# Patient Record
Sex: MALE | Race: WHITE | NOT HISPANIC OR LATINO | Employment: OTHER | URBAN - METROPOLITAN AREA
[De-identification: names, ages, dates, MRNs, and addresses within clinical notes are randomized per-mention and may not be internally consistent; named-entity substitution may affect disease eponyms.]

---

## 2017-02-10 ENCOUNTER — TELEPHONE (OUTPATIENT)
Dept: FAMILY MEDICINE | Facility: CLINIC | Age: 77
End: 2017-02-10

## 2017-02-10 NOTE — TELEPHONE ENCOUNTER
Panel Management Review      Patient has the following on his problem list:   Composite cancer screening  Chart review shows that this patient is due/due soon for the following   Summary:    Patient is due/failing the following:       Action needed:       Type of outreach:    Phone, spoke to patient.  He is no longer a patient of [facility]    Questions for provider review:    None                                                                                   Barbara Dominguez CMA

## 2017-03-24 ENCOUNTER — DOCUMENTATION ONLY (OUTPATIENT)
Dept: OTHER | Facility: CLINIC | Age: 77
End: 2017-03-24

## 2017-03-24 DIAGNOSIS — Z71.89 ADVANCED DIRECTIVES, COUNSELING/DISCUSSION: Chronic | ICD-10-CM

## 2017-04-14 ENCOUNTER — TELEPHONE (OUTPATIENT)
Dept: FAMILY MEDICINE | Facility: CLINIC | Age: 77
End: 2017-04-14

## 2017-04-14 NOTE — LETTER
[facility]  [address]  [address]  Phone: [phone]      April 27, 2017      Pee Ayala  [address]  [facility] [address]      Dear Pee,     As part of [facility]'s commitment to health and wellness we have recently reviewed your chart and your medical record indicates that you are due for one or more of the following:    -- A diabetic check appointment with fasting blood work. We like to see you every 6 months to be sure we are doing everything we can to keep your diabetes under control. Please call to schedule an appointment. Also, plan to come fasting at least 8-10 hours prior to your appointment.     -- Colonoscopy or FIT test.  Please call one of the following numbers to schedule a colonoscopy:  [facility] [phone]  [facility] [phone]  [facility] [phone]  [facility] [phone] (multiple sites, call for locations)    A colonoscopy is the gold standard but if you are reluctant to do this there is a less invasive and less expensive alternative. FIT (fecal immunochemical testing) is a screening option that is performed on a yearly basis. This is a test to check for blood in the stool, which can be performed in the comfort of your own home. If you are willing to perform this test, we can order the kit for you to  at our lab. When you have completed the test, you simply mail it in the pre-addressed envelope.    Please call us at [phone] if you need an order for a colonoscopy or FIT testing.        Please try to schedule and/or complete the tests above within the next 2-4 weeks.   The number to call to schedule an appointment at [facility] [phone].    While we work hard to maintain accurate records on all our patients, it is always possible that this notice does not accurately reflect tests that you may have had. To ensure that we do not continue to send you notices please verify, at your next visit  or by a MyChart message, that we have accurate dates of your tests, even if these were done many years ago.     Working together for your health is our goal.    Thank you for choosing [facility] for your healthcare needs.      Sincerely,     [facility] Team

## 2017-04-14 NOTE — TELEPHONE ENCOUNTER
Panel Management Review      Patient has the following on his problem list:     Diabetes    ASA: Passed    Last A1C  Lab Results   Component Value Date    A1C 8.0 06/09/2016    A1C 8.2 03/08/2016    A1C 7.7 12/23/2015    A1C 6.9 09/15/2015    A1C 8.2 06/15/2015     A1C tested: FAILED    Last LDL:    Lab Results   Component Value Date    CHOL 148 04/29/2015     Lab Results   Component Value Date    HDL 44 04/29/2015     Lab Results   Component Value Date    LDL 79 01/05/2016    LDL 85 04/29/2015     Lab Results   Component Value Date    TRIG 93 04/29/2015     Lab Results   Component Value Date    CHOLHDLRATIO 3.4 04/29/2015     No results found for: NHDL    Is the patient on a Statin? YES             Is the patient on Aspirin? YES    Medications     HMG CoA Reductase Inhibitors    pravastatin (PRAVACHOL) 80 MG tablet    Salicylates    aspirin 81 MG EC tablet          Last three blood pressure readings:  BP Readings from Last 3 Encounters:   06/09/16 109/68   05/26/16 110/60   03/29/16 113/72       Date of last diabetes office visit: 03/29/2016- seen endo     Tobacco History:     History   Smoking Status     Former Smoker     Quit date: 1/1/1990   Smokeless Tobacco     Never Used         Hypertension   Last three blood pressure readings:  BP Readings from Last 3 Encounters:   06/09/16 109/68   05/26/16 110/60   03/29/16 113/72     Blood pressure: Passed    HTN Guidelines:  Age 18-59 BP range:  Less than 140/90  Age 60-85 with Diabetes:  Less than 140/90  Age 60-85 without Diabetes:  less than 150/90      Composite cancer screening  Chart review shows that this patient is due/due soon for the following Colonoscopy  Summary:    Patient is due/failing the following:   See health maintenance     Action needed:   Routed to provider for review.    Type of outreach:        Questions for provider review:    Per notes from last [facility] outreach in 2/2017 states patient is not a [facility] patient anymore.                                                                                                                                      Cecile Crocker, JOEL       Chart routed to Provider .

## 2017-04-27 NOTE — TELEPHONE ENCOUNTER
Ok to send a letter asking him to make a diabetes/health maintenance appointment.   YUNIER Shafer MD

## 2017-05-16 DIAGNOSIS — E11.42 TYPE 2 DIABETES MELLITUS WITH PERIPHERAL NEUROPATHY (H): ICD-10-CM

## 2017-05-16 NOTE — TELEPHONE ENCOUNTER
Writer phoned patient to review. Patient is now seeing Dr. Baig with [facility].     Advised patient to contact Dr. Baig for refill.    Cecy Suresh LPN  Adult Endocrinology  [facility]

## 2018-12-14 LAB — INR PPP: 4 (ref 0.9–1.1)

## 2018-12-17 LAB — INR PPP: 2.3 (ref 0.9–1.1)

## 2018-12-27 LAB — INR PPP: 1.7 (ref 0.9–1.1)

## 2019-01-03 ENCOUNTER — COMMUNICATION - HEALTHEAST (OUTPATIENT)
Dept: FAMILY MEDICINE | Facility: CLINIC | Age: 79
End: 2019-01-03

## 2019-01-03 ENCOUNTER — COMMUNICATION - HEALTHEAST (OUTPATIENT)
Dept: ANTICOAGULATION | Facility: CLINIC | Age: 79
End: 2019-01-03

## 2019-01-03 ENCOUNTER — OFFICE VISIT - HEALTHEAST (OUTPATIENT)
Dept: FAMILY MEDICINE | Facility: CLINIC | Age: 79
End: 2019-01-03

## 2019-01-03 DIAGNOSIS — I48.20 CHRONIC ATRIAL FIBRILLATION (H): ICD-10-CM

## 2019-01-03 DIAGNOSIS — Z79.01 ANTICOAGULATED ON COUMADIN: ICD-10-CM

## 2019-01-03 DIAGNOSIS — E78.2 MIXED HYPERLIPIDEMIA: ICD-10-CM

## 2019-01-03 DIAGNOSIS — I10 ESSENTIAL HYPERTENSION: ICD-10-CM

## 2019-01-03 DIAGNOSIS — E11.3599 TYPE 2 DIABETES MELLITUS WITH PROLIFERATIVE RETINOPATHY WITHOUT MACULAR EDEMA, WITH LONG-TERM CURRENT USE OF INSULIN, UNSPECIFIED LATERALITY (H): ICD-10-CM

## 2019-01-03 DIAGNOSIS — Z79.4 TYPE 2 DIABETES MELLITUS WITH PROLIFERATIVE RETINOPATHY WITHOUT MACULAR EDEMA, WITH LONG-TERM CURRENT USE OF INSULIN, UNSPECIFIED LATERALITY (H): ICD-10-CM

## 2019-01-03 DIAGNOSIS — E11.42 DIABETIC POLYNEUROPATHY ASSOCIATED WITH TYPE 2 DIABETES MELLITUS (H): ICD-10-CM

## 2019-01-03 DIAGNOSIS — Z76.89 ENCOUNTER TO ESTABLISH CARE: ICD-10-CM

## 2019-01-03 LAB
AMPHETAMINES UR QL SCN: NORMAL
BARBITURATES UR QL: NORMAL
BENZODIAZ UR QL: NORMAL
CANNABINOIDS UR QL SCN: NORMAL
COCAINE UR QL: NORMAL
CREAT UR-MCNC: 129.1 MG/DL
HBA1C MFR BLD: 7.6 % (ref 3.5–6)
INR PPP: 4.5 (ref 0.9–1.1)
METHADONE UR QL SCN: NORMAL
OPIATES UR QL SCN: NORMAL
OXYCODONE UR QL: NORMAL
PCP UR QL SCN: NORMAL

## 2019-01-03 ASSESSMENT — MIFFLIN-ST. JEOR: SCORE: 1584.57

## 2019-01-04 ENCOUNTER — COMMUNICATION - HEALTHEAST (OUTPATIENT)
Dept: ANTICOAGULATION | Facility: CLINIC | Age: 79
End: 2019-01-04

## 2019-01-15 ENCOUNTER — COMMUNICATION - HEALTHEAST (OUTPATIENT)
Dept: ANTICOAGULATION | Facility: CLINIC | Age: 79
End: 2019-01-15

## 2019-01-21 ENCOUNTER — COMMUNICATION - HEALTHEAST (OUTPATIENT)
Dept: ANTICOAGULATION | Facility: CLINIC | Age: 79
End: 2019-01-21

## 2019-01-21 ENCOUNTER — AMBULATORY - HEALTHEAST (OUTPATIENT)
Dept: LAB | Facility: CLINIC | Age: 79
End: 2019-01-21

## 2019-01-21 DIAGNOSIS — I48.20 CHRONIC ATRIAL FIBRILLATION (H): ICD-10-CM

## 2019-01-21 LAB — INR PPP: 3.2 (ref 0.9–1.1)

## 2019-01-23 ENCOUNTER — OFFICE VISIT - HEALTHEAST (OUTPATIENT)
Dept: CARDIOLOGY | Facility: CLINIC | Age: 79
End: 2019-01-23

## 2019-01-23 DIAGNOSIS — I48.20 CHRONIC ATRIAL FIBRILLATION (H): ICD-10-CM

## 2019-01-23 DIAGNOSIS — I10 ESSENTIAL HYPERTENSION: ICD-10-CM

## 2019-01-23 DIAGNOSIS — Z79.01 ANTICOAGULATED ON COUMADIN: ICD-10-CM

## 2019-01-23 DIAGNOSIS — R07.89 CHEST HEAVINESS: ICD-10-CM

## 2019-01-23 DIAGNOSIS — R06.09 DYSPNEA ON EXERTION: ICD-10-CM

## 2019-01-23 DIAGNOSIS — I25.10 CAD (CORONARY ARTERY DISEASE): ICD-10-CM

## 2019-01-23 ASSESSMENT — MIFFLIN-ST. JEOR: SCORE: 1571.75

## 2019-01-24 ENCOUNTER — COMMUNICATION - HEALTHEAST (OUTPATIENT)
Dept: ANTICOAGULATION | Facility: CLINIC | Age: 79
End: 2019-01-24

## 2019-01-24 DIAGNOSIS — I48.20 CHRONIC ATRIAL FIBRILLATION (H): ICD-10-CM

## 2019-01-25 ENCOUNTER — RECORDS - HEALTHEAST (OUTPATIENT)
Dept: ADMINISTRATIVE | Facility: OTHER | Age: 79
End: 2019-01-25

## 2019-01-29 ENCOUNTER — COMMUNICATION - HEALTHEAST (OUTPATIENT)
Dept: SCHEDULING | Facility: CLINIC | Age: 79
End: 2019-01-29

## 2019-01-31 ENCOUNTER — AMBULATORY - HEALTHEAST (OUTPATIENT)
Dept: LAB | Facility: CLINIC | Age: 79
End: 2019-01-31

## 2019-01-31 ENCOUNTER — RECORDS - HEALTHEAST (OUTPATIENT)
Dept: ADMINISTRATIVE | Facility: OTHER | Age: 79
End: 2019-01-31

## 2019-01-31 ENCOUNTER — COMMUNICATION - HEALTHEAST (OUTPATIENT)
Dept: ANTICOAGULATION | Facility: CLINIC | Age: 79
End: 2019-01-31

## 2019-01-31 DIAGNOSIS — I48.20 CHRONIC ATRIAL FIBRILLATION (H): ICD-10-CM

## 2019-01-31 LAB — INR PPP: 3.1 (ref 0.9–1.1)

## 2019-02-05 ENCOUNTER — COMMUNICATION - HEALTHEAST (OUTPATIENT)
Dept: ANTICOAGULATION | Facility: CLINIC | Age: 79
End: 2019-02-05

## 2019-02-05 ENCOUNTER — AMBULATORY - HEALTHEAST (OUTPATIENT)
Dept: LAB | Facility: CLINIC | Age: 79
End: 2019-02-05

## 2019-02-05 DIAGNOSIS — I48.20 CHRONIC ATRIAL FIBRILLATION (H): ICD-10-CM

## 2019-02-05 LAB — INR PPP: 3.6 (ref 0.9–1.1)

## 2019-02-07 ENCOUNTER — RECORDS - HEALTHEAST (OUTPATIENT)
Dept: ADMINISTRATIVE | Facility: OTHER | Age: 79
End: 2019-02-07

## 2019-02-13 ENCOUNTER — COMMUNICATION - HEALTHEAST (OUTPATIENT)
Dept: FAMILY MEDICINE | Facility: CLINIC | Age: 79
End: 2019-02-13

## 2019-02-13 ENCOUNTER — RECORDS - HEALTHEAST (OUTPATIENT)
Dept: ADMINISTRATIVE | Facility: OTHER | Age: 79
End: 2019-02-13

## 2019-02-15 ENCOUNTER — COMMUNICATION - HEALTHEAST (OUTPATIENT)
Dept: ANTICOAGULATION | Facility: CLINIC | Age: 79
End: 2019-02-15

## 2019-02-15 ENCOUNTER — AMBULATORY - HEALTHEAST (OUTPATIENT)
Dept: LAB | Facility: CLINIC | Age: 79
End: 2019-02-15

## 2019-02-15 DIAGNOSIS — I48.20 CHRONIC ATRIAL FIBRILLATION (H): ICD-10-CM

## 2019-02-15 LAB — INR PPP: 1.6 (ref 0.9–1.1)

## 2019-03-01 ENCOUNTER — COMMUNICATION - HEALTHEAST (OUTPATIENT)
Dept: ANTICOAGULATION | Facility: CLINIC | Age: 79
End: 2019-03-01

## 2019-03-01 ENCOUNTER — AMBULATORY - HEALTHEAST (OUTPATIENT)
Dept: LAB | Facility: CLINIC | Age: 79
End: 2019-03-01

## 2019-03-01 DIAGNOSIS — I48.20 CHRONIC ATRIAL FIBRILLATION (H): ICD-10-CM

## 2019-03-01 LAB — INR PPP: 1.8 (ref 0.9–1.1)

## 2019-03-04 ENCOUNTER — COMMUNICATION - HEALTHEAST (OUTPATIENT)
Dept: FAMILY MEDICINE | Facility: CLINIC | Age: 79
End: 2019-03-04

## 2019-03-07 ENCOUNTER — RECORDS - HEALTHEAST (OUTPATIENT)
Dept: ADMINISTRATIVE | Facility: OTHER | Age: 79
End: 2019-03-07

## 2019-03-12 ENCOUNTER — COMMUNICATION - HEALTHEAST (OUTPATIENT)
Dept: FAMILY MEDICINE | Facility: CLINIC | Age: 79
End: 2019-03-12

## 2019-03-13 ENCOUNTER — AMBULATORY - HEALTHEAST (OUTPATIENT)
Dept: LAB | Facility: CLINIC | Age: 79
End: 2019-03-13

## 2019-03-13 ENCOUNTER — COMMUNICATION - HEALTHEAST (OUTPATIENT)
Dept: ANTICOAGULATION | Facility: CLINIC | Age: 79
End: 2019-03-13

## 2019-03-13 DIAGNOSIS — I48.20 CHRONIC ATRIAL FIBRILLATION (H): ICD-10-CM

## 2019-03-13 LAB — INR PPP: 2.5 (ref 0.9–1.1)

## 2019-03-18 ENCOUNTER — HOSPITAL ENCOUNTER (OUTPATIENT)
Dept: CARDIOLOGY | Facility: HOSPITAL | Age: 79
Discharge: HOME | End: 2019-03-18
Attending: INTERNAL MEDICINE

## 2019-03-18 DIAGNOSIS — I48.20 CHRONIC ATRIAL FIBRILLATION (H): ICD-10-CM

## 2019-03-18 DIAGNOSIS — R06.09 DYSPNEA ON EXERTION: ICD-10-CM

## 2019-03-18 DIAGNOSIS — I25.10 CAD (CORONARY ARTERY DISEASE): ICD-10-CM

## 2019-03-18 DIAGNOSIS — R06.09 OTHER FORMS OF DYSPNEA: ICD-10-CM

## 2019-03-18 LAB
AORTIC ROOT: 3.5 CM
AORTIC VALVE MEAN VELOCITY: 228 CM/S
AR DECEL SLOPE: 2830 MM/S2
AR PEAK VELOCITY: 440 CM/S
ASCENDING AORTA: 3.5 CM
AV DIMENSIONLESS INDEX VTI: 0.2
AV MEAN GRADIENT: 24 MMHG
AV PEAK GRADIENT: 46 MMHG
AV REGURGITANT PEAK GRADIENT: 77.4 MMHG
AV REGURGITATION PRESSURE HALF TIME: 456 MS
AV VALVE AREA: 0.7 CM2
AV VELOCITY RATIO: 0.2
BSA FOR ECHO PROCEDURE: 2.06 M2
CV ECHO HEIGHT: 65 IN
CV ECHO WEIGHT: 205 LBS
DOP CALC AO PEAK VEL: 339 CM/S
DOP CALC AO VTI: 68.4 CM
DOP CALC LVOT AREA: 3.14 CM2
DOP CALC LVOT DIAMETER: 2 CM
DOP CALC LVOT PEAK VEL: 79.8 CM/S
DOP CALC LVOT STROKE VOLUME: 49 CM3
DOP CALCLVOT PEAK VEL VTI: 15.6 CM
ECHO EJECTION FRACTION ESTIMATED: 65 %
EJECTION FRACTION: 71 % (ref 55–75)
FRACTIONAL SHORTENING: 45.8 % (ref 28–44)
INTERVENTRICULAR SEPTUM IN END DIASTOLE: 1.29 CM (ref 0.6–1)
IVS/PW RATIO: 1
LA AREA 1: 29 CM2
LA AREA 2: 27 CM2
LEFT ATRIUM LENGTH: 6.1 CM
LEFT ATRIUM SIZE: 4.9 CM
LEFT ATRIUM VOLUME INDEX: 53 ML/M2
LEFT ATRIUM VOLUME: 109.1 ML
LEFT VENTRICLE CARDIAC INDEX: 1.7 L/MIN/M2
LEFT VENTRICLE CARDIAC OUTPUT: 3.5 L/MIN
LEFT VENTRICLE DIASTOLIC VOLUME INDEX: 47.5 CM3/M2 (ref 34–74)
LEFT VENTRICLE DIASTOLIC VOLUME: 97.9 CM3 (ref 62–150)
LEFT VENTRICLE HEART RATE: 71 BPM
LEFT VENTRICLE MASS INDEX: 129.2 G/M2
LEFT VENTRICLE SYSTOLIC VOLUME INDEX: 13.9 CM3/M2 (ref 11–31)
LEFT VENTRICLE SYSTOLIC VOLUME: 28.7 CM3 (ref 21–61)
LEFT VENTRICULAR INTERNAL DIMENSION IN DIASTOLE: 5 CM (ref 4.2–5.8)
LEFT VENTRICULAR INTERNAL DIMENSION IN SYSTOLE: 2.71 CM (ref 2.5–4)
LEFT VENTRICULAR MASS: 266.2 G
LEFT VENTRICULAR OUTFLOW TRACT MEAN GRADIENT: 1 MMHG
LEFT VENTRICULAR OUTFLOW TRACT MEAN VELOCITY: 47.7 CM/S
LEFT VENTRICULAR OUTFLOW TRACT PEAK GRADIENT: 3 MMHG
LEFT VENTRICULAR POSTERIOR WALL IN END DIASTOLE: 1.34 CM (ref 0.6–1)
LV STROKE VOLUME INDEX: 23.8 ML/M2
MITRAL VALVE DECELERATION SLOPE: 7980 MM/S2
MITRAL VALVE PRESSURE HALF-TIME: 43 MS
MV AVERAGE E/E' RATIO: 9.6 CM/S
MV DECELERATION TIME: 148 MS
MV E'TISSUE VEL-LAT: 15.7 CM/S
MV E'TISSUE VEL-MED: 8.81 CM/S
MV LATERAL E/E' RATIO: 7.5
MV MEDIAL E/E' RATIO: 13.4
MV PEAK E VELOCITY: 118 CM/S
MV VALVE AREA PRESSURE 1/2 METHOD: 5.1 CM2
NUC REST DIASTOLIC VOLUME INDEX: 3280 LBS
NUC REST SYSTOLIC VOLUME INDEX: 65 IN
PR MAX PG: 5 MMHG
PR PEAK VELOCITY: 134 CM/S
TRICUSPID REGURGITATION PEAK PRESSURE GRADIENT: 32.3 MMHG
TRICUSPID VALVE ANULAR PLANE SYSTOLIC EXCURSION: 1.3 CM
TRICUSPID VALVE PEAK REGURGITANT VELOCITY: 284 CM/S

## 2019-03-18 ASSESSMENT — MIFFLIN-ST. JEOR: SCORE: 1571.75

## 2019-03-20 ENCOUNTER — OFFICE VISIT - HEALTHEAST (OUTPATIENT)
Dept: ENDOCRINOLOGY | Facility: CLINIC | Age: 79
End: 2019-03-20

## 2019-03-20 ENCOUNTER — AMBULATORY - HEALTHEAST (OUTPATIENT)
Dept: ENDOCRINOLOGY | Facility: CLINIC | Age: 79
End: 2019-03-20

## 2019-03-20 DIAGNOSIS — E11.3599 TYPE 2 DIABETES MELLITUS WITH PROLIFERATIVE RETINOPATHY WITHOUT MACULAR EDEMA, WITH LONG-TERM CURRENT USE OF INSULIN, UNSPECIFIED LATERALITY (H): ICD-10-CM

## 2019-03-20 DIAGNOSIS — Z79.4 TYPE 2 DIABETES MELLITUS WITH PROLIFERATIVE RETINOPATHY WITHOUT MACULAR EDEMA, WITH LONG-TERM CURRENT USE OF INSULIN, UNSPECIFIED LATERALITY (H): ICD-10-CM

## 2019-03-20 ASSESSMENT — MIFFLIN-ST. JEOR: SCORE: 1572.21

## 2019-03-21 ENCOUNTER — AMBULATORY - HEALTHEAST (OUTPATIENT)
Dept: CARDIOLOGY | Facility: CLINIC | Age: 79
End: 2019-03-21

## 2019-03-22 ENCOUNTER — AMBULATORY - HEALTHEAST (OUTPATIENT)
Dept: CARDIOLOGY | Facility: CLINIC | Age: 79
End: 2019-03-22

## 2019-03-22 DIAGNOSIS — I25.83 CORONARY ARTERY DISEASE DUE TO LIPID RICH PLAQUE: ICD-10-CM

## 2019-03-22 DIAGNOSIS — I25.10 CORONARY ARTERY DISEASE DUE TO LIPID RICH PLAQUE: ICD-10-CM

## 2019-03-26 ENCOUNTER — COMMUNICATION - HEALTHEAST (OUTPATIENT)
Dept: ANTICOAGULATION | Facility: CLINIC | Age: 79
End: 2019-03-26

## 2019-03-28 ENCOUNTER — OFFICE VISIT - HEALTHEAST (OUTPATIENT)
Dept: FAMILY MEDICINE | Facility: CLINIC | Age: 79
End: 2019-03-28

## 2019-03-28 ENCOUNTER — COMMUNICATION - HEALTHEAST (OUTPATIENT)
Dept: ANTICOAGULATION | Facility: CLINIC | Age: 79
End: 2019-03-28

## 2019-03-28 DIAGNOSIS — I48.20 CHRONIC ATRIAL FIBRILLATION (H): ICD-10-CM

## 2019-03-28 DIAGNOSIS — I50.9 CONGESTIVE HEART FAILURE, UNSPECIFIED HF CHRONICITY, UNSPECIFIED HEART FAILURE TYPE (H): ICD-10-CM

## 2019-03-28 DIAGNOSIS — Z01.818 PREOP GENERAL PHYSICAL EXAM: ICD-10-CM

## 2019-03-28 DIAGNOSIS — L98.9 SKIN LESIONS: ICD-10-CM

## 2019-03-28 DIAGNOSIS — E11.3593 TYPE 2 DIABETES MELLITUS WITH BOTH EYES AFFECTED BY PROLIFERATIVE RETINOPATHY WITHOUT MACULAR EDEMA, WITH LONG-TERM CURRENT USE OF INSULIN (H): ICD-10-CM

## 2019-03-28 DIAGNOSIS — Z79.4 TYPE 2 DIABETES MELLITUS WITH BOTH EYES AFFECTED BY PROLIFERATIVE RETINOPATHY WITHOUT MACULAR EDEMA, WITH LONG-TERM CURRENT USE OF INSULIN (H): ICD-10-CM

## 2019-03-28 DIAGNOSIS — E66.01 MORBID OBESITY (H): ICD-10-CM

## 2019-03-28 LAB
ALBUMIN SERPL-MCNC: 3.7 G/DL (ref 3.5–5)
ALP SERPL-CCNC: 63 U/L (ref 45–120)
ALT SERPL W P-5'-P-CCNC: 15 U/L (ref 0–45)
ANION GAP SERPL CALCULATED.3IONS-SCNC: 9 MMOL/L (ref 5–18)
AST SERPL W P-5'-P-CCNC: 18 U/L (ref 0–40)
BILIRUB SERPL-MCNC: 0.6 MG/DL (ref 0–1)
BUN SERPL-MCNC: 18 MG/DL (ref 8–28)
CALCIUM SERPL-MCNC: 9.7 MG/DL (ref 8.5–10.5)
CHLORIDE BLD-SCNC: 104 MMOL/L (ref 98–107)
CO2 SERPL-SCNC: 25 MMOL/L (ref 22–31)
CREAT SERPL-MCNC: 0.92 MG/DL (ref 0.7–1.3)
GFR SERPL CREATININE-BSD FRML MDRD: >60 ML/MIN/1.73M2
GLUCOSE BLD-MCNC: 246 MG/DL (ref 70–125)
HBA1C MFR BLD: 7.5 % (ref 3.5–6)
INR PPP: 3.7 (ref 0.9–1.1)
POTASSIUM BLD-SCNC: 4.9 MMOL/L (ref 3.5–5)
PROT SERPL-MCNC: 6.6 G/DL (ref 6–8)
SODIUM SERPL-SCNC: 138 MMOL/L (ref 136–145)

## 2019-03-28 ASSESSMENT — MIFFLIN-ST. JEOR: SCORE: 1598.06

## 2019-04-01 ENCOUNTER — COMMUNICATION - HEALTHEAST (OUTPATIENT)
Dept: FAMILY MEDICINE | Facility: CLINIC | Age: 79
End: 2019-04-01

## 2019-04-02 ENCOUNTER — AMBULATORY - HEALTHEAST (OUTPATIENT)
Dept: VASCULAR SURGERY | Facility: CLINIC | Age: 79
End: 2019-04-02

## 2019-04-02 ENCOUNTER — COMMUNICATION - HEALTHEAST (OUTPATIENT)
Dept: CARDIOLOGY | Facility: CLINIC | Age: 79
End: 2019-04-02

## 2019-04-02 DIAGNOSIS — I73.9 PAD (PERIPHERAL ARTERY DISEASE) (H): ICD-10-CM

## 2019-04-03 ENCOUNTER — SURGERY - HEALTHEAST (OUTPATIENT)
Dept: CARDIOLOGY | Facility: CLINIC | Age: 79
End: 2019-04-03

## 2019-04-04 ENCOUNTER — SURGERY - HEALTHEAST (OUTPATIENT)
Dept: CARDIOLOGY | Facility: CLINIC | Age: 79
End: 2019-04-04

## 2019-04-04 ENCOUNTER — COMMUNICATION - HEALTHEAST (OUTPATIENT)
Dept: FAMILY MEDICINE | Facility: CLINIC | Age: 79
End: 2019-04-04

## 2019-04-04 ASSESSMENT — MIFFLIN-ST. JEOR: SCORE: 1581.28

## 2019-04-05 ENCOUNTER — COMMUNICATION - HEALTHEAST (OUTPATIENT)
Dept: ANTICOAGULATION | Facility: CLINIC | Age: 79
End: 2019-04-05

## 2019-04-05 DIAGNOSIS — I48.20 CHRONIC ATRIAL FIBRILLATION (H): ICD-10-CM

## 2019-04-09 ENCOUNTER — AMBULATORY - HEALTHEAST (OUTPATIENT)
Dept: LAB | Facility: CLINIC | Age: 79
End: 2019-04-09

## 2019-04-09 ENCOUNTER — COMMUNICATION - HEALTHEAST (OUTPATIENT)
Dept: ANTICOAGULATION | Facility: CLINIC | Age: 79
End: 2019-04-09

## 2019-04-09 DIAGNOSIS — I48.20 CHRONIC ATRIAL FIBRILLATION (H): ICD-10-CM

## 2019-04-09 LAB — INR PPP: 1.4 (ref 0.9–1.1)

## 2019-04-17 ENCOUNTER — COMMUNICATION - HEALTHEAST (OUTPATIENT)
Dept: ANTICOAGULATION | Facility: CLINIC | Age: 79
End: 2019-04-17

## 2019-04-17 ENCOUNTER — AMBULATORY - HEALTHEAST (OUTPATIENT)
Dept: LAB | Facility: CLINIC | Age: 79
End: 2019-04-17

## 2019-04-17 DIAGNOSIS — I48.20 CHRONIC ATRIAL FIBRILLATION (H): ICD-10-CM

## 2019-04-17 LAB — INR PPP: 3.5 (ref 0.9–1.1)

## 2019-04-18 ENCOUNTER — OFFICE VISIT - HEALTHEAST (OUTPATIENT)
Dept: FAMILY MEDICINE | Facility: CLINIC | Age: 79
End: 2019-04-18

## 2019-04-18 DIAGNOSIS — Z79.4 TYPE 2 DIABETES MELLITUS WITH BOTH EYES AFFECTED BY PROLIFERATIVE RETINOPATHY WITHOUT MACULAR EDEMA, WITH LONG-TERM CURRENT USE OF INSULIN (H): ICD-10-CM

## 2019-04-18 DIAGNOSIS — E11.3593 TYPE 2 DIABETES MELLITUS WITH BOTH EYES AFFECTED BY PROLIFERATIVE RETINOPATHY WITHOUT MACULAR EDEMA, WITH LONG-TERM CURRENT USE OF INSULIN (H): ICD-10-CM

## 2019-04-18 DIAGNOSIS — I48.20 CHRONIC ATRIAL FIBRILLATION (H): ICD-10-CM

## 2019-04-18 DIAGNOSIS — Z98.890 STATUS POST CORONARY ANGIOGRAM: ICD-10-CM

## 2019-04-18 DIAGNOSIS — I50.9 CONGESTIVE HEART FAILURE, UNSPECIFIED HF CHRONICITY, UNSPECIFIED HEART FAILURE TYPE (H): ICD-10-CM

## 2019-04-23 ENCOUNTER — AMBULATORY - HEALTHEAST (OUTPATIENT)
Dept: VASCULAR SURGERY | Facility: CLINIC | Age: 79
End: 2019-04-23

## 2019-04-23 DIAGNOSIS — I73.9 PAD (PERIPHERAL ARTERY DISEASE) (H): ICD-10-CM

## 2019-04-24 ENCOUNTER — AMBULATORY - HEALTHEAST (OUTPATIENT)
Dept: ENDOCRINOLOGY | Facility: CLINIC | Age: 79
End: 2019-04-24

## 2019-04-25 ENCOUNTER — OFFICE VISIT - HEALTHEAST (OUTPATIENT)
Dept: PHARMACY | Facility: CLINIC | Age: 79
End: 2019-04-25

## 2019-04-25 ENCOUNTER — COMMUNICATION - HEALTHEAST (OUTPATIENT)
Dept: ANTICOAGULATION | Facility: CLINIC | Age: 79
End: 2019-04-25

## 2019-04-25 ENCOUNTER — AMBULATORY - HEALTHEAST (OUTPATIENT)
Dept: LAB | Facility: CLINIC | Age: 79
End: 2019-04-25

## 2019-04-25 DIAGNOSIS — I25.10 CORONARY ARTERY DISEASE INVOLVING NATIVE CORONARY ARTERY OF NATIVE HEART WITHOUT ANGINA PECTORIS: ICD-10-CM

## 2019-04-25 DIAGNOSIS — I48.20 CHRONIC ATRIAL FIBRILLATION (H): ICD-10-CM

## 2019-04-25 DIAGNOSIS — E11.3593 TYPE 2 DIABETES MELLITUS WITH BOTH EYES AFFECTED BY PROLIFERATIVE RETINOPATHY WITHOUT MACULAR EDEMA, WITH LONG-TERM CURRENT USE OF INSULIN (H): ICD-10-CM

## 2019-04-25 DIAGNOSIS — Z79.4 TYPE 2 DIABETES MELLITUS WITH BOTH EYES AFFECTED BY PROLIFERATIVE RETINOPATHY WITHOUT MACULAR EDEMA, WITH LONG-TERM CURRENT USE OF INSULIN (H): ICD-10-CM

## 2019-04-25 LAB — INR PPP: 4.4 (ref 0.9–1.1)

## 2019-05-01 ENCOUNTER — RECORDS - HEALTHEAST (OUTPATIENT)
Dept: VASCULAR ULTRASOUND | Facility: CLINIC | Age: 79
End: 2019-05-01

## 2019-05-01 ENCOUNTER — OFFICE VISIT - HEALTHEAST (OUTPATIENT)
Dept: VASCULAR SURGERY | Facility: CLINIC | Age: 79
End: 2019-05-01

## 2019-05-01 DIAGNOSIS — I73.9 CLAUDICATION (H): ICD-10-CM

## 2019-05-01 DIAGNOSIS — I71.40 AAA (ABDOMINAL AORTIC ANEURYSM) (H): ICD-10-CM

## 2019-05-01 DIAGNOSIS — R09.89 ABDOMINAL BRUIT: ICD-10-CM

## 2019-05-01 DIAGNOSIS — Z13.6 SCREENING FOR AAA (ABDOMINAL AORTIC ANEURYSM): ICD-10-CM

## 2019-05-01 DIAGNOSIS — R19.00 PALPABLE ABDOMINAL MASS: ICD-10-CM

## 2019-05-01 DIAGNOSIS — I73.9 PERIPHERAL VASCULAR DISEASE, UNSPECIFIED (H): ICD-10-CM

## 2019-05-01 DIAGNOSIS — I74.10 AORTIC THROMBUS (H): ICD-10-CM

## 2019-05-01 DIAGNOSIS — I73.9 PAD (PERIPHERAL ARTERY DISEASE) (H): ICD-10-CM

## 2019-05-01 DIAGNOSIS — I70.213 ATHEROSCLEROSIS OF NATIVE ARTERIES OF EXTREMITIES WITH INTERMITTENT CLAUDICATION, BILATERAL LEGS (H): ICD-10-CM

## 2019-05-01 ASSESSMENT — MIFFLIN-ST. JEOR: SCORE: 1615.3

## 2019-05-03 ENCOUNTER — COMMUNICATION - HEALTHEAST (OUTPATIENT)
Dept: ANTICOAGULATION | Facility: CLINIC | Age: 79
End: 2019-05-03

## 2019-05-06 ENCOUNTER — AMBULATORY - HEALTHEAST (OUTPATIENT)
Dept: LAB | Facility: CLINIC | Age: 79
End: 2019-05-06

## 2019-05-06 ENCOUNTER — COMMUNICATION - HEALTHEAST (OUTPATIENT)
Dept: ANTICOAGULATION | Facility: CLINIC | Age: 79
End: 2019-05-06

## 2019-05-06 DIAGNOSIS — I48.20 CHRONIC ATRIAL FIBRILLATION (H): ICD-10-CM

## 2019-05-06 LAB — INR PPP: 1.4 (ref 0.9–1.1)

## 2019-05-09 ENCOUNTER — RECORDS - HEALTHEAST (OUTPATIENT)
Dept: ADMINISTRATIVE | Facility: OTHER | Age: 79
End: 2019-05-09

## 2019-05-09 ENCOUNTER — OFFICE VISIT - HEALTHEAST (OUTPATIENT)
Dept: CARDIOLOGY | Facility: CLINIC | Age: 79
End: 2019-05-09

## 2019-05-09 DIAGNOSIS — I48.20 CHRONIC ATRIAL FIBRILLATION (H): ICD-10-CM

## 2019-05-09 DIAGNOSIS — I50.32 CHRONIC DIASTOLIC CONGESTIVE HEART FAILURE (H): ICD-10-CM

## 2019-05-09 ASSESSMENT — MIFFLIN-ST. JEOR: SCORE: 1612.58

## 2019-05-10 LAB — BNP SERPL-MCNC: 96 PG/ML (ref 0–80)

## 2019-05-11 ENCOUNTER — COMMUNICATION - HEALTHEAST (OUTPATIENT)
Dept: CARDIOLOGY | Facility: CLINIC | Age: 79
End: 2019-05-11

## 2019-05-11 DIAGNOSIS — I11.9 HYPERTENSIVE HEART DISEASE: ICD-10-CM

## 2019-05-14 ENCOUNTER — COMMUNICATION - HEALTHEAST (OUTPATIENT)
Dept: ANTICOAGULATION | Facility: CLINIC | Age: 79
End: 2019-05-14

## 2019-05-14 ENCOUNTER — AMBULATORY - HEALTHEAST (OUTPATIENT)
Dept: LAB | Facility: CLINIC | Age: 79
End: 2019-05-14

## 2019-05-14 DIAGNOSIS — I48.20 CHRONIC ATRIAL FIBRILLATION (H): ICD-10-CM

## 2019-05-14 DIAGNOSIS — Z79.01 LONG TERM CURRENT USE OF ANTICOAGULANT THERAPY: ICD-10-CM

## 2019-05-14 LAB — INR PPP: 4.2 (ref 0.9–1.1)

## 2019-05-15 ENCOUNTER — COMMUNICATION - HEALTHEAST (OUTPATIENT)
Dept: NURSING | Facility: CLINIC | Age: 79
End: 2019-05-15

## 2019-05-17 ENCOUNTER — COMMUNICATION - HEALTHEAST (OUTPATIENT)
Dept: ANTICOAGULATION | Facility: CLINIC | Age: 79
End: 2019-05-17

## 2019-05-17 ENCOUNTER — AMBULATORY - HEALTHEAST (OUTPATIENT)
Dept: LAB | Facility: CLINIC | Age: 79
End: 2019-05-17

## 2019-05-17 DIAGNOSIS — I48.20 CHRONIC ATRIAL FIBRILLATION (H): ICD-10-CM

## 2019-05-17 LAB — INR PPP: 3.2 (ref 0.9–1.1)

## 2019-05-20 ENCOUNTER — COMMUNICATION - HEALTHEAST (OUTPATIENT)
Dept: NURSING | Facility: CLINIC | Age: 79
End: 2019-05-20

## 2019-05-22 ENCOUNTER — COMMUNICATION - HEALTHEAST (OUTPATIENT)
Dept: CARDIOLOGY | Facility: CLINIC | Age: 79
End: 2019-05-22

## 2019-05-22 ENCOUNTER — AMBULATORY - HEALTHEAST (OUTPATIENT)
Dept: CARDIOLOGY | Facility: CLINIC | Age: 79
End: 2019-05-22

## 2019-05-22 ENCOUNTER — RECORDS - HEALTHEAST (OUTPATIENT)
Dept: ADMINISTRATIVE | Facility: OTHER | Age: 79
End: 2019-05-22

## 2019-05-22 ENCOUNTER — COMMUNICATION - HEALTHEAST (OUTPATIENT)
Dept: ANTICOAGULATION | Facility: CLINIC | Age: 79
End: 2019-05-22

## 2019-05-22 ENCOUNTER — AMBULATORY - HEALTHEAST (OUTPATIENT)
Dept: ANTICOAGULATION | Facility: CLINIC | Age: 79
End: 2019-05-22

## 2019-05-22 DIAGNOSIS — I48.20 CHRONIC ATRIAL FIBRILLATION (H): ICD-10-CM

## 2019-05-22 DIAGNOSIS — Z79.01 LONG TERM CURRENT USE OF ANTICOAGULANT THERAPY: ICD-10-CM

## 2019-05-22 DIAGNOSIS — I11.9 HYPERTENSIVE HEART DISEASE: ICD-10-CM

## 2019-05-22 LAB
ANION GAP SERPL CALCULATED.3IONS-SCNC: 8 MMOL/L (ref 5–18)
BUN SERPL-MCNC: 23 MG/DL (ref 8–28)
CALCIUM SERPL-MCNC: 9.5 MG/DL (ref 8.5–10.5)
CHLORIDE BLD-SCNC: 104 MMOL/L (ref 98–107)
CO2 SERPL-SCNC: 25 MMOL/L (ref 22–31)
CREAT SERPL-MCNC: 0.85 MG/DL (ref 0.7–1.3)
GFR SERPL CREATININE-BSD FRML MDRD: >60 ML/MIN/1.73M2
GLUCOSE BLD-MCNC: 251 MG/DL (ref 70–125)
INR PPP: 5.21 (ref 0.9–1.1)
POC INR - HE - HISTORICAL: 5.9 (ref 0.9–1.1)
POTASSIUM BLD-SCNC: 5.1 MMOL/L (ref 3.5–5)
SODIUM SERPL-SCNC: 137 MMOL/L (ref 136–145)

## 2019-05-24 ENCOUNTER — COMMUNICATION - HEALTHEAST (OUTPATIENT)
Dept: ANTICOAGULATION | Facility: CLINIC | Age: 79
End: 2019-05-24

## 2019-05-24 ENCOUNTER — AMBULATORY - HEALTHEAST (OUTPATIENT)
Dept: LAB | Facility: CLINIC | Age: 79
End: 2019-05-24

## 2019-05-24 ENCOUNTER — AMBULATORY - HEALTHEAST (OUTPATIENT)
Dept: CARDIOLOGY | Facility: CLINIC | Age: 79
End: 2019-05-24

## 2019-05-24 ENCOUNTER — OFFICE VISIT - HEALTHEAST (OUTPATIENT)
Dept: CARDIOLOGY | Facility: CLINIC | Age: 79
End: 2019-05-24

## 2019-05-24 DIAGNOSIS — I50.30 HEART FAILURE WITH PRESERVED EJECTION FRACTION (H): ICD-10-CM

## 2019-05-24 DIAGNOSIS — I48.20 CHRONIC ATRIAL FIBRILLATION (H): ICD-10-CM

## 2019-05-24 DIAGNOSIS — I10 ESSENTIAL HYPERTENSION: ICD-10-CM

## 2019-05-24 DIAGNOSIS — I11.9 HYPERTENSIVE HEART DISEASE: ICD-10-CM

## 2019-05-24 LAB — INR PPP: 2.6 (ref 0.9–1.1)

## 2019-05-24 ASSESSMENT — MIFFLIN-ST. JEOR: SCORE: 1579.68

## 2019-05-28 ENCOUNTER — COMMUNICATION - HEALTHEAST (OUTPATIENT)
Dept: NURSING | Facility: CLINIC | Age: 79
End: 2019-05-28

## 2019-05-28 ENCOUNTER — COMMUNICATION - HEALTHEAST (OUTPATIENT)
Dept: FAMILY MEDICINE | Facility: CLINIC | Age: 79
End: 2019-05-28

## 2019-05-28 DIAGNOSIS — E66.01 MORBID OBESITY (H): ICD-10-CM

## 2019-05-30 ENCOUNTER — COMMUNICATION - HEALTHEAST (OUTPATIENT)
Dept: ANTICOAGULATION | Facility: CLINIC | Age: 79
End: 2019-05-30

## 2019-05-30 ENCOUNTER — OFFICE VISIT - HEALTHEAST (OUTPATIENT)
Dept: FAMILY MEDICINE | Facility: CLINIC | Age: 79
End: 2019-05-30

## 2019-05-30 ENCOUNTER — AMBULATORY - HEALTHEAST (OUTPATIENT)
Dept: NURSING | Facility: CLINIC | Age: 79
End: 2019-05-30

## 2019-05-30 DIAGNOSIS — Z79.01 ANTICOAGULATED ON COUMADIN: ICD-10-CM

## 2019-05-30 DIAGNOSIS — I48.20 CHRONIC ATRIAL FIBRILLATION (H): ICD-10-CM

## 2019-05-30 DIAGNOSIS — I50.30 HEART FAILURE WITH PRESERVED EJECTION FRACTION (H): ICD-10-CM

## 2019-05-30 DIAGNOSIS — Z79.4 TYPE 2 DIABETES MELLITUS WITH BOTH EYES AFFECTED BY PROLIFERATIVE RETINOPATHY WITHOUT MACULAR EDEMA, WITH LONG-TERM CURRENT USE OF INSULIN (H): ICD-10-CM

## 2019-05-30 DIAGNOSIS — E66.01 MORBID OBESITY (H): ICD-10-CM

## 2019-05-30 DIAGNOSIS — E11.3593 TYPE 2 DIABETES MELLITUS WITH BOTH EYES AFFECTED BY PROLIFERATIVE RETINOPATHY WITHOUT MACULAR EDEMA, WITH LONG-TERM CURRENT USE OF INSULIN (H): ICD-10-CM

## 2019-05-30 DIAGNOSIS — E87.5 HYPERKALEMIA: ICD-10-CM

## 2019-05-30 LAB — INR PPP: 3.6 (ref 0.9–1.1)

## 2019-05-31 ENCOUNTER — COMMUNICATION - HEALTHEAST (OUTPATIENT)
Dept: FAMILY MEDICINE | Facility: CLINIC | Age: 79
End: 2019-05-31

## 2019-06-01 ENCOUNTER — COMMUNICATION - HEALTHEAST (OUTPATIENT)
Dept: FAMILY MEDICINE | Facility: CLINIC | Age: 79
End: 2019-06-01

## 2019-06-03 ENCOUNTER — COMMUNICATION - HEALTHEAST (OUTPATIENT)
Dept: FAMILY MEDICINE | Facility: CLINIC | Age: 79
End: 2019-06-03

## 2019-06-03 DIAGNOSIS — E11.42 DIABETIC POLYNEUROPATHY ASSOCIATED WITH TYPE 2 DIABETES MELLITUS (H): ICD-10-CM

## 2019-06-04 ENCOUNTER — COMMUNICATION - HEALTHEAST (OUTPATIENT)
Dept: NURSING | Facility: CLINIC | Age: 79
End: 2019-06-04

## 2019-06-06 ENCOUNTER — AMBULATORY - HEALTHEAST (OUTPATIENT)
Dept: CARDIOLOGY | Facility: CLINIC | Age: 79
End: 2019-06-06

## 2019-06-06 DIAGNOSIS — Z79.01 LONG TERM CURRENT USE OF ANTICOAGULANT THERAPY: ICD-10-CM

## 2019-06-07 ENCOUNTER — COMMUNICATION - HEALTHEAST (OUTPATIENT)
Dept: FAMILY MEDICINE | Facility: CLINIC | Age: 79
End: 2019-06-07

## 2019-06-07 ENCOUNTER — COMMUNICATION - HEALTHEAST (OUTPATIENT)
Dept: ANTICOAGULATION | Facility: CLINIC | Age: 79
End: 2019-06-07

## 2019-06-07 DIAGNOSIS — I48.20 CHRONIC ATRIAL FIBRILLATION (H): ICD-10-CM

## 2019-06-07 DIAGNOSIS — E11.42 DIABETIC POLYNEUROPATHY ASSOCIATED WITH TYPE 2 DIABETES MELLITUS (H): ICD-10-CM

## 2019-06-13 ENCOUNTER — COMMUNICATION - HEALTHEAST (OUTPATIENT)
Dept: ANTICOAGULATION | Facility: CLINIC | Age: 79
End: 2019-06-13

## 2019-06-13 ENCOUNTER — AMBULATORY - HEALTHEAST (OUTPATIENT)
Dept: LAB | Facility: CLINIC | Age: 79
End: 2019-06-13

## 2019-06-13 DIAGNOSIS — I48.20 CHRONIC ATRIAL FIBRILLATION (H): ICD-10-CM

## 2019-06-13 LAB — INR PPP: 2.5 (ref 0.9–1.1)

## 2019-06-20 ENCOUNTER — COMMUNICATION - HEALTHEAST (OUTPATIENT)
Dept: NURSING | Facility: CLINIC | Age: 79
End: 2019-06-20

## 2019-06-27 ENCOUNTER — AMBULATORY - HEALTHEAST (OUTPATIENT)
Dept: LAB | Facility: CLINIC | Age: 79
End: 2019-06-27

## 2019-06-27 ENCOUNTER — COMMUNICATION - HEALTHEAST (OUTPATIENT)
Dept: FAMILY MEDICINE | Facility: CLINIC | Age: 79
End: 2019-06-27

## 2019-06-27 ENCOUNTER — COMMUNICATION - HEALTHEAST (OUTPATIENT)
Dept: ANTICOAGULATION | Facility: CLINIC | Age: 79
End: 2019-06-27

## 2019-06-27 DIAGNOSIS — I48.20 CHRONIC ATRIAL FIBRILLATION (H): ICD-10-CM

## 2019-06-27 LAB — INR PPP: 2.3 (ref 0.9–1.1)

## 2019-06-28 ENCOUNTER — COMMUNICATION - HEALTHEAST (OUTPATIENT)
Dept: NURSING | Facility: CLINIC | Age: 79
End: 2019-06-28

## 2019-07-02 ENCOUNTER — COMMUNICATION - HEALTHEAST (OUTPATIENT)
Dept: FAMILY MEDICINE | Facility: CLINIC | Age: 79
End: 2019-07-02

## 2019-07-02 DIAGNOSIS — I48.20 CHRONIC ATRIAL FIBRILLATION (H): ICD-10-CM

## 2019-07-02 DIAGNOSIS — E66.01 MORBID OBESITY (H): ICD-10-CM

## 2019-07-10 ENCOUNTER — COMMUNICATION - HEALTHEAST (OUTPATIENT)
Dept: NURSING | Facility: CLINIC | Age: 79
End: 2019-07-10

## 2019-07-12 ENCOUNTER — OFFICE VISIT - HEALTHEAST (OUTPATIENT)
Dept: CARDIOLOGY | Facility: CLINIC | Age: 79
End: 2019-07-12

## 2019-07-12 DIAGNOSIS — I11.9 HYPERTENSIVE HEART DISEASE: ICD-10-CM

## 2019-07-12 LAB
ANION GAP SERPL CALCULATED.3IONS-SCNC: 8 MMOL/L (ref 5–18)
BUN SERPL-MCNC: 17 MG/DL (ref 8–28)
CALCIUM SERPL-MCNC: 9.3 MG/DL (ref 8.5–10.5)
CHLORIDE BLD-SCNC: 105 MMOL/L (ref 98–107)
CO2 SERPL-SCNC: 24 MMOL/L (ref 22–31)
CREAT SERPL-MCNC: 0.94 MG/DL (ref 0.7–1.3)
GFR SERPL CREATININE-BSD FRML MDRD: >60 ML/MIN/1.73M2
GLUCOSE BLD-MCNC: 225 MG/DL (ref 70–125)
POTASSIUM BLD-SCNC: 4.4 MMOL/L (ref 3.5–5)
SODIUM SERPL-SCNC: 137 MMOL/L (ref 136–145)

## 2019-07-12 ASSESSMENT — MIFFLIN-ST. JEOR: SCORE: 1592.7

## 2019-07-16 ENCOUNTER — RECORDS - HEALTHEAST (OUTPATIENT)
Dept: ADMINISTRATIVE | Facility: OTHER | Age: 79
End: 2019-07-16

## 2019-07-19 ENCOUNTER — COMMUNICATION - HEALTHEAST (OUTPATIENT)
Dept: NURSING | Facility: CLINIC | Age: 79
End: 2019-07-19

## 2019-08-01 ENCOUNTER — COMMUNICATION - HEALTHEAST (OUTPATIENT)
Dept: ANTICOAGULATION | Facility: CLINIC | Age: 79
End: 2019-08-01

## 2019-08-01 ENCOUNTER — AMBULATORY - HEALTHEAST (OUTPATIENT)
Dept: LAB | Facility: CLINIC | Age: 79
End: 2019-08-01

## 2019-08-01 DIAGNOSIS — I48.20 CHRONIC ATRIAL FIBRILLATION (H): ICD-10-CM

## 2019-08-01 LAB — INR PPP: 2.2 (ref 0.9–1.1)

## 2019-08-13 ENCOUNTER — COMMUNICATION - HEALTHEAST (OUTPATIENT)
Dept: ANTICOAGULATION | Facility: CLINIC | Age: 79
End: 2019-08-13

## 2019-08-13 ENCOUNTER — AMBULATORY - HEALTHEAST (OUTPATIENT)
Dept: LAB | Facility: CLINIC | Age: 79
End: 2019-08-13

## 2019-08-13 DIAGNOSIS — I48.20 CHRONIC ATRIAL FIBRILLATION (H): ICD-10-CM

## 2019-08-13 DIAGNOSIS — Z79.4 TYPE 2 DIABETES MELLITUS WITH PROLIFERATIVE RETINOPATHY WITHOUT MACULAR EDEMA, WITH LONG-TERM CURRENT USE OF INSULIN, UNSPECIFIED LATERALITY (H): ICD-10-CM

## 2019-08-13 DIAGNOSIS — E11.3599 TYPE 2 DIABETES MELLITUS WITH PROLIFERATIVE RETINOPATHY WITHOUT MACULAR EDEMA, WITH LONG-TERM CURRENT USE OF INSULIN, UNSPECIFIED LATERALITY (H): ICD-10-CM

## 2019-08-13 LAB
ANION GAP SERPL CALCULATED.3IONS-SCNC: 8 MMOL/L (ref 5–18)
BUN SERPL-MCNC: 16 MG/DL (ref 8–28)
CALCIUM SERPL-MCNC: 9.4 MG/DL (ref 8.5–10.5)
CHLORIDE BLD-SCNC: 106 MMOL/L (ref 98–107)
CO2 SERPL-SCNC: 24 MMOL/L (ref 22–31)
CREAT SERPL-MCNC: 0.87 MG/DL (ref 0.7–1.3)
GFR SERPL CREATININE-BSD FRML MDRD: >60 ML/MIN/1.73M2
GLUCOSE BLD-MCNC: 229 MG/DL (ref 70–125)
HBA1C MFR BLD: 8 % (ref 3.5–6)
INR PPP: 2.3 (ref 0.9–1.1)
POTASSIUM BLD-SCNC: 4.4 MMOL/L (ref 3.5–5)
SODIUM SERPL-SCNC: 138 MMOL/L (ref 136–145)

## 2019-08-14 ENCOUNTER — OFFICE VISIT - HEALTHEAST (OUTPATIENT)
Dept: CARDIOLOGY | Facility: CLINIC | Age: 79
End: 2019-08-14

## 2019-08-14 DIAGNOSIS — I48.20 CHRONIC ATRIAL FIBRILLATION (H): ICD-10-CM

## 2019-08-14 DIAGNOSIS — I50.32 CHRONIC HEART FAILURE WITH PRESERVED EJECTION FRACTION (H): ICD-10-CM

## 2019-08-14 DIAGNOSIS — I10 ESSENTIAL HYPERTENSION: ICD-10-CM

## 2019-08-14 ASSESSMENT — MIFFLIN-ST. JEOR: SCORE: 1592.73

## 2019-08-20 ENCOUNTER — RECORDS - HEALTHEAST (OUTPATIENT)
Dept: ADMINISTRATIVE | Facility: OTHER | Age: 79
End: 2019-08-20

## 2019-08-20 ENCOUNTER — OFFICE VISIT - HEALTHEAST (OUTPATIENT)
Dept: ENDOCRINOLOGY | Facility: CLINIC | Age: 79
End: 2019-08-20

## 2019-08-20 DIAGNOSIS — E11.3593 TYPE 2 DIABETES MELLITUS WITH BOTH EYES AFFECTED BY PROLIFERATIVE RETINOPATHY WITHOUT MACULAR EDEMA, WITH LONG-TERM CURRENT USE OF INSULIN (H): ICD-10-CM

## 2019-08-20 DIAGNOSIS — Z79.4 TYPE 2 DIABETES MELLITUS WITH BOTH EYES AFFECTED BY PROLIFERATIVE RETINOPATHY WITHOUT MACULAR EDEMA, WITH LONG-TERM CURRENT USE OF INSULIN (H): ICD-10-CM

## 2019-08-20 ASSESSMENT — MIFFLIN-ST. JEOR: SCORE: 1590.91

## 2019-09-03 ENCOUNTER — COMMUNICATION - HEALTHEAST (OUTPATIENT)
Dept: CARDIOLOGY | Facility: CLINIC | Age: 79
End: 2019-09-03

## 2019-09-03 DIAGNOSIS — I11.9 HYPERTENSIVE HEART DISEASE: ICD-10-CM

## 2019-09-06 ENCOUNTER — AMBULATORY - HEALTHEAST (OUTPATIENT)
Dept: LAB | Facility: CLINIC | Age: 79
End: 2019-09-06

## 2019-09-06 ENCOUNTER — COMMUNICATION - HEALTHEAST (OUTPATIENT)
Dept: ANTICOAGULATION | Facility: CLINIC | Age: 79
End: 2019-09-06

## 2019-09-06 DIAGNOSIS — I48.20 CHRONIC ATRIAL FIBRILLATION (H): ICD-10-CM

## 2019-09-06 LAB — INR PPP: 1.6 (ref 0.9–1.1)

## 2019-09-16 ENCOUNTER — COMMUNICATION - HEALTHEAST (OUTPATIENT)
Dept: FAMILY MEDICINE | Facility: CLINIC | Age: 79
End: 2019-09-16

## 2019-09-16 ENCOUNTER — COMMUNICATION - HEALTHEAST (OUTPATIENT)
Dept: CARDIOLOGY | Facility: CLINIC | Age: 79
End: 2019-09-16

## 2019-09-17 ENCOUNTER — AMBULATORY - HEALTHEAST (OUTPATIENT)
Dept: CARDIOLOGY | Facility: CLINIC | Age: 79
End: 2019-09-17

## 2019-09-17 ENCOUNTER — COMMUNICATION - HEALTHEAST (OUTPATIENT)
Dept: NURSING | Facility: CLINIC | Age: 79
End: 2019-09-17

## 2019-09-17 DIAGNOSIS — Z00.6 RESEARCH EXAM: ICD-10-CM

## 2019-09-17 DIAGNOSIS — I48.20 CHRONIC ATRIAL FIBRILLATION (H): ICD-10-CM

## 2019-09-17 LAB
ATRIAL RATE - MUSE: 202 BPM
DIASTOLIC BLOOD PRESSURE - MUSE: NORMAL
INTERPRETATION ECG - MUSE: NORMAL
P AXIS - MUSE: NORMAL
PR INTERVAL - MUSE: NORMAL
QRS DURATION - MUSE: 100 MS
QT - MUSE: 494 MS
QTC - MUSE: 417 MS
R AXIS - MUSE: 1 DEGREES
SYSTOLIC BLOOD PRESSURE - MUSE: NORMAL
T AXIS - MUSE: 21 DEGREES
VENTRICULAR RATE- MUSE: 43 BPM

## 2019-09-17 ASSESSMENT — MIFFLIN-ST. JEOR: SCORE: 1563.51

## 2019-09-20 ENCOUNTER — OFFICE VISIT - HEALTHEAST (OUTPATIENT)
Dept: FAMILY MEDICINE | Facility: CLINIC | Age: 79
End: 2019-09-20

## 2019-09-20 ENCOUNTER — AMBULATORY - HEALTHEAST (OUTPATIENT)
Dept: CARDIOLOGY | Facility: CLINIC | Age: 79
End: 2019-09-20

## 2019-09-20 DIAGNOSIS — F40.241 FEAR OF HEIGHTS: ICD-10-CM

## 2019-09-20 DIAGNOSIS — L72.3 SEBACEOUS CYST: ICD-10-CM

## 2019-09-20 DIAGNOSIS — L60.8 NAIL DEFORMITY: ICD-10-CM

## 2019-09-20 DIAGNOSIS — E66.01 MORBID OBESITY (H): ICD-10-CM

## 2019-09-20 ASSESSMENT — ANXIETY QUESTIONNAIRES
5. BEING SO RESTLESS THAT IT IS HARD TO SIT STILL: NOT AT ALL
6. BECOMING EASILY ANNOYED OR IRRITABLE: MORE THAN HALF THE DAYS
2. NOT BEING ABLE TO STOP OR CONTROL WORRYING: NOT AT ALL
IF YOU CHECKED OFF ANY PROBLEMS ON THIS QUESTIONNAIRE, HOW DIFFICULT HAVE THESE PROBLEMS MADE IT FOR YOU TO DO YOUR WORK, TAKE CARE OF THINGS AT HOME, OR GET ALONG WITH OTHER PEOPLE: SOMEWHAT DIFFICULT
GAD7 TOTAL SCORE: 6
3. WORRYING TOO MUCH ABOUT DIFFERENT THINGS: NOT AT ALL
4. TROUBLE RELAXING: SEVERAL DAYS
1. FEELING NERVOUS, ANXIOUS, OR ON EDGE: SEVERAL DAYS
7. FEELING AFRAID AS IF SOMETHING AWFUL MIGHT HAPPEN: MORE THAN HALF THE DAYS

## 2019-09-20 ASSESSMENT — PATIENT HEALTH QUESTIONNAIRE - PHQ9: SUM OF ALL RESPONSES TO PHQ QUESTIONS 1-9: 6

## 2019-09-21 ENCOUNTER — COMMUNICATION - HEALTHEAST (OUTPATIENT)
Dept: FAMILY MEDICINE | Facility: CLINIC | Age: 79
End: 2019-09-21

## 2019-09-21 DIAGNOSIS — G89.29 OTHER CHRONIC PAIN: ICD-10-CM

## 2019-09-21 DIAGNOSIS — G62.9 POLYNEUROPATHY: ICD-10-CM

## 2019-09-21 DIAGNOSIS — E66.01 MORBID OBESITY (H): ICD-10-CM

## 2019-09-23 ENCOUNTER — COMMUNICATION - HEALTHEAST (OUTPATIENT)
Dept: ANTICOAGULATION | Facility: CLINIC | Age: 79
End: 2019-09-23

## 2019-09-25 ENCOUNTER — AMBULATORY - HEALTHEAST (OUTPATIENT)
Dept: LAB | Facility: CLINIC | Age: 79
End: 2019-09-25

## 2019-09-25 ENCOUNTER — COMMUNICATION - HEALTHEAST (OUTPATIENT)
Dept: ANTICOAGULATION | Facility: CLINIC | Age: 79
End: 2019-09-25

## 2019-09-25 DIAGNOSIS — I48.20 CHRONIC ATRIAL FIBRILLATION (H): ICD-10-CM

## 2019-09-25 LAB — INR PPP: 1.6 (ref 0.9–1.1)

## 2019-09-26 ENCOUNTER — OFFICE VISIT - HEALTHEAST (OUTPATIENT)
Dept: CARDIOLOGY | Facility: CLINIC | Age: 79
End: 2019-09-26

## 2019-09-26 DIAGNOSIS — I25.10 CORONARY ARTERY DISEASE DUE TO LIPID RICH PLAQUE: ICD-10-CM

## 2019-09-26 DIAGNOSIS — E11.42 DIABETIC POLYNEUROPATHY ASSOCIATED WITH TYPE 2 DIABETES MELLITUS (H): ICD-10-CM

## 2019-09-26 DIAGNOSIS — I50.32 CHRONIC HEART FAILURE WITH PRESERVED EJECTION FRACTION (H): ICD-10-CM

## 2019-09-26 DIAGNOSIS — R06.09 DYSPNEA ON EXERTION: ICD-10-CM

## 2019-09-26 DIAGNOSIS — I25.83 CORONARY ARTERY DISEASE DUE TO LIPID RICH PLAQUE: ICD-10-CM

## 2019-09-26 DIAGNOSIS — I48.20 CHRONIC ATRIAL FIBRILLATION (H): ICD-10-CM

## 2019-09-26 ASSESSMENT — MIFFLIN-ST. JEOR: SCORE: 1586.44

## 2019-10-02 ENCOUNTER — COMMUNICATION - HEALTHEAST (OUTPATIENT)
Dept: ANTICOAGULATION | Facility: CLINIC | Age: 79
End: 2019-10-02

## 2019-10-02 DIAGNOSIS — I48.20 CHRONIC ATRIAL FIBRILLATION (H): ICD-10-CM

## 2019-10-05 ENCOUNTER — HEALTH MAINTENANCE LETTER (OUTPATIENT)
Age: 79
End: 2019-10-05

## 2019-10-14 ENCOUNTER — COMMUNICATION - HEALTHEAST (OUTPATIENT)
Dept: ANTICOAGULATION | Facility: CLINIC | Age: 79
End: 2019-10-14

## 2019-10-14 ENCOUNTER — AMBULATORY - HEALTHEAST (OUTPATIENT)
Dept: LAB | Facility: CLINIC | Age: 79
End: 2019-10-14

## 2019-10-14 DIAGNOSIS — I48.20 CHRONIC ATRIAL FIBRILLATION (H): ICD-10-CM

## 2019-10-14 LAB — INR PPP: 1.7 (ref 0.9–1.1)

## 2019-10-15 ENCOUNTER — OFFICE VISIT - HEALTHEAST (OUTPATIENT)
Dept: PODIATRY | Facility: CLINIC | Age: 79
End: 2019-10-15

## 2019-10-15 DIAGNOSIS — E11.40 TYPE 2 DIABETES MELLITUS WITH DIABETIC NEUROPATHY, WITH LONG-TERM CURRENT USE OF INSULIN (H): ICD-10-CM

## 2019-10-15 DIAGNOSIS — B35.1 NAIL FUNGUS: ICD-10-CM

## 2019-10-15 DIAGNOSIS — Z79.4 TYPE 2 DIABETES MELLITUS WITH DIABETIC NEUROPATHY, WITH LONG-TERM CURRENT USE OF INSULIN (H): ICD-10-CM

## 2019-10-15 DIAGNOSIS — L60.2 ONYCHAUXIS: ICD-10-CM

## 2019-10-15 DIAGNOSIS — E11.9 TYPE 2 DIABETES MELLITUS WITHOUT COMPLICATION, WITH LONG-TERM CURRENT USE OF INSULIN (H): ICD-10-CM

## 2019-10-15 DIAGNOSIS — Z79.4 TYPE 2 DIABETES MELLITUS WITHOUT COMPLICATION, WITH LONG-TERM CURRENT USE OF INSULIN (H): ICD-10-CM

## 2019-10-15 ASSESSMENT — MIFFLIN-ST. JEOR: SCORE: 1586.38

## 2019-10-21 ENCOUNTER — COMMUNICATION - HEALTHEAST (OUTPATIENT)
Dept: SCHEDULING | Facility: CLINIC | Age: 79
End: 2019-10-21

## 2019-10-21 DIAGNOSIS — F40.241 FEAR OF HEIGHTS: ICD-10-CM

## 2019-10-23 ENCOUNTER — COMMUNICATION - HEALTHEAST (OUTPATIENT)
Dept: VASCULAR SURGERY | Facility: CLINIC | Age: 79
End: 2019-10-23

## 2019-10-23 ENCOUNTER — RECORDS - HEALTHEAST (OUTPATIENT)
Dept: ADMINISTRATIVE | Facility: OTHER | Age: 79
End: 2019-10-23

## 2019-10-24 ENCOUNTER — COMMUNICATION - HEALTHEAST (OUTPATIENT)
Dept: VASCULAR SURGERY | Facility: CLINIC | Age: 79
End: 2019-10-24

## 2019-10-28 ENCOUNTER — AMBULATORY - HEALTHEAST (OUTPATIENT)
Dept: LAB | Facility: CLINIC | Age: 79
End: 2019-10-28

## 2019-10-28 ENCOUNTER — COMMUNICATION - HEALTHEAST (OUTPATIENT)
Dept: ANTICOAGULATION | Facility: CLINIC | Age: 79
End: 2019-10-28

## 2019-10-28 DIAGNOSIS — I48.20 CHRONIC ATRIAL FIBRILLATION (H): ICD-10-CM

## 2019-10-28 LAB — INR PPP: 3.8 (ref 0.9–1.1)

## 2019-11-04 ENCOUNTER — COMMUNICATION - HEALTHEAST (OUTPATIENT)
Dept: NURSING | Facility: CLINIC | Age: 79
End: 2019-11-04

## 2019-11-06 ENCOUNTER — RECORDS - HEALTHEAST (OUTPATIENT)
Dept: VASCULAR ULTRASOUND | Facility: CLINIC | Age: 79
End: 2019-11-06

## 2019-11-06 ENCOUNTER — OFFICE VISIT - HEALTHEAST (OUTPATIENT)
Dept: VASCULAR SURGERY | Facility: CLINIC | Age: 79
End: 2019-11-06

## 2019-11-06 DIAGNOSIS — I73.9 PERIPHERAL VASCULAR DISEASE, UNSPECIFIED (H): ICD-10-CM

## 2019-11-06 DIAGNOSIS — R19.00 INTRA-ABDOMINAL AND PELVIC SWELLING, MASS AND LUMP, UNSPECIFIED SITE: ICD-10-CM

## 2019-11-06 DIAGNOSIS — I73.9 PAD (PERIPHERAL ARTERY DISEASE) (H): ICD-10-CM

## 2019-11-06 DIAGNOSIS — Z13.6 ENCOUNTER FOR SCREENING FOR CARDIOVASCULAR DISORDERS: ICD-10-CM

## 2019-11-06 DIAGNOSIS — I71.40 ABDOMINAL AORTIC ANEURYSM (AAA) WITHOUT RUPTURE (H): ICD-10-CM

## 2019-11-06 DIAGNOSIS — I73.9 CLAUDICATION (H): ICD-10-CM

## 2019-11-06 DIAGNOSIS — Z13.6 SCREENING FOR AAA (ABDOMINAL AORTIC ANEURYSM): ICD-10-CM

## 2019-11-06 DIAGNOSIS — I74.10: ICD-10-CM

## 2019-11-06 DIAGNOSIS — R09.89 OTHER SPECIFIED SYMPTOMS AND SIGNS INVOLVING THE CIRCULATORY AND RESPIRATORY SYSTEMS: ICD-10-CM

## 2019-11-14 ENCOUNTER — OFFICE VISIT - HEALTHEAST (OUTPATIENT)
Dept: CARDIOLOGY | Facility: CLINIC | Age: 79
End: 2019-11-14

## 2019-11-14 DIAGNOSIS — I48.20 CHRONIC ATRIAL FIBRILLATION (H): ICD-10-CM

## 2019-11-14 DIAGNOSIS — I50.32 CHRONIC HEART FAILURE WITH PRESERVED EJECTION FRACTION (H): ICD-10-CM

## 2019-11-14 DIAGNOSIS — R26.81 UNSTEADINESS ON FEET: ICD-10-CM

## 2019-11-14 DIAGNOSIS — I10 ESSENTIAL HYPERTENSION: ICD-10-CM

## 2019-11-14 DIAGNOSIS — I25.10 CORONARY ARTERY DISEASE DUE TO LIPID RICH PLAQUE: ICD-10-CM

## 2019-11-14 DIAGNOSIS — I25.83 CORONARY ARTERY DISEASE DUE TO LIPID RICH PLAQUE: ICD-10-CM

## 2019-11-14 ASSESSMENT — MIFFLIN-ST. JEOR: SCORE: 1529.79

## 2019-11-15 ENCOUNTER — HOSPITAL ENCOUNTER (OUTPATIENT)
Dept: CARDIOLOGY | Facility: HOSPITAL | Age: 79
Discharge: HOME | End: 2019-11-15
Attending: INTERNAL MEDICINE

## 2019-11-15 DIAGNOSIS — I48.20 CHRONIC ATRIAL FIBRILLATION (H): ICD-10-CM

## 2019-11-19 ENCOUNTER — COMMUNICATION - HEALTHEAST (OUTPATIENT)
Dept: ANTICOAGULATION | Facility: CLINIC | Age: 79
End: 2019-11-19

## 2019-11-19 ENCOUNTER — COMMUNICATION - HEALTHEAST (OUTPATIENT)
Dept: CARE COORDINATION | Facility: CLINIC | Age: 79
End: 2019-11-19

## 2019-11-19 ENCOUNTER — COMMUNICATION - HEALTHEAST (OUTPATIENT)
Dept: FAMILY MEDICINE | Facility: CLINIC | Age: 79
End: 2019-11-19

## 2019-11-19 ENCOUNTER — AMBULATORY - HEALTHEAST (OUTPATIENT)
Dept: LAB | Facility: CLINIC | Age: 79
End: 2019-11-19

## 2019-11-19 DIAGNOSIS — I48.20 CHRONIC ATRIAL FIBRILLATION (H): ICD-10-CM

## 2019-11-19 LAB — INR PPP: 2.8 (ref 0.9–1.1)

## 2019-11-22 ENCOUNTER — COMMUNICATION - HEALTHEAST (OUTPATIENT)
Dept: FAMILY MEDICINE | Facility: CLINIC | Age: 79
End: 2019-11-22

## 2019-11-22 ENCOUNTER — HOSPITAL ENCOUNTER (OUTPATIENT)
Dept: ULTRASOUND IMAGING | Facility: HOSPITAL | Age: 79
Discharge: HOME | End: 2019-11-22

## 2019-11-22 ENCOUNTER — COMMUNICATION - HEALTHEAST (OUTPATIENT)
Dept: CARDIOLOGY | Facility: CLINIC | Age: 79
End: 2019-11-22

## 2019-11-22 DIAGNOSIS — I65.23 CAROTID STENOSIS, BILATERAL: ICD-10-CM

## 2019-11-22 DIAGNOSIS — I25.10 CORONARY ARTERY DISEASE DUE TO LIPID RICH PLAQUE: ICD-10-CM

## 2019-11-22 DIAGNOSIS — I25.83 CORONARY ARTERY DISEASE DUE TO LIPID RICH PLAQUE: ICD-10-CM

## 2019-11-22 DIAGNOSIS — R26.81 UNSTEADINESS ON FEET: ICD-10-CM

## 2019-12-04 ENCOUNTER — COMMUNICATION - HEALTHEAST (OUTPATIENT)
Dept: FAMILY MEDICINE | Facility: CLINIC | Age: 79
End: 2019-12-04

## 2019-12-05 ENCOUNTER — COMMUNICATION - HEALTHEAST (OUTPATIENT)
Dept: NURSING | Facility: CLINIC | Age: 79
End: 2019-12-05

## 2019-12-10 ENCOUNTER — OFFICE VISIT - HEALTHEAST (OUTPATIENT)
Dept: CARDIOLOGY | Facility: CLINIC | Age: 79
End: 2019-12-10

## 2019-12-10 DIAGNOSIS — I48.20 CHRONIC ATRIAL FIBRILLATION (H): ICD-10-CM

## 2019-12-10 DIAGNOSIS — I45.5 SINUS PAUSE: ICD-10-CM

## 2019-12-10 DIAGNOSIS — R00.1 BRADYCARDIA: ICD-10-CM

## 2019-12-10 DIAGNOSIS — I10 BENIGN ESSENTIAL HYPERTENSION: ICD-10-CM

## 2019-12-10 DIAGNOSIS — I50.32 CHRONIC HEART FAILURE WITH PRESERVED EJECTION FRACTION (H): ICD-10-CM

## 2019-12-10 DIAGNOSIS — R42 DIZZINESS: ICD-10-CM

## 2019-12-10 DIAGNOSIS — I65.22 STENOSIS OF LEFT CAROTID ARTERY: ICD-10-CM

## 2019-12-10 ASSESSMENT — MIFFLIN-ST. JEOR: SCORE: 1566.08

## 2019-12-11 ENCOUNTER — COMMUNICATION - HEALTHEAST (OUTPATIENT)
Dept: ANTICOAGULATION | Facility: CLINIC | Age: 79
End: 2019-12-11

## 2019-12-12 ENCOUNTER — COMMUNICATION - HEALTHEAST (OUTPATIENT)
Dept: ANTICOAGULATION | Facility: CLINIC | Age: 79
End: 2019-12-12

## 2019-12-12 ENCOUNTER — AMBULATORY - HEALTHEAST (OUTPATIENT)
Dept: LAB | Facility: CLINIC | Age: 79
End: 2019-12-12

## 2019-12-12 DIAGNOSIS — I48.20 CHRONIC ATRIAL FIBRILLATION (H): ICD-10-CM

## 2019-12-12 LAB — INR PPP: 2.3 (ref 0.9–1.1)

## 2019-12-16 ENCOUNTER — OFFICE VISIT - HEALTHEAST (OUTPATIENT)
Dept: VASCULAR SURGERY | Facility: CLINIC | Age: 79
End: 2019-12-16

## 2019-12-16 ENCOUNTER — AMBULATORY - HEALTHEAST (OUTPATIENT)
Dept: LAB | Facility: CLINIC | Age: 79
End: 2019-12-16

## 2019-12-16 DIAGNOSIS — E11.3593 TYPE 2 DIABETES MELLITUS WITH BOTH EYES AFFECTED BY PROLIFERATIVE RETINOPATHY WITHOUT MACULAR EDEMA, WITH LONG-TERM CURRENT USE OF INSULIN (H): ICD-10-CM

## 2019-12-16 DIAGNOSIS — Z79.4 TYPE 2 DIABETES MELLITUS WITH BOTH EYES AFFECTED BY PROLIFERATIVE RETINOPATHY WITHOUT MACULAR EDEMA, WITH LONG-TERM CURRENT USE OF INSULIN (H): ICD-10-CM

## 2019-12-16 DIAGNOSIS — I65.29 CAROTID STENOSIS: ICD-10-CM

## 2019-12-16 DIAGNOSIS — M48.061 SPINAL STENOSIS AT L4-L5 LEVEL: ICD-10-CM

## 2019-12-16 LAB
CHOLEST SERPL-MCNC: 121 MG/DL
FASTING STATUS PATIENT QL REPORTED: NO
HBA1C MFR BLD: 7 % (ref 3.5–6)
HDLC SERPL-MCNC: 48 MG/DL
LDLC SERPL CALC-MCNC: 50 MG/DL
TRIGL SERPL-MCNC: 115 MG/DL

## 2019-12-17 ENCOUNTER — OFFICE VISIT - HEALTHEAST (OUTPATIENT)
Dept: PHARMACY | Facility: CLINIC | Age: 79
End: 2019-12-17

## 2019-12-17 DIAGNOSIS — F41.9 ANXIETY: ICD-10-CM

## 2019-12-17 DIAGNOSIS — E11.3593 TYPE 2 DIABETES MELLITUS WITH BOTH EYES AFFECTED BY PROLIFERATIVE RETINOPATHY WITHOUT MACULAR EDEMA, WITH LONG-TERM CURRENT USE OF INSULIN (H): ICD-10-CM

## 2019-12-17 DIAGNOSIS — I48.20 CHRONIC ATRIAL FIBRILLATION (H): ICD-10-CM

## 2019-12-17 DIAGNOSIS — G62.9 POLYNEUROPATHY: ICD-10-CM

## 2019-12-17 DIAGNOSIS — I10 ESSENTIAL HYPERTENSION: ICD-10-CM

## 2019-12-17 DIAGNOSIS — G25.81 RESTLESS LEGS SYNDROME (RLS): ICD-10-CM

## 2019-12-17 DIAGNOSIS — N40.0 BPH (BENIGN PROSTATIC HYPERPLASIA): ICD-10-CM

## 2019-12-17 DIAGNOSIS — I50.30 HEART FAILURE WITH PRESERVED EJECTION FRACTION (H): ICD-10-CM

## 2019-12-17 DIAGNOSIS — Z79.4 TYPE 2 DIABETES MELLITUS WITH BOTH EYES AFFECTED BY PROLIFERATIVE RETINOPATHY WITHOUT MACULAR EDEMA, WITH LONG-TERM CURRENT USE OF INSULIN (H): ICD-10-CM

## 2019-12-17 DIAGNOSIS — M54.9 BACK PAIN, UNSPECIFIED BACK LOCATION, UNSPECIFIED BACK PAIN LATERALITY, UNSPECIFIED CHRONICITY: ICD-10-CM

## 2019-12-17 DIAGNOSIS — I50.32 CHRONIC HEART FAILURE WITH PRESERVED EJECTION FRACTION (H): ICD-10-CM

## 2019-12-17 DIAGNOSIS — N40.0 BENIGN PROSTATIC HYPERPLASIA, UNSPECIFIED WHETHER LOWER URINARY TRACT SYMPTOMS PRESENT: ICD-10-CM

## 2019-12-18 ENCOUNTER — COMMUNICATION - HEALTHEAST (OUTPATIENT)
Dept: FAMILY MEDICINE | Facility: CLINIC | Age: 79
End: 2019-12-18

## 2019-12-23 ENCOUNTER — COMMUNICATION - HEALTHEAST (OUTPATIENT)
Dept: NURSING | Facility: CLINIC | Age: 79
End: 2019-12-23

## 2019-12-24 ENCOUNTER — OFFICE VISIT - HEALTHEAST (OUTPATIENT)
Dept: CARDIOLOGY | Facility: CLINIC | Age: 79
End: 2019-12-24

## 2019-12-24 DIAGNOSIS — I48.20 CHRONIC ATRIAL FIBRILLATION (H): ICD-10-CM

## 2019-12-24 DIAGNOSIS — I50.32 CHRONIC HEART FAILURE WITH PRESERVED EJECTION FRACTION (H): ICD-10-CM

## 2019-12-24 DIAGNOSIS — I10 ESSENTIAL HYPERTENSION: ICD-10-CM

## 2019-12-24 ASSESSMENT — MIFFLIN-ST. JEOR: SCORE: 1547.93

## 2019-12-30 ENCOUNTER — COMMUNICATION - HEALTHEAST (OUTPATIENT)
Dept: FAMILY MEDICINE | Facility: CLINIC | Age: 79
End: 2019-12-30

## 2019-12-30 ENCOUNTER — AMBULATORY - HEALTHEAST (OUTPATIENT)
Dept: LAB | Facility: CLINIC | Age: 79
End: 2019-12-30

## 2019-12-30 DIAGNOSIS — I48.20 CHRONIC ATRIAL FIBRILLATION (H): ICD-10-CM

## 2020-01-02 ENCOUNTER — COMMUNICATION - HEALTHEAST (OUTPATIENT)
Dept: ANTICOAGULATION | Facility: CLINIC | Age: 80
End: 2020-01-02

## 2020-01-02 ENCOUNTER — OFFICE VISIT - HEALTHEAST (OUTPATIENT)
Dept: FAMILY MEDICINE | Facility: CLINIC | Age: 80
End: 2020-01-02

## 2020-01-02 DIAGNOSIS — I48.20 CHRONIC ATRIAL FIBRILLATION (H): ICD-10-CM

## 2020-01-02 DIAGNOSIS — D64.9 LOW HEMOGLOBIN: ICD-10-CM

## 2020-01-02 DIAGNOSIS — I50.32 CHRONIC HEART FAILURE WITH PRESERVED EJECTION FRACTION (H): ICD-10-CM

## 2020-01-02 DIAGNOSIS — Z79.4 TYPE 2 DIABETES MELLITUS WITH DIABETIC NEUROPATHY, WITH LONG-TERM CURRENT USE OF INSULIN (H): ICD-10-CM

## 2020-01-02 DIAGNOSIS — G25.81 RESTLESS LEG SYNDROME: ICD-10-CM

## 2020-01-02 DIAGNOSIS — E11.3593 TYPE 2 DIABETES MELLITUS WITH BOTH EYES AFFECTED BY PROLIFERATIVE RETINOPATHY WITHOUT MACULAR EDEMA, WITH LONG-TERM CURRENT USE OF INSULIN (H): ICD-10-CM

## 2020-01-02 DIAGNOSIS — E66.01 MORBID OBESITY (H): ICD-10-CM

## 2020-01-02 DIAGNOSIS — Z79.4 TYPE 2 DIABETES MELLITUS WITH BOTH EYES AFFECTED BY PROLIFERATIVE RETINOPATHY WITHOUT MACULAR EDEMA, WITH LONG-TERM CURRENT USE OF INSULIN (H): ICD-10-CM

## 2020-01-02 DIAGNOSIS — E11.40 TYPE 2 DIABETES MELLITUS WITH DIABETIC NEUROPATHY, WITH LONG-TERM CURRENT USE OF INSULIN (H): ICD-10-CM

## 2020-01-02 DIAGNOSIS — G47.00 INSOMNIA, UNSPECIFIED TYPE: ICD-10-CM

## 2020-01-02 DIAGNOSIS — I74.10 AORTIC THROMBUS (H): ICD-10-CM

## 2020-01-02 LAB
BASOPHILS # BLD AUTO: 0 THOU/UL (ref 0–0.2)
BASOPHILS NFR BLD AUTO: 0 % (ref 0–2)
EOSINOPHIL # BLD AUTO: 0.1 THOU/UL (ref 0–0.4)
EOSINOPHIL NFR BLD AUTO: 2 % (ref 0–6)
ERYTHROCYTE [DISTWIDTH] IN BLOOD BY AUTOMATED COUNT: 12.2 % (ref 11–14.5)
FOLATE SERPL-MCNC: 9.9 NG/ML
HCT VFR BLD AUTO: 43 % (ref 40–54)
HGB BLD-MCNC: 14.5 G/DL (ref 14–18)
INR PPP: 1.8 (ref 0.9–1.1)
IRON SATN MFR SERPL: 26 % (ref 20–50)
IRON SERPL-MCNC: 98 UG/DL (ref 42–175)
LYMPHOCYTES # BLD AUTO: 1.5 THOU/UL (ref 0.8–4.4)
LYMPHOCYTES NFR BLD AUTO: 24 % (ref 20–40)
MCH RBC QN AUTO: 34 PG (ref 27–34)
MCHC RBC AUTO-ENTMCNC: 33.7 G/DL (ref 32–36)
MCV RBC AUTO: 101 FL (ref 80–100)
MONOCYTES # BLD AUTO: 0.4 THOU/UL (ref 0–0.9)
MONOCYTES NFR BLD AUTO: 6 % (ref 2–10)
NEUTROPHILS # BLD AUTO: 4.2 THOU/UL (ref 2–7.7)
NEUTROPHILS NFR BLD AUTO: 67 % (ref 50–70)
PLATELET # BLD AUTO: 188 THOU/UL (ref 140–440)
PMV BLD AUTO: 8.5 FL (ref 7–10)
RBC # BLD AUTO: 4.25 MILL/UL (ref 4.4–6.2)
TIBC SERPL-MCNC: 373 UG/DL (ref 313–563)
TRANSFERRIN SERPL-MCNC: 298 MG/DL (ref 212–360)
VIT B12 SERPL-MCNC: 374 PG/ML (ref 213–816)
WBC: 6.2 THOU/UL (ref 4–11)

## 2020-01-02 ASSESSMENT — MIFFLIN-ST. JEOR: SCORE: 1547.93

## 2020-01-03 ENCOUNTER — COMMUNICATION - HEALTHEAST (OUTPATIENT)
Dept: FAMILY MEDICINE | Facility: CLINIC | Age: 80
End: 2020-01-03

## 2020-01-08 ENCOUNTER — HOSPITAL ENCOUNTER (OUTPATIENT)
Dept: CT IMAGING | Facility: HOSPITAL | Age: 80
Discharge: HOME | End: 2020-01-08
Attending: INTERNAL MEDICINE

## 2020-01-08 DIAGNOSIS — I65.29 CAROTID STENOSIS: ICD-10-CM

## 2020-01-08 DIAGNOSIS — M48.061 SPINAL STENOSIS AT L4-L5 LEVEL: ICD-10-CM

## 2020-01-08 LAB
CREAT BLD-MCNC: 1 MG/DL (ref 0.7–1.3)
GFR SERPL CREATININE-BSD FRML MDRD: >60 ML/MIN/1.73M2

## 2020-01-09 ENCOUNTER — AMBULATORY - HEALTHEAST (OUTPATIENT)
Dept: LAB | Facility: CLINIC | Age: 80
End: 2020-01-09

## 2020-01-09 ENCOUNTER — COMMUNICATION - HEALTHEAST (OUTPATIENT)
Dept: ANTICOAGULATION | Facility: CLINIC | Age: 80
End: 2020-01-09

## 2020-01-09 DIAGNOSIS — I48.20 CHRONIC ATRIAL FIBRILLATION (H): ICD-10-CM

## 2020-01-09 LAB — INR PPP: 2.5 (ref 0.9–1.1)

## 2020-01-13 ENCOUNTER — OFFICE VISIT - HEALTHEAST (OUTPATIENT)
Dept: VASCULAR SURGERY | Facility: CLINIC | Age: 80
End: 2020-01-13

## 2020-01-13 DIAGNOSIS — I65.23 BILATERAL CAROTID ARTERY STENOSIS: ICD-10-CM

## 2020-01-22 ENCOUNTER — AMBULATORY - HEALTHEAST (OUTPATIENT)
Dept: LAB | Facility: CLINIC | Age: 80
End: 2020-01-22

## 2020-01-22 ENCOUNTER — COMMUNICATION - HEALTHEAST (OUTPATIENT)
Dept: ANTICOAGULATION | Facility: CLINIC | Age: 80
End: 2020-01-22

## 2020-01-22 DIAGNOSIS — I48.20 CHRONIC ATRIAL FIBRILLATION (H): ICD-10-CM

## 2020-01-22 LAB — INR PPP: 1.6 (ref 0.9–1.1)

## 2020-01-27 ENCOUNTER — OFFICE VISIT - HEALTHEAST (OUTPATIENT)
Dept: EDUCATION SERVICES | Facility: CLINIC | Age: 80
End: 2020-01-27

## 2020-01-27 DIAGNOSIS — E11.3593 TYPE 2 DIABETES MELLITUS WITH BOTH EYES AFFECTED BY PROLIFERATIVE RETINOPATHY WITHOUT MACULAR EDEMA, WITH LONG-TERM CURRENT USE OF INSULIN (H): ICD-10-CM

## 2020-01-27 DIAGNOSIS — Z79.4 TYPE 2 DIABETES MELLITUS WITH BOTH EYES AFFECTED BY PROLIFERATIVE RETINOPATHY WITHOUT MACULAR EDEMA, WITH LONG-TERM CURRENT USE OF INSULIN (H): ICD-10-CM

## 2020-01-28 ENCOUNTER — COMMUNICATION - HEALTHEAST (OUTPATIENT)
Dept: CARDIOLOGY | Facility: CLINIC | Age: 80
End: 2020-01-28

## 2020-01-28 DIAGNOSIS — I45.5 SINUS PAUSE: ICD-10-CM

## 2020-01-28 DIAGNOSIS — R00.1 BRADYCARDIA: ICD-10-CM

## 2020-01-28 DIAGNOSIS — I48.20 CHRONIC ATRIAL FIBRILLATION (H): ICD-10-CM

## 2020-01-31 ENCOUNTER — COMMUNICATION - HEALTHEAST (OUTPATIENT)
Dept: NURSING | Facility: CLINIC | Age: 80
End: 2020-01-31

## 2020-01-31 ENCOUNTER — COMMUNICATION - HEALTHEAST (OUTPATIENT)
Dept: CARDIOLOGY | Facility: CLINIC | Age: 80
End: 2020-01-31

## 2020-01-31 DIAGNOSIS — R00.1 BRADYCARDIA: ICD-10-CM

## 2020-01-31 DIAGNOSIS — I48.20 CHRONIC ATRIAL FIBRILLATION (H): ICD-10-CM

## 2020-01-31 DIAGNOSIS — I45.5 SINUS PAUSE: ICD-10-CM

## 2020-02-06 ENCOUNTER — COMMUNICATION - HEALTHEAST (OUTPATIENT)
Dept: FAMILY MEDICINE | Facility: CLINIC | Age: 80
End: 2020-02-06

## 2020-02-06 DIAGNOSIS — E11.3593 TYPE 2 DIABETES MELLITUS WITH BOTH EYES AFFECTED BY PROLIFERATIVE RETINOPATHY WITHOUT MACULAR EDEMA, WITH LONG-TERM CURRENT USE OF INSULIN (H): ICD-10-CM

## 2020-02-06 DIAGNOSIS — Z79.4 TYPE 2 DIABETES MELLITUS WITH BOTH EYES AFFECTED BY PROLIFERATIVE RETINOPATHY WITHOUT MACULAR EDEMA, WITH LONG-TERM CURRENT USE OF INSULIN (H): ICD-10-CM

## 2020-02-07 ENCOUNTER — AMBULATORY - HEALTHEAST (OUTPATIENT)
Dept: ENDOCRINOLOGY | Facility: CLINIC | Age: 80
End: 2020-02-07

## 2020-02-07 ENCOUNTER — COMMUNICATION - HEALTHEAST (OUTPATIENT)
Dept: LAB | Facility: CLINIC | Age: 80
End: 2020-02-07

## 2020-02-07 DIAGNOSIS — E11.3593 TYPE 2 DIABETES MELLITUS WITH BOTH EYES AFFECTED BY PROLIFERATIVE RETINOPATHY WITHOUT MACULAR EDEMA, WITH LONG-TERM CURRENT USE OF INSULIN (H): ICD-10-CM

## 2020-02-07 DIAGNOSIS — Z79.4 TYPE 2 DIABETES MELLITUS WITH BOTH EYES AFFECTED BY PROLIFERATIVE RETINOPATHY WITHOUT MACULAR EDEMA, WITH LONG-TERM CURRENT USE OF INSULIN (H): ICD-10-CM

## 2020-02-10 ENCOUNTER — COMMUNICATION - HEALTHEAST (OUTPATIENT)
Dept: CARE COORDINATION | Facility: CLINIC | Age: 80
End: 2020-02-10

## 2020-02-10 ENCOUNTER — HEALTH MAINTENANCE LETTER (OUTPATIENT)
Age: 80
End: 2020-02-10

## 2020-02-12 ENCOUNTER — AMBULATORY - HEALTHEAST (OUTPATIENT)
Dept: LAB | Facility: CLINIC | Age: 80
End: 2020-02-12

## 2020-02-12 ENCOUNTER — COMMUNICATION - HEALTHEAST (OUTPATIENT)
Dept: ANTICOAGULATION | Facility: CLINIC | Age: 80
End: 2020-02-12

## 2020-02-12 DIAGNOSIS — Z79.4 TYPE 2 DIABETES MELLITUS WITH BOTH EYES AFFECTED BY PROLIFERATIVE RETINOPATHY WITHOUT MACULAR EDEMA, WITH LONG-TERM CURRENT USE OF INSULIN (H): ICD-10-CM

## 2020-02-12 DIAGNOSIS — I48.20 CHRONIC ATRIAL FIBRILLATION (H): ICD-10-CM

## 2020-02-12 DIAGNOSIS — E11.3593 TYPE 2 DIABETES MELLITUS WITH BOTH EYES AFFECTED BY PROLIFERATIVE RETINOPATHY WITHOUT MACULAR EDEMA, WITH LONG-TERM CURRENT USE OF INSULIN (H): ICD-10-CM

## 2020-02-12 LAB
ANION GAP SERPL CALCULATED.3IONS-SCNC: 10 MMOL/L (ref 5–18)
BUN SERPL-MCNC: 16 MG/DL (ref 8–28)
CALCIUM SERPL-MCNC: 9.3 MG/DL (ref 8.5–10.5)
CHLORIDE BLD-SCNC: 102 MMOL/L (ref 98–107)
CO2 SERPL-SCNC: 23 MMOL/L (ref 22–31)
CREAT SERPL-MCNC: 1.11 MG/DL (ref 0.7–1.3)
GFR SERPL CREATININE-BSD FRML MDRD: >60 ML/MIN/1.73M2
GLUCOSE BLD-MCNC: 360 MG/DL (ref 70–125)
INR PPP: 2.3 (ref 0.9–1.1)
POTASSIUM BLD-SCNC: 5.2 MMOL/L (ref 3.5–5)
SODIUM SERPL-SCNC: 135 MMOL/L (ref 136–145)

## 2020-02-13 ENCOUNTER — COMMUNICATION - HEALTHEAST (OUTPATIENT)
Dept: NURSING | Facility: CLINIC | Age: 80
End: 2020-02-13

## 2020-02-19 ENCOUNTER — OFFICE VISIT - HEALTHEAST (OUTPATIENT)
Dept: ENDOCRINOLOGY | Facility: CLINIC | Age: 80
End: 2020-02-19

## 2020-02-19 ENCOUNTER — COMMUNICATION - HEALTHEAST (OUTPATIENT)
Dept: FAMILY MEDICINE | Facility: CLINIC | Age: 80
End: 2020-02-19

## 2020-02-19 ENCOUNTER — AMBULATORY - HEALTHEAST (OUTPATIENT)
Dept: LAB | Facility: CLINIC | Age: 80
End: 2020-02-19

## 2020-02-19 DIAGNOSIS — Z79.4 TYPE 2 DIABETES MELLITUS WITH BOTH EYES AFFECTED BY PROLIFERATIVE RETINOPATHY WITHOUT MACULAR EDEMA, WITH LONG-TERM CURRENT USE OF INSULIN (H): ICD-10-CM

## 2020-02-19 DIAGNOSIS — E11.3593 TYPE 2 DIABETES MELLITUS WITH BOTH EYES AFFECTED BY PROLIFERATIVE RETINOPATHY WITHOUT MACULAR EDEMA, WITH LONG-TERM CURRENT USE OF INSULIN (H): ICD-10-CM

## 2020-02-19 LAB
CREAT UR-MCNC: 137.7 MG/DL
MICROALBUMIN UR-MCNC: 2.85 MG/DL (ref 0–1.99)
MICROALBUMIN/CREAT UR: 20.7 MG/G

## 2020-02-19 ASSESSMENT — MIFFLIN-ST. JEOR: SCORE: 1547.93

## 2020-03-11 ENCOUNTER — COMMUNICATION - HEALTHEAST (OUTPATIENT)
Dept: FAMILY MEDICINE | Facility: CLINIC | Age: 80
End: 2020-03-11

## 2020-03-11 ENCOUNTER — COMMUNICATION - HEALTHEAST (OUTPATIENT)
Dept: ANTICOAGULATION | Facility: CLINIC | Age: 80
End: 2020-03-11

## 2020-03-13 ENCOUNTER — COMMUNICATION - HEALTHEAST (OUTPATIENT)
Dept: ANTICOAGULATION | Facility: CLINIC | Age: 80
End: 2020-03-13

## 2020-03-13 ENCOUNTER — AMBULATORY - HEALTHEAST (OUTPATIENT)
Dept: LAB | Facility: CLINIC | Age: 80
End: 2020-03-13

## 2020-03-13 DIAGNOSIS — I48.20 CHRONIC ATRIAL FIBRILLATION (H): ICD-10-CM

## 2020-03-13 LAB — INR PPP: 2.3 (ref 0.9–1.1)

## 2020-03-25 ENCOUNTER — RECORDS - HEALTHEAST (OUTPATIENT)
Dept: ADMINISTRATIVE | Facility: OTHER | Age: 80
End: 2020-03-25

## 2020-03-31 ENCOUNTER — COMMUNICATION - HEALTHEAST (OUTPATIENT)
Dept: FAMILY MEDICINE | Facility: CLINIC | Age: 80
End: 2020-03-31

## 2020-03-31 DIAGNOSIS — G25.81 RESTLESS LEG SYNDROME: ICD-10-CM

## 2020-04-03 ENCOUNTER — OFFICE VISIT - HEALTHEAST (OUTPATIENT)
Dept: FAMILY MEDICINE | Facility: CLINIC | Age: 80
End: 2020-04-03

## 2020-04-03 DIAGNOSIS — I25.10 CORONARY ARTERY DISEASE INVOLVING NATIVE CORONARY ARTERY OF NATIVE HEART WITHOUT ANGINA PECTORIS: ICD-10-CM

## 2020-04-03 DIAGNOSIS — G25.81 RESTLESS LEG SYNDROME: ICD-10-CM

## 2020-04-03 DIAGNOSIS — E11.42 DIABETIC POLYNEUROPATHY ASSOCIATED WITH TYPE 2 DIABETES MELLITUS (H): ICD-10-CM

## 2020-04-10 ENCOUNTER — AMBULATORY - HEALTHEAST (OUTPATIENT)
Dept: LAB | Facility: CLINIC | Age: 80
End: 2020-04-10

## 2020-04-10 ENCOUNTER — COMMUNICATION - HEALTHEAST (OUTPATIENT)
Dept: ANTICOAGULATION | Facility: CLINIC | Age: 80
End: 2020-04-10

## 2020-04-10 DIAGNOSIS — I48.20 CHRONIC ATRIAL FIBRILLATION (H): ICD-10-CM

## 2020-04-10 LAB — INR PPP: 2 (ref 0.9–1.1)

## 2020-04-13 ENCOUNTER — COMMUNICATION - HEALTHEAST (OUTPATIENT)
Dept: NURSING | Facility: CLINIC | Age: 80
End: 2020-04-13

## 2020-04-13 DIAGNOSIS — G25.81 RESTLESS LEG SYNDROME: ICD-10-CM

## 2020-04-29 ENCOUNTER — OFFICE VISIT - HEALTHEAST (OUTPATIENT)
Dept: CARDIOLOGY | Facility: TELEHEALTH | Age: 80
End: 2020-04-29

## 2020-04-29 DIAGNOSIS — E11.3593 TYPE 2 DIABETES MELLITUS WITH BOTH EYES AFFECTED BY PROLIFERATIVE RETINOPATHY WITHOUT MACULAR EDEMA, WITH LONG-TERM CURRENT USE OF INSULIN (H): ICD-10-CM

## 2020-04-29 DIAGNOSIS — I25.10 CORONARY ARTERY DISEASE INVOLVING NATIVE HEART WITHOUT ANGINA PECTORIS, UNSPECIFIED VESSEL OR LESION TYPE: ICD-10-CM

## 2020-04-29 DIAGNOSIS — I48.20 CHRONIC ATRIAL FIBRILLATION (H): ICD-10-CM

## 2020-04-29 DIAGNOSIS — Z79.4 TYPE 2 DIABETES MELLITUS WITH BOTH EYES AFFECTED BY PROLIFERATIVE RETINOPATHY WITHOUT MACULAR EDEMA, WITH LONG-TERM CURRENT USE OF INSULIN (H): ICD-10-CM

## 2020-04-29 DIAGNOSIS — I50.32 CHRONIC HEART FAILURE WITH PRESERVED EJECTION FRACTION (H): ICD-10-CM

## 2020-04-30 ENCOUNTER — COMMUNICATION - HEALTHEAST (OUTPATIENT)
Dept: CARDIOLOGY | Facility: CLINIC | Age: 80
End: 2020-04-30

## 2020-04-30 DIAGNOSIS — I25.10 CAD (CORONARY ARTERY DISEASE): ICD-10-CM

## 2020-05-05 ENCOUNTER — COMMUNICATION - HEALTHEAST (OUTPATIENT)
Dept: ENDOCRINOLOGY | Facility: CLINIC | Age: 80
End: 2020-05-05

## 2020-05-05 DIAGNOSIS — E11.3599 TYPE 2 DIABETES MELLITUS WITH PROLIFERATIVE RETINOPATHY WITHOUT MACULAR EDEMA, WITH LONG-TERM CURRENT USE OF INSULIN, UNSPECIFIED LATERALITY (H): ICD-10-CM

## 2020-05-05 DIAGNOSIS — Z79.4 TYPE 2 DIABETES MELLITUS WITH PROLIFERATIVE RETINOPATHY WITHOUT MACULAR EDEMA, WITH LONG-TERM CURRENT USE OF INSULIN, UNSPECIFIED LATERALITY (H): ICD-10-CM

## 2020-05-07 ENCOUNTER — AMBULATORY - HEALTHEAST (OUTPATIENT)
Dept: LAB | Facility: CLINIC | Age: 80
End: 2020-05-07

## 2020-05-07 ENCOUNTER — COMMUNICATION - HEALTHEAST (OUTPATIENT)
Dept: ANTICOAGULATION | Facility: CLINIC | Age: 80
End: 2020-05-07

## 2020-05-07 DIAGNOSIS — I48.20 CHRONIC ATRIAL FIBRILLATION (H): ICD-10-CM

## 2020-05-07 LAB — INR PPP: 1.5 (ref 0.9–1.1)

## 2020-05-15 ENCOUNTER — COMMUNICATION - HEALTHEAST (OUTPATIENT)
Dept: NURSING | Facility: CLINIC | Age: 80
End: 2020-05-15

## 2020-05-19 ENCOUNTER — OFFICE VISIT - HEALTHEAST (OUTPATIENT)
Dept: FAMILY MEDICINE | Facility: CLINIC | Age: 80
End: 2020-05-19

## 2020-05-19 DIAGNOSIS — R29.6 FALLS FREQUENTLY: ICD-10-CM

## 2020-05-19 DIAGNOSIS — G62.9 POLYNEUROPATHY: ICD-10-CM

## 2020-05-19 DIAGNOSIS — I45.5 SINUS PAUSE: ICD-10-CM

## 2020-05-19 DIAGNOSIS — R25.1 TREMOR: ICD-10-CM

## 2020-05-19 DIAGNOSIS — G25.81 RESTLESS LEG SYNDROME: ICD-10-CM

## 2020-05-19 DIAGNOSIS — R00.1 BRADYCARDIA: ICD-10-CM

## 2020-05-20 ENCOUNTER — COMMUNICATION - HEALTHEAST (OUTPATIENT)
Dept: FAMILY MEDICINE | Facility: CLINIC | Age: 80
End: 2020-05-20

## 2020-05-21 ENCOUNTER — AMBULATORY - HEALTHEAST (OUTPATIENT)
Dept: LAB | Facility: CLINIC | Age: 80
End: 2020-05-21

## 2020-05-21 ENCOUNTER — COMMUNICATION - HEALTHEAST (OUTPATIENT)
Dept: ANTICOAGULATION | Facility: CLINIC | Age: 80
End: 2020-05-21

## 2020-05-21 DIAGNOSIS — I48.20 CHRONIC ATRIAL FIBRILLATION (H): ICD-10-CM

## 2020-05-21 LAB — INR PPP: 1.7 (ref 0.9–1.1)

## 2020-05-29 ENCOUNTER — COMMUNICATION - HEALTHEAST (OUTPATIENT)
Dept: NURSING | Facility: CLINIC | Age: 80
End: 2020-05-29

## 2020-06-04 ENCOUNTER — AMBULATORY - HEALTHEAST (OUTPATIENT)
Dept: LAB | Facility: CLINIC | Age: 80
End: 2020-06-04

## 2020-06-04 ENCOUNTER — COMMUNICATION - HEALTHEAST (OUTPATIENT)
Dept: ANTICOAGULATION | Facility: CLINIC | Age: 80
End: 2020-06-04

## 2020-06-04 DIAGNOSIS — I48.20 CHRONIC ATRIAL FIBRILLATION (H): ICD-10-CM

## 2020-06-04 LAB — INR PPP: 2.1 (ref 0.9–1.1)

## 2020-06-06 ENCOUNTER — COMMUNICATION - HEALTHEAST (OUTPATIENT)
Dept: CARE COORDINATION | Facility: CLINIC | Age: 80
End: 2020-06-06

## 2020-06-08 ENCOUNTER — OFFICE VISIT - HEALTHEAST (OUTPATIENT)
Dept: FAMILY MEDICINE | Facility: CLINIC | Age: 80
End: 2020-06-08

## 2020-06-08 DIAGNOSIS — I10 ESSENTIAL HYPERTENSION: ICD-10-CM

## 2020-06-08 DIAGNOSIS — G25.81 RESTLESS LEG SYNDROME: ICD-10-CM

## 2020-06-08 DIAGNOSIS — E11.3593 TYPE 2 DIABETES MELLITUS WITH BOTH EYES AFFECTED BY PROLIFERATIVE RETINOPATHY WITHOUT MACULAR EDEMA, WITH LONG-TERM CURRENT USE OF INSULIN (H): ICD-10-CM

## 2020-06-08 DIAGNOSIS — E66.01 MORBID OBESITY (H): ICD-10-CM

## 2020-06-08 DIAGNOSIS — Z79.4 TYPE 2 DIABETES MELLITUS WITH BOTH EYES AFFECTED BY PROLIFERATIVE RETINOPATHY WITHOUT MACULAR EDEMA, WITH LONG-TERM CURRENT USE OF INSULIN (H): ICD-10-CM

## 2020-06-18 ENCOUNTER — COMMUNICATION - HEALTHEAST (OUTPATIENT)
Dept: ANTICOAGULATION | Facility: CLINIC | Age: 80
End: 2020-06-18

## 2020-06-18 ENCOUNTER — AMBULATORY - HEALTHEAST (OUTPATIENT)
Dept: LAB | Facility: CLINIC | Age: 80
End: 2020-06-18

## 2020-06-18 DIAGNOSIS — I48.20 CHRONIC ATRIAL FIBRILLATION (H): ICD-10-CM

## 2020-06-18 LAB — INR PPP: 2.1 (ref 0.9–1.1)

## 2020-06-23 ENCOUNTER — AMBULATORY - HEALTHEAST (OUTPATIENT)
Dept: ENDOCRINOLOGY | Facility: CLINIC | Age: 80
End: 2020-06-23

## 2020-06-23 DIAGNOSIS — Z79.4 TYPE 2 DIABETES MELLITUS WITH BOTH EYES AFFECTED BY PROLIFERATIVE RETINOPATHY WITHOUT MACULAR EDEMA, WITH LONG-TERM CURRENT USE OF INSULIN (H): ICD-10-CM

## 2020-06-23 DIAGNOSIS — E11.3593 TYPE 2 DIABETES MELLITUS WITH BOTH EYES AFFECTED BY PROLIFERATIVE RETINOPATHY WITHOUT MACULAR EDEMA, WITH LONG-TERM CURRENT USE OF INSULIN (H): ICD-10-CM

## 2020-07-10 ENCOUNTER — COMMUNICATION - HEALTHEAST (OUTPATIENT)
Dept: VASCULAR SURGERY | Facility: CLINIC | Age: 80
End: 2020-07-10

## 2020-07-13 ENCOUNTER — RECORDS - HEALTHEAST (OUTPATIENT)
Dept: VASCULAR ULTRASOUND | Facility: CLINIC | Age: 80
End: 2020-07-13

## 2020-07-13 DIAGNOSIS — I65.23 OCCLUSION AND STENOSIS OF BILATERAL CAROTID ARTERIES: ICD-10-CM

## 2020-07-16 ENCOUNTER — AMBULATORY - HEALTHEAST (OUTPATIENT)
Dept: LAB | Facility: CLINIC | Age: 80
End: 2020-07-16

## 2020-07-16 ENCOUNTER — COMMUNICATION - HEALTHEAST (OUTPATIENT)
Dept: VASCULAR SURGERY | Facility: CLINIC | Age: 80
End: 2020-07-16

## 2020-07-16 ENCOUNTER — COMMUNICATION - HEALTHEAST (OUTPATIENT)
Dept: ANTICOAGULATION | Facility: CLINIC | Age: 80
End: 2020-07-16

## 2020-07-16 DIAGNOSIS — I48.20 CHRONIC ATRIAL FIBRILLATION (H): ICD-10-CM

## 2020-07-16 DIAGNOSIS — I65.29 CAROTID STENOSIS: ICD-10-CM

## 2020-07-16 DIAGNOSIS — E78.5 DYSLIPIDEMIA: ICD-10-CM

## 2020-07-16 LAB — INR PPP: 2.1 (ref 0.9–1.1)

## 2020-08-11 ENCOUNTER — COMMUNICATION - HEALTHEAST (OUTPATIENT)
Dept: SCHEDULING | Facility: CLINIC | Age: 80
End: 2020-08-11

## 2020-08-11 ENCOUNTER — COMMUNICATION - HEALTHEAST (OUTPATIENT)
Dept: ANTICOAGULATION | Facility: CLINIC | Age: 80
End: 2020-08-11

## 2020-08-11 ENCOUNTER — AMBULATORY - HEALTHEAST (OUTPATIENT)
Dept: LAB | Facility: CLINIC | Age: 80
End: 2020-08-11

## 2020-08-11 DIAGNOSIS — I48.20 CHRONIC ATRIAL FIBRILLATION (H): ICD-10-CM

## 2020-08-11 DIAGNOSIS — E78.5 DYSLIPIDEMIA: ICD-10-CM

## 2020-08-11 DIAGNOSIS — Z79.4 TYPE 2 DIABETES MELLITUS WITH BOTH EYES AFFECTED BY PROLIFERATIVE RETINOPATHY WITHOUT MACULAR EDEMA, WITH LONG-TERM CURRENT USE OF INSULIN (H): ICD-10-CM

## 2020-08-11 DIAGNOSIS — E11.3593 TYPE 2 DIABETES MELLITUS WITH BOTH EYES AFFECTED BY PROLIFERATIVE RETINOPATHY WITHOUT MACULAR EDEMA, WITH LONG-TERM CURRENT USE OF INSULIN (H): ICD-10-CM

## 2020-08-11 LAB
ANION GAP SERPL CALCULATED.3IONS-SCNC: 11 MMOL/L (ref 5–18)
BUN SERPL-MCNC: 14 MG/DL (ref 8–28)
CALCIUM SERPL-MCNC: 9 MG/DL (ref 8.5–10.5)
CHLORIDE BLD-SCNC: 101 MMOL/L (ref 98–107)
CHOLEST SERPL-MCNC: 135 MG/DL
CO2 SERPL-SCNC: 23 MMOL/L (ref 22–31)
CREAT SERPL-MCNC: 1.17 MG/DL (ref 0.7–1.3)
FASTING STATUS PATIENT QL REPORTED: YES
GFR SERPL CREATININE-BSD FRML MDRD: 60 ML/MIN/1.73M2
GLUCOSE BLD-MCNC: 427 MG/DL (ref 70–125)
HBA1C MFR BLD: 9 %
HDLC SERPL-MCNC: 54 MG/DL
INR PPP: 2.2 (ref 0.9–1.1)
LDLC SERPL CALC-MCNC: 57 MG/DL
POTASSIUM BLD-SCNC: 5.1 MMOL/L (ref 3.5–5)
SODIUM SERPL-SCNC: 135 MMOL/L (ref 136–145)
TRIGL SERPL-MCNC: 119 MG/DL

## 2020-08-13 ENCOUNTER — OFFICE VISIT - HEALTHEAST (OUTPATIENT)
Dept: VASCULAR SURGERY | Facility: CLINIC | Age: 80
End: 2020-08-13

## 2020-08-13 ENCOUNTER — COMMUNICATION - HEALTHEAST (OUTPATIENT)
Dept: CARDIOLOGY | Facility: CLINIC | Age: 80
End: 2020-08-13

## 2020-08-13 ENCOUNTER — COMMUNICATION - HEALTHEAST (OUTPATIENT)
Dept: ENDOCRINOLOGY | Facility: CLINIC | Age: 80
End: 2020-08-13

## 2020-08-13 DIAGNOSIS — I11.9 HYPERTENSIVE HEART DISEASE: ICD-10-CM

## 2020-08-13 DIAGNOSIS — I65.23 BILATERAL CAROTID ARTERY STENOSIS: ICD-10-CM

## 2020-08-13 DIAGNOSIS — E11.3599 TYPE 2 DIABETES MELLITUS WITH PROLIFERATIVE RETINOPATHY WITHOUT MACULAR EDEMA, WITH LONG-TERM CURRENT USE OF INSULIN, UNSPECIFIED LATERALITY (H): ICD-10-CM

## 2020-08-13 DIAGNOSIS — Z79.4 TYPE 2 DIABETES MELLITUS WITH PROLIFERATIVE RETINOPATHY WITHOUT MACULAR EDEMA, WITH LONG-TERM CURRENT USE OF INSULIN, UNSPECIFIED LATERALITY (H): ICD-10-CM

## 2020-08-18 ENCOUNTER — OFFICE VISIT - HEALTHEAST (OUTPATIENT)
Dept: ENDOCRINOLOGY | Facility: CLINIC | Age: 80
End: 2020-08-18

## 2020-08-18 DIAGNOSIS — Z79.4 TYPE 2 DIABETES MELLITUS WITH BOTH EYES AFFECTED BY PROLIFERATIVE RETINOPATHY WITHOUT MACULAR EDEMA, WITH LONG-TERM CURRENT USE OF INSULIN (H): ICD-10-CM

## 2020-08-18 DIAGNOSIS — E11.3593 TYPE 2 DIABETES MELLITUS WITH BOTH EYES AFFECTED BY PROLIFERATIVE RETINOPATHY WITHOUT MACULAR EDEMA, WITH LONG-TERM CURRENT USE OF INSULIN (H): ICD-10-CM

## 2020-09-01 ENCOUNTER — COMMUNICATION - HEALTHEAST (OUTPATIENT)
Dept: ENDOCRINOLOGY | Facility: CLINIC | Age: 80
End: 2020-09-01

## 2020-09-09 ENCOUNTER — COMMUNICATION - HEALTHEAST (OUTPATIENT)
Dept: FAMILY MEDICINE | Facility: CLINIC | Age: 80
End: 2020-09-09

## 2020-09-09 DIAGNOSIS — Z79.4 TYPE 2 DIABETES MELLITUS WITH BOTH EYES AFFECTED BY PROLIFERATIVE RETINOPATHY WITHOUT MACULAR EDEMA, WITH LONG-TERM CURRENT USE OF INSULIN (H): ICD-10-CM

## 2020-09-09 DIAGNOSIS — E11.3593 TYPE 2 DIABETES MELLITUS WITH BOTH EYES AFFECTED BY PROLIFERATIVE RETINOPATHY WITHOUT MACULAR EDEMA, WITH LONG-TERM CURRENT USE OF INSULIN (H): ICD-10-CM

## 2020-09-15 ENCOUNTER — COMMUNICATION - HEALTHEAST (OUTPATIENT)
Dept: FAMILY MEDICINE | Facility: CLINIC | Age: 80
End: 2020-09-15

## 2020-09-15 ENCOUNTER — COMMUNICATION - HEALTHEAST (OUTPATIENT)
Dept: ENDOCRINOLOGY | Facility: CLINIC | Age: 80
End: 2020-09-15

## 2020-09-15 ENCOUNTER — COMMUNICATION - HEALTHEAST (OUTPATIENT)
Dept: CARDIOLOGY | Facility: CLINIC | Age: 80
End: 2020-09-15

## 2020-09-15 DIAGNOSIS — E11.3599 TYPE 2 DIABETES MELLITUS WITH PROLIFERATIVE RETINOPATHY WITHOUT MACULAR EDEMA, WITH LONG-TERM CURRENT USE OF INSULIN, UNSPECIFIED LATERALITY (H): ICD-10-CM

## 2020-09-15 DIAGNOSIS — Z79.4 TYPE 2 DIABETES MELLITUS WITH PROLIFERATIVE RETINOPATHY WITHOUT MACULAR EDEMA, WITH LONG-TERM CURRENT USE OF INSULIN, UNSPECIFIED LATERALITY (H): ICD-10-CM

## 2020-09-15 DIAGNOSIS — G25.81 RESTLESS LEG SYNDROME: ICD-10-CM

## 2020-09-16 ENCOUNTER — OFFICE VISIT - HEALTHEAST (OUTPATIENT)
Dept: CARDIOLOGY | Facility: CLINIC | Age: 80
End: 2020-09-16

## 2020-09-16 DIAGNOSIS — I10 ESSENTIAL HYPERTENSION: ICD-10-CM

## 2020-09-16 DIAGNOSIS — I48.20 CHRONIC ATRIAL FIBRILLATION (H): ICD-10-CM

## 2020-09-16 DIAGNOSIS — I25.83 CORONARY ARTERY DISEASE DUE TO LIPID RICH PLAQUE: ICD-10-CM

## 2020-09-16 DIAGNOSIS — I25.10 CORONARY ARTERY DISEASE DUE TO LIPID RICH PLAQUE: ICD-10-CM

## 2020-09-16 DIAGNOSIS — I50.32 CHRONIC HEART FAILURE WITH PRESERVED EJECTION FRACTION (H): ICD-10-CM

## 2020-09-16 DIAGNOSIS — I11.9 HYPERTENSIVE HEART DISEASE: ICD-10-CM

## 2020-09-16 ASSESSMENT — MIFFLIN-ST. JEOR: SCORE: 1575.15

## 2020-09-21 ENCOUNTER — COMMUNICATION - HEALTHEAST (OUTPATIENT)
Dept: SCHEDULING | Facility: CLINIC | Age: 80
End: 2020-09-21

## 2020-09-21 DIAGNOSIS — G25.81 RESTLESS LEG SYNDROME: ICD-10-CM

## 2020-09-22 ENCOUNTER — COMMUNICATION - HEALTHEAST (OUTPATIENT)
Dept: FAMILY MEDICINE | Facility: CLINIC | Age: 80
End: 2020-09-22

## 2020-09-23 ENCOUNTER — COMMUNICATION - HEALTHEAST (OUTPATIENT)
Dept: ANTICOAGULATION | Facility: CLINIC | Age: 80
End: 2020-09-23

## 2020-09-23 ENCOUNTER — AMBULATORY - HEALTHEAST (OUTPATIENT)
Dept: LAB | Facility: CLINIC | Age: 80
End: 2020-09-23

## 2020-09-23 ENCOUNTER — COMMUNICATION - HEALTHEAST (OUTPATIENT)
Dept: LAB | Facility: CLINIC | Age: 80
End: 2020-09-23

## 2020-09-23 DIAGNOSIS — I48.20 CHRONIC ATRIAL FIBRILLATION (H): ICD-10-CM

## 2020-09-23 LAB — INR PPP: 1.8 (ref 0.9–1.1)

## 2020-09-24 ENCOUNTER — OFFICE VISIT - HEALTHEAST (OUTPATIENT)
Dept: FAMILY MEDICINE | Facility: CLINIC | Age: 80
End: 2020-09-24

## 2020-09-24 DIAGNOSIS — I50.32 CHRONIC HEART FAILURE WITH PRESERVED EJECTION FRACTION (H): ICD-10-CM

## 2020-09-24 DIAGNOSIS — G25.81 RESTLESS LEG SYNDROME: ICD-10-CM

## 2020-09-24 DIAGNOSIS — F41.9 ANXIETY: ICD-10-CM

## 2020-09-24 DIAGNOSIS — E66.01 MORBID OBESITY (H): ICD-10-CM

## 2020-09-24 ASSESSMENT — ANXIETY QUESTIONNAIRES
6. BECOMING EASILY ANNOYED OR IRRITABLE: SEVERAL DAYS
GAD7 TOTAL SCORE: 3
1. FEELING NERVOUS, ANXIOUS, OR ON EDGE: NOT AT ALL
7. FEELING AFRAID AS IF SOMETHING AWFUL MIGHT HAPPEN: NOT AT ALL
IF YOU CHECKED OFF ANY PROBLEMS ON THIS QUESTIONNAIRE, HOW DIFFICULT HAVE THESE PROBLEMS MADE IT FOR YOU TO DO YOUR WORK, TAKE CARE OF THINGS AT HOME, OR GET ALONG WITH OTHER PEOPLE: SOMEWHAT DIFFICULT
2. NOT BEING ABLE TO STOP OR CONTROL WORRYING: NOT AT ALL
4. TROUBLE RELAXING: NOT AT ALL
3. WORRYING TOO MUCH ABOUT DIFFERENT THINGS: NOT AT ALL
5. BEING SO RESTLESS THAT IT IS HARD TO SIT STILL: MORE THAN HALF THE DAYS

## 2020-09-24 ASSESSMENT — PATIENT HEALTH QUESTIONNAIRE - PHQ9: SUM OF ALL RESPONSES TO PHQ QUESTIONS 1-9: 6

## 2020-10-07 ENCOUNTER — COMMUNICATION - HEALTHEAST (OUTPATIENT)
Dept: CARDIOLOGY | Facility: CLINIC | Age: 80
End: 2020-10-07

## 2020-10-16 ENCOUNTER — AMBULATORY - HEALTHEAST (OUTPATIENT)
Dept: ANTICOAGULATION | Facility: CLINIC | Age: 80
End: 2020-10-16

## 2020-10-16 DIAGNOSIS — I48.20 CHRONIC ATRIAL FIBRILLATION (H): ICD-10-CM

## 2020-10-20 ENCOUNTER — COMMUNICATION - HEALTHEAST (OUTPATIENT)
Dept: CARDIOLOGY | Facility: CLINIC | Age: 80
End: 2020-10-20

## 2020-10-21 ENCOUNTER — COMMUNICATION - HEALTHEAST (OUTPATIENT)
Dept: ANTICOAGULATION | Facility: CLINIC | Age: 80
End: 2020-10-21

## 2020-10-21 ENCOUNTER — AMBULATORY - HEALTHEAST (OUTPATIENT)
Dept: CARDIOLOGY | Facility: CLINIC | Age: 80
End: 2020-10-21

## 2020-10-21 ENCOUNTER — OFFICE VISIT - HEALTHEAST (OUTPATIENT)
Dept: CARDIOLOGY | Facility: CLINIC | Age: 80
End: 2020-10-21

## 2020-10-21 DIAGNOSIS — I48.20 CHRONIC ATRIAL FIBRILLATION (H): ICD-10-CM

## 2020-10-21 DIAGNOSIS — I50.30 HEART FAILURE WITH PRESERVED EJECTION FRACTION, NYHA CLASS I (H): ICD-10-CM

## 2020-10-21 DIAGNOSIS — I10 ESSENTIAL HYPERTENSION: ICD-10-CM

## 2020-10-21 DIAGNOSIS — I50.32 CHRONIC HEART FAILURE WITH PRESERVED EJECTION FRACTION (H): ICD-10-CM

## 2020-10-21 LAB
ANION GAP SERPL CALCULATED.3IONS-SCNC: 11 MMOL/L (ref 5–18)
BUN SERPL-MCNC: 19 MG/DL (ref 8–28)
CALCIUM SERPL-MCNC: 9.1 MG/DL (ref 8.5–10.5)
CHLORIDE BLD-SCNC: 100 MMOL/L (ref 98–107)
CO2 SERPL-SCNC: 26 MMOL/L (ref 22–31)
CREAT SERPL-MCNC: 1.28 MG/DL (ref 0.7–1.3)
GFR SERPL CREATININE-BSD FRML MDRD: 54 ML/MIN/1.73M2
GLUCOSE BLD-MCNC: 267 MG/DL (ref 70–125)
POC INR - HE - HISTORICAL: 2.4 (ref 0.9–1.1)
POTASSIUM BLD-SCNC: 4.4 MMOL/L (ref 3.5–5)
SODIUM SERPL-SCNC: 137 MMOL/L (ref 136–145)

## 2020-10-21 ASSESSMENT — MIFFLIN-ST. JEOR: SCORE: 1529.79

## 2020-10-22 LAB — BNP SERPL-MCNC: 56 PG/ML (ref 0–82)

## 2020-11-02 ENCOUNTER — RECORDS - HEALTHEAST (OUTPATIENT)
Dept: VASCULAR ULTRASOUND | Facility: CLINIC | Age: 80
End: 2020-11-02

## 2020-11-02 ENCOUNTER — HOSPITAL ENCOUNTER (OUTPATIENT)
Dept: CT IMAGING | Facility: HOSPITAL | Age: 80
Discharge: HOME | End: 2020-11-02
Attending: SURGERY

## 2020-11-02 ENCOUNTER — OFFICE VISIT - HEALTHEAST (OUTPATIENT)
Dept: VASCULAR SURGERY | Facility: CLINIC | Age: 80
End: 2020-11-02

## 2020-11-02 ENCOUNTER — COMMUNICATION - HEALTHEAST (OUTPATIENT)
Dept: LAB | Facility: CLINIC | Age: 80
End: 2020-11-02

## 2020-11-02 DIAGNOSIS — I73.9 PERIPHERAL VASCULAR DISEASE, UNSPECIFIED (H): ICD-10-CM

## 2020-11-02 DIAGNOSIS — Z79.4 TYPE 2 DIABETES MELLITUS WITH BOTH EYES AFFECTED BY PROLIFERATIVE RETINOPATHY WITHOUT MACULAR EDEMA, WITH LONG-TERM CURRENT USE OF INSULIN (H): ICD-10-CM

## 2020-11-02 DIAGNOSIS — I71.40 ABDOMINAL AORTIC ANEURYSM (AAA) WITHOUT RUPTURE (H): ICD-10-CM

## 2020-11-02 DIAGNOSIS — I73.9 PAD (PERIPHERAL ARTERY DISEASE) (H): ICD-10-CM

## 2020-11-02 DIAGNOSIS — E11.3593 TYPE 2 DIABETES MELLITUS WITH BOTH EYES AFFECTED BY PROLIFERATIVE RETINOPATHY WITHOUT MACULAR EDEMA, WITH LONG-TERM CURRENT USE OF INSULIN (H): ICD-10-CM

## 2020-11-02 DIAGNOSIS — Z13.6 SCREENING FOR AAA (ABDOMINAL AORTIC ANEURYSM): ICD-10-CM

## 2020-11-04 ENCOUNTER — AMBULATORY - HEALTHEAST (OUTPATIENT)
Dept: LAB | Facility: CLINIC | Age: 80
End: 2020-11-04

## 2020-11-04 ENCOUNTER — COMMUNICATION - HEALTHEAST (OUTPATIENT)
Dept: ANTICOAGULATION | Facility: CLINIC | Age: 80
End: 2020-11-04

## 2020-11-04 DIAGNOSIS — E11.3593 TYPE 2 DIABETES MELLITUS WITH BOTH EYES AFFECTED BY PROLIFERATIVE RETINOPATHY WITHOUT MACULAR EDEMA, WITH LONG-TERM CURRENT USE OF INSULIN (H): ICD-10-CM

## 2020-11-04 DIAGNOSIS — I48.20 CHRONIC ATRIAL FIBRILLATION (H): ICD-10-CM

## 2020-11-04 DIAGNOSIS — Z79.4 TYPE 2 DIABETES MELLITUS WITH BOTH EYES AFFECTED BY PROLIFERATIVE RETINOPATHY WITHOUT MACULAR EDEMA, WITH LONG-TERM CURRENT USE OF INSULIN (H): ICD-10-CM

## 2020-11-04 LAB
HBA1C MFR BLD: 8.9 %
INR PPP: 2.1 (ref 0.9–1.1)

## 2020-11-06 ENCOUNTER — HOSPITAL ENCOUNTER (OUTPATIENT)
Dept: CARDIOLOGY | Facility: HOSPITAL | Age: 80
Discharge: HOME | End: 2020-11-06

## 2020-11-06 DIAGNOSIS — I50.32 CHRONIC HEART FAILURE WITH PRESERVED EJECTION FRACTION (H): ICD-10-CM

## 2020-11-06 LAB
AORTIC ROOT: 3.7 CM
AORTIC VALVE MEAN VELOCITY: 289 CM/S
AR DECEL SLOPE: 1570 MM/S2
AR PEAK VELOCITY: 319 CM/S
ASCENDING AORTA: 3.5 CM
AV DIMENSIONLESS INDEX VTI: 0.2
AV MEAN GRADIENT: 37 MMHG
AV PEAK GRADIENT: 65.9 MMHG
AV REGURGITANT PEAK GRADIENT: 40.7 MMHG
AV REGURGITATION PRESSURE HALF TIME: 595 MS
AV VALVE AREA: 0.6 CM2
AV VELOCITY RATIO: 0.2
BSA FOR ECHO PROCEDURE: 2.01 M2
CV BLOOD PRESSURE: NORMAL MMHG
CV ECHO HEIGHT: 65.5 IN
CV ECHO WEIGHT: 194 LBS
DOP CALC AO PEAK VEL: 406 CM/S
DOP CALC AO VTI: 97.4 CM
DOP CALC LVOT AREA: 3.8 CM2
DOP CALC LVOT DIAMETER: 2.2 CM
DOP CALC LVOT PEAK VEL: 69.5 CM/S
DOP CALC LVOT STROKE VOLUME: 58.5 CM3
DOP CALCLVOT PEAK VEL VTI: 15.4 CM
EJECTION FRACTION: 71 % (ref 55–75)
LA AREA 1: 29.4 CM2
LA AREA 2: 21.6 CM2
LEFT ATRIUM LENGTH: 6.3 CM
LEFT ATRIUM VOLUME INDEX: 42.6 ML/M2
LEFT ATRIUM VOLUME: 85.7 ML
LEFT VENTRICLE CARDIAC INDEX: 2.2 L/MIN/M2
LEFT VENTRICLE CARDIAC OUTPUT: 4.4 L/MIN
LEFT VENTRICLE DIASTOLIC VOLUME INDEX: 48.4 CM3/M2 (ref 34–74)
LEFT VENTRICLE DIASTOLIC VOLUME: 97.3 CM3 (ref 62–150)
LEFT VENTRICLE HEART RATE: 76 BPM
LEFT VENTRICLE SYSTOLIC VOLUME INDEX: 13.8 CM3/M2 (ref 11–31)
LEFT VENTRICLE SYSTOLIC VOLUME: 27.8 CM3 (ref 21–61)
LEFT VENTRICULAR OUTFLOW TRACT MEAN GRADIENT: 1 MMHG
LEFT VENTRICULAR OUTFLOW TRACT MEAN VELOCITY: 45.5 CM/S
LEFT VENTRICULAR OUTFLOW TRACT PEAK GRADIENT: 2 MMHG
LV STROKE VOLUME INDEX: 29.1 ML/M2
MITRAL VALVE DECELERATION SLOPE: 7960 MM/S2
MITRAL VALVE E/A RATIO: 3.3
MITRAL VALVE PRESSURE HALF-TIME: 41 MS
MV AVERAGE E/E' RATIO: 10.8 CM/S
MV DECELERATION TIME: 141 MS
MV E'TISSUE VEL-LAT: 13.8 CM/S
MV E'TISSUE VEL-MED: 6.96 CM/S
MV LATERAL E/E' RATIO: 8.1
MV MEDIAL E/E' RATIO: 16.1
MV PEAK A VELOCITY: 33.5 CM/S
MV PEAK E VELOCITY: 112 CM/S
MV VALVE AREA PRESSURE 1/2 METHOD: 5.4 CM2
NUC REST DIASTOLIC VOLUME INDEX: 3104 LBS
NUC REST SYSTOLIC VOLUME INDEX: 65.5 IN
TRICUSPID VALVE ANULAR PLANE SYSTOLIC EXCURSION: 1.1 CM

## 2020-11-06 ASSESSMENT — MIFFLIN-ST. JEOR: SCORE: 1529.79

## 2020-11-09 ENCOUNTER — AMBULATORY - HEALTHEAST (OUTPATIENT)
Dept: CARDIOLOGY | Facility: CLINIC | Age: 80
End: 2020-11-09

## 2020-11-09 DIAGNOSIS — I35.0 AORTIC STENOSIS: ICD-10-CM

## 2020-11-14 ENCOUNTER — HEALTH MAINTENANCE LETTER (OUTPATIENT)
Age: 80
End: 2020-11-14

## 2020-11-18 ENCOUNTER — COMMUNICATION - HEALTHEAST (OUTPATIENT)
Dept: ADMINISTRATIVE | Facility: CLINIC | Age: 80
End: 2020-11-18

## 2020-11-23 ENCOUNTER — COMMUNICATION - HEALTHEAST (OUTPATIENT)
Dept: ANTICOAGULATION | Facility: CLINIC | Age: 80
End: 2020-11-23

## 2020-11-23 ENCOUNTER — AMBULATORY - HEALTHEAST (OUTPATIENT)
Dept: LAB | Facility: CLINIC | Age: 80
End: 2020-11-23

## 2020-11-23 ENCOUNTER — OFFICE VISIT - HEALTHEAST (OUTPATIENT)
Dept: CARDIOLOGY | Facility: CLINIC | Age: 80
End: 2020-11-23

## 2020-11-23 ENCOUNTER — COMMUNICATION - HEALTHEAST (OUTPATIENT)
Dept: FAMILY MEDICINE | Facility: CLINIC | Age: 80
End: 2020-11-23

## 2020-11-23 DIAGNOSIS — I48.20 CHRONIC ATRIAL FIBRILLATION (H): ICD-10-CM

## 2020-11-23 DIAGNOSIS — I35.0 NONRHEUMATIC AORTIC VALVE STENOSIS: ICD-10-CM

## 2020-11-23 LAB — INR PPP: 2.8 (ref 0.9–1.1)

## 2020-11-24 ENCOUNTER — RECORDS - HEALTHEAST (OUTPATIENT)
Dept: ADMINISTRATIVE | Facility: OTHER | Age: 80
End: 2020-11-24

## 2020-12-04 ENCOUNTER — HOSPITAL ENCOUNTER (OUTPATIENT)
Dept: CT IMAGING | Facility: CLINIC | Age: 80
Discharge: HOME | End: 2020-12-04
Attending: INTERNAL MEDICINE

## 2020-12-04 DIAGNOSIS — I35.0 AORTIC STENOSIS: ICD-10-CM

## 2020-12-04 LAB
CREAT BLD-MCNC: 1.1 MG/DL (ref 0.7–1.3)
GFR SERPL CREATININE-BSD FRML MDRD: >60 ML/MIN/1.73M2

## 2020-12-09 ENCOUNTER — AMBULATORY - HEALTHEAST (OUTPATIENT)
Dept: CARDIOLOGY | Facility: CLINIC | Age: 80
End: 2020-12-09

## 2020-12-10 ENCOUNTER — OFFICE VISIT - HEALTHEAST (OUTPATIENT)
Dept: CARDIOLOGY | Facility: CLINIC | Age: 80
End: 2020-12-10

## 2020-12-10 DIAGNOSIS — I35.0 NONRHEUMATIC AORTIC VALVE STENOSIS: ICD-10-CM

## 2020-12-15 ENCOUNTER — COMMUNICATION - HEALTHEAST (OUTPATIENT)
Dept: FAMILY MEDICINE | Facility: CLINIC | Age: 80
End: 2020-12-15

## 2020-12-15 DIAGNOSIS — I48.20 CHRONIC ATRIAL FIBRILLATION (H): ICD-10-CM

## 2020-12-16 ENCOUNTER — AMBULATORY - HEALTHEAST (OUTPATIENT)
Dept: CARDIOLOGY | Facility: CLINIC | Age: 80
End: 2020-12-16

## 2020-12-16 DIAGNOSIS — I35.0 SEVERE AORTIC STENOSIS: ICD-10-CM

## 2020-12-24 ENCOUNTER — COMMUNICATION - HEALTHEAST (OUTPATIENT)
Dept: CARDIOLOGY | Facility: CLINIC | Age: 80
End: 2020-12-24

## 2020-12-28 ENCOUNTER — AMBULATORY - HEALTHEAST (OUTPATIENT)
Dept: CARDIOLOGY | Facility: HOSPITAL | Age: 80
End: 2020-12-28

## 2020-12-28 ENCOUNTER — COMMUNICATION - HEALTHEAST (OUTPATIENT)
Dept: FAMILY MEDICINE | Facility: CLINIC | Age: 80
End: 2020-12-28

## 2020-12-28 DIAGNOSIS — Z11.59 ENCOUNTER FOR SCREENING FOR OTHER VIRAL DISEASES: ICD-10-CM

## 2020-12-29 ENCOUNTER — COMMUNICATION - HEALTHEAST (OUTPATIENT)
Dept: CARDIOLOGY | Facility: CLINIC | Age: 80
End: 2020-12-29

## 2020-12-30 ENCOUNTER — AMBULATORY - HEALTHEAST (OUTPATIENT)
Dept: CARDIOLOGY | Facility: HOSPITAL | Age: 80
End: 2020-12-30

## 2020-12-30 ENCOUNTER — AMBULATORY - HEALTHEAST (OUTPATIENT)
Dept: LAB | Facility: CLINIC | Age: 80
End: 2020-12-30

## 2020-12-30 ENCOUNTER — COMMUNICATION - HEALTHEAST (OUTPATIENT)
Dept: ANTICOAGULATION | Facility: CLINIC | Age: 80
End: 2020-12-30

## 2020-12-30 ENCOUNTER — COMMUNICATION - HEALTHEAST (OUTPATIENT)
Dept: CARDIOLOGY | Facility: CLINIC | Age: 80
End: 2020-12-30

## 2020-12-30 DIAGNOSIS — I48.20 CHRONIC ATRIAL FIBRILLATION (H): ICD-10-CM

## 2020-12-30 DIAGNOSIS — I35.0 AORTIC STENOSIS: Primary | ICD-10-CM

## 2020-12-30 DIAGNOSIS — Z11.59 ENCOUNTER FOR SCREENING FOR OTHER VIRAL DISEASES: ICD-10-CM

## 2020-12-30 LAB — INR PPP: 1.7 (ref 0.9–1.1)

## 2020-12-30 NOTE — PROGRESS NOTES
Placing order for TAVR per Dr. Romano request. Plan for 29 S3 R TF approach, 565 mm2, yes to sentinel- per CT read by Dr. Romano on 12/29.    To be scheduled on 1/12 at [facility]. Patient is aware.    Maude Hand, RN, BSN  Valve Clinic Coordinator  [phone]

## 2020-12-31 ENCOUNTER — AMBULATORY - HEALTHEAST (OUTPATIENT)
Dept: CARDIOLOGY | Facility: CLINIC | Age: 80
End: 2020-12-31

## 2020-12-31 DIAGNOSIS — I35.0 SEVERE AORTIC STENOSIS: ICD-10-CM

## 2021-01-03 ENCOUNTER — AMBULATORY - HEALTHEAST (OUTPATIENT)
Dept: FAMILY MEDICINE | Facility: CLINIC | Age: 81
End: 2021-01-03

## 2021-01-03 DIAGNOSIS — Z11.59 ENCOUNTER FOR SCREENING FOR OTHER VIRAL DISEASES: ICD-10-CM

## 2021-01-03 LAB
SARS-COV-2 PCR COMMENT: NORMAL
SARS-COV-2 RNA SPEC QL NAA+PROBE: NEGATIVE
SARS-COV-2 VIRUS SPECIMEN SOURCE: NORMAL

## 2021-01-04 ENCOUNTER — COMMUNICATION - HEALTHEAST (OUTPATIENT)
Dept: SCHEDULING | Facility: CLINIC | Age: 81
End: 2021-01-04

## 2021-01-04 DIAGNOSIS — Z11.59 ENCOUNTER FOR SCREENING FOR OTHER VIRAL DISEASES: Primary | ICD-10-CM

## 2021-01-05 ENCOUNTER — COMMUNICATION - HEALTHEAST (OUTPATIENT)
Dept: CARDIOLOGY | Facility: CLINIC | Age: 81
End: 2021-01-05

## 2021-01-05 ENCOUNTER — COMMUNICATION - HEALTHEAST (OUTPATIENT)
Dept: ADMINISTRATIVE | Facility: CLINIC | Age: 81
End: 2021-01-05

## 2021-01-05 ENCOUNTER — OFFICE VISIT - HEALTHEAST (OUTPATIENT)
Dept: FAMILY MEDICINE | Facility: CLINIC | Age: 81
End: 2021-01-05

## 2021-01-05 ENCOUNTER — SURGERY - HEALTHEAST (OUTPATIENT)
Dept: CARDIOLOGY | Facility: CLINIC | Age: 81
End: 2021-01-05

## 2021-01-05 DIAGNOSIS — I74.10 AORTIC THROMBUS (H): ICD-10-CM

## 2021-01-05 DIAGNOSIS — Z79.4 TYPE 2 DIABETES MELLITUS WITH BOTH EYES AFFECTED BY PROLIFERATIVE RETINOPATHY WITHOUT MACULAR EDEMA, WITH LONG-TERM CURRENT USE OF INSULIN (H): ICD-10-CM

## 2021-01-05 DIAGNOSIS — I35.0 SEVERE AORTIC STENOSIS: ICD-10-CM

## 2021-01-05 DIAGNOSIS — N40.0 BPH (BENIGN PROSTATIC HYPERPLASIA): ICD-10-CM

## 2021-01-05 DIAGNOSIS — E11.3593 TYPE 2 DIABETES MELLITUS WITH BOTH EYES AFFECTED BY PROLIFERATIVE RETINOPATHY WITHOUT MACULAR EDEMA, WITH LONG-TERM CURRENT USE OF INSULIN (H): ICD-10-CM

## 2021-01-05 DIAGNOSIS — G25.81 RESTLESS LEG SYNDROME: ICD-10-CM

## 2021-01-05 DIAGNOSIS — I50.32 CHRONIC HEART FAILURE WITH PRESERVED EJECTION FRACTION (H): ICD-10-CM

## 2021-01-05 DIAGNOSIS — E66.01 MORBID OBESITY (H): ICD-10-CM

## 2021-01-05 DIAGNOSIS — I48.20 CHRONIC ATRIAL FIBRILLATION (H): ICD-10-CM

## 2021-01-06 ENCOUNTER — AMBULATORY - HEALTHEAST (OUTPATIENT)
Dept: FAMILY MEDICINE | Facility: CLINIC | Age: 81
End: 2021-01-06

## 2021-01-06 ENCOUNTER — SURGERY - HEALTHEAST (OUTPATIENT)
Dept: CARDIOLOGY | Facility: HOSPITAL | Age: 81
End: 2021-01-06

## 2021-01-06 DIAGNOSIS — Z11.59 ENCOUNTER FOR SCREENING FOR OTHER VIRAL DISEASES: ICD-10-CM

## 2021-01-06 ASSESSMENT — MIFFLIN-ST. JEOR: SCORE: 1586.03

## 2021-01-07 ENCOUNTER — AMBULATORY - HEALTHEAST (OUTPATIENT)
Dept: ANTICOAGULATION | Facility: CLINIC | Age: 81
End: 2021-01-07

## 2021-01-07 DIAGNOSIS — I48.20 CHRONIC ATRIAL FIBRILLATION (H): ICD-10-CM

## 2021-01-08 ENCOUNTER — DOCUMENTATION ONLY (OUTPATIENT)
Dept: CARDIOLOGY | Facility: CLINIC | Age: 81
End: 2021-01-08

## 2021-01-08 ENCOUNTER — OFFICE VISIT - HEALTHEAST (OUTPATIENT)
Dept: CARDIOLOGY | Facility: CLINIC | Age: 81
End: 2021-01-08

## 2021-01-08 ENCOUNTER — AMBULATORY - HEALTHEAST (OUTPATIENT)
Dept: CARDIOLOGY | Facility: CLINIC | Age: 81
End: 2021-01-08

## 2021-01-08 ENCOUNTER — COMMUNICATION - HEALTHEAST (OUTPATIENT)
Dept: ANTICOAGULATION | Facility: CLINIC | Age: 81
End: 2021-01-08

## 2021-01-08 ENCOUNTER — PREP FOR PROCEDURE (OUTPATIENT)
Dept: CARDIOLOGY | Facility: CLINIC | Age: 81
End: 2021-01-08

## 2021-01-08 ENCOUNTER — AMBULATORY - HEALTHEAST (OUTPATIENT)
Dept: LAB | Facility: CLINIC | Age: 81
End: 2021-01-08

## 2021-01-08 DIAGNOSIS — I50.32 CHRONIC HEART FAILURE WITH PRESERVED EJECTION FRACTION (H): ICD-10-CM

## 2021-01-08 DIAGNOSIS — I35.0 SEVERE AORTIC STENOSIS: ICD-10-CM

## 2021-01-08 DIAGNOSIS — Z11.59 ENCOUNTER FOR SCREENING FOR OTHER VIRAL DISEASES: ICD-10-CM

## 2021-01-08 DIAGNOSIS — I48.20 CHRONIC ATRIAL FIBRILLATION (H): ICD-10-CM

## 2021-01-08 DIAGNOSIS — I50.30 HEART FAILURE WITH PRESERVED EJECTION FRACTION (H): ICD-10-CM

## 2021-01-08 DIAGNOSIS — I25.10 CORONARY ARTERY DISEASE DUE TO LIPID RICH PLAQUE: ICD-10-CM

## 2021-01-08 DIAGNOSIS — I25.83 CORONARY ARTERY DISEASE DUE TO LIPID RICH PLAQUE: ICD-10-CM

## 2021-01-08 DIAGNOSIS — I10 ESSENTIAL HYPERTENSION: ICD-10-CM

## 2021-01-08 DIAGNOSIS — I35.0 NONRHEUMATIC AORTIC VALVE STENOSIS: Primary | ICD-10-CM

## 2021-01-08 LAB
ALBUMIN SERPL-MCNC: 3.7 G/DL (ref 3.5–5)
ALP SERPL-CCNC: 58 U/L (ref 45–120)
ALT SERPL W P-5'-P-CCNC: 20 U/L (ref 0–45)
ANION GAP SERPL CALCULATED.3IONS-SCNC: 9 MMOL/L (ref 5–18)
AST SERPL W P-5'-P-CCNC: 25 U/L (ref 0–40)
BASOPHILS # BLD AUTO: 0 THOU/UL (ref 0–0.2)
BASOPHILS NFR BLD AUTO: 1 % (ref 0–2)
BILIRUB SERPL-MCNC: 1.1 MG/DL (ref 0–1)
BNP SERPL-MCNC: 91 PG/ML (ref 0–82)
BUN SERPL-MCNC: 14 MG/DL (ref 8–28)
CALCIUM SERPL-MCNC: 9 MG/DL (ref 8.5–10.5)
CHLORIDE BLD-SCNC: 103 MMOL/L (ref 98–107)
CO2 SERPL-SCNC: 26 MMOL/L (ref 22–31)
CREAT SERPL-MCNC: 0.99 MG/DL (ref 0.7–1.3)
EOSINOPHIL # BLD AUTO: 0.1 THOU/UL (ref 0–0.4)
EOSINOPHIL NFR BLD AUTO: 3 % (ref 0–6)
ERYTHROCYTE [DISTWIDTH] IN BLOOD BY AUTOMATED COUNT: 12.8 % (ref 11–14.5)
GFR SERPL CREATININE-BSD FRML MDRD: >60 ML/MIN/1.73M2
GLUCOSE BLD-MCNC: 278 MG/DL (ref 70–125)
HCT VFR BLD AUTO: 40.4 % (ref 40–54)
HGB BLD-MCNC: 13.6 G/DL (ref 14–18)
IMM GRANULOCYTES # BLD: 0 THOU/UL
IMM GRANULOCYTES NFR BLD: 0 %
INR PPP: 1.11 (ref 0.9–1.1)
LYMPHOCYTES # BLD AUTO: 1.2 THOU/UL (ref 0.8–4.4)
LYMPHOCYTES NFR BLD AUTO: 24 % (ref 20–40)
MCH RBC QN AUTO: 33.6 PG (ref 27–34)
MCHC RBC AUTO-ENTMCNC: 33.7 G/DL (ref 32–36)
MCV RBC AUTO: 100 FL (ref 80–100)
MONOCYTES # BLD AUTO: 0.4 THOU/UL (ref 0–0.9)
MONOCYTES NFR BLD AUTO: 8 % (ref 2–10)
NEUTROPHILS # BLD AUTO: 3.2 THOU/UL (ref 2–7.7)
NEUTROPHILS NFR BLD AUTO: 64 % (ref 50–70)
PLATELET # BLD AUTO: 148 THOU/UL (ref 140–440)
PMV BLD AUTO: 11.3 FL (ref 8.5–12.5)
POTASSIUM BLD-SCNC: 4.2 MMOL/L (ref 3.5–5)
PROT SERPL-MCNC: 7 G/DL (ref 6–8)
RBC # BLD AUTO: 4.05 MILL/UL (ref 4.4–6.2)
SODIUM SERPL-SCNC: 138 MMOL/L (ref 136–145)
WBC: 4.9 THOU/UL (ref 4–11)

## 2021-01-08 RX ORDER — LIDOCAINE 40 MG/G
CREAM TOPICAL
Status: CANCELLED | OUTPATIENT
Start: 2021-01-08

## 2021-01-08 RX ORDER — CEFAZOLIN SODIUM 2 G/50ML
2 SOLUTION INTRAVENOUS
Status: CANCELLED | OUTPATIENT
Start: 2021-01-08

## 2021-01-08 RX ORDER — TOPIRAMATE SPINKLE 25 MG/1
25 CAPSULE ORAL 2 TIMES DAILY
COMMUNITY
End: 2021-01-08

## 2021-01-08 RX ORDER — PRAMIPEXOLE DIHYDROCHLORIDE 0.25 MG/1
0.25 TABLET ORAL 2 TIMES DAILY
COMMUNITY
End: 2021-11-02

## 2021-01-08 RX ORDER — SODIUM CHLORIDE 9 MG/ML
INJECTION, SOLUTION INTRAVENOUS CONTINUOUS
Status: CANCELLED | OUTPATIENT
Start: 2021-01-08

## 2021-01-08 ASSESSMENT — MIFFLIN-ST. JEOR: SCORE: 1566.08

## 2021-01-08 NOTE — PROGRESS NOTES
Patient in to see RN for Pre-TAVR visit on 2021     All pre-procedure labs drawn: 2021  (Type and screen to be draw at [facility] on  upon arrival- NO ANTIBODIES DETECTED ON )     EKG obtained: 2021      MRSA nose swab collected: 2021     Brief history:          Valve Clinic Nursing Note: Aortic Stenosis     Referring provider: Lilli Guzman CNP     Patient history is significant for known CAD s/p CABG in 2016- cath in 2019 demonstrates a patent LIMA and SVG to OM1.     Symptoms include TOBAR, decreased energy, leg fatigue     Echo information: ()  EF: 60 M P. King: 4.1 MEMO: 0.6 Di: 0.2 Svi: 29.1        Recent angiogram report:    81 yo male s/p CABG and PCI with residual severe small vessel disease of RCA/prox LAD into diagonal, mid LAD, circ prox and Circ now with severe aortic stenosis on echo and significant dyspnea on exertion     Angiography via left radial  Noted all vessels with severe diffuse dz and very small in diameter  LM mild dz  LAD prox 70%; mid 80%; diagonal prox 60-70%, patent LIMA   Circ severe dz in prox/mid with patent SVG to OM  RCA prox stent patent severe dz in mid (no change from 2019)     LVEDP 19mmHg pull back did not record gradient     Imp/plan  1. Severe aortic stenosis - plan TAVR   2. CAD s/p CABG/PCI - severe diffuse dz in very small vessels - no change from 2019      STS score: 3.5%  NYHA score: 3  CCS score: 0    Labs reviewed: yes, no action needed. Will repeat labs the day of procedure.     Patient instructed on medications:   Instructed to continue to hold warfarin from angiogram on   ONLY TAKE IMDUR THE AM OF PROCEDURE     Patient on blood thinner: yes, continue to hold warfarin from angiogram on  per Dr. Romano    Loading dose of ASA: 325 mg asa upon arrival    Sent home with showering instructions.        Education was given to patient regarding what to expect pre-procedure.     Instructed to come to the main entrance of [facility] at  0530    TAVR, SELAM and blood consent signed at the time of the appt: 1/8/2021 FAXING TO [facility] 1/8    All questions were answered to family and patient by RN.    Patient present at the time of appointment.    Maude Hand, RN, BSN  Valve Clinic Coordinator  [facility]  [phone]  01/08/21 9:07 AM

## 2021-01-09 LAB
BACTERIA SPEC CULT: NORMAL
SARS-COV-2 PCR COMMENT: NORMAL
SARS-COV-2 RNA SPEC QL NAA+PROBE: NEGATIVE
SARS-COV-2 VIRUS SPECIMEN SOURCE: NORMAL

## 2021-01-11 ENCOUNTER — ANESTHESIA EVENT (OUTPATIENT)
Dept: CARDIOLOGY | Facility: CLINIC | Age: 81
DRG: 267 | End: 2021-01-11
Payer: COMMERCIAL

## 2021-01-11 ENCOUNTER — COMMUNICATION - HEALTHEAST (OUTPATIENT)
Dept: SCHEDULING | Facility: CLINIC | Age: 81
End: 2021-01-11

## 2021-01-11 ENCOUNTER — TELEPHONE (OUTPATIENT)
Dept: CARDIOLOGY | Facility: CLINIC | Age: 81
End: 2021-01-11

## 2021-01-11 LAB
ATRIAL RATE - MUSE: 69 BPM
DIASTOLIC BLOOD PRESSURE - MUSE: NORMAL
INTERPRETATION ECG - MUSE: NORMAL
P AXIS - MUSE: NORMAL
PR INTERVAL - MUSE: NORMAL
QRS DURATION - MUSE: 96 MS
QT - MUSE: 430 MS
QTC - MUSE: 436 MS
R AXIS - MUSE: 13 DEGREES
SYSTOLIC BLOOD PRESSURE - MUSE: NORMAL
T AXIS - MUSE: 3 DEGREES
VENTRICULAR RATE- MUSE: 62 BPM

## 2021-01-12 ENCOUNTER — PREP FOR PROCEDURE (OUTPATIENT)
Facility: CLINIC | Age: 81
End: 2021-01-12

## 2021-01-12 ENCOUNTER — AMBULATORY - HEALTHEAST (OUTPATIENT)
Dept: CARDIOLOGY | Facility: CLINIC | Age: 81
End: 2021-01-12

## 2021-01-12 ENCOUNTER — HOSPITAL ENCOUNTER (OUTPATIENT)
Dept: CARDIOLOGY | Facility: CLINIC | Age: 81
DRG: 267 | End: 2021-01-12
Attending: INTERNAL MEDICINE | Admitting: INTERNAL MEDICINE
Payer: COMMERCIAL

## 2021-01-12 ENCOUNTER — ANESTHESIA (OUTPATIENT)
Dept: CARDIOLOGY | Facility: CLINIC | Age: 81
DRG: 267 | End: 2021-01-12
Payer: COMMERCIAL

## 2021-01-12 ENCOUNTER — RECORDS - HEALTHEAST (OUTPATIENT)
Dept: ADMINISTRATIVE | Facility: OTHER | Age: 81
End: 2021-01-12

## 2021-01-12 ENCOUNTER — HOSPITAL ENCOUNTER (INPATIENT)
Facility: CLINIC | Age: 81
LOS: 1 days | Discharge: HOME | DRG: 267 | End: 2021-01-13
Attending: INTERNAL MEDICINE | Admitting: THORACIC SURGERY (CARDIOTHORACIC VASCULAR SURGERY)
Payer: COMMERCIAL

## 2021-01-12 DIAGNOSIS — I35.0 AORTIC STENOSIS, SEVERE: ICD-10-CM

## 2021-01-12 DIAGNOSIS — I10 ESSENTIAL HYPERTENSION: ICD-10-CM

## 2021-01-12 DIAGNOSIS — N52.9 IMPOTENCE OF ORGANIC ORIGIN: ICD-10-CM

## 2021-01-12 DIAGNOSIS — I48.20 CHRONIC ATRIAL FIBRILLATION (H): ICD-10-CM

## 2021-01-12 DIAGNOSIS — Z71.89 ADVANCED DIRECTIVES, COUNSELING/DISCUSSION: Chronic | ICD-10-CM

## 2021-01-12 DIAGNOSIS — E78.5 HYPERLIPIDEMIA LDL GOAL <100: ICD-10-CM

## 2021-01-12 DIAGNOSIS — I35.0 AORTIC STENOSIS: ICD-10-CM

## 2021-01-12 DIAGNOSIS — R07.9 CHEST PAIN, UNSPECIFIED TYPE: ICD-10-CM

## 2021-01-12 DIAGNOSIS — E78.2 MIXED HYPERLIPIDEMIA: ICD-10-CM

## 2021-01-12 DIAGNOSIS — Z85.828 HISTORY OF SKIN CANCER: ICD-10-CM

## 2021-01-12 DIAGNOSIS — R25.1 TREMOR: ICD-10-CM

## 2021-01-12 DIAGNOSIS — I50.43 ACUTE ON CHRONIC COMBINED SYSTOLIC AND DIASTOLIC CONGESTIVE HEART FAILURE (H): ICD-10-CM

## 2021-01-12 DIAGNOSIS — I25.10 CORONARY ARTERY DISEASE INVOLVING NATIVE CORONARY ARTERY OF NATIVE HEART WITHOUT ANGINA PECTORIS: ICD-10-CM

## 2021-01-12 DIAGNOSIS — L57.0 ACTINIC KERATOSIS: ICD-10-CM

## 2021-01-12 DIAGNOSIS — E11.42 TYPE 2 DIABETES MELLITUS WITH PERIPHERAL NEUROPATHY (H): ICD-10-CM

## 2021-01-12 DIAGNOSIS — H35.369 DEGENERATIVE DRUSEN, UNSPECIFIED LATERALITY: ICD-10-CM

## 2021-01-12 DIAGNOSIS — Z76.89 HEALTH CARE HOME: ICD-10-CM

## 2021-01-12 DIAGNOSIS — I24.9 ACS (ACUTE CORONARY SYNDROME) (H): ICD-10-CM

## 2021-01-12 DIAGNOSIS — I35.0 NONRHEUMATIC AORTIC VALVE STENOSIS: ICD-10-CM

## 2021-01-12 DIAGNOSIS — H90.3 SENSORINEURAL HEARING LOSS, ASYMMETRICAL: ICD-10-CM

## 2021-01-12 DIAGNOSIS — Z95.2 S/P TAVR (TRANSCATHETER AORTIC VALVE REPLACEMENT): ICD-10-CM

## 2021-01-12 DIAGNOSIS — S41.132D: ICD-10-CM

## 2021-01-12 DIAGNOSIS — Z98.890 STATUS POST CORONARY ANGIOGRAM: ICD-10-CM

## 2021-01-12 DIAGNOSIS — I48.91 NEW ONSET ATRIAL FIBRILLATION (H): ICD-10-CM

## 2021-01-12 DIAGNOSIS — E11.3599 TYPE 2 DIABETES MELLITUS WITH PROLIFERATIVE RETINOPATHY WITHOUT MACULAR EDEMA, WITHOUT LONG-TERM CURRENT USE OF INSULIN, UNSPECIFIED LATERALITY (H): ICD-10-CM

## 2021-01-12 DIAGNOSIS — Z79.01 LONG TERM CURRENT USE OF ANTICOAGULANT THERAPY: ICD-10-CM

## 2021-01-12 DIAGNOSIS — K75.81 NONALCOHOLIC STEATOHEPATITIS: ICD-10-CM

## 2021-01-12 DIAGNOSIS — E11.42 DIABETIC POLYNEUROPATHY ASSOCIATED WITH TYPE 2 DIABETES MELLITUS (H): Primary | ICD-10-CM

## 2021-01-12 DIAGNOSIS — E11.9 TYPE 2 DIABETES, HBA1C GOAL < 8% (H): ICD-10-CM

## 2021-01-12 LAB
ABO + RH BLD: NORMAL
ABO + RH BLD: NORMAL
ALBUMIN SERPL-MCNC: 2.9 G/DL (ref 3.4–5)
ALP SERPL-CCNC: 63 U/L (ref 40–150)
ALT SERPL W P-5'-P-CCNC: 32 U/L (ref 0–70)
ANION GAP SERPL CALCULATED.3IONS-SCNC: 6 MMOL/L (ref 3–14)
ANION GAP SERPL CALCULATED.3IONS-SCNC: 6 MMOL/L (ref 3–14)
AST SERPL W P-5'-P-CCNC: 26 U/L (ref 0–45)
BILIRUB SERPL-MCNC: 0.8 MG/DL (ref 0.2–1.3)
BLD GP AB SCN SERPL QL: NORMAL
BLD PROD TYP BPU: NORMAL
BLD UNIT ID BPU: 0
BLD UNIT ID BPU: 0
BLOOD BANK CMNT PATIENT-IMP: NORMAL
BLOOD PRODUCT CODE: NORMAL
BLOOD PRODUCT CODE: NORMAL
BPU ID: NORMAL
BPU ID: NORMAL
BUN SERPL-MCNC: 19 MG/DL (ref 7–30)
BUN SERPL-MCNC: 20 MG/DL (ref 7–30)
CALCIUM SERPL-MCNC: 8.7 MG/DL (ref 8.5–10.1)
CALCIUM SERPL-MCNC: 9.2 MG/DL (ref 8.5–10.1)
CHLORIDE SERPL-SCNC: 105 MMOL/L (ref 94–109)
CHLORIDE SERPL-SCNC: 105 MMOL/L (ref 94–109)
CO2 SERPL-SCNC: 28 MMOL/L (ref 20–32)
CO2 SERPL-SCNC: 28 MMOL/L (ref 20–32)
CREAT SERPL-MCNC: 0.93 MG/DL (ref 0.66–1.25)
CREAT SERPL-MCNC: 1.01 MG/DL (ref 0.66–1.25)
ERYTHROCYTE [DISTWIDTH] IN BLOOD BY AUTOMATED COUNT: 12.8 % (ref 10–15)
ERYTHROCYTE [DISTWIDTH] IN BLOOD BY AUTOMATED COUNT: 12.9 % (ref 10–15)
GFR SERPL CREATININE-BSD FRML MDRD: 71 ML/MIN/{1.73_M2}
GFR SERPL CREATININE-BSD FRML MDRD: 78 ML/MIN/{1.73_M2}
GLUCOSE BLDC GLUCOMTR-MCNC: 173 MG/DL (ref 70–99)
GLUCOSE BLDC GLUCOMTR-MCNC: 189 MG/DL (ref 70–99)
GLUCOSE BLDC GLUCOMTR-MCNC: 200 MG/DL (ref 70–99)
GLUCOSE BLDC GLUCOMTR-MCNC: 216 MG/DL (ref 70–99)
GLUCOSE BLDC GLUCOMTR-MCNC: 240 MG/DL (ref 70–99)
GLUCOSE BLDC GLUCOMTR-MCNC: 264 MG/DL (ref 70–99)
GLUCOSE SERPL-MCNC: 230 MG/DL (ref 70–99)
GLUCOSE SERPL-MCNC: 241 MG/DL (ref 70–99)
HBA1C MFR BLD: 9.1 % (ref 0–5.6)
HCT VFR BLD AUTO: 33.8 % (ref 40–53)
HCT VFR BLD AUTO: 37.1 % (ref 40–53)
HGB BLD-MCNC: 11.1 G/DL (ref 13.3–17.7)
HGB BLD-MCNC: 12.1 G/DL (ref 13.3–17.7)
INR PPP: 1.09 (ref 0.86–1.14)
KCT BLD-ACNC: 363 SEC (ref 75–150)
MAGNESIUM SERPL-MCNC: 1.9 MG/DL (ref 1.6–2.3)
MAGNESIUM SERPL-MCNC: 2.1 MG/DL (ref 1.6–2.3)
MCH RBC QN AUTO: 32.7 PG (ref 26.5–33)
MCH RBC QN AUTO: 33 PG (ref 26.5–33)
MCHC RBC AUTO-ENTMCNC: 32.6 G/DL (ref 31.5–36.5)
MCHC RBC AUTO-ENTMCNC: 32.8 G/DL (ref 31.5–36.5)
MCV RBC AUTO: 100 FL (ref 78–100)
MCV RBC AUTO: 101 FL (ref 78–100)
NUM BPU REQUESTED: 2
PLATELET # BLD AUTO: 113 10E9/L (ref 150–450)
PLATELET # BLD AUTO: 130 10E9/L (ref 150–450)
POTASSIUM SERPL-SCNC: 3.8 MMOL/L (ref 3.4–5.3)
POTASSIUM SERPL-SCNC: 4.3 MMOL/L (ref 3.4–5.3)
PROT SERPL-MCNC: 5.8 G/DL (ref 6.8–8.8)
RBC # BLD AUTO: 3.36 10E12/L (ref 4.4–5.9)
RBC # BLD AUTO: 3.7 10E12/L (ref 4.4–5.9)
SODIUM SERPL-SCNC: 139 MMOL/L (ref 133–144)
SODIUM SERPL-SCNC: 139 MMOL/L (ref 133–144)
SPECIMEN EXP DATE BLD: NORMAL
TRANSFUSION STATUS PATIENT QL: NORMAL
WBC # BLD AUTO: 5 10E9/L (ref 4–11)
WBC # BLD AUTO: 5 10E9/L (ref 4–11)

## 2021-01-12 PROCEDURE — 33361 REPLACE AORTIC VALVE PERQ: CPT | Mod: 62 | Performed by: THORACIC SURGERY (CARDIOTHORACIC VASCULAR SURGERY)

## 2021-01-12 PROCEDURE — 86901 BLOOD TYPING SEROLOGIC RH(D): CPT | Performed by: INTERNAL MEDICINE

## 2021-01-12 PROCEDURE — 214N000001 HC R&B CCU UMMC

## 2021-01-12 PROCEDURE — 250N000011 HC RX IP 250 OP 636: Performed by: INTERNAL MEDICINE

## 2021-01-12 PROCEDURE — 86850 RBC ANTIBODY SCREEN: CPT | Performed by: INTERNAL MEDICINE

## 2021-01-12 PROCEDURE — 999N001017 HC STATISTIC GLUCOSE BY METER IP

## 2021-01-12 PROCEDURE — 80053 COMPREHEN METABOLIC PANEL: CPT | Performed by: INTERNAL MEDICINE

## 2021-01-12 PROCEDURE — C1760 CLOSURE DEV, VASC: HCPCS | Performed by: INTERNAL MEDICINE

## 2021-01-12 PROCEDURE — P9016 RBC LEUKOCYTES REDUCED: HCPCS | Performed by: INTERNAL MEDICINE

## 2021-01-12 PROCEDURE — 250N000011 HC RX IP 250 OP 636: Performed by: ANESTHESIOLOGY

## 2021-01-12 PROCEDURE — 85027 COMPLETE CBC AUTOMATED: CPT | Performed by: INTERNAL MEDICINE

## 2021-01-12 PROCEDURE — 83036 HEMOGLOBIN GLYCOSYLATED A1C: CPT | Performed by: INTERNAL MEDICINE

## 2021-01-12 PROCEDURE — 272N000002 HC OR SUPPLY OTHER OPNP: Performed by: INTERNAL MEDICINE

## 2021-01-12 PROCEDURE — 85610 PROTHROMBIN TIME: CPT | Performed by: INTERNAL MEDICINE

## 2021-01-12 PROCEDURE — 93005 ELECTROCARDIOGRAM TRACING: CPT

## 2021-01-12 PROCEDURE — X2A5312 CEREBRAL EMBOLIC FILTRATION, DUAL FILTER IN INNOMINATE ARTERY AND LEFT COMMON CAROTID ARTERY, PERCUTANEOUS APPROACH, NEW TECHNOLOGY GROUP 2: ICD-10-PCS | Performed by: INTERNAL MEDICINE

## 2021-01-12 PROCEDURE — C1769 GUIDE WIRE: HCPCS | Performed by: INTERNAL MEDICINE

## 2021-01-12 PROCEDURE — 250N000012 HC RX MED GY IP 250 OP 636 PS 637: Performed by: INTERNAL MEDICINE

## 2021-01-12 PROCEDURE — 360N000079 HC SURGERY LEVEL 6, PER MIN: Performed by: INTERNAL MEDICINE

## 2021-01-12 PROCEDURE — 250N000011 HC RX IP 250 OP 636: Performed by: NURSE ANESTHETIST, CERTIFIED REGISTERED

## 2021-01-12 PROCEDURE — 80048 BASIC METABOLIC PNL TOTAL CA: CPT | Performed by: INTERNAL MEDICINE

## 2021-01-12 PROCEDURE — 86923 COMPATIBILITY TEST ELECTRIC: CPT | Performed by: INTERNAL MEDICINE

## 2021-01-12 PROCEDURE — 93306 TTE W/DOPPLER COMPLETE: CPT

## 2021-01-12 PROCEDURE — 250N000013 HC RX MED GY IP 250 OP 250 PS 637: Performed by: STUDENT IN AN ORGANIZED HEALTH CARE EDUCATION/TRAINING PROGRAM

## 2021-01-12 PROCEDURE — 258N000003 HC RX IP 258 OP 636: Performed by: INTERNAL MEDICINE

## 2021-01-12 PROCEDURE — 250N000013 HC RX MED GY IP 250 OP 250 PS 637: Performed by: INTERNAL MEDICINE

## 2021-01-12 PROCEDURE — 250N000009 HC RX 250: Performed by: INTERNAL MEDICINE

## 2021-01-12 PROCEDURE — 272N000001 HC OR GENERAL SUPPLY STERILE: Performed by: INTERNAL MEDICINE

## 2021-01-12 PROCEDURE — 36415 COLL VENOUS BLD VENIPUNCTURE: CPT | Performed by: INTERNAL MEDICINE

## 2021-01-12 PROCEDURE — 250N000011 HC RX IP 250 OP 636: Performed by: STUDENT IN AN ORGANIZED HEALTH CARE EDUCATION/TRAINING PROGRAM

## 2021-01-12 PROCEDURE — 93010 ELECTROCARDIOGRAM REPORT: CPT | Mod: 59 | Performed by: INTERNAL MEDICINE

## 2021-01-12 PROCEDURE — 93306 TTE W/DOPPLER COMPLETE: CPT | Mod: 26 | Performed by: GENERAL ACUTE CARE HOSPITAL

## 2021-01-12 PROCEDURE — 86900 BLOOD TYPING SEROLOGIC ABO: CPT | Performed by: INTERNAL MEDICINE

## 2021-01-12 PROCEDURE — 83735 ASSAY OF MAGNESIUM: CPT | Performed by: INTERNAL MEDICINE

## 2021-01-12 PROCEDURE — 258N000003 HC RX IP 258 OP 636: Performed by: NURSE ANESTHETIST, CERTIFIED REGISTERED

## 2021-01-12 PROCEDURE — 99222 1ST HOSP IP/OBS MODERATE 55: CPT | Mod: 25 | Performed by: NURSE PRACTITIONER

## 2021-01-12 PROCEDURE — 02RF38Z REPLACEMENT OF AORTIC VALVE WITH ZOOPLASTIC TISSUE, PERCUTANEOUS APPROACH: ICD-10-PCS | Performed by: INTERNAL MEDICINE

## 2021-01-12 PROCEDURE — C1894 INTRO/SHEATH, NON-LASER: HCPCS | Performed by: INTERNAL MEDICINE

## 2021-01-12 PROCEDURE — 370N000017 HC ANESTHESIA TECHNICAL FEE, PER MIN: Performed by: INTERNAL MEDICINE

## 2021-01-12 PROCEDURE — 250N000013 HC RX MED GY IP 250 OP 250 PS 637: Performed by: ANESTHESIOLOGY

## 2021-01-12 PROCEDURE — 250N000009 HC RX 250: Performed by: NURSE ANESTHETIST, CERTIFIED REGISTERED

## 2021-01-12 PROCEDURE — 85347 COAGULATION TIME ACTIVATED: CPT

## 2021-01-12 DEVICE — IMPLANTABLE DEVICE: Type: IMPLANTABLE DEVICE | Site: AORTA | Status: FUNCTIONAL

## 2021-01-12 RX ORDER — ACETAMINOPHEN 325 MG/1
325-650 TABLET ORAL EVERY 4 HOURS PRN
Status: DISCONTINUED | OUTPATIENT
Start: 2021-01-12 | End: 2021-01-13 | Stop reason: HOSPADM

## 2021-01-12 RX ORDER — NICOTINE POLACRILEX 4 MG
15-30 LOZENGE BUCCAL
Status: DISCONTINUED | OUTPATIENT
Start: 2021-01-12 | End: 2021-01-13 | Stop reason: HOSPADM

## 2021-01-12 RX ORDER — MAGNESIUM SULFATE HEPTAHYDRATE 40 MG/ML
2 INJECTION, SOLUTION INTRAVENOUS DAILY PRN
Status: DISCONTINUED | OUTPATIENT
Start: 2021-01-12 | End: 2021-01-13 | Stop reason: HOSPADM

## 2021-01-12 RX ORDER — ONDANSETRON 4 MG/1
4 TABLET, FILM COATED ORAL EVERY 6 HOURS PRN
Status: DISCONTINUED | OUTPATIENT
Start: 2021-01-12 | End: 2021-01-13 | Stop reason: HOSPADM

## 2021-01-12 RX ORDER — PRAVASTATIN SODIUM 40 MG
80 TABLET ORAL AT BEDTIME
Status: DISCONTINUED | OUTPATIENT
Start: 2021-01-12 | End: 2021-01-13 | Stop reason: HOSPADM

## 2021-01-12 RX ORDER — ISOSORBIDE MONONITRATE 30 MG/1
30 TABLET, EXTENDED RELEASE ORAL DAILY
Status: DISCONTINUED | OUTPATIENT
Start: 2021-01-13 | End: 2021-01-13 | Stop reason: HOSPADM

## 2021-01-12 RX ORDER — POTASSIUM CHLORIDE 29.8 MG/ML
20 INJECTION INTRAVENOUS
Status: DISCONTINUED | OUTPATIENT
Start: 2021-01-12 | End: 2021-01-13 | Stop reason: HOSPADM

## 2021-01-12 RX ORDER — ONDANSETRON 2 MG/ML
4 INJECTION INTRAMUSCULAR; INTRAVENOUS EVERY 30 MIN PRN
Status: DISCONTINUED | OUTPATIENT
Start: 2021-01-12 | End: 2021-01-12 | Stop reason: HOSPADM

## 2021-01-12 RX ORDER — DEXTROSE MONOHYDRATE 25 G/50ML
25-50 INJECTION, SOLUTION INTRAVENOUS
Status: DISCONTINUED | OUTPATIENT
Start: 2021-01-12 | End: 2021-01-14 | Stop reason: HOSPADM

## 2021-01-12 RX ORDER — POTASSIUM CHLORIDE 1500 MG/1
20-40 TABLET, EXTENDED RELEASE ORAL
Status: DISCONTINUED | OUTPATIENT
Start: 2021-01-12 | End: 2021-01-13 | Stop reason: HOSPADM

## 2021-01-12 RX ORDER — POTASSIUM CHLORIDE 1.5 G/1.58G
20-40 POWDER, FOR SOLUTION ORAL
Status: DISCONTINUED | OUTPATIENT
Start: 2021-01-12 | End: 2021-01-13 | Stop reason: HOSPADM

## 2021-01-12 RX ORDER — ONDANSETRON 4 MG/1
4 TABLET, ORALLY DISINTEGRATING ORAL EVERY 30 MIN PRN
Status: DISCONTINUED | OUTPATIENT
Start: 2021-01-12 | End: 2021-01-12 | Stop reason: HOSPADM

## 2021-01-12 RX ORDER — SODIUM CHLORIDE 9 MG/ML
INJECTION, SOLUTION INTRAVENOUS CONTINUOUS
Status: DISCONTINUED | OUTPATIENT
Start: 2021-01-12 | End: 2021-01-12

## 2021-01-12 RX ORDER — CLOPIDOGREL BISULFATE 75 MG/1
75 TABLET ORAL DAILY
Status: DISCONTINUED | OUTPATIENT
Start: 2021-01-12 | End: 2021-01-13 | Stop reason: HOSPADM

## 2021-01-12 RX ORDER — ASPIRIN 81 MG/1
81 TABLET ORAL DAILY
Status: DISCONTINUED | OUTPATIENT
Start: 2021-01-13 | End: 2021-01-12

## 2021-01-12 RX ORDER — ASPIRIN 81 MG/1
81 TABLET ORAL DAILY
Status: DISCONTINUED | OUTPATIENT
Start: 2021-01-12 | End: 2021-01-12

## 2021-01-12 RX ORDER — NALOXONE HYDROCHLORIDE 0.4 MG/ML
0.2 INJECTION, SOLUTION INTRAMUSCULAR; INTRAVENOUS; SUBCUTANEOUS
Status: ACTIVE | OUTPATIENT
Start: 2021-01-12 | End: 2021-01-13

## 2021-01-12 RX ORDER — LIRAGLUTIDE 6 MG/ML
1.8 INJECTION SUBCUTANEOUS DAILY
Status: DISCONTINUED | OUTPATIENT
Start: 2021-01-13 | End: 2021-01-13 | Stop reason: HOSPADM

## 2021-01-12 RX ORDER — WARFARIN SODIUM 5 MG/1
5 TABLET ORAL
Status: DISCONTINUED | OUTPATIENT
Start: 2021-01-13 | End: 2021-01-12

## 2021-01-12 RX ORDER — NOREPINEPHRINE BITARTRATE 0.06 MG/ML
.03-.4 INJECTION, SOLUTION INTRAVENOUS CONTINUOUS
Status: DISCONTINUED | OUTPATIENT
Start: 2021-01-12 | End: 2021-01-12 | Stop reason: HOSPADM

## 2021-01-12 RX ORDER — HYDROMORPHONE HCL IN WATER/PF 6 MG/30 ML
0.2 PATIENT CONTROLLED ANALGESIA SYRINGE INTRAVENOUS ONCE
Status: COMPLETED | OUTPATIENT
Start: 2021-01-12 | End: 2021-01-12

## 2021-01-12 RX ORDER — DIPHENHYDRAMINE HCL 25 MG
25-50 CAPSULE ORAL EVERY 4 HOURS PRN
Status: DISCONTINUED | OUTPATIENT
Start: 2021-01-12 | End: 2021-01-13 | Stop reason: HOSPADM

## 2021-01-12 RX ORDER — IOPAMIDOL 755 MG/ML
INJECTION, SOLUTION INTRAVASCULAR
Status: DISCONTINUED | OUTPATIENT
Start: 2021-01-12 | End: 2021-01-12 | Stop reason: HOSPADM

## 2021-01-12 RX ORDER — BENZOCAINE/MENTHOL 6 MG-10 MG
LOZENGE MUCOUS MEMBRANE 2 TIMES DAILY
Status: DISCONTINUED | OUTPATIENT
Start: 2021-01-12 | End: 2021-01-13 | Stop reason: HOSPADM

## 2021-01-12 RX ORDER — PROTAMINE SULFATE 10 MG/ML
INJECTION, SOLUTION INTRAVENOUS PRN
Status: DISCONTINUED | OUTPATIENT
Start: 2021-01-12 | End: 2021-01-12

## 2021-01-12 RX ORDER — DEXMEDETOMIDINE HYDROCHLORIDE 4 UG/ML
0.2-1.2 INJECTION, SOLUTION INTRAVENOUS CONTINUOUS
Status: DISCONTINUED | OUTPATIENT
Start: 2021-01-12 | End: 2021-01-12 | Stop reason: HOSPADM

## 2021-01-12 RX ORDER — ACETAMINOPHEN 325 MG/1
650 TABLET ORAL ONCE
Status: COMPLETED | OUTPATIENT
Start: 2021-01-12 | End: 2021-01-12

## 2021-01-12 RX ORDER — METOPROLOL SUCCINATE 100 MG/1
100 TABLET, EXTENDED RELEASE ORAL DAILY
Status: DISCONTINUED | OUTPATIENT
Start: 2021-01-12 | End: 2021-01-13 | Stop reason: HOSPADM

## 2021-01-12 RX ORDER — NICOTINE POLACRILEX 4 MG
15-30 LOZENGE BUCCAL
Status: DISCONTINUED | OUTPATIENT
Start: 2021-01-12 | End: 2021-01-14 | Stop reason: HOSPADM

## 2021-01-12 RX ORDER — ONDANSETRON 2 MG/ML
INJECTION INTRAMUSCULAR; INTRAVENOUS PRN
Status: DISCONTINUED | OUTPATIENT
Start: 2021-01-12 | End: 2021-01-12

## 2021-01-12 RX ORDER — WARFARIN SODIUM 5 MG/1
5 TABLET ORAL
Status: COMPLETED | OUTPATIENT
Start: 2021-01-12 | End: 2021-01-12

## 2021-01-12 RX ORDER — SODIUM CHLORIDE, SODIUM LACTATE, POTASSIUM CHLORIDE, CALCIUM CHLORIDE 600; 310; 30; 20 MG/100ML; MG/100ML; MG/100ML; MG/100ML
INJECTION, SOLUTION INTRAVENOUS CONTINUOUS PRN
Status: DISCONTINUED | OUTPATIENT
Start: 2021-01-12 | End: 2021-01-12

## 2021-01-12 RX ORDER — HEPARIN SODIUM 1000 [USP'U]/ML
INJECTION, SOLUTION INTRAVENOUS; SUBCUTANEOUS PRN
Status: DISCONTINUED | OUTPATIENT
Start: 2021-01-12 | End: 2021-01-12

## 2021-01-12 RX ORDER — NALOXONE HYDROCHLORIDE 0.4 MG/ML
0.4 INJECTION, SOLUTION INTRAMUSCULAR; INTRAVENOUS; SUBCUTANEOUS
Status: ACTIVE | OUTPATIENT
Start: 2021-01-12 | End: 2021-01-13

## 2021-01-12 RX ORDER — FENTANYL CITRATE 50 UG/ML
INJECTION, SOLUTION INTRAMUSCULAR; INTRAVENOUS PRN
Status: DISCONTINUED | OUTPATIENT
Start: 2021-01-12 | End: 2021-01-12

## 2021-01-12 RX ORDER — SODIUM CHLORIDE, SODIUM LACTATE, POTASSIUM CHLORIDE, CALCIUM CHLORIDE 600; 310; 30; 20 MG/100ML; MG/100ML; MG/100ML; MG/100ML
INJECTION, SOLUTION INTRAVENOUS CONTINUOUS
Status: DISCONTINUED | OUTPATIENT
Start: 2021-01-12 | End: 2021-01-12 | Stop reason: HOSPADM

## 2021-01-12 RX ORDER — MAGNESIUM SULFATE HEPTAHYDRATE 40 MG/ML
4 INJECTION, SOLUTION INTRAVENOUS EVERY 4 HOURS PRN
Status: DISCONTINUED | OUTPATIENT
Start: 2021-01-12 | End: 2021-01-13 | Stop reason: HOSPADM

## 2021-01-12 RX ORDER — CEFAZOLIN SODIUM 2 G/100ML
2 INJECTION, SOLUTION INTRAVENOUS
Status: COMPLETED | OUTPATIENT
Start: 2021-01-12 | End: 2021-01-12

## 2021-01-12 RX ORDER — HYDRALAZINE HYDROCHLORIDE 20 MG/ML
10 INJECTION INTRAMUSCULAR; INTRAVENOUS
Status: DISCONTINUED | OUTPATIENT
Start: 2021-01-12 | End: 2021-01-13 | Stop reason: HOSPADM

## 2021-01-12 RX ORDER — DEXTROSE MONOHYDRATE 25 G/50ML
25-50 INJECTION, SOLUTION INTRAVENOUS
Status: DISCONTINUED | OUTPATIENT
Start: 2021-01-12 | End: 2021-01-13 | Stop reason: HOSPADM

## 2021-01-12 RX ORDER — POTASSIUM CHLORIDE 7.45 MG/ML
10 INJECTION INTRAVENOUS
Status: DISCONTINUED | OUTPATIENT
Start: 2021-01-12 | End: 2021-01-13 | Stop reason: HOSPADM

## 2021-01-12 RX ORDER — MULTIPLE VITAMINS W/ MINERALS TAB 9MG-400MCG
1 TAB ORAL DAILY
Status: DISCONTINUED | OUTPATIENT
Start: 2021-01-12 | End: 2021-01-13 | Stop reason: HOSPADM

## 2021-01-12 RX ORDER — PRAMIPEXOLE DIHYDROCHLORIDE 0.25 MG/1
0.25 TABLET ORAL
Status: DISCONTINUED | OUTPATIENT
Start: 2021-01-12 | End: 2021-01-13 | Stop reason: HOSPADM

## 2021-01-12 RX ORDER — FENTANYL CITRATE 50 UG/ML
25-50 INJECTION, SOLUTION INTRAMUSCULAR; INTRAVENOUS
Status: DISCONTINUED | OUTPATIENT
Start: 2021-01-12 | End: 2021-01-12 | Stop reason: HOSPADM

## 2021-01-12 RX ORDER — NITROGLYCERIN 0.4 MG/1
0.4 TABLET SUBLINGUAL EVERY 5 MIN PRN
Status: DISCONTINUED | OUTPATIENT
Start: 2021-01-12 | End: 2021-01-13 | Stop reason: HOSPADM

## 2021-01-12 RX ORDER — LIDOCAINE 40 MG/G
CREAM TOPICAL
Status: DISCONTINUED | OUTPATIENT
Start: 2021-01-12 | End: 2021-01-12 | Stop reason: HOSPADM

## 2021-01-12 RX ORDER — TAMSULOSIN HYDROCHLORIDE 0.4 MG/1
0.4 CAPSULE ORAL DAILY
Status: DISCONTINUED | OUTPATIENT
Start: 2021-01-12 | End: 2021-01-13 | Stop reason: HOSPADM

## 2021-01-12 RX ORDER — CLOPIDOGREL BISULFATE 75 MG/1
75 TABLET ORAL DAILY
Status: DISCONTINUED | OUTPATIENT
Start: 2021-01-12 | End: 2021-01-12

## 2021-01-12 RX ADMIN — PROTAMINE SULFATE 50 MG: 10 INJECTION, SOLUTION INTRAVENOUS at 09:55

## 2021-01-12 RX ADMIN — HYDRALAZINE HYDROCHLORIDE 10 MG: 20 INJECTION INTRAMUSCULAR; INTRAVENOUS at 21:01

## 2021-01-12 RX ADMIN — FENTANYL CITRATE 50 MCG: 50 INJECTION, SOLUTION INTRAMUSCULAR; INTRAVENOUS at 07:48

## 2021-01-12 RX ADMIN — PRAVASTATIN SODIUM 80 MG: 40 TABLET ORAL at 21:17

## 2021-01-12 RX ADMIN — HUMAN INSULIN 6 UNITS: 100 INJECTION, SOLUTION SUBCUTANEOUS at 11:15

## 2021-01-12 RX ADMIN — CLOPIDOGREL BISULFATE 75 MG: 75 TABLET ORAL at 17:23

## 2021-01-12 RX ADMIN — TAMSULOSIN HYDROCHLORIDE 0.4 MG: 0.4 CAPSULE ORAL at 17:23

## 2021-01-12 RX ADMIN — DIPHENHYDRAMINE HYDROCHLORIDE 25 MG: 25 CAPSULE ORAL at 20:42

## 2021-01-12 RX ADMIN — Medication 10000 UNITS: at 09:07

## 2021-01-12 RX ADMIN — INSULIN ASPART 3 UNITS: 100 INJECTION, SOLUTION INTRAVENOUS; SUBCUTANEOUS at 17:00

## 2021-01-12 RX ADMIN — HYDRALAZINE HYDROCHLORIDE 10 MG: 20 INJECTION INTRAMUSCULAR; INTRAVENOUS at 22:36

## 2021-01-12 RX ADMIN — SODIUM CHLORIDE, POTASSIUM CHLORIDE, SODIUM LACTATE AND CALCIUM CHLORIDE: 600; 310; 30; 20 INJECTION, SOLUTION INTRAVENOUS at 08:15

## 2021-01-12 RX ADMIN — SODIUM CHLORIDE, POTASSIUM CHLORIDE, SODIUM LACTATE AND CALCIUM CHLORIDE: 600; 310; 30; 20 INJECTION, SOLUTION INTRAVENOUS at 07:42

## 2021-01-12 RX ADMIN — ACETAMINOPHEN 650 MG: 325 TABLET, FILM COATED ORAL at 13:16

## 2021-01-12 RX ADMIN — ASPIRIN 325 MG: 325 TABLET, COATED ORAL at 06:37

## 2021-01-12 RX ADMIN — ONDANSETRON HYDROCHLORIDE 4 MG: 4 TABLET, FILM COATED ORAL at 22:22

## 2021-01-12 RX ADMIN — DEXMEDETOMIDINE 0.3 MCG/KG/HR: 100 INJECTION, SOLUTION, CONCENTRATE INTRAVENOUS at 08:15

## 2021-01-12 RX ADMIN — MAGNESIUM SULFATE IN WATER 2 G: 40 INJECTION, SOLUTION INTRAVENOUS at 13:08

## 2021-01-12 RX ADMIN — CEFAZOLIN 2 G: 10 INJECTION, POWDER, FOR SOLUTION INTRAVENOUS at 08:20

## 2021-01-12 RX ADMIN — PRAMIPEXOLE DIHYDROCHLORIDE 0.25 MG: 0.25 TABLET ORAL at 17:24

## 2021-01-12 RX ADMIN — DIPHENHYDRAMINE HYDROCHLORIDE 25 MG: 25 CAPSULE ORAL at 23:01

## 2021-01-12 RX ADMIN — MIDAZOLAM 1 MG: 1 INJECTION INTRAMUSCULAR; INTRAVENOUS at 08:15

## 2021-01-12 RX ADMIN — HYDROCORTISONE: 1 CREAM TOPICAL at 19:28

## 2021-01-12 RX ADMIN — HYDRALAZINE HYDROCHLORIDE 10 MG: 20 INJECTION INTRAMUSCULAR; INTRAVENOUS at 19:27

## 2021-01-12 RX ADMIN — ONDANSETRON 4 MG: 2 INJECTION INTRAMUSCULAR; INTRAVENOUS at 08:32

## 2021-01-12 RX ADMIN — HYDROMORPHONE HYDROCHLORIDE 0.2 MG: 0.2 INJECTION, SOLUTION INTRAMUSCULAR; INTRAVENOUS; SUBCUTANEOUS at 13:16

## 2021-01-12 RX ADMIN — MULTIPLE VITAMINS W/ MINERALS TAB 1 TABLET: TAB at 17:23

## 2021-01-12 RX ADMIN — WARFARIN SODIUM 5 MG: 5 TABLET ORAL at 18:19

## 2021-01-12 ASSESSMENT — MIFFLIN-ST. JEOR: SCORE: 1555.88

## 2021-01-12 ASSESSMENT — ACTIVITIES OF DAILY LIVING (ADL)
ADLS_ACUITY_SCORE: 12
ADLS_ACUITY_SCORE: 12

## 2021-01-12 NOTE — H&P
[facility]      CSI History and Physicial  Pee Ayala MRN: [id number]  1940  Date of Admission:1/12/2021  Primary care provider: Jazzy Gil      Assessment and Plan:     Pee Ayala is 80 y.o male with past medical history significant for severe aortic stenosis, CAD s/p 2-vessel CABG, dyslipidemia, DMII, and atrial fibrillation (on coumadin) who is admitted today following planned TAVR.     #Severe aortic stenosis, now s/p Transcatheter Aortic Valve Replacement with 29 mm Dominick 3. Trace AI post-deployment with MG of 3 mmHg. Prior to procedure, pt noted to have a mean gradient 37 mmHG, MEMO 0.6 Cm^2, peak velocity 4.0 M/s- Sheath sites soft without hematoma. No arrhythmias. Neuro's intact.      - Bedrest per protocol   - Neuro checks, per protocol  - Echocardiogram tomorrow  - Monitor groin sites  - EKG in morning   - Silva can be removed once patient is out of bed   - Plavix and coumadin for anticoagulation   - Cardiac rehab/PT/OT  - Diurese as needed/able  - Daily weights  - Strict intake/output  - Continuous telemetry monitoring      # CAD s/p CABG (LIMA-LAD, SVG-OM)   # Dyslipidemia   Pre-TAVR coronary angiogram revealed patent grafts and RCA stent, unchanged from 2019.  - Continue plavix, BB, statin, and Imdur   - No prior ACE given symptomatic hypotension   - Discontinue aspirin as patient will not need triple therapy at discharge, will     #Atrial fibrillation   On coumadin. EKG shows atrial fibrillation with rates in 50's.   - Pharmacy to dose coumadin, resume tomorrow prior to discharge     # DMII  PTA insulin and metformin.   - sliding scale insulin while inpatient  - Q4 and HS BG checks  - Hypoglycemia protocol       FEN: Cardiac diet  Prophylaxis:  DVT: aspirin/coumadin PPI: omeprazole   Disposition: Home in 1-2 days with cardiac rehab pending stable post-op period  Code Status: FULL CODE    Diana Kong DNP, APRN, CNP  [facility] Interventional Cardiology  Team  [phone]           Chief Complaint:   Worsening dyspnea on exertion          History of Present Illness:   Pee Ayala is a 80 y.o male with severe aortic stenosis characterized by MEMO 0.6 cm2, MG 37 mmHg, and peak velocity of 4 m/s. Other past medical history is notable for CAD s/p CABG (LIMA-LAD, SVG-OM), DM2, atrial fibrillation, and dyslipidemia..     Patient began noticing worsening dyspnea on exertion. Echocardiogram revealed progression of his aortic stenosis to severe. He was evaluated by the structural team at [facility] and was deemed an appropriate TAVR candidate given his age and prior sternotomy.     Patient was seen and evaluated in PACU. No acute complications or concerns. No arrhythmias. VSS. Denies chest pain, shortness of breath, dizziness or lightheadedness. Bilateral groin sites and R radial intact without bleeding or hematoma. Neuro intact.          Review of Systems:    10 point review of systems negative except for stated above in HPI.          Past Medical History:   Medical History reviewed.   Past Medical History:   Diagnosis Date     ACS (acute coronary syndrome) (H) 6/2/2014     Actinic keratosis      Antiplatelet or antithrombotic long-term use      Atrial fibrillation (H)      Chest pain 5/31/2014     Congestive heart failure (H)      Coronary artery disease      Diabetes mellitus (H)      Difficulty in walking(719.7)      Dyspnea on exertion      Esophageal reflux 11/18/2010     History of angina      HTN (hypertension) 6/30/2009     (Problem list name updated by automated process. Provider to review and confirm.)     Hyperlipidemia LDL goal <100 10/31/2010     Hypertension      Impotence of organic origin      Mixed hyperlipidemia 4/25/2007 April 25, 2007 restarted Zocor today, recheck in 3 months.  August 23, 2007 LDL at 101 with 40 mg, will increase to 80 mg. Recheck  In 3 months.      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   Past Surgical History:   Procedure Laterality Date     BYPASS GRAFT ARTERY CORONARY N/A 9/10/2014    Procedure: BYPASS GRAFT ARTERY CORONARY;  Surgeon: Bharathi Caraballo MD;  Location: [facility] OR     COLONOSCOPY  2004     CORONARY ANGIOGRAPHY ADULT ORDER       ESOPHAGOSCOPY, GASTROSCOPY, DUODENOSCOPY (EGD), COMBINED N/A 2016    Procedure: COMBINED ESOPHAGOSCOPY, GASTROSCOPY, DUODENOSCOPY (EGD);  Surgeon: Rk Srivastava MD;  Location: [facility]     LAPAROSCOPIC CHOLECYSTECTOMY  10/09     MAZE PROCEDURE N/A 9/10/2014    Procedure: MAZE PROCEDURE;  Surgeon: Bharathi Caraballo MD;  Location: [facility] OR     ORTHOPEDIC SURGERY      surgery for fx  Left forearm     STENT, CORONARY, HUMA  2016     VASECTOMY               Social History:   Social History reviewed.  Social History     Tobacco Use     Smoking status: Former Smoker     Quit date: 1990     Years since quittin.0     Smokeless tobacco: Never Used   Substance Use Topics     Alcohol use: Yes     Alcohol/week: 0.0 standard drinks     Comment: Very rare-couple times a year             Family History:   Family History reviewed.   Family History   Problem Relation Age of Onset     Diabetes Mother      Hypertension Father      Cerebrovascular Disease Father      Diabetes Maternal Grandmother      Breast Cancer No family hx of      Cancer - colorectal No family hx of      Prostate  Cancer No family hx of      C.A.D. No family hx of              Allergies:     Allergies   Allergen Reactions     Oxycodone Rash and Itching     Amlodipine Dizziness     Dizziness and dry heaves     Lisinopril Cough             Medications:   Medications Reviewed.   Current Facility-Administered Medications   Medication     [Auto Hold] 0.9% sodium chloride BOLUS     [Auto Hold] aspirin EC tablet 81 mg     [Auto Hold] clopidogrel (PLAVIX) tablet 75 mg     dexmedetomidine (PRECEDEX) 400 mcg in 0.9% sodium chloride 100 mL     glucose gel 15-30 g    Or     dextrose 50 % injection 25-50 mL    Or     glucagon injection 1 mg     EPINEPHrine (ADRENALIN) 5 mg in sodium chloride 0.9 % 250 mL infusion     [Auto Hold] Eye Vitamins CAPS     fentaNYL (PF) (SUBLIMAZE) injection 25-50 mcg     heparin (porcine) 10,000 Units, magnesium sulfate 1 g, mannitol 25 % 12.5 g, sodium bicarbonate 50 mEq in sodium chloride 0.9 % PUMP PRIME solution     heparin (porcine) 30,000 Units in sodium chloride 0.9 % PERFUSION solution     HOLD:  Metformin and metformin containing medications if patient received IV contrast with acute kidney injury or severe chronic kidney disease (stage IV or stage V; i.e., eGFR less than 30)     hydrALAZINE (APRESOLINE) injection 10 mg     [Auto Hold] insulin aspart (NovoLOG) injection (RAPID ACTING)     insulin aspart (NovoLOG) injection (RAPID ACTING)     insulin aspart (NovoLOG) injection (RAPID ACTING)     [Auto Hold] isosorbide mononitrate (IMDUR) 24 hr tablet 30 mg     lactated ringers infusion     [Auto Hold] liraglutide (VICTOZA) injection 1.8 mg     magnesium sulfate 2 g in water intermittent infusion     magnesium sulfate 4 g in 100 mL sterile water (premade)     [Auto Hold] metFORMIN (GLUCOPHAGE) tablet 1,000 mg     [Auto Hold] metoprolol succinate ER (TOPROL-XL) 24 hr tablet 100 mg     [Auto Hold] naloxone (NARCAN) injection 0.2 mg     [Auto Hold] naloxone (NARCAN) injection 0.2 mg     [Auto Hold]  "naloxone (NARCAN) injection 0.4 mg     [Auto Hold] naloxone (NARCAN) injection 0.4 mg     [Auto Hold] niCARdipine 0.1 mg/mL (CARDENE) 1,000 mcg     niCARdipine 40 mg in 200 mL 0.9% NaCl (CARDENE) infusion     norepinephrine (LEVOPHED) 16 mg in  mL infusion CENTRAL LINE     [Auto Hold] omeprazole (priLOSEC) CR capsule 40 mg     ondansetron (ZOFRAN-ODT) ODT tab 4 mg    Or     ondansetron (ZOFRAN) injection 4 mg     potassium chloride (KLOR-CON) Packet 20-40 mEq     potassium chloride 10 mEq in 100 mL sterile water intermittent infusion (premix)     potassium chloride 20 mEq in 50 mL intermittent infusion     potassium chloride ER (KLOR-CON M) CR tablet 20-40 mEq     [Auto Hold] pramipexole (MIRAPEX) tablet 0.25 mg     [Auto Hold] pravastatin (PRAVACHOL) tablet 80 mg     prochlorperazine (COMPAZINE) injection 5 mg     [Auto Hold] Reason ACE/ARB/ARNI order not selected     [Auto Hold] tamsulosin (FLOMAX) capsule 0.4 mg     [Auto Hold] warfarin ANTICOAGULANT (COUMADIN) tablet 5 mg     Warfarin Therapy Reminder (Check START DATE - warfarin may be starting in the FUTURE)     Facility-Administered Medications Ordered in Other Encounters   Medication     glucose gel 15-30 g    Or     dextrose 50 % injection 25-50 mL    Or     glucagon kit 1 mg     insulin aspart (NovoLOG) injection (RAPID ACTING)             Physical Exam:   Vitals were reviewed.  Blood pressure 117/60, pulse (!) 46, temperature 98.5  F (36.9  C), temperature source Oral, resp. rate 16, height 1.651 m (5' 5\"), weight 90.9 kg (200 lb 6.4 oz), SpO2 96 %.    General: AAOx3, NAD  Skin: Not jaundiced, no rash, no ecchymoses; incision site CDI, soft, non-tender, no signs of hematoma. No bruit  HEENT: MMM, PERRLA, EOM intact  CV: RRR, normal S1S2, grade 2/6 systolic murmur, no clicks, rubs  Resp: Clear to auscultation bilaterally, no wheezes, rhonchi  Abd: Soft, non-tender, BS+, no masses appreciated  Extremities: warm and well perfused, palpable pulses, no " edema  Neuro: No lateralizing symptoms or focal neurologic deficits        Labs:   Routine Labs:  Lab Results   Component Value Date    TROPI 0.042 (H) 06/02/2014    TROPI 0.037 (H) 06/01/2014    TROPI 0.043 (H) 05/31/2014    TROPI 0.040 (H) 05/31/2014     CMP  Recent Labs   Lab 01/12/21  1119 01/12/21  0649    139   POTASSIUM 4.3 3.8   CHLORIDE 105 105   CO2 28 28   ANIONGAP 6 6   * 241*   BUN 19 20   CR 1.01 0.93   GFRESTIMATED 71 78   GFRESTBLACK 82 90   RACHEL 8.7 9.2   MAG 1.9 2.1   PROTTOTAL 5.8*  --    ALBUMIN 2.9*  --    BILITOTAL 0.8  --    ALKPHOS 63  --    AST 26  --    ALT 32  --      CBC  Recent Labs   Lab 01/12/21  1119 01/12/21  0649   WBC 5.0 5.0   RBC 3.36* 3.70*   HGB 11.1* 12.1*   HCT 33.8* 37.1*   * 100   MCH 33.0 32.7   MCHC 32.8 32.6   RDW 12.9 12.8   * 130*     INR  Recent Labs   Lab 01/12/21  0649   INR 1.09           Diagnostics:    EKG 12Lead: 1/12/21    Echo: 1/12/21  Interpretation Summary  Pre-TAVR:  1. Left ventricular function is normal.The EF is 60-65%. Mild concentric wall  thickening consistent with left ventricular hypertrophy is present.  2. Severe aortic valve calcification causing severe stenosis and mild  regurgitation. The aortic valve area is 0.5 cm^2, by the continuity equatio  n.  The mean gradient across the aortic valve is42 mmHg. The peak aortic velocity  is 4.2 m/sec. The V2/V1 ratio is 0.15.  3. No other hemodynamically significant valvular disease.     Post-TAVR:  1. A 29 mm Magdaleno CECILIA bioprosthetic aortic valve was successfully placed.  No prosthetic stenosis with peak velocity with peak velocity 1.5 m/s, mean  gradient 4 mm Hg, dimensionless index 0.51. No regurgitation.  2. Left ventricular ejection fraction appears hyperdynamic with estimiated  left ventricular ejection fraction 75%.    Coronary Angi[facility]: 1/9/21 ([facility])    Result Narrative     Mid RCA lesion is 75% stenosed.    Mid Cx lesion is 85% stenosed.    Prox LAD to Mid LAD  lesion is 75% stenosed.    1st Diag lesion is 70% stenosed.    Prox Cx lesion is 80% stenosed.    79 yo male s/p CABG and PCI with residual severe small vessel disease of   RCA/prox LAD into diagonal, mid LAD, circ prox and Circ now with severe   aortic stenosis on echo and significant dyspnea on exertion    Angiography via left radial  Noted all vessels with severe diffuse dz and very small in diameter  LM mild dz  LAD prox 70%; mid 80%; diagonal prox 60-70%, patent LIMA   Circ severe dz in prox/mid with patent SVG to OM  RCA prox stent patent severe dz in mid (no change from 2019)    LVEDP 19mmHg pull back did not record gradient    Imp/plan  1. Severe aortic stenosis - plan TAVR   2. CAD s/p CABG/PCI - severe diffuse dz in very small vessels - no change   from 2019

## 2021-01-12 NOTE — PHARMACY-ANTICOAGULATION SERVICE
Clinical Pharmacy - Warfarin Dosing Consult     Pharmacy has been consulted to manage this patient s warfarin therapy.  Indication: Atrial Fibrillation  Therapy Goal: INR 2-3  Provider/Team: Nadia COLEMAN Anticoag Clinic: [facility]  Warfarin Prior to Admission: Yes  Warfarin PTA Regimen: 7.5 mg TUTHSA and 5 mg ROW  Significant drug interactions: aspirin, plavix    INR   Date Value Ref Range Status   01/12/2021 1.09 0.86 - 1.14 Final   06/09/2016 1.72 (H) 0.86 - 1.14 Final       Recommend warfarin 5 mg today.  Pharmacy will monitor Pee CHRIS Jamie daily and order warfarin doses to achieve specified goal.      Please contact pharmacy as soon as possible if the warfarin needs to be held for a procedure or if the warfarin goals change.

## 2021-01-12 NOTE — OP NOTE
OPERATIVE DATE: 1/12/2021    PRE-OPERATIVE DIAGNOSIS:  1) Severe aortic stenosis  2) Coronary artery disease s/p coronary artery bypass  3) Diabetes mellitus type 2  4) Hypertension  5) Atrial fibrillation  6) Hyperlipidemia  7) Peripheral arterial disease    POST-OPERATIVE DIAGNOSIS:  1) Severe aortic stenosis  2) Coronary artery disease s/p coronary artery bypass  3) Diabetes mellitus type 2  4) Hypertension  5) Atrial fibrillation  6) Hyperlipidemia  7) Peripheral arterial disease    PROCEDURE:  1) Temporary pacemaker insertion  2) Iliofemoral artery angiography  3) Ascending aorta angiography  4) Left heart catheterization  5) Insertion Forman cerebral protection device  5) Successful right transfemoral transcatheter aortic valve replacement with Magdaleno Dominick 3 29 mm valve  6) Arteriotomy closure    SURGEON: Jefferson Sandy MD    CARDIOLOGIST: Jo Romano MD & Marty Mariano MD    ANESTHESIA: Monitored anesthesia care with local analgesia    ESTIMATED BLOOD LOSS: 10 mL    INDICATIONS:  Mr. Pee Ayala is a 80 year old year old male admitted with severe, symptomatic aortic valve stenosis.  We were asked to evaluate for transcatheter replacement given his prior sternotomy and age.  Risks and benefits of the operation were explained to the patient and their family including, but not limited to, bleeding, infection, stroke and even death.  They understood these risks and agreed to proceed electively.    OPERATIVE REPORT:  The patient was transferred to the operating room and positioned supine on the OR table.  Monitored anesthesia care was begun.  The patients chest, abdomen and bilateral lower extremities were clipped, prepped and draped in sterile fashion.  A pre-procedure time-out was performed confirming the correct patient, correct site and correct procedure.    Intravenous heparin was administered. Arterial access of the right and left femoral arteries and left common femoral vein was performed  by interventional cardiology. The right CFA distance to the skin was measured with ultrasound to guide a Manta closure at the end of the procedure.    A temporary transvenous pacemaker was placed by the cardiology team.    A Lunderquist wire was advanced to support the Magdaleno sheath insertion.  A pigtail catheter was advanced to the aortic root and angiogram performed to confirm optimal implant angle.  The pigtail was placed in the non-coronary cusp.    Right radial arterial access was achieved using a micropuncture needle and sheath under ultrasound guidance by cardiology. This was exchanged over a wire for a 6 Fr sheath. The [place] device was prepared on the back table and inserted. Under fluoroscopic guidance it was directed through the brachial and axillary artery into the aortic arch. Once within the arch the first net was deployed and the device was bent to guide the wire into the left common carotid artery. The second could not be deployed as the left common carotid artery was unable to be accessed.    The LV was accessed using an AL-1 catheter over a straight wire.  The pigtail catheter was advanced into the LV.  The pigtail catheter was used to advance a Lunderquist Safari wire into the LV and the pigtail catheter was removed.    The 29 mm S3 bioprosthetic valve was positioned across the native aortic valve and deployed using rapid pacing.    Transthoracic echocardiography showed trace paravalvular insufficiency.    The deployment system and wires were removed. The femoral access was made hemostatic.    A final iliofemoral angiogram showed no extravasation or stenosis.    The patient was then transferred to the recovery area in stable condition.    All needle, sponge and instrument counts were correct at the end of the case.    Jefferson Sandy MD  Cardiothoracic Surgery  [phone]

## 2021-01-12 NOTE — PROGRESS NOTES
CSI NP Diana (ASCOM [id number]) bedside in PACU following post op blood work collection and EKG. Aware of HR in the 40s and remaining in A Fib - asymptomatic and BP sustained 90s/50s with MAPs all >65. Pt was run on precedex gtt during procedure. Pt denies chest pain, SOB.    In anesthesia handoff, no post protamine ACT drawn per CSI team. INR ordered for tomorrow.    Bilateral groin sites intact (see flowsheets) and TR band to R radial artery remains inflated w 13 ml air until 1200.  Bedrest flat until 1600.    6 units regular iv insulin given post op for , will recheck at 1200. sliding scale insulin will be ordered for continued coverage. -> recheck 189, ok per Dr Suggs to hold sliding scale insulin until transfer to 6C/no longer NPO with laying flat    Dr Suggs verbalized sign out ~1200. 6C floor will call when bed available for admit    BP trending lower, high 80s/50s with MAP sustained 68-69 - CSI NP Diana updated and she will advise r/t fluids/other intervention vs continued monitoring

## 2021-01-12 NOTE — ANESTHESIA PREPROCEDURE EVALUATION
Anesthesia Pre-Procedure Evaluation    Patient: Pee Ayala   MRN:     [id number] Gender:   male   Age:    80 year old :      1940        Preoperative Diagnosis: heart valve condition   Procedure(s):  CV TRANSCATHETER AORTIC VALVE REPLACEMENT  OR TRANSCATHETER AORTIC VALVE REPLACEMENT, OPEN FEMORAL ARTERY APPROACH     LABS:  CBC:   Lab Results   Component Value Date    WBC 5.0 2021    WBC 7.9 2016    HGB 12.1 (L) 2021    HGB 13.8 2016    HCT 37.1 (L) 2021    HCT 41.2 2016     (L) 2021     2016     BMP:   Lab Results   Component Value Date     2021     2016    POTASSIUM 3.8 2021    POTASSIUM 4.1 2016    CHLORIDE 105 2021    CHLORIDE 103 2016    CO2 28 2021    CO2 28 2016    BUN 20 2021    BUN 17 2016    CR 0.93 2021    CR 0.83 2016     (H) 2021     (H) 2016     COAGS:   Lab Results   Component Value Date    PTT 40 (H) 2016    INR 1.09 2021    FIBR 232 09/10/2014     POC:   Lab Results   Component Value Date     (H) 2021     OTHER:   Lab Results   Component Value Date    PH 7.38 09/10/2014    LACT 3.5 (H) 09/10/2014    A1C 8.0 (H) 2016    RACHEL 9.2 2021    PHOS 2.8 2014    MAG 2.1 2021    ALBUMIN 3.5 2016    PROTTOTAL 7.8 2016    ALT 19 2016    AST 19 2016    ALKPHOS 62 2016    BILITOTAL 0.8 2016    LIPASE 113 2016    AMYLASE 43 2016    TSH 0.84 2016        Preop Vitals    BP Readings from Last 3 Encounters:   21 (!) 160/75   16 109/68   16 110/60    Pulse Readings from Last 3 Encounters:   21 56   16 100   16 98      Resp Readings from Last 3 Encounters:   21 16   16 16   16 16    SpO2 Readings from Last 3 Encounters:   21 98%   16 97%   16 99%      Temp Readings  "from Last 1 Encounters:   01/12/21 36.6  C (97.9  F) (Oral)    Ht Readings from Last 1 Encounters:   01/12/21 1.651 m (5' 5\")      Wt Readings from Last 1 Encounters:   01/12/21 90.9 kg (200 lb 6.4 oz)    Estimated body mass index is 33.35 kg/m  as calculated from the following:    Height as of this encounter: 1.651 m (5' 5\").    Weight as of this encounter: 90.9 kg (200 lb 6.4 oz).     LDA:  Right Radial Interventional Cardiac Procedure Access (Active)   Number of days:         Past Medical History:   Diagnosis Date     ACS (acute coronary syndrome) (H) 6/2/2014     Actinic keratosis      Antiplatelet or antithrombotic long-term use      Atrial fibrillation (H)      Chest pain 5/31/2014     Congestive heart failure (H)      Coronary artery disease      Diabetes mellitus (H)      Difficulty in walking(719.7)      Dyspnea on exertion      Esophageal reflux 11/18/2010     History of angina      HTN (hypertension) 6/30/2009     (Problem list name updated by automated process. Provider to review and confirm.)     Hyperlipidemia LDL goal <100 10/31/2010     Hypertension      Impotence of organic origin      Mixed hyperlipidemia 4/25/2007 April 25, 2007 restarted Zocor today, recheck in 3 months.  August 23, 2007 LDL at 101 with 40 mg, will increase to 80 mg. Recheck  In 3 months.      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      Type II or unspecified type diabetes mellitus with neurological manifestations, not stated as uncontrolled(250.60) (H)      Walking troubles       Past Surgical History:   Procedure Laterality Date     " BYPASS GRAFT ARTERY CORONARY N/A 9/10/2014    Procedure: BYPASS GRAFT ARTERY CORONARY;  Surgeon: Bharathi Caraballo MD;  Location: [facility] OR     COLONOSCOPY  1/14/2004     CORONARY ANGIOGRAPHY ADULT ORDER       ESOPHAGOSCOPY, GASTROSCOPY, DUODENOSCOPY (EGD), COMBINED N/A 1/13/2016    Procedure: COMBINED ESOPHAGOSCOPY, GASTROSCOPY, DUODENOSCOPY (EGD);  Surgeon: Rk Srivastava MD;  Location: [facility]     LAPAROSCOPIC CHOLECYSTECTOMY  10/09     MAZE PROCEDURE N/A 9/10/2014    Procedure: MAZE PROCEDURE;  Surgeon: Bharathi Caraballo MD;  Location: [facility] OR     ORTHOPEDIC SURGERY      surgery for fx  Left forearm     STENT, CORONARY, HUMA  02/2016     VASECTOMY        Allergies   Allergen Reactions     Lisinopril Cough     Oxycodone Rash        Anesthesia Evaluation     .             ROS/MED HX    ENT/Pulmonary:       Neurologic:       Cardiovascular:     (+) hypertension--CAD, --. Taking blood thinners : . CHF . . :. valvular problems/murmurs type: AS .       METS/Exercise Tolerance:     Hematologic:         Musculoskeletal:         GI/Hepatic: Comment: ALMEIDA    (+) GERD hepatitis type Other, liver disease,       Renal/Genitourinary:         Endo:     (+) type II DM Using insulin Obesity, .      Psychiatric:         Infectious Disease:         Malignancy:         Other:                         PHYSICAL EXAM:   Mental Status/Neuro: A/A/O   Airway: Facies: Feasible  Mallampati: I  Mouth/Opening: Full  TM distance: > 6 cm  Neck ROM: Full   Respiratory:   Resp. Rate: Normal     Resp. Effort: Normal      CV: Rhythm: Regular  Rate: Age appropriate  Edema: None   Comments:      Dental: Normal Dentition                Assessment:   ASA SCORE: 3    H&P: History and physical reviewed and following examination; no interval change.   Smoking Status:  Non-Smoker/Unknown   NPO Status: NPO Appropriate     Plan:   Anes. Type:  MAC   Pre-Medication: None   Induction:  IV (Standard)   Airway: ETT; Oral   Access/Monitoring: PIV;  2nd PIV   Maintenance: Balanced     Drips/Meds: Dexmedetomidine     Postop Plan:   Postop Pain: Opioids  Postop Sedation/Airway: Not planned     PONV Management:   Adult Risk Factors:, Non-Smoker, Postop Opioids     CONSENT: Direct conversation   Plan and risks discussed with: Patient   Blood Products: Consented (ALL Blood Products)                   Micheal Suggs MD

## 2021-01-12 NOTE — Clinical Note
TTE performed preprocedure to establish baseline measurements and to gloria post valve deployment sites.

## 2021-01-12 NOTE — Clinical Note
Grounding pads are removed from the patient. The skin is clean, dry, intact, and free from redness.

## 2021-01-12 NOTE — OR NURSING
Maude Holm (valve coordinator) here at 0616 and aware of FSBS pre op 216. She reported she would follow up with Dr. Romano.  Maude Holm now here with another physician who works with Dr. Romano. Order will be entered for insulin coverage.

## 2021-01-12 NOTE — ANESTHESIA POSTPROCEDURE EVALUATION
Anesthesia POST Procedure Evaluation    Patient: Pee Ayala   MRN:     [id number] Gender:   male   Age:    80 year old :      1940        Preoperative Diagnosis: heart valve condition   Procedure(s):  Right transfemoral transcatheter aortic valve replacement using Magdaleno Dominick 3 size 29mm.  Transthoracic echocardiogram  Cardiopulmonary Bypass standby   Postop Comments: No value filed.     Anesthesia Type: MAC       Disposition: Admission   Postop Pain Control: Uneventful            Sign Out: Well controlled pain   PONV: No   Neuro/Psych: Uneventful            Sign Out: Acceptable/Baseline neuro status   Airway/Respiratory: Uneventful            Sign Out: Acceptable/Baseline resp. status   CV/Hemodynamics: Uneventful            Sign Out: Acceptable CV status   Other NRE: NONE   DID A NON-ROUTINE EVENT OCCUR? No         Last Anesthesia Record Vitals:  CRNA VITALS  2021 0952 - 2021 1052      2021             Pulse:  56    Ht Rate:  (!) 49    SpO2:  100 %          Last PACU Vitals:  Vitals Value Taken Time   /76 21 1315   Temp 37  C (98.6  F) 21 1100   Pulse 46 21 1316   Resp 16 21 1300   SpO2 96 % 21 1316   Temp src     NIBP     Pulse     SpO2     Resp     Temp     Ht Rate     Temp 2     Vitals shown include unvalidated device data.      Electronically Signed By: Micheal Suggs MD, 2021, 1:17 PM

## 2021-01-12 NOTE — Clinical Note
Temporary pacemaker Rate= 40bpmPaced mA= 102Pacer wire was captured appropriately, Pacer is set and Demand on Standby

## 2021-01-12 NOTE — ANESTHESIA CARE TRANSFER NOTE
Patient: Pee Ayala    Procedure(s):  Right transfemoral transcatheter aortic valve replacement using Magdaleno Dominick 3 size 29mm.  Transthoracic echocardiogram  Cardiopulmonary Bypass standby    Diagnosis: heart valve condition  Diagnosis Additional Information: No value filed.    Anesthesia Type:   No value filed.     Note:  Airway :Face Mask  Patient transferred to:PACU  Comments: VSS, report given to RN.  Handoff Report: Identifed the Patient, Identified the Reponsible Provider, Reviewed the pertinent medical history, Discussed the surgical course, Reviewed Intra-OP anesthesia mangement and issues during anesthesia, Set expectations for post-procedure period and Allowed opportunity for questions and acknowledgement of understanding      Vitals: (Last set prior to Anesthesia Care Transfer)    CRNA VITALS  1/12/2021 0952 - 1/12/2021 1031      1/12/2021             Pulse:  49    BP 96/54    SpO2:  RR  100 %  12                Electronically Signed By: BOBBY Pruitt CRNA  January 12, 2021  10:31 AM

## 2021-01-12 NOTE — OR NURSING
Maude Holm here again at 0720.  Unable to see orders for Insulin she informed me were entered. She will contact CRNA/ Dr. Cruz.  Dr. Suggs here now and his decision is to not treat the glucose level pre operatively.

## 2021-01-13 ENCOUNTER — APPOINTMENT (OUTPATIENT)
Dept: CARDIOLOGY | Facility: CLINIC | Age: 81
DRG: 267 | End: 2021-01-13
Attending: INTERNAL MEDICINE
Payer: COMMERCIAL

## 2021-01-13 ENCOUNTER — APPOINTMENT (OUTPATIENT)
Dept: OCCUPATIONAL THERAPY | Facility: CLINIC | Age: 81
DRG: 267 | End: 2021-01-13
Attending: INTERNAL MEDICINE
Payer: COMMERCIAL

## 2021-01-13 ENCOUNTER — COMMUNICATION - HEALTHEAST (OUTPATIENT)
Dept: FAMILY MEDICINE | Facility: CLINIC | Age: 81
End: 2021-01-13

## 2021-01-13 ENCOUNTER — RECORDS - HEALTHEAST (OUTPATIENT)
Dept: ADMINISTRATIVE | Facility: OTHER | Age: 81
End: 2021-01-13

## 2021-01-13 VITALS
HEART RATE: 65 BPM | RESPIRATION RATE: 16 BRPM | WEIGHT: 196 LBS | TEMPERATURE: 97.6 F | BODY MASS INDEX: 32.65 KG/M2 | OXYGEN SATURATION: 97 % | HEIGHT: 65 IN | DIASTOLIC BLOOD PRESSURE: 58 MMHG | SYSTOLIC BLOOD PRESSURE: 126 MMHG

## 2021-01-13 LAB
ANION GAP SERPL CALCULATED.3IONS-SCNC: 12 MMOL/L (ref 3–14)
BUN SERPL-MCNC: 20 MG/DL (ref 7–30)
CALCIUM SERPL-MCNC: 9.2 MG/DL (ref 8.5–10.1)
CHLORIDE SERPL-SCNC: 102 MMOL/L (ref 94–109)
CO2 SERPL-SCNC: 21 MMOL/L (ref 20–32)
CREAT SERPL-MCNC: 0.97 MG/DL (ref 0.66–1.25)
ERYTHROCYTE [DISTWIDTH] IN BLOOD BY AUTOMATED COUNT: 13.2 % (ref 10–15)
GFR SERPL CREATININE-BSD FRML MDRD: 74 ML/MIN/{1.73_M2}
GLUCOSE BLDC GLUCOMTR-MCNC: 253 MG/DL (ref 70–99)
GLUCOSE BLDC GLUCOMTR-MCNC: 286 MG/DL (ref 70–99)
GLUCOSE SERPL-MCNC: 312 MG/DL (ref 70–99)
HCT VFR BLD AUTO: 42.3 % (ref 40–53)
HGB BLD-MCNC: 13.7 G/DL (ref 13.3–17.7)
INR PPP: 1.13 (ref 0.86–1.14)
INTERPRETATION ECG - MUSE: NORMAL
INTERPRETATION ECG - MUSE: NORMAL
MAGNESIUM SERPL-MCNC: 2.3 MG/DL (ref 1.6–2.3)
MCH RBC QN AUTO: 32.7 PG (ref 26.5–33)
MCHC RBC AUTO-ENTMCNC: 32.4 G/DL (ref 31.5–36.5)
MCV RBC AUTO: 101 FL (ref 78–100)
PLATELET # BLD AUTO: 141 10E9/L (ref 150–450)
POTASSIUM SERPL-SCNC: 4.2 MMOL/L (ref 3.4–5.3)
RBC # BLD AUTO: 4.19 10E12/L (ref 4.4–5.9)
SODIUM SERPL-SCNC: 135 MMOL/L (ref 133–144)
WBC # BLD AUTO: 9.8 10E9/L (ref 4–11)

## 2021-01-13 PROCEDURE — 80048 BASIC METABOLIC PNL TOTAL CA: CPT | Performed by: INTERNAL MEDICINE

## 2021-01-13 PROCEDURE — 85027 COMPLETE CBC AUTOMATED: CPT | Performed by: INTERNAL MEDICINE

## 2021-01-13 PROCEDURE — 93005 ELECTROCARDIOGRAM TRACING: CPT

## 2021-01-13 PROCEDURE — 97165 OT EVAL LOW COMPLEX 30 MIN: CPT | Mod: GO

## 2021-01-13 PROCEDURE — 83735 ASSAY OF MAGNESIUM: CPT | Performed by: INTERNAL MEDICINE

## 2021-01-13 PROCEDURE — 93306 TTE W/DOPPLER COMPLETE: CPT | Mod: 26 | Performed by: INTERNAL MEDICINE

## 2021-01-13 PROCEDURE — 250N000013 HC RX MED GY IP 250 OP 250 PS 637: Performed by: STUDENT IN AN ORGANIZED HEALTH CARE EDUCATION/TRAINING PROGRAM

## 2021-01-13 PROCEDURE — 250N000011 HC RX IP 250 OP 636: Performed by: INTERNAL MEDICINE

## 2021-01-13 PROCEDURE — 85610 PROTHROMBIN TIME: CPT | Performed by: INTERNAL MEDICINE

## 2021-01-13 PROCEDURE — 250N000009 HC RX 250: Performed by: INTERNAL MEDICINE

## 2021-01-13 PROCEDURE — 93306 TTE W/DOPPLER COMPLETE: CPT

## 2021-01-13 PROCEDURE — 36415 COLL VENOUS BLD VENIPUNCTURE: CPT | Performed by: INTERNAL MEDICINE

## 2021-01-13 PROCEDURE — 99238 HOSP IP/OBS DSCHRG MGMT 30/<: CPT | Mod: 25 | Performed by: NURSE PRACTITIONER

## 2021-01-13 PROCEDURE — 999N001017 HC STATISTIC GLUCOSE BY METER IP

## 2021-01-13 PROCEDURE — 93010 ELECTROCARDIOGRAM REPORT: CPT | Performed by: INTERNAL MEDICINE

## 2021-01-13 PROCEDURE — 250N000013 HC RX MED GY IP 250 OP 250 PS 637: Performed by: NURSE PRACTITIONER

## 2021-01-13 PROCEDURE — 97110 THERAPEUTIC EXERCISES: CPT | Mod: GO

## 2021-01-13 PROCEDURE — 250N000013 HC RX MED GY IP 250 OP 250 PS 637: Performed by: INTERNAL MEDICINE

## 2021-01-13 RX ORDER — WARFARIN SODIUM 7.5 MG/1
7.5 TABLET ORAL
Status: DISCONTINUED | OUTPATIENT
Start: 2021-01-13 | End: 2021-01-13 | Stop reason: HOSPADM

## 2021-01-13 RX ADMIN — LIRAGLUTIDE 1.8 MG: 6 INJECTION SUBCUTANEOUS at 08:57

## 2021-01-13 RX ADMIN — MULTIPLE VITAMINS W/ MINERALS TAB 1 TABLET: TAB at 08:57

## 2021-01-13 RX ADMIN — HYDROCORTISONE: 1 CREAM TOPICAL at 08:58

## 2021-01-13 RX ADMIN — TAMSULOSIN HYDROCHLORIDE 0.4 MG: 0.4 CAPSULE ORAL at 08:57

## 2021-01-13 RX ADMIN — METOPROLOL SUCCINATE 100 MG: 100 TABLET, EXTENDED RELEASE ORAL at 08:57

## 2021-01-13 RX ADMIN — ISOSORBIDE MONONITRATE 30 MG: 30 TABLET, EXTENDED RELEASE ORAL at 08:57

## 2021-01-13 RX ADMIN — HYDRALAZINE HYDROCHLORIDE 10 MG: 20 INJECTION INTRAMUSCULAR; INTRAVENOUS at 03:56

## 2021-01-13 RX ADMIN — CLOPIDOGREL BISULFATE 75 MG: 75 TABLET ORAL at 08:57

## 2021-01-13 ASSESSMENT — ACTIVITIES OF DAILY LIVING (ADL)
ADLS_ACUITY_SCORE: 12
ADLS_ACUITY_SCORE: 13

## 2021-01-13 ASSESSMENT — MIFFLIN-ST. JEOR: SCORE: 1535.93

## 2021-01-13 NOTE — DISCHARGE INSTRUCTIONS
Patient Instructions - Going Home after TAVR:  Going Home: It's normal to feel a little anxious after leaving the hospital and when fewer people are nearby. People do much better when they feel as though they have support- if you live alone we suggest you arrange to have someone stay with you for a day or two to help you recover.     Medications:  Take all of your medications as directed in your discharge summary. Do not stop taking these medications without speaking with your cardiologist. If you have any questions about any of your medications, speak to your pharmacist or provider.     Follow up Appointments  If you need to reschedule your appointment, please call:  [phone]      1 week follow-up with JEEVAN Kline on 1/21 at [facility]       1 month echocardiogram on 2/17 at 10 am at [facility]       Dee will call you with your follow up with Dr. Romano or Dr. Mariano at 1 month post-TAVR      See your regular cardiologist Dr. Talbert in 6 months. Make an appointment once you get home. Your regular heart doctor will continue to be your heart specialist.       See your family doctor Dr. Gil in 2 weeks.  Make an appointment once you get home.      You will receive a temporary  heart valve  wallet card. We recommend that you keep it in your wallet or purse at all times. You will be receiving a permanent wallet card from the  within 6 months.       Before an MRI (magnetic resonance imaging) procedure, always notify the doctor (or medical technician) that you have an implanted heart valve.     Site Care: Shower every day- let the water run over your incision or access site, but do not rub the area. No tub baths, pools or hot-tubs for the 1st week you are at home. Do not put ointments, powders, or lotions on your access site and/or incision.  It is normal to feel a small lump the size of a marble in the groin access site.  That is the closure device used to close the  vein.  If you are experiencing discomfort, redness or increasing swelling, please call us.    Dental Work: Avoid major dental work for the next 6 months if possible. You will need antibiotics before dental work or surgery. This would decrease the risk of infection.     Driving:  You should not drive for the first 2 weeks after your procedure. The first time you drive, you must have someone with you. You may ride in a car.     Activity: People recover at different rates depending on their general health and the type of heart valve procedure. Most people take about 4-10 weeks to feel fully recovered. Daily activity and exercise are an important part of your recovery.  Do not lift, push or pull anything > 10 lb. for the first 2 weeks.        CALL THE VALVE CLINIC IF YOU HAVE ANY QUESTIONS OR CONCERNS:  [phone]                      For the first 2 weeks after TAVR     Do Not:     Lift, push, or pull more than ten pounds (including pets, laundry, groceries, etc)    Garden, including lawn mowing and raking    Excessively bear down or strain when having a bowel movement     Ride a bike     Do:    Weigh yourself every day in the morning after using the bathroom.  If you notice weight gain of more than 3 pounds in 1 day or more than 5 pounds in 1 week, call the cardiology clinic.     Get up and get dressed every day. Do not stay in bed.  Set a daily routine.     Walk as much as you can. You may walk up and down stairs. When you are tired, rest.     Cough and deep breathe. Use your incentive spirometer 5-10 times each hour when you are awake.     We strongly recommend you participate in a cardiac rehabilitation program. This type of program will help you learn about your heart health, prevent more heart problems, participate in safe and heart-healthy activities, and learn how to return to your activities of daily living and hobbies.     Let the cardiology clinic know if you need to leave town before your first follow-up  visit.      When to call the Cardiology Clinic to speak with a nurse?             Swelling of the legs, ankles, or feet            Increasing shortness of breath            Change in the color or temperature of your lower legs and feet            Abdominal pain or unrelieved nausea and/or vomiting            Redness and warmth around your access site and/or incision that does not go away            Yellow or green drainage from your access site and/or incision              Weight gain of 3 pounds in one day or 5 pounds in one week           Develop any numbness, tingling, or limited movement of your arms or legs.            Chest pain that does not go away           New confusion or you cannot think clearly           Fever and chills

## 2021-01-13 NOTE — CONSULTS
Care Management Follow Up    Length of Stay (days): 1    Expected Discharge Date: 01/14/21     Concerns to be Addressed: discharge planning   Patient plan of care discussed at interdisciplinary rounds: Yes    Anticipated Discharge Disposition:  home     Anticipated Discharge Services:  OP CR  Anticipated Discharge DME:  none    Patient/family educated on Medicare website which has current facility and service quality ratings:  N/A  Education Provided on the Discharge Plan:  no  Patient/Family in Agreement with the Plan:  N/A    Referrals Placed by CM/SW:  none  Private pay costs discussed: Not applicable    Additional Information:  Pt is a 80-year-old male admitted to [facility] 1/12/21 following planned TAVR. SW auto-consulted for discharge planning. OT evaluated pt and recommends home with outpatient cardiac rehab. No SW needs identified.    LORI Esteves, LincolnHealthSW  6C   [facility]  Pager [phone]  Phone [phone]

## 2021-01-13 NOTE — PROVIDER NOTIFICATION
Pt called and said he had to use the bathroom, urgently. Pt had large emesis of undigested food and a large loose BM. BP back up to 155/71. Discussed with CSI cross cover, Dr. Bonilla. Obtained order for zofran and will repeat IV Hydralazine.

## 2021-01-13 NOTE — DISCHARGE SUMMARY
[address] [address]  p: [phone]    Discharge Summary: Cardiology Service    Pee Ayala MRN# [id number]   YOB: 1940 Age: 80 year old       Admission Date: 1/12/2021  Discharge Date: 01/13/21    Discharge Diagnoses:  1. Severe aortic stenosis s/p TAVR  2. CAD s/p CABG  3. Dyslipidemia  4. Atrial fibrillation   5. DM II    Brief HPI:  Pee Ayala is a 80 y.o male with severe aortic stenosis characterized by MEMO 0.6 cm2, MG 37 mmHg, and peak velocity of 4 m/s. Other past medical history is notable for CAD s/p CABG (LIMA-LAD, SVG-OM), DM2, atrial fibrillation, and dyslipidemia..      Patient began noticing worsening dyspnea on exertion. Echocardiogram revealed progression of his aortic stenosis to severe. He was evaluated by the structural team at [facility] and was deemed an appropriate TAVR candidate given his age and prior sternotomy.      Hospital Course by Diagnosis:  #Severe aortic stenosis, now s/p Transcatheter Aortic Valve Replacement with 29 mm Doimnick 3. Trace AI post-deployment with MG of 3 mmHg. Prior to procedure, pt noted to have a mean gradient 37 mmHG, MEMO 0.6 Cm^2, peak velocity 4.0 M/s- Sheath sites soft without hematoma. No arrhythmias. Neuro's intact. Post-procedural echo shows well-seated valve with MG of 9 mmHg. No AI or pericardial effusion.      - Plavix for anti-platelet therapy  - Discontinue aspirin since patient is on coumadin    - Outpatient cardiac rehab/PT/OT  - Lifelong antibiotic therapy prior to any dental procedures  - Follow-up with structural clinic as planned       # CAD s/p CABG (LIMA-LAD, SVG-OM)   # Dyslipidemia   Pre-TAVR coronary angiogram revealed patent grafts and RCA stent.   - Continue plavix, BB, statin, and Imdur   - No prior ACE given symptomatic hypotension   - Discontinue aspirin, as above      #Atrial fibrillation   - Resume PTA coumadin  - INR check as planned       #  DMII  Sliding scale insulin while inpatient. Hypoglycemia protocol.   - Resume metformin, glimepiride, and Victoza    Pertinent Procedures:  1. Transcatheter aortic valve replacement     Consults:  None     Medication Changes:  See below     Discharge medications:   Current Discharge Medication List      CONTINUE these medications which have NOT CHANGED    Details   clopidogrel (PLAVIX) 75 MG tablet Take 1 tablet (75 mg) by mouth daily  Qty: 90 tablet, Refills: 3    Associated Diagnoses: Postsurgical percutaneous transluminal coronary angioplasty status; Coronary artery disease involving native coronary artery of native heart with angina pectoris (H); Status post coronary angioplasty      glimepiride (AMARYL) 4 MG tablet TAKE ONE TABLET BY MOUTH TWICE DAILY BEFORE MEAL  Qty: 90 tablet, Refills: 1    Associated Diagnoses: 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)      insulin pen needle (B-D U/F) 31G X 8 MM Use daily.  Qty: 100 each, Refills: prn    Associated Diagnoses: 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)      isosorbide mononitrate (IMDUR) 30 MG 24 hr tablet Take 1 tablet (30 mg) by mouth daily  Qty: 30 tablet, Refills: 11    Associated Diagnoses: Coronary artery disease involving native coronary artery of native heart with unstable angina pectoris (H)      metFORMIN (GLUCOPHAGE) 500 MG tablet Take 2 tablets (1,000 mg) by mouth 2 times daily (with meals)  Qty: 360 tablet, Refills: 1    Associated Diagnoses: 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)      Multiple Vitamins-Minerals (EYE VITAMINS) CAPS Take by mouth 2 times daily      NOVOLOG FLEXPEN 100 UNIT/ML soln Inject 3 units before dinner, for carbohydrate containing foods.  Qty: 3 mL, Refills: 3    Comments: Please dispense as Novolog Flexpen 100 unit/mL  Associated Diagnoses: Type 2 diabetes mellitus 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      omeprazole (PRILOSEC) 40 MG capsule Take 1  capsule (40 mg) by mouth daily Take 30-60 minutes before a meal.  Qty: 90 capsule, Refills: 3    Associated Diagnoses: Gastroesophageal reflux disease, esophagitis presence not specified      pramipexole (MIRAPEX) 0.25 MG tablet Take 0.25 mg by mouth 2 times daily Takes 4pm and 5;30 pm      pravastatin (PRAVACHOL) 80 MG tablet Take 1 tablet (80 mg) by mouth At Bedtime  Qty: 90 tablet, Refills: 0    Comments: OK to fill today. Needs fasting lab before further refills  Associated Diagnoses: Mixed hyperlipidemia      tamsulosin (FLOMAX) 0.4 MG 24 hr capsule TAKE ONE CAPSULE BY MOUTH ONCE DAILY  Qty: 90 capsule, Refills: 0    Associated Diagnoses: Lower urinary tract symptoms (LUTS)      warfarin (COUMADIN) 5 MG tablet Take 5mg by mouth MWF and 7.5mg all other days or as directed by Anticoagulation Clinic  Qty: 10 tablet, Refills: 0    Comments: OVERDUE FOR INR - NO FURTHER REFILLS UNTIL SEEN  Associated Diagnoses: Long term current use of anticoagulant therapy      ACE/ARB NOT PRESCRIBED, INTENTIONAL, ACE & ARB not prescribed due to Other: Blood pressure will not tolerate at this time    Associated Diagnoses: S/P CABG (coronary artery bypass graft)      blood glucose monitor KIT 1 kit 2 times daily.  Qty: 1 kit, Refills: 0    Comments: Contour Monitor SYS Blue KIT  Diagnosis: Type 2 diabetes, HbA1C goal < 8% 250.00  Associated Diagnoses: Type 2 diabetes, HbA1C goal < 8% (H)      blood glucose monitoring (JOYCE CONTOUR NEXT) test strip Use to test blood sugar 2 times daily .  Qty: 200 each, Refills: 12    Associated Diagnoses: 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)      liraglutide (VICTOZA PEN) 18 MG/3ML soln Inject 1.8 mg Subcutaneous daily  Qty: 3 Month, Refills: 3    Associated Diagnoses: 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)      metoprolol (TOPROL-XL) 100 MG 24 hr tablet One tablet each morning and 1/2 tablet each night  Qty: 30 tablet, Refills: 12    Associated Diagnoses:  Coronary artery disease involving autologous artery coronary bypass graft with angina pectoris (H)      ONE TOUCH ULTRASOFT LANCETS MISC Test glucose twice daily  Qty: 3 months, Refills: 3    Associated Diagnoses: Type II or unspecified type diabetes mellitus with neurological manifestations, not stated as uncontrolled(250.60) (H)      pimecrolimus (ELIDEL) 1 % cream Use around the eyes twice a day  Qty: 30 g, Refills: 3    Associated Diagnoses: Dermatitis         STOP taking these medications       aspirin 81 MG EC tablet Comments:   Reason for Stopping:               Follow-up:  - Structural clinic, as planned     Labs or imaging requiring follow-up after discharge:  - CBC/BMP in 1 week     Code status:  Full     Condition on discharge  Temp:  [97.6  F (36.4  C)-98.7  F (37.1  C)] 97.6  F (36.4  C)  Pulse:  [] 65  Resp:  [16-20] 16  BP: ()/(50-85) 126/58  SpO2:  [95 %-100 %] 97 %  General: Alert, interactive, NAD  Eyes: sclera anicteric, EOMI  Neck: JVP 0, carotid 2+ bilaterally  Cardiovascular: regular rate and rhythm, normal S1 and S2, grade 1-II systolic murmur, no gallops, or rubs  Resp: clear to auscultation bilaterally, no rales, wheezes, or rhonchi  GI: Soft, nontender, nondistended. +BS.  No HSM or masses, no rebound or guarding.  Extremities: No edema, no cyanosis or clubbing, dorsalis pedis and posterior tibialis pulses 2+ bilaterally  Skin: Warm and dry, no jaundice or rash  Neuro: CN 2-12 intact, moves all extremities equally  Psych: Alert & oriented x 3    Imaging with results:  Echocardiogram:   1/13/21  Interpretation Summary  S/P TAVR with 29mm Magdaleno S3 valve on 1/12/2021.Mean gradient 9mmHg. No AI.  No pericardial effusion is present.    1/12/21  Interpretation Summary  Pre-TAVR:  1. Left ventricular function is normal.The EF is 60-65%. Mild concentric wall  thickening consistent with left ventricular hypertrophy is present.  2. Severe aortic valve calcification causing severe  stenosis and mild  regurgitation. The aortic valve area is 0.5 cm^2, by the continuity equatio  n.  The mean gradient across the aortic valve is42 mmHg. The peak aortic velocity  is 4.2 m/sec. The V2/V1 ratio is 0.15.  3. No other hemodynamically significant valvular disease.     Post-TAVR:  1. A 29 mm Magdaleno DOMINICK bioprosthetic aortic valve was successfully placed.  No prosthetic stenosis with peak velocity with peak velocity 1.5 m/s, mean  gradient 4 mm Hg, dimensionless index 0.51. No regurgitation.  2. Left ventricular ejection fraction appears hyperdynamic with estimiated  left ventricular ejection fraction 75%.    TAVR 1/12/21:  Conclusions:  1. Severe symptomatic aortic stenosis status post successful transcatheter aortic valve replacement with a 29mm Dominick 3 valve, delivered via the right transfemoral approach.     2.  Trace aortic insufficiency post deployment with a mean gradient of 3 mmHg.    Patient Care Team:  Jazzy Gil MD as PCP - General (Family Medicine)    Diana Kong DNP, APRN, CNP  [facility] Cardiology  [phone]

## 2021-01-13 NOTE — PLAN OF CARE
DISCHARGE                         1/13/2021  1:40 PM  ----------------------------------------------------------------------------  Discharged to: Home.  Via: Automobile.  Accompanied by: Family at hospital entrance.   Discharge Instructions: diet, activity, medications, follow up appointments, when to call the MD, aftercare instructions, and what to watchout for (i.e. s/s of infection, increasing SOB, palpitations, chest pain).  Prescriptions: No new medications.  Follow Up Appointments: arranged; information given.  Belongings: All sent with pt.  IV: out.  Telemetry: off.  Pt exhibits understanding of above discharge instructions; all questions answered.  Discharge Paperwork: Signed, copied, and sent home with patient.

## 2021-01-13 NOTE — PROGRESS NOTES
BRIEF CARDIOLOGY CROSS COVER NOTE    Notified by RN earlier this evening that the patient was having itching/rash associated with adhesives. Did get some relief with topical hydrocortisone but was requesting something additional to help relieve pruritis. Ordered PRN benadryl. Later had a sudden urge to have a BM; had emesis x 1 simultaneously.     Went to assess patient at bedside. He reported crampy abdominal pain that preceded his BM but no nausea, light-headedness, vertigo, palpitations, chest pain. He still has some intermittent abdominal cramping, but states this is now mild (improved from prior). Initial BM was loose, but he had a subsequent BM that was more watery. Chronic numbness in his feet; denies new weakness, vision changes, dizziness, numbness/tingling. Did not think there was a relationship between taking meds and his reaction, though he had used benadryl prior. On exam, irregularly irregular rhythm with soft systolic murmur; lungs CTAB in anterior fields; abdomen soft, nontender to palpation with bowel sounds present. Slight serosanguinous drainage at R groin; L groin and R radial access sites C/D/I without evidence of hematoma. At this time, unclear precipitant of abdominal pain/emesis, but will continue to monitor for now.

## 2021-01-13 NOTE — PROVIDER NOTIFICATION
Scheduled metrprolol held at the beginning of the shift due to heart rates in the 40's, afib. SBP has been a high as 167. Pt has received 2 doses of IV hydralazine per prn order. Remains in afib, but for the last couple of hours, heart rate has been in the 70's. BP after IV hydralazine 132/81. Discussed with NESS dejesus MD, [place] Bonilla. Okay to continue to hold metoprolol and treat BP with prn hydralazine as needed.

## 2021-01-13 NOTE — PLAN OF CARE
Occupational Therapy Discharge Summary    Reason for therapy discharge:    Discharged to home.    Progress towards therapy goal(s). See goals on Care Plan in [facility] electronic health record for goal details.  Goals partially met.  Barriers to achieving goals:   discharge on same date as initial evaluation.    Therapy recommendation(s):    Continued therapy is recommended.  Rationale/Recommendations:  Pt would benefit from additional skilled OP CR to address ADL/IADL independence.

## 2021-01-13 NOTE — PLAN OF CARE
Temp: 98.2  F (36.8  C) Temp src: Oral BP: (!) 140/66 Pulse: 91   Resp: 16 SpO2: 96 % O2 Device: None (Room air) Oxygen Delivery: 1 LPM    D: Severe aortic stenosis s/p TAVR 1/12. Hx DM2, CAD s/p CABG, dyslipidemia, afib.    I/A: Monitored vitals and assessed pt status. A&O x4, neuros q4hrs. Afebrile. Afib/flutter 50-120s. SBP 140s, prn hydralazine x1. RA. Rash 2/2 electrodes, prn benadryl. Denies pain. R radial and bilateral groin sites CDI, soft, no hematoma, CMS intact. Voiding adequate amount in bedside urinal. Up with assist x1 and walker. Sleeping between cares.    P: Plan for echo today. Monitor INR, goal 2-3. Continue to monitor and follow POC. Notify CSI with changes.

## 2021-01-13 NOTE — PLAN OF CARE
"Admitted from PACU to  around 1600, s/p for severe aortic stenosis. Other PMH includes DM 2, CAD, s/p 2 vessel CABG, dyslipidemia, and atrial fibrillation. On arrival to the unit, pt was in afib, HR 40's-50's. CSI provider, Diana Kong CNP at bedside. Scheduled dose of metoprolol held.Oriented to bedside controls. Instructed to use nurse call light to get OOB. Pt admission profile completed.  Pt is allergic to telemetry electrodes. Hypoallergenic patches used, but pt still developed a rash and itching on chest. Discussed with Dr. Sepulveda and obtained order for hydrocortisone cream. Cream relieved itching for a couple of hours and then pt asked to have his monitor removed because he \"Can't stand\" the itching. Explained the importance of needing to monitor. Discussed with Dr. Bonilla and obtained an order for benadryl. On initial assessment, Blood noted under R groin tegaderm. Tegaderm changed, no further bleeding noted. R groin is ecchymotic, but soft. Pt was off of bedrest at 1600. Ambulated to the BR with walker and standby asssit. BP has required 3 doses of IV hydralazine to Maintain SBP< 140. Around 2215, pt called to report a stomach ache and need to go to the bathroom, denied nausea. When pt got to the BR he had a large emesis of undigested food and a large loose bowel movement. See provider notification note. Dr. Bonilla at bedside to assess pt. See flow sheets for vs and neuro assessments and puncture site assessments. Bedside report hand off completed with night RN.  Continue with current poc. Notify MD of any changes. ECHO tomorrow.  "

## 2021-01-13 NOTE — PROGRESS NOTES
01/13/21 0716   Quick Adds   Type of Visit Initial Occupational Therapy Evaluation   Living Environment   People in home spouse;child(hayes), adult   Current Living Arrangements condominium   Home Accessibility stairs to enter home;stairs within home   Number of Stairs, Main Entrance 3   Stair Railings, Main Entrance railing on right side (ascending);other (see comments);railing on left side (ascending)  (From garage, right side. Front door, left side)   Number of Stairs, Within Home, Primary other (see comments)  (10+, uses to let dog out and for storage)   Stair Railings, Within Home, Primary railing on left side (ascending)   Transportation Anticipated car, drives self   Living Environment Comments standard toilet height, has attachment if need to raise, grab bars by toilet and shower   Self-Care   Usual Activity Tolerance good   Current Activity Tolerance good   Regular Exercise Yes  (goes to dog park everday, half mile loop, takes rest breaks )   Activity/Exercise Type running/jogging   Exercise Amount/Frequency daily   Equipment Currently Used at Home cane, straight;shower chair;grab bar, toilet;grab bar, tub/shower  (Uses cane when he goes to the dog park)   Activity/Exercise/Self-Care Comment Uses grocery cart for support when shopping, has shower chair but doesn't use, fixer upper, goes to garage to fix things often    Disability/Function   Hearing Difficulty or Deaf yes  (hearing aids)   Describe hearing loss hearing loss on right side;hearing loss on left side   Use of hearing assistive devices bilateral hearing aids   Concentrating, Remembering or Making Decisions Difficulty no   Difficulty Communicating no   Difficulty Eating/Swallowing no   Walking or Climbing Stairs Difficulty yes   Walking or Climbing Stairs other (see comments)  (fatigue)   Mobility Management cane   Dressing/Bathing Difficulty no   Toileting issues no   Doing Errands Independently Difficulty (such as shopping) no   Fall history  within last six months no   Change in Functional Status Since Onset of Current Illness/Injury yes   General Information   Onset of Illness/Injury or Date of Surgery 01/12/21   Referring Physician Marty Mariano MD   Patient/Family Therapy Goal Statement (OT) to go home   Additional Occupational Profile Info/Pertinent History of Current Problem Pee Ayala is 80 y.o male with past medical history significant for severe aortic stenosis, CAD s/p 2-vessel CABG, dyslipidemia, DMII, and atrial fibrillation (on coumadin) who is admitted today following planned TAVR.   Existing Precautions/Restrictions other (see comments)  (avoid excessive hip flexion x2 weeks)   Left Upper Extremity (Weight-bearing Status) full weight-bearing (FWB)   Right Upper Extremity (Weight-bearing Status) full weight-bearing (FWB)   Left Lower Extremity (Weight-bearing Status) full weight-bearing (FWB)   Right Lower Extremity (Weight-bearing Status) full weight-bearing (FWB)   Heart Disease Risk Factors Diabetes;High blood pressure;Gender;Age   General Observations and Info Activity: Up in chair   Cognitive Status Examination   Orientation Status orientation to person, place and time   Cognitive Status Comments grossly oriented   Visual Perception   Visual Impairment/Limitations WNL;other (see comments)  (L eye cataract)   Impact of Vision Impairment on Function (Vision) no acute changes   Sensory   Sensory Comments numbness in feet, constant. restless leg in late afternoon   Pain Assessment   Patient Currently in Pain No   Integumentary/Edema   Integumentary/Edema no deficits were identifed   Range of Motion Comprehensive   General Range of Motion upper extremity range of motion deficits identified   Comment, General Range of Motion RUE WFL, LUE shoulder limited, about 110 degrees shoulder flex/abd   Strength Comprehensive (MMT)   General Manual Muscle Testing (MMT) Assessment upper extremity strength deficits identified   Comment, General  Manual Muscle Testing (MMT) Assessment RUE WNL (grossly 4/5), LUE WFL   Coordination   Upper Extremity Coordination No deficits were identified   Coordination Comments WNL   Instrumental Activities of Daily Living (IADL)   Previous Responsibilities meal prep;laundry;shopping;medication management;finances;driving;yardwork   IADL Comments Wife helps with laundry and meal prep   Clinical Impression   Criteria for Skilled Therapeutic Interventions Met (OT) yes   OT Diagnosis Decreased I with ADLs/IADLs   OT Problem List-Impairments impacting ADL activity tolerance impaired;balance;range of motion (ROM);strength;post-surgical precautions;sensation   Assessment of Occupational Performance 3-5 Performance Deficits   Identified Performance Deficits functional mobility, bathing, home mgmt   Planned Therapy Interventions (OT) ADL retraining;IADL retraining;ROM;strengthening;progressive activity/exercise;transfer training   Clinical Decision Making Complexity (OT) low complexity   Therapy Frequency (OT) Daily   Predicted Duration of Therapy 1 week   Risks and Benefits of Treatment have been explained. Yes   Patient, Family & other staff in agreement with plan of care Yes   Comment-Clinical Impression Pt to benefit from skilled OT to address above deficits. See daily flowsheet for details regarding tx provided.   OT Discharge Planning    OT Discharge Recommendation (DC Rec) home with outpatient cardiac rehab   OT Rationale for DC Rec Anticipate pt will progress well with OP CR. SBA-CGA for mobility   OT Brief overview of current status  SBA-CGA for functional mobility, wife and son able to assist PRN   Total Evaluation Time (Minutes)   Total Evaluation Time (Minutes) 15

## 2021-01-15 ENCOUNTER — COMMUNICATION - HEALTHEAST (OUTPATIENT)
Dept: CARDIOLOGY | Facility: CLINIC | Age: 81
End: 2021-01-15

## 2021-01-19 ENCOUNTER — OFFICE VISIT - HEALTHEAST (OUTPATIENT)
Dept: FAMILY MEDICINE | Facility: CLINIC | Age: 81
End: 2021-01-19

## 2021-01-19 DIAGNOSIS — E66.01 MORBID OBESITY (H): ICD-10-CM

## 2021-01-19 DIAGNOSIS — Z95.2 S/P TAVR (TRANSCATHETER AORTIC VALVE REPLACEMENT): ICD-10-CM

## 2021-01-19 DIAGNOSIS — E11.3593 TYPE 2 DIABETES MELLITUS WITH BOTH EYES AFFECTED BY PROLIFERATIVE RETINOPATHY WITHOUT MACULAR EDEMA, WITH LONG-TERM CURRENT USE OF INSULIN (H): ICD-10-CM

## 2021-01-19 DIAGNOSIS — Z79.4 TYPE 2 DIABETES MELLITUS WITH BOTH EYES AFFECTED BY PROLIFERATIVE RETINOPATHY WITHOUT MACULAR EDEMA, WITH LONG-TERM CURRENT USE OF INSULIN (H): ICD-10-CM

## 2021-01-20 ENCOUNTER — AMBULATORY - HEALTHEAST (OUTPATIENT)
Dept: CARDIAC REHAB | Facility: HOSPITAL | Age: 81
End: 2021-01-20

## 2021-01-20 DIAGNOSIS — Z95.2 S/P TAVR (TRANSCATHETER AORTIC VALVE REPLACEMENT): ICD-10-CM

## 2021-01-20 LAB
GLUCOSE BLDC GLUCOMTR-MCNC: 206 MG/DL (ref 70–139)
GLUCOSE BLDC GLUCOMTR-MCNC: 222 MG/DL (ref 70–139)

## 2021-01-20 ASSESSMENT — MIFFLIN-ST. JEOR: SCORE: 1549.07

## 2021-01-21 ENCOUNTER — OFFICE VISIT - HEALTHEAST (OUTPATIENT)
Dept: CARDIOLOGY | Facility: CLINIC | Age: 81
End: 2021-01-21

## 2021-01-21 ENCOUNTER — COMMUNICATION - HEALTHEAST (OUTPATIENT)
Dept: ANTICOAGULATION | Facility: CLINIC | Age: 81
End: 2021-01-21

## 2021-01-21 DIAGNOSIS — I10 ESSENTIAL HYPERTENSION: ICD-10-CM

## 2021-01-21 DIAGNOSIS — I25.10 CORONARY ARTERY DISEASE DUE TO LIPID RICH PLAQUE: ICD-10-CM

## 2021-01-21 DIAGNOSIS — I50.32 CHRONIC HEART FAILURE WITH PRESERVED EJECTION FRACTION (H): ICD-10-CM

## 2021-01-21 DIAGNOSIS — I48.20 CHRONIC ATRIAL FIBRILLATION (H): ICD-10-CM

## 2021-01-21 DIAGNOSIS — I25.83 CORONARY ARTERY DISEASE DUE TO LIPID RICH PLAQUE: ICD-10-CM

## 2021-01-21 DIAGNOSIS — I35.0 SEVERE AORTIC STENOSIS: ICD-10-CM

## 2021-01-21 DIAGNOSIS — Z95.2 S/P TAVR (TRANSCATHETER AORTIC VALVE REPLACEMENT): ICD-10-CM

## 2021-01-22 ENCOUNTER — AMBULATORY - HEALTHEAST (OUTPATIENT)
Dept: CARDIAC REHAB | Facility: HOSPITAL | Age: 81
End: 2021-01-22

## 2021-01-22 DIAGNOSIS — Z95.2 S/P TAVR (TRANSCATHETER AORTIC VALVE REPLACEMENT): ICD-10-CM

## 2021-01-22 LAB
GLUCOSE BLDC GLUCOMTR-MCNC: 271 MG/DL (ref 70–139)
GLUCOSE BLDC GLUCOMTR-MCNC: 272 MG/DL (ref 70–139)

## 2021-01-25 ENCOUNTER — COMMUNICATION - HEALTHEAST (OUTPATIENT)
Dept: ANTICOAGULATION | Facility: CLINIC | Age: 81
End: 2021-01-25

## 2021-01-25 ENCOUNTER — AMBULATORY - HEALTHEAST (OUTPATIENT)
Dept: LAB | Facility: CLINIC | Age: 81
End: 2021-01-25

## 2021-01-25 DIAGNOSIS — I48.20 CHRONIC ATRIAL FIBRILLATION (H): ICD-10-CM

## 2021-01-25 LAB — INR PPP: 2.2 (ref 0.9–1.1)

## 2021-01-27 ENCOUNTER — AMBULATORY - HEALTHEAST (OUTPATIENT)
Dept: CARDIAC REHAB | Facility: HOSPITAL | Age: 81
End: 2021-01-27

## 2021-01-27 DIAGNOSIS — Z95.2 S/P TAVR (TRANSCATHETER AORTIC VALVE REPLACEMENT): ICD-10-CM

## 2021-01-27 LAB
GLUCOSE BLDC GLUCOMTR-MCNC: 166 MG/DL (ref 70–139)
GLUCOSE BLDC GLUCOMTR-MCNC: 202 MG/DL (ref 70–139)

## 2021-01-29 ENCOUNTER — AMBULATORY - HEALTHEAST (OUTPATIENT)
Dept: CARDIAC REHAB | Facility: HOSPITAL | Age: 81
End: 2021-01-29

## 2021-01-29 DIAGNOSIS — Z95.2 S/P TAVR (TRANSCATHETER AORTIC VALVE REPLACEMENT): ICD-10-CM

## 2021-01-29 LAB
GLUCOSE BLDC GLUCOMTR-MCNC: 196 MG/DL (ref 70–139)
GLUCOSE BLDC GLUCOMTR-MCNC: 264 MG/DL (ref 70–139)

## 2021-02-03 ENCOUNTER — AMBULATORY - HEALTHEAST (OUTPATIENT)
Dept: CARDIAC REHAB | Facility: HOSPITAL | Age: 81
End: 2021-02-03

## 2021-02-03 DIAGNOSIS — Z95.2 S/P TAVR (TRANSCATHETER AORTIC VALVE REPLACEMENT): ICD-10-CM

## 2021-02-04 ENCOUNTER — IMMUNIZATION (OUTPATIENT)
Dept: PEDIATRICS | Facility: CLINIC | Age: 81
End: 2021-02-04
Payer: COMMERCIAL

## 2021-02-04 PROCEDURE — 91300 PR COVID VAC PFIZER DIL RECON 30 MCG/0.3 ML IM: CPT

## 2021-02-04 PROCEDURE — 0001A PR COVID VAC PFIZER DIL RECON 30 MCG/0.3 ML IM: CPT

## 2021-02-08 ENCOUNTER — AMBULATORY - HEALTHEAST (OUTPATIENT)
Dept: LAB | Facility: CLINIC | Age: 81
End: 2021-02-08

## 2021-02-08 ENCOUNTER — COMMUNICATION - HEALTHEAST (OUTPATIENT)
Dept: ANTICOAGULATION | Facility: CLINIC | Age: 81
End: 2021-02-08

## 2021-02-08 DIAGNOSIS — I48.20 CHRONIC ATRIAL FIBRILLATION (H): ICD-10-CM

## 2021-02-08 LAB — INR PPP: 2.5 (ref 0.9–1.1)

## 2021-02-10 ENCOUNTER — AMBULATORY - HEALTHEAST (OUTPATIENT)
Dept: CARDIAC REHAB | Facility: HOSPITAL | Age: 81
End: 2021-02-10

## 2021-02-10 DIAGNOSIS — Z95.2 S/P TAVR (TRANSCATHETER AORTIC VALVE REPLACEMENT): ICD-10-CM

## 2021-02-12 ENCOUNTER — AMBULATORY - HEALTHEAST (OUTPATIENT)
Dept: CARDIAC REHAB | Facility: HOSPITAL | Age: 81
End: 2021-02-12

## 2021-02-12 DIAGNOSIS — Z95.2 S/P TAVR (TRANSCATHETER AORTIC VALVE REPLACEMENT): ICD-10-CM

## 2021-02-15 ENCOUNTER — COMMUNICATION - HEALTHEAST (OUTPATIENT)
Dept: ADMINISTRATIVE | Facility: CLINIC | Age: 81
End: 2021-02-15

## 2021-02-15 ENCOUNTER — AMBULATORY - HEALTHEAST (OUTPATIENT)
Dept: CARDIOLOGY | Facility: CLINIC | Age: 81
End: 2021-02-15

## 2021-02-17 ENCOUNTER — OFFICE VISIT - HEALTHEAST (OUTPATIENT)
Dept: ENDOCRINOLOGY | Facility: CLINIC | Age: 81
End: 2021-02-17

## 2021-02-17 ENCOUNTER — HOSPITAL ENCOUNTER (OUTPATIENT)
Dept: CARDIOLOGY | Facility: HOSPITAL | Age: 81
Discharge: HOME | End: 2021-02-17
Attending: INTERNAL MEDICINE

## 2021-02-17 DIAGNOSIS — Z79.4 TYPE 2 DIABETES MELLITUS WITH BOTH EYES AFFECTED BY PROLIFERATIVE RETINOPATHY WITHOUT MACULAR EDEMA, WITH LONG-TERM CURRENT USE OF INSULIN (H): ICD-10-CM

## 2021-02-17 DIAGNOSIS — I35.0 SEVERE AORTIC STENOSIS: ICD-10-CM

## 2021-02-17 DIAGNOSIS — E11.3593 TYPE 2 DIABETES MELLITUS WITH BOTH EYES AFFECTED BY PROLIFERATIVE RETINOPATHY WITHOUT MACULAR EDEMA, WITH LONG-TERM CURRENT USE OF INSULIN (H): ICD-10-CM

## 2021-02-17 LAB
AORTIC ROOT: 2.6 CM
AORTIC VALVE MEAN VELOCITY: 109 CM/S
AV DIMENSIONLESS INDEX VTI: 0.6
AV MEAN GRADIENT: 6 MMHG
AV PEAK GRADIENT: 14.3 MMHG
AV VALVE AREA: 2 CM2
AV VELOCITY RATIO: 0.4
BSA FOR ECHO PROCEDURE: 2.06 M2
CV ECHO HEIGHT: 65 IN
CV ECHO WEIGHT: 204 LBS
DOP CALC AO PEAK VEL: 189 CM/S
DOP CALC AO VTI: 32.3 CM
DOP CALC LVOT AREA: 3.46 CM2
DOP CALC LVOT DIAMETER: 2.1 CM
DOP CALC LVOT PEAK VEL: 82 CM/S
DOP CALC LVOT STROKE VOLUME: 65.4 CM3
DOP CALCLVOT PEAK VEL VTI: 18.9 CM
EJECTION FRACTION: 60 % (ref 55–75)
FRACTIONAL SHORTENING: 35.2 % (ref 28–44)
INTERVENTRICULAR SEPTUM IN END DIASTOLE: 1 CM (ref 0.6–1)
IVS/PW RATIO: 1
LA AREA 1: 28.2 CM2
LA AREA 2: 24.2 CM2
LEFT ATRIUM LENGTH: 6.1 CM
LEFT ATRIUM SIZE: 5.3 CM
LEFT ATRIUM TO AORTIC ROOT RATIO: 2.04 NO UNITS
LEFT ATRIUM VOLUME INDEX: 46.2 ML/M2
LEFT ATRIUM VOLUME: 95.1 ML
LEFT VENTRICLE DIASTOLIC VOLUME INDEX: 46.1 CM3/M2 (ref 34–74)
LEFT VENTRICLE DIASTOLIC VOLUME: 95 CM3 (ref 62–150)
LEFT VENTRICLE MASS INDEX: 100.4 G/M2
LEFT VENTRICLE SYSTOLIC VOLUME INDEX: 18.4 CM3/M2 (ref 11–31)
LEFT VENTRICLE SYSTOLIC VOLUME: 38 CM3 (ref 21–61)
LEFT VENTRICULAR INTERNAL DIMENSION IN DIASTOLE: 5.4 CM (ref 4.2–5.8)
LEFT VENTRICULAR INTERNAL DIMENSION IN SYSTOLE: 3.5 CM (ref 2.5–4)
LEFT VENTRICULAR MASS: 206.7 G
LEFT VENTRICULAR OUTFLOW TRACT MEAN GRADIENT: 2 MMHG
LEFT VENTRICULAR OUTFLOW TRACT MEAN VELOCITY: 59 CM/S
LEFT VENTRICULAR OUTFLOW TRACT PEAK GRADIENT: 3 MMHG
LEFT VENTRICULAR POSTERIOR WALL IN END DIASTOLE: 1 CM (ref 0.6–1)
LV STROKE VOLUME INDEX: 31.8 ML/M2
MITRAL REGURGITANT VELOCITY TIME INTEGRAL: 164 CM
MITRAL REGURGITANT VELOCITY TIME INTEGRAL: 164 CM
MR FLOW: 31 CM3
MR MEAN GRADIENT: 62 MMHG
MR MEAN GRADIENT: 62 MMHG
MR MEAN VELOCITY: 369 CM/S
MR MEAN VELOCITY: 369 CM/S
MR PISA RADIUS: 0.7 CM
MR PISA RADIUS: 0.7 CM
MR PISA VN NYQUIST: 30.8 CM/S
MV AVERAGE E/E' RATIO: 8.6 CM/S
MV DECELERATION TIME: 282 MS
MV E'TISSUE VEL-LAT: 16.1 CM/S
MV E'TISSUE VEL-MED: 10.3 CM/S
MV LATERAL E/E' RATIO: 7.1
MV MEDIAL E/E' RATIO: 11.1
MV PEAK E VELOCITY: 114 CM/S
NUC REST DIASTOLIC VOLUME INDEX: 3264 LBS
NUC REST SYSTOLIC VOLUME INDEX: 65 IN
PISA MR PEAK VEL: 508 CM/S
PISA MR PEAK VEL: 508 CM/S
TRICUSPID REGURGITATION PEAK PRESSURE GRADIENT: 29.8 MMHG
TRICUSPID VALVE ANULAR PLANE SYSTOLIC EXCURSION: 1.5 CM
TRICUSPID VALVE PEAK REGURGITANT VELOCITY: 273 CM/S

## 2021-02-17 ASSESSMENT — MIFFLIN-ST. JEOR: SCORE: 1567.22

## 2021-02-19 ENCOUNTER — RECORDS - HEALTHEAST (OUTPATIENT)
Dept: ADMINISTRATIVE | Facility: OTHER | Age: 81
End: 2021-02-19

## 2021-02-19 LAB — RETINOPATHY: POSITIVE

## 2021-02-22 ENCOUNTER — OFFICE VISIT - HEALTHEAST (OUTPATIENT)
Dept: CARDIOLOGY | Facility: CLINIC | Age: 81
End: 2021-02-22

## 2021-02-22 DIAGNOSIS — Z95.2 S/P TAVR (TRANSCATHETER AORTIC VALVE REPLACEMENT): ICD-10-CM

## 2021-02-23 ENCOUNTER — AMBULATORY - HEALTHEAST (OUTPATIENT)
Dept: LAB | Facility: CLINIC | Age: 81
End: 2021-02-23

## 2021-02-23 DIAGNOSIS — Z79.4 TYPE 2 DIABETES MELLITUS WITH BOTH EYES AFFECTED BY PROLIFERATIVE RETINOPATHY WITHOUT MACULAR EDEMA, WITH LONG-TERM CURRENT USE OF INSULIN (H): ICD-10-CM

## 2021-02-23 DIAGNOSIS — E11.3593 TYPE 2 DIABETES MELLITUS WITH BOTH EYES AFFECTED BY PROLIFERATIVE RETINOPATHY WITHOUT MACULAR EDEMA, WITH LONG-TERM CURRENT USE OF INSULIN (H): ICD-10-CM

## 2021-02-23 LAB
ANION GAP SERPL CALCULATED.3IONS-SCNC: 10 MMOL/L (ref 5–18)
BUN SERPL-MCNC: 21 MG/DL (ref 8–28)
CALCIUM SERPL-MCNC: 9.3 MG/DL (ref 8.5–10.5)
CHLORIDE BLD-SCNC: 100 MMOL/L (ref 98–107)
CO2 SERPL-SCNC: 28 MMOL/L (ref 22–31)
CREAT SERPL-MCNC: 1.14 MG/DL (ref 0.7–1.3)
CREAT UR-MCNC: 9.6 MG/DL
GFR SERPL CREATININE-BSD FRML MDRD: >60 ML/MIN/1.73M2
GLUCOSE BLD-MCNC: 387 MG/DL (ref 70–125)
HBA1C MFR BLD: 8.5 %
LDLC SERPL CALC-MCNC: 59 MG/DL
MICROALBUMIN UR-MCNC: <0.5 MG/DL (ref 0–1.99)
MICROALBUMIN/CREAT UR: NORMAL MG/G{CREAT}
POTASSIUM BLD-SCNC: 4.3 MMOL/L (ref 3.5–5)
SODIUM SERPL-SCNC: 138 MMOL/L (ref 136–145)

## 2021-02-24 ENCOUNTER — RECORDS - HEALTHEAST (OUTPATIENT)
Dept: HEALTH INFORMATION MANAGEMENT | Facility: CLINIC | Age: 81
End: 2021-02-24

## 2021-02-25 ENCOUNTER — IMMUNIZATION (OUTPATIENT)
Dept: PEDIATRICS | Facility: CLINIC | Age: 81
End: 2021-02-25
Attending: INTERNAL MEDICINE
Payer: COMMERCIAL

## 2021-02-25 PROCEDURE — 0002A PR COVID VAC PFIZER DIL RECON 30 MCG/0.3 ML IM: CPT

## 2021-02-25 PROCEDURE — 91300 PR COVID VAC PFIZER DIL RECON 30 MCG/0.3 ML IM: CPT

## 2021-02-26 ENCOUNTER — AMBULATORY - HEALTHEAST (OUTPATIENT)
Dept: LAB | Facility: CLINIC | Age: 81
End: 2021-02-26

## 2021-02-26 ENCOUNTER — COMMUNICATION - HEALTHEAST (OUTPATIENT)
Dept: ANTICOAGULATION | Facility: CLINIC | Age: 81
End: 2021-02-26

## 2021-02-26 DIAGNOSIS — I48.20 CHRONIC ATRIAL FIBRILLATION (H): ICD-10-CM

## 2021-02-26 LAB — INR PPP: 2.7 (ref 0.9–1.1)

## 2021-03-03 ENCOUNTER — COMMUNICATION - HEALTHEAST (OUTPATIENT)
Dept: ENDOCRINOLOGY | Facility: CLINIC | Age: 81
End: 2021-03-03

## 2021-03-05 ENCOUNTER — COMMUNICATION - HEALTHEAST (OUTPATIENT)
Dept: ENDOCRINOLOGY | Facility: CLINIC | Age: 81
End: 2021-03-05

## 2021-03-16 ENCOUNTER — COMMUNICATION - HEALTHEAST (OUTPATIENT)
Dept: ENDOCRINOLOGY | Facility: CLINIC | Age: 81
End: 2021-03-16

## 2021-03-29 ENCOUNTER — AMBULATORY - HEALTHEAST (OUTPATIENT)
Dept: LAB | Facility: CLINIC | Age: 81
End: 2021-03-29

## 2021-03-29 ENCOUNTER — COMMUNICATION - HEALTHEAST (OUTPATIENT)
Dept: ANTICOAGULATION | Facility: CLINIC | Age: 81
End: 2021-03-29

## 2021-03-29 DIAGNOSIS — I48.20 CHRONIC ATRIAL FIBRILLATION (H): ICD-10-CM

## 2021-03-29 LAB — INR PPP: 1.4 (ref 0.9–1.1)

## 2021-03-31 ENCOUNTER — COMMUNICATION - HEALTHEAST (OUTPATIENT)
Dept: ENDOCRINOLOGY | Facility: CLINIC | Age: 81
End: 2021-03-31

## 2021-03-31 DIAGNOSIS — Z79.4 TYPE 2 DIABETES MELLITUS WITH PROLIFERATIVE RETINOPATHY WITHOUT MACULAR EDEMA, WITH LONG-TERM CURRENT USE OF INSULIN, UNSPECIFIED LATERALITY (H): ICD-10-CM

## 2021-03-31 DIAGNOSIS — E11.3599 TYPE 2 DIABETES MELLITUS WITH PROLIFERATIVE RETINOPATHY WITHOUT MACULAR EDEMA, WITH LONG-TERM CURRENT USE OF INSULIN, UNSPECIFIED LATERALITY (H): ICD-10-CM

## 2021-04-03 ENCOUNTER — HEALTH MAINTENANCE LETTER (OUTPATIENT)
Age: 81
End: 2021-04-03

## 2021-04-05 ENCOUNTER — AMBULATORY - HEALTHEAST (OUTPATIENT)
Dept: LAB | Facility: CLINIC | Age: 81
End: 2021-04-05

## 2021-04-05 ENCOUNTER — COMMUNICATION - HEALTHEAST (OUTPATIENT)
Dept: ANTICOAGULATION | Facility: CLINIC | Age: 81
End: 2021-04-05

## 2021-04-05 DIAGNOSIS — I48.20 CHRONIC ATRIAL FIBRILLATION (H): ICD-10-CM

## 2021-04-05 LAB — INR PPP: 2.4 (ref 0.9–1.1)

## 2021-04-07 ENCOUNTER — COMMUNICATION - HEALTHEAST (OUTPATIENT)
Dept: FAMILY MEDICINE | Facility: CLINIC | Age: 81
End: 2021-04-07

## 2021-04-07 DIAGNOSIS — I25.10 CORONARY ARTERY DISEASE INVOLVING NATIVE CORONARY ARTERY OF NATIVE HEART WITHOUT ANGINA PECTORIS: ICD-10-CM

## 2021-04-19 ENCOUNTER — AMBULATORY - HEALTHEAST (OUTPATIENT)
Dept: LAB | Facility: CLINIC | Age: 81
End: 2021-04-19

## 2021-04-19 ENCOUNTER — COMMUNICATION - HEALTHEAST (OUTPATIENT)
Dept: FAMILY MEDICINE | Facility: CLINIC | Age: 81
End: 2021-04-19

## 2021-04-19 ENCOUNTER — COMMUNICATION - HEALTHEAST (OUTPATIENT)
Dept: ANTICOAGULATION | Facility: CLINIC | Age: 81
End: 2021-04-19

## 2021-04-19 DIAGNOSIS — I48.20 CHRONIC ATRIAL FIBRILLATION (H): ICD-10-CM

## 2021-04-19 LAB — INR PPP: 1.6 (ref 0.9–1.1)

## 2021-05-03 ENCOUNTER — AMBULATORY - HEALTHEAST (OUTPATIENT)
Dept: LAB | Facility: CLINIC | Age: 81
End: 2021-05-03

## 2021-05-03 ENCOUNTER — COMMUNICATION - HEALTHEAST (OUTPATIENT)
Dept: ANTICOAGULATION | Facility: CLINIC | Age: 81
End: 2021-05-03

## 2021-05-03 DIAGNOSIS — I48.20 CHRONIC ATRIAL FIBRILLATION (H): ICD-10-CM

## 2021-05-03 LAB — INR PPP: 2.5 (ref 0.9–1.1)

## 2021-05-06 ENCOUNTER — OFFICE VISIT - HEALTHEAST (OUTPATIENT)
Dept: FAMILY MEDICINE | Facility: CLINIC | Age: 81
End: 2021-05-06

## 2021-05-06 ENCOUNTER — RECORDS - HEALTHEAST (OUTPATIENT)
Dept: GENERAL RADIOLOGY | Facility: CLINIC | Age: 81
End: 2021-05-06

## 2021-05-06 DIAGNOSIS — E08.3213: ICD-10-CM

## 2021-05-06 DIAGNOSIS — Z95.2 S/P TAVR (TRANSCATHETER AORTIC VALVE REPLACEMENT): ICD-10-CM

## 2021-05-06 DIAGNOSIS — G25.81 RESTLESS LEG SYNDROME: ICD-10-CM

## 2021-05-06 DIAGNOSIS — Z79.4 TYPE 2 DIABETES MELLITUS WITH BOTH EYES AFFECTED BY PROLIFERATIVE RETINOPATHY WITHOUT MACULAR EDEMA, WITH LONG-TERM CURRENT USE OF INSULIN (H): ICD-10-CM

## 2021-05-06 DIAGNOSIS — M25.552 HIP PAIN, LEFT: ICD-10-CM

## 2021-05-06 DIAGNOSIS — Z79.4: ICD-10-CM

## 2021-05-06 DIAGNOSIS — E11.3593 TYPE 2 DIABETES MELLITUS WITH BOTH EYES AFFECTED BY PROLIFERATIVE RETINOPATHY WITHOUT MACULAR EDEMA, WITH LONG-TERM CURRENT USE OF INSULIN (H): ICD-10-CM

## 2021-05-06 DIAGNOSIS — M25.552 PAIN IN LEFT HIP: ICD-10-CM

## 2021-05-06 DIAGNOSIS — R29.6 FALLS FREQUENTLY: ICD-10-CM

## 2021-05-06 LAB
BASOPHILS # BLD AUTO: 0.1 THOU/UL (ref 0–0.2)
BASOPHILS NFR BLD AUTO: 1 % (ref 0–2)
EOSINOPHIL # BLD AUTO: 0.2 THOU/UL (ref 0–0.4)
EOSINOPHIL NFR BLD AUTO: 2 % (ref 0–6)
ERYTHROCYTE [DISTWIDTH] IN BLOOD BY AUTOMATED COUNT: 12.5 % (ref 11–14.5)
HCT VFR BLD AUTO: 41.8 % (ref 40–54)
HGB BLD-MCNC: 13.9 G/DL (ref 14–18)
IMM GRANULOCYTES # BLD: 0 THOU/UL
IMM GRANULOCYTES NFR BLD: 0 %
LYMPHOCYTES # BLD AUTO: 1.7 THOU/UL (ref 0.8–4.4)
LYMPHOCYTES NFR BLD AUTO: 25 % (ref 20–40)
MCH RBC QN AUTO: 32.8 PG (ref 27–34)
MCHC RBC AUTO-ENTMCNC: 33.3 G/DL (ref 32–36)
MCV RBC AUTO: 99 FL (ref 80–100)
MONOCYTES # BLD AUTO: 0.5 THOU/UL (ref 0–0.9)
MONOCYTES NFR BLD AUTO: 7 % (ref 2–10)
NEUTROPHILS # BLD AUTO: 4.5 THOU/UL (ref 2–7.7)
NEUTROPHILS NFR BLD AUTO: 65 % (ref 50–70)
PLATELET # BLD AUTO: 156 THOU/UL (ref 140–440)
PMV BLD AUTO: 10.3 FL (ref 7–10)
RBC # BLD AUTO: 4.24 MILL/UL (ref 4.4–6.2)
TSH SERPL DL<=0.005 MIU/L-ACNC: 0.87 UIU/ML (ref 0.3–5)
WBC: 6.9 THOU/UL (ref 4–11)

## 2021-05-06 ASSESSMENT — MIFFLIN-ST. JEOR: SCORE: 1578.56

## 2021-05-07 ENCOUNTER — COMMUNICATION - HEALTHEAST (OUTPATIENT)
Dept: FAMILY MEDICINE | Facility: CLINIC | Age: 81
End: 2021-05-07

## 2021-05-07 DIAGNOSIS — Z79.4 TYPE 2 DIABETES MELLITUS WITH BOTH EYES AFFECTED BY PROLIFERATIVE RETINOPATHY WITHOUT MACULAR EDEMA, WITH LONG-TERM CURRENT USE OF INSULIN (H): ICD-10-CM

## 2021-05-07 DIAGNOSIS — E11.3593 TYPE 2 DIABETES MELLITUS WITH BOTH EYES AFFECTED BY PROLIFERATIVE RETINOPATHY WITHOUT MACULAR EDEMA, WITH LONG-TERM CURRENT USE OF INSULIN (H): ICD-10-CM

## 2021-05-13 ENCOUNTER — AMBULATORY - HEALTHEAST (OUTPATIENT)
Dept: CARDIAC REHAB | Facility: HOSPITAL | Age: 81
End: 2021-05-13

## 2021-05-13 DIAGNOSIS — Z95.2 S/P TAVR (TRANSCATHETER AORTIC VALVE REPLACEMENT): ICD-10-CM

## 2021-05-13 LAB
GLUCOSE BLDC GLUCOMTR-MCNC: 310 MG/DL (ref 70–139)
GLUCOSE BLDC GLUCOMTR-MCNC: 347 MG/DL (ref 70–139)

## 2021-05-25 ENCOUNTER — AMBULATORY - HEALTHEAST (OUTPATIENT)
Dept: LAB | Facility: CLINIC | Age: 81
End: 2021-05-25

## 2021-05-25 ENCOUNTER — COMMUNICATION - HEALTHEAST (OUTPATIENT)
Dept: ANTICOAGULATION | Facility: CLINIC | Age: 81
End: 2021-05-25

## 2021-05-25 DIAGNOSIS — I48.20 CHRONIC ATRIAL FIBRILLATION (H): ICD-10-CM

## 2021-05-25 LAB — INR PPP: 3.8 (ref 0.9–1.1)

## 2021-05-26 VITALS
HEART RATE: 60 BPM | TEMPERATURE: 97.7 F | SYSTOLIC BLOOD PRESSURE: 140 MMHG | DIASTOLIC BLOOD PRESSURE: 70 MMHG | RESPIRATION RATE: 18 BRPM

## 2021-05-26 VITALS
TEMPERATURE: 97.9 F | RESPIRATION RATE: 18 BRPM | DIASTOLIC BLOOD PRESSURE: 68 MMHG | SYSTOLIC BLOOD PRESSURE: 118 MMHG | HEART RATE: 62 BPM

## 2021-05-26 ASSESSMENT — PATIENT HEALTH QUESTIONNAIRE - PHQ9: SUM OF ALL RESPONSES TO PHQ QUESTIONS 1-9: 6
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
## 2021-05-27 VITALS
HEIGHT: 65 IN | WEIGHT: 206.5 LBS | OXYGEN SATURATION: 97 % | DIASTOLIC BLOOD PRESSURE: 81 MMHG | BODY MASS INDEX: 34.41 KG/M2 | HEART RATE: 73 BPM | SYSTOLIC BLOOD PRESSURE: 141 MMHG

## 2021-05-27 VITALS
HEART RATE: 60 BPM | DIASTOLIC BLOOD PRESSURE: 66 MMHG | RESPIRATION RATE: 15 BRPM | TEMPERATURE: 98.1 F | SYSTOLIC BLOOD PRESSURE: 118 MMHG

## 2021-05-27 VITALS — DIASTOLIC BLOOD PRESSURE: 62 MMHG | RESPIRATION RATE: 18 BRPM | SYSTOLIC BLOOD PRESSURE: 118 MMHG | HEART RATE: 74 BPM

## 2021-05-27 ASSESSMENT — PATIENT HEALTH QUESTIONNAIRE - PHQ9: SUM OF ALL RESPONSES TO PHQ QUESTIONS 1-9: 6
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
## 2021-05-27 NOTE — TELEPHONE ENCOUNTER
Please inform patient of the following instructions for his upcoming angiogram-    1-do not take the morning dose of insulin.    2-do not take morning dose of metformin on the day of angiogram.  Metformin can be resumed after the angiogram.  Since, kidney functions are normal.    3-follow instructions on Coumadin per nursing.    4-you can stop the Amaryl/glimepiride    5-take rest of the medication as before.    Jazzy Gil MD  4/1/2019

## 2021-05-27 NOTE — PATIENT INSTRUCTIONS - HE
I have seen you for preop.    Await to hear from me how to take your medications.    I will write a letter for you explaining this.    You need to take report to [facility] to your cardiologist as suggested by them ( I do not have the results yet).    Jazzy Gil MD  3/28/2019

## 2021-05-27 NOTE — TELEPHONE ENCOUNTER
ANTICOAGULATION  MANAGEMENT    Assessment     Today's INR result of 1.4 is Subtherapeutic (goal INR of 2.0-3.0)        Warfarin recently held as instructed which may be affecting INR     Patient was holding warfarin doses from 3/31- 4/3 for Coronary Angiogram Procedure done on 4/4 .Less warfarin was taken post procedure- anticoagulation calendar updated.    No new diet changes affecting INR    No interaction between stopping metoprolol,starting amlodipine and carvedilol and warfarin may be affecting INR    Continues to tolerate warfarin with no reported s/s of bleeding or thromboembolism     Previous INR was Subtherapeutic    Plan:     Spoke with Pee regarding INR result and instructed:     Warfarin Dosing Instructions:  take one time booster dose of 10 mg today  then continue current warfarin dose    5 mg every Mon, Thu; 7.5 mg all other days      (0 % change) changed the days the patient takes 5 mg from Sun Wed to Mon Thurs.    Patient was able to read back doses instructed correctly.    Instructed patient to follow up no later than: 5-7 days. Appointment made.    Education provided: importance of therapeutic range, target INR goal and significance of current INR result and no interaction anticipated between warfarin and stopping metoprolol, strting amlodipine and carvedilol    Pee verbalizes understanding and agrees to warfarin dosing plan.    Instructed to call the [facility] for any changes, questions or concerns. (#[phone])   ?   Norma Osorio RN    Subjective/Objective:      Pee Ayala, a 78 y.o. male is on warfarin.     Pee reports:     Home warfarin dose: verbally confirmed home dose with Pee and updated on anticoagulation calendar     Missed doses: No     Medication changes:  Yes: see above     S/S of bleeding or thromboembolism:  No     New Injury or illness:  No     Changes in diet or alcohol consumption:  No     Upcoming surgery, procedure or cardioversion:  No    Anticoagulation  Episode Summary     Current INR goal:   2.0-3.0   TTR:   26.1 % (2.9 mo)   Next INR check:   4/18/2019   INR from last check:   1.40! (4/9/2019)   Weekly max warfarin dose:      Target end date:      INR check location:      Preferred lab:      Send INR reminders to:   ANTICOAGULATION POOL C (DTN,VAD,CGR,GAV)    Indications    Chronic atrial fibrillation (H) [I48.2]           Comments:            Anticoagulation Care Providers     Provider Role Specialty Phone number    Jazzy Gil MD Referring Family Medicine [phone]

## 2021-05-27 NOTE — TELEPHONE ENCOUNTER
Patient is not home yet, per patient's spouse Agueda, ACN need to call back and talk to patient regarding INR and dosing instructions.    Instructed Agueda to have the patient call ACN back.  Awaiting for call back.    Please transfer call to Norma Warner RN @ [phone]

## 2021-05-27 NOTE — TELEPHONE ENCOUNTER
ANTICOAGULATION  MANAGEMENT: Discharge Continuity of Care Review    Hospital admission on  4/4/19 for Coronary Angiogram procedure.    Discharge disposition: Home    INR Results:       Recent labs: (last 7 days)     04/04/19  0847   INR 1.17*       Warfarin inpatient management: Held and resumed on discharge    Warfarin discharge instructions:per AVS  Warfarin doses       Medication Changes Affecting Anticoagulation: No    Additional Factors Affecting Anticoagulation: No    Plan     Recommend 4/11 due to restarting warfarin post procedure- oneilet stated that he can come on 4/9- appointment made.    Spoke with Pee and reported he was discharged late yesterday. He reported that he took 2.5 mg dose last night   Discussed discharge instructions dosing and instructed to take 10 mg dose today then starting tomorrow take 7.5 mg warfarin doses and will check INR on 4/9.    Anticoagulation calendar updated    Norma Osorio RN
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
## 2021-05-27 NOTE — TELEPHONE ENCOUNTER
Reason contacted:  MD recommendations  Information relayed:  Informed Pt of Dr Gil's message, Pt states understanding.  Additional questions:  No  Further follow-up needed:  No  Okay to leave a detailed message:  No

## 2021-05-27 NOTE — TELEPHONE ENCOUNTER
ANTICOAGULATION  MANAGEMENT    Assessment     Today's INR result of 3.7 is Supratherapeutic (goal INR of 2.0-3.0)        Warfarin taken as previously instructed    No new diet changes affecting INR    No new medication/supplements affecting INR    Continues to tolerate warfarin with no reported s/s of bleeding or thromboembolism     Previous INR was Therapeutic     4/4 Coronary Angiogram Procedure - pre op with PCP today.    Plan:     Spoke with Pee regarding INR result and instructed:     Warfarin Dosing Instructions:  hold warfarin dose today t, take 7.5 mg Fri and Sat.      Warfarin interruption plan for upcoming Coronary Angiogram Procedure:  1. Hold warfarin doses on 3/31,4/1, 4/2 and 4/3  2. Restart warfarin doses on 4/4 if given okay by Cardiologist.    Dosing plan post procedure is (5 % change from previous dose.) :   5 mg every Sun, Wed; 7.5 mg all other days     Patient is requesting for ACN to call him on 4/4 with his new dosing.    Instructed patient to follow up no later than: one week after restarting warfarin doses.    Education provided: importance of therapeutic range and target INR goal and significance of current INR result    Pee verbalizes understanding and agrees to warfarin dosing plan.    Instructed to call the [facility] for any changes, questions or concerns. (#[phone])   ?   Norma Osorio RN    Subjective/Objective:      Pee Ayala, a 78 y.o. male is on warfarin.     Pee reports:     Home warfarin dose: verbally confirmed home dose with Pee and updated on anticoagulation calendar     Missed doses: No     Medication changes:  No     S/S of bleeding or thromboembolism:  No     New Injury or illness:  No     Changes in diet or alcohol consumption:  No     Upcoming surgery, procedure or cardioversion:  Yes: 4/4/19 Coronary Angiogram.    Anticoagulation Episode Summary     Current INR goal:   2.0-3.0   TTR:   26.7 % (2.5 mo)   Next INR check:   4/11/2019   INR from last  check:   3.70! (3/28/2019)   Weekly max warfarin dose:      Target end date:      INR check location:      Preferred lab:      Send INR reminders to:   ANTICOAGULATION POOL C (DTN,VAD,CGR,GAV)    Indications    Chronic atrial fibrillation (H) [I48.2]           Comments:            Anticoagulation Care Providers     Provider Role Specialty Phone number    Jazzy Gil MD Referring Family Medicine [phone]

## 2021-05-27 NOTE — TELEPHONE ENCOUNTER
ANTICOAGULATION  MANAGEMENT    Assessment     Today's INR result of 3.5 is Supratherapeutic (goal INR of 2.0-3.0)        Warfarin taken as previously instructed    No new diet changes affecting INR    No new medication/supplements affecting INR    Continues to tolerate warfarin with no reported s/s of bleeding or thromboembolism     Previous INR was Subtherapeutic    Plan:     Spoke with Pee regarding INR result and instructed:     Warfarin Dosing Instructions:  take one time lower dose of 2.5 mg today then continue current warfarin dose    5 mg every Mon, Thu; 7.5 mg all other days     (0 % change)  Patient wrote down instructed doses and was able to correctly read it back.    Instructed patient to follow up no later than: 7-10 days, appointment made.    Education provided: importance of therapeutic range, target INR goal and significance of current INR result and importance of taking warfarin as instructed    Pee verbalizes understanding and agrees to warfarin dosing plan.    Instructed to call the [facility] for any changes, questions or concerns. (#[phone])   ?   Norma Osorio RN    Subjective/Objective:      Pee Ayala, a 79 y.o. male is on warfarin.     Pee reports:     Home warfarin dose: verbally confirmed home dose with Pee and updated on anticoagulation calendar     Missed doses: No     Medication changes:  No     S/S of bleeding or thromboembolism:  No     New Injury or illness:  No     Changes in diet or alcohol consumption:  No     Upcoming surgery, procedure or cardioversion:  No    Anticoagulation Episode Summary     Current INR goal:   2.0-3.0   TTR:   28.0 % (3.1 mo)   Next INR check:   4/26/2019   INR from last check:   3.50! (4/17/2019)   Weekly max warfarin dose:      Target end date:      INR check location:      Preferred lab:      Send INR reminders to:   ANTICOAGULATION POOL C (DTN,VAD,CGR,GAV)    Indications    Chronic atrial fibrillation (H) [I48.2]            Comments:            Anticoagulation Care Providers     Provider Role Specialty Phone number    Jazzy Gil MD Referring Family Medicine [phone]

## 2021-05-27 NOTE — PATIENT INSTRUCTIONS - HE
You should follow with Dr Rivera.     Follow up with Vascular surgery ( as scheduled).    I have made a referral to Pharm D for a medication reconciliation.    Jazzy Gil MD  4/18/2019

## 2021-05-27 NOTE — TELEPHONE ENCOUNTER
Pee Segovia is scheduled for Coronary Angiogram on 4/4/19 he is scheduled for Pre Op visit with you on 3/28     Per [facility] RN :  Our guidelines for  warfarin before an angiogram are to have patient hold for 4 days prior, no bridging necessary if no mechanical valve or CVA with within 2 years. It appears that this patient fits into these guidelines.     Is it okay for him to hold warfarin 4 days prior to procedure.  Resume warfarin after the procedure if given okay by cardiologist  Recheck INR one week after resuming warfarin doses.    Please advise.    Thank you.

## 2021-05-28 ENCOUNTER — OFFICE VISIT - HEALTHEAST (OUTPATIENT)
Dept: PHYSICAL THERAPY | Facility: REHABILITATION | Age: 81
End: 2021-05-28

## 2021-05-28 ENCOUNTER — RECORDS - HEALTHEAST (OUTPATIENT)
Dept: ADMINISTRATIVE | Facility: CLINIC | Age: 81
End: 2021-05-28

## 2021-05-28 DIAGNOSIS — M25.559 HIP PAIN: ICD-10-CM

## 2021-05-28 DIAGNOSIS — M25.552 LEFT HIP PAIN: ICD-10-CM

## 2021-05-28 DIAGNOSIS — M79.18 PIRIFORMIS MUSCLE PAIN: ICD-10-CM

## 2021-05-28 ASSESSMENT — ANXIETY QUESTIONNAIRES
GAD7 TOTAL SCORE: 3
GAD7 TOTAL SCORE: 6

## 2021-05-28 NOTE — TELEPHONE ENCOUNTER
ANTICOAGULATION  MANAGEMENT    Assessment     Today's INR result of 1.4 is Subtherapeutic (goal INR of 2.0-3.0)        More warfarin taken than instructed which may be affecting INR    No new diet changes affecting INR    No new medication/supplements affecting INR    Continues to tolerate warfarin with no reported s/s of bleeding or thromboembolism     Previous INR was Supratherapeutic    Plan:     Spoke with Pee regarding INR result and instructed:     Warfarin Dosing Instructions:  take one time booster dose of 10 mg today then continue current warfarin dose    5 mg every Mon; 7.5 mg all other days     (0 % change)  Patient stated that he wrote the above dosing on the anticoagulation blue-sheet and was able to read it back correctly to ACN.    Instructed patient to follow up no later than: 5-7 days. Appointment made.    Education provided: importance of therapeutic range, target INR goal and significance of current INR result, importance of following up for INR monitoring at instructed interval and importance of taking warfarin as instructed    Pee verbalizes understanding and agrees to warfarin dosing plan.    Instructed to call the [facility] for any changes, questions or concerns. (#[phone])   ?   Norma Osorio RN    Subjective/Objective:      Pee Ayala, a 79 y.o. male is on warfarin.     Pee reports:     Home warfarin dose: verbally confirmed home dose with Pee and updated on anticoagulation calendar     Missed doses: No     Medication changes:  No     S/S of bleeding or thromboembolism:  No     New Injury or illness:  No     Changes in diet or alcohol consumption:  No     Upcoming surgery, procedure or cardioversion:  No    Anticoagulation Episode Summary     Current INR goal:   2.0-3.0   TTR:   26.6 % (3.8 mo)   Next INR check:   5/13/2019   INR from last check:   1.40! (5/6/2019)   Weekly max warfarin dose:      Target end date:      INR check location:      Preferred lab:      Send  INR reminders to:   ANTICOAGULATION POOL C (DTN,VAD,CGR,GAV)    Indications    Chronic atrial fibrillation (H) [I48.2]           Comments:            Anticoagulation Care Providers     Provider Role Specialty Phone number    Jazzy Gil MD Referring Family Medicine [phone]

## 2021-05-28 NOTE — TELEPHONE ENCOUNTER
ANTICOAGULATION  MANAGEMENT    Assessment     Today's INR result of 3.20 is Supratherapeutic (goal INR of 2.0-3.0)        Warfarin recently held as instructed which may be affecting INR    No new diet changes affecting INR    No new medication/supplements affecting INR    Continues to tolerate warfarin with no reported s/s of bleeding or thromboembolism     Previous INR was Supratherapeutic at 4.20 on 5/14/19.    Plan:     Spoke with Pee regarding INR result and instructed:    1.  Continue to adjust warfarin dose based on INR results, to achieve and maintain INR target goal of 2-3.    Warfarin Dosing Instructions:   (has 5mg tabs)   - Change warfarin dose to 5 mg daily on Mon/Fri; and 7.5 mg daily rest of week.   - (5 % change)    Instructed patient to follow up no later than:  5-7 days.   - scheduled on 5/22/19 [facility]    Education provided: target INR goal and significance of current INR result and importance of notifying clinic for changes in medications    Pee verbalizes understanding and agrees to warfarin dosing plan.    Instructed to call the [facility] for any changes, questions or concerns. (#[phone])   ?   Mariel Dale RN    Subjective/Objective:      Pee Ayala, a 79 y.o. male is on warfarin.     Pee reports:     Home warfarin dose: as updated on anticoagulation calendar per template     Missed doses: Yes:  Reported holding warfarin dose on 5/14/19, as instructed.     Medication changes:  Yes. On 5/9/19 started on Furosemide 20mg daily and Potassium 10meq daily.     S/S of bleeding or thromboembolism:  No     New Injury or illness:  Yes.  Reported breathing a little bit better and ankle swelling a little improved.  Has lost 2 lbs since starting Furosemide.   - Hx of chronic atrial fibrillation     Changes in diet or alcohol consumption:  No     Upcoming surgery, procedure or cardioversion:  No    Anticoagulation Episode Summary     Current INR goal:   2.0-3.0    TTR:   26.6 % (4.1 mo)   Next INR check:   5/24/2019   INR from last check:   3.20! (5/17/2019)   Weekly max warfarin dose:      Target end date:      INR check location:      Preferred lab:      Send INR reminders to:   ANTICOAGULATION POOL C (DTN,VAD,CGR,GAV)    Indications    Chronic atrial fibrillation (H) [I48.2]           Comments:            Anticoagulation Care Providers     Provider Role Specialty Phone number    Jazzy Gil MD Referring Family Medicine [phone]

## 2021-05-28 NOTE — TELEPHONE ENCOUNTER
Called patient and reviewed recent lab results and medication recommendations per Dr. Talbert. Patient verbalized understanding and will start Lasix, eat banana daily and recheck BMP next week. He will also call on 5/20 with update on his ankle edema. -jesica

## 2021-05-28 NOTE — PROGRESS NOTES
MTM Consult Encounter    Pee Ayala is a 79 y.o. male referred for a clinical pharmacist consult from Dr. Gil for medication reconciliation.  His wife had expressed concern about his understanding of his medications and if he was taking the correct medications since he sets them up himself. He uses a pillbox.     Discussion: takes pills three times a day, the only med he takes at noon is omeprazole that he uses as needed.  Identified several areas of either confusion or nonadherence, this time it was difficult to assess whether he is having some memory issues, or if this was a misunderstanding.  And occasionally he has adjusted his medications based on his own interpretation of how he is doing.  Of note we will encourage him to take his Crestor every day, based on indications rather than 3 times weekly.  Provided education on his previous labs and the results are based on him taking his statin every day.  He was given a new prescription for insulin syringes however they are 100 units syringes and is very difficult for him to see how many units he is drying up, therefore he was switched to the 30 units syringes which he previously used due to his low dose of insulin.  Discussed with his anticoagulation RN, as he may have made a mistake in 1 of his doses of warfarin, resulting in a higher INR than he was last week.  He will be establishing with a new endocrinology team, nurse practitioner with [facility], following up in August.  He had previously worked with an endocrinologist who put him on 70/30 insulin and glimepiride, if he does not stay on this regimen he will have elevated fasting sugars the next day.  He previously had followed with urology however it has been several years since he has seen them.  He does have urinary incontinence every other day, and bowel incontinence every other month.  He did not discuss this with his primary doctor.  He will follow-up in 1 week for INR, and with myself to  "reassess questions of adherence.    Follow up:   1 week     Rodolfo Curry, PharmD, BCACP  Medication Management (MTM) Pharmacist  [facility]      Current Outpatient Medications   Medication Sig Dispense Refill     acetaminophen (TYLENOL) 500 MG tablet Take 1,000 mg by mouth every 6 (six) hours as needed.              alfuzosin (UROXATRAL) 10 mg 24 hr tablet Take 10 mg by mouth daily.              amLODIPine (NORVASC) 5 MG tablet Take 1 tablet (5 mg total) by mouth at bedtime. 90 tablet 5     antiox.mv no.10/omeg3s/lut/starr (I-CAPS ORAL) Take 1 tablet by mouth 2 (two) times a day.       aspirin 81 mg chewable tablet Chew 81 mg daily.              blood glucose meter (GLUCOMETER) Use 1 each As Directed as needed. Dispense glucometer brand per patient's insurance at pharmacy discretion. 1 each 0     carvedilol (COREG) 25 MG tablet Take 1 tablet (25 mg total) by mouth 2 (two) times a day with meals. 180 tablet 5     finasteride (PROSCAR) 5 mg tablet TAKE ONE TABLET BY MOUTH ONCE DAILY IN  ADDITION  TO  FLOMAX       fluorouracil (EFUDEX) 5 % cream Apply to cheeks, nose, forehead, ears and scalp twice daily for 2 weeks. Inflammation is expected.       gabapentin (NEURONTIN) 300 MG capsule Take 600 mg by mouth 2 (two) times a day.              glimepiride (AMARYL) 4 MG tablet TAKE ONE TABLET BY MOUTH TWICE DAILY       insulin NPH-insulin regular (NOVOLIN 70/30 U-100 INSULIN) 100 unit/mL (70-30) injection 18 in the morning and 12 at night              insulin syringe-needle U-100 0.3 mL 31 gauge x 15/64\" Syrg Use 1 each As Directed 2 (two) times a day. 100 Syringe 11     metFORMIN (GLUCOPHAGE) 500 MG tablet Take 2 tablets (1,000 mg total) by mouth 2 (two) times a day. 360 tablet 3     omeprazole (PRILOSEC) 40 MG capsule Take 40 mg by mouth daily as needed.              traMADol (ULTRAM) 50 mg tablet Take 1 tablet (50 mg total) by mouth at bedtime as needed. 30 tablet 0     warfarin " (COUMADIN/JANTOVEN) 5 MG tablet TAKE 10MG (5MG X 2) BY MOUTH ON WEDNESDAY AND 7.5MG (5MG X 1.5) BY MOUTH ALL OTHER DAYS DOSE MAY CHANGE BASED ON INR RESULTS       rosuvastatin (CRESTOR) 20 MG tablet Take 1 tablet (20 mg total) by mouth at bedtime. 90 tablet 3     No current facility-administered medications for this visit.

## 2021-05-28 NOTE — PROGRESS NOTES
Dr. Gil referring. PAD: This was noted with the ABIs done at home with the nurse.  No reports of JESSENIA. Patient has claudication located in bilateral calves. Pain starts at 1/8mile. States it is a numbing, tired feeling. ASA,insulin, metformin, statin, coumadin.

## 2021-05-28 NOTE — TELEPHONE ENCOUNTER
ANTICOAGULATION  MANAGEMENT    Assessment     Today's INR result of 4.2 is Supratherapeutic (goal INR of 2.0-3.0)        More warfarin taken than instructed which may be affecting INR    No new diet changes affecting INR     Patient's BNP is elevated and was started on Furosemide by Cardiology    No interaction expected between Furosemide and warfarin    Continues to tolerate warfarin with no reported s/s of thromboembolism     Nose bleeding- no recurrence    Previous INR was Subtherapeutic    Plan:     Spoke with Pee regarding INR result and instructed:     Warfarin Dosing Instructions:  Hold warfarin dose today th, 7.5 mg dose tomorrow and Thursday  Patient was only given doses until Thursday and made aware that INR leticia be checked frequently until he is stable again.    Instructed patient to follow up no later than: 5/17 - appointment made.    Education provided: importance of therapeutic range, target INR goal and significance of current INR result and importance of taking warfarin as instructed    Pee verbalizes understanding and agrees to warfarin dosing plan.    Instructed to call the [facility] for any changes, questions or concerns. (#[phone])   ?   Norma Osorio RN    Subjective/Objective:      Pee Ayala, a 79 y.o. male is on warfarin.     Pee reports:     Home warfarin dose: as updated on anticoagulation calendar per template     Missed doses: No     Medication changes:  Yes: furosemide     S/S of bleeding or thromboembolism:  Yes: nose bleeding     New Injury or illness:  No     Changes in diet or alcohol consumption:  No     Upcoming surgery, procedure or cardioversion:  No    Anticoagulation Episode Summary     Current INR goal:   2.0-3.0   TTR:   27.2 % (4 mo)   Next INR check:   5/17/2019   INR from last check:   4.20! (5/14/2019)   Weekly max warfarin dose:      Target end date:      INR check location:      Preferred lab:      Send INR reminders to:   ANTICOAGULATION POOL C  (DTN,VAD,CGR,GAV)    Indications    Chronic atrial fibrillation (H) [I48.2]           Comments:            Anticoagulation Care Providers     Provider Role Specialty Phone number    Jazzy Gil MD Referring Family Medicine [phone]

## 2021-05-28 NOTE — PROGRESS NOTES
Attempt 2: Care Guide called patient.  If this patient is returning my call, please transfer to Amina at ext [phone].  LMTCB: CCC Referral    Plan: Offer enrollment, Follow up with MTM-       New Referral Entered: 5/15/19    Provider: Dr. 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 Question if he is actually taking doses as instructed. Or if there is  issue with memory?   He was working with [facility] but did not show up for his follow up appointment.     Insurance: [employer]/Medicare    Resources Needed:  RICHARD HOFFMAN, RN    Outreach Attempts: 5/15/19, 5/20    Next Outreach: 5/28

## 2021-05-28 NOTE — TELEPHONE ENCOUNTER
----- Message from Tres Talbert MD sent at 5/9/2019  4:53 PM CDT -----  Mar's,    I am off tomorrow but sent off a B natruretic peptide on this patient.  If it is all elevated, could you call him and send in a prescription for Lasix 20 mg each day and potassium chloride 10 mEq each day.  If you would rather not take the potassium he could eat a banana each day.  He should then call us back in 5 days to see if his water weight is down, see if his peripheral edema is down, and see if his shortness of breath is improved at all.  He should get a repeat basic metabolic panel in 7 to 10 days.  He did not get a basic metabolic panel today as he had a recent one which was normal.    If his BNP is normal, please order a chest x-ray and pulmonary function test.    Thanks Mar

## 2021-05-28 NOTE — TELEPHONE ENCOUNTER
ANTICOAGULATION  MANAGEMENT    Assessment     Today's INR result of 4.4 is Supratherapeutic (goal INR of 2.0-3.0)        More warfarin taken than instructed which may be affecting INR    No new diet changes affecting INR    Potential interaction between rosuvastatin and warfarin which may affect subsequent INRs    Continues to tolerate warfarin with no reported s/s of bleeding or thromboembolism     Previous INR was Supratherapeutic patient took 7.5 mg dose on 4/17 versus 2.5 mg dose as instructed.    Plan:     Spoke with Pee regarding INR result and instructed:     Warfarin Dosing Instructions:  Hold warfarin dose today, take 5 mg tomorrow then continue current warfarin dose    5 mg every Mon, Thu; 7.5 mg all other days      (0 % change)  Rodolfo [facility] Pharmacist wrote above dosing and gave it to patient today.    Instructed patient to follow up no later than: 5-7 days - appointment on 5/2 prior to meeting with [facility] pharmacist.    Education provided: importance of therapeutic range, target INR goal and significance of current INR result, importance of taking warfarin as instructed, potential interaction between warfarin and rosuvastatin and monitoring for bleeding signs and symptoms    Pee verbalizes understanding and agrees to warfarin dosing plan.    Instructed to call the [facility] for any changes, questions or concerns. (#[phone])   ?   Norma Osorio RN    Subjective/Objective:      Pee Ayala, a 79 y.o. male is on warfarin.     Pee reports:     Home warfarin dose: as updated on anticoagulation calendar per template     Missed doses: No     Medication changes:  No     S/S of bleeding or thromboembolism:  No     New Injury or illness:  No     Changes in diet or alcohol consumption:  No     Upcoming surgery, procedure or cardioversion:  No    Anticoagulation Episode Summary     Current INR goal:   2.0-3.0   TTR:   25.8 % (3.4 mo)   Next INR check:   5/2/2019   INR from last check:   4.40!  (4/25/2019)   Weekly max warfarin dose:      Target end date:      INR check location:      Preferred lab:      Send INR reminders to:   ANTICOAGULATION POOL C (DTN,VAD,CGR,GAV)    Indications    Chronic atrial fibrillation (H) [I48.2]           Comments:            Anticoagulation Care Providers     Provider Role Specialty Phone number    Jazzy Gil MD Referring Family Medicine [phone]

## 2021-05-28 NOTE — TELEPHONE ENCOUNTER
ANTICOAGULATION  MANAGEMENT PROGRAM    Pee Ayala is overdue for INR check.  Reminder call made.    Spoke with Pee and scheduled INR appointment on today at 3:15 .    Norma Osorio RN

## 2021-05-28 NOTE — PATIENT INSTRUCTIONS - HE
Start taking rosuvastatin 20mg daily   New syringes at the pharmacy   We may consider a new blood sugar medication - we will discuss in one week   Check sugars twice daily until you see Ibis again

## 2021-05-28 NOTE — PROGRESS NOTES
Attempt 1: Care Guide called patient.  If this patient is returning my call, please transfer to Amina at ext [phone].  LMTCB: CCC Referral    Plan: Offer enrollment, Follow up with MTM-       New Referral Entered: 5/15/19    Provider: Dr. 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 Question if he is actually taking doses as instructed. Or if there is  issue with memory?   He was working with [facility] but did not show up for his follow up appointment.     Insurance: [employer]/Medicare    Resources Needed:  RICHARD HOFFMAN, RN    Outreach Attempts: 5/15/19    Next Outreach:

## 2021-05-28 NOTE — PROGRESS NOTES
VASCULAR SURGERY OUTPATIENT CONSULT OR VISIT   VASCULAR SURGEON: Wendy Delgadillo MD    LOCATION:  [facility]    Pee Ayala   Medical Record #:  [id number]  YOB: 1940  Age:  79 y.o.     Date of Service: 5/1/2019    PRIMARY CARE PROVIDER: Jazzy Gil MD      Reason for consultation: Leg pain with walking    IMPRESSION: Patient with diabetic distribution peripheral vascular disease and secondary noncompressible tibial vessels.  Duplex is largely normal although there appears to be some left-sided infrapopliteal disease.  Toe pressures are in the normal and safe range.  Symptoms are very typical for claudication however.  In this context he is someone who probably has little benefit from angiointervention some degree of risk from it.  Conversely he may have important benefit from a supervised exercise regimen.    Patient is a former smoker and while his family history is negative for AAA he probably carries a 2 to 5% risk of having one.  I cannot find any records of him having had there is CT or dedicated AAA ultrasound.  In this context we will send him for ultrasound to rule out AAA.    RECOMMENDATION: Patient with symptoms consistent with claudication although noninvasive studies do not show that significant disease other than diabetes related to tibial vessel disease.  Has agreed to enroll in a PAD supervised exercise regimen which I think will provide him significant walking distance benefit.  It will also improve his overall cardiovascular health.  I would like him to undergo a Escobar Orozco test prior to starting the program so that we can compare it to the results after the program and see if he has gained benefit.  When he does come back, in 3 months time we will therefore repeat Escobar Orozco testing.  At that time I would also like him to have an ultrasound to rule out AAA given his prior smoking history.    HPI:  Pee Ayala is a 79 y.o. male who was seen  today in consultation for leg pains with walking over the last 8 months.  Prior to this the patient says that he was able to walk for more than a mile without having to stop.  The symptoms of calf pains occurred during walking and resolved within a minute of rest.  These are particularly pronounced when they went on a walking tour in [place].  He says he is able to power through the discomfort.  No rest pain at all.  No foot wounds.  The patient is diabetic with some significant neuropathy bilaterally and he knows to take care of his feet.    Patient's history is also fairly significant for heart disease and heart failure.  Had a recent coronary angiogram last year which showed no need for intervention.  Overall well compensated.    No personal history of strokes.  No personal history of AAA.    Patient smoked into the 1990s but then quit.    Patient is on a statin and aspirin.    Family history significant for diabetes and for amputations in his grandmother.  No family history of AAA.  No family history of strokes.    No other new health issues.    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     Status post coronary angiogram 3/9/2016     Tremor 9/28/2014        Past Surgical History:   Procedure Laterality Date     CORONARY ARTERY BYPASS GRAFT       CV CORONARY ANGIOGRAM N/A 4/4/2019    Procedure: Coronary Angiogram;  Surgeon: Dominik Vega MD;  Location: [facility] Cath Lab;  Service:  "Cardiology     CV LEFT HEART CATHETERIZATION WO LEFT VETRICULOGRAM Left 4/4/2019    Procedure: Left Heart Catheterization Without Left Ventriculogram;  Surgeon: Dominik Vega MD;  Location: [facility] Cath Lab;  Service: Cardiology     CV RIGHT HEART CATH Right 4/4/2019    Procedure: Right Heart Catheterization;  Surgeon: Dominik Vega MD;  Location: [facility] Cath Lab;  Service: Cardiology     MOHS SURGERY         ALLERGIES:  Oxycodone and Lisinopril    MEDS:    Current Outpatient Medications:      acetaminophen (TYLENOL) 500 MG tablet, Take 1,000 mg by mouth every 6 (six) hours as needed.    , Disp: , Rfl:      alfuzosin (UROXATRAL) 10 mg 24 hr tablet, Take 10 mg by mouth daily.    , Disp: , Rfl:      amLODIPine (NORVASC) 5 MG tablet, Take 1 tablet (5 mg total) by mouth at bedtime., Disp: 90 tablet, Rfl: 5     antiox.mv no.10/omeg3s/lut/starr (I-CAPS ORAL), Take 1 tablet by mouth 2 (two) times a day., Disp: , Rfl:      aspirin 81 mg chewable tablet, Chew 81 mg daily.    , Disp: , Rfl:      blood glucose meter (GLUCOMETER), Use 1 each As Directed as needed. Dispense glucometer brand per patient's insurance at pharmacy discretion., Disp: 1 each, Rfl: 0     carvedilol (COREG) 25 MG tablet, Take 1 tablet (25 mg total) by mouth 2 (two) times a day with meals., Disp: 180 tablet, Rfl: 5     finasteride (PROSCAR) 5 mg tablet, TAKE ONE TABLET BY MOUTH ONCE DAILY IN  ADDITION  TO  FLOMAX, Disp: , Rfl:      gabapentin (NEURONTIN) 300 MG capsule, Take 600 mg by mouth 2 (two) times a day.    , Disp: , Rfl:      glimepiride (AMARYL) 4 MG tablet, TAKE ONE TABLET BY MOUTH TWICE DAILY, Disp: , Rfl:      insulin NPH-insulin regular (NOVOLIN 70/30 U-100 INSULIN) 100 unit/mL (70-30) injection, 18 in the morning and 12 at night    , Disp: , Rfl:      insulin syringe-needle U-100 0.3 mL 31 gauge x 15/64\" Syrg, Use 1 each As Directed 2 (two) times a day., Disp: 100 Syringe, Rfl: 11     metFORMIN (GLUCOPHAGE) 500 MG " "tablet, Take 2 tablets (1,000 mg total) by mouth 2 (two) times a day., Disp: 360 tablet, Rfl: 3     omeprazole (PRILOSEC) 40 MG capsule, Take 40 mg by mouth daily as needed.    , Disp: , Rfl:      rosuvastatin (CRESTOR) 20 MG tablet, Take 1 tablet (20 mg total) by mouth at bedtime., Disp: 90 tablet, Rfl: 3     traMADol (ULTRAM) 50 mg tablet, Take 1 tablet (50 mg total) by mouth at bedtime as needed., Disp: 30 tablet, Rfl: 0     warfarin (COUMADIN/JANTOVEN) 5 MG tablet, TAKE 10MG (5MG X 2) BY MOUTH ON WEDNESDAY AND 7.5MG (5MG X 1.5) BY MOUTH ALL OTHER DAYS DOSE MAY CHANGE BASED ON INR RESULTS, Disp: , Rfl:      fluorouracil (EFUDEX) 5 % cream, Apply to cheeks, nose, forehead, ears and scalp twice daily for 2 weeks. Inflammation is expected., Disp: , Rfl:     SOCIAL HABITS:    Social History     Tobacco Use   Smoking Status Former Smoker     Last attempt to quit: 1998     Years since quittin.3   Smokeless Tobacco Never Used       Social History     Substance and Sexual Activity   Alcohol Use Yes    Comment: very rare       Social History     Substance and Sexual Activity   Drug Use No       FAMILY HISTORY:    Family History   Problem Relation Age of Onset     Diabetes type II Mother         58 ,  from anesthesia complication.     Heart disease Father         86  from stroke     Stroke Father      No Medical Problems Brother         60 years of age.       REVIEW OF SYSTEMS:    A 12 point ROS was reviewed and except for what is listed in the HPI above, all others are negative    PE:  /85   Pulse (!) 56   Temp 98  F (36.7  C)   Resp 18   Ht 5' 5\" (1.651 m)   Wt 214 lb 9.6 oz (97.3 kg)   BMI 35.71 kg/m    Wt Readings from Last 1 Encounters:   19 214 lb 9.6 oz (97.3 kg)     Body mass index is 35.71 kg/m .    EXAM:  GENERAL: This is a well-developed 79 y.o. male who appears his stated age  EYES: Grossly normal.  MOUTH: Buccal mucosa normal   CARDIAC:  Not assessed  CHEST/LUNG:  Not " Assessed  GASTROINTESINAL (ABDOMEN):Soft, non-tender, B/S present, no pulsatile mass   MUSCULOSKELETAL: Grossly normal and both lower extremities are intact.  HEME/LYMPH: No lymphedema  NEUROLOGIC: Focally intact, Alert and oriented x 3.   PSYCH: appropriate affect  INTEGUMENT: No open lesions or ulcers.  Feet without wounds.  Pulse Exam:   Carotid: No Bruit    Femoral: Left +2   Right  +2    Popliteal: Left +1   Right  +2    DP: Left 0   Right  0     PT:   Left 0   Right  0          DIAGNOSTIC STUDIES:     Images:  No results found.    I personally reviewed the images and my interpretation is that his noninvasive study suggest essentially normal toe pressures with adequate blood supply for healing.  His ABIs are noncompressible consistent with significant diabetes.  Duplex suggests no limitation to flow all the way down to the knees bilaterally.  On the right they are normal to the level of the foot although on the left there appears to be some tibial vessel disease.  Nothing to explain claudication..    LABS:      Sodium   Date Value Ref Range Status   04/04/2019 139 136 - 145 mmol/L Final   03/28/2019 138 136 - 145 mmol/L Final   10/06/2014 136 136 - 145 mmol/L Final     Potassium   Date Value Ref Range Status   04/04/2019 4.2 3.5 - 5.0 mmol/L Final   03/28/2019 4.9 3.5 - 5.0 mmol/L Final   10/06/2014 4.4 3.5 - 5.0 mmol/L Final     Chloride   Date Value Ref Range Status   04/04/2019 105 98 - 107 mmol/L Final   03/28/2019 104 98 - 107 mmol/L Final   10/06/2014 105 98 - 107 mmol/L Final     BUN   Date Value Ref Range Status   04/04/2019 15 8 - 28 mg/dL Final   03/28/2019 18 8 - 28 mg/dL Final   10/06/2014 13 8 - 28 mg/dL Final     Creatinine   Date Value Ref Range Status   04/04/2019 0.83 0.70 - 1.30 mg/dL Final   03/28/2019 0.92 0.70 - 1.30 mg/dL Final   10/06/2014 0.89 0.70 - 1.30 mg/dL Final     Hemoglobin   Date Value Ref Range Status   04/04/2019 12.6 (L) 14.0 - 18.0 g/dL Final   10/06/2014 11.3 (L) 14.0 -  18.0 g/dL Final   10/02/2014 10.2 (L) 14.0 - 18.0 g/dL Final     Platelets   Date Value Ref Range Status   04/04/2019 159 140 - 440 thou/uL Final   10/06/2014 249 140 - 440 thou/uL Final   10/02/2014 275 140 - 440 thou/uL Final     INR   Date Value Ref Range Status   04/25/2019 4.40 (H) 0.90 - 1.10 Final   04/17/2019 3.50 (H) 0.90 - 1.10 Final   04/09/2019 1.40 (H) 0.90 - 1.10 Final         Wendy Delgadillo MD  VASCULAR SURGERY

## 2021-05-28 NOTE — TELEPHONE ENCOUNTER
ANTICOAGULATION  MANAGEMENT PROGRAM    Pee Ayala is overdue for INR check.  Reminder call made.    Spoke with Pee and scheduled INR appointment on 5/6/19 .    Norma Osorio RN

## 2021-05-28 NOTE — TELEPHONE ENCOUNTER
Ambulatory referral to Care Management entered to assess with medication compliance INR management.    Norma Osorio RN

## 2021-05-29 NOTE — TELEPHONE ENCOUNTER
Left message to call back for: Message from Dr. Gil  Information to relay to patient:  Left detailed message for Diann that Dr. Gil will be calling her Thursday morning to discuss patient.

## 2021-05-29 NOTE — TELEPHONE ENCOUNTER
Reason contacted:  Gabapentin Refill  Information relayed: Informed patient that Dr. Gil has sent Gabapentin to [facility] pharmacy. Pt voiced understanding.  Additional questions:  No  Further follow-up needed:  No

## 2021-05-29 NOTE — TELEPHONE ENCOUNTER
Pharmacy called with questions regarding Dx on the tramadol Rx.    Review of the problem list states that the patient takes the medication for back pain.    Problem Detail     Noted:  5/29/2019   Overview Signed 5/29/2019  3:21 PM by Jazzy Gil MD   I am refilling tramadol to be used as 1 pill a day for your back pain.  We will do a urine drug screen today and signed controlled substance agreement.  This is a narcotic medication.  I do not recommend that you use more than this dose as it is under beers criteria for medication for patients over 65 years of age.      Jazzy Gil MD  1/3/2019        This information given to the pharmacy.    Yolis Jiang RN  [facility] Medication Refill and Triage Nurse  6/1/2019  10:32 AM        Reason for Disposition    Pharmacy calling with prescription question and triager answers question    Protocols used: MEDICATION QUESTION CALL-A-

## 2021-05-29 NOTE — TELEPHONE ENCOUNTER
ANTICOAGULATION  MANAGEMENT    Assessment     Today's INR result of 2.6 is Therapeutic (goal INR of 2.0-3.0)        Warfarin recently held as instructed which may be affecting INR    No new diet changes affecting INR    No new medication/supplements affecting INR    Continues to tolerate warfarin with no reported s/s of bleeding or thromboembolism     Previous INR was Supratherapeutic    Plan:     Spoke with Pee regarding INR result and instructed:     Warfarin Dosing Instructions:  Change warfarin dose to 7.5 mg daily on Mon/Wed/Sat; and 5 mg daily rest of week  (15% reduction to the dose patient took prior to elevated INR)    Instructed patient to follow up no later than: 5/30     Education provided: importance of therapeutic range and target INR goal and significance of current INR result    Pee verbalizes understanding and agrees to warfarin dosing plan.    Instructed to call the [facility] for any changes, questions or concerns. (#[phone])   ?   Cecy Dominguez RN    Subjective/Objective:      Pee Ayala, a 79 y.o. male is on warfarin.     Pee reports:     Home warfarin dose: as updated on anticoagulation calendar per template     Missed doses: Yes: 2 held doses     Medication changes:  No     S/S of bleeding or thromboembolism:  No     New Injury or illness:  No     Changes in diet or alcohol consumption:  No     Upcoming surgery, procedure or cardioversion:  No      Anticoagulation Episode Summary     Current INR goal:   2.0-3.0   TTR:   25.4 % (4.4 mo)   Next INR check:   5/30/2019   INR from last check:   2.60 (5/24/2019)   Weekly max warfarin dose:      Target end date:      INR check location:      Preferred lab:      Send INR reminders to:   [facility]    Indications    Chronic atrial fibrillation (H) [I48.2]           Comments:            Anticoagulation Care Providers     Provider Role Specialty Phone number    Jazzy Gil MD Referring Family Medicine  [phone]

## 2021-05-29 NOTE — TELEPHONE ENCOUNTER
Referral to care management team has already been done.  Patient does not qualify for home health services.  Jazzy Gil MD  6/10/2019

## 2021-05-29 NOTE — PATIENT INSTRUCTIONS - HE
Pee Ayala,    It was a pleasure to see you today at the [facility].     My recommendations after this visit include:  - Please follow up with Dr Talbert in August   - Please follow up with Lilli Guzman in 6 weeks  - No changes to your medications    Lilli Guzman CNP    What is the [facility]?     The [facility] is a heart failure specialty clinic within [facility].  You will work with your cardiologist, nurse practitioner, and nurses to carefully adjust medications and learn how to live with heart failure.  The Heart Failure Program will help you:      Better understand your chronic heart condition    Feel better and avoid hospital stays    Monitoring for Symptoms      Call the Heart Failure Phone Line ([phone]) if you have any of these symptoms:     Increased shortness of breath/shortness of breath at rest    Waking up at night with difficulty breathing    Unable to lie down for sleep due to symptoms or needing to sit upright for sleep    Weight gain of 2 pounds a day for 2 days in a row OR 5 pounds in 1 week    Increased swelling in your ankles or legs    Dizziness or lightheadedness    Medications       Take your medications as prescribed    Bring all your medications in their original bottles to every appointment    Avoid non-steroidal anti-inflammatory medications (Advil, Aleve, Ibuprofen, Naprosyn, Naproxen, Celebrex)    Do not stop taking your medications or begin taking over-the-counter or herbal medications without first talking to your doctor or nurse practitioner    Diet and Lifestyle       Limit sodium/salt to 2000 mg daily   o Read food labels for sodium content  o Do not add salt when cooking or add salt at the table    Weigh yourself every day and record in your daily weight log   o Call if you gain 2 pounds a day for 2 days in a row OR 5 pounds in 1 week  o Bring daily weight log to every  appointment    Stay active, pace yourself, listen to your body, and rest when tired    Elevate your legs if they are swollen. Ask about using compression/support stockings    Stop smoking    Lose weight if you are overweight    Avoid drinking alcohol or limit amount    Stay updated on your immunizations including flu and pneumonia vaccines

## 2021-05-29 NOTE — TELEPHONE ENCOUNTER
"ANTICOAGULATION  MANAGEMENT    Assessment     Today's INR result of 3.6 is Supratherapeutic (goal INR of 2.0-3.0)        Warfarin likely taken as previously instructed but unable to verify 100%. See note below    No new diet changes affecting INR    No new medication/supplements affecting INR    Continues to tolerate warfarin with no reported s/s of bleeding or thromboembolism     Previous INR was Therapeutic following a 2 day hold for supratherapeutic INR    Plan:     Spoke with wife Agueda regarding INR result and instructed:     Warfarin Dosing Instructions:  Take a one time lower dose of warfarin today Thur 5/30 of 2.5mg then change warfarin dose to 5 mg daily. Agueda wrote down instructions and read back correctly. States she will try to help patient remember to take his dose at the same time every day    Instructed patient to follow up no later than: 1 week    Education provided: importance of therapeutic range, target INR goal and significance of current INR result and importance of taking warfarin as instructed    Agueda verbalizes understanding and agrees to warfarin dosing plan.    Instructed to call the [facility] for any changes, questions or concerns. (#[phone])   ?   Kendal Muniz RN    Subjective/Objective:      Pee Ayala, a 79 y.o. male is on warfarin.     Pee reports:     Home warfarin dose: verbally confirmed home dose with Agueda and updated on anticoagulation calendar. Patient did not write down dose for Fri 5/24 and Sat 5/25 on template. Agueda is unable to 100% verify what he took on those days but states \"I know he wrote it down and he said today that he did exactly what you told him to do\". She then adds that patient takes his warfarin at odd times of the day, \"it's just whenever he thinks of it and then he'll take it\". She does not think he missed any doses or double dosed.      Missed doses: No     Medication changes:  No     S/S of bleeding or thromboembolism:  No     New " Injury or illness:  No     Changes in diet or alcohol consumption:  No     Upcoming surgery, procedure or cardioversion:  No    Anticoagulation Episode Summary     Current INR goal:   2.0-3.0   TTR:   26.0 % (4.6 mo)   Next INR check:   6/6/2019   INR from last check:   3.60! (5/30/2019)   Weekly max warfarin dose:      Target end date:      INR check location:      Preferred lab:      Send INR reminders to:   [facility]    Indications    Chronic atrial fibrillation (H) [I48.2]           Comments:            Anticoagulation Care Providers     Provider Role Specialty Phone number    Jazzy Gil MD Referring Family Medicine [phone]

## 2021-05-29 NOTE — TELEPHONE ENCOUNTER
ANTICOAGULATION  MANAGEMENT    Assessment     Today's INR result of 5.21 is Supratherapeutic (goal INR of 2.0-3.0)        More warfarin taken than instructed which may be affecting INR. Patient believes he took 7.5mg on Mon instead of 5mg but lost his sheet with the instructions.    No new diet changes affecting INR    No new medication/supplements affecting INR    Continues to tolerate warfarin with no reported s/s of bleeding or thromboembolism .     Previous INR was Supratherapeutic    Plan:     Spoke with Pee regarding INR result and instructed:     Warfarin Dosing Instructions: hold warfarin Wed and Thurs  Instructed patient to follow up no later than: 5/24    Education provided: importance of therapeutic range and target INR goal and significance of current INR result    Pee verbalizes understanding and agrees to warfarin dosing plan.    Instructed to call the [facility] for any changes, questions or concerns. (#[phone])   ?   Cecy Dominguez RN    Subjective/Objective:      Peeleyla Ayala, a 79 y.o. male is on warfarin.     Pee reports:     Home warfarin dose: verbally confirmed home dose with Pee and updated on anticoagulation calendar     Missed doses: No     Medication changes:  No     S/S of bleeding or thromboembolism:  No     New Injury or illness:  No     Changes in diet or alcohol consumption:  No     Upcoming surgery, procedure or cardioversion:  No    Anticoagulation Episode Summary     Current INR goal:   2.0-3.0   TTR:   25.5 % (4.3 mo)   Next INR check:   5/24/2019   INR from last check:   5.21! (5/22/2019)   Weekly max warfarin dose:      Target end date:      INR check location:      Preferred lab:      Send INR reminders to:   [facility]    Indications    Chronic atrial fibrillation (H) [I48.2]           Comments:            Anticoagulation Care Providers     Provider Role Specialty Phone number    Jazzy Gil MD Referring Family Medicine [phone]
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
## 2021-05-29 NOTE — TELEPHONE ENCOUNTER
Patient Returning Call  Reason for call:  Patient returning call  Information relayed to patient:  Message will be sent & he will get a call back  Patient has additional questions:  No  If YES, what are your questions/concerns:  n/a  Okay to leave a detailed message?: Yes

## 2021-05-29 NOTE — TELEPHONE ENCOUNTER
I talked to me again.  The plan at this time is to refer patient to care management team.    Jazzy Gil MD  6/10/2019

## 2021-05-29 NOTE — PROGRESS NOTES
The Clinic Care Guide spoke with this patient at the request of the PCP to discuss possible clinic care coordination enrollment. Clinic care coordination was described to the patient and immediate needs were discussed. The patient agreed to enrollment in clinic care coordination and future appointments were scheduled for an RN care coordination assessment.     PCAM: 5/30 @ 2pm with Marilou Patient is in the clinic seeing Dr. BOYD at 12:50pm      New Referral Entered: 5/15/19    Provider: Dr. 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 Question if he is actually taking doses as instructed. Or if there is  issue with memory?   He was working with YASMIN but did not show up for his follow up appointment.     Insurance: [employer]/Medicare    Resources Needed:  RICHARD HOFFMAN, RN    Outreach Attempts: 5/15/19

## 2021-05-29 NOTE — TELEPHONE ENCOUNTER
Refill Request  Did you contact pharmacy: Yes.  Date: 6.5.2019  Medication name:   Requested Prescriptions     Pending Prescriptions Disp Refills     gabapentin (NEURONTIN) 300 MG capsule 240 capsule 3     Sig: Take 2 capsules (600 mg total) by mouth 2 (two) times a day.     Who prescribed the medication: Jazzy Gil MD   Pharmacy Name and Location: [facility]  Is patient out of medication: No.  unknown days left  Patient notified refills processed in 72 hours:  no  Okay to leave a detailed message: no    Please note - pharmacy is requesting a 90 day prescription.

## 2021-05-29 NOTE — TELEPHONE ENCOUNTER
"Return call made to patient to discuss his concerns.    First, patient reports that he was instructed to contact Dr. Talbert's office regarding his lasix use with concurrent use of warfarin and potential interaction. Patient did have supra-therapeutic INR today of 5.21 (being addressed by [facility]).    Second, patient had BMP done today after starting Lasix approx 10 days ago. He reports he is down 4 lbs since starting and his face looks thinner-- but states no change in his symptoms of shortness of breath with activity.     Lastly, patient states that he had an episode of dizziness last night that was \"severe\" and precipitated nausea. Patient admits to room spinning dizziness. He states that he has had \"vertigo\" before and this was similar. Currently, patient symptoms have resolved. He was instructed to follow-up with PCP regarding this concern. Also instructed patient to seek more emergent evaluation if symptoms return, especially is associated with weakness, numbness, severe headache or sudden loss in vision. Patient states understanding.    Dr. Talbert, patient's BMP results should be available soon. Do you have any other recommendations to add re: Lasix or dizziness? Please advise. -ejb  "

## 2021-05-29 NOTE — TELEPHONE ENCOUNTER
Medication Request  Medication name:     gabapentin (NEURONTIN) 300 MG capsule   11/8/2018     Sig - Route: Take 600 mg by mouth 2 (two) times a day.        - Oral    Class: Historical Med        Pharmacy Name and Location: [facility] [place] - [place], [place]   Reason for request: The patient states that he is out of the medication.   When did you use medication last?:  Unknown per patient he states sometime last week.   Patient offered appointment:  patient declined  Okay to leave a detailed message: yes

## 2021-05-29 NOTE — PROGRESS NOTES
Patient seen in clinic for HF education s/p recent office visit with Dr. Talbert   Reviewed HF Binder that includes the  HF Sx Awareness & Action plan  handout and  A Stronger Pump  booklet and Weight log booklet highlighting :  __X_patient s type of heart failure _X__Na management in diet  __X_importance of daily wts  _X__Fluid Guidelines, if applicable  __X_medication review and importance of compliance     Instructed patient in signs and sx of heart failure, reiterated when to call clinic - reviewed HF hotline # [phone] and after hours call # [phone].  Majority of time was spent reviewing: Low sodium diet. Pt admits that his taste buds are quite dulled and that sodium is what he can taste these days. He also reports that him and his wife enjoy eating out and eat a lot for convenience. He wonders about sodium content in canned soups and ramen noodles. We went over how to read food labels and to make concessions where he can. He will no longer add salt to his meals.  Patient verbalized understanding of HF discussion.  Plan for f/u with continued HF education reviewed.  Pt will see EDGAR in 6 weeks and NED in August.

## 2021-05-29 NOTE — TELEPHONE ENCOUNTER
Type of referral:  Home care  What provider or facility are you being referred to?  [facility]  Do you have an appointment scheduled?  Yes  What is your question?  The [facility] nurse states patient is not eligible for home care.  Patient is reporting he has no issues  ,drives when he needs to so this makes him not home bound.  Please call Diann to discuss.    Okay to leave a detailed message: Yes [phone]

## 2021-05-29 NOTE — PROGRESS NOTES
My Clinic Care Coordination Care Plan    [facility]  [address] [place]  [address]  [phone]    My Preferred Method of Contact:  Phone: [phone]    My Primary/Preferred Language:  English    Emergency Contact: Extended Emergency Contact Information  Primary Emergency Contact: Agueda Ayala Phone: [phone]  Relation: Spouse  Secondary Emergency Contact: decllined, declined  Relation: Declined     PCP:  Jazzy Gil MD  Care Team            Jazzy Gil MD   [phone] PCP - General, Family Medicine    Yolis Collins [facility] Care Coordination Care Guide    [phone], [phone]    Marilou Salas RN Registered Nurse          Hospitalizations and/or ED Visits  Most Recent: 4/4/19= Angiogram     Previous:  1/23/19= ED- Fall/Arm Pain    Concerns regarding my health: Taking my medications at the same time daily- Hopes to improve my INR      Advanced Directive/Living Will: The patient was given information regarding Adanced Directives/Living Will      Pee elected to enroll in the [facility] Care Coordination.  Pee was given a copy of the Clinic Care Coordination brochure and full description of how to access their care team both during clinic hours and after hours.   My Care Guide's Name and Phone Number:  Amina Collins [phone] or fax [phone]  The Care Guide and myself agreed to be in contact every 2 weeks.      Goals        Patient Stated      I would like home care service to assist with med mgmt.  (pt-stated)      Action steps to achieve this goal  1.I will speak with the [facility] CG at outreach telephone calls.   2. I will answer my phone calls or call them back.  3.  I will provide any necessary paperwork/documentation in a timely manner if needed to assist with this process    Date goal set:  5/30/19              Medication Update  The patient's medication list is up to date per Dr. Gil    Health Maintenance and  Immunization Update  The patient's preventive health screenings and immunizations are up to date per Dr. Gil.    My Emergency Plan  Emergency Plan Recommendation:     When to Use the Emergency Department (ED)  An emergency means you could die if you don t get care quickly. Or you could be hurt permanently (disabled). Read below to know when to use -- and when not to use -- an emergency department (also called ED).     Dangers to your life  Here are examples of emergencies. These need immediate care:  A hard time breathing  Severe chest pain  Choking  Severe bleeding  Suddenly not able to move or speak  Blacking out (fainting)`  Poisoning     Dangers of permanent injuries  Here are other emergencies. These also need immediate care:  Deep cuts or severe burns  Broken bones, or sudden severe pain and swelling in a joint     When it s an emergency  If you have an emergency, follow these steps:     1. Go to the nearest emergency department  If you can, go to the hospital ED closest to you right away.  If you cannot get there right away, or if it is not safe to take yourself, call 911 or your police emergency number.  2. Call your primary care doctor  Tell your doctor about the emergency. Call within 24 hours of going to the ED.  If you cannot call, have someone call for you.  Go to your doctor (not the ED) for any follow-up care.     When it s not an emergency  If a problem is not an emergency, follow these steps:     1. Call your primary care doctor  If you don t know the name of your doctor, call your health plan.  If you cannot call, have someone call for you.  2. Follow instructions  Your doctor will tell you what you should do.  You may be told to see your doctor right away. You may be told to go to the ED. Or you may be told to go to an urgent care center.  Follow your doctor s advice.  jLong-Term Complications of Diabetes can cause health problems over time. These are called complications. They are more  likely to happen if your blood sugar is often too high. Over time, high blood sugar can damage blood vessels in your body. It is important to keep your blood sugar in your target range. This can help prevent or delay complications from diabetes.  Possible complications  Complications of diabetes include:    Eye problems, including damage to the blood vessels in the eyes (retinopathy), pressure in the eye (glaucoma), and clouding of the eye s lens (a cataract). Eye problems can eventually lead to irreversible blindness.     Tooth and gum problems (periodontal disease), causing loss of teeth and bone    Blood vessel (vascular) disease leading to circulation problems, heart attack or stroke, or a need for amputation of a limb     Problems with sexual function leading to erectile dysfunction in men and sexual discomfort in women     Kidney disease (nephropathy) can eventually lead to kidney failure, which may require dialysis or kidney transplant     Nerve problems (neuropathy), causing pain or loss of feeling in your feet and other parts of your body, potentially leading to an amputation of a limb     High blood pressure (hypertension), putting strain on your heart and blood vessels    Serious infections, possibly leading to loss of toes, feet, or limbs  How to avoid complications  The serious consequences of these complications may be avoidable for most people with diabetes by managing your blood glucose, blood pressure, and cholesterol levels. This can help you feel better and stay healthy. You can manage diabetes by tracking your blood sugar. You can also eat healthy and exercise to avoid gaining weight. And you should take medicine if directed by your healthcare provider.  Call your health care provider if:    You vomit or have diarrhea for more than 6 hours.    Your blood glucose level is higher than usual or over 250 mg/dL after you have taken extra insulin (if recommended in your sick-day plan).    You take oral  medicine for diabetes, and your blood sugar is higher than usual or over 250 mg/dL, before a meal and stays that high for more than 24 hours.    Your blood glucose is lower than usual or less than 70 mg/dL    You have moderate to large amounts of ketones in your blood or urine.    You aren t better after 2 days.  High Blood Pressure (Hypertension)  You have been diagnosed with high blood pressure (also called hypertension). This means the force of blood against your artery walls is too strong. It also means your heart is working hard to move blood. High blood pressure usually has no symptoms, but over time, it can damage your heart, blood vessels, eyes, kidneys, and other organs. With help from your doctor, you can manage your blood pressure and protect your health.  Taking medications    Learn to take your own blood pressure. Keep a record of your results. Ask your doctor which readings mean that you need medical attention.    Take your blood pressure medication exactly as directed. Don t skip doses. Missing doses can cause your blood pressure to get out of control.    Avoid medications that contain heart stimulants, including over-the-counter drugs. Check for warnings about high blood pressure on the label.    Check with your doctor before taking a decongestant. Some decongestants can worsen high blood pressure.  Lifestyle changes    Maintain a healthy weight. Get help to lose any extra pounds.    Cut back on salt.  ? Limit canned, dried, packaged, and fast foods.  ? Don t add salt to your food at the table.  ? Season foods with herbs instead of salt when you cook.    Follow the DASH (Dietary Approaches to Stop Hypertension) eating plan. This plan recommends vegetables, fruits, whole gains, and other heart healthy foods.    Begin an exercise program. Ask your doctor how to get started. The American Heart Association recommends aerobic exercise 3 to 4 times a week for an average of 40 minutes at a time, with your  doctor's approval. Simple activities like walking or gardening can help.    Break the smoking habit. Enroll in a stop-smoking program to improve your chances of success. Ask your health care provider about programs and medications to help you stop smoking.    Limit drinks that contain caffeine (coffee, black or green tea, cola) to 2 per day.    Never take stimulants such as amphetamines or cocaine; these drugs can be deadly for someone with high blood pressure.    Control your stress. Learn stress-management techniques.    Limit alcohol to no more than 1 drink a day for women and 2 drinks a day for men.  Follow-up care  Make a follow-up appointment as directed by our staff.     When to seek medical care  Call your doctor immediately if you have any of the following:    Chest pain or shortness of breath (call [phone])    Moderate to severe headache    Weakness in the muscles of your face, arms, or legs    Trouble speaking    Extreme drowsiness    Confusion    Fainting or dizziness    Pulsating or rushing sound in your ears    Unexplained nosebleed    Weakness, tingling, or numbness of your face, arms, or legs    Change in vision    Blood pressure measured at home that is greater than 180/110     All [facility] clinic patients have access to a Nurse 24 hours a day, 7 days a week.  If you have questions or want advice from a Nurse, please know [facility] is here for you.  You can call your clinic and they will connect you or you can call [facility] at [phone].  [facility] also has Walk In Care clinics in multiple locations.  Call the number listed above for more information about our Walk In Care clinics or visit the [facility] website at [email or web address].    Patient Support  I will ask my emergency contact for help in supporting me in these goals.  Relationship to patient: Spouse  Home # : [phone] , best time to call anytime

## 2021-05-29 NOTE — TELEPHONE ENCOUNTER
I will call Thursday morning.  Please inform Diann, Thursday morning.    Jazzy Gil MD  6/4/2019

## 2021-05-29 NOTE — TELEPHONE ENCOUNTER
"----- Message from Nuha Medrano sent at 5/22/2019 11:13 AM CDT -----  Contact: patient  General phone call:    Caller: gumaro  Primary cardiologist: dr talbert  Detailed reason for call: pt was seen today for his INR, which was at a critical value. The INR nurse told him he needs to see Dr Talbert ASAP, the soonest was June 14th in a held spot.Gumaro states \"I might be dead by then that is not asap\". Not sure what else we can do? Rac? But that is out until the middle of next week.   New or active symptoms? active  Best phone number: [phone]  Best time to contact: any  Ok to leave a detailed message? yes  Device? no    Additional Info:   "

## 2021-05-29 NOTE — TELEPHONE ENCOUNTER
Controlled Substance Refill Request  Medication Name:   Requested Prescriptions     Pending Prescriptions Disp Refills     traMADol (ULTRAM) 50 mg tablet 30 tablet 0     Sig: Take 1 tablet (50 mg total) by mouth at bedtime as needed.     Date Last Fill: 3/4/19  Pharmacy: [facility]      Submit electronically to pharmacy  Controlled Substance Agreement Date Scanned:   Encounter-Level CSA Scan Date:    There are no encounter-level csa scan date.       Last office visit with prescriber/PCP: 4/18/2019 Jazzy Gil MD OR same dept: 4/18/2019 Jazzy Gil MD OR same specialty: 4/18/2019 Jazzy Gil MD  Last physical: 3/28/2019 Last MTM visit: Visit date not found

## 2021-05-29 NOTE — PROGRESS NOTES
"Pee Ayala is 79 yr old male came to see [facility] RN today for RN assessment referral to [facility] for \"with medication compliance INR management.\"    Lives with spouse in town home.   6 children and states children are not involved.   One of the daughters is a nurse.     Memory issues reported. Takes extra time to finish the sentences or hard time find words. States had a complete cognitive assessment done but no major issues was found. Patient states he does notice his memory has worsen the past year or so but states \" I am 79 so what do you expect\".     States still driving around but he got lost easily with directions.     Main concern is non-therapeutic INR - discussed with patient what could be the reason. Patient states he wasn't taking his coumadin consistence with time. States he takes it sometimes at 4pm and sometimes at 8-9pm. States he could be sometimes forget to take it due to forgetfulness. Unable to check coumadin bottle to make sure he's taking it as directed. Didn't bring his meds with him today.  Patient states he always remember to take his bedtime medications so agrees to take coumadin at bedtime. States he tends to forget to take it at 4pm sometimes.     Patient was giving inconsistence answers to writer such as when he's taking coumadin and possible double dosing himself. Flat effect noted.     Discussed about home care to assist him with med mgmt. Patient states \" Are they going to help me with setting up my meds then I am okay\".     Home care referral entered for SN to assist with complex med mgmt, INRs and coumadin, PT for strength and gait training and OT for cognitive assessment, ADLs, and home safety.     Patient states his wife has lots of health issues and not able to assist him.     States it takes a lot out of him to drive to his appts. He has no other choices but drive himself to appts.     RN Recommendations and Referrals  Home Safety Evaluation with Home Care:  SN, PT and OT    Action " Plan    RN Will  Will add the patient to [facility] RN tracking list  Be available to the patient as nursing needs arise    Care Guide Will  1) check on home care status. If no admit then schedule patient to see MTM with next INR appts or next available appt      Goals  Goals        Patient Stated      I would like home care service to assist with med mgmt.  (pt-stated)      Action steps to achieve this goal  1.I will speak with the [facility] CG at outreach telephone calls.   2. I will answer my phone calls or call them back.  3.  I will provide any necessary paperwork/documentation in a timely manner if needed to assist with this process    Date goal set:  5/30/19              Clinic Care Coordination RN Assessed Needs  Patient Centered Assessment Method-No data recorded  Level 2:  A score of 25-36 indicates that the patient has a moderate initial need for RN or SW intervention at the discretion of the .  The RN will add this patient to her panel and follow closely in partnership with the care guide until stable.  She will reach out to the care guide for support in care coordination needs and graduate the patient to standard care guide outreach when appropriate.      PCAM (Patient Centered Assessment Method)          Emergency Plan  Emergency Plan Recommendation:    When to Use the Emergency Department (ED)  An emergency means you could die if you don t get care quickly. Or you could be hurt permanently (disabled). Read below to know when to use -- and when not to use -- an emergency department (also called ED).    Dangers to your life  Here are examples of emergencies. These need immediate care:  A hard time breathing  Severe chest pain  Choking  Severe bleeding  Suddenly not able to move or speak  Blacking out (fainting)`  Poisoning    Dangers of permanent injuries  Here are other emergencies. These also need immediate care:  Deep cuts or severe burns  Broken bones, or sudden severe pain and swelling in a  joint    When it s an emergency  If you have an emergency, follow these steps:    1. Go to the nearest emergency department  If you can, go to the hospital ED closest to you right away.  If you cannot get there right away, or if it is not safe to take yourself, call 911 or your police emergency number.  2. Call your primary care doctor  Tell your doctor about the emergency. Call within 24 hours of going to the ED.  If you cannot call, have someone call for you.  Go to your doctor (not the ED) for any follow-up care.    When it s not an emergency  If a problem is not an emergency, follow these steps:    1. Call your primary care doctor  If you don t know the name of your doctor, call your health plan.  If you cannot call, have someone call for you.  2. Follow instructions  Your doctor will tell you what you should do.  You may be told to see your doctor right away. You may be told to go to the ED. Or you may be told to go to an urgent care center.  Follow your doctor s advice.  jLong-Term Complications of Diabetes can cause health problems over time. These are called complications. They are more likely to happen if your blood sugar is often too high. Over time, high blood sugar can damage blood vessels in your body. It is important to keep your blood sugar in your target range. This can help prevent or delay complications from diabetes.  Possible complications  Complications of diabetes include:    Eye problems, including damage to the blood vessels in the eyes (retinopathy), pressure in the eye (glaucoma), and clouding of the eye s lens (a cataract). Eye problems can eventually lead to irreversible blindness.     Tooth and gum problems (periodontal disease), causing loss of teeth and bone    Blood vessel (vascular) disease leading to circulation problems, heart attack or stroke, or a need for amputation of a limb     Problems with sexual function leading to erectile dysfunction in men and sexual discomfort in women      Kidney disease (nephropathy) can eventually lead to kidney failure, which may require dialysis or kidney transplant     Nerve problems (neuropathy), causing pain or loss of feeling in your feet and other parts of your body, potentially leading to an amputation of a limb     High blood pressure (hypertension), putting strain on your heart and blood vessels    Serious infections, possibly leading to loss of toes, feet, or limbs  How to avoid complications  The serious consequences of these complications may be avoidable for most people with diabetes by managing your blood glucose, blood pressure, and cholesterol levels. This can help you feel better and stay healthy. You can manage diabetes by tracking your blood sugar. You can also eat healthy and exercise to avoid gaining weight. And you should take medicine if directed by your healthcare provider.  Call your health care provider if:    You vomit or have diarrhea for more than 6 hours.    Your blood glucose level is higher than usual or over 250 mg/dL after you have taken extra insulin (if recommended in your sick-day plan).    You take oral medicine for diabetes, and your blood sugar is higher than usual or over 250 mg/dL, before a meal and stays that high for more than 24 hours.    Your blood glucose is lower than usual or less than 70 mg/dL    You have moderate to large amounts of ketones in your blood or urine.    You aren t better after 2 days.  High Blood Pressure (Hypertension)  You have been diagnosed with high blood pressure (also called hypertension). This means the force of blood against your artery walls is too strong. It also means your heart is working hard to move blood. High blood pressure usually has no symptoms, but over time, it can damage your heart, blood vessels, eyes, kidneys, and other organs. With help from your doctor, you can manage your blood pressure and protect your health.  Taking medications    Learn to take your own blood  pressure. Keep a record of your results. Ask your doctor which readings mean that you need medical attention.    Take your blood pressure medication exactly as directed. Don t skip doses. Missing doses can cause your blood pressure to get out of control.    Avoid medications that contain heart stimulants, including over-the-counter drugs. Check for warnings about high blood pressure on the label.    Check with your doctor before taking a decongestant. Some decongestants can worsen high blood pressure.  Lifestyle changes    Maintain a healthy weight. Get help to lose any extra pounds.    Cut back on salt.  o Limit canned, dried, packaged, and fast foods.  o Don t add salt to your food at the table.  o Season foods with herbs instead of salt when you cook.    Follow the DASH (Dietary Approaches to Stop Hypertension) eating plan. This plan recommends vegetables, fruits, whole gains, and other heart healthy foods.    Begin an exercise program. Ask your doctor how to get started. The American Heart Association recommends aerobic exercise 3 to 4 times a week for an average of 40 minutes at a time, with your doctor's approval. Simple activities like walking or gardening can help.    Break the smoking habit. Enroll in a stop-smoking program to improve your chances of success. Ask your health care provider about programs and medications to help you stop smoking.    Limit drinks that contain caffeine (coffee, black or green tea, cola) to 2 per day.    Never take stimulants such as amphetamines or cocaine; these drugs can be deadly for someone with high blood pressure.    Control your stress. Learn stress-management techniques.    Limit alcohol to no more than 1 drink a day for women and 2 drinks a day for men.  Follow-up care  Make a follow-up appointment as directed by our staff.     When to seek medical care  Call your doctor immediately if you have any of the following:    Chest pain or shortness of breath (call  911)    Moderate to severe headache    Weakness in the muscles of your face, arms, or legs    Trouble speaking    Extreme drowsiness    Confusion    Fainting or dizziness    Pulsating or rushing sound in your ears    Unexplained nosebleed    Weakness, tingling, or numbness of your face, arms, or legs    Change in vision    Blood pressure measured at home that is greater than 180/110

## 2021-05-29 NOTE — TELEPHONE ENCOUNTER
Please call Diann and ask her what I can be doing for the patient.  I am in clinic on Monday until late.  What would be the best time to touch base with her?  I will try calling her in between patients.    Jazzy Gil MD  6/2/2019

## 2021-05-29 NOTE — TELEPHONE ENCOUNTER
"ANTICOAGULATION  MANAGEMENT    Pee Ayala is on warfarin and had a point of care INR result > 5.5 or an \"error message\" on point of care meter today.    Afternoon INR; STAT venous INR processing and STAT  requested from lab    Spoke with Pee via phone while at the lab and reviewed triage questions for potential signs and symptoms of bleeding.      Patient Response     Have you had any bleeding in the last week?   No   Have you passed any red, black, or tarry stools in last week?   No   Have you vomited/spit up any red or coffee ground material in last week?   No   Have you had any new, severe abdominal pain or bloating develop in last week?   No   Have you fallen or had any injuries in last week?   No   Do you currently have a severe, sudden onset headache?   No   Do you currently have any severe sudden changes in your vision?   No   Do you currently have any new onset numbness, weakness/paralysis?   No     Assessment/Plan:     Pee's responses were negative for signs and symptoms of bleeding; may discharge from clinic    Pee instructed to:       Hold warfarin today until venous INR result returns and receives follow up call from M    Seek medical attention for new signs and symptoms of bleeding or a fall with injury.         "

## 2021-05-29 NOTE — TELEPHONE ENCOUNTER
Refill Approved    Rx renewed per Medication Renewal Policy. Medication was last renewed on 6/3/19.    Ov: 5/30/19    Yolis Jiang, [facility] Triage/Med Refill 6/8/2019     Requested Prescriptions   Pending Prescriptions Disp Refills     gabapentin (NEURONTIN) 300 MG capsule 240 capsule 3     Sig: Take 2 capsules (600 mg total) by mouth 2 (two) times a day.       Gabapentin/Levetiracetam/Tiagabine Refill Protocol  Passed - 6/7/2019  1:06 PM        Passed - PCP or prescribing provider visit in past 12 months or next 3 months     Last office visit with prescriber/PCP: 5/30/2019 Jazzy Gil MD OR same dept: 5/30/2019 Jazzy Gil MD OR same specialty: 5/30/2019 Jazzy Gil MD  Last physical: 3/28/2019 Last MTM visit: Visit date not found   Next visit within 3 mo: Visit date not found  Next physical within 3 mo: Visit date not found  Prescriber OR PCP: Jazzy Gil MD  Last diagnosis associated with med order: 1. Diabetic polyneuropathy associated with type 2 diabetes mellitus (H)  - gabapentin (NEURONTIN) 300 MG capsule; Take 2 capsules (600 mg total) by mouth 2 (two) times a day.  Dispense: 240 capsule; Refill: 3    If protocol passes may refill for 12 months if within 3 months of last provider visit (or a total of 15 months).

## 2021-05-29 NOTE — TELEPHONE ENCOUNTER
tyrel stated she is available to talk today at 3:40 pm otherwise any time tomorrow except between 2-3 pm, morning works best.

## 2021-05-29 NOTE — TELEPHONE ENCOUNTER
ANTICOAGULATION  MANAGEMENT    Assessment     Today's INR result of 2.5 is Therapeutic (goal INR of 2.0-3.0)        Warfarin taken as previously instructed    No new diet changes affecting INR    No new medication/supplements affecting INR    Continues to tolerate warfarin with no reported s/s of bleeding or thromboembolism     Previous INR was Therapeutic    Plan:     Spoke with Pee regarding INR result and instructed:     Warfarin Dosing Instructions:  Continue current warfarin dose 5 mg daily  Instructed patient to follow up no later than: two weeks    Education provided: importance of therapeutic range    Pee verbalizes understanding and agrees to warfarin dosing plan.    Instructed to call the [facility] for any changes, questions or concerns. (#[phone])   ?   Cecy Dominguez RN    Subjective/Objective:      Pee Ayala, a 79 y.o. male is on warfarin.     Pee reports:     Home warfarin dose: as updated on anticoagulation calendar per template     Missed doses: No     Medication changes:  No     S/S of bleeding or thromboembolism:  No     New Injury or illness:  No     Changes in diet or alcohol consumption:  No     Upcoming surgery, procedure or cardioversion:  No    Anticoagulation Episode Summary     Current INR goal:   2.0-3.0   TTR:   28.9 % (5 mo)   Next INR check:   6/27/2019   INR from last check:   2.50 (6/13/2019)   Weekly max warfarin dose:      Target end date:      INR check location:      Preferred lab:      Send INR reminders to:   [facility]    Indications    Chronic atrial fibrillation (H) [I48.2]           Comments:            Anticoagulation Care Providers     Provider Role Specialty Phone number    Jazzy Gil MD Referring Family Medicine [phone]

## 2021-05-29 NOTE — TELEPHONE ENCOUNTER
ANTICOAGULATION  MANAGEMENT    Assessment     6/6's INR result of 2.9 is Therapeutic (goal INR of 2.0-3.0)        Warfarin taken as previously instructed    No new diet changes affecting INR    No new medication/supplements affecting INR    Continues to tolerate warfarin with no reported s/s of bleeding or thromboembolism     Previous INR was Supratherapeutic    Plan:     Spoke with Epe regarding INR result and instructed:     Warfarin Dosing Instructions:  Continue current warfarin dose 5 mg daily.    Instructed patient to follow up no later than: 1 week. Appt is made for 6/13.     Education provided: importance of taking warfarin as instructed    Pee verbalizes understanding and agrees to warfarin dosing plan.    Instructed to call the [facility] for any changes, questions or concerns. (#[phone])   ?   Ayan Rudd RN    Subjective/Objective:      Pee Ayala, a 79 y.o. male is on warfarin.     Pee reports:     Home warfarin dose: verbally confirmed home dose with pt and wife  and updated on anticoagulation calendar     Missed doses: No     Medication changes:  No     S/S of bleeding or thromboembolism:  No     New Injury or illness:  No     Changes in diet or alcohol consumption:  No     Upcoming surgery, procedure or cardioversion:  No    Anticoagulation Episode Summary     Current INR goal:   2.0-3.0   TTR:   25.5 % (4.8 mo)   Next INR check:   6/13/2019   INR from last check:   2.90 (6/6/2019)   Weekly max warfarin dose:      Target end date:      INR check location:      Preferred lab:      Send INR reminders to:   [facility]    Indications    Chronic atrial fibrillation (H) [I48.2]           Comments:            Anticoagulation Care Providers     Provider Role Specialty Phone number    Jazzy Gil MD Referring Family Medicine [phone]

## 2021-05-29 NOTE — TELEPHONE ENCOUNTER
Left message for Gricel asking her to please call back with suggestions or a good time for Dr Gil to reach her.

## 2021-05-30 NOTE — PATIENT INSTRUCTIONS - HE
Pee Ayala,    It was a pleasure to see you today at the [facility].     My recommendations after this visit include:  - Please follow up with Dr Talbert in September   - Please follow up with Lilli Guzman in 4 weeks  - I have checked labs and will contact you the results  - I have increased your lasix to 20 mg twice a day    Lilli Guzman, CNP

## 2021-05-30 NOTE — TELEPHONE ENCOUNTER
Who is calling:  Patient  Reason for Call:  Same day lab appointment for INR at 1:15 pm 6/27.  Okay to leave a detailed message: No

## 2021-05-30 NOTE — PROGRESS NOTES
"Care guide reached out to patient for outreach/check in today, spoke to patient's wife Agueda today.  She has no concerns at this time for her . Pee was not available, as he was out doing errands.  Wife states patient is not receiving any home care services at this time, as he doesn't qualify for services.  Patient has been attending all of his appointments and \"his INR has been very good\"  Care guide will follow up with patient next week and update [facility] Goals    Last Outreach Comments:  From 5/30 ASSMT:  Discussed about home care to assist him with med mgmt. Patient states \" Are they going to help me with setting up my meds then I am okay\".    Home care referral entered for SN to assist with complex med mgmt, INRs and coumadin, PT for strength and gait training and OT for cognitive assessment, ADLs, and home safety.    Patient states his wife has lots of health issues and not able to assist him.    States it takes a lot out of him to drive to his appts. He has no other choices but drive himself to appts.     Follow up:  Patient did not qualify for home care services (as of 6/10- per Dr. BOYD)  Patient's INRs  have been therapeutic on last 2 checks:  2.3 on 6/27  2.5 on 6/13  Next check is on 8/1    Last office visit:  5/30/19 with Dr. BOYD    PCAM: 5/30/19 (with [facility] RN Marilou)    Future Scheduled Visits:   7/12 @ [facility]  8/1 @ [facility] (INR check)    Current [facility] Goals:  I would like home care service to assist with med mgmt.     Outreach Attempts:  Spoke to patient's wife Agueda 6/28- patient not home    Next Outreach: 7/2/1  "

## 2021-05-30 NOTE — TELEPHONE ENCOUNTER
Patient is down to two tabs.  He called the pharmacy yesterday so that request is not processed even yet.  Patient does not have CSA. Please review .    Controlled Substance Refill Request  Medication Name:   Requested Prescriptions     Pending Prescriptions Disp Refills     traMADol (ULTRAM) 50 mg tablet 30 tablet 0     Sig: Take 1 tablet (50 mg total) by mouth at bedtime as needed.     Date Last Fill: 5/29/19  Pharmacy:[facility]     Submit electronically to pharmacy  Controlled Substance Agreement Date Scanned:   Encounter-Level CSA Scan Date:    There are no encounter-level csa scan date.       Last office visit with prescriber/PCP: 5/30/2019 Jazzy Gil MD OR same dept: 5/30/2019 Jazzy Gil MD OR same specialty: 5/30/2019 Jazzy Gil MD  Last physical: 3/28/2019 Last MTM visit: Visit date not found

## 2021-05-30 NOTE — TELEPHONE ENCOUNTER
Refill Request  Did you contact pharmacy: Yes Yesterday.  Patient states he is down to three tabs and is leaving town. Please review.   Medication name: Warfarin    Who prescribed the medication: Historical  Pharmacy Name and Location: [facility]  Is patient out of medication: No.  3 days left  Patient notified refills processed in 72 hours:  yes  Okay to leave a detailed message: no

## 2021-05-30 NOTE — PROGRESS NOTES
Scheduled Follow Up Call: Attempt 1 Community Health Worker called and left a message for the patient. If the patient is returning my call, please transfer the patient to Amina at ext. [phone].    Next Outreach: 7/25

## 2021-05-30 NOTE — TELEPHONE ENCOUNTER
There is a drug urine screen from January 2019, similarly a CSA is scanned from January and in problem list it is clearly stated that okay for tramadol #30/ month.    I would like to follow with Fay Niño, why she was not able to access?    Jazzy Gil MD  7/4/2019

## 2021-05-30 NOTE — TELEPHONE ENCOUNTER
ANTICOAGULATION  MANAGEMENT    Assessment     Today's INR result of 2.3 is Therapeutic (goal INR of 2.0-3.0)        Warfarin taken as previously instructed    No new diet changes affecting INR    No new medication/supplements affecting INR    Continues to tolerate warfarin with no reported s/s of bleeding or thromboembolism     Previous INR was Therapeutic    Plan:     Spoke with Pee regarding INR result and instructed:     Warfarin Dosing Instructions:  Continue current warfarin dose 5 mg daily  Instructed patient to follow up no later than: 4 weeks    Education provided: importance of therapeutic range    Pee verbalizes understanding and agrees to warfarin dosing plan.    Instructed to call the [facility] for any changes, questions or concerns. (#[phone])   ?   Cecy Dominguez RN    Subjective/Objective:      Pee Ayala, a 79 y.o. male is on warfarin.     Pee reports:     Home warfarin dose: template incorrect; verbally confirmed home dose with Pee and updated on anticoagulation calendar     Missed doses: No     Medication changes:  No     S/S of bleeding or thromboembolism:  No     New Injury or illness:  No     Changes in diet or alcohol consumption:  No     Upcoming surgery, procedure or cardioversion:  No    Anticoagulation Episode Summary     Current INR goal:   2.0-3.0   TTR:   35.0 % (5.5 mo)   Next INR check:   7/25/2019   INR from last check:   2.30 (6/27/2019)   Weekly max warfarin dose:      Target end date:      INR check location:      Preferred lab:      Send INR reminders to:   [facility]    Indications    Chronic atrial fibrillation (H) [I48.2]           Comments:            Anticoagulation Care Providers     Provider Role Specialty Phone number    Jazzy Gil MD Referring Family Medicine [phone]

## 2021-05-31 NOTE — TELEPHONE ENCOUNTER
ANTICOAGULATION  MANAGEMENT    Assessment     Today's INR result of 2.3 is Therapeutic (goal INR of 2.0-3.0)        Warfarin taken as previously instructed    No new diet changes affecting INR    No new medication/supplements affecting INR    Continues to tolerate warfarin with no reported s/s of bleeding or thromboembolism     Previous INR was Therapeutic    Plan:     Spoke with Pee regarding INR result and instructed:     Warfarin Dosing Instructions:  Continue current warfarin dose 5 mg daily   Instructed patient to follow up no later than: 3-4 weeks    Education provided: importance of therapeutic range and target INR goal and significance of current INR result    Pee verbalizes understanding and agrees to warfarin dosing plan.    Instructed to call the [facility] for any changes, questions or concerns. (#[phone])   ?   Cecy Dominguez RN    Subjective/Objective:      Pee Ayala, a 79 y.o. male is on warfarin.     Pee reports:     Home warfarin dose: as updated on anticoagulation calendar per template     Missed doses: No     Medication changes:  No     S/S of bleeding or thromboembolism:  No     New Injury or illness:  No     Changes in diet or alcohol consumption:  No     Upcoming surgery, procedure or cardioversion:  No    Anticoagulation Episode Summary     Current INR goal:   2.0-3.0   TTR:   49.3 % (7.1 mo)   Next INR check:   9/10/2019   INR from last check:   2.30 (8/13/2019)   Weekly max warfarin dose:      Target end date:      INR check location:      Preferred lab:      Send INR reminders to:   [facility]    Indications    Chronic atrial fibrillation (H) [I48.2]           Comments:            Anticoagulation Care Providers     Provider Role Specialty Phone number    Jazzy Gil MD Referring Family Medicine [phone]

## 2021-05-31 NOTE — PATIENT INSTRUCTIONS - HE
Pee Ayala,    It was a pleasure to see you today at the [facility].     My recommendations after this visit include:  - Please follow up with Dr Talbert September 26   - Please follow up with Lilli Guzman in 3 months  - I have checked labs and will contact you with results  - No changes to your medications    Lilli Guzman, CNP

## 2021-05-31 NOTE — TELEPHONE ENCOUNTER
ANTICOAGULATION  MANAGEMENT    Assessment     Today's INR result of 2.2 is Therapeutic (goal INR of 2.0-3.0)        Missed dose(s) may be affecting INR - skipped last night after a nosebleed    No new diet changes affecting INR    No new medication/supplements affecting INR    Continues to tolerate warfarin with no reported s/s of thromboembolism     Reported a minor nosebleed last night, last about 3 minutes    Previous INR was Therapeutic    Plan:     Spoke with Pee regarding INR result and instructed:     Warfarin Dosing Instructions:  Continue current warfarin dose 5 mg daily   (0 % change)    Instructed patient to follow up no later than: at 8/13 lab appt    Education provided: target INR goal and significance of current INR result, importance of following up for INR monitoring at instructed interval, importance of taking warfarin as instructed, monitoring for bleeding signs and symptoms and when to seek medical attention/emergency care    Pee verbalizes understanding and agrees to warfarin dosing plan.    Instructed to call the [facility] for any changes, questions or concerns. (#[phone])   ?   Katie Mathur RN    Subjective/Objective:      Pee Ayala, a 79 y.o. male is on warfarin.     Pee reports:     Home warfarin dose: verbally confirmed home dose with Pee and updated on anticoagulation calendar     Missed doses: Yes: yesterday     Medication changes:  No     S/S of bleeding or thromboembolism:  Yes: nosebleed last night     New Injury or illness:  No     Changes in diet or alcohol consumption:  No     Upcoming surgery, procedure or cardioversion:  No    Anticoagulation Episode Summary     Current INR goal:   2.0-3.0   TTR:   46.3 % (6.7 mo)   Next INR check:   8/13/2019   INR from last check:   2.20 (8/1/2019)   Weekly max warfarin dose:      Target end date:      INR check location:      Preferred lab:      Send INR reminders to:   [facility]    Chronic  atrial fibrillation (H) [I48.2]           Comments:            Anticoagulation Care Providers     Provider Role Specialty Phone number    Jazzy Gil MD Referring Family Medicine [phone]

## 2021-05-31 NOTE — PATIENT INSTRUCTIONS - HE
Try and take your insulin in the evening with your meal.    Work on getting in two tests a day.    Work on a little less potato salad and sweet corn.
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
## 2021-06-01 ENCOUNTER — RECORDS - HEALTHEAST (OUTPATIENT)
Dept: ADMINISTRATIVE | Facility: CLINIC | Age: 81
End: 2021-06-01

## 2021-06-01 NOTE — TELEPHONE ENCOUNTER
ANTICOAGULATION  MANAGEMENT PROGRAM    Pee Ayala is overdue for INR check.  Reminder call made.    Left message for Pee. If returning call, please schedule INR check as soon as possible.    Cecy Dominguez RN

## 2021-06-01 NOTE — PATIENT INSTRUCTIONS - HE
Sebaceous cyst on the back has been opened and cleaned.  Follow-up in 4 weeks.    The toenail is damaged from injury.  Wear wide shoes.  This may need excision.  A referral to podiatry is done and you can pursue it after your travel.    For fear of heights and your travel, I am giving you hydroxyzine that is usually given for anxiety.  However this medication does have anticholinergic side effect.  Can also make you sleepy and drowsy.  Do read about the side effects before using it and avoid situations that make you anxious.

## 2021-06-01 NOTE — TELEPHONE ENCOUNTER
Patient Returning Call  Reason for call:  Patient is returning call.  Patient stated he had called back and was speaking with someone when the call was disconnected.   Information relayed to patient:  Patient stated he received the below message from Jazzy Gil MD regarding his Tramadol prescription.   Patient has additional questions:  Yes  If YES, what are your questions/concerns:  Patient is asking for the prescription to be printed and left for him at the .  Patient stated he will be able to come to the clinic to pick this up in about an hour.   Okay to leave a detailed message?: No

## 2021-06-01 NOTE — TELEPHONE ENCOUNTER
New Appointment Needed  What is the reason for the visit:    Office Visit-Anxiety, Cyst on back     Provider Preference: PCP only     How soon do you need to be seen?: Patient has appointment on 09/19 at 2:10pm with Dr. Gil.  Patient states he has a conflict and looking to have an appointment on Friday 09/20 or Monday 09/23 at anytime.    He will be traveling to [place] on 09/27 and needs to be seen sooner with PCP.  Patient not comfortable seeing another provider.    Waitlist offered?: Yes    Okay to leave a detailed message:  Yes

## 2021-06-01 NOTE — TELEPHONE ENCOUNTER
Zestar Study Consent Visit    Study description: ECG and PPG Study: Zestar Study      Pee Ayala a 79 y.o. male , was contacted by today to discuss participation in the Zestar study.     The patient called the Clinical Trials Office to inquire about study participation.  The consent form was reviewed with the patient .     The review of the study included:    Study purpose     Conflict of interest    Device description      Study visits    Risks of participation    Benefits (if any)    Alternatives    Voluntary participation    Confidentiality     Compensation/costs of participation    Study stipends    Injury and legal rights    The subject was queried in regards to his willingness to continue and come in for scheduled appointment. his questions were answered to his satisfaction.   The patient has given his preliminary agreement to volunteer to participate in the above noted study.     Plan: Pee Ayala will come to [facility] on 09/17/2019 to continue consent process. If he continues to agrees to participate, the study visit will be done on the same day.    Kiki Simpson RN

## 2021-06-01 NOTE — TELEPHONE ENCOUNTER
Please help patient be scheduled on Friday or Monday for a procedure visit  Jazzy Gil MD  9/16/2019

## 2021-06-01 NOTE — TELEPHONE ENCOUNTER
FYI - Status Update  Who is Calling: Patient  Update: Patient was checking the status of this request. Patient stated [place] is leaving out [place] and he does not want to  at 7 day supply.  Okay to leave a detailed message?:  No

## 2021-06-01 NOTE — TELEPHONE ENCOUNTER
ANTICOAGULATION  MANAGEMENT    Assessment     Today's INR result of 1.6 is Subtherapeutic (goal INR of 2.0-3.0)        Warfarin taken as previously instructed    Increased fruit (written on template) may be affecting diet and INR    No new medication/supplements affecting INR    Continues to tolerate warfarin with no reported s/s of bleeding or thromboembolism     Previous INR was Therapeutic    Plan:     Left a detailed message for Pee regarding INR result and instructed:     Warfarin Dosing Instructions:  one time booster dose of 7.5mg tonight then continue current warfarin dose 5 mg daily  Instructed patient to follow up no later than: one week    Education provided: impact of vitamin K foods on INR, importance of therapeutic range and target INR goal and significance of current INR result    Instructed to call the [facility] for any changes, questions or concerns. (#[phone])   ?   Cecy Dominguez RN    Subjective/Objective:      Pee Ayala, a 79 y.o. male is on warfarin.     Pee reports:     Home warfarin dose: Wrote 5mg down for 5 days but did not fill out Fri or Sat     Missed doses: No     Medication changes:  No     S/S of bleeding or thromboembolism:  No     New Injury or illness:  No     Changes in diet or alcohol consumption:  Yes: increase in fruit intake     Upcoming surgery, procedure or cardioversion:  No    Anticoagulation Episode Summary     Current INR goal:   2.0-3.0   TTR:   49.5 %   Next INR check:   9/13/2019   INR from last check:   1.60! (9/6/2019)   Weekly max warfarin dose:      Target end date:      INR check location:      Preferred lab:      Send INR reminders to:   [facility]    Indications    Chronic atrial fibrillation (H) [I48.2]           Comments:            Anticoagulation Care Providers     Provider Role Specialty Phone number    Jazzy Gil MD Referring Family Medicine [phone]
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
## 2021-06-01 NOTE — TELEPHONE ENCOUNTER
First Attempt: LMTCB to reschedule procedure visit/anxiety . Ok to schedule on Friday 9/20 or Mon 9/23 per Dr. NOLAN

## 2021-06-01 NOTE — TELEPHONE ENCOUNTER
Medication Question or Clarification  Who is calling: Pharmacy: [facility] fax  What medication are you calling about? (include dose and sig)    Disp Refills Start End    traMADol (ULTRAM) 50 mg tablet 30 tablet 0 9/20/2019     Sig - Route: Take 1 tablet (50 mg total) by mouth at bedtime as needed. - Oral    Sent to pharmacy as: traMADol (ULTRAM) 50 mg tablet    E-Prescribing Status: Receipt confirmed by pharmacy (9/20/2019 12:17 PM CDT)      Who prescribed the medication?: Jazzy Gil MD   What is your question/concern?: Fax stated patient can only get a 7 day supply unless this Rx is for chronic pain. Fax stated the diagnosis associated with this Rx is Morbid Obesity. Please advise on this request and call the pharmacy back.  Pharmacy: [facility]  Okay to leave a detailed message?: No  Site CMT - Please call the pharmacy to obtain any additional needed information.

## 2021-06-01 NOTE — TELEPHONE ENCOUNTER
Who is calling:  Patient   Reason for Call:  Patient called to schedule his overdue INR appointment.    Patient did schedule for 09/25/19   Date of last appointment with primary care: 09/20/19  Okay to leave a detailed message: Yes

## 2021-06-01 NOTE — TELEPHONE ENCOUNTER
Please inform patient that [facility] is having a system wide problem with sending tramadol or any controlled substance over the computer system or E prescribe method.  I have contacted the IT people and this problem should be resolved by the end of the day.    Alternatively, patient can come and  a physical prescription and get a refill from the pharmacy.  Jazzy Gil MD  9/23/2019

## 2021-06-01 NOTE — PROGRESS NOTES
Scheduled Follow Up Call: Attempt 1 Community Health Worker called and left a message for the patient. If the patient is returning my call, please transfer the patient to Amina at ext. [phone].    LMTCB: Updating CCC goals  Patient will be in clinic 9/19, CHW plan to meet with patient after his appt.

## 2021-06-01 NOTE — TELEPHONE ENCOUNTER
"Anticoagulation Annual Referral Renewal Review    Pee Ayala's chart reviewed for annual renewal of referral to anticoagulation monitoring.        Criteria for anticoagulation nurse and/or pharmacist renewal met   Warfarin indication: Atrial Fibrillation Yes, per indication   Current with INR monitoring/compliant Yes Yes   Date of last office visit 9/26/19 Yes, had office visit within last year   Time in Therapeutic Range (TTR) 26.74 % No, TTR < 60 %       Pee Ayala did NOT meet all criteria for anticoagulation management program initiated renewal and requires provider review. Using dot phrase, \".acmrenewalprovider\", please advise if Pee's anticoagulation management referral should be renewed or if patient should be seen in office to review anticoagulation therapy      Cecy Dominguez RN  1:46 PM      "

## 2021-06-01 NOTE — PATIENT INSTRUCTIONS - HE
Pee Ayala,    It was a pleasure to see you today at the [facility].     My recommendations after this visit include:    1.  We will schedule a Holter monitor when you return from your cruise.  We want to see what your heart rate does at rest, when you are sleeping, as well as when you are walking in the dog park.  Please be active when you wear the Holter monitor.  We may want to adjust her medications after getting the results and we will call you with the results.    2.  Have a wonderful cruise from [place] into [place]        [place]

## 2021-06-02 VITALS — WEIGHT: 207 LBS | HEIGHT: 65 IN | BODY MASS INDEX: 34.49 KG/M2

## 2021-06-02 VITALS — WEIGHT: 205 LBS | HEIGHT: 65 IN | BODY MASS INDEX: 34.16 KG/M2

## 2021-06-02 VITALS — BODY MASS INDEX: 35.12 KG/M2 | WEIGHT: 210.8 LBS | HEIGHT: 65 IN

## 2021-06-02 VITALS — WEIGHT: 207.1 LBS | HEIGHT: 65 IN | BODY MASS INDEX: 34.51 KG/M2

## 2021-06-02 VITALS — WEIGHT: 205.1 LBS | HEIGHT: 65 IN | BODY MASS INDEX: 34.17 KG/M2

## 2021-06-02 VITALS — HEIGHT: 65 IN | WEIGHT: 205 LBS | BODY MASS INDEX: 34.16 KG/M2

## 2021-06-02 VITALS — BODY MASS INDEX: 35.61 KG/M2 | WEIGHT: 214 LBS

## 2021-06-02 NOTE — TELEPHONE ENCOUNTER
Provider Review: Anticoagulation Annual Referral Renewal    ACM Renewal Decision:  Renew ACM warfarin management      INR Range:   Continue management at current INR goal   Anticipated Duration of Therapy (from today):  Long-term anticoagulation      Jazzy Gil MD  1:12 PM

## 2021-06-02 NOTE — PATIENT INSTRUCTIONS - HE
Podiatry Clinics that offer foot and toenail care  [facility]  [facility]  [phone]    Foot and Ankle Clinics, [facility]  [phone]    [facility]  [place] - Dr. Mendez on Tuesdays  [phone]    [facility]  [place]  [phone]    [facility]  [facility]  [phone]    [facility]  [phone]    [facility]  [phone]    [facility]  They have several locations and have a team of registered nurses that offer diabetic foot care.  They do not bill to insurance and the average cost per visit is $37.  [facility]  [phone]    [facility]  *Nurse comes to your home for nail care.  Norma Eli RN Foot Specialist  [phone]

## 2021-06-02 NOTE — TELEPHONE ENCOUNTER
Refill Approved    Rx renewed per Medication Renewal Policy. Medication was last renewed on 9/20/19.    Ashley Ruiz, [facility] Triage/Med Refill 10/22/2019     Requested Prescriptions   Pending Prescriptions Disp Refills     hydrOXYzine HCl (ATARAX) 25 MG tablet 30 tablet 0     Sig: Take 1 tablet (25 mg total) by mouth 3 (three) times a day as needed for itching.       Antihistamine Refill Protocol Passed - 10/21/2019  6:28 PM        Passed - Patient has had office visit/physical in last year     Last office visit with prescriber/PCP: 9/20/2019 Jazzy Gil MD OR same dept: Visit date not found OR same specialty: Visit date not found  Last physical: 3/28/2019 Last MTM visit: Visit date not found   Next visit within 3 mo: Visit date not found  Next physical within 3 mo: Visit date not found  Prescriber OR PCP: Jazzy Gil MD  Last diagnosis associated with med order: 1. Fear of heights  - hydrOXYzine HCl (ATARAX) 25 MG tablet; Take 1 tablet (25 mg total) by mouth 3 (three) times a day as needed for itching.  Dispense: 30 tablet; Refill: 0    If protocol passes may refill for 12 months if within 3 months of last provider visit (or a total of 15 months).

## 2021-06-02 NOTE — TELEPHONE ENCOUNTER
ANTICOAGULATION  MANAGEMENT    Assessment     Today's INR result of 3.8 is Supratherapeutic (goal INR of 2.0-3.0)        Warfarin taken as previously instructed    No new diet changes affecting INR    No new medication/supplements affecting INR    Continues to tolerate warfarin with no reported s/s of bleeding or thromboembolism     Previous INR was Subtherapeutic    Plan:     Spoke with Agueda regarding INR result and instructed:     Warfarin Dosing Instructions:  hold today then change warfarin dose to 7.5 mg daily on wed/fri; and 5 mg daily rest of week  (6 % change)    Instructed patient to follow up no later than: two weeks      Agueda verbalizes understanding and agrees to warfarin dosing plan.    Instructed to call the [facility] for any changes, questions or concerns. (#[phone])   ?   Yousuf Madison RN    Subjective/Objective:      Pee Ayala, a 79 y.o. male is on warfarin.     Pee reports:     Home warfarin dose: as updated on anticoagulation calendar per template     Missed doses: No     Medication changes:  No     S/S of bleeding or thromboembolism:  No     New Injury or illness:  No     Changes in diet or alcohol consumption:  No     Upcoming surgery, procedure or cardioversion:  No    Anticoagulation Episode Summary     Current INR goal:   2.0-3.0   TTR:   42.8 %   Next INR check:   11/11/2019   INR from last check:   3.80! (10/28/2019)   Weekly max warfarin dose:      Target end date:      INR check location:      Preferred lab:      Send INR reminders to:   [facility]    Indications    Chronic atrial fibrillation [I48.20]           Comments:            Anticoagulation Care Providers     Provider Role Specialty Phone number    Jazzy Gil MD Referring Family Medicine [phone]

## 2021-06-02 NOTE — TELEPHONE ENCOUNTER
Central PA team  [phone]  Pool: HE PA MED ([id number])          PA has been initiated.       PA form completed and faxed insurance via Cover My Meds     Key:   [id number] MORGAN Case ID: [id number]     Medication:  Nortriptyline HCl 50MG capsules    Insurance:  [employer]        Response will be received via fax and may take up to 5-10 business days depending on plan

## 2021-06-02 NOTE — TELEPHONE ENCOUNTER
Received a fax from [facility] with a prior authorization request on 10/20/19.  Writer scanned the paper into patients chart under [facility] surescripts    Medication Nortriptyline 50 mg

## 2021-06-02 NOTE — PROGRESS NOTES
FOOT AND ANKLE SURGERY/PODIATRY CONSULT NOTE         ASSESSMENT:   Onychomycosis  Onychauxis  Diabetic neuropathy  Diabetes mellitus      TREATMENT:  Debrided nails 1 through 5 both feet        HPI: I was asked to see Pee Ayala today complaining of long thick nails both feet.  The patient indicated that he has difficulty trimming his own nails.  He stated that several years ago he went on a walking tour which lasted 5 miles.  After the tour his right great toenail was discolored.  Since that time it has remained discolored and slightly thickened.  He also has pain.  He describes it as a pressure type pain which can be aggravated by shoe gear.  He has not had any redness, swelling, drainage of bleeding surrounding any of the nails both feet.  He has had his nails treated at another clinic.  The patient was seen in consultation at the request of Jazzy Gil MD for for evaluation treatment of long thick nails both feet.     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;  Service: Cardiology     CV LEFT HEART  CATHETERIZATION WO LEFT VETRICULOGRAM Left 4/4/2019    Procedure: Left Heart Catheterization Without Left Ventriculogram;  Surgeon: Dominik Vega MD;  Location: [facility] Lab;  Service: Cardiology     CV RIGHT HEART CATH Right 4/4/2019    Procedure: Right Heart Catheterization;  Surgeon: Dominik Vega MD;  Location: [facility];  Service: Cardiology     forearm sugery Left 2015     MOHS SURGERY         Allergies   Allergen Reactions     Oxycodone Itching     Lisinopril Cough         Current Outpatient Medications:      acetaminophen (TYLENOL) 500 MG tablet, Take 1,000 mg by mouth every 6 (six) hours as needed.    , Disp: , Rfl:      amLODIPine (NORVASC) 5 MG tablet, Take 1 tablet (5 mg total) by mouth at bedtime., Disp: 90 tablet, Rfl: 5     aspirin 81 mg chewable tablet, Chew 81 mg daily.    , Disp: , Rfl:      blood glucose meter (GLUCOMETER), Use 1 each As Directed as needed. Dispense glucometer brand per patient's insurance at pharmacy discretion., Disp: 1 each, Rfl: 0     blood glucose test strips, Use 1 each As Directed as needed. Dispense brand per patient's insurance at pharmacy discretion., Disp: 200 strip, Rfl: 5     carvedilol (COREG) 25 MG tablet, Take 1 tablet (25 mg total) by mouth 2 (two) times a day with meals., Disp: 180 tablet, Rfl: 5     finasteride (PROSCAR) 5 mg tablet, TAKE ONE TABLET BY MOUTH ONCE DAILY IN  ADDITION  TO  FLOMAX, Disp: , Rfl:      furosemide (LASIX) 20 MG tablet, Take 1 tablet (20 mg total) by mouth 2 (two) times a day at 9am and 6pm., Disp: 180 tablet, Rfl: 0     gabapentin (NEURONTIN) 300 MG capsule, Take 2 capsules (600 mg total) by mouth 2 (two) times a day., Disp: 360 capsule, Rfl: 3     glimepiride (AMARYL) 4 MG tablet, TAKE ONE TABLET BY MOUTH TWICE DAILY, Disp: , Rfl:      hydrOXYzine HCl (ATARAX) 25 MG tablet, Take 1 tablet (25 mg total) by mouth 3 (three) times a day as needed for itching., Disp: 30 tablet, Rfl: 0     insulin  "NPH-insulin regular (NOVOLIN 70/30 U-100 INSULIN) 100 unit/mL (70-30) injection, 18 in the morning and 12 at night    , Disp: , Rfl:      insulin syringe-needle U-100 0.3 mL 31 gauge x 15/64\" Syrg, Use 1 each As Directed 2 (two) times a day., Disp: 100 Syringe, Rfl: 11     metFORMIN (GLUCOPHAGE) 500 MG tablet, Take 2 tablets (1,000 mg total) by mouth 2 (two) times a day., Disp: 360 tablet, Rfl: 3     omeprazole (PRILOSEC) 40 MG capsule, Take 40 mg by mouth daily as needed.    , Disp: , Rfl:      rosuvastatin (CRESTOR) 20 MG tablet, Take 1 tablet (20 mg total) by mouth at bedtime., Disp: 90 tablet, Rfl: 3     traMADol (ULTRAM) 50 mg tablet, Take 1 tablet (50 mg total) by mouth at bedtime as needed., Disp: 30 tablet, Rfl: 0     warfarin (COUMADIN/JANTOVEN) 5 MG tablet, Take 5mg daily by mouth as directed, Disp: 90 tablet, Rfl: 1     alfuzosin (UROXATRAL) 10 mg 24 hr tablet, Take 10 mg by mouth at bedtime.    , Disp: , Rfl:     Family History   Problem Relation Age of Onset     Diabetes type II Mother         58 ,  from anesthesia complication.     Heart disease Father         86  from stroke     Stroke Father      No Medical Problems Brother         60 years of age.       Social History     Socioeconomic History     Marital status:      Spouse name: Not on file     Number of children: Not on file     Years of education: Not on file     Highest education level: Not on file   Occupational History     Not on file   Social Needs     Financial resource strain: Not on file     Food insecurity:     Worry: Not on file     Inability: Not on file     Transportation needs:     Medical: Not on file     Non-medical: Not on file   Tobacco Use     Smoking status: Former Smoker     Last attempt to quit: 1998     Years since quittin.8     Smokeless tobacco: Never Used   Substance and Sexual Activity     Alcohol use: Yes     Comment: very rare     Drug use: No     Sexual activity: Never     Birth " control/protection: None   Lifestyle     Physical activity:     Days per week: Not on file     Minutes per session: Not on file     Stress: Not on file   Relationships     Social connections:     Talks on phone: Not on file     Gets together: Not on file     Attends Scientologist service: Not on file     Active member of club or organization: Not on file     Attends meetings of clubs or organizations: Not on file     Relationship status: Not on file     Intimate partner violence:     Fear of current or ex partner: Not on file     Emotionally abused: Not on file     Physically abused: Not on file     Forced sexual activity: Not on file   Other Topics Concern     Not on file   Social History Narrative     and lives with life.    Has adult children from previous relationship. ( Blended family).    Has a dog - Vincent Gil MD  1/3/2019           Review of Systems - Patient denies fever, chills, rash, wound, stiffness, limping, numbness, weakness, heart burn, blood in stool, chest pain with activity, calf pain when walking, shortness of breath with activity, chronic cough, easy bleeding/bruising, swelling of ankles, excessive thirst, fatigue, depression, anxiety.  Patient admits to long thick nails both feet.      OBJECTIVE:  Appearance: alert, well appearing, and in no distress.    Vitals:    10/15/19 1051   BP: 126/80   Pulse: 60   SpO2: 98%       BMI= Body mass index is 34.08 kg/m .    General appearance: Patient is alert and fully cooperative with history & exam.  No sign of distress is noted during the visit.  Psychiatric: Affect is pleasant & appropriate.  Patient appears motivated to improve health.  Respiratory: Breathing is regular & unlabored while sitting.  HEENT: Hearing is intact to spoken word.  Speech is clear.  No gross evidence of visual impairment that would impact ambulation.    Vascular: Dorsalis pedis and posterior tibial pulses are non-palpable. There is no pedal hair growth  bilaterally.  CFT < 3 sec from anterior tibial surface to distal digits bilaterally. There is appreciable edema noted bilaterally.  Dermatologic: Elongated nails 1 through 5 both feet.  Nails are 2 times normal thickness.  Turgor and texture are within normal limits. No coloration or temperature changes. No primary or secondary lesions noted.  Neurologic: All epicritic and proprioceptive sensations are diminished bilaterally.   Musculoskeletal: All active and passive ankle, subtalar, midtarsal, and 1st MPJ range of motion are grossly intact without pain or crepitus, with the exception of none. Manual muscle strength is within normal limits bilaterally. All dorsiflexors, plantarflexors, invertors, evertors are intact bilaterally. Tenderness present to right great toenail on palpation.  No tenderness to bilateral feet or ankles with range of motion. Calf is soft/non-tender without warmth/induration    Imaging:         No results found.       TREATMENT:  Debrided nails 1 through 5 both feet today.  The patient was given a prescription for Elavil 50 mg to be taken at bedtime for his neuropathy.  He is to return to the clinic as needed.    Bharathi John; DWAYNE  [facility] Foot & Ankle Surgery/Podiatry

## 2021-06-02 NOTE — TELEPHONE ENCOUNTER
ANTICOAGULATION  MANAGEMENT    Assessment     Today's INR result of 1.7 is Subtherapeutic (goal INR of 2.0-3.0)        Warfarin taken as previously instructed    No new diet changes affecting INR    Keflex ended on 9/28    Continues to tolerate warfarin with no reported s/s of bleeding or thromboembolism     Previous INR was Subtherapeutic    Plan:     Spoke with Pee regarding INR result and instructed:     Warfarin Dosing Instructions:  Change warfarin dose to  (6 % change)    7.5 mg every Mon, Wed, Sat; 5 mg all other days       Instructed patient to follow up no later than: two weeks    Education provided: importance of therapeutic range and target INR goal and significance of current INR result    Pee verbalizes understanding and agrees to warfarin dosing plan.    Instructed to call the [facility] for any changes, questions or concerns. (#[phone])   ?   Cecy Dominguez RN    Subjective/Objective:      Peeleyla Ayala, a 79 y.o. male is on warfarin.     Pee reports:     Home warfarin dose: template incorrect; verbally confirmed home dose with Pee and updated on anticoagulation calendar     Missed doses: No     Medication changes:  No     S/S of bleeding or thromboembolism:  No     New Injury or illness:  No     Changes in diet or alcohol consumption:  No     Upcoming surgery, procedure or cardioversion:  No    Anticoagulation Episode Summary     Current INR goal:   2.0-3.0   TTR:   42.5 %   Next INR check:   10/28/2019   INR from last check:   1.70! (10/14/2019)   Weekly max warfarin dose:      Target end date:      INR check location:      Preferred lab:      Send INR reminders to:   [facility]    Indications    Chronic atrial fibrillation [I48.20]           Comments:            Anticoagulation Care Providers     Provider Role Specialty Phone number    Jazzy Gil MD Referring Family Medicine [phone]

## 2021-06-02 NOTE — TELEPHONE ENCOUNTER
"Please use dot phrase, \".acmrenewalprovider\",  To answer questions in order for [facility] to renew anticoagulation management   "

## 2021-06-03 ENCOUNTER — AMBULATORY - HEALTHEAST (OUTPATIENT)
Dept: LAB | Facility: CLINIC | Age: 81
End: 2021-06-03

## 2021-06-03 ENCOUNTER — COMMUNICATION - HEALTHEAST (OUTPATIENT)
Dept: FAMILY MEDICINE | Facility: CLINIC | Age: 81
End: 2021-06-03

## 2021-06-03 ENCOUNTER — COMMUNICATION - HEALTHEAST (OUTPATIENT)
Dept: ANTICOAGULATION | Facility: CLINIC | Age: 81
End: 2021-06-03

## 2021-06-03 VITALS
RESPIRATION RATE: 18 BRPM | HEART RATE: 57 BPM | BODY MASS INDEX: 34.1 KG/M2 | WEIGHT: 204.7 LBS | SYSTOLIC BLOOD PRESSURE: 137 MMHG | TEMPERATURE: 97.2 F | HEIGHT: 65 IN | DIASTOLIC BLOOD PRESSURE: 65 MMHG

## 2021-06-03 VITALS — WEIGHT: 206.6 LBS | BODY MASS INDEX: 33.2 KG/M2 | HEIGHT: 66 IN

## 2021-06-03 VITALS — BODY MASS INDEX: 34.12 KG/M2 | WEIGHT: 208.2 LBS

## 2021-06-03 VITALS
HEART RATE: 64 BPM | BODY MASS INDEX: 34.31 KG/M2 | SYSTOLIC BLOOD PRESSURE: 136 MMHG | DIASTOLIC BLOOD PRESSURE: 66 MMHG | HEIGHT: 64 IN | WEIGHT: 201 LBS | RESPIRATION RATE: 16 BRPM

## 2021-06-03 VITALS
HEART RATE: 48 BPM | WEIGHT: 206.8 LBS | SYSTOLIC BLOOD PRESSURE: 120 MMHG | TEMPERATURE: 96.9 F | BODY MASS INDEX: 34.88 KG/M2 | DIASTOLIC BLOOD PRESSURE: 60 MMHG

## 2021-06-03 VITALS
WEIGHT: 207 LBS | DIASTOLIC BLOOD PRESSURE: 62 MMHG | BODY MASS INDEX: 34.49 KG/M2 | RESPIRATION RATE: 16 BRPM | SYSTOLIC BLOOD PRESSURE: 108 MMHG | HEART RATE: 64 BPM | HEIGHT: 65 IN

## 2021-06-03 VITALS
HEART RATE: 60 BPM | WEIGHT: 207 LBS | BODY MASS INDEX: 34.49 KG/M2 | DIASTOLIC BLOOD PRESSURE: 80 MMHG | HEIGHT: 65 IN | SYSTOLIC BLOOD PRESSURE: 126 MMHG | OXYGEN SATURATION: 98 %

## 2021-06-03 VITALS — WEIGHT: 207 LBS | HEIGHT: 66 IN | BODY MASS INDEX: 33.27 KG/M2

## 2021-06-03 VITALS — WEIGHT: 205 LBS | HEIGHT: 66 IN | BODY MASS INDEX: 32.95 KG/M2

## 2021-06-03 VITALS — BODY MASS INDEX: 35.65 KG/M2 | HEIGHT: 65 IN | WEIGHT: 214 LBS

## 2021-06-03 VITALS — BODY MASS INDEX: 33.27 KG/M2 | WEIGHT: 207 LBS | HEIGHT: 66 IN

## 2021-06-03 VITALS — HEIGHT: 65 IN | WEIGHT: 214.6 LBS | BODY MASS INDEX: 35.75 KG/M2

## 2021-06-03 DIAGNOSIS — F41.9 ANXIETY: ICD-10-CM

## 2021-06-03 DIAGNOSIS — I48.20 CHRONIC ATRIAL FIBRILLATION (H): ICD-10-CM

## 2021-06-03 LAB — INR PPP: 2.6 (ref 0.9–1.1)

## 2021-06-03 NOTE — TELEPHONE ENCOUNTER
ANTICOAGULATION  MANAGEMENT    Assessment     Today's INR result of 2.8 is Therapeutic (goal INR of 2.0-3.0)        Warfarin recently held as instructed which may be affecting INR - held warfarin Sun/Mon after ED visit for epistaxis and supratherapeutic INR    No new diet changes affecting INR    No new medication/supplements affecting INR    Continues to tolerate warfarin with no reported s/s of bleeding or thromboembolism     Previous INR was Supratherapeutic in ED    Plan:     Spoke with Agueda, regarding INR result and instructed:     Warfarin Dosing Instructions:  Change warfarin dose to 5 mg daily   (12.5 % change, adjusted from 4.96 ED result)    Instructed patient to follow up no later than: 2 weeks at 12/3 OV    Education provided: target INR goal and significance of current INR result, importance of following up for INR monitoring at instructed interval, importance of taking warfarin as instructed, importance of notifying clinic for changes in medications, monitoring for bleeding signs and symptoms and when to seek medical attention/emergency care    Agueda verbalizes understanding and agrees to warfarin dosing plan.    Instructed to call the [facility] for any changes, questions or concerns. (#[phone])   ?   Katie Mathur RN    Subjective/Objective:      Pee Ayala, a 79 y.o. male is on warfarin.     Pee reports:     Home warfarin dose: verbally confirmed home dose with Agueda and updated on anticoagulation calendar     Missed doses: No     Medication changes:  No     S/S of bleeding or thromboembolism:  Yes: in ED on 11/17 for epistaxis     New Injury or illness:  No     Changes in diet or alcohol consumption:  No     Upcoming surgery, procedure or cardioversion:  No    Anticoagulation Episode Summary     Current INR goal:   2.0-3.0   TTR:   39.8 %   Next INR check:   12/3/2019   INR from last check:   2.80 (11/19/2019)   Weekly max warfarin dose:      Target end date:      INR check  location:      Preferred lab:      Send INR reminders [facility]:   [facility]    Indications    Chronic atrial fibrillation [I48.20]           Comments:            Anticoagulation Care Providers     Provider Role Specialty Phone number    Jazzy Gil MD Referring Family Medicine [phone]

## 2021-06-03 NOTE — PROGRESS NOTES
VASCULAR SURGERY CLINIC PROGRESS NOTE    HPI: Mr. Ayala presents for follow-up of his peripheral vascular disease. He has not been able to perform his exercise program, but does walk daily. He is able to walk approximately 1/2 mile before having to stop due to claudication of his left leg. He localizes pain/craming from below to knee down. Resolves with rest. He has DM and attributes some pain to neuropathy.     Allergies, Medications, Social History, Past Medical History and Past Surgical History were reviewed and are noted in the chart.            /70   Pulse 60   Temp 97.7  F (36.5  C)   Resp 18   There is no height or weight on file to calculate BMI.    EXAM:  EYES: Grossly normal.  MOUTH: Buccal mucosa normal   CARDIAC:  Not assessed  CHEST/LUNG:  Not Assessed  MUSCULOSKELETAL: Grossly normal and both lower extremities are intact.  HEME/LYMPH: No lymphedema  NEUROLOGIC: Focally intact, Alert and oriented x 3.   PSYCH: appropriate affect  INTEGUMENT: No open lesions or ulcers.  Feet without wounds.      IMAGES:   Us Arterial Legs Bilateral Complete    Result Date: 11/6/2019  Arterial Duplex Ultrasound Lower Extremity Artery Evaluation Indication: SURVEILLANCE PAD, CLAUDICATION  Previous: 5/1/2019 History: Hypertension, Diabetic, Hyperlipidemia, PAD, CAD, MI and Claudication Technique: Duplex imaging is performed utilizing gray-scale, two-dimensional images, and color-flow imaging. Doppler waveform analysis and spectral Doppler imaging is also performed. LOWER EXTREMITY ARTERIAL DUPLEX EXAM WITH WAVEFORMS Right Leg:(cm/s) Location: Velocities Waveforms EIA:   84  T CFA:   88  B PFA:   72  B SFA Proximal:   100  B SFA Mid:   80  B SFA Distal:   83  B Popliteal Artery:   76  B PTA:   74   B SABI:   55  B DPA:   166  B Waveforms: T=Triphasic, M=Monophasic, B=Biphasic Left Leg:(cm/s) Location: Velocities Waveforms EIA:   141  B CFA:   125  B PFA:   109  B SFA Proximal:   46  B SFA Mid:   52  B SFA Distal:    49  B Popliteal Artery:   44  B PTA:   39   M SABI:   27  M DPA:   82  M Waveforms: T=Triphasic, M=Monophasic, B=Biphasic Comments:   Impression: Right Lower Extremity: Normal right leg arterial duplex with completely normal by and triphasic waveforms throughout suggesting no hemodynamically significant stenosis Left Lower Extremity: Proximally normal biphasic waveforms to the level of the popliteal artery suggesting no hemodynamically significant stenosis in the proximal leg.  Monophasic waveforms at the dorsalis pedis and posterior tibial artery level suggesting infrapopliteal disease. Reference: Category Normal 1-19% 20-49% 50-99% Occluded PSV <160 cm/sec without spectral broadening <160 cm/sec with spectral broadening Increased Increased Absent Flow Ratio N/A N/A < 2.0 >2.0 N/A Post-Stenotic Turbulence No No No Yes N/A     Us Arterial Pressures Legs Bilateral    Result Date: 11/6/2019      RESTING ANKLE-BRACHIAL INDICES (JESSENIA'S)  Indication: PAD, CLAUDICATION Previous: 5/1/2019 History: Hypertension, Diabetic, Hyperlipidemia, PAD, CAD, MI and Claudication         Right: mmHg Index   Brachial: 151  Ankle-(PT): >254 NC Ankle-(DP): >254 NC          Digit: 85 0.54              Left: mmHg Index    Brachial: 158  Ankle-(PT): >254 NC Ankle-(DP): >254 NC         Digit:  69 0.44 Resting ankle-brachial index of (Non Compressible) on the right. Toe Pressures of 85 mmHg and TBI of 0.54  Resting ankle-brachial index of (Non Compressible) on the left. Toe Pressures of 69 mmHg and TBI of 0.44  WAVEFORMS: The right dorsalis pedis and posterior tibial arteries show mildly blunted waveforms. The left dorsalis pedis and posterior tibial arteries show mildly blunted waveforms. Comments:   Impression:  Right JESSENIA is  Uninterpretable with a noncompressible JESSENIA and Toe Pressures of 85 mmHg which is normal. Left JESSENIA is Uninterpretable with a noncompressible JESSENIA and Toe Pressures of 69 mmHg which is at the lower threshold of normal..      Us Aorta Ivc Iliac Non Graft Complete    Result Date: 11/6/2019  Aorta Ultrasound Indication:  SURVEILLANCE AAA Previous: NONE History: Hypertension, Diabetic, Hyperlipidemia, PAD, CAD and Claudication Technique: Duplex imaging is performed utilizing gray-scale, two dimensional images, and color-flow imaging. Doppler waveform analysis and spectral Doppler imagine is also performed. Waveforms: Triphasic= T, Biphasic= B, Monophasic=M  Diameter (cm) AP X Width Velocities Waveform Proximal Aorta:   2.9 X 3.0 85  B Mid Aorta:   2.0 X 1.9 83  B Distal Aorta:   2.0 X 2.1 98  B Right KAN:   1.3 X 1.1 76  B Left KAN:   1.5 X 1.1 105  B Previous Dimensions:  NONE Location of Aneurysm:  suprarenal Aneurysm dimensions (AP x Width):   2.9 cm   X  3.0 cm Comments:   Impressions: Uncertain if patient truly has a 3 cm aorta as imaging quality in the suprarenal segment was very poor.  That said this does meet the threshold for abdominal aortic aneurysm and so ongoing surveillance is likely recommended. Reference: Category Normal Intermediate Severe Occluded  PSV  cm/s 151-300 cm/s <45 or >300cm/s Absent Flow Ratio <1.5 1.5-3.4 >3.4 N/A       I reviewed his Duplex with him. The raw numbers suggest Tibial disease in left lower extremtity      Assessment/Plan:   79M w/ hx of peripheral arterial disease consistent with diabetic distribution and non compressible tibal vessels. Duplex is grossly normal above knee with multi-phasic waveforms bilaterally. There is waveform dampening on his left lower extremity with low normal toe pressures on the left. Toe pressures on the right are normal.       Will follow up with patient in 1 year with following studies  obtain non-contrast CT to evaluate potential AAA  obtain JESSENIA and duplex to follow his LLE claudication      Guido Gaitan MD

## 2021-06-03 NOTE — TELEPHONE ENCOUNTER
----- Message from Lilli Guzman CNP sent at 11/22/2019  2:51 PM CST -----  Hayde, Patient maybe calling you for results. US shows moderate plaque bilateral carotids. Also, showed possible disease in left vertebral artery. I will inform his PMD of results. He wanted this done with his family hx of stroke and unsteadiness.    Pee Segovia requested a carotid ultrasound due to his unsteadiness and family history of stroke in his father.  I see you see him in a week or so.  Do not know if you recommend any further testing for him?  Wanted to inform you of this.  Thank you.

## 2021-06-03 NOTE — TELEPHONE ENCOUNTER
Pt is asking for a follow up appt with Dr. BOYD in early Dec. Pt stated he was told to make a follow up appt after he gets back from visiting [place]. Ok to use sameday or reserved slots? Please advise, thank you.

## 2021-06-03 NOTE — PROGRESS NOTES
Clinic Care Coordination Contact    CC received notification of Emergency Room visit.  ER visit occurred on 11/17/19 at [facility] with Dx of Epistaxis.    CC contacted member and left a message requesting a return call.  Member has a follow-up appointment with PCP:  No f/u needed with PCP but going to have INR recheck today  Member has had a change in condition: n/a  New referrals placed: No  Home Visit Needed: No  Care plan reviewed and updated.  PCP notified of ED visit via EMR.    Message sent to CHW to assist schedule f/u appt with PCP if needed

## 2021-06-03 NOTE — TELEPHONE ENCOUNTER
Please inform patient that his ultrasound shows some carotid stenosis.  I have referred him to vascular surgeon for further evaluation.  CTA has been recommended but I will let the vascular surgeon evaluate and order further testing and management.    Jazzy Gil MD  11/22/2019

## 2021-06-03 NOTE — TELEPHONE ENCOUNTER
Pt notified at 4:09 pm, relayed Dr Gil's message and recommendations. Pt states understanding and will await a call from specialty .  XIANG Johns\

## 2021-06-03 NOTE — PATIENT INSTRUCTIONS - HE
Pee Ayala,    It was a pleasure to see you today at [facility].     My recommendations after this visit include:  - Please follow up with Dr Talbert in March   - Please follow up with Lilli Guzman in 6 months  - Please follow up with your primary doctor regarding your balance issues  - Please schedule holter monitor per Dr Talbert's request  - Please schedule a carotid ultrasound at your convenience  - No changes to your medications    Lilli Guzman, CNP

## 2021-06-03 NOTE — PROGRESS NOTES
Reached out to patient today for monthly outreach call. Pee stares things are going well, he states he has no complaints for now. CHW and patient discussed home care services he is no longer receiving services at home as that stopped in June 2019t.  Pee did have a few questions regarding his medications, he is confused as he has some bottles that state do not take after a certain date- CHW has set up a visit with CCC FRANCISCO Zamarripa at [facility] 11/7/19 @10am to clarify he is taking the correct one.  Patient may benefit from weekly/monthly MSU    Home services was only CCC goal for patient, RN to discuss any new needs on appt 11/7/19    No other concerns at this time.  Next Outreach: 12/4/19

## 2021-06-03 NOTE — TELEPHONE ENCOUNTER
Left voicemail for patient regarding carotid ultrasound results.  Instructed him to call Hayde for results and recommendations.  I will also inform his PMD of results.

## 2021-06-03 NOTE — PROGRESS NOTES
"US AND JESSENIA BEFRE APPT-6m f/u. Leg pain with walking- PAD. Pt did not do a walking program. Pt does walk every day with dog, 0.5mile walks. Pt describes pain as a \"tired numbness\" in both legs which starts at about 0.25mile.  AAA screen today.  "

## 2021-06-03 NOTE — PROGRESS NOTES
I have seen and assess this patient with my fellow, Dr Gaitan.  Please see his not for details.  In summary, this patient has stable toe pressures and a duplex suggesting mild infrapopliteal disease on the left.  Sx consistent with stable 1/4 mile claudication.  No role for intervention in this diabetic male who is doing reasonably well.  Had a AAA duplex that showed possible 3cm AAA in the upper abdomen.  I think the visualization was poor and so will confirm with a CT on next visit.  To see our nurse practitioner at that time. JESSENIA repeat then too,

## 2021-06-04 VITALS
BODY MASS INDEX: 33.1 KG/M2 | SYSTOLIC BLOOD PRESSURE: 146 MMHG | DIASTOLIC BLOOD PRESSURE: 57 MMHG | WEIGHT: 202 LBS | HEART RATE: 54 BPM

## 2021-06-04 VITALS
RESPIRATION RATE: 16 BRPM | HEIGHT: 66 IN | HEART RATE: 49 BPM | WEIGHT: 202 LBS | BODY MASS INDEX: 32.47 KG/M2 | SYSTOLIC BLOOD PRESSURE: 130 MMHG | DIASTOLIC BLOOD PRESSURE: 70 MMHG

## 2021-06-04 VITALS
SYSTOLIC BLOOD PRESSURE: 106 MMHG | RESPIRATION RATE: 14 BRPM | WEIGHT: 198 LBS | HEIGHT: 66 IN | DIASTOLIC BLOOD PRESSURE: 76 MMHG | HEART RATE: 83 BPM | BODY MASS INDEX: 31.82 KG/M2

## 2021-06-04 VITALS
BODY MASS INDEX: 31.82 KG/M2 | HEIGHT: 66 IN | HEART RATE: 67 BPM | TEMPERATURE: 95.5 F | WEIGHT: 198 LBS | SYSTOLIC BLOOD PRESSURE: 122 MMHG | DIASTOLIC BLOOD PRESSURE: 81 MMHG | OXYGEN SATURATION: 99 %

## 2021-06-04 VITALS
BODY MASS INDEX: 31.82 KG/M2 | WEIGHT: 198 LBS | HEIGHT: 66 IN | HEART RATE: 62 BPM | SYSTOLIC BLOOD PRESSURE: 108 MMHG | DIASTOLIC BLOOD PRESSURE: 70 MMHG

## 2021-06-04 VITALS
SYSTOLIC BLOOD PRESSURE: 155 MMHG | HEART RATE: 71 BPM | BODY MASS INDEX: 31.89 KG/M2 | OXYGEN SATURATION: 98 % | WEIGHT: 194.6 LBS | DIASTOLIC BLOOD PRESSURE: 74 MMHG

## 2021-06-04 VITALS — BODY MASS INDEX: 31.3 KG/M2 | WEIGHT: 191 LBS

## 2021-06-04 NOTE — PATIENT INSTRUCTIONS - HE
Pee Ayala,    It was a pleasure to see you today at the [facility].     My recommendations after this visit include:    - Decrease Carvedilol from 12.5 mg two times a day to 6.25 mg two times a day. Please make sure to take Carvedilol with food. New prescription sent to pharmacy.    - Start Amlodipine 2.5 mg daily at bed time for your blood pressure.      - Stay on low sodium diet < 2000 mg/day, monitor daily weight (bring weight log book for each visit), and stay physically active as tolerated    -  Follow up with Geronimo in 2 weeks    - Please call Betina Adames [phone], if you have any questions or concerns    Geronimo Jones, CNP

## 2021-06-04 NOTE — PATIENT INSTRUCTIONS - HE
Pee Ayala,    It was a pleasure to see you today at the [facility].     My recommendations after this visit include:    - No medications changes made today     - I did some lab work today and will call you with results when available     - Stay on low sodium diet < 2000 mg/day, monitor daily weight (bring weight log book for each visit), and adequate fluid of a tleast 50-60 ounces per day    -Please call if you experience rapid weight gain 2-3 lbs two days in a row or 5 lbs in a week with worsening shortness of breath, lightheaded, dizziness, abdominal bloating, and leg swelling     - Please call your Podiatrist (Bharathi John) regarding side effects from Nortrypline    - Follow up with Dr. Talbert in March as recommended    - Follow up in Heart Failure Clinic with Lilli in May as recommended    - Please call Betina Adames [phone], if you have any questions or concerns    Geronimo Jones CNP

## 2021-06-04 NOTE — TELEPHONE ENCOUNTER
ANTICOAGULATION  MANAGEMENT PROGRAM    Pee Ayala is overdue for INR check.     Spoke with Pee and scheduled INR appointment on 12/12.      Cecy Dominguez RN

## 2021-06-04 NOTE — TELEPHONE ENCOUNTER
ANTICOAGULATION  MANAGEMENT    Assessment     Today's INR result of 2.3 is Therapeutic (goal INR of 2.0-3.0)        Warfarin taken as previously instructed    No new diet changes affecting INR    No new medication/supplements affecting INR    Continues to tolerate warfarin with no reported s/s of bleeding or thromboembolism     Previous INR was Therapeutic    Plan:     Spoke with Pee regarding INR result and instructed:     Warfarin Dosing Instructions:  Continue current warfarin dose 5 mg daily     Instructed patient to follow up no later than: 2 weeks    Education provided: importance of therapeutic range and target INR goal and significance of current INR result    Pee verbalizes understanding and agrees to warfarin dosing plan.    Instructed to call the [facility] for any changes, questions or concerns. (#[phone])   ?   Kendal Muniz RN    Subjective/Objective:      Peeleyla Ayala, a 79 y.o. male is on warfarin.     Pee reports:     Home warfarin dose: template incorrect; verbally confirmed home dose with Pee and updated on anticoagulation calendar     Missed doses: No     Medication changes:  No     S/S of bleeding or thromboembolism:  No     New Injury or illness:  No     Changes in diet or alcohol consumption:  No     Upcoming surgery, procedure or cardioversion:  No    Anticoagulation Episode Summary     Current INR goal:   2.0-3.0   TTR:   43.5 % (11.1 mo)   Next INR check:   12/26/2019   INR from last check:   2.30 (12/12/2019)   Weekly max warfarin dose:      Target end date:      INR check location:      Preferred lab:      Send INR reminders to:   [facility]    Indications    Chronic atrial fibrillation [I48.20]           Comments:            Anticoagulation Care Providers     Provider Role Specialty Phone number    Jazzy Gil MD Referring Family Medicine [phone]

## 2021-06-04 NOTE — PATIENT INSTRUCTIONS - HE
Take Nortriptyline every other day for 3 doses and then stop    DO NOT REFILL TRAZODONE    Start taking 1 pill a day of Mirapex and see if it is helping. You may increase to 2 tablets daily after one week and let me know,    Patient Education     Understanding Restless Legs Syndrome    Are you ever annoyed by a creeping or itching feeling in your legs? Do you often feel an urge to move your legs while sitting or lying in bed? This can keep you from falling asleep at night. You may then feel tired during the day. If you have these problems, talk to your healthcare provider. He or she can suggest a treatment plan and help you find ways to sleep better.  Restless legs syndrome (RLS)  RLS is a creeping, crawly, or jumpy feeling in the legs with an urge to move them. Symptoms of RLS often occur during periods of inactivity, such as when you sit or lie down at night. This discomfort can keep you from falling asleep. RLS is more common in older people and tends to run in families. Overuse of caffeine or alcohol may make symptoms worse. Iron deficiency, diabetes, or kidney problems can contribute to RLS.  Periodic limb movement syndrome (PLMS)  PLMS is sudden, repetitive leg jerking during sleep. The person you sleep with is often the one who notices it. Your legs may jerk many times during the night. You and your partner may both have trouble sleeping and feel tired in the morning. PLMS shouldn t be confused with the normal leg or body twitching many people have when first falling asleep.  Treating these problems  If these problems are causing disrupted sleep and daytime symptoms, treatment may be needed. Possible treatments may include:    Avoiding medicines like antidepressants, antinausea medicine, and other medicines that block dopamine    Iron supplements    Prescribed medicines including pramipexole, ropinirole, ritigotine, pregbalin, cabergoline, levodopa, and clonidine    Lifestyle changes, such as regular  exercise, controlling caffeine intake, alcohol, and smoking  Date Last Reviewed: 9/1/2017 2000-2019 [employer]. [address] [place] [address], [place] [address]. All rights reserved. This information is not intended as a substitute for professional medical care. Always follow your healthcare professional's instructions.

## 2021-06-04 NOTE — PROGRESS NOTES
Scheduled Follow Up Call: Attempt 1 Community Health Worker called and left a message for the patient. If the patient is returning my call, please transfer the patient to Amina at ext. [phone].    CHW left msg today following up on recent No Show appt with PCP on 12/3/19.    Plan: reschedule appointment/no active goals  Chart review for possible transition?     Next Outreach:12/16/19

## 2021-06-04 NOTE — TELEPHONE ENCOUNTER
"Patient was in clinic today.  He states that he got a call 12/17/19 and was informed that he has an appointment at the [facility] 12/18/19 at 2.45p for the placement of a \"24 hour blood sugar patch\".    Reviewed patient's chart, reached out to Diabetic educator department and Endocrinology department and verified that patient does not have an appointment today.    Patient has an appointment with Diabetic educator 1/27/19 at 2pm ( arrival time 1.45p) at the [facility].    Patient left before this information was relied to him.   Called and left message with patient's spouse.        "

## 2021-06-04 NOTE — TELEPHONE ENCOUNTER
ANTICOAGULATION  MANAGEMENT    Assessment     Today's INR result of 1.8 is Subtherapeutic (goal INR of 2.0-3.0)        Missed dose(s) may be affecting INR    No new diet changes affecting INR    Interaction between Notriptyline and warfarin may be affecting INR. Ends in 3 days (started 2 weeks ago)    Continues to tolerate warfarin with no reported s/s of bleeding or thromboembolism     Previous INR was Therapeutic    Plan:     Spoke with Pee regarding INR result and instructed:     Warfarin Dosing Instructions:  one time booster of 7.5mg tonight then continue current warfarin dose 5 mg daily   Instructed patient to follow up no later than: two weeks    Education provided: importance of therapeutic range and potential interaction between warfarin and Nortryptyline    Pee verbalizes understanding and agrees to warfarin dosing plan.    Instructed to call the [facility] for any changes, questions or concerns. (#[phone])   ?   Cecy Dominguez RN    Subjective/Objective:      Pee Ayala, a 79 y.o. male is on warfarin.     Pee reports:     Home warfarin dose: template incorrect; verbally confirmed home dose with Pee and updated on anticoagulation calendar     Missed doses: Yes: one dose missed on 12/30     Medication changes:  Yes: stopping Nortriptyline in 3 days      S/S of bleeding or thromboembolism:  No     New Injury or illness:  No     Changes in diet or alcohol consumption:  No     Upcoming surgery, procedure or cardioversion:  No    Anticoagulation Episode Summary     Current INR goal:   2.0-3.0   TTR:   44.5 % (11.8 mo)   Next INR check:   1/16/2020   INR from last check:   1.80! (1/2/2020)   Weekly max warfarin dose:      Target end date:      INR check location:      Preferred lab:      Send INR reminders to:   [facility]    Indications    Chronic atrial fibrillation [I48.20]           Comments:            Anticoagulation Care Providers     Provider Role Specialty Phone  number    Jazzy Gil MD [facility] [phone]

## 2021-06-04 NOTE — TELEPHONE ENCOUNTER
Who is calling:  patient  Reason for Call:  Patient returning missed call from AC[facility] team on today's INR   At time of patient's call ACN was unavailable.   Date of last appointment with primary care: 01/02/20   Okay to leave a detailed message: No

## 2021-06-04 NOTE — PROGRESS NOTES
Vascular Medicine Progress note    Dr Bull Hoskins MD, Vascular Medicine      Pee Ayala    Medical Record #:  [id number]    Date of Service: 12/16/2019     Date last seen:  Visit date not found    PRIMARY CARE PROVIDER: Jazzy Gil MD      IMPRESSION/PLAN: Patient is here for left neck pain and Doppler carotid arterial ultrasound bilateral which showed left atheromatous plaque with abnormal vertebral artery waveform the velocity in the ICA on the left side was 2 1 6 cm/s with an ICA/CCA ratio of 3.5 by velocity criteria patient is in the range of severe disease although he was classified by 50 to 69% stenosis  Patient is asymptomatic apart from left side of the neck pain which happens occasionally and occasional headaches that the patient mentioned that he never have headaches  Patient is also having trouble with his gait and weak balance patient mentioned that the condition has been going on for the past 6 months in addition he has problems in controlling his bladder when asked about back pain patient mentioned that he has back pain and he did receive a shot in the back about a year ago yet he has no image evidence of spinal stenosis    Patient denies any history of claudication  Patient denies any history of shortness of breath palpitations or chest pain    Patient vascular risk factors includes  Age  Hypertension  Diabetes his A1c is 8%  Hyperlipidemia no recent labs were done for his lipid profile  Patient is non-smoker    DISPOSITION:  1- CTA head and neck  2- CT lumbosacral spine with no contrast  3- check A1c, CBC CMP and lipid profile  4- we will see the patient once test results are available      ______________________________________________________________________    Subjective:    ALLERGIES:  Oxycodone and Lisinopril    MEDS:    Current Outpatient Medications:      acetaminophen (TYLENOL) 500 MG tablet, Take 1,000 mg by mouth every 6 (six) hours as needed.    , Disp: , Rfl:      aspirin  "81 mg chewable tablet, Chew 81 mg daily.    , Disp: , Rfl:      blood glucose meter (GLUCOMETER), Use 1 each As Directed as needed. Dispense glucometer brand per patient's insurance at pharmacy discretion., Disp: 1 each, Rfl: 0     blood glucose test strips, Use 1 each As Directed as needed. Dispense brand per patient's insurance at pharmacy discretion., Disp: 200 strip, Rfl: 5     carvedilol (COREG) 6.25 MG tablet, Take 1 tablet (6.25 mg total) by mouth 2 (two) times a day with meals., Disp: 60 tablet, Rfl: 1     finasteride (PROSCAR) 5 mg tablet, TAKE ONE TABLET BY MOUTH ONCE DAILY IN  ADDITION  TO  FLOMAX, Disp: , Rfl:      furosemide (LASIX) 20 MG tablet, Take 1 tablet (20 mg total) by mouth 2 (two) times a day at 9am and 6pm., Disp: 180 tablet, Rfl: 0     gabapentin (NEURONTIN) 300 MG capsule, Take 2 capsules (600 mg total) by mouth 2 (two) times a day., Disp: 360 capsule, Rfl: 3     glimepiride (AMARYL) 4 MG tablet, TAKE ONE TABLET BY MOUTH TWICE DAILY, Disp: , Rfl:      hydrOXYzine HCl (ATARAX) 25 MG tablet, Take 1 tablet (25 mg total) by mouth 3 (three) times a day as needed for itching., Disp: 30 tablet, Rfl: 10     insulin NPH-insulin regular (NOVOLIN 70/30 U-100 INSULIN) 100 unit/mL (70-30) injection, Inject 20 Units under the skin 2 (two) times a day. 20 in the morning and at bedtime., Disp: , Rfl:      insulin syringe-needle U-100 0.3 mL 31 gauge x 15/64\" Syrg, Use 1 each As Directed 2 (two) times a day., Disp: 100 Syringe, Rfl: 11     metFORMIN (GLUCOPHAGE) 500 MG tablet, Take 2 tablets (1,000 mg total) by mouth 2 (two) times a day., Disp: 360 tablet, Rfl: 3     nortriptyline (PAMELOR) 50 MG capsule, Take 1 capsule (50 mg total) by mouth at bedtime., Disp: 30 capsule, Rfl: 0     omeprazole (PRILOSEC) 40 MG capsule, Take 40 mg by mouth daily as needed.    , Disp: , Rfl:      rosuvastatin (CRESTOR) 20 MG tablet, Take 1 tablet (20 mg total) by mouth at bedtime., Disp: 90 tablet, Rfl: 3     traMADol " (ULTRAM) 50 mg tablet, Take 1 tablet (50 mg total) by mouth at bedtime as needed., Disp: 30 tablet, Rfl: 0     warfarin (COUMADIN/JANTOVEN) 5 MG tablet, Take 5mg daily by mouth as directed, Disp: 90 tablet, Rfl: 1     alfuzosin (UROXATRAL) 10 mg 24 hr tablet, Take 10 mg by mouth at bedtime.    , Disp: , Rfl:      amLODIPine (NORVASC) 2.5 MG tablet, Take 1 tablet (2.5 mg total) by mouth at bedtime., Disp: 30 tablet, Rfl: 0    REVIEW OF SYSTEMS:    A 12 point ROS was reviewed and except for what is listed in the HPI above, all others are negative    Objective:    PE:  /68 (Patient Site: Right Arm, Patient Position: Sitting, Cuff Size: Adult Regular)   Pulse 62   Temp 97.9  F (36.6  C) (Oral)   Resp 18   Wt Readings from Last 1 Encounters:   12/10/19 202 lb (91.6 kg)     There is no height or weight on file to calculate BMI.    EXAM:  GENERAL: This is a well-developed 79 y.o. male who appears his stated age  EYES: Grossly normal.  MOUTH: Buccal mucosa normal   CARDIAC:  Normal S1 and S2, no Murmur  CHEST/LUNG:  Clear lung fields bilaterally  GASTROINTESINAL (ABDOMEN):Soft, non-tender, B/S present, no pulsatile mass     MUSCULOSKELETAL: Grossly normal and both lower extremities are intact.  HEME/LYMPH: No lymphedema  NEUROLOGIC: Focally intact, Alert and oriented x 3.   PSYCH: appropriate affect  INTEGUMENT: No open lesions or ulcers  Pulse Exam: palpable  Circumferential measures:  No flowsheet data found.        DIAGNOSTIC STUDIES:     Us Carotid Bilateral    Result Date: 11/22/2019  EXAM: US CAROTID BILATERAL LOCATION: [facility] DATE/TIME: 11/22/2019 11:11 AM INDICATION: Unsteadiness on feet coronary artery disease COMPARISON: None. TECHNIQUE: Duplex exam performed utilizing 2D gray-scale imaging, Doppler interrogation with color-flow and spectral waveform analysis. FINDINGS: RIGHT: There is mild to moderate atheromatous plaque. Normal waveforms with no significant stenosis. LEFT: There is moderate  atheromatous plaque. Abnormal vertebral artery waveform Both vertebral arteries and subclavian artery waveforms are normal. VELOCITY CHART: The following velocities were obtained in the RIGHT carotid system. CCA: 54 cm/s ICA: 95 cm/s ECA: 69 cm/s ICA/CCA: PS 1.8 The following velocities were obtained in the LEFT carotid system. CCA: 62 cm/s ICA: 216 cm/s ECA: 67 cm/s ICA/CCA: PS 3.5     1. Moderate atheromatous plaque in the carotid arteries. 2. The mid/distal left internal carotid artery demonstrates moderate disease (50-69% stenosis) by velocity criteria with a maximum systolic velocity of 216 cm/s. ICA/CCA systolic ratio however is elevated within range of severe disease. 3. Abnormal left vertebral artery waveform suggestive of significant proximal disease. Consider CTA of the head/neck for further evaluation. Evaluation based on velocities and NASCET criteria.    Us Arterial Legs Bilateral Complete    Result Date: 11/6/2019  Arterial Duplex Ultrasound Lower Extremity Artery Evaluation Indication: SURVEILLANCE PAD, CLAUDICATION  Previous: 5/1/2019 History: Hypertension, Diabetic, Hyperlipidemia, PAD, CAD, MI and Claudication Technique: Duplex imaging is performed utilizing gray-scale, two-dimensional images, and color-flow imaging. Doppler waveform analysis and spectral Doppler imaging is also performed. LOWER EXTREMITY ARTERIAL DUPLEX EXAM WITH WAVEFORMS Right Leg:(cm/s) Location: Velocities Waveforms EIA:   84  T CFA:   88  B PFA:   72  B SFA Proximal:   100  B SFA Mid:   80  B SFA Distal:   83  B Popliteal Artery:   76  B PTA:   74   B SABI:   55  B DPA:   166  B Waveforms: T=Triphasic, M=Monophasic, B=Biphasic Left Leg:(cm/s) Location: Velocities Waveforms EIA:   141  B CFA:   125  B PFA:   109  B SFA Proximal:   46  B SFA Mid:   52  B SFA Distal:   49  B Popliteal Artery:   44  B PTA:   39   M SABI:   27  M DPA:   82  M Waveforms: T=Triphasic, M=Monophasic, B=Biphasic Comments:   Impression: Right Lower  Extremity: Normal right leg arterial duplex with completely normal by and triphasic waveforms throughout suggesting no hemodynamically significant stenosis Left Lower Extremity: Proximally normal biphasic waveforms to the level of the popliteal artery suggesting no hemodynamically significant stenosis in the proximal leg.  Monophasic waveforms at the dorsalis pedis and posterior tibial artery level suggesting infrapopliteal disease. Reference: Category Normal 1-19% 20-49% 50-99% Occluded PSV <160 cm/sec without spectral broadening <160 cm/sec with spectral broadening Increased Increased Absent Flow Ratio N/A N/A < 2.0 >2.0 N/A Post-Stenotic Turbulence No No No Yes N/A     Us Arterial Pressures Legs Bilateral    Result Date: 11/6/2019      RESTING ANKLE-BRACHIAL INDICES (JESSENIA'S)  Indication: PAD, CLAUDICATION Previous: 5/1/2019 History: Hypertension, Diabetic, Hyperlipidemia, PAD, CAD, MI and Claudication         Right: mmHg Index   Brachial: 151  Ankle-(PT): >254 NC Ankle-(DP): >254 NC          Digit: 85 0.54              Left: mmHg Index    Brachial: 158  Ankle-(PT): >254 NC Ankle-(DP): >254 NC         Digit:  69 0.44 Resting ankle-brachial index of (Non Compressible) on the right. Toe Pressures of 85 mmHg and TBI of 0.54  Resting ankle-brachial index of (Non Compressible) on the left. Toe Pressures of 69 mmHg and TBI of 0.44  WAVEFORMS: The right dorsalis pedis and posterior tibial arteries show mildly blunted waveforms. The left dorsalis pedis and posterior tibial arteries show mildly blunted waveforms. Comments:   Impression:  Right JESSENIA is  Uninterpretable with a noncompressible JESSENIA and Toe Pressures of 85 mmHg which is normal. Left JESSENIA is Uninterpretable with a noncompressible JESSENIA and Toe Pressures of 69 mmHg which is at the lower threshold of normal..     Us Aorta Ivc Iliac Non Graft Complete    Result Date: 11/6/2019  Aorta Ultrasound Indication:  SURVEILLANCE AAA Previous: NONE History: Hypertension,  Diabetic, Hyperlipidemia, PAD, CAD and Claudication Technique: Duplex imaging is performed utilizing gray-scale, two dimensional images, and color-flow imaging. Doppler waveform analysis and spectral Doppler imagine is also performed. Waveforms: Triphasic= T, Biphasic= B, Monophasic=M  Diameter (cm) AP X Width Velocities Waveform Proximal Aorta:   2.9 X 3.0 85  B Mid Aorta:   2.0 X 1.9 83  B Distal Aorta:   2.0 X 2.1 98  B Right KAN:   1.3 X 1.1 76  B Left KAN:   1.5 X 1.1 105  B Previous Dimensions:  NONE Location of Aneurysm:  suprarenal Aneurysm dimensions (AP x Width):   2.9 cm   X  3.0 cm Comments:   Impressions: Uncertain if patient truly has a 3 cm aorta as imaging quality in the suprarenal segment was very poor.  That said this does meet the threshold for abdominal aortic aneurysm and so ongoing surveillance is likely recommended. Reference: Category Normal Intermediate Severe Occluded  PSV  cm/s 151-300 cm/s <45 or >300cm/s Absent Flow Ratio <1.5 1.5-3.4 >3.4 N/A     I personally reviewed the following imaging today and those on care everywhere, if indicated    LABS:      Sodium   Date Value Ref Range Status   08/13/2019 138 136 - 145 mmol/L Final   07/12/2019 137 136 - 145 mmol/L Final   05/22/2019 137 136 - 145 mmol/L Final     Potassium   Date Value Ref Range Status   08/13/2019 4.4 3.5 - 5.0 mmol/L Final   07/12/2019 4.4 3.5 - 5.0 mmol/L Final   05/22/2019 5.1 (H) 3.5 - 5.0 mmol/L Final     Chloride   Date Value Ref Range Status   08/13/2019 106 98 - 107 mmol/L Final   07/12/2019 105 98 - 107 mmol/L Final   05/22/2019 104 98 - 107 mmol/L Final     BUN   Date Value Ref Range Status   08/13/2019 16 8 - 28 mg/dL Final   07/12/2019 17 8 - 28 mg/dL Final   05/22/2019 23 8 - 28 mg/dL Final     Creatinine   Date Value Ref Range Status   08/13/2019 0.87 0.70 - 1.30 mg/dL Final   07/12/2019 0.94 0.70 - 1.30 mg/dL Final   05/22/2019 0.85 0.70 - 1.30 mg/dL Final     Hemoglobin   Date Value Ref Range Status    04/04/2019 12.6 (L) 14.0 - 18.0 g/dL Final   10/06/2014 11.3 (L) 14.0 - 18.0 g/dL Final   10/02/2014 10.2 (L) 14.0 - 18.0 g/dL Final     Platelets   Date Value Ref Range Status   04/04/2019 159 140 - 440 thou/uL Final   10/06/2014 249 140 - 440 thou/uL Final   10/02/2014 275 140 - 440 thou/uL Final     BNP   Date Value Ref Range Status   05/09/2019 96 (H) 0 - 80 pg/mL Final     POC INR   Date Value Ref Range Status   06/06/2019 2.90 (!) 0.90 - 1.10    05/22/2019 5.90 (!) 0.90 - 1.10 Final     Comment:     Critical value at 5.9 forwarded to pool C, contacted anticoag nurse and mallory a stat lab. Also contacted Mar Fink regarding patients dizziness, she will be contacting patient. Please call patient regarding dosing at [phone] BS, CMA      INR   Date Value Ref Range Status   12/12/2019 2.30 (H) 0.90 - 1.10 Final   11/19/2019 2.80 (H) 0.90 - 1.10 Final   11/17/2019 4.96 (H) 0.90 - 1.10 Final       Total time spent 35 (15,25,35) minutes face to face with patient with more than 50% time spent in counseling and coordination of care.    Bull Hoskins MD  VASCULAR MEDICINE

## 2021-06-04 NOTE — PROGRESS NOTES
MTM Transition of Care Encounter  Assessment & Plan                                                     Post Discharge Medication Reconciliation Status: discharge medications reconciled, continue medications without change    1. Essential hypertension  Elevated today, however previously intolerant to amlodipine.  He is closely following up with cardiology nurse practitioner, recommend to continue off of amlodipine until follow-up at that time, to reassess antihypertensives.  -Continue off amlodipine, follow-up with cardiology as scheduled    2. Heart failure with preserved ejection fraction (H)  Well compensated per cardiology nurse practitioner, reviewed lower dose of carvedilol which she is tolerating well, recommend to continue monitor weights, as adjustment below in gabapentin may also result in a decrease in weight.    3. Chronic atrial fibrillation  Stable, reviewed episode of epistaxis at emergency department, INR has now stabilized, recommend continue anticoagulation management with anticoagulation nurse program.    4. Type 2 diabetes mellitus with both eyes affected by proliferative retinopathy without macular edema, with long-term current use of insulin (H)  At goal A1c less than 8% per ADA guidelines, reviewed current insulin regimen, he would need to monitor blood sugars at least 4 times daily to better understand how to adjust his insulin.  At this time he does not qualify for a CGM per Medicare guidelines, however he would qualify for a professional freestyle jerson for 2-week assessment.  Will refer to diabetes education to apply the device.  This will ideally help identify as if he is experiencing episodes of hyperglycemia or hypoglycemia.  Provide education on high-protein snacks to eat at bedtime.  - Ambulatory referral to Diabetes Education Program (CDE) -professional freestyle jerson    5. Polyneuropathy  6. Restless legs syndrome (RLS)  Reviewed podiatry notes, he is also had resolution of his  restless leg syndrome symptoms when they occur eating a pickle.  Discussed that if he is able to manage his symptoms nonpharmacologically, I would consider trial off of gabapentin.  If symptoms resume and at the pickle does not address his symptoms, consider restarting nortriptyline per podiatry.  -Discontinue gabapentin  -If needed, switch to nortriptyline per podiatry    7. Back pain, unspecified back location, unspecified back pain laterality, unspecified chronicity  Stable. Recommended to continue current regimen.     8. BPH (benign prostatic hyperplasia)  Stable, not requiring alpha-blocker for symptom management, will refill finasteride at this time.  - refill finasteride    9. Anxiety  Reviewed significant anxiety, as previously discussed with PCP.  He does not have a medication at home at this time to help manage his symptoms, he would prefer to have something that he could take on an as-needed basis if he knows he will drive somewhere with bridges, etc.  He did not feel that gabapentin was effective for anxiety type symptoms.  Per discussion with PCP will trial hydroxyzine again, as he did not actually take this while on vacation.  -Per PCP, trial of hydroxyzine as needed    Follow Up  Return in about 2 weeks (around 12/31/2019) for Medication Management Pharmacist, via [employer]    Subjective & Objective                                                       Pee Ayala is a 79 y.o. male coming in for a transitions of care visit. he was discharged from [facility] emergency department on November 17, 2019 for epistaxis.    Chief Complaint: [place] management/hospital follow-up    Medication Adherence/Access: Stable, no issues identified.    Hypertension: Notes that after his last follow-up appointment with cardiology, he is getting so dizzy from adding amlodipine he was getting dry heaves. He was feeling dizzy until about noon during this time.  He has since stopped amlodipine.  Carvedilol was also cut  back. He is starting to monitor his pulse at home more regularly.     HRpEF: two times a day dosing for furosemide, 8am and 2pm, no issues with nocturia.     Atrial Fibrillation: had a nose bleed that lasted for about 8 hours. At that time INR was 4.96, this got cauterized.  Since then his INR has stabilized.  Lab Results   Component Value Date    INR 2.30 (H) 12/12/2019    INR 2.80 (H) 11/19/2019    INR 4.96 (H) 11/17/2019     Type 2 diabetes: tolerating metformin without adverse effects, excited about most recent A1C result. He is looking for better snacks during the night. Having low sugars in the next AM. He is trying to check his sugars three times a day, some days are just twice daily.   Lab Results   Component Value Date    HGBA1C 7.0 (H) 12/16/2019    HGBA1C 8.0 (H) 08/13/2019    HGBA1C 7.5 (H) 03/28/2019     Lab Results   Component Value Date    LDLCALC 50 12/16/2019    CREATININE 0.87 08/13/2019     Neuropathy: On gabapentin for neuropathy.  Additionally his podiatrist gave him nortriptyline 50 mg also be taken at bedtime for neuropathy.  It was felt that perhaps nortriptyline would be more effective than gabapentin.    RLS: Has been getting RLS symptoms, was told to try to eat a pickle spear and this does seem to work.  He would be happy to cut back on medications if possible, and a trial eating a pickle instead.  He is aware that this has more salt, and will continue to monitor his weight.    Back pain: uses tylenol as needed at night, if wakes up from pain will take tramadol. May take tramadol 1-2 times per week, he tries to keep this to a minimum. Denies constipation with this.     BPH: needs a refill of finasteride. He just ran out for about two days. Not taking alfluzosin or tamsulosin, only finasteride. No symptoms of urgency, frequency, or emptying bladder    Anxiety: Significant anxiety around [place], he has been treating his anxiety for many years.  But 2 to 3 years ago he even went through  hypnosis, which worked for about 3 months and then his anxiety has slowly come back.  He would like to evaluate this again.  He was previously prescribed hydroxyzine however he never took this when he was on vacation on a cruise, as the prescription noted that it was for itching.  Reviewed with PCP who is agreeable to him trying this is again.      PMH: reviewed in EPIC   Allergies/ADRs: reviewed in EPIC   Alcohol: rarely   Tobacco:   Social History     Tobacco Use   Smoking Status Former Smoker     Last attempt to quit: 1998     Years since quittin.9   Smokeless Tobacco Never Used     Recent Vitals:   BP Readings from Last 3 Encounters:   19 146/57   19 118/68   12/10/19 130/70      Wt Readings from Last 3 Encounters:   19 202 lb (91.6 kg)   12/10/19 202 lb (91.6 kg)   19 196 lb (88.9 kg)     ----------------    The patient was given a summary of these recommendations as an after visit summary    I spent 60 minutes with this patient today;  . All changes were made via collaborative practice agreement with Jazzy Gil MD. A copy of the visit note was provided to the patient's provider.     Rodolfo Curry, PharmD, [facility]  Medication Management (MTM) Pharmacist  [facility]      Current Outpatient Medications   Medication Sig Dispense Refill     acetaminophen (TYLENOL) 650 MG CR tablet Take 1,300 mg by mouth as needed.       antiox.mv no.10-omeg3s-lut-starr 280-10-2 mg cap Take 1 tablet by mouth 2 (two) times a day.       aspirin 81 mg chewable tablet Chew 81 mg daily.              blood glucose meter (GLUCOMETER) Use 1 each As Directed as needed. Dispense glucometer brand per patient's insurance at pharmacy discretion. 1 each 0     blood glucose test strips Use 1 each As Directed as needed. Dispense brand per patient's insurance at pharmacy discretion. 200 strip 5     carvedilol (COREG) 6.25 MG tablet Take 1 tablet (6.25 mg total) by mouth 2  "(two) times a day with meals. 60 tablet 1     finasteride (PROSCAR) 5 mg tablet Take 1 tablet (5 mg total) by mouth daily. 90 tablet 3     furosemide (LASIX) 20 MG tablet Take 1 tablet (20 mg total) by mouth 2 (two) times a day at 9am and 6pm. 180 tablet 0     insulin NPH-insulin regular (NOVOLIN 70-30) 100 unit/mL (70-30) injection Inject under the skin. Inject 22 units in AM and 10-11 units in PM       insulin syringe-needle U-100 0.3 mL 31 gauge x 15/64\" Syrg Use 1 each As Directed 2 (two) times a day. 100 Syringe 11     metFORMIN (GLUCOPHAGE) 500 MG tablet Take 2 tablets (1,000 mg total) by mouth 2 (two) times a day. 360 tablet 3     nortriptyline (PAMELOR) 50 MG capsule Take 1 capsule (50 mg total) by mouth at bedtime. 30 capsule 0     omeprazole (PRILOSEC) 40 MG capsule Take 40 mg by mouth daily as needed.              rosuvastatin (CRESTOR) 20 MG tablet Take 1 tablet (20 mg total) by mouth at bedtime. 90 tablet 3     traMADol (ULTRAM) 50 mg tablet Take 1 tablet (50 mg total) by mouth at bedtime as needed. 30 tablet 0     warfarin (COUMADIN/JANTOVEN) 5 MG tablet Take 5mg daily by mouth as directed 90 tablet 1     hydrOXYzine HCl (ATARAX) 25 MG tablet Take 1 tablet (25 mg total) by mouth 3 (three) times a day as needed for anxiety. 90 tablet 11     No current facility-administered medications for this visit.            "

## 2021-06-04 NOTE — PROGRESS NOTES
CCC Outreach completed on: 12/23/19    PCAM:  7/10/19    New Barriers:  RLS- Restless Leg Syndrome  Affecting patient at least 5x per week. He is having pain while sitting, or laying donw and has at time woken him up from sleep.      Follow up from last outreach/progress made:  Patient does not have any current CCC goals.  CHW will send chart review to CCC RN for maintenance or possible graduation    CCC Goals:NONE    Medications: No concerns per wife and patient      Future Appointments: 1/2/2020 @ 11:10am with PCP  Patient did have no show with PCP 12/3/19    Next Outreach: 1/23/2020

## 2021-06-05 VITALS — WEIGHT: 201 LBS | BODY MASS INDEX: 33.45 KG/M2

## 2021-06-05 VITALS
BODY MASS INDEX: 32.78 KG/M2 | HEIGHT: 66 IN | SYSTOLIC BLOOD PRESSURE: 140 MMHG | WEIGHT: 204 LBS | DIASTOLIC BLOOD PRESSURE: 70 MMHG | HEART RATE: 64 BPM | RESPIRATION RATE: 14 BRPM

## 2021-06-05 VITALS — BODY MASS INDEX: 31.18 KG/M2 | HEIGHT: 66 IN | WEIGHT: 194 LBS

## 2021-06-05 VITALS — WEIGHT: 206.4 LBS | BODY MASS INDEX: 33.17 KG/M2 | HEIGHT: 66 IN

## 2021-06-05 VITALS
WEIGHT: 202 LBS | HEART RATE: 67 BPM | SYSTOLIC BLOOD PRESSURE: 132 MMHG | DIASTOLIC BLOOD PRESSURE: 55 MMHG | RESPIRATION RATE: 20 BRPM | BODY MASS INDEX: 33.61 KG/M2 | TEMPERATURE: 98 F

## 2021-06-05 VITALS — WEIGHT: 200 LBS | BODY MASS INDEX: 33.28 KG/M2

## 2021-06-05 VITALS
HEART RATE: 68 BPM | WEIGHT: 194 LBS | BODY MASS INDEX: 31.18 KG/M2 | DIASTOLIC BLOOD PRESSURE: 60 MMHG | SYSTOLIC BLOOD PRESSURE: 132 MMHG | RESPIRATION RATE: 14 BRPM | HEIGHT: 66 IN

## 2021-06-05 VITALS — BODY MASS INDEX: 33.28 KG/M2 | WEIGHT: 200 LBS

## 2021-06-05 VITALS — BODY MASS INDEX: 33.99 KG/M2 | HEIGHT: 65 IN | WEIGHT: 204 LBS

## 2021-06-05 VITALS — BODY MASS INDEX: 33.32 KG/M2 | WEIGHT: 200 LBS | HEIGHT: 65 IN

## 2021-06-05 VITALS — WEIGHT: 204 LBS | BODY MASS INDEX: 33.95 KG/M2

## 2021-06-05 VITALS — WEIGHT: 200.6 LBS | BODY MASS INDEX: 33.38 KG/M2

## 2021-06-05 VITALS
HEART RATE: 60 BPM | DIASTOLIC BLOOD PRESSURE: 68 MMHG | BODY MASS INDEX: 32.47 KG/M2 | SYSTOLIC BLOOD PRESSURE: 142 MMHG | WEIGHT: 202 LBS | HEIGHT: 66 IN | RESPIRATION RATE: 16 BRPM

## 2021-06-05 VITALS — WEIGHT: 203 LBS | BODY MASS INDEX: 33.78 KG/M2

## 2021-06-05 NOTE — PROGRESS NOTES
Assessment: pt seen today for DM education. He brings in BG meter, most recent readings:   FB, 151, 142, 148, 186, 147, 194, 98, 211, 160, 110, 142, 111, 153, 150, 181, 163, 95, 120  5pm: 292  6-7pm: 221, 298, 337, 347  9pm: 350, 390, 304, 260, 202    Pt states he has a low BG about twice a month. He states it only happens during the night and is usually if he forgets his HS snack. Pt reports lows are not severe. Reviewed signs and symptoms of hypoglycemia and treatment. Denies any lows during the day.   He is eating regularly and seems to be doing decently at this. Breakfast around 8am; lunch 12-1pm; dinner 5-8pm; snacks during the day are fruit or trail mix. He eats protein at HS.   Discussed exercise, pt states he walks his dog to the dog park every day, unless it is below 20 degrees. The walk is about a 1/2 mile.     Discussed possibly doing a pro CGM today. Pt declines, he states if BG worsen he will do it in the future. He may need additional insulin as his evening #'s seem high, however not enough data. As A1c is good, will continue as is right now. If worsens we should do the CGM.     Plan: continue checking BG daily, trying to eat healthy and getting daily activity. Call if BG patterns worsen.     Subjective and Objective:      Pee Ayala is referred by Dr. Gil for Diabetes Education.     Lab Results   Component Value Date    HGBA1C 7.0 (H) 2019         Current diabetes medications:  Metformin 1g two times a day, 70/30: 22 units in the morning, evening: if BG >300 will take 15 units, if >300 will take 12 units     Follow up:   CDE PRN       Education:     Monitoring   Meter (per above goals): Assessed and Discussed  Monitoring: Assessed and Discussed  BG goals: Discussed    Nutrition Management  Nutrition Management: Assessed and Discussed  Weight: Not addressed  Portions/Balance: Assessed and Discussed  Carb ID/Count: Assessed and Discussed  Label Reading: Assessed and  Discussed  Heart Healthy Fats: Assessed and Discussed  Menu Planning: Assessed and Discussed  Dining Out: Assessed and Discussed  Physical Activity: Assessed and Discussed  Medications: Discussed  Orals: Discussed  Injected Medications: Discussed   Storage/Exp:Not addressed   Site Rotation: Discussed   Sites Assessed: no    Diabetes Disease Process: Discussed    Acute Complications: Prevent, Detect, Treat:  Hypoglycemia: Assessed and Discussed  Hyperglycemia: Assessed and Discussed  Sick Days: Discussed  Driving: Discussed    Chronic Complications  Foot Care:Discussed  Skin Care: Not addressed  Eye: Not addressed  ABC: Assessed and Discussed  Teeth:Not addressed  Goal Setting and Problem Solving: Assessed and Discussed  Barriers: Assessed and Discussed  Psychosocial Adjustments: Assessed and Discussed      Time spent with the patient: 60 minutes for diabetes education and counseling.   Previous Education: yes  Visit Type:SHAUNA Akers  1/27/2020

## 2021-06-05 NOTE — PROGRESS NOTES
Scheduled Follow Up Call: Attempt 1 Community Health Worker called and left a message for the patient. If the patient is returning my call, please transfer the patient to Amina at ext. [phone].    Patient yuri been enrolled with [facility] since 7/10/2019, at this time he does not have any goals he is currently working on.  Last PCP appt was on 12/24/2019 with Dr. BOYD(next PCP f/u due 4/2020) and had an initial consult with Diabetic Educator Nayeli on 1/27/2020    CHW will send chart review to [facility] FRANCISCO Zamarripa, for transitioning patient to Maintenance.    Next Outreach: 2/10/2020

## 2021-06-05 NOTE — TELEPHONE ENCOUNTER
Naima has upcoming lab appointment Feb. 12th, [facility] labs for for  Endo-Kaehr,

## 2021-06-05 NOTE — TELEPHONE ENCOUNTER
ANTICOAGULATION  MANAGEMENT    Assessment     Today's INR result of 2.5 is Therapeutic (goal INR of 2.0-3.0)        Warfarin taken as previously instructed    No new diet changes affecting INR    No new medication/supplements affecting INR    Continues to tolerate warfarin with no reported s/s of bleeding or thromboembolism     Previous INR was Subtherapeutic patient confirmed that he took the booster dose as instructed.    Plan:     Spoke with Pee regarding INR result and instructed:     Warfarin Dosing Instructions:  Continue current warfarin dose    5 mg every day     (0 % change)    Instructed patient to follow up no later than: 2 weeks    Education provided: importance of therapeutic range and target INR goal and significance of current INR result    Pee verbalizes understanding and agrees to warfarin dosing plan.    Instructed to call the [facility] for any changes, questions or concerns. (#[phone])   ?   Norma Osorio RN    Subjective/Objective:      Pee Ayala, a 79 y.o. male is on warfarin.     Pee reports:     Home warfarin dose: verbally confirmed home dose with Pee and updated on anticoagulation calendar     Missed doses: No     Medication changes:  No     S/S of bleeding or thromboembolism:  No     New Injury or illness:  No     Changes in diet or alcohol consumption:  No     Upcoming surgery, procedure or cardioversion:  Yes: CT scan- waiting for result cardiac surgery - has an appointment on 1/13 to discuss result.    Anticoagulation Episode Summary     Current INR goal:   2.0-3.0   TTR:   45.0 % (1 y)   Next INR check:   1/23/2020   INR from last check:   2.50 (1/9/2020)   Weekly max warfarin dose:      Target end date:      INR check location:      Preferred lab:      Send INR reminders to:   [facility]    Indications    Chronic atrial fibrillation [I48.20]           Comments:            Anticoagulation Care Providers     Provider Role Specialty Phone number     Jazzy Gil MD [facility] [phone]

## 2021-06-05 NOTE — PROGRESS NOTES
1 month f/u. CTs 1/8/19. review labs 12/16/19. left neck pain and Doppler carotid arterial ultrasound bilateral which showed left atheromatous plaque

## 2021-06-05 NOTE — TELEPHONE ENCOUNTER
Refill Request  Did you contact pharmacy: Yes  Medication name:   Requested Prescriptions     Pending Prescriptions Disp Refills     blood glucose test strips 200 strip 5     Sig: Use 1 each As Directed as needed. Dispense brand per patient's insurance at pharmacy discretion.     Who prescribed the medication: SWATHI SETHI  Requested Pharmacy: [facility]  Is patient out of medication: 4 test strips left  Patient notified refills processed in 3 business days:  no  Okay to leave a detailed message: yes  [phone]    FYI: Patient stated that he was informed by the pharmacy that they have sent multiple refill requests last week and today with no response. Patient stated that he is down to 4 test strips and will need this prescription sent as soon as possible. Patient stated that he is using [employer] test strips  sig test 3 times a day.

## 2021-06-05 NOTE — TELEPHONE ENCOUNTER
ANTICOAGULATION  MANAGEMENT    Assessment     Today's INR result of 1.6 is Subtherapeutic (goal INR of 2.0-3.0)        Warfarin taken as previously instructed    No new diet changes affecting INR    No new medication/supplements affecting INR    Continues to tolerate warfarin with no reported s/s of bleeding or thromboembolism     Previous INR was Therapeutic    Plan:     Spoke with Pee regarding INR result and instructed:     Warfarin Dosing Instructions:  take 7.5 mg booster dose today then continue current warfarin dose    5 mg every day        (0 % change)    Instructed patient to follow up no later than: 7-10 days- appointment made    Education provided: importance of therapeutic range, target INR goal and significance of current INR result and importance of taking warfarin as instructed    Pee verbalizes understanding and agrees to warfarin dosing plan.    Instructed to call the [facility] for any changes, questions or concerns. (#[phone])   ?   Norma Osorio RN    Subjective/Objective:      Pee Ayala, a 79 y.o. male is on warfarin.     Pee reports:     Home warfarin dose: verbally confirmed home dose with Pee and updated on anticoagulation calendar     Missed doses: No     Medication changes:  No     S/S of bleeding or thromboembolism:  No     New Injury or illness:  No     Changes in diet or alcohol consumption:  No     Upcoming surgery, procedure or cardioversion:  No    Anticoagulation Episode Summary     Current INR goal:   2.0-3.0   TTR:   46.5 % (1 y)   Next INR check:   2/5/2020   INR from last check:   1.60! (1/22/2020)   Weekly max warfarin dose:      Target end date:      INR check location:      Preferred lab:      Send INR reminders to:   [facility]    Indications    Chronic atrial fibrillation [I48.20]           Comments:            Anticoagulation Care Providers     Provider Role Specialty Phone number    Jazzy Gil MD Referring Family Medicine  [phone]

## 2021-06-05 NOTE — PROGRESS NOTES
Vascular Medicine Progress note    Dr Bull Hoskins MD, Vascular Medicine      Pee A Dickerson    Medical Record #:  [id number]    Date of Service: 01/13/2020     Date last seen:  12/16/2019    PRIMARY CARE PROVIDER: Jazzy Gil MD      IMPRESSION/PLAN: Patient is here for follow-up on lab results and imaging results,      Lab results showed an INR within the normal range A1c of 7%, lipid profile LDL was less than 70    Patient imaging results, CTA of the head showed new moderate focal stenosis of the distal horizontal petrous segment of the right internal carotid artery, mild diffuse atherosclerotic stenosis of the cavernous internal carotid artery bilaterally, normal appearance of the middle and anterior cerebral arteries with no hemodynamically significant stenosis, neck CTA showed unchanged severe focal stenosis at the origin of the left vertebral artery with multiple focal moderate to severe stenosis in V1 and V2 segments as well as V4, less than 40% stenosis proximal bilateral internal carotid artery bilaterally, patient is asymptomatic, patient will report some dizziness and imbalance while walking yet intervening with vertebral arteries is risky but the patient was asked to report symptoms or new symptoms of posterior circulation if any then intervention might be an option but for now will focus on vascular risk factors modifications and trying to keep the blood pressure in the range of 130/80 which might help with the symptoms    I do not see that the patient needs to be on Lasix in addition to his blood pressure medications    Patient will discuss that with his cardiologist    DISPOSITION:  1- continue with vascular risk factors modifications  2- carotid Doppler arterial ultrasound bilateral in 6 months from now  3-patient is to report any signs or symptoms indicating worsening of his posterior vertebral circulation, dizziness, vision problems, ataxia  4-we will follow-up with the patient in 6  "months      ______________________________________________________________________    Subjective:    ALLERGIES:  Oxycodone; Amlodipine; and Lisinopril    MEDS:    Current Outpatient Medications:      acetaminophen (TYLENOL) 650 MG CR tablet, Take 1,300 mg by mouth as needed., Disp: , Rfl:      antiox. no.10-omeg3s-lut-starr 280-10-2 mg cap, Take 1 tablet by mouth 2 (two) times a day., Disp: , Rfl:      aspirin 81 mg chewable tablet, Chew 81 mg daily.    , Disp: , Rfl:      blood glucose meter (GLUCOMETER), Use 1 each As Directed as needed. Dispense glucometer brand per patient's insurance at pharmacy discretion., Disp: 1 each, Rfl: 0     blood glucose test strips, Use 1 each As Directed as needed. Dispense brand per patient's insurance at pharmacy discretion., Disp: 200 strip, Rfl: 5     carvedilol (COREG) 6.25 MG tablet, Take 1 tablet (6.25 mg total) by mouth 2 (two) times a day with meals., Disp: 60 tablet, Rfl: 1     finasteride (PROSCAR) 5 mg tablet, Take 1 tablet (5 mg total) by mouth daily., Disp: 90 tablet, Rfl: 3     furosemide (LASIX) 20 MG tablet, Take 1 tablet (20 mg total) by mouth 2 (two) times a day at 9am and 6pm., Disp: 180 tablet, Rfl: 0     hydrOXYzine HCl (ATARAX) 25 MG tablet, Take 1 tablet (25 mg total) by mouth 3 (three) times a day as needed for anxiety., Disp: 90 tablet, Rfl: 11     insulin NPH-insulin regular (NOVOLIN 70-30) 100 unit/mL (70-30) injection, Inject under the skin. Inject 22 units in AM and 10-11 units in PM, Disp: , Rfl:      insulin syringe-needle U-100 0.3 mL 31 gauge x 15/64\" Syrg, Use 1 each As Directed 2 (two) times a day., Disp: 100 Syringe, Rfl: 11     metFORMIN (GLUCOPHAGE) 500 MG tablet, Take 2 tablets (1,000 mg total) by mouth 2 (two) times a day., Disp: 360 tablet, Rfl: 3     nortriptyline (PAMELOR) 50 MG capsule, Take 1 capsule (50 mg total) by mouth at bedtime., Disp: 30 capsule, Rfl: 0     omeprazole (PRILOSEC) 40 MG capsule, Take 40 mg by mouth daily as needed.   "  , Disp: , Rfl:      pramipexole (MIRAPEX) 0.125 MG tablet, Take 1 tablet (0.125 mg total) by mouth daily. Take 2 hours before bedtime, Disp: 30 tablet, Rfl: 2     rosuvastatin (CRESTOR) 20 MG tablet, Take 1 tablet (20 mg total) by mouth at bedtime., Disp: 90 tablet, Rfl: 3     warfarin ANTICOAGULANT (COUMADIN/JANTOVEN) 5 MG tablet, Take 1 tablet (5 mg total) by mouth daily. Take 5mg daily by mouth as directed, Disp: 90 tablet, Rfl: 1    REVIEW OF SYSTEMS:    A 12 point ROS was reviewed and except for what is listed in the HPI above, all others are negative    Objective:    PE:  /62   Pulse 74   Resp 18   Wt Readings from Last 1 Encounters:   01/02/20 198 lb (89.8 kg)     There is no height or weight on file to calculate BMI.    EXAM:  GENERAL: This is a well-developed 79 y.o. male who appears his stated age  EYES: Grossly normal.  MOUTH: Buccal mucosa normal   CARDIAC:  Normal S1 and S2, no Murmur  CHEST/LUNG:  Clear lung fields bilaterally  GASTROINTESINAL (ABDOMEN):Soft, non-tender, B/S present, no pulsatile mass     MUSCULOSKELETAL: Grossly normal and both lower extremities are intact.  HEME/LYMPH: No lymphedema  NEUROLOGIC: Focally intact, Alert and oriented x 3.   PSYCH: appropriate affect  INTEGUMENT: No open lesions or ulcers  Pulse Exam: palpable  Circumferential measures:  No flowsheet data found.        DIAGNOSTIC STUDIES:     Cta Head And Neck    Result Date: 1/8/2020  EXAM: CTA HEAD AND NECK LOCATION: [facility] DATE/TIME: 1/8/2020 12:45 PM INDICATION: Carotid or vertebral dissection suspected, carotid artery stenosis COMPARISON: CTA head and neck 07/12/2016 CONTRAST: Iohexol (Omni) 100 mL TECHNIQUE: Head and neck CT angiogram with IV contrast. Noncontrast head CT followed by axial helical CT images of the head and neck vessels obtained during the arterial phase of intravenous contrast administration. Axial 2D reconstructed images and multiplanar 3D MIP reconstructed images of the head  and neck vessels were performed by the technologist. Dose reduction techniques were used. All stenosis measurements made according to NASCET criteria unless otherwise specified. FINDINGS: NONCONTRAST HEAD CT: INTRACRANIAL CONTENTS: No intracranial hemorrhage, extraaxial collection, or mass effect.  No CT evidence of acute infarct. Mild to moderate presumed chronic small vessel ischemic changes. Mild generalized volume loss. No hydrocephalus. Intracranial atherosclerotic calcifications. VISUALIZED ORBITS/SINUSES/MASTOIDS: No intraorbital abnormality. No paranasal sinus mucosal disease. No middle ear or mastoid effusion. BONES/SOFT TISSUES: No acute abnormality. HEAD CTA: ANTERIOR CIRCULATION: Moderate focal stenosis of the distal horizontal petrous segment of the right internal carotid artery (axial CT series 6, image #361). Mild diffuse atherosclerotic stenosis of the cavernous internal carotid arteries bilaterally. Normal appearance of the bilateral middle and anterior cerebral arteries, without hemodynamically significant stenosis or occlusion. No aneurysm or high flow vascular malformation. Standard [place] of [place] anatomy. POSTERIOR CIRCULATION: No hemodynamically significant stenosis, vascular occlusion, aneurysm, or high flow vascular malformation. Balanced vertebral arteries supply a normal basilar artery. DURAL VENOUS SINUSES: Expected enhancement of the major dural venous sinuses. NECK CTA: RIGHT CAROTID: Atherosclerotic plaque results in less than 40% stenosis in the right ICA. No dissection. LEFT CAROTID: Atherosclerotic plaque results in less than 40% stenosis in the left ICA. No dissection. VERTEBRAL ARTERIES: Dominant right and smaller left vertebral arteries. Redemonstration of severe focal stenosis at the origin of the left vertebral artery and multifocal moderate to severe stenoses in its V1 and proximal V2 segments. Additional moderate  focal stenosis of the left V4 segment. The right vertebral  artery is patent throughout the neck and into the head, with only a mild focal stenosis in its proximal V4 segment. AORTIC ARCH: Classic aortic arch anatomy with no significant stenosis at the origin of the great vessels. Mild multifocal stenoses in the visualized proximal subclavian arteries bilaterally. NONVASCULAR STRUCTURES: Partially visualized scarring or atelectasis in the visualized left upper lobe. Prior median sternotomy and CABG. Moderate degenerative cervical spondylosis with disc bulges at C3-C4 producing mild to moderate spinal canal stenosis. Disc osteophyte complex at C7 produces mild spinal canal stenosis.     HEAD CT: 1.  No acute cranial hemorrhage, mass effect, or CT evidence for acute infarction. 2.  Mild global brain parenchymal volume loss with presumed sequelae of mild to moderate chronic small vessel ischemic disease. HEAD CTA: 1.  New moderate focal stenosis of the distal horizontal petrous segment right internal carotid artery. 2.  Mild diffuse atherosclerotic stenosis of the cavernous internal carotid arteries bilaterally. 3.  Normal appearance of the bilateral middle and anterior cerebral arteries, without hemodynamically significant stenosis, occlusion, or aneurysm. NECK CTA: 1.  Unchanged severe focal stenosis at the origin of the left vertebral artery with multiple focal moderate to severe stenoses in its V1 and proximal V2 segments, as well as moderate focal stenosis of its V4 segment. 2.  Less than 40% stenosis in the proximal bilateral internal carotid arteries by NASCET criteria.    Ct Lumbar Spine Without Contrast    Result Date: 1/8/2020  [facility] CT LUMBAR SPINE WO CONTRAST 1/8/2020 12:44 PM INDICATION: Lumbosacral spinal canal stenosis. Back pain or radiculopathy greater than 6 weeks. TECHNIQUE: Contiguous helical axial images were obtained through the lumbar spine without contrast. Multiplanar reformats were generated. Dose reduction techniques were used. CONTRAST:  None. COMPARISON: MRI lumbar spine 09/20/2017. FINDINGS: For consistency purposes of the prior MRI, the last fully developed interspace is designated as L5-S1 with a rudimentary disc space at S1-S2. With this numbering system rudimentary ribs are seen bilaterally at L1. Subtle convex left lumbar curvature. Stable subtle, grade 1 anterolisthesis L4 on L5. Otherwise normal lumbar lordosis. There is new compression deformity involving the central and anterior aspects of the superior L4 endplate with approximately 15% height loss. Height loss at this level is favored to be chronic as there is new prominent ventral interbody spurring at the L3-L4 level. Vertebral body heights are otherwise preserved. No pars defects. Flowing ventral ankylosis is seen at T12-L1 and L1-L2. Dorsal paraspinal musculature and psoas muscles are unremarkable for age. Visualized aorta is normal in caliber. Coarse mesenteric, aortic, and iliac atherosclerotic calcifications. Degenerative changes anterior aspects of both SI joints with ventral ankylosis on the left. T12-L1: Partial ankylosis along the periphery of the interspace. Normal disc height. No herniation. No facet arthropathy. No spinal canal or neural foraminal stenosis. L1-L2: Partial ankylosis along the periphery of the interspace. Normal disc height. No herniation. Minor facet arthropathy. No spinal canal or neural foraminal stenosis. L2-L3: Normal disc height. Trace posterior disc bulge. Minor facet arthropathy. No spinal canal or neural foraminal stenosis. L3-L4: Normal disc height. Vacuum change. Shallow posterior disc bulge partially effaces the caudal ventral foramina. Mild to moderate facet arthropathy. Ligamentum flavum thickening. Severe right lateral recess narrowing and moderate to severe spinal canal stenosis with a trefoil configuration. Mild right foraminal stenosis. Borderline left foraminal stenosis. L4-L5: Subtle stable anterolisthesis L4 on L5. Slight posterior  interspace narrowing. Shallow posterior disc bulge. Advanced facet arthropathy with left-sided vacuum change. Ligamentum flavum thickening. Right greater than left lateral recess narrowing and moderate spinal canal stenosis. Mild right foraminal stenosis. Mild left foraminal stenosis. L5-S1: Mild posterior disc space narrowing. Very shallow posterior disc bulge. Moderate hypertrophic facet arthropathy. No spinal canal stenosis. Moderate right foraminal stenosis. Mild to moderate left foraminal stenosis.     1. New superior L4 endplate compression deformity. Presence of large ventral osteophyte at this level suggests that this is a chronic finding. 2. Progression in degrees of the lower lumbar spondylosis with combination of posterior disc herniation and hypertrophic facet changes at L3-L4 resulting in severe right lateral recess narrowing and moderate to severe spinal canal stenosis with mild right and borderline left foraminal stenosis. 3. Right greater than left lateral recess stenosis L4-L5 and moderate spinal canal stenosis with mild bilateral neural foraminal stenosis. 4. Moderate right and mild-to-moderate left foraminal stenosis L5-S1. 5. Ankylosis T12-L1 and L1-L2 and fusion of the left SI joint suggesting seronegative spondyloarthropathy.    I personally reviewed the following imaging today and those on care everywhere, if indicated    LABS:      Sodium   Date Value Ref Range Status   08/13/2019 138 136 - 145 mmol/L Final   07/12/2019 137 136 - 145 mmol/L Final   05/22/2019 137 136 - 145 mmol/L Final     Potassium   Date Value Ref Range Status   08/13/2019 4.4 3.5 - 5.0 mmol/L Final   07/12/2019 4.4 3.5 - 5.0 mmol/L Final   05/22/2019 5.1 (H) 3.5 - 5.0 mmol/L Final     Chloride   Date Value Ref Range Status   08/13/2019 106 98 - 107 mmol/L Final   07/12/2019 105 98 - 107 mmol/L Final   05/22/2019 104 98 - 107 mmol/L Final     BUN   Date Value Ref Range Status   08/13/2019 16 8 - 28 mg/dL Final   07/12/2019  17 8 - 28 mg/dL Final   05/22/2019 23 8 - 28 mg/dL Final     Creatinine   Date Value Ref Range Status   08/13/2019 0.87 0.70 - 1.30 mg/dL Final   07/12/2019 0.94 0.70 - 1.30 mg/dL Final   05/22/2019 0.85 0.70 - 1.30 mg/dL Final     Hemoglobin   Date Value Ref Range Status   01/02/2020 14.5 14.0 - 18.0 g/dL Final   04/04/2019 12.6 (L) 14.0 - 18.0 g/dL Final   10/06/2014 11.3 (L) 14.0 - 18.0 g/dL Final     Platelets   Date Value Ref Range Status   01/02/2020 188 140 - 440 thou/uL Final   04/04/2019 159 140 - 440 thou/uL Final   10/06/2014 249 140 - 440 thou/uL Final     BNP   Date Value Ref Range Status   05/09/2019 96 (H) 0 - 80 pg/mL Final     POC INR   Date Value Ref Range Status   06/06/2019 2.90 (!) 0.90 - 1.10    05/22/2019 5.90 (!) 0.90 - 1.10 Final     Comment:     Critical value at 5.9 forwarded to pool C, contacted anticoag nurse and mallory a stat lab. Also contacted Mar Fink regarding patients dizziness, she will be contacting patient. Please call patient regarding dosing at [phone] BS, CMA      INR   Date Value Ref Range Status   01/09/2020 2.50 (H) 0.90 - 1.10 Final   01/02/2020 1.80 (H) 0.90 - 1.10 Final   12/12/2019 2.30 (H) 0.90 - 1.10 Final       Total time spent 35 (15,25,35) minutes face to face with patient with more than 50% time spent in counseling and coordination of care.    Bull Hoskins MD  VASCULAR MEDICINE

## 2021-06-06 NOTE — TELEPHONE ENCOUNTER
I called Pee back and he did not understand, so I went back and looked at your OV note.  See below.  I dont think the OV note and AVS match.  He was taken off nortriptyline and given Mirapex and trazodone instead.  Please review again and advise.    Patient leaving for 2 weeks, will refills before he leaves.     01/02/20  For now I am going to wean him off nortriptyline and start him on pramipexole for his restless leg.  We talked about starting trazodone for insomnia but his main concern is restless leg rather than insomnia.  He believes insomnia secondary to his restless legs.
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
## 2021-06-06 NOTE — TELEPHONE ENCOUNTER
Medication Request  Medication name: traZODone (DESYREL) 50 MG tablet   Requested Pharmacy: [facility] # [id number]  Reason for request: pharmacy requesting for refill   When did you use medication last?:  Unknown   Patient offered appointment:  N/A - electronic request  Okay to leave a detailed message: no

## 2021-06-06 NOTE — PROGRESS NOTES
This Maintenance Wellness Plan provides private information in regard to the work I have done with my Care Team at my Primary Care Clinic.  This document provides insight on the goals I have worked hard to achieve.  My Care Team congratulates me on my journey to become well.  With the assistance of the Clinic Care Coordination Team and my Primary Care Provider, I have succeeded in improving areas of my health that I identified as barriers to becoming well.  I will continue to seek wellness and use the skills I have obtained to further my journey.  My Community Health Worker will follow up with me in 2 months.  In the meantime, if I should have any questions or concerns I will contact my Community Health Worker.      My Clinic Care Coordination Wellness Plan    [facility]  [address] [place]  [address]  [phone]    My Preferred Method of Contact:  Phone: [phone]    My Primary/Preferred Language:  English        Emergency Contact: Extended Emergency Contact Information  Primary Emergency Contact: Agueda Ayala  Home Phone: [phone]  Relation: Spouse  Secondary Emergency Contact: Declined, Per Pt  Relation: Declined     PCP:  Jazzy Gil MD  Specialists:    Care Team            Jazzy Gil MD   [phone] PCP - General, Family Medicine    Marilou Salas, RN Registered Nurse    [facility]   [phone] Community Health Worker, Primary Care - CC    Marilou Salas, RN Lead Care Coordinator, Primary Care - CC    Jazzy Gil MD   [phone] Assigned PCP    Rodolfo Curry, PharmD   [phone] Pharmacist, Pharmacist        Accomplishments:  Goals        Patient Stated      COMPLETED: I would like home care service to assist with med mgmt.  (pt-stated)      Action steps to achieve this goal  1.I will speak with the [facility] CG at outreach telephone calls.   2. I will answer my phone calls or call them back.  3.  I will provide any necessary  paperwork/documentation in a timely manner if needed to assist with this process    Date goal set:  5/30/19  Discussed with wife 6/28- HC no longer seeing patient?            Advanced Directive/Living Will: On file with the clinic        All [facility] clinic patients have access to a Nurse 24 hours a day, 7 days a week.  If you have questions or want advice from a Nurse, please know [facility] is here for you.  You can call your clinic and they will connect you or you can call Care Connection at [phone].  [facility] also has Walk In Care clinics in multiple locations.  Call the number listed above for more information about our Walk In Care clinics or visit the [facility] website at [email or web address].

## 2021-06-06 NOTE — TELEPHONE ENCOUNTER
FYI - Status Update  Who is Calling: Patient  Update: Patient calling the ACN back, upon trying to transfer the call was lost. Writer attempted to call the patient back, but the call was not answered.  Okay to leave a detailed message?:  Yes

## 2021-06-06 NOTE — TELEPHONE ENCOUNTER
ANTICOAGULATION  MANAGEMENT PROGRAM    Pee Ayala is overdue for INR check.     Spoke with Pee and scheduled INR appointment on 3/13.      Cecy Dominguez RN

## 2021-06-06 NOTE — TELEPHONE ENCOUNTER
Yes, I totally remember.  The office note does not match because I had asked him in AVS not to refill trazodone and had discontinued it.  Apparently, because I did not call pharmacy, he must have refilled the trazodone.  At this time I do not want him on trazodone.    I only want him to take Mirapex and then follow-up in clinic.    Jazzy Gil MD  2/20/2020

## 2021-06-06 NOTE — TELEPHONE ENCOUNTER
Medication Request  Medication name: Trazodone 50 mg  Requested Pharmacy: [facility]  Reason for request: Refill request received, medication is not on active med list.   When did you use medication last?:  Last fill 1.29.2020  Patient offered appointment:  N/A - electronic request  Okay to leave a detailed message: yes

## 2021-06-06 NOTE — TELEPHONE ENCOUNTER
ANTICOAGULATION  MANAGEMENT    Assessment     Today's INR result of 2.3 is Therapeutic (goal INR of 2.0-3.0)        Warfarin taken as previously instructed    No new diet changes affecting INR    No new medication/supplements affecting INR    Continues to tolerate warfarin with no reported s/s of bleeding or thromboembolism     Previous INR was Subtherapeutic  3 weeks ago    Plan:     Spoke with Pee regarding INR result and instructed:     Warfarin Dosing Instructions:  Continue current warfarin dose 5 mg   Instructed patient to follow up no later than: 3 weeks    Education provided: importance of therapeutic range    Pee verbalizes understanding and agrees to warfarin dosing plan.    Instructed to call the [facility] for any changes, questions or concerns. (#[phone])   ?   Cecy Dominguez RN    Subjective/Objective:      Pee Ayala, a 79 y.o. male is on warfarin.     Pee reports:     Home warfarin dose: template incorrect; verbally confirmed home dose with Pee and updated on anticoagulation calendar     Missed doses: No     Medication changes:  No     S/S of bleeding or thromboembolism:  No     New Injury or illness:  No     Changes in diet or alcohol consumption:  No     Upcoming surgery, procedure or cardioversion:  No    Anticoagulation Episode Summary     Current INR goal:   2.0-3.0   TTR:   48.0 % (1 y)   Next INR check:   3/4/2020   INR from last check:   2.30 (2/12/2020)   Weekly max warfarin dose:      Target end date:      INR check location:      Preferred lab:      Send INR reminders to:   [facility]    Indications    Chronic atrial fibrillation [I48.20]           Comments:            Anticoagulation Care Providers     Provider Role Specialty Phone number    Jazzy Gil MD Referring Family Medicine [phone]

## 2021-06-07 ENCOUNTER — COMMUNICATION - HEALTHEAST (OUTPATIENT)
Dept: CARDIOLOGY | Facility: CLINIC | Age: 81
End: 2021-06-07

## 2021-06-07 DIAGNOSIS — R00.1 BRADYCARDIA: ICD-10-CM

## 2021-06-07 DIAGNOSIS — I45.5 SINUS PAUSE: ICD-10-CM

## 2021-06-07 NOTE — TELEPHONE ENCOUNTER
The patient would like his medication that he talk to Dr. Gil about on 4/3/20. He stated that the medication he was prescribed and told to double the dosage needs a refill, but the prescription was not changed for the pharmacy. Would the medication be able to be refilled and the prescription updated? The patient did state that the updated medication was helping.

## 2021-06-07 NOTE — PROGRESS NOTES
Clinic Care Coordination Contact    Follow Up Progress Note     Goals addressed this encounter:   Goals Addressed    None       Intervention/Education provided during outreach: The CHW was able to talk to the patient. The patient stated that he was doing well at this time. The patient stated that the only thing that he wanted to ask the PCP was about the medication that he talked to her about on 4/3/20. The CHW was able to send a message to the PCP regarding the medication. No other concerns, the CHW will keep the patient on her panel for one more month due to the COVID-19 virus.    Plan:   The CHW will reach out in a month to follow up    Care Coordinator will follow up in one month    Next Outreach: 5/15/20

## 2021-06-07 NOTE — PROGRESS NOTES
Review of Systems - Musculoskeletal ROS: positive for - muscle pain  ALL OTHER WNL    RESTLESS LEG SYNDROME  AFTER 7PM SUPER TIRED    NO OTHER CONCERNS      Phoebe Gallagher, CMA

## 2021-06-07 NOTE — PROGRESS NOTES
"Pee Ayala is a 79 y.o. male who is being evaluated via a billable telephone visit.      The patient has been notified of following:     \"This telephone visit will be conducted via a call between you and your physician/provider. We have found that certain health care needs can be provided without the need for a physical exam.  This service lets us provide the care you need with a short phone conversation.  If a prescription is necessary we can send it directly to your pharmacy.  If lab work is needed we can place an order for that and you can then stop by our lab to have the test done at a later time.    If during the course of the call the physician/provider feels a telephone visit is not appropriate, you will not be charged for this service.\"     Patient has given verbal consent to a Telephone visit? Yes    Pee Ayala complains of    Chief Complaint   Patient presents with     Leg Problem     Pt was diagnosed with RLS a yr ago and would like to discuss worsening, starts at 8pm with twitching and by 9 pm legs are numb       I have reviewed and updated the patient's Past Medical History, Social History, Family History and Medication List.    ALLERGIES  Oxycodone; Amlodipine; and Lisinopril    Additional provider notes: Patient is a 79-year-old male with multiple comorbidities including polyneuropathy with diabetes, coronary artery disease, atrial fibrillation, obesity and heart failure with preserved ejection fraction who has worsening restless leg syndrome.  He was diagnosed with restless leg syndrome a year ago and started on medication.    During the course of his disease management, he was started on tramadol and he believes this helped.  But due to his history of fall it was discontinued.  Patient now denies any recent falls.    He was started on pramipexole for restless leg sick syndrome at nighttime.  He takes this medication an hour before his bedtime.  He does believe that the medication helps " but around 8 PM his twitching gets worse and by 9 PM he feels his legs are numb.  Although his both legs get numb, he is able to function normally.  When he gets up in the morning  He feels cramping and has left calf.    His diabetes remains under fair control with his blood sugars ranging from 100-1 80.  His last hemoglobin A1c was fairly normal.  He has a pillbox and does his own pill sorting with a.m. and p.m.  He denies missing out on his medication.    Assessment/Plan:  1. Diabetic polyneuropathy associated with type 2 diabetes mellitus (H)     2. Coronary artery disease involving native coronary artery of native heart without angina pectoris  rosuvastatin (CRESTOR) 20 MG tablet   3. Restless leg syndrome       Discussed with the patient that tramadol is not an option with his history of falls.  Again discussed the dependence.  Patient is agreeable.    At this time, we will increase his medication to doubling up the dose to see if this helps.    If augmentation phenomena occurs then we will wean off medication and consider other options including possible referral to neurology if needed.  Patient verbalizes understanding of the plan.    Phone call duration:  11  minutes    Cheli Diallo CMA

## 2021-06-07 NOTE — TELEPHONE ENCOUNTER
----- Message from Tres Talbert MD sent at 4/30/2020  9:49 AM CDT -----  Regarding: RE: med change  Imdur 30 mg each day at 5 pm.    Sorry  ----- Message -----  From: Ashley Elizondo RN  Sent: 4/29/2020   3:49 PM CDT  To: Tres Talbert MD  Subject: med change                                       Is pt to be on Imdur 30 at 5 pm or isosorbide dinitrate (ISORDIL) 30 MG tablet 30 tablet 5 4/29/2020   Sig - Route: Take 1 tablet (30 mg total) by mouth 4 (four) times a day. Take around 5 PM to 6 PM each night - Oral       It is confusing and pharm is asking for Prior Auth.

## 2021-06-08 ENCOUNTER — COMMUNICATION - HEALTHEAST (OUTPATIENT)
Dept: PHYSICAL THERAPY | Facility: REHABILITATION | Age: 81
End: 2021-06-08

## 2021-06-08 NOTE — PROGRESS NOTES
Clinic Care Coordination Contact    Assessment: Care Coordinator contacted patient for 2 month follow up.  Patient has continued to follow the plan of care and assessment is negative for any new needs or concerns.    Enrollment status: Graduated.      Plan: No further outreaches at this time.  Patient will continue to follow the plan of care.  If new needs arise a new Care Coordination referral may be placed.  FYI to PCP

## 2021-06-08 NOTE — TELEPHONE ENCOUNTER
MAGNUS for Pt ay 747 am, upon return call please relay Dr Gil's message and recommendations.  IRENA JohnsA

## 2021-06-08 NOTE — TELEPHONE ENCOUNTER
The patient wants to talk to his PCP about his restless leg syndrome. He stated that the medication he is on now works most of the time, but he is wondering if there is any other medication that works better. The patient may need an appointment.

## 2021-06-08 NOTE — TELEPHONE ENCOUNTER
Pt notified at 929 am, relayed Dr Gil's message and scheduled telephone visit for 5/19/2020 at 1030.  IRENA JohnsA

## 2021-06-08 NOTE — TELEPHONE ENCOUNTER
ANTICOAGULATION  MANAGEMENT    Assessment     Today's INR result of 2.10 is Therapeutic (goal INR of 2.0-3.0)        Warfarin taken as previously instructed    No new diet changes affecting INR    No new medication/supplements affecting INR    Continues to tolerate warfarin with no reported s/s of bleeding or thromboembolism     Previous INR was Subtherapeutic at 1.70 on 5/21/20.    Plan:     Spoke with Pee regarding INR result and instructed:     Warfarin Dosing Instructions:  (evenings. Has 5mg tabs)   - Continue current warfarin dose 7.5 mg daily on Mondays; and 5 mg daily rest of week.    Instructed patient to follow up no later than:  2 wks.   - INR scheduled on 6/18/20 @ [facility].    Education provided: importance of consistent vitamin K intake and target INR goal and significance of current INR result    Pee verbalizes understanding and agrees to warfarin dosing plan.    Instructed to call the [facility] for any changes, questions or concerns. (#[phone])   ?   Mariel Dale RN    Subjective/Objective:      Pee Ayala, a 80 y.o. male is on warfarin.     Pee reports:     Home warfarin dose: as updated on anticoagulation calendar per template     Missed doses: No     Medication changes:  No     S/S of bleeding or thromboembolism:  No     New Injury or illness:  No     Changes in diet or alcohol consumption:  No     Upcoming surgery, procedure or cardioversion:  No    Anticoagulation Episode Summary     Current INR goal:   2.0-3.0   TTR:   56.0 % (1 y)   Next INR check:   6/18/2020   INR from last check:   2.10 (6/4/2020)   Weekly max warfarin dose:      Target end date:      INR check location:      Preferred lab:      Send INR reminders to:   [facility]    Indications    Chronic atrial fibrillation [I48.20]           Comments:            Anticoagulation Care Providers     Provider Role Specialty Phone number    Jazzy Gil MD Referring Family Medicine [phone]

## 2021-06-08 NOTE — TELEPHONE ENCOUNTER
Please inform patient that I have sent a prescription of nortriptyline/Pamelor to the pharmacy.    He should start taking 1 pill a day of this along with current dose of pramipexole/Mirapex.    If there is noted benefit, we can then increase the dose of nortriptyline and reduce the dose of pramipexole.    Both medications do increase drowsiness/sedation.  He should only take it at nighttime and be cautious.    I would like him to follow-up with me either with a telephone encounter or message me in 2 weeks time.    Jazzy Gil MD  5/20/2020

## 2021-06-08 NOTE — TELEPHONE ENCOUNTER
ANTICOAGULATION  MANAGEMENT    Assessment     Today's INR result of 1.7 is Subtherapeutic (goal INR of 2.0-3.0)        Warfarin taken as previously instructed    No new diet changes affecting INR    No new medication/supplements affecting INR    Continues to tolerate warfarin with no reported s/s of bleeding or thromboembolism     Previous INR was Subtherapeutic    Plan:     Spoke with Pee regarding INR result and instructed:     Warfarin Dosing Instructions:  have 7.5 mg tonight to boost then change warfarin dose to 7.5 mg daily on mon; and 5 mg daily rest of week  (7 % change)    Instructed patient to follow up no later than: two weeks, scheduled    Pee verbalizes understanding and agrees to warfarin dosing plan.    Instructed to call the [facility] for any changes, questions or concerns. (#[phone])   ?   Yousuf Madison RN    Subjective/Objective:      Pee Ayala, a 80 y.o. male is on warfarin.     Pee reports:     Home warfarin dose: as updated on anticoagulation calendar per template     Missed doses: No     Medication changes:  No     S/S of bleeding or thromboembolism:  No     New Injury or illness:  No     Changes in diet or alcohol consumption:  No     Upcoming surgery, procedure or cardioversion:  No    Anticoagulation Episode Summary     Current INR goal:   2.0-3.0   TTR:   55.8 % (1 y)   Next INR check:   6/4/2020   INR from last check:   1.70! (5/21/2020)   Weekly max warfarin dose:      Target end date:      INR check location:      Preferred lab:      Send INR reminders to:   [facility]    Indications    Chronic atrial fibrillation [I48.20]           Comments:            Anticoagulation Care Providers     Provider Role Specialty Phone number    Jazzy Gil MD Referring Family Medicine [phone]

## 2021-06-08 NOTE — PROGRESS NOTES
"Pee Ayala is a 80 y.o. male who is being evaluated via a billable telephone visit.      The patient has been notified of following:     \"This telephone visit will be conducted via a call between you and your physician/provider. We have found that certain health care needs can be provided without the need for a physical exam.  This service lets us provide the care you need with a short phone conversation.  If a prescription is necessary we can send it directly to your pharmacy.  If lab work is needed we can place an order for that and you can then stop by our lab to have the test done at a later time.    Telephone visits are billed at different rates depending on your insurance coverage. During this emergency period, for some insurers they may be billed the same as an in-person visit.  Please reach out to your insurance provider with any questions.    If during the course of the call the physician/provider feels a telephone visit is not appropriate, you will not be charged for this service.\"    Patient has given verbal consent to a Telephone visit? Yes    What phone number would you like to be contacted at? [phone]    Patient would like to receive their AVS by AVS Preference: Mail a copy.    Chief Complaint   Patient presents with     Leg Problem     Pt would like to discuss RLS, was Dx with Neuropathy and put on Pramipexole. Pt states around 730 pm is having leg twitching, on occasion feels like he is wearing compression stockings       Subjective:    Pee Ayala is a 80 y.o. male who presents for restless leg syndrome.  Patient informs me that his twitching of legs had responded in the past to nortriptyline.  He is not remembering why it was discontinued.  He feels the pramipexole is not having optimal control.  Regarding his diabetes, he feels he is doing well.  He denies any other new symptoms of shortness of breath or chest pain  He feels it is not his neuropathy that he has had in the past from " his diabetes.  He feels that is stable.  It is mostly the twitching of legs that he gets at the end of the day.  In the past he has been on tramadol.  This was discontinued.  Subsequently we had started pramipexole and increased it but it looks like this is not having benefit for the patient.  He is also need for his medication refill for carvedilol  Regarding his blood pressure, he informs me that it was fairly normal when he had reported earlier to his cardiologist.    Problem List, Past Medical History, Social History, Family History, Medications, and [facility]ies [facility]      Review of Systems -  Review of Systems - General ROS: negative  Cardiovascular ROS: negative  Gastrointestinal ROS: no abdominal pain, change in bowel habits, or black or bloody stools  negative        Objective:     There were no vitals taken for this visit.    Patient is in no acute distress.  Speech and mentation appears normal.  Somewhat forgetful with series of events.      Assessment:    1. Restless leg syndrome     2. Bradycardia  carvediloL (COREG) 6.25 MG tablet   3. Sinus pause  carvediloL (COREG) 6.25 MG tablet   4. Polyneuropathy     5. Tremor     6. Falls frequently       Patient with diabetes type 2, polyneuropathy and tremor has restless leg syndrome.  His medication has been watched closely regarding any medication that increase his risk of fall as he has had frequent falls in the past.  Noting that patient is not having benefit from pramipexole, I will restart nortriptyline.    Plan: Start nortriptyline at nighttime in addition to current pramipexole.  We can then consider reducing pramipexole and increasing nortriptyline if noted benefit.      Follow up if symptoms are not improving or worsening.    [facility]      Phone call duration: 15 minutes    Jazzy Gil MD

## 2021-06-08 NOTE — PROGRESS NOTES
Clinic Care Coordination Contact    Community Health Worker Follow Up    Discussion: The patient stated that the only concern that he had was that the medication that he is using at this time is working most of the time. The patient wanted to talk to his PCP about other types of medication. The CHW sent a message to the Care Team to follow up with an appointment. The CHW will follow up in 3 weeks to ask if there are any other concerns that he would like assistance with.    CHW Next Follow-up: Three Weeks    CHW Plan: Follow up in 3 weeks to talk about Graduation    Next Outreach: 5/29/20

## 2021-06-08 NOTE — TELEPHONE ENCOUNTER
Attempt # 2    LM for Pt at 1047 am, upon return call please help schedule Pt for virtual visit with Dr Gil to discuss Restless leg syndrome.  IRENA JohnsA

## 2021-06-08 NOTE — PROGRESS NOTES
"Pee Ayala is a 80 y.o. male who is being evaluated via a billable telephone visit.      The patient has been notified of following:     \"This telephone visit will be conducted via a call between you and your physician/provider. We have found that certain health care needs can be provided without the need for a physical exam.  This service lets us provide the care you need with a short phone conversation.  If a prescription is necessary we can send it directly to your pharmacy.  If lab work is needed we can place an order for that and you can then stop by our lab to have the test done at a later time.    Telephone visits are billed at different rates depending on your insurance coverage. During this emergency period, for some insurers they may be billed the same as an in-person visit.  Please reach out to your insurance provider with any questions.    If during the course of the call the physician/provider feels a telephone visit is not appropriate, you will not be charged for this service.\"    Patient has given verbal consent to a Telephone visit? Yes    What phone number would you like to be contacted at? [phone]    Patient would like to receive their AVS by AVS Preference: Mail a copy.    Additional provider notes:     Chief Complaint   Patient presents with     Follow-up     restless leg follow up        Subjective:    Pee Ayala is a 80 y.o. male who presents for restless leg follow-up.  At last visit he had requested change of medication to nortriptyline.  He felt that 2 pills of pramipexole were not helping him.  However, with starting of nortriptyline, he felt that his restless legs got worse and then he switched back to pramipexole 2 pills and felt that it made his symptoms go away.  Currently he feels stable on 2 pills of pramipexole.  He does not want any further intervention.    He checks his blood pressure and feels it has been normal most of the time.  He denies any shortness of " breath, chest pain or any concerns.  He does mention that sometimes by end of day there may be some swelling in his leg but nothing new.    Regarding his sleep, he sleeps in the bed and then around 4:30 AM he comes to a recliner.  He was previously using his recliner because of back pain.  Now it is more out of habit because currently he is not having any back pain.    His diabetes numbers are running slightly high.  He informs me that today his morning blood sugar was 215.  He is using 22 units of insulin in the morning and then 15 units in the evening.  He follows with endocrinology.    He walks about half a mile every day with his dog with some rest in between.  His current weight is 192 pounds.      Problem List, Past Medical History, Social History, Family History, Medications, and Allergies reviewed in Cell[facility]Point.      Review of Systems -  Review of Systems - General ROS: negative  Respiratory ROS: negative  Cardiovascular ROS: negative        Objective:     There were no vitals taken for this visit.  Patient does not appear to be in any acute distress.  Speech and mentation were normal.      Assessment:    1. Type 2 diabetes mellitus with both eyes affected by proliferative retinopathy without macular edema, with long-term current use of insulin (H)     2. Restless leg syndrome  pramipexole (MIRAPEX) 0.125 MG tablet   3. Essential hypertension     4. Obesity (BMI 35.0-39.9) with comorbidity (H)           Plan: Continue with current medications.  Follow-up for routine exam and 3 months.  I did ask him to follow with endocrinology as I do believe that fasting blood sugars of 215 might be high.  His last hemoglobin A1c in December was 7.  He may need further titration of insulin.  Patient verbalizes understanding of the plan.    Follow up if symptoms are not improving or worsening.    See orders in EpicCare.        Phone call duration:  11 minutes    Jazzy iGl MD

## 2021-06-08 NOTE — PROGRESS NOTES
Clinic Care Coordination Contact    Community Health Worker Follow Up    Goals:   Goals Addressed    None       Discussion: The CHW called the patient to follow up. The patient then stated that his restless leg syndrome was doing better. The patient stated that the medication that he went back to is making him feel better and that he has stopped drinking alcohol at night and his legs have not been giving him symptoms for the past 4 days. The CHW talked about moving to Graduation and the patient understood. He has the CHW's phone number incase there are any questions in the future.     Patient has completed all goals with Clinic Care Coordination.  Please review the chart and confirm if graduation is approved.    CHW Next Follow-up: Not planned    CHW Plan: Ask for a chart review for Graduation

## 2021-06-08 NOTE — TELEPHONE ENCOUNTER
"ANTICOAGULATION  MANAGEMENT    Assessment     Today's INR result of 1.50 is Subtherapeutic (goal INR of 2.0-3.0)        Warfarin taken as previously instructed    Increased greens/vitamin K intake may be affecting INR    No interaction expected between Imdur and warfarin    Continues to tolerate warfarin with no reported s/s of bleeding or thromboembolism     Previous INR was Therapeutic at 2.00 on 4/10/20.  (therapeutic 3x).    Plan:     Spoke with Pee regarding INR result and instructed:     Warfarin Dosing Instructions:  (eveningshas 5mg tabs)   - tonight, advised one time booster with 7.5mg warfarin dose,   - then continue current warfarin dose 5 mg daily.    Instructed patient to follow up no later than:  1-2 wks.   - INR scheduled on 5/21/20 @ [facility].    Education provided: importance of consistent vitamin K intake, impact of vitamin K foods on INR and target INR goal and significance of current INR result    Pee verbalizes understanding and agrees to warfarin dosing plan.    Instructed to call the [facility] for any changes, questions or concerns. (#[phone])   ?   Mariel Dale RN    Subjective/Objective:      Pee Ayala, a 80 y.o. male is on warfarin.     Pee reports:     Home warfarin dose: verbally confirmed home dose with Pee and updated on anticoagulation calendar     Missed doses: No     Medication changes:  Yes:  Started on Imdur on 4/30/20.     S/S of bleeding or thromboembolism:  No     New Injury or illness:  No     Changes in diet or alcohol consumption:  Yes:  Reported, had been \"eating a lot of green beans and asparagus.\"     Upcoming surgery, procedure or cardioversion:  No    Anticoagulation Episode Summary     Current INR goal:   2.0-3.0   TTR:   56.6 % (1 y)   Next INR check:   5/21/2020   INR from last check:   1.50! (5/7/2020)   Weekly max warfarin dose:      Target end date:      INR check location:      Preferred lab:      Send INR reminders to:   [facility]    Indications    Chronic atrial fibrillation [I48.20]           Comments:            Anticoagulation Care Providers     Provider Role Specialty Phone number    Jazzy Gil MD Referring Family Medicine [phone]

## 2021-06-08 NOTE — TELEPHONE ENCOUNTER
Patient Returning Call  Reason for call:   Message from clinic  Information relayed to patient:  Please inform patient that I have sent a prescription of nortriptyline/Pamelor to the pharmacy.     He should start taking 1 pill a day of this along with current dose of pramipexole/Mirapex.     If there is noted benefit, we can then increase the dose of nortriptyline and reduce the dose of pramipexole.     Both medications do increase drowsiness/sedation.  He should only take it at nighttime and be cautious.     I would like him to follow-up with me either with a telephone encounter or message me in 2 weeks time.     Jazzy Gil MD  5/20/2020    Patient has additional questions:  No  If YES, what are your questions/concerns:  n/a  Okay to leave a detailed message?: No call back needed

## 2021-06-09 NOTE — TELEPHONE ENCOUNTER
ANTICOAGULATION  MANAGEMENT    Assessment     Today's INR result of 2.1 is Therapeutic (goal INR of 2.0-3.0)        Warfarin taken as previously instructed    No new diet changes affecting INR    No new medication/supplements affecting INR    Continues to tolerate warfarin with no reported s/s of bleeding or thromboembolism     Previous INR was Therapeutic    Plan:     Spoke with wife Agueda (she will relay message to pt) regarding INR result and instructed:     Warfarin Dosing Instructions:  Continue current warfarin dose 7.5 mg daily on Mon; and 5 mg daily rest of week  (0 % change)    Instructed patient to follow up no later than: 4 weeks.     Education provided: importance of taking warfarin as instructed    Agueda verbalizes understanding and agrees to warfarin dosing plan.    Instructed to call the [facility] for any changes, questions or concerns. (#[phone])   ?   Ayan Rudd RN    Subjective/Objective:      Pee PEREZ Jamie, a 80 y.o. male is on warfarin.     Pee reports:     Home warfarin dose: template incorrect; verbally confirmed home dose with Agueda and updated on anticoagulation calendar     Missed doses: No     Medication changes:  No     S/S of bleeding or thromboembolism:  No     New Injury or illness:  No     Changes in diet or alcohol consumption:  No     Upcoming surgery, procedure or cardioversion:  No    Anticoagulation Episode Summary     Current INR goal:   2.0-3.0   TTR:   56.1 % (1 y)   Next INR check:   8/13/2020   INR from last check:   2.10 (7/16/2020)   Weekly max warfarin dose:      Target end date:      INR check location:      Preferred lab:      Send INR reminders to:   [facility]    Indications    Chronic atrial fibrillation (H) [I48.20]           Comments:            Anticoagulation Care Providers     Provider Role Specialty Phone number    Jazzy Gil MD Referring Family Medicine [phone]

## 2021-06-09 NOTE — TELEPHONE ENCOUNTER
ANTICOAGULATION  MANAGEMENT    Assessment     Today's INR result of 2.10 is Therapeutic (goal INR of 2.0-3.0)        Warfarin taken as previously instructed    No new diet changes affecting INR    No new medication/supplements affecting INR    Continues to tolerate warfarin with no reported s/s of bleeding or thromboembolism     Previous INR was Therapeutic at 2.10 on 6/4/20.    Plan:     Spoke with Pee regarding INR result and instructed:     Warfarin Dosing Instructions:  (evenings. Has 5mg tabs)    Continue current warfarin dose 7.5 mg daily on Mondays; and 5 mg daily rest of week.    Instructed patient to follow up no later than:  4 wks.   - INR scheduled on7/16/20 @ [facility].    Education provided: importance of consistent vitamin K intake and target INR goal and significance of current INR result    Pee verbalizes understanding and agrees to warfarin dosing plan.    Instructed to call the [facility] for any changes, questions or concerns. (#[phone])   ?   Mariel Dale RN    Subjective/Objective:      Pee Ayala, a 80 y.o. male is on warfarin.     Pee reports:     Home warfarin dose: verbally confirmed home dose with Pee and updated on anticoagulation calendar     Missed doses: No     Medication changes:  No     S/S of bleeding or thromboembolism:  No     New Injury or illness:  No     Changes in diet or alcohol consumption:  No     Upcoming surgery, procedure or cardioversion:  No    Anticoagulation Episode Summary     Current INR goal:   2.0-3.0   TTR:   56.1 % (1 y)   Next INR check:   7/16/2020   INR from last check:   2.10 (6/18/2020)   Weekly max warfarin dose:      Target end date:      INR check location:      Preferred lab:      Send INR reminders to:   [facility]    Indications    Chronic atrial fibrillation (H) [I48.20]           Comments:            Anticoagulation Care Providers     Provider Role Specialty Phone number    Jazzy Gil MD Referring Family  Medicine [phone]

## 2021-06-09 NOTE — TELEPHONE ENCOUNTER
Patient did not  for telephone visit with Dr. Hoskins. Left vm to call back.     Dr. Hoskins reviewed his carotid ultrasound and result is stable. Will plan for patient to repeat studies in 1 year and also get a lipid lab drawn at that time with follow up.

## 2021-06-10 ENCOUNTER — RECORDS - HEALTHEAST (OUTPATIENT)
Dept: ADMINISTRATIVE | Facility: OTHER | Age: 81
End: 2021-06-10

## 2021-06-10 NOTE — TELEPHONE ENCOUNTER
ANTICOAGULATION  MANAGEMENT    Assessment     Today's INR result of 2.2 is Therapeutic (goal INR of 2.0-3.0)        Warfarin taken as previously instructed    No new diet changes affecting INR    No new medication/supplements affecting INR    Continues to tolerate warfarin with no reported s/s of bleeding or thromboembolism     Previous INR was Therapeutic    Plan:     Left detailed message for Pee regarding INR result and instructed:     Warfarin Dosing Instructions:  Continue current warfarin dose 7.5 mg daily on mon; and 5 mg daily rest of week  (0 % change)    Instructed patient to follow up no later than: one month    Instructed to call the [facility] for any changes, questions or concerns. (#[phone])   ?   Yousuf Madison RN    Subjective/Objective:      Pee Ayala, a 80 y.o. male is on warfarin.     Pee reports:     Home warfarin dose: as updated on anticoagulation calendar per template     Missed doses: No     Medication changes:  No     S/S of bleeding or thromboembolism:  No     New Injury or illness:  No     Changes in diet or alcohol consumption:  No     Upcoming surgery, procedure or cardioversion:  No    Anticoagulation Episode Summary     Current INR goal:   2.0-3.0   TTR:   56.1 % (1 y)   Next INR check:   9/8/2020   INR from last check:   2.20 (8/11/2020)   Weekly max warfarin dose:      Target end date:      INR check location:      Preferred lab:      Send INR reminders to:   [facility]    Indications    Chronic atrial fibrillation (H) [I48.20]           Comments:            Anticoagulation Care Providers     Provider Role Specialty Phone number    Jazzy Gil MD Referring Family Medicine [phone]

## 2021-06-10 NOTE — TELEPHONE ENCOUNTER
Please forward this message to endocrinology Fay Kwok for diabetes follow-up with patient    Jazzy Gil MD  8/12/2020

## 2021-06-10 NOTE — PROGRESS NOTES
Vascular Medicine Progress note    Dr Bull Hoskins MD, Vascular Medicine      [facility]    Medical Record #:  [id number]    Date of Service: 08/13/2020     Date last seen:  7/10/2020    PRIMARY CARE PROVIDER: Jazzy Gil MD      IMPRESSION/PLAN: Patient was interviewed over the phone for 6 minutes, phone call was to review his Doppler ultrasound  His Doppler ultrasound showed evidence of less than 50% stenosis bilaterally  Patient is asymptomatic    Patient vascular risk factors includes  Age  Diabetes mellitus, currently his A1c is 9%  Hyperlipidemia, to target  Hypertension, to target        DISPOSITION:  1- Doppler carotid arterial ultrasound in 1 year  2-target A1c less than 7%, asked the patient to keep a log of his fasting blood sugar and midday blood sugar, and increased his insulin 70/30 to 25 units in the morning and 24 units at night  3-follow-up in 1 month on his blood sugar      ______________________________________________________________________    Subjective:    ALLERGIES:  Oxycodone; Amlodipine; and Lisinopril    MEDS:    Current Outpatient Medications:      acetaminophen (TYLENOL) 650 MG CR tablet, Take 1,300 mg by mouth as needed., Disp: , Rfl:      antiox. no.10-omeg3s-lut-starr 280-10-2 mg cap, Take 1 tablet by mouth 2 (two) times a day., Disp: , Rfl:      aspirin 81 mg chewable tablet, Chew 81 mg daily.    , Disp: , Rfl:      blood glucose meter (GLUCOMETER), Use 1 each As Directed as needed. Dispense glucometer brand per patient's insurance at pharmacy discretion., Disp: 1 each, Rfl: 0     blood glucose test strips, Use 1 each As Directed as needed. Dispense brand per patient's insurance at pharmacy discretion., Disp: 200 strip, Rfl: 0     carvediloL (COREG) 6.25 MG tablet, Take 1 tablet (6.25 mg total) by mouth 2 (two) times a day with meals., Disp: 180 tablet, Rfl: 1     finasteride (PROSCAR) 5 mg tablet, Take 1 tablet (5 mg total) by mouth daily., Disp: 90 tablet, Rfl: 3      "furosemide (LASIX) 20 MG tablet, TAKE 1 TABLET BY MOUTH TWICE DAILY AT  9AM  AND  AT  6PM., Disp: 180 tablet, Rfl: 0     hydrOXYzine HCl (ATARAX) 25 MG tablet, Take 1 tablet (25 mg total) by mouth 3 (three) times a day as needed for anxiety., Disp: 90 tablet, Rfl: 11     insulin NPH-insulin regular (NOVOLIN 70-30) 100 unit/mL (70-30) injection, Inject under the skin. Inject 22 units in AM and 10-11 units in PM, Disp: , Rfl:      insulin syringe-needle U-100 0.3 mL 31 gauge x 15/64\" Syrg, Use 1 each As Directed 2 (two) times a day., Disp: 100 Syringe, Rfl: 11     isosorbide mononitrate (IMDUR) 30 MG 24 hr tablet, Take 1 tablet (30 mg total) by mouth daily. Take daily at 5 pm., Disp: 90 tablet, Rfl: 2     MULTIVITAMIN ORAL, Take 2 tablets by mouth daily., Disp: , Rfl:      omeprazole (PRILOSEC) 40 MG capsule, Take 40 mg by mouth daily as needed.    , Disp: , Rfl:      pramipexole (MIRAPEX) 0.125 MG tablet, Take 2 tablets (0.25 mg total) by mouth daily. Take 2 hours before bedtime, Disp: 60 tablet, Rfl: 3     rosuvastatin (CRESTOR) 20 MG tablet, Take 1 tablet (20 mg total) by mouth at bedtime., Disp: 90 tablet, Rfl: 3     traMADol-acetaminophen (ULTRACET) 37.5-325 mg per tablet, TAKE 2 TABLETS BY MOUTH THREE TIMES DAILY AS NEEDED FOR PAIN, Disp: , Rfl:      warfarin ANTICOAGULANT (COUMADIN/JANTOVEN) 5 MG tablet, Take 1 tablet (5 mg total) by mouth daily. Take 5mg daily by mouth as directed, Disp: 90 tablet, Rfl: 1     metFORMIN (GLUCOPHAGE) 500 MG tablet, Take 2 tablets by mouth twice daily, Disp: 120 tablet, Rfl: 0    REVIEW OF SYSTEMS:    A 12 point ROS was reviewed and except for what is listed in the HPI above, all others are negative    Objective:    PE:  There were no vitals taken for this visit.  Wt Readings from Last 1 Encounters:   04/29/20 194 lb 9.6 oz (88.3 kg)     There is no height or weight on file to calculate BMI.    EXAM:  GENERAL: This is a well-developed 80 y.o. male who appears his stated " age  EYES: Grossly normal.  MOUTH: Buccal mucosa normal   CARDIAC:  Not assessed  CHEST/LUNG:  Not Assessed  GASTROINTESINAL (ABDOMEN):Not Assessed     MUSCULOSKELETAL: Grossly normal and both lower extremities are intact.  HEME/LYMPH: No lymphedema  NEUROLOGIC: Focally intact, Alert and oriented x 3.   PSYCH: appropriate affect  INTEGUMENT: No open lesions or ulcers  Circumferential measures:  No flowsheet data found.        DIAGNOSTIC STUDIES:     Us Carotid Bilateral    Result Date: 7/13/2020      BILATERAL CAROTID ULTRASOUND Indication: Surveillance of bilateral stenosis Endarterectomy: NA Previous: 11/22/19 Symptoms: Hypertension, Heart Disease and Previous Smoker TECHNIQUE: Duplex exam performed utilizing 2D gray-scale imaging, Doppler interrogation with color-flow and spectral waveform analysis. Right Velocity  Chart (cm/sec) Location Right CCA-PS 52 CCA- ED 10 ICA-PS 70 ICA-ED 22 ECA-PS 69 ECA-ED 0 Vertebral- Antegrade 62 Subclavian A- Triphasic 50 Ratio ICA/CCA-PS 1.34 Ratio ICA/CCA-ED 2.20 Left Velocity  Chart (cm/sec) Location Left CCA-PS 59 CCA- ED 10 ICA-PS 90 ICA-ED 21 ECA-PS 92 ECA-ED 0 Vertebral- Antegrade 13 Subclavian A- Triphasic 82 Ratio ICA/CCA-PS 1.55 Ratio ICA/CCA-ED2.2 2.10 FINDINGS: RIGHT: There is minimal  atheromatous plaque.  LEFT: There is minimal atheromatous plaque.  Both vertebral arteries and subclavian artery waveforms are antegrade. Impression:  1. Less than 50% diameter  diameter stenosis of the right ICA based on velocity criteria   2. Less than 50% diameter diameter stenosis of the left ICA based on velocity criteria. Evaluation based on velocities and NASCET criteria   Category PSV (cm/s)  Plaque Imaging EDV (cm/s)  ICA/CCA Ratio Normal,  <125  None <40 <2 Mild <50% <125 < than 50% DR stenosis <40 <2 Moderate Disease 50-69% 125-230 > than 50% DR stenosis  2.0-4.0 Severe->70% but less than near occlusion >230 > than 50% DR stenosis >100 >4.0 Near Occlusion May be low or  undetectable Visible, extensive Variable Variable Occlusion No Flow Visible with no detectable lumen N/A N/A Plaquetype Description Type 1 Uniformly echolucent Type 2 Predominantly echolucent >50% Type 3 Predominantly echogenic >50% Type 4 Uniformly echogenic Type 5 Unclassified due to poor visualization Ulcer Visible Ulcerative Plaque     I personally reviewed the following imaging today and those on care everywhere, if indicated    LABS:      Sodium   Date Value Ref Range Status   08/11/2020 135 (L) 136 - 145 mmol/L Final   02/12/2020 135 (L) 136 - 145 mmol/L Final   08/13/2019 138 136 - 145 mmol/L Final     Potassium   Date Value Ref Range Status   08/11/2020 5.1 (H) 3.5 - 5.0 mmol/L Final   02/12/2020 5.2 (H) 3.5 - 5.0 mmol/L Final   08/13/2019 4.4 3.5 - 5.0 mmol/L Final     Chloride   Date Value Ref Range Status   08/11/2020 101 98 - 107 mmol/L Final   02/12/2020 102 98 - 107 mmol/L Final   08/13/2019 106 98 - 107 mmol/L Final     BUN   Date Value Ref Range Status   08/11/2020 14 8 - 28 mg/dL Final   02/12/2020 16 8 - 28 mg/dL Final   08/13/2019 16 8 - 28 mg/dL Final     Creatinine   Date Value Ref Range Status   08/11/2020 1.17 0.70 - 1.30 mg/dL Final   02/12/2020 1.11 0.70 - 1.30 mg/dL Final   08/13/2019 0.87 0.70 - 1.30 mg/dL Final     Hemoglobin   Date Value Ref Range Status   01/02/2020 14.5 14.0 - 18.0 g/dL Final   04/04/2019 12.6 (L) 14.0 - 18.0 g/dL Final   10/06/2014 11.3 (L) 14.0 - 18.0 g/dL Final     Platelets   Date Value Ref Range Status   01/02/2020 188 140 - 440 thou/uL Final   04/04/2019 159 140 - 440 thou/uL Final   10/06/2014 249 140 - 440 thou/uL Final     BNP   Date Value Ref Range Status   05/09/2019 96 (H) 0 - 80 pg/mL Final     POC INR   Date Value Ref Range Status   06/06/2019 2.90 (!) 0.90 - 1.10    05/22/2019 5.90 (!) 0.90 - 1.10 Final     Comment:     Critical value at 5.9 forwarded to pool C, [facility] Also contacted Mar Fink regarding  patients dizziness, she will be contacting patient. Please call patient regarding dosing at [phone] LEONELA, CHRISTI      INR   Date Value Ref Range Status   08/11/2020 2.20 (H) 0.90 - 1.10 Final   07/16/2020 2.10 (H) 0.90 - 1.10 Final   06/18/2020 2.10 (H) 0.90 - 1.10 Final       Total time spent 10 (15,25,35) minutes face to face with patient with more than 50% time spent in counseling and coordination of care.    Bull Hoskins MD  VASCULAR MEDICINE

## 2021-06-10 NOTE — TELEPHONE ENCOUNTER
[facility] calling with a critical lab result- blood glucose is 427.   Blood was collected today at 9:34 am.     Hx: Diabetes type 2    RN called patient to check on him. Patient hasn't checked his blood sugar today.     RN asked patient to check his blood glucose now. Glucose 247. He states he just ate supper. Patient states he takes 22 units of insulin in the morning and 22 units of insulin in the evening (insulin NPH-insulin regular (NOVOLIN 70-30) 100 unit/mL (70-30) injection. He also takes Metformin 500 mg tablets twice daily.     RN paged on call provider for [facility], Aureliano zOuna MD via [place] web at 7:40 pm to call RN back directly at [phone].    Provider called back and stated to have patient follow up with PCP. Will route encounter to PCP, Dr Gil.    Roberta Gavin RN/[facility] Nurse Advisors    COVID 19 Nurse Triage Plan/Patient Instructions    Please be aware that novel coronavirus (COVID-19) may be circulating in the community. If you develop symptoms such as fever, cough, or SOB or if you have concerns about the presence of another infection including coronavirus (COVID-19), please contact your health care provider or visit [email or web address].     Disposition/Instructions    Page on call provider.     Thank you for taking steps to prevent the spread of this virus.  o Limit your contact with others.  o Wear a simple mask to cover your cough.  o Wash your hands well and often.    Resources    [facility]: About COVID-19: [email or web address]    CDC: What to Do If You're Sick: www.cdc.gov/coronavirus/2019-ncov/about/steps-when-sick.html    CDC: Ending Home Isolation: www.cdc.gov/coronavirus/2019-ncov/hcp/disposition-in-home-patients.html     CDC: Caring for Someone: www.cdc.gov/coronavirus/2019-ncov/if-you-are-sick/care-for-someone.html     [facility]: Interim Guidance for Hospital Discharge to Home:  www.health.[facility].[place].us/diseases/coronavirus/hcp/hospdischarge.pdf    [facility] clinical trials (COVID-19 research studies): [email or web address].[facility]/umn-clinical-trials     Below are the COVID-19 hotlines at the [place] Department of Health ([facility]). Interpreters are available.   o For health questions: Call 955-189-9007 or 1-292.493.9286 (7 a.m. to 7 p.m.)  o For questions about schools and childcare: Call 673-240-5842 or 1-883.478.2975 (7 a.m. to 7 p.m.)       Reason for Disposition    Lab or radiology calling with CRITICAL test results    Additional Information    Negative: Lab calling with strep throat test results and triager can call in prescription    Negative: Lab calling with urinalysis test results and triager can call in prescription    Negative: Medication questions    Negative: ED call to PCP    Negative: Call about patient who is currently hospitalized    Negative: Physician call to PCP    Negative: Unconscious or difficult to awaken    Negative: Acting confused (e.g., disoriented, slurred speech)    Negative: Very weak (e.g., can't stand)    Negative: Sounds like a life-threatening emergency to the triager    Protocols used: PCP CALL - NO TRIAGE-A-, DIABETES - HIGH BLOOD SUGAR-A-
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
## 2021-06-10 NOTE — PROGRESS NOTES
"Pee Ayala is a 80 y.o. male who is being evaluated via a billable telephone visit.      The patient has been notified of following:     \"This telephone visit will be conducted via a call between you and your physician/provider. We have found that certain health care needs can be provided without the need for a physical exam.  This service lets us provide the care you need with a short phone conversation.  If a prescription is necessary we can send it directly to your pharmacy.  If lab work is needed we can place an order for that and you can then stop by our lab to have the test done at a later time.    Telephone visits are billed at different rates depending on your insurance coverage. During this emergency period, for some insurers they may be billed the same as an in-person visit.  Please reach out to your insurance provider with any questions.    If during the course of the call the physician/provider feels a telephone visit is not appropriate, you will not be charged for this service.\"    Patient has given verbal consent to a Telephone visit? Yes    What phone number would you like to be contacted at? [phone]    Patient would like to receive their AVS by AVS Preference: Mail a copy.      Jessica Tang LPN      "

## 2021-06-11 ENCOUNTER — COMMUNICATION - HEALTHEAST (OUTPATIENT)
Dept: FAMILY MEDICINE | Facility: CLINIC | Age: 81
End: 2021-06-11

## 2021-06-11 DIAGNOSIS — I48.20 CHRONIC ATRIAL FIBRILLATION (H): ICD-10-CM

## 2021-06-11 NOTE — TELEPHONE ENCOUNTER
Please call pt and have him increase his evening insulin dose to 24 and his morning dose to 27.  I would like to have him work on getting me a dinner time reading so we can see how his daytime insulin dose is doing. Please test two times a day, not just once. I would also like another set of readings in 2 weeks.

## 2021-06-11 NOTE — TELEPHONE ENCOUNTER
Please have patient schedule either a virtual visit or  in person visit for discussing option.  We have tried other medication in the past that has not worked.  There are limited options.  A referral to neurology can be considered.      Jazzy Gil MD  9/22/2020

## 2021-06-11 NOTE — TELEPHONE ENCOUNTER
"Clinic Action Needed: Patient requesting a new medication  FNA Triage Call  Presenting Problem:  Patient calling and reports he has restless leg syndrome.     Patient reports pramipexole (MIRAPEX) 0.125 MG tablet is not working consistently, states it works off and on.     He is requesting a new medication that helps his restless legs.       Routed to: Jazzy Gil MD, and care team      Roberta Gavin RN/[facility] Nurse Advisors        Reason for Disposition    Caller has NON-URGENT medication question about med that PCP prescribed and triager unable to answer question    Additional Information    Negative: Drug overdose and nurse unable to answer question    Negative: Caller requesting information not related to medicine    Negative: Caller requesting a prescription for Strep throat and has a positive culture result    Negative: Rash while taking a medication or within 3 days of stopping it    Negative: Immunization reaction suspected    Negative: [1] Asthma and [2] having symptoms of asthma (cough, wheezing, etc)    Negative: MORE THAN A DOUBLE DOSE of a prescription or over-the-counter (OTC) drug    Negative: [1] DOUBLE DOSE (an extra dose or lesser amount) of over-the-counter (OTC) drug AND [2] any symptoms (e.g., dizziness, nausea, pain, sleepiness)    Negative: [1] DOUBLE DOSE (an extra dose or lesser amount) of prescription drug AND [2] any symptoms (e.g., dizziness, nausea, pain, sleepiness)    Negative: Took another person's prescription drug    Negative: [1] DOUBLE DOSE (an extra dose or lesser amount) of prescription drug AND [2] NO symptoms (Exception: a double dose of antibiotics)    Negative: Diabetes drug error or overdose (e.g., insulin or extra dose)    Negative: [1] Request for URGENT new prescription or refill of \"essential\" medication (i.e., likelihood of harm to patient if not taken) AND [2] triager unable to fill per unit policy    Negative: [1] Prescription not at pharmacy AND " [2] was prescribed today by PCP    Negative: Pharmacy calling with prescription questions and triager unable to answer question    Negative: Caller has URGENT medication question about med that PCP prescribed and triager unable to answer question    Protocols used: MEDICATION QUESTION CALL-A-AH

## 2021-06-11 NOTE — TELEPHONE ENCOUNTER
Need lab orders ASAP - pt came in for a lab only apt and was not sure what he needed.  We end up doing his INR and mallory his blood since you will be having a Telephone visit with him tomorrow @ 1:30pm for med check.  So we did collect couple of tubes of blood just in case you wanted to check his blood work.  Please put appropriate lab orders.  Thx

## 2021-06-11 NOTE — TELEPHONE ENCOUNTER
ANTICOAGULATION  MANAGEMENT    Assessment     Today's INR result of 1.80 is Subtherapeutic (goal INR of 2.0-3.0)        Warfarin taken as previously instructed    No new diet changes affecting INR    Potential interaction between Lasix and warfarin which may affect subsequent INRs    - on 9/16/20, Lasix dose increased to 40mg morning and 20mg afternoon, due to heart failure.   - also new medications for restless leg syndrome; (Mirapex).    Continues to tolerate warfarin with no reported s/s of bleeding or thromboembolism     Previous INR was Therapeutic at 2.20 on 8/11/20.    Plan:     Spoke on phone with wife, Agueda  regarding INR result and instructed:    1.  Informed if any questions, Pee can call back.    Warfarin Dosing Instructions:  (Has 5mg tabs)   - Change warfarin dose to 7.5 mg daily on Mon/Thurs; and 5 mg daily rest of week.   - (6.7 % change)    Instructed patient to follow up no later than:  2 wks.   - INR scheduled on 10/8/20 @ [facility].    Education provided: importance of consistent vitamin K intake, target INR goal and significance of current INR result, potential interaction between warfarin and Lasix and importance of notifying clinic for changes in medications    Agueda verbalizes understanding and agrees to warfarin dosing plan.    Instructed to call the [facility] for any changes, questions or concerns. (#[phone])   ?   Mariel Dale RN    Subjective/Objective:      Pee Ayala, a 80 y.o. male is on warfarin.     Pee reports:     Home warfarin dose: as updated on anticoagulation calendar per template     Missed doses: No     Medication changes:  Yes:  New meds for restless leg - Mirapex.     S/S of bleeding or thromboembolism:  No     New Injury or illness:  No     Changes in diet or alcohol consumption:  No     Upcoming surgery, procedure or cardioversion:  No    Anticoagulation Episode Summary     Current INR goal:   2.0-3.0   TTR:   58.8 % (1 y)   Next INR  check:   10/21/2020   INR from last check:   1.80! (9/23/2020)   Weekly max warfarin dose:      Target end date:      INR check location:      Preferred lab:      Send INR reminders to:   DEVON IQBAL    Indications    Chronic atrial fibrillation (H) [I48.20]           Comments:            Anticoagulation Care Providers     Provider Role Specialty Phone number    Jazzy Gil MD Referring Family Medicine [phone]

## 2021-06-11 NOTE — TELEPHONE ENCOUNTER
Forms Request  Name of form/paperwork: Other:  Diabeteic supplies-Diabetic shoes request  Have you been seen for this request: N/A  Do we have the form: Did  Jazzy Gil MD receive the form? Caller states she spoke to Milka on 9/11/20. Caller states she re-faxed the form on 9/11/20.  When is form needed by:  As soon as possible  How would you like the form returned: Fax:  [phone]  Patient Notified form requests are processed in 3-5 business days: Yes    Okay to leave a detailed message? Yes   Caller states she would also need the progress note stating the need for the shoes.

## 2021-06-11 NOTE — TELEPHONE ENCOUNTER
Dr. Gil states this was in her outbox with a note stating this needs to be faxed to diabetes provider. At this point we do not have this form anymore. Fay Oden and her staff should have this and fill this out for pt.

## 2021-06-11 NOTE — TELEPHONE ENCOUNTER
8-18   6:42a 92    8-19  6:01a 250  8:00a 176    8-20  6:01a 250  8:00a 176    8-21  7:33a 98    8-22  7:00a 210    8-23  6:45a 172    8-24  9:30a 240    8-25  7:30a 208    8-26  7:26a 187    8-27  x    8-28  x    8-29  7:00a 121    8-30  7:50a 157    8-31  6:55a 174  7:00a 174

## 2021-06-11 NOTE — TELEPHONE ENCOUNTER
Medication Request  Medication name:    Disp  Refills  Start  End     pramipexole (MIRAPEX) 0.125 MG tablet  60 tablet  3  6/8/2020      Sig - Route: Take 2 tablets (0.25 mg total) by mouth daily. Take 2 hours before bedtime - Oral     Class: No Print        Requested Pharmacy: [facility]  Reason for request: per pharmacy   When did you use medication last?:    Patient offered appointment:  N/A - electronic request  Okay to leave a detailed message: yes

## 2021-06-11 NOTE — TELEPHONE ENCOUNTER
"Patient states he needs a refill for his pramipexole (Mirapex) 0.125 MG tablet. States the pharmacy doesn't have the medication.     Per chart review this medication was refilled on 9/16/2020 by Dr Gil, but was never sent to the pharmacy electronically.     RN resubmitted the medication electronically to the [facility] pharmacy in [place] off of [address].     Patient was notified that the medication was sent electronically to the pharmacy.     Patient verbalized understanding and had no further questions.    Roberta Gavin RN/[facility] Nurse Advisors              Additional Information    Negative: Drug overdose and nurse unable to answer question    Negative: Caller requesting information not related to medicine    Negative: Caller requesting a prescription for Strep throat and has a positive culture result    Negative: Rash while taking a medication or within 3 days of stopping it    Negative: Immunization reaction suspected    Negative: [1] Asthma and [2] having symptoms of asthma (cough, wheezing, etc)    Negative: MORE THAN A DOUBLE DOSE of a prescription or over-the-counter (OTC) drug    Negative: [1] DOUBLE DOSE (an extra dose or lesser amount) of over-the-counter (OTC) drug AND [2] any symptoms (e.g., dizziness, nausea, pain, sleepiness)    Negative: [1] DOUBLE DOSE (an extra dose or lesser amount) of prescription drug AND [2] any symptoms (e.g., dizziness, nausea, pain, sleepiness)    Negative: Took another person's prescription drug    Negative: [1] DOUBLE DOSE (an extra dose or lesser amount) of prescription drug AND [2] NO symptoms (Exception: a double dose of antibiotics)    Negative: Diabetes drug error or overdose (e.g., insulin or extra dose)    Negative: [1] Request for URGENT new prescription or refill of \"essential\" medication (i.e., likelihood of harm to patient if not taken) AND [2] triager unable to fill per unit policy    Negative: [1] Prescription not at " pharmacy AND [2] was prescribed today by PCP    Negative: Pharmacy calling with prescription questions and triager unable to answer question    Negative: Caller has URGENT medication question about med that PCP prescribed and triager unable to answer question    Negative: Caller has medication question about med not prescribed by PCP and triager unable to answer question (e.g., compatibility with other med, storage)    Negative: Caller has NON-URGENT medication question about med that PCP prescribed and triager unable to answer question    Negative: Caller requesting a NON-URGENT new prescription or refill and triager unable to refill per unit policy    Negative: [1] DOUBLE DOSE (an extra dose or lesser amount) of over-the-counter (OTC) drug AND [2] NO symptoms    Negative: [1] DOUBLE DOSE (an extra dose or lesser amount) of antibiotic drug AND [2] NO symptoms    Caller has medication question only, adult not sick, and triager answers question    Protocols used: MEDICATION QUESTION CALL-A-

## 2021-06-11 NOTE — PATIENT INSTRUCTIONS - HE
Pee Ayala,    It was a pleasure to see you today at [facility].     My recommendations after this visit include:  - Please follow up with Dr Talbert in December   - Please follow up with Lilli Guzman in 2-3 weeks  - Increase lasix to 40 mg in the morning and 20 mg in the afternoon    Lilli Guzman, CNP
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
## 2021-06-12 NOTE — TELEPHONE ENCOUNTER
"Patient has a future lab appointment on 11/4/20 for \"labs per marcello\". There are no lab orders. Could you please review the patient's chart and place orders?        Thanks    "

## 2021-06-12 NOTE — PROGRESS NOTES
VASCULAR SURGERY CLINIC PROGRESS NOTE    HPI: Mr. Ayala presents for follow-up of his peripheral vascular disease as well as potential AAA noted on US in the past (3.0 cm).  He does not report any worsening claudication or leg pain since we saw him last, although he has developed restless leg syndrome for which his PCP was put him on Mirapex and he states good relief with this. He is able to walk approximately 1/2 mile before having to stop due to claudication of his left leg. He localizes pain/craming from below to knee down. Resolves with rest. He has DM and attributes some pain to neuropathy.  He does not feel that it interferes with his day to day activities.  Of note he is also being followed with carotid US for surveillance and carotid arteries appear normal with <50% stenosis and CTA done to better visualize questionable AAA from previous US shows NO AAA.  He denies abdominal pains or known family history of AAA.  He denies any other new health concerns.   Non smoker.   He is on aspirin, statin, and coumadin.     He does request to condense vascular visits to one provider if possible.     Allergies, Medications, Social History, Past Medical History and Past Surgical History were reviewed and are noted in the chart.    /66   Pulse 60   Temp 98.1  F (36.7  C)   Resp 15   There is no height or weight on file to calculate BMI.    EXAM:  EYES: Grossly normal.  MOUTH: Buccal mucosa normal   CARDIAC:  Not assessed  CHEST/LUNG:  Not Assessed  MUSCULOSKELETAL: Grossly normal and both lower extremities are intact.  HEME/LYMPH: No lymphedema  NEUROLOGIC: Focally intact, Alert and oriented x 3.   PSYCH: appropriate affect  INTEGUMENT: No open lesions or ulcers.  Feet without wounds.      IMAGES:   -CTA reviewed, no evidence of AAA, largest diameter measure was 2.8 cm by 2.8 cm which is normal.   -Carotid US appears normal without any significant stenosis.   -duplex of left leg stable from prior imaging,  indicates tibial disease with monophasic tibial waveforms.  Left toe pressures with slight drop from previous imaging, 69 to 51.       Assessment/Plan:   79M w/ hx of peripheral arterial disease consistent with diabetic distribution and non compressible tibal vessels. Duplex is grossly normal above knee with multi-phasic waveforms bilaterally. There is waveform dampening on his left lower extremity with drop in toe pressures to 51 however he remains minimally symptomatic without rest pain or wounds and claudication is not lifestyle limiting.    Will follow up with patient in 1 year with JESSENIA and duplex to follow his LLE claudication.  No indication for ongoing AAA or carotid artery surveillance given normal imaging reviewed today.       Rodolfo Mendiola CNP  Vascular Surgery

## 2021-06-12 NOTE — PROGRESS NOTES
Provider Review: Anticoagulation Annual Referral Renewal    ACM Renewal Decision:  Renew ACM warfarin management      INR Range:   Continue management at current INR goal   Anticipated Duration of Therapy (from today):  Long-term anticoagulation      Jazzy Gil MD  8:01 PM

## 2021-06-12 NOTE — TELEPHONE ENCOUNTER
Wellness Screening Tool  Symptom Screening:  Do you have one of the following NEW symptoms:    Fever (subjective or >100.0)?  No    A new cough?  No    Shortness of breath?  No     Chills? No     New loss of taste or smell? No     Generalized body aches? No     New persistent headache? No     New sore throat? No     Nausea, vomiting, or diarrhea?  No    Within the past 2 weeks, have you been exposed to someone with a known positive illness below:    COVID-19 (known or suspected)?  No    Chicken pox?  No    Mealses?  No    Pertussis?  No    Patient notified of visitor policy- They may have one person accompany them to their appointment, but they will need to wear a mask and will be screened upon arrival for symptoms: Yes  Pt informed to wear a mask: Yes  Pt notified if they develop any symptoms listed above, prior to their appointment, they are to call the clinic directly at [phone] for further instructions.  Yes  Patient's appointment status: Patient will be seen in clinic as scheduled on 10/21

## 2021-06-12 NOTE — PROGRESS NOTES
1yr f/u PAD, AAA. RF pt. Bilateral claudication.    Pt states has developed restless leg syndrome and starts to notice it around 4pm. Pt states he has seen his PCP for this and was given meds. Medications have been helping with his RLS    Pt states has numbness to bilat ankles today, somedays it is to his calves though and just depends on the day    Informed pt that we would like him to see 1 provider, currently sees Leona/Rodolfo for PAD and AAA, and Gato for carotid stenosis. Pt agrees and thinks it would be easier to just see 1 provider

## 2021-06-12 NOTE — PATIENT INSTRUCTIONS - HE
Pee Ayala,    It was a pleasure to see you today at [facility].     My recommendations after this visit include:  - Please follow up with Dr Talbert in December   - Please follow up with Lilli Guzman in March  - I have ordered an echocardiogram, please schedule  - I have checked labs and will contact you with results  - Continue current medications    Lilli Guzman, CNP

## 2021-06-12 NOTE — TELEPHONE ENCOUNTER
ANTICOAGULATION  MANAGEMENT    Assessment     Today's INR result of 2.4 is Therapeutic (goal INR of 2.0-3.0)        Warfarin taken differently than instructed, but no impact to total weekly dose Taking warfarin 7.5 mg on Tuesdays and Thursdays, not Mondays and Thursdays     No new diet changes affecting INR    No new medication/supplements affecting INR    Continues to tolerate warfarin with no reported s/s of bleeding or thromboembolism     Previous INR was Subtherapeutic    Plan:     Spoke on phone with Pee regarding INR result and instructed:     Warfarin Dosing Instructions:  Continue current warfarin dose 7.5 mg daily on Tuesdays and Thursdays; and 5 mg daily rest of week  (0 % change)    Instructed patient to follow up no later than: 2 weeks    Education provided: importance of therapeutic range, importance of following up for INR monitoring at instructed interval, importance of taking warfarin as instructed, importance of notifying clinic for changes in medications, when to seek medical attention/emergency care and importance of notifying clinic for diarrhea, nausea/vomiting, reduced intake and/or illness    Pee verbalizes understanding and agrees to warfarin dosing plan.    Instructed to call the [facility] for any changes, questions or concerns. (#[phone])   ?   Marifer Dhillon RN    Subjective/Objective:      Peeleyla Ayala, a 80 y.o. male is on warfarin.     Pee reports:     Home warfarin dose: verbally confirmed home dose with Pee  and updated on anticoagulation calendar     Missed doses: No     Medication changes:  No     S/S of bleeding or thromboembolism:  No     New Injury or illness:  No     Changes in diet or alcohol consumption:  No     Upcoming surgery, procedure or cardioversion:  No    Anticoagulation Episode Summary     Current INR goal:  2.0-3.0   TTR:  62.4 % (1 y)   Next INR check:  11/4/2020   INR from last check:  2.40 (10/21/2020)   Weekly max warfarin dose:     Target  end date:     INR check location:     Preferred lab:     Send INR reminders to:  [facility]    Indications    Chronic atrial fibrillation (H) [I48.20]           Comments:           Anticoagulation Care Providers     Provider Role Specialty Phone number    Jazzy Gil MD Referring Family Medicine [phone]

## 2021-06-12 NOTE — PROGRESS NOTES
"Anticoagulation Annual Referral Renewal Review    Pee Ayala's chart reviewed for annual renewal of referral to anticoagulation monitoring.        Criteria for anticoagulation nurse and/or pharmacist renewal met   Warfarin indication: Atrial Fibrillation Yes, per indication   Current with INR monitoring/compliant Yes Yes   Date of last office visit 9/24/20 Yes, had office visit within last year   Time in Therapeutic Range (TTR) 59 % No, TTR < 60 %       Pee Ayala did NOT meet all criteria for anticoagulation management program initiated renewal and requires provider review. Using dot phrase, \".acmrenewalprovider\", please advise if Pee's anticoagulation management referral should be renewed or if patient should be seen in office to review anticoagulation therapy      Kendal Muniz RN  4:12 PM        "

## 2021-06-12 NOTE — TELEPHONE ENCOUNTER
ANTICOAGULATION  MANAGEMENT    Assessment     Today's INR result of 2.10 is Therapeutic (goal INR of 2.0-3.0)        Warfarin taken as previously instructed    No new diet changes affecting INR    No new medication/supplements affecting INR    Continues to tolerate warfarin with no reported s/s of bleeding or thromboembolism     Previous INR was Therapeutic at 2.40 on 10/21/20.    [facility] for P[facility]. Able to walk approximately 1/2 mile before having to stop due to claudication     Plan:     Spoke on phone with wife, Agueda regarding INR result and instructed:    1.  Increased wkly dose of warfarin to ensure INR stability and keep vessels patent.   2.  If Pee has any questions, to call back.    Warfarin Dosing Instructions:    - Change warfarin dose to 7.5 mg daily on Tues/Thurs/Sat; and 5 mg daily rest of week.   - (6.3% change)    Instructed patient to follow up no later than:  2-3 wks.    Education provided: target INR goal and significance of current INR result    Agueda verbalizes understanding and agrees to warfarin dosing plan.    Instructed to call the [facility] for any changes, questions or concerns. (#[phone])   ?   Mariel Dale RN    Subjective/Objective:      Pee Ayala, a 80 y.o. male is on warfarin.     Pee reports:     Home warfarin dose: as updated on anticoagulation calendar per template    - wife verified current warfarin dose.     Missed doses: No     Medication changes:  No     S/S of bleeding or thromboembolism:  No     New Injury or illness:  No     Changes in diet or alcohol consumption:  No     Upcoming surgery, procedure or cardioversion:  No    Anticoagulation Episode Summary     Current INR goal:  2.0-3.0   TTR:  65.9 % (1 y)   Next INR check:  11/25/2020   INR from last check:  2.10 (11/4/2020)   Weekly max warfarin dose:     Target end date:     INR check location:     Preferred lab:     Send INR reminders to:  [facility]     Indications    Chronic atrial fibrillation (H) [I48.20]           Comments:           Anticoagulation Care Providers     Provider Role Specialty Phone number    Jazzy Gil MD Referring Family Medicine [phone]

## 2021-06-13 NOTE — PROGRESS NOTES
Valve Clinic Nursing Note: Aortic Stenosis    Referring provider: Lilli Guzman CNP    Patient history is significant for known CAD s/p CABG in 2016- cath in 2019 demonstrates a patent LIMA and SVG to OM1.    Symptoms include TOBAR, decreased energy, leg fatigue    Echo information: ()  EF: 60 M P. King: 4.1 MEMO: 0.6 Di: 0.2 Svi: 29.1    CT scan done () need physician input on valve type and size along with access    Angiogram 2019:    LM short min dz  LAD severe dz in prox 75%; leading into small diameter diagonal <2mm; mid 90% with patent LIMA  Circ prox 75-80%; patent SVG to OM1 back feeds into OM2; Distal Circ 85% feeds into small OM3  RCA prox/ostial stent (extends out into aorta) 5-10% narrowing; mid 50%; high take off PDA and 80% just distal in small vessel <2mm     Tentative Plan: Patient to have virtual visit with Dr. Romano on 12/10. Valve coordinator to follow up with remaining work up needed.          KCCQ12 (date completed TBD)    Preliminary STS score: 3.5%      Maude Hand, RN, BSN  Valve Clinic Coordinator  [facility] 1st Floor Heart Clinic  [phone]  20 4:15 PM

## 2021-06-13 NOTE — PROGRESS NOTES
Review of systems done with patient over the phone and all within normal limits. He was unable to get his vitals today.

## 2021-06-13 NOTE — TELEPHONE ENCOUNTER
ANTICOAGULATION  MANAGEMENT    Assessment     Today's INR result of 2.80 is Therapeutic (goal INR of 2.0-3.0)        Warfarin taken as previously instructed    No new diet changes affecting INR    No new medication/supplements affecting INR    Continues to tolerate warfarin with no reported s/s of bleeding or thromboembolism     Previous INR was Therapeutic at 2.10 on 11/4/20.    Consultated with [facility] telephone visit prior to TAVR procedure on 12/10/20.  Reported awaiting call from Cardiologist.    Plan:     Spoke on phone with Pee regarding INR result and instructed:     Warfarin Dosing Instructions:    - Continue current warfarin dose 7.5 mg daily on Tues/Thurs/Sat; and 5 mg daily rest of week.    Instructed patient to follow up no later than:  4 wks.    Education provided: importance of consistent vitamin K intake, target INR goal and significance of current INR result, importance of notifying clinic for changes in medications and importance of notifying clinic of upcoming surgeries and procedures 2 weeks in advanceWayne    Pee verbalizes understanding and agrees to warfarin dosing plan.    Instructed to call the [facility] for any changes, questions or concerns. (#[phone])   ?   Mariel Dale RN    Subjective/Objective:      Pee Ayala, a 80 y.o. male is on warfarin.     Pee reports:     Home warfarin dose: as updated on anticoagulation calendar per template     Missed doses: No     Medication changes:  No     S/S of bleeding or thromboembolism:  No     New Injury or illness:  Yes.  Reported continues to be fatigue and dyspnea on exertion.     Changes in diet or alcohol consumption:  No     Upcoming surgery, procedure or cardioversion:  Yes: date to be determined for TAVR surgery.    Anticoagulation Episode Summary     Current INR goal:  2.0-3.0   TTR:  69.8 % (1 y)   Next INR check:  12/21/2020   INR from last check:  2.80 (11/23/2020)   Weekly max warfarin dose:     Target end  date:     INR check location:     Preferred lab:     Send INR reminders to:  [facility]    Indications    Chronic atrial fibrillation (H) [I48.20]           Comments:           Anticoagulation Care Providers     Provider Role Specialty Phone number    Jazzy Gil MD Referring Family Medicine [phone]

## 2021-06-13 NOTE — TELEPHONE ENCOUNTER
Please approve the request for diabetic shoes from [facility] in [facility]. Podiatrist tried getting a hold of us multiple times per Pee and no response.

## 2021-06-13 NOTE — PROGRESS NOTES
"Pee Ayala is a 80 y.o. male who is being evaluated via a billable telephone visit.      The patient has been notified of following:     \"This telephone visit will be conducted via a call between you and your physician/provider. We have found that certain health care needs can be provided without the need for a physical exam.  This service lets us provide the care you need with a short phone conversation.  If a prescription is necessary we can send it directly to your pharmacy.  If lab work is needed we can place an order for that and you can then stop by our lab to have the test done at a later time.    Telephone visits are billed at different rates depending on your insurance coverage. During this emergency period, for some insurers they may be billed the same as an in-person visit.  Please reach out to your insurance provider with any questions.    If during the course of the call the physician/provider feels a telephone visit is not appropriate, you will not be charged for this service.\"    Patient has given verbal consent to a Telephone visit? Yes    What phone number would you like to be contacted at? [phone]    Patient would like to receive their AVS by AVS Preference: Pradeephart.    Phone call duration: 12 minutes  Start: 2:15 PM  End: 2:27 PM    Jefferson Sandy MD            Cardiothoracic Surgery Consult    Date of Service: 11/23/2020    REFERRING CARDIOLOGIST: Valve Clinic    REASON FOR CONSULTATION: Severe, aortic valve stenosis     HISTORY OF PRESENT ILLNESS: Mr. Pee Ayala is a 80 y.o. year-old male with know CAD s/p CAB in 2016, PAD, diabetes mellitus type 2, atrial fibrillation, hyperlipidemia, and gastroesophageal reflux. He presented with increasing leg fatigue with activity consistent with claudication but complained of some increased shortness of breath and decreased energy. A TTE was ordered by his PCP showing progression of moderate AORTIC STENOSIS to severe. He is " referred to valve clinic for TAVR evaluation.     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     Status post coronary angiogram 3/9/2016     Tremor 9/28/2014       PAST SURGICAL HISTORY:   Past Surgical History:   Procedure Laterality Date     CORONARY ARTERY BYPASS GRAFT  2016     CV CORONARY ANGIOGRAM N/A 4/4/2019    Procedure: Coronary Angiogram;  Surgeon: Dominik Vega MD;  Location: [facility];  Service: Cardiology     CV LEFT HEART CATHETERIZATION WO LEFT VETRICULOGRAM Left 4/4/2019    Procedure: Left Heart Catheterization Without Left Ventriculogram;  Surgeon: Dominik Vega MD;  Location: [facility];  Service: Cardiology     CV RIGHT HEART CATH Right 4/4/2019    Procedure: Right Heart Catheterization;  Surgeon: Dominik Vega MD;  Location: [facility];  Service: Cardiology     forearm sugery Left 2015     [facility]         ALLERGIES:   Allergies   Allergen Reactions     Oxycodone Itching     Amlodipine Dizziness     Dizziness and dry heaves     Lisinopril Cough          CURRENT MEDICATIONS:  Current Outpatient Medications on File Prior to Visit   Medication Sig Dispense Refill     ACCU-CHEK GUIDE TEST STRIPS strips USE 1 STRIP TO CHECK GLUCOSE THREE TIMES DAILY  "300 strip 1     acetaminophen (TYLENOL) 650 MG CR tablet Take 1,300 mg by mouth as needed.       antiox. no.10-omeg3s-lut-starr 280-10-2 mg cap Take 1 tablet by mouth 2 (two) times a day.       aspirin 81 mg chewable tablet Chew 81 mg daily.              blood glucose meter (GLUCOMETER) Use 1 each As Directed as needed. Dispense glucometer brand per patient's insurance at pharmacy discretion. 1 each 0     carvediloL (COREG) 6.25 MG tablet Take 1 tablet (6.25 mg total) by mouth 2 (two) times a day with meals. 180 tablet 1     finasteride (PROSCAR) 5 mg tablet Take 1 tablet (5 mg total) by mouth daily. 90 tablet 3     furosemide (LASIX) 20 MG tablet Take 40 mg in the morning and 20 mg in the afternoon 270 tablet 0     hydrOXYzine HCl (ATARAX) 25 MG tablet Take 1 tablet (25 mg total) by mouth 3 (three) times a day as needed for anxiety. 90 tablet 11     insulin NPH-insulin regular (NOVOLIN 70-30) 100 unit/mL (70-30) injection Inject under the skin. Inject 25 units in AM and 24 units in PM       insulin syringe-needle U-100 0.3 mL 31 gauge x 15/64\" Syrg Use 1 each As Directed 2 (two) times a day. 100 Syringe 11     isosorbide mononitrate (IMDUR) 30 MG 24 hr tablet Take 1 tablet (30 mg total) by mouth daily. Take daily at 5 pm. 90 tablet 2     metFORMIN (GLUCOPHAGE) 500 MG tablet Take 2 tablets by mouth twice daily 360 tablet 1     MULTIVITAMIN ORAL Take 2 tablets by mouth daily.       omeprazole (PRILOSEC) 40 MG capsule Take 40 mg by mouth daily as needed.              pramipexole (MIRAPEX) 0.125 MG tablet Take 2 tablets (0.25 mg total) by mouth daily. Around 4:15 pm 60 tablet 6     rosuvastatin (CRESTOR) 20 MG tablet Take 1 tablet (20 mg total) by mouth at bedtime. 90 tablet 3     warfarin ANTICOAGULANT (COUMADIN/JANTOVEN) 5 MG tablet Take 1 tablet (5 mg total) by mouth daily. Take 5mg daily by mouth as directed 90 tablet 1     No current facility-administered medications on file prior to visit.          FAMILY " HISTORY:   Family History   Problem Relation Age of Onset     Diabetes type II Mother         58 ,  from anesthesia complication.     Heart disease Father         86  from stroke     Stroke Father      No Medical Problems Brother         60 years of age.     The family history was reviewed and is not pertinent to the patient's disease/illness    SOCIAL HISTORY:   Social History     Socioeconomic History     Marital status:      Spouse name: Not on file     Number of children: Not on file     Years of education: Not on file     Highest education level: Not on file   Occupational History     Not on file   Social Needs     Financial resource strain: Not on file     Food insecurity     Worry: Not on file     Inability: Not on file     Transportation needs     Medical: Not on file     Non-medical: Not on file   Tobacco Use     Smoking status: Former Smoker     Quit date: 1998     Years since quittin.9     Smokeless tobacco: Never Used   Substance and Sexual Activity     Alcohol use: Yes     Comment: very rare     Drug use: No     Sexual activity: Never     Birth control/protection: None   Lifestyle     Physical activity     Days per week: Not on file     Minutes per session: Not on file     Stress: Not on file   Relationships     Social connections     Talks on phone: Not on file     Gets together: Not on file     Attends Worship service: Not on file     Active member of club or organization: Not on file     Attends meetings of clubs or organizations: Not on file     Relationship status: Not on file     Intimate partner violence     Fear of current or ex partner: Not on file     Emotionally abused: Not on file     Physically abused: Not on file     Forced sexual activity: Not on file   Other Topics Concern     Not on file   Social History Narrative     and lives with life.    Has adult children from previous relationship. ( Blended family).    Has a dog - Vincent Gil  MD  1/3/2019           REVIEW OF SYSTEMS:  A complete 10 point review of systems was obtained and is negative other than the above stated complaints    PHYSICAL EXAMINATION:   No vitals or exam was performed for this telephone visit.    LABORATORY STUDIES:   Lab Results   Component Value Date    WBC 6.2 01/02/2020    HGB 14.5 01/02/2020    HCT 43.0 01/02/2020     (H) 01/02/2020     01/02/2020      Lab Results   Component Value Date    CREATININE 1.28 10/21/2020    BUN 19 10/21/2020     10/21/2020    K 4.4 10/21/2020     10/21/2020    CO2 26 10/21/2020     Lab Results   Component Value Date    HGBA1C 8.9 (H) 11/04/2020     TRANSTHORACIC ECHOCARDIOGRAM: This study was personally reviewed by me  LVEF 60%  MEMO 0.6, MG 37 mmHg, PV 4.1 m/s, DI 0.2    IMPRESSION AND PLAN: Mr. Pee Ayala is a 80 y.o. Year-old male with severe aortic stenosis. Echocardiography demonstrates preserved function with an LVEF of 60% , and coronary angiography demonstrates a patent LIMA and SVG to OM1. I recommend transcatheter aortic valve replacement due to redo-sternotomy status and patient preference. I discussed the technical details of the open surgical AVR with the patient, as well as the expected postoperative course and recovery. The risks include but are not limited to bleeding, infection, stroke, heart or graft failure, dysrhythmia, respiratory failure, kidney or liver injury, bowel or limb ischemia, and death.    - Ongoing valve clinic evaluation, agree with TAVR due to redo-sternotomy and patient preference  - Patient would like CPB standby and conversion to redo-sternotomy in event of need for emergent surgical conversion to open.    Thank you very much for this referral.       Jefferson Sandy 11/23/2020 2:09 PM

## 2021-06-13 NOTE — TELEPHONE ENCOUNTER
If there is a form that needs to be filled out, do forward it to me.  It would be great to have the podiatrist visit summary so that I can use the appropriate clinical evaluation.    Jazzy Gil MD  11/23/2020

## 2021-06-13 NOTE — TELEPHONE ENCOUNTER
I called Xenia, she will contact the pt's PCP to sign, but is requesting notes. I will fax to [phone]

## 2021-06-13 NOTE — TELEPHONE ENCOUNTER
Requested Prescriptions     Pending Prescriptions Disp Refills     warfarin ANTICOAGULANT (COUMADIN/JANTOVEN) 5 MG tablet 90 tablet 1     Sig: Take 1 tablet (5 mg total) by mouth daily. Take 5mg daily by mouth as directed     Last Office Visit:  Pt had virtual visit on 9/24/2020  Last Refill:  12/14/2020  CSA:  N/A  Date of Last Labs:  11/23/2020

## 2021-06-13 NOTE — PROGRESS NOTES
Pre-TAVR cath.    Maude Hand, RN, BSN  Valve Clinic Coordinator  [address] [facility]  [phone]  12/16/20 10:41 AM

## 2021-06-14 NOTE — TELEPHONE ENCOUNTER
I called and spoke to patient's wife Agueda regarding a change of schedule for his angiogram. He now needs to report at [facility] at 9 am Wednesday, January 6th.   I did adjust his warfarin hold date to begin Jan 2nd- his last dose will be Jan 1st.   We briefly touched on the needed pre-op for his TAVR that is getting scheduled for 1/12 at [facility]. I will have Dee call them to schedule the pre-op. I will place an order for TAVR on the [facility] instance of [employer]. Dee will schedule that.    They will also need a second COVID test scheduled for 4 days prior to his TAVR.    They know to call with any concerns or questions.    Maude Hand, RN, BSN  Valve Clinic Coordinator  [facility] 1st Floor [facility] Clinic  [phone]  12/30/20 1:46 PM

## 2021-06-14 NOTE — TELEPHONE ENCOUNTER
Incoming call from pt requesting change in medication for restless legs. Currently has restless leg starting in the late afternoon and continues all night long. Currently taking medication at about 4pm. Is wondering if there is something different he can try cause he is not getting any sleep.

## 2021-06-14 NOTE — PROGRESS NOTES
He was unable to get his vitals today. Review of systems done with patient over the phone and all within normal limits.

## 2021-06-14 NOTE — PROGRESS NOTES
ANTICOAGULATION  MANAGEMENT: Discharge Review    Pee Ayala chart reviewed for anticoagulation continuity of care    Outpatient surgery/procedure on  1/6/2021 for Coronary angiogram.   - severe aortic stenosis.   - scheduled for TAVR on 1/12/21.    Discharge disposition: Home       INR Results:       Recent labs: (last 7 days)     01/06/21  1018   INR 1.20*       Warfarin inpatient management: held warfarin due to cor. angiogram on 1/6/21.    - held warfarin doses for 4 ays from 1/2-5.    Warfarin discharge instructions:  Not sure.    - will call and check with [facility] about resuming warfarin therapy in light of scheduled TAVR on 1/12.     Medication Changes Affecting Anticoagulation: No    Additional Factors Affecting Anticoagulation: No    Plan     No adjustment to anticoagulation plan needed      Patient not contacted    Anticoagulation calendar updated    Mariel Dale RN

## 2021-06-14 NOTE — TELEPHONE ENCOUNTER
Patient called. PCP is concern with his A1C and wants him to schedule an appointment with Agueda. He will need an appointment with Agueda at the end of January since he is going to have surgery soon and won't be able to make the appointment.   He is already scheduled for a follow up w/Agueda on 03.02.2021. Does Agueda want to see him sooner?     Pee @ [phone]

## 2021-06-14 NOTE — TELEPHONE ENCOUNTER
I called and spoke with wife. They are inquiring about scheduling his pre-TAVR heart cath.    I will have the  call them next week.      Juan R Adair RN  [facility]  [facility]  [phone]  12/24/20  10:34 AM        ----- Message from [employer] sent at 12/24/2020  9:22 AM CST -----  Regarding: VAlve Pt  General phone call:    Caller: Pee   Primary cardiologist: Pt brooks Romano     Detailed reason for call: Pt callling in regarding testing that Dr. Romano would like him to do for a Valve procedure    Best phone number: [phone]  Best time to contact: any   Ok to leave a detailed message? yes  Device? no    Additional Info:

## 2021-06-14 NOTE — PROGRESS NOTES
Updated anticoagulation calendar to reflect that no warfarin was administered on 1/6/2020.    Norma Osorio RN
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
## 2021-06-14 NOTE — PROGRESS NOTES
"Pee Ayala is a 80 y.o. male who is being evaluated via a billable telephone visit.      What phone number would you like to be contacted at? [phone]  How would you like to obtain your AVS? AVS Preference: Mail a copy.  Assessment & Plan     1. Restless leg syndrome  pramipexole (MIRAPEX) 0.25 MG tablet   2. BPH (benign prostatic hyperplasia)  finasteride (PROSCAR) 5 mg tablet   3. Aortic thrombus (H)     4. Chronic heart failure with preserved ejection fraction (H)     5. Type 2 diabetes mellitus with both eyes affected by proliferative retinopathy without macular edema, with long-term current use of insulin (H)     6. Chronic atrial fibrillation (H)     7. Morbid obesity (H)       Patient with restless legs with increasing medication about couple of months ago.  We will further increase medication to 0.5 mg daily.  Medication finasteride refill for the patient.  Patient does not feel that he has followed with urology.    Best practice advisory mentions aortic thrombus, CHF and atrial fibrillation.  He is following with cardiology and is now scheduled for procedures with them.  Patient with severe aortic valve stenosis is now going to be scheduled for his surgery.  He has upcoming appointment with cardiology after getting an angiogram done.  Notes from specialist are reviewed.  Patient has type 2 diabetes and follows with endocrinology nurse practitioner.  This is not under good control.  Her last hemoglobin A1c at 8.9.  Best practices advisory mentions morbid obesity.  Patient to follow-up for annual visit.    BMI:   Estimated body mass index is 31.79 kg/m  as calculated from the following:    Height as of 11/6/20: 5' 5.5\" (1.664 m).    Weight as of 11/6/20: 194 lb (88 kg).         No follow-ups on file.    Jazzy Gil MD  [facility]    Subjective    Chief Complaint   Patient presents with     Leg Problem     Pt would like to discuss RLS, has been treated previously "       Pee Ayala is 80 y.o. and presents to clinic today for the following health issues   HPI   Patient feels that he may need further increase in his Mirapex for restless leg.  He is currently using 27 and 25 units of insulin for his diabetes.  Follows with Endo.        Review of Systems  No cardiac or respiratory issues      Objective    Vitals - Patient Reported  Weight (Patient Reported): 196 lb (88.9 kg)(As reported by Pt)    Physical Exam  And mentation are normal.          Phone call duration: 11 minutes

## 2021-06-14 NOTE — TELEPHONE ENCOUNTER
New Appointment Needed  What is the reason for the visit:    Inpatient/ED Follow Up Appt Request  At what hospital or facility were you seen?: [facility]  What is the reason you were seen?: Heart Surgery  What date were you admitted?: date: 1/12/21  What date were you discharged?: date: 1/13/21  What was the recommended timeframe for your follow up appointment?: within 7 days  Provider Preference: PCP only  How soon do you need to be seen?: within 7 days  Waitlist offered?: Yes  Okay to leave a detailed message:  Yes

## 2021-06-14 NOTE — TELEPHONE ENCOUNTER
[facility] RN Pre-Procedure Education Note    Reason for angiogram: 1/6    Procedure: Coronary angiogram possible PCI With Dr. Johnson    Date of Procedure: 1/6  Arrival time: 9 AM    Location: [facility]                Diagnosis: severe aortic stenosis  Cardiologist Ordering Procedure: Rosalina   Primary Cardiologist: Nitish  PCP: Jazzy Gil MD    H&P completed by: 12/10  Previous Cath Report: Epic  Bypass grafts: Yes  Labs within 7 days: no  Renal Issues: No  Diabetic: Yes    Does patient have contrast/IV dye allergy: no      Patient Education  Explained indications/risks for diagnostic evaluation, including one or more of the following:  left heart catheterization, right heart catheterization and coronary angiogram  Explained indications/risks for therapeutic interventions, including one or more of the following: PTCA, artherectomy and stent.  These risks are in addition to baseline risks associated with a Diagnostic Evaluation.  Patient state understanding of procedure and risks and agrees to proceed    Additional education comment: Pt was instructed on procedure letter and written education material. This information was reviewed with the patient. No further questions at this time.    Pre-procedure instructions  Patient instructed to be NPO after midnight.  Patient instructed to arrange for transportation home following procedure  No driving for 24 hours post procedure  Depending on the results of the test, provider may decide to keep patient overnight in the hospital for further evaluation.  Reviewed lifting restrictions    Pre-procedure medication instructions  medication instructions: instructed to hold warfain x4 days prior to procedure  Hold diabetic agents and lasix.       Current Outpatient Medications   Medication Sig Dispense Refill     ACCU-CHEK GUIDE TEST STRIPS strips USE 1 STRIP TO CHECK GLUCOSE THREE TIMES DAILY 300 strip 1     acetaminophen (TYLENOL) 650 MG CR tablet  "Take 1,300 mg by mouth as needed.       antiox. no.10-omeg3s-lut-starr 280-10-2 mg cap Take 1 tablet by mouth 2 (two) times a day. icaps       aspirin 81 mg chewable tablet Chew 81 mg daily.              blood glucose meter (GLUCOMETER) Use 1 each As Directed as needed. Dispense glucometer brand per patient's insurance at pharmacy discretion. 1 each 0     carvediloL (COREG) 6.25 MG tablet Take 1 tablet (6.25 mg total) by mouth 2 (two) times a day with meals. 180 tablet 1     finasteride (PROSCAR) 5 mg tablet Take 1 tablet (5 mg total) by mouth daily. 90 tablet 3     furosemide (LASIX) 20 MG tablet Take 40 mg in the morning and 20 mg in the afternoon 270 tablet 0     hydrOXYzine HCl (ATARAX) 25 MG tablet Take 1 tablet (25 mg total) by mouth 3 (three) times a day as needed for anxiety. 90 tablet 11     insulin NPH-insulin regular (NOVOLIN 70-30) 100 unit/mL (70-30) injection Inject under the skin. Inject 27 units in AM and 24 units in PM       insulin syringe-needle U-100 0.3 mL 31 gauge x 15/64\" Syrg Use 1 each As Directed 2 (two) times a day. 100 Syringe 11     isosorbide mononitrate (IMDUR) 30 MG 24 hr tablet Take 1 tablet (30 mg total) by mouth daily. Take daily at 5 pm. 90 tablet 2     metFORMIN (GLUCOPHAGE) 500 MG tablet Take 2 tablets by mouth twice daily 360 tablet 1     omeprazole (PRILOSEC) 40 MG capsule Take 40 mg by mouth daily as needed.              pramipexole (MIRAPEX) 0.25 MG tablet Take 2 tablets (0.5 mg total) by mouth at bedtime. 180 tablet 3     rosuvastatin (CRESTOR) 20 MG tablet Take 1 tablet (20 mg total) by mouth at bedtime. 90 tablet 3     UNABLE TO FIND Apply 1 tablet to the mouth or throat daily. Med Name: Prevagen (memory)       warfarin ANTICOAGULANT (COUMADIN/JANTOVEN) 5 MG tablet Take 1 tablet (5 mg total) by mouth daily. Take 5mg daily by mouth as directed 90 tablet 1     No current facility-administered medications for this visit.        Allergies   Allergen Reactions     Oxycodone " Itching     Amlodipine Dizziness     Dizziness and dry heaves     Lisinopril Cough       Maude Hand, RN, BSN  Valve Clinic Coordinator  [facility]  [phone]  01/05/21 3:56 PM

## 2021-06-14 NOTE — PROGRESS NOTES
ITP ASSESSMENT   Assessment Day: Initial    Session Number: 1/2  Precautions: S/P TAVR Precautions    Diagnosis: Valve;CHF    Risk Stratification: High    Referring Provider: Jo Romano MD  EXERCISE  Exercise Assessment: Initial       Tolerated 20 mins on Nustep at 2.3 mets                         Exercise Plan  Goals Next 30 days   ST Goal #1:  Pt will tolerate 30-40 mins of exercise at 2.3-2.8 mets;  in cardiac rehab;  2 x weekly; without CV symptoms    ST Goal #2:  Pt will walk 2-3 x daily at home 3-5 mins without CV symptoms;  ST Goal #3:  Pt will lose 1-2 # per month with portion control and increased activity     LTG:  Pt will tolerate activities  in the 3.5-4.5 Met level range, including Cooking; moderate cleaning and resuming woodworking projects and sorting; organizing work bench         Education Goals: All goals in this section met    Education Goals Met: Patient can state cardiac s/s and appropriate emergency response.;Has system for taking medication.;Medication review.      Exercise Prescription  Exercise Mode: Treadmill;Bike;Nustep;Arm Erg.    Frequency: 2  week    Duration: 30-40 mins    Intensity / THR: 20-30 beats above resting heart rate    RPE 11-14  Progression / Met level: 2.3-2.8    Resistive Training?: Yes      Current Exercise (mins/week): 3      Interventions  Home Exercise:  Mode: WAlk-indoors    Frequency: 2-3 x daily    Duration: 3-5 mins      Education Material : Educational videos;Provide written material;Individual education and counseling      Education Completed  Exercise Education Completed: Signs and Symptoms;Medication review;RPE;Emergency Plan;Home Exercise;Benefits of Exercise;End point of exercise              Exercise Follow-up/Discharge  Follow up/Discharge: Skilled therapy required to monitor CV response to increasing Met levels; To provide risk factor education and support with changes;  Pt is limited by LBP; Shoulder discomfort; L/E neuropathy;  Exercises will need  "to be adapted within pt's limits     NUTRITION  Nutrition Assessment: Initial      Nutrition Risk Factors:  Nutrition Risk Factors: Diabetes;Dyslipidemia;Overweight  HbA1c: 9.1  Monitors blood sugar at home: Yes  Frequency: 4 x day  Cholesterol: 135  LDL: 57  HDL: 54  Triglycerides: 119      Nutrition Plan  Interventions  Other Nutrition Intervention: Therapist/Pt Discussion;Educational Videos;Provide with Written Material    Initial Rate Your Plate Score: 57      Education Completed  Nutrition Education Completed: Risk factor overview      Goals  Nutrition Goals (Next 30 days): Patient will follow a low sodium diet;Review Dietitian schedule;Patient will follow a low saturated fat diet;Patient will lose weight      Goals Met  Nutrition Goals Met: Patient can identify their risk factors for CAD;Provided Rate your Plate Survey;Reviewed Dietitian schedule      Height, Weight, and  BMI  Weight: 200 lb (90.7 kg)  Height: 5' 5\" (1.651 m)  BMI: 33.28      Nutrition Follow-up  Follow-up/Discharge: Will schedule a time for pt to meet with the dietician         Other Risk Factors  Other Risk Factor Assessment: Initial      HTN Risk Factor: Hypertension      Pre Exercise BP: 122/70  Post Exercise BP: 110/60      Hypertension Plan  Goals  HTN Goals: Follow low sodium diet;Take medication as prescribed;Exercises regularly      Goals Met  HTN Goals Met: Take medication as prescribed      HTN Interventions  HTN Interventions: Diet consult;Therapist/patient discussion;Provide written material;Offer educational videos      HTN Education Completed  HTN Education Completed: Medication review;Risk factor overview      Tobacco Risk Factor: NA        Risk Factor Follow-up   Follow-up/Discharge: Will provide risk factor ed. as needed     PSYCHOSOCIAL  Psychosocial Assessment: Initial       [facility] Q of L Summary Score: 24      PHQ-9 Total Score: 5      Psychosocial Risk Factor: Stress      Psychosocial " Plan  Interventions  If  Interventions: Offer educational videos and classes;Provide written material;Individual education and counseling       Education Completed  Education Completed: Effects of stress on body      Goals  Goals (Next 30 days): Practicing stress management skills      Goals Met  Goals Met: Identified Support system;Oriented to stress management classes;Identify stressors      Psychosocial Follow-up  Follow-up/Discharge: Reports he has a good support system             Patient involved in Goal setting?: Yes      Signature: _____________________________________________________________    Date: __________________

## 2021-06-14 NOTE — PROGRESS NOTES
1. S/P TAVR (transcatheter aortic valve replacement)     2. Type 2 diabetes mellitus with both eyes affected by proliferative retinopathy without macular edema, with long-term current use of insulin (H)     3. Morbid obesity (H)       Patient Instructions   I have seen you today for follow-up of recent aortic valve replacement.    The access site looks clean.    Exam is normal.    Follow-up with cardiology as per schedule.    Appears that your diabetes is not under great control.    Please follow-up with your endocrinology specialist.  Further titration of insulin may be needed.    Follow-up in 3 months for annual wellness exam.              Subjective     Pee Ayala is 80 y.o. and presents to clinic today for the following health issues   HPI     Hospital Follow-up Visit:    Hospital/Nursing Home/IP Rehab Facility: [facility]  Date of Admission: 1/12/2021  Date of Discharge: 1/13/2021  Reason(s) for Admission: TAVR    Was your hospitalization related to COVID-19? No  Problems taking medications regularly:  None  Medication changes since discharge: None  Problems adhering to non-medication therapy:  None    Summary of hospitalization:  [facility] discharge summary reviewed  Diagnostic Tests/Treatments reviewed.  Follow up needed: Needs follow-up 1 month echocardiogram on 2/17/2021  Other Healthcare Providers Involved in Patient s Care:         Specialist appointment - Cardiology and Surgical follow-up appointment - JEEVAN Kline on 1/21 at [place] [facility]  Update since discharge: improved.      Post Discharge Medication Reconciliation: discharge medications reconciled, continue medications without change.  Plan of care communicated with patient              Review of Systems  No shortness of breath or chest pain.  Appetite is fair.  No nausea vomiting or abdominal pain.      Objective    /55 (Patient Site: Left Arm, Patient Position: Sitting, Cuff Size: Adult  Large)   Pulse 67   Temp 98  F (36.7  C) (Oral)   Resp 20   Wt 202 lb (91.6 kg)   BMI 33.10 kg/m    Body mass index is 33.1 kg/m .  Physical Exam  GENERAL: Healthy, alert and no distress  EYES: Eyes grossly normal to inspection. No discharge or erythema, or obvious scleral/conjunctival abnormalities.  RESP: No audible wheeze, cough, or visible cyanosis.  No visible retractions or increased work of breathing.    CV: irregularly irregular rhythm  SKIN: Exercises reviewed.  Band-Aid is removed.  Site is clean.  No drainage is noted.  Small bruising on the right groin without any hematoma.  NEURO: Cranial nerves grossly intact. Mentation and speech appropriate for age.  PSYCH: Mentation appears normal, affect normal/bright, judgement and insight intact, normal speech and appearance well-groomed

## 2021-06-14 NOTE — TELEPHONE ENCOUNTER
[facility] RN Pre-Procedure Education Note    Reason for angiogram: pre-TAVR    Procedure: coronary angiogram With Dr. Mariano    Date of Procedure: 1/4/2021  Arrival time: 6:00 am    Location: [facility]                Diagnosis: severe aortic stenosis, CAD  Cardiologist Ordering Procedure: Dr. Romano  Primary Cardiologist: Nitish  PCP: Jazzy Gil MD    H&P completed by: Rosalina  Previous Cath Report: in Epic  Bypass grafts: Yes  Labs within 7 days: no  Renal Issues: No  Diabetic: Yes    Does patient have contrast/IV dye allergy: no      Patient Education  Explained indications/risks for diagnostic evaluation, including one or more of the following:  left heart catheterization, right heart catheterization and coronary angiogram  Explained indications/risks for therapeutic interventions, including one or more of the following: PTCA, artherectomy and stent.  These risks are in addition to baseline risks associated with a Diagnostic Evaluation.  Patient state understanding of procedure and risks and agrees to proceed    Additional education comment: Pt was instructed on procedure letter and written education material. This information was reviewed with the patient. No further questions at this time.    Pre-procedure instructions  Patient instructed to be NPO after midnight.  Patient instructed to arrange for transportation home following procedure  No driving for 24 hours post procedure  Depending on the results of the test, provider may decide to keep patient overnight in the hospital for further evaluation.  Reviewed lifting restrictions    Pre-procedure medication instructions  medication instructions: instructed to hold warfarin x4 days.  Hold all diabetic agents. Only take Coreg and Imdur the AM of procedure with small sips of water.  Hold all other medications.      Current Outpatient Medications   Medication Sig Dispense Refill     ACCU-CHEK GUIDE TEST STRIPS strips USE 1 STRIP TO  "CHECK GLUCOSE THREE TIMES DAILY 300 strip 1     acetaminophen (TYLENOL) 650 MG CR tablet Take 1,300 mg by mouth as needed.       antiox. no.10-omeg3s-lut-starr 280-10-2 mg cap Take 1 tablet by mouth 2 (two) times a day. icaps       aspirin 81 mg chewable tablet Chew 81 mg daily.              blood glucose meter (GLUCOMETER) Use 1 each As Directed as needed. Dispense glucometer brand per patient's insurance at pharmacy discretion. 1 each 0     carvediloL (COREG) 6.25 MG tablet Take 1 tablet (6.25 mg total) by mouth 2 (two) times a day with meals. 180 tablet 1     finasteride (PROSCAR) 5 mg tablet Take 1 tablet (5 mg total) by mouth daily. 90 tablet 3     furosemide (LASIX) 20 MG tablet Take 40 mg in the morning and 20 mg in the afternoon 270 tablet 0     hydrOXYzine HCl (ATARAX) 25 MG tablet Take 1 tablet (25 mg total) by mouth 3 (three) times a day as needed for anxiety. 90 tablet 11     insulin NPH-insulin regular (NOVOLIN 70-30) 100 unit/mL (70-30) injection Inject under the skin. Inject 27 units in AM and 24 units in PM       insulin syringe-needle U-100 0.3 mL 31 gauge x 15/64\" Syrg Use 1 each As Directed 2 (two) times a day. 100 Syringe 11     isosorbide mononitrate (IMDUR) 30 MG 24 hr tablet Take 1 tablet (30 mg total) by mouth daily. Take daily at 5 pm. 90 tablet 2     metFORMIN (GLUCOPHAGE) 500 MG tablet Take 2 tablets by mouth twice daily 360 tablet 1     omeprazole (PRILOSEC) 40 MG capsule Take 40 mg by mouth daily as needed.              pramipexole (MIRAPEX) 0.125 MG tablet Take 2 tablets (0.25 mg total) by mouth daily. Around 4:15 pm 60 tablet 6     rosuvastatin (CRESTOR) 20 MG tablet Take 1 tablet (20 mg total) by mouth at bedtime. 90 tablet 3     warfarin ANTICOAGULANT (COUMADIN/JANTOVEN) 5 MG tablet Take 1 tablet (5 mg total) by mouth daily. Take 5mg daily by mouth as directed 90 tablet 1     No current facility-administered medications for this visit.        Allergies   Allergen Reactions     " Oxycodone Itching     Amlodipine Dizziness     Dizziness and dry heaves     Lisinopril Cough       Maude Hand, RN, BSN  Valve Clinic Coordinator  [facility]  [phone]  12/29/20 10:10 AM

## 2021-06-14 NOTE — TELEPHONE ENCOUNTER
ANTICOAGULATION  MANAGEMENT PROGRAM    Pee A Jamie is overdue for INR check.     Left message to call and schedule INR appointment as soon as possible.      Mariel Dale RN

## 2021-06-14 NOTE — TELEPHONE ENCOUNTER
Please schedule a virtual appointment for this patient.  We have had ongoing discussions on changes in his medication.  We have tried to continue this medication a few times for RLS  Also also will refer to neurology.    Jazzy Gil MD  12/28/2020

## 2021-06-14 NOTE — TELEPHONE ENCOUNTER
ANTICOAGULATION  MANAGEMENT    Assessment     Today's INR result of 2.20 is Therapeutic (goal INR of 2.0-3.0)        Warfarin taken as previously instructed    No new diet changes affecting INR    Potential interaction between Plavix and Aspirin and warfarin which may affect subsequent INRs    - on 1/12/21, per Cardiology  [facility] - stop Aspirin since he is on Warfarin.  However, Pee reported, he saw another RN, and she advised to stop Plavix and continue with Aspirin.  So, he stopped Plavix last week.    Continues to tolerate warfarin with no reported s/s of bleeding or thromboembolism     Previous INR was Subtherapeutic at 1.13 on 1/13/21.    S/p TAVR  With RCA stent placement on 1/12/21 for severe aortic stenosis.    Plan:     Spoke on phone with Pee regarding INR result and instructed:     Warfarin Dosing Instructions:  (5mg tabs)   -  Continue current warfarin dose 7.5 mg daily on Tues/Thurs/Sat; and 5 mg daily rest of week.    Instructed patient to follow up no later than:  1-2 wks.   - INR scheduled on 2/8/21 @ [facility].    Education provided: importance of consistent vitamin K intake, target INR goal and significance of current INR result, potential interaction between warfarin and stopped Aspirin and restarted on Aspirin / stopped Plavix last week, and importance of notifying clinic for changes in medications    Pee verbalizes understanding and agrees to warfarin dosing plan.    Instructed to call the [facility] for any changes, questions or concerns. (#[phone])   ?   Mariel Dale RN    Subjective/Objective:      Pee Ayala, a 80 y.o. male is on warfarin.     Pee reports:     Home warfarin dose: verbally confirmed home dose with Pee and updated on anticoagulation calendar     Missed doses: No     Medication changes:  Yes: was started on Plavix and discontinued Aspirin, since patient is on Coumadin.  However, saw an nurse from [facility] and she said to stop the Plavix instead and  resume Aspirin 81mg daily.     S/S of bleeding or thromboembolism:  No    New Injury or illness:  Yes. S/p TAVR  With RCA stent placement on 1/12/21 for severe aortic stenosis.     Changes in diet or alcohol consumption:  No     Upcoming surgery, procedure or cardioversion:  No    Anticoagulation Episode Summary     Current INR goal:  2.0-3.0   TTR:  66.1 % (1 y)   Next INR check:  2/8/2021   INR from last check:  2.20 (1/25/2021)   Weekly max warfarin dose:     Target end date:     INR check location:     Preferred lab:     Send INR reminders to:  [facility]    Indications    Chronic atrial fibrillation (H) [I48.20]           Comments:           Anticoagulation Care Providers     Provider Role Specialty Phone number    Jazzy Gil MD Referring Family Medicine [phone]

## 2021-06-14 NOTE — PATIENT INSTRUCTIONS - HE
I have seen you today for follow-up of recent aortic valve replacement.    The access site looks clean.    Exam is normal.    Follow-up with cardiology as per schedule.    Appears that your diabetes is not under great control.    Please follow-up with your endocrinology specialist.  Further titration of insulin may be needed.    Follow-up in 3 months for annual wellness exam.

## 2021-06-14 NOTE — TELEPHONE ENCOUNTER
Called patient to see how he is doing now that he is home from his TAVR. He states that he feels so good that he has to hold himself back from doing more. He denies any weight gain, swelling or shortness of breath. He reports that his groin sites are CDI, unchanged. He knows to call with any changes in symptoms.     He made an appt for f/u with his PCP. He plans to meet with JEEVAN Kline next week for his 1 week follow up.    Maude Hand, RN, BSN  Valve Clinic Coordinator  [facility]  [phone]  01/15/21 8:34 AM

## 2021-06-14 NOTE — TELEPHONE ENCOUNTER
Informed patient of Dr. Gil's message below. Pt scheduled 01/05/21 for telephone visit with Dr. Gil to discuss restless legs.

## 2021-06-15 NOTE — PROGRESS NOTES
Review of systems done with patient over the phone. Positive for visual disturbance, irregular heart beat, leg swelling, joint pain, muscle pain, loss of balance. He was unable to get his vitals today.

## 2021-06-15 NOTE — TELEPHONE ENCOUNTER
ANTICOAGULATION  MANAGEMENT    Assessment     Today's INR result of 2.50 is Therapeutic (goal INR of 2.0-3.0)        Warfarin taken as previously instructed    No new diet changes affecting INR    No new medication/supplements affecting INR    - vacciinated 1st dose Pfizer Covid-19 on 2/4/21.    Continues to tolerate warfarin with no reported s/s of bleeding or thromboembolism     Previous INR was Therapeutic at 2.20 on 1/25/21.    Plan:     Spoke on phone with Pee regarding INR result and instructed:      Warfarin Dosing Instructions:    - Continue current warfarin dose 7.5 mg daily on Tues/Thurs/Sat; and 5 mg daily rest of week.    Instructed patient to follow up no later than: 4 wks.   - INR schedule don 3/8/21 @ [facility].    Education provided: importance of consistent vitamin K intake, target INR goal and significance of current INR result and importance of notifying clinic for changes in medications    Pee verbalizes understanding and agrees to warfarin dosing plan.    Instructed to call the [facility] for any changes, questions or concerns. (#[phone])   ?   Mariel Dale RN    Subjective/Objective:      Pee PEREZ Jamie, a 80 y.o. male is on warfarin. Pee Glasgow reports:     Home warfarin dose: verbally confirmed home dose with Pee and updated on anticoagulation calendar     Missed doses: No     Medication changes:  No     S/S of bleeding or thromboembolism:  No     New Injury or illness:  No     Changes in diet or alcohol consumption:  No     Upcoming surgery, procedure or cardioversion:  No    Anticoagulation Episode Summary     Current INR goal:  2.0-3.0   TTR:  68.4 % (1 y)   Next INR check:  3/8/2021   INR from last check:  2.50 (2/8/2021)   Weekly max warfarin dose:     Target end date:     INR check location:     Preferred lab:     Send INR reminders to:  [facility]    Indications    Chronic atrial fibrillation (H) [I48.20]           Comments:           Anticoagulation  Care Providers     Provider Role Specialty Phone number    Jazzy Gil MD Referring Family Medicine [phone]
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
## 2021-06-15 NOTE — TELEPHONE ENCOUNTER
[facility] Team      Pt is leaving to [place] 02/25. wont be back until April.  His appt for 1st wek of march has been cancelled. wondering if we can get him in before he leaves, ([facility] is fully booked). Or if ok for pt to wait until he returns.      Patient can be reached @ [phone]

## 2021-06-15 NOTE — TELEPHONE ENCOUNTER
----- Message from Luna Umana RN sent at 2/17/2021  1:30 PM CST -----  Call pt in 2 weeks for BG's

## 2021-06-15 NOTE — TELEPHONE ENCOUNTER
ANTICOAGULATION  MANAGEMENT    Assessment     Today's INR result of 2.70 is Therapeutic (goal INR of 2.0-3.0)        Warfarin taken as previously instructed    No new diet changes affecting INR    No new medication/supplements affecting INR    - on 2/25/21 completed 2 shots Pfizer Covid-19 vaccination.    Continues to tolerate warfarin with no reported s/s of bleeding or thromboembolism     Previous INR was Therapeutic at 2.50 on 2/8/21.    Leaving on a vacation and returning on 3/19/21.    Plan:     Spoke on phone with Pee regarding INR result and instructed:      Warfarin Dosing Instructions:    - Continue current warfarin dose 7.5 mg daily on Tues/Thurs/Sat; and 5 mg daily rest of week.    Instructed patient to follow up no later than:  4 wks.   - INR scheduled on 3/29/21.    Education provided: importance of consistent vitamin K intake, target INR goal and significance of current INR result and travel related clotting risk and prevention    Pee verbalizes understanding and agrees to warfarin dosing plan.    Instructed to call the [facility] for any changes, questions or concerns. (#[phone])   ?   Mariel Dale RN    Subjective/Objective:      Pee CHRIS Ayala, a 80 y.o. male is on warfarin. Pee Glasgow reports:     Home warfarin dose: as updated on anticoagulation calendar per template     Missed doses: No     Medication changes:  No     S/S of bleeding or thromboembolism:  No     New Injury or illness:  No     Changes in diet or alcohol consumption:  No     Upcoming surgery, procedure or cardioversion:  No    Anticoagulation Episode Summary     Current INR goal:  2.0-3.0   TTR:  68.4 % (1 y)   Next INR check:  3/26/2021   INR from last check:  2.70 (2/26/2021)   Weekly max warfarin dose:     Target end date:     INR check location:     Preferred lab:     Send INR reminders to:  [facility]    Indications    Chronic atrial fibrillation (H) [I48.20]           Comments:            Anticoagulation Care Providers     Provider Role Specialty Phone number    Jazzy Gil MD Referring Family Medicine [phone]

## 2021-06-16 NOTE — TELEPHONE ENCOUNTER
Telephone Encounter by Norma Osorio RN at 2/15/2019  4:22 PM     Author: Norma Osorio RN Service: -- Author Type: Registered Nurse    Filed: 2/15/2019  5:14 PM Encounter Date: 2/15/2019 Status: Attested    : Norma Osorio RN (Registered Nurse) Cosigner: Jazzy Gil MD at 2/15/2019  5:31 PM    Attestation signed by Jazzy Gil MD at 2/15/2019  5:31 PM      First statement more warfarin taken can be affecting INR?                ANTICOAGULATION  MANAGEMENT    Assessment     Today's INR result of 1.6 is Subtherapeutic (goal INR of 2.0-3.0)        More warfarin taken than instructed which may be affecting INR    No new diet changes affecting INR    No new medication/supplements affecting INR    Continues to tolerate warfarin with no reported s/s of bleeding or thromboembolism     Previous INR was Supratherapeutic    Plan:     Spoke with Pee regarding INR result and instructed:     Warfarin Dosing Instructions:  take one time booster dose of 10 mg today then change warfarin dose to    5 mg on Thu; 7.5 mg all other days      (11 % change)    Instructed patient to follow up no later than: 1-2 weeks- patient stated that he is leaving for [place] tomorrow and coming back on 2/28 - Appointment made for 3/1/19    Education provided: importance of therapeutic range, target INR goal and significance of current INR result, importance of taking warfarin as instructed, monitoring for clotting signs and symptoms, when to seek medical attention/emergency care and travel related clotting risk and prevention    Pee verbalizes understanding and agrees to warfarin dosing plan.    Instructed to call the [facility] for any changes, questions or concerns. (#[phone])   ?   Norma Osorio RN    Subjective/Objective:      Pee Ayala, a 78 y.o. male is on warfarin.     Pee reports:     Home warfarin dose: verbally confirmed home dose with Pee and updated on anticoagulation  calendar     Missed doses: No     Medication changes:  No     S/S of bleeding or thromboembolism:  No     New Injury or illness:  No     Changes in diet or alcohol consumption:  No     Upcoming surgery, procedure or cardioversion:  No    Anticoagulation Episode Summary     Current INR goal:   2.0-3.0   TTR:   14.9 % (1.1 mo)   Next INR check:   3/1/2019   INR from last check:   1.60! (2/15/2019)   Weekly max warfarin dose:      Target end date:      INR check location:      Preferred lab:      Send INR reminders to:   ANTICOAGULATION POOL C (DTN,VAD,CGR,GAV)    Indications    Chronic atrial fibrillation (H) [I48.2]           Comments:            Anticoagulation Care Providers     Provider Role Specialty Phone number    Jazzy Gil MD Referring Family Medicine [phone]

## 2021-06-16 NOTE — TELEPHONE ENCOUNTER
ANTICOAGULATION  MANAGEMENT- Home Care/Care Facility Result    Assessment     Today's INR result of 2.4 is Therapeutic (goal INR of 2.0-3.0)        Warfarin taken as previously instructed    No new diet changes affecting INR    No new medication/supplements affecting INR    Continues to tolerate warfarin with no reported s/s of bleeding or thromboembolism     Previous INR was Therapeutic    Plan:     Spoke with wife discussed INR result and instructed:     Warfarin Dosing Instructions: Continue current warfarin dose 7.5 mg daily on tue/thu/sat; and 5 mg daily rest of week  (0 % change)    Next INR to be drawn: two weeks    Pee verbalizes understanding and agrees to warfarin dosing plan.   ?   Yousuf Madison RN    Subjective/Objective:      Pee Ayala, a 80 y.o. male is established on warfarin.     Home care/care facility RN's report of Pee INR, recent warfarin dosing, diet changes, medication changes, and symptoms is documented below.    Additional findings: none    Anticoagulation Episode Summary     Current INR goal:  2.0-3.0   TTR:  63.3 % (1 y)   Next INR check:  4/19/2021   INR from last check:  2.40 (4/5/2021)   Weekly max warfarin dose:     Target end date:     INR check location:     Preferred lab:     Send INR reminders to:  [facility]    Indications    Chronic atrial fibrillation (H) [I48.20]           Comments:           Anticoagulation Care Providers     Provider Role Specialty Phone number    Jazzy Gil MD Referring Family Medicine [phone]

## 2021-06-16 NOTE — TELEPHONE ENCOUNTER
Telephone Encounter by Ana Maria Luna at 10/24/2019 11:12 AM     Author: Ana Maria Luna Service: -- Author Type: --    Filed: 10/24/2019 11:13 AM Encounter Date: 10/24/2019 Status: Signed    : Ana Maria Luna APPROVED:    Approval start date:10/24/2019  Approval end date:12/31/2020    Pharmacy has been notified of approval and will contact patient when medication is ready for pickup.

## 2021-06-16 NOTE — TELEPHONE ENCOUNTER
Telephone Encounter by Cecy Dominguez RN at 3/13/2020  1:52 PM     Author: Cecy Dominguez RN Service: -- Author Type: Registered Nurse    Filed: 3/13/2020  1:53 PM Encounter Date: 3/13/2020 Status: Attested    : Cecy Dominguez RN (Registered Nurse) Cosigner: Jazzy Gil MD at 3/13/2020  1:59 PM    Attestation signed by Jazzy Gil MD at 3/13/2020  1:59 PM    Agree                ANTICOAGULATION  MANAGEMENT    Assessment     Today's INR result of 2.3 is Therapeutic (goal INR of 2.0-3.0)        Warfarin taken as previously instructed    No new diet changes affecting INR    No new medication/supplements affecting INR    Continues to tolerate warfarin with no reported s/s of bleeding or thromboembolism     Previous INR was Therapeutic    Plan:     Spoke with Pee regarding INR result and instructed:     Warfarin Dosing Instructions:  Continue current warfarin dose 5 mg daily   Instructed patient to follow up no later than: 4 weeks    Education provided: importance of therapeutic range    Pee verbalizes understanding and agrees to warfarin dosing plan.    Instructed to call the [facility] for any changes, questions or concerns. (#[phone])   ?   Cecy Dominguez RN    Subjective/Objective:      Pee CHRIS Jamie, a 79 y.o. male is on warfarin.     Pee reports:     Home warfarin dose: template incorrect; verbally confirmed home dose with Pee and updated on anticoagulation calendar     Missed doses: No     Medication changes:  No     S/S of bleeding or thromboembolism:  No     New Injury or illness:  No     Changes in diet or alcohol consumption:  No     Upcoming surgery, procedure or cardioversion:  No    Anticoagulation Episode Summary     Current INR goal:   2.0-3.0   TTR:   53.3 % (1 y)   Next INR check:   4/10/2020   INR from last check:   2.30 (3/13/2020)   Weekly max warfarin dose:      Target end date:      INR check location:      Preferred lab:      Send INR  reminders to:   [facility]    Indications    Chronic atrial fibrillation [I48.20]           Comments:            Anticoagulation Care Providers     Provider Role Specialty Phone number    Jazzy Gil MD Referring Family Medicine [phone]

## 2021-06-16 NOTE — TELEPHONE ENCOUNTER
Telephone Encounter by Jazzy Gil MD at 2/19/2020  8:28 PM     Author: Jazzy Gil MD Service: -- Author Type: Physician    Filed: 2/19/2020  8:30 PM Encounter Date: 2/19/2020 Status: Signed    : Jazzy Gil MD (Physician)       I had specifically discussed and written  to patient on his AVS from last visit that was January 9 as follows       Return in about 3 months (around 4/2/2020) for recheck.   Take Nortriptyline every other day for 3 doses and then stop     DO NOT REFILL TRAZODONE     Start taking 1 pill a day of Mirapex and see if it is helping. You may increase to 2 tablets daily after one week and let me know        Declining refill of trazodone at this time.  As he has more symptoms of restless leg.    Ask him to schedule appointment if he wants to discuss this further.

## 2021-06-16 NOTE — TELEPHONE ENCOUNTER
Refill Approved    Rx renewed per Medication Renewal Policy. Medication was last renewed on 4/3/20.    Ihsan Bo, [facility] Triage/Med Refill 4/8/2021     Requested Prescriptions   Pending Prescriptions Disp Refills     rosuvastatin (CRESTOR) 20 MG tablet 90 tablet 3     Sig: Take 1 tablet (20 mg total) by mouth at bedtime.       Statins Refill Protocol (Hmg CoA Reductase Inhibitors) Passed - 4/7/2021 10:07 AM        Passed - PCP or prescribing provider visit in past 12 months      Last office visit with prescriber/PCP: 1/19/2021 Jazzy Gil MD OR same dept: 1/19/2021 Jazzy Gil MD OR same specialty: 1/19/2021 Jazzy Gil MD  Last physical: 3/28/2019 Last MTM visit: Visit date not found   Next visit within 3 mo: Visit date not found  Next physical within 3 mo: Visit date not found  Prescriber OR PCP: Jazzy Gil MD  Last diagnosis associated with med order: 1. Coronary artery disease involving native coronary artery of native heart without angina pectoris  - rosuvastatin (CRESTOR) 20 MG tablet; Take 1 tablet (20 mg total) by mouth at bedtime.  Dispense: 90 tablet; Refill: 3    If protocol passes may refill for 12 months if within 3 months of last provider visit (or a total of 15 months).

## 2021-06-17 NOTE — PATIENT INSTRUCTIONS - HE
Patient Instructions by Saray Bill RN at 1/13/2020  2:20 PM     Author: Saray Bill RN Service: -- Author Type: Registered Nurse    Filed: 1/13/2020  2:58 PM Encounter Date: 1/13/2020 Status: Signed    : Saray Bill RN (Registered Nurse)       Carotid Duplex    Description  Wearing your street clothes, you will be asked to lie on a stretcher.    Ultrasound gel, usually warmed for your comfort, will be placed on either side of your neck.    Through the gel, the technician will apply to your neck a small hand-held device that emits sound waves.   When the test is completed, the technician will remove excess gel from your neck. The gel is water-soluble and will not stain your skin or clothes.  How to Prepare  Eat and take medications as usual.   Wear minimal or no jewelry to ease application and removal of gel.  Risks  There are typically no side effects or complications associated with a carotid duplex ultrasound examination.  What Can I Expect After the Test?  The technician will send the ultrasound images to your vascular surgeon for evaluation. Typically, a report is available in 2-3 days. If anything critical is found, it is standard practice to notify the vascular surgeon immediately.  Reference: https://vascular.org/patient-resources/vascular-tests

## 2021-06-17 NOTE — TELEPHONE ENCOUNTER
"RN cannot approve Refill Request    RN can NOT refill this medication Protocol failed and NO refill given. Last office visit: 5/6/2021 Jazzy Gil MD Last Physical: 3/28/2019 Last MTM visit: Visit date not found Last visit same specialty: 5/6/2021 Jazzy Gil MD.  Next visit within 3 mo: Visit date not found  Next physical within 3 mo: Visit date not found      Rosalie Lyon, [facility] Triage/Med Refill 5/8/2021    Requested Prescriptions   Pending Prescriptions Disp Refills     insulin syringe-needle U-100 0.3 mL 31 gauge x 15/64\" Syrg 100 Syringe 11     Sig: Use 1 each As Directed 2 (two) times a day.       There is no refill protocol information for this order           "

## 2021-06-17 NOTE — TELEPHONE ENCOUNTER
Telephone Encounter by Mariel Dale RN at 4/19/2021  3:14 PM     Author: Mariel Dale RN Service: -- Author Type: Registered Nurse    Filed: 4/19/2021  4:44 PM Encounter Date: 4/19/2021 Status: Signed    : Mariel Dale RN (Registered Nurse)       ANTICOAGULATION  MANAGEMENT    Assessment     Today's INR result of 1.60 is Subtherapeutic (goal INR of 2.0-3.0)        Warfarin taken as previously instructed    No new diet changes affecting INR    No new medication/supplements affecting INR    Continues to tolerate warfarin with no reported s/s of bleeding or thromboembolism     Previous INR was Therapeutic at 2.40 on 4/5/21.    Reported a dog scratch.  Would is healing and no s/sx of any infections.    Just returned from vacation from [place]    Plan:     Spoke on phone with wife, Agueda regarding INR result and instructed:      Warfarin Dosing Instructions:  (evenings. 5mg tabs)   - today, advised one time booster with 7.5mg warfarin dose,   - then continue current warfarin dose 7.5 mg daily on Tues/Thurs/Sat; and 5 mg daily rest of week.    Instructed patient to follow up no later than: 1-2 wks.   - INR schedule don 5/3/21 @ [facility].    Education provided: importance of consistent vitamin K intake and target INR goal and significance of current INR result    Agueda verbalizes understanding and agrees to warfarin dosing plan.    Instructed to call the [facility] for any changes, questions or concerns. (#[phone])   ?   Mariel Dale RN    Subjective/Objective:      Pee CHRIS Ayala, a 81 y.o. male is on warfarin. Pee      Pee reports:     Home warfarin dose: as updated on anticoagulation calendar per template     Missed doses: No     Medication changes:  No     S/S of bleeding or thromboembolism:  No     New Injury or illness:  No     Changes in diet or alcohol consumption:  No     Upcoming surgery, procedure or cardioversion:  No    Anticoagulation Episode Summary     Current INR  goal:  2.0-3.0   TTR:  63.8 % (1 y)   Next INR check:  5/3/2021   INR from last check:  1.60 (4/19/2021)   Weekly max warfarin dose:     Target end date:     INR check location:     Preferred lab:     Send INR reminders to:  [facility]    Indications    Chronic atrial fibrillation (H) [I48.20]           Comments:           Anticoagulation Care Providers     Provider Role Specialty Phone number    Jazzy Gil MD Referring Family Medicine [phone]

## 2021-06-17 NOTE — TELEPHONE ENCOUNTER
Telephone Encounter by Cecy Dominguez RN at 4/10/2020 12:03 PM     Author: Cecy Dominguez RN Service: -- Author Type: Registered Nurse    Filed: 4/10/2020 12:07 PM Encounter Date: 4/10/2020 Status: Attested    : Cecy Dominguez RN (Registered Nurse) Cosigner: Lary Brito MD at 4/12/2020  9:54 PM    Attestation signed by Lary Brito MD at 4/12/2020  9:54 PM    Agree                 ANTICOAGULATION  MANAGEMENT    Assessment     Today's INR result of 2.0 is Therapeutic (goal INR of 2.0-3.0)        Warfarin taken as previously instructed    No new diet changes affecting INR    No new medication/supplements affecting INR    Continues to tolerate warfarin with no reported s/s of bleeding or thromboembolism     Previous INR was Therapeutic    Plan:     Spoke with Pee regarding INR result and instructed:     Warfarin Dosing Instructions:  Continue current warfarin dose 5 mg daily   Instructed patient to follow up no later than: 4 weeks    Education provided: importance of therapeutic range    Pee verbalizes understanding and agrees to warfarin dosing plan.    Instructed to call the [facility] for any changes, questions or concerns. (#[phone])   ?   Cecy Dominguez RN    Subjective/Objective:      Pee Ayala, a 79 y.o. male is on warfarin.     Pee reports:     Home warfarin dose: as updated on anticoagulation calendar per template     Missed doses: No     Medication changes:  No     S/S of bleeding or thromboembolism:  No     New Injury or illness:  No     Changes in diet or alcohol consumption:  No     Upcoming surgery, procedure or cardioversion:  No    Anticoagulation Episode Summary     Current INR goal:   2.0-3.0   TTR:   58.6 % (1 y)   Next INR check:   5/8/2020   INR from last check:   2.00 (4/10/2020)   Weekly max warfarin dose:      Target end date:      INR check location:      Preferred lab:      Send INR reminders to:   [facility]     Indications    Chronic atrial fibrillation [I48.20]           Comments:            Anticoagulation Care Providers     Provider Role Specialty Phone number    Jazzy Gil MD Referring Family Medicine [phone]

## 2021-06-17 NOTE — PATIENT INSTRUCTIONS - HE
Patient Instructions by Kole Arthur RN at 5/1/2019  9:00 AM     Author: Kole Arthur RN Service: -- Author Type: Registered Nurse    Filed: 5/1/2019 10:21 AM Encounter Date: 5/1/2019 Status: Addendum    : Wendy Delgadillo MD (Physician)    Related Notes: Original Note by Kole Arthur RN (Registered Nurse) filed at 5/1/2019  9:46 AM       Patient Education     A Walking Program for Peripheral Arterial Disease (PAD)  Peripheral arterial disease (PAD) is a condition where the arteries in the legs are severely damaged. When you have PAD, walking can be painful. So you may start to walk less. Walking less makes your leg muscles weaker. It then becomes harder and more painful to walk. A walking program can break this cycle. This sheet gives you tips on how to get started.     Walking farther without pain  Exercise strengthens leg muscles that are out of shape. It also trains these muscles to work with less oxygen. This helps your leg muscles work better even with reduced blood flow to your legs. When you have PAD, a walking program can be helpful. Your program can:    Let you walk longer and farther without claudication. This is an ache in your legs during exercise that goes away with rest.    Let you do more and be more active    Add to your overall health and well-being    Help you control your blood sugar and blood pressure    Help you become healthier with no risk and at little or no cost  Getting started  Your local hospital, vascular center, or cardiac rehab center may have a special walking program for people with PAD. If so, this is your best option. But if you cant find a program, or its not covered by insurance, you can still walk on your own. Follow these steps at each session:    Step 1. Start at a pace that lets you walk for 5 to 10 minutes before you start to feel claudication. This feeling is unpleasant, but it doesnt hurt you. Keep going until the pain makes you feel that you need to stop.    Step 2. Stop  and rest for 3 to 5 minutes, just long enough for the pain to go away. You can rest standing or sitting. (Some people like to bring along a cane or a lightweight folding chair.)    Step 3. Again walk at a pace that lets you walk for 5 to 10 minutes more before you feel pain. This may be slower than your starting pace in step 1. Then rest again.    Step 4. Repeat this process until youve walked for 45 minutes. This should be about 60 to 80 minutes total, including resting time. You may not be able to do a full 45 minutes at first. Do as much as you can, and increase your time as you improve.  Making the most of your program    Walk at least 3 times a week. Take no more than 2 days off between sessions. If you stop walking, even for a week or two, you can lose the health benefits of your program.    Find a good place to walk. A treadmill or a track may be better at first. That way, you wont run the risk of going too far and finding that you cant walk back. Be sure to have a place to walk indoors in bad weather, such as a gym or a mall.    Wear shoes with sturdy, flexible soles. The heel should fit without slipping. You should have room to wiggle your toes.    Keep track of how long you walk. A pedometer will show your daily progress. It can also show how much farther you can walk as time goes by.    Ask a friend to keep you company. You may enjoy walking with someone else. Or you may want to make your walking sessions a time to relax by yourself.    Make it fun. Listen to music while you walk and rest. Walk in a favorite park. Get to know the people at the gym, or the folks that you pass on your route. While you rest, you can window-shop, read, or feed the birds. Do whatever will help you enjoy your exercise sessions.  Date Last Reviewed: 6/1/2016 2000-2017 [employer] [address] [facility], [place] [address]. All rights reserved. This information is not intended as a substitute for professional  medical care. Always follow your healthcare professional's instructions.         Ultrasound    Thank you for choosing [facility] as partners in your care.  Please read the following information before your appointment.  Feel free to ask questions.    An ultrasound is an exam that uses sound waves to create pictures.  The special camera that takes the pictures is called a transducer.  An ultrasound technologist will perform the exam under the direction of a physician.    Preparation  Ultrasound preps vary.  If you are scheduled for an Aorta Ultrasound, please do not eat or drink anything 8 hours before your exam.  You may take daily medications with a small sip of water.  There is no preparation required for any of the other ultrasound exams performed at [facility].    The Examination  You may or may not need to change clothes for your exam.  The technologist will explain the exam to you.  He or she will ask you a few questions and answer any questions you may have.    You will lie on a table and a gel will be applied to your skin.  A small handheld instrument called a transducer will be moved across the area we are looking at while pictures are taken.  Also, your exam may include measuring your blood pressure on your arms, legs and/or toes.    When the Exam Is Completed  Your physician will receive the results of the exam and explain them to you.  You may return to your normal diet and activities unless otherwise directed by your doctor.  Feel free to ask questions if something is not clear to you.  We welcome comments.    At [facility], we are dedicated to providing the best possible care.  Thank you for taking time to read these instructions.  We hope your experience is as pleasant as possible.      You are scheduled for the following exam(s):    []Carotid Duplex:  Evaluates the carotid arteries in the neck that feed the brain with blood.  Gel is applied to the skin of the neck.  A  transducer will be placed on the gel covered areas to obtain images and evaluate and listen to the blood flow in the arteries.  This exam takes approximately 30 minutes.    []Venous Duplex:  Evaluates the veins that carry blood to the heart from the arms and/or legs.  Gel is applied to the skin of the arms and/or legs.  A transducer will be placed on the gel covered areas to obtain images and evaluate flow in the veins.  This exam takes approximately 30 minutes per arm/leg.    [x]Arterial Duplex:  Evaluates the arteries that feed the arms and legs with blood.  Gel is applied to the skin of the arms and/or legs.  A transducer will be placed on the gel covered areas to obtain images and listen to the blood flow in the arms and/or legs.  This exam takes approximately 30 minutes per arm/leg.    [x]JESSENIA or Segmental Pressures:  Ultrasound and blood pressure cuffs are used to evaluate the arteries that supply the arms and legs with blood.  Several blood pressure cuffs will be place on your arms and legs.  When inflated, the cuffs will provide blood pressure readings that are used to determine where blockages in the arteries may be.  You may be asked to do a moderate level of exercise during this exam.  This exam takes approximately 30-60 minutes.    []Aorta:  Evaluates the abdominal aorta for blockages and aneurysm.  Gel is applied to the stomach.  A transducer will be placed on the gel covered areas to obtain images of the aorta.  This exam takes approximately 30 minutes.    Prep instructions:  Do not eat or drink anything 8 hours before procedure.  You may take daily medications with a small sip of water.

## 2021-06-17 NOTE — TELEPHONE ENCOUNTER
Telephone Encounter by Norma Osorio RN at 12/30/2020  3:25 PM     Author: Norma Osorio RN Service: -- Author Type: Registered Nurse    Filed: 12/30/2020  3:43 PM Encounter Date: 12/30/2020 Status: Signed    : Norma Osorio RN (Registered Nurse)       ANTICOAGULATION  MANAGEMENT    Assessment     Today's INR result of 1.7 is Subtherapeutic (goal INR of 2.0-3.0)        Warfarin taken as previously instructed    No new diet changes affecting INR    No new medication/supplements affecting INR    Continues to tolerate warfarin with no reported s/s of bleeding or thromboembolism     Previous INR was Therapeutic     Scheduled for angiogram on 1/6 ( see warfarin interruption plan on [facility] encounter dated 12/30/2020)        Plan:     Spoke on phone with Pee regarding INR result and instructed:     Warfarin Dosing Instructions:  Continue current warfarin dose    7.5 mg every Tue, Thu, Sat; 5 mg all other days      (0 % change)    Warfarin interruption plan for Angiogram procedure on 1/6/2021 per [facility]'s instructions:    Hold warfarin starting 1/2  Resume warfarin as instructed at discharge post procedure.    Instructed patient to follow up no later than: as instructed post porcedure    Education provided: importance of therapeutic range, target INR goal and significance of current INR result and importance of taking warfarin as instructed    Pee verbalizes understanding and agrees to warfarin dosing plan.    Instructed to call the [facility] for any changes, questions or concerns. (#[phone])   ?   Norma Osorio RN    Subjective/Objective:      Pee PEREZ Jamie, a 80 y.o. male is on warfarin.     Pee reports:     Home warfarin dose: as updated on anticoagulation calendar per template     Missed doses: No     Medication changes:  No     S/S of bleeding or thromboembolism:  No     New Injury or illness:  No     Changes in diet or alcohol consumption:  No     Upcoming surgery, procedure or  cardioversion:  Yes: angiogram    Anticoagulation Episode Summary     Current INR goal:  2.0-3.0   TTR:  68.6 % (1 y)   Next INR check:  1/13/2021   INR from last check:  1.70 (12/30/2020)   Weekly max warfarin dose:     Target end date:     INR check location:     Preferred lab:     Send INR reminders to:  [facility]    Indications    Chronic atrial fibrillation (H) [I48.20]           Comments:           Anticoagulation Care Providers     Provider Role Specialty Phone number    Jazzy Gil MD Referring Family Medicine [phone]

## 2021-06-17 NOTE — TELEPHONE ENCOUNTER
ANTICOAGULATION  MANAGEMENT    Assessment     Today's INR result of 2.50 is Therapeutic (goal INR of 2.0-3.0)        Warfarin taken as previously instructed    No new diet changes affecting INR    No new medication/supplements affecting INR    Continues to tolerate warfarin with no reported s/s of bleeding or thromboembolism     Previous INR was Subtherapeutic at 1.60 on 4/19/21.    Plan:     Spoke on phone with Pee regarding INR result and instructed:      Warfarin Dosing Instructions:   (evenings. 5mg tabs)   - Continue current warfarin dose 7.5 mg daily on Tues/Thurs/Sat; and 5 mg daily rest of week.    Instructed patient to follow up no later than:  2 wks.   - INR scheduled on 5/24/21 @ [facility].    Education provided: importance of consistent vitamin K intake and target INR goal and significance of current INR result    Pee verbalizes understanding and agrees to warfarin dosing plan.    Instructed to call the [facility] for any changes, questions or concerns. (#[phone])   ?   Mariel Dale RN    Subjective/Objective:      Pee Ayala, a 81 y.o. male is on warfarin. Pee Glasgow reports:     Home warfarin dose: as updated on anticoagulation calendar per template     Missed doses: No     Medication changes:  No     S/S of bleeding or thromboembolism:  No     New Injury or illness:  No     Changes in diet or alcohol consumption:  No     Upcoming surgery, procedure or cardioversion:  No    Anticoagulation Episode Summary     Current INR goal:  2.0-3.0   TTR:  66.1 % (1 y)   Next INR check:  5/17/2021   INR from last check:  2.50 (5/3/2021)   Weekly max warfarin dose:     Target end date:     INR check location:     Preferred lab:     Send INR reminders to:  [facility]    Indications    Chronic atrial fibrillation (H) [I48.20]           Comments:           Anticoagulation Care Providers     Provider Role Specialty Phone number    Jazzy Gil MD Referring Family Medicine  [phone]

## 2021-06-17 NOTE — PROGRESS NOTES
Left    Assessment:     1. Hip pain, left  XR Pelvis W 2 Vw Hip Left    Ambulatory referral to PT/OT   2. S/P TAVR (transcatheter aortic valve replacement)     3. Type 2 diabetes mellitus with both eyes affected by proliferative retinopathy without macular edema, with long-term current use of insulin (H)     4. Falls frequently     5. Restless leg syndrome  HM1(CBC and Differential)    Thyroid [place]   6. Diabetes mellitus due to underlying condition with both eyes affected by mild nonproliferative retinopathy and macular edema, with long-term current use of insulin (H)   Thyroid [place]     Patient with diabetes, restless leg syndrome presents today for left hip pain.  He is status post TAVR and has started cardiac rehab.  One thought is tendinitis with recent activity versus early arthritis.  X-ray reviewed by me shows mild arthritis.  We will start with physical therapy.  Discussed his previous low hemoglobin.  Patient informs me that has been present for some time.  He is not sure when his last colonoscopy was.  Informs me that it was at [facility].  Reviewing care everywhere and I am unable to find this.  Will check with [facility].  Discussed that this will be explored in detail at his physical  He will follow for his diabetes with endocrine Fay Kwok CNP.      Plan:      The diagnosis was discussed with the patient and evaluation and treatment plans outlined.  See orders for lab and imaging studies.  Follow up: Return in about 3 months (around 8/6/2021) for Annual physical.     Subjective:      Pee Ayala is a  male who presents for evaluation of   Chief Complaint   Patient presents with     Follow-up     F/U from after surgery. Pt wants his head looked at for spots/bumps and wants to talk about left hip pain- extreme pain when getting up and walking, X3 months. Restless legs is happening more often      Patient has had aortic valve replacement and now has been doing cardiac rehab.  He otherwise  "feels well except for the hip pain that has been present about 3 months.  After sitting for a while, he feels pain in his outer part of the hip.  Once his pain starts he feels that that onsets his restless legs.    The following portions of the patient's history were reviewed and updated as appropriate: allergies, current medications, past family history, past medical history, past social history, past surgical history and problem list.    Review of Systems  Constitutional: negative  Respiratory: negative  Cardiovascular: negative       Objective:   /81 (Patient Site: Left Arm, Patient Position: Sitting, Cuff Size: Adult Large)   Pulse 73   Ht 5' 5\" (1.651 m)   Wt 206 lb 8 oz (93.7 kg)   SpO2 97%   BMI 34.36 kg/m      GENERAL: Healthy, alert and no distress  EYES: Eyes grossly normal to inspection. No discharge or erythema, or obvious scleral/conjunctival abnormalities.  MS: normal muscle tone, tenderness to palpation at the lateral hip joint and pain elicited with external rotation.  Range of motion is otherwise intact.  NEURO: Cranial nerves grossly intact. Mentation and speech appropriate for age.  PSYCH: Mentation appears normal, affect normal/bright, judgement and insight intact, normal speech and appearance well-groomed    "

## 2021-06-17 NOTE — PROGRESS NOTES
ITP ASSESSMENT   Assessment Day: 120 Day    Session Number: 22  Precautions: S/P TAVR    Diagnosis: Valve;CHF    Risk Stratification: High    Referring Provider: Jazzy Gil MD  EXERCISE  Exercise Assessment: Reassessment                           Exercise Plan  Goals Next 30 days  Pt to tolerate 40 min of exercise at 2.5-3.1 METS by 6/11/2021, in order to safely resume yardwork, working in his garden and to be able to go upstairs without SOB.      Education Goals: All goals in this section met    Education Goals Met: Patient can state cardiac s/s and appropriate emergency response.;Has system for taking medication.;Medication review.                          Goals Met  90 day ADL'S goals met: Goal met: Tolerates 40 min of exercise at 2.5-3.1 METS with no CV sxs.    90 Day Progress: Reports he has resumed light to mod housework, grocery shopping with his wife to assist and walks his dog 60 min 5-6 days a week.  He is making nice progress but wants to be able to work in the garden, do yardwork and go upstairs with no sx's.      60 day ADL'S goals met: Goal Met - Pt is tolerating u pto 4.2 mets of exercise for 30-40 minutes without cardiovascular symptom or EKG changes.    60 day Leisure goals met: Goal Met - Pt has lost 4lbs in the past month.    60 day Work goals met: Goal Met - Pt is tolerating up to 4.2 mets    60 Day Progression: Pt is unavailabe for reassessment due to being on vacation. Will reassess when he returns.      Initial ADL's goals met: Pt has met 2.3 METs - goal met    Initial Leisure goals met: Pt has not lost wt - goal not met    Intial Work goals met: Pt is exercising most days per week    Initial Progression: Pt is progressing well, has been out of rehab since 2/25/21 on vacation. Will update plan and work to increase intensities when pt resumes next week      Exercise Prescription  Exercise Mode: Treadmill;Bike;Nustep;Arm Erg.    Frequency: 2x/week    Duration: 30-40 min    Intensity /  "THR: 20-30 beats above resting heart rate    RPE 11-14  Progression / Met level: 2.5-3.1    Resistive Training?: Yes      Current Exercise (mins/week): 250      Interventions  Home Exercise:  Mode: Walking    Frequency: 4-5 days a week    Duration: 30-60 min      Education Material : Educational videos;Provide written material;Individual education and counseling      Education Completed  Exercise Education Completed: Signs and Symptoms;Medication review;RPE;Emergency Plan;Home Exercise;Benefits of Exercise;End point of exercise              Exercise Follow-up/Discharge  Follow up/Discharge: Skilled therapy required to monitor CV response to increasing Met levels for safely resuming previous ADL's; To provide risk factor education and support with changes;  Pt is limited by LBP; Shoulder discomfort; L/E neuropathy;  Exercises will need to be adapted within pt's limits     NUTRITION  Nutrition Assessment: Reassessment      Nutrition Risk Factors:  Nutrition Risk Factors: Diabetes;Dyslipidemia;Overweight  HbA1c: 8.5 (2/23/21)  Monitors blood sugar at home: Yes  Frequency: 4x/day  LDL: 59      Nutrition Plan  Interventions  Diet Consult: Completed    Other Nutrition Intervention: Therapist/Pt Discussion;Provide with Written Material    Initial Rate Your Plate Score: 57      Education Completed  Nutrition Education Completed: Low Saturated fat diet;Low sodium diet      Goals  Nutrition Goals (Next 30 days): Patient demonstrated understanding of cardiac nutrition, no goals identified for the next 30 days      Goals Met  Nutrition Goals Met: Patient follows a low sodium diet;Completed Nutritional Risk Screen;Patient knows appropriate portion size      Height, Weight, and  BMI  Weight: 200 lb (90.7 kg)  Height: 5' 5\" (1.651 m)  BMI: 33.28      Nutrition Follow-up  Follow-up/Discharge: RD available for questions as needed         Other Risk Factors  Other Risk Factor Assessment: Reassessment      HTN Risk Factor: " Hypertension        Hypertension Plan  Goals  HTN Goals: Follow low sodium diet;Take medication as prescribed;Exercises regularly      Goals Met  HTN Goals Met: Take medication as prescribed      HTN Interventions  HTN Interventions: Diet consult;Therapist/patient discussion;Provide written material;Offer educational videos      HTN Education Completed  HTN Education Completed: Medication review;Risk factor overview      Tobacco Risk Factor: NA      Risk Factor Follow-up   Follow-up/Discharge: BP's have been elevated on occasion, Blood sugars are also elevated at time.  Will continue to monitor.     PSYCHOSOCIAL  Psychosocial Assessment: Reassessment       [facility] LIAM Q of L Summary Score: 24      PHQ-9 Total Score: 5      Psychosocial Risk Factor: Stress      Psychosocial Plan  Interventions  Interventions: Offer educational videos and classes;Provide written material;Individual education and counseling      Education Completed  Education Completed: Effects of stress on body      Goals  Goals (Next 30 days): Practicing stress management skills      Goals Met  Goals Met: Identified Support system;Oriented to stress management classes;Identify stressors      Psychosocial Follow-up  Follow-up/Discharge: Reports he has a good support system             Patient involved in Goal setting?: Yes      Signature: _____________________________________________________________    Date: __________________    Time: __________________See Doc Flowsheet

## 2021-06-17 NOTE — TELEPHONE ENCOUNTER
Telephone Encounter by Norma Osorio RN at 1/8/2021 11:12 AM     Author: Norma Osorio RN Service: -- Author Type: Registered Nurse    Filed: 1/8/2021 11:53 AM Encounter Date: 1/8/2021 Status: Signed    : Norma Osorio RN (Registered Nurse)       ANTICOAGULATION  MANAGEMENT    Assessment     Today's INR result of 1.1 is Subtherapeutic (goal INR of 2.0-3.0)        warfarin held 4 days prior to Agiogram procedure done on 1/6 - per patient he took 5 mg dose yesterday    No new diet changes affecting INR    No new medication/supplements affecting INR    Continues to tolerate warfarin with no reported s/s of bleeding or thromboembolism     Previous INR was Subtherapeutic     1/12 TAVR procedure [facility] instructed patient to continue holding warfarin ( per encounter dated today )    Plan:     Spoke on phone with Pee regarding INR result and instructed:     Warfarin Dosing Instructions:  continue hold warfarin as instructed by [facility]  for upcoming TAVR procedure scheduled on 1/12     Instructed patient to follow warfarin dosing at discharge.      Instructed patient to follow up no later than: as instructed post procedure    Education provided: warfarin interruption plan for procedure    Pee verbalizes understanding and agrees to warfarin dosing plan.    Instructed to call the [facility] for any changes, questions or concerns. (#[phone])   ?   Norma Osorio RN    Subjective/Objective:      Pee Ayala, a 80 y.o. male is on warfarin.     Pee reports:     Home warfarin dose: Per patient he took 5 mg yesterday     Missed doses: Yes: held for procedure     Medication changes:  No     S/S of bleeding or thromboembolism:  No     New Injury or illness:  No     Changes in diet or alcohol consumption:  No     Upcoming surgery, procedure or cardioversion:  Yes: 1/12 valve replacement    Anticoagulation Episode Summary     Current INR goal:  2.0-3.0   TTR:  67.3 % (1 y)   Next INR check:  1/19/2021    INR from last check:  1.11 (1/8/2021)   Weekly max warfarin dose:     Target end date:     INR check location:     Preferred lab:     Send INR reminders to:  [facility]    Indications    Chronic atrial fibrillation (H) [I48.20]           Comments:           Anticoagulation Care Providers     Provider Role Specialty Phone number    Jazzy Gil MD Referring Family Medicine [phone]

## 2021-06-17 NOTE — PATIENT INSTRUCTIONS - HE
Patient Instructions by Kole Arthur RN at 11/6/2019 10:40 AM     Author: Kole Arthur RN Service: -- Author Type: Registered Nurse    Filed: 11/6/2019 11:03 AM Encounter Date: 11/6/2019 Status: Addendum    : Kole Arthur RN (Registered Nurse)    Related Notes: Original Note by Kole Arthur RN (Registered Nurse) filed at 11/6/2019 10:53 AM         Abdominal Aortic Aneurysm (Stable)    The aorta is the bodys main artery that carries oxygen-rich blood from the heart. It travels from the heart down to the lower abdomen, where it divides into smaller blood vessels. An aneurysm is a bulge or dilatation in the wall of an artery, in this example, the aorta. This happens because there is a weakened spot in the artery wall causing that area to begin to deteriorate. This allows the artery to bulge or balloon out creating the aneurysm. It may remain stable and cause no problems, or it can expand and lengthen. If this happens, it can affect the blood flow to different organs. It may also leak or rupture (break open) and cause internal bleeding and even death.    A number of things can cause aortic aneurysms including:    Atherosclerosis    Hypertension    Injury    Infection    Marfan syndrome. This is an inherited condition that most commonly affects the heart, eyes, blood vessels, and skeleton.  Risk factors that have been associated with aortic aneurysms include:    Atherosclerosis    Hypertension    Older age    Family history    Elevated cholesterol    Obesity    Tobacco use    Men more than women  Most aortic aneurysms do not cause any symptoms until they begin to expand rapidly or rupture. Therefore, most aneurysms are discovered on exams or tests done for other reasons (like an X-ray, ultrasound or CT scan). When there are symptoms, they may be vague, or they can include:    Deep, steady pain in the abdomen and back    Pulsating feeling in your abdomen    Weakness    Dizziness, fainting    Low blood pressure  Once  there are symptoms, it is important that it be taken care of. An expanding aneurysm causes symptoms of abdomen, back, flank or groin pain, which may come and go at first, or become constant. When an aneurysm ruptures, there can be sudden abdominal, back or groin pain, followed by weakness, dizziness and loss of consciousness as blood pressure drops and a shock state occurs. This is a fatal condition unless immediate surgery is performed.  Small aneurysms rarely rupture and can often be treated with medicines to lower blood pressure and reduce stress on the aortic wall. Routine ultrasound or CT scans can determine if the aneurysm is growing. Larger or expanding aneurysms will require surgery. Surgical treatment involves removing the section of aorta where the aneurysm is and replacing it with an aortic graft (artificial blood vessel). A newer alternative to surgery, which can be used in certain cases, involves the placement of a stent (tubular wire mesh) inside the aorta to support the wall and reduce stress on the aneurysm. Rarely, a blood clot can form inside of an aortic aneurysm with no symptoms. A piece of the clot can break off and pass to smaller blood vessels in the intestines or legs and cause pain and loss of blood flow to that part.  If a small aneurysm has been identified, which does not require surgery, you should still change any lifestyle factors that may improve your overall cardiovascular health. This includes such things as following a healthy diet, losing weight, stopping smoking, and lowering your cholesterol.  Home care    Your aneurysm is small and does not need surgery. You will be followed along as an outpatient with routine ultrasound screening exams to measure the size of the aneurysm every 6 months.    You may return to your usual level of activity.    Follow these guidelines to improve your cardiac health:  ? If you are overweight, begin a weight loss program.  ? If you have hypertension,  reduce your salt intake. Don't eat high-salt foods, and dont add salt when cooking.  ? Begin an exercise program. Discuss with your doctor what type of exercise program would be best for you. It doesn't have to be difficult. Even brisk walking for 20 minutes three times a week is a good form of exercise.  ? Don't take medicines containing stimulants. This includes many cold and sinus decongestant pills and sprays as well as diet pills. Check the warnings about hypertension on the label. Stimulants such as amphetamine or cocaine could be lethal for someone with hypertension. Never take these.  ? Limit your caffeine intake or switch to caffeine-free products.  ? Stop smoking. No matter how long you've smoked, quitting can be hard. Enroll in a stop-smoking program to improve your chance of success.    Learning how to handle stress better is an important part of any program to lower blood pressure. Learn about relaxation methods such as meditation, yoga, or biofeedback.    If medicines were prescribed for hypertension, take them exactly as directed. Missing doses may cause your blood pressure get out of control.    Consider buying an automatic blood pressure machine (available at most pharmacies). Use this to monitor your blood pressure at home and report the results to your doctor.  Follow up care  Regular visits to your healthcare provider for blood pressure checks and periodic ultrasounds of the aorta are an important part of your care. Make a follow-up appointment with your doctor, or as advised.  When to seek medical advice   Call your healthcare provider right away if any of the following occur:    Sudden severe abdominal, back, flank or groin pain    Blood in your stools    Weakness or dizziness    Weakness, numbness, pain or coolness of one leg  Call 911  The symptoms of a heart attack or stroke can be life threatening. If you see or have any of the following symptoms, call 911 right away:     Trouble  breathing    Confused or difficulty arousing    Fainting or loss of consciousness    Rapid heart rate    New chest, arm, shoulder, neck or upper back pain    Difficulty with speech or vision, weakness of an arm or leg    Difficulty walking or talking, loss of balance, numbness or weakness in one side of you body, facial droop  Date Last Reviewed: 9/25/2015 2000-2017 [employer]. [address] [place] [id number]. All rights reserved. This information is not intended as a substitute for professional medical care. Always follow your healthcare professional's instructions.    Ultrasound    Thank you for choosing [facility] as partners in your care.  Please read the following information before your appointment.  Feel free to ask questions.    An ultrasound is an exam that uses sound waves to create pictures.  The special camera that takes the pictures is called a transducer.  An ultrasound technologist will perform the exam under the direction of a physician.    Preparation  Ultrasound preps vary.  If you are scheduled for an Aorta Ultrasound, please do not eat or drink anything 8 hours before your exam.  You may take daily medications with a small sip of water.  There is no preparation required for any of the other ultrasound exams performed at [facility].    The Examination  You may or may not need to change clothes for your exam.  The technologist will explain the exam to you.  He or she will ask you a few questions and answer any questions you may have.    You will lie on a table and a gel will be applied to your skin.  A small handheld instrument called a transducer will be moved across the area we are looking at while pictures are taken.  Also, your exam may include measuring your blood pressure on your arms, legs and/or toes.    When the Exam Is Completed  Your physician will receive the results of the exam and explain them to you.  You may return to your  normal diet and activities unless otherwise directed by your doctor.  Feel free to ask questions if something is not clear to you.  We welcome comments.    At [facility], we are dedicated to providing the best possible care.  Thank you for taking time to read these instructions.  We hope your experience is as pleasant as possible.      You are scheduled for the following exam(s):    []Carotid Duplex:  Evaluates the carotid arteries in the neck that feed the brain with blood.  Gel is applied to the skin of the neck.  A transducer will be placed on the gel covered areas to obtain images and evaluate and listen to the blood flow in the arteries.  This exam takes approximately 30 minutes.    []Venous Duplex:  Evaluates the veins that carry blood to the heart from the arms and/or legs.  Gel is applied to the skin of the arms and/or legs.  A transducer will be placed on the gel covered areas to obtain images and evaluate flow in the veins.  This exam takes approximately 30 minutes per arm/leg.    []Arterial Duplex:  Evaluates the arteries that feed the arms and legs with blood.  Gel is applied to the skin of the arms and/or legs.  A transducer will be placed on the gel covered areas to obtain images and listen to the blood flow in the arms and/or legs.  This exam takes approximately 30 minutes per arm/leg.    [x]JESSENIA or Segmental Pressures:  Ultrasound and blood pressure cuffs are used to evaluate the arteries that supply the arms and legs with blood.  Several blood pressure cuffs will be place on your arms and legs.  When inflated, the cuffs will provide blood pressure readings that are used to determine where blockages in the arteries may be.  You may be asked to do a moderate level of exercise during this exam.  This exam takes approximately 30-60 minutes.    []Aorta:  Evaluates the abdominal aorta for blockages and aneurysm.  Gel is applied to the stomach.  A transducer will be placed on the gel covered areas to obtain  images of the aorta.  This exam takes approximately 30 minutes.    Prep instructions:  Do not eat or drink anything 8 hours before procedure.  You may take daily medications with a small sip of water.

## 2021-06-17 NOTE — TELEPHONE ENCOUNTER
Anticoagulation Management    Unable to reach Pee today.    Today's INR result of 2.50 is Therapeutic (goal INR of 2.0-3.0).     Follow up required to discuss dosing instructions and confirm understanding of instructions.    Left message to continue current dose of warfarin 5 mg tonight.       ACN to follow up - Tues. 5/4.    Mariel Dale RN

## 2021-06-17 NOTE — TELEPHONE ENCOUNTER
Telephone Encounter by Mariel Dale RN at 5/25/2021 10:03 AM     Author: Mariel Dale RN Service: -- Author Type: Registered Nurse    Filed: 5/25/2021 12:06 PM Encounter Date: 5/25/2021 Status: Signed    : Mariel Dale RN (Registered Nurse)       ANTICOAGULATION  MANAGEMENT    Assessment     Today's INR result of 3.80 is Supratherapeutic (goal INR of 2.0-3.0)        Warfarin taken as previously instructed    No new diet changes affecting INR    No new medication/supplements affecting INR    Continues to tolerate warfarin with no reported s/s YES bleeding.   - 4 days ago, while walking his dog at the dog park, he walked into a nest of nocium (mosquitos/jiggers/ or biting gnats) and had terrible itching and from scratching all over his body.  Taking Benadryl for the itch; bites are improving..    Previous INR was Therapeutic at 2.50 on 5/3/21.     Plan:     Spoke on phone with Pee regarding INR result and instructed:   - advised f/u with his doctor, if no improvement of bug bites.      Warfarin Dosing Instructions:  (evenings. 5mg tabs)   - today, advised one time lower dose with 2.5mg warfarin, then continue current warfarin dose 7.5 mg daily on Tues/Thurs/Sat ; and 5 mg daily rest of week.    Instructed patient to follow up no later than: 1-2 wks.   - INR scheduled on 6/3/21 @ [facility].    Education provided: importance of consistent vitamin K intake, target INR goal and significance of current INR result, no interaction anticipated between warfarin and Benadryl and importance of notifying clinic for changes in medications    Pee verbalizes understanding and agrees to warfarin dosing plan.    Instructed to call the [facility] for any changes, questions or concerns. (#[phone])   ?   Mariel Dale RN    Subjective/Objective:      Pee Ayala, a 81 y.o. male is on warfarin. Pee      Pee reports:     Home warfarin dose: as updated on anticoagulation calendar per  template     Missed doses: No     Medication changes:  Yes: Benadryl     S/S of bleeding or thromboembolism:  No     New Injury or illness:  Yes:  Stepped on a nest of biting fleas or gnats, while he walked his dog at the dog park.     Changes in diet or alcohol consumption:  No     Upcoming surgery, procedure or cardioversion:  No    Anticoagulation Episode Summary     Current INR goal:  2.0-3.0   TTR:  68.2 % (1 y)   Next INR check:  6/8/2021   INR from last check:  3.80 (5/25/2021)   Weekly max warfarin dose:     Target end date:     INR check location:     Preferred lab:     Send INR reminders to:  [facility]    Indications    Chronic atrial fibrillation (H) [I48.20]           Comments:           Anticoagulation Care Providers     Provider Role Specialty Phone number    Jazzy Gil MD Referring Family Medicine [phone]

## 2021-06-17 NOTE — TELEPHONE ENCOUNTER
Telephone Encounter by Yousuf Madison RN at 3/29/2021  4:36 PM     Author: Yousuf Madison RN Service: -- Author Type: Registered Nurse    Filed: 3/29/2021  5:55 PM Encounter Date: 3/29/2021 Status: Signed    : Yousuf Madison RN (Registered Nurse)       ANTICOAGULATION  MANAGEMENT    Assessment     Today's INR result of 1.4 is Subtherapeutic (goal INR of 2.0-3.0)        Warfarin taken as previously instructed    Increased greens/vitamin K intake may be affecting INR had extra broccoli last night    No new medication/supplements affecting INR    Continues to tolerate warfarin with no reported s/s of bleeding or thromboembolism     Previous INR was Therapeutic    Plan:     Spoke on phone with Pee regarding INR result and instructed:      Warfarin Dosing Instructions:  10 mg tonight to boost then continue current warfarin dose 7.5 mg daily on tue/thu/sat; and 5 mg daily rest of week  (0 % change)    Instructed patient to follow up no later than: one month      Pee verbalizes understanding and agrees to warfarin dosing plan.    Instructed to call the [facility] for any changes, questions or concerns. (#[phone])   ?   Yousuf Madison RN    Subjective/Objective:      Pee Ayala, a 80 y.o. male is on warfarin. Pee Glasgow reports:     Home warfarin dose: as updated on anticoagulation calendar per template     Missed doses: No     Medication changes:  No     S/S of bleeding or thromboembolism:  No     New Injury or illness:  No     Changes in diet or alcohol consumption:  No     Upcoming surgery, procedure or cardioversion:  No    Anticoagulation Episode Summary     Current INR goal:  2.0-3.0   TTR:  64.5 % (1 y)   Next INR check:  4/6/2021   INR from last check:  1.40 (3/29/2021)   Weekly max warfarin dose:     Target end date:     INR check location:     Preferred lab:     Send INR reminders to:  [facility]    Indications    Chronic atrial fibrillation (H) [I48.20]            Comments:           Anticoagulation Care Providers     Provider Role Specialty Phone number    Jazzy Gil MD Referring Family Medicine [phone]

## 2021-06-18 NOTE — PATIENT INSTRUCTIONS - HE
Patient Instructions by Huyen Samuel RN at 11/2/2020 11:40 AM     Author: Huyen Samuel RN Service: -- Author Type: Registered Nurse    Filed: 11/2/2020 12:03 PM Encounter Date: 11/2/2020 Status: Signed    : Huyen Samuel RN (Registered Nurse)       Lower Extremity Arterial Ultrasound    Description  Ultrasound examinations are painless and easy for the patient. The vascular laboratory will contain a bed and just two or three pieces of equipment. You will be asked to remove pants or shorts and gowns will be provided. It usually takes about 30 minutes.  The technician will tuck a towel under your underpants in the groin. The gel is water-soluble and will not stain your skin or clothes.  Ultrasound gel, usually warmed for your comfort, will be placed on the inner side of your legs.    Through the gel, the technician will apply to your legs a small hand-held device that emits sound waves.  When the test is completed, the technician will remove excess gel from your legs.    Risks  There are typically no side effects or complications associated with a lower extremity arterial duplex ultrasound.  How to Prepare  Eat and take medications as usual.  There is no preparation required for a lower extremity arterial duplex ultrasound.  What Can I Expect After the Test?  The technician will send the ultrasound images to your vascular surgeon for evaluation. Typically, a report is available in 2-3 days. If anything critical is found, it is standard practice to notify the vascular surgeon immediately.  Reference: https://vascular.org/patient-resources/vascular-tests  Ankle-Brachial Index (JESSENIA) or Physiologic Test    Description  An ankle-brachial index test is relatively pain free. Blood pressure cuffs of various sizes are placed on your thigh, calf, foot and toes.  Similar to having your blood pressure checked with an arm cuff, as the technician inflates the cuffs, they progressively tighten and are then quickly  released.  You may feel some discomfort, but generally for less than 60 seconds for each measurement. You will be asked to remove your socks and shoes and possibly your pants or shorts. Gowns will be provided. It usually takes about 30-60 minutes.   Depending on the initial readings and patient symptoms, you may be asked to perform a light walk on a treadmill.  The technician will apply ultrasound gel, usually warmed for your comfort, to your ankles and wrists. Through the gel, the technician will use a small hand-held device that emits sound waves.  Risks  There are typically no side effects or complications associated with a physiologic study.  How to Prepare  Eat and take medications as usual.  There is no preparation required for an ankle-brachial index (JESSENIA) or physiologic exam.  What Can I Expect After the Test?  The technician will send the ultrasound images to your vascular surgeon for evaluation. Typically, a report is available in 2-3 days. If anything critical is found, it is standard practice to notify the vascular surgeon immediately.  Reference: https://vascular.org/patient-resources/vascular-tests

## 2021-06-18 NOTE — PATIENT INSTRUCTIONS - HE
Patient Instructions by Jazzy Gil MD at 5/6/2021 11:30 AM     Author: Jazzy Gil MD Service: -- Author Type: Physician    Filed: 5/6/2021 12:20 PM Encounter Date: 5/6/2021 Status: Signed    : Jazzy Gil MD (Physician)       Patient Education     Restless Legs Syndrome: What You Can Do    Symptoms of restless leg syndrome (RLS) can be treated. Together, you and your healthcare provider can work on your treatment plan. If needed, medicines may be prescribed. Also learn what you can do to ease your discomfort. Good sleep habits and a healthy lifestyle will help you rest better at night and have more energy during the day.  Working with your healthcare provider  RLS may occur on its own and may be passed on in families. It is sometimes linked to other medical problems. Low iron may cause some RLS symptoms. Your healthcare provider may order a lab test to check your iron level. Other medical problems associated with RLS are kidney disease, diabetes, Parkinson disease, and multiple sclerosis. Your doctor may prescribe medicines to reduce your symptoms and help you sleep better.  Tips for temporary relief  To reduce your discomfort, try the following:    Walking or stretching    Rubbing your legs    Having a massage    Taking a hot or cold bath    Doing activities that make muscles in your hands or legs work    Relaxing with yoga or meditation  Good sleep habits  Even though you have RLS, you can still have restful sleep. Try these good sleeping habits:    Keep a regular sleep schedule. Go to bed and get up at the same time each day.    Avoid or limit naps.    Make sure the bedroom is quiet, dark, and not too hot or too cold.    Use your bed only for sleep and sex.  Healthy lifestyle  Your lifestyle affects your health and your sleep. Here are some healthy habits:    Eat a balanced diet. To get enough vitamins and minerals, you may also need to take supplements.    Manage stress and learn  ways to relax. Deep breathing techniques and visualization can help to relax your muscles and calm your mind.    Exercise regularly. It can help reduce stress. Also, you will have more energy during the day and be more tired at bedtime. Afternoon exercise is best. Nighttime exercise may affect how well you sleep.  Date Last Reviewed: 9/1/2017 2000-2019 [employer]. [address] [place] [id number]. All rights reserved. This information is not intended as a substitute for professional medical care. Always follow your healthcare professional's instructions.

## 2021-06-18 NOTE — PATIENT INSTRUCTIONS - HE
Patient Instructions by Raisa Grant PA-C at 1/21/2021 10:30 AM     Author: Raisa Grant PA-C Service: -- Author Type: Physician Assistant    Filed: 1/21/2021 10:46 AM Encounter Date: 1/21/2021 Status: Signed    : Raisa Grant PA-C (Physician Assistant)       Pee Ayala,    It was a pleasure to speak with you today over the telephone regarding your recent TAVR.     My recommendations after this visit include:     - continue taking baby aspirin with your coumadin - no need to fill the Plavix Rx    You should followup with Dr. Romano on Feb 22.  Your echo is scheduled for Feb 17      If you have questions or concerns, please call using the numbers below:    Valve Clinic Pool  [phone]    After Hours/Scheduling  [phone]    Otherwise you can dial the nurse directly at:    Maude Dunham RN  Valve clinic coordinator  [phone]

## 2021-06-18 NOTE — PATIENT INSTRUCTIONS - HE
Patient Instructions by Jessica Tang LPN at 8/13/2020  2:00 PM     Author: Jessica Tang LPN Service: -- Author Type: Licensed Nurse    Filed: 8/13/2020  2:44 PM Encounter Date: 8/13/2020 Status: Signed    : Jessica Tang LPN (Licensed Nurse)       Patient Education   Ultrasound    Thank you for choosing [facility] as partners in your care.  Please read the following information before your appointment.  Feel free to ask questions.    An ultrasound is an exam that uses sound waves to create pictures.  The special camera that takes the pictures is called a transducer.  An ultrasound technologist will perform the exam under the direction of a physician.    Preparation  Ultrasound preps vary.  If you are scheduled for an Aorta Ultrasound, please do not eat or drink anything 8 hours before your exam.  You may take daily medications with a small sip of water.  There is no preparation required for any of the other ultrasound exams performed at [facility].    The Examination  You may or may not need to change clothes for your exam.  The technologist will explain the exam to you.  He or she will ask you a few questions and answer any questions you may have.    You will lie on a table and a gel will be applied to your skin.  A small handheld instrument called a transducer will be moved across the area we are looking at while pictures are taken.  Also, your exam may include measuring your blood pressure on your arms, legs and/or toes.    When the Exam Is Completed  Your physician will receive the results of the exam and explain them to you.  You may return to your normal diet and activities unless otherwise directed by your doctor.  Feel free to ask questions if something is not clear to you.  We welcome comments.    At [facility], we are dedicated to providing the best possible care.  Thank you for taking time to read these instructions.  We hope your experience is as  pleasant as possible.      You are scheduled for the following exam(s):    [x]Carotid Duplex:  Evaluates the carotid arteries in the neck that feed the brain with blood.  Gel is applied to the skin of the neck.  A transducer will be placed on the gel covered areas to obtain images and evaluate and listen to the blood flow in the arteries.  This exam takes approximately 30 minutes.    []Venous Duplex:  Evaluates the veins that carry blood to the heart from the arms and/or legs.  Gel is applied to the skin of the arms and/or legs.  A transducer will be placed on the gel covered areas to obtain images and evaluate flow in the veins.  This exam takes approximately 30 minutes per arm/leg.    []Arterial Duplex:  Evaluates the arteries that feed the arms and legs with blood.  Gel is applied to the skin of the arms and/or legs.  A transducer will be placed on the gel covered areas to obtain images and listen to the blood flow in the arms and/or legs.  This exam takes approximately 30 minutes per arm/leg.    []JESSENIA or Segmental Pressures:  Ultrasound and blood pressure cuffs are used to evaluate the arteries that supply the arms and legs with blood.  Several blood pressure cuffs will be place on your arms and legs.  When inflated, the cuffs will provide blood pressure readings that are used to determine where blockages in the arteries may be.  You may be asked to do a moderate level of exercise during this exam.  This exam takes approximately 30-60 minutes.    []Aorta:  Evaluates the abdominal aorta for blockages and aneurysm.  Gel is applied to the stomach.  A transducer will be placed on the gel covered areas to obtain images of the aorta.  This exam takes approximately 30 minutes.    Prep instructions:  Do not eat or drink anything 8 hours before procedure.  You may take daily medications with a small sip of water.      Carotid Artery Problems: Blockage     A healthy carotid artery lets blood flow easily to the brain.    The blood carries oxygen and nutrients throughout the body. The carotid arteries are large blood vessels that carry blood to the brain. Certain health problems can make the inside of the carotid arteries narrow and rough. Over time, this damage increases the chances of having a stroke. A stroke is a sudden loss of brain function.   Open carotid arteries  In a healthy carotid artery, the inside of the artery is open. The lining of the artery wall is also smooth. This lets blood flow freely from the heart to the brain. The brain gets all the oxygen and nutrients it needs to function well.  Narrowed carotid arteries  Health factors, such as high blood pressure, high cholesterol, smoking, and diabetes, can damage artery walls and make them rough. This allows cholesterol and other particles in the blood to stick to the artery walls and form plaque or fatty deposits. As the plaque builds up, it can narrow the artery. Blood may also collect on the plaque and form blood clots.  How a stroke happens     Emboli can enter the bloodstream and travel to the brain. Brain tissue is damaged when emboli block arteries in the brain.   A stroke can happen when plaque in the carotid artery ruptures. This can allow small pieces of plaque to break off into the bloodstream. At the same time, rupture can make more blood clots. The pieces of plaque and tiny blood clots or emboli can flow in the blood until they get stuck in a small blood vessel in the brain. This blocks blood flow to part of the brain and causes a stroke.  Date Last Reviewed: 6/8/2015 2000-2017 [employer]. [address] [place] [address], [place] [address]. All rights reserved. This information is not intended as a substitute for professional medical care. Always follow your healthcare professional's instructions.

## 2021-06-18 NOTE — PATIENT INSTRUCTIONS - HE
Patient Instructions by Raisa Grant PA-C at 1/8/2021  8:30 AM     Author: Raisa Grant PA-C Service: -- Author Type: Physician Assistant    Filed: 1/8/2021  9:15 AM Encounter Date: 1/8/2021 Status: Signed    : Raisa Grant PA-C (Physician Assistant)       Pee Ayala,    It was a pleasure to see you today in the clinic regarding your upcoming TAVR procedure.     My recommendations after this visit include:     - increase your morning furosemide (water pill) to 2 tablets.  Continue 1 tablet at noon    - report for your procedure on Tuesday morning at the instructed time    You should followup with me on January 21 over video visit      If you have questions or concerns, please call using the numbers below:    Valve Clinic Pool  [phone]    After Hours/Scheduling  [phone]    Otherwise you can dial the nurse directly at:    Maude Dunham RN  Valve clinic coordinator  [phone]

## 2021-06-18 NOTE — LETTER
Letter by Jazzy Gil MD at      Author: Jazzy Gil MD Service: -- Author Type: --    Filed:  Encounter Date: 1/3/2019 Status: (Other)         [facility] MEDICINE/OB  01/03/19    Patient: Pee Ayala  YOB: 1940  Medical Record Number: [id number]                                                                  Opioid / Opioid Plus Controlled Substance Agreement    I understand that my care provider has prescribed an opioid (narcotic) controlled substance to help manage my condition(s). I am taking this medicine to help me function or work. I know this is strong medicine, and that it can cause serious side effects. Opioid medicine can be sedating, addicting and may cause a dependency on the drug. They can affect my ability to drive or think, and cause depression. They need to be taken exactly as prescribed. Combining opioids with certain medicines or chemicals (such as cocaine, sedatives and tranquilizers, sleeping pills, meth) can be dangerous or even fatal. Also, if I stop opioids suddenly, I may have severe withdrawal symptoms. Last, I understand that opioids do not work for all types of pain nor for all patients. If not helpful, I may be asked to stop them.        The risks, benefits, and side effects of these medicine(s) were explained to me. I agree that:    1. I will take part in other treatments as advised by my care team. This may be psychiatry or counseling, physical therapy, behavioral therapy, group treatment or a referral to a pain clinic. I will reduce or stop my medicine when my care team tells me to do so.  2. I will take my medicines as prescribed. I will not change the dose or schedule unless my care team tells me to. There will be no refills if I run out early.  I may be contactedwithout warning and asked to complete a urine drug test or pill count at any time.   3. I will keep all my appointments, and understand this is part of the monitoring of opioids.  My care team may require an office visit for EVERY opioid/controlled substance refill. If I miss appointments or dont follow instructions, my care team may stop my medicine.  4. I will not ask other providers to prescribe controlled substances, and I will not accept controlled substances from other people. If I need another prescribed controlled substance for a new reason, I will tell my care team within 1 business day.  5. I will use one pharmacy to fill all of my controlled substance prescriptions, and it is up to me to make sure that I do not run out of my medicines on weekends or holidays. If my care team is willing to refill my opioid prescription without a visit, I must request refills only during office hours, refills may take up to 3 days to process, and it may take up to 5 to 7 days for my medicine to be mailed and ready at my pharmacy. Prescriptions will not be mailed anywhere except my pharmacy.        [id number]  Rev 12/18         Registration to scan to EHR                             Page 1 of 2               Controlled Substance Agreement Opioid        [facility] MEDICINE/OB  01/03/19  Patient: Pee Ayala  YOB: 1940  Medical Record Number: [id number]                                                                  6. I am responsible for my prescriptions. If the medicine/prescription is lost or stolen, it will not be replaced. I also agree not to share controlled substance medicines with anyone.  7. I agree to not use ANY illegal or recreational drugs. This includes marijuana, cocaine, bath salts or other drugs. I agree not to use alcohol unless my care team says I may.          I agree to give urine samples whenever asked. If I dont give a urine sample, the care team may stop my medicine.    8. If I enroll in the [place] Medical Marijuana program, I will tell my care team. I will also sign an agreement to share my medical records with my care team.   9. I will bring in my  list of medicines (or my medicine bottles) each time I come to the clinic.   10. I will tell my care team right away if I become pregnant or have a new medical problem treated outside of my regular clinic.  11. I understand that this medicine can affect my thinking and judgment. It may be unsafe for me to drive, use machinery and do dangerous tasks. I will not do any of these things until I know how the medicine affects me. If my dose changes, I will wait to see how it affects me. I will contact my care team if I have concerns about medicine side effects.    I understand that if I do not follow any of the conditions above, my prescriptions or treatment may be stopped.      I agree that my provider, clinic care team, and pharmacy may work with any city, state or federal law enforcement agency that investigates the misuse, sale, or other diversion of my controlled medicine. I will allow my provider to discuss my care with or share a copy of this agreement with any other treating provider, pharmacy or emergency room where I receive care. I agree to give up (waive) any right of privacy or confidentiality with respect to these consents.     I have read this agreement and have asked questions about anything I did not understand.      ________________________________________________________________________  Patient signature - Date/Time -  Pee Ayala                                      ________________________________________________________________________  Witness signature                                                            ________________________________________________________________________  Provider signature - Jazzy Gil MD      [id number]  Rev 12/18         Registration to scan to EHR                         Page 2 of 2                   Controlled Substance Agreement Opioid           Page 1 of 2  Opioid Pain Medicines (also known as Narcotics)  What You Need to Know    What are opioids?    Opioids are pain medicines that must be prescribed by a doctor.  They are also known as narcotics.    Examples are:     morphine (MS Contin, Pilar)    oxycodone (Oxycontin)    oxycodone and acetaminophen (Percocet)    hydrocodone and acetaminophen (Vicodin, Norco)     fentanyl patch (Duragesic)     hydromorphone (Dilaudid)     methadone     What do opioids do well?   Opioids are best for short-term pain after a surgery or injury. They also work well for cancer pain. Unlike other pain medicines, they do not cause liver or kidney failure or ulcers. They may help some people with long-lasting (chronic) pain.     What do opioids NOT do well?   Opioids never get rid of pain entirely, and they do not work well for most patients with chronic pain. Opioids do not reduce swelling, one of the causes of pain. They also dont work well for nerve pain.                           For informational purposes only.  Not to replace the advice of your care provider.  Copyright [facility]. All right reserved. [employer] 624435-Ntp 02/18.      Page 2 of 2    Risks and side effects   Talk to your doctor before you start or decide to keep taking one of these medicines. Side effects include:    Lowering your breathing rate enough to cause death    Overdose, including death, especially if taking higher than prescribed doses    Long-term opioid use    Worse depression symptoms; less pleasure in things you usually enjoy    Feeling tired or sluggish    Slower thoughts or cloudy thinking    Being more sensitive to pain over time; pain is harder to control    Trouble sleeping or restless sleep    Changes in hormone levels (for example, less testosterone)    Changes in sex drive or ability to have sex    Constipation    Unsafe driving    Itching and sweating    Feeling dizzy    Nausea, vomiting and dry mouth    What else should I know about opioids?  When someone takes opioids for too long or too often, they become dependent.  This means that if you stop or reduce the medicine too quickly, you will have withdrawal symptoms.    Dependence is not the same as addiction. Addiction is when people keep using a substance that harms their body, their mind or their relations with others. If you have a history of drug or alcohol abuse, taking opioids can cause a relapse.    Over time, opioids dont work as well. Most people will need higher and higher doses. The higher the dose, the more serious the side effects. We dont know the long-term effects of opioids.      Prescribed opioids aren't the best way to manage chronic pain    Other ways to manage pain include:      Ibuprofen or acetaminophen.  You should always try this first.      Treat health problems that may be causing pain.      acupuncture or massage, deep breathing, meditation, visual imagery, aromatherapy.      Use heat or ice at the pain site      Physical therapy and exercise      Stop smoking      See a counselor or therapist                                                  People who have used opioids for a long time may have a lower quality of life, worse depression, higher levels of pain and more visits to doctors.    Never share your opioids with others. Be sure to store opioids in a secure place, locked if possible.Young children can easily swallow them and overdose.     You can overdose on opioids.  Signs of overdose include decrease or loss of consciousness, slowed breathing, trouble waking and blue lips.  If someone is worried about overdose, they should call 911.    If you are at risk for overdose, you may get naloxone (Narcan, a medicine that reverses the effects of opioids.  If you overdose, a friend or family member can give you Narcan while waiting for the ambulance.  They need to know the signs of overdose and how to give Narcan.    While you're taking opioids:    Don't use alcohol or street drugs. Taking them together can cause death.    Don't take any of these medicines  unless your doctor says its okay.  Taking these with opioids can cause death.    Benzodiazepines (such as lorazepam         or diazepam)    Muscle relaxers (such as cyclobenzaprine)    sleeping pills    other opioids    Safe disposal of opioids  Find your area drug take-back program, your pharmacy mail-back program, buy a special disposal bag (such as Deterra) from your pharmacy or flush them down the toilet.  Use the guidelines at:  www.fda.gov/drugs/resourcesforyou

## 2021-06-19 NOTE — LETTER
Letter by Jazzy Gil MD at      Author: Jazzy Gil MD Service: -- Author Type: --    Filed:  Encounter Date: 2019 Status: Signed         Pee Ayala  [address]  [place] [address]      2019      Dear Pee Ayala,   : 1940      This letter is in regards to the appointment that you had scheduled on 12/3/19 at the [facility] with Dr. Jazzy Gil.     The [facility] strives to see all patients in a timely manner and we need your help to achieve this.  The above-mentioned appointment was missed and we do not have record of a cancellation by you.  Whenever possible, we request appointment cancellations at least 72 hours in advance.  This time allows us to offer the appointment to another patient in need.      If you feel you have received this letter in error, or if you need to reschedule this appointment, please call our office so that we may update our records.      Sincerely,    [facility], [place]

## 2021-06-19 NOTE — LETTER
Letter by Jazzy Gil MD at      Author: Jazzy Gil MD Service: -- Author Type: --    Filed:  Encounter Date: 4/4/2019 Status: (Other)       Pee Ayala  [address]  [place] [address]                 April 4, 2019      Dear Pee:     At your previous appointment with us your blood pressure was elevated and I would like for you to schedule a nurse only appointment to recheck your blood pressure.    We have included a personalized hypertension report card on the following page that lists your most recent blood pressure readings along with your ideal values. Please message us through [employer] or call us at [phone] to schedule your nurse only appointment.    Thank you for including us as members of your health care team.      Sincerely,         Jazzy Gil MD    Enclosure    Hypertension Report Card for Pee Ayala  April 4, 2019:     Below is a summary of recent tests related to your hypertension.     Blood Pressure:   Normal blood pressure values are 120/80 or lower. Your blood pressure values should be less than 120/80.     Your most recent blood pressure readings at our clinic were:   BP Readings from Last 3 Encounters:   04/04/19 165/74   03/28/19 139/68   03/20/19 112/72

## 2021-06-20 NOTE — LETTER
Letter by Sravani Pastor RN at      Author: Sravani Pastor RN Service: -- Author Type: --    Filed:  Encounter Date: 6/6/2020 Status: (Other)       CARE COORDINATION  [facility] [address] [place] [address]    June 6, 2020    Pee Ayala  [address]  [place] [address]      Dear Pee,  Your Care Team congratulates you on your journey to maintain wellness. This document will help guide you on your journey to maintain a healthy lifestyle.  You can use this to help you overcome any barriers you may encounter.  If you should have any questions or concerns, you can contact the members of your Care Team or contact your Primary Care Clinic for assistance.    Health Maintenance  Health Maintenance Reviewed:      My Access Plan  Medical Emergency 911   Primary Clinic Line Jazzy Gil MD - [phone]   24 Hour Appointment Line [phone] or  [phone]) (toll-free)   24 Hour Nurse Line [phone]   Preferred Urgent Care     Preferred Hospital     Preferred Pharmacy [id number] - [place], [place] - [address]     Behavioral Health Crisis Line The National Suicide Prevention Lifeline at 1-596.202.9764 or 895     My Care Team Members  Patient Care Team       Relationship Specialty Notifications Start End    Jazzy Gil MD PCP - General Family Medicine  1/3/19     Phone: [phone] Fax: [phone]         [address] [facility] [address]    Jazzy Gli MD Assigned PCP   7/28/19     Phone: [phone] Fax: [phone]         [address] [facility] [address]    Rodolfo Crury, PharmD Pharmacist Pharmacist  10/8/19     Phone: [phone] Fax: [phone]         [facility] [address] [facility] [address]                It has been your Clinic Care Team's pleasure to work with you on your goals.    Regards,  Your Clinic Care Team

## 2021-06-20 NOTE — LETTER
Letter by Jazzy Gil MD at      Author: Jazzy Gil MD Service: -- Author Type: --    Filed:  Encounter Date: 1/3/2020 Status: Signed         Pee Ayala  [address]  [place] [address]             January 3, 2020         Dear Mr. Ayala,    Below are the results from your recent visit:    Resulted Orders   Iron and Transferrin Iron Binding Capacity   Result Value Ref Range    Iron 98 42 - 175 ug/dL    Transferrin 298 212 - 360 mg/dL    Transferrin Saturation, Calculated 26 20 - 50 %    Transferrin IBC, Calculated 373 313 - 563 ug/dL   HM1 (CBC with Diff)   Result Value Ref Range    WBC 6.2 4.0 - 11.0 thou/uL    RBC 4.25 (L) 4.40 - 6.20 mill/uL    Hemoglobin 14.5 14.0 - 18.0 g/dL    Hematocrit 43.0 40.0 - 54.0 %     (H) 80 - 100 fL    MCH 34.0 27.0 - 34.0 pg    MCHC 33.7 32.0 - 36.0 g/dL    RDW 12.2 11.0 - 14.5 %    Platelets 188 140 - 440 thou/uL    MPV 8.5 7.0 - 10.0 fL    Neutrophils % 67 50 - 70 %    Lymphocytes % 24 20 - 40 %    Monocytes % 6 2 - 10 %    Eosinophils % 2 0 - 6 %    Basophils % 0 0 - 2 %    Neutrophils Absolute 4.2 2.0 - 7.7 thou/uL    Lymphocytes Absolute 1.5 0.8 - 4.4 thou/uL    Monocytes Absolute 0.4 0.0 - 0.9 thou/uL    Eosinophils Absolute 0.1 0.0 - 0.4 thou/uL    Basophils Absolute 0.0 0.0 - 0.2 thou/uL   Vitamin B12   Result Value Ref Range    Vitamin B-12 374 213 - 816 pg/mL   Folate, Serum   Result Value Ref Range    Folate 9.9 >=3.5 ng/mL       These labs are normal.    Please call with questions or contact us using [employer].    Sincerely,        Electronically signed by Jazzy Gil MD

## 2021-06-21 ENCOUNTER — COMMUNICATION - HEALTHEAST (OUTPATIENT)
Dept: FAMILY MEDICINE | Facility: CLINIC | Age: 81
End: 2021-06-21

## 2021-06-21 DIAGNOSIS — M25.519 SHOULDER PAIN, UNSPECIFIED CHRONICITY, UNSPECIFIED LATERALITY: ICD-10-CM

## 2021-06-21 NOTE — LETTER
"Letter by Jefferson Sandy MD at      Author: Jefferson Sandy MD Service: -- Author Type: --    Filed:  Encounter Date: 11/23/2020 Status: (Other)         [facility] VALVE CLINIC POOL                                  November 23, 2020    Patient: Pee Ayala   MR Number: [id number]   YOB: 1940   Date of Visit: 11/23/2020     Dear Dr. Hastings:    Thank you for referring Pee Ayala to me for evaluation. Below are the relevant portions of my assessment and plan of care.    If you have questions, please do not hesitate to call me. I look forward to following Pee along with you.    Sincerely,        MD JAMILAH Haywood MD Sanchez, Patricia A, CMA  11/23/2020  2:13 PM  Sign when Signing Visit  Review of systems are within normal limits.    Did not check vitals today    Jefferson Sandy MD  11/23/2020  2:42 PM  Sign when Signing Visit  Pee Ayala is a 80 y.o. male who is being evaluated via a billable telephone visit.      The patient has been notified of following:     \"This telephone visit will be conducted via a call between you and your physician/provider. We have found that certain health care needs can be provided without the need for a physical exam.  This service lets us provide the care you need with a short phone conversation.  If a prescription is necessary we can send it directly to your pharmacy.  If lab work is needed we can place an order for that and you can then stop by our lab to have the test done at a later time.    Telephone visits are billed at different rates depending on your insurance coverage. During this emergency period, for some insurers they may be billed the same as an in-person visit.  Please reach out to your insurance provider with any questions.    If during the course of the call the physician/provider feels a telephone visit is not appropriate, you will not be charged for this service.\"    Patient " has given verbal consent to a Telephone visit? Yes    What phone number would you like to be contacted at? [phone]    Patient would like to receive their AVS by AVS Preference: Silas.    Phone call duration: 12 minutes  Start: 2:15 PM  End: 2:27 PM    Jefferson Sandy MD            Cardiothoracic Surgery Consult    Date of Service: 11/23/2020    REFERRING CARDIOLOGIST: Valve Clinic    REASON FOR CONSULTATION: Severe, aortic valve stenosis     HISTORY OF PRESENT ILLNESS: Mr. Pee Ayala is a 80 y.o. year-old male with know CAD s/p CAB in 2016, PAD, diabetes mellitus type 2, atrial fibrillation, hyperlipidemia, and gastroesophageal reflux. He presented with increasing leg fatigue with activity consistent with claudication but complained of some increased shortness of breath and decreased energy. A TTE was ordered by his PCP showing progression of moderate AORTIC STENOSIS to severe. He is referred to valve clinic for TAVR evaluation.     PAST MEDICAL HISTORY:   Past Medical History:   Diagnosis Date   ? ACS (acute coronary syndrome) (H) 6/2/2014   ? Actinic keratosis 1/14/2014   ? Actinic keratosis 1/14/2014   ? Anticoagulated on Coumadin 12/30/2015   ? Atrial fibrillation (H) 2016   ? Bone mass 4/26/2017   ? Chest heaviness 1/23/2019   ? Closed fracture of left forearm 2015   ? Diabetes (H) 2009   ? Dyslipidemia 8/31/2016   ? 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    ? Encounter for long-term (current) use of insulin (H) 8/11/2016   ? Esophageal reflux 11/18/2010   ? Nonalcoholic steatohepatitis 10/1/2009   ? 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 Status post coronary angiogram 3/9/2016   ? Tremor 9/28/2014       PAST SURGICAL HISTORY:   Past Surgical History:   Procedure Laterality Date   ? CORONARY ARTERY BYPASS GRAFT  2016   ? CV CORONARY ANGIOGRAM N/A 4/4/2019     Procedure: Coronary Angiogram;  Surgeon: Dominik Vega MD;  Location: [facility] Cath Lab;  Service: Cardiology   ? CV LEFT HEART CATHETERIZATION WO LEFT VETRICULOGRAM Left 4/4/2019    Procedure: Left Heart Catheterization Without Left Ventriculogram;  Surgeon: Dominik Vega MD;  Location: [facility] Cath Lab;  Service: Cardiology   ? CV RIGHT HEART CATH Right 4/4/2019    Procedure: Right Heart Catheterization;  Surgeon: Dominik Vega MD;  Location: [facility] Cath Lab;  Service: Cardiology   ? forearm sugery Left 2015   ? MOHS SURGERY         ALLERGIES:   Allergies   Allergen Reactions   ? Oxycodone Itching   ? Amlodipine Dizziness     Dizziness and dry heaves   ? Lisinopril Cough          CURRENT MEDICATIONS:  Current Outpatient Medications on File Prior to Visit   Medication Sig Dispense Refill   ? ACCU-CHEK GUIDE TEST STRIPS strips USE 1 STRIP TO CHECK GLUCOSE THREE TIMES DAILY 300 strip 1   ? acetaminophen (TYLENOL) 650 MG CR tablet Take 1,300 mg by mouth as needed.     ? antiox.mv no.10-omeg3s-lut-starr 280-10-2 mg cap Take 1 tablet by mouth 2 (two) times a day.     ? aspirin 81 mg chewable tablet Chew 81 mg daily.            ? blood glucose meter (GLUCOMETER) Use 1 each As Directed as needed. Dispense glucometer brand per patient's insurance at pharmacy discretion. 1 each 0   ? carvediloL (COREG) 6.25 MG tablet Take 1 tablet (6.25 mg total) by mouth 2 (two) times a day with meals. 180 tablet 1   ? finasteride (PROSCAR) 5 mg tablet Take 1 tablet (5 mg total) by mouth daily. 90 tablet 3   ? furosemide (LASIX) 20 MG tablet Take 40 mg in the morning and 20 mg in the afternoon 270 tablet 0   ? hydrOXYzine HCl (ATARAX) 25 MG tablet Take 1 tablet (25 mg total) by mouth 3 (three) times a day as needed for anxiety. 90 tablet 11   ? insulin NPH-insulin regular (NOVOLIN 70-30) 100 unit/mL (70-30) injection Inject under the skin. Inject 25 units in AM and 24 units in PM     ? insulin  "syringe-needle U-100 0.3 mL 31 gauge x 15\" Syrg Use 1 each As Directed 2 (two) times a day. 100 Syringe 11   ? isosorbide mononitrate (IMDUR) 30 MG 24 hr tablet Take 1 tablet (30 mg total) by mouth daily. Take daily at 5 pm. 90 tablet 2   ? metFORMIN (GLUCOPHAGE) 500 MG tablet Take 2 tablets by mouth twice daily 360 tablet 1   ? MULTIVITAMIN ORAL Take 2 tablets by mouth daily.     ? omeprazole (PRILOSEC) 40 MG capsule Take 40 mg by mouth daily as needed.            ? pramipexole (MIRAPEX) 0.125 MG tablet Take 2 tablets (0.25 mg total) by mouth daily. Around 4:15 pm 60 tablet 6   ? rosuvastatin (CRESTOR) 20 MG tablet Take 1 tablet (20 mg total) by mouth at bedtime. 90 tablet 3   ? warfarin ANTICOAGULANT (COUMADIN/JANTOVEN) 5 MG tablet Take 1 tablet (5 mg total) by mouth daily. Take 5mg daily by mouth as directed 90 tablet 1     No current facility-administered medications on file prior to visit.          FAMILY HISTORY:   Family History   Problem Relation Age of Onset   ? Diabetes type II Mother         58 ,  from anesthesia complication.   ? Heart disease Father         86  from stroke   ? Stroke Father    ? No Medical Problems Brother         60 years of age.     The family history was reviewed and is not pertinent to the patient's disease/illness    SOCIAL HISTORY:   Social History     Socioeconomic History   ? Marital status:      Spouse name: Not on file   ? Number of children: Not on file   ? Years of education: Not on file   ? Highest education level: Not on file   Occupational History   ? Not on file   Social Needs   ? Financial resource strain: Not on file   ? Food insecurity     Worry: Not on file     Inability: Not on file   ? Transportation needs     Medical: Not on file     Non-medical: Not on file   Tobacco Use   ? Smoking status: Former Smoker     Quit date: 1998     Years since quittin.9   ? Smokeless tobacco: Never Used   Substance and Sexual Activity   ? Alcohol use: Yes "     Comment: very rare   ? Drug use: No   ? Sexual activity: Never     Birth control/protection: None   Lifestyle   ? Physical activity     Days per week: Not on file     Minutes per session: Not on file   ? Stress: Not on file   Relationships   ? Social connections     Talks on phone: Not on file     Gets together: Not on file     Attends [employer] service: Not on file     Active member of club or organization: Not on file     Attends meetings of clubs or organizations: Not on file     Relationship status: Not on file   ? Intimate partner violence     Fear of current or ex partner: Not on file     Emotionally abused: Not on file     Physically abused: Not on file     Forced sexual activity: Not on file   Other Topics Concern   ? Not on file   Social History Narrative     and lives with life.    Has adult children from previous relationship. ( Blended family).    Has a dog - Vincent Gil MD  1/3/2019           REVIEW OF SYSTEMS:  A complete 10 point review of systems was obtained and is negative other than the above stated complaints    PHYSICAL EXAMINATION:   No vitals or exam was performed for this telephone visit.    LABORATORY STUDIES:   Lab Results   Component Value Date    WBC 6.2 01/02/2020    HGB 14.5 01/02/2020    HCT 43.0 01/02/2020     (H) 01/02/2020     01/02/2020      Lab Results   Component Value Date    CREATININE 1.28 10/21/2020    BUN 19 10/21/2020     10/21/2020    K 4.4 10/21/2020     10/21/2020    CO2 26 10/21/2020     Lab Results   Component Value Date    HGBA1C 8.9 (H) 11/04/2020     TRANSTHORACIC ECHOCARDIOGRAM: This study was personally reviewed by me  LVEF 60%  MEMO 0.6, MG 37 mmHg, PV 4.1 m/s, DI 0.2    IMPRESSION AND PLAN: Mr. Pee Ayala is a 80 y.o. Year-old male with severe aortic stenosis. Echocardiography demonstrates preserved function with an LVEF of 60% , and coronary angiography demonstrates a patent LIMA and SVG to OM1. I  recommend transcatheter aortic valve replacement due to redo-sternotomy status and patient preference. I discussed the technical details of the open surgical AVR with the patient, as well as the expected postoperative course and recovery. The risks include but are not limited to bleeding, infection, stroke, heart or graft failure, dysrhythmia, respiratory failure, kidney or liver injury, bowel or limb ischemia, and death.    - Ongoing valve clinic evaluation, agree with TAVR due to redo-sternotomy and patient preference  - Patient would like CPB standby and conversion to redo-sternotomy in event of need for emergent surgical conversion to open.    Thank you very much for this referral.       Jefferson Sandy 11/23/2020 2:09 PM

## 2021-06-21 NOTE — LETTER
Letter by Fay Kowk NP at      Author: aFy Kwok NP Service: -- Author Type: --    Filed:  Encounter Date: 3/16/2021 Status: (Other)         Pee Ayala                                  March 16, 2021    Patient: Pee Ayala   MR Number: [id number]   YOB: 1940   Date of Visit: 3/16/2021     Dear Pee,    I have been unable to reach you by phone and my chart to discuss your blood glucose levels. Please call us at [phone] to discuss you recent blood glucose levels.    If you have questions, please do not hesitate to call me.     Sincerely,        Fay Kwok NP          CC  No Recipients

## 2021-06-21 NOTE — LETTER
Letter by Jazzy Gil MD at      Author: Jazzy Gil MD Service: -- Author Type: --    Filed:  Encounter Date: 2021 Status: (Other)         Pee Ayala  [address]  [place] [address]      2021      Dear Pee Ayala,   : 1940      This letter is in regards to the appointment that you had scheduled on 2021 at the [facility] with Dr. Gil.     The [facility] strives to see all patients in a timely manner and we need your help to achieve this.  The above-mentioned appointment was missed and we do not have record of a cancellation by you.  Whenever possible, we request appointment cancellations at least 72 hours in advance.  This time allows us to offer the appointment to another patient in need.      If you feel you have received this letter in error, or if you need to reschedule this appointment, please call our office so that we may update our records.      Sincerely,    [facility]

## 2021-06-22 NOTE — TELEPHONE ENCOUNTER
ANTICOAGULATION  MANAGEMENT-Patient Agreement      Spoke with Pee and reviewed patient agreement.    [facility] Anticoagulation Management Program Patient Agreement     Your provider, Jazzy Gil MD, has referred you to the [facility] Anticoagulation Management Program in order to help ensure you have safe and effective dosing and monitoring of your anticoagulant medication.     In order to be a patient in the Anticoagulation Management Program:       Patient/Caregiver response   You will need to have regular blood testing done as directed by the clinic staff.     Yes   You will take your warfarin as directed by the anticoagulation clinic staff.     Yes   You have access to a telephone which you can routinely be reached at. If you cannot be reached, you authorize the anticoagulation staff to leave a detailed message either via voicemail, email, web-based format or with a designated person.     Yes    Designated person(s): Agueda ( spouse)   You will contact the anticoagulation staff if you do not receive instructions within 24 hours after a blood test.     Yes   You will notify the anticoagulation staff whenever you start, stop or change the dose of a prescription, over-the counter medication or supplement.      Yes   You will notify the anticoagulation staff of any planned procedures or surgeries that you may need at least 2 weeks prior to the procedure or surgery.     Yes   You will have regular office visits, at least one a year, with your [facility] primary care provider.   Yes     Repeated failure to follow dosing or follow up instructions for blood testing will result in notification of my primary care provider and may result in termination with the program.     Yes     Pee is in agreement with all Anticoagulation Management program conditions.     Norma Osorio RN  10:11 AM

## 2021-06-22 NOTE — PROGRESS NOTES
Assessment:     1. Chronic atrial fibrillation (H)  INR    Ambulatory referral to Cardiology    Ambulatory referral to Anticoagulation Monitoring   2. Essential hypertension     3. Mixed hyperlipidemia     4. Anticoagulated on Coumadin     5. 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 Diabetic polyneuropathy associated with type 2 diabetes mellitus (H)  Drug Abuse 1+, Urine   7. Encounter to establish care           Plan:      The diagnosis was discussed with the patient and evaluation and treatment plans outlined.  See orders for lab and imaging studies.   Patient Instructions   Your INR today is at 4.5.    Please hold your warfarin for today.  If you have already taken her warfarin today then hold it tomorrow.    We will contact you about restarting and the new dose after I talk to my anticoagulation nurses.    The hemoglobin A1c is at 7.6.  Continue the current dose of medication and insulin.  A referral to endocrinology has been done.    I have referred you to cardiology for management of your chest pressure discomfort in the morning.  If you were to develop chest pain, shortness of breath or sudden fatigue, then you should be evaluated immediately.    I would recommend that you follow-up with your dermatologist for your skin cancer and actinic keratosis.  If you have difficulty scheduling an appointment, please let me know.  We can refer you to dermatology.    I am refilling tramadol to be used as 1 pill a day for your back pain.  We will do a urine drug screen today and signed controlled substance agreement.  This is a narcotic medication.  I do not recommend that you use more than this dose as it is under beers criteria for medication for patients over 65 years of age.     Jazzy Gil MD   1/3/2019                Subjective:      Pee Ayala is a  male who presents for evaluation of   Chief Complaint   Patient presents with     Establish Care     Pt here today to establish care with a new provider- recieved previous care at [facility]     INR Check     1-  A- Fib: He is on Coumadin and needs an INR check    2- DM-2: Was following with [facility] endocrinology.  He is currently on insulin, metformin    3- Bleeding from wound: Discussed with his fluctuation in INR.  He was on antibiotic after his Mohs surgery of the scalp.  Subsequently he had an echo on his cheek and was unable to stop bleeding and was seen in the emergency room.  Reviewed the ED note as follows.      ED visit    Medical Decision Making   Impression and Plan:  Pee Ayala is a 78 y.o. male who is on coumadin presents with active bleeding from a small nick on his left cheek. Notes he stopped taking his coumadin two days ago due to the uncontrolled bleeding. The bleeding was controlled here and he was sent home with gel foam strips to apply as needed. His INR was obtained today at 1.7 and he was instructed to start taking his prescribed dosage of coumadin again. I discussed with him that he should return for any worsened bleeding or other concerning symptoms.  Diagnosis:  ICD-10-CM   1. Bleeding from wound T14.8XXA         The following portions of the patient's history were reviewed and updated as appropriate: allergies, current medications, past family history, past medical history, past social history, past surgical history and problem list.    Review of Systems  Constitutional: negative  Eyes: Needs to follow with opthalmology as he has macular edema and transition lenses and things get dimmer/ brighter  Ears, nose, mouth, throat, and face: has had long standing sinus drainage  Respiratory: negative  Cardiovascular: positive for chest pressure/discomfort and in the morning when he sits from lying position and  "then it gets better  Gastrointestinal: negative  Genitourinary:negative  Integument/breast: follows with dermatology  Hematologic/lymphatic: negative, has bleed  Musculoskeletal:negative  Neurological: head with injury has numbness for few months  Behavioral/Psych: negative  Endocrine: positive for diabetic symptoms including increased fatigue and weight loss  Allergic/Immunologic: negative       Objective:   /61 (Patient Site: Right Arm, Patient Position: Sitting, Cuff Size: Adult Large)   Pulse 60   Resp 16   Ht 5' 5.24\" (1.657 m)   Wt 207 lb (93.9 kg)   SpO2 97%   BMI 34.20 kg/m      "

## 2021-06-22 NOTE — TELEPHONE ENCOUNTER
Medication Question or Clarification  Who is calling: Pharmacy: [facility]  What medication are you calling about?:   traMADol (ULTRAM) 50 mg tablet 30 tablet 0 1/3/2019     Sig - Route: Take 1 tablet (50 mg total) by mouth at bedtime as needed. - Oral        Who prescribed the medication?: Dr Gil  What is your question/concern?: Pharmacy called and states Dr Morales from [facility] had just filled this medication today for patient.   Pharmacy states noted to also use other providers to fill this med. Dr Cuellar [facility].  Please advise if provider would like this medication filled or not.  Pharmacy: [facility].  Okay to leave a detailed message?: Yes  Site CMT - Please call the pharmacy to obtain any additional needed information.

## 2021-06-22 NOTE — TELEPHONE ENCOUNTER
Patient is a new referral to the ACM Program.    INR today 4.5 noted that the patient was instructed by PCP to hold dose today.    ACN called and spoke with patient's spouse Agueda and she stated that the patient is not home at this time and would like ACN to call back tomorrow and talk to the patient regarding his INR/Warfarin doses.    Instructed Agueda to make sure that the patient hold his Warfarin dose tonight and ACN will call tomorrow to complete the enrollment process and discuss INR/ Warfarin doses.    Agueda verbalized understanding and agrees to plan.    Norma Osorio RN

## 2021-06-22 NOTE — TELEPHONE ENCOUNTER
ANTICOAGULATION  MANAGEMENT    Assessment     1/3/19INR result of 4.5 is Supratherapeutic (goal INR of 2.0-3.0)   Patient is a new referral to the ACM Program but is not new to Warfarin previously managed by [facility]. Patient declined for 1:1 education at this time. Made aware that education packet will be mailed to him.      Patient reported that he has been taking 10 mg on Wed then 7.5 mg all other days.     Patient confirmed that he held his warfarin dose yesterday as instructed.    Change in alcohol intake may be affecting INR - had alcoholic drinks during new years. Normally does not drink alcohol per patient.    Patient reported that he was taking antibiotic for a week post MOH's procedure - cannot remember the name of the medication at this time but stated that last dose was on 12/29/18    Interaction between antibiotic taken post moh's procedure and warfarin may be affecting INR    Continues to tolerate warfarin with no reported s/s of bleeding or thromboembolism     Previous INR was Subtherapeutic per care everywhere record, denied missing any doses.    12/27 ED visit due to bleeding on spot of cheek - stopped after cauterized area in ED.    Patient confirmed that he has Warfarin 5 mg tablets on hand, takes warfarin around 7-8 PM.    Plan:     Spoke with Pee regarding INR result and instructed:   Patient confirmed that he held his dose on 1/3 as instructed.    Warfarin Dosing Instructions:  Change warfarin dose to    5 mg on Fri; 7.5 mg all other days     (9 % change)    Instructed patient to follow up no later than: one week, appointment made.    Education provided: importance of consistent vitamin K intake, impact of vitamin K foods on INR, potential interaction between warfarin and alcohol, importance of therapeutic range, target INR goal and significance of current INR result, importance of following up for INR monitoring at instructed interval, importance of taking warfarin as instructed,  importance of notifying clinic for changes in medications, monitoring for bleeding signs and symptoms, monitoring for clotting signs and symptoms, importance of notifying clinic of upcoming surgeries and procedures 2 weeks in advance and Importance of contacting anticoagulation clinic if you have not received warfarin dosing instructions on day of INR testing    Pee verbalizes understanding and agrees to warfarin dosing plan.    Instructed to call the [facility] for any changes, questions or concerns. (#[phone])   ?   Norma Osorio RN    Subjective/Objective:      Pee Ayala, a 78 y.o. male is on warfarin.     Pee reports:     Home warfarin dose: verbally confirmed home dose with Pee and updated on anticoagulation calendar     Missed doses: No     Medication changes:  Yes: antibiotic post moh's procedure- see above.     S/S of bleeding or thromboembolism:  Yes: 12/27 ED visit due to spot on cheek bleeding- resolved after area was cauterized in ED per patient.     New Injury or illness:  No     Changes in diet or alcohol consumption:  Yes: Alcoholic drink during new years.     Upcoming surgery, procedure or cardioversion:  No    Anticoagulation Episode Summary     Current INR goal:   2.0-3.0   TTR:   --   Next INR check:   1/10/2019   INR from last check:   1.70! (12/27/2018)   Weekly max warfarin dose:      Target end date:      INR check location:      Preferred lab:      Send INR reminders to:   ANTICOAGULATION POOL C (DTN,VAD,CGR,GAV)    Indications    Chronic atrial fibrillation (H) [I48.2]           Comments:            Anticoagulation Care Providers     Provider Role Specialty Phone number    Jazzy Gil MD Referring Family Medicine [phone]

## 2021-06-22 NOTE — PATIENT INSTRUCTIONS - HE
Your INR today is at 4.5.    Please hold your warfarin for today.  If you have already taken her warfarin today then hold it tomorrow.    We will contact you about restarting and the new dose after I talk to my anticoagulation nurses.    The hemoglobin A1c is at 7.6.  Continue the current dose of medication and insulin.  A referral to endocrinology has been done.    I have referred you to cardiology for management of your chest pressure discomfort in the morning.  If you were to develop chest pain, shortness of breath or sudden fatigue, then you should be evaluated immediately.    I would recommend that you follow-up with your dermatologist for your skin cancer and actinic keratosis.  If you have difficulty scheduling an appointment, please let me know.  We can refer you to dermatology.    I am refilling tramadol to be used as 1 pill a day for your back pain.  We will do a urine drug screen today and signed controlled substance agreement.  This is a narcotic medication.  I do not recommend that you use more than this dose as it is under beers criteria for medication for patients over 65 years of age.     Jazzy Gil MD  1/3/2019

## 2021-06-22 NOTE — TELEPHONE ENCOUNTER
Who is calling:  Patient Pee  Reason for Call:  Pee advised that he had his INR yesterday and would like to find out his dosing regiment.  He advised that he did speak with his doctor yesterday, but still needs to find out his regiment.  Called anti coag Nurse Norma Warner, not available. Please call patient back to discuss further. Thanks.  Date of last appointment with primary care: 01-03-19  Has the patient been recently seen:  Yes  Okay to leave a detailed message: Yes

## 2021-06-22 NOTE — TELEPHONE ENCOUNTER
Reason contacted:  Medication refill  Information relayed:  [facility] notified that Pt should  Rx written by Dr Gil dated 1/3/2019. Pt also called and informed.  Additional questions:  No  Further follow-up needed:  No  Okay to leave a detailed message:  No

## 2021-06-23 NOTE — PATIENT INSTRUCTIONS - HE
Pee Ayala,    It was a pleasure to see you today at the [facility].     My recommendations after this visit include:    1.  We will obtain an echocardiogram to evaluate the pumping function of your heart as well as the heart murmur.  We will call you with the results and further recommendations.    2.  We will schedule a Holter monitor to see how well controlled her heart rate is while you are in atrial fibrillation.  We will also call you with the results and further recommendations.    3.  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    4.  Pending these test results, we may want to convert you to a newer type of blood thinner which would be easier to take and not require blood monitoring.        Tres Talbert

## 2021-06-23 NOTE — TELEPHONE ENCOUNTER
Please advised patient to put pressure on the nares.  Use a cold pack.  Go over the guidelines for a nosebleed.  If despite symptom cares, he continues to bleed then he should be seen in the clinic/urgent care for INR check for management.    Jazzy Gil MD  1/29/2019

## 2021-06-23 NOTE — TELEPHONE ENCOUNTER
ANTICOAGULATION  MANAGEMENT    Assessment     Today's INR result of 3.1 is Supratherapeutic (goal INR of 2.0-3.0)     ED visit on 1/23 due to fall ,arm pain ( closed displaced fracture of proximal end of right humerus.)      Warfarin taken as previously instructed    No new diet changes affecting INR    No new medication/supplements affecting INR    Continues to tolerate warfarin with no reported s/s of thromboembolism     Nose bleeding - see triage call on 1/29/19 no recurrence since.    Previous INR was Supratherapeutic    Plan:     Spoke with Pee regarding INR result and instructed:     Warfarin Dosing Instructions:  Change warfarin dose to    5 mg on Tue, Thu, Sat; 7.5 mg all other days     (5 % change)  Patient wrote down dosing instructed and was able to read it back correctly to ACN.    Instructed patient to follow up no later than: 1-2 weeks, appointment made.    Education provided: importance of therapeutic range, target INR goal and significance of current INR result and importance of taking warfarin as instructed    Pee verbalizes understanding and agrees to warfarin dosing plan.    Instructed to call the [facility] for any changes, questions or concerns. (#[phone])   ?   Norma Osorio RN    Subjective/Objective:      Pee CHRIS Ayala, a 78 y.o. male is on warfarin.     Pee reports:     Home warfarin dose: verbally confirmed home dose with Pee and updated on anticoagulation calendar     Missed doses: No     Medication changes:  No     S/S of bleeding or thromboembolism:  Yes: nose bleeding, last occurrence was on Monday or Tuesday per patient.See triage call encounter.     New Injury or illness:  Yes: ED visit on 1/23 due to a fall and broke left shoulder     Changes in diet or alcohol consumption:  No     Upcoming surgery, procedure or cardioversion:  No    Anticoagulation Episode Summary     Current INR goal:   2.0-3.0   TTR:   0.0 % (2.6 wk)   Next INR check:   2/14/2019   INR from  last check:   3.10! (1/31/2019)   Weekly max warfarin dose:      Target end date:      INR check location:      Preferred lab:      Send INR reminders to:   ANTICOAGULATION POOL C (DTN,VAD,CGR,GAV)    Indications    Chronic atrial fibrillation (H) [I48.2]           Comments:            Anticoagulation Care Providers     Provider Role Specialty Phone number    Jazzy Gil MD Referring Family Medicine [phone]

## 2021-06-23 NOTE — TELEPHONE ENCOUNTER
ANTICOAGULATION  MANAGEMENT: Discharge Continuity of Care Review    Emergency room visit on  1/23/19 for Fall; arm pain- closed displaced fracture of proximal end of right humerus.    Discharge disposition: Home    INR Results:       Recent labs: (last 7 days)     01/21/19  1008   INR 3.20*       Warfarin inpatient management: not applicable    Warfarin discharge instructions: home regimen continued     Medication Changes Affecting Anticoagulation: No    Additional Factors Affecting Anticoagulation: Yes: arm pain- due to fracture    Plan     No adjustment to anticoagulation plan needed    Recommended follow up is scheduled    Anticoagulation calendar updated    Norma Osorio RN

## 2021-06-23 NOTE — TELEPHONE ENCOUNTER
ANTICOAGULATION  MANAGEMENT    Assessment     Today's INR result of 3.2 is Supratherapeutic (goal INR of 2.0-3.0)        Patient reported that he has been taking 10 mg on Wed then 7.5 mg all other days but did not take his dose yesterday due to nose bleeding.Patient only received 47.5 mg this week.    No new diet changes affecting INR    No new medication/supplements affecting INR    Continues to tolerate warfarin with no reported s/s of     thromboembolism     Patient reported of nose ebleeding, stated that he has sinus drainage problems and every time he sneezes he noticed blood as well.    Previous INR was Supratherapeutic    Plan:     Spoke with Pee regarding INR result and instructed:     Warfarin Dosing Instructions:  Change warfarin dose to    5 mg on Mon, Fri; 7.5 mg all other days     This is the total amount the patient took this past week.    Instructed patient to follow up no later than: 1-2 weeks.Appointment made.    Education provided: importance of therapeutic range, target INR goal and significance of current INR result, importance of taking warfarin as instructed, monitoring for bleeding signs and symptoms and when to seek medical attention/emergency care    Pee verbalizes understanding and agrees to warfarin dosing plan.    Instructed to call the [facility] for any changes, questions or concerns. (#[phone])   ?   Norma Osorio RN    Subjective/Objective:      Pee Ayala, a 78 y.o. male is on warfarin.     Pee reports:     Home warfarin dose: verbally confirmed home dose with Pee and updated on anticoagulation calendar - patient stated that he prefers dosing instructions by number of tablets to be taken  versus in mg's     Missed doses: Yes: on 1/20/19     Medication changes:  No     S/S of bleeding or thromboembolism:  Yes: nose dripping - sinus issue and noticed blood every time he sneezed. Discussed to seek monitor medical attention for bleeding that last more than 10-15  minutes after putting pressure.     New Injury or illness:  No     Changes in diet or alcohol consumption:  No     Upcoming surgery, procedure or cardioversion:  No    Anticoagulation Episode Summary     Current INR goal:   2.0-3.0   TTR:   0.0 % (1.1 wk)   Next INR check:   2/4/2019   INR from last check:   3.20! (1/21/2019)   Weekly max warfarin dose:      Target end date:      INR check location:      Preferred lab:      Send INR reminders to:   ANTICOAGULATION POOL C (DTN,VAD,CGR,GAV)    Indications    Chronic atrial fibrillation (H) [I48.2]           Comments:            Anticoagulation Care Providers     Provider Role Specialty Phone number    aJzzy Gil MD Referring Family Medicine [phone]

## 2021-06-23 NOTE — TELEPHONE ENCOUNTER
Spoke to pt and relayed MD messages. Pt stated his nose stopped bleeding shortly after he spoke to the triage nurse. Advised pt that if bleeding returns he needs to be seen in urgent care or ER. Pt also stated that [facility] and they advised him to stop taking ibuprofen and gave him a different medication to help with pain control in his shoulder.

## 2021-06-23 NOTE — TELEPHONE ENCOUNTER
ACN returned patient call and he just wanted to confirm that he wrote the right dosing as instructed. Relayed dosing instructions and patient able to verbally read back doses as instructed.    He has no other concerns at this time.    Norma Osorio RN

## 2021-06-23 NOTE — TELEPHONE ENCOUNTER
ANTICOAGULATION  MANAGEMENT PROGRAM    Pee Ayala is overdue for INR check.  Reminder call made.    Left message for Pee. If returning call, please schedule INR check as soon as possible.    Norma Osorio RN

## 2021-06-23 NOTE — TELEPHONE ENCOUNTER
ANTICOAGULATION  MANAGEMENT    Assessment     Today's INR result of 3.6 is Supratherapeutic (goal INR of 2.0-3.0)        Warfarin taken as previously instructed    No new diet changes affecting INR     Pain ( closed displaced fracture on right humerus)may be affecting INR.    Taking Tylenol ES 2 times a day for pain     Interaction between Tylenol and warfarin may be affecting INR    Continues to tolerate warfarin with no reported s/s ofthromboembolism     Reported another episode of nose bleeding- scant amount on Sunday.    Previous INR was Supratherapeutic    Plan:     Spoke with Pee regarding INR result and instructed:     Warfarin Dosing Instructions:  hold warfarin dose today then change warfarin dose to    7.5 mg on Mon, Wed, Fri; 5 mg all other days     (6 % change)    Instructed patient to follow up no later than: 1-2 weeks- appointment already made for 2/14/19.    Education provided: importance of therapeutic range, target INR goal and significance of current INR result and monitoring for bleeding signs and symptoms    Pee verbalizes understanding and agrees to warfarin dosing plan.    Instructed to call the [facility] for any changes, questions or concerns. (#[phone])   ?   Norma Osorio RN    Subjective/Objective:      Pee Ayala, a 78 y.o. male is on warfarin.     Pee reports:     Home warfarin dose: as updated on anticoagulation calendar per template     Missed doses: No     Medication changes:  Yes: taking tylenol 500 mg 2 tablets 2 times a day for pain     S/S of bleeding or thromboembolism:  Yes: had another episode of nose bleeding on Sunday- small amount.     New Injury or illness:  No     Changes in diet or alcohol consumption:  No     Upcoming surgery, procedure or cardioversion:  No    Anticoagulation Episode Summary     Current INR goal:   2.0-3.0   TTR:   0.0 % (3.3 wk)   Next INR check:   2/14/2019   INR from last check:   3.60! (2/5/2019)   Weekly max warfarin dose:       Target end date:      INR check location:      Preferred lab:      Send INR reminders to:   ANTICOAGULATION POOL C (DTN,VAD,CGR,GAV)    Indications    Chronic atrial fibrillation (H) [I48.2]           Comments:            Anticoagulation Care Providers     Provider Role Specialty Phone number    Jazzy Gil MD Referring Family Medicine [phone]

## 2021-06-23 NOTE — TELEPHONE ENCOUNTER
Triage note:    78 year old male calling about pain in his shoulder and a constant nosebleed today (on warfarin).    Last Wednesday he fell and broke his shoulder.  He was seen at [facility] (Dr. Elvis Guerrero).  He was instructed to take alternating 2 Tylenols and in 2 hours 2 Ibuprofen. He rates the pain a '5' on a scale of 1-10 which is still very uncomfortable for him.    However, he developed a nosebleed at 9 am this morning and has been applying pressure but it just created a big clot and continues to bleed out of the right nare.  He is on Wafarin. He is wondering if he needs a dose adjustment?       RN triaged to be seen in ED or in clinic with PCP approval.  He would like to be seen in clinic today, if possible? Please advise!    *Ok to leave a detailed message upon call back      Reason for Disposition    Bleeding present > 30 minutes and using correct method of direct pressure    Protocols used: NOSEBLEED-A-OH

## 2021-06-23 NOTE — TELEPHONE ENCOUNTER
Who is calling:  Patient  Reason for Call:   Patient states he spoke with ACN, however, has additional questions regarding dosing.  Date of last appointment with primary care: none  Has the patient been recently seen:  Yes  Okay to leave a detailed message: Yes

## 2021-06-23 NOTE — TELEPHONE ENCOUNTER
Spoke with Dr Gil again to clarify since pt has been bleeding since 9am. She stated that he should be seen in urgent care or ER.

## 2021-06-24 ENCOUNTER — AMBULATORY - HEALTHEAST (OUTPATIENT)
Dept: LAB | Facility: CLINIC | Age: 81
End: 2021-06-24

## 2021-06-24 ENCOUNTER — COMMUNICATION - HEALTHEAST (OUTPATIENT)
Dept: ANTICOAGULATION | Facility: CLINIC | Age: 81
End: 2021-06-24

## 2021-06-24 DIAGNOSIS — I48.20 CHRONIC ATRIAL FIBRILLATION (H): ICD-10-CM

## 2021-06-24 LAB — INR PPP: 1 (ref 0.9–1.1)

## 2021-06-24 NOTE — TELEPHONE ENCOUNTER
Patient Returning Call  Reason for call:  Patient is calling to say the meter was never sent as he requested. (Please see below message)  Information relayed to patient:  Patient was requesting the glucometer and the test strips that his insurance approves to be fill ASAP.  He got the test strips approval but not the glucometer.  Please send the glucometer that works with the test strips already sent and that is approved by his insurance.    Patient has additional questions:  Yes  If YES, what are your questions/concerns:  Please return call to patient with status of his request and fill ASAP Patient is leaving town and needs this in today  Okay to leave a detailed message?: Yes

## 2021-06-24 NOTE — TELEPHONE ENCOUNTER
ANTICOAGULATION  MANAGEMENT    Assessment     Today's INR result of 1.80 is Subtherapeutic (goal INR of 2.0-3.0)        Less warfarin taken than instructed which may be affecting INR     Patient only took 45 mg versus 50 mg as instructed in the past week    Will plan to continue planned doses    No new diet changes affecting INR    No new medication/supplements affecting INR    Continues to tolerate warfarin with no reported s/s of bleeding or thromboembolism     Previous INR was Subtherapeutic    Plan:     Spoke with Pee regarding INR result and instructed:     Warfarin Dosing Instructions:  Continue current warfarin dose    5 mg every Thu; 7.5 mg all other days      (0 % change)  ACN had the patient fill his weekly medication box with the warfarin doses as instructed and he was able to correctly report back the doses.Patient prefers number of tablets versus in mg's    Instructed patient to follow up no later than: 1-2 weeks Appointment made for 3/8/19    Education provided: importance of therapeutic range, target INR goal and significance of current INR result and importance of taking warfarin as instructed    Pee verbalizes understanding and agrees to warfarin dosing plan.    Instructed to call the [facility] for any changes, questions or concerns. (#[phone])   ?   Norma Osorio RN    Subjective/Objective:      Pee Ayala, a 78 y.o. male is on warfarin.     Pee reports:     Home warfarin dose: Patient reported that he took 5 mg doses on Thurs,Sat and Sun then 7.5 mg all other days- anticoagulation calendar updated.     Missed doses: No     Medication changes:  No     S/S of bleeding or thromboembolism:  No     New Injury or illness:  No     Changes in diet or alcohol consumption:  No     Upcoming surgery, procedure or cardioversion:  No    Anticoagulation Episode Summary     Current INR goal:   2.0-3.0   TTR:   10.6 % (1.6 mo)   Next INR check:   3/15/2019   INR from last check:   1.80!  (3/1/2019)   Weekly max warfarin dose:      Target end date:      INR check location:      Preferred lab:      Send INR reminders to:   ANTICOAGULATION POOL C (DTN,VAD,CGR,GAV)    Indications    Chronic atrial fibrillation (H) [I48.2]           Comments:            Anticoagulation Care Providers     Provider Role Specialty Phone number    Jazzy Gil MD Referring Family Medicine [phone]

## 2021-06-24 NOTE — TELEPHONE ENCOUNTER
Please Compa up medications and supplies in future.    I did send glucometer. Inform patient.  Jazzy Gil MD  2/14/2019

## 2021-06-24 NOTE — TELEPHONE ENCOUNTER
ANTICOAGULATION  MANAGEMENT    Assessment     Today's INR result of 2.5 is Therapeutic (goal INR of 2.0-3.0)        Missed dose(s) on 3/10/19 may be affecting INR    No new diet changes affecting INR    No new medication/supplements affecting INR    Continues to tolerate warfarin with no reported s/s of bleeding or thromboembolism     Previous INR was Subtherapeutic    Plan:     Spoke with Pee regarding INR result and instructed:     Warfarin Dosing Instructions:  Continue current warfarin dose    5 mg every Thu; 7.5 mg all other days      (0 % change)    Instructed patient to follow up no later than: 2 weeks, appointment made.    Education provided: importance of therapeutic range, target INR goal and significance of current INR result and importance of taking warfarin as instructed    Pee verbalizes understanding and agrees to warfarin dosing plan.    Instructed to call the [facility] for any changes, questions or concerns. (#[phone])   ?   Norma Osorio RN    Subjective/Objective:      Pee Ayala, a 78 y.o. male is on warfarin.     Pee reports:     Home warfarin dose: verbally confirmed home dose with Pee and updated on anticoagulation calendar     Missed doses: Yes: Sunday     Medication changes:  No     S/S of bleeding or thromboembolism:  No     New Injury or illness:  No     Changes in diet or alcohol consumption:  No     Upcoming surgery, procedure or cardioversion:  No    Anticoagulation Episode Summary     Current INR goal:   2.0-3.0   TTR:   22.9 % (2 mo)   Next INR check:   3/27/2019   INR from last check:   2.50 (3/13/2019)   Weekly max warfarin dose:      Target end date:      INR check location:      Preferred lab:      Send INR reminders to:   ANTICOAGULATION POOL C (DTN,VAD,CGR,GAV)    Indications    Chronic atrial fibrillation (H) [I48.2]           Comments:            Anticoagulation Care Providers     Provider Role Specialty Phone number    Jazzy Gil MD Referring  Family Medicine [phone]

## 2021-06-24 NOTE — TELEPHONE ENCOUNTER
FYI - Status Update  [facility]   Update: Randy states that they had done a measurement on peripheral artery disease on patient and both of patient's feet tested positive; .84 on the right foot and .73 on the left foot.  They had sent a copy of the results via mail to Dr. Gil and patient was told to bring in the copy when he sees cardiologist next week.  Okay to leave a detailed message?:  No return call needed

## 2021-06-24 NOTE — TELEPHONE ENCOUNTER
Medication Question or Clarification  Who is calling: Patient  What medication are you calling about?: I need a new glucometer and test strips. My pharmacy said my insurance is requesting another machine and test strips.  What dose do you take?: n/a   How often are you taking the medication?: N/A  Who prescribed the medication?: N/A  What is your question/concern?: I am leaving out of town and I need this by Friday  Pharmacy: [facility]  Okay to leave a detailed message?: Yes  Site CMT - Please call the pharmacy to obtain any additional needed information.

## 2021-06-24 NOTE — TELEPHONE ENCOUNTER
Controlled Substance Refill Request  Medication Name:   Requested Prescriptions     Pending Prescriptions Disp Refills     traMADol (ULTRAM) 50 mg tablet 30 tablet 0     Sig: Take 1 tablet (50 mg total) by mouth at bedtime as needed.     Date Last Fill: 1/3/2019  Pharmacy: [facility] [place]      Submit electronically to pharmacy  Controlled Substance Agreement Date Scanned:   Encounter-Level CSA Scan Date:    There are no encounter-level csa scan date.       Last office visit with prescriber/PCP: 1/3/2019 Jazzy Gil MD OR same dept: 1/3/2019 Jazzy Gil MD OR same specialty: 1/3/2019 Jazzy Gil MD  Last physical: Visit date not found Last MTM visit: Visit date not found

## 2021-06-24 NOTE — TELEPHONE ENCOUNTER
Patient is requesting another provider to address this due to needing this prior to Thursday and Jazzy Gil MD not in office.

## 2021-06-25 ENCOUNTER — COMMUNICATION - HEALTHEAST (OUTPATIENT)
Dept: ANTICOAGULATION | Facility: CLINIC | Age: 81
End: 2021-06-25

## 2021-06-25 ENCOUNTER — COMMUNICATION - HEALTHEAST (OUTPATIENT)
Dept: PHYSICAL THERAPY | Facility: REHABILITATION | Age: 81
End: 2021-06-25

## 2021-06-25 DIAGNOSIS — I48.20 CHRONIC ATRIAL FIBRILLATION (H): ICD-10-CM

## 2021-06-25 NOTE — TELEPHONE ENCOUNTER
Who is calling:  patient  Reason for Call:  Returning acn call  Date of last appointment with primary care: na  Okay to leave a detailed message: Yes

## 2021-06-25 NOTE — TELEPHONE ENCOUNTER
Refill Approved    Rx renewed per Medication Renewal Policy. Medication was last renewed on 12/23/2019.    Opal Lucero, [facility] Triage/Med Refill 6/4/2021     Requested Prescriptions   Pending Prescriptions Disp Refills     hydrOXYzine HCL (ATARAX) 25 MG tablet 90 tablet 11     Sig: Take 1 tablet (25 mg total) by mouth 3 (three) times a day as needed for anxiety.       Antihistamine Refill Protocol Passed - 6/3/2021  2:55 PM        Passed - Patient has had office visit/physical in last year     Last office visit with prescriber/PCP: 5/6/2021 Jazzy Gil MD OR same dept: 5/6/2021 Jazzy Gil MD OR same specialty: 5/6/2021 Jazzy Gil MD  Last physical: 3/28/2019 Last MTM visit: Visit date not found   Next visit within 3 mo: Visit date not found  Next physical within 3 mo: Visit date not found  Prescriber OR PCP: Jazzy Gil MD  Last diagnosis associated with med order: 1. Anxiety  - hydrOXYzine HCL (ATARAX) 25 MG tablet; Take 1 tablet (25 mg total) by mouth 3 (three) times a day as needed for anxiety.  Dispense: 90 tablet; Refill: 11    If protocol passes may refill for 12 months if within 3 months of last provider visit (or a total of 15 months).

## 2021-06-25 NOTE — TELEPHONE ENCOUNTER
Called and left message for patient regarding missed appointment on 6/8. Patient returned call and stated that he forgot about the appointment. Reminded of now show policy and of upcoming appointments scheduled. NS #1    Flakita Lujan, PT, DPT

## 2021-06-25 NOTE — TELEPHONE ENCOUNTER
ANTICOAGULATION  MANAGEMENT    Assessment     Today's INR result of 2.60 is Therapeutic (goal INR of 2.0-3.0)        Warfarin taken as previously instructed    No new diet changes affecting INR    No new medication/supplements affecting INR    Continues to tolerate warfarin with no reported s/s of bleeding or thromboembolism     Previous INR was Supratherapeutic at 3.80 on 5/25/21.    Plan:     Requested a call after 430pm or tomorrow 6/4/21 regarding INR result and instructed:   - LM with Pee - take his usual 7.5mg warfarin dose tonight.      Warfarin Dosing Instructions:   (evenings. 5mg tabs)   - Continue current warfarin dose 7.5 mg daily on Tues/Thurs/Sat; and 5 mg daily rest of week.    Instructed patient to follow up no later than:  2 wks.   - INR scheduled on 6/24/21 @ [facility].    Education provided: target INR goal and significance of current INR result    Instructed to call the [facility] for any changes, questions or concerns. (#[phone])   ?   Mariel Dale RN    Subjective/Objective:      Pee Ayala, a 81 y.o. male is on warfarin. Pee Glasgow reports:     Home warfarin dose: as updated on anticoagulation calendar per template     Missed doses: No     Medication changes:  No     S/S of bleeding or thromboembolism:  No     New Injury or illness:  No     Changes in diet or alcohol consumption:  No     Upcoming surgery, procedure or cardioversion:  No    Anticoagulation Episode Summary     Current INR goal:  2.0-3.0   TTR:  68.4 % (1 y)   Next INR check:  6/17/2021   INR from last check:  2.60 (6/3/2021)   Weekly max warfarin dose:     Target end date:     INR check location:     Preferred lab:     Send INR reminders to:  [facility]    Indications    Chronic atrial fibrillation (H) [I48.20]           Comments:           Anticoagulation Care Providers     Provider Role Specialty Phone number    Jazzy Gil MD Referring Family Medicine [phone]

## 2021-06-25 NOTE — PATIENT INSTRUCTIONS - HE
Try eating a handful of peanuts at bedtime    Try that pickle for your restless legs      Consider compression socks for your lower legs.

## 2021-06-26 NOTE — TELEPHONE ENCOUNTER
----- Message from Flakita Lujan PT sent at 6/7/2021  9:17 AM CDT -----  Regarding: PAtient referral for shoulder  Hello Dr. Gil,    I evaluated this patient for hip pain, he also had complaints of shoulder pain and would like it to be addressed in PT. Would you be able to put in an order for shoulder pain right side, so I am able to evaluate and address this.    Thank you,  Flakita Lujan, PT, DPT

## 2021-06-27 NOTE — PROGRESS NOTES
Progress Notes by Lilli Guzman CNP at 8/14/2019  2:50 PM     Author: Lilli Guzman CNP Service: -- Author Type: Nurse Practitioner    Filed: 8/14/2019  3:39 PM Encounter Date: 8/14/2019 Status: Signed    : Lilli Guzman CNP (Nurse Practitioner)           Click to link to [facility] HEART CARE NOTE      Assessment/Recommendations   Assessment:    1.  Heart failure with preserved ejection fraction, NYHA class II: Compensated..  He states his lower extremity edema has improved with the increase of Lasix twice a day.  His weights have remained stable.  He continues to have fatigue and dyspnea on exertion.  He continues to follow a low-sodium diet.    2.  Hypertension: Slightly elevated.  Blood pressure 146/70    3.  Permanent atrial fibrillation: Rate controlled.  He continues warfarin for anticoagulation.    Plan:  1.  Continue current medications  2.  Continue daily weights and low-sodium diet  3.  Continue regular exercise    Pee Ayala will follow up with Dr. Talbert September 26 and in the heart failure clinic in November.     History of Present Illness    Mr. Pee Ayala is a 79 y.o. male seen at [facility] heart failure clinic today for continued follow-up.  He follows up for heart failure with preserved ejection fraction.  He did echocardiogram March 18, 2019 which showed ejection fraction of 65% with moderate aortic stenosis and mild aortic regurgitation.  He has a past medical history significant for hypertension, coronary artery disease, hyperlipidemia, permanent atrial fibrillation, and diabetes.  Status post three-vessel coronary artery bypass graft in 2014.    During the last clinic visit, I increased his Lasix to 20 mg twice a day.  He states this is helped his lower extremity edema later in the day.  He continues to have fatigue and dyspnea on exertion.  He denies orthopnea or PND.  He denies chest pain.  He denies  lightheadedness, shortness of breath, orthopnea, PND, palpitations, chest pain and abdominal fullness/bloating.      He is monitoring home weights which are stable between 200-202 pounds.  He is following a low sodium diet.       Physical Examination Review of Systems   Vitals:    08/14/19 1507   BP: 146/70   Pulse: 63   Resp: 12     Body mass index is 33.67 kg/m .  Wt Readings from Last 3 Encounters:   08/14/19 207 lb (93.9 kg)   07/12/19 207 lb (93.9 kg)   05/30/19 208 lb 3.2 oz (94.4 kg)       General Appearance:     Alert, cooperative and in no acute distress.   ENT/Mouth: membranes moist, no oral lesions or bleeding gums.      EYES:  no scleral icterus, normal conjunctivae   Neck: no carotid bruits or thyromegaly   Chest/Lungs:   lungs are clear to auscultation, no rales or wheezing, respirations unlabored   Cardiovascular:    Irregularly irregular. Normal first and second heart sounds with a 3/6 systolic murmur, no rubs, or gallops; the carotid, radial and posterior tibial pulses are intact, Jugular venous pressure normal, trace edema bilateral lower extremities    Abdomen:  Soft, nontender, nondistended, bowel sounds present   Extremities: no cyanosis or clubbing   Skin: warm, dry.    Neurologic: mood and affect are appropriate, alert and oriented x3      General: WNL  Eyes: WNL  Ears/Nose/Throat: WNL  Lungs: WNL  Heart: Irregular Heartbeat  Stomach: WNL  Bladder: Frequent Urination at Night  Muscle/Joints: WNL  Skin: WNL  Nervous System: WNL  Mental Health: WNL           Medical History  Surgical History Family History Social History   Past Medical History:   Diagnosis Date   ? Actinic keratosis 1/14/2014   ? Actinic keratosis 1/14/2014   ? Anticoagulated on Coumadin 12/30/2015   ? Bone mass 4/26/2017   ? Dyslipidemia 8/31/2016   ? 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    ? Encounter for long-term  (current) use of insulin (H) 2016   ? Esophageal reflux 2010   ? Nonalcoholic steatohepatitis 10/1/2009   ? PD (perceptive deafness), asymmetrical 2010   ? Status post coronary angiogram 3/9/2016   ? Tremor 2014    Past Surgical History:   Procedure Laterality Date   ? CORONARY ARTERY BYPASS GRAFT     ? CV CORONARY ANGIOGRAM N/A 2019    Procedure: Coronary Angiogram;  Surgeon: Dominik Vega MD;  Location: [facility] Cath Lab;  Service: Cardiology   ? CV LEFT HEART CATHETERIZATION WO LEFT VETRICULOGRAM Left 2019    Procedure: Left Heart Catheterization Without Left Ventriculogram;  Surgeon: Dominik Vega MD;  Location: [facility] Cath Lab;  Service: Cardiology   ? CV RIGHT HEART CATH Right 2019    Procedure: Right Heart Catheterization;  Surgeon: Dominik Vega MD;  Location: [facility] Cath Lab;  Service: Cardiology   ? MOHS SURGERY      Family History   Problem Relation Age of Onset   ? Diabetes type II Mother         58 ,  from anesthesia complication.   ? Heart disease Father         86  from stroke   ? Stroke Father    ? No Medical Problems Brother         60 years of age.    Social History     Socioeconomic History   ? Marital status:      Spouse name: Not on file   ? Number of children: Not on file   ? Years of education: Not on file   ? Highest education level: Not on file   Occupational History   ? Not on file   Social Needs   ? Financial resource strain: Not on file   ? Food insecurity:     Worry: Not on file     Inability: Not on file   ? Transportation needs:     Medical: Not on file     Non-medical: Not on file   Tobacco Use   ? Smoking status: Former Smoker     Last attempt to quit: 1998     Years since quittin.6   ? Smokeless tobacco: Never Used   Substance and Sexual Activity   ? Alcohol use: Yes     Comment: very rare   ? Drug use: No   ? Sexual activity: Never     Birth control/protection: None   Lifestyle   ?  Physical activity:     Days per week: Not on file     Minutes per session: Not on file   ? Stress: Not on file   Relationships   ? Social connections:     Talks on phone: Not on file     Gets together: Not on file     Attends [employer] service: Not on file     Active member of club or organization: Not on file     Attends meetings of clubs or organizations: Not on file     Relationship status: Not on file   ? Intimate partner violence:     Fear of current or ex partner: Not on file     Emotionally abused: Not on file     Physically abused: Not on file     Forced sexual activity: Not on file   Other Topics Concern   ? Not on file   Social History Narrative     and lives with life.    Has adult children from previous relationship. ( Blended family).    Has a dog - Vincent Gil MD  1/3/2019              Medications  Allergies   Current Outpatient Medications   Medication Sig Dispense Refill   ? acetaminophen (TYLENOL) 500 MG tablet Take 1,000 mg by mouth every 6 (six) hours as needed.            ? amLODIPine (NORVASC) 5 MG tablet Take 1 tablet (5 mg total) by mouth at bedtime. 90 tablet 5   ? aspirin 81 mg chewable tablet Chew 81 mg daily.            ? blood glucose meter (GLUCOMETER) Use 1 each As Directed as needed. Dispense glucometer brand per patient's insurance at pharmacy discretion. 1 each 0   ? carvedilol (COREG) 25 MG tablet Take 1 tablet (25 mg total) by mouth 2 (two) times a day with meals. 180 tablet 5   ? finasteride (PROSCAR) 5 mg tablet TAKE ONE TABLET BY MOUTH ONCE DAILY IN  ADDITION  TO  FLOMAX     ? fluorouracil (EFUDEX) 5 % cream Apply to cheeks, nose, forehead, ears and scalp twice daily for 2 weeks. Inflammation is expected.     ? furosemide (LASIX) 20 MG tablet Take 1 tablet (20 mg total) by mouth 2 (two) times a day at 9am and 6pm. 180 tablet 0   ? gabapentin (NEURONTIN) 300 MG capsule Take 2 capsules (600 mg total) by mouth 2 (two) times a day. 360 capsule 3   ?  "glimepiride (AMARYL) 4 MG tablet TAKE ONE TABLET BY MOUTH TWICE DAILY     ? insulin NPH-insulin regular (NOVOLIN 70/30 U-100 INSULIN) 100 unit/mL (70-30) injection 18 in the morning and 12 at night            ? insulin syringe-needle U-100 0.3 mL 31 gauge x 15/64\" Syrg Use 1 each As Directed 2 (two) times a day. 100 Syringe 11   ? metFORMIN (GLUCOPHAGE) 500 MG tablet Take 2 tablets (1,000 mg total) by mouth 2 (two) times a day. 360 tablet 3   ? omeprazole (PRILOSEC) 40 MG capsule Take 40 mg by mouth daily as needed.            ? rosuvastatin (CRESTOR) 20 MG tablet Take 1 tablet (20 mg total) by mouth at bedtime. 90 tablet 3   ? traMADol (ULTRAM) 50 mg tablet Take 1 tablet (50 mg total) by mouth at bedtime as needed. 30 tablet 0   ? warfarin (COUMADIN/JANTOVEN) 5 MG tablet Take 5mg daily by mouth as directed 90 tablet 1   ? alfuzosin (UROXATRAL) 10 mg 24 hr tablet Take 10 mg by mouth daily.              No current facility-administered medications for this visit.       Allergies   Allergen Reactions   ? Oxycodone Itching   ? Lisinopril Cough         Lab Results    Chemistry CBC BNP   Lab Results   Component Value Date    CREATININE 0.87 08/13/2019    BUN 16 08/13/2019     08/13/2019    K 4.4 08/13/2019     08/13/2019    CO2 24 08/13/2019     Creatinine (mg/dL)   Date Value   08/13/2019 0.87   07/12/2019 0.94   05/22/2019 0.85   04/04/2019 0.83    Lab Results   Component Value Date    WBC 6.3 04/04/2019    HGB 12.6 (L) 04/04/2019    HCT 37.7 (L) 04/04/2019     (H) 04/04/2019     04/04/2019    Lab Results   Component Value Date    BNP 96 (H) 05/09/2019     BNP (pg/mL)   Date Value   05/09/2019 96 (H)          25 minutes were spent with the patient with greater than 50% spent on education and counseling.      Lilli Guzman, [facility]   Heart Failure Clinic                          "

## 2021-06-27 NOTE — PROGRESS NOTES
Progress Notes by Tres Talbert MD at 5/9/2019  4:10 PM     Author: Tres Talbert MD Service: -- Author Type: Physician    Filed: 5/10/2019  8:34 AM Encounter Date: 5/9/2019 Status: Signed    : Tres Talbert MD (Physician)           Click to link to [facility] HEART CARE NOTE    Thank you, Dr. Gil, for asking us to see Pee Ayala at the [facility].      Assessment/Recommendations   Patient with known coronary artery disease, likely diastolic filling abnormalities with shortness of breath with activity consistent with heart failure with preserved ejection fraction.  Atrial fibrillation does not help the situation.  I cannot exclude the possibility of pulmonary disease but this seems less likely.  He also has moderate aortic stenosis.    I recommended that we check a B natruretic peptide.  If this is at all elevated I would like to start him on 20 mg of furosemide and 10 mEq of potassium each day.  We would then have him call in 5 days to see if he lost water weight, to see if his breathing was better, and we would schedule of repeat basic metabolic panel in 7 to 10 days.    If the B natruretic peptide is totally normal, I would be inclined to do a chest x-ray and pulmonary function test.    I would like to see him back in 1 month to further evaluate the response to treatments.    Thank you for allowing us to participate in his care.         History of Present Illness    Mr. Pee Ayala is a 79 y.o. male with known coronary artery disease and known permanent he has had trouble more with shortness of breath with activity and leg fatigue.  He was evaluated with a Holter monitor which showed good control the ventricular response to atrial fibrillation.  He had an echocardiogram which showed normal left ventricular systolic function.  He had a heart cath which showed mild elevation in his right heart pressures mild to moderate elevation in  his left ventricular end-diastolic pressure and an elevated pulmonary capillary wedge pressure at 24.  He was switched from one beta-blocker to another beta-blocker by the interventional cardiologist.  This did not cause any improvement.  He saw vascular surgeon and was felt to have somewhat distal vessel issues and it is been recommended that he start a rehab program but they have not yet called him.  He denies orthopnea or paroxysmal nocturnal dyspnea but still has leg fatigue when he walks and gets short of breath quite easily.  In the past he was a big exerciser and is very frustrating to him.  He has difficulty keeping up with his spouse which is also very frustrating for him.  The peripheral edema in his lower extremities is continued is really not changed.  He has never been on a diuretic.         Physical Examination Review of Systems   Vitals:    05/09/19 1553   BP: 130/60   Pulse: 67   Resp: 14     Body mass index is 35.61 kg/m .  Wt Readings from Last 3 Encounters:   05/09/19 214 lb (97.1 kg)   05/01/19 214 lb 9.6 oz (97.3 kg)   04/18/19 214 lb (97.1 kg)     General Appearance:   Alert, cooperative and in no acute distress.   ENT/Mouth: Oral mucuos membranes pink and moist .      EYES:  No scleral icterus. No Xanthelasma.    Neck: JVP normal.  Positive hepatojugular reflux. Thyroid not visualized   Chest/Lungs:   Lungs are clear to auscultation, equal chest wall expansion    Cardiovascular:   S1, S2 with 3/6 systolic murmur , no clicks or rubs. Brachial, radial and posterior tibial pulses are intact and symetric. No carotid bruits noted   Abdomen:  Nontender. BS+.       Extremities: No cyanosis, clubbing and bilateral pitting pretibial edema   Skin: no xanthelasma, warm.    Psych: Appropriate affect.   Neurologic:  Slow but normal gait, normal  bilateral, no tremors        General: WNL  Eyes: WNL  Ears/Nose/Throat: WNL  Lungs: WNL  Heart: WNL  Stomach: WNL  Bladder: WNL  Muscle/Joints: WNL  Skin:  WNL  Nervous System: WNL  Mental Health: WNL     Blood: WNL     Medical History  Surgical History Family History Social History   Past Medical History:   Diagnosis Date   ? Actinic keratosis 2014   ? Actinic keratosis 2014   ? Anticoagulated on Coumadin 2015   ? Bone mass 2017   ? Dyslipidemia 2016   ? ED (erectile dysfunction) of organic origin 2005    Overview:  2007 will check PSA, try Levitra, no history of CAD, not on nitrates.    ? Encounter for long-term (current) use of insulin (H) 2016   ? Esophageal reflux 2010   ? Nonalcoholic steatohepatitis 10/1/2009   ? PD (perceptive deafness), asymmetrical 2010   ? Status post coronary angiogram 3/9/2016   ? Tremor 2014    Past Surgical History:   Procedure Laterality Date   ? CORONARY ARTERY BYPASS GRAFT     ? CV CORONARY ANGIOGRAM N/A 2019    Procedure: Coronary Angiogram;  Surgeon: Dominik Vega MD;  Location: [facility] Cath Lab;  Service: Cardiology   ? CV LEFT HEART CATHETERIZATION WO LEFT VETRICULOGRAM Left 2019    Procedure: Left Heart Catheterization Without Left Ventriculogram;  Surgeon: Dominik Vega MD;  Location: [facility] Cath Lab;  Service: Cardiology   ? CV RIGHT HEART CATH Right 2019    Procedure: Right Heart Catheterization;  Surgeon: Dominik Vega MD;  Location: [facility] Cath Lab;  Service: Cardiology   ? MOHS SURGERY      Family History   Problem Relation Age of Onset   ? Diabetes type II Mother         58 ,  from anesthesia complication.   ? Heart disease Father         86  from stroke   ? Stroke Father    ? No Medical Problems Brother         60 years of age.    Social History     Socioeconomic History   ? Marital status:      Spouse name: Not on file   ? Number of children: Not on file   ? Years of education: Not on file   ? Highest education level: Not on file   Occupational History   ? Not on file   Social Needs   ?  Financial resource strain: Not on file   ? Food insecurity:     Worry: Not on file     Inability: Not on file   ? Transportation needs:     Medical: Not on file     Non-medical: Not on file   Tobacco Use   ? Smoking status: Former Smoker     Last attempt to quit: 1998     Years since quittin.3   ? Smokeless tobacco: Never Used   Substance and Sexual Activity   ? Alcohol use: Yes     Comment: very rare   ? Drug use: No   ? Sexual activity: Never     Birth control/protection: None   Lifestyle   ? Physical activity:     Days per week: Not on file     Minutes per session: Not on file   ? Stress: Not on file   Relationships   ? Social connections:     Talks on phone: Not on file     Gets together: Not on file     Attends Hindu service: Not on file     Active member of club or organization: Not on file     Attends meetings of clubs or organizations: Not on file     Relationship status: Not on file   ? Intimate partner violence:     Fear of current or ex partner: Not on file     Emotionally abused: Not on file     Physically abused: Not on file     Forced sexual activity: Not on file   Other Topics Concern   ? Not on file   Social History Narrative     and lives with life.    Has adult children from previous relationship. ( Blended family).    Has a dog - Vincent Gil MD  1/3/2019              Medications  Allergies   Current Outpatient Medications   Medication Sig Dispense Refill   ? acetaminophen (TYLENOL) 500 MG tablet Take 1,000 mg by mouth every 6 (six) hours as needed.            ? amLODIPine (NORVASC) 5 MG tablet Take 1 tablet (5 mg total) by mouth at bedtime. 90 tablet 5   ? antiox.mv no.10/omeg3s/lut/starr (I-CAPS ORAL) Take 1 tablet by mouth 2 (two) times a day.     ? aspirin 81 mg chewable tablet Chew 81 mg daily.            ? blood glucose meter (GLUCOMETER) Use 1 each As Directed as needed. Dispense glucometer brand per patient's insurance at pharmacy discretion. 1 each 0  "  ? carvedilol (COREG) 25 MG tablet Take 1 tablet (25 mg total) by mouth 2 (two) times a day with meals. 180 tablet 5   ? finasteride (PROSCAR) 5 mg tablet TAKE ONE TABLET BY MOUTH ONCE DAILY IN  ADDITION  TO  FLOMAX     ? fluorouracil (EFUDEX) 5 % cream Apply to cheeks, nose, forehead, ears and scalp twice daily for 2 weeks. Inflammation is expected.     ? gabapentin (NEURONTIN) 300 MG capsule Take 600 mg by mouth 2 (two) times a day.            ? glimepiride (AMARYL) 4 MG tablet TAKE ONE TABLET BY MOUTH TWICE DAILY     ? insulin NPH-insulin regular (NOVOLIN 70/30 U-100 INSULIN) 100 unit/mL (70-30) injection 18 in the morning and 12 at night            ? insulin syringe-needle U-100 0.3 mL 31 gauge x 15/64\" Syrg Use 1 each As Directed 2 (two) times a day. 100 Syringe 11   ? metFORMIN (GLUCOPHAGE) 500 MG tablet Take 2 tablets (1,000 mg total) by mouth 2 (two) times a day. 360 tablet 3   ? omeprazole (PRILOSEC) 40 MG capsule Take 40 mg by mouth daily as needed.            ? rosuvastatin (CRESTOR) 20 MG tablet Take 1 tablet (20 mg total) by mouth at bedtime. 90 tablet 3   ? traMADol (ULTRAM) 50 mg tablet Take 1 tablet (50 mg total) by mouth at bedtime as needed. 30 tablet 0   ? warfarin (COUMADIN/JANTOVEN) 5 MG tablet TAKE 10MG (5MG X 2) BY MOUTH ON WEDNESDAY AND 7.5MG (5MG X 1.5) BY MOUTH ALL OTHER DAYS DOSE MAY CHANGE BASED ON INR RESULTS     ? alfuzosin (UROXATRAL) 10 mg 24 hr tablet Take 10 mg by mouth daily.              No current facility-administered medications for this visit.       Allergies   Allergen Reactions   ? Oxycodone Itching   ? Lisinopril Cough         Lab Results    Chemistry/lipid CBC Cardiac Enzymes/BNP/TSH/INR   Lab Results   Component Value Date    CREATININE 0.83 04/04/2019    BUN 15 04/04/2019    K 4.2 04/04/2019     04/04/2019     04/04/2019    CO2 24 04/04/2019    Lab Results   Component Value Date    WBC 6.3 04/04/2019    HGB 12.6 (L) 04/04/2019    HCT 37.7 (L) 04/04/2019    "  (H) 04/04/2019     04/04/2019    Lab Results   Component Value Date    INR 1.40 (H) 05/06/2019

## 2021-06-27 NOTE — PROGRESS NOTES
Progress Notes by Lilli Guzman CNP at 5/24/2019 12:50 PM     Author: Lilli Guzman CNP Service: -- Author Type: Nurse Practitioner    Filed: 5/24/2019  1:26 PM Encounter Date: 5/24/2019 Status: Signed    : Lilli Guzman CNP (Nurse Practitioner)           Click to link to [facility] HEART CARE NOTE      Assessment/Recommendations   Assessment:    1.  Heart failure with preserved ejection fraction, NYHA class III: Compensated.  He states his symptoms of shortness of breath and lower extremity edema have improved with starting Lasix.  His weight has decreased 6 pounds.  We discussed monitoring symptoms, following a low-sodium diet, monitoring daily weights, and heart failure treatment.  He met with the nurse clinician for further heart failure education.    2.  Hypertension: Controlled.  Blood pressure 110/60    3.  Permanent atrial fibrillation: Rate controlled.  He continues warfarin for anticoagulation.  His INR today was 2.6.    Plan:  1.  Continue current medications  2.  Continue daily weights and low-sodium diet  3.  Encouraged regular exercise    Pee Ayala will follow up Dr. Talbert in August and in the heart failure clinic in 6 weeks.     History of Present Illness    Mr. Pee Ayala is a 79 y.o. male seen at [facility] heart failure clinic today for continued follow-up.  He follows up for heart failure with preserved ejection fraction.  He did echocardiogram March 18, 2018 which showed ejection fraction of 65% with moderate aortic stenosis and mild aortic regurgitation.  He has a past medical history significant for hypertension, coronary artery disease, hyperlipidemia, permanent atrial fibrillation, and diabetes.  Status post three-vessel coronary artery bypass graft in 2014.    During the last clinic visit, Dr. Talbert started him on Lasix 20 mg daily.  Today, he states his dyspnea on exertion and lower extremity edema have  improved.  He continues to have mild fatigue.  He has had some episodes of vertigo the last few nights.  He has a history of vertigo.  He denies chest pain.  Denies any orthopnea or PND.  He denies lightheadedness, shortness of breath, orthopnea, PND, palpitations, chest pain and abdominal fullness/bloating.      He is monitoring home weights which have decreased 6 pounds.  He states his weight today was 199 pounds.  He and his wife eat out on a regular basis he states.    ECHO 3/18/2019:   Summary       No previous study for comparison.    Left ventricle ejection fraction is normal. The estimated left ventricular ejection fraction is 65%.    Mild concentric left ventricular hypertrophy    Normal right ventricular size and systolic function.    Moderate aortic stenosis with mild aortic insufficiency          Physical Examination Review of Systems   Vitals:    05/24/19 1233   BP: 110/60   Pulse: (!) 56   Resp: 16     Body mass index is 33.59 kg/m .  Wt Readings from Last 3 Encounters:   05/24/19 205 lb (93 kg)   05/09/19 214 lb (97.1 kg)   05/01/19 214 lb 9.6 oz (97.3 kg)       General Appearance:     Alert, cooperative and in no acute distress.   ENT/Mouth: membranes moist, no oral lesions or bleeding gums.      EYES:  no scleral icterus, normal conjunctivae   Neck: no carotid bruits or thyromegaly   Chest/Lungs:   lungs are clear to auscultation, no rales or wheezing, respirations unlabored   Cardiovascular:    Irregularly irregular. Normal first and second heart sounds with grade 3/6 systolic murmur, no rubs, or gallops; the carotid, radial and posterior tibial pulses are intact, Jugular venous pressure normal, trace edema bilateral lower extremities    Abdomen:  Soft, nontender, nondistended, bowel sounds present   Extremities: no cyanosis or clubbing   Skin: warm, dry.    Neurologic: mood and affect are appropriate, alert and oriented x3      General: WNL  Eyes: WNL  Ears/Nose/Throat: Nosebleeds  Lungs:  WNL  Heart: Leg Swelling  Stomach: Heartburn  Bladder: Frequent Urination at Night  Muscle/Joints: WNL  Skin: WNL  Nervous System: Dizziness, Loss of Balance  Mental Health: WNL     Blood: Easy Bleeding, Easy Bruising     Medical History  Surgical History Family History Social History   Past Medical History:   Diagnosis Date   ? Actinic keratosis 2014   ? Actinic keratosis 2014   ? Anticoagulated on Coumadin 2015   ? Bone mass 2017   ? Dyslipidemia 2016   ? ED (erectile dysfunction) of organic origin 2005    Overview:  2007 will check PSA, try Levitra, no history of CAD, not on nitrates.    ? Encounter for long-term (current) use of insulin (H) 2016   ? Esophageal reflux 2010   ? Nonalcoholic steatohepatitis 10/1/2009   ? PD (perceptive deafness), asymmetrical 2010   ? Status post coronary angiogram 3/9/2016   ? Tremor 2014    Past Surgical History:   Procedure Laterality Date   ? CORONARY ARTERY BYPASS GRAFT     ? CV CORONARY ANGIOGRAM N/A 2019    Procedure: Coronary Angiogram;  Surgeon: Dominik Vega MD;  Location: [facility] Cath Lab;  Service: Cardiology   ? CV LEFT HEART CATHETERIZATION WO LEFT VETRICULOGRAM Left 2019    Procedure: Left Heart Catheterization Without Left Ventriculogram;  Surgeon: Dominik Vega MD;  Location: [facility] Cath Lab;  Service: Cardiology   ? CV RIGHT HEART CATH Right 2019    Procedure: Right Heart Catheterization;  Surgeon: Dominik Vega MD;  Location: [facility] Cath Lab;  Service: Cardiology   ? [facility]      Family History   Problem Relation Age of Onset   ? Diabetes type II Mother         58 ,  from anesthesia complication.   ? Heart disease Father         86  from stroke   ? Stroke Father    ? No Medical Problems Brother         60 years of age.    Social History     Socioeconomic History   ? Marital status:      Spouse name: Not on file   ? Number of  children: Not on file   ? Years of education: Not on file   ? Highest education level: Not on file   Occupational History   ? Not on file   Social Needs   ? Financial resource strain: Not on file   ? Food insecurity:     Worry: Not on file     Inability: Not on file   ? Transportation needs:     Medical: Not on file     Non-medical: Not on file   Tobacco Use   ? Smoking status: Former Smoker     Last attempt to quit: 1998     Years since quittin.4   ? Smokeless tobacco: Never Used   Substance and Sexual Activity   ? Alcohol use: Yes     Comment: very rare   ? Drug use: No   ? Sexual activity: Never     Birth control/protection: None   Lifestyle   ? Physical activity:     Days per week: Not on file     Minutes per session: Not on file   ? Stress: Not on file   Relationships   ? Social connections:     Talks on phone: Not on file     Gets together: Not on file     Attends Religion service: Not on file     Active member of club or organization: Not on file     Attends meetings of clubs or organizations: Not on file     Relationship status: Not on file   ? Intimate partner violence:     Fear of current or ex partner: Not on file     Emotionally abused: Not on file     Physically abused: Not on file     Forced sexual activity: Not on file   Other Topics Concern   ? Not on file   Social History Narrative     and lives with life.    Has adult children from previous relationship. ( Blended family).    Has a dog - Vincent Gil MD  1/3/2019              Medications  Allergies   Current Outpatient Medications   Medication Sig Dispense Refill   ? acetaminophen (TYLENOL) 500 MG tablet Take 1,000 mg by mouth every 6 (six) hours as needed.            ? alfuzosin (UROXATRAL) 10 mg 24 hr tablet Take 10 mg by mouth daily.            ? amLODIPine (NORVASC) 5 MG tablet Take 1 tablet (5 mg total) by mouth at bedtime. 90 tablet 5   ? antiox.mv no.10/omeg3s/lut/starr (I-CAPS ORAL) Take 1 tablet by mouth  "2 (two) times a day.     ? aspirin 81 mg chewable tablet Chew 81 mg daily.            ? blood glucose meter (GLUCOMETER) Use 1 each As Directed as needed. Dispense glucometer brand per patient's insurance at pharmacy discretion. 1 each 0   ? carvedilol (COREG) 25 MG tablet Take 1 tablet (25 mg total) by mouth 2 (two) times a day with meals. 180 tablet 5   ? finasteride (PROSCAR) 5 mg tablet TAKE ONE TABLET BY MOUTH ONCE DAILY IN  ADDITION  TO  FLOMAX     ? fluorouracil (EFUDEX) 5 % cream Apply to cheeks, nose, forehead, ears and scalp twice daily for 2 weeks. Inflammation is expected.     ? furosemide (LASIX) 20 MG tablet Take 1 tablet (20 mg total) by mouth daily. 90 tablet 0   ? gabapentin (NEURONTIN) 300 MG capsule Take 600 mg by mouth 2 (two) times a day.            ? insulin NPH-insulin regular (NOVOLIN 70/30 U-100 INSULIN) 100 unit/mL (70-30) injection 18 in the morning and 12 at night            ? insulin syringe-needle U-100 0.3 mL 31 gauge x 15/64\" Syrg Use 1 each As Directed 2 (two) times a day. 100 Syringe 11   ? metFORMIN (GLUCOPHAGE) 500 MG tablet Take 2 tablets (1,000 mg total) by mouth 2 (two) times a day. 360 tablet 3   ? omeprazole (PRILOSEC) 40 MG capsule Take 40 mg by mouth daily as needed.            ? rosuvastatin (CRESTOR) 20 MG tablet Take 1 tablet (20 mg total) by mouth at bedtime. 90 tablet 3   ? traMADol (ULTRAM) 50 mg tablet Take 1 tablet (50 mg total) by mouth at bedtime as needed. 30 tablet 0   ? warfarin (COUMADIN/JANTOVEN) 5 MG tablet TAKE 10MG (5MG X 2) BY MOUTH ON WEDNESDAY AND 7.5MG (5MG X 1.5) BY MOUTH ALL OTHER DAYS DOSE MAY CHANGE BASED ON INR RESULTS     ? glimepiride (AMARYL) 4 MG tablet TAKE ONE TABLET BY MOUTH TWICE DAILY       No current facility-administered medications for this visit.       Allergies   Allergen Reactions   ? Oxycodone Itching   ? Lisinopril Cough         Lab Results    Chemistry CBC BNP   Lab Results   Component Value Date    CREATININE 0.85 05/22/2019    " BUN 23 05/22/2019     05/22/2019    K 5.1 (H) 05/22/2019     05/22/2019    CO2 25 05/22/2019     Creatinine (mg/dL)   Date Value   05/22/2019 0.85   04/04/2019 0.83   03/28/2019 0.92   10/06/2014 0.89    Lab Results   Component Value Date    WBC 6.3 04/04/2019    HGB 12.6 (L) 04/04/2019    HCT 37.7 (L) 04/04/2019     (H) 04/04/2019     04/04/2019    Lab Results   Component Value Date    BNP 96 (H) 05/09/2019     BNP (pg/mL)   Date Value   05/09/2019 96 (H)          40 minutes were spent with the patient with greater than 50% spent on education and counseling.      Lilli Guzman, [facility]   Heart Failure Clinic

## 2021-06-27 NOTE — PROGRESS NOTES
Progress Notes by Tres Talbert MD at 1/23/2019  9:10 AM     Author: Tres Talbert MD Service: -- Author Type: Physician    Filed: 1/23/2019 10:08 AM Encounter Date: 1/23/2019 Status: Signed    : Tres Talbert MD (Physician)           Click to link to [facility] HEART [facility] NOTE    Thank you, Dr. Gil, for asking us to see Pee Ayala at the [facility].      Assessment/Recommendations   Patient with known history of coronary artery disease, status post coronary artery bypass surgery as well as permanent atrial fibrillation.  He does have significant limitations in his functional capacity which could be related to coronary artery disease, reduced left ventricular systolic function, diastolic dysfunction, or poor or inappropriate rate control of his atrial fibrillation.  We talked about these possibilities in the clinic today.  I suggested we start with an echocardiogram and a Holter monitor and hopefully can get them in a timely manner.  Pending these results I think it is likely that he would need repeat coronary angiography and I would add on a right heart catheterization as well.    We talked about switching him to a novel anticoagulant but we will wait to see if he needs an angiogram at which time we would hold his warfarin and we could start him on a novel anticoagulant thereafter.  He is eager to get some answers and see if he can improve his functional capacity as he has a trip coming up to the [facility] later on this year.    Thank you for allowing us to participate in his care.         History of Present Illness    Mr. Pee Ayala is a 78 y.o. male with known coronary artery disease and permanent atrial fibrillation.  He reports that his heart disease was first diagnosed approximately 5 years ago.  He did not report any history of myocardial infarction or angina but underwent evaluation and ultimately bypass surgery which was done  at [facility].  Notes from [facility] indicate a three-vessel coronary artery bypass surgery with an LISHA graft to the LAD, saphenous vein graft to the OM1 and OM 2.  He subsequently had a drug-eluting stent placed to his right coronary artery in March 2016 and at that time the grafts were patent.    Over the past 2 years he has had worsening shortness of breath with activity.  He typically walks his dog on a daily basis.  He used to build to do 3 laps were about half a mile each without stopping.  He now has to stop about 3 times for each lap and sit on a bench to catch his breath.  If he sits for about 20-30 seconds his breathing improves and is able to continue.  He reports that the shortness of breath first occurred about 2 years ago when he was in [place] and needed to walk a ways from his room or hot to the [place] to get dinner.  It was at this time that he noticed that he was short of breath and had to stop and rest to catch his breath.  It is gradually worsened since that time.  He does not think he has seen a cardiologist in the last couple years.  He recently changed his primary care physician to [facility].  He does not get chest discomfort with this but in the mornings when he wakes up he sometimes feels some chest heaviness.  This is occurring most mornings and spontaneously goes away.  He does not have this chest heaviness when he walks.  He denies orthopnea, paroxysmal nocturnal dyspnea and also denies peripheral edema.  He has not had syncopal episodes but when he first gets up in the morning he feels a bit lightheaded and has to sit on the edge of the bed for a while he denies palpitations although in the morning when he feels this chest heaviness he wonders if it is related to the atrial fibrillation.  He has had some difficulty with high and low INRs and some bleeding issues with his nose.    He does not have a history of rheumatic fever but may have had a TIA in the past based on no review.  His  father had cerebrovascular accidents and heart problems and  prior to having heart surgery.  The patient smokes cigarettes lightly but quit 10 years ago.  The patient does have diabetes.  His lipids are well controlled.  He has been treated for hypertension in the past.    He grew up and on [facility].  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  He has 2 daughters and 3 granddaughters.  Daughters do not have heart problems or diabetes.  He is currently .  He still enjoys doing woodworking..               Physical Examination Review of Systems   Vitals:    19 0919   BP: 130/68   Pulse: (!) 56   Resp: 16     Body mass index is 34.11 kg/m .  Wt Readings from Last 3 Encounters:   19 205 lb (93 kg)   19 207 lb (93.9 kg)     General Appearance:   Alert, cooperative and in no acute distress.   ENT/Mouth: Oral mucuos membranes pink and moist .      EYES:  No scleral icterus. No Xanthelasma.    Neck: JVP normal. No Hepatojugular reflux. Thyroid not visualized   Chest/Lungs:   Lungs are clear to auscultation, equal chest wall expansion    Cardiovascular:   S1, S2 with 2/6 systolic murmur , no clicks or rubs. Brachial, radial  pulses are intact and symetric. No carotid bruits noted   Abdomen:  Nontender. BS+. No bruits.      Extremities: No cyanosis, clubbing and mild bilateral pitting pretibial edema   Skin: no xanthelasma, warm.    Psych: Appropriate affect.   Neurologic:  Slow but normal gait, normal  bilateral, no tremors        General: WNL  Eyes: WNL  Ears/Nose/Throat: Nosebleeds  Lungs: Shortness of Breath  Heart: Shortness of Breath with activity, Irregular Heartbeat  Stomach: Heartburn  Bladder: Frequent Urination at Night  Muscle/Joints: Muscle Weakness  Skin: WNL  Nervous System: Dizziness  Mental Health: WNL     Blood: Easy Bleeding, Easy Bruising     Medical History  Surgical History Family History Social History   Past Medical History:   Diagnosis  Date   ? Actinic keratosis 2014   ? Actinic keratosis 2014   ? Anticoagulated on Coumadin 2015   ? Bone mass 2017   ? Dyslipidemia 2016   ? ED (erectile dysfunction) of organic origin 2005    Overview:  2007 will check PSA, try Levitra, no history of CAD, not on nitrates.    ? Encounter for long-term (current) use of insulin (H) 2016   ? Esophageal reflux 2010   ? Nonalcoholic steatohepatitis 10/1/2009   ? PD (perceptive deafness), asymmetrical 2010   ? Status post coronary angiogram 3/9/2016   ? Tremor 2014    Past Surgical History:   Procedure Laterality Date   ? CORONARY ARTERY BYPASS GRAFT     ? MOHS SURGERY      Family History   Problem Relation Age of Onset   ? Diabetes type II Mother         58 ,  from anesthesia complication.   ? Heart disease Father         86  from stroke   ? Stroke Father    ? No Medical Problems Brother         60 years of age.    Social History     Socioeconomic History   ? Marital status:      Spouse name: Not on file   ? Number of children: Not on file   ? Years of education: Not on file   ? Highest education level: Not on file   Social Needs   ? Financial resource strain: Not on file   ? Food insecurity - worry: Not on file   ? Food insecurity - inability: Not on file   ? Transportation needs - medical: Not on file   ? Transportation needs - non-medical: Not on file   Occupational History   ? Not on file   Tobacco Use   ? Smoking status: Former Smoker     Last attempt to quit: 1998     Years since quittin.0   ? Smokeless tobacco: Never Used   Substance and Sexual Activity   ? Alcohol use: Yes     Comment: very rare   ? Drug use: No   ? Sexual activity: No     Birth control/protection: None   Other Topics Concern   ? Not on file   Social History Narrative     and lives with life.    Has adult children from previous relationship. ( Blended family).    Has a dog - Collinsimin        Jazzy  "MD Louise  1/3/2019              Medications  Allergies   Current Outpatient Medications   Medication Sig Dispense Refill   ? acetaminophen (TYLENOL) 500 MG tablet Take 1,000 mg by mouth every 6 (six) hours as needed.            ? alfuzosin (UROXATRAL) 10 mg 24 hr tablet Take 10 mg by mouth daily.            ? antiox.mv no.10/omeg3s/lut/starr (I-CAPS ORAL) Take 1 tablet by mouth 2 (two) times a day.     ? aspirin 81 mg chewable tablet Chew 81 mg daily.            ? blood glucose test (CONTOUR NEXT TEST STRIPS) strips Use  to test three times a day. Use as directed. Pharmacy dispense brand based on insurance.     ? ferrous sulfate 325 (65 FE) MG tablet Take 325 mg by mouth 2 (two) times a day.            ? finasteride (PROSCAR) 5 mg tablet TAKE ONE TABLET BY MOUTH ONCE DAILY IN  ADDITION  TO  FLOMAX     ? fluorouracil (EFUDEX) 5 % cream Apply to cheeks, nose, forehead, ears and scalp twice daily for 2 weeks. Inflammation is expected.     ? gabapentin (NEURONTIN) 300 MG capsule Take 600 mg by mouth 2 (two) times a day.            ? glimepiride (AMARYL) 4 MG tablet TAKE ONE TABLET BY MOUTH TWICE DAILY     ? insulin NPH-insulin regular (NOVOLIN 70/30 U-100 INSULIN) 100 unit/mL (70-30) injection Inject 17 Units with breakfast and 11 units before dinner. Take 12 units before dinner if planning on eating dessert.     ? insulin syringe-needle U-100 1 mL 31 gauge x 5/16 Syrg Use to inject Novolin N 2 times daily.     ? metFORMIN (GLUCOPHAGE) 500 MG tablet TAKE 2 TABLETS BY MOUTH TWICE DAILY     ? metoprolol succinate (TOPROL-XL) 100 MG 24 hr tablet Take 100 mg by mouth daily.            ? omeprazole (PRILOSEC) 40 MG capsule Take 40 mg by mouth daily as needed.            ? pen needle, diabetic (BD ULTRA-FINE SHORT PEN NEEDLE) 31 gauge x 5/16\" Ndle Use daily.     ? pimecrolimus (ELIDEL) 1 % cream Apply topically.     ? rosuvastatin (CRESTOR) 20 MG tablet Take 20 mg by mouth at bedtime.            ? traMADol (ULTRAM) 50 mg " tablet Take 1 tablet (50 mg total) by mouth at bedtime as needed. 30 tablet 0   ? warfarin (COUMADIN/JANTOVEN) 5 MG tablet TAKE 10MG (5MG X 2) BY MOUTH ON WEDNESDAY AND 7.5MG (5MG X 1.5) BY MOUTH ALL OTHER DAYS DOSE MAY CHANGE BASED ON INR RESULTS     ? acetaminophen-codeine (TYLENOL #3) 300-30 mg per tablet Take 1-2 tablets by mouth every 6 (six) hours as needed.              No current facility-administered medications for this visit.       Allergies   Allergen Reactions   ? Lisinopril Cough         Lab Results    Chemistry/lipid CBC Cardiac Enzymes/BNP/TSH/INR   Lab Results   Component Value Date    CREATININE 0.89 10/06/2014    BUN 13 10/06/2014    K 4.4 10/06/2014     10/06/2014     10/06/2014    CO2 25 10/06/2014    Lab Results   Component Value Date    WBC 7.3 10/06/2014    HGB 11.3 (L) 10/06/2014    HCT 34.3 (L) 10/06/2014     10/06/2014     10/06/2014    Lab Results   Component Value Date    INR 3.20 (H) 01/21/2019

## 2021-06-28 NOTE — PROGRESS NOTES
Progress Notes by Geronimo Jones NP at 12/24/2019  7:50 AM     Author: Geronimo Jones NP Service: -- Author Type: Nurse Practitioner    Filed: 12/24/2019  9:21 AM Encounter Date: 12/24/2019 Status: Signed    : Geronimo Jones NP (Nurse Practitioner)             Assessment/Recommendations   Assessment:    1. Chronic atrial fibrillation: Recent Holter showed A. fib with average heart rate in 60s with some nocturnal bradycardia heart rate as low as 33 with pauses up to 2.1 seconds.  His carvedilol dose was reduced.    On warfarin therapy for stroke prevention.  His recent INR therapeutic.    Heart rate improved from high 40s to low 80s today.  His dizziness is unchanged on low-dose of carvedilol.    His dizziness is likely multifactorial including including side effect from medications, hypoglycemia, and recent diagnosis of carotid artery stenosis per carotid ultrasound.  Patient met with pharmacist for medication management.    Obstructive sleep apnea screening: He scored 0 for [place] Sleepiness Scale and 4 for STOP-BANG questionnaire.  Low risk.    2.  Heart failure with preserved ejection fraction, NYHA class II: He is well compensated with no evidence of retention assessment except fatigue, dizziness, mild crackles in left lower lobe, and trace lower extremity edema.      Home weight has been stable between 198 to 200 pounds    3.  Hypertension: Blood pressure today is 106/76-well-controlled.  He stopped amlodipine due to dizziness and dry heaves.  Last heart failure clinic blood pressure was 130/70 on left arm and 136/70 on right arm.    Patient has been experiencing more restless in his legs since switched from gabapentin to nortriptyline by podiatrist.-Patient was recommended to follow-up.    Plan:  1.  Continue current dose of carvedilol.  INR and warfarin dosing per INR clinic    2.  Reinforced low-sodium diet less than 2 g/day, daily weight monitoring, adequate hydration of at least 50 to 60 ounces  of fluid per day.  Encouraged to call if persistent weight gain with worsening heart failure symptoms    3.  Follow-up with endocrinology, vascular,  and PCP as scheduled    Follow-up with Dr. Talbert in March and follow up with Lilli Guzman CNP in May as recommended     History of Present Illness/Subjective    Mr. Pee Ayala is a 79 y.o. male with a significant past medical history of coronary artery disease, chronic atrial fibrillation, hypertension, mixed hyperlipidemia, dyspnea on exertion, obesity, frequent falls, polyneuropathy, and heart failure with preserved ejection fraction who was seen in the heart care clinic per recommendation from Dr. Talbert.    Patient recently saw Dr. Talbert back in September 2019.  Holter study showed atrial fibrillation with average heart rate in 60s with nocturnal bradycardia heart rate in low 30s with 2.1-second pauses noted.  His carvedilol dose was reduced.    He was seen in the heart failure clinic by CARY Reeder in mid November.  He brought up concern about gait unsteadiness and neck pain and with his family history of stroke.     His carotid ultrasound showed moderate disease in left carotid artery.  PCP recommended him to follow-up with the vascular clinic.  He  During last heart failure clinic visit, he was noted to have his heart rate in high 40s at rest and 80s with activity.  He continues to have dizziness and fatigue despite cutting back on carvedilol.  Carvedilol dose was decreased further due to low heart rate.    He was recommended to follow-up with endocrinology/PCP regarding low blood glucose running in 70s which might be contributing factor for his dizziness.    He recently followed up with pharmacist regarding medication management.  He was recommended to follow-up with the diabetes education.    Patient was seen in the vascular clinic.  He was recommended to have CTA head and neck and CT lumbar spine which is pending.    Patient expressed that  he is very nervous about his carotid  artery disease.  He stated that his father had a carotid artery disease and passed away after having a surgery with stroke.    Today, patient reports that he feels about the same with his dizziness and gait unsteadiness despite cutting back on carvedilol.  His blood pressure is stable and heart rate improved.  He felt more dizzy and dry heaves on amlodipine and therefore he stopped.    He denies having any chest pain, heart palpitation, heart racing, abdominal bloating, PND, orthopnea, or lower extremity edema or bleeding complications.  Per patient he was recently switched from gabapentin to nortriptyline for his peripheral neuropathy pain.  His pain is unchanged but he feels worsening with his restless leg.    He follows low-sodium diet and he is monitoring his weight regularly.     Physical Examination Review of Systems   Vitals:    12/24/19 0757   BP: 106/76   Pulse: 83   Resp: 14     Body mass index is 32.45 kg/m .  Wt Readings from Last 3 Encounters:   12/24/19 198 lb (89.8 kg)   12/17/19 202 lb (91.6 kg)   12/10/19 202 lb (91.6 kg)       General Appearance:   no distress, normal body habitus   ENT/Mouth: membranes moist, no oral lesions or bleeding gums.      EYES:  no scleral icterus, normal conjunctivae   Neck: no carotid bruits or thyromegaly   Chest/Lungs:   lungs are clear to auscultation, no rales or wheezing,  equal chest wall expansion except mild crackles in left lower lobe   Cardiovascular:    Irregularly irregular r. Normal first and second heart sounds with 2/6 systolic murmurs, with no rubs, or gallops; the carotid, radial and posterior tibial pulses are intact, Jugular venous pressure flat, trace edema bilaterally    Abdomen:  no organomegaly, masses, bruits, or tenderness; bowel sounds are present   Extremities: no cyanosis or clubbing   Skin: no xanthelasma, warm.    Neurologic: normal  bilateral, no tremors     Psychiatric: alert and oriented x3, calm      General: WNL  Eyes: WNL  Ears/Nose/Throat: Nosebleeds  Lungs: Shortness of Breath  Heart: WNL  Stomach: WNL  Bladder: WNL  Muscle/Joints: WNL  Skin: WNL  Nervous System: Loss of Balance  Mental Health: WNL     Blood: Easy Bleeding, Easy Bruising     Medical History  Surgical History Family History Social History   Past Medical History:   Diagnosis Date   ? ACS (acute coronary syndrome) (H) 2014   ? Actinic keratosis 2014   ? Actinic keratosis 2014   ? Anticoagulated on Coumadin 2015   ? Atrial fibrillation (H)    ? Bone mass 2017   ? Chest heaviness 2019   ? Closed fracture of left forearm    ? Diabetes (H)    ? Dyslipidemia 2016   ? ED (erectile dysfunction) of organic origin 2005    Overview:  2007 will check PSA, try Levitra, no history of CAD, not on nitrates.    ? Encounter for long-term (current) use of insulin (H) 2016   ? Esophageal reflux 2010   ? Nonalcoholic steatohepatitis 10/1/2009   ? PD (perceptive deafness), asymmetrical 2010   ? Status post coronary angiogram 3/9/2016   ? Tremor 2014    Past Surgical History:   Procedure Laterality Date   ? CORONARY ARTERY BYPASS GRAFT     ? CV CORONARY ANGIOGRAM N/A 2019    Procedure: Coronary Angiogram;  Surgeon: Dominik Vega MD;  Location: [facility] Cath Lab;  Service: Cardiology   ? CV LEFT HEART CATHETERIZATION WO LEFT VETRICULOGRAM Left 2019    Procedure: Left Heart Catheterization Without Left Ventriculogram;  Surgeon: Dominik Vega MD;  Location: [facility] Cath Lab;  Service: Cardiology   ? CV RIGHT HEART CATH Right 2019    Procedure: Right Heart Catheterization;  Surgeon: Dominik Vega MD;  Location: [facility] Cath Lab;  Service: Cardiology   ? forearm sugery Left    [facility]      Family History   Problem Relation Age of Onset   ? Diabetes type II Mother         58 ,  from anesthesia complication.   ?  Heart disease Father         86  from stroke   ? Stroke Father    ? No Medical Problems Brother         60 years of age.    Social History     Socioeconomic History   ? Marital status:      Spouse name: Not on file   ? Number of children: Not on file   ? Years of education: Not on file   ? Highest education level: Not on file   Occupational History   ? Not on file   Social Needs   ? Financial resource strain: Not on file   ? Food insecurity:     Worry: Not on file     Inability: Not on file   ? Transportation needs:     Medical: Not on file     Non-medical: Not on file   Tobacco Use   ? Smoking status: Former Smoker     Last attempt to quit: 1998     Years since quittin.9   ? Smokeless tobacco: Never Used   Substance and Sexual Activity   ? Alcohol use: Yes     Comment: very rare   ? Drug use: No   ? Sexual activity: Never     Birth control/protection: None   Lifestyle   ? Physical activity:     Days per week: Not on file     Minutes per session: Not on file   ? Stress: Not on file   Relationships   ? Social connections:     Talks on phone: Not on file     Gets together: Not on file     Attends Church service: Not on file     Active member of club or organization: Not on file     Attends meetings of clubs or organizations: Not on file     Relationship status: Not on file   ? Intimate partner violence:     Fear of current or ex partner: Not on file     Emotionally abused: Not on file     Physically abused: Not on file     Forced sexual activity: Not on file   Other Topics Concern   ? Not on file   Social History Narrative     and lives with life.    Has adult children from previous relationship. ( Blended family).    Has a dog - Vincent Gil MD  1/3/2019              Medications  Allergies   Current Outpatient Medications   Medication Sig Dispense Refill   ? acetaminophen (TYLENOL) 650 MG CR tablet Take 1,300 mg by mouth as needed.     ? antiox. no.10-omeg3s-lut-starr  "280-10-2 mg cap Take 1 tablet by mouth 2 (two) times a day.     ? aspirin 81 mg chewable tablet Chew 81 mg daily.            ? blood glucose meter (GLUCOMETER) Use 1 each As Directed as needed. Dispense glucometer brand per patient's insurance at pharmacy discretion. 1 each 0   ? blood glucose test strips Use 1 each As Directed as needed. Dispense brand per patient's insurance at pharmacy discretion. 200 strip 5   ? carvedilol (COREG) 6.25 MG tablet Take 1 tablet (6.25 mg total) by mouth 2 (two) times a day with meals. 60 tablet 1   ? finasteride (PROSCAR) 5 mg tablet Take 1 tablet (5 mg total) by mouth daily. 90 tablet 3   ? furosemide (LASIX) 20 MG tablet Take 1 tablet (20 mg total) by mouth 2 (two) times a day at 9am and 6pm. 180 tablet 0   ? hydrOXYzine HCl (ATARAX) 25 MG tablet Take 1 tablet (25 mg total) by mouth 3 (three) times a day as needed for anxiety. 90 tablet 11   ? insulin NPH-insulin regular (NOVOLIN 70-30) 100 unit/mL (70-30) injection Inject under the skin. Inject 22 units in AM and 10-11 units in PM     ? insulin syringe-needle U-100 0.3 mL 31 gauge x 15/64\" Syrg Use 1 each As Directed 2 (two) times a day. 100 Syringe 11   ? metFORMIN (GLUCOPHAGE) 500 MG tablet Take 2 tablets (1,000 mg total) by mouth 2 (two) times a day. 360 tablet 3   ? nortriptyline (PAMELOR) 50 MG capsule Take 1 capsule (50 mg total) by mouth at bedtime. 30 capsule 0   ? omeprazole (PRILOSEC) 40 MG capsule Take 40 mg by mouth daily as needed.            ? rosuvastatin (CRESTOR) 20 MG tablet Take 1 tablet (20 mg total) by mouth at bedtime. 90 tablet 3   ? traMADol (ULTRAM) 50 mg tablet Take 1 tablet (50 mg total) by mouth at bedtime as needed. 30 tablet 0   ? warfarin (COUMADIN/JANTOVEN) 5 MG tablet Take 5mg daily by mouth as directed 90 tablet 1     No current facility-administered medications for this visit.     Allergies   Allergen Reactions   ? Oxycodone Itching   ? Amlodipine Dizziness     Dizziness and dry heaves   ? " Lisinopril Cough         Lab Results    Chemistry/lipid CBC Cardiac Enzymes/BNP/TSH/INR   Lab Results   Component Value Date    CHOL 121 12/16/2019    HDL 48 12/16/2019    LDLCALC 50 12/16/2019    TRIG 115 12/16/2019    CREATININE 0.87 08/13/2019    BUN 16 08/13/2019    K 4.4 08/13/2019     08/13/2019     08/13/2019    CO2 24 08/13/2019    Lab Results   Component Value Date    WBC 6.3 04/04/2019    HGB 12.6 (L) 04/04/2019    HCT 37.7 (L) 04/04/2019     (H) 04/04/2019     04/04/2019    Lab Results   Component Value Date    BNP 96 (H) 05/09/2019    INR 2.30 (H) 12/12/2019        30  minutes were spent face to face with the patient with greater than 50% spent on education and counseling.    This note has been dictated using voice recognition software. Any grammatical, typographical, or context distortions are unintentional and inherent to the software

## 2021-06-28 NOTE — PROGRESS NOTES
Progress Notes by Tres Talbert MD at 9/26/2019  2:10 PM     Author: Tres Talbert MD Service: -- Author Type: Physician    Filed: 9/26/2019  2:41 PM Encounter Date: 9/26/2019 Status: Signed    : Tres Talbert MD (Physician)           Click to link to [facility]     [facility] NOTE    Thank you, Dr. Gil, for asking us to see Pee Ayala at the [facility].      Assessment/Recommendations   Patient with known heart failure with preserved ejection fraction, coronary artery disease, and permanent atrial fibrillation.  I would like to get a Holter monitor to see what his ventricular response is to atrial fibrillation.  I am somewhat concerned that we may be blunting his response and reducing his exercise tolerance.  He is leaving on a cruise going from [place] and into [place] through the [address] tomorrow.  He will be back in mid October and will get the Holter monitor read after he gets back.  I have asked him to watch his sodium intake on the trip as he will likely get more dietary sodium eating out.  He is aware of this.  I have asked him to call if he is having troubles and he may need to take his second dose of Lasix on a more regular basis if he starts retaining fluid.    Thank you for allowing us to participate in his care.         History of Present Illness    Mr. Pee Ayala is a 79 y.o. male with known coronary artery disease, preserved left ventricular ejection fraction and congestive heart failure.  He had recent heart cath which did not show opportunities for significant revascularization so he has been treated medically.  He takes a diuretic but he forgets his second dose frequently.  He feels like his breathing is a little bit better and his peripheral edema is better.  He goes to the dog park and walks his dog on 1/2 mile track and he can do this easier than he used to be able to do this but he  still struggles a little bit with fatigue and shortness of breath.  He denies orthopnea or paroxysmal nocturnal dyspnea.  He has mild peripheral edema which as noted above is a little better.  He has not had any syncopal or near syncopal episodes and he denies any palpitations.  He participated in the Estar study which is a study of apple watch his ability to detect atrial fibrillation.  His baseline ECG showed atrial fibrillation with a ventricular response in the mid 40s.  Heart rate today is 64.    ECG: Personally reviewed.  Recent study ECG shows atrial fibrillation with a ventricular response in the low 40s and nonspecific ST-T wave changes.     Physical Examination Review of Systems   Vitals:    09/26/19 1404   BP: 108/62   Pulse: 64   Resp: 16     Body mass index is 34.07 kg/m .  Wt Readings from Last 3 Encounters:   09/26/19 207 lb (93.9 kg)   09/20/19 206 lb 12.8 oz (93.8 kg)   09/17/19 204 lb 11.2 oz (92.9 kg)     General Appearance:   Alert, cooperative and in no acute distress.   ENT/Mouth: Oral mucuos membranes pink and moist .      EYES:  No scleral icterus. No Xanthelasma.    Neck: JVP normal. No Hepatojugular reflux. Thyroid not visualized   Chest/Lungs:   Lungs are clear to auscultation, equal chest wall expansion    Cardiovascular:   S1, S2 without murmur ,clicks or rubs. Brachial, radial and posterior tibial pulses are intact and symetric. No carotid bruits noted   Abdomen:  Nontender. BS+.      Extremities: No cyanosis, clubbing and minimal pretibial edema   Skin: no xanthelasma, warm.    Psych: Appropriate affect.   Neurologic: normal gait, normal  bilateral, no tremors        General: WNL  Eyes: WNL  Ears/Nose/Throat: WNL  Lungs: WNL  Heart: Arm Pain, Irregular Heartbeat  Stomach: WNL  Bladder: Frequent Urination at Night  Muscle/Joints: Muscle Weakness, Muscle Pain  Skin: WNL  Nervous System: WNL  Mental Health: WNL     Blood: Easy Bruising     Medical History  Surgical History Family  History Social History   Past Medical History:   Diagnosis Date   ? Actinic keratosis 2014   ? Actinic keratosis 2014   ? Anticoagulated on Coumadin 2015   ? Atrial fibrillation (H)    ? Bone mass 2017   ? Closed fracture of left forearm    ? Diabetes (H)    ? Dyslipidemia 2016   ? ED (erectile dysfunction) of organic origin 2005    Overview:  2007 will check PSA, try Levitra, no history of CAD, not on nitrates.    ? Encounter for long-term (current) use of insulin (H) 2016   ? Esophageal reflux 2010   ? Nonalcoholic steatohepatitis 10/1/2009   ? PD (perceptive deafness), asymmetrical 2010   ? Status post coronary angiogram 3/9/2016   ? Tremor 2014    Past Surgical History:   Procedure Laterality Date   ? CORONARY ARTERY BYPASS GRAFT     ? CV CORONARY ANGIOGRAM N/A 2019    Procedure: Coronary Angiogram;  Surgeon: Dominik Vega MD;  Location: [facility];  Service: Cardiology   ? CV LEFT HEART CATHETERIZATION WO LEFT VETRICULOGRAM Left 2019    Procedure: Left Heart Catheterization Without Left Ventriculogram;  Surgeon: Dominik Vega MD;  Location: [facility];  Service: Cardiology   ? CV RIGHT HEART CATH Right 2019    Procedure: Right Heart Catheterization;  Surgeon: Dominik Vega MD;  Location: [facility];  Service: Cardiology   ? forearm sugery Left    ? MOHS SURGERY      Family History   Problem Relation Age of Onset   ? Diabetes type II Mother         58 ,  from anesthesia complication.   ? Heart disease Father         86  from stroke   ? Stroke Father    ? No Medical Problems Brother         60 years of age.    Social History     Socioeconomic History   ? Marital status:      Spouse name: Not on file   ? Number of children: Not on file   ? Years of education: Not on file   ? Highest education level: Not on file   Occupational History   ? Not on  file   Social Needs   ? Financial resource strain: Not on file   ? Food insecurity:     Worry: Not on file     Inability: Not on file   ? Transportation needs:     Medical: Not on file     Non-medical: Not on file   Tobacco Use   ? Smoking status: Former Smoker     Last attempt to quit: 1998     Years since quittin.7   ? Smokeless tobacco: Never Used   Substance and Sexual Activity   ? Alcohol use: Yes     Comment: very rare   ? Drug use: No   ? Sexual activity: Never     Birth control/protection: None   Lifestyle   ? Physical activity:     Days per week: Not on file     Minutes per session: Not on file   ? Stress: Not on file   Relationships   ? Social connections:     Talks on phone: Not on file     Gets together: Not on file     Attends Jew service: Not on file     Active member of club or organization: Not on file     Attends meetings of clubs or organizations: Not on file     Relationship status: Not on file   ? Intimate partner violence:     Fear of current or ex partner: Not on file     Emotionally abused: Not on file     Physically abused: Not on file     Forced sexual activity: Not on file   Other Topics Concern   ? Not on file   Social History Narrative     and lives with life.    Has adult children from previous relationship. ( Blended family).    Has a dog - Vincent Gil MD  1/3/2019              Medications  Allergies   Current Outpatient Medications   Medication Sig Dispense Refill   ? acetaminophen (TYLENOL) 500 MG tablet Take 1,000 mg by mouth every 6 (six) hours as needed.            ? alfuzosin (UROXATRAL) 10 mg 24 hr tablet Take 10 mg by mouth at bedtime.            ? aspirin 81 mg chewable tablet Chew 81 mg daily.            ? blood glucose meter (GLUCOMETER) Use 1 each As Directed as needed. Dispense glucometer brand per patient's insurance at pharmacy discretion. 1 each 0   ? blood glucose test strips Use 1 each As Directed as needed. Dispense brand per  "patient's insurance at pharmacy discretion. 200 strip 5   ? carvedilol (COREG) 25 MG tablet Take 1 tablet (25 mg total) by mouth 2 (two) times a day with meals. 180 tablet 5   ? cephalexin (KEFLEX) 500 MG capsule Take 1 capsule (500 mg total) by mouth 2 (two) times a day for 5 days. 10 capsule 0   ? finasteride (PROSCAR) 5 mg tablet TAKE ONE TABLET BY MOUTH ONCE DAILY IN  ADDITION  TO  FLOMAX     ? furosemide (LASIX) 20 MG tablet Take 1 tablet (20 mg total) by mouth 2 (two) times a day at 9am and 6pm. 180 tablet 0   ? gabapentin (NEURONTIN) 300 MG capsule Take 2 capsules (600 mg total) by mouth 2 (two) times a day. 360 capsule 3   ? glimepiride (AMARYL) 4 MG tablet TAKE ONE TABLET BY MOUTH TWICE DAILY     ? insulin NPH-insulin regular (NOVOLIN 70/30 U-100 INSULIN) 100 unit/mL (70-30) injection 18 in the morning and 12 at night            ? insulin syringe-needle U-100 0.3 mL 31 gauge x 15/64\" Syrg Use 1 each As Directed 2 (two) times a day. 100 Syringe 11   ? metFORMIN (GLUCOPHAGE) 500 MG tablet Take 2 tablets (1,000 mg total) by mouth 2 (two) times a day. 360 tablet 3   ? omeprazole (PRILOSEC) 40 MG capsule Take 40 mg by mouth daily as needed.            ? rosuvastatin (CRESTOR) 20 MG tablet Take 1 tablet (20 mg total) by mouth at bedtime. 90 tablet 3   ? traMADol (ULTRAM) 50 mg tablet Take 1 tablet (50 mg total) by mouth at bedtime as needed. 30 tablet 0   ? warfarin (COUMADIN/JANTOVEN) 5 MG tablet Take 5mg daily by mouth as directed 90 tablet 1   ? amLODIPine (NORVASC) 5 MG tablet Take 1 tablet (5 mg total) by mouth at bedtime. 90 tablet 5   ? hydrOXYzine HCl (ATARAX) 25 MG tablet Take 1 tablet (25 mg total) by mouth 3 (three) times a day as needed for itching. 30 tablet 0     No current facility-administered medications for this visit.       Allergies   Allergen Reactions   ? Oxycodone Itching   ? Lisinopril Cough         Lab Results    Chemistry/lipid CBC Cardiac Enzymes/BNP/TSH/INR   Lab Results   Component " Value Date    CREATININE 0.87 08/13/2019    BUN 16 08/13/2019    K 4.4 08/13/2019     08/13/2019     08/13/2019    CO2 24 08/13/2019    Lab Results   Component Value Date    WBC 6.3 04/04/2019    HGB 12.6 (L) 04/04/2019    HCT 37.7 (L) 04/04/2019     (H) 04/04/2019     04/04/2019    Lab Results   Component Value Date    BNP 96 (H) 05/09/2019    INR 1.60 (H) 09/25/2019

## 2021-06-28 NOTE — PROGRESS NOTES
Progress Notes by Marilou Salas RN at 2/10/2020  6:16 AM     Author: Marilou Salas RN Service: -- Author Type: Registered Nurse    Filed: 2/10/2020  6:20 AM Encounter Date: 2/10/2020 Status: Signed    : Marilou Salas RN (Registered Nurse)       Clinic Care Coordination Contact    Situation: Patient chart reviewed by care coordinator.    Background:   Yolis Collins CHW  You 10 days ago      Patient has completed all goals with [facility].  Please review the chart, confirm if Maintenance is approved and provide an Emergency plan.    Routing comment       Yolis Collins CHW 10 days ago         Scheduled Follow Up Call: Attempt 1 Community Health Worker called and left a message for the patient. If the patient is returning my call, please transfer the patient to [facility] at ext. [phone].    Patient yuri been enrolled with [facility] since 7/10/2019, at this time he does not have any goals he is currently working on.  Last PCP appt was on 12/24/2019 with Dr. BOYD(next PCP f/u due 4/2020) and had an initial consult with Diabetic Educator Nayeli on 1/27/2020    CHW will send chart review to [facility] FRANCISCO Zamarripa, for transitioning patient to Maintenance.    Next Outreach: 2/10/2020            Assessment:  Chart review completed today.   No ED visits or hospitalization the past 60 days.   A1c - 7.0 on 12/16/19  No new concerns or new goals reported/noted.     Plan/Recommendations:  Okay to step down to maintenance

## 2021-06-28 NOTE — PROGRESS NOTES
Progress Notes by Blanca River CNP at 9/17/2019 12:30 PM     Author: Blanca River CNP Service: -- Author Type: Nurse Practitioner    Filed: 9/17/2019  4:09 PM Encounter Date: 9/17/2019 Status: Signed    : Blanca River CNP (Nurse Practitioner)        Zestar Study    Physical Examination  For abnormal findings, please evaluate if the finding is Clinically Significant (by 'CS') or Not Clinically Significant (by 'NCS')  General Appearance Normal  Head and Neck  Normal  Lungs    Normal  Cardiovascular  Abnormal- Irreg r/r, 2/6 MAE, NCS  Abdomen   Normal  Musculoskeletal/Extremities Normal   Lymph Nodes  Normal  Skin    Abnormal- Sammy legs, trace LE swelling, NCS  Neurological   Normal   Tremor absent     If present, evaluate severity on 1-10 scale    Blanca River, ALBINO

## 2021-06-28 NOTE — PROGRESS NOTES
Progress Notes by Pedro Cameron at 9/17/2019 12:30 PM     Author: Pedro Cameron Service: -- Author Type: Patient Access    Filed: 10/11/2019 10:49 AM Encounter Date: 9/17/2019 Status: Addendum    : Pedro Cameron (Patient Access)    Related Notes: Original Note by Pedro Cameron (Patient Access) filed at 9/24/2019  8:21 AM             Zestar Study Consent Visit    Study description: ECG and PPG Study: Zestar Study      Note time seated: 1249     Pee Ayala a 79 y.o. male , was seen in 2500 today to discuss participation in the Zestar study.   The consent discussion began on 16 September 2019.  Please refer to phone call note from Kiki Simpson for more details.  The consent form was reviewed with the patient.     The review of the study included:    Study purpose     Conflict of interest    Device description      Study visits    Risks of participation    Benefits (if any)    Alternatives    Voluntary participation    Confidentiality     Compensation/costs of participation    Study stipends    Injury and legal rights    The subject was provided time to review the consent form and consider participation. his questions were answered to his satisfaction.   The patient has voluntarily agreed to participate in the above noted study.     The consent form version [id number] and HIPPA form version [id number] was signed 09/17/19 at 1308    The subject was provided with a copy of the consent form and HIPPA. A copy of the signed forms was forwarded to medical records.    No study procedures were done prior to Pee Ayala providing informed consent.     Pedro Cameron    Subject Restrictions During Study -Confirmed with Subject prior to any study procedures completed    Restrictions on jewelry, recreational drugs, caffeine, and exercise few days prior and during study.   1. Subjects should not consume excessive amount of caffeine (6 or more 8-oz cups of coffee, or more than 570 mg of caffeine from energy drinks, pills or  "similar substance) during their participation in the study.   2. Subjects should not consume excessive amount of alcohol for the duration of their participation in the study. A typical moderate amount is allowed during stage 3.   3. Subjects should not take any recreational drugs (including, but not limited to methamphetamines, cocaine, opioids, cannabis, LSD) for the duration of their participation in the study.   4. Subjects should not wear underwire bra or jewelry during the in-lab study (to not interfere with electrode placement and ECG data recordings).   5. Subject will not be permitted to have their cell phone or any electronic recording device on or with them during the in-lab test session(s).   6. Subjects under 22 years old will not be permitted to take ECG recordings through the ECG lela on the wrist-worn devices.     For study stage 3 only   1. Subjects should only do high intensity exercise (e.g. sprinting, heavy lifting, etc.) in the morning upon awakening or else not at all   2. Subjects should abstain from swimming during the time of the study   3. Subjects should only shower in the morning upon awakening (or else not at all)   4. Female subjects are strongly suggested to wear non-underwire bras throughout this stage of the study     Pedro Cameron      Study Data collections   Vitals  (TPBP)     Vitals:    09/17/19 1313 09/17/19 1314 09/17/19 1315   BP: 142/64 142/65 137/65   Patient Site: Right Arm Right Arm Right Arm   Patient Position: Sitting Sitting Sitting   Cuff Size: Adult Regular Adult Regular Adult Regular   Pulse: (!) 54 (!) 56 (!) 57   Resp:  18    Temp: 97.2  F (36.2  C)     Weight:   204 lb 11.2 oz (92.9 kg)   Height:   5' 4.57\" (1.64 m)      VS taken after 5 min rest     MAP 1    92  MAP 2      94   MAP 3             93          Body mass index is 34.52 kg/m .  male  1940  79 y.o.      Note time patient placed in supine position: 1319    Ethnicity   []   or    [x]  " Not  or   Race   []   or    []    []  Black or   []   or Other   [x]  White  Physical Activity Level  per subject report:   []  0- Extremely Inactive []  1- Sedentary [x]  2- Moderately Active  []  3- Vigorously Active []  4- Extremely Active  Trained Athlete   [] Yes  [x] No     Dyson's' Skin type   [] Type 1 [] Type 2 [x] Type 3    [] Type 4 [] Type 5 [] Type 6    Subject participated in previous ECG [facility]: [] Yes    [x] No    Past Medical History:   Diagnosis Date   ? Actinic keratosis 1/14/2014   ? Actinic keratosis 1/14/2014   ? Anticoagulated on Coumadin 12/30/2015   ? Atrial fibrillation (H) 2016   ? Bone mass 4/26/2017   ? Closed fracture of left forearm 2015   ? Diabetes (H) 2009   ? Dyslipidemia 8/31/2016   ? 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    ? Encounter for long-term (current) use of insulin (H) 8/11/2016   ? Esophageal reflux 11/18/2010   ? Nonalcoholic steatohepatitis 10/1/2009   ? 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 Status post coronary angiogram 3/9/2016   ? Tremor 9/28/2014       HISTORY OF HEART RHYTHM ABNORMALITIES   []  None   []  Atrial Flutter  [] Frequent PACs (>3 in 30 secs)  [x] AF (permanent)  []  Tachycardia  [] Bigeminy, trigeminy, and/or quadgeminy  []  AF (persistent)  []  Heart Block   []  AF (paroxysmal)  [] PVCs    [] AF (other)    [] BBB    []  Other:   How many years? 6  Special interest allergies: active allergic skin reactions  Allergies   Allergen Reactions   ? Oxycodone Itching   ? Lisinopril Cough       Current Outpatient Medications:   ?  acetaminophen (TYLENOL) 500 MG tablet, Take 1,000 mg by mouth every 6 (six) hours as needed.    , Disp: , Rfl:   ?  alfuzosin (UROXATRAL) 10 mg 24 hr tablet, Take 10 mg by mouth at bedtime.    ,  "Disp: , Rfl:   ?  amLODIPine (NORVASC) 5 MG tablet, Take 1 tablet (5 mg total) by mouth at bedtime., Disp: 90 tablet, Rfl: 5  ?  aspirin 81 mg chewable tablet, Chew 81 mg daily.    , Disp: , Rfl:   ?  blood glucose meter (GLUCOMETER), Use 1 each As Directed as needed. Dispense glucometer brand per patient's insurance at pharmacy discretion., Disp: 1 each, Rfl: 0  ?  blood glucose test strips, Use 1 each As Directed as needed. Dispense brand per patient's insurance at pharmacy discretion., Disp: 200 strip, Rfl: 5  ?  carvedilol (COREG) 25 MG tablet, Take 1 tablet (25 mg total) by mouth 2 (two) times a day with meals., Disp: 180 tablet, Rfl: 5  ?  finasteride (PROSCAR) 5 mg tablet, TAKE ONE TABLET BY MOUTH ONCE DAILY IN  ADDITION  TO  FLOMAX, Disp: , Rfl:   ?  fluorouracil (EFUDEX) 5 % cream, Apply to cheeks, nose, forehead, ears and scalp twice daily for 2 weeks. Inflammation is expected., Disp: , Rfl:   ?  furosemide (LASIX) 20 MG tablet, Take 1 tablet (20 mg total) by mouth 2 (two) times a day at 9am and 6pm., Disp: 180 tablet, Rfl: 0  ?  gabapentin (NEURONTIN) 300 MG capsule, Take 2 capsules (600 mg total) by mouth 2 (two) times a day., Disp: 360 capsule, Rfl: 3  ?  glimepiride (AMARYL) 4 MG tablet, TAKE ONE TABLET BY MOUTH TWICE DAILY, Disp: , Rfl:   ?  insulin NPH-insulin regular (NOVOLIN 70/30 U-100 INSULIN) 100 unit/mL (70-30) injection, 18 in the morning and 12 at night    , Disp: , Rfl:   ?  insulin syringe-needle U-100 0.3 mL 31 gauge x 15/64\" Syrg, Use 1 each As Directed 2 (two) times a day., Disp: 100 Syringe, Rfl: 11  ?  metFORMIN (GLUCOPHAGE) 500 MG tablet, Take 2 tablets (1,000 mg total) by mouth 2 (two) times a day., Disp: 360 tablet, Rfl: 3  ?  omeprazole (PRILOSEC) 40 MG capsule, Take 40 mg by mouth daily as needed.    , Disp: , Rfl:   ?  rosuvastatin (CRESTOR) 20 MG tablet, Take 1 tablet (20 mg total) by mouth at bedtime., Disp: 90 tablet, Rfl: 3  ?  traMADol (ULTRAM) 50 mg tablet, Take 1 tablet " "(50 mg total) by mouth at bedtime as needed., Disp: 30 tablet, Rfl: 0  ?  warfarin (COUMADIN/JANTOVEN) 5 MG tablet, Take 5mg daily by mouth as directed, Disp: 90 tablet, Rfl: 1      10-sec 12-lead ECG & 30-sec 12-lead ECG rhythm strip done; reviewed by & PE done by Blanca River  Subject Questionnaire    OCCUPATION: Retired   Predominately works outdoors  [] Yes    [x] No      Hours/week spent outdoors (total, not only for work): 22    Frequently participates in \"hand intensive\" activities [] Yes [x] No  Caffeine  []  Never  [] Occasionally        [x]  Daily (1 cup/day)     []  Daily (>1 cup/day)    Alcohol   []  Never  [x] Light (drink or 2 occasionally) []  Moderate (a drink or 2 almost daily)   []  Occasional-heavy (more than a few drinks <2x / month)  [] Heavy (more than a few drinks >2x / month)    Tobacco/nicotine  [x]  Never  [] Rarely  []  Frequently/ Daily     Mattress Information  [] Subject did not participate in Stage 3  Mattress type:  []  Memory foam [] Gel  [] Innerspring (coil)  [x] Airbed  [] Waterbed [] Shikibuton  [] Hybrid  [] No mattress  [] Other    Mattress foundation   [] Mattress on floor/ground    [x] Mattress on foundation/box spring on floor/ground  [] Mattress on foundation/box spring on bed frame  [] Mattress on tatami on floor/ground  [] Other    Mattress topper   [] No mattress topper  [] Pillow top  [x] Foam top - flat style  [] Foam top - \"egg crate\" style  [] Other    Co-sleeper    [x]  Yes  []  No    CPAP use   [] Yes  [x] No      Dominant hand [x] left  [] right [] ambidextrous  Preferred Wrist to wear band on   []  left   [x]  right   Were screening day & study day: [x]  same [] different   Same: wrist circumference:      174   mm     Device wearing wrist skin fold thickness:       2.6  mm  Wrist Band Size:     []  Flush Fit S/M  []  Non-Flush Fit S/M   [x]  Flush Fit M/L  []  Non-Flush Fit M/L  []  Flush Fit XL  []  Non-Flush Fit XL    Preferred/natural band notch: 6  Secure " band notch: 6  Crown orientation:  [x]  left   []  right  Device wearing wrist hairiness:     [x]  Light []  Medium       []  Heavy  Spectophotometer    L  A  B   Reading #1 67.67  10.55  17.22   Reading #2  67.47  11.46  17.21   Reading #3  68.27  9.84  17.48     Pregnancy test  for WOCBP    [x] n/a male or female not child bearing potential  Room Temperature ( C): 23  CS Laptop ID: [id number]  CS Cam ID:[id number]  Device Set ID:[id number]  Wrist Device ID:[id number]  Subject has now completed their in-house participation in the Zestar study. Subject will complete Stage 3 at home for the next 3 days and return the equipment on 9/20/19.  Pedro Cameron

## 2021-06-28 NOTE — PROGRESS NOTES
Progress Notes by Geronimo Jones NP at 12/10/2019  9:50 AM     Author: Geronimo Jones NP Service: -- Author Type: Nurse Practitioner    Filed: 12/10/2019 11:45 AM Encounter Date: 12/10/2019 Status: Signed    : Geronimo Jones NP (Nurse Practitioner)             Assessment/Recommendations   Assessment:    1. Chronic atrial fibrillation: Recent Holter showed A. fib with average heart rate in 60s with some nocturnal bradycardia heart rate as low as 33 with pauses up to 2.1 seconds.  His carvedilol dose was reduced.    Heart rate today is in 50s at rest and 80s when he is active.  He continues to have dizziness.  He has been taking carvedilol and empty stomach.    He has history of diabetes and on insulin, metformin and glimepiride.  He reports his blood glucose running low in 70s in the morning.    His dizziness is likely multifactorial including including side effect from medications, hypoglycemia, and moderate carotid artery stenosis per recent carotid ultrasound.    Obstructive sleep apnea screening: He scored 0 for [place] Sleepiness Scale and 4 for STOP-BANG questionnaire.  Low risk.    2.  Heart failure with preserved ejection fraction, NYHA class II: He is well compensated with no evidence of fluid retention on assessment except fatigue, dizziness and very mild lower extremity edema.    Home weight has been stable between 198 to 205 pounds.    3.  Hypertension: Blood pressure today is 130/70 on left arm and 136/70 on right arm.    Plan:  1.  We discussed about cutting back further on his carvedilol from 12.5 mg twice a day to 6.25 mg twice a day.    2.  Resumed low-dose of amlodipine 2.5 mg daily for blood pressure control    3.  Follow-up with endocrinology as scheduled.    4.  Follow-up with the vascular clinic as scheduled    5.  Referral placed to meet with pharmacy regarding medication management (polypharmacy)    Follow-up with me in 2 weeks     History of Present Illness/Subjective    Mr. Pee PEREZ  Jamie is a 79 y.o. male with a significant past medical history of coronary artery disease, chronic atrial fibrillation, hypertension, mixed hyperlipidemia, dyspnea on exertion, obesity, frequent falls, polyneuropathy, and heart failure with preserved ejection fraction who was seen in the heart care clinic per recommendation from Dr. Talbert.    Patient recently saw Dr. Talbert back in September 2019.  Holter study showed atrial fibrillation with average heart rate in 60s with nocturnal bradycardia heart rate in low 30s with 2.1-second pauses noted.  His carvedilol dose was reduced.    He was seen in the heart failure clinic by CARY Reeder in mid November.  He brought up concern about gait unsteadiness and neck pain and with his family history of stroke.     His carotid ultrasound showed moderate disease in left carotid artery.  PCP recommended him to follow-up with the vascular clinic.    Today, patient reports that he continues to have dizziness and fatigue despite cutting back on carvedilol.  Heart rate today is running in low 49 at rest and 80s with activity.  He also reports that his blood sugars been running low in 70s in the morning which makes him feel dizzy.  He is also on multiple medications that can potentially make him feel dizzy.  He missed his appointment with pharmacy regarding medication management.      Patient expressed that he is very nervous about his carotid  artery disease.  He stated that his father had a carotid artery disease and passed away after having a surgery with stroke.    He has an appointment with endocrinology and vascular clinic soon coming up in next few days.    He denies having any headache, blurred vision, weakness in arms and legs or speech disturbance.  He does have peripheral neuropathy and on gabapentin.    He denies chest pain, heart palpitation, heart racing, abdominal bloating or lower extremity edema.     Physical Examination Review of Systems   Vitals:     12/10/19 0958   BP: 130/70   Pulse: (!) 49   Resp: 16     Body mass index is 33.1 kg/m .  Wt Readings from Last 3 Encounters:   12/10/19 202 lb (91.6 kg)   11/17/19 196 lb (88.9 kg)   11/14/19 201 lb (91.2 kg)       General Appearance:   no distress, normal body habitus   ENT/Mouth: membranes moist, no oral lesions or bleeding gums.      EYES:  no scleral icterus, normal conjunctivae   Neck: no carotid bruits or thyromegaly   Chest/Lungs:   lungs are clear to auscultation, no rales or wheezing,  equal chest wall expansion except mild crackles in bilateral bases   Cardiovascular:    Irregularly irregular r. Normal first and second heart sounds with 2/6 systolic murmurs, with no rubs, or gallops; the carotid, radial and posterior tibial pulses are intact, Jugular venous pressure flat, trace edema bilaterally    Abdomen:  no organomegaly, masses, bruits, or tenderness; bowel sounds are present   Extremities: no cyanosis or clubbing   Skin: no xanthelasma, warm.    Neurologic: normal  bilateral, no tremors     Psychiatric: alert and oriented x3, calm     General: WNL  Eyes: Visual Distubance  Ears/Nose/Throat: Nosebleeds  Lungs: WNL  Heart: Leg Swelling  Stomach: WNL  Bladder: Frequent Urination at Night  Muscle/Joints: WNL  Skin: WNL  Nervous System: Dizziness, Loss of Balance  Mental Health: WNL     Blood: Easy Bleeding     Medical History  Surgical History Family History Social History   Past Medical History:   Diagnosis Date   ? ACS (acute coronary syndrome) (H) 6/2/2014   ? Actinic keratosis 1/14/2014   ? Actinic keratosis 1/14/2014   ? Anticoagulated on Coumadin 12/30/2015   ? Atrial fibrillation (H) 2016   ? Bone mass 4/26/2017   ? Chest heaviness 1/23/2019   ? Closed fracture of left forearm 2015   ? Diabetes (H) 2009   ? Dyslipidemia 8/31/2016   ? 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    ?  Encounter for long-term (current) use of insulin (H) 2016   ? Esophageal reflux 2010   ? Nonalcoholic steatohepatitis 10/1/2009   ? PD (perceptive deafness), asymmetrical 2010   ? Status post coronary angiogram 3/9/2016   ? Tremor 2014    Past Surgical History:   Procedure Laterality Date   ? CORONARY ARTERY BYPASS GRAFT     ? CV CORONARY ANGIOGRAM N/A 2019    Procedure: Coronary Angiogram;  Surgeon: Dominik Vega MD;  Location: [facility];  Service: Cardiology   ? CV LEFT HEART CATHETERIZATION WO LEFT VETRICULOGRAM Left 2019    Procedure: Left Heart Catheterization Without Left Ventriculogram;  Surgeon: Dominik Vega MD;  Location: [facility];  Service: Cardiology   ? CV RIGHT HEART CATH Right 2019    Procedure: Right Heart Catheterization;  Surgeon: Dominik Vega MD;  Location: [facility];  Service: Cardiology   ? forearm sugery Left    ? MOHS SURGERY      Family History   Problem Relation Age of Onset   ? Diabetes type II Mother         58 ,  from anesthesia complication.   ? Heart disease Father         86  from stroke   ? Stroke Father    ? No Medical Problems Brother         60 years of age.    Social History     Socioeconomic History   ? Marital status:      Spouse name: Not on file   ? Number of children: Not on file   ? Years of education: Not on file   ? Highest education level: Not on file   Occupational History   ? Not on file   Social Needs   ? Financial resource strain: Not on file   ? Food insecurity:     Worry: Not on file     Inability: Not on file   ? Transportation needs:     Medical: Not on file     Non-medical: Not on file   Tobacco Use   ? Smoking status: Former Smoker     Last attempt to quit: 1998     Years since quittin.9   ? Smokeless tobacco: Never Used   Substance and Sexual Activity   ? Alcohol use: Yes     Comment: very rare   ? Drug use: No   ? Sexual activity:  Never     Birth control/protection: None   Lifestyle   ? Physical activity:     Days per week: Not on file     Minutes per session: Not on file   ? Stress: Not on file   Relationships   ? Social connections:     Talks on phone: Not on file     Gets together: Not on file     Attends Confucianist service: Not on file     Active member of club or organization: Not on file     Attends meetings of clubs or organizations: Not on file     Relationship status: Not on file   ? Intimate partner violence:     Fear of current or ex partner: Not on file     Emotionally abused: Not on file     Physically abused: Not on file     Forced sexual activity: Not on file   Other Topics Concern   ? Not on file   Social History Narrative     and lives with life.    Has adult children from previous relationship. ( Blended family).    Has a dog - Vincent Gil MD  1/3/2019              Medications  Allergies   Current Outpatient Medications   Medication Sig Dispense Refill   ? acetaminophen (TYLENOL) 500 MG tablet Take 1,000 mg by mouth every 6 (six) hours as needed.            ? alfuzosin (UROXATRAL) 10 mg 24 hr tablet Take 10 mg by mouth at bedtime.            ? aspirin 81 mg chewable tablet Chew 81 mg daily.            ? blood glucose meter (GLUCOMETER) Use 1 each As Directed as needed. Dispense glucometer brand per patient's insurance at pharmacy discretion. 1 each 0   ? blood glucose test strips Use 1 each As Directed as needed. Dispense brand per patient's insurance at pharmacy discretion. 200 strip 5   ? finasteride (PROSCAR) 5 mg tablet TAKE ONE TABLET BY MOUTH ONCE DAILY IN  ADDITION  TO  FLOMAX     ? furosemide (LASIX) 20 MG tablet Take 1 tablet (20 mg total) by mouth 2 (two) times a day at 9am and 6pm. 180 tablet 0   ? gabapentin (NEURONTIN) 300 MG capsule Take 2 capsules (600 mg total) by mouth 2 (two) times a day. 360 capsule 3   ? glimepiride (AMARYL) 4 MG tablet TAKE ONE TABLET BY MOUTH TWICE DAILY     ?  "hydrOXYzine HCl (ATARAX) 25 MG tablet Take 1 tablet (25 mg total) by mouth 3 (three) times a day as needed for itching. 30 tablet 10   ? insulin NPH-insulin regular (NOVOLIN 70/30 U-100 INSULIN) 100 unit/mL (70-30) injection Inject 20 Units under the skin 2 (two) times a day. 20 in the morning and at bedtime.     ? insulin syringe-needle U-100 0.3 mL 31 gauge x 15/64\" Syrg Use 1 each As Directed 2 (two) times a day. 100 Syringe 11   ? metFORMIN (GLUCOPHAGE) 500 MG tablet Take 2 tablets (1,000 mg total) by mouth 2 (two) times a day. 360 tablet 3   ? omeprazole (PRILOSEC) 40 MG capsule Take 40 mg by mouth daily as needed.            ? rosuvastatin (CRESTOR) 20 MG tablet Take 1 tablet (20 mg total) by mouth at bedtime. 90 tablet 3   ? traMADol (ULTRAM) 50 mg tablet Take 1 tablet (50 mg total) by mouth at bedtime as needed. 30 tablet 0   ? warfarin (COUMADIN/JANTOVEN) 5 MG tablet Take 5mg daily by mouth as directed 90 tablet 1   ? amLODIPine (NORVASC) 2.5 MG tablet Take 1 tablet (2.5 mg total) by mouth at bedtime. 30 tablet 0   ? carvedilol (COREG) 6.25 MG tablet Take 1 tablet (6.25 mg total) by mouth 2 (two) times a day with meals. 60 tablet 1   ? nortriptyline (PAMELOR) 50 MG capsule Take 1 capsule (50 mg total) by mouth at bedtime. 30 capsule 0     No current facility-administered medications for this visit.     Allergies   Allergen Reactions   ? Oxycodone Itching   ? Lisinopril Cough         Lab Results    Chemistry/lipid CBC Cardiac Enzymes/BNP/TSH/INR   Lab Results   Component Value Date    CREATININE 0.87 08/13/2019    BUN 16 08/13/2019    K 4.4 08/13/2019     08/13/2019     08/13/2019    CO2 24 08/13/2019    Lab Results   Component Value Date    WBC 6.3 04/04/2019    HGB 12.6 (L) 04/04/2019    HCT 37.7 (L) 04/04/2019     (H) 04/04/2019     04/04/2019    Lab Results   Component Value Date    BNP 96 (H) 05/09/2019    INR 2.80 (H) 11/19/2019        35  minutes were spent face to face with " the patient with greater than 50% spent on education and counseling.    This note has been dictated using voice recognition software. Any grammatical, typographical, or context distortions are unintentional and inherent to the software

## 2021-06-28 NOTE — PROGRESS NOTES
Progress Notes by Lilli Guzman CNP at 11/14/2019 10:30 AM     Author: Lilli Guzman CNP Service: -- Author Type: Nurse Practitioner    Filed: 11/14/2019 11:36 AM Encounter Date: 11/14/2019 Status: Signed    : Lilli Guzman CNP (Nurse Practitioner)             Assessment/Recommendations   Assessment:    1.  Heart failure with preserved ejection fraction, NYHA class II: Compensated.  He continues to have dyspnea on exertion and fatigue.  His lower extremity edema has resolved.  He has lost about 5 pounds by watching what he is eating.  He continues to try to walk on a regular basis.  He follows a low-sodium diet.    2.  Hypertension: Controlled.  Blood pressure 136/66    3.  Permanent atrial fibrillation: Rate controlled.  He continues warfarin for anticoagulation and carvedilol for rate control.  Dr. Talbert recommended a Holter monitor which he will schedule today.    4.  Unsteadiness: He states that this is been going on for some time.  We discussed different causes of unsteadiness.  I encouraged him to follow-up with his PMD.    Plan:  1.  Continue current medications  2.  Continue daily weights and low-sodium diet  3.  Continue regular exercise  4.  Schedule Holter monitor  5.  Schedule carotid ultrasound per patient's concern of family history of stroke and recent neck pain and unsteadiness    Pee Ayala will follow up with Dr. Talbert in March and in the heart failure clinic in 6 months.     History of Present Illness/Subjective    Mr. Pee Ayala is a 79 y.o. male seen at [facility] heart failure clinic today for continued follow-up.  He follows up for heart failure with preserved ejection fraction.  He did echocardiogram March 18, 2019 which showed ejection fraction of 65% with moderate aortic stenosis and mild aortic regurgitation.  He has a past medical history significant for hypertension, coronary artery disease, hyperlipidemia, permanent atrial  fibrillation, and diabetes.  Status post three-vessel coronary artery bypass graft in 2014.    Today, he comes in with continued fatigue and dyspnea on exertion.  He denies any dizziness or lightheadedness.  He does state he has unsteadiness and balance issues.  He also complains of neck pain for the last week.  He states that it is worse with rest.  He is concerned that he may have a blocked carotid as his father passed away from a stroke.  He denies chest pain.  He denies orthopnea or PND.  He denies lightheadedness, shortness of breath, orthopnea, PND, palpitations, chest pain, abdominal fullness/bloating and lower extremity edema.      He is monitoring home weights which have decreased.  His weight is able around 195 pounds.  He is following a low sodium diet.  He participates in regular physical activity including walking.         Physical Examination Review of Systems   Vitals:    11/14/19 1047   BP: 136/66   Pulse: 64   Resp: 16     Body mass index is 35.05 kg/m .  Wt Readings from Last 3 Encounters:   11/14/19 201 lb (91.2 kg)   10/15/19 207 lb (93.9 kg)   09/26/19 207 lb (93.9 kg)       General Appearance:   no distress, normal body habitus   ENT/Mouth: membranes moist, no oral lesions or bleeding gums.      EYES:  no scleral icterus, normal conjunctivae   Neck: no carotid bruits or thyromegaly   Chest/Lungs:   lungs are clear to auscultation, no rales or wheezing, equal chest wall expansion    Cardiovascular:    Irregularly irregular. Normal first and second heart sounds with grade 3/6 systolic murmur, no rubs, or gallops; Jugular venous pressure normal, no edema   Abdomen:  no organomegaly, masses, bruits, or tenderness; bowel sounds are present   Extremities: no cyanosis or clubbing   Skin: no xanthelasma, warm.    Neurologic: normal  bilateral, no tremors     Psychiatric: alert and oriented x3    General: WNL  Eyes: WNL  Ears/Nose/Throat: Nosebleeds  Lungs: WNL  Heart: WNL  Stomach: WNL  Bladder:  Frequent Urination at Night  Muscle/Joints: WNL  Skin: WNL  Nervous System: Loss of Balance  Mental Health: WNL     Blood: Easy Bleeding     Medical History  Surgical History Family History Social History   Past Medical History:   Diagnosis Date   ? Actinic keratosis 2014   ? Actinic keratosis 2014   ? Anticoagulated on Coumadin 2015   ? Atrial fibrillation (H)    ? Bone mass 2017   ? Closed fracture of left forearm    ? Diabetes (H)    ? Dyslipidemia 2016   ? ED (erectile dysfunction) of organic origin 2005    Overview:  2007 will check PSA, try Levitra, no history of CAD, not on nitrates.    ? Encounter for long-term (current) use of insulin (H) 2016   ? Esophageal reflux 2010   ? Nonalcoholic steatohepatitis 10/1/2009   ? PD (perceptive deafness), asymmetrical 2010   ? Status post coronary angiogram 3/9/2016   ? Tremor 2014    Past Surgical History:   Procedure Laterality Date   ? CORONARY ARTERY BYPASS GRAFT     ? CV CORONARY ANGIOGRAM N/A 2019    Procedure: Coronary Angiogram;  Surgeon: Dominik Vega MD;  Location: [facility];  Service: Cardiology   ? CV LEFT HEART CATHETERIZATION WO LEFT VETRICULOGRAM Left 2019    Procedure: Left Heart Catheterization Without Left Ventriculogram;  Surgeon: Dominik Vega MD;  Location: [facility];  Service: Cardiology   ? CV RIGHT HEART CATH Right 2019    Procedure: Right Heart Catheterization;  Surgeon: Dominik Vega MD;  Location: [facility];  Service: Cardiology   ? forearm sugery Left    ? MOHS SURGERY      Family History   Problem Relation Age of Onset   ? Diabetes type II Mother         58 ,  from anesthesia complication.   ? Heart disease Father         86  from stroke   ? Stroke Father    ? No Medical Problems Brother         60 years of age.    Social History     Socioeconomic History   ? Marital status:       Spouse name: Not on file   ? Number of children: Not on file   ? Years of education: Not on file   ? Highest education level: Not on file   Occupational History   ? Not on file   Social Needs   ? Financial resource strain: Not on file   ? Food insecurity:     Worry: Not on file     Inability: Not on file   ? Transportation needs:     Medical: Not on file     Non-medical: Not on file   Tobacco Use   ? Smoking status: Former Smoker     Last attempt to quit: 1998     Years since quittin.8   ? Smokeless tobacco: Never Used   Substance and Sexual Activity   ? Alcohol use: Yes     Comment: very rare   ? Drug use: No   ? Sexual activity: Never     Birth control/protection: None   Lifestyle   ? Physical activity:     Days per week: Not on file     Minutes per session: Not on file   ? Stress: Not on file   Relationships   ? Social connections:     Talks on phone: Not on file     Gets together: Not on file     Attends Worship service: Not on file     Active member of club or organization: Not on file     Attends meetings of clubs or organizations: Not on file     Relationship status: Not on file   ? Intimate partner violence:     Fear of current or ex partner: Not on file     Emotionally abused: Not on file     Physically abused: Not on file     Forced sexual activity: Not on file   Other Topics Concern   ? Not on file   Social History Narrative     and lives with life.    Has adult children from previous relationship. ( Blended family).    Has a dog - Vincent Gil MD  1/3/2019              Medications  Allergies   Current Outpatient Medications   Medication Sig Dispense Refill   ? acetaminophen (TYLENOL) 500 MG tablet Take 1,000 mg by mouth every 6 (six) hours as needed.            ? amLODIPine (NORVASC) 5 MG tablet Take 1 tablet (5 mg total) by mouth at bedtime. 90 tablet 5   ? aspirin 81 mg chewable tablet Chew 81 mg daily.            ? blood glucose meter (GLUCOMETER) Use  "1 each As Directed as needed. Dispense glucometer brand per patient's insurance at pharmacy discretion. 1 each 0   ? blood glucose test strips Use 1 each As Directed as needed. Dispense brand per patient's insurance at pharmacy discretion. 200 strip 5   ? carvedilol (COREG) 25 MG tablet Take 1 tablet (25 mg total) by mouth 2 (two) times a day with meals. 180 tablet 5   ? finasteride (PROSCAR) 5 mg tablet TAKE ONE TABLET BY MOUTH ONCE DAILY IN  ADDITION  TO  FLOMAX     ? furosemide (LASIX) 20 MG tablet Take 1 tablet (20 mg total) by mouth 2 (two) times a day at 9am and 6pm. 180 tablet 0   ? gabapentin (NEURONTIN) 300 MG capsule Take 2 capsules (600 mg total) by mouth 2 (two) times a day. 360 capsule 3   ? glimepiride (AMARYL) 4 MG tablet TAKE ONE TABLET BY MOUTH TWICE DAILY     ? hydrOXYzine HCl (ATARAX) 25 MG tablet Take 1 tablet (25 mg total) by mouth 3 (three) times a day as needed for itching. 30 tablet 10   ? insulin NPH-insulin regular (NOVOLIN 70/30 U-100 INSULIN) 100 unit/mL (70-30) injection 18 in the morning and 12 at night            ? insulin syringe-needle U-100 0.3 mL 31 gauge x 15/64\" Syrg Use 1 each As Directed 2 (two) times a day. 100 Syringe 11   ? metFORMIN (GLUCOPHAGE) 500 MG tablet Take 2 tablets (1,000 mg total) by mouth 2 (two) times a day. 360 tablet 3   ? nortriptyline (PAMELOR) 50 MG capsule Take 1 capsule (50 mg total) by mouth at bedtime. 30 capsule 0   ? omeprazole (PRILOSEC) 40 MG capsule Take 40 mg by mouth daily as needed.            ? rosuvastatin (CRESTOR) 20 MG tablet Take 1 tablet (20 mg total) by mouth at bedtime. 90 tablet 3   ? traMADol (ULTRAM) 50 mg tablet Take 1 tablet (50 mg total) by mouth at bedtime as needed. 30 tablet 0   ? warfarin (COUMADIN/JANTOVEN) 5 MG tablet Take 5mg daily by mouth as directed 90 tablet 1   ? alfuzosin (UROXATRAL) 10 mg 24 hr tablet Take 10 mg by mouth at bedtime.              No current facility-administered medications for this visit.     " Allergies   Allergen Reactions   ? Oxycodone Itching   ? Lisinopril Cough         Lab Results    Chemistry/lipid CBC Cardiac Enzymes/BNP/TSH/INR   Lab Results   Component Value Date    CREATININE 0.87 08/13/2019    BUN 16 08/13/2019    K 4.4 08/13/2019     08/13/2019     08/13/2019    CO2 24 08/13/2019    Lab Results   Component Value Date    WBC 6.3 04/04/2019    HGB 12.6 (L) 04/04/2019    HCT 37.7 (L) 04/04/2019     (H) 04/04/2019     04/04/2019    Lab Results   Component Value Date    BNP 96 (H) 05/09/2019    INR 3.80 (H) 10/28/2019             This note has been dictated using voice recognition software. Any grammatical, typographical, or context distortions are unintentional and inherent to the software

## 2021-06-28 NOTE — PROGRESS NOTES
Progress Notes by Huyen Elizabeth at 9/20/2019  9:14 AM     Author: Huyen Elizabeth Service: -- Author Type: Research Associate    Filed: 9/20/2019  9:14 AM Encounter Date: 9/20/2019 Status: Signed    : Huyen Elizabeth (Research Associate)         Zestar Study Device Return    Pee Ayala returned all the devices for the Zestar study.  Pee Ayala denies medication changes or adverse events since last visit.    Pee Ayala has now completed their participation in the Zestar study.   Thank you for your gift of participation.    Huyen Elizabeth

## 2021-06-28 NOTE — PROGRESS NOTES
Progress Notes by Pedro Cameron at 9/17/2019 12:30 PM     Author: Pedro Cameron Service: -- Author Type: Patient Access    Filed: 10/25/2019  4:38 PM Encounter Date: 9/17/2019 Status: Addendum    : Oliverio Santiago MD (Physician)    Related Notes: Original Note by Pedro Cameron (Patient Access) filed at 9/24/2019  8:20 AM           [facility] Study Inclusion / Exclusion Criteria  Protocol Version 1.0    Inclusion Criteria    Yes No Criteria # Subject must meet all inclusion criteria:      [x]    []  1 At least 18 years old for NSR and 22 years old for Non-NSR. Inclusive for both cohorts, at time of screening, with no upper limit on age.        [x]      []  2 To be enrolled as a non-NSR subject the volunteer must have one of the following conditions: Permanent/Persistent AF, Hx of paroxysmal AF, Hx of High-rate AF, AF + rate control medication, Hx Atrial Flutter, PVC burden >1%, Frequent PACs, Hx BBB, Hx 2nd degree block (any type), Hx Bigeminy/Trigeminy/Quadgeminy, Hx Tachycardia. For subjects with any of the following diagnoses, the condition must be present at the time of screening:   ? Permanent/persistent AF  ? AF plus rate control medication  ? PVC [place]  ? Frequent PACs   Note: If not present at screening, subjects may not be enrolled as a non-NSR subject or NSR subject.         3  [x] N/A HE site For Subjects to be enrolled as NSR they must be in NSR at the time of screening as determined by the investigator. For subjects with occasional ectopic beats (<1% burden during screening), they should still qualify as individuals in the NSR cohort as long as they don't have a medical history/diagnosis of significant ectopic burden.    [x]  []  4 Able to read and understand a written ICF    [x]  []  5 Willing and able to participate in the study procedures and comply with its restrictions    [x]  []  6 Able to communicate effectively with study staff as well as understand and follow directions    All must be  Yes    Exclusion Criteria-all must be no  Yes No Criteria # Subject must not meet any exclusion criteria:    []  [x]  1 Physical disability that prevents safe and adequate testing.   []  [x]  2 Pregnant women or women planning to become pregnant   []  [x]  3 Any acute illness or condition that may interfere with study procedures (e.g. cough, fever, sore throat, headache, sunburn, etc.)   []  [x]  4 Clinically significant hand tremors, as judged by the Investigator   []  [x]  5 Resting hypertension with systolic blood pressure ?161 mmHg or diastolic blood pressure ?101 mmHg (if at least 2 of 3 measurements meet this criteria)   []  [x]  6 Subjects with implanted cardiac devices such as a pacemaker or an automated implantable cardioverter- defibrillator (AICD)   []  [x]  7 Acute myocardial infarction (MI) within 90 days from the screening visit   []  [x]  8 Other cardiovascular disease that increases the risk to the subject or would render the data uninterpretable in the opinion of the Investigator (e.g., recent or ongoing unstable angina, significant valvular heart disease or chronic heart failure, myocarditis or pericarditis)    []  [x]  9 Acute pulmonary embolism, pulmonary infarction, or deep vein thrombosis within 90 days from the screening visit    []  [x]  10 Stroke or transient ischemic attack within 90 days from the screening visit    []  [x]  11 Known untreated medical conditions as determined by the Investigator, such as but not limited to significant anemia, important electrolyte imbalance and untreated or uncontrolled thyroid disease.    []  [x]  12 Any history of wrist surgery with scarring in the area of the sensor location on the wrist where the subject will be wearing the watch;    []  [x]  13 Open wound(s) on the wrist and forearm where the subject will be wearing the watch    []  [x]  14 Severe symptomatic (or active) overly dry/injured skin, skin disorders, or allergic skin reactions such as  eczema, rosacea, impetigo, dermatomyositis or allergic contact dermatitis on wrist and locations where the electrodes will be placed (e.g. chest, forearms, stomach), as determined by the investigator.    []  [x]  15 Tattoos, scars or moles in the area of the sensor location on the wrist where the subject will be wearing the watch    []  [x]  16 Device wearing Wrist circumference ? 129 mm or ? 246 mm    []  [x]  17 Known significant sensitivity to medical adhesives or isopropyl alcohol (for ECG electrode placement)    []  [x]  18 Known allergy or sensitivity to fluorocarbon-based synthetic rubber, such as contact dermatitis with fluoroelastomer bands primarily used in wrist worn fitness devices    []  [x]  19 Subjects with any Medical History, Physical exam, vital sign or any other study procedure finding/assessment that in the opinion of the investigator could compromise subject safety during study participation or interfere with the study integrity and/or the accurate assessment of the study objectives    []  [x]  20 Subject works for a company that develops or sells medical and/or fitness devices (e.g., ECG monitors, wearable fitness bands, sleep monitors, etc.) or are technology journalists (e.g., professional bloggers, TV, magazine, newspaper reporters, etc.)    []  [x]  21 Weight > 181 kg for subjects using the stationary bike and/or treadmill. Weight of ?138 kg for NSR subjects.    []  [x]  22 Subject is employed in shift work, or otherwise does not maintain a reasonably consistent day/night schedule (e.g. Subjects who go to bed after 4am).    []  [x]  23 Overnight travel planned during data collection nights    []  [x]  24 Non-NSR subjects should not have partaken in strenuous physical activity within 12 hours prior to screening    []  [x]  25 Non-NSR subjects with Atrial fibrillation categories: Subjects taking Class 1 or Class 3 antiarrhythmic agents such as the following may not take part in any stage of  the study: amiodarone, sotalol, dronedarone, ibutilide, dofetilide, propafenone, quinidine, procainamide, disopyramide, flecainide (Subjects taking class 2, 4 or 5 antiarrhythmic agents may take part the study).      Inclusion/exclusion criteria reviewed, inclusion criteria are met.  No exclusion criteria.    Pedro Cameron

## 2021-06-29 ENCOUNTER — RECORDS - HEALTHEAST (OUTPATIENT)
Dept: ADMINISTRATIVE | Facility: OTHER | Age: 81
End: 2021-06-29

## 2021-06-29 NOTE — PROGRESS NOTES
Progress Notes by Lilli Guzman CNP at 9/16/2020  2:10 PM     Author: Lilli Guzman CNP Service: -- Author Type: Nurse Practitioner    Filed: 9/16/2020  2:52 PM Encounter Date: 9/16/2020 Status: Signed    : Lilli Guzman CNP (Nurse Practitioner)             Assessment/Recommendations   Assessment:    1.  Heart failure with preserved ejection fraction, NYHA class III: Decompensated.  He continues to have fatigue and dyspnea on exertion.  He has mild lower extremity edema.  He denies orthopnea or PND.  His weight has been stable.    2.  Hypertension: Slightly elevated today at 140/70    3. Permanent atrial fibrillation: Rate controlled. He continues warfarin for anticoagulation and carvedilol for rate control.       Plan:  1.  Increase lasix to 40 mg in the morning and 20 mg in the afternoon   2. Continue daily weights and low sodium diet  3. Continue exercise    Pee Ayala will follow up with Dr. Talbert in December and in the heart failure clinic in 2 to 3 weeks.     History of Present Illness/Subjective    Mr. Pee Ayala is a 80 y.o. male seen at [facility] heart failure clinic today for continued follow-up.  He follows up for heart failure with preserved ejection fraction.  His last echocardiogram was done March 2019 which showed an ejection fraction of 65% with moderate aortic stenosis and mild aortic regurgitation.  He has a past medical history significant for hypertension, CAD, hyperlipidemia, permanent atrial fibrillation, and diabetes.  Status post three-vessel CABG in 2014.    Today, he comes in with continued fatigue, dyspnea on exertion, lower extreme edema.  He denies orthopnea, PND or chest pain.  He denies shortness of breath, orthopnea, PND, palpitations, chest pain and abdominal fullness/bloating.      He is monitoring home weights which are stable between 194-197 pounds.  He is following a low sodium diet.  He participates in regular physical activity  including walking his dog daily.         Physical Examination Review of Systems   Vitals:    09/16/20 1426   BP: 140/70   Pulse: 64   Resp: 14     Body mass index is 33.43 kg/m .  Wt Readings from Last 3 Encounters:   09/16/20 204 lb (92.5 kg)   04/29/20 194 lb 9.6 oz (88.3 kg)   04/03/20 191 lb (86.6 kg)       General Appearance:   no acute distress   ENT/Mouth: membranes moist, no oral lesions or bleeding gums.      EYES:  no scleral icterus, normal conjunctivae   Neck: no carotid bruits or thyromegaly   Chest/Lungs:   lungs are clear to auscultation, no rales or wheezing, equal chest wall expansion    Cardiovascular:    Irregularly irregular. Normal first and second heart sounds with grade 3/6 systolic murmur no rubs, or gallops; Jugular venous pressure normal, 1+ edema bilaterally    Abdomen:  no organomegaly, masses, bruits, or tenderness; bowel sounds are present   Extremities: no cyanosis or clubbing   Skin: no xanthelasma, warm.    Neurologic: normal  bilateral, no tremors     Psychiatric: alert and oriented x3    General: WNL  Eyes: WNL  Ears/Nose/Throat: Hearing Loss  Lungs: Shortness of Breath  Heart: Shortness of Breath with activity  Stomach: WNL  Bladder: Frequent Urination at Night  Muscle/Joints: Joint Pain  Skin: WNL  Nervous System: Loss of Balance  Mental Health: WNL     Blood: Easy Bruising, Easy Bleeding     Medical History  Surgical History Family History Social History   Past Medical History:   Diagnosis Date   ? ACS (acute coronary syndrome) (H) 6/2/2014   ? Actinic keratosis 1/14/2014   ? Actinic keratosis 1/14/2014   ? Anticoagulated on Coumadin 12/30/2015   ? Atrial fibrillation (H) 2016   ? Bone mass 4/26/2017   ? Chest heaviness 1/23/2019   ? Closed fracture of left forearm 2015   ? Diabetes (H) 2009   ? Dyslipidemia 8/31/2016   ? 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    ?  Encounter for long-term (current) use of insulin (H) 2016   ? Esophageal reflux 2010   ? Nonalcoholic steatohepatitis 10/1/2009   ? PD (perceptive deafness), asymmetrical 2010   ? Status post coronary angiogram 3/9/2016   ? Tremor 2014    Past Surgical History:   Procedure Laterality Date   ? CORONARY ARTERY BYPASS GRAFT     ? CV CORONARY ANGIOGRAM N/A 2019    Procedure: Coronary Angiogram;  Surgeon: Dominik Vega MD;  Location: [facility] Lab;  Service: Cardiology   ? CV LEFT HEART CATHETERIZATION WO LEFT VETRICULOGRAM Left 2019    Procedure: Left Heart Catheterization Without Left Ventriculogram;  Surgeon: Dominik Vega MD;  Location: [facility];  Service: Cardiology   ? CV RIGHT HEART CATH Right 2019    Procedure: Right Heart Catheterization;  Surgeon: Dominik Vega MD;  Location: [facility];  Service: Cardiology   ? forearm sugery Left    ? MOHS SURGERY      Family History   Problem Relation Age of Onset   ? Diabetes type II Mother         58 ,  from anesthesia complication.   ? Heart disease Father         86  from stroke   ? Stroke Father    ? No Medical Problems Brother         60 years of age.    Social History     Socioeconomic History   ? Marital status:      Spouse name: Not on file   ? Number of children: Not on file   ? Years of education: Not on file   ? Highest education level: Not on file   Occupational History   ? Not on file   Social Needs   ? Financial resource strain: Not on file   ? Food insecurity     Worry: Not on file     Inability: Not on file   ? Transportation needs     Medical: Not on file     Non-medical: Not on file   Tobacco Use   ? Smoking status: Former Smoker     Last attempt to quit: 1998     Years since quittin.7   ? Smokeless tobacco: Never Used   Substance and Sexual Activity   ? Alcohol use: Yes     Comment: very rare   ? Drug use: No   ? Sexual activity:  Never     Birth control/protection: None   Lifestyle   ? Physical activity     Days per week: Not on file     Minutes per session: Not on file   ? Stress: Not on file   Relationships   ? Social connections     Talks on phone: Not on file     Gets together: Not on file     Attends [employer] service: Not on file     Active member of club or organization: Not on file     Attends meetings of clubs or organizations: Not on file     Relationship status: Not on file   ? Intimate partner violence     Fear of current or ex partner: Not on file     Emotionally abused: Not on file     Physically abused: Not on file     Forced sexual activity: Not on file   Other Topics Concern   ? Not on file   Social History Narrative     and lives with life.    Has adult children from previous relationship. ( Blended family).    Has a dog - Vincent Gil MD  1/3/2019              Medications  Allergies   Current Outpatient Medications   Medication Sig Dispense Refill   ? ACCU-CHEK GUIDE TEST STRIPS strips USE 1 STRIP TO CHECK GLUCOSE THREE TIMES DAILY 300 strip 1   ? acetaminophen (TYLENOL) 650 MG CR tablet Take 1,300 mg by mouth as needed.     ? antiox.mv no.10-[id number]-lut-starr 280-10-2 mg cap Take 1 tablet by mouth 2 (two) times a day.     ? aspirin 81 mg chewable tablet Chew 81 mg daily.            ? blood glucose meter (GLUCOMETER) Use 1 each As Directed as needed. Dispense glucometer brand per patient's insurance at pharmacy discretion. 1 each 0   ? carvediloL (COREG) 6.25 MG tablet Take 1 tablet (6.25 mg total) by mouth 2 (two) times a day with meals. 180 tablet 1   ? finasteride (PROSCAR) 5 mg tablet Take 1 tablet (5 mg total) by mouth daily. 90 tablet 3   ? furosemide (LASIX) 20 MG tablet Take 40 mg in the morning and 20 mg in the afternoon 270 tablet 0   ? hydrOXYzine HCl (ATARAX) 25 MG tablet Take 1 tablet (25 mg total) by mouth 3 (three) times a day as needed for anxiety. 90 tablet 11   ? insulin  "NPH-insulin regular (NOVOLIN 70-30) 100 unit/mL (70-30) injection Inject under the skin. Inject 25 units in AM and 24 units in PM     ? insulin syringe-needle U-100 0.3 mL 31 gauge x 15/64\" Syrg Use 1 each As Directed 2 (two) times a day. 100 Syringe 11   ? isosorbide mononitrate (IMDUR) 30 MG 24 hr tablet Take 1 tablet (30 mg total) by mouth daily. Take daily at 5 pm. 90 tablet 2   ? metFORMIN (GLUCOPHAGE) 500 MG tablet Take 2 tablets by mouth twice daily 360 tablet 1   ? MULTIVITAMIN ORAL Take 2 tablets by mouth daily.     ? omeprazole (PRILOSEC) 40 MG capsule Take 40 mg by mouth daily as needed.            ? pramipexole (MIRAPEX) 0.125 MG tablet Take 2 tablets (0.25 mg total) by mouth daily. Take 2 hours before bedtime 60 tablet 6   ? rosuvastatin (CRESTOR) 20 MG tablet Take 1 tablet (20 mg total) by mouth at bedtime. 90 tablet 3   ? traMADol-acetaminophen (ULTRACET) 37.5-325 mg per tablet TAKE 2 TABLETS BY MOUTH THREE TIMES DAILY AS NEEDED FOR PAIN     ? warfarin ANTICOAGULANT (COUMADIN/JANTOVEN) 5 MG tablet Take 1 tablet (5 mg total) by mouth daily. Take 5mg daily by mouth as directed 90 tablet 1     No current facility-administered medications for this visit.     Allergies   Allergen Reactions   ? Oxycodone Itching   ? Amlodipine Dizziness     Dizziness and dry heaves   ? Lisinopril Cough         Lab Results    Chemistry/lipid CBC Cardiac Enzymes/BNP/TSH/INR   Lab Results   Component Value Date    CHOL 135 08/11/2020    HDL 54 08/11/2020    LDLCALC 57 08/11/2020    TRIG 119 08/11/2020    CREATININE 1.17 08/11/2020    BUN 14 08/11/2020    K 5.1 (H) 08/11/2020     (L) 08/11/2020     08/11/2020    CO2 23 08/11/2020    Lab Results   Component Value Date    WBC 6.2 01/02/2020    HGB 14.5 01/02/2020    HCT 43.0 01/02/2020     (H) 01/02/2020     01/02/2020    Lab Results   Component Value Date    BNP 96 (H) 05/09/2019    INR 2.20 (H) 08/11/2020             This note has been dictated using " voice recognition software. Any grammatical, typographical, or context distortions are unintentional and inherent to the software

## 2021-06-29 NOTE — PROGRESS NOTES
Progress Notes by Lilli Guzman CNP at 10/21/2020  2:10 PM     Author: Lilli Guzman CNP Service: -- Author Type: Nurse Practitioner    Filed: 10/21/2020  2:57 PM Encounter Date: 10/21/2020 Status: Signed    : Lilli Guzman CNP (Nurse Practitioner)             Assessment/Recommendations   Assessment:    1.  Heart failure with preserved ejection fraction, NYHA class III: Compensated.  His lower extremity edema has improved and has lost a few pounds.  He continues to have fatigue and dyspnea on exertion.  His weight at home has been 194 to 198 pounds.    2.  Hypertension: Controlled.  Blood pressure 132/60    3.  Permanent atrial fibrillation: Rate controlled.  He continues warfarin for anticoagulation.  His INR today 2.40.  He continues carvedilol.    4.  Moderate aortic stenosis: Noted on echocardiogram in March 2019.  With his continued dyspnea on exertion and fatigue we discussed it would be reasonable to recheck an echocardiogram to monitor aortic stenosis.    Plan:  1.  BMP and BNP pending  2.  Continue current medications  3.  Schedule echocardiogram  4.  Continue daily weights and low-sodium diet  5.  Follow-up with PMD in regards to worsening unsteadiness    Pee Ayala will follow up with Dr. Talbert in December and in the heart failure clinic in March.     History of Present Illness/Subjective    Mr. ePe Ayala is a 80 y.o. male seen at [facility] heart failure clinic today for continued follow-up.  He follows up for heart failure with preserved ejection fraction.  His last echocardiogram was done March 2019 which showed an ejection fraction of 65% with moderate aortic stenosis and mild aortic regurgitation.  He has a past medical history significant for hypertension, CAD, hyperlipidemia, permanent atrial fibrillation, and diabetes.  Status post three-vessel CABG in 2014.     During the last clinic visit, I increased his Lasix to 40 mg in the morning and 20 mg  in the afternoon.  His edema has improved and his weight has decreased.  He continues to have fatigue and dyspnea on exertion.  He denies orthopnea, PND or chest pain.  His main concern today is unsteadiness.  He states this is worsening.  He denies any falls.  He has not followed up with his PMD regarding this issue.  He denies shortness of breath, orthopnea, PND, palpitations, chest pain and abdominal fullness/bloating.      He is monitoring home weights which are stable between 194-198 pounds.  He is following a low sodium diet.       Physical Examination Review of Systems   Vitals:    10/21/20 1415   BP: 132/60   Pulse: 68   Resp: 14     Body mass index is 31.79 kg/m .  Wt Readings from Last 3 Encounters:   10/21/20 194 lb (88 kg)   09/16/20 204 lb (92.5 kg)   04/29/20 194 lb 9.6 oz (88.3 kg)       General Appearance:   no acute distress   ENT/Mouth: membranes moist, no oral lesions or bleeding gums.      EYES:  no scleral icterus, normal conjunctivae   Neck: no carotid bruits or thyromegaly   Chest/Lungs:   lungs are clear to auscultation, no rales or wheezing, equal chest wall expansion    Cardiovascular:    Irregularly irregular. Normal first and second heart sounds with grade 3/6 systolic murmur, no rubs, or gallops; Jugular venous pressure normal, trace right lower extremity edema edema    Abdomen:  no organomegaly, masses, bruits, or tenderness; bowel sounds are present   Extremities: no cyanosis or clubbing   Skin: no xanthelasma, warm.    Neurologic: normal  bilateral, no tremors     Psychiatric: alert and oriented x3    General: WNL  Eyes: WNL  Ears/Nose/Throat: WNL  Lungs: WNL  Heart: Irregular Heartbeat  Stomach: WNL  Bladder: Frequent Urination at Night  Muscle/Joints: WNL  Skin: WNL  Nervous System: Loss of Balance  Mental Health: WNL     Blood: Easy Bleeding     Medical History  Surgical History Family History Social History   Past Medical History:   Diagnosis Date   ? ACS (acute coronary  syndrome) (H) 2014   ? Actinic keratosis 2014   ? Actinic keratosis 2014   ? Anticoagulated on Coumadin 2015   ? Atrial fibrillation (H)    ? Bone mass 2017   ? Chest heaviness 2019   ? Closed fracture of left forearm    ? Diabetes (H)    ? Dyslipidemia 2016   ? ED (erectile dysfunction) of organic origin 2005    Overview:  2007 will check PSA, try Levitra, no history of CAD, not on nitrates.    ? Encounter for long-term (current) use of insulin (H) 2016   ? Esophageal reflux 2010   ? Nonalcoholic steatohepatitis 10/1/2009   ? PD (perceptive deafness), asymmetrical 2010   ? Status post coronary angiogram 3/9/2016   ? Tremor 2014    Past Surgical History:   Procedure Laterality Date   ? CORONARY ARTERY BYPASS GRAFT     ? CV CORONARY ANGIOGRAM N/A 2019    Procedure: Coronary Angiogram;  Surgeon: Dominik Vega MD;  Location: [facility] Cath Lab;  Service: Cardiology   ? CV LEFT HEART CATHETERIZATION WO LEFT VETRICULOGRAM Left 2019    Procedure: Left Heart Catheterization Without Left Ventriculogram;  Surgeon: Dominik Vega MD;  Location: [facility] Cath Lab;  Service: Cardiology   ? CV RIGHT HEART CATH Right 2019    Procedure: Right Heart Catheterization;  Surgeon: Dominik Vega MD;  Location: [facility] Cath Lab;  Service: Cardiology   ? forearm sugery Left    ? MOHS SURGERY      Family History   Problem Relation Age of Onset   ? Diabetes type II Mother         58 ,  from anesthesia complication.   ? Heart disease Father         86  from stroke   ? Stroke Father    ? No Medical Problems Brother         60 years of age.    Social History     Socioeconomic History   ? Marital status:      Spouse name: Not on file   ? Number of children: Not on file   ? Years of education: Not on file   ? Highest education level: Not on file   Occupational History   ? Not on file    Social Needs   ? Financial resource strain: Not on file   ? Food insecurity     Worry: Not on file     Inability: Not on file   ? Transportation needs     Medical: Not on file     Non-medical: Not on file   Tobacco Use   ? Smoking status: Former Smoker     Quit date: 1998     Years since quittin.8   ? Smokeless tobacco: Never Used   Substance and Sexual Activity   ? Alcohol use: Yes     Comment: very rare   ? Drug use: No   ? Sexual activity: Never     Birth control/protection: None   Lifestyle   ? Physical activity     Days per week: Not on file     Minutes per session: Not on file   ? Stress: Not on file   Relationships   ? Social connections     Talks on phone: Not on file     Gets together: Not on file     Attends Tenriism service: Not on file     Active member of club or organization: Not on file     Attends meetings of clubs or organizations: Not on file     Relationship status: Not on file   ? Intimate partner violence     Fear of current or ex partner: Not on file     Emotionally abused: Not on file     Physically abused: Not on file     Forced sexual activity: Not on file   Other Topics Concern   ? Not on file   Social History Narrative     and lives with life.    Has adult children from previous relationship. ( Blended family).    Has a dog - Vincent Gil MD  1/3/2019              Medications  Allergies   Current Outpatient Medications   Medication Sig Dispense Refill   ? ACCU-CHEK GUIDE TEST STRIPS strips USE 1 STRIP TO CHECK GLUCOSE THREE TIMES DAILY 300 strip 1   ? acetaminophen (TYLENOL) 650 MG CR tablet Take 1,300 mg by mouth as needed.     ? antiox.mv no.10-omeg3s-lut-starr 280-10-2 mg cap Take 1 tablet by mouth 2 (two) times a day.     ? aspirin 81 mg chewable tablet Chew 81 mg daily.            ? blood glucose meter (GLUCOMETER) Use 1 each As Directed as needed. Dispense glucometer brand per patient's insurance at pharmacy discretion. 1 each 0   ? carvediloL  "(COREG) 6.25 MG tablet Take 1 tablet (6.25 mg total) by mouth 2 (two) times a day with meals. 180 tablet 1   ? finasteride (PROSCAR) 5 mg tablet Take 1 tablet (5 mg total) by mouth daily. 90 tablet 3   ? furosemide (LASIX) 20 MG tablet Take 40 mg in the morning and 20 mg in the afternoon 270 tablet 0   ? hydrOXYzine HCl (ATARAX) 25 MG tablet Take 1 tablet (25 mg total) by mouth 3 (three) times a day as needed for anxiety. 90 tablet 11   ? insulin NPH-insulin regular (NOVOLIN 70-30) 100 unit/mL (70-30) injection Inject under the skin. Inject 25 units in AM and 24 units in PM     ? insulin syringe-needle U-100 0.3 mL 31 gauge x 15/64\" Syrg Use 1 each As Directed 2 (two) times a day. 100 Syringe 11   ? isosorbide mononitrate (IMDUR) 30 MG 24 hr tablet Take 1 tablet (30 mg total) by mouth daily. Take daily at 5 pm. 90 tablet 2   ? metFORMIN (GLUCOPHAGE) 500 MG tablet Take 2 tablets by mouth twice daily 360 tablet 1   ? MULTIVITAMIN ORAL Take 2 tablets by mouth daily.     ? omeprazole (PRILOSEC) 40 MG capsule Take 40 mg by mouth daily as needed.            ? pramipexole (MIRAPEX) 0.125 MG tablet Take 2 tablets (0.25 mg total) by mouth daily. Around 4:15 pm 60 tablet 6   ? rosuvastatin (CRESTOR) 20 MG tablet Take 1 tablet (20 mg total) by mouth at bedtime. 90 tablet 3   ? warfarin ANTICOAGULANT (COUMADIN/JANTOVEN) 5 MG tablet Take 1 tablet (5 mg total) by mouth daily. Take 5mg daily by mouth as directed 90 tablet 1     No current facility-administered medications for this visit.     Allergies   Allergen Reactions   ? Oxycodone Itching   ? Amlodipine Dizziness     Dizziness and dry heaves   ? Lisinopril Cough         Lab Results    Chemistry/lipid CBC Cardiac Enzymes/BNP/TSH/INR   Lab Results   Component Value Date    CHOL 135 08/11/2020    HDL 54 08/11/2020    LDLCALC 57 08/11/2020    TRIG 119 08/11/2020    CREATININE 1.17 08/11/2020    BUN 14 08/11/2020    K 5.1 (H) 08/11/2020     (L) 08/11/2020     " 08/11/2020    CO2 23 08/11/2020    Lab Results   Component Value Date    WBC 6.2 01/02/2020    HGB 14.5 01/02/2020    HCT 43.0 01/02/2020     (H) 01/02/2020     01/02/2020    Lab Results   Component Value Date    BNP 96 (H) 05/09/2019    INR 2.40 (!) 10/21/2020             This note has been dictated using voice recognition software. Any grammatical, typographical, or context distortions are unintentional and inherent to the software

## 2021-06-29 NOTE — PROGRESS NOTES
"Progress Notes by Tres Talbert MD at 4/29/2020 11:10 AM     Author: Tres Talbert MD Service: -- Author Type: Physician    Filed: 4/29/2020 11:48 AM Encounter Date: 4/29/2020 Status: Signed    : Tres Talbert MD (Physician)           The patient has been notified of following:     \"This telephone visit will be conducted via a call between you and your physician/provider. We have found that certain health care needs can be provided without the need for a physical exam.  This service lets us provide the care you need with a phone conversation.  If a prescription is necessary we can send it directly to your pharmacy.  If lab work is needed we can place an order for that and you can then stop by our lab to have the test done at a later time. If during the course of the call the physician/provider feels a telephone visit is not appropriate, you will not be charged for this service.\" Verbal consent has been obtained for this service by care team member:         HEART CARE PHONE ENCOUNTER        The patient has chosen to have the visit conducted as a telephone visit, to reduce risk of exposure given the current status of Coronavirus in our community. This telephone visit is being conducted via a call between the patient and physician/provider. Health care needs are being provided without a physical exam.     Assessment/Recommendations   Assessment: Patient with known coronary artery disease, heart failure with preserved ejection fraction, type 2 diabetes, who is doing reasonably well however has more gait instability and develops a significant fatigue after eating his evening meal.  He struggles getting the dog out as he has to go down the stairs and back up the stairs each evening.  During the daytime this is not an issue.    It is possible that this is somewhat of a anginal equivalent and a full stomach would not help this.  We have decided to try isosorbide mononitrate 30 mg a day at about 5 or 6 " PM to see if this helps the symptom at all.  If it does not we will discontinue it.    We will set him up also for 3-month follow-up with [facility].    Thank you for allowing us to participate in his care.        Follow Up Plan: Follow up in   I have reviewed the note as documented.  This accurately captures the substance of my conversation with the patient.    Total time of call between patient and provider was 11 minutes   Start Time: 11:22 AM  Stop Time: 11:33 AM       History of Present Illness/Subjective    Pee Ayala is a 80 y.o. male who is being evaluated via a billable telephone visit.  Patient has known coronary artery disease, known heart failure with preserved ejection fraction and known diabetes with complications.  He has been reasonably stable although reports a bothersome symptom of extreme fatigue in the evening.  Each evening around 7:00, he takes his dog out.  He has to take that dog down the steps let him out and then come back up the steps and when he is coming back up the steps he just is all in and fatigued.  He denies specifically dramatic shortness of breath and also specifically denies chest discomfort at this time.  His blood pressures have not been optimal by his blood pressure checks at home.  He was previously on amlodipine and it was discontinued but he does not recall the reason why.  He does not recall having a side effect from the amlodipine.  He denies syncopal or near syncopal episodes or palpitations.  He does not have orthopnea or paroxysmal nocturnal dyspnea.      I have reviewed and updated the patient's Past Medical History, Social History, Family History and Medication List.     Physical Examination not performed given phone encounter Review of Systems                                                Medical History  Surgical History Family History Social History   Past Medical History:   Diagnosis Date   ? ACS (acute coronary syndrome) (H) 6/2/2014   ? Actinic keratosis  2014   ? Actinic keratosis 2014   ? Anticoagulated on Coumadin 2015   ? Atrial fibrillation (H)    ? Bone mass 2017   ? Chest heaviness 2019   ? Closed fracture of left forearm    ? Diabetes (H)    ? Dyslipidemia 2016   ? ED (erectile dysfunction) of organic origin 2005    Overview:  2007 will check PSA, try Levitra, no history of CAD, not on nitrates.    ? Encounter for long-term (current) use of insulin (H) 2016   ? Esophageal reflux 2010   ? Nonalcoholic steatohepatitis 10/1/2009   ? PD (perceptive deafness), asymmetrical 2010   ? Status post coronary angiogram 3/9/2016   ? Tremor 2014    Past Surgical History:   Procedure Laterality Date   ? CORONARY ARTERY BYPASS GRAFT     ? CV CORONARY ANGIOGRAM N/A 2019    Procedure: Coronary Angiogram;  Surgeon: Dominik Vega MD;  Location: [facility] Cath Lab;  Service: Cardiology   ? CV LEFT HEART CATHETERIZATION WO LEFT VETRICULOGRAM Left 2019    Procedure: Left Heart Catheterization Without Left Ventriculogram;  Surgeon: Dominik Vega MD;  Location: [facility] Cath Lab;  Service: Cardiology   ? CV RIGHT HEART CATH Right 2019    Procedure: Right Heart Catheterization;  Surgeon: Dominik Vega MD;  Location: [facility] Cath Lab;  Service: Cardiology   ? forearm sugery Left    ? MOHS SURGERY      Family History   Problem Relation Age of Onset   ? Diabetes type II Mother         58 ,  from anesthesia complication.   ? Heart disease Father         86  from stroke   ? Stroke Father    ? No Medical Problems Brother         60 years of age.    Social History     Socioeconomic History   ? Marital status:      Spouse name: Not on file   ? Number of children: Not on file   ? Years of education: Not on file   ? Highest education level: Not on file   Occupational History   ? Not on file   Social Needs   ? Financial resource strain: Not on  file   ? Food insecurity     Worry: Not on file     Inability: Not on file   ? Transportation needs     Medical: Not on file     Non-medical: Not on file   Tobacco Use   ? Smoking status: Former Smoker     Last attempt to quit: 1998     Years since quittin.3   ? Smokeless tobacco: Never Used   Substance and Sexual Activity   ? Alcohol use: Yes     Comment: very rare   ? Drug use: No   ? Sexual activity: Never     Birth control/protection: None   Lifestyle   ? Physical activity     Days per week: Not on file     Minutes per session: Not on file   ? Stress: Not on file   Relationships   ? Social connections     Talks on phone: Not on file     Gets together: Not on file     Attends Hoahaoism service: Not on file     Active member of club or organization: Not on file     Attends meetings of clubs or organizations: Not on file     Relationship status: Not on file   ? Intimate partner violence     Fear of current or ex partner: Not on file     Emotionally abused: Not on file     Physically abused: Not on file     Forced sexual activity: Not on file   Other Topics Concern   ? Not on file   Social History Narrative     and lives with life.    Has adult children from previous relationship. ( Blended family).    Has a dog - Vincent Gil MD  1/3/2019              Medications  Allergies   Current Outpatient Medications   Medication Sig Dispense Refill   ? acetaminophen (TYLENOL) 650 MG CR tablet Take 1,300 mg by mouth as needed.     ? antiox.mv no.10-omeg3s-lut-starr 280-10-2 mg cap Take 1 tablet by mouth 2 (two) times a day.     ? aspirin 81 mg chewable tablet Chew 81 mg daily.            ? blood glucose meter (GLUCOMETER) Use 1 each As Directed as needed. Dispense glucometer brand per patient's insurance at pharmacy discretion. 1 each 0   ? blood glucose test strips Use 1 each As Directed as needed. Dispense brand per patient's insurance at pharmacy discretion. 200 strip 0   ? carvediloL  "(COREG) 6.25 MG tablet Take 1 tablet (6.25 mg total) by mouth 2 (two) times a day with meals. 180 tablet 1   ? finasteride (PROSCAR) 5 mg tablet Take 1 tablet (5 mg total) by mouth daily. 90 tablet 3   ? furosemide (LASIX) 20 MG tablet Take 1 tablet (20 mg total) by mouth 2 (two) times a day at 9am and 6pm. 180 tablet 0   ? insulin NPH-insulin regular (NOVOLIN 70-30) 100 unit/mL (70-30) injection Inject under the skin. Inject 22 units in AM and 10-11 units in PM     ? insulin syringe-needle U-100 0.3 mL 31 gauge x 15/64\" Syrg Use 1 each As Directed 2 (two) times a day. 100 Syringe 11   ? metFORMIN (GLUCOPHAGE) 500 MG tablet Take 2 tablets (1,000 mg total) by mouth 2 (two) times a day. 360 tablet 3   ? MULTIVITAMIN ORAL Take 2 tablets by mouth daily.     ? omeprazole (PRILOSEC) 40 MG capsule Take 40 mg by mouth daily as needed.            ? pramipexole (MIRAPEX) 0.125 MG tablet Take 2 tablets (0.25 mg total) by mouth daily. Take 2 hours before bedtime 60 tablet 3   ? rosuvastatin (CRESTOR) 20 MG tablet Take 1 tablet (20 mg total) by mouth at bedtime. 90 tablet 3   ? warfarin ANTICOAGULANT (COUMADIN/JANTOVEN) 5 MG tablet Take 1 tablet (5 mg total) by mouth daily. Take 5mg daily by mouth as directed 90 tablet 1   ? hydrOXYzine HCl (ATARAX) 25 MG tablet Take 1 tablet (25 mg total) by mouth 3 (three) times a day as needed for anxiety. 90 tablet 11   ? isosorbide dinitrate (ISORDIL) 30 MG tablet Take 1 tablet (30 mg total) by mouth 4 (four) times a day. Take around 5 PM to 6 PM each night 30 tablet 5     No current facility-administered medications for this visit.     Allergies   Allergen Reactions   ? Oxycodone Itching   ? Amlodipine Dizziness     Dizziness and dry heaves   ? Lisinopril Cough         Lab Results    Chemistry/lipid CBC Cardiac Enzymes/BNP/TSH/INR   Lab Results   Component Value Date    CHOL 121 12/16/2019    HDL 48 12/16/2019    LDLCALC 50 12/16/2019    TRIG 115 12/16/2019    CREATININE 1.11 02/12/2020    " BUN 16 02/12/2020    K 5.2 (H) 02/12/2020     (L) 02/12/2020     02/12/2020    CO2 23 02/12/2020    Lab Results   Component Value Date    WBC 6.2 01/02/2020    HGB 14.5 01/02/2020    HCT 43.0 01/02/2020     (H) 01/02/2020     01/02/2020    Lab Results   Component Value Date    BNP 96 (H) 05/09/2019    INR 2.00 (H) 04/10/2020        Tres Talbert

## 2021-06-30 NOTE — PROGRESS NOTES
Progress Notes by Raisa Grant PA-C at 1/8/2021  8:30 AM     Author: Raisa Grant PA-C Service: -- Author Type: Physician Assistant    Filed: 1/8/2021  2:37 PM Encounter Date: 1/8/2021 Status: Signed    : Raisa Grant PA-C (Physician Assistant)           Assessment/Recommendations   Problem List Items Addressed This Visit     None          1.  Aortic Stenosis: This has become severe and is now causing fatigue, LE edema and shortness of breath.  He has seen a decline in his functional status.  He has been evaluated by a multidisciplinary team and has been deemed a good candidate for transcatheter aortic valve replacement.  He has now undergone all the required workup for TAVR and wishes to proceed.   We discussed the procedure in detail,  including post-procedure care, restrictions and follow up.   He understands that there is a 4-5% risk of bleeding, infection, stroke, heart block requiring permanent pacemaker, cardiac perforation, aortic root rupture, dissection and death.     All questions were answered   Consents were signed and witnessed by me.  He has never had trouble with anesthesia in the past.    Labs today reveal Hgb 13.6, normal WBC, electrolytes WNL and creat 0.99    The plan will be for MAC with transthoracic echo imaging prior to and following valve deployment.  We will plan on using the sentinel embolic protection device.  We will use the Magdaleno Dominick valve. Pee Ayala will report Tuesday morning for the procedure and all instructions regarding times and medications were given by TAVR coordinator RN.     2. CAD - s/p CABG in 2016 with LIMA to LAD and SVG to OM.  He also has stent in RCA.  Most recent cor angio showed patent grafts and patent sent, essentially unchanged from 2019 study.  He reports no angina.  He will continue aspirin, beta-blocker, isosorbide and statin    3.  Chronic atrial fibrillation -rate controlled.  Takes warfarin for stroke  prophylaxis.  He will start holding this tonight in anticipation of procedure next week.    4.  Hypertension -blood pressure is controlled today.  He should continue taking carvedilol 6.25 mg twice daily, furosemide 40 mg in the morning and 20 mg in the afternoon and isosorbide 30 mg daily.    5. Chronic heart failure with preserved ejection fraction, NYHA class III -with lower extremity edema.  He is supposed to be taking Lasix 40 mg in the morning and 20 mg in the evening, however he is currently taking 20 mg twice daily.  We will have him increase to 40 mg in the morning.  He can continue to take his 20 mg dose at noon.    6.  Type 2 diabetes mellitus -insulin-dependent with Novolin.  Patient also takes metformin, but this is currently still on hold after his coronary angiogram.       History of Present Illness/Subjective    Pee Ayala is a 80 y.o. male who comes in today for history and physical prior to TAVR (transcatheter aortic valve replacement).     Mr. Ayala is a pleasant 80-year-old gentleman with established coronary artery disease, having had bypass surgery in 2016 (receiving a LIMA to the LAD and a vein graft to his obtuse marginal), peripheral arterial disease, diabetes mellitus type 2, atrial fibrillation, dyslipidemia and GERD.  He is also known to have aortic valve disease, and on a recent echocardiogram was seen to have progression to severe aortic stenosis.  This echocardiogram was performed because of increasing shortness of breath and a declining functional capacity.  He has been evaluated by a multidisciplinary team and has been deemed a good candidate for TAVR.      Pee Ayala denies chest discomfort, palpitations, paroxysmal nocturnal dyspnea, orthopnea, lightheadedness, dizziness, pre-syncope, or syncope, weight loss, changes in appetite, nausea or vomiting.     Medical, surgical, family, social history, and medications were reviewed and updated as necessary.    ECG  (personally reviewed): 1/8/2021 shows atrial fibrillation with ventricular rate of 62 bpm    ECHOCARDIOGRAM done on November 6, 2020:  1. Normal left ventricular size and systolic performance with a visually estimated ejection fraction of 60%.   2. There is mild concentric increase in left ventricular wall thickness.   3. There is severe aortic stenosis.     The mean gradient is measured at 37 mmHg with peak velocity of 4.1 m/s.  Calculated valve area 0.6 cm . VRVTI = 0.2. VRvel = 0.2.   4. Normal right ventricular size and systolic performance.   5. There is mild to moderate left atrial enlargement.     CATH done on January 6, 2021:    Mid RCA lesion is 75% stenosed.    Mid Cx lesion is 85% stenosed.    Prox LAD to Mid LAD lesion is 75% stenosed.    1st Diag lesion is 70% stenosed.    Prox Cx lesion is 80% stenosed.      Noted all vessels with severe diffuse dz and very small in diameter  LM mild dz  LAD prox 70%; mid 80%; diagonal prox 60-70%, patent LIMA   Circ severe dz in prox/mid with patent SVG to OM  RCA prox stent patent severe dz in mid (no change from 2019)     LVEDP 19mmHg pull back did not record gradient       Physical Examination Review of Systems   Vitals:    01/08/21 0850   BP: 142/68   Pulse: 60   Resp: 16     Body mass index is 33.1 kg/m .  Wt Readings from Last 3 Encounters:   01/08/21 202 lb (91.6 kg)   01/06/21 206 lb 6.4 oz (93.6 kg)   11/06/20 194 lb (88 kg)       General Appearance:   Alert, cooperative and in no acute distress   ENT/Mouth: membranes moist, no oral lesions or bleeding gums.      EYES:  no scleral icterus, normal conjunctivae   Neck: no carotid bruits.  Thyroid not visualized   Chest/Lungs:   lungs are clear to auscultation, no rales or wheezing   Cardiovascular:   irregular. Normal first and second heart sounds with 5/6 systolic murmur.  No rubs or gallops; the carotid, radial and posterior tibial pulses are intact, 1-2+ edema bilaterally    Abdomen:  Soft and nontender.  Bowel sounds are present in all quadrants   Extremities: no cyanosis or clubbing   Skin: no xanthelasma, warm.    Neurologic: normal gait, normal  bilateral, no tremors   Psychiatric: Normal mood and affect    General: WNL  Eyes: WNL  Ears/Nose/Throat: WNL  Lungs: WNL  Heart: Shortness of Breath with activity, Irregular Heartbeat, Leg Swelling  Stomach: WNL  Bladder: WNL  Muscle/Joints: Muscle Weakness  Skin: WNL  Nervous System: WNL  Mental Health: WNL     Blood: Easy Bleeding     Medical History  Surgical History Family History Social History   Past Medical History:   Diagnosis Date   ? ACS (acute coronary syndrome) (H) 6/2/2014   ? Actinic keratosis 1/14/2014   ? Actinic keratosis 1/14/2014   ? Anticoagulated on Coumadin 12/30/2015   ? Atrial fibrillation (H) 2016   ? Bone mass 4/26/2017   ? Chest heaviness 1/23/2019   ? Closed fracture of left forearm 2015   ? Diabetes (H) 2009   ? Dyslipidemia 8/31/2016   ? 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    ? Encounter for long-term (current) use of insulin (H) 8/11/2016   ? Esophageal reflux 11/18/2010   ? Nonalcoholic steatohepatitis 10/1/2009   ? 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 Status post coronary angiogram 3/9/2016   ? Tremor 9/28/2014    Past Surgical History:   Procedure Laterality Date   ? CORONARY ARTERY BYPASS GRAFT  2016   ? CV CORONARY ANGIOGRAM N/A 4/4/2019    Procedure: Coronary Angiogram;  Surgeon: Dominik Vega MD;  Location: [facility] Cath Lab;  Service: Cardiology   ? CV LEFT HEART CATHETERIZATION WO LEFT VETRICULOGRAM Left 4/4/2019    Procedure: Left Heart Catheterization Without Left Ventriculogram;  Surgeon: Dominik Vega MD;  Location: [facility] Cath Lab;  Service: Cardiology   ? CV RIGHT HEART CATH Right 4/4/2019    Procedure: Right Heart Catheterization;  Surgeon: Dominik Vega,  MD;  Location: [facility];  Service: Cardiology   ? forearm sugery Left 2015   ? MOHS SURGERY      Family History   Problem Relation Age of Onset   ? Diabetes type II Mother         58 ,  from anesthesia complication.   ? Heart disease Father         86  from stroke   ? Stroke Father    ? No Medical Problems Brother         60 years of age.    Social History     Socioeconomic History   ? Marital status:      Spouse name: Not on file   ? Number of children: Not on file   ? Years of education: Not on file   ? Highest education level: Not on file   Occupational History   ? Not on file   Social Needs   ? Financial resource strain: Not on file   ? Food insecurity     Worry: Not on file     Inability: Not on file   ? Transportation needs     Medical: Not on file     Non-medical: Not on file   Tobacco Use   ? Smoking status: Former Smoker     Quit date: 1998     Years since quittin.0   ? Smokeless tobacco: Never Used   Substance and Sexual Activity   ? Alcohol use: Yes     Comment: very rare   ? Drug use: No   ? Sexual activity: Never     Birth control/protection: None   Lifestyle   ? Physical activity     Days per week: Not on file     Minutes per session: Not on file   ? Stress: Not on file   Relationships   ? Social connections     Talks on phone: Not on file     Gets together: Not on file     Attends Pent[employer]alism service: Not on file     Active member of club or organization: Not on file     Attends meetings of clubs or organizations: Not on file     Relationship status: Not on file   ? Intimate partner violence     Fear of current or ex partner: Not on file     Emotionally abused: Not on file     Physically abused: Not on file     Forced sexual activity: Not on file   Other Topics Concern   ? Not on file   Social History Narrative     and lives with life.    Has adult children from previous relationship. ( Blended family).    Has a dog - Vincent Gil MD   "1/3/2019              Medications  Allergies   Current Outpatient Medications   Medication Sig Dispense Refill   ? ACCU-CHEK GUIDE TEST STRIPS strips USE 1 STRIP TO CHECK GLUCOSE THREE TIMES DAILY 300 strip 1   ? acetaminophen (TYLENOL) 650 MG CR tablet Take 1,300 mg by mouth as needed.     ? antiox. no.10-omeg3s-lut-starr 280-10-2 mg cap Take 1 tablet by mouth 2 (two) times a day. icaps     ? aspirin 81 mg chewable tablet Chew 81 mg daily.            ? blood glucose meter (GLUCOMETER) Use 1 each As Directed as needed. Dispense glucometer brand per patient's insurance at pharmacy discretion. 1 each 0   ? carvediloL (COREG) 6.25 MG tablet Take 1 tablet (6.25 mg total) by mouth 2 (two) times a day with meals. 180 tablet 1   ? finasteride (PROSCAR) 5 mg tablet Take 1 tablet (5 mg total) by mouth daily. 90 tablet 3   ? furosemide (LASIX) 20 MG tablet Take 40 mg in the morning and 20 mg in the afternoon 270 tablet 0   ? hydrOXYzine HCl (ATARAX) 25 MG tablet Take 1 tablet (25 mg total) by mouth 3 (three) times a day as needed for anxiety. 90 tablet 11   ? insulin NPH-insulin regular (NOVOLIN 70-30) 100 unit/mL (70-30) injection Inject under the skin. Inject 27 units in AM and 27 units in PM     ? insulin syringe-needle U-100 0.3 mL 31 gauge x 15/64\" Syrg Use 1 each As Directed 2 (two) times a day. 100 Syringe 11   ? isosorbide mononitrate (IMDUR) 30 MG 24 hr tablet Take 1 tablet (30 mg total) by mouth daily. Take daily at 5 pm. 90 tablet 2   ? metFORMIN (GLUCOPHAGE) 500 MG tablet Take 2 tablets by mouth twice daily 360 tablet 1   ? omeprazole (PRILOSEC) 40 MG capsule Take 40 mg by mouth daily as needed.            ? pramipexole (MIRAPEX) 0.25 MG tablet Take 2 tablets (0.5 mg total) by mouth at bedtime. 180 tablet 3   ? rosuvastatin (CRESTOR) 20 MG tablet Take 1 tablet (20 mg total) by mouth at bedtime. 90 tablet 3   ? UNABLE TO FIND Apply 1 tablet to the mouth or throat daily. Med Name: Prevagen (memory)     ? warfarin " ANTICOAGULANT (COUMADIN/JANTOVEN) 5 MG tablet Take 1 tablet (5 mg total) by mouth daily. Take 5mg daily by mouth as directed 90 tablet 1     No current facility-administered medications for this visit.     Allergies   Allergen Reactions   ? Oxycodone Itching   ? Amlodipine Dizziness     Dizziness and dry heaves   ? Lisinopril Cough         Lab Results    Chemistry/lipid CBC Cardiac Enzymes/BNP/TSH/INR   Lab Results   Component Value Date    CHOL 135 08/11/2020    HDL 54 08/11/2020    LDLCALC 57 08/11/2020    TRIG 119 08/11/2020    CREATININE 0.83 01/06/2021    BUN 16 01/06/2021    K 4.0 01/06/2021     01/06/2021     01/06/2021    CO2 25 01/06/2021    Lab Results   Component Value Date    WBC 4.9 01/08/2021    HGB 13.6 (L) 01/08/2021    HCT 40.4 01/08/2021     01/08/2021     01/08/2021    Lab Results   Component Value Date    BNP 56 10/21/2020    INR 1.20 (H) 01/06/2021        This note has been dictated using voice recognition software. Any grammatical or context distortions are unintentional and inherent to the software.

## 2021-06-30 NOTE — PROGRESS NOTES
"Progress Notes by Jo Romano MD at 12/10/2020 10:50 AM     Author: Jo Romano MD Service: -- Author Type: Physician    Filed: 12/10/2020 11:18 AM Encounter Date: 12/10/2020 Status: Signed    : Jo Romano MD (Physician)           The patient has been notified of following:     \"This video visit will be conducted via a call between you and your physician/provider. We have found that certain health care needs can be provided without the need for an in-person physical exam.  This service lets us provide the care you need with a video conversation.  If a prescription is necessary we can send it directly to your pharmacy.  If lab work is needed we can place an order for that and you can then stop by our lab to have the test done at a later time.      Patient has given verbal consent to a Video visit? Yes    HEART CARE VIDEO ENCOUNTER        The patient has chosen to have the visit conducted as a video visit, to reduce risk of exposure given the current status of Coronavirus in our community. This video visit is being conducted via a call between the patient and physician/provider. Health care needs are being provided without a physical exam.     Assessment/Recommendations   Assessment/Plan:    1.  Severe aortic valve stenosis: Given his dyspnea on exertion and the severity of his valve disease, he will benefit from aortic valve replacement.  The options of surgical as well as transcatheter aortic valve placement were reviewed, and given his age and prior sternotomy, he would be a good candidate for TAVR, which is his preference as well.    The risks, benefits and alternatives of transcatheter aortic valve replacement were reviewed, and he would like to proceed as early as possible.      He will be scheduled for a coronary angiogram to reevaluate his grafts and native anatomy, and his TAVR CTA will be reviewed.  It was outlined that he can expect to have his TAVR sometime in the next 4 to 8 weeks, " but he knows to call before that if there is a change in his condition or worsening symptoms.    Follow Up Plan: Valve clinic will arrange coronary angiogram, with TAVR to follow in the coming weeks  I have reviewed the note as documented.  This accurately captures the substance of my conversation with the patient.    Total time of video between patient and provider was 13 minutes   Start Time:1103   Stop Time:1116    Originating Location (pt. Location): Home    Distant Location (provider location):  [facility] HEART [facility]     Mode of Communication:  Video Conference via [email or web address]       History of Present Illness/Subjective    Pee Ayala is a 80 y.o. male who is being evaluated via a billable video visit and has consented to a video visit.     Mr. Ayala is a pleasant 80-year-old gentleman with established coronary artery disease, having had bypass surgery in 2016 (receiving a LIMA to the LAD and a vein graft to his obtuse marginal), peripheral arterial disease, diabetes mellitus type 2, atrial fibrillation, dyslipidemia and GERD.  He is also known to have aortic valve disease, and on a recent echocardiogram was seen to have progression to severe aortic stenosis.  This echocardiogram was performed because of increasing shortness of breath and a declining functional capacity.    This transthoracic study on November 6, 2020 showed normal left ventricular systolic function, with ejection fraction of 60%.  The mean gradient across the aortic valve was 37 mmHg with a peak velocity of 4.1 m/s and a valve area of 0.6 cm , with a dimensionless index of 0.2.      I have reviewed and updated the patient's Past Medical History, Social History, Family History and Medication List.     Physical Examination performed via live video encounter Review of Systems   General Appearance:   no distress, normal body habitus, upright.   ENT/Mouth: membranes moist, no nasal discharge or bleeding gums.  Normal head  shape, no evidence of injury or laceration.     EYES:  no scleral icterus, normal conjunctivae   Neck: no evidence of thyromegaly.  Supple   Chest/Lungs:   No audible wheezing equal chest wall expansion. Non labored breathing.  No cough.   Cardiovascular:   No evidence of elevated jugular venous pressure.  No evidence of pitting edema bilaterally    Abdomen:  no evidence of abdominal distention. No observe juandice.     Extremities: no cyanosis or clubbing noted.    Skin: no xanthelasma, normal skin color. No evidence of facial lacerations.      Neurologic: Normal arm motion bilateral, no tremors.  No evidence of focal defect.       Psychiatric: alert and oriented x3, calm                                               Medical History  Surgical History Family History Social History   Past Medical History:   Diagnosis Date   ? ACS (acute coronary syndrome) (H) 6/2/2014   ? Actinic keratosis 1/14/2014   ? Actinic keratosis 1/14/2014   ? Anticoagulated on Coumadin 12/30/2015   ? Atrial fibrillation (H) 2016   ? Bone mass 4/26/2017   ? Chest heaviness 1/23/2019   ? Closed fracture of left forearm 2015   ? Diabetes (H) 2009   ? Dyslipidemia 8/31/2016   ? 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    ? Encounter for long-term (current) use of insulin (H) 8/11/2016   ? Esophageal reflux 11/18/2010   ? Nonalcoholic steatohepatitis 10/1/2009   ? 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 Status post coronary angiogram 3/9/2016   ? Tremor 9/28/2014    Past Surgical History:   Procedure Laterality Date   ? CORONARY ARTERY BYPASS GRAFT  2016   ? CV CORONARY ANGIOGRAM N/A 4/4/2019    Procedure: Coronary Angiogram;  Surgeon: Dominik Vega MD;  Location: [facility] Cath Lab;  Service: Cardiology   ? CV LEFT HEART CATHETERIZATION WO LEFT VETRICULOGRAM Left 4/4/2019    Procedure: Left Heart Catheterization  Without Left Ventriculogram;  Surgeon: Dominik Vega MD;  Location: [facility] Cath Lab;  Service: Cardiology   ? CV RIGHT HEART CATH Right 2019    Procedure: Right Heart Catheterization;  Surgeon: Dominik Vega MD;  Location: [facility] Cath Lab;  Service: Cardiology   ? forearm sugery Left    ? MOHS SURGERY      Family History   Problem Relation Age of Onset   ? Diabetes type II Mother         58 ,  from anesthesia complication.   ? Heart disease Father         86  from stroke   ? Stroke Father    ? No Medical Problems Brother         60 years of age.      Social History     Socioeconomic History   ? Marital status:      Spouse name: Not on file   ? Number of children: Not on file   ? Years of education: Not on file   ? Highest education level: Not on file   Occupational History   ? Not on file   Social Needs   ? Financial resource strain: Not on file   ? Food insecurity     Worry: Not on file     Inability: Not on file   ? Transportation needs     Medical: Not on file     Non-medical: Not on file   Tobacco Use   ? Smoking status: Former Smoker     Quit date: 1998     Years since quittin.9   ? Smokeless tobacco: Never Used   Substance and Sexual Activity   ? Alcohol use: Yes     Comment: very rare   ? Drug use: No   ? Sexual activity: Never     Birth control/protection: None   Lifestyle   ? Physical activity     Days per week: Not on file     Minutes per session: Not on file   ? Stress: Not on file   Relationships   ? Social connections     Talks on phone: Not on file     Gets together: Not on file     Attends [employer] service: Not on file     Active member of club or organization: Not on file     Attends meetings of clubs or organizations: Not on file     Relationship status: Not on file   ? Intimate partner violence     Fear of current or ex partner: Not on file     Emotionally abused: Not on file     Physically abused: Not on file     Forced sexual  "activity: Not on file   Other Topics Concern   ? Not on file   Social History Narrative     and lives with life.    Has adult children from previous relationship. ( Blended family).    Has a dog - [email or web address]        Jazzy Gil MD  1/3/2019              Medications  Allergies   Current Outpatient Medications   Medication Sig Dispense Refill   ? acetaminophen (TYLENOL) 650 MG CR tablet Take 1,300 mg by mouth as needed.     ? antiox.mv no.10-omeg3s-lut-starr 280-10-2 mg cap Take 1 tablet by mouth 2 (two) times a day. icaps     ? aspirin 81 mg chewable tablet Chew 81 mg daily.            ? blood glucose meter (GLUCOMETER) Use 1 each As Directed as needed. Dispense glucometer brand per patient's insurance at pharmacy discretion. 1 each 0   ? carvediloL (COREG) 6.25 MG tablet Take 1 tablet (6.25 mg total) by mouth 2 (two) times a day with meals. 180 tablet 1   ? finasteride (PROSCAR) 5 mg tablet Take 1 tablet (5 mg total) by mouth daily. 90 tablet 3   ? furosemide (LASIX) 20 MG tablet Take 40 mg in the morning and 20 mg in the afternoon 270 tablet 0   ? insulin NPH-insulin regular (NOVOLIN 70-30) 100 unit/mL (70-30) injection Inject under the skin. Inject 27 units in AM and 24 units in PM     ? insulin syringe-needle U-100 0.3 mL 31 gauge x 15/64\" Syrg Use 1 each As Directed 2 (two) times a day. 100 Syringe 11   ? isosorbide mononitrate (IMDUR) 30 MG 24 hr tablet Take 1 tablet (30 mg total) by mouth daily. Take daily at 5 pm. 90 tablet 2   ? metFORMIN (GLUCOPHAGE) 500 MG tablet Take 2 tablets by mouth twice daily 360 tablet 1   ? omeprazole (PRILOSEC) 40 MG capsule Take 40 mg by mouth daily as needed.            ? pramipexole (MIRAPEX) 0.125 MG tablet Take 2 tablets (0.25 mg total) by mouth daily. Around 4:15 pm 60 tablet 6   ? rosuvastatin (CRESTOR) 20 MG tablet Take 1 tablet (20 mg total) by mouth at bedtime. 90 tablet 3   ? warfarin ANTICOAGULANT (COUMADIN/JANTOVEN) 5 MG tablet Take 1 tablet (5 mg total) by " mouth daily. Take 5mg daily by mouth as directed 90 tablet 1   ? ACCU-CHEK GUIDE TEST STRIPS strips USE 1 STRIP TO CHECK GLUCOSE THREE TIMES DAILY 300 strip 1   ? hydrOXYzine HCl (ATARAX) 25 MG tablet Take 1 tablet (25 mg total) by mouth 3 (three) times a day as needed for anxiety. 90 tablet 11     No current facility-administered medications for this visit.     Allergies   Allergen Reactions   ? Oxycodone Itching   ? Amlodipine Dizziness     Dizziness and dry heaves   ? Lisinopril Cough         Lab Results    Chemistry/lipid CBC Cardiac Enzymes/BNP/TSH/INR   Lab Results   Component Value Date    CHOL 135 08/11/2020    HDL 54 08/11/2020    LDLCALC 57 08/11/2020    TRIG 119 08/11/2020    CREATININE 1.28 10/21/2020    BUN 19 10/21/2020    K 4.4 10/21/2020     10/21/2020     10/21/2020    CO2 26 10/21/2020    Lab Results   Component Value Date    WBC 6.2 01/02/2020    HGB 14.5 01/02/2020    HCT 43.0 01/02/2020     (H) 01/02/2020     01/02/2020    Lab Results   Component Value Date    BNP 56 10/21/2020    INR 2.80 (H) 11/23/2020        Jo Romano MD

## 2021-06-30 NOTE — PROGRESS NOTES
"Progress Notes by Raisa Grant PA-C at 1/21/2021 10:30 AM     Author: Raisa Grant PA-C Service: -- Author Type: Physician Assistant    Filed: 1/21/2021  4:29 PM Encounter Date: 1/21/2021 Status: Signed    : Raisa Grant PA-C (Physician Assistant)           The patient has been notified of following:     \"This telephone visit will be conducted via a call between you and your physician/provider. We have found that certain health care needs can be provided without the need for a physical exam.  This service lets us provide the care you need with a phone conversation.  If a prescription is necessary we can send it directly to your pharmacy.  If lab work is needed we can place an order for that and you can then stop by our lab to have the test done at a later time. If during the course of the call the physician/provider feels a telephone visit is not appropriate, you will not be charged for this service.\" Verbal consent has been obtained for this service by care team member:         HEART CARE PHONE ENCOUNTER        The patient has chosen to have the visit conducted as a telephone visit, to reduce risk of exposure given the current status of Coronavirus in our community. This telephone visit is being conducted via a call between the patient and physician/provider. Health care needs are being provided without a physical exam.     Assessment/Recommendations   Assessment/Plan:    1.  Severe aortic stenosis - s/p TAVR on January 12th via the right transfemoral approach and using a #29 CECILIA 3 valve.  Patient has seen significant improvement in his symptomatology and has more energy.  Breathing is easier and he is doing steps without stopping.  He is starting cardiac rehab at [facility] on Wednesdays and Fridays.  He will continue taking aspirin 81 mg daily indefinitely.  He has repeat echocardiogram is scheduled on February 17 and he will have a follow-up visit over video with Dr." Rosalina on February 22    Discharge summary from the [facility] was reviewed.  It was noted that patient was instructed to switch from aspirin to Plavix, however we do not use Plavix after our TAVR procedures and I instructed patient to continue on his aspirin along with warfarin.    2.  Chronic atrial fibrillation -rate controlled.  Patient on warfarin for stroke prophylaxis.  No increase in palpitations post procedure    3.  Chronic diastolic heart failure -currently stable with no signs or symptoms of fluid overload.  He should continue Lasix 40 mg in the morning and 20 mg in the afternoon.    4.  Coronary artery disease - s/p CABG in 2016 with LIMA to LAD and SVG to OM.  He also has stent in RCA.  Most recent cor angio showed patent grafts and patent sent, essentially unchanged from 2019 study.  He reports no angina.  He will continue aspirin, beta-blocker, isosorbide and statin    I have reviewed the note as documented.  This accurately captures the substance of my conversation with the patient.    Total time of call between patient and provider was 6 minutes   Start Time: 1036   Stop Time: 1042       History of Present Illness/Subjective    Pee Ayala is a 80 y.o. male who is being evaluated via a billable telephone visit.      Pee has a history of coronary artery disease, CABG in 2016, stent placement to the RCA, peripheral arterial disease, chronic atrial fibrillation, dyslipidemia, gastroesophageal reflux disease and aortic stenosis.  On recent echocardiogram his aortic stenosis had progressed to the severe range.  He was evaluated for possible transcatheter aortic valve replacement and was deemed a good candidate given his prior median sternotomy.    Pee underwent his procedure over at the [facility] on January 12.  He was sent home the next day.  He did react poorly to the EKG pads and was using oral Benadryl.  He has now transition to hydrocortisone cream.  The areas of itchiness are improved.   Pee has seen a significant difference in his breathing.  He breathes easier.  He has more energy.  He is now doing steps without having to stop.  He is very happy with how he is doing.    I have reviewed and updated the patient's Past Medical History, Social History, Family History and Medication List.     Physical Examination not performed given phone encounter Review of Systems                                                Medical History  Surgical History Family History Social History   Past Medical History:   Diagnosis Date   ? ACS (acute coronary syndrome) (H) 6/2/2014   ? Actinic keratosis 1/14/2014   ? Actinic keratosis 1/14/2014   ? Anticoagulated on Coumadin 12/30/2015   ? Atrial fibrillation (H) 2016   ? Bone mass 4/26/2017   ? Chest heaviness 1/23/2019   ? Closed fracture of left forearm 2015   ? Diabetes (H) 2009   ? Dyslipidemia 8/31/2016   ? 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    ? Encounter for long-term (current) use of insulin (H) 8/11/2016   ? Esophageal reflux 11/18/2010   ? Nonalcoholic steatohepatitis 10/1/2009   ? 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 Status post coronary angiogram 3/9/2016   ? Tremor 9/28/2014    Past Surgical History:   Procedure Laterality Date   ? CORONARY ARTERY BYPASS GRAFT  2016   ? CV CORONARY ANGIOGRAM N/A 4/4/2019    Procedure: Coronary Angiogram;  Surgeon: Dominik Vega MD;  Location: [facility] Cath Lab;  Service: Cardiology   ? CV CORONARY ANGIOGRAM N/A 1/6/2021    Procedure: Coronary Angiogram;  Surgeon: Dominik Vega MD;  Location: [facility] Cardiac Cath Lab;  Service: Cardiology   ? CV LEFT HEART CATHETERIZATION WO LEFT VETRICULOGRAM Left 4/4/2019    Procedure: Left Heart Catheterization Without Left Ventriculogram;  Surgeon: Dominik Vega MD;  Location: [facility] Cath Lab;  Service: Cardiology   ?  CV LEFT HEART CATHETERIZATION WO LEFT VETRICULOGRAM Left 2021    Procedure: Left Heart Catheterization Without Left Ventriculogram;  Surgeon: Dominik Vega MD;  Location: [facility] Cardiac Cath Lab;  Service: Cardiology   ? CV RIGHT HEART CATH Right 2019    Procedure: Right Heart Catheterization;  Surgeon: Dominik Vega MD;  Location: [facility] Cath Lab;  Service: Cardiology   ? forearm sugery Left    ? MOHS SURGERY      Family History   Problem Relation Age of Onset   ? Diabetes type II Mother         58 ,  from anesthesia complication.   ? Heart disease Father         86  from stroke   ? Stroke Father    ? No Medical Problems Brother         60 years of age.    Social History     Socioeconomic History   ? Marital status:      Spouse name: Not on file   ? Number of children: Not on file   ? Years of education: Not on file   ? Highest education level: Not on file   Occupational History   ? Not on file   Social Needs   ? Financial resource strain: Not on file   ? Food insecurity     Worry: Not on file     Inability: Not on file   ? Transportation needs     Medical: Not on file     Non-medical: Not on file   Tobacco Use   ? Smoking status: Former Smoker     Quit date: 1998     Years since quittin.0   ? Smokeless tobacco: Never Used   Substance and Sexual Activity   ? Alcohol use: Yes     Comment: very rare   ? Drug use: No   ? Sexual activity: Never     Birth control/protection: None   Lifestyle   ? Physical activity     Days per week: Not on file     Minutes per session: Not on file   ? Stress: Not on file   Relationships   ? Social connections     Talks on phone: Not on file     Gets together: Not on file     Attends Buddhism service: Not on file     Active member of club or organization: Not on file     Attends meetings of clubs or organizations: Not on file     Relationship status: Not on file   ? Intimate partner violence     Fear of current or ex  "partner: Not on file     Emotionally abused: Not on file     Physically abused: Not on file     Forced sexual activity: Not on file   Other Topics Concern   ? Not on file   Social History Narrative     and lives with life.    Has adult children from previous relationship. ( Blended family).    Has a dog - Collinsimin        Jazzy Gil MD  1/3/2019              Medications  Allergies   Current Outpatient Medications   Medication Sig Dispense Refill   ? clopidogreL (PLAVIX) 75 mg tablet Take 75 mg by mouth daily.     ? furosemide (LASIX) 20 MG tablet Take 40 mg in the morning and 20 mg in the afternoon 270 tablet 0   ? glimepiride (AMARYL) 4 MG tablet Take 4 mg by mouth 2 (two) times a day.     ? hydrOXYzine HCl (ATARAX) 25 MG tablet Take 1 tablet (25 mg total) by mouth 3 (three) times a day as needed for anxiety. 90 tablet 11   ? insulin aspart U-100 (NOVOLOG) 100 unit/mL (3 mL) injection pen Inject under the skin.     ? insulin NPH-insulin regular (NOVOLIN 70-30) 100 unit/mL (70-30) injection Inject under the skin. Inject 27 units in AM and 27 units in PM     ? insulin syringe-needle U-100 0.3 mL 31 gauge x 15/64\" Syrg Use 1 each As Directed 2 (two) times a day. 100 Syringe 11   ? isosorbide mononitrate (IMDUR) 30 MG 24 hr tablet Take 1 tablet (30 mg total) by mouth daily. Take daily at 5 pm. 90 tablet 2   ? metFORMIN (GLUCOPHAGE) 500 MG tablet Take 2 tablets by mouth twice daily 360 tablet 1   ? metoprolol succinate (TOPROL-XL) 100 MG 24 hr tablet Take 50 mg by mouth 2 (two) times a day.      ? multivitamin therapeutic tablet Take 1 tablet by mouth daily.     ? omeprazole (PRILOSEC) 40 MG capsule Take 40 mg by mouth daily as needed.            ? pimecrolimus (ELIDEL) 1 % cream Apply topically daily.     ? pramipexole (MIRAPEX) 0.25 MG tablet Take 2 tablets (0.5 mg total) by mouth at bedtime. 180 tablet 3   ? pravastatin (PRAVACHOL) 80 MG tablet Take 80 mg by mouth at bedtime.     ? rosuvastatin (CRESTOR) " 20 MG tablet Take 1 tablet (20 mg total) by mouth at bedtime. 90 tablet 3   ? tamsulosin (FLOMAX) 0.4 mg cap Take 0.4 mg by mouth.     ? UNABLE TO FIND Apply 1 tablet to the mouth or throat daily. Med Name: Prevagen (memory)     ? warfarin ANTICOAGULANT (COUMADIN/JANTOVEN) 5 MG tablet Take 1 tablet (5 mg total) by mouth daily. Take 5mg daily by mouth as directed 90 tablet 1   ? ACCU-CHEK GUIDE TEST STRIPS strips USE 1 STRIP TO CHECK GLUCOSE THREE TIMES DAILY 300 strip 1   ? acetaminophen (TYLENOL) 650 MG CR tablet Take 1,300 mg by mouth as needed.     ? antiox.mv no.10-omeg3s-lut-starr 280-10-2 mg cap Take 1 tablet by mouth 2 (two) times a day. icaps     ? blood glucose meter (GLUCOMETER) Use 1 each As Directed as needed. Dispense glucometer brand per patient's insurance at pharmacy discretion. 1 each 0   ? carvediloL (COREG) 6.25 MG tablet Take 1 tablet (6.25 mg total) by mouth 2 (two) times a day with meals. 180 tablet 1   ? finasteride (PROSCAR) 5 mg tablet Take 1 tablet (5 mg total) by mouth daily. 90 tablet 3   ? liraglutide (VICTOZA) 0.6 mg/0.1 mL (18 mg/3 mL) injection Inject 1.8 mg under the skin daily.       No current facility-administered medications for this visit.     Allergies   Allergen Reactions   ? Oxycodone Itching   ? Amlodipine Dizziness     Dizziness and dry heaves   ? Adhesive Tape-Silicones Itching and Rash   ? Lisinopril Cough         Lab Results    Chemistry/lipid CBC Cardiac Enzymes/BNP/TSH/INR   Lab Results   Component Value Date    CHOL 135 08/11/2020    HDL 54 08/11/2020    LDLCALC 57 08/11/2020    TRIG 119 08/11/2020    CREATININE 0.99 01/08/2021    BUN 14 01/08/2021    K 4.2 01/08/2021     01/08/2021     01/08/2021    CO2 26 01/08/2021    Lab Results   Component Value Date    WBC 4.9 01/08/2021    HGB 13.6 (L) 01/08/2021    HCT 40.4 01/08/2021     01/08/2021     01/08/2021    Lab Results   Component Value Date    BNP 91 (H) 01/08/2021    INR 1.11 (H) 01/08/2021

## 2021-06-30 NOTE — PROGRESS NOTES
"Progress Notes by Jo Romano MD at 2/22/2021 10:20 AM     Author: Jo Romano MD Service: -- Author Type: Physician    Filed: 2/22/2021 10:53 AM Encounter Date: 2/22/2021 Status: Signed    : Jo Romano MD (Physician)             The patient has been notified of following:     \"This telephone visit will be conducted via a call between you and your physician/provider. We have found that certain health care needs can be provided without the need for a physical exam.  This service lets us provide the care you need with a phone conversation.  If a prescription is necessary we can send it directly to your pharmacy.  If lab work is needed we can place an order for that and you can then stop by our lab to have the test done at a later time. If during the course of the call the physician/provider feels a telephone visit is not appropriate, you will not be charged for this service.\" Verbal consent has been obtained for this service by care team member:         HEART CARE PHONE ENCOUNTER        The patient has chosen to have the visit conducted as a telephone visit, to reduce risk of exposure given the current status of Coronavirus in our community. This telephone visit is being conducted via a call between the patient and physician/provider. Health care needs are being provided without a physical exam.     Assessment/Recommendations   Assessment/Plan:    Status post TAVR: Mr. Ayala is doing well, after undergoing transcatheter aortic valve replacement on January 12, 2021, receiving a 29 mm CECILIA 3 valve via the transfemoral approach.  He has noticed a significant improvement in his functional capacity, and denies any ongoing dyspnea on exertion.  He is progressing well in cardiac rehab.    His 30-day echocardiogram was reviewed and shows normal left ventricular systolic function, and a normally functioning aortic bioprosthesis with a mean gradient of 6 mmHg.    He has been asked to continue his " current medical regimen, and knows to call if there is a change in his condition or worsening symptoms.    Coronary artery disease: Received a LIMA to the LAD and a vein graft to an obtuse marginal in 2016, and also has a stent in the mid RCA.  His pre-TAVR coronary angiogram showed patent grafts and a patent stent, with with the distal vessels in all 3 distributions being small with diffuse atherosclerosis.  He knows to continue his aspirin, beta-blockade, isosorbide and statin.    Follow Up Plan:  12 months in valve clinic with a repeat echocardiogram  I have reviewed the note as documented.  This accurately captures the substance of my conversation with the patient.        Total time of call between patient and provider was 13 minutes   Start Time:1035   Stop Time:1048       History of Present Illness/Subjective    Pee Ayala is a 80 y.o. male who is being evaluated via a billable telephone visit.      Mr. Ayala is a pleasant 80-year-old gentleman with established coronary artery disease, having had bypass surgery in 2016 (receiving a LIMA to the LAD and a vein graft to his obtuse marginal), peripheral arterial disease, diabetes mellitus type 2, atrial fibrillation, dyslipidemia and GERD.  He is also known to have aortic valve disease, and on a recent echocardiogram was seen to have progression to severe aortic stenosis.  This echocardiogram was performed because of increasing shortness of breath and a declining functional capacity.    After a multidisciplinary evaluation, he was felt to be a good candidate for transcatheter aortic valve replacement, and underwent the procedure on January 12, 2021.  His postprocedural course was relatively unremarkable, and he was discharged home on postoperative day 1.    Since that he states he has been feeling well, with a better functional capacity and no shortness of breath.  He has started cardiac rehab and is progressing well.      I have reviewed and updated the  patient's Past Medical History, Social History, Family History and Medication List.  I have reviewed and updated the patient's Past Medical History, Social History, Family History and Medication List.     Physical Examination not performed given phone encounter Review of Systems                                                Medical History  Surgical History Family History Social History   Past Medical History:   Diagnosis Date   ? ACS (acute coronary syndrome) (H) 6/2/2014   ? Actinic keratosis 1/14/2014   ? Actinic keratosis 1/14/2014   ? Anticoagulated on Coumadin 12/30/2015   ? Atrial fibrillation (H) 2016   ? Bone mass 4/26/2017   ? Chest heaviness 1/23/2019   ? Closed fracture of left forearm 2015   ? Diabetes (H) 2009   ? Dyslipidemia 8/31/2016   ? 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    ? Encounter for long-term (current) use of insulin (H) 8/11/2016   ? Esophageal reflux 11/18/2010   ? Nonalcoholic steatohepatitis 10/1/2009   ? 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 Status post coronary angiogram 3/9/2016   ? Tremor 9/28/2014    Past Surgical History:   Procedure Laterality Date   ? CORONARY ARTERY BYPASS GRAFT  2016   ? CV CORONARY ANGIOGRAM N/A 4/4/2019    Procedure: Coronary Angiogram;  Surgeon: Dominik Vega MD;  Location: [facility] Cath Lab;  Service: Cardiology   ? CV CORONARY ANGIOGRAM N/A 1/6/2021    Procedure: Coronary Angiogram;  Surgeon: Dominik Vega MD;  Location: [facility] Cardiac Cath Lab;  Service: Cardiology   ? CV LEFT HEART CATHETERIZATION WO LEFT VETRICULOGRAM Left 4/4/2019    Procedure: Left Heart Catheterization Without Left Ventriculogram;  Surgeon: Dominik Vega MD;  Location: [facility] Cath Lab;  Service: Cardiology   ? CV LEFT HEART CATHETERIZATION WO LEFT VETRICULOGRAM Left 1/6/2021    Procedure: Left Heart Catheterization  Without Left Ventriculogram;  Surgeon: Dominik Vega MD;  Location: [facility] Cardiac Cath Lab;  Service: Cardiology   ? CV RIGHT HEART CATH Right 2019    Procedure: Right Heart Catheterization;  Surgeon: Dominik Vega MD;  Location: [facility] Cath Lab;  Service: Cardiology   ? forearm sugery Left    ? MOHS SURGERY      Family History   Problem Relation Age of Onset   ? Diabetes type II Mother         58 ,  from anesthesia complication.   ? Heart disease Father         86  from stroke   ? Stroke Father    ? No Medical Problems Brother         60 years of age.    Social History     Socioeconomic History   ? Marital status:      Spouse name: Not on file   ? Number of children: Not on file   ? Years of education: Not on file   ? Highest education level: Not on file   Occupational History   ? Not on file   Social Needs   ? Financial resource strain: Not on file   ? Food insecurity     Worry: Not on file     Inability: Not on file   ? Transportation needs     Medical: Not on file     Non-medical: Not on file   Tobacco Use   ? Smoking status: Former Smoker     Quit date: 1998     Years since quittin.1   ? Smokeless tobacco: Never Used   Substance and Sexual Activity   ? Alcohol use: Yes     Comment: very rare   ? Drug use: No   ? Sexual activity: Never     Birth control/protection: None   Lifestyle   ? Physical activity     Days per week: Not on file     Minutes per session: Not on file   ? Stress: Not on file   Relationships   ? Social connections     Talks on phone: Not on file     Gets together: Not on file     Attends Bahai service: Not on file     Active member of club or organization: Not on file     Attends meetings of clubs or organizations: Not on file     Relationship status: Not on file   ? Intimate partner violence     Fear of current or ex partner: Not on file     Emotionally abused: Not on file     Physically abused: Not on file     Forced sexual  "activity: Not on file   Other Topics Concern   ? Not on file   Social History Narrative     and lives with life.    Has adult children from previous relationship. ( Blended family).    Has a dog - Antoniooswaldo        Jazzy Gil MD  1/3/2019              Medications  Allergies   Current Outpatient Medications   Medication Sig Dispense Refill   ? ACCU-CHEK GUIDE TEST STRIPS strips USE 1 STRIP TO CHECK GLUCOSE THREE TIMES DAILY 300 strip 1   ? acetaminophen (TYLENOL) 650 MG CR tablet Take 1,300 mg by mouth as needed.     ? antiox.mv no.10-omeg3s-lut-starr 280-10-2 mg cap Take 1 tablet by mouth 2 (two) times a day. icaps     ? aspirin 81 MG EC tablet Take 81 mg by mouth daily.     ? blood glucose meter (GLUCOMETER) Use 1 each As Directed as needed. Dispense glucometer brand per patient's insurance at pharmacy discretion. 1 each 0   ? carvediloL (COREG) 6.25 MG tablet Take 1 tablet (6.25 mg total) by mouth 2 (two) times a day with meals. 180 tablet 1   ? finasteride (PROSCAR) 5 mg tablet Take 1 tablet (5 mg total) by mouth daily. 90 tablet 3   ? furosemide (LASIX) 20 MG tablet Take 40 mg in the morning and 20 mg in the afternoon 270 tablet 0   ? insulin NPH-insulin regular (NOVOLIN 70-30) 100 unit/mL (70-30) injection Inject under the skin. Inject 27 units in AM and 27 units in PM     ? insulin syringe-needle U-100 0.3 mL 31 gauge x 15/64\" Syrg Use 1 each As Directed 2 (two) times a day. 100 Syringe 11   ? isosorbide mononitrate (IMDUR) 30 MG 24 hr tablet Take 1 tablet (30 mg total) by mouth daily. Take daily at 5 pm. 90 tablet 2   ? metFORMIN (GLUCOPHAGE) 500 MG tablet Take 2 tablets by mouth twice daily 360 tablet 1   ? metoprolol succinate (TOPROL-XL) 100 MG 24 hr tablet Take 50 mg by mouth 2 (two) times a day.      ? omeprazole (PRILOSEC) 40 MG capsule Take 40 mg by mouth daily as needed.            ? pramipexole (MIRAPEX) 0.25 MG tablet Take 2 tablets (0.5 mg total) by mouth at bedtime. 180 tablet 3   ? " rosuvastatin (CRESTOR) 20 MG tablet Take 1 tablet (20 mg total) by mouth at bedtime. 90 tablet 3   ? tamsulosin (FLOMAX) 0.4 mg cap Take 0.4 mg by mouth.     ? UNABLE TO FIND Apply 1 tablet to the mouth or throat daily. Med Name: Prevagen (memory)     ? warfarin ANTICOAGULANT (COUMADIN/JANTOVEN) 5 MG tablet Take 1 tablet (5 mg total) by mouth daily. Take 5mg daily by mouth as directed 90 tablet 1   ? clopidogreL (PLAVIX) 75 mg tablet Take 75 mg by mouth daily.     ? hydrOXYzine HCl (ATARAX) 25 MG tablet Take 1 tablet (25 mg total) by mouth 3 (three) times a day as needed for anxiety. 90 tablet 11   ? multivitamin therapeutic tablet Take 1 tablet by mouth daily.     ? pimecrolimus (ELIDEL) 1 % cream Apply topically daily.     ? pravastatin (PRAVACHOL) 80 MG tablet Take 80 mg by mouth at bedtime.       No current facility-administered medications for this visit.     Allergies   Allergen Reactions   ? Oxycodone Itching   ? Amlodipine Dizziness     Dizziness and dry heaves   ? Adhesive Tape-Silicones Itching and Rash   ? Lisinopril Cough         Lab Results    Chemistry/lipid CBC Cardiac Enzymes/BNP/TSH/INR   Lab Results   Component Value Date    CHOL 135 08/11/2020    HDL 54 08/11/2020    LDLCALC 57 08/11/2020    TRIG 119 08/11/2020    CREATININE 0.99 01/08/2021    BUN 14 01/08/2021    K 4.2 01/08/2021     01/08/2021     01/08/2021    CO2 26 01/08/2021    Lab Results   Component Value Date    WBC 4.9 01/08/2021    HGB 13.6 (L) 01/08/2021    HCT 40.4 01/08/2021     01/08/2021     01/08/2021    Lab Results   Component Value Date    BNP 91 (H) 01/08/2021    INR 2.50 (H) 02/08/2021        Jo Romano MD

## 2021-07-01 ENCOUNTER — COMMUNICATION - HEALTHEAST (OUTPATIENT)
Dept: ANTICOAGULATION | Facility: CLINIC | Age: 81
End: 2021-07-01

## 2021-07-01 ENCOUNTER — AMBULATORY - HEALTHEAST (OUTPATIENT)
Dept: LAB | Facility: CLINIC | Age: 81
End: 2021-07-01

## 2021-07-01 DIAGNOSIS — I48.20 CHRONIC ATRIAL FIBRILLATION (H): ICD-10-CM

## 2021-07-01 LAB — INR PPP: 1.8 (ref 0.9–1.1)

## 2021-07-02 ENCOUNTER — OFFICE VISIT - HEALTHEAST (OUTPATIENT)
Dept: PHYSICAL THERAPY | Facility: REHABILITATION | Age: 81
End: 2021-07-02

## 2021-07-02 DIAGNOSIS — Z79.4 TYPE 2 DIABETES MELLITUS WITH BOTH EYES AFFECTED BY PROLIFERATIVE RETINOPATHY WITHOUT MACULAR EDEMA, WITH LONG-TERM CURRENT USE OF INSULIN (H): ICD-10-CM

## 2021-07-02 DIAGNOSIS — G62.9 POLYNEUROPATHY: ICD-10-CM

## 2021-07-02 DIAGNOSIS — I25.83 CORONARY ARTERY DISEASE DUE TO LIPID RICH PLAQUE: ICD-10-CM

## 2021-07-02 DIAGNOSIS — M25.612 SHOULDER JOINT STIFFNESS, BILATERAL: ICD-10-CM

## 2021-07-02 DIAGNOSIS — E66.01 MORBID OBESITY (H): ICD-10-CM

## 2021-07-02 DIAGNOSIS — R29.3 POOR POSTURE: ICD-10-CM

## 2021-07-02 DIAGNOSIS — I25.10 CORONARY ARTERY DISEASE DUE TO LIPID RICH PLAQUE: ICD-10-CM

## 2021-07-02 DIAGNOSIS — E78.2 MIXED HYPERLIPIDEMIA: ICD-10-CM

## 2021-07-02 DIAGNOSIS — G89.29 CHRONIC PAIN OF BOTH SHOULDERS: ICD-10-CM

## 2021-07-02 DIAGNOSIS — M25.511 CHRONIC PAIN OF BOTH SHOULDERS: ICD-10-CM

## 2021-07-02 DIAGNOSIS — M25.611 SHOULDER JOINT STIFFNESS, BILATERAL: ICD-10-CM

## 2021-07-02 DIAGNOSIS — M25.552 LEFT HIP PAIN: ICD-10-CM

## 2021-07-02 DIAGNOSIS — I10 ESSENTIAL HYPERTENSION: ICD-10-CM

## 2021-07-02 DIAGNOSIS — E11.3593 TYPE 2 DIABETES MELLITUS WITH BOTH EYES AFFECTED BY PROLIFERATIVE RETINOPATHY WITHOUT MACULAR EDEMA, WITH LONG-TERM CURRENT USE OF INSULIN (H): ICD-10-CM

## 2021-07-02 DIAGNOSIS — M25.512 CHRONIC PAIN OF BOTH SHOULDERS: ICD-10-CM

## 2021-07-02 DIAGNOSIS — M25.559 HIP PAIN: ICD-10-CM

## 2021-07-02 DIAGNOSIS — M79.18 PIRIFORMIS MUSCLE PAIN: ICD-10-CM

## 2021-07-03 NOTE — ADDENDUM NOTE
Addendum Note by Kendal Muniz RN at 10/16/2020  4:12 PM     Author: Kendal Muniz RN Service: -- Author Type: Registered Nurse    Filed: 10/19/2020 12:24 PM Encounter Date: 10/16/2020 Status: Signed    : Kendal Muniz RN (Registered Nurse)    Addended by: KENDAL MUNIZ on: 10/19/2020 12:24 PM        Modules accepted: Orders

## 2021-07-03 NOTE — ADDENDUM NOTE
Addendum Note by Jesús Gil MD at 2/14/2019  3:13 PM     Author: Jesús Gil MD Service: -- Author Type: Physician    Filed: 2/14/2019  3:13 PM Encounter Date: 2/13/2019 Status: Signed    : Jesús Gil MD (Physician)    Addended by: JESÚS GIL on: 2/14/2019 03:13 PM        Modules accepted: Orders

## 2021-07-03 NOTE — ADDENDUM NOTE
Addendum Note by Sue Arshad RN at 5/15/2019  9:09 AM     Author: Sue Arshad RN Service: -- Author Type: Registered Nurse    Filed: 5/15/2019  9:09 AM Encounter Date: 5/14/2019 Status: Signed    : Sue Arshad RN (Registered Nurse)    Addended by: SUE ARSHAD on: 5/15/2019 09:09 AM        Modules accepted: Orders

## 2021-07-03 NOTE — ADDENDUM NOTE
Addendum Note by Janice Peguero CMA at 5/22/2019 11:00 AM     Author: Janice Peguero CMA Service: -- Author Type: Certified Medical Assistant    Filed: 5/22/2019 11:19 AM Encounter Date: 5/22/2019 Status: Signed    : Janice Peguero CMA (Certified Medical Assistant)    Addended by: JANICE PEGUERO on: 5/22/2019 11:19 AM        Modules accepted: Orders

## 2021-07-03 NOTE — ADDENDUM NOTE
Addendum Note by Vannesa Dominguez RN at 10/3/2019  1:32 PM     Author: Vannesa Dominguez RN Service: -- Author Type: Registered Nurse    Filed: 10/3/2019  1:32 PM Encounter Date: 10/2/2019 Status: Signed    : Vannesa Dominguez RN (Registered Nurse)    Addended by: VANNESA DOMINGUEZ on: 10/3/2019 01:32 PM        Modules accepted: Orders

## 2021-07-04 NOTE — TELEPHONE ENCOUNTER
Telephone Encounter by Mariel Dale, RN at 7/1/2021 12:41 PM     Author: Mariel Dale, RN Service: -- Author Type: Registered Nurse    Filed: 7/1/2021  2:26 PM Encounter Date: 7/1/2021 Status: Signed    : Mariel Dale RN (Registered Nurse)       ANTICOAGULATION MANAGEMENT     Pee Ayala 81 y.o., male is on warfarin with Subtherapeutic INR result (goal range 2.0-3.0)    Recent labs: (last 7 days)     07/01/21  1146   INR 1.80*       ASSESSMENT     Source: Patient/Caregiver Call and Template      Warfarin dosing taken: Warfarin taken as instructed.   - reported taking one time booster with 10mg warfarin dose, last week, as instructed.    Diet: No new diet changes affecting INR    Illness, Injury or hospitalization: No    Medication changes: None    Signs or symptoms of bleeding or clotting: No    Previous INR: subtherapeutic at 1.00 on 6/24/21.    Additional findings: None     PLAN     Recommended plan for no diet, medication or health factor changes affecting INR:     Dosing instructions:   - Increase your warfarin dose to 5 mg daily on Mon/Wed/Fri; and 7.5 mg daily rest of week.   - (5.9% change)    Follow up no later than: 1-2 wks.\   - INR scheduled on 7/15/21 @ [facility].    Telephone call with Pee who verbalizes understanding and agrees to plan    Lab visit scheduled    Education provided: importance of consistent vitamin K intake and target INR goal and significance of current INR result    Plan made per [facility] anticoagulation protocol    Mariel Dale  Anticoagulation Clinic   [phone]    Anticoagulation Episode Summary     Current INR goal:  2.0-3.0   TTR:  63.0 % (1 y)   Next INR check:  7/15/2021   INR from last check:  1.80 (7/1/2021)   Weekly max warfarin dose:     Target end date:     INR check location:     Preferred lab:     Send INR reminders to:  [facility]    Indications    Chronic atrial fibrillation (H) [I48.20]           Comments:            Anticoagulation Care Providers     Provider Role Specialty Phone number    Jazzy Gil MD Referring Family Medicine [phone]

## 2021-07-04 NOTE — TELEPHONE ENCOUNTER
Telephone Encounter by Mariel Dale, RN at 6/24/2021  3:06 PM     Author: Mariel Dale RN Service: -- Author Type: Registered Nurse    Filed: 6/25/2021 11:47 AM Encounter Date: 6/24/2021 Status: Addendum    : Mariel Dale RN (Registered Nurse)    Related Notes: Original Note by Mariel Dale RN (Registered Nurse) filed at 6/25/2021 11:44 AM       ANTICOAGULATION MANAGEMENT     Pee Ayala 81 y.o., male is on warfarin with Subtherapeutic INR result (goal range 2.0-3.0)    Recent labs: (last 7 days)     06/24/21  1354   INR 1.00       ASSESSMENT     Source: Template - Pee wrote to call him on Fri. 6/25 after 10a or later.      Warfarin dosing taken:   - reported not missing any warfarin dose.  (at least he did not think so).   - Pee reported he picked up his warfarin RX at pharmacy and warfarin dose had different dosing instructions.    - on 6/15/21 warfarin RX was filled and had different instructions than ordered by [facility].    Diet: Increased greens/vitamin K and eating more meat (protein) in diet; ongoing change    Illness, Injury or hospitalization: No    Medication changes: Will verify on 6/25/21. - Verified correct warfarin dosing instructions.    Signs or symptoms of bleeding or clotting: No    Previous INR: therapeutic last visit; previously outside of goal range    Additional findings: sent new warfarin instructions to pharmacy for next refill.  (he will be running out sooner, d/t wrong quanity).     PLAN     Recommended plan for temporary change(s) affecting INR:     Dosing instructions:   - did not take one time booster on 6/24/21.  (did not listen to VM)   - tonight, advised one time booster with 10mg warfarin dose.   - then continue same warfarin dose with 7.5mg on Tues/Thurs/Sat and 5mg all other days.    Follow up no later than: 5-7 days    - INR scheduled on 7/1/21 @ [facility].    Detailed voice message left for Pee with dosing instructions and follow up date.      Antonia requested a call on Fri. 6/25, after 10a or later    Education provided: target INR goal and significance of current INR result    Plan made per [facility] anticoagulation protocol    Mariel Dale  Anticoagulation Clinic   [phone]    Anticoagulation Episode Summary     Current INR goal:  2.0-3.0   TTR:  64.8 % (1 y)   Next INR check:  7/1/2021   INR from last check:  1.00 (6/24/2021)   Weekly max warfarin dose:     Target end date:     INR check location:     Preferred lab:     Send INR reminders to:  [facility]    Indications    Chronic atrial fibrillation (H) [I48.20]           Comments:           Anticoagulation Care Providers     Provider Role Specialty Phone number    Jazzy Gil MD Referring Family Medicine [phone]

## 2021-07-04 NOTE — PROGRESS NOTES
Progress Notes by Flakita Lujan PT at 5/28/2021  1:30 PM     Author: Flakita Lujan PT Service: -- Author Type: Physical Therapist    Filed: 5/28/2021  2:30 PM Encounter Date: 5/28/2021 Status: Attested    : Flakita Lujan PT (Physical Therapist) Cosigner: Jacqueline Varela DO at 6/1/2021  1:00 PM    Attestation signed by Jacqueline Varela DO at 6/1/2021  1:00 PM    Agree with [facility] Rehabilitation Certification Request    May 28, 2021      Patient: Pee Ayala  MR Number: [id number]  YOB: 1940  Date of Visit: 5/28/2021      Dear Jazzy Segovia MD:    Thank you for this referral.   We are seeing Pee Ayala in Physical Therapyfor hip pain.    Medicare and/or Medicaid requires physician review and approval of the treatment plan. Please review the plan of care and verify that you agree with the therapy plan of care by co-signing this note.      Plan of Care  Authorization / Certification Start Date: 05/28/21  Authorization / Certification End Date: 08/26/21  Authorization / Certification Number of Visits: 10  Communication with: Referral Source  Patient Related Instruction: Nature of Condition;Treatment plan and rationale;Self Care instruction;Basis of treatment;Body mechanics;Posture;Precautions;Next steps;Expected outcome  Times per Week: 1  Number of Weeks: 6-12  Number of Visits: 6-10  Discharge Planning: when goals are met or patient is independent in management  Precautions / Restrictions : cardiac percautions (in [facility] aortic vlave replacement)  Therapeutic Exercise: ROM;Stretching;Strengthening  Neuromuscular Reeducation: kinesio tape;posture;balance/proprioception;TNE;core  Manual Therapy: soft tissue mobilization;myofascial release;joint mobilization;muscle energy  Modalities: TENS;cold pack;hot pack  Gait Training: as indicated      Goals:  Pt. will demonstrate/verbalize independence in self-management of condition in : 4  weeks  Pt. will be independent with home exercise program in : 4 weeks    Pt will: report being able to sit for 30 min or more without getting onset of hip pain upon standing, within 8 weeks.  Pt will: be able to sleep throughout the night, without waking from hip pain, within 8 weeks.        If you have any questions or concerns, please don't hesitate to call.    Sincerely,      Flakita Lujan, PT, DPT        Physician recommendation:     ___ Follow therapist's recommendation        ___ Modify therapy      *Physician co-signature indicates they certify the need for these services furnished within this plan and while under their care.        [facility]   Hip Initial Evaluation    Patient Name: Pee Ayala  Date of evaluation: 5/28/2021  Referral Diagnosis: hip pain, left  Referring provider: Jazzy Gil MD  Visit Diagnosis:     ICD-10-CM    1. Hip pain  M25.559    2. Left hip pain  M25.552    3. Piriformis muscle pain  M79.18      Past Medical History:   Diagnosis Date   ? ACS (acute coronary syndrome) (H) 6/2/2014   ? Actinic keratosis 1/14/2014   ? Actinic keratosis 1/14/2014   ? Anticoagulated on Coumadin 12/30/2015   ? Atrial fibrillation (H) 2016   ? Bone mass 4/26/2017   ? Chest heaviness 1/23/2019   ? Closed fracture of left forearm 2015   ? Diabetes (H) 2009   ? Dyslipidemia 8/31/2016   ? 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    ? Encounter for long-term (current) use of insulin (H) 8/11/2016   ? Esophageal reflux 11/18/2010   ? Nonalcoholic steatohepatitis 10/1/2009   ? 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 Status post coronary angiogram 3/9/2016   ? Tremor 9/28/2014         Assessment:      Pee Ayala is a 81 y.o. male who presents to therapy today with chief complaints of left hip pain. Symptoms began a few months ago.  Patient has a PMH that  is positive for recent history of aeortic valve replacement surgery currently participating in cardiac rehab. Functional impairments include getting up after sitting for 10 min or longer and having pain, stiars, and sleeping throughout the night without waking from pain. Pain symptoms are also present in the right shoulder, will request an order for this as well.  Physical therapy evaluation found pain provocation with IR in hip, palpation of piriformis and piriformis stretching. Patient demonstrates signs and sx consistent with hip pain coming likely from piriformis muscle vs OA. PT POC and goals have been discussed with patient and He  is agreeable to these. Patient appears motivated for physical therapy and is appropriate for skilled therapy services.    Will get order for shoulder and evaluate next session.    The POC is dynamic and will be modified on an ongoing basis.  Barriers to achieving goals as noted in the assessment section may affect outcome.  Prognosis to achieve goals is  good   Pt. is appropriate for skilled PT intervention as outlined in the Plan of Care (POC).  Pt. is a good candidate for skilled PT services to improve pain levels and function.  Plan of care and goals were established in collaboration with patient.     Goals:  Pt. will demonstrate/verbalize independence in self-management of condition in : 4 weeks  Pt. will be independent with home exercise program in : 4 weeks    Pt will: report being able to sit for 30 min or more without getting onset of hip pain upon standing, within 8 weeks.  Pt will: be able to sleep throughout the night, without waking from hip pain, within 8 weeks.      Patient's expectations/goals are realistic.    Barriers to Learning or Achieving Goals:  Chronicity of the problem.  Co-morbidities or other medical factors.  .  Age.        Plan / Patient Instructions:        Plan of Care:   Authorization / Certification Start Date: 05/28/21  Authorization / Certification  End Date: 08/26/21  Authorization / Certification Number of Visits: 10  Communication with: Referral Source  Patient Related Instruction: Nature of Condition;Treatment plan and rationale;Self Care instruction;Basis of treatment;Body mechanics;Posture;Precautions;Next steps;Expected outcome  Times per Week: 1  Number of Weeks: 6-12  Number of Visits: 6-10  Discharge Planning: when goals are met or patient is independent in management  Precautions / Restrictions : cardiac percautions (in [facility] rehab aortic vlave replacement)  Therapeutic Exercise: ROM;Stretching;Strengthening  Neuromuscular Reeducation: kinesio tape;posture;balance/proprioception;TNE;core  Manual Therapy: soft tissue mobilization;myofascial release;joint mobilization;muscle energy  Modalities: TENS;cold pack;hot pack  Gait Training: as indicated      POC and pathology of condition were reviewed with patient.  Pt. is in agreement with the Plan of Care  A Home Exercise Program (HEP) was initiated today.  Pt. was instructed in exercises by PT and patient was given a handout with detailed instructions.    Plan for next visit: evaluate shoulder next session, progress exercise, work on hip mobility and strengthening.     Subjective:      Patient has had aortic valve replacement and now has been doing cardiac rehab.    X-ray reviewed by me shows mild arthritis of the hip.    Hip pain started a few months ago, shoulder did a little before that. Planning to get order for shoulder. HE is currently participating in cardiac rehab for heart procedure. Hip pain hurts the most after sitting for a bit and getting up. Normally about 10 steps and it goes away. Sitting for 10 min or more this happens.    Hip wakes him up every single night multiple times.     Fall: two times in the last two years, getting out of bed late at night when mother nature calls.     Patient reports he also has shoulder pain on the right side. This hurts with reaching and dressing and hanging  things up. Hip pain wakes up at night. Shoulder does at sometime.     Both wake up at night and go to a recliner at 4 am every night.    Social information:   Living Situation: live with wife, stairs are a big problem (more related to heart- legs get tired and out of breath).   Occupation:retired   Work Status:NA     Pain Ratin  Pain rating at best: 0 when walk most of the time  Pain rating at worst: 10 (at night when it wakes him up sleeping on that side)  Pain description: aching    Functional limitations are described as occurring with:   bending over to tie shoes (shortness of breath), tighing shoes crossing over hurts, getting up after sitting, getting out o the car when driving    Patient reports, tyelonal helps sleep to 4, walking       Objective:      Note: Items left blank indicates the item was not performed or not indicated at the time of the evaluation.    Patient Outcome Measures :    Lower Extremity Functional Scale (_/80): 56     Scores range from 0-80, where a score of 80 represents maximum function. The minimal clinically important difference is a positive change of 9 points.    Hip Examination  1. Hip pain     2. Left hip pain     3. Piriformis muscle pain       Precautions/Restrictions: cardiac  Involved Side: Left  Posture Observation: forward hunched posture     General sitting posture is  fair.  General standing posture is fair.  Assistive Device: None  Gait Observation: forward hunched posture, small step length, non antalgic  Lumbar Clearing: Does not provoke symptoms    30 sec STS: 7 repetitions (tiring from the knees down, calves and ankles get very tired)    Hip ROM:    Date: 21     Hip ROM( ) AROM in degrees AROM in degrees AROM in degrees    Right Left Right Left Right Left   Hip Flexion (0-120 ) 100 95       Hip Abduction (0-45 ) 40 40       Hip External Rotation (0-50 ) 45 40       Hip Internal Rotation (0-40 ) 35 tightness and pain provoking (20 degrees about)       Hip  Extension (0-15 ) 5 5         PROM:  Tight in hip flexors and IR of hip    Hip/Knee Strength   5/5 with MMT chayo LE (noted below if there is pain)  Date: 05/28/21     Hip/Knee Strength (/5) MMT MMT MMT    Right Left Right Left Right Left   Hip Flexion  Little pain       Hip Abduction         Hip Adduction         Hip Extension         Hip External Rotation  painful       Hip Internal Rotation  Little pain       Knee Extension         Knee Flexion           Ankle/Foot Strength (/5) MMT MMT MMT    Right Left Right Left Right Left   Dorsiflexion            Palpation: pain to palpation posterior GH joint on left hip and across buttock along piriformis muscle, lateral attachment most painful at this time  LE Sensation: WNL, light touch in tact    Hip Special Tests     OA Right (+/-) Left (+/-) Intra Articular Right (+/-) Left (+/-)   Hip Scour - - MERARY - -   Test Cluster  -Hip pain  -Hip IR <15   -Hip Flex <115  - + FADIR - +   Test Cluster  -Painful Hip IR  ->50 years old  -Morning Stiffness <60 min  + Passive Supine Rotation Test     Misc. Right (+/-) Left (+/-) Stinchfield Test (SLR Against Resistance) - -   Elys - - External Derotation Test (glutmed)     Obers - - DIRI     Trendelenburg - - Posterior Rim Impingement     SIJ Right (+/-) Left (+/-) Lateral Rim Impingement     SIJ Compression - - Other     SIJ Distraction - - Other     POSH Test   Other     Sacral Thrust   Other         Exercises:  Exercise #1: piriformis stretch x30sec  Comment #1: clamshell x10  Exercise #2: bridge x10  Comment #2: STS 2x5      Treatment Today:  TREATMENT MINUTES COMMENTS   Evaluation 19 Low Complexity Eval  Patient educated on pathology  Discussed POC   Self-care/ Home management     Manual therapy     Neuromuscular Re-education     Therapeutic Activity     Therapeutic Exercises 25 Demo/performance of HEP, education on purpose of exercise. Handouts with written instruction provided.   Gait training     Modality__________________                 Total 44    Blank areas are intentional and mean the treatment did not include these items.     PT Evaluation Code: (Please list factors)  Patient History/Comorbidities:   Past Medical History:   Diagnosis Date   ? ACS (acute coronary syndrome) (H) 6/2/2014   ? Actinic keratosis 1/14/2014   ? Actinic keratosis 1/14/2014   ? Anticoagulated on Coumadin 12/30/2015   ? Atrial fibrillation (H) 2016   ? Bone mass 4/26/2017   ? Chest heaviness 1/23/2019   ? Closed fracture of left forearm 2015   ? Diabetes (H) 2009   ? Dyslipidemia 8/31/2016   ? 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    ? Encounter for long-term (current) use of insulin (H) 8/11/2016   ? Esophageal reflux 11/18/2010   ? Nonalcoholic steatohepatitis 10/1/2009   ? 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 Status post coronary angiogram 3/9/2016   ? Tremor 9/28/2014       Examination: see above   Clinical Presentation: stable  Clinical Decision Making: low    Patient History/  Comorbidities Examination  (body structures and functions, activity limitations, and/or participation restrictions) Clinical Presentation Clinical Decision Making (Complexity)   No documented Comorbidities or personal factors 1-2 Elements Stable and/or uncomplicated Low   1-2 documented comorbidities or personal factor 3 Elements Evolving clinical presentation with changing characteristics Moderate   3-4 documented comorbidities or personal factors 4 or more Unstable and unpredictable High       Flakita Lujan, PT, DPT  5/28/2021  8:15 AM

## 2021-07-04 NOTE — PROGRESS NOTES
Progress Notes by Opal Esteves OT at 1/20/2021  8:00 AM     Author: Opal Esteves OT Service: -- Author Type: Occupational Therapist    Filed: 6/29/2021  3:25 PM Encounter Date: 1/20/2021 Status: Signed    : Opal Esteves OT (Occupational Therapist) Cosigner: Socorro Gil MD at 6/29/2021  3:52 PM       ITP ASSESSMENT   Assessment Day: 150 Day/DISCHARGE    Session Number: 36  Precautions: S/P TAVR    Diagnosis: Valve;CHF    Risk Stratification: High    Referring Provider: Jo Romano MD  EXERCISE  Exercise Assessment: Discharge                        Education Goals: All goals in this section met    Education Goals Met: Patient can state cardiac s/s and appropriate emergency response.;Has system for taking medication.;Medication review.                          Goals Met                                    120 day ADL'S goals met: MET. Pt is exercising 40 minutes at 2.8-3 MET level.     No data recorded  No data recorded  120 Day Progress: Continues to make progress, not doing much home exercise      90 day ADL'S goals met: Goal met: Tolerates 40 min of exercise at 2.5-3.1 METS with no CV sxs.    90 Day Progress: Reports he has resumed light to mod housework, grocery shopping with his wife to assist and walks his dog 60 min 5-6 days a week.  He is making nice progress but wants to be able to work in the garden, do yardwork and go upstairs with no sx's.      60 day ADL'S goals met: Goal Met - Pt is tolerating u pto 4.2 mets of exercise for 30-40 minutes without cardiovascular symptom or EKG changes.    60 day Leisure goals met: Goal Met - Pt has lost 4lbs in the past month.    60 day Work goals met: Goal Met - Pt is tolerating up to 4.2 mets    60 Day Progression: Pt is unavailabe for reassessment due to being on vacation. Will reassess when he returns.      Exercise Prescription  Exercise Mode: Treadmill;Bike;Nustep;Arm Erg.    Frequency: 2x/week    Duration: 40 min    Intensity /  "THR: 20-30 beats above resting heart rate    RPE 11-14  Progression / Met level: 3.1-4    Resistive Training?: Yes      Current Exercise (mins/week): 250      Interventions  Home Exercise:  Mode: walking    Frequency: 4-5x/week    Duration: 20-40 min      Education Material : Educational videos;Provide written material;Individual education and counseling      Education Completed  Exercise Education Completed: Signs and Symptoms;Medication review;RPE;Emergency Plan;Home Exercise;Benefits of Exercise;End point of exercise              Exercise Follow-up/Discharge  Follow up/Discharge: Pt has had min-mod improvement in exercise ability. He has a home walking program.   NUTRITION  Nutrition Assessment: Discharge      Nutrition Risk Factors:  Nutrition Risk Factors: Diabetes;Dyslipidemia;Overweight  HbA1c: 9.1  Monitors blood sugar at home: Yes  Frequency: 4x/day  Cholesterol: 135  LDL: 57  HDL: 54  Triglycerides: 119      Nutrition Plan  Interventions  Diet Consult: Completed    Other Nutrition Intervention: Therapist/Pt Discussion;Provide with Written Material    Initial Rate Your Plate Score: 52    Pre Initial Rate Your Plate Score: 57   No data recorded    Education Completed  Nutrition Education Completed: Low Saturated fat diet;Low sodium diet      Goals  Nutrition Goals (Next 30 days): Patient demonstrated understanding of cardiac nutrition, no goals identified for the next 30 days      Goals Met  Nutrition Goals Met: Patient follows a low sodium diet;Completed Nutritional Risk Screen;Patient knows appropriate portion size      Height, Weight, and  BMI  Weight: 202 lb 9.6 oz (91.9 kg)  Height: 5' 5\" (1.651 m)  BMI: 33.71    Pre BMI: 33.28     Nutrition Follow-up  Follow-up/Discharge: RD available for questions as needed         Other Risk Factors  Other Risk Factor Assessment: Discharge      HTN Risk Factor: Hypertension      No data recorded    Hypertension Plan  Goals  HTN Goals: Follow low sodium diet;Take " medication as prescribed;Exercises regularly;Patient demonstrates understanding of HTN, no goals identified for the next 30 days      Goals Met  HTN Goals Met: Take medication as prescribed      HTN Interventions  HTN Interventions: Diet consult;Therapist/patient discussion;Provide written material;Offer educational videos      HTN Education Completed  HTN Education Completed: Medication review;Risk factor overview      Tobacco Risk Factor: NA    Risk Factor Follow-up   Follow-up/Discharge: BP's/HR WNL     PSYCHOSOCIAL  Psychosocial Assessment: Discharge       [facility] Q [facility] L Summary Score: 21    [facility]     PHQ-9 Total Score: 4    Pre PHQ-9 Total Score: 5     Psychosocial Risk Factor: Stress      Psychosocial Plan  Interventions  If PHQ-9 is >9, send letter to MD  Interventions: Offer educational videos and classes;Provide written material;Individual education and counseling      Education Completed  Education Completed: Effects of stress on body      Goals  Goals (Next 30 days): Practicing stress management skills      Goals Met  Goals Met: Identified Support system;Oriented to stress management classes;Identify stressors      Psychosocial Follow-up  Follow-up/Discharge: Not currently an issue for him.             Patient involved in Goal setting?: Yes      Signature: _____________________________________________________________    Date: __________________    Time: __________________See Doc Flowsheet

## 2021-07-05 NOTE — PROGRESS NOTES
Progress Notes by Josephine Avelar PT at 7/2/2021  9:30 AM     Author: Josephine Avelar PT Service: -- Author Type: Physical Therapist    Filed: 7/2/2021 10:35 AM Encounter Date: 7/2/2021 Status: Attested    : Josephine Avelar PT (Physical Therapist) Cosigner: Aureliano Ozuna MD at 7/5/2021 11:15 AM    Attestation signed by Aureliano Ozuna MD at 7/5/2021 11:15 AM    Signing for Dr. Louise CHRISTY [facility] Rehabilitation Certification Request    July 2, 2021      Patient: Pee Ayala  MR Number: [id number]  YOB: 1940  Date of Visit: 7/2/2021      Dear Jazzy Segovia MD:    Thank you for this referral.   We are seeing Pee Ayala in Physical Therapy for hip pain and bilateral shoulder pain.    Medicare and/or Medicaid requires physician review and approval of the treatment plan. Please review the plan of care and verify that you agree with the therapy plan of care by co-signing this note.      Plan of Care  Authorization / Certification Start Date: 07/02/21  Authorization / Certification End Date: 09/30/21  Authorization / Certification Number of Visits: 12  Communication with: Referral Source  Patient Related Instruction: Nature of Condition;Treatment plan and rationale;Self Care instruction;Basis of treatment;Body mechanics;Posture;Precautions;Next steps;Expected outcome  Times per Week: 1-2  Number of Weeks: 6-12  Number of Visits: 6-12  Discharge Planning: Patient will be discharged when independent in self-management of condition or when goals are met.  Precautions / Restrictions : cardiac percautions (in [facility] re[facility] aortic vlave replacement)  Therapeutic Exercise: ROM;Stretching;Strengthening  Neuromuscular Reeducation: kinesio tape;posture;balance/proprioception;TNE;core  Manual Therapy: soft tissue mobilization;myofascial release;joint mobilization;muscle energy  Modalities: TENS;cold pack;hot pack  Gait Training: as  indicated      Goals:  Pt. will demonstrate/verbalize independence in self-management of condition in : 4 weeks;Progressing toward  Pt. will be independent with home exercise program in : 4 weeks;Progressing toward    Pt will: report being able to sit for 30 min or more without getting onset of hip pain upon standing, within 8 weeks.  Pt will: be able to sleep throughout the night, without waking from hip pain, within 8 weeks.  Pt will: demonstrate functional shoulder ROM in order to reach into cupboard to put dishes away with <2/10 pain in the shoulders; in 8 weeks  Pt will: demonstrate functional strength in order to be able to put milk back in the refrigerator with <2/10 pain in the shoulders; in 8 weeks        If you have any questions or concerns, please don't hesitate to call.    Sincerely,      Josephine Avelar, PT, DPT        Physician recommendation:     ___ Follow therapist's recommendation        ___ Modify therapy      *Physician co-signature indicates they certify the need for these services furnished within this plan and while under their care.      [facility] Daily Progress Note    Patient Name: Pee Ayala  Date: 7/2/2021  Visit #: 2  PTA visit #:  NA  Referral Diagnosis: Hip pain, left. Bilateral shoulder pain  Referring provider: Jazzy Gil MD  Visit Diagnosis:     ICD-10-CM    1. Hip pain  M25.559    2. Left hip pain  M25.552    3. Piriformis muscle pain  M79.18    4. Chronic pain of both shoulders  M25.511     G89.29     M25.512    5. Shoulder joint stiffness, bilateral  M25.611     M25.612    6. Poor posture  R29.3      Assessment from Initial Evaluation:  Pee Ayala is a 81 y.o. male who presents to therapy today with chief complaints of left hip pain. Symptoms began a few months ago.  Patient has a PMH that is positive for recent history of aeortic valve replacement surgery currently participating in cardiac rehab. Functional impairments include  getting up after sitting for 10 min or longer and having pain, stiars, and sleeping throughout the night without waking from pain. Pain symptoms are also present in the right shoulder, will request an order for this as well.  Physical therapy evaluation found pain provocation with IR in hip, palpation of piriformis and piriformis stretching. Patient demonstrates signs and sx consistent with hip pain coming likely from piriformis muscle vs OA. PT POC and goals have been discussed with patient and He  is agreeable to these. Patient appears motivated for physical therapy and is appropriate for skilled therapy services.    Precautions / Restrictions : cardiac percautions (in [facility] rehab aortic vlave replacement)    Assessment:     HEP/POC compliance is  fair .  The patient presents to PT for first follow up visit, wanting to be evaluated for his shoulder pain.  He demonstrates limited shoulder ROM and strength as well as hypomobility at the GH joint.  Patient demonstrates s/s of impingement syndrome bilaterally L > R.  He is appropriate for skilled PT services to address mobility, ROM, strength, and overall function.    Goal Status:  Pt. will demonstrate/verbalize independence in self-management of condition in : 4 weeks;Progressing toward  Pt. will be independent with home exercise program in : 4 weeks;Progressing toward    Pt will: report being able to sit for 30 min or more without getting onset of hip pain upon standing, within 8 weeks.  Pt will: be able to sleep throughout the night, without waking from hip pain, within 8 weeks.  Pt will: demonstrate functional shoulder ROM in order to reach into cupboard to put dishes away with <2/10 pain in the shoulders; in 8 weeks  Pt will: demonstrate functional strength in order to be able to put milk back in the refrigerator with <2/10 pain in the shoulders; in 8 weeks    Plan / Patient Education:     Continue with initial plan of care.  Progress with home program as  tolerated.  Continue with GH joint mobilizations if helpful.  Progress shoulder strengthening and ROM exercises as able.  Address hip PRN.   evaluate shoulder next session, progress exercise, work on hip mobility and strengthening.    Subjective:     Pain Rating: 3-4 when he moves his shoulders, the higher he lifts, the higher the pain  The patient reports that his shoulder has been hurting for months.  He fell on the L shoulder and broke it 2 years ago.  The R shoulder he is not sure if he has arthritis or what.  Both shoulders are at the same stage of mobility and pain.  He can't put milk back in the refrigerator unless he uses both hands.  He denies any injury to the R shoulder.  No pain at rest.  His hip is doing much better.  He has been doing 2 of the exercises that were given to him.  He does have pulleys at home.    Objective:     Evaluation of shoulder pain:    Shoulder AROM to shoulder height in sitting.  Pain with flexion, abduction, ER.  No pain or limited ROM into IR/ext.    Shoulder/Elbow Strength  Date: 7/2/21     Shoulder/Elbow Strength (/5)  Manual Muscle Test (MMT) MMT MMT MMT    Right Left Right Left Right Left   Shoulder Flexion 4+ 4+       Supraspinatus 4 4       Shoulder Abduction 4+ 4+ with pain       Shoulder Extension         Shoulder External Rotation 4+ with pain 4 with pain       Shoulder Internal Rotation 4+ 4+       Elbow Flexion 5 5       Elbow Extension 5 5       Other:         Other:             Palpation: Tenderness bilateral bicep tendons.    Shoulder/Elbow ROM  Date: 7/2/21     Shoulder and Elbow ROM ( )   AROM in degrees AROM in degrees AROM in degrees    Right Left Right Left Right Left   Shoulder Flexion (0-180 ) 113 95       Shoulder Abduction (0-180 ) 96 85       Shoulder Extension (0-60 )         Shoulder ER (0-90 ) 66 at 90 degrees abduction 44 at 30 degrees abduction       Shoulder IR (0-70 ) 39 at 90 degrees abduction 55 at 30 degrees abduction       Shoulder IR/EXT          Elbow Flexion (150 )         Elbow Extension (0 )          PROM in degrees PROM in degrees PROM in degrees    Right Left Right Left Right Left   Shoulder Flexion (0-180 )         Shoulder Abduction (0-180 )         Shoulder Extension (0-60 )         Shoulder ER (0-90 )         Shoulder IR (0-70 )         Elbow Flexion (150 )         Elbow Extension (0 )           Shoulder Special Tests  Impingement Cluster Right (+/-) Left (+/-) Rotator Cuff Tests Right (+/-) Left (+/-)   Balwinder-Pierre + + Drop Arm Sign     Painful Arc   Hornblowers     Infraspinatus Test + + ERLS     AC Tests Right (+/-) Left (+/-) IRLS     Active Compression   Labral Tests Right (+/-) Left (+/-)   Crossbody Adduction   Biceps Load Test II     AC Resisted Extension   Jerk Test     GH Instability Tests Right (+/-) Left (+/-) Vijaya Test     Sulcus Sign   Biceps Tests Right (+/-) Left (+/-)   Apprehension   Speed     Relocation   Kameron     Other:   Other:     Other:   Other:       See flow sheet for date performed:  Exercise #1: piriformis stretch HEP  Comment #1: clamshell HEP  Exercise #2: bridge HEP  Comment #2: STS HEP  Exercise #3: Scapular retractions x 10  Comment #3: Shoulder ER with L1 band x 10  Exercise #4: Shoulder IR towel stretch  Comment #4: L shoulder x 20 seconds; patient to re-try on R after a few days at home and do on R side if non-painful    Appt time: 9:30AM - 10:12AM    Treatment Today     TREATMENT MINUTES COMMENTS   Evaluation 16 -Low complexity shoulder evaluation   Self-care/ Home management     Manual therapy 9 -Supine L shoulder GH joint mobilizations grades II-III x 30 x 3 AP, inferior, and joint distraction   Neuromuscular Re-education     Therapeutic Activity     Therapeutic Exercises 17 -See flow sheet; initiated shoulder exercises  -Discussed pulleys since patient has at home to do for 3-5 minutes at a time.   Gait training     Modality__________________                Total 42    Blank areas are intentional and  mean the treatment did not include these items.     PT Evaluation Code: (Please list factors)  Patient History/Comorbidities: CAD, type 2 diabetes, dyslipidemia, diabetic polyneuropathy, HTN, obesity  Examination: Bilateral shoulders  Clinical Presentation: Stable  Clinical Decision Making: Low complexity    Patient History/  Comorbidities Examination  (body structures and functions, activity limitations, and/or participation restrictions) Clinical Presentation Clinical Decision Making (Complexity)   No documented Comorbidities or personal factors 1-2 Elements Stable and/or uncomplicated Low   1-2 documented comorbidities or personal factor 3 Elements Evolving clinical presentation with changing characteristics Moderate   3-4 documented comorbidities or personal factors 4 or more Unstable and unpredictable High         Josephine Avelar, PT, DPT  7/2/2021

## 2021-07-14 DIAGNOSIS — I48.20 CHRONIC ATRIAL FIBRILLATION (H): Primary | ICD-10-CM

## 2021-07-14 PROBLEM — I74.10 AORTIC THROMBUS (H): Status: RESOLVED | Noted: 2020-01-02 | Resolved: 2021-01-08

## 2021-07-14 NOTE — PROGRESS NOTES
Standing POCT INR order was already placed; transferred order(s) discontinued.     Viky Escamilla RN  [facility] Anticoagulation  [phone]

## 2021-07-15 ENCOUNTER — LAB (OUTPATIENT)
Dept: LAB | Facility: CLINIC | Age: 81
End: 2021-07-15
Payer: COMMERCIAL

## 2021-07-15 ENCOUNTER — ANTICOAGULATION THERAPY VISIT (OUTPATIENT)
Dept: ANTICOAGULATION | Facility: CLINIC | Age: 81
End: 2021-07-15

## 2021-07-15 DIAGNOSIS — I48.20 CHRONIC ATRIAL FIBRILLATION (H): Primary | ICD-10-CM

## 2021-07-15 DIAGNOSIS — I48.20 CHRONIC ATRIAL FIBRILLATION (H): ICD-10-CM

## 2021-07-15 LAB — INR BLD: 1.6 (ref 0.9–1.1)

## 2021-07-15 PROCEDURE — 85610 PROTHROMBIN TIME: CPT

## 2021-07-15 PROCEDURE — 36416 COLLJ CAPILLARY BLOOD SPEC: CPT

## 2021-07-15 NOTE — PROGRESS NOTES
ANTICOAGULATION MANAGEMENT     Pee Ayala 81 year old male is on warfarin with subtherapeutic INR result. (Goal INR 2.0-3.0)    Recent labs: (last 7 days)     07/15/21  0947   INR 1.6*       ASSESSMENT     Source(s): Patient/Caregiver Call and Template       Warfarin doses taken: Warfarin taken as instructed    Diet: No new diet changes identified    New illness, injury, or hospitalization: No    Medication/supplement changes: None noted    Signs or symptoms of bleeding or clotting: No    Previous INR: Subtherapeutic at 1.80 on 7/1/21.    Additional findings: None     PLAN     Recommended plan for no diet, medication or health factor changes affecting INR     Dosing Instructions:  Increase your warfarin dose (11.1% change) with next INR in 1-2 weeks         Summary  As of 7/15/2021    Full warfarin instructions:  5 mg every Wed; 7.5 mg all other days   Next INR check:  7/29/2021             Telephone call with Pee who verbalizes understanding and agrees to plan    Lab visit scheduled - 7/29/21 @ [facility]    Education provided: Importance of consistent vitamin K intake and Target INR goal and significance of current INR result    Plan made per [facility] anticoagulation protocol    Mariel Dale, RN  Anticoagulation Clinic  7/15/2021    _______________________________________________________________________     Anticoagulation Episode Summary     Current INR goal:  2.0-3.0   TTR:  55.2 % (11.7 mo)   Target end date:     Send INR reminders [facility]:  [facility]    Indications    Chronic atrial fibrillation (H) [I48.20]           Comments:           Anticoagulation Care Providers     Provider Role Specialty Phone number    Jazzy Gil MD Referring Family Medicine [phone]

## 2021-08-03 ENCOUNTER — HOSPITAL ENCOUNTER (OUTPATIENT)
Dept: PHYSICAL THERAPY | Facility: REHABILITATION | Age: 81
End: 2021-08-03
Payer: COMMERCIAL

## 2021-08-03 ENCOUNTER — ANTICOAGULATION THERAPY VISIT (OUTPATIENT)
Dept: ANTICOAGULATION | Facility: CLINIC | Age: 81
End: 2021-08-03

## 2021-08-03 ENCOUNTER — LAB (OUTPATIENT)
Dept: LAB | Facility: CLINIC | Age: 81
End: 2021-08-03
Payer: COMMERCIAL

## 2021-08-03 DIAGNOSIS — E11.3599 TYPE 2 DIABETES MELLITUS WITH PROLIFERATIVE RETINOPATHY WITHOUT MACULAR EDEMA, WITHOUT LONG-TERM CURRENT USE OF INSULIN, UNSPECIFIED LATERALITY (H): ICD-10-CM

## 2021-08-03 DIAGNOSIS — M25.611 SHOULDER JOINT STIFFNESS, BILATERAL: ICD-10-CM

## 2021-08-03 DIAGNOSIS — M25.512 CHRONIC PAIN OF BOTH SHOULDERS: ICD-10-CM

## 2021-08-03 DIAGNOSIS — M25.552 LEFT HIP PAIN: ICD-10-CM

## 2021-08-03 DIAGNOSIS — M25.559 HIP PAIN: Primary | ICD-10-CM

## 2021-08-03 DIAGNOSIS — I48.20 CHRONIC ATRIAL FIBRILLATION (H): ICD-10-CM

## 2021-08-03 DIAGNOSIS — E11.9 TYPE 2 DIABETES, HBA1C GOAL < 8% (H): ICD-10-CM

## 2021-08-03 DIAGNOSIS — M25.511 CHRONIC PAIN OF BOTH SHOULDERS: ICD-10-CM

## 2021-08-03 DIAGNOSIS — E11.3599 TYPE 2 DIABETES MELLITUS WITH PROLIFERATIVE RETINOPATHY WITHOUT MACULAR EDEMA, WITHOUT LONG-TERM CURRENT USE OF INSULIN, UNSPECIFIED LATERALITY (H): Primary | ICD-10-CM

## 2021-08-03 DIAGNOSIS — M25.612 SHOULDER JOINT STIFFNESS, BILATERAL: ICD-10-CM

## 2021-08-03 DIAGNOSIS — I48.20 CHRONIC ATRIAL FIBRILLATION (H): Primary | ICD-10-CM

## 2021-08-03 DIAGNOSIS — R29.3 POOR POSTURE: ICD-10-CM

## 2021-08-03 DIAGNOSIS — M79.18 PIRIFORMIS MUSCLE PAIN: ICD-10-CM

## 2021-08-03 DIAGNOSIS — G89.29 CHRONIC PAIN OF BOTH SHOULDERS: ICD-10-CM

## 2021-08-03 LAB
HBA1C MFR BLD: 9 % (ref 0–5.6)
INR BLD: 3.3 (ref 0.9–1.1)

## 2021-08-03 PROCEDURE — 85610 PROTHROMBIN TIME: CPT

## 2021-08-03 PROCEDURE — 80048 BASIC METABOLIC PNL TOTAL CA: CPT

## 2021-08-03 PROCEDURE — 36415 COLL VENOUS BLD VENIPUNCTURE: CPT

## 2021-08-03 PROCEDURE — 97110 THERAPEUTIC EXERCISES: CPT | Mod: GP | Performed by: PHYSICAL THERAPIST

## 2021-08-03 PROCEDURE — 83036 HEMOGLOBIN GLYCOSYLATED A1C: CPT

## 2021-08-03 NOTE — PROGRESS NOTES
ANTICOAGULATION MANAGEMENT     Pee Ayala 81 year old male is on warfarin with supratherapeutic INR result. (Goal INR 2.0-3.0)    Recent labs: (last 7 days)     08/03/21  1419   INR 3.3*       ASSESSMENT     Source(s): Chart Review and Template       Warfarin doses taken: No.   -Template incorrect wrote dosing from 7/1/21; need to confirm home dose with Pee.   - will have Pee take warfarin as he stated and will update anticoag. Calendar.   - reviewed INR trends with varying INR results, despite dose adjustments.    Diet: No new diet changes identified    New illness, injury, or hospitalization: No    Medication/supplement changes: None noted    Signs or symptoms of bleeding or clotting: No    Previous INR: Subtherapeutic on 1.60 on 7/15/21.    Additional findings: None     PLAN     Recommended plan for temporary change(s) affecting INR     Dosing Instructions: Tonight, advised one time lower dose with 5mg warfarin.      Summary  As of 8/3/2021    Full warfarin instructions:  8/3: Hold; Otherwise 5 mg every Mon, Wed, Fri; 7.5 mg all other days   Next INR check:  8/10/2021             Detailed voice message left for Pee with dosing instructions and follow up date.   - need to provide warfarin education.   - needs wkly INR check and adjust warfarin dose to ensure INR stability 2-3.   - (has had varying INR's from sub to supra, despite warfarin dose adjustments, not coinciding with INR result).   - will need to write down warfarin dosing, and not by memory.    will schedule next INR    Education provided: Target INR goal and significance of current INR result and Importance of taking warfarin as instructed    Plan made per [facility] anticoagulation protocol    Mariel Dale, RN  Anticoagulation Clinic  8/3/2021    _______________________________________________________________________     Anticoagulation Episode Summary     Current INR goal:  2.0-3.0   TTR:  52.8 % (11.7 mo)   Target end date:     Send INR  reminders to:  [facility]    Indications    Chronic atrial fibrillation (H) [I48.20]           Comments:           Anticoagulation Care Providers     Provider Role Specialty Phone number    Jazzy Gil MD Referring Family Medicine [phone]

## 2021-08-03 NOTE — PROGRESS NOTES
Beginning/End Dates of Progress Note Reporting Period:  7/2/21 to 8/3/21    Progress Toward Goals:   Progress this reporting period: The patient is progressing toward all goals.  He had a slight setback since last week, but ROM appears improved still since beginning PT.    Goal Identifier Self-Management/HEP   Goal Description Patient will be independent in self-management of condition and HEP.   Target Date 07/30/21   Date Met      Progress (detail required for progress note):Progressing toward - good understanding of HEP     Goal Identifier Sitting   Goal Description Pt will report being able to sit for 30 min or more without getting onset of hip pain upon standing   Target Date 08/27/21   Date Met  07/09/21   Progress (detail required for progress note):Met     Goal Identifier Sleeping   Goal Description Pt will be able to sleep throughout the night, without waking from hip pain   Target Date 08/27/21   Date Met  07/09/21   Progress (detail required for progress note):Met     Goal Identifier Reaching   Goal Description Pt will demonstrate functional shoulder ROM in order to reach into cupboard to put dishes away with <2/10 pain in the shoulders   Target Date 08/27/21   Date Met      Progress (detail required for progress note):Progressing toward - improving ROM     Goal Identifier Shoulder strength   Goal Description Pt will demonstrate functional strength in order to be able to put milk back in the refrigerator with <2/10 pain in the shoulders   Target Date 08/27/21   Date Met      Progress (detail required for progress note):Progressing toward - improving strength evidenced by progression of exercises     Client Self (Subjective) Report for Progress Note Reporting Period: The patient reports that last Tuesday, his dog pushed him into the dock and has a big bruise on his R arm and ribs.  He has been having difficulty with his exercises d/t pain in the shoulder.  His hip is doing great and he is still working on  the stretches and strengthening exercises for the hip.  His left shoulder has been pain free but is still not able to reach above his shoulder on that side.  Pain is rated a 2/10 in the arm and 4/10 in the chest.  Pain in the L shoulder is non-existent unless he lifts his arm above her shoulder height.    Objective Measurements:   Objective Measure: Shoulder ROM  Details: Shoulder ROM just above shoulder height with pain R > L, shoulder abduction to ~90 degrees with pain, limited end range ROM ER and IR         Josephine Avelar, PT, DPT, MHA

## 2021-08-04 ENCOUNTER — NURSE TRIAGE (OUTPATIENT)
Dept: NURSING | Facility: CLINIC | Age: 81
End: 2021-08-04

## 2021-08-04 LAB
ANION GAP SERPL CALCULATED.3IONS-SCNC: 14 MMOL/L (ref 5–18)
BUN SERPL-MCNC: 17 MG/DL (ref 8–28)
CALCIUM SERPL-MCNC: 9.6 MG/DL (ref 8.5–10.5)
CHLORIDE BLD-SCNC: 100 MMOL/L (ref 98–107)
CO2 SERPL-SCNC: 23 MMOL/L (ref 22–31)
CREAT SERPL-MCNC: 1.15 MG/DL (ref 0.7–1.3)
GFR SERPL CREATININE-BSD FRML MDRD: 60 ML/MIN/1.73M2
GLUCOSE BLD-MCNC: 421 MG/DL (ref 70–125)
POTASSIUM BLD-SCNC: 4.1 MMOL/L (ref 3.5–5)
SODIUM SERPL-SCNC: 137 MMOL/L (ref 136–145)

## 2021-08-04 NOTE — TELEPHONE ENCOUNTER
Lab representative called to report patient glucose was 421. Called patient to asses status, but no answer. Left a voicemail to have patient call back or to also call clinic. Will call patient back in an hour.    Jacqueline Cheng RN   08/04/21 4:45 AM  [facility] Nurse Advisor    Called patient to assess current condition. Patient blood sugar reading now is 128. Patient has no symptoms of hyperglycemia currently and is scheduled with PCP next week for a follow up. Called on call provider Keiry Santa Md to inform and provider acknowledged result.     Jacqueline Cheng RN   08/04/21 7:54 AM  [facility] Nurse Advisor

## 2021-08-04 NOTE — PROGRESS NOTES
Called and spoke with Pee.     - he reported getting my message and held his warfarin dose.  However, I advised one time lower dose of 5mg warfarin.     - he verbalized back the wrong warfarin dose - taking 5mg on Mon/Wed/Fri and 7.5mg all other days.     - Provided education:    - importance of taking warfarin as instructed.    - taking warfarin every 24 hrs.  (if possible same time every evening.     (at time he is late in taking warfarin).    - staying consistent eating Vitamin-K foods.     - PLAN:      1.  Will continue same warfarin dose, as he is taking now.    2.  Close monitoring of INR (weekly) to ensure INR stability.     - INR schduled on 8/11/21 @ [facility].

## 2021-08-09 ENCOUNTER — HOSPITAL ENCOUNTER (OUTPATIENT)
Dept: PHYSICAL THERAPY | Facility: REHABILITATION | Age: 81
End: 2021-08-09
Payer: COMMERCIAL

## 2021-08-09 DIAGNOSIS — M25.611 SHOULDER JOINT STIFFNESS, BILATERAL: ICD-10-CM

## 2021-08-09 DIAGNOSIS — M25.552 LEFT HIP PAIN: ICD-10-CM

## 2021-08-09 DIAGNOSIS — M25.559 HIP PAIN: Primary | ICD-10-CM

## 2021-08-09 DIAGNOSIS — R29.3 POOR POSTURE: ICD-10-CM

## 2021-08-09 DIAGNOSIS — G89.29 CHRONIC PAIN OF BOTH SHOULDERS: ICD-10-CM

## 2021-08-09 DIAGNOSIS — M25.512 CHRONIC PAIN OF BOTH SHOULDERS: ICD-10-CM

## 2021-08-09 DIAGNOSIS — M25.511 CHRONIC PAIN OF BOTH SHOULDERS: ICD-10-CM

## 2021-08-09 DIAGNOSIS — M25.612 SHOULDER JOINT STIFFNESS, BILATERAL: ICD-10-CM

## 2021-08-09 DIAGNOSIS — M79.18 PIRIFORMIS MUSCLE PAIN: ICD-10-CM

## 2021-08-09 PROCEDURE — 97110 THERAPEUTIC EXERCISES: CPT | Mod: GP | Performed by: PHYSICAL THERAPIST

## 2021-08-10 ENCOUNTER — OFFICE VISIT (OUTPATIENT)
Dept: ENDOCRINOLOGY | Facility: CLINIC | Age: 81
End: 2021-08-10
Payer: COMMERCIAL

## 2021-08-10 VITALS
WEIGHT: 203.1 LBS | SYSTOLIC BLOOD PRESSURE: 144 MMHG | BODY MASS INDEX: 33.8 KG/M2 | HEART RATE: 76 BPM | DIASTOLIC BLOOD PRESSURE: 70 MMHG

## 2021-08-10 DIAGNOSIS — E11.42 TYPE 2 DIABETES MELLITUS WITH PERIPHERAL NEUROPATHY (H): Primary | ICD-10-CM

## 2021-08-10 PROBLEM — Z95.2 S/P TAVR (TRANSCATHETER AORTIC VALVE REPLACEMENT): Status: ACTIVE | Noted: 2021-01-21

## 2021-08-10 PROBLEM — E66.01 MORBID OBESITY (H): Status: ACTIVE | Noted: 2019-03-28

## 2021-08-10 PROBLEM — I35.0 SEVERE AORTIC STENOSIS: Status: ACTIVE | Noted: 2020-12-16

## 2021-08-10 PROBLEM — M89.8X9 BONE MASS: Status: ACTIVE | Noted: 2017-04-26

## 2021-08-10 PROBLEM — R06.09 DYSPNEA ON EXERTION: Status: ACTIVE | Noted: 2019-01-23

## 2021-08-10 PROCEDURE — 99214 OFFICE O/P EST MOD 30 MIN: CPT | Performed by: NURSE PRACTITIONER
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
## 2021-08-11 ENCOUNTER — ANTICOAGULATION THERAPY VISIT (OUTPATIENT)
Dept: ANTICOAGULATION | Facility: CLINIC | Age: 81
End: 2021-08-11

## 2021-08-11 ENCOUNTER — LAB (OUTPATIENT)
Dept: LAB | Facility: CLINIC | Age: 81
End: 2021-08-11
Payer: COMMERCIAL

## 2021-08-11 ENCOUNTER — HOSPITAL ENCOUNTER (OUTPATIENT)
Dept: PHYSICAL THERAPY | Facility: REHABILITATION | Age: 81
End: 2021-08-11
Payer: COMMERCIAL

## 2021-08-11 DIAGNOSIS — M25.611 SHOULDER JOINT STIFFNESS, BILATERAL: ICD-10-CM

## 2021-08-11 DIAGNOSIS — R29.3 POOR POSTURE: ICD-10-CM

## 2021-08-11 DIAGNOSIS — M25.552 LEFT HIP PAIN: ICD-10-CM

## 2021-08-11 DIAGNOSIS — M25.512 CHRONIC PAIN OF BOTH SHOULDERS: ICD-10-CM

## 2021-08-11 DIAGNOSIS — I48.20 CHRONIC ATRIAL FIBRILLATION (H): ICD-10-CM

## 2021-08-11 DIAGNOSIS — I48.20 CHRONIC ATRIAL FIBRILLATION (H): Primary | ICD-10-CM

## 2021-08-11 DIAGNOSIS — M25.559 HIP PAIN: Primary | ICD-10-CM

## 2021-08-11 DIAGNOSIS — M25.612 SHOULDER JOINT STIFFNESS, BILATERAL: ICD-10-CM

## 2021-08-11 DIAGNOSIS — G89.29 CHRONIC PAIN OF BOTH SHOULDERS: ICD-10-CM

## 2021-08-11 DIAGNOSIS — M25.511 CHRONIC PAIN OF BOTH SHOULDERS: ICD-10-CM

## 2021-08-11 DIAGNOSIS — M79.18 PIRIFORMIS MUSCLE PAIN: ICD-10-CM

## 2021-08-11 LAB — INR BLD: 2.1 (ref 0.9–1.1)

## 2021-08-11 PROCEDURE — 97140 MANUAL THERAPY 1/> REGIONS: CPT | Mod: GP | Performed by: PHYSICAL THERAPIST

## 2021-08-11 PROCEDURE — 36416 COLLJ CAPILLARY BLOOD SPEC: CPT

## 2021-08-11 PROCEDURE — 85610 PROTHROMBIN TIME: CPT

## 2021-08-11 PROCEDURE — 97110 THERAPEUTIC EXERCISES: CPT | Mod: GP | Performed by: PHYSICAL THERAPIST

## 2021-08-11 NOTE — PROGRESS NOTES
ANTICOAGULATION MANAGEMENT     Pee Ayala 81 year old male is on warfarin with therapeutic INR result. (Goal INR 2.0-3.0)    Recent labs: (last 7 days)     08/11/21  1013   INR 2.1*       ASSESSMENT     Source(s): Patient/Caregiver Call and Template       Warfarin doses taken: Warfarin taken as instructed    Diet: No new diet changes identified    New illness, injury, or hospitalization: No    Medication/supplement changes: None noted    Signs or symptoms of bleeding or clotting: No    Previous INR: Supratherapeutic at 3.3 on 8/3/21.    Additional findings: None     PLAN     Recommended plan for no diet, medication or health factor changes affecting INR     Dosing Instructions: Continue your current warfarin dose with next INR in 2 weeks       Summary  As of 8/11/2021    Full warfarin instructions:  5 mg every Mon, Wed, Fri; 7.5 mg all other days   Next INR check:  8/18/2021             Telephone call with Pee who verbalizes understanding and agrees to plan    - will check INR again in one week, to ensure INR stability.   - wrote correct warfarin dose on template.    Lab visit scheduled - INR on 8/18/21 @ [facility]    Education provided: Importance of consistent vitamin K intake and Target INR goal and significance of current INR result    Plan made per [facility] anticoagulation protocol    Mariel Dale, RN  Anticoagulation Clinic  8/11/2021    _______________________________________________________________________     Anticoagulation Episode Summary     Current INR goal:  2.0-3.0   TTR:  52.3 % (11.7 mo)   Target end date:     Send INR reminders to:  [facility]    Indications    Chronic atrial fibrillation (H) [I48.20]           Comments:           Anticoagulation Care Providers     Provider Role Specialty Phone number    Jazzy Gil MD Referring Family Medicine [phone]

## 2021-08-12 ENCOUNTER — TELEPHONE (OUTPATIENT)
Dept: CARDIOLOGY | Facility: CLINIC | Age: 81
End: 2021-08-12

## 2021-08-12 ENCOUNTER — OFFICE VISIT (OUTPATIENT)
Dept: FAMILY MEDICINE | Facility: CLINIC | Age: 81
End: 2021-08-12
Payer: COMMERCIAL

## 2021-08-12 VITALS
OXYGEN SATURATION: 97 % | HEART RATE: 69 BPM | TEMPERATURE: 97.9 F | RESPIRATION RATE: 18 BRPM | SYSTOLIC BLOOD PRESSURE: 151 MMHG | DIASTOLIC BLOOD PRESSURE: 78 MMHG

## 2021-08-12 DIAGNOSIS — M79.671 RIGHT FOOT PAIN: ICD-10-CM

## 2021-08-12 DIAGNOSIS — R22.41 LOCALIZED SWELLING OF RIGHT FOOT: Primary | ICD-10-CM

## 2021-08-12 DIAGNOSIS — Z79.01 LONG TERM CURRENT USE OF ANTICOAGULANT THERAPY: ICD-10-CM

## 2021-08-12 DIAGNOSIS — E11.42 DIABETIC POLYNEUROPATHY ASSOCIATED WITH TYPE 2 DIABETES MELLITUS (H): ICD-10-CM

## 2021-08-12 PROCEDURE — 99214 OFFICE O/P EST MOD 30 MIN: CPT | Performed by: NURSE PRACTITIONER

## 2021-08-12 ASSESSMENT — ENCOUNTER SYMPTOMS
SHORTNESS OF BREATH: 0
CHILLS: 0
FEVER: 0
COUGH: 0

## 2021-08-12 NOTE — PROGRESS NOTES
Assessment & Plan     Localized swelling of right foot    - XR Foot Right G/E 3 Views  - XR Ankle Right G/E 3 Views     Diabetic polyneuropathy, right foot pain    Unilateral right foot swelling and discomfort is worsening.  Considered stress fracture.  X-rays were negative for this.  Increase nighttime Lasix from 20 mg to 40 mg.  Elevate.  Do this for 5 days and then switch to previous dosing if swelling is resolved.     Long term use of anticoagulant   INR from yesterday is therapeutic at 2.1.  Did not pursue clot as the cause of this.  Patient to return ASAP if swelling appears to be more so in the calf when he starts to have calf pain.    Applied Ace wrap to right foot to about the mid calf area.    Use walker to help take weight off affected foot.     Recheck your clinic in about a week or use my chart to update provider.    Did review last potassium.  Did have a potassium checked on August 3 which was 4.1.  Deferred additional screenings of potassium with dose increase.    25 minutes spent on the date of the encounter doing chart review, review of test results, patient visit, documentation and discussion with family         No follow-ups on file.    [facility] 81 year [facility] male who presents to clinic today for the following health issues:  Chief Complaint   Patient presents with     Musculoskeletal Problem     right foot painful and swelling x 1 week hurts to bend toes      HPI    Rt foot painful and swollen x 1 week    Takes diuretic bid 40 am and 20 pm   +CHF  +Type II diabetes     On warfarin for atrial fib.  INR yesterday was 2.1.     Foot is numb without moving foot.     Has been elevating foot periodically during the day.      Pain 9/10 the last 3 days - worse on ball of the foot and near toes     No gout hx     Normally has neuropathy with sensitive feet     Pain is worst on the bottom of the foot as well as lateral ankle on the  right side.           Review of Systems   Constitutional: Negative for chills and fever.   Respiratory: Negative for cough and shortness of breath.            Objective    BP (!) 151/78   Pulse 69   Temp 97.9  F (36.6  C) (Oral)   Resp 18   SpO2 97%   Physical Exam  Constitutional:       Appearance: Normal appearance.   Cardiovascular:      Comments: Right difficult to palpate pedal pulses.  Foot is warm with normal coloring.  Pulmonary:      Effort: Pulmonary effort is normal.   Musculoskeletal:         General: Swelling (Swelling over most of the right foot extending dorsally.  No swelling in the calf noted.  Mild swelling in the medial foot of the left.) and tenderness (Most notable over right lateral ankle.) present.   Skin:     Findings: No erythema or rash.   Neurological:      Mental Status: He is alert.   Psychiatric:         Mood and Affect: Mood normal.         Behavior: Behavior normal.         Thought Content: Thought content normal.         Judgment: Judgment normal.            Results for orders placed or performed in visit on 08/12/21 (from the past 24 hour(s))   XR Ankle Right G/E 3 Views    Narrative    EXAM DATE:         08/12/2021    EXAM: X-RAY ANKLE RIGHT, MINIMUM THREE VIEWS  LOCATION: [facility]  DATE/TIME: 8/12/2021 7:30 PM    INDICATION: Localized swelling right foot.    COMPARISON: None.    IMPRESSION: The right ankle is negative for fracture. No evidence for disruption of the ankle mortise. There is degenerative change at the tibiotalar joint. There is soft tissue swelling seen circumferentially about the ankle joint. Plantar and Achilles   calcaneal spurring. Vascular calcifications.             XR Foot Right G/E 3 Views    Narrative    EXAM DATE:         08/12/2021    EXAM: X-RAY FOOT RIGHT, MINIMUM 3 VIEWS  LOCATION: [facility]  DATE/TIME: 8/12/2021 7:15 PM    INDICATION: Localized swelling right foot  COMPARISON:  None.    IMPRESSION: Moderate degenerative changes, especially at the first TMT joint. Soft tissue swelling surrounding the forefoot. Heavy vascular calcifications diffusely. No acute fracture or definite erosive change.               *Note: Due to a large number of results and/or encounters for the requested time period, some results have not been displayed. A complete set of results can be found in Results Review.

## 2021-08-12 NOTE — TELEPHONE ENCOUNTER
Return call received from patient. He has been having increased swelling in his lower extremities. One foot compared to the other, is more swollen, red and painful. Patient notes difficulty walking do to the pain. Also states it is painful to move his toes or flex foot. Patient describes that foot as more red and warm. Advised patient that she should have the foot evaluated today or tomorrow. If concern for cardiac issue after he is evaluated, he can be seen in [facility]. Patient verbalized understanding. -ejb       ----- Message from Zenia Cuellar sent at 8/12/2021  2:08 PM CDT -----  Regarding: NED pt  General phone call:    Caller: Pee   Primary cardiologist: NED  Detailed reason for call: He has had pain and difficulty walking for about a week.    Best phone number [phone]   Best time to contact: any  Ok to leave a detailed message? yesDevice? no    Additional Info:

## 2021-08-13 NOTE — PATIENT INSTRUCTIONS
Take 40 mg Lasix daily and at night for the next 5 days and then switch back if swelling is improved.      Elevate feet    Recheck next week in your clinic.      Xray showed areas of arthritis, but no stress fractures     Wear ACE wrap for now to help with compression.  Can take off for showers.      Can try a walker or similar to help take weight off the area.

## 2021-08-17 ENCOUNTER — TELEPHONE (OUTPATIENT)
Dept: FAMILY MEDICINE | Facility: CLINIC | Age: 81
End: 2021-08-17

## 2021-08-17 NOTE — TELEPHONE ENCOUNTER
Reason for Call:  Other appointment    Detailed comments: patient would like to be seen sooner than first available for a walk in care follow up for a foot infection.     Phone Number Patient can be reached at: Home number on file [phone] (home)    Best Time: any    Can we leave a detailed message on this number? YES    Call taken on 8/17/2021 at 10:39 AM by Eileen Corral

## 2021-08-18 ENCOUNTER — HOSPITAL ENCOUNTER (OUTPATIENT)
Dept: PHYSICAL THERAPY | Facility: REHABILITATION | Age: 81
End: 2021-08-18
Payer: COMMERCIAL

## 2021-08-18 ENCOUNTER — ANTICOAGULATION THERAPY VISIT (OUTPATIENT)
Dept: ANTICOAGULATION | Facility: CLINIC | Age: 81
End: 2021-08-18

## 2021-08-18 ENCOUNTER — LAB (OUTPATIENT)
Dept: LAB | Facility: CLINIC | Age: 81
End: 2021-08-18
Payer: COMMERCIAL

## 2021-08-18 DIAGNOSIS — R29.3 POOR POSTURE: ICD-10-CM

## 2021-08-18 DIAGNOSIS — M25.552 LEFT HIP PAIN: ICD-10-CM

## 2021-08-18 DIAGNOSIS — M25.512 CHRONIC PAIN OF BOTH SHOULDERS: ICD-10-CM

## 2021-08-18 DIAGNOSIS — G89.29 CHRONIC PAIN OF BOTH SHOULDERS: ICD-10-CM

## 2021-08-18 DIAGNOSIS — M25.559 HIP PAIN: Primary | ICD-10-CM

## 2021-08-18 DIAGNOSIS — M25.612 SHOULDER JOINT STIFFNESS, BILATERAL: ICD-10-CM

## 2021-08-18 DIAGNOSIS — M79.18 PIRIFORMIS MUSCLE PAIN: ICD-10-CM

## 2021-08-18 DIAGNOSIS — I48.20 CHRONIC ATRIAL FIBRILLATION (H): ICD-10-CM

## 2021-08-18 DIAGNOSIS — I48.20 CHRONIC ATRIAL FIBRILLATION (H): Primary | ICD-10-CM

## 2021-08-18 DIAGNOSIS — M25.611 SHOULDER JOINT STIFFNESS, BILATERAL: ICD-10-CM

## 2021-08-18 DIAGNOSIS — M25.511 CHRONIC PAIN OF BOTH SHOULDERS: ICD-10-CM

## 2021-08-18 LAB — INR BLD: 4 (ref 0.9–1.1)

## 2021-08-18 PROCEDURE — 36416 COLLJ CAPILLARY BLOOD SPEC: CPT

## 2021-08-18 PROCEDURE — 97110 THERAPEUTIC EXERCISES: CPT | Mod: GP | Performed by: PHYSICAL THERAPIST

## 2021-08-18 PROCEDURE — 85610 PROTHROMBIN TIME: CPT

## 2021-08-18 PROCEDURE — 97140 MANUAL THERAPY 1/> REGIONS: CPT | Mod: GP | Performed by: PHYSICAL THERAPIST

## 2021-08-18 NOTE — PROGRESS NOTES
ANTICOAGULATION MANAGEMENT     Pee Ayala 81 year old male is on warfarin with supratherapeutic INR result. (Goal INR 2.0-3.0)    Recent labs: (last 7 days)     08/18/21  1025   INR 4.0*       ASSESSMENT     Source(s): Chart Review, Patient/Caregiver Call and Template       Warfarin doses taken: Warfarin taken as instructed    Diet: No new diet changes identified    New illness, injury, or hospitalization: No   - had localized worsening right foot swelling and discomfort    Medication/supplement changes: Yes.    - today is the last day with 40mg Lasix,  However, his thoughts are if he returns to lower duiretic dose, he will be back with foot swelling.   - 8/12/21, Lasix increased from 20mg to 40mg for 5 days, then switched to previous dosing, if swelling has resolved.    - reported a WT of 3-4 lbs wt loss.  Going to the bathroom every hour.    Signs or symptoms of bleeding or clotting: No    Previous INR: Therapeutic last visit; previously outside of goal range    Additional findings: OV appt this Fri. 8/20 with Dr. Gil.     PLAN     Recommended plan for ongoing change(s) affecting INR     Dosing Instructions:   - tonight Hold warfarin dose   - tomorrow, 8/19/21 take one time lower dose with 5mg warfarin.   - then continue your current warfarin dose with 7.5mg on Sun/Tues/Fri and 5mg all other days.   - (5.6% change).   - next INR in 7-10 days       Summary  As of 8/18/2021    Full warfarin instructions:  8/18: Hold; 8/19: 5 mg; Otherwise 7.5 mg every Sun, Tue, Fri; 5 mg all other days   Next INR check:  8/31/2021             Telephone call with Pee who verbalizes understanding and agrees to plan   - advised to stay well hydrated as well, since he is going to the bathroom every hour.   - reported eating ice chips.    Lab visit scheduled - INR on 8/31/21 during OV @ [facility].    Education provided: Importance of consistent vitamin K intake, Target INR goal and significance of current INR result and  Monitoring for bleeding signs and symptoms    Plan made per [facility] anticoagulation protocol    Mariel Dale RN  Anticoagulation Clinic  8/18/2021    _______________________________________________________________________     Anticoagulation Episode Summary     Current INR goal:  2.0-3.0   TTR:  51.4 % (11.7 mo)   Target end date:     Send INR reminders to:  [facility]    Indications    Chronic atrial fibrillation (H) [I48.20]           Comments:           Anticoagulation Care Providers     Provider Role Specialty Phone number    Jazzy Gil MD Referring Family Medicine [phone]

## 2021-08-20 ENCOUNTER — ANTICOAGULATION THERAPY VISIT (OUTPATIENT)
Dept: ANTICOAGULATION | Facility: CLINIC | Age: 81
End: 2021-08-20

## 2021-08-20 ENCOUNTER — OFFICE VISIT (OUTPATIENT)
Dept: FAMILY MEDICINE | Facility: CLINIC | Age: 81
End: 2021-08-20
Payer: COMMERCIAL

## 2021-08-20 VITALS
BODY MASS INDEX: 33.07 KG/M2 | OXYGEN SATURATION: 97 % | TEMPERATURE: 97.6 F | HEIGHT: 65 IN | WEIGHT: 198.5 LBS | RESPIRATION RATE: 24 BRPM | SYSTOLIC BLOOD PRESSURE: 142 MMHG | HEART RATE: 57 BPM | DIASTOLIC BLOOD PRESSURE: 65 MMHG

## 2021-08-20 DIAGNOSIS — G62.9 POLYNEUROPATHY: ICD-10-CM

## 2021-08-20 DIAGNOSIS — I48.20 CHRONIC ATRIAL FIBRILLATION (H): ICD-10-CM

## 2021-08-20 DIAGNOSIS — R22.41 LOCALIZED SWELLING OF RIGHT FOOT: Primary | ICD-10-CM

## 2021-08-20 DIAGNOSIS — I48.20 CHRONIC ATRIAL FIBRILLATION (H): Primary | ICD-10-CM

## 2021-08-20 DIAGNOSIS — R09.89 DIMINISHED PULSES IN LOWER EXTREMITY: ICD-10-CM

## 2021-08-20 DIAGNOSIS — E11.42 TYPE 2 DIABETES MELLITUS WITH PERIPHERAL NEUROPATHY (H): ICD-10-CM

## 2021-08-20 LAB
ALBUMIN SERPL-MCNC: 3.7 G/DL (ref 3.5–5)
ALP SERPL-CCNC: 72 U/L (ref 45–120)
ALT SERPL W P-5'-P-CCNC: 18 U/L (ref 0–45)
ANION GAP SERPL CALCULATED.3IONS-SCNC: 10 MMOL/L (ref 5–18)
AST SERPL W P-5'-P-CCNC: 22 U/L (ref 0–40)
BASOPHILS # BLD AUTO: 0 10E3/UL (ref 0–0.2)
BASOPHILS NFR BLD AUTO: 1 %
BILIRUB SERPL-MCNC: 0.8 MG/DL (ref 0–1)
BUN SERPL-MCNC: 22 MG/DL (ref 8–28)
CALCIUM SERPL-MCNC: 9.1 MG/DL (ref 8.5–10.5)
CHLORIDE BLD-SCNC: 105 MMOL/L (ref 98–107)
CO2 SERPL-SCNC: 24 MMOL/L (ref 22–31)
CREAT SERPL-MCNC: 1.02 MG/DL (ref 0.7–1.3)
EOSINOPHIL # BLD AUTO: 0.1 10E3/UL (ref 0–0.7)
EOSINOPHIL NFR BLD AUTO: 1 %
ERYTHROCYTE [DISTWIDTH] IN BLOOD BY AUTOMATED COUNT: 12.7 % (ref 10–15)
GFR SERPL CREATININE-BSD FRML MDRD: 70 ML/MIN/1.73M2
GLUCOSE BLD-MCNC: 167 MG/DL (ref 70–125)
HCT VFR BLD AUTO: 43.4 % (ref 40–53)
HGB BLD-MCNC: 14.4 G/DL (ref 13.3–17.7)
IMM GRANULOCYTES # BLD: 0 10E3/UL
IMM GRANULOCYTES NFR BLD: 0 %
INR BLD: 4.6 (ref 0.9–1.1)
LYMPHOCYTES # BLD AUTO: 1.9 10E3/UL (ref 0.8–5.3)
LYMPHOCYTES NFR BLD AUTO: 25 %
MCH RBC QN AUTO: 33.5 PG (ref 26.5–33)
MCHC RBC AUTO-ENTMCNC: 33.2 G/DL (ref 31.5–36.5)
MCV RBC AUTO: 101 FL (ref 78–100)
MONOCYTES # BLD AUTO: 0.4 10E3/UL (ref 0–1.3)
MONOCYTES NFR BLD AUTO: 6 %
NEUTROPHILS # BLD AUTO: 4.9 10E3/UL (ref 1.6–8.3)
NEUTROPHILS NFR BLD AUTO: 67 %
NRBC # BLD AUTO: 0 10E3/UL
NRBC BLD AUTO-RTO: 0 /100
PLATELET # BLD AUTO: 175 10E3/UL (ref 150–450)
POTASSIUM BLD-SCNC: 4.4 MMOL/L (ref 3.5–5)
PROT SERPL-MCNC: 6.4 G/DL (ref 6–8)
RBC # BLD AUTO: 4.3 10E6/UL (ref 4.4–5.9)
SODIUM SERPL-SCNC: 139 MMOL/L (ref 136–145)
URATE SERPL-MCNC: 3.2 MG/DL (ref 3–8)
WBC # BLD AUTO: 7.3 10E3/UL (ref 4–11)

## 2021-08-20 PROCEDURE — 85610 PROTHROMBIN TIME: CPT | Performed by: FAMILY MEDICINE

## 2021-08-20 PROCEDURE — 84550 ASSAY OF BLOOD/URIC ACID: CPT | Performed by: FAMILY MEDICINE

## 2021-08-20 PROCEDURE — 85025 COMPLETE CBC W/AUTO DIFF WBC: CPT | Performed by: FAMILY MEDICINE

## 2021-08-20 PROCEDURE — 80053 COMPREHEN METABOLIC PANEL: CPT | Performed by: FAMILY MEDICINE

## 2021-08-20 PROCEDURE — 99214 OFFICE O/P EST MOD 30 MIN: CPT | Performed by: FAMILY MEDICINE

## 2021-08-20 PROCEDURE — 36415 COLL VENOUS BLD VENIPUNCTURE: CPT | Performed by: FAMILY MEDICINE

## 2021-08-20 ASSESSMENT — MIFFLIN-ST. JEOR: SCORE: 1542.27

## 2021-08-20 NOTE — PATIENT INSTRUCTIONS
REVIEWED YOUR WALK IN CLINIC VISIT    GO BACK TO 3 TABLETS OF LASIX    BRING ALL MEDS FOR NEXT VISIT    WE WILL CHECK US OF LL    CHECK FOR URIC ACID FOR GOUT    YOUR DIABETES IN NOT IN GOOD CONTROL

## 2021-08-20 NOTE — PROGRESS NOTES
ANTICOAGULATION MANAGEMENT     Pee Ayala 81 year old male is on warfarin with supratherapeutic INR result. (Goal INR 2.0-3.0)    Recent labs: (last 7 days)     08/20/21  1051   INR 4.6*       ASSESSMENT     Source(s): Chart Review       Warfarin doses taken: Warfarin taken as instructed    Diet: No new diet changes identified    New illness, injury, or hospitalization: No    Medication/supplement changes: None noted    Signs or symptoms of bleeding or clotting: No    Previous INR: supratherapeutic     Additional findings: None     PLAN     Recommended plan for no diet, medication or health factor changes affecting INR     Dosing Instructions: Hold 2 doses then Decrease your warfarin dose (11.8% change) with next INR in 5-7 days      Summary  As of 8/20/2021    Full warfarin instructions:  8/20: Hold; 8/21: Hold; Otherwise 7.5 mg every Tue; 5 mg all other days   Next INR check:               Telephone call with Pee who verbalizes understanding and agrees to plan    Lab visit scheduled    Education provided: None required    Plan made per [facility] anticoagulation protocol    Yousuf [facility] RN  Anticoagulation Clinic  8/20/2021    _______________________________________________________________________     Anticoagulation Episode Summary     Current INR goal:  2.0-3.0   TTR:  50.8 % (11.7 mo)   Target end date:     Send INR reminders to:  [facility]    Indications    Chronic atrial fibrillation (H) [I48.20]           Comments:           Anticoagulation Care Providers     Provider Role Specialty Phone number    Jazzy Gil MD Referring Family Medicine [phone]

## 2021-08-22 NOTE — PROGRESS NOTES
"    Assessment & Plan     ICD-10-CM    1. Localized swelling of right foot  R22.41 Comprehensive metabolic panel (BMP + Alb, Alk Phos, ALT, AST, Total. Bili, TP)     Uric acid     Comprehensive metabolic panel (BMP + Alb, Alk Phos, ALT, AST, Total. Bili, TP)     Uric acid   2. 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 Diminished pulses in lower extremity  R09.89 US Lower Extremity Arterial Duplex Bilateral     CBC with platelets and differential     CANCELED: CBC with platelets and differential   4. Chronic atrial fibrillation (H)  I48.20 INR point of care, Interfaced Result   5. Polyneuropathy  G62.9      Decision making: Patient presented with right foot localized swelling to the urgent care and x-ray was done.  This was normal/negative.  He was treated symptomatically with increased dose of Lasix and asked to go back to his usual dose.  Since then the swelling is resolved.  He does mention an acute onset of swelling.   Differential includes sprain/strain versus gout.  At this time exam is normal.  He will continue current medication.  He does not need any further increased dose of Lasix and should be on his usual dose which is 2 tablets in the morning and 1 in the evening.  Lab and testing as above.  Noted heavy vascular calcification and with poor pulses, will proceed with lower extremity duplex for him.  He does follow with cardiology.  His diabetes is not under adequate control and he follows with Endo.  He does dietary indiscretions.    }     BMI:   Estimated body mass index is 33.03 kg/m  as calculated from the following:    Height as of this encounter: 1.651 m (5' 5\").    Weight as of this encounter: 90 kg (198 lb 8 oz).   Weight management plan: Discussed healthy diet and exercise guidelines    MEDICATIONS:  Continue current medications without change  Work on weight loss  Regular exercise  See Patient " "Instructions    Return in about 3 months (around 11/20/2021) for Routine preventive.    Jazzy Gil MD  [employer]    Subjective    Chief Complaint   Patient presents with     Foot Swelling     bilateral foot swelling/pain x 1 week-was [facility] 8[facility]        Pee is a 81 year old who presents for the following health issues     HPI: Patient presented to the urgent care last week for increased right foot swelling and pain.  He informed me that this is a fairly acute onset and he was hobbling around.  Weightbearing was difficult.  He does not remember any trauma.  He informs me in the past he has been told that he may have an element of gout but denies ever being diagnosed with gouty attacks.  He does have diabetes that is not under good control and informs me that he has been not strict with his diet.  He does take Lasix 2 tablets in the morning and 1 in the evening of 20 mg.  He was evaluated in urgent care and x-rays are negative for any acute fracture and he was discharged with increased dose of Lasix in the evening to discontinue was the swelling subsided.  An Ace wrap was done.  Patient has had improvement with the Ace wrap and the swelling is now subsided.  No recent fevers or other rashes.  Appetite is normal.      Review of Systems   Constitutional, HEENT, cardiovascular, pulmonary, gi and gu systems are negative, except as otherwise noted.      Objective    BP (!) 142/65 (BP Location: Left arm, Patient Position: Sitting, Cuff Size: Adult Regular)   Pulse 57   Temp 97.6  F (36.4  C) (Oral)   Resp 24   Ht 1.651 m (5' 5\")   Wt 90 kg (198 lb 8 oz)   SpO2 97%   BMI 33.03 kg/m    Body mass index is 33.03 kg/m .  Physical Exam   GENERAL: healthy, alert and no distress  NECK: no adenopathy, no asymmetry, masses, or scars and thyroid normal to palpation  RESP: lungs clear to auscultation - no rales, rhonchi or wheezes  MS: no edema, monofilament exam does show " some neuropathy.  There is some deformity of the midfoot of the right side.  There is no tenderness on deep palpation.  No swelling at this time is noted.    Results for orders placed or performed in visit on 08/20/21   Comprehensive metabolic panel (BMP + Alb, Alk Phos, ALT, AST, Total. Bili, TP)     Status: Abnormal   Result Value Ref Range    Sodium 139 136 - 145 mmol/L    Potassium 4.4 3.5 - 5.0 mmol/L    Chloride 105 98 - 107 mmol/L    Carbon Dioxide (CO2) 24 22 - 31 mmol/L    Anion Gap 10 5 - 18 mmol/L    Urea Nitrogen 22 8 - 28 mg/dL    Creatinine 1.02 0.70 - 1.30 mg/dL    Calcium 9.1 8.5 - 10.5 mg/dL    Glucose 167 (H) 70 - 125 mg/dL    Alkaline Phosphatase 72 45 - 120 U/L    AST 22 0 - 40 U/L    ALT 18 0 - 45 U/L    Protein Total 6.4 6.0 - 8.0 g/dL    Albumin 3.7 3.5 - 5.0 g/dL    Bilirubin Total 0.8 0.0 - 1.0 mg/dL    GFR Estimate 70 >60 mL/min/1.73m2   Uric acid     Status: Normal   Result Value Ref Range    Uric Acid 3.2 3.0 - 8.0 mg/dL   CBC with platelets and differential *Canceled*     Status: None ()    Narrative    The following orders were created for panel order CBC with platelets and differential.  Procedure                               Abnormality         Status                     ---------                               -----------         ------                     CBC with platelets and d...[[id number]]                                                   Please view results for these tests on the individual orders.   INR point of care, Interfaced Result     Status: Abnormal   Result Value Ref Range    INR 4.6 (H) 0.9 - 1.1    Narrative    This test is intended for monitoring Coumadin therapy. Results are not accurate in patients with prolonged INR due to factor deficiency.   CBC with Platelets & Differential     Status: Abnormal    Narrative    The following orders were created for panel order CBC with Platelets & Differential.  Procedure                               Abnormality         Status                      ---------                               -----------         ------                     CBC with platelets and d...[[id number]]  Abnormal            Final result                 Please view results for these tests on the individual orders.   CBC with platelets and differential     Status: Abnormal   Result Value Ref Range    WBC Count 7.3 4.0 - 11.0 10e3/uL    RBC Count 4.30 (L) 4.40 - 5.90 10e6/uL    Hemoglobin 14.4 13.3 - 17.7 g/dL    Hematocrit 43.4 40.0 - 53.0 %     (H) 78 - 100 fL    MCH 33.5 (H) 26.5 - 33.0 pg    MCHC 33.2 31.5 - 36.5 g/dL    RDW 12.7 10.0 - 15.0 %    Platelet Count 175 150 - 450 10e3/uL    % Neutrophils 67 %    % Lymphocytes 25 %    % Monocytes 6 %    % Eosinophils 1 %    % Basophils 1 %    % Immature Granulocytes 0 %    NRBCs per 100 WBC 0 <1 /100    Absolute Neutrophils 4.9 1.6 - 8.3 10e3/uL    Absolute Lymphocytes 1.9 0.8 - 5.3 10e3/uL    Absolute Monocytes 0.4 0.0 - 1.3 10e3/uL    Absolute Eosinophils 0.1 0.0 - 0.7 10e3/uL    Absolute Basophils 0.0 0.0 - 0.2 10e3/uL    Absolute Immature Granulocytes 0.0 <=0.0 10e3/uL    Absolute NRBCs 0.0 10e3/uL

## 2021-08-23 ENCOUNTER — HOSPITAL ENCOUNTER (OUTPATIENT)
Dept: PHYSICAL THERAPY | Facility: REHABILITATION | Age: 81
End: 2021-08-23
Payer: COMMERCIAL

## 2021-08-23 DIAGNOSIS — M25.612 SHOULDER JOINT STIFFNESS, BILATERAL: ICD-10-CM

## 2021-08-23 DIAGNOSIS — M79.18 PIRIFORMIS MUSCLE PAIN: ICD-10-CM

## 2021-08-23 DIAGNOSIS — M25.511 CHRONIC PAIN OF BOTH SHOULDERS: ICD-10-CM

## 2021-08-23 DIAGNOSIS — R29.3 POOR POSTURE: ICD-10-CM

## 2021-08-23 DIAGNOSIS — M25.512 CHRONIC PAIN OF BOTH SHOULDERS: ICD-10-CM

## 2021-08-23 DIAGNOSIS — M25.552 LEFT HIP PAIN: ICD-10-CM

## 2021-08-23 DIAGNOSIS — G89.29 CHRONIC PAIN OF BOTH SHOULDERS: ICD-10-CM

## 2021-08-23 DIAGNOSIS — M25.611 SHOULDER JOINT STIFFNESS, BILATERAL: ICD-10-CM

## 2021-08-23 DIAGNOSIS — M25.559 HIP PAIN: Primary | ICD-10-CM

## 2021-08-23 PROCEDURE — 97110 THERAPEUTIC EXERCISES: CPT | Mod: GP | Performed by: PHYSICAL THERAPIST

## 2021-08-23 PROCEDURE — 97140 MANUAL THERAPY 1/> REGIONS: CPT | Mod: GP | Performed by: PHYSICAL THERAPIST

## 2021-08-30 ENCOUNTER — HOSPITAL ENCOUNTER (OUTPATIENT)
Dept: PHYSICAL THERAPY | Facility: REHABILITATION | Age: 81
End: 2021-08-30
Payer: COMMERCIAL

## 2021-08-30 DIAGNOSIS — M25.552 LEFT HIP PAIN: ICD-10-CM

## 2021-08-30 DIAGNOSIS — M25.512 CHRONIC PAIN OF BOTH SHOULDERS: ICD-10-CM

## 2021-08-30 DIAGNOSIS — M25.611 SHOULDER JOINT STIFFNESS, BILATERAL: ICD-10-CM

## 2021-08-30 DIAGNOSIS — M25.559 HIP PAIN: Primary | ICD-10-CM

## 2021-08-30 DIAGNOSIS — M25.612 SHOULDER JOINT STIFFNESS, BILATERAL: ICD-10-CM

## 2021-08-30 DIAGNOSIS — M25.511 CHRONIC PAIN OF BOTH SHOULDERS: ICD-10-CM

## 2021-08-30 DIAGNOSIS — G89.29 CHRONIC PAIN OF BOTH SHOULDERS: ICD-10-CM

## 2021-08-30 DIAGNOSIS — R29.3 POOR POSTURE: ICD-10-CM

## 2021-08-30 DIAGNOSIS — M79.18 PIRIFORMIS MUSCLE PAIN: ICD-10-CM

## 2021-08-30 PROCEDURE — 97140 MANUAL THERAPY 1/> REGIONS: CPT | Mod: GP | Performed by: PHYSICAL THERAPIST

## 2021-08-30 PROCEDURE — 97110 THERAPEUTIC EXERCISES: CPT | Mod: GP | Performed by: PHYSICAL THERAPIST

## 2021-08-31 ENCOUNTER — ANTICOAGULATION THERAPY VISIT (OUTPATIENT)
Dept: ANTICOAGULATION | Facility: CLINIC | Age: 81
End: 2021-08-31

## 2021-08-31 ENCOUNTER — OFFICE VISIT (OUTPATIENT)
Dept: FAMILY MEDICINE | Facility: CLINIC | Age: 81
End: 2021-08-31
Payer: COMMERCIAL

## 2021-08-31 VITALS
SYSTOLIC BLOOD PRESSURE: 131 MMHG | TEMPERATURE: 97.3 F | HEIGHT: 65 IN | DIASTOLIC BLOOD PRESSURE: 77 MMHG | BODY MASS INDEX: 33.15 KG/M2 | WEIGHT: 199 LBS | HEART RATE: 66 BPM

## 2021-08-31 DIAGNOSIS — M14.671 CHARCOT'S JOINT OF RIGHT FOOT: Primary | ICD-10-CM

## 2021-08-31 DIAGNOSIS — E11.42 TYPE 2 DIABETES MELLITUS WITH PERIPHERAL NEUROPATHY (H): ICD-10-CM

## 2021-08-31 DIAGNOSIS — I48.20 CHRONIC ATRIAL FIBRILLATION (H): ICD-10-CM

## 2021-08-31 DIAGNOSIS — I48.20 CHRONIC ATRIAL FIBRILLATION (H): Primary | ICD-10-CM

## 2021-08-31 LAB
ANION GAP SERPL CALCULATED.3IONS-SCNC: 14 MMOL/L (ref 5–18)
BUN SERPL-MCNC: 19 MG/DL (ref 8–28)
CALCIUM SERPL-MCNC: 9.2 MG/DL (ref 8.5–10.5)
CHLORIDE BLD-SCNC: 106 MMOL/L (ref 98–107)
CO2 SERPL-SCNC: 20 MMOL/L (ref 22–31)
CREAT SERPL-MCNC: 1.04 MG/DL (ref 0.7–1.3)
CREAT UR-MCNC: 11 MG/DL
GFR SERPL CREATININE-BSD FRML MDRD: 68 ML/MIN/1.73M2
GLUCOSE BLD-MCNC: 143 MG/DL (ref 70–125)
HBA1C MFR BLD: 8.6 % (ref 0–5.6)
INR BLD: 2.7 (ref 0.9–1.1)
LDLC SERPL CALC-MCNC: 64 MG/DL
MICROALBUMIN UR-MCNC: <0.5 MG/DL (ref 0–1.99)
MICROALBUMIN/CREAT UR: NORMAL MG/G{CREAT}
POTASSIUM BLD-SCNC: 4.1 MMOL/L (ref 3.5–5)
SODIUM SERPL-SCNC: 140 MMOL/L (ref 136–145)

## 2021-08-31 PROCEDURE — 36415 COLL VENOUS BLD VENIPUNCTURE: CPT | Performed by: FAMILY MEDICINE

## 2021-08-31 PROCEDURE — 82043 UR ALBUMIN QUANTITATIVE: CPT | Performed by: FAMILY MEDICINE

## 2021-08-31 PROCEDURE — 85610 PROTHROMBIN TIME: CPT | Performed by: FAMILY MEDICINE

## 2021-08-31 PROCEDURE — 99213 OFFICE O/P EST LOW 20 MIN: CPT | Performed by: FAMILY MEDICINE

## 2021-08-31 PROCEDURE — 80048 BASIC METABOLIC PNL TOTAL CA: CPT | Performed by: FAMILY MEDICINE

## 2021-08-31 PROCEDURE — 83036 HEMOGLOBIN GLYCOSYLATED A1C: CPT | Performed by: FAMILY MEDICINE

## 2021-08-31 PROCEDURE — 83721 ASSAY OF BLOOD LIPOPROTEIN: CPT | Performed by: FAMILY MEDICINE

## 2021-08-31 ASSESSMENT — MIFFLIN-ST. JEOR: SCORE: 1544.54

## 2021-08-31 NOTE — PATIENT INSTRUCTIONS
Patient Education     What is Charcot Foot?    Charcot foot is a pattern of bone and joint damage that can lead to foot deformity. Charcot foot begins with peripheral neuropathy, a disease of the nerves in the feet. Because your nerves do not function well, you may not sense pain when you injure your foot. You may break bones or damage joints without knowing it. If you keep walking on your injured foot, fractures can heal unevenly. This causes foot deformity. With Charcot foot, minor fractures can lead to major problems if not treated early. Charcot foot may also be called diabetic foot. That's because diabetes is a leading cause of neuropathy in the feet.    Acute Charcot foot  Acute Charcot foot begins when repeated small fractures are not felt and therefore not protected. At first, there may be no visible signs of Charcot. If you keep walking on your foot, fractures become worse. Your foot may feel hot and appear red and swollen.    Fractures  Fractures begin to heal as Charcot foot progresses. But pressure from your body weight keeps bones from mending smoothly. Continued walking can cause new fractures. Your foot may still be red and swollen.    Chronic Charcot foot  Chronic Charcot foot is deformity resulting from poor bone healing. Shoes may not fit the deformed foot. Chafing from a poorly fitting shoe can cause ulcers (open sores). Ulcers may become infected. Severe infection may require amputation.  [employer] last reviewed this educational content on 2/1/2018 2000-2021 [employer]. All rights reserved. This information is not intended as a substitute for professional medical care. Always follow your healthcare professional's instructions.

## 2021-08-31 NOTE — PROGRESS NOTES
ANTICOAGULATION MANAGEMENT     Pee Ayala 81 year old male is on warfarin with therapeutic INR result. (Goal INR 2.0-3.0)    Recent labs: (last 7 days)     08/31/21  1236   INR 2.7*       ASSESSMENT     Source(s): Chart Review, Patient/Caregiver Call and Template       Warfarin doses taken: Warfarin taken as instructed and Template incorrect; verbally confirmed home dose with Pee     HELD 2 days of warfarin doses on 8/20-21.   However, he resumed his usual wkly warfarin dose.    Diet: No new diet changes identified    New illness, injury, or hospitalization: No    Medication/supplement changes: None noted    Signs or symptoms of bleeding or clotting: No    Previous INR: Supratherapeutic at 4.60 on 8/20/21.    Additional findings: None     PLAN     Recommended plan for ongoing change(s) affecting INR     Dosing Instructions:  (evenings. 5mg tabs)   - Continue your current warfarin dose with next INR in 2 weeks       Summary  As of 8/31/2021    Full warfarin instructions:  5 mg every Mon, Wed, Fri; 7.5 mg all other days   Next INR check:  9/14/2021             Telephone call with Pee who verbalizes understanding and agrees to plan    Lab visit scheduled - INR scheduled on 9/16/21 during annual check with Dr. Gil.    Education provided: Goal range and significance of current result and Importance of taking warfarin as instructed    Plan made per [facility] anticoagulation protocol    Mariel Dale RN  Anticoagulation Clinic  8/31/2021    _______________________________________________________________________     Anticoagulation Episode Summary     Current INR goal:  2.0-3.0   TTR:  48.1 % (11.7 mo)   Target end date:     Send INR reminders to:  [facility]    Indications    Chronic atrial fibrillation (H) [I48.20]           Comments:           Anticoagulation Care Providers     Provider Role Specialty Phone number    Jazzy Gil MD Referring Family Medicine [phone]

## 2021-08-31 NOTE — PROGRESS NOTES
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  He continues to have discomfort of the right arch with point tenderness along the midfoot with an obvious Charcot deformity.  We reviewed the pathophysiology of this and how it relates to his peripheral neuropathy and uncontrolled type 2 diabetes.  We discussed the importance of wearing appropriate well supportive, lace up shoes, and a prescription was written for diabetic inserts/shoes.  He is scheduled to undergo lower extremity duplexes and has a follow-up scheduled with his PCP in a couple weeks.  - Orthotics, Prosthetics and Custom Compression Order for DME - ONLY FOR DME    Katie Jeronimo DO  [facility]    Alex Glasgow is a 81 year old who presents for the following health issues   Chief Complaint   Patient presents with     Swelling     right foot swelling has gone down, but still a little sore       HPI     Last saw PCP 8/20/2021. Was having localized right foot swelling with negative x-ray at urgent care. His Lasix was increased to 40 mg twice daily with improvement of his swelling. Lab work was done at follow-up and revealed normal CMP, CBC, and uric acid.  His Lasix was returned to his usual dosing of 40 mg in the morning and 20 mg in the afternoon.  His swelling remains under control with the use of compression stockings during the day.    Although his swelling has improved he continues to have discomfort of his right arch.  While he sitting the pain is usually 2 out of 10 and in the evenings after ambulation it increases to a 5 or 6 out of 10.  He takes Tylenol only because he is not supposed to take NSAIDs due to his heart failure.  He does wear lace up tennis shoes when he is  "going out to dinner but tends to wear slippers around the home and to his appointments.        Objective    /77   Pulse 66   Temp 97.3  F (36.3  C) (Oral)   Ht 1.651 m (5' 5\")   Wt 90.3 kg (199 lb)   BMI 33.12 kg/m    Body mass index is 33.12 kg/m .  Physical Exam   GENERAL: healthy, alert and no distress  MS: trace edema b/l to mid shin  Diabetic foot exam: no trophic changes or ulcerative lesions, DP reduced bilaterally, PT reduced bilaterally, reduced sensation throughout bilateral plantar aspects of feet, and onychomycosis    "

## 2021-09-09 ENCOUNTER — TRANSFERRED RECORDS (OUTPATIENT)
Dept: HEALTH INFORMATION MANAGEMENT | Facility: CLINIC | Age: 81
End: 2021-09-09

## 2021-09-12 ENCOUNTER — HEALTH MAINTENANCE LETTER (OUTPATIENT)
Age: 81
End: 2021-09-12

## 2021-09-16 ENCOUNTER — OFFICE VISIT (OUTPATIENT)
Dept: FAMILY MEDICINE | Facility: CLINIC | Age: 81
End: 2021-09-16
Payer: COMMERCIAL

## 2021-09-16 VITALS
OXYGEN SATURATION: 96 % | WEIGHT: 200.8 LBS | BODY MASS INDEX: 33.45 KG/M2 | SYSTOLIC BLOOD PRESSURE: 126 MMHG | HEART RATE: 56 BPM | DIASTOLIC BLOOD PRESSURE: 60 MMHG | HEIGHT: 65 IN

## 2021-09-16 DIAGNOSIS — Z11.59 NEED FOR HEPATITIS C SCREENING TEST: ICD-10-CM

## 2021-09-16 DIAGNOSIS — R32 URINARY INCONTINENCE, UNSPECIFIED TYPE: ICD-10-CM

## 2021-09-16 DIAGNOSIS — Z13.220 SCREENING FOR HYPERLIPIDEMIA: ICD-10-CM

## 2021-09-16 DIAGNOSIS — E78.2 MIXED HYPERLIPIDEMIA: ICD-10-CM

## 2021-09-16 DIAGNOSIS — Z00.00 ENCOUNTER FOR MEDICARE ANNUAL WELLNESS EXAM: Primary | ICD-10-CM

## 2021-09-16 DIAGNOSIS — I50.32 CHRONIC HEART FAILURE WITH PRESERVED EJECTION FRACTION (H): ICD-10-CM

## 2021-09-16 LAB
CHOLEST SERPL-MCNC: 117 MG/DL
FASTING STATUS PATIENT QL REPORTED: NO
HDLC SERPL-MCNC: 43 MG/DL
LDLC SERPL CALC-MCNC: 48 MG/DL
TRIGL SERPL-MCNC: 131 MG/DL

## 2021-09-16 PROCEDURE — 90662 IIV NO PRSV INCREASED AG IM: CPT | Performed by: FAMILY MEDICINE

## 2021-09-16 PROCEDURE — 99213 OFFICE O/P EST LOW 20 MIN: CPT | Mod: 25 | Performed by: FAMILY MEDICINE

## 2021-09-16 PROCEDURE — 36415 COLL VENOUS BLD VENIPUNCTURE: CPT | Performed by: FAMILY MEDICINE

## 2021-09-16 PROCEDURE — 80061 LIPID PANEL: CPT | Performed by: FAMILY MEDICINE

## 2021-09-16 PROCEDURE — 86803 HEPATITIS C AB TEST: CPT | Performed by: FAMILY MEDICINE

## 2021-09-16 PROCEDURE — G0008 ADMIN INFLUENZA VIRUS VAC: HCPCS | Performed by: FAMILY MEDICINE

## 2021-09-16 PROCEDURE — 99397 PER PM REEVAL EST PAT 65+ YR: CPT | Performed by: FAMILY MEDICINE

## 2021-09-16 RX ORDER — FINASTERIDE 5 MG/1
5 TABLET, FILM COATED ORAL DAILY
COMMUNITY
End: 2022-02-04

## 2021-09-16 RX ORDER — CARVEDILOL 6.25 MG/1
TABLET ORAL
COMMUNITY
Start: 2021-09-05 | End: 2022-04-08

## 2021-09-16 RX ORDER — HYDROXYZINE HYDROCHLORIDE 25 MG/1
25 TABLET, FILM COATED ORAL 3 TIMES DAILY PRN
COMMUNITY
End: 2022-04-08

## 2021-09-16 RX ORDER — ROSUVASTATIN CALCIUM 20 MG/1
20 TABLET, COATED ORAL DAILY
COMMUNITY
End: 2021-11-15 | Stop reason: ALTCHOICE

## 2021-09-16 ASSESSMENT — ACTIVITIES OF DAILY LIVING (ADL): CURRENT_FUNCTION: NO ASSISTANCE NEEDED

## 2021-09-16 ASSESSMENT — MIFFLIN-ST. JEOR: SCORE: 1552.7

## 2021-09-16 NOTE — PATIENT INSTRUCTIONS
At your visit today, we discussed your risk for falls and preventive options.    Fall Prevention  Falls often occur due to slipping, tripping or losing your balance. Millions of people fall every year and injure themselves. Here are ways to reduce your risk of falling again.     Think about your fall, was there anything that caused your fall that can be fixed, removed, or replaced?    Make your home safe by keeping walkways clear of objects you may trip over, such as electric cords.    Use non-slip pads under rugs. Don't use area rugs or small throw rugs.    Use non-slip mats in bathtubs and showers.    Install handrails and lights on staircases. The handrails should be on both sides of the stairs.    Don't walk in poorly lit areas.    Don't stand on chairs or wobbly ladders.    Use caution when reaching overhead or looking upward. This position can cause a loss of balance.    Be sure your shoes fit properly, have non-slip bottoms and are in good condition.     Wear shoes both inside and out. Don't go barefoot or wear slippers.    Be cautious when going up and down stairs, curbs, and when walking on uneven sidewalks.    If your balance is poor, consider using a cane or walker.    If your fall was related to alcohol use, stop or limit alcohol intake.     If your fall was related to use of sleeping medicines, talk to your healthcare provider about this. You may need to reduce your dosage at bedtime if you awaken during the night to go to the bathroom.      To reduce the need for nighttime bathroom trips:  ? Don't drink fluids for several hours before going to bed  ? Empty your bladder before going to bed  ? Men can keep a urinal at the bedside    Stay as active as you can. Balance, flexibility, strength, and endurance all come from exercise. They all play a role in preventing falls. Ask your healthcare provider which types of activity are right for you.    Get your vision checked on a regular basis.    If you have  pets, know where they are before you stand up or walk so you don't trip over them.    Use night lights.    Go over all your medicines with a pharmacist or other healthcare provider to see if any of them could make you more likely to fall.  [employer] last reviewed this educational content on 4/1/2018 2000-2021 [employer]. All rights reserved. This information is not intended as a substitute for professional medical care. Always follow your healthcare professional's instructions.        Patient Education   Personalized Prevention Plan  You are due for the preventive services outlined below.  Your care team is available to assist you in scheduling these services.  If you have already completed any of these items, please share that information with your care team to update in your medical record.  Health Maintenance Due   Topic Date Due     Hepatitis C Screening  Never done     Heart Failure Action Plan  06/09/2019     Cholesterol Lab  08/11/2021     Flu Vaccine (1) 09/01/2021       Understanding [employer] MyPlate  The USDA has guidelines to help you make healthy food choices. These are called MyPlate. MyPlate shows the food groups that make up healthy meals using the image of a place setting. Before you eat, think about the healthiest choices for what to put on your plate or in your cup or bowl. To learn more about building a healthy plate, visit www.choosemyplate.gov.    The food groups    Fruits. Any fruit or 100% fruit juice counts as part of the Fruit Group. Fruits may be fresh, canned, frozen, or dried, and may be whole, cut-up, or pureed. Make 1/2 of your plate fruits and vegetables.    Vegetables. Any vegetable or 100% vegetable juice counts as a member of the Vegetable Group. Vegetables may be fresh, frozen, canned, or dried. They can be served raw or cooked and may be whole, cut-up, or mashed. Make 1/2 of your plate fruits and vegetables.    Grains. All foods made from grains are part of the Grains  Group. These include wheat, rice, oats, cornmeal, and barley. Grains are often used to make foods such as bread, pasta, oatmeal, cereal, tortillas, and grits. Grains should be no more than 1/4 of your plate. At least half of your grains should be whole grains.    Protein. This group includes meat, poultry, seafood, beans and peas, eggs, processed soy products (such as tofu), nuts (including nut butters), and seeds. Make protein choices no more than 1/4 of your plate. Meat and poultry choices should be lean or low fat.    Dairy. The Dairy Group includes all fluid milk products and foods made from milk that contain calcium, such as yogurt and cheese. (Foods that have little calcium, such as cream, butter, and cream cheese, are not part of this group.) Most dairy choices should be low-fat or fat-free.    Oils. Oils aren't a food group, but they do contain essential nutrients. However it's important to watch your intake of oils. These are fats that are liquid at room temperature. They include canola, corn, olive, soybean, vegetable, and sunflower oil. Foods that are mainly oil include mayonnaise, certain salad dressings, and soft margarines. You likely already get your daily oil allowance from the foods you eat.  Things to limit  Eating healthy also means limiting these things in your diet:       Salt (sodium). Many processed foods have a lot of sodium. To keep sodium intake down, eat fresh vegetables, meats, poultry, and seafood when possible. Purchase low-sodium, reduced-sodium, or no-salt-added food products at the store. And don't add salt to your meals at home. Instead, season them with herbs and spices such as dill, oregano, cumin, and paprika. Or try adding flavor with lemon or lime zest and juice.    Saturated fat. Saturated fats are most often found in animal products such as beef, pork, and chicken. They are often solid at room temperature, such as butter. To reduce your saturated fat intake, choose leaner  cuts of meat and poultry. And try healthier cooking methods such as grilling, broiling, roasting, or baking. For a simple lower-fat swap, use plain nonfat yogurt instead of mayonnaise when making potato salad or macaroni salad.    Added sugars. These are sugars added to foods. They are in foods such as ice cream, candy, soda, fruit drinks, sports drinks, energy drinks, cookies, pastries, jams, and syrups. Cut down on added sugars by sharing sweet treats with a family member or friend. You can also choose fruit for dessert, and drink water or other unsweetened beverages.     [employer] last reviewed this educational content on 6/1/2020 2000-2021 [employer]. All rights reserved. This information is not intended as a substitute for professional medical care. Always follow your healthcare professional's instructions.          Urinary Incontinence (Male)    Urinary incontinence means not being able to control the release of urine from the bladder.   Causes  Common causes of urinary incontinence in men include:    Infection    Certain medicines    Aging    Poor pelvic muscle tone    Bladder spasms    Obesity    Trouble urinating and fully emptying the bladder (urinary retention)  Other things that can cause incontinence are:     Nervous system diseases    Diabetes    Sleep apnea    Urinary tract infections    Prostate surgery    Pelvic injury  Constipation and smoking have also been identified as risk factors.   Symptoms    Urge incontinence (overactive bladder). This is a sudden urge to urinate. It occurs even though there may not be much urine in the bladder. The need to urinate often during the night is common. It's due to bladder spasms.    Stress incontinence. This is urine leakage that you can't control. It can occur with sneezing, coughing, and other actions that put stress on the bladder.    Treatment  Treatment depends on what is causing the condition. Bladder infections are treated with  antibiotics. Urinary retention is treated with a bladder catheter.   Home care  Follow these guidelines when caring for yourself at home:    Don't have any foods and drinks that may irritate the bladder. This includes:  ? Chocolate  ? Alcohol  ? Caffeine  ? Carbonated drinks  ? Acidic fruits and juices    Limit fluids to 6 to 8 cups a day.    Lose weight if you are overweight. This will reduce your symptoms.    If advised, do regular pelvic muscle-strengthening exercises such as Kegel exercises.    If needed, wear absorbent pads to catch urine. Change the pads often. This is for good hygiene and to prevent skin and bladder infections.    Bathe daily for good hygiene.    If an antibiotic was prescribed to treat a bladder infection, take it until it's finished. Keep taking it even if you are feeling better. This is to make sure your infection has cleared.    If a catheter was left in place, keep bacteria from getting into the collection bag. Don't disconnect the catheter from the collection bag.    Use a leg band to secure the catheter drainage tube, so it does not pull on the catheter. Drain the collection bag when it becomes full. To do this, use the drain spout at the bottom of the bag. Don't disconnect the bag from the catheter.    Don't pull on or try to remove a catheter. The catheter must be removed by a healthcare provider.    If you smoke, stop. Ask your provider for help if you can't do this on your own.  Follow-up care  Follow up with your healthcare provider, or as advised.  When to get medical advice  Call your healthcare provider right away if any of these occur:    Fever over 100.4 F (38 C), or as directed by your provider    Bladder pain or fullness    Belly swelling, nausea, or vomiting    Back pain    Weakness, dizziness, or fainting    If a catheter was left in place, return if:  ? The catheter falls out  ? The catheter stops draining for 6 hours  ? Your urine gets cloudy or smells bad  [employer]  last reviewed this educational content on 1/1/2020 2000-2021 [employer]. All rights reserved. This information is not intended as a substitute for professional medical care. Always follow your healthcare professional's instructions.        Your Health Risk Assessment indicates you feel you are not in good emotional health.    Recreation   Recreation is not limited to sports and team events. It includes any activity that provides relaxation, interest, enjoyment, and exercise. Recreation provides an outlet for physical, mental, and social energy. It can give a sense of worth and achievement. It can help you stay healthy.    Mental Exercise and Social Involvement  Mental and emotional health is as important as physical health. Keep in touch with friends and family. Stay as active as possible. Continue to learn and challenge yourself.   Things you can do to stay mentally active are:    Learn something new, like a foreign language or musical instrument.     Play SCRABBLE or do crossword puzzles. If you cannot find people to play these games with you at home, you can play them with others on your computer through the Internet.     Join a games club--anything from card games to chess or checkers or lawn bowling.     Start a new hobby.     Go back to school.     Volunteer.     Read.   Keep up with world events.    Preventing Falls at Home  A person can fall for many reasons. Older adults may fall because reaction time slows down as we age. Your muscles and joints may get stiff, weak, or less flexible because of illness, medicines, or a physical condition.   Other health problems that make falls more likely include:     Arthritis    Dizziness or lightheadedness when you stand up (orthostatic hypotension)    History of a stroke    Dizziness    Anemia    Certain medicines taken for mental illness or to control blood pressure.    Problems with balance or gait    Bladder or urinary problems    History of  falling    Changes in vision (vision impairment)    Changes in thinking skills and memory (cognitive impairment)  Injuries from a fall can include serious injuries such as broken bones, dislocated joints, internal bleeding and cuts. Injuries like these can limit your independence.   Prevention tips  To help prevent falls and fall-related injuries, follow the tips below.    Floors  To make floors safer:     Put nonskid pads under area rugs.    Remove small rugs.    Replace worn floor coverings.    Tack carpets firmly to each step on carpeted stairs. Put nonskid strips on the edges of uncarpeted stairs.    Keep floors and stairs free of clutter and cords.    Arrange furniture so there are clear pathways.    Clean up any spills right away.  Bathrooms    To make bathrooms safer:     Install grab bars in the tub or shower.    Apply nonskid strips or put a nonskid rubber mat in the tub or shower.    Sit on a bath chair to bathe.    Use bathmats with nonskid backing.  Lighting  To improve visibility in your home:      Keep a flashlight in each room. Or put a lamp next to the bed within easy reach.    Put nightlights in the bedrooms, hallways, kitchen, and bathrooms.    Make sure all stairways have good lighting.    Take your time when going up and down stairs.    Put handrails on both sides of stairs and in walkways for more support. To prevent injury to your wrist or arm, don t use handrails to pull yourself up.    Install grab bars to pull yourself up.    Move or rearrange items that you use often. This will make them easier to find or reach.    Look at your home to find any safety hazards. Especially look at doorways, walkways, and the driveway. Remove or repair any safety problems that you find.  Other changes to make    Look around to find any safety hazards. Look closely at doorways, walkways, and the driveway. Remove or repair any safety problems that you find.    Wear shoes that fit well.    Take your time when  going up and down stairs.    Put handrails on both sides of stairs and in walkways for more support. To prevent injury to your wrist or arm, don t use handrails to pull yourself up.    Install grab bars wherever needed to pull yourself up.    Arrange items that you use often. This will make them easier to find or reach.    [employer] last reviewed this educational content on 3/1/2020    7641-9733 [employer]. All rights reserved. This information is not intended as a substitute for professional medical care. Always follow your healthcare professional's instructions.              You are currently not taking Imdur and Flomax.     I am not sure why and when they were stopped.  Imdur is usually prescribed by the heart specialist.  I will send a message to Dr. Osorio    Flomax is a medication for prostate/bladder control.  It is sometimes stopped if you are having low blood pressure and falls.  You do complain about urinary issues and we have discussed lifestyle modification.      Your usual appointment with cardiology is once a year in February  Your usual appointment with cardiovascular surgery is in November

## 2021-09-16 NOTE — PROGRESS NOTES
ASSESSMENT / PLAN:       ICD-10-CM    1. Encounter for Medicare annual wellness exam  Z00.00    2. Need for hepatitis C screening test  Z11.59 Hepatitis C Screen Reflex to HCV RNA Quant and Genotype     Hepatitis C Screen Reflex to HCV RNA Quant and Genotype   3. Screening for hyperlipidemia  Z13.220    4. Mixed hyperlipidemia  E78.2 Lipid panel reflex to direct LDL Non-fasting     Lipid panel reflex to direct LDL Non-fasting   5. Urinary incontinence, unspecified type  R32    6. Chronic heart failure with preserved ejection fraction (H)  I50.32      Medical decision making: Not taking Imdur and Flomax!!  Likely this is stopped during her hospital admission of previous episodes of low blood pressure.  At this time patient is stable but does have urinary incontinence in the morning when he takes his Lasix.  I discussed lifestyle modification including timed bathroom breaks.  If his symptoms persist, I can refer back to urology.  I am hesitant to restart Flomax noting that his diastolic blood pressure is fairly low.  This is discussed with him and patient is agreeable.  Also discussed that he should be attention to his diet and improve diabetes and that can also help with urination.  Patient verbalizes understanding.      Patient instruction: You are currently not taking Imdur and Flomax.     I am not sure why and when they were stopped.  Imdur is usually prescribed by the heart specialist.  I will send a message to Dr. Osorio    Flomax is a medication for prostate/bladder control.  It is sometimes stopped if you are having low blood pressure and falls.  You do complain about urinary issues and we have discussed lifestyle modification.      Your usual appointment with cardiology is once a year in February  Your usual appointment with cardiovascular surgery is in November    Note from Dr. Osorio  via telephone message-   Thank you for the note. If he is doing well without anginal symptoms, he can stay off the  "Imdur.     Patient has been advised of split billing requirements and indicates understanding: Yes  COUNSELING:  Reviewed preventive health counseling, as reflected in patient instructions       Regular exercise       Healthy diet/nutrition       Vision screening       Dental care       Bladder control       Alcohol Use        Hepatitis C screening    Estimated body mass index is 33.41 kg/m  as calculated from the following:    Height as of this encounter: 1.651 m (5' 5\").    Weight as of this encounter: 91.1 kg (200 lb 12.8 oz).    Weight management plan: Discussed healthy diet and exercise guidelines    He reports that he quit smoking about 23 years ago. He has never used smokeless tobacco.      Appropriate preventive services were discussed with this patient, including applicable screening as appropriate for cardiovascular disease, diabetes, osteopenia/osteoporosis, and glaucoma.  As appropriate for age/gender, discussed screening for colorectal cancer, prostate cancer, breast cancer, and cervical cancer. Checklist reviewing preventive services available has been given to the patient.    Reviewed patients plan of care and provided an AVS. The Basic Care Plan (routine screening as documented in Health Maintenance) for Pee meets the Care Plan requirement. This Care Plan has been established and reviewed with the Patient.  SUBJECTIVE:   Pee Ayala is a 81 year old male who presents for Preventive Visit.  Chief Complaint   Patient presents with     Wellness Visit     Pt is NOT fasting today, went through all medications with pt. Wants his feet checked out.      Changed insulin- 30 units in morning, doing 25 units before dinner ( usually 6:30)  Gives extra before bed time?! ( 8:30- 9:00 PM)  Novolog 70/30 10-12 units if   No noted low BS or signs of hypoglycemia.    Not fasting today. Ate a biscotti and a banana this morning.    Foot swelling is better. Uses [employer] stocking    NOT taking Flomax or " "Imdur.    Patient has been advised of split billing requirements and indicates understanding: Yes  Are you in the first 12 months of your Medicare coverage?  No    Healthy Habits:     In general, how would you rate your overall health?  Good    Frequency of exercise:  4-5 days/week    Duration of exercise:  45-60 minutes    Do you usually eat at least 4 servings of fruit and vegetables a day, include whole grains    & fiber and avoid regularly eating high fat or \"junk\" foods?  No    Taking medications regularly:  Yes    Barriers to taking medications:  Problems remembering to take them    Medication side effects:  None    Ability to successfully perform activities of daily living:  No assistance needed    Home Safety:  Throw rugs in the hallway    Hearing Impairment:  No hearing concerns    In the past 6 months, have you been bothered by leaking of urine? Yes    In general, how would you rate your overall mental or emotional health?  Fair      PHQ-2 Total Score: 0    Additional concerns today:  No    Do you feel safe in your environment? Yes    Have you ever done Advance Care Planning? (For example, a Health Directive, POLST, or a discussion with a medical provider or your loved ones about your wishes): Yes, advance care planning is on file.      Fall risk  Fallen 2 or more times in the past year?: Yes  Any fall with injury in the past year?: Yes    Cognitive Screening   1) Repeat 3 items (Leader, Season, Table)    2) Clock draw: NORMAL  3) 3 item recall: Recalls 2 objects   Results: NORMAL clock, 1-2 items recalled: COGNITIVE IMPAIRMENT LESS LIKELY    Mini-CogTM Copyright ASHLYN Castillo. Licensed by the author for use in [facility]; reprinted with permission (abebe@.[facility]). All rights reserved.      Do you have sleep apnea, excessive snoring or daytime drowsiness?: no    Reviewed and updated as needed this visit by clinical staff  Tobacco  Allergies  Meds              Reviewed and updated as needed this " "visit by Provider                Social History     Tobacco Use     Smoking status: Former Smoker     Quit date: 1998     Years since quittin.7     Smokeless tobacco: Never Used   Substance Use Topics     Alcohol use: Yes     Alcohol/week: 0.0 standard drinks     Comment: Alcoholic Drinks/day: very rare     If you drink alcohol do you typically have >3 drinks per day or >7 drinks per week? No    Alcohol Use 2021   Prescreen: >3 drinks/day or >7 drinks/week? No   Prescreen: >3 drinks/day or >7 drinks/week? -   No flowsheet data found.      Current providers sharing in care for this patient include:   Patient Care Team:  Jazzy Gil MD as PCP - General (Family Medicine)  Rodolfo Curry PharmD as Pharmacist (Pharmacist)  Jo Romano MD as Assigned Heart and Vascular Provider  Jazzy Gil MD as Assigned PCP    The following health maintenance items are reviewed in Epic and correct as of today:  Health Maintenance Due   Topic Date Due     HF ACTION PLAN  2019     BP Readings from Last 3 Encounters:   21 126/60   21 131/77   21 (!) 142/65    Wt Readings from Last 3 Encounters:   21 91.1 kg (200 lb 12.8 oz)   21 90.3 kg (199 lb)   21 90 kg (198 lb 8 oz)                     Review of Systems  Constitutional, HEENT, cardiovascular, pulmonary, gi and gu systems are negative, except as otherwise noted.    OBJECTIVE:   /60 (BP Location: Left arm, Patient Position: Sitting, Cuff Size: Adult Regular)   Pulse 56   Ht 1.651 m (5' 5\")   Wt 91.1 kg (200 lb 12.8 oz)   SpO2 96%   BMI 33.41 kg/m   Estimated body mass index is 33.41 kg/m  as calculated from the following:    Height as of this encounter: 1.651 m (5' 5\").    Weight as of this encounter: 91.1 kg (200 lb 12.8 oz).  Physical Exam  GENERAL: healthy, alert and no distress  NECK: no adenopathy, no asymmetry, masses, or scars and thyroid normal to palpation  RESP: lungs clear to auscultation - " no rales, rhonchi or wheezes  CV: regular rate and rhythm, normal S1 S2, no S3 or S4, no murmur, click or rub, no peripheral edema and peripheral pulses strong  ABDOMEN: soft, nontender, no hepatosplenomegaly, no masses and bowel sounds normal  MS: no gross musculoskeletal defects noted, trace edema    Diagnostic Test Results:  Labs reviewed in Epic  Results for orders placed or performed in visit on 09/16/21   Hepatitis C Screen Reflex to HCV RNA Quant and Genotype     Status: Normal   Result Value Ref Range    Hepatitis C Antibody Nonreactive Nonreactive    Narrative    Assay performance characteristics have not been established for newborns, infants, and children.   Lipid panel reflex to direct LDL Non-fasting     Status: None   Result Value Ref Range    Cholesterol 117 <=199 mg/dL    Triglycerides 131 <=149 mg/dL    Direct Measure HDL 43 >=40 mg/dL    LDL Cholesterol Calculated 48 <=129 mg/dL    Patient Fasting > 8hrs? No          Counseling Resources:  ATP IV Guidelines  Pooled Cohorts Equation Calculator  Breast Cancer Risk Calculator  Breast Cancer: Medication to Reduce Risk  FRAX Risk Assessment  ICSI Preventive Guidelines  Dietary Guidelines for Americans, 2010  Formatta's MyPlate  ASA Prophylaxis  Lung CA Screening    Jazzy Gil MD  [facility]    Identified Health Risks:  The patient was counseled and encouraged to consider modifying their diet and eating habits. He was provided with information on recommended healthy diet options.  Information on urinary incontinence and treatment options given to patient.  The patient was provided with suggestions to help him develop a healthy emotional lifestyle.  He is at risk for falling and has been provided with information to reduce the risk of falling at home.

## 2021-09-17 ENCOUNTER — TRANSFERRED RECORDS (OUTPATIENT)
Dept: HEALTH INFORMATION MANAGEMENT | Facility: CLINIC | Age: 81
End: 2021-09-17

## 2021-09-17 LAB — HCV AB SERPL QL IA: NONREACTIVE

## 2021-09-23 ENCOUNTER — TELEPHONE (OUTPATIENT)
Dept: ANTICOAGULATION | Facility: CLINIC | Age: 81
End: 2021-09-23

## 2021-09-23 DIAGNOSIS — I48.20 CHRONIC ATRIAL FIBRILLATION (H): Primary | ICD-10-CM

## 2021-09-23 NOTE — TELEPHONE ENCOUNTER
ANTICOAGULATION     Pee Ayala is overdue for INR check.      Left message for patient to call and schedule lab appointment as soon as possible. If returning call, please schedule.     Mariel Dale RN

## 2021-09-27 ENCOUNTER — TELEPHONE (OUTPATIENT)
Dept: FAMILY MEDICINE | Facility: CLINIC | Age: 81
End: 2021-09-27

## 2021-09-27 NOTE — TELEPHONE ENCOUNTER
Please inform patient that Flomax can cause a drop in blood pressure and increase risk of fall.  At some point, it was discontinued.  If he is having symptoms of urinary problems, I can refer him to urologist for other options.    At this time, I do not recommend that he start on the medication.    Jazzy Gil MD

## 2021-09-27 NOTE — TELEPHONE ENCOUNTER
Incoming call from pt following up from his [facility] about the flomax. Pt stated that he had discussed with you and you were going to check and see if he was still supposed to be on it or not? Please advise

## 2021-09-28 ENCOUNTER — ANTICOAGULATION THERAPY VISIT (OUTPATIENT)
Dept: ANTICOAGULATION | Facility: CLINIC | Age: 81
End: 2021-09-28

## 2021-09-28 ENCOUNTER — LAB (OUTPATIENT)
Dept: LAB | Facility: CLINIC | Age: 81
End: 2021-09-28
Payer: COMMERCIAL

## 2021-09-28 ENCOUNTER — MEDICAL CORRESPONDENCE (OUTPATIENT)
Dept: HEALTH INFORMATION MANAGEMENT | Facility: CLINIC | Age: 81
End: 2021-09-28

## 2021-09-28 DIAGNOSIS — I48.20 CHRONIC ATRIAL FIBRILLATION (H): ICD-10-CM

## 2021-09-28 DIAGNOSIS — I48.20 CHRONIC ATRIAL FIBRILLATION (H): Primary | ICD-10-CM

## 2021-09-28 LAB — INR BLD: 3.6 (ref 0.9–1.1)

## 2021-09-28 PROCEDURE — 36416 COLLJ CAPILLARY BLOOD SPEC: CPT

## 2021-09-28 PROCEDURE — 85610 PROTHROMBIN TIME: CPT

## 2021-09-28 NOTE — PROGRESS NOTES
ANTICOAGULATION MANAGEMENT     Pee Ayala 81 year old male is on warfarin with supratherapeutic INR result. (Goal INR 2.0-3.0)    Recent labs: (last 7 days)     09/28/21  1225   INR 3.6*       ASSESSMENT     Source(s): Chart Review, Patient/Caregiver Call and Template       Warfarin doses taken: Less warfarin taken than planned which may be affecting INR    Diet: No new diet changes identified    New illness, injury, or hospitalization: No    Medication/supplement changes: None noted.    Signs or symptoms of bleeding or clotting: No    Previous INR: Therapeutic last visit at 2.7; previously outside of goal range at 4.6    Additional findings: None  Reported no changes with diet or medication regiments.     PLAN     Recommended plan for no diet, medication or health factor changes affecting INR     Dosing Instructions:    (evenings. 5mg tabs)   - advised to HOLD warfarin dose tonight.   - Decrease your warfarin dose (11.1% change) with next INR in 1-2 weeks       Summary  As of 9/28/2021    Full warfarin instructions:  9/28: Hold; Otherwise 7.5 mg every Mon, Fri; 5 mg all other days   Next INR check:  10/12/2021             Telephone call with  wife, Fay Perez who verbalizes understanding and agrees to plan    - Pee walking his dog @ the park.   - stated to get warfarin instructions and call back Pee tomorrow morning.    Contact [phone] to schedule and with any changes, questions or concerns.     Education provided: Importance of consistent vitamin K intake and Goal range and significance of current result    Plan made per [facility] anticoagulation protocol    Mariel Dale RN  Anticoagulation Clinic  9/28/2021    _______________________________________________________________________     Anticoagulation Episode Summary     Current INR goal:  2.0-3.0   TTR:  50.4 % (11.7 mo)   Target end date:     Send INR reminders to:  [facility]    Indications    Chronic atrial fibrillation (H)  [I48.20]           Comments:           Anticoagulation Care Providers     Provider Role Specialty Phone number    Jazzy Gil MD Referring Family Medicine [phone]

## 2021-09-29 NOTE — PROGRESS NOTES
Called and spoke with Pee,     - he reported holding his warfarin dose last night, as instructed.     - not taking Flomax anymore.     - gave new warfarin dose.     - INR scheduled on 10/12/212 @ [facility].

## 2021-10-07 ENCOUNTER — TRANSFERRED RECORDS (OUTPATIENT)
Dept: HEALTH INFORMATION MANAGEMENT | Facility: CLINIC | Age: 81
End: 2021-10-07

## 2021-10-12 ENCOUNTER — LAB (OUTPATIENT)
Dept: LAB | Facility: CLINIC | Age: 81
End: 2021-10-12
Payer: COMMERCIAL

## 2021-10-12 ENCOUNTER — ANTICOAGULATION THERAPY VISIT (OUTPATIENT)
Dept: ANTICOAGULATION | Facility: CLINIC | Age: 81
End: 2021-10-12

## 2021-10-12 DIAGNOSIS — I48.20 CHRONIC ATRIAL FIBRILLATION (H): Primary | ICD-10-CM

## 2021-10-12 DIAGNOSIS — I48.20 CHRONIC ATRIAL FIBRILLATION (H): ICD-10-CM

## 2021-10-12 LAB — INR BLD: 2.3 (ref 0.9–1.1)

## 2021-10-12 PROCEDURE — 36416 COLLJ CAPILLARY BLOOD SPEC: CPT

## 2021-10-12 PROCEDURE — 85610 PROTHROMBIN TIME: CPT

## 2021-10-12 NOTE — PROGRESS NOTES
ANTICOAGULATION MANAGEMENT     Pee Ayala 81 year old male is on warfarin with therapeutic INR result. (Goal INR 2.0-3.0)    Recent labs: (last 7 days)     10/12/21  1103   INR 2.3*       ASSESSMENT     Source(s): Chart Review, Patient/Caregiver Call and Template       Warfarin doses taken: More warfarin taken than planned which may be affecting INR    Held one dose of warfarin on 9/28/21.  However, he resumed his previous warfarin dose, and taking more than instructed. (update antico. Calendar)    Diet: No new diet changes identified    New illness, injury, or hospitalization: No    Medication/supplement changes: None noted    Signs or symptoms of bleeding or clotting: No    Previous INR: Supratherapeutic at 3.6 on 9/28/21.    Additional findings: None     PLAN     Recommended plan for ongoing change(s) affecting INR     Dosing Instructions: (evenings. 5mg tabs)    Continue your current warfarin dose with next INR in 2-3 weeks       Summary  As of 10/12/2021    Full warfarin instructions:  7.5 mg every Sun, Tue, Thu; 5 mg all other days   Next INR check:  11/2/2021             Telephone call with Pee ([phone]) who verbalizes understanding and agrees to plan    Lab visit scheduled - INR on 10/26/21 @ [facility]    Education provided: Importance of consistent vitamin K intake, Goal range and significance of current result and Importance of taking warfarin as instructed    Plan made per [facility] anticoagulation protocol    Mariel Dale RN  Anticoagulation Clinic  10/12/2021    _______________________________________________________________________     Anticoagulation Episode Summary     Current INR goal:  2.0-3.0   TTR:  52.4 % (11.7 mo)   Target end date:     Send INR reminders to:  [facility]    Indications    Chronic atrial fibrillation (H) [I48.20]           Comments:           Anticoagulation Care Providers     Provider Role Specialty Phone number    Jazzy Gil MD Referring Family  Medicine [phone]

## 2021-10-18 DIAGNOSIS — E11.42 TYPE 2 DIABETES MELLITUS WITH PERIPHERAL NEUROPATHY (H): ICD-10-CM

## 2021-10-26 ENCOUNTER — LAB (OUTPATIENT)
Dept: LAB | Facility: CLINIC | Age: 81
End: 2021-10-26
Payer: COMMERCIAL

## 2021-10-26 ENCOUNTER — ANTICOAGULATION THERAPY VISIT (OUTPATIENT)
Dept: ANTICOAGULATION | Facility: CLINIC | Age: 81
End: 2021-10-26
Payer: COMMERCIAL

## 2021-10-26 DIAGNOSIS — I48.20 CHRONIC ATRIAL FIBRILLATION (H): Primary | ICD-10-CM

## 2021-10-26 DIAGNOSIS — Z79.01 LONG TERM CURRENT USE OF ANTICOAGULANT THERAPY: ICD-10-CM

## 2021-10-26 DIAGNOSIS — I48.20 CHRONIC ATRIAL FIBRILLATION (H): ICD-10-CM

## 2021-10-26 LAB — INR BLD: 2.8 (ref 0.9–1.1)

## 2021-10-26 PROCEDURE — 36416 COLLJ CAPILLARY BLOOD SPEC: CPT

## 2021-10-26 PROCEDURE — 85610 PROTHROMBIN TIME: CPT

## 2021-10-26 NOTE — PROGRESS NOTES
ANTICOAGULATION MANAGEMENT     Pee Ayala 81 year old male is on warfarin with therapeutic INR result. (Goal INR 2.0-3.0)    Recent labs: (last 7 days)     10/26/21  1305   INR 2.8*       ASSESSMENT     Source(s): Chart Review, Patient/Caregiver Call and Template       Warfarin doses taken: Warfarin taken as instructed    Diet: No new diet changes identified    New illness, injury, or hospitalization: No    Medication/supplement changes: None noted    Signs or symptoms of bleeding or clotting: No    Previous INR: Therapeutic last visit AT 2.3; previously outside of goal range AT 3.6    Additional findings: None     PLAN     Recommended plan for no diet, medication or health factor changes affecting INR     Dosing Instructions:  (evenings. 5mg tabs)   - Continue your current warfarin dose with next INR in 4 weeks       Summary  As of 10/26/2021    Full warfarin instructions:  7.5 mg every Sun, Tue, Thu; 5 mg all other days   Next INR check:  11/23/2021             Detailed voice message left for Pee with dosing instructions and follow up date.     Contact [phone] to schedule and with any changes, questions or concerns.    - informed Pee - he has appt on 11/15/21 for bilateral ultrasound / doppler and follow-up with Dr. Webb (his vascular doctor); [facility]    Education provided: Importance of consistent vitamin K intake and Goal range and significance of current result    Plan made per [facility] anticoagulation protocol    Mariel Dale, RN  Anticoagulation Clinic  10/26/2021    _______________________________________________________________________     Anticoagulation Episode Summary     Current INR goal:  2.0-3.0   TTR:  55.1 % (11.7 mo)   Target end date:     Send INR reminders to:  [facility]    Indications    Chronic atrial fibrillation (H) [I48.20]           Comments:           Anticoagulation Care Providers     Provider Role Specialty Phone  number    Jazzy Gil MD [facility] Family [facility] [phone]

## 2021-11-02 DIAGNOSIS — G25.81 RESTLESS LEG SYNDROME: Primary | ICD-10-CM

## 2021-11-04 RX ORDER — PRAMIPEXOLE DIHYDROCHLORIDE 0.25 MG/1
0.25 TABLET ORAL 2 TIMES DAILY
Qty: 180 TABLET | Refills: 3 | Status: SHIPPED | OUTPATIENT
Start: 2021-11-04 | End: 2022-06-03

## 2021-11-04 NOTE — TELEPHONE ENCOUNTER
Disp Refills Start End FREDY    pramipexole (MIRAPEX) 0.25 MG tablet 180 tablet 3 1/5/2021  --   Sig - Route: Take 2 tablets (0.5 mg total) by mouth at bedtime. - Oral   Sent to pharmacy as: pramipexole 0.25 mg tablet (Mirapex)   E-Prescribing Status: Receipt confirmed by pharmacy (1/5/2021 11:09 AM CST)       pramipexole (MIRAPEX) 0.25 MG tablet [[id number]]    Electronically signed by: Jazzy Gil MD on 01/05/21 1109 Status: Active   Ordering user: Jazzy Gil MD 01/05/21 1109 Authorized by: Jazzy Gil MD   Frequency: QHS 01/05/21 - Until Discontinued   Diagnoses  Restless leg syndrome [G25.81]     Routing refill request to provider for review/approval because:  Early refill requested.    Last office visit provider:  9/16/21     Requested Prescriptions   Pending Prescriptions Disp Refills     pramipexole (MIRAPEX) 0.25 MG tablet 180 tablet 3     Sig: Take 1 tablet (0.25 mg) by mouth 2 times daily Takes 4pm and 5;30 pm       Antiparkinson's Agents Protocol Passed - 11/2/2021  1:16 PM        Passed - Blood pressure under 140/90 in past 12 months     BP Readings from Last 3 Encounters:   09/16/21 126/60   08/31/21 131/77   08/20/21 (!) 142/65                 Passed - CBC on record in past 12 months     Recent Labs   Lab Test 08/20/21  1045   WBC 7.3   RBC 4.30*   HGB 14.4   HCT 43.4                    Passed - ALT on record in past 12 months         Recent Labs   Lab Test 08/20/21  1045   ALT 18             Passed - Serum Creatinine on file in past 12 months     Recent Labs   Lab Test 08/31/21  1221   CR 1.04       Ok to refill medication if creatinine is low          Passed - Medication is active on med list        Passed - Patient is age 18 or older        Passed - Recent (6 mo) or future (30 days) visit within the authorizing provider's specialty     Patient had office visit in the last 6 months or has a visit in the next 30 days with authorizing provider or within the authorizing  "provider's specialty.  See \"Patient Info\" tab in inbasket, or \"Choose Columns\" in Meds & Orders section of the refill encounter.                 Ihsan Bo RN 11/04/21 7:03 AM  "

## 2021-11-15 ENCOUNTER — TELEPHONE (OUTPATIENT)
Dept: VASCULAR SURGERY | Facility: CLINIC | Age: 81
End: 2021-11-15

## 2021-11-15 ENCOUNTER — ANCILLARY PROCEDURE (OUTPATIENT)
Dept: VASCULAR ULTRASOUND | Facility: CLINIC | Age: 81
End: 2021-11-15
Attending: NURSE PRACTITIONER
Payer: COMMERCIAL

## 2021-11-15 ENCOUNTER — OFFICE VISIT (OUTPATIENT)
Dept: VASCULAR SURGERY | Facility: CLINIC | Age: 81
End: 2021-11-15
Attending: NURSE PRACTITIONER
Payer: COMMERCIAL

## 2021-11-15 VITALS — DIASTOLIC BLOOD PRESSURE: 84 MMHG | HEART RATE: 80 BPM | TEMPERATURE: 98.1 F | SYSTOLIC BLOOD PRESSURE: 136 MMHG

## 2021-11-15 DIAGNOSIS — I73.9 PAD (PERIPHERAL ARTERY DISEASE) (H): ICD-10-CM

## 2021-11-15 DIAGNOSIS — I73.9 PAD (PERIPHERAL ARTERY DISEASE) (H): Primary | ICD-10-CM

## 2021-11-15 DIAGNOSIS — I70.212 ATHEROSCLEROSIS OF NATIVE ARTERIES OF EXTREMITIES WITH INTERMITTENT CLAUDICATION, LEFT LEG (H): ICD-10-CM

## 2021-11-15 DIAGNOSIS — Z95.2 S/P TAVR (TRANSCATHETER AORTIC VALVE REPLACEMENT): ICD-10-CM

## 2021-11-15 PROCEDURE — 93922 UPR/L XTREMITY ART 2 LEVELS: CPT

## 2021-11-15 PROCEDURE — 99214 OFFICE O/P EST MOD 30 MIN: CPT | Performed by: SURGERY

## 2021-11-15 PROCEDURE — 93925 LOWER EXTREMITY STUDY: CPT

## 2021-11-15 RX ORDER — TRIAMCINOLONE ACETONIDE 1 MG/G
CREAM TOPICAL
COMMUNITY
Start: 2021-10-07 | End: 2021-11-15

## 2021-11-15 NOTE — PATIENT INSTRUCTIONS
Pee Ayala,    Your visit to [facility] for your surgery or procedure is coming soon and we look forward to seeing you! This friendly reminder and pre-procedure checklist will help to ensure your procedure/surgery goes smoothly and meets your expectations. At [facility], our goal is to provide you with a great patient experience and to deliver genuine, professional care to every patient.     Please complete all the steps in advance of your visit. If you have any questions about the items listed below, please give our office a call. We can be reached at [phone] or visit our website at [email or web address] for more information.     Procedure: Left Leg Angiogram    Procedure Date :  12/7/21    Procedure Time :  8am    Arrival Time: 7am    Covid Test: 12/3/21    Post Procedure Appointment: 12/20/21    Admission Type: Outpatient    Surgeon: MD Wendy Delgadillo    Procedure Location:     If you take blood thinners: SEE SPECIFIC INSTRUCTIONS BELOW    PLEASE DO NOT STOP YOUR ASPIRIN OR PLAVIX UNLESS SPECIFICALLY DIRECTED BY THE VASCULAR SURGEON TO STOP!  - In most cases Vascular surgeons want you to continue these. This is different from most NON vascular surgeries and may not be well known by your Primary Care Provider      WE WILL NEED YOU TO HOLD YOUR COUMADIN FOR 5 DAYS PRIOR, WE WILL CALL YOU IF ANY BRIDGING MEDICATION IS NEEDED.    IF YOU NEED TO RESCHEDULE OR CANCEL YOUR PROCEDURE FOR ANY REASON PLEASE CALL THE CLINIC AS SOON AS POSSIBLE -[phone].    [] A Pre-op physical within 30 days of the procedure is required. You will need to set up an appointment with your primary care provider.  YOU NEED TO SCHEDULE THIS    Before the procedure  Prepare for the peripheral angiography as follows:     Do not eat or drink anything after midnight before your procedure. If your provider says to take your normal medicines, swallow them with only  small sips of water.    Tell your healthcare provider about all medicines you take and any allergies you may have.    Arrange for a family member or friend to drive you home.    If you are on Metformin, you will need to hold this medication for 48 hours after the procedure.     Take only 1/2 of your bedtime insulin dose and hold your morning dose but bring it with you to the angiogram.    Peripheral Angiography    Peripheral angiography is an outpatient procedure that makes a  map  of the vessels (arteries) in your lower body, legs, and arms, using X-ray and dye.This map can show where blood flow may be blocked.    An angiogram is commonly performed under sedation with the use of local anesthesia.      The procedure usually starts with a needle put into the femoral (groin) artery. From one treatment site, areas all over the body can be treated.  After access is established, catheters (thin tubes) and wires are threaded through the arterial system to a specific area of interest or throughout the entire body.  As a contrast agent (iodine dye) is injected, X-ray images are taken to let your vascular surgeon view the flow of the dye and identify blockages. The surgeon can then choose the best mode of therapy for you - whether during or following the angiogram. This decision depends on your symptoms and the severity and characteristics of the blockages.  Two common therapies that can be provided during the angiogram are balloon angioplasty and stent placement.                     Angioplasty can be used to open arterial blockages. Guided by X-ray, your vascular surgeon navigates through the blockage with a wire and introduces a special device equipped with an inflatable balloon. After positioning the balloon device across the blocked portion of the artery, the vascular surgeon inflates the balloon to expand the artery and compress the blockage. The balloon is then deflated and removed while keeping the wire in place  across the area that has been treated. Next, contrast dye is injected to assess the result. Treatment is considered a success if blood flow is improved and less than 30% of the blockage remains. If the vessel is still considerably narrowed, placing a stent may be the next step.      Stents are used to prop open an artery at the site of a narrowing. Stents are generally placed after balloon angioplasty when there is residual narrowing or insufficient blood flow in a treated vessel. Stents are considered a permanent implant and cannot be used if you have a metal allergy. Stents that are used in the leg are constructed of a nickel-titanium alloy (Nitinol), a memory-shaped metal. This alloy has a predetermined size and shape at body temperature and expands to this size and shape after being introduced through a catheter. These stents resist kinking and are flexible so that damage from activities that involve your legs is minimized.    If surgery is felt to be a better option, your vascular surgeon will obtain any additional X-ray images needed to plan a surgical bypass of the blocked vessel/s and will then conclude the angiogram.          During the procedure  Here is what to expect:      You may get medicine through an IV (intravenous) line to relax you. You re given an injection to numb the insertion site. Then, a tiny skin cut (incision) is made near an artery in your groin.    Your provider inserts a thin tube (catheter) through the incision. He or she then threads the catheter into an artery while looking at a video monitor.    Contrast  dye  is injected into the catheter to confirm position. You may feel warmth or pressure in your legs and back. You lie still as X-rays are taken. The catheter is then taken out.  After the procedure  You ll be taken to a recovery area. A healthcare provider will apply pressure to the site for about 10 minutes. Your healthcare provider will tell you how long to lie down and keep the  insertion site still. Your healthcare provider will discuss the results with you soon after the procedure.  Back at home  On the day you get home, don t drive, don t exercise, avoid walking and taking stairs, and avoid bending and lifting. Your healthcare provider may give you other care instructions.    Call your healthcare provider  Call your healthcare provider right away if:    You notice a lump or bleeding at the insertion site    You feel pain at the insertion site    You become lightheaded or dizzy    You have leg pain or numbness    You do not urinate in 8 hours      Angiogram Procedure Discharge Instructions:     1. If you received sedation for your procedure: Do not drive or operate heavy machinery for the rest of the day.     2. Avoid strenuous activity for 72 hours (3 days):                        - Do not lift greater than 10 pounds.                        - Excessive exercise                        - Straining                        - Return to your normal activities as you tolerate after the 3 days restriction     3. Avoid tub baths, Jacuzzis, hot tubs and pools for 72 hours (3 days) or until puncture site is will healed.     4. You may shower beginning tomorrow. Do not scrub puncture site(s) until well healed, pat dry.     5. You can expect to return to work 1-2 days after your procedure - depending on the nature of your profession.     6. It is normal to have some tenderness and minimal swelling at puncture site. A small area of discoloration may be present. Tenderness typically subsides in 24-48 hours. A small knot may also be present at puncture site for 6-8 weeks, this can be a normal part of the healing process.         After the angiogram If you:      1. Experience any bleeding or active swelling from puncture site: Lie down, firmly apply pressure to puncture site and CALL 9-1-1     2. Fever greater than 101 degrees Fahrenheit.     3. Redness, swelling, warmth to touch, or purulent  (yellow/green/foul smelling) drainage from the puncture site.     4. Increasing pain, tenderness or swelling at puncture site OR of arm/leg near puncture site.     5. Feeling weak or faint.     6. Change in color, temperature, or sensation of arm/leg where puncture was made.     Call us with any other questions or concerns after your procedure: [phone]    You will need to have an ultrasound 2-3 weeks after your angiogram and should be scheduled at the time of your follow up appt.  Further follow up will be based on ultrasound results. Typical follow up is every 3 months for the first year, then every 6 months to one year thereafter.       All invasive procedures can have complications. While the risk of an angiogram is low it is not zero. The most common complications are related to the arterial access site.       Risks/ Complications     Bruising is Common  You will likely have bruising (ecchymosis) where the artery was entered.      Pain and Bleeding  Less commonly, patients experience pain and bleeding that may include blood collecting under the skin (hematoma).      Blockage and Leakage   In rare cases, the access artery can become blocked. Infrequently, patients experience persistent leakage of blood where the artery was entered, which can result in the formation of a pseudoaneurysm--a blood-filled sac--that may require further treatment.    Other complications related to an angiogram include:   Allergic reaction to the iodine contrast dye, which can lead to the development of kidney failure.  Very rarely during balloon angioplasty and/or stent placement, part of the arterial blockage can break off (embolism) and travel to more distant arteries. This can worsen blood flow.        Notify our office right away, if you have any changes in your health status, or if you develop a cold, flu, diarrhea, infection, fever or sore throat before your scheduled surgery date. We can be reached at [phone]  Monday-Friday 8 am-4:30 pm if you have any questions.   Thank you for choosing [facility]  If you have any questions or need to reschedule your appointment, please call [phone]

## 2021-11-15 NOTE — NURSING NOTE
[facility]        Patient is here for a 6 mo follow up  to discuss Peripheral artery disease (PAD). Symptoms include claudicaton: right calf and left calf at distance of 1 block feet in right lower extremity and left lower extremity. LEFT>RIGHT    Pt is currently taking Aspirin, Statin and Warfarin.    /84   Pulse 80   Temp 98.1  F (36.7  C)     The provider has been notified that the patient has concerns of left leg pain.     Questions patient would like addressed today are: N/A.    Refills are needed: No    Has homecare services and agency name:  [facility]

## 2021-11-16 ENCOUNTER — TELEPHONE (OUTPATIENT)
Dept: ANTICOAGULATION | Facility: CLINIC | Age: 81
End: 2021-11-16
Payer: COMMERCIAL

## 2021-11-16 DIAGNOSIS — Z79.01 LONG TERM CURRENT USE OF ANTICOAGULANT THERAPY: ICD-10-CM

## 2021-11-16 DIAGNOSIS — I48.20 CHRONIC ATRIAL FIBRILLATION (H): Primary | ICD-10-CM

## 2021-11-16 DIAGNOSIS — Z11.59 ENCOUNTER FOR SCREENING FOR OTHER VIRAL DISEASES: ICD-10-CM

## 2021-11-16 NOTE — PROGRESS NOTES
VASCULAR SURGERY OUTPATIENT CONSULT OR VISIT   VASCULAR SURGEON: Wendy Delgadillo MD    LOCATION:  [facility]    Pee Ayala   Medical Record #:  [id number]  YOB: 1940  Age:  81 year old     Date of Service: 11/15/2021    PRIMARY CARE PROVIDER: Jazzy Gil      Reason for consultation: Evaluation of PAD    IMPRESSION: Patient with bilateral SFA disease who appears to be progressing to rest pain now from previously stable claudication.  ABIs in the left side are unreliable and toe pressures are 40.    RECOMMENDATION: Given the patient's comorbidities and anticoagulation I would like him to have a formal preop evaluation by his primary care provider.  After that we will move forward with a left leg angiogram and possible intervention.  I discussed the risks of the procedure with the patient and I think given his symptoms it is now appropriate to move forward.  He is currently on Coumadin and baby aspirin.  If we are successful with the procedure I will load him with Plavix afterwards and switch him to Coumadin and Plavix.    HPI:  Pee Ayala is a 81 year old male who was seen today for his PAD.  I have been following him since 2019.  He has done quite well until recently with claudication that was not to lifestyle limiting.  L form however this has deteriorated to where he is being woken with discomfort in his feet.  This is more on the left foot than the right.  He has discomfort and has just get up and stand up and then the pain improves.  He has found that if he then goes and sleeps in a recliner he has no further pain suggesting the gravity is helping with his discomfort.  The patient is also having worsening claudication when he walks.  He says that he has back pain at the very start but improves with walking for distance and then becomes terrible little further along to where he has to stop.    Otherwise patient's health has been stable.  No symptoms consistent with  cardiac disease.  His diabetes has been under fairly good control according to him.  Continues to take his anticoagulation for his stable atrial fibrillation.  He has known heart failure and so cannot be on cilostazol.    No other new health issues.    PHH:    Past Medical History:   Diagnosis Date     ACS (acute coronary syndrome) (H) 6/2/2014     ACS (acute coronary syndrome) (H) 6/2/2014     Actinic keratosis      Actinic keratosis 1/14/2014     Actinic keratosis 1/14/2014     Anticoagulated on Coumadin 12/30/2015     Antiplatelet or antithrombotic long-term use      Atrial fibrillation (H)      Atrial fibrillation (H) 2016     Bone mass 4/26/2017     Chest heaviness 1/23/2019     Chest pain 5/31/2014     Closed fracture of left forearm 2015     Congestive heart failure (H)      Coronary artery disease      Diabetes (H) 2009     Diabetes mellitus (H)      Difficulty in walking(719.7)      Dyslipidemia 8/31/2016     Dyspnea on exertion      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      Encounter for long-term (current) use of insulin (H) 8/11/2016     Esophageal reflux 11/18/2010     Esophageal reflux 11/18/2010     History of angina      HTN (hypertension) 6/30/2009     (Problem list name updated by automated process. Provider to review and confirm.)     Hyperlipidemia LDL goal <100 10/31/2010     Hypertension      Impotence of organic origin      Mixed hyperlipidemia 4/25/2007 April 25, 2007 restarted Zocor today, recheck in 3 months.  August 23, 2007 LDL at 101 with 40 mg, will increase to 80 mg. Recheck  In 3 months.      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      Type II or unspecified type diabetes mellitus with neurological manifestations, not stated as uncontrolled(250.60) (H)      Walking troubles         Past Surgical History:   Procedure Laterality Date     BYPASS GRAFT ARTERY CORONARY N/A 9/10/2014    Procedure: BYPASS GRAFT ARTERY CORONARY;  Surgeon: Bharathi Caraballo MD;  Location: [facility] OR     BYPASS GRAFT ARTERY CORONARY  2016     COLONOSCOPY  1/14/2004     CORONARY ANGIOGRAPHY ADULT ORDER       CV CORONARY ANGIOGRAM N/A 4/4/2019    Procedure: Coronary Angiogram;  Surgeon: Dominik Vega MD;  Location: [facility] Cath Lab;  Service: Cardiology     CV CORONARY ANGIOGRAM N/A 1/6/2021    Procedure: Coronary Angiogram;  Surgeon: Dominik Vega MD;  Location: [facility] Cardiac Cath Lab;  Service: Cardiology     CV LEFT HEART CATHETERIZATION WITHOUT LEFT VENTRICULOGRAM Left 4/4/2019    Procedure: Left Heart Catheterization Without Left Ventriculogram;  Surgeon: Dominik Vega MD;  Location: [facility] Cath Lab;  Service: Cardiology     CV LEFT HEART CATHETERIZATION WITHOUT LEFT VENTRICULOGRAM Left 1/6/2021    Procedure: Left Heart Catheterization Without Left Ventriculogram;  Surgeon: Dominik Vega MD;  Location: [facility] Cardiac Cath Lab;  Service: Cardiology     CV RIGHT HEART CATHETERIZATION Right 4/4/2019    Procedure: Right Heart Catheterization;  Surgeon: Dominik Vega MD;  Location: [facility] Cath Lab;  Service: Cardiology     CV TRANSCATHETER AORTIC VALVE REPLACEMENT N/A 1/12/2021    Procedure: Right transfemoral transcatheter aortic valve replacement using Magdaleno Dominick 3 size 29mm.   Transthoracic echocardiogram;  Surgeon: Jo Romano MD;  Location: [facility] CARDIAC CATH LAB     ESOPHAGOSCOPY, GASTROSCOPY, DUODENOSCOPY (EGD), COMBINED N/A 1/13/2016    Procedure: COMBINED ESOPHAGOSCOPY, GASTROSCOPY, DUODENOSCOPY (EGD);  Surgeon: kR Srivastava MD;  Location: [facility]     HEART CATH FEMORAL CANNULIZATION WITH OPEN STANDBY REPAIR AORTIC VALVE N/A 1/12/2021    Procedure: Cardiopulmonary Bypass standby;  Surgeon: Jefferson Sandy MD;  Location:  HEART CARDIAC CATH LAB     LAPAROSCOPIC CHOLECYSTECTOMY  10/09     MAZE PROCEDURE N/A 9/10/2014    Procedure: MAZE PROCEDURE;  Surgeon: Bharathi Caraballo MD;  Location:  OR     MOHS MICROGRAPHIC PROCEDURE       ORTHOPEDIC SURGERY      surgery for fx  Left forearm     OTHER SURGICAL HISTORY Left 2015    forearm sugery     STENT, CORONARY, HUMA  02/2016     VASECTOMY         ALLERGIES:  Oxycodone, Amlodipine, Lisinopril, and Adhesive tape    MEDS:    Current Outpatient Medications:      Apoaequorin (PREVAGEN PO), Take by mouth daily, Disp: , Rfl:      ASPIRIN 81 PO, Take 81 mg by mouth daily, Disp: , Rfl:      blood glucose monitor KIT, 1 kit 2 times daily., Disp: 1 kit, Rfl: 0     blood glucose monitoring (JOYCE CONTOUR NEXT) test strip, Use to test blood sugar 2 times daily ., Disp: 200 each, Rfl: 12     carvedilol (COREG) 6.25 MG tablet, TAKE 1 TABLET BY MOUTH TWICE DAILY WITH MEALS, Disp: , Rfl:      empagliflozin (JARDIANCE) 10 MG TABS tablet, Take 1 tablet (10 mg) by mouth daily, Disp: 30 tablet, Rfl: 4     finasteride (PROSCAR) 5 MG tablet, Take 5 mg by mouth daily, Disp: , Rfl:      furosemide (LASIX) 20 MG tablet, TAKE 2 TABLETS BY MOUTH IN THE MORNING AND 1 TABLET IN  THE  AFTERNOON, Disp: 270 tablet, Rfl: 1     hydrOXYzine (ATARAX) 25 MG tablet, Take 25 mg by mouth 3 times daily as needed for itching, Disp: , Rfl:      insulin aspart prot & aspart (NOVOLOG MIX 70/30 PEN) (70-30) 100 UNIT/ML pen, Inject Subcutaneous 2 times daily  as needed 32 in AM and 26 in PM, Disp: , Rfl:      insulin pen needle (B-D U/F) 31G X 8 MM, Use daily., Disp: 100 each, Rfl: prn     metFORMIN (GLUCOPHAGE) 500 MG tablet, Take 2 tablets by mouth twice daily, Disp: 360 tablet, Rfl: 0     metoprolol (TOPROL-XL) 100 MG 24 hr tablet, One tablet each morning and 1/2 tablet each night (Patient taking differently: 100 mg daily ), Disp: 30 tablet, Rfl: 12     Multiple Vitamins-Minerals (EYE VITAMINS) CAPS, Take by mouth 2 times daily, Disp: , Rfl:      omeprazole (PRILOSEC) 40 MG capsule, Take 1 capsule (40 mg) by mouth daily Take 30-60 minutes before a meal. (Patient taking differently: Take 40 mg by mouth daily as needed Take 30-60 minutes before a meal.), Disp: 90 capsule, Rfl: 3     ONE TOUCH ULTRASOFT LANCETS MISC, Test glucose twice daily, Disp: 3 months, Rfl: 3     pimecrolimus (ELIDEL) 1 % cream, Use around the eyes twice a day, Disp: 30 g, Rfl: 3     pramipexole (MIRAPEX) 0.25 MG tablet, Take 1 tablet (0.25 mg) by mouth 2 times daily Takes 4pm and 5;30 pm, Disp: 180 tablet, Rfl: 3     pravastatin (PRAVACHOL) 80 MG tablet, Take 1 tablet (80 mg) by mouth At Bedtime, Disp: 90 tablet, Rfl: 0     warfarin (COUMADIN) 5 MG tablet, Take 5mg by mouth MWF and 7.5mg all other days or as directed by Anticoagulation Clinic, Disp: 10 tablet, Rfl: 0    SOCIAL HABITS:    History   Smoking Status     Former Smoker     Quit date: 1/1/1998   Smokeless Tobacco     Never Used     Social History    Substance and Sexual Activity      Alcohol use: Yes        Alcohol/week: 0.0 standard drinks        Comment: Alcoholic Drinks/day: very rare      History   Drug Use No       FAMILY HISTORY:    Family History   Problem Relation Age of Onset     Diabetes Mother      Hypertension Father      Cerebrovascular Disease Father      Diabetes Maternal Grandmother      Breast Cancer No family hx of      Cancer - colorectal No family hx of      Prostate Cancer No family hx of      C.A.D. No family hx of       Diabetes Type 2  Mother         58 ,  from anesthesia complication.     Heart Disease Father         86  from stroke     No Known Problems Brother         60 years of age.       REVIEW OF SYSTEMS:    A 12 point ROS was reviewed and except for what is listed in the HPI above, all others are negative    PE:  /84   Pulse 80   Temp 98.1  F (36.7  C)   Wt Readings from Last 1 Encounters:   21 200 lb 12.8 oz (91.1 kg)     There is no height or weight on file to calculate BMI.    EXAM:  GENERAL: This is a well-developed 81 year old male who appears his stated age  EYES: Grossly normal.  MOUTH: Buccal mucosa normal   MUSCULOSKELETAL: Grossly normal and both lower extremities are intact.  HEME/LYMPH: No lymphedema  NEUROLOGIC: Focally intact, Alert and oriented x 3.   PSYCH: appropriate affect  INTEGUMENT: No open lesions or ulcers        DIAGNOSTIC STUDIES:     Images:  US Lower Extremity Arterial Duplex Bilateral    Result Date: 11/15/2021  Arterial Duplex Ultrasound (Date: 11/15/21) Lower Extremity Artery Evaluation Indication: Surveillance of bilateral PAD, decreased lower extremity pulses   Previous: 19; 2020   History: Hypertension, Diabetic, Hyperlipidemia, PAD and Claudication Technique: Duplex imaging is performed utilizing gray-scale, two-dimensional images, and color-flow imaging. Doppler waveform analysis and spectral Doppler imaging is also performed. LOWER EXTREMITY ARTERIAL DUPLEX EXAM WITH WAVEFORMS Right Leg:(cm/s) Location: Velocities Waveforms EIA:   132  T CFA:   136  B PFA:   122  B SFA Proximal:   150  T SFA Mid:   117  T SFA Distal:   136  T Popliteal Artery:   69  T PTA:   132   M SABI:   125  M DPA:   194  M Waveforms: T=Triphasic, M=Monophasic, B=Biphasic Left Leg:(cm/s) Location: Velocities Waveforms EIA:   88  T CFA:   113  T PFA:   127  T SFA Proximal:   59  T SFA Mid:   39  M SFA Distal:   27/456/54 M/M/M Popliteal Artery:   27  M PTA:   11   M SABI:   82  M  DPA:   87  M Waveforms: T=Triphasic, M=Monophasic, B=Biphasic Stenotic Profile Ratio: 456 / 27 = 16.88 Impression: Right Lower Extremity: Patent vasculature to the popliteal artery with multiphasic flow.  Patent infrapopliteal vessels with monophasic flow.  No focal stenosis. Left Lower Extremity: Vasculature to the proximal superficial femoral artery with triphasic flow.  Transition to monophasic flow in the mid superficial femoral artery.  Marked elevation in distal superficial femoral artery peak systolic velocities, reflecting 50 to 99% stenosis.  Moderate to severe, focal, eccentric, calcified plaque in the distal superficial femoral artery.  Patent infrapopliteal vessels with monophasic flow. Reference: Category Normal 1-19% 20-49% 50-99% Occluded PSV <160 cm/sec without spectral broadening <160 cm/sec with spectral broadening Increased Increased Absent Flow Ratio N/A N/A < 2.0 >2.0 N/A Post-Stenotic Turbulence No No No Yes N/A     US JESSENIA Doppler No Exercise 1-2 Levels Bilateral    Result Date: 11/15/2021  BILATERAL RESTING ANKLE-BRACHIAL INDICES (JESSENIA'S) (Date: 11/15/21) Indication: Surveillance of bilateral PAD, decreased lower extremity pulses   Previous: 11/6/19; 11/02/2020   History: Hypertension, Diabetic, Hyperlipidemia, PAD and Claudication  Resting JESSENIA's          Right: mmHg Index     Brachial: 160  Ankle-(PT): 173 1.08 Ankle-(DP): >254 NC          Digit Room Temp. 96.4 F*: 94 0.59   Digit Post Heat Temp. 97.7 F*: 85 0.53               Left: mmHg Index     Brachial: 155  Ankle-(PT): 218 1.36 Ankle-(DP): >254 NC          Digit Room Temp. 95.5 F*: 41 0.26    Digit Post Heat Temp. 96.7 F*: 40 0.25 Resting ankle-brachial index of NC on the right. Toe Pressures of 94 mmHg and TBI of 0.59  Resting ankle-brachial index of NC on the left. Toe Pressures of 41 mmHg and TBI of 0.26  WAVEFORMS: The right dorsalis pedis and posterior tibial arteries show monophasic waveforms. The left dorsalis pedis and posterior  tibial arteries show monophasic waveforms. Impression:  1. RIGHT LOWER EXTREMITY: JESSENIA is Uninterpretable due to incompressible vessels. and Mildly abnormal, but adequate for healing toe pressures of 85 mmHg. 2. LEFT LOWER EXTREMITY: JESSENIA is Uninterpretable due to incompressible vessels. and Mildly abnormal, but adequate for healing toe pressures of 41 mmHg. Reference: Wound classification Grade JESSENIA Ankle Systolic Pressure Toe Pressures 0 > 0.80 > 100 mmHg > 60 mmHg 1 0.6 - 0.79 70 - 100 mmHg 40 - 59 mmHg 2 0.4 - 0.59 50-70 mmHg 30 - 39 mmHg 3 < 0.39 < 50 mmHg < 30 mmHg Digit Pressures DBI Disease Category > 0.70 Normal < 0.70 Abnormal > 30 mmHg Potential wound healing < 30 mmHg Impaired wound healing Ankle Brachial Pressures JESSENIA Disease Category > 1.3  Likely vessel calcification with monophasic waveforms, non-diagnostic 0.95-1.30 Normal with multiphasic waveforms 0.50-0.95 Single level disease 0.30-0.50 Multilevel disease < 0.30 Critical limb ischema Volume Plethysmography Recording (VPR) at all levels Normal Sharp systolic peak, fast upstroke, prominent dicrotic notch in wave Mild Sharp systolic peak, fast upstroke, absent dicrotic notch in wave Moderate Flattened systolic peak, slowed upstroke, absent dicrotic notch in wave Severe Low-amplitude wave with = upslope and down slope Occluded Flat Line Multiple Level Segmental Pressures  Normal Abnormal Upper Thigh > 1.2 pressure indices, <30 mmHg between lateral and horizontal cuffs < 0.8 pressure indices suggest proximal occlusion, 0.8-1.2 pressure indices suggest inflow disease, > 30 mmHg between lateral and horizontal cuffs  Lower Thigh < 30 mmHg between lateral and horizontal cuffs > 30 mmHg between lateral and horizontal cuffs Upper Calf < 30 mmHg between lateral and horizontal cuffs > 30 mmHg between lateral and horizontal cuffs Note: As limb diameter increases, pressure measurements also increase.      I personally reviewed the images and my interpretation is  that his ABIs are noncompressible bilaterally with normal toe pressures on the right but diminished toe pressures on the left at 40 mmHg.  This number shows a progressive decline from his prior studies which show toe pressures of 51 on that side a year ago and 69 the year prior to that..    LABS:      Sodium   Date Value Ref Range Status   08/31/2021 140 136 - 145 mmol/L Final   08/20/2021 139 136 - 145 mmol/L Final   08/03/2021 137 136 - 145 mmol/L Final   01/13/2021 135 133 - 144 mmol/L Final   01/12/2021 139 133 - 144 mmol/L Final   01/12/2021 139 133 - 144 mmol/L Final     Urea Nitrogen   Date Value Ref Range Status   08/31/2021 19 8 - 28 mg/dL Final   08/20/2021 22 8 - 28 mg/dL Final   08/03/2021 17 8 - 28 mg/dL Final   01/13/2021 20 7 - 30 mg/dL Final   01/12/2021 19 7 - 30 mg/dL Final   01/12/2021 20 7 - 30 mg/dL Final     Hemoglobin   Date Value Ref Range Status   08/20/2021 14.4 13.3 - 17.7 g/dL Final   05/06/2021 13.9 (L) 14.0 - 18.0 g/dL Final   01/13/2021 13.7 13.3 - 17.7 g/dL Final   01/12/2021 11.1 (L) 13.3 - 17.7 g/dL Final   01/12/2021 12.1 (L) 13.3 - 17.7 g/dL Final     Platelet Count   Date Value Ref Range Status   08/20/2021 175 150 - 450 10e3/uL Final   05/06/2021 156 140 - 440 thou/uL Final   01/13/2021 141 (L) 150 - 450 10e9/L Final   01/12/2021 113 (L) 150 - 450 10e9/L Final   01/12/2021 130 (L) 150 - 450 10e9/L Final     BNP   Date Value Ref Range Status   01/08/2021 91 (H) 0 - 82 pg/mL Final   10/21/2020 56 0 - 82 pg/mL Final   05/09/2019 96 (H) 0 - 80 pg/mL Final     INR   Date Value Ref Range Status   10/26/2021 2.8 (H) 0.9 - 1.1 Final   10/12/2021 2.3 (H) 0.9 - 1.1 Final   09/28/2021 3.6 (H) 0.9 - 1.1 Final   07/01/2021 1.80 (H) 0.90 - 1.10 Final   06/24/2021 1.00 0.90 - 1.10 Final   06/03/2021 2.60 (H) 0.90 - 1.10 Final   01/13/2021 1.13 0.86 - 1.14 Final   01/12/2021 1.09 0.86 - 1.14 Final   06/09/2016 1.72 (H) 0.86 - 1.14 Final             Wendy Delgadillo MD, MD  VASCULAR SURGERY

## 2021-11-16 NOTE — TELEPHONE ENCOUNTER
----- Message from Isa Norton RN sent at 11/15/2021  4:50 PM CST -----  Regarding: Bridging  Hello,  This patient is scheduled for an angiogram on 12/7 with Dr. Delgadillo and he is on warfarin. Will he need bridging for the 5 days prior that the warfarin will be on hold?  Cc'd Dr. Delgadillo's nurse Vijaya on this message.    Thanks,  Isa SINGH

## 2021-11-18 ENCOUNTER — TELEPHONE (OUTPATIENT)
Dept: VASCULAR SURGERY | Facility: CLINIC | Age: 81
End: 2021-11-18
Payer: COMMERCIAL

## 2021-11-23 ENCOUNTER — TELEPHONE (OUTPATIENT)
Dept: ANTICOAGULATION | Facility: CLINIC | Age: 81
End: 2021-11-23
Payer: COMMERCIAL

## 2021-11-23 NOTE — TELEPHONE ENCOUNTER
LUIS-PROCEDURAL ANTICOAGULATION  MANAGEMENT    ASSESSMENT     Warfarin interruption plan for Left Leg Angiogram on 21.    Indication for Anticoagulation: Atrial Fibrillation      Risk stratification for thromboembolism: moderate (2017 ACC periprocedure pathway for NVAF Expert Consensus)    o [id number] = 6 (CHF, Hypertension, Age >= 75, Diabetes and Vascular- ACS/CABG)    o S/P TAVR    o Bleeding risk factors: Aspirin use    o Previously tolerated hold without bridge for 21 Angiogram & 21 TAVR; 2019 Angiogram    NVAF: 2017 ACC periprocedure pathway for NVAF advises likely NO bridge for moderate risk stratification (DQH1SX2-EKXf score 5-6)  without a hx of stroke, TIA or systemic embolism and     AFIB & Bioprosthetic Valve or Annuloplasty Rin AHA/ACC Management of Valvular Heart disease guidelines suggest bioprosthetic heart valves or annuloplasty rings receiving anticoagulation for atrial fibrillation  reasonable to consider need for bridging on the basis of the [id number] score weighed against the risk of bleeding.     RECOMMENDATION       Pre-Procedure:  o Check INR prior to hold (due )  o Hold warfarin for 5 days, until after procedure starting:    o No Bridge      Post-Procedure:  o Resume warfarin dose if okay with provider doing procedure on night of procedure,  PM: 12.5 mg x 1 then resume current dose  o Recheck INR ~ 7 days after resuming warfarin     Plan routed to referring provider for approval  ?   Sandra Covarrubias, [facility]    SUBJECTIVE/OBJECTIVE     Pee PEREZ Jamie, a 81 year old male    Goal INR Range: 2.0-3.0     Patient bridged in past: No    Wt Readings from Last 3 Encounters:   21 91.1 kg (200 lb 12.8 oz)   21 90.3 kg (199 lb)   21 90 kg (198 lb 8 oz)      Ideal body weight: 61.5 kg (135 lb 9.3 oz)  Adjusted ideal body weight: 73.3 kg (161 lb 10.7 oz)     Estimated body mass index is 33.41 kg/m  as calculated from the following:    Height  "as of 9/16/21: 1.651 m (5' 5\").    Weight as of 9/16/21: 91.1 kg (200 lb 12.8 oz).    Lab Results   Component Value Date    INR 2.8 (H) 10/26/2021    INR 2.3 (H) 10/12/2021    INR 3.6 (H) 09/28/2021     Lab Results   Component Value Date    HGB 14.4 08/20/2021    HCT 43.4 08/20/2021     08/20/2021     Lab Results   Component Value Date    CR 1.04 08/31/2021    CR 1.02 08/20/2021    CR 1.15 08/03/2021     "

## 2021-11-23 NOTE — TELEPHONE ENCOUNTER
Call back Pee after 330p     - he has another appt to go to.     - NEED TO SCHEDULED inr ON 11/24 AND PREOP WITH DR. RANDLE.

## 2021-11-23 NOTE — TELEPHONE ENCOUNTER
----- Message from Sandra Covarrubias [facility] sent at 11/23/2021 12:48 PM CST -----  Regarding: RE: Bridging  Hi,    See 11/16/21 encounter. Cleared for warfarin hold, no bridge from PCP perspective.    Note: pre-op is not yet scheduled. Will try to remind him    Sandra  ----- Message -----  From: Mariel Dale RN  Sent: 11/16/2021   9:05 AM CST  To: Sandra Covarrubias [facility]  Subject: FW: Bridging                                       ----- Message -----  From: Isa Norton RN  Sent: 11/15/2021   5:01 PM CST  To: Mariel Dale RN, Vijaya Bill RN, #  Subject: Bridging                                         Hello,  This patient is scheduled for an angiogram on 12/7 with Dr. Delgadillo and he is on warfarin. Will he need bridging for the 5 days prior that the warfarin will be on hold?  Cc'd Dr. Delgadillo's nurse Vijaya on this message.    Thanks,  Isa SINGH

## 2021-11-24 ENCOUNTER — ANTICOAGULATION THERAPY VISIT (OUTPATIENT)
Dept: ANTICOAGULATION | Facility: CLINIC | Age: 81
End: 2021-11-24

## 2021-11-24 ENCOUNTER — LAB (OUTPATIENT)
Dept: LAB | Facility: CLINIC | Age: 81
End: 2021-11-24

## 2021-11-24 DIAGNOSIS — Z79.01 LONG TERM CURRENT USE OF ANTICOAGULANT THERAPY: ICD-10-CM

## 2021-11-24 DIAGNOSIS — I48.20 CHRONIC ATRIAL FIBRILLATION (H): Primary | ICD-10-CM

## 2021-11-24 LAB — INR BLD: 2.1 (ref 0.9–1.1)

## 2021-11-24 NOTE — PROGRESS NOTES
ANTICOAGULATION MANAGEMENT     Pee Ayala 81 year old male is on warfarin with therapeutic INR result. (Goal INR 2.0-3.0)    Recent labs: (last 7 days)     11/24/21  1415   INR 2.1*       ASSESSMENT     Source(s): Chart Review, Patient/Caregiver Call and Template       Warfarin doses taken: Warfarin taken as instructed    Diet: No new diet changes identified    New illness, injury, or hospitalization: Yes:   Peripheral arterial disease (PAD) with worsening claudication.    Medication/supplement changes: None noted    Signs or symptoms of bleeding or clotting: No    Previous INR: Therapeutic last 2 visits    Additional findings: Upcoming surgery/procedure - Yes. scheduled for left leg angiogram on 12/7/21 w/ Dr. Delgadillo.   - OK to hold warfarin for 5 days with no bridging.   - has appt for Covid-19 virus testing on 12/3/21     PLAN     Recommended plan for temporary change(s) affecting INR     Dosing Instructions:   - Continue your current warfarin dose with next INR in 1 week       Summary  As of 11/24/2021    Full warfarin instructions:  12/2: Hold; 12/3: Hold; 12/4: Hold; 12/5: Hold; 12/6: Hold; 12/7: 12.5 mg; Otherwise 7.5 mg every Sun, Tue, Thu; 5 mg all other days   Next INR check:  12/14/2021             Telephone call with  Pee ([phone]) who verbalizes understanding and agrees to plan    Patient elected to schedule next visit one wk after resuming warfarin therapy.    Education provided: Importance of consistent vitamin K intake and Goal range and significance of current result    Plan made per ACC anticoagulation protocol    Mariel Dale RN  Anticoagulation Clinic  11/24/2021    _______________________________________________________________________     Anticoagulation Episode Summary     Current INR goal:  2.0-3.0   TTR:  55.1 % (11.7 mo)   Target end date:  Indefinite   Send INR reminders to:  [facility]    Indications    Chronic atrial fibrillation (H) [I48.20]  Long term  current use of anticoagulant therapy [Z79.01]           Comments:           Anticoagulation Care Providers     Provider Role Specialty Phone number    Jazzy Gil MD Referring Family Medicine [phone]

## 2021-11-29 DIAGNOSIS — Z95.2 S/P TAVR (TRANSCATHETER AORTIC VALVE REPLACEMENT): Primary | ICD-10-CM

## 2021-11-30 DIAGNOSIS — E11.42 TYPE 2 DIABETES MELLITUS WITH PERIPHERAL NEUROPATHY (H): ICD-10-CM

## 2021-11-30 RX ORDER — BLOOD SUGAR DIAGNOSTIC
STRIP MISCELLANEOUS
Qty: 300 STRIP | Refills: 1 | Status: SHIPPED | OUTPATIENT
Start: 2021-11-30 | End: 2022-09-21

## 2021-12-03 ENCOUNTER — OFFICE VISIT (OUTPATIENT)
Dept: FAMILY MEDICINE | Facility: CLINIC | Age: 81
End: 2021-12-03
Payer: COMMERCIAL

## 2021-12-03 VITALS
TEMPERATURE: 95.9 F | OXYGEN SATURATION: 97 % | SYSTOLIC BLOOD PRESSURE: 153 MMHG | HEART RATE: 72 BPM | HEIGHT: 65 IN | DIASTOLIC BLOOD PRESSURE: 66 MMHG | RESPIRATION RATE: 16 BRPM | WEIGHT: 205.8 LBS | BODY MASS INDEX: 34.29 KG/M2

## 2021-12-03 DIAGNOSIS — Z11.59 ENCOUNTER FOR SCREENING FOR OTHER VIRAL DISEASES: ICD-10-CM

## 2021-12-03 DIAGNOSIS — I50.32 CHRONIC HEART FAILURE WITH PRESERVED EJECTION FRACTION (H): ICD-10-CM

## 2021-12-03 DIAGNOSIS — Z01.818 PREOP GENERAL PHYSICAL EXAM: ICD-10-CM

## 2021-12-03 DIAGNOSIS — Z01.810 PRE-OPERATIVE CARDIOVASCULAR EXAMINATION: Primary | ICD-10-CM

## 2021-12-03 DIAGNOSIS — D75.89 INCREASED MCV: ICD-10-CM

## 2021-12-03 DIAGNOSIS — E11.42 DIABETIC POLYNEUROPATHY ASSOCIATED WITH TYPE 2 DIABETES MELLITUS (H): ICD-10-CM

## 2021-12-03 DIAGNOSIS — E78.2 MIXED HYPERLIPIDEMIA: ICD-10-CM

## 2021-12-03 LAB
ALBUMIN SERPL-MCNC: 3.5 G/DL (ref 3.5–5)
ALP SERPL-CCNC: 67 U/L (ref 45–120)
ALT SERPL W P-5'-P-CCNC: 18 U/L (ref 0–45)
ANION GAP SERPL CALCULATED.3IONS-SCNC: 10 MMOL/L (ref 5–18)
AST SERPL W P-5'-P-CCNC: 22 U/L (ref 0–40)
ATRIAL RATE - MUSE: 71 BPM
BASOPHILS # BLD AUTO: 0.1 10E3/UL (ref 0–0.2)
BASOPHILS NFR BLD AUTO: 1 %
BILIRUB SERPL-MCNC: 1.2 MG/DL (ref 0–1)
BUN SERPL-MCNC: 15 MG/DL (ref 8–28)
CALCIUM SERPL-MCNC: 9.1 MG/DL (ref 8.5–10.5)
CHLORIDE BLD-SCNC: 103 MMOL/L (ref 98–107)
CO2 SERPL-SCNC: 24 MMOL/L (ref 22–31)
CREAT SERPL-MCNC: 0.93 MG/DL (ref 0.7–1.3)
DIASTOLIC BLOOD PRESSURE - MUSE: NORMAL MMHG
EOSINOPHIL # BLD AUTO: 0.1 10E3/UL (ref 0–0.7)
EOSINOPHIL NFR BLD AUTO: 2 %
ERYTHROCYTE [DISTWIDTH] IN BLOOD BY AUTOMATED COUNT: 12.3 % (ref 10–15)
GFR SERPL CREATININE-BSD FRML MDRD: 78 ML/MIN/1.73M2
GLUCOSE BLD-MCNC: 228 MG/DL (ref 70–125)
HBA1C MFR BLD: 8.1 % (ref 0–5.6)
HCT VFR BLD AUTO: 41.7 % (ref 40–53)
HGB BLD-MCNC: 13.5 G/DL (ref 13.3–17.7)
IMM GRANULOCYTES # BLD: 0 10E3/UL
IMM GRANULOCYTES NFR BLD: 0 %
INTERPRETATION ECG - MUSE: NORMAL
LYMPHOCYTES # BLD AUTO: 1.5 10E3/UL (ref 0.8–5.3)
LYMPHOCYTES NFR BLD AUTO: 26 %
MCH RBC QN AUTO: 33.3 PG (ref 26.5–33)
MCHC RBC AUTO-ENTMCNC: 32.4 G/DL (ref 31.5–36.5)
MCV RBC AUTO: 103 FL (ref 78–100)
MONOCYTES # BLD AUTO: 0.4 10E3/UL (ref 0–1.3)
MONOCYTES NFR BLD AUTO: 7 %
NEUTROPHILS # BLD AUTO: 3.6 10E3/UL (ref 1.6–8.3)
NEUTROPHILS NFR BLD AUTO: 64 %
P AXIS - MUSE: NORMAL DEGREES
PLATELET # BLD AUTO: 141 10E3/UL (ref 150–450)
POTASSIUM BLD-SCNC: 4.4 MMOL/L (ref 3.5–5)
PR INTERVAL - MUSE: NORMAL MS
PROT SERPL-MCNC: 6.3 G/DL (ref 6–8)
QRS DURATION - MUSE: 88 MS
QT - MUSE: 428 MS
QTC - MUSE: 462 MS
R AXIS - MUSE: -7 DEGREES
RBC # BLD AUTO: 4.05 10E6/UL (ref 4.4–5.9)
SODIUM SERPL-SCNC: 137 MMOL/L (ref 136–145)
SYSTOLIC BLOOD PRESSURE - MUSE: NORMAL MMHG
T AXIS - MUSE: 12 DEGREES
VENTRICULAR RATE- MUSE: 70 BPM
WBC # BLD AUTO: 5.6 10E3/UL (ref 4–11)

## 2021-12-03 PROCEDURE — 93010 ELECTROCARDIOGRAM REPORT: CPT | Performed by: GENERAL ACUTE CARE HOSPITAL

## 2021-12-03 PROCEDURE — 83036 HEMOGLOBIN GLYCOSYLATED A1C: CPT | Performed by: FAMILY MEDICINE

## 2021-12-03 PROCEDURE — U0005 INFEC AGEN DETEC AMPLI PROBE: HCPCS | Performed by: FAMILY MEDICINE

## 2021-12-03 PROCEDURE — 99214 OFFICE O/P EST MOD 30 MIN: CPT | Performed by: FAMILY MEDICINE

## 2021-12-03 PROCEDURE — 36415 COLL VENOUS BLD VENIPUNCTURE: CPT | Performed by: FAMILY MEDICINE

## 2021-12-03 PROCEDURE — 85025 COMPLETE CBC W/AUTO DIFF WBC: CPT | Performed by: FAMILY MEDICINE

## 2021-12-03 PROCEDURE — U0003 INFECTIOUS AGENT DETECTION BY NUCLEIC ACID (DNA OR RNA); SEVERE ACUTE RESPIRATORY SYNDROME CORONAVIRUS 2 (SARS-COV-2) (CORONAVIRUS DISEASE [COVID-19]), AMPLIFIED PROBE TECHNIQUE, MAKING USE OF HIGH THROUGHPUT TECHNOLOGIES AS DESCRIBED BY CMS-2020-01-R: HCPCS | Performed by: FAMILY MEDICINE

## 2021-12-03 PROCEDURE — 80053 COMPREHEN METABOLIC PANEL: CPT | Performed by: FAMILY MEDICINE

## 2021-12-03 PROCEDURE — 93005 ELECTROCARDIOGRAM TRACING: CPT | Performed by: FAMILY MEDICINE

## 2021-12-03 ASSESSMENT — MIFFLIN-ST. JEOR: SCORE: 1575.38

## 2021-12-03 NOTE — PROGRESS NOTES
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      Prostate Cancer No family hx of      C.A.D. No family hx of      Diabetes Type 2  Mother         58 ,  from anesthesia complication.     Heart Disease Father         86  from stroke     No Known Problems Brother         60 years of age.     History   Drug Use No         Objective     BP (!) 153/66 (BP Location: Left arm, Patient Position: Sitting, Cuff Size: Adult Regular)   Pulse 72   Temp (!) 95.9  F (35.5  C) (Oral)   Resp 16   Ht 1.651 m (5' 5\")   Wt 93.4 kg (205 lb 12.8 oz)   SpO2 97%   BMI 34.25 kg/m      Physical Exam    GENERAL APPEARANCE: healthy, alert and no distress     EYES: EOMI,  PERRL     HENT: ear canals and TM's normal and nose and mouth without ulcers or lesions     NECK: no adenopathy, no asymmetry, masses, or scars and thyroid normal to palpation     RESP: lungs clear to auscultation - no rales, rhonchi or wheezes     CV: " irregular rhythm     ABDOMEN:  soft, nontender, no HSM or masses and bowel sounds normal     MS: +2 ankle edema     SKIN: no suspicious lesions or rashes     NEURO: Normal strength and tone, sensory exam grossly normal, mentation intact and speech normal     PSYCH: mentation appears normal. and affect normal/bright     LYMPHATICS: No cervical adenopathy    Recent Labs   Lab Test 11/24/21  1415 10/26/21  1305 08/31/21  1236 08/31/21  1221 08/20/21  1051 08/20/21  1045 08/11/21  1013 08/03/21  1419 05/25/21  0907 05/06/21  1242   HGB  --   --   --   --   --  14.4  --   --   --  13.9*   PLT  --   --   --   --   --  175  --   --   --  156   INR 2.1* 2.8*   < >  --    < >  --    < > 3.3*   < >  --    NA  --   --   --  140  --  139  --  137  --   --    POTASSIUM  --   --   --  4.1  --  4.4  --  4.1  --   --    CR  --   --   --  1.04  --  1.02  --  1.15  --   --    A1C  --   --   --  8.6*  --   --   --  9.0*  --   --     < > = values in this interval not displayed.        Diagnostics:  Labs pending at this time.  Results will be reviewed when available.   EKG: Atrial fibrillation, nonspecific ST-T changes    Revised Cardiac Risk Index (RCRI):  The patient has the following serious cardiovascular risks for perioperative complications:   - Coronary Artery Disease (MI, positive stress test, angina, Qs on EKG) history    - Diabetes Mellitus (on Insulin) = 1 point     RCRI Interpretation: 1-2 points: Class II- III (moderate risk - 6.6% complication rate)     Estimated Functional Capacity:            Signed Electronically by: Jazzy Gil MD  Copy of this evaluation report is provided to requesting physician.

## 2021-12-03 NOTE — PATIENT INSTRUCTIONS

## 2021-12-04 LAB — SARS-COV-2 RNA RESP QL NAA+PROBE: NEGATIVE

## 2021-12-05 PROBLEM — D75.89 INCREASED MCV: Status: ACTIVE | Noted: 2021-12-05

## 2021-12-06 RX ORDER — FLUMAZENIL 0.1 MG/ML
0.2 INJECTION, SOLUTION INTRAVENOUS
Status: DISCONTINUED | OUTPATIENT
Start: 2021-12-06 | End: 2021-12-08 | Stop reason: HOSPADM

## 2021-12-06 RX ORDER — HEPARIN SODIUM 200 [USP'U]/100ML
1 INJECTION, SOLUTION INTRAVENOUS CONTINUOUS PRN
Status: DISCONTINUED | OUTPATIENT
Start: 2021-12-06 | End: 2021-12-08 | Stop reason: HOSPADM

## 2021-12-06 RX ORDER — FENTANYL CITRATE 50 UG/ML
25-50 INJECTION, SOLUTION INTRAMUSCULAR; INTRAVENOUS EVERY 5 MIN PRN
Status: DISCONTINUED | OUTPATIENT
Start: 2021-12-06 | End: 2021-12-08 | Stop reason: HOSPADM

## 2021-12-06 RX ORDER — NALOXONE HYDROCHLORIDE 0.4 MG/ML
0.4 INJECTION, SOLUTION INTRAMUSCULAR; INTRAVENOUS; SUBCUTANEOUS
Status: DISCONTINUED | OUTPATIENT
Start: 2021-12-06 | End: 2021-12-08 | Stop reason: HOSPADM

## 2021-12-06 RX ORDER — NALOXONE HYDROCHLORIDE 0.4 MG/ML
0.2 INJECTION, SOLUTION INTRAMUSCULAR; INTRAVENOUS; SUBCUTANEOUS
Status: DISCONTINUED | OUTPATIENT
Start: 2021-12-06 | End: 2021-12-08 | Stop reason: HOSPADM

## 2021-12-06 NOTE — PRE-PROCEDURE
IR Procedure Pre-call    Spoke with: pt  Arrival and procedure time: 0700 and 0800  Patient has : Yes  Patient has someone to stay overnight:Yes If angiogram must have responsible adult for 24 hours.  Patient is taking blood thinners:Yes apirin  Patient has H&P within 30 days:Yes   Patient has covid test:Yes   Patient NPO 8 hours prior: Yes    Pre-call completed.   December 6, 2021 9:34 AM  Jael Cunningham RN

## 2021-12-07 ENCOUNTER — HOSPITAL ENCOUNTER (OUTPATIENT)
Dept: INTERVENTIONAL RADIOLOGY/VASCULAR | Facility: HOSPITAL | Age: 81
Discharge: HOME | End: 2021-12-07
Attending: SURGERY | Admitting: SURGERY
Payer: COMMERCIAL

## 2021-12-07 VITALS
SYSTOLIC BLOOD PRESSURE: 123 MMHG | TEMPERATURE: 97.8 F | OXYGEN SATURATION: 98 % | DIASTOLIC BLOOD PRESSURE: 76 MMHG | HEART RATE: 66 BPM | WEIGHT: 200 LBS | BODY MASS INDEX: 33.32 KG/M2 | RESPIRATION RATE: 18 BRPM | HEIGHT: 65 IN

## 2021-12-07 DIAGNOSIS — I70.212 ATHEROSCLEROSIS OF NATIVE ARTERIES OF EXTREMITIES WITH INTERMITTENT CLAUDICATION, LEFT LEG (H): ICD-10-CM

## 2021-12-07 DIAGNOSIS — I73.9 PAD (PERIPHERAL ARTERY DISEASE) (H): ICD-10-CM

## 2021-12-07 DIAGNOSIS — Z95.2 S/P TAVR (TRANSCATHETER AORTIC VALVE REPLACEMENT): ICD-10-CM

## 2021-12-07 LAB
ACT BLD: 254 SECONDS (ref 74–150)
ANION GAP SERPL CALCULATED.3IONS-SCNC: 7 MMOL/L (ref 5–18)
APTT PPP: 28 SECONDS (ref 22–38)
BUN SERPL-MCNC: 16 MG/DL (ref 8–28)
CALCIUM SERPL-MCNC: 9.2 MG/DL (ref 8.5–10.5)
CHLORIDE BLD-SCNC: 105 MMOL/L (ref 98–107)
CO2 SERPL-SCNC: 25 MMOL/L (ref 22–31)
CREAT SERPL-MCNC: 0.87 MG/DL (ref 0.7–1.3)
ERYTHROCYTE [DISTWIDTH] IN BLOOD BY AUTOMATED COUNT: 12.5 % (ref 10–15)
GFR SERPL CREATININE-BSD FRML MDRD: 82 ML/MIN/1.73M2
GLUCOSE BLD-MCNC: 247 MG/DL (ref 70–125)
GLUCOSE BLDC GLUCOMTR-MCNC: 199 MG/DL (ref 70–99)
HCT VFR BLD AUTO: 42.5 % (ref 40–53)
HGB BLD-MCNC: 14.1 G/DL (ref 13.3–17.7)
INR PPP: 1.09 (ref 0.85–1.15)
MCH RBC QN AUTO: 34.1 PG (ref 26.5–33)
MCHC RBC AUTO-ENTMCNC: 33.2 G/DL (ref 31.5–36.5)
MCV RBC AUTO: 103 FL (ref 78–100)
PLATELET # BLD AUTO: 149 10E3/UL (ref 150–450)
POTASSIUM BLD-SCNC: 4.3 MMOL/L (ref 3.5–5)
RBC # BLD AUTO: 4.14 10E6/UL (ref 4.4–5.9)
SODIUM SERPL-SCNC: 137 MMOL/L (ref 136–145)
WBC # BLD AUTO: 5 10E3/UL (ref 4–11)

## 2021-12-07 PROCEDURE — 99153 MOD SED SAME PHYS/QHP EA: CPT

## 2021-12-07 PROCEDURE — 250N000011 HC RX IP 250 OP 636: Performed by: SURGERY

## 2021-12-07 PROCEDURE — 76937 US GUIDE VASCULAR ACCESS: CPT | Mod: 26 | Performed by: SURGERY

## 2021-12-07 PROCEDURE — 80048 BASIC METABOLIC PNL TOTAL CA: CPT | Performed by: SURGERY

## 2021-12-07 PROCEDURE — 36415 COLL VENOUS BLD VENIPUNCTURE: CPT | Performed by: SURGERY

## 2021-12-07 PROCEDURE — 85730 THROMBOPLASTIN TIME PARTIAL: CPT | Performed by: SURGERY

## 2021-12-07 PROCEDURE — 272N000302 HC DEVICE INFLATION CR5

## 2021-12-07 PROCEDURE — 250N000009 HC RX 250: Performed by: SURGERY

## 2021-12-07 PROCEDURE — 82962 GLUCOSE BLOOD TEST: CPT

## 2021-12-07 PROCEDURE — 250N000013 HC RX MED GY IP 250 OP 250 PS 637: Performed by: SURGERY

## 2021-12-07 PROCEDURE — 37226 PR REVASCULARIZE FEM/POP ARTERY, ANGIOPLASTY/STENT: CPT | Mod: LT | Performed by: SURGERY

## 2021-12-07 PROCEDURE — 272N000121 HC CATH CR6

## 2021-12-07 PROCEDURE — 85347 COAGULATION TIME ACTIVATED: CPT

## 2021-12-07 PROCEDURE — 99152 MOD SED SAME PHYS/QHP 5/>YRS: CPT

## 2021-12-07 PROCEDURE — 272N000500 HC NEEDLE CR2

## 2021-12-07 PROCEDURE — C1725 CATH, TRANSLUMIN NON-LASER: HCPCS

## 2021-12-07 PROCEDURE — C1769 GUIDE WIRE: HCPCS

## 2021-12-07 PROCEDURE — 272N000570 HC SHEATH CR7

## 2021-12-07 PROCEDURE — C1760 CLOSURE DEV, VASC: HCPCS

## 2021-12-07 PROCEDURE — 85027 COMPLETE CBC AUTOMATED: CPT | Performed by: SURGERY

## 2021-12-07 PROCEDURE — 37226 HC REVASCULARIZE FEM-POP ARTERY ANGIOPLASTY-STENT: CPT

## 2021-12-07 PROCEDURE — 76937 US GUIDE VASCULAR ACCESS: CPT

## 2021-12-07 PROCEDURE — C1876 STENT, NON-COA/NON-COV W/DEL: HCPCS

## 2021-12-07 PROCEDURE — 255N000002 HC RX 255 OP 636: Performed by: SURGERY

## 2021-12-07 PROCEDURE — 75710 ARTERY X-RAYS ARM/LEG: CPT | Mod: LT

## 2021-12-07 PROCEDURE — 85610 PROTHROMBIN TIME: CPT | Performed by: SURGERY

## 2021-12-07 RX ORDER — SODIUM CHLORIDE 9 MG/ML
INJECTION, SOLUTION INTRAVENOUS CONTINUOUS
Status: DISCONTINUED | OUTPATIENT
Start: 2021-12-07 | End: 2021-12-08 | Stop reason: HOSPADM

## 2021-12-07 RX ORDER — HEPARIN SODIUM 1000 [USP'U]/ML
7000 INJECTION, SOLUTION INTRAVENOUS; SUBCUTANEOUS ONCE
Status: COMPLETED | OUTPATIENT
Start: 2021-12-07 | End: 2021-12-07

## 2021-12-07 RX ORDER — CLOPIDOGREL BISULFATE 75 MG/1
75 TABLET ORAL DAILY
Qty: 90 TABLET | Refills: 1 | Status: SHIPPED | OUTPATIENT
Start: 2021-12-07 | End: 2022-05-05

## 2021-12-07 RX ORDER — IODIXANOL 320 MG/ML
100 INJECTION, SOLUTION INTRAVASCULAR ONCE
Status: COMPLETED | OUTPATIENT
Start: 2021-12-07 | End: 2021-12-07

## 2021-12-07 RX ORDER — WARFARIN SODIUM 5 MG/1
5 TABLET ORAL DAILY
Start: 2021-12-07 | End: 2021-12-25

## 2021-12-07 RX ORDER — ACETAMINOPHEN 325 MG/1
650 TABLET ORAL EVERY 6 HOURS PRN
Status: DISCONTINUED | OUTPATIENT
Start: 2021-12-07 | End: 2021-12-08 | Stop reason: HOSPADM

## 2021-12-07 RX ORDER — LIDOCAINE 40 MG/G
CREAM TOPICAL
Status: DISCONTINUED | OUTPATIENT
Start: 2021-12-07 | End: 2021-12-08 | Stop reason: HOSPADM

## 2021-12-07 RX ORDER — CLOPIDOGREL BISULFATE 75 MG/1
300 TABLET ORAL ONCE
Status: COMPLETED | OUTPATIENT
Start: 2021-12-07 | End: 2021-12-07

## 2021-12-07 RX ADMIN — MIDAZOLAM HYDROCHLORIDE 0.5 MG: 1 INJECTION, SOLUTION INTRAMUSCULAR; INTRAVENOUS at 09:04

## 2021-12-07 RX ADMIN — MIDAZOLAM HYDROCHLORIDE 0.5 MG: 1 INJECTION, SOLUTION INTRAMUSCULAR; INTRAVENOUS at 09:27

## 2021-12-07 RX ADMIN — FENTANYL CITRATE 25 MCG: 50 INJECTION, SOLUTION INTRAMUSCULAR; INTRAVENOUS at 09:09

## 2021-12-07 RX ADMIN — MIDAZOLAM HYDROCHLORIDE 0.5 MG: 1 INJECTION, SOLUTION INTRAMUSCULAR; INTRAVENOUS at 08:42

## 2021-12-07 RX ADMIN — IODIXANOL 75 ML: 320 INJECTION, SOLUTION INTRAVASCULAR at 10:00

## 2021-12-07 RX ADMIN — LIDOCAINE HYDROCHLORIDE 15 ML: 10 INJECTION, SOLUTION EPIDURAL; INFILTRATION; INTRACAUDAL; PERINEURAL at 08:32

## 2021-12-07 RX ADMIN — HEPARIN SODIUM 7000 UNITS: 1000 INJECTION, SOLUTION INTRAVENOUS; SUBCUTANEOUS at 09:01

## 2021-12-07 RX ADMIN — CLOPIDOGREL BISULFATE 300 MG: 75 TABLET ORAL at 11:13

## 2021-12-07 RX ADMIN — FENTANYL CITRATE 50 MCG: 50 INJECTION, SOLUTION INTRAMUSCULAR; INTRAVENOUS at 08:27

## 2021-12-07 RX ADMIN — FENTANYL CITRATE 25 MCG: 50 INJECTION, SOLUTION INTRAMUSCULAR; INTRAVENOUS at 08:59

## 2021-12-07 RX ADMIN — ACETAMINOPHEN 650 MG: 325 TABLET ORAL at 12:02

## 2021-12-07 RX ADMIN — MIDAZOLAM HYDROCHLORIDE 1 MG: 1 INJECTION, SOLUTION INTRAMUSCULAR; INTRAVENOUS at 08:21

## 2021-12-07 ASSESSMENT — MIFFLIN-ST. JEOR: SCORE: 1549.07

## 2021-12-07 NOTE — DISCHARGE INSTRUCTIONS
Angiogram Discharge Instructions:    You had an angiogram procedure. An angiogram is a procedure thatuses x-rays to take pictures of your blood vessels. A long, flexible tube or catheter is inserted through the blood stream (through the procedure site) to help deliver contrast (dye) into the arteries so they can be visibleon the x-ray. Angiograms are used to evaluate and treat possible blockages or other disease in the arterial system. Please follow the below instructions after your angiogram, including monitoring of your procedure site.    Care instructions after angiogram procedure:    - If you received sedation for your procedure, do not drive or operate heavy machinery for the rest of the day.    - Do not lift objects greater than 10 poundsfor 2 days following angiogram procedure.    - Avoid excessive exercise and straining for 2 days.    - Avoid tub baths, pools, hot tubs and Jacuzzis for 3 days or until procedure site is well healed.    - Youmay shower beginning tomorrow. Do not scrub procedure site until well healed; pat dry.    - Return to your normal activities as you tolerate after the 2 day restriction.    - You can expect to return to work 1-2days after your procedure - depending on the nature of your profession.    - It is normal to have some tenderness and minimal swelling at procedure puncture site. A small area of discoloration may be present.Tenderness typically subsides in 1-2 days. A small knot may also be present at puncture site for 6-8 weeks. This can be a normal part of the healing process.    Medications and other post-procedure care:    -If you had a blockage opened please make sure to fill your prescription for any new medication, such as Plavix, that you may have been prescribed and take it everyday    - If you have kidney function issues, please makesure to hydrate yourself well for the next two days by drinking lots of fluids to help clear your body of the dye used. If you have heart  problems such as heart failure, this may have to be moderated.    - If you are onMetformin, please do not resume it for 48hrs.    Follow up:    - Follow up with your vascular surgery team at the [facility] vascular center A follow up appointment should already be arranged for you.If you are unsure of your appointment or do not have a follow up appointment in the next 2-3 weeks, please call our office at [phone]. You will need to have an ultrasound 2-3 weeks after your angiogram and should bescheduled at the time of your follow up appt.    Further follow up will be based on ultrasound results. Typical follow up is every 3 months for the first year, then every 6 months to one year thereafter.    seek medical evaluation for:    - If you develop fevers (greater than 101 F (38.3C)).    - If you develop increasing pain, redness, purulent drainage, tenderness, or swelling at procedure site.    If you experience any bleeding from procedure/puncture site: lie down, firmly apply pressure to puncture site and call 911.    - Seek emergent evaluation if you experience any new leg/arm pain, discoloration ornumbness.    Call the vascular center at [phone] with questions/concerns or if you have any of the above symptoms.

## 2021-12-07 NOTE — IP AVS SNAPSHOT
[facility]  [address] [place] [id number]  Phone: [phone]  Fax: [phone]                                  After Visit Summary   12/7/2021    Pee Ayala   MRN: [id number]           After Visit Summary Signature Page    I have received my discharge instructions, and my questions have been answered. I have discussed any challenges I see with this plan with the nurse or doctor.    ..........................................................................................................................................  Patient/Patient Representative Signature      ..........................................................................................................................................  Patient Representative Print Name and Relationship to Patient    ..................................................               ................................................  Date                                   Time    ..........................................................................................................................................  Reviewed by Signature/Title    ...................................................              ..............................................  Date                                               Time          22EPIC Rev 08/18

## 2021-12-07 NOTE — OP NOTE
Vascular surgery interventional procedure note    Date: 12/07/21     Procedures Performed:    Ir Lower Extremity Angiogram Left  from the aorta level via right groin approach    Ultrasound-guided vascular access    Left leg SFA angioplasty and drug-eluting stent placement    Completion angiogram    Right leg angiogram  .  Provider:  Wendy Delgadillo MD     Assistant:  Madai Peres MD, vascular surgery fellow    Indication:  This is a 81-year-old gentleman with short distance lifestyle limiting claudication who has failed attempts at cilostazol and supervised exercise regimen. No rest pain but plan for and intervention to improve his quality of life.    Sedation:    The procedure was performed with administration of intravenous conscious sedation with appropriate preoperative, intraoperative, and postoperative evaluation.  80 minutes of supervised face to face intraservice time was provided by a radiology nurse under my direct supervision.  Versed 2.5 mg and fentanyl 100 mcg given.    ANTIBIOTICS: None    Heparin 7000 units given    FLUOROSCOPIC TIME:   Minutes    RADIATION DOSE: 453 mGy ; 19.9 Minutes ; 0 mGy     CONTRAST:  75cc Visipaque      Procedure:    Ultrasound was used to evaluate the right groin.  The selected vessel was  widely patent.  It was the common femoral artery. Ultrasound was then used for real-time ultrasound guided needle entry of the common femoral artery. Permanent images were recorded and saved to the patient's medical record.    Micropuncture technique was used to deliver a 4 Georgian sheath. Omni Flush catheter was then used to perform an angiogram from the aorta level.    Findings: Tight aortic bifurcation but widely patent common iliac arteries, internal iliac arteries, and external iliac arteries without stenosis. Incon femoral artery without disease.    Wire catheter advanced down and over the aortic bifurcation to the left common femoral level and a selective left leg angiogram  performed.    Findings: Patent profunda femoris artery. Superficial femoral artery is widely patent all the way to Adrian's canal where it is completely occluded for segment of approximately 2-1/2 cm with what appears to be a very calcified blockage. Below this the vessel reconstitutes in the patent popliteal arteries without disease. Tibial vessel runoff is diseased with visible disease in the posterior tibial artery and peroneal artery. Anterior tibial artery is widely patent and provides dominant flow to the foot.    At this point the patient was fully heparinized and we advanced a 6 Greek Ansell sheath to the left common femoral level. A wire and cath were then advanced down to the Adrian's canal lesion and with some effort were able to traverse this lesion in a subintimal plane reentering the vessel at the above-knee popliteal level. We now perform simple balloon angioplasty with a 5 mm x 4 cm angioplasty balloon. We could clearly see that there was severe extrinsic compression of this balloon even at 12 char of pressure. We inflated for 2 minutes at this pressure. We then exchanged in a 6 mm balloon of the same length and perform another 2-minute inflation this time taking it all the way up to 12 char again. We performed several views of fluoroscopy and confirmed that the balloon appeared to be open circumferentially without it being pancake more than 30 or 40% however. We took the balloon down and could see significant recoil. At this point we exchanged in a 7 mm x 4 cm Zilver PTX stents and deployed this across the lesion. We could see compression of the stent Stent in its central portion.. We reintroduced a 6 mm balloon and inflated it all the way to 16 char of pressure within the stent.    Completion angiogram was performed from the sheath level    Findings: Now inline flow all the way through the intertibial artery to the foot. SFA stent is patent with approximately 20 to 30% narrowing through its  midportion. No evidence of dissection or distal embolization. Flow is clearly better to the lower leg.    We now brought her sheath back and performed an angiogram of the right leg.    Findings: Patent common femoral artery, profunda femoris artery, superficial femoral artery and popliteal artery without any disease or stenosis seen. Heavily calcified tibial arteries with a very focal occlusion of the intertibial artery just beyond its elbow and then dominant anterior tibial artery flow to the foot. Both peroneal artery and posterior tibial artery are heavily diseased and do not carry flow beyond the ankle    The sheath was removed and a 6 Armenian Angio-Seal was deployed without difficulty. A completion duplex was performed showing the plug of the Angio-Seal on the anterior wall of the artery and with brisk flow going beyond through the lumen of the vessel.        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 Really only single-vessel tibial runoff on this leg however this is a robust anterior tibial artery and the 2 other vessels are diseased but patent. Right leg has only advanced tibial vessel disease with no lesion that is appropriate for treatment for claudication. Patient loaded on Plavix at the end of the case and will remain on Plavix and Coumadin, stopping his baby aspirin. We will see him in 2 weeks with a Escobar Orozco test in our clinic.      Wenyd Delgadillo MD  [facility]

## 2021-12-07 NOTE — PRE-PROCEDURE
GENERAL PRE-PROCEDURE:   Procedure:  Bilateral lower extremity angiogram with possible left sided intervention, moderate sedation and monitoring  Date/Time:  12/7/2021 7:47 AM    Verbal consent obtained?: Yes    Written consent obtained?: Yes    Risks and benefits: Risks, benefits and alternatives were discussed    Consent given by:  Patient  Patient states understanding of procedure being performed: Yes    Patient's understanding of procedure matches consent: Yes    Procedure consent matches procedure scheduled: Yes    Expected level of sedation:  Moderate  Appropriately NPO:  Yes  ASA Class:  3  Mallampati  :  Grade 2- soft palate, base of uvula, tonsillar pillars, and portion of posterior pharyngeal wall visible  Lungs:  Lungs clear with good breath sounds bilaterally  Heart:  A-fib  History & Physical reviewed:  History and physical reviewed and no updates needed  Statement of review:  I have reviewed the lab findings, diagnostic data, medications, and the plan for sedation    Madai Peres MD  12/07/21  7:48 AM

## 2021-12-07 NOTE — PROVIDER NOTIFICATION
Patient and spouse verbalized understanding of discharge information. Patient up in chair for 30 mins and ambulated. Site looks clean, dry and intact. No redness, no swelling, no bleeding noted.

## 2021-12-08 ENCOUNTER — TELEPHONE (OUTPATIENT)
Dept: INTERVENTIONAL RADIOLOGY/VASCULAR | Facility: HOSPITAL | Age: 81
End: 2021-12-08
Payer: COMMERCIAL
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
## 2021-12-18 ENCOUNTER — NURSE TRIAGE (OUTPATIENT)
Dept: NURSING | Facility: CLINIC | Age: 81
End: 2021-12-18
Payer: COMMERCIAL

## 2021-12-18 DIAGNOSIS — S32.049D CLOSED FRACTURE OF FOURTH LUMBAR VERTEBRA WITH ROUTINE HEALING, UNSPECIFIED FRACTURE MORPHOLOGY, SUBSEQUENT ENCOUNTER: Primary | ICD-10-CM
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
## 2021-12-19 RX ORDER — HYDROCODONE BITARTRATE AND ACETAMINOPHEN 5; 325 MG/1; MG/1
1 TABLET ORAL 2 TIMES DAILY PRN
Qty: 10 TABLET | Refills: 0 | Status: CANCELLED | OUTPATIENT
Start: 2021-12-19

## 2021-12-19 RX ORDER — HYDROCODONE BITARTRATE AND ACETAMINOPHEN 5; 325 MG/1; MG/1
1 TABLET ORAL
COMMUNITY
Start: 2021-12-16 | End: 2021-12-20

## 2021-12-20 ENCOUNTER — ANCILLARY PROCEDURE (OUTPATIENT)
Dept: VASCULAR ULTRASOUND | Facility: CLINIC | Age: 81
End: 2021-12-20
Attending: SURGERY
Payer: COMMERCIAL

## 2021-12-20 ENCOUNTER — VIRTUAL VISIT (OUTPATIENT)
Dept: VASCULAR SURGERY | Facility: CLINIC | Age: 81
End: 2021-12-20
Attending: SURGERY
Payer: COMMERCIAL

## 2021-12-20 ENCOUNTER — TELEPHONE (OUTPATIENT)
Dept: FAMILY MEDICINE | Facility: CLINIC | Age: 81
End: 2021-12-20

## 2021-12-20 DIAGNOSIS — I73.9 PAD (PERIPHERAL ARTERY DISEASE) (H): ICD-10-CM

## 2021-12-20 DIAGNOSIS — I73.9 PAD (PERIPHERAL ARTERY DISEASE) (H): Primary | ICD-10-CM

## 2021-12-20 DIAGNOSIS — S32.009D CLOSED FRACTURE DISLOCATION OF LUMBAR SPINE WITH ROUTINE HEALING, SUBSEQUENT ENCOUNTER: Primary | ICD-10-CM

## 2021-12-20 DIAGNOSIS — Z95.2 S/P TAVR (TRANSCATHETER AORTIC VALVE REPLACEMENT): ICD-10-CM

## 2021-12-20 PROCEDURE — 93922 UPR/L XTREMITY ART 2 LEVELS: CPT | Mod: 26 | Performed by: SURGERY

## 2021-12-20 PROCEDURE — 93926 LOWER EXTREMITY STUDY: CPT | Mod: 26 | Performed by: SURGERY

## 2021-12-20 PROCEDURE — 93922 UPR/L XTREMITY ART 2 LEVELS: CPT

## 2021-12-20 PROCEDURE — 99213 OFFICE O/P EST LOW 20 MIN: CPT | Mod: GT | Performed by: SURGERY

## 2021-12-20 PROCEDURE — 93926 LOWER EXTREMITY STUDY: CPT | Mod: LT

## 2021-12-20 RX ORDER — HYDROCODONE BITARTRATE AND ACETAMINOPHEN 5; 325 MG/1; MG/1
1 TABLET ORAL EVERY 6 HOURS PRN
Qty: 10 TABLET | Refills: 0 | Status: SHIPPED | OUTPATIENT
Start: 2021-12-20 | End: 2021-12-28

## 2021-12-20 NOTE — PATIENT INSTRUCTIONS
Ankle-Brachial Index (JESSENIA) or Physiologic Test    Description  An ankle-brachial index test is relatively pain free. Blood pressure cuffs of various sizes are placed on your thigh, calf, foot and toes.  Similar to having your blood pressure checked with an arm cuff, as the technician inflates the cuffs, they progressively tighten and are then quickly released.  You may feel some discomfort, but generally for less than 60 seconds for each measurement. You will be asked to remove your socks and shoes and possibly your pants or shorts. Gowns will be provided. It usually takes about 30-60 minutes.   Depending on the initial readings and patient symptoms, you may be asked to perform a light walk on a treadmill.  The technician will apply ultrasound gel, usually warmed for your comfort, to your ankles and wrists. Through the gel, the technician will use a small hand-held device that emits sound waves.  Risks  There are typically no side effects or complications associated with a physiologic study.  How to Prepare  Eat and take medications as usual.  There is no preparation required for an ankle-brachial index (JESSENIA) or physiologic exam.  What Can I Expect After the Test?  The technician will send the ultrasound images to your vascular surgeon for evaluation. Typically, a report is available in 2-3 days. If anything critical is found, it is standard practice to notify the vascular surgeon immediately.  Reference: https://vascular.org/patient-resources/vascular-tests          Lower Extremity Arterial Ultrasound    Description  Ultrasound examinations are painless and easy for the patient. The vascular laboratory will contain a bed and just two or three pieces of equipment. You will be asked to remove pants or shorts and gowns will be provided. It usually takes about 30 minutes.  The technician will tuck a towel under your underpants in the groin. The gel is water-soluble and will not stain your skin or clothes.  Ultrasound  gel, usually warmed for your comfort, will be placed on the inner side of your legs.    Through the gel, the technician will apply to your legs a small hand-held device that emits sound waves.  When the test is completed, the technician will remove excess gel from your legs.    Risks  There are typically no side effects or complications associated with a lower extremity arterial duplex ultrasound.  How to Prepare  Eat and take medications as usual.  There is no preparation required for a lower extremity arterial duplex ultrasound.  What Can I Expect After the Test?  The technician will send the ultrasound images to your vascular surgeon for evaluation. Typically, a report is available in 2-3 days. If anything critical is found, it is standard practice to notify the vascular surgeon immediately.  Reference: https://vascular.org/patient-resources/vascular-tests          Understanding Peripheral Arterial Disease       Peripheral arteries deliver oxygen-rich blood to the tissues outside the heart. As you age, your arteries become stiffer and thicker. In addition, risk factors, such as smoking and high cholesterol, can damage the artery lining. This allows a buildup of fat and other materials (plaque) to form within the artery walls. The buildup of plaque narrows the space inside the artery and sometimes blocks blood flow. Peripheral arterial disease (PAD) happens when blood flow through the arteries is reduced because of plaque buildup. It often happens in the legs and feet, but can also happen elsewhere in the body. If this buildup happens in the large artery in the neck (carotid artery), it can lead to stroke.      A healthy artery  An artery is a muscular tube that carries oxygen rich blood and nutrients from the heart to the rest of the body. It has a smooth lining and flexible walls that allow blood to pass freely. When active, muscles need more oxygen. This increases blood flow. Healthy arteries can adapt to meet  this need.      A damaged artery  PAD starts when the lining of an artery is damaged. This is often because of risk factors, such as smoking, older age, or diabetes. Plaque then starts to form within the artery wall. At this stage, blood flows normally, so you re not likely to have symptoms.      A narrowed artery  If plaque continues to build up, the space inside the artery narrows. The artery walls become less able to expand. The artery still provides enough blood and oxygen to your muscles during rest. But when you re active, the increased demand for blood can t be met. As a result, your leg may cramp or ache when you walk.      A blocked artery  An artery can become blocked by plaque or by a blood clot lodged in a narrowed section. When this happens, oxygen can t reach the muscle below the blockage. Then you may feel pain when lying down or when you are not active (rest pain). This type of pain is especially common at night when you re lying flat. In time, the affected tissue can die. This can lead to the loss of a toe or foot.        [id number] [employer]. [address] [place] [address], [place] [address]. All rights reserved. This information is not intended as a substitute for professional medical care. Always follow your healthcare professional's instructions.

## 2021-12-20 NOTE — TELEPHONE ENCOUNTER
Patient calling for pain medication.  He was seen in urgent care for back pain and given tramadol.  Tramadol made him constipated and he was waiting for a different pain medication.  Patient is under the impression that Dr Gil was going to write him a prescription.    Please call patient.  He said he has been waiting all weekend and is in a lot of pain. His pharmacy says they have no orders for anything.

## 2021-12-20 NOTE — PROGRESS NOTES
VASCULAR SURGERY OUTPATIENT VIRTUAL CONSULT OR VISIT   VASCULAR SURGEON: Wendy Delgadillo MD    LOCATION:  Virtual visit done using telephone    Pee Ayala   Medical Record #:  [id number]  YOB: 1940  Age:  81 year old     Date of Service: 12/20/2021    PRIMARY CARE PROVIDER: Jazzy Gil      Reason for consultation: Follow-up after left leg and intervention    IMPRESSION: Doing very well after left leg and intervention with SFA angioplasty and drug-eluting stent placement.  Recognized that only tibial disease on the right and no good targets for treatment of claudication.  Patient states that left leg claudication is completely resolved and he is doing better from all aspects with the limb with no pain with walking or even achiness that he was getting in the evening.  Very much notices that the right leg troublesome more but is willing to accept it as he is now able to go up and down stairs and do other activities.  Noninvasive studies continue to show probable noncompressible vessels at the ankle and toe pressures are now improved to 62 on the left and remain normal on the right.  Arterial duplex now shows much sharper upstroke waveforms in the intertibial artery and dorsalis pedis artery on the left and widely patent popliteal artery.    RECOMMENDATION: Patient much improved after left leg SFA stenting.  Willing to accept the symptoms he continues to have on the right leg.  We will have him continue on Plavix and Coumadin therapy for the next 3 months and will reevaluate him with Escobar Orozco testing and left leg arterial duplex when he comes back.    HPI:  Pee Ayala is a 81 year old male who was evaluated today for follow-up after left leg intervention to treat very short distance claudication and a variety of pains in both legs of unclear etiology.  While I have been following him conservatively for claudication in November when he came to see me he was having much more  significant pain and even pain at night.  Getting up improve the pain suggesting that there might have been a rest pain component.  Since intervention he says that all these pains are completely resolved in the left leg.  He is able to sleep without any discomfort in the limb.  Even able to go up stairs without pain.  He is noticing that the right leg is bothering him much more in this context but feels that he can manage it given that I told him that this was not safe to try to treat.    He is compliant with his dual antiplatelet therapy and statin.  Not smoking.        PHH:    Past Medical History:   Diagnosis Date     ACS (acute coronary syndrome) (H) 6/2/2014     ACS (acute coronary syndrome) (H) 6/2/2014     Actinic keratosis      Actinic keratosis 1/14/2014     Actinic keratosis 1/14/2014     Anticoagulated on Coumadin 12/30/2015     Antiplatelet or antithrombotic long-term use      Atrial fibrillation (H)      Atrial fibrillation (H) 2016     Bone mass 4/26/2017     Chest heaviness 1/23/2019     Chest pain 5/31/2014     Closed fracture of left forearm 2015     Congestive heart failure (H)      Coronary artery disease      Diabetes (H) 2009     Diabetes mellitus (H)      Difficulty in walking(719.7)      Dyslipidemia 8/31/2016     Dyspnea on exertion      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      Encounter for long-term (current) use of insulin (H) 8/11/2016     Esophageal reflux 11/18/2010     Esophageal reflux 11/18/2010     History of angina      HTN (hypertension) 6/30/2009     (Problem list name updated by automated process. Provider to review and confirm.)     Hyperlipidemia LDL goal <100 10/31/2010     Hypertension      Impotence of organic origin      Mixed hyperlipidemia 4/25/2007 April 25, 2007 restarted Zocor today, recheck in 3 months.  August 23, 2007 LDL at 101 with 40 mg, will  increase to 80 mg. Recheck  In 3 months.      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      Type II or unspecified type diabetes mellitus with neurological manifestations, not stated as uncontrolled(250.60) (H)      Walking troubles         Past Surgical History:   Procedure Laterality Date     BYPASS GRAFT ARTERY CORONARY N/A 9/10/2014    Procedure: BYPASS GRAFT ARTERY CORONARY;  Surgeon: Bharathi Caraballo MD;  Location: [facility] OR     BYPASS GRAFT ARTERY CORONARY  2016     COLONOSCOPY  1/14/2004     CORONARY ANGIOGRAPHY ADULT ORDER       CV CORONARY ANGIOGRAM N/A 4/4/2019    Procedure: Coronary Angiogram;  Surgeon: Dominik Vega MD;  Location: [facility] Cath Lab;  Service: Cardiology     CV CORONARY ANGIOGRAM N/A 1/6/2021    Procedure: Coronary Angiogram;  Surgeon: Dominik Vega MD;  Location: [facility] Cardiac Cath Lab;  Service: Cardiology     CV LEFT HEART CATHETERIZATION WITHOUT LEFT VENTRICULOGRAM Left 4/4/2019    Procedure: Left Heart Catheterization Without Left Ventriculogram;  Surgeon: Dominik Vega MD;  Location: [facility] Cath Lab;  Service: Cardiology     CV LEFT HEART CATHETERIZATION WITHOUT LEFT VENTRICULOGRAM Left 1/6/2021    Procedure: Left Heart Catheterization Without Left Ventriculogram;  Surgeon: Dominik Vega MD;  Location: [facility] Cardiac Cath Lab;  Service: Cardiology     CV RIGHT HEART  CATHETERIZATION Right 4/4/2019    Procedure: Right Heart Catheterization;  Surgeon: Dominik Vega MD;  Location: [facility] Cath Lab;  Service: Cardiology     CV TRANSCATHETER AORTIC VALVE REPLACEMENT N/A 1/12/2021    Procedure: Right transfemoral transcatheter aortic valve replacement using Magdaleno Dominick 3 size 29mm.  Transthoracic echocardiogram;  Surgeon: Jo Romano MD;  Location: [facility] CARDIAC CATH LAB     ESOPHAGOSCOPY, GASTROSCOPY, DUODENOSCOPY (EGD), COMBINED N/A 1/13/2016    Procedure: COMBINED ESOPHAGOSCOPY, GASTROSCOPY, DUODENOSCOPY (EGD);  Surgeon: Rk Srivastava MD;  Location: [facility] FEMORAL CANNULIZATION WITH OPEN STANDBY REPAIR AORTIC VALVE N/A 1/12/2021    Procedure: Cardiopulmonary Bypass standby;  Surgeon: Jefferson Sandy MD;  Location: [facility] CARDIAC CATH LAB     IR LOWER EXTREMITY ANGIOGRAM LEFT  12/7/2021     LAPAROSCOPIC CHOLECYSTECTOMY  10/09     MAZE PROCEDURE N/A 9/10/2014    Procedure: MAZE PROCEDURE;  Surgeon: Bharathi Caraballo MD;  Location:  OR     MOHS MICROGRAPHIC PROCEDURE       ORTHOPEDIC SURGERY      surgery for fx  Left forearm     OTHER SURGICAL HISTORY Left 2015    forearm sugery     STENT, CORONARY, HUMA  02/2016     VASECTOMY         ALLERGIES:  Oxycodone, Amlodipine, Lisinopril, and Adhesive tape    MEDS:    Current Outpatient Medications:      ACCU-CHEK GUIDE test strip, USE 1  TO CHECK GLUCOSE THREE TIMES DAILY, Disp: 300 strip, Rfl: 1     Apoaequorin (PREVAGEN PO), Take by mouth daily, Disp: , Rfl:      blood glucose monitor KIT, 1 kit 2 times daily., Disp: 1 kit, Rfl: 0     carvedilol (COREG) 6.25 MG tablet, TAKE 1 TABLET BY MOUTH TWICE DAILY WITH MEALS, Disp: , Rfl:      clopidogrel (PLAVIX) 75 MG tablet, Take 1 tablet (75 mg) by mouth daily Start taking medication the day after the procedure., Disp: 90 tablet, Rfl: 1     empagliflozin (JARDIANCE) 10 MG TABS tablet, Take 1 tablet (10 mg) by mouth daily, Disp: 30 tablet,  Rfl: 4     finasteride (PROSCAR) 5 MG tablet, Take 5 mg by mouth daily, Disp: , Rfl:      furosemide (LASIX) 20 MG tablet, TAKE 2 TABLETS BY MOUTH IN THE MORNING AND 1 TABLET IN  THE  AFTERNOON, Disp: 270 tablet, Rfl: 1     HYDROcodone-acetaminophen (NORCO) 5-325 MG tablet, Take 1 tablet by mouth, Disp: , Rfl:      hydrOXYzine (ATARAX) 25 MG tablet, Take 25 mg by mouth 3 times daily as needed for itching, Disp: , Rfl:      insulin aspart prot & aspart (NOVOLOG MIX 70/30 PEN) (70-30) 100 UNIT/ML pen, Inject Subcutaneous 2 times daily as needed 32 in AM and 26 in PM, Disp: , Rfl:      insulin pen needle (B-D U/F) 31G X 8 MM, Use daily., Disp: 100 each, Rfl: prn     metFORMIN (GLUCOPHAGE) 500 MG tablet, Take 2 tablets by mouth twice daily, Disp: 360 tablet, Rfl: 0     metoprolol (TOPROL-XL) 100 MG 24 hr tablet, One tablet each morning and 1/2 tablet each night (Patient taking differently: 100 mg daily ), Disp: 30 tablet, Rfl: 12     Multiple Vitamins-Minerals (EYE VITAMINS) CAPS, Take by mouth 2 times daily, Disp: , Rfl:      omeprazole (PRILOSEC) 40 MG capsule, Take 1 capsule (40 mg) by mouth daily Take 30-60 minutes before a meal. (Patient taking differently: Take 40 mg by mouth daily as needed Take 30-60 minutes before a meal.), Disp: 90 capsule, Rfl: 3     ONE TOUCH ULTRASOFT LANCETS MISC, Test glucose twice daily, Disp: 3 months, Rfl: 3     OXYCODONE HCL PO, Taking for back pain but his PCP will be prescribing something different, Disp: , Rfl:      pimecrolimus (ELIDEL) 1 % cream, Use around the eyes twice a day, Disp: 30 g, Rfl: 3     pramipexole (MIRAPEX) 0.25 MG tablet, Take 1 tablet (0.25 mg) by mouth 2 times daily Takes 4pm and 5;30 pm, Disp: 180 tablet, Rfl: 3     pravastatin (PRAVACHOL) 80 MG tablet, Take 1 tablet (80 mg) by mouth At Bedtime, Disp: 90 tablet, Rfl: 0     warfarin ANTICOAGULANT (COUMADIN) 5 MG tablet, Take 1 tablet (5 mg) by mouth daily Resume your warfarin tonight 12/7 after the procedure at  the usual dose, Disp: , Rfl:     SOCIAL HABITS:    History   Smoking Status     Former Smoker     Quit date: 1998   Smokeless Tobacco     Never Used     Social History    Substance and Sexual Activity      Alcohol use: Yes        Alcohol/week: 0.0 standard drinks        Comment: Alcoholic Drinks/day: very rare      History   Drug Use No       FAMILY HISTORY:    Family History   Problem Relation Age of Onset     Diabetes Mother      Hypertension Father      Cerebrovascular Disease Father      Diabetes Maternal Grandmother      Breast Cancer No family hx of      Cancer - colorectal No family hx of      Prostate Cancer No family hx of      C.A.D. No family hx of      Diabetes Type 2  Mother         58 ,  from anesthesia complication.     Heart Disease Father         86  from stroke     No Known Problems Brother         60 years of age.       REVIEW OF SYSTEMS:    A 12 point ROS was reviewed and except for what is listed in the HPI above, all others are negative    PE:  There were no vitals taken for this visit.  Wt Readings from Last 1 Encounters:   21 200 lb (90.7 kg)     There is no height or weight on file to calculate BMI.    EXAM:  EXAM LIMITED AS THIS IS A VIRTUAL VISIT with telephone    DIAGNOSTIC STUDIES:     Images:  US JESSENIA with PPG wo Exercise    Result Date: 2021  BILATERAL RESTING ANKLE-BRACHIAL INDICES (JESSENIA'S) (Date: 21) Indication: SURVEILLANCE JESSENIA'S: PAD, S/P LEFT LEG ANGIOGRAM WITH CODY CANAL STENT PLACEMENT DONE 2021. Previous: 11/15/2021 History: Previous Smoker, Diabetic, Hyperlipidemia, PAD, CAD, Angioplasty, Vascular Surgery and Surgical Follow-up  Resting JESSENIA's          Right: mmHg Index     Brachial: 137  Ankle-(PT): >254 NC Ankle-(DP): >254 NC           Digit: 77 0.56               Left: mmHg Index     Brachial: 132  Ankle-(PT): >254 NC Ankle-(DP): >254 NC           Digit: 62 0.45 Resting ankle-brachial index in non compressible  on the right. Toe Pressures  of 77 mmHg and TBI of 0.56  Resting ankle-brachial index is non compressible on the left. Toe Pressures of 62 mmHg and TBI of 0.45  WAVEFORMS: The right dorsalis pedis and posterior tibial arteries show monophasic waveforms. The left dorsalis pedis and posterior tibial arteries show monophasic waveforms.  Impression:  1. RIGHT LOWER EXTREMITY: JESSENIA is Uninterpretable due to incompressible vessels. and Normal toe pressures of 77 mmHg. 2. LEFT LOWER EXTREMITY: JESSENIA is Uninterpretable due to incompressible vessels. and Mildly abnormal, but adequate for healing toe pressures of 62 mmHg. Reference: Wound classification Grade JESSENIA Ankle Systolic Pressure Toe Pressures 0 > 0.80 > 100 mmHg > 60 mmHg 1 0.6 - 0.79 70 - 100 mmHg 40 - 59 mmHg 2 0.4 - 0.59 50-70 mmHg 30 - 39 mmHg 3 < 0.39 < 50 mmHg < 30 mmHg Digit Pressures DBI Disease Category > 0.70 Normal < 0.70 Abnormal > 30 mmHg Potential wound healing < 30 mmHg Impaired wound healing Ankle Brachial Pressures JESSENIA Disease Category > 1.3  Likely vessel calcification with monophasic waveforms, non-diagnostic 0.95-1.30 Normal with multiphasic waveforms 0.50-0.95 Single level disease 0.30-0.50 Multilevel disease < 0.30 Critical limb ischema Volume Plethysmography Recording (VPR) at all levels Normal Sharp systolic peak, fast upstroke, prominent dicrotic notch in wave Mild Sharp systolic peak, fast upstroke, absent dicrotic notch in wave Moderate Flattened systolic peak, slowed upstroke, absent dicrotic notch in wave Severe Low-amplitude wave with = upslope and down slope Occluded Flat Line Multiple Level Segmental Pressures  Normal Abnormal Upper Thigh > 1.2 pressure indices, <30 mmHg between lateral and horizontal cuffs < 0.8 pressure indices suggest proximal occlusion, 0.8-1.2 pressure indices suggest inflow disease, > 30 mmHg between lateral and horizontal cuffs  Lower Thigh < 30 mmHg between lateral and horizontal cuffs > 30 mmHg between lateral and horizontal cuffs Upper Calf  < 30 mmHg between lateral and horizontal cuffs > 30 mmHg between lateral and horizontal cuffs Note: As limb diameter increases, pressure measurements also increase.    IR Lower Extremity Angiogram Left    Result Date: 12/7/2021  Vascular surgery interventional procedure note Date: 12/07/21 Procedures Performed: Ir Lower Extremity Angiogram Left  from the aorta level via right groin approach   Ultrasound-guided vascular access   Left leg SFA angioplasty and drug-eluting stent placement   Completion angiogram   Right leg angiogram . Provider: Wendy Delgadillo MD Assistant: Madai Peres MD, vascular surgery fellow Indication: This is a 81-year-old gentleman with short distance lifestyle limiting claudication who has failed attempts at cilostazol and supervised exercise regimen. No rest pain but plan for and intervention to improve his quality of life. Sedation: The procedure was performed with administration of intravenous conscious sedation with appropriate preoperative, intraoperative, and postoperative evaluation.  80 minutes of supervised face to face intraservice time was provided by a radiology nurse under my direct supervision.  Versed 2.5 mg and fentanyl 100 mcg given. ANTIBIOTICS: None Heparin 7000 units given FLUOROSCOPIC TIME:   Minutes RADIATION DOSE: 453 mGy ; 19.9 Minutes ; 0 mGy CONTRAST:  75cc Visipaque Procedure:   Ultrasound was used to evaluate the right groin.  The selected vessel was  widely patent.  It was the common femoral artery. Ultrasound was then used for real-time ultrasound guided needle entry of the common femoral artery. Permanent images were recorded and saved to the patient's medical record.   Micropuncture technique was used to deliver a 4 Belarusian sheath. Omni Flush catheter was then used to perform an angiogram from the aorta level.   Findings: Tight aortic bifurcation but widely patent common iliac arteries, internal iliac arteries, and external iliac arteries without stenosis. Incon  femoral artery without disease.   Wire catheter advanced down and over the aortic bifurcation to the left common femoral level and a selective left leg angiogram performed.   Findings: Patent profunda femoris artery. Superficial femoral artery is widely patent all the way to Adrian's canal where it is completely occluded for segment of approximately 2-1/2 cm with what appears to be a very calcified blockage. Below this the vessel reconstitutes in the patent popliteal arteries without disease. Tibial vessel runoff is diseased with visible disease in the posterior tibial artery and peroneal artery. Anterior tibial artery is widely patent and provides dominant flow to the foot.   At this point the patient was fully heparinized and we advanced a 6 Liberian Ansell sheath to the left common femoral level. A wire and cath were then advanced down to the Adrian's canal lesion and with some effort were able to traverse this lesion in a subintimal plane reentering the vessel at the above-knee popliteal level. We now perform simple balloon angioplasty with a 5 mm x 4 cm angioplasty balloon. We could clearly see that there was severe extrinsic compression of this balloon even at 12 char of pressure. We inflated for 2 minutes at this pressure. We then exchanged in a 6 mm balloon of the same length and perform another 2-minute inflation this time taking it all the way up to 12 char again. We performed several views of fluoroscopy and confirmed that the balloon appeared to be open circumferentially without it being pancake more than 30 or 40% however. We took the balloon down and could see significant recoil. At this point we exchanged in a 7 mm x 4 cm Zilver PTX stents and deployed this across the lesion. We could see compression of the stent Stent in its central portion.. We reintroduced a 6 mm balloon and inflated it all the way to 16 char of pressure within the stent.   Completion angiogram was performed from the sheath level    Findings: Now inline flow all the way through the intertibial artery to the foot. SFA stent is patent with approximately 20 to 30% narrowing through its midportion. No evidence of dissection or distal embolization. Flow is clearly better to the lower leg.   We now brought her sheath back and performed an angiogram of the right leg.   Findings: Patent common femoral artery, profunda femoris artery, superficial femoral artery and popliteal artery without any disease or stenosis seen. Heavily calcified tibial arteries with a very focal occlusion of the intertibial artery just beyond its elbow and then dominant anterior tibial artery flow to the foot. Both peroneal artery and posterior tibial artery are heavily diseased and do not carry flow beyond the ankle   The sheath was removed and a 6 Occitan Angio-Seal was deployed without difficulty. A completion duplex was performed showing the plug of the Angio-Seal on the anterior wall of the artery and with brisk flow going beyond through the lumen of the vessel. Impression: Successful drug-eluting stent treatment of Cody's canal focal heavily calcified stenosis of the left leg. Really only single-vessel tibial runoff on this leg however this is a robust anterior tibial artery and the 2 other vessels are diseased but patent. Right leg has only advanced tibial vessel disease with no lesion that is appropriate for treatment for claudication. Patient loaded on Plavix at the end of the case and will remain on Plavix and Coumadin, stopping his baby aspirin. We will see him in 2 weeks with a Escobar Orozco test in our clinic. Wendy Delgadillo MD [facility] Vascular Surgery    US Lower Extremity Arterial Duplex Left    Result Date: 12/20/2021  Arterial Duplex Ultrasound (Date: 12/20/21) Lower Extremity Artery Evaluation Indication: SURVEILLANCE LEFT LEG: PAD, S/P LEFT LEG ANGIOGRAM WITH CODY CANAL STENT PLACEMENT DONE 12/7/2021.  Previous: 11/15/2021 Angioplasty Date:  12/7/2021 History: Previous Smoker, Diabetic, Hyperlipidemia, PAD, CAD, Angioplasty, Vascular Surgery and Surgical Follow-up  Technique: Duplex imaging is performed utilizing gray-scale, two-dimensional images, and color-flow imaging. Doppler waveform analysis and spectral Doppler imaging is also performed.  LOWER EXTREMITY ARTERIAL DUPLEX EXAM WITH WAVEFORMS  Right Leg:(cm/s) Location Velocities Waveforms PTA   36   M SABI   93  M DPA   156  M Waveforms: T=Triphasic, M=Monophasic, B=Biphasic Left Leg:(cm/s) Location: Velocities Waveforms EIA:   132  T CFA:   171  T PFA:   88  B SFA Proximal:   168  B SFA Mid:   88  B SFA Distal:   118  T Popliteal Artery:   90/ 93/ 80 B PTA:   27   M SABI:   115  M DPA:   153  M Waveforms: T=Triphasic, M=Monophasic, B=Biphasic Stent Velocities: Stent 1: Left Leg Stent location: HUNTERS CANAL ( HARD TO VISUALIZE)      Velocity    Waveform Inflow Artery: SFA DISTAL        Proximal to Stent: 118 T Proximal Stent:   NON VIS  N/A Mid Stent:   NON VIS  N/A  Distal Stent:   90  B Distal to Stent:   93  B  Outflow Artery: POP ARTERY       Impression: Right Lower Extremity: Not fully evaluated but monophasic flow in tibial vessels suggest more proximal disease Left Lower Extremity: Triphasic flow in common femoral artery suggesting no inflow disease.  Even though a part of the proximal SFA stent is not visualized, the quality of the waveform distally suggest a good patency of the stent.  Transition to monophasic flow in tibial vessels suggesting underlying peripheral toe disease Reference: Category Normal 1-19% 20-49% 50-99% Occluded PSV <160 cm/sec without spectral broadening <160 cm/sec with spectral broadening Increased Increased Absent Flow Ratio N/A N/A < 2.0 >2.0 N/A Post-Stenotic Turbulence No No No Yes N/A       I personally reviewed the images and my interpretation is that his toe pressures are much improved on the left side.  Arterial duplex also shows improved waveforms..    LABS:       Sodium   Date Value Ref Range Status   12/07/2021 137 136 - 145 mmol/L Final   12/03/2021 137 136 - 145 mmol/L Final   08/31/2021 140 136 - 145 mmol/L Final   01/13/2021 135 133 - 144 mmol/L Final   01/12/2021 139 133 - 144 mmol/L Final   01/12/2021 139 133 - 144 mmol/L Final     Urea Nitrogen   Date Value Ref Range Status   12/07/2021 16 8 - 28 mg/dL Final   12/03/2021 15 8 - 28 mg/dL Final   08/31/2021 19 8 - 28 mg/dL Final   01/13/2021 20 7 - 30 mg/dL Final   01/12/2021 19 7 - 30 mg/dL Final   01/12/2021 20 7 - 30 mg/dL Final     Hemoglobin   Date Value Ref Range Status   12/07/2021 14.1 13.3 - 17.7 g/dL Final   12/03/2021 13.5 13.3 - 17.7 g/dL Final   08/20/2021 14.4 13.3 - 17.7 g/dL Final   01/13/2021 13.7 13.3 - 17.7 g/dL Final   01/12/2021 11.1 (L) 13.3 - 17.7 g/dL Final   01/12/2021 12.1 (L) 13.3 - 17.7 g/dL Final     Platelet Count   Date Value Ref Range Status   12/07/2021 149 (L) 150 - 450 10e3/uL Final   12/03/2021 141 (L) 150 - 450 10e3/uL Final   08/20/2021 175 150 - 450 10e3/uL Final   01/13/2021 141 (L) 150 - 450 10e9/L Final   01/12/2021 113 (L) 150 - 450 10e9/L Final   01/12/2021 130 (L) 150 - 450 10e9/L Final     BNP   Date Value Ref Range Status   01/08/2021 91 (H) 0 - 82 pg/mL Final   10/21/2020 56 0 - 82 pg/mL Final   05/09/2019 96 (H) 0 - 80 pg/mL Final     INR   Date Value Ref Range Status   12/07/2021 1.09 0.85 - 1.15 Final   11/24/2021 2.1 (H) 0.9 - 1.1 Final   10/26/2021 2.8 (H) 0.9 - 1.1 Final   10/12/2021 2.3 (H) 0.9 - 1.1 Final   07/01/2021 1.80 (H) 0.90 - 1.10 Final   06/24/2021 1.00 0.90 - 1.10 Final   01/13/2021 1.13 0.86 - 1.14 Final   01/12/2021 1.09 0.86 - 1.14 Final   06/09/2016 1.72 (H) 0.86 - 1.14 Final     This is a telephone visit with the start time of 3:03 PM and end time of 3:12 PM    20 minutes spent on the day of encounter doing chart review, history and exam, documentation, and further activities as noted.     Wendy Delgadillo MD, MD  VASCULAR SURGERY

## 2021-12-20 NOTE — TELEPHONE ENCOUNTER
LMTCB. Please verify with pt which medication he is wanting a refill of?    I spoke to [facility] and they said the Hydrocodone was filled on 12/16 for 10 tabs and that he had called them asking for a refill of the tramadol. Message here says he wants something other than tramadol?    Which medication has he been taking?

## 2021-12-20 NOTE — PROGRESS NOTES
I see that he was given hydrocodone and he wanted a refill.  This is being sent to the pharmacy.  He was not given tramadol.  Do tell patient that hydrocodone causes constipation.  Nonnarcotic pain medication would be Tylenol.    Jazzy Gil MD

## 2021-12-20 NOTE — PROGRESS NOTES
[facility]        Patient is here for a 2 week  follow up  by telephone to discuss Left leg angio. He states his left leg is doing well. Right leg is the same.     Currently taking oxycodone for pain.     Pt is currently taking Statin, Plavix and Warfarin.    There were no vitals taken for this visit.    The provider has been notified that the patient has no concerns.     Questions patient would like addressed today are: N/A.    Refills are needed: No    Has homecare services and agency name:  Blanca Norton RN

## 2021-12-24 DIAGNOSIS — I70.212 ATHEROSCLEROSIS OF NATIVE ARTERIES OF EXTREMITIES WITH INTERMITTENT CLAUDICATION, LEFT LEG (H): ICD-10-CM

## 2021-12-24 DIAGNOSIS — I73.9 PAD (PERIPHERAL ARTERY DISEASE) (H): ICD-10-CM

## 2021-12-24 NOTE — TELEPHONE ENCOUNTER
Pharmacy refill request, Coumadin 5 mg take 1-1.5 tablet by mouth daily as directed adjust dose based on INR results.

## 2021-12-25 RX ORDER — WARFARIN SODIUM 5 MG/1
5 TABLET ORAL DAILY
Qty: 30 TABLET | Refills: 3 | Status: SHIPPED | OUTPATIENT
Start: 2021-12-25 | End: 2022-04-18 | Stop reason: DRUGHIGH

## 2021-12-26 ENCOUNTER — NURSE TRIAGE (OUTPATIENT)
Dept: NURSING | Facility: CLINIC | Age: 81
End: 2021-12-26
Payer: COMMERCIAL

## 2021-12-26 NOTE — TELEPHONE ENCOUNTER
Pee is calling and states that over Reydon lost his Warfarin.  PCP is Jazzy Gil MD out of [facility].  Pharmacy of choice is [facility] in [facility].  FNA advised that a prescription has been sent to [facility] on [address]  Patient will go  his medication.    COVID 19 Nurse Triage Plan/Patient Instructions    Please be aware that novel coronavirus (COVID-19) may be circulating in the community. If you develop symptoms such as fever, cough, or SOB or if you have concerns about the presence of another infection including coronavirus (COVID-19), please contact your health care provider or visit [email or web address].     Disposition/Instructions    Home care recommended. Follow home care protocol based instructions.    Thank you for taking steps to prevent the spread of this virus.  o Limit your contact with others.  o Wear a simple mask to cover your cough.  o Wash your hands well and often.    Resources    [facility]: About COVID-19: [email or web address]    CDC: What to Do If You're Sick: www.cdc.gov/coronavirus/2019-ncov/about/steps-when-sick.html    CDC: Ending Home Isolation: www.cdc.gov/coronavirus/2019-ncov/hcp/disposition-in-home-patients.html     CDC: Caring for Someone: www.cdc.gov/coronavirus/2019-ncov/if-you-are-sick/care-for-someone.html     [facility]: Interim Guidance for Hospital Discharge to Home: www.health.[facility].mn.us/diseases/coronavirus/hcp/hospdischarge.pdf    [facility] clinical trials (COVID-19 research studies): [email or web address]     Below are the COVID-19 hotlines at the [facility] ([facility]). Interpreters are available.   o For health questions: Call 653-390-5123 or 1-424.872.1879 (7 a.m. to 7 p.m.)  o For questions about schools and childcare: Call 635-840-8734 or 1-683.660.8242 (7 a.m. to 7 p.m.)

## 2021-12-28 ENCOUNTER — OFFICE VISIT (OUTPATIENT)
Dept: FAMILY MEDICINE | Facility: CLINIC | Age: 81
End: 2021-12-28
Payer: COMMERCIAL

## 2021-12-28 ENCOUNTER — ANTICOAGULATION THERAPY VISIT (OUTPATIENT)
Dept: ANTICOAGULATION | Facility: CLINIC | Age: 81
End: 2021-12-28

## 2021-12-28 VITALS
WEIGHT: 207.25 LBS | HEART RATE: 60 BPM | SYSTOLIC BLOOD PRESSURE: 175 MMHG | BODY MASS INDEX: 34.53 KG/M2 | HEIGHT: 65 IN | DIASTOLIC BLOOD PRESSURE: 69 MMHG

## 2021-12-28 DIAGNOSIS — I48.20 CHRONIC ATRIAL FIBRILLATION (H): Primary | ICD-10-CM

## 2021-12-28 DIAGNOSIS — Z79.01 LONG TERM CURRENT USE OF ANTICOAGULANT THERAPY: ICD-10-CM

## 2021-12-28 DIAGNOSIS — R79.89 LOW TESTOSTERONE IN MALE: ICD-10-CM

## 2021-12-28 DIAGNOSIS — Z95.2 S/P TAVR (TRANSCATHETER AORTIC VALVE REPLACEMENT): ICD-10-CM

## 2021-12-28 DIAGNOSIS — R03.0 ELEVATED BLOOD PRESSURE READING: ICD-10-CM

## 2021-12-28 DIAGNOSIS — M80.08XA AGE-RELATED OSTEOPOROSIS WITH CURRENT PATHOLOGICAL FRACTURE, VERTEBRA(E), INITIAL ENCOUNTER FOR FRACTURE (H): ICD-10-CM

## 2021-12-28 DIAGNOSIS — E66.811 CLASS 1 OBESITY WITH BODY MASS INDEX (BMI) OF 34.0 TO 34.9 IN ADULT, UNSPECIFIED OBESITY TYPE, UNSPECIFIED WHETHER SERIOUS COMORBIDITY PRESENT: ICD-10-CM

## 2021-12-28 DIAGNOSIS — S32.030D CLOSED COMPRESSION FRACTURE OF L3 LUMBAR VERTEBRA WITH ROUTINE HEALING, SUBSEQUENT ENCOUNTER: Primary | ICD-10-CM

## 2021-12-28 DIAGNOSIS — I48.20 CHRONIC ATRIAL FIBRILLATION (H): ICD-10-CM

## 2021-12-28 LAB
ALBUMIN SERPL-MCNC: 3.4 G/DL (ref 3.5–5)
ALP SERPL-CCNC: 84 U/L (ref 45–120)
ALT SERPL W P-5'-P-CCNC: 14 U/L (ref 0–45)
ANION GAP SERPL CALCULATED.3IONS-SCNC: 10 MMOL/L (ref 5–18)
AST SERPL W P-5'-P-CCNC: 21 U/L (ref 0–40)
BASOPHILS # BLD AUTO: 0.1 10E3/UL (ref 0–0.2)
BASOPHILS NFR BLD AUTO: 1 %
BILIRUB SERPL-MCNC: 0.5 MG/DL (ref 0–1)
BUN SERPL-MCNC: 17 MG/DL (ref 8–28)
CALCIUM SERPL-MCNC: 9 MG/DL (ref 8.5–10.5)
CHLORIDE BLD-SCNC: 107 MMOL/L (ref 98–107)
CO2 SERPL-SCNC: 24 MMOL/L (ref 22–31)
CREAT SERPL-MCNC: 0.86 MG/DL (ref 0.7–1.3)
EOSINOPHIL # BLD AUTO: 0.1 10E3/UL (ref 0–0.7)
EOSINOPHIL NFR BLD AUTO: 2 %
ERYTHROCYTE [DISTWIDTH] IN BLOOD BY AUTOMATED COUNT: 13 % (ref 10–15)
GFR SERPL CREATININE-BSD FRML MDRD: 88 ML/MIN/1.73M2
GLUCOSE BLD-MCNC: 101 MG/DL (ref 70–125)
HCT VFR BLD AUTO: 40.3 % (ref 40–53)
HGB BLD-MCNC: 12.8 G/DL (ref 13.3–17.7)
IMM GRANULOCYTES # BLD: 0 10E3/UL
IMM GRANULOCYTES NFR BLD: 0 %
INR BLD: 3.1 (ref 0.9–1.1)
LYMPHOCYTES # BLD AUTO: 1.7 10E3/UL (ref 0.8–5.3)
LYMPHOCYTES NFR BLD AUTO: 24 %
MCH RBC QN AUTO: 33.2 PG (ref 26.5–33)
MCHC RBC AUTO-ENTMCNC: 31.8 G/DL (ref 31.5–36.5)
MCV RBC AUTO: 104 FL (ref 78–100)
MONOCYTES # BLD AUTO: 0.5 10E3/UL (ref 0–1.3)
MONOCYTES NFR BLD AUTO: 7 %
NEUTROPHILS # BLD AUTO: 4.7 10E3/UL (ref 1.6–8.3)
NEUTROPHILS NFR BLD AUTO: 67 %
PLATELET # BLD AUTO: 176 10E3/UL (ref 150–450)
POTASSIUM BLD-SCNC: 4.4 MMOL/L (ref 3.5–5)
PROT SERPL-MCNC: 6.1 G/DL (ref 6–8)
RBC # BLD AUTO: 3.86 10E6/UL (ref 4.4–5.9)
SODIUM SERPL-SCNC: 141 MMOL/L (ref 136–145)
WBC # BLD AUTO: 7 10E3/UL (ref 4–11)

## 2021-12-28 PROCEDURE — 36415 COLL VENOUS BLD VENIPUNCTURE: CPT | Performed by: FAMILY MEDICINE

## 2021-12-28 PROCEDURE — 96372 THER/PROPH/DIAG INJ SC/IM: CPT | Performed by: FAMILY MEDICINE

## 2021-12-28 PROCEDURE — 80053 COMPREHEN METABOLIC PANEL: CPT | Performed by: FAMILY MEDICINE

## 2021-12-28 PROCEDURE — 85025 COMPLETE CBC W/AUTO DIFF WBC: CPT | Performed by: FAMILY MEDICINE

## 2021-12-28 PROCEDURE — 36416 COLLJ CAPILLARY BLOOD SPEC: CPT | Performed by: FAMILY MEDICINE

## 2021-12-28 PROCEDURE — 84403 ASSAY OF TOTAL TESTOSTERONE: CPT | Performed by: FAMILY MEDICINE

## 2021-12-28 PROCEDURE — 99214 OFFICE O/P EST MOD 30 MIN: CPT | Mod: 25 | Performed by: FAMILY MEDICINE

## 2021-12-28 PROCEDURE — 85610 PROTHROMBIN TIME: CPT | Performed by: FAMILY MEDICINE

## 2021-12-28 PROCEDURE — 82306 VITAMIN D 25 HYDROXY: CPT | Performed by: FAMILY MEDICINE

## 2021-12-28 PROCEDURE — 83970 ASSAY OF PARATHORMONE: CPT | Performed by: FAMILY MEDICINE

## 2021-12-28 RX ORDER — KETOROLAC TROMETHAMINE 30 MG/ML
15 INJECTION, SOLUTION INTRAMUSCULAR; INTRAVENOUS ONCE
Status: COMPLETED | OUTPATIENT
Start: 2021-12-28 | End: 2021-12-28

## 2021-12-28 RX ORDER — POLYETHYLENE GLYCOL 3350 17 G/17G
1 POWDER, FOR SOLUTION ORAL DAILY PRN
Qty: 507 G | Refills: 0 | Status: SHIPPED | OUTPATIENT
Start: 2021-12-28 | End: 2022-07-27

## 2021-12-28 RX ORDER — HYDROCODONE BITARTRATE AND ACETAMINOPHEN 5; 325 MG/1; MG/1
1 TABLET ORAL 3 TIMES DAILY
Qty: 21 TABLET | Refills: 0 | Status: SHIPPED | OUTPATIENT
Start: 2021-12-28 | End: 2022-01-04

## 2021-12-28 RX ADMIN — KETOROLAC TROMETHAMINE 15 MG: 30 INJECTION, SOLUTION INTRAMUSCULAR; INTRAVENOUS at 16:10

## 2021-12-28 ASSESSMENT — MIFFLIN-ST. JEOR: SCORE: 1581.96

## 2021-12-28 NOTE — PROGRESS NOTES
Assessment & Plan     1. Closed compression fracture of L3 lumbar vertebra with routine healing, subsequent encounter  - Orthopedic  Referral; Future  - HYDROcodone-acetaminophen (NORCO) 5-325 MG tablet; Take 1 tablet by mouth 3 times daily for 7 days  Dispense: 21 tablet; Refill: 0  - polyethylene glycol (MIRALAX) 17 GM/Dose powder; Take 17 g (1 capful) by mouth daily as needed for constipation (opioid induced constipation)  Dispense: 507 g; Refill: 0  - ketorolac (TORADOL) injection 15 mg  - Comprehensive metabolic panel (BMP + Alb, Alk Phos, ALT, AST, Total. Bili, TP)  - Testosterone, total  - Vitamin D Deficiency  - Parathyroid Hormone Intact    Urgent care note and CT reviewed.     Second compression fracture. Recommend evaluation for secondary causes. Will start with laboratory evaluation and discuss dexa scan when labs return.    Recommend orthopedic evaluation for management of compression fracture. May benefit from a stabilizing brace. Patient would like to go to [facility]. Referral placed.     Pain management is primary concern for patient at this moment. Refilled Norco and increased frequency from BID to TID due to pain inadequately controlled. Counseled regarding sedation risk and continuing to be cognizant of total tylenol dose per day. In general, avoids NSAIDS. Will give 1 dose of toradol today to help manage pain to bridge to picking up [place] refill.    Currently using OTC suppositories for bowel regimen. Recommend he trial Miralax daily, titrate to effect.     2. Class 1 obesity with body mass index (BMI) of 34.0 to 34.9 in adult, unspecified obesity type, unspecified whether serious comorbidity present  - Vitamin D Deficiency    Checking vitamin D per above.     3. S/P TAVR (transcatheter aortic valve replacement)  4. Chronic atrial fibrillation (H)  - CBC with platelets differential  - INR point of care, Interfaced Result    Labs per other providers.     5. Elevated blood pressure  "reading  Blood pressure elevated today. Suspect secondary to pain. Blood pressure has been at goal.   Continue current management with close follow up.       No follow-ups on file.    Mar Morales [facility]    Subjective   Pee is a 81 year old who presents for the following health issues     Chief Complaint   Patient presents with     Back Pain     Low        HPI     12/16/21 - acute onset of back pain, L4 previous compression fracture, no known injury. Thinks like 7 years ago?  Now with L3 compression fracture. Again no injuries.     Pain is continuous. Was given pain medications. Given #10 tablets. Called in for refill, #10 tablets. Out again since 12/23/21.   Pain is a roller coaster. Spends day trying to find the most comfortable position. Very painful to do certain activities like put on shoes or socks.     1 pill twice a day was helping, but not adequately controlling pain. Hasn't been offered a brace for support.   Told he may be referred to ortho, but wasn't given the prescription.    Constipated from the opioid. Using suppositories as needed. Using some stool softeners (didn't help). Tried x-lax. Working okay.     Taking tylenol in addition to the Norco.   Takes 2 in the morning and and 1 with his Norco twice daily.  Is limiting Tylenol to under 2000mg.     Review of Systems   See HPI above.       Objective    BP (!) 175/69   Pulse 60   Ht 1.651 m (5' 5\")   Wt 94 kg (207 lb 4 oz)   BMI 34.49 kg/m    Body mass index is 34.49 kg/m .  Physical Exam   GENERAL: Pee is a pleasant, non-toxic appearing male, in no acute distress.  HEART: Irregularly irregular rhythm, no normal rate, no murmurs.  LUNGS: Clear to auscultation bilaterally, unlabored.   MSK: Lumbar spine tender to palpation.  NEURO: no gross deficits present.         "

## 2021-12-28 NOTE — PROGRESS NOTES
ANTICOAGULATION MANAGEMENT     Pee Ayala 81 year old male is on warfarin with therapeutic INR result. (Goal INR 2.0-3.0)    Recent labs: (last 7 days)     12/28/21  1623   INR 3.1*       ASSESSMENT     Source(s): Chart Review and Patient/Caregiver Call       Warfarin doses taken: Warfarin taken as instructed    Diet: No new diet changes identified    New illness, injury, or hospitalization: No    Medication/supplement changes: None noted    Signs or symptoms of bleeding or clotting: Yes: did have nosebleed today to which he says he has always been susceptible. Reviewed self help measures and he will let us know if recurring more frequently    Previous INR: Subtherapeutic    Additional findings: None     PLAN     Recommended plan for temporary change(s) affecting INR     Dosing Instructions: Hold dose then continue your current warfarin dose with next INR in 2 weeks       Summary  As of 12/28/2021    Full warfarin instructions:  12/28: Hold; Otherwise 7.5 mg every Sun, Tue, Thu; 5 mg all other days   Next INR check:  1/11/2022             Telephone call with Pee who verbalizes understanding and agrees to plan    Lab visit scheduled    Education provided: None required    Plan made per [facility] anticoagulation protocol    Yousuf Madison RN  Anticoagulation Clinic  12/28/2021    _______________________________________________________________________     Anticoagulation Episode Summary     Current INR goal:  2.0-3.0   TTR:  51.0 % (11.7 mo)   Target end date:  Indefinite   Send INR reminders to:  [facility] [place]    Indications    Chronic atrial fibrillation (H) [I48.20]  Long term current use of anticoagulant therapy [Z79.01]           Comments:           Anticoagulation Care Providers     Provider Role Specialty Phone number    Jazzy Gil MD Referring Family Medicine [phone]

## 2021-12-29 LAB
DEPRECATED CALCIDIOL+CALCIFEROL SERPL-MC: 45 UG/L (ref 30–80)
PTH-INTACT SERPL-MCNC: 42 PG/ML (ref 10–86)
TESTOST SERPL-MCNC: 138 NG/DL (ref 221–716)

## 2021-12-30 ENCOUNTER — TRANSFERRED RECORDS (OUTPATIENT)
Dept: HEALTH INFORMATION MANAGEMENT | Facility: CLINIC | Age: 81
End: 2021-12-30
Payer: COMMERCIAL

## 2022-01-03 ENCOUNTER — TELEPHONE (OUTPATIENT)
Dept: FAMILY MEDICINE | Facility: CLINIC | Age: 82
End: 2022-01-03
Payer: COMMERCIAL

## 2022-01-03 NOTE — TELEPHONE ENCOUNTER
----- Message from Mar Morales, DO sent at 1/3/2022  3:22 PM CST -----  Please check in with Pee.  Qi Glasgow,  Your lab results have returned and everything looks good except for a low testosterone level.  With 2 compression fractures, I do recommend we do a bone density screen to look for osteoporosis (low bone density).  How is your pain?  Were you able to get in to see [place]?  Mar Morales, DO

## 2022-01-03 NOTE — RESULT ENCOUNTER NOTE
Please check in with Pee.  Qi Glasgow,  Your lab results have returned and everything looks good except for a low testosterone level.  With 2 compression fractures, I do recommend we do a bone density screen to look for osteoporosis (low bone density).  How is your pain?  Were you able to get in to see [place]?  Mar Morales, DO

## 2022-01-04 NOTE — TELEPHONE ENCOUNTER
Left message to call back for: Results  Information to relay to patient: Please see message below from provider

## 2022-01-06 ENCOUNTER — HOSPITAL ENCOUNTER (OUTPATIENT)
Dept: CARDIOLOGY | Facility: HOSPITAL | Age: 82
Discharge: HOME | End: 2022-01-06
Attending: INTERNAL MEDICINE | Admitting: INTERNAL MEDICINE
Payer: COMMERCIAL

## 2022-01-06 DIAGNOSIS — Z95.2 S/P TAVR (TRANSCATHETER AORTIC VALVE REPLACEMENT): ICD-10-CM

## 2022-01-06 PROCEDURE — 93306 TTE W/DOPPLER COMPLETE: CPT

## 2022-01-06 PROCEDURE — 93306 TTE W/DOPPLER COMPLETE: CPT | Mod: 26 | Performed by: INTERNAL MEDICINE

## 2022-01-10 ENCOUNTER — MEDICAL CORRESPONDENCE (OUTPATIENT)
Dept: HEALTH INFORMATION MANAGEMENT | Facility: CLINIC | Age: 82
End: 2022-01-10
Payer: COMMERCIAL

## 2022-01-10 ENCOUNTER — TRANSFERRED RECORDS (OUTPATIENT)
Dept: HEALTH INFORMATION MANAGEMENT | Facility: CLINIC | Age: 82
End: 2022-01-10
Payer: COMMERCIAL

## 2022-01-11 ENCOUNTER — TELEPHONE (OUTPATIENT)
Dept: FAMILY MEDICINE | Facility: CLINIC | Age: 82
End: 2022-01-11
Payer: COMMERCIAL

## 2022-01-11 NOTE — CONFIDENTIAL NOTE
Please tell me if this is a dump Sandra. Got a fax placed on my desk from [facility] for this patient who needs INR to 1.2 or less for kyphoplasty for compression fracture. Date as soon as possible. Can you give bridging recommendations for warfarin hold 5 days prior. ?  This is not my patient but covering for a colleague last minute

## 2022-01-13 NOTE — TELEPHONE ENCOUNTER
LUIS-PROCEDURAL ANTICOAGULATION  MANAGEMENT    ASSESSMENT     Warfarin interruption plan for Kyphoplasty for compression fracture on TBD.    Indication for Anticoagulation: Atrial Fibrillation      [id number] = 6 (CHF, Hypertension, Age >= 75, Diabetes and Vascular- ACS/CABG)       S/P TAVR      Bleeding risk factors: Plavix (started 21 after SFA angioplasty and drug eluting stent placement)      Previously tolerated hold without bridge for  Angiogram of leg; 21 Angiogram & 21 TAVR, 2019 Angiogram      Luis-Procedure Risk stratification for thromboembolism: moderate (2017 ACC periprocedure pathway for NVAF Expert Consensus)     2017 ACC periprocedure pathway for NVAF advises NO bridge for moderate risk stratification ([id number] score 5-6 or hx of stroke, TIA or systemic embolism > 3 months ago) with increased risk of bleeding    AFIB & Bioprosthetic Valve or Annuloplasty Rin AHA/ACC Management of Valvular Heart disease guidelines suggest bioprosthetic heart valves or annuloplasty rings receiving anticoagulation for atrial fibrillation  reasonable to consider need for bridging on the basis of the [id number] score weighed against the risk of bleeding.     RECOMMENDATION     o From Afib perspective reasonable to consider 5 day hold without bridge as previously done    o Note since his last hold plan, Pee had SFA stenting and placed on Plavix. Does antiplatelet also need hold?  If so, suggest review with vascular due to recent nature of stent placement and hold of both agents    Plan routed to referring provider for approval  ?   Sandra Covarrubias, [facility]    SUBJECTIVE/OBJECTIVE     Pee Ayala, a 81 year old male    Goal INR Range: 2.0-3.0     Wt Readings from Last 3 Encounters:   21 94 kg (207 lb 4 oz)   21 90.7 kg (200 lb)   21 93.4 kg (205 lb 12.8 oz)      Ideal body weight: 61.5 kg (135 lb 9.3 oz)  Adjusted ideal body weight: 74.5 kg (164 lb 4 oz)     Estimated  "body mass index is 34.49 kg/m  as calculated from the following:    Height as of 12/28/21: 1.651 m (5' 5\").    Weight as of 12/28/21: 94 kg (207 lb 4 oz).    Lab Results   Component Value Date    INR 3.1 (H) 12/28/2021    INR 1.09 12/07/2021    INR 2.1 (H) 11/24/2021     Lab Results   Component Value Date    HGB 12.8 12/28/2021    HGB 13.7 01/13/2021    HCT 40.3 12/28/2021    HCT 42.3 01/13/2021     12/28/2021     01/13/2021     Lab Results   Component Value Date    CR 0.86 12/28/2021    CR 0.87 12/07/2021    CR 0.93 12/03/2021     Estimated Creatinine Clearance: 73.4 mL/min (based on SCr of 0.86 mg/dL).  "

## 2022-01-13 NOTE — TELEPHONE ENCOUNTER
Incoming call from Agatha with [facility] stating she is going to fax over an updated form. Patient failed to let them know that he's also on plavix. They need pt to hold for 7 days.     Please be on the look out for the updated form.

## 2022-01-13 NOTE — CONFIDENTIAL NOTE
Dr. Delgadillo, this patient recently saw you for  Follow up 12/20 for SFA angioplasty and drug-eluting stent placement.    He needs kyphoplasty with [facility] for vertebral fracture. They want him to hold his Plavix for 7 days prior. Is this possible>?

## 2022-01-14 ENCOUNTER — ANCILLARY PROCEDURE (OUTPATIENT)
Dept: BONE DENSITY | Facility: CLINIC | Age: 82
End: 2022-01-14
Attending: FAMILY MEDICINE
Payer: COMMERCIAL

## 2022-01-14 DIAGNOSIS — R79.89 LOW TESTOSTERONE IN MALE: ICD-10-CM

## 2022-01-14 DIAGNOSIS — M48.56XA COLLAPSED VERTEBRA, NOT ELSEWHERE CLASSIFIED, LUMBAR REGION, INITIAL ENCOUNTER FOR FRACTURE (H): ICD-10-CM

## 2022-01-14 DIAGNOSIS — M80.08XA AGE-RELATED OSTEOPOROSIS WITH CURRENT PATHOLOGICAL FRACTURE, VERTEBRA(E), INITIAL ENCOUNTER FOR FRACTURE (H): ICD-10-CM

## 2022-01-14 DIAGNOSIS — S32.030D CLOSED COMPRESSION FRACTURE OF L3 LUMBAR VERTEBRA WITH ROUTINE HEALING, SUBSEQUENT ENCOUNTER: ICD-10-CM

## 2022-01-14 PROCEDURE — 77081 DXA BONE DENSITY APPENDICULR: CPT | Mod: TC | Performed by: RADIOLOGY

## 2022-01-14 PROCEDURE — 77080 DXA BONE DENSITY AXIAL: CPT | Mod: TC | Performed by: RADIOLOGY

## 2022-01-14 NOTE — RESULT ENCOUNTER NOTE
Patient updated by tidy message.   -----------------------------------------  Qi Glasgow,  Your dexa scan does show low bone density and high risk of fracture. We can talk about these results at your appointment on Tuesday.  Mar Morales, DO

## 2022-01-18 ENCOUNTER — ANTICOAGULATION THERAPY VISIT (OUTPATIENT)
Dept: ANTICOAGULATION | Facility: CLINIC | Age: 82
End: 2022-01-18

## 2022-01-18 ENCOUNTER — OFFICE VISIT (OUTPATIENT)
Dept: FAMILY MEDICINE | Facility: CLINIC | Age: 82
End: 2022-01-18
Payer: COMMERCIAL

## 2022-01-18 VITALS
DIASTOLIC BLOOD PRESSURE: 70 MMHG | WEIGHT: 205.13 LBS | OXYGEN SATURATION: 98 % | HEART RATE: 66 BPM | HEIGHT: 65 IN | BODY MASS INDEX: 34.18 KG/M2 | SYSTOLIC BLOOD PRESSURE: 185 MMHG

## 2022-01-18 DIAGNOSIS — M85.9 LOW BONE DENSITY: ICD-10-CM

## 2022-01-18 DIAGNOSIS — L03.116 CELLULITIS OF LEFT LOWER EXTREMITY: Primary | ICD-10-CM

## 2022-01-18 DIAGNOSIS — I73.9 PERIPHERAL ARTERIAL DISEASE (H): ICD-10-CM

## 2022-01-18 DIAGNOSIS — S32.000G COMPRESSION FRACTURE OF LUMBAR VERTEBRA WITH DELAYED HEALING, UNSPECIFIED LUMBAR VERTEBRAL LEVEL, SUBSEQUENT ENCOUNTER: ICD-10-CM

## 2022-01-18 DIAGNOSIS — R03.0 ELEVATED BLOOD PRESSURE READING WITHOUT DIAGNOSIS OF HYPERTENSION: ICD-10-CM

## 2022-01-18 DIAGNOSIS — Z79.01 LONG TERM CURRENT USE OF ANTICOAGULANT THERAPY: ICD-10-CM

## 2022-01-18 DIAGNOSIS — I48.20 CHRONIC ATRIAL FIBRILLATION (H): ICD-10-CM

## 2022-01-18 DIAGNOSIS — Z95.2 S/P TAVR (TRANSCATHETER AORTIC VALVE REPLACEMENT): Primary | ICD-10-CM

## 2022-01-18 DIAGNOSIS — I48.20 CHRONIC ATRIAL FIBRILLATION (H): Primary | ICD-10-CM

## 2022-01-18 LAB — INR BLD: 1.3 (ref 0.9–1.1)

## 2022-01-18 PROCEDURE — 85610 PROTHROMBIN TIME: CPT | Performed by: FAMILY MEDICINE

## 2022-01-18 PROCEDURE — 36416 COLLJ CAPILLARY BLOOD SPEC: CPT | Performed by: FAMILY MEDICINE

## 2022-01-18 PROCEDURE — 99215 OFFICE O/P EST HI 40 MIN: CPT | Performed by: FAMILY MEDICINE

## 2022-01-18 RX ORDER — CALCITONIN SALMON 200 [IU]/.09ML
SPRAY, METERED NASAL
COMMUNITY
Start: 2021-12-30 | End: 2022-03-17

## 2022-01-18 RX ORDER — OXYCODONE AND ACETAMINOPHEN 5; 325 MG/1; MG/1
TABLET ORAL
COMMUNITY
Start: 2022-01-14 | End: 2022-01-29

## 2022-01-18 RX ORDER — CEPHALEXIN 500 MG/1
CAPSULE ORAL
COMMUNITY
Start: 2022-01-13 | End: 2022-01-25

## 2022-01-18 ASSESSMENT — MIFFLIN-ST. JEOR: SCORE: 1572.32

## 2022-01-18 NOTE — PROGRESS NOTES
Assessment & Plan     1. Cellulitis of left lower extremity  Infection appears to have resolved. Complete antibiotics as prescribed.   Recommend he keep the wound covered until it fully resolves.     2. Elevated blood pressure reading without diagnosis of hypertension  Blood pressure remains significantly elevated. May be exacerbated due to pain.   Requested nursing call Pee when he returned home to confirm medications. Removed metoprolol from list.  He is taking lasix 40mg in the morning and 20mg in the afternoon.  He is also taking carvedilol 6.25mg BID.   He has allergies listed for amlodipine (dizziness and dry heaves) and lisinopril (cough)   Last labs December 28, 2021, normal sodium, normal potassium, normal creatinine, GFR 88.    After reviewing chart, will contact patient tomorrow to discuss BP options    could add ARB, losartan    could add spironolactone     last potassium okay, could consider addition of thiazide, particularly as potassium was 4.4 on recent check.     3. Peripheral arterial disease (H)  Pee did not think he was taking Plavix.   After nursing checked with patient, his realized that he is in fact taking plavix.  Prescription bottle states clopidogrel, so he did not recognize brand name in clinic.  Will need to discuss timing of surgical procedure with his vascular surgeon. Staff message sent through epic. Awaiting response.     Someone told patient to hold his statin. He hasn't noticed any improvement in RLS symptoms off his statin. Encouraged to resume his statin. Chart states pravastatin, but he reported rosuvastatin with nurse follow up med rec call.     4. Compression fracture of lumbar vertebra with delayed healing, unspecified lumbar vertebral level, subsequent encounter  Patient reports being scheduled for vertebroplasty procedure but then canceled due to infection in leg.   He has not completed a preop through our health system and I do not see one recently completed in care  everywhere.   Left message with his surgical team to try and get further information for planned timing of procedure and laid out a few challenges we will need to coordinate - elevated blood pressure, anticoagulation planning, recent PAD stenting. Confirming that he needs a preop for planned procedure. Infection resolved per above. Waiting to hear back.    5. Low bone density  Dexa scan reveals low bone density with elevated fracture risk.  Lab work up otherwise unrevealing with exception of low testosterone.  Will plan to refer to osteoporosis specialist once he is through his surgery to discuss options to help prevent recurrent fracture as he has now had 2 lumbar compression fractures without trauma.    6. Chronic atrial fibrillation (H)  - INR point of care, Interfaced Result     Due for INR check today. Managed with anticoagulation clinic.       Return in about 6 weeks (around 3/1/2022) for establish care visit / sooner as needed .    Mar Morales, [facility]    50 minutes spent on date of encounter with chart review, patient visit, care coordination, and documentation.    Alex Glasgow is a 81 year old who presents for the following health issues     Chief Complaint   Patient presents with     Wound Check     Left ankle      HPI     Due to compression fracture, scheduled for procedure but developed infection on calf.     Went into urgent care. Treated sore on his leg. [facility] cancelled his appointment. Needs to get in for recheck and needs okay before procedure. Needs to be off the Plavix for 7 days before surgery.     In fact, states he has not even been taking the Plavix. Was prescribed for 3 months back on 12/20/21.     Off omeprazole, Tums is controlling it.    Off pravastatin, was told could be causing his RLS. Off pravastatin x 3 weeks about. Hasn't noticed a difference.     Checking blood pressures at home MWF. At home, systolic mildly elevated, diastolic  "okay.   Top number has been high.     Ortho surgeon: Dr. Mauri Cruz, [facility].    Leg swelling is stable.    Review of Systems   See HPI above.       Objective    BP (!) 185/70   Pulse 66   Ht 1.651 m (5' 5\")   Wt 93 kg (205 lb 2 oz)   SpO2 98%   BMI 34.13 kg/m    Body mass index is 34.13 kg/m .  Physical Exam   GENERAL: Pee is a pleasant, non-toxic appearing male, in no acute distress.  HEART: Regular rate and rhythm, no murmurs.  LUNGS: Clear to auscultation bilaterally, unlabored.   EXTREMITIES: Trace lower extremity edema present.  DERM: Open wound persists on left medial lower leg. No current evidence of cellulitis.         "

## 2022-01-18 NOTE — PATIENT INSTRUCTIONS
Infection looks good. Wound is still slightly open. I would keep it covered.     Blood pressure is high. We need to clarify your medications. I will have Viky call you this afternoon to confirm what you are taking. We may need to adjust your meds. I don't want a high blood pressure to prevent you from having surgery.    I need to clarify with your vascular surgeon the plan for your Plavix as you have not been taking this. I wouldn't start now with plan for surgery in the near future.     Your bone scan shows low bone density. Once you are through your procedure, we will discuss referral to the osteoporosis specialist to try and help prevent another compression fracture.

## 2022-01-18 NOTE — PROGRESS NOTES
ANTICOAGULATION MANAGEMENT     Pee Ayala 81 year old male is on warfarin with subtherapeutic INR result. (Goal INR 2.0-3.0)    Recent labs: (last 7 days)     01/18/22  1359   INR 1.3*       ASSESSMENT     Source(s): Chart Review, Patient/Caregiver Call and Template       Warfarin doses taken: Warfarin recently held for which may be affecting INR    Was already prepping and holding warfarin, (for 5 days)   Held warfarin connell on 12/28/21 as instructed d/t nosebleed.    Diet: No new diet changes identified    New illness, injury, or hospitalization: Yes:    Cellulitis of left ankle.   Presented @ [facility] on 1/13/22 for wound infection.   Osteopenia / compression fx of L3 vertebra on DEXA result of 1/14/22.    Medication/supplement changes:  Yes.    1/13/22 - Cephalexin 500mg BID for 10 days, from 1/13-23.    Signs or symptoms of bleeding or clotting: No    Previous INR: Supratherapeutic at 3.1 on 12/28/21.    Additional findings:  Yes. Follow up today for wound check with Dr. Morales.     Reported spine clinic cancelled back injection d/t wound infection.     PLAN     Recommended plan for temporary change(s) affecting INR     Dosing Instructions:   (evenings. 5mg tabs)    Booster dose then continue your current warfarin dose with next INR in 5-7 days       Summary  As of 1/18/2022    Full warfarin instructions:  1/18: 10 mg; Otherwise 7.5 mg every Sun, Tue, Thu; 5 mg all other days   Next INR check:  1/25/2022             Telephone call with  Pee ([phone]) who verbalizes understanding and agrees to plan    Lab visit scheduled - INR on 1/25/22 @ [facility].    Education provided: Goal range and significance of current result, Monitoring for clotting signs and symptoms and Importance of notifying clinic for diarrhea, nausea/vomiting, reduced intake, and/or illness; a sooner lab recheck maybe needed.    Plan made per ACC anticoagulation protocol    Mariel Dale, RN  Anticoagulation  Clinic  1/18/2022    _______________________________________________________________________     Anticoagulation Episode Summary     Current INR goal:  2.0-3.0   TTR:  53.4 % (11.9 mo)   Target end date:  Indefinite   Send INR reminders to:  [facility]    Indications    Chronic atrial fibrillation (H) [I48.20]  Long term current use of anticoagulant therapy [Z79.01]           Comments:           Anticoagulation Care Providers     Provider Role Specialty Phone number    Jazzy Gil MD Referring Family Medicine [phone]

## 2022-01-19 ENCOUNTER — LAB (OUTPATIENT)
Dept: CARDIOLOGY | Facility: CLINIC | Age: 82
End: 2022-01-19
Payer: COMMERCIAL

## 2022-01-19 ENCOUNTER — OFFICE VISIT (OUTPATIENT)
Dept: CARDIOLOGY | Facility: CLINIC | Age: 82
End: 2022-01-19

## 2022-01-19 VITALS
DIASTOLIC BLOOD PRESSURE: 64 MMHG | BODY MASS INDEX: 33.49 KG/M2 | HEART RATE: 67 BPM | RESPIRATION RATE: 16 BRPM | HEIGHT: 65 IN | SYSTOLIC BLOOD PRESSURE: 132 MMHG | WEIGHT: 201 LBS

## 2022-01-19 DIAGNOSIS — Z95.2 S/P TAVR (TRANSCATHETER AORTIC VALVE REPLACEMENT): ICD-10-CM

## 2022-01-19 DIAGNOSIS — Z95.2 S/P TAVR (TRANSCATHETER AORTIC VALVE REPLACEMENT): Primary | ICD-10-CM

## 2022-01-19 LAB
ANION GAP SERPL CALCULATED.3IONS-SCNC: 11 MMOL/L (ref 5–18)
BUN SERPL-MCNC: 15 MG/DL (ref 8–28)
CALCIUM SERPL-MCNC: 9.4 MG/DL (ref 8.5–10.5)
CHLORIDE BLD-SCNC: 104 MMOL/L (ref 98–107)
CO2 SERPL-SCNC: 25 MMOL/L (ref 22–31)
CREAT SERPL-MCNC: 0.98 MG/DL (ref 0.7–1.3)
ERYTHROCYTE [DISTWIDTH] IN BLOOD BY AUTOMATED COUNT: 13.4 % (ref 10–15)
GFR SERPL CREATININE-BSD FRML MDRD: 78 ML/MIN/1.73M2
GLUCOSE BLD-MCNC: 151 MG/DL (ref 70–125)
HCT VFR BLD AUTO: 42.7 % (ref 40–53)
HGB BLD-MCNC: 13.8 G/DL (ref 13.3–17.7)
MCH RBC QN AUTO: 34.2 PG (ref 26.5–33)
MCHC RBC AUTO-ENTMCNC: 32.3 G/DL (ref 31.5–36.5)
MCV RBC AUTO: 106 FL (ref 78–100)
PLATELET # BLD AUTO: 200 10E3/UL (ref 150–450)
POTASSIUM BLD-SCNC: 4.4 MMOL/L (ref 3.5–5)
RBC # BLD AUTO: 4.04 10E6/UL (ref 4.4–5.9)
SODIUM SERPL-SCNC: 140 MMOL/L (ref 136–145)
WBC # BLD AUTO: 5.7 10E3/UL (ref 4–11)

## 2022-01-19 PROCEDURE — 99214 OFFICE O/P EST MOD 30 MIN: CPT | Performed by: INTERNAL MEDICINE

## 2022-01-19 PROCEDURE — 85027 COMPLETE CBC AUTOMATED: CPT

## 2022-01-19 PROCEDURE — 36415 COLL VENOUS BLD VENIPUNCTURE: CPT

## 2022-01-19 PROCEDURE — 93000 ELECTROCARDIOGRAM COMPLETE: CPT | Performed by: INTERNAL MEDICINE

## 2022-01-19 PROCEDURE — 80048 BASIC METABOLIC PNL TOTAL CA: CPT

## 2022-01-19 ASSESSMENT — MIFFLIN-ST. JEOR: SCORE: 1553.61

## 2022-01-19 NOTE — LETTER
1/19/2022    Jazzy Gil MD  [address]  [facility] [address]    RE: Pee Ayala       Dear Colleague,     I had the pleasure of seeing Pee Ayala in the [facility].    HEART CARE ENCOUNTER CONSULTATON NOTE      [facility]  [phone]      Assessment/Recommendations   Assessment/Plan: 81M w/ aortic stenosis s/p TAVR 01/21, CAD s/p CABG '16, AF, HL, DM who is here for f/u of his valvular disease.    # Aortic stenosis - s/p TAVR 01/21 w/ a #29S3, mean gradient 7, trace AI  - continue ASA/clopidogrel/furosemide    # CAD - s/p CABG w/ L-LAD, V-OM '16, as well as stent in the mid RCA  - pre-TAVR angio: patent grafts and a patent stent, distal vessels in all 3 distributions small with diffuse atherosclerosis  - continue ASA, clopidogrel, carvedilol, pravastatin  - if no bleeding issues, reasonable to continue DAPT indefinitely, but as he is more that 12 mos after most recent PCI, reasonable to interrupt as need be for his back surgery, or even stop if has bleeding diathesis   - ideally, should not stop ASA for his back procedure, as he does have a stent in mid-RCA  - after recovery from back surgery, would go to clopidogrel/warfarin rather than asa/clopidogrel/warfarin. Feel free to reach out with questions    # AF - continue carvedilol/warfarin    F/u w/  in 1 year w/ an annual echo or as needed     History of Present Illness/Subjective    HPI: Pee Ayala is a 81 year old male w/ aortic stenosis s/p TAVR 01/21, CAD s/p CABG '16, AF, HL, DM who is here for f/u of his valvular disease.    He appears to be doing well from the CV perspective, and is mostly imited by back pain, w/ a plan for a spinal treatment, upcoming, limited by healing of his leg wound (take care of by [facility]). No CP/SOB/TOBAR/ortopnea/PND/edema/syncope/N/V. + intermitent constipation/diarrhea due to opiates and laatives. No F/C.    Recent Echocardiogram Results:  1.  "Normal left ventricular size and systolic performance with a visually  estimated ejection fraction of 65-70%.  2. There is mild concentric increase in left ventricular wall thickness.  3. There is a bio-prosthetic aortic valve (documented 29 mm Magdaleno Dominick 3  tissue valve).  Â  Normal aortic valve prosthesis metrics with a mean systolic gradient of 7  mmHg and a peak anterograde velocity of 1.9 m/sec.  Â  There is trace-mild aortic insufficiency.  4. There is moderate mitral insufficiency.  5. Normal right ventricular size and systolic performance.  6. There is severe left atrial enlargement. There is mild to moderate right  atrial enlargement.  7. Right ventricular systolic pressure relative to right atrial pressure is  mildly increased. The pulmonary artery pressure is estimated to be 35-40 mmHg  plus right atrial pressure (the IVC is not well-visualized on this study).     The prior real-time echocardiogram could not be accessed from the digital  archive for direct comparison.    Recent Coronary Angiogram Results 01/21:  79 yo male s/p CABG and PCI with residual severe small vessel disease of RCA/prox LAD into diagonal, mid LAD, circ prox and Circ now with severe aortic stenosis on echo and significant dyspnea on exertion     Angiography via left radial  Noted all vessels with severe diffuse dz and very small in diameter  LM mild dz  LAD prox 70%; mid 80%; diagonal prox 60-70%, patent LIMA   Circ severe dz in prox/mid with patent SVG to OM  RCA prox stent patent severe dz in mid (no change from 2019)     LVEDP 19mmHg pull back did not record gradient     Imp/plan  1. Severe aortic stenosis - plan TAVR   2. CAD s/p CABG/PCI - severe diffuse dz in very small vessels - no change from 2019         Physical Examination  Review of Systems   Vitals: /64 (BP Location: Right arm, Patient Position: Sitting, Cuff Size: Adult Regular)   Pulse 67   Resp 16   Ht 1.651 m (5' 5\")   Wt 91.2 kg (201 lb)   BMI 33.45 " kg/m    BMI= Body mass index is 33.45 kg/m .  Wt Readings from Last 3 Encounters:   01/18/22 93 kg (205 lb 2 oz)   12/28/21 94 kg (207 lb 4 oz)   12/07/21 90.7 kg (200 lb)       General Appearance:   no distress, normal body habitus   ENT/Mouth: membranes moist, no oral lesions or bleeding gums.      EYES:  no scleral icterus, normal conjunctivae   Neck: no carotid bruits or thyromegaly   Chest/Lungs:   lungs are clear to auscultation, no rales or wheezing, + sternal scar, equal chest wall expansion    Cardiovascular:   Regular. Normal first and second heart sounds with 1/6 systolic murmur, rubs, or gallops; the carotid, radial and posterior tibial pulses are intact, Jugular venous pressure 6, no edema bilaterally    Abdomen:  no organomegaly, masses, bruits, or tenderness; bowel sounds are present   Extremities: no cyanosis or clubbing   Skin: no xanthelasma, warm.    Neurologic: normal  bilateral, no tremors     Psychiatric: alert and oriented x3, calm        Please refer above for cardiac ROS details.        Medical History  Surgical History Family History Social History   Past Medical History:   Diagnosis Date     ACS (acute coronary syndrome) (H) 6/2/2014     ACS (acute coronary syndrome) (H) 6/2/2014     Actinic keratosis      Actinic keratosis 1/14/2014     Actinic keratosis 1/14/2014     Anticoagulated on Coumadin 12/30/2015     Antiplatelet or antithrombotic long-term use      Atrial fibrillation (H)      Atrial fibrillation (H) 2016     Bone mass 4/26/2017     Chest heaviness 1/23/2019     Chest pain 5/31/2014     Closed fracture of left forearm 2015     Congestive heart failure (H)      Coronary artery disease      Diabetes (H) 2009     Diabetes mellitus (H)      Difficulty in walking(719.7)      Dyslipidemia 8/31/2016     Dyspnea on exertion      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       Encounter for long-term (current) use of insulin (H) 8/11/2016     Esophageal reflux 11/18/2010     Esophageal reflux 11/18/2010     History of angina      HTN (hypertension) 6/30/2009     (Problem list name updated by automated process. Provider to review and confirm.)     Hyperlipidemia LDL goal <100 10/31/2010     Hypertension      Impotence of organic origin      Mixed hyperlipidemia 4/25/2007 April 25, 2007 restarted Zocor today, recheck in 3 months.  August 23, 2007 LDL at 101 with 40 mg, will increase to 80 mg. Recheck  In 3 months.      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      Type II or unspecified type diabetes mellitus with neurological manifestations, not stated as uncontrolled(250.60) (H)      Walking troubles      Past Surgical History:   Procedure Laterality Date     BYPASS GRAFT ARTERY CORONARY N/A 9/10/2014    Procedure: BYPASS GRAFT ARTERY CORONARY;  Surgeon: Bharathi Caraballo MD;  Location: [facility] OR     BYPASS GRAFT ARTERY CORONARY  2016     COLONOSCOPY  1/14/2004     CORONARY ANGIOGRAPHY ADULT ORDER       CV CORONARY ANGIOGRAM N/A 4/4/2019    Procedure: Coronary Angiogram;  Surgeon: Dominik Vega MD;  Location: [facility] Cath Lab;  Service: Cardiology     CV CORONARY ANGIOGRAM N/A 1/6/2021    Procedure: Coronary Angiogram;  Surgeon: Dominik Vega MD;  Location: [facility] Cardiac Cath Lab;  Service: Cardiology     CV  LEFT HEART CATHETERIZATION WITHOUT LEFT VENTRICULOGRAM Left 4/4/2019    Procedure: Left Heart Catheterization Without Left Ventriculogram;  Surgeon: Dominik Vega MD;  Location: [facility] Cath Lab;  Service: Cardiology     CV LEFT HEART CATHETERIZATION WITHOUT LEFT VENTRICULOGRAM Left 1/6/2021    Procedure: Left Heart Catheterization Without Left Ventriculogram;  Surgeon: Dominik Vega MD;  Location: [facility] Cardiac Cath Lab;  Service: Cardiology     CV RIGHT HEART CATHETERIZATION Right 4/4/2019    Procedure: Right Heart Catheterization;  Surgeon: Dominik Vega MD;  Location: [facility] Cath Lab;  Service: Cardiology     CV TRANSCATHETER AORTIC VALVE REPLACEMENT N/A 1/12/2021    Procedure: Right transfemoral transcatheter aortic valve replacement using Magdaleno Dominick 3 size 29mm.  Transthoracic echocardiogram;  Surgeon: Jo Romano MD;  Location: [facility] CARDIAC CATH LAB     ESOPHAGOSCOPY, GASTROSCOPY, DUODENOSCOPY (EGD), COMBINED N/A 1/13/2016    Procedure: COMBINED ESOPHAGOSCOPY, GASTROSCOPY, DUODENOSCOPY (EGD);  Surgeon: Rk Srivastava MD;  Location: [facility] FEMORAL CANNULIZATION WITH OPEN STANDBY REPAIR AORTIC VALVE N/A 1/12/2021    Procedure: Cardiopulmonary Bypass standby;  Surgeon: Jefferson Sandy MD;  Location: [facility] CARDIAC CATH LAB     IR LOWER EXTREMITY ANGIOGRAM LEFT  12/7/2021     LAPAROSCOPIC CHOLECYSTECTOMY  10/09     MAZE PROCEDURE N/A 9/10/2014    Procedure: MAZE PROCEDURE;  Surgeon: Bharathi Caraballo MD;  Location:  OR     MOHS MICROGRAPHIC PROCEDURE       ORTHOPEDIC SURGERY      surgery for fx  Left forearm     OTHER SURGICAL HISTORY Left 2015    forearm sugery     STENT, CORONARY, HUMA  02/2016     VASECTOMY       Family History   Problem Relation Age of Onset     Diabetes Mother      Hypertension Father      Cerebrovascular Disease Father      Diabetes Maternal Grandmother      Breast Cancer No family hx of      Cancer -  colorectal No family hx of      Prostate Cancer No family hx of      C.A.D. No family hx of      Diabetes Type 2  Mother         58 ,  from anesthesia complication.     Heart Disease Father         86  from stroke     No Known Problems Brother         60 years of age.        Social History     Socioeconomic History     Marital status:      Spouse name: Fay Ayala     Number of children: Not on file     Years of education: Not on file     Highest education level: Not on file   Occupational History     Employer: RETIRED   Tobacco Use     Smoking status: Former Smoker     Quit date: 1998     Years since quittin.0     Smokeless tobacco: Never Used   Substance and Sexual Activity     Alcohol use: Yes     Alcohol/week: 0.0 standard drinks     Comment: Alcoholic Drinks/day: very rare     Drug use: No     Sexual activity: Never     Birth control/protection: None   Other Topics Concern     Parent/sibling w/ CABG, MI or angioplasty before 65F 55M? No   Social History Narrative     and lives with life.  Has adult children from previous relationship. ( Blended family).  Has a dog - Vincent Gil MD  1/3/2019         Social Determinants of Health     Financial Resource Strain: Not on file   Food Insecurity: Not on file   Transportation Needs: Not on file   Physical Activity: Not on file   Stress: Not on file   Social Connections: Not on file   Intimate Partner Violence: Not on file   Housing Stability: Not on file           Medications  Allergies   Current Outpatient Medications   Medication Sig Dispense Refill     ACCU-CHEK GUIDE test strip USE 1  TO CHECK GLUCOSE THREE TIMES DAILY 300 strip 1     Apoaequorin (PREVAGEN PO) Take by mouth daily       blood glucose monitor KIT 1 kit 2 times daily. 1 kit 0     calcitonin, salmon, (MIACALCIN) 200 UNIT/ACT nasal spray USE 1 SPRAY(S) INTO ONE NOSTRIL ONCE DAILY, ALTERNATING NOSTRIL EACH DAY       carvedilol (COREG) 6.25 MG tablet  TAKE 1 TABLET BY MOUTH TWICE DAILY WITH MEALS       cephALEXin (KEFLEX) 500 MG capsule TAKE 1 CAPSULE BY MOUTH TWICE DAILY FOR 10 DAYS       clopidogrel (PLAVIX) 75 MG tablet Take 1 tablet (75 mg) by mouth daily Start taking medication the day after the procedure. (Patient not taking: Reported on 1/18/2022) 90 tablet 1     empagliflozin (JARDIANCE) 10 MG TABS tablet Take 1 tablet (10 mg) by mouth daily 30 tablet 4     finasteride (PROSCAR) 5 MG tablet Take 5 mg by mouth daily       furosemide (LASIX) 20 MG tablet TAKE 2 TABLETS BY MOUTH IN THE MORNING AND 1 TABLET IN  THE  AFTERNOON 270 tablet 1     hydrOXYzine (ATARAX) 25 MG tablet Take 25 mg by mouth 3 times daily as needed for itching (Patient not taking: Reported on 12/28/2021)       insulin aspart prot & aspart (NOVOLOG MIX 70/30 PEN) (70-30) 100 UNIT/ML pen Inject Subcutaneous 2 times daily as needed 32 in AM and 26 in PM       insulin pen needle (B-D U/F) 31G X 8 MM Use daily. 100 each prn     metFORMIN (GLUCOPHAGE) 500 MG tablet Take 2 tablets by mouth twice daily 360 tablet 0     Multiple Vitamins-Minerals (EYE VITAMINS) CAPS Take by mouth 2 times daily       ONE TOUCH ULTRASOFT LANCETS MISC Test glucose twice daily 3 months 3     oxyCODONE-acetaminophen (PERCOCET) 5-325 MG tablet TAKE 1 TABLET BY MOUTH EVERY 8 HOURS AS NEEDED       pimecrolimus (ELIDEL) 1 % cream Use around the eyes twice a day (Patient not taking: Reported on 12/28/2021) 30 g 3     polyethylene glycol (MIRALAX) 17 GM/Dose powder Take 17 g (1 capful) by mouth daily as needed for constipation (opioid induced constipation) 507 g 0     pramipexole (MIRAPEX) 0.25 MG tablet Take 1 tablet (0.25 mg) by mouth 2 times daily Takes 4pm and 5;30 pm 180 tablet 3     pravastatin (PRAVACHOL) 80 MG tablet Take 1 tablet (80 mg) by mouth At Bedtime 90 tablet 0     warfarin ANTICOAGULANT (COUMADIN) 5 MG tablet Take 1 tablet (5 mg) by mouth daily Resume your warfarin tonight 12/7 after the procedure at the  usual dose 30 tablet 3       Allergies   Allergen Reactions     Oxycodone Rash and Itching     Amlodipine Dizziness     Dizziness and dry heaves     Lisinopril Cough     Adhesive Tape Itching and Rash          Lab Results    Chemistry/lipid CBC Cardiac Enzymes/BNP/TSH/INR   Recent Labs   Lab Test 09/16/21  1026 01/05/16  1104 04/29/15  0834   CHOL 117   < > 148   HDL 43   < > 44   LDL 48   < > 85   TRIG 131   < > 93   CHOLHDLRATIO  --   --  3.4    < > = values in this interval not displayed.     Recent Labs   Lab Test 09/16/21  1026 08/31/21  1221 02/23/21  1007   LDL 48 64 59     Recent Labs   Lab Test 12/28/21  1617      POTASSIUM 4.4   CHLORIDE 107   CO2 24      BUN 17   CR 0.86   GFRESTIMATED 88   RACHEL 9.0     Recent Labs   Lab Test 12/28/21  1617 12/07/21  0720 12/03/21  1251   CR 0.86 0.87 0.93     Recent Labs   Lab Test 12/03/21  1251 08/31/21  1221 08/03/21  1419   A1C 8.1* 8.6* 9.0*          Recent Labs   Lab Test 12/28/21  1617   WBC 7.0   HGB 12.8*   HCT 40.3   *        Recent Labs   Lab Test 12/28/21  1617 12/07/21  0720 12/03/21  1251   HGB 12.8* 14.1 13.5    No results for input(s): TROPONINI in the last 88070 hours.  Recent Labs   Lab Test 01/08/21  0842 10/21/20  1451 05/09/19  1657 06/09/16  1203 05/31/14  1120   BNP 91* 56 96*  --   --    NTBNPI  --   --   --   --  2,150*   NTBNP  --   --   --  354*  --      Recent Labs   Lab Test 05/06/21  1242   TSH 0.87     Recent Labs   Lab Test 01/18/22  1359 12/28/21  1623 12/07/21  0720   INR 1.3* 3.1* 1.09        Marty Mariano MD   [facility]

## 2022-01-19 NOTE — PROGRESS NOTES
HEART CARE ENCOUNTER CONSULTATON NOTE      [facility]  [phone]      Assessment/Recommendations   Assessment/Plan: 81M w/ aortic stenosis s/p TAVR 01/21, CAD s/p CABG '16, AF, HL, DM who is here for f/u of his valvular disease.    # Aortic stenosis - s/p TAVR 01/21 w/ a #29S3, mean gradient 7, trace AI  - continue ASA/clopidogrel/furosemide    # CAD - s/p CABG w/ L-LAD, V-OM '16, as well as stent in the mid RCA  - pre-TAVR angio: patent grafts and a patent stent, distal vessels in all 3 distributions small with diffuse atherosclerosis  - continue ASA, clopidogrel, carvedilol, pravastatin  - if no bleeding issues, reasonable to continue DAPT indefinitely, but as he is more that 12 mos after most recent PCI, reasonable to interrupt as need be for his back surgery, or even stop if has bleeding diathesis   - ideally, should not stop ASA for his back procedure, as he does have a stent in mid-RCA  - after recovery from back surgery, would go to clopidogrel/warfarin rather than asa/clopidogrel/warfarin. Feel free to reach out with questions    # AF - continue carvedilol/warfarin    F/u w/  in 1 year w/ an annual echo or as needed     History of Present Illness/Subjective    HPI: Pee Ayala is a 81 year old male w/ aortic stenosis s/p TAVR 01/21, CAD s/p CABG '16, AF, HL, DM who is here for f/u of his valvular disease.    He appears to be doing well from the CV perspective, and is mostly imited by back pain, w/ a plan for a spinal treatment, upcoming, limited by healing of his leg wound (take care of by [facility]). No CP/SOB/TOBAR/ortopnea/PND/edema/syncope/N/V. + intermitent constipation/diarrhea due to opiates and laatives. No F/C.    Recent Echocardiogram Results:  1. Normal left ventricular size and systolic performance with a visually  estimated ejection fraction of 65-70%.  2. There is mild concentric increase in left ventricular wall thickness.  3. There is a  "bio-prosthetic aortic valve (documented 29 mm Magdaleno Dominick 3  tissue valve).  Â  Normal aortic valve prosthesis metrics with a mean systolic gradient of 7  mmHg and a peak anterograde velocity of 1.9 m/sec.  Â  There is trace-mild aortic insufficiency.  4. There is moderate mitral insufficiency.  5. Normal right ventricular size and systolic performance.  6. There is severe left atrial enlargement. There is mild to moderate right  atrial enlargement.  7. Right ventricular systolic pressure relative to right atrial pressure is  mildly increased. The pulmonary artery pressure is estimated to be 35-40 mmHg  plus right atrial pressure (the IVC is not well-visualized on this study).     The prior real-time echocardiogram could not be accessed from the digital  archive for direct comparison.    Recent Coronary Angiogram Results 01/21:  81 yo male s/p CABG and PCI with residual severe small vessel disease of RCA/prox LAD into diagonal, mid LAD, circ prox and Circ now with severe aortic stenosis on echo and significant dyspnea on exertion     Angiography via left radial  Noted all vessels with severe diffuse dz and very small in diameter  LM mild dz  LAD prox 70%; mid 80%; diagonal prox 60-70%, patent LIMA   Circ severe dz in prox/mid with patent SVG to OM  RCA prox stent patent severe dz in mid (no change from 2019)     LVEDP 19mmHg pull back did not record gradient     Imp/plan  1. Severe aortic stenosis - plan TAVR   2. CAD s/p CABG/PCI - severe diffuse dz in very small vessels - no change from 2019         Physical Examination  Review of Systems   Vitals: /64 (BP Location: Right arm, Patient Position: Sitting, Cuff Size: Adult Regular)   Pulse 67   Resp 16   Ht 1.651 m (5' 5\")   Wt 91.2 kg (201 lb)   BMI 33.45 kg/m    BMI= Body mass index is 33.45 kg/m .  Wt Readings from Last 3 Encounters:   01/18/22 93 kg (205 lb 2 oz)   12/28/21 94 kg (207 lb 4 oz)   12/07/21 90.7 kg (200 lb)       General Appearance:   " no distress, normal body habitus   ENT/Mouth: membranes moist, no oral lesions or bleeding gums.      EYES:  no scleral icterus, normal conjunctivae   Neck: no carotid bruits or thyromegaly   Chest/Lungs:   lungs are clear to auscultation, no rales or wheezing, + sternal scar, equal chest wall expansion    Cardiovascular:   Regular. Normal first and second heart sounds with 1/6 systolic murmur, rubs, or gallops; the carotid, radial and posterior tibial pulses are intact, Jugular venous pressure 6, no edema bilaterally    Abdomen:  no organomegaly, masses, bruits, or tenderness; bowel sounds are present   Extremities: no cyanosis or clubbing   Skin: no xanthelasma, warm.    Neurologic: normal  bilateral, no tremors     Psychiatric: alert and oriented x3, calm        Please refer above for cardiac ROS details.        Medical History  Surgical History Family History Social History   Past Medical History:   Diagnosis Date     ACS (acute coronary syndrome) (H) 6/2/2014     ACS (acute coronary syndrome) (H) 6/2/2014     Actinic keratosis      Actinic keratosis 1/14/2014     Actinic keratosis 1/14/2014     Anticoagulated on Coumadin 12/30/2015     Antiplatelet or antithrombotic long-term use      Atrial fibrillation (H)      Atrial fibrillation (H) 2016     Bone mass 4/26/2017     Chest heaviness 1/23/2019     Chest pain 5/31/2014     Closed fracture of left forearm 2015     Congestive heart failure (H)      Coronary artery disease      Diabetes (H) 2009     Diabetes mellitus (H)      Difficulty in walking(719.7)      Dyslipidemia 8/31/2016     Dyspnea on exertion      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      Encounter for long-term (current) use of insulin (H) 8/11/2016     Esophageal reflux 11/18/2010     Esophageal reflux 11/18/2010     History of angina      HTN (hypertension) 6/30/2009     (Problem list  name updated by automated process. Provider to review and confirm.)     Hyperlipidemia LDL goal <100 10/31/2010     Hypertension      Impotence of organic origin      Mixed hyperlipidemia 4/25/2007 April 25, 2007 restarted Zocor today, recheck in 3 months.  August 23, 2007 LDL at 101 with 40 mg, will increase to 80 mg. Recheck  In 3 months.      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      Type II or unspecified type diabetes mellitus with neurological manifestations, not stated as uncontrolled(250.60) (H)      Walking troubles      Past Surgical History:   Procedure Laterality Date     BYPASS GRAFT ARTERY CORONARY N/A 9/10/2014    Procedure: BYPASS GRAFT ARTERY CORONARY;  Surgeon: Bharathi Caraballo MD;  Location: [facility] OR     BYPASS GRAFT ARTERY CORONARY  2016     COLONOSCOPY  1/14/2004     CORONARY ANGIOGRAPHY ADULT ORDER       CV CORONARY ANGIOGRAM N/A 4/4/2019    Procedure: Coronary Angiogram;  Surgeon: Dominik Vega MD;  Location: [facility] Cath Lab;  Service: Cardiology     CV CORONARY ANGIOGRAM N/A 1/6/2021    Procedure: Coronary Angiogram;  Surgeon: Dominik Vega MD;  Location: [facility] Cardiac Cath Lab;  Service: Cardiology     CV LEFT HEART CATHETERIZATION WITHOUT LEFT VENTRICULOGRAM Left 4/4/2019    Procedure: Left Heart Catheterization Without Left Ventriculogram;  Surgeon: Dominik Vega MD;  Location: [facility]  Cath Lab;  Service: Cardiology     CV LEFT HEART CATHETERIZATION WITHOUT LEFT VENTRICULOGRAM Left 2021    Procedure: Left Heart Catheterization Without Left Ventriculogram;  Surgeon: Dominik Vega MD;  Location: [facility] Cardiac Cath Lab;  Service: Cardiology     CV RIGHT HEART CATHETERIZATION Right 2019    Procedure: Right Heart Catheterization;  Surgeon: Dominik Vega MD;  Location: [facility] Cath Lab;  Service: Cardiology     CV TRANSCATHETER AORTIC VALVE REPLACEMENT N/A 2021    Procedure: Right transfemoral transcatheter aortic valve replacement using Magdaleno Dominick 3 size 29mm.  Transthoracic echocardiogram;  Surgeon: Jo Romano MD;  Location: [facility] CARDIAC CATH LAB     ESOPHAGOSCOPY, GASTROSCOPY, DUODENOSCOPY (EGD), COMBINED N/A 2016    Procedure: COMBINED ESOPHAGOSCOPY, GASTROSCOPY, DUODENOSCOPY (EGD);  Surgeon: Rk Srivastava MD;  Location: [facility] FEMORAL CANNULIZATION WITH OPEN STANDBY REPAIR AORTIC VALVE N/A 2021    Procedure: Cardiopulmonary Bypass standby;  Surgeon: Jefferson Sandy MD;  Location: [facility] CARDIAC CATH LAB     IR LOWER EXTREMITY ANGIOGRAM LEFT  2021     LAPAROSCOPIC CHOLECYSTECTOMY  10/09     MAZE PROCEDURE N/A 9/10/2014    Procedure: MAZE PROCEDURE;  Surgeon: Bharathi Caraballo MD;  Location:  OR     MOHS MICROGRAPHIC PROCEDURE       ORTHOPEDIC SURGERY      surgery for fx  Left forearm     OTHER SURGICAL HISTORY Left     forearm sugery     STENT, CORONARY, HUMA  2016     VASECTOMY       Family History   Problem Relation Age of Onset     Diabetes Mother      Hypertension Father      Cerebrovascular Disease Father      Diabetes Maternal Grandmother      Breast Cancer No family hx of      Cancer - colorectal No family hx of      Prostate Cancer No family hx of      C.A.D. No family hx of      Diabetes Type 2  Mother         58 ,  from anesthesia complication.     Heart Disease Father          86  from stroke     No Known Problems Brother         60 years of age.        Social History     Socioeconomic History     Marital status:      Spouse name: Fay Ayala     Number of children: Not on file     Years of education: Not on file     Highest education level: Not on file   Occupational History     Employer: RETIRED   Tobacco Use     Smoking status: Former Smoker     Quit date: 1998     Years since quittin.0     Smokeless tobacco: Never Used   Substance and Sexual Activity     Alcohol use: Yes     Alcohol/week: 0.0 standard drinks     Comment: Alcoholic Drinks/day: very rare     Drug use: No     Sexual activity: Never     Birth control/protection: None   Other Topics Concern     Parent/sibling w/ CABG, MI or angioplasty before 65F 55M? No   Social History Narrative     and lives with life.  Has adult children from previous relationship. ( Blended family).  Has a dog - Vincent Gil MD  1/3/2019         Social Determinants of Health     Financial Resource Strain: Not on file   Food Insecurity: Not on file   Transportation Needs: Not on file   Physical Activity: Not on file   Stress: Not on file   Social Connections: Not on file   Intimate Partner Violence: Not on file   Housing Stability: Not on file           Medications  Allergies   Current Outpatient Medications   Medication Sig Dispense Refill     ACCU-CHEK GUIDE test strip USE 1  TO CHECK GLUCOSE THREE TIMES DAILY 300 strip 1     Apoaequorin (PREVAGEN PO) Take by mouth daily       blood glucose monitor KIT 1 kit 2 times daily. 1 kit 0     calcitonin, salmon, (MIACALCIN) 200 UNIT/ACT nasal spray USE 1 SPRAY(S) INTO ONE NOSTRIL ONCE DAILY, ALTERNATING NOSTRIL EACH DAY       carvedilol (COREG) 6.25 MG tablet TAKE 1 TABLET BY MOUTH TWICE DAILY WITH MEALS       cephALEXin (KEFLEX) 500 MG capsule TAKE 1 CAPSULE BY MOUTH TWICE DAILY FOR 10 DAYS       clopidogrel (PLAVIX) 75 MG tablet Take 1 tablet (75 mg) by mouth  daily Start taking medication the day after the procedure. (Patient not taking: Reported on 1/18/2022) 90 tablet 1     empagliflozin (JARDIANCE) 10 MG TABS tablet Take 1 tablet (10 mg) by mouth daily 30 tablet 4     finasteride (PROSCAR) 5 MG tablet Take 5 mg by mouth daily       furosemide (LASIX) 20 MG tablet TAKE 2 TABLETS BY MOUTH IN THE MORNING AND 1 TABLET IN  THE  AFTERNOON 270 tablet 1     hydrOXYzine (ATARAX) 25 MG tablet Take 25 mg by mouth 3 times daily as needed for itching (Patient not taking: Reported on 12/28/2021)       insulin aspart prot & aspart (NOVOLOG MIX 70/30 PEN) (70-30) 100 UNIT/ML pen Inject Subcutaneous 2 times daily as needed 32 in AM and 26 in PM       insulin pen needle (B-D U/F) 31G X 8 MM Use daily. 100 each prn     metFORMIN (GLUCOPHAGE) 500 MG tablet Take 2 tablets by mouth twice daily 360 tablet 0     Multiple Vitamins-Minerals (EYE VITAMINS) CAPS Take by mouth 2 times daily       ONE TOUCH ULTRASOFT LANCETS MISC Test glucose twice daily 3 months 3     oxyCODONE-acetaminophen (PERCOCET) 5-325 MG tablet TAKE 1 TABLET BY MOUTH EVERY 8 HOURS AS NEEDED       pimecrolimus (ELIDEL) 1 % cream Use around the eyes twice a day (Patient not taking: Reported on 12/28/2021) 30 g 3     polyethylene glycol (MIRALAX) 17 GM/Dose powder Take 17 g (1 capful) by mouth daily as needed for constipation (opioid induced constipation) 507 g 0     pramipexole (MIRAPEX) 0.25 MG tablet Take 1 tablet (0.25 mg) by mouth 2 times daily Takes 4pm and 5;30 pm 180 tablet 3     pravastatin (PRAVACHOL) 80 MG tablet Take 1 tablet (80 mg) by mouth At Bedtime 90 tablet 0     warfarin ANTICOAGULANT (COUMADIN) 5 MG tablet Take 1 tablet (5 mg) by mouth daily Resume your warfarin tonight 12/7 after the procedure at the usual dose 30 tablet 3       Allergies   Allergen Reactions     Oxycodone Rash and Itching     Amlodipine Dizziness     Dizziness and dry heaves     Lisinopril Cough     Adhesive Tape Itching and Rash           Lab Results    Chemistry/lipid CBC Cardiac Enzymes/BNP/TSH/INR   Recent Labs   Lab Test 09/16/21  1026 01/05/16  1104 04/29/15  0834   CHOL 117   < > 148   HDL 43   < > 44   LDL 48   < > 85   TRIG 131   < > 93   CHOLHDLRATIO  --   --  3.4    < > = values in this interval not displayed.     Recent Labs   Lab Test 09/16/21  1026 08/31/21  1221 02/23/21  1007   LDL 48 64 59     Recent Labs   Lab Test 12/28/21  1617      POTASSIUM 4.4   CHLORIDE 107   CO2 24      BUN 17   CR 0.86   GFRESTIMATED 88   RACHEL 9.0     Recent Labs   Lab Test 12/28/21  1617 12/07/21  0720 12/03/21  1251   CR 0.86 0.87 0.93     Recent Labs   Lab Test 12/03/21  1251 08/31/21  1221 08/03/21  1419   A1C 8.1* 8.6* 9.0*          Recent Labs   Lab Test 12/28/21  1617   WBC 7.0   HGB 12.8*   HCT 40.3   *        Recent Labs   Lab Test 12/28/21  1617 12/07/21  0720 12/03/21  1251   HGB 12.8* 14.1 13.5    No results for input(s): TROPONINI in the last [id number] hours.  Recent Labs   Lab Test 01/08/21  0842 10/21/20  1451 05/09/19  1657 06/09/16  1203 05/31/14  1120   BNP 91* 56 96*  --   --    NTBNPI  --   --   --   --  2,150*   NTBNP  --   --   --  354*  --      Recent Labs   Lab Test 05/06/21  1242   TSH 0.87     Recent Labs   Lab Test 01/18/22  1359 12/28/21  1623 12/07/21  0720   INR 1.3* 3.1* 1.09        Marty Mariano MD

## 2022-01-19 NOTE — PATIENT INSTRUCTIONS
It is great to see you today!    I hope you can get the back pain taken care of soon. Heart should be able to cope with the procedure, as the function and the valve look good    You should not interrupt aspirin, it is okay to interrupt both warfarin and clopidogrel at this point.     Follow up is in 1 year w/  or as needed

## 2022-01-21 ENCOUNTER — TELEPHONE (OUTPATIENT)
Dept: LAB | Facility: CLINIC | Age: 82
End: 2022-01-21
Payer: COMMERCIAL

## 2022-01-21 DIAGNOSIS — E11.3599 TYPE 2 DIABETES MELLITUS WITH PROLIFERATIVE RETINOPATHY WITHOUT MACULAR EDEMA, WITHOUT LONG-TERM CURRENT USE OF INSULIN, UNSPECIFIED LATERALITY (H): Primary | ICD-10-CM

## 2022-01-21 DIAGNOSIS — E11.9 TYPE 2 DIABETES, HBA1C GOAL < 8% (H): ICD-10-CM

## 2022-01-21 LAB
ATRIAL RATE - MUSE: 68 BPM
DIASTOLIC BLOOD PRESSURE - MUSE: NORMAL MMHG
INTERPRETATION ECG - MUSE: NORMAL
P AXIS - MUSE: NORMAL DEGREES
PR INTERVAL - MUSE: NORMAL MS
QRS DURATION - MUSE: 90 MS
QT - MUSE: 424 MS
QTC - MUSE: 448 MS
R AXIS - MUSE: -15 DEGREES
SYSTOLIC BLOOD PRESSURE - MUSE: NORMAL MMHG
T AXIS - MUSE: -1 DEGREES
VENTRICULAR RATE- MUSE: 67 BPM

## 2022-01-21 NOTE — PROGRESS NOTES
Patient is on our lab schedule 1/28, states labs for Endo. Please place orders in chart. Thank you.

## 2022-01-22 DIAGNOSIS — E11.42 TYPE 2 DIABETES MELLITUS WITH PERIPHERAL NEUROPATHY (H): ICD-10-CM

## 2022-01-25 ENCOUNTER — OFFICE VISIT (OUTPATIENT)
Dept: FAMILY MEDICINE | Facility: CLINIC | Age: 82
End: 2022-01-25
Payer: COMMERCIAL

## 2022-01-25 ENCOUNTER — ANTICOAGULATION THERAPY VISIT (OUTPATIENT)
Dept: ANTICOAGULATION | Facility: CLINIC | Age: 82
End: 2022-01-25

## 2022-01-25 VITALS
WEIGHT: 204 LBS | DIASTOLIC BLOOD PRESSURE: 66 MMHG | SYSTOLIC BLOOD PRESSURE: 152 MMHG | HEIGHT: 65 IN | HEART RATE: 76 BPM | BODY MASS INDEX: 33.99 KG/M2

## 2022-01-25 DIAGNOSIS — I73.9 PERIPHERAL ARTERIAL DISEASE (H): ICD-10-CM

## 2022-01-25 DIAGNOSIS — E11.3599 TYPE 2 DIABETES MELLITUS WITH PROLIFERATIVE RETINOPATHY WITHOUT MACULAR EDEMA, WITHOUT LONG-TERM CURRENT USE OF INSULIN, UNSPECIFIED LATERALITY (H): ICD-10-CM

## 2022-01-25 DIAGNOSIS — I48.20 CHRONIC ATRIAL FIBRILLATION (H): ICD-10-CM

## 2022-01-25 DIAGNOSIS — E11.9 TYPE 2 DIABETES, HBA1C GOAL < 8% (H): ICD-10-CM

## 2022-01-25 DIAGNOSIS — Z01.818 PREOP GENERAL PHYSICAL EXAM: Primary | ICD-10-CM

## 2022-01-25 DIAGNOSIS — Z95.2 S/P TAVR (TRANSCATHETER AORTIC VALVE REPLACEMENT): ICD-10-CM

## 2022-01-25 DIAGNOSIS — S32.000G COMPRESSION FRACTURE OF LUMBAR VERTEBRA WITH DELAYED HEALING, UNSPECIFIED LUMBAR VERTEBRAL LEVEL, SUBSEQUENT ENCOUNTER: ICD-10-CM

## 2022-01-25 DIAGNOSIS — I48.20 CHRONIC ATRIAL FIBRILLATION (H): Primary | ICD-10-CM

## 2022-01-25 DIAGNOSIS — Z79.01 LONG TERM CURRENT USE OF ANTICOAGULANT THERAPY: ICD-10-CM

## 2022-01-25 DIAGNOSIS — I25.10 CORONARY ARTERY DISEASE INVOLVING NATIVE CORONARY ARTERY OF NATIVE HEART WITHOUT ANGINA PECTORIS: ICD-10-CM

## 2022-01-25 LAB
HBA1C MFR BLD: 8 % (ref 0–5.6)
INR BLD: 1.1 (ref 0.9–1.1)
LDLC SERPL CALC-MCNC: 65 MG/DL

## 2022-01-25 PROCEDURE — 85610 PROTHROMBIN TIME: CPT | Performed by: FAMILY MEDICINE

## 2022-01-25 PROCEDURE — 83036 HEMOGLOBIN GLYCOSYLATED A1C: CPT | Performed by: FAMILY MEDICINE

## 2022-01-25 PROCEDURE — 36415 COLL VENOUS BLD VENIPUNCTURE: CPT | Performed by: FAMILY MEDICINE

## 2022-01-25 PROCEDURE — 83721 ASSAY OF BLOOD LIPOPROTEIN: CPT | Performed by: FAMILY MEDICINE

## 2022-01-25 PROCEDURE — 36416 COLLJ CAPILLARY BLOOD SPEC: CPT | Performed by: FAMILY MEDICINE

## 2022-01-25 PROCEDURE — 99214 OFFICE O/P EST MOD 30 MIN: CPT | Performed by: FAMILY MEDICINE

## 2022-01-25 ASSESSMENT — MIFFLIN-ST. JEOR: SCORE: 1567.22

## 2022-01-25 NOTE — PATIENT INSTRUCTIONS
Preparing for Your Surgery  Getting started  A nurse will call you to review your health history and instructions. They will give you an arrival time based on your scheduled surgery time. Please be ready to share:    Your doctor's clinic name and phone number    Your medical, surgical and anesthesia history    A list of allergies and sensitivities    A list of medicines, including herbal treatments and over-the-counter drugs    Whether the patient has a legal guardian (ask how to send us the papers in advance)  Please tell us if you're pregnant--or if there's any chance you might be pregnant. Some surgeries may injure a fetus (unborn baby), so they require a pregnancy test. Surgeries that are safe for a fetus don't always need a test, and you can choose whether to have one.   If you have a child who's having surgery, please ask for a copy of Preparing for Your Child's Surgery.    Preparing for surgery    Within 30 days of surgery: Have a pre-op exam (sometimes called an H&P, or History and Physical). This can be done at a clinic or pre-operative center.  ? If you're having a , you may not need this exam. Talk to your care team.    At your pre-op exam, talk to your care team about all medicines you take. If you need to stop any medicines before surgery, ask when to start taking them again.  ? We do this for your safety. Many medicines can make you bleed too much during surgery. Some change how well surgery (anesthesia) drugs work.    Call your insurance company to let them know you're having surgery. (If you don't have insurance, call [phone].)    Call your clinic if there's any change in your health. This includes signs of a cold or flu (sore throat, runny nose, cough, rash, fever). It also includes a scrape or scratch near the surgery site.    If you have questions on the day of surgery, call your hospital or surgery center.  COVID testing  You may need to be tested for COVID-19 before having  surgery. If so, your surgical team will give you instructions for scheduling this test, separate from your preoperative history and physical.  Eating and drinking guidelines  For your safety: Unless your surgeon tells you otherwise, follow the guidelines below.    Eat and drink as usual until 8 hours before surgery. After that, no food or milk.    Drink clear liquids until 2 hours before surgery. These are liquids you can see through, like water, Gatorade and Propel Water. You may also have black coffee and tea (no cream or milk).    Nothing by mouth within 2 hours of surgery. This includes gum, candy and breath mints.    If you drink alcohol: Stop drinking it the night before surgery.    If your care team tells you to take medicine on the morning of surgery, it's okay to take it with a sip of water.  Preventing infection    Shower or bathe the night before and morning of your surgery. Follow the instructions your clinic gave you. (If no instructions, use regular soap.)    Don't shave or clip hair near your surgery site. We'll remove the hair if needed.    Don't smoke or vape the morning of surgery. You may chew nicotine gum up to 2 hours before surgery. A nicotine patch is okay.  ? Note: Some surgeries require you to completely quit smoking and nicotine. Check with your surgeon.    Your care team will make every effort to keep you safe from infection. We will:  ? Clean our hands often with soap and water (or an alcohol-based hand rub).  ? Clean the skin at your surgery site with a special soap that kills germs.  ? Give you a special gown to keep you warm. (Cold raises the risk of infection.)  ? Wear special hair covers, masks, gowns and gloves during surgery.  ? Give antibiotic medicine, if prescribed. Not all surgeries need antibiotics.  What to bring on the day of surgery    Photo ID and insurance card    Copy of your health care directive, if you have one    Glasses and hearing aides (bring cases)  ? You can't  wear contacts during surgery    Inhaler and eye drops, if you use them (tell us about these when you arrive)    CPAP machine or breathing device, if you use them    A few personal items, if spending the night    If you have . . .  ? A pacemaker, ICD (cardiac defibrillator) or other implant: Bring the ID card.  ? An implanted stimulator: Bring the remote control.  ? A legal guardian: Bring a copy of the certified (court-stamped) guardianship papers.  Please remove any jewelry, including body piercings. Leave jewelry and other valuables at home.  If you're going home the day of surgery    You must have a responsible adult drive you home. They should stay with you overnight as well.    If you don't have someone to stay with you, and you aren't safe to go home alone, we may keep you overnight. Insurance often won't pay for this.  After surgery  If it's hard to control your pain or you need more pain medicine, please call your surgeon's office.  Questions?   If you have any questions for your care team, list them here: _________________________________________________________________________________________________________________________________________________________________________ ____________________________________ ____________________________________ ____________________________________  For informational purposes only. Not to replace the advice of your health care provider. Copyright   2003, 2019 [facility]. All rights reserved. Clinically reviewed by Saba Rae MD. [employer] [id number] - REV 07/21.    How to Take Your Medication Before Surgery  - Take all of your medications before surgery except as noted below  - HOLD (do not take) your LASIX (FUROSEMIDE) on the morning of surgery.   - HOLD (do not take) your Metformin on morning of surgery.  - HOLD (do not take) your Jardiance for 3 days before surgery.  - HOLD (do not take) Aspirin for 7 days before surgery.  - HOLD (do not take) Plavix for 7  days before surgery.  - HOLD (do not take) Warfarin for 7 days before surgery. WE WILL DISCUSS BRIDGING PLAN.  - STOP taking all vitamins and herbal supplements 14 days before surgery.

## 2022-01-25 NOTE — PROGRESS NOTES
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 interval not displayed.        Diagnostics:    Recent Results (from the past 240 hour(s))   INR point of care, Interfaced Result    Collection Time: 01/18/22  1:59 PM   Result Value Ref Range    INR 1.3 (H) 0.9 - 1.1   Basic metabolic panel    Collection Time: 01/19/22  3:28 PM   Result Value Ref Range    Sodium 140 136 - 145 mmol/L    Potassium 4.4 3.5 - 5.0 mmol/L    Chloride 104 98 - 107 mmol/L    Carbon Dioxide (CO2) 25 22 - 31 mmol/L    Anion Gap 11 5 - 18 mmol/L    Urea Nitrogen 15 8 - 28 mg/dL    Creatinine 0.98 0.70 - 1.30 mg/dL    Calcium 9.4 8.5 - 10.5 mg/dL    Glucose 151 (H) 70 - 125 mg/dL    GFR Estimate 78 >60 mL/min/1.73m2   CBC with platelets    Collection Time: 01/19/22  3:28 PM   Result Value Ref Range    WBC Count 5.7 4.0 - 11.0 10e3/uL    RBC Count 4.04 (L) 4.40 - 5.90 10e6/uL    Hemoglobin 13.8 13.3 - 17.7 g/dL    Hematocrit 42.7 40.0 - 53.0 %     (H) 78 - 100 fL    MCH 34.2 (H) 26.5 - 33.0 pg     MCHC 32.3 31.5 - 36.5 g/dL    RDW 13.4 10.0 - 15.0 %    Platelet Count 200 150 - 450 10e3/uL   ECG 12-Lead with MUSE - [facility],MARCIO,WW    Collection Time: 01/19/22  3:43 PM   Result Value Ref Range    Systolic Blood Pressure  mmHg    Diastolic Blood Pressure  mmHg    Ventricular Rate 67 BPM    Atrial Rate 68 BPM    AL Interval  ms    QRS Duration 90 ms     ms    QTc 448 ms    P Axis  degrees    R AXIS -15 degrees    T Axis -1 degrees    Interpretation ECG       Atrial fibrillation  Inferior infarct , age undetermined  Abnormal ECG  When compared with ECG of 03-DEC-2021 12:02,  No significant change was found  Confirmed by ANGELINA DUGGAN, LES LOC: ([id number]) on 1/21/2022 8:17:42 AM     INR point of care, Interfaced Result    Collection Time: 01/25/22  2:43 PM   Result Value Ref Range    INR 1.1 0.9 - 1.1   LDL cholesterol direct    Collection Time: 01/25/22  2:43 PM   Result Value Ref Range    LDL Cholesterol Direct 65 <=129 mg/dL   Hemoglobin A1c    Collection Time: 01/25/22  2:43 PM   Result Value Ref Range    Hemoglobin A1C 8.0 (H) 0.0 - 5.6 %        No EKG this visit, completed in the last 90 days.    Revised Cardiac Risk Index (RCRI):  The patient has the following serious cardiovascular risks for perioperative complications:   - Coronary Artery Disease (MI, positive stress test, angina, Qs on EKG) = 1 point   - Congestive Heart Failure (pulmonary edema, PND, s3 charlene, CXR with pulmonary congestion, basilar rales) = 1 point   - Diabetes Mellitus (on Insulin) = 1 point     RCRI Interpretation: 3 points: Class IV (high risk - >11% complication rate)    Estimated Functional Capacity: DASI 5.63 mets         Signed Electronically by: Mar Morales DO  Copy of this evaluation report is provided to requesting physician.

## 2022-01-25 NOTE — PROGRESS NOTES
"ANTICOAGULATION MANAGEMENT     Pee Ayala 81 year old male is on warfarin with subtherapeutic INR result. (Goal INR 2.0-3.0)    Recent labs: (last 7 days)     01/25/22  1443   INR 1.1       ASSESSMENT     Source(s): Chart Review and Patient/Caregiver Call       Warfarin doses taken: Missed dose(s) may be affecting INR    Reported possibly missing some doses, he \"is in so much pain, not sure if he is coming or going.\"   Reported, he is to begin warfarin hold on 1/27 for 7 days prior to surgery.    Diet: No new diet changes identified    New illness, injury, or hospitalization: Yes:    Back pains.   Compression FX of lumbar (L3) spine   He completed ABX on 1/23/22 for his wounds.    Medication/supplement changes:  Yes.   Patient already stopped his Plavix.   per Dr. Rasmussen with [facility] on OV of 1/19/22 - OK to hold warfarin / plavix, but continuous therapy with Aspirin.  Post procedure - ok to resume Warfarin and Plavix only, and OK to stop Aspirin.   1/18/22 - stopped Metoprolol Succinate.    Signs or symptoms of bleeding or clotting: No    Previous INR: Subtherapeutic at 1.3 on 1/18/22.    Additional findings: Upcoming lumbar spine surgery/procedure     - Yes. on 2/3/22 with Dr. Mckeon with [facility].    - per Dr. Morales - planned hold of warfarin for 7 days prior to surgery.   - Patient already stopped Plavix.     PLAN     Recommended plan for temporary change(s) affecting INR     Dosing Instructions:   (evenings. 5mg tabs)    Booster dose then continue your current warfarin dose with next INR in 1 week post surgery.    Summary  As of 1/25/2022    Full warfarin instructions:  1/27: Hold; 1/28: Hold; 1/29: Hold; 1/30: Hold; 1/31: Hold; 2/1: Hold; 2/2: Hold; Otherwise 7.5 mg every Sun, Tue, Thu; 5 mg all other days   Next INR check:  2/10/2022             Telephone call with  Pee ([phone]) who verbalizes understanding and agrees to plan    Patient offered & declined to schedule next " visit    Education provided: Goal range and significance of current result and Importance of taking warfarin as instructed    Plan made per [facility] anticoagulation protocol    Mariel Dale, RN  Anticoagulation Clinic  1/25/2022    _______________________________________________________________________     Anticoagulation Episode Summary     Current INR goal:  2.0-3.0   TTR:  52.4 % (1 y)   Target end date:  Indefinite   Send INR reminders to:  [facility]    Indications    Chronic atrial fibrillation (H) [I48.20]  Long term current use of anticoagulant therapy [Z79.01]           Comments:           Anticoagulation Care Providers     Provider Role Specialty Phone number    Jazzy Gil MD Referring Family Medicine [phone]

## 2022-01-28 ENCOUNTER — TELEPHONE (OUTPATIENT)
Dept: LAB | Facility: CLINIC | Age: 82
End: 2022-01-28
Payer: COMMERCIAL

## 2022-01-28 DIAGNOSIS — Z01.818 PREOP GENERAL PHYSICAL EXAM: Primary | ICD-10-CM

## 2022-01-28 NOTE — TELEPHONE ENCOUNTER
Happy to place order. Did Pee confirm dates of his procedure? He had discussed possibly changing to the hospital due to pain and was unsure if date of procedure would change.  Mar Morales, DO     1. Preop general physical exam  - Asymptomatic COVID-19 Virus (Coronavirus) by PCR Nasopharyngeal; Future

## 2022-01-28 NOTE — PROGRESS NOTES
Patient coming in 1/31/2022 for pre-proc COVID swab. You saw him 1/25 for a pre-op, will you please place an order? Thanks!

## 2022-01-29 DIAGNOSIS — S32.049D CLOSED FRACTURE OF FOURTH LUMBAR VERTEBRA WITH ROUTINE HEALING, UNSPECIFIED FRACTURE MORPHOLOGY, SUBSEQUENT ENCOUNTER: Primary | ICD-10-CM

## 2022-01-29 NOTE — TELEPHONE ENCOUNTER
Scheduled for procedure  For Kyphoplasty on 02/03/2022.    He reports he is out of his Oxycodone-Acetaminophen 5-325 , and needs a few to get him through the weekend.    Patient advised to go to [facility] in [place] or [place] to see if they will give him a few to get him through the weekend, otherwise he will have to wait until Monday,     Patient agreed to this.    Liv Wagner, RN [facility] Triage/refill nurse        Pending Prescriptions:                       Disp   Refills    oxyCODONE-acetaminophen (PERCOCET) 5-325 *30 tab*0            Sig: Take 1 tablet by mouth

## 2022-01-30 RX ORDER — OXYCODONE AND ACETAMINOPHEN 5; 325 MG/1; MG/1
1 TABLET ORAL EVERY 8 HOURS PRN
Qty: 21 TABLET | Refills: 0 | Status: SHIPPED | OUTPATIENT
Start: 2022-01-30 | End: 2022-02-06

## 2022-01-31 ENCOUNTER — APPOINTMENT (OUTPATIENT)
Dept: LAB | Facility: CLINIC | Age: 82
End: 2022-01-31
Payer: COMMERCIAL

## 2022-01-31 DIAGNOSIS — Z01.818 PREOP GENERAL PHYSICAL EXAM: ICD-10-CM

## 2022-01-31 PROCEDURE — U0003 INFECTIOUS AGENT DETECTION BY NUCLEIC ACID (DNA OR RNA); SEVERE ACUTE RESPIRATORY SYNDROME CORONAVIRUS 2 (SARS-COV-2) (CORONAVIRUS DISEASE [COVID-19]), AMPLIFIED PROBE TECHNIQUE, MAKING USE OF HIGH THROUGHPUT TECHNOLOGIES AS DESCRIBED BY CMS-2020-01-R: HCPCS

## 2022-01-31 PROCEDURE — U0005 INFEC AGEN DETEC AMPLI PROBE: HCPCS

## 2022-01-31 NOTE — TELEPHONE ENCOUNTER
1. Closed fracture of fourth lumbar vertebra with routine healing, unspecified fracture morphology, subsequent encounter  - oxyCODONE-acetaminophen (PERCOCET) 5-325 MG tablet; Take 1 tablet by mouth every 8 hours as needed for severe pain  Dispense: 21 tablet; Refill: 0     Pee is in significant pain secondary to compression fracture of lumbar vertebrae.   He is scheduled for kyphoplasty this week to help with the pain.     Refill of meds sent to pharmacy.    Mar Morales, DO

## 2022-02-01 LAB — SARS-COV-2 RNA RESP QL NAA+PROBE: NEGATIVE
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
## 2022-02-04 DIAGNOSIS — N40.0 BENIGN PROSTATIC HYPERPLASIA, UNSPECIFIED WHETHER LOWER URINARY TRACT SYMPTOMS PRESENT: Primary | ICD-10-CM

## 2022-02-04 RX ORDER — FINASTERIDE 5 MG/1
5 TABLET, FILM COATED ORAL DAILY
Qty: 90 TABLET | Refills: 3 | Status: SHIPPED | OUTPATIENT
Start: 2022-02-04 | End: 2022-07-27

## 2022-02-05 NOTE — TELEPHONE ENCOUNTER
1. Benign prostatic hyperplasia, unspecified whether lower urinary tract symptoms present  - finasteride (PROSCAR) 5 MG tablet; Take 1 tablet (5 mg) by mouth daily  Dispense: 90 tablet; Refill: 3     Refilled finasteride.    Mar Morales, DO   
yes

## 2022-02-07 ENCOUNTER — TRANSFERRED RECORDS (OUTPATIENT)
Dept: HEALTH INFORMATION MANAGEMENT | Facility: CLINIC | Age: 82
End: 2022-02-07
Payer: COMMERCIAL

## 2022-02-08 ENCOUNTER — TELEPHONE (OUTPATIENT)
Dept: FAMILY MEDICINE | Facility: CLINIC | Age: 82
End: 2022-02-08

## 2022-02-08 ENCOUNTER — VIRTUAL VISIT (OUTPATIENT)
Dept: ENDOCRINOLOGY | Facility: CLINIC | Age: 82
End: 2022-02-08
Payer: COMMERCIAL

## 2022-02-08 DIAGNOSIS — E11.42 TYPE 2 DIABETES MELLITUS WITH PERIPHERAL NEUROPATHY (H): Primary | ICD-10-CM

## 2022-02-08 PROCEDURE — 99213 OFFICE O/P EST LOW 20 MIN: CPT | Mod: 95 | Performed by: NURSE PRACTITIONER

## 2022-02-08 NOTE — TELEPHONE ENCOUNTER
Left detailed message stating message below to patient. I also called and updates [facility] too.

## 2022-02-08 NOTE — TELEPHONE ENCOUNTER
Reason for Call:  Other appointment    Detailed comments: pt had injections with spine doctor at [facility]- still having severe pain so they would like to do another injection for swelling    [facility] needs okay from PCP to stop warfarin & plavix for procedure.    Please advise & call pt     Phone Number Patient can be reached at: Cell number on file:    Telephone Information:   Mobile [phone]       Best Time: na    Can we leave a detailed message on this number? YES    Call taken on 2/8/2022 at 9:28 AM by Naomi Bland

## 2022-02-11 ENCOUNTER — DOCUMENTATION ONLY (OUTPATIENT)
Dept: ANTICOAGULATION | Facility: CLINIC | Age: 82
End: 2022-02-11
Payer: COMMERCIAL

## 2022-02-11 DIAGNOSIS — I48.20 CHRONIC ATRIAL FIBRILLATION (H): Primary | ICD-10-CM

## 2022-02-11 DIAGNOSIS — Z79.01 LONG TERM CURRENT USE OF ANTICOAGULANT THERAPY: ICD-10-CM

## 2022-02-11 NOTE — PROGRESS NOTES
Reminder call:     RE:  Telephone message on 2/8/22.     - Pee had injections with spine doctor @ [facility] on 2/3/22.  HELD warfarin for 7 days, from 1/27-2/2.  He was still having severe pain and would like to do another injection for the swelling.     - they need him to stop warfarin and plavix for procedure.     - Dr. Morales gave OK

## 2022-02-22 ENCOUNTER — TRANSFERRED RECORDS (OUTPATIENT)
Dept: HEALTH INFORMATION MANAGEMENT | Facility: CLINIC | Age: 82
End: 2022-02-22
Payer: COMMERCIAL

## 2022-03-01 ENCOUNTER — TRANSFERRED RECORDS (OUTPATIENT)
Dept: HEALTH INFORMATION MANAGEMENT | Facility: CLINIC | Age: 82
End: 2022-03-01

## 2022-03-02 ENCOUNTER — TELEPHONE (OUTPATIENT)
Dept: ANTICOAGULATION | Facility: CLINIC | Age: 82
End: 2022-03-02
Payer: COMMERCIAL

## 2022-03-02 DIAGNOSIS — Z79.01 LONG TERM CURRENT USE OF ANTICOAGULANT THERAPY: ICD-10-CM

## 2022-03-02 DIAGNOSIS — I48.20 CHRONIC ATRIAL FIBRILLATION (H): Primary | ICD-10-CM

## 2022-03-02 NOTE — TELEPHONE ENCOUNTER
ANTICOAGULATION     Pee Ayala is overdue for INR check.      Spoke with Pee and scheduled lab appointment on 3/9 @ [facility]    - just resumed warfarin on  2/28/22.   - s/p L3 Kyphoplasty on 2/3/22   - bilateral L4 TFESI.  (was off warfarin for another 5 days)       Mariel Dale RN

## 2022-03-09 ENCOUNTER — LAB (OUTPATIENT)
Dept: LAB | Facility: CLINIC | Age: 82
End: 2022-03-09
Payer: COMMERCIAL

## 2022-03-09 ENCOUNTER — ANTICOAGULATION THERAPY VISIT (OUTPATIENT)
Dept: ANTICOAGULATION | Facility: CLINIC | Age: 82
End: 2022-03-09

## 2022-03-09 DIAGNOSIS — I48.20 CHRONIC ATRIAL FIBRILLATION (H): ICD-10-CM

## 2022-03-09 DIAGNOSIS — I48.20 CHRONIC ATRIAL FIBRILLATION (H): Primary | ICD-10-CM

## 2022-03-09 DIAGNOSIS — Z79.01 LONG TERM CURRENT USE OF ANTICOAGULANT THERAPY: ICD-10-CM

## 2022-03-09 LAB — INR BLD: 3.4 (ref 0.9–1.1)

## 2022-03-09 PROCEDURE — 85610 PROTHROMBIN TIME: CPT

## 2022-03-09 PROCEDURE — 36415 COLL VENOUS BLD VENIPUNCTURE: CPT

## 2022-03-09 NOTE — PROGRESS NOTES
ANTICOAGULATION MANAGEMENT     Pee Ayala 81 year old male is on warfarin with supratherapeutic INR result. (Goal INR 2.0-3.0)    Recent labs: (last 7 days)     03/09/22  1024   INR 3.4*       ASSESSMENT       Source(s): Chart Review, Patient/Caregiver Call and Template       Warfarin doses taken: Warfarin taken as instructed    Warfarin resumed on 2/28.   Bilateral L4 TF-FRANKY and was off warfarin for 5 days prior to procedure.   Another spinal injection was planned and had to hold warfarin for 7 days, however, this was not effective and only last 4 days.   Reported warfarin held for 7 days on 1/27-2/2 for spinal injection.    Diet: No new diet changes identified     New illness, injury, or hospitalization: Yes:   Ongoing back pains.  S/p L3 Kyphoplasty on 2/3/22.  (which gave him temporary relief)   Compression FX of lumbar (L3) spine    Medication/supplement changes: None noted     Signs or symptoms of bleeding or clotting: No    Previous INR: Subtherapeutic at 1.1 on 1/25/22.    Additional findings:   Reported they will prescribe prednisone therapy on 3/19 and get him set up with TENS electrical unit.  Has appt with Dr. Delgadillo on 3/18/22.       PLAN     Recommended plan for ongoing change(s) affecting INR     Dosing Instructions:   (evenings. 5mg tabs)    Hold dose then continue your current warfarin dose with next INR in 2 weeks       Summary  As of 3/9/2022    Full warfarin instructions:  3/9: Hold; Otherwise 7.5 mg every Sun, Tue, Thu; 5 mg all other days   Next INR check:  3/23/2022             Telephone call with  Pee ([phone]) who verbalizes understanding and agrees to plan    Lab visit scheduled - INR on 3/18/22 @ [facility].    Education provided: Importance of consistent vitamin K intake and Goal range and significance of current result    Plan made per [facility] anticoagulation protocol    Mariel Dale, RN  Anticoagulation  Clinic  3/9/2022    _______________________________________________________________________     Anticoagulation Episode Summary     Current INR goal:  2.0-3.0   TTR:  45.8 % (1 y)   Target end date:  Indefinite   Send INR reminders to:  [facility]    Indications    Chronic atrial fibrillation (H) [I48.20]  Long term current use of anticoagulant therapy [Z79.01]           Comments:           Anticoagulation Care Providers     Provider Role Specialty Phone number    Jazzy Gil MD Referring Family Medicine [phone]

## 2022-03-16 ENCOUNTER — ANCILLARY PROCEDURE (OUTPATIENT)
Dept: VASCULAR ULTRASOUND | Facility: CLINIC | Age: 82
End: 2022-03-16
Attending: SURGERY
Payer: COMMERCIAL

## 2022-03-16 DIAGNOSIS — I73.9 PAD (PERIPHERAL ARTERY DISEASE) (H): ICD-10-CM

## 2022-03-16 PROCEDURE — 93922 UPR/L XTREMITY ART 2 LEVELS: CPT

## 2022-03-16 PROCEDURE — 93926 LOWER EXTREMITY STUDY: CPT | Mod: LT

## 2022-03-17 ENCOUNTER — OFFICE VISIT (OUTPATIENT)
Dept: FAMILY MEDICINE | Facility: CLINIC | Age: 82
End: 2022-03-17
Payer: COMMERCIAL

## 2022-03-17 VITALS
BODY MASS INDEX: 30.16 KG/M2 | HEART RATE: 84 BPM | HEIGHT: 65 IN | DIASTOLIC BLOOD PRESSURE: 73 MMHG | TEMPERATURE: 96.3 F | WEIGHT: 181 LBS | SYSTOLIC BLOOD PRESSURE: 151 MMHG

## 2022-03-17 DIAGNOSIS — I50.32 CHRONIC HEART FAILURE WITH PRESERVED EJECTION FRACTION (H): ICD-10-CM

## 2022-03-17 DIAGNOSIS — B35.3 TINEA PEDIS OF BOTH FEET: Primary | ICD-10-CM

## 2022-03-17 DIAGNOSIS — E66.01 MORBID OBESITY (H): ICD-10-CM

## 2022-03-17 DIAGNOSIS — I10 PRIMARY HYPERTENSION: ICD-10-CM

## 2022-03-17 PROCEDURE — 99214 OFFICE O/P EST MOD 30 MIN: CPT | Performed by: FAMILY MEDICINE

## 2022-03-17 RX ORDER — HYDROCODONE BITARTRATE AND ACETAMINOPHEN 5; 325 MG/1; MG/1
1 TABLET ORAL
COMMUNITY
End: 2022-04-08

## 2022-03-17 RX ORDER — CLOTRIMAZOLE 1 %
CREAM (GRAM) TOPICAL
Qty: 85 G | Refills: 1 | Status: SHIPPED | OUTPATIENT
Start: 2022-03-17 | End: 2022-06-13

## 2022-03-17 RX ORDER — FUROSEMIDE 20 MG
TABLET ORAL
Qty: 180 TABLET | Refills: 3 | Status: SHIPPED | OUTPATIENT
Start: 2022-03-17 | End: 2022-07-27

## 2022-03-17 NOTE — PATIENT INSTRUCTIONS
Patient Education     Apply clotrimazole twice daily. Use vaseline at night     Athlete s Foot     Athlete s foot (tinea pedis) is caused by a fungal infection in the skin. It affects the skin between the toes, causing cracks in the skin called fissures. It can also affect the bottom of the foot where it causes dry white scales and peeling of the skin. This infection is more likely to occur when the foot is in hot, sweaty socks and shoes for long periods of time. You may feel itching and burning between your toes. This infection is treated with skin creams or medicine taken by mouth.  Home care  The following are general care guidelines:    It's important to keep the feet dry. Use absorbent cotton socks and change them if they become sweaty. Or wear an open-toe shoe or sandal. Wash the feet at least once a day with soap and water.    Apply the antifungal cream as prescribed. Some antifungal creams are available without a prescription.    It may take a week before the rash starts to improve. It can take about 3 to 4 weeks to completely clear. Continue the medicine until the rash is all gone.    Use over-the-counter antifungal powders or sprays on your feet after exposure to high-risk environments, such as public showers, gyms, and locker rooms. This can help prevent future infections. Wearing appropriate shoes in these situations can help.  Prevention  These tips may help prevent athlete s foot:    Don't share shoes or socks with someone who has athlete's foot.    Don't walk barefoot in places where a fungal infection can spread quickly such as locker rooms, showers, and swimming pools.    Change your socks regularly.    Alternate shoes to help with drying.  Follow-up care  Follow up with your healthcare provider as recommended if the rash doesn't improve after 10 days of treatment, or if the rash continues to spread.  When to seek medical care  Get medical attention right away if any of the following occur:    Fever  of 100.4 F (38 C) or higher, or as directed    Increasing redness or swelling of the foot    Infection comes back soon after treatment    Pus draining from cracks in the skin  [employer] last reviewed this educational content on 7/1/2019 2000-2021 [employer]. All rights reserved. This information is not intended as a substitute for professional medical care. Always follow your healthcare professional's instructions.

## 2022-03-17 NOTE — PROGRESS NOTES
Assessment & Plan     Tinea pedis of both feet  Notably dry feet with cracking heels, most significant on right.  No current signs of infection warranting antibiotics.  He also has onychomycosis throughout his toenails, so possible tinea component.  Patient would like to avoid oral treatment due to medication concerns so we will begin topical clotrimazole.  He will also apply Vaseline and cover with socks prior to bedtime to help with moisturization.  He will continue to follow with his podiatrist, and routinely check his feet given his diabetic neuropathy  - clotrimazole (LOTRIMIN) 1 % external cream  Dispense: 85 g; Refill: 1    Primary hypertension  Chronic heart failure with preserved ejection fraction (H)  Blood pressure above goal today at 151/73, and increased lower extremity edema noted on exam.  Due to this we will increase his Lasix by adding additional 20 mg in the afternoon.  Readdressed importance of wearing compression stockings and elevating his feet.  Recommended follow-up in 1 month for blood pressure recheck and monitoring labs  - furosemide (LASIX) 20 MG tablet  Dispense: 180 tablet; Refill: 3    Morbid obesity (H)  Body mass index is 30.12 kg/m .  Comorbidities include CAD s/p three-vessel CABG, type 2 diabetes, dyslipidemia, and steatohepatitis.  Continue to encourage weight loss through diet and exercise.    Return in about 4 weeks (around 4/14/2022) for Follow up blood pressure and blood work since increasing lasix.    DO JAELYN Armijo [facility]   Pee is a 81 year old who presents for the following health issues  Chief Complaint   Patient presents with     Foot Problems     dry and cracked heels       History of Present Illness       Reason for visit:  Pain & pealing on heals  Symptom onset:  3-4 weeks ago  Symptoms include:  Pain & pealing  Symptom intensity:  Moderate  Symptom progression:  Staying the same  Had these symptoms before:   "No  What makes it worse:  Pain  What makes it better:  No pain    He eats 2-3 servings of fruits and vegetables daily.He consumes 1 sweetened beverage(s) daily.He exercises with enough effort to increase his heart rate 10 to 19 minutes per day.  He exercises with enough effort to increase his heart rate 4 days per week. He is missing 1 dose(s) of medications per week.     Using over-the-counter foot moisturizers, along with lotion and Neosporin to cracks.  He has to clip the excess skin off with a nail clipper so it does not snag.  He typically wears compression stockings due to his lower extremity swelling from his heart failure.  He is only taking 40 mg of Lasix daily.      Objective    BP (!) 151/73   Pulse 84   Temp (!) 96.3  F (35.7  C) (Oral)   Ht 1.651 m (5' 5\")   Wt 82.1 kg (181 lb)   BMI 30.12 kg/m    Body mass index is 30.12 kg/m .  Physical Exam   GENERAL: healthy, alert and no distress  MS: 2+ edema of feet, 1+ to shins bilaterally  Foot exam: reduced sensation throughout bilateral feet with a 2 cm deeper abrasion right heel without surrounding redness or drainage, dry cracking heels and nail exam onychomycosis of the toenails      "

## 2022-03-18 ENCOUNTER — LAB (OUTPATIENT)
Dept: LAB | Facility: CLINIC | Age: 82
End: 2022-03-18
Payer: COMMERCIAL

## 2022-03-18 ENCOUNTER — ANTICOAGULATION THERAPY VISIT (OUTPATIENT)
Dept: ANTICOAGULATION | Facility: CLINIC | Age: 82
End: 2022-03-18

## 2022-03-18 DIAGNOSIS — I48.20 CHRONIC ATRIAL FIBRILLATION (H): Primary | ICD-10-CM

## 2022-03-18 DIAGNOSIS — Z79.01 LONG TERM CURRENT USE OF ANTICOAGULANT THERAPY: ICD-10-CM

## 2022-03-18 DIAGNOSIS — I48.20 CHRONIC ATRIAL FIBRILLATION (H): ICD-10-CM

## 2022-03-18 LAB — INR BLD: 1.1 (ref 0.9–1.1)

## 2022-03-18 PROCEDURE — 36416 COLLJ CAPILLARY BLOOD SPEC: CPT

## 2022-03-18 PROCEDURE — 85610 PROTHROMBIN TIME: CPT

## 2022-03-18 NOTE — PROGRESS NOTES
ANTICOAGULATION MANAGEMENT     Pee Ayala 81 year old male is on warfarin with subtherapeutic INR result. (Goal INR 2.0-3.0)    Recent labs: (last 7 days)     03/18/22  1145   INR 1.1       ASSESSMENT       Source(s): Chart Review, Patient/Caregiver Call and Template       Warfarin doses taken: Warfarin taken differently, but did not change total weekly dose    Updated anticoag. Calendar.    Reported he held warfarin dose on 3/16, d/t bloody nose on 3/15.   Also reported taking warfarin at 730pm or bedtime.   Previously held 2x - 5 day warfarin hold prior to FRANKY of LS on 2/2 and 2/28    Diet: No new diet changes identified    New illness, injury, or hospitalization: Yes:    Bilateral Tinea pedis / onychomycosis.   He reported has had 2 Bilateral spinal (cortisone) injections on 2/2 and 2/28; which were not effective.   Ongoing chronic back pains for the last 4 months.   S/p L3 Kyphoplasty on 2/3/22.  (which gave him temporary relief)   Compression FX of lumbar (L3) spine    Medication/supplement changes:  Yes.    started on 3/17/22 Clotrimazole ointment  No interaction anticipated.   Starting will start today 3/18/22 - tapering Prednisone 80mg for 2 days, 60mg for 3 days, 40mg for 2 days, 20mg for 2 days, then 10mg for 2 days. 9this is TX for his back d/t going on vacation for 2 wks.   Then will start Methocarbamol 500mg BID PRN.    Signs or symptoms of bleeding or clotting: Yes:    Bloody nose on 3/15.    Previous INR: Supratherapeutic at 3.4 on 3/9/22.    Additional findings:  Reported they will prescribe prednisone therapy and get him set up with TENS electrical unit.    Pee reported, he is leaving out of town on 3/19 for 2 wks.  ([facility]).       PLAN     Recommended plan for temporary change(s) affecting INR     Dosing Instructions:   (evenings. 5mg tabs)    Booster dose then continue your current warfarin dose with next INR in 5-7 days days       Summary  As of 3/18/2022    Full warfarin  instructions:  3/18: 10 mg; Otherwise 7.5 mg every Tue, Thu, Sat; 5 mg all other days   Next INR check:  3/25/2022             Telephone call with  Pee ([phone]) who verbalizes understanding and agrees to plan.   - advised to take warfarin same time everyday   - advised to stay well hydrated during his flight and do leg exercises.  He will also wear his support stockings.   - offered lab testing in [place].  Patient did not want to go to an outside lab and preferred to test INR on his return.    Lab visit scheduled - INR on 4/4/22 @ [facility].    Education provided: Importance of consistent vitamin K intake, Impact of vitamin K foods on INR, Goal range and significance of current result, Monitoring for clotting signs and symptoms, When to seek medical attention/emergency care and Travel related clotting risk and prevention    Plan made with [facility] Pharmacist Viviana Dale RN  Anticoagulation Clinic  3/18/2022    _______________________________________________________________________     Anticoagulation Episode Summary     Current INR goal:  2.0-3.0   TTR:  45.2 % (1 y)   Target end date:  Indefinite   Send INR reminders to:  [facility]    Indications    Chronic atrial fibrillation (H) [I48.20]  Long term current use of anticoagulant therapy [Z79.01]           Comments:           Anticoagulation Care Providers     Provider Role Specialty Phone number    Jazzy Gil MD Referring Family Medicine [phone]

## 2022-03-21 ENCOUNTER — TELEPHONE (OUTPATIENT)
Dept: VASCULAR SURGERY | Facility: CLINIC | Age: 82
End: 2022-03-21
Payer: COMMERCIAL

## 2022-03-21 NOTE — TELEPHONE ENCOUNTER
----- Message from Isa Norton RN sent at 3/21/2022 12:52 PM CDT -----  Regarding: Needs appointment with [facility]  Needs to reschedule his appointment with Dr. Delgadillo that he cancelled, already had his ultrasounds done. Please call to schedule, thanks!

## 2022-04-01 ENCOUNTER — TELEPHONE (OUTPATIENT)
Dept: FAMILY MEDICINE | Facility: CLINIC | Age: 82
End: 2022-04-01
Payer: COMMERCIAL

## 2022-04-01 NOTE — TELEPHONE ENCOUNTER
I am really sorry, but if ortho is prescribing the opioids, they need to be the one authorizing refills.     Mar Morales, DO

## 2022-04-01 NOTE — TELEPHONE ENCOUNTER
Reason for call:  Medication     Name of the pharmacy and phone number for the current request: [id number] - [place], [place] - [address] [place] [address] E    Name of the medication requested: oxycodone/acetaminophen 5-325 mg or something similar    Patient states that he has a collapsed Disc in his back. He states that it is very painful. Patient was prescribed the pain med but he will run out on Sunday. Patient has an appt scheduled with [facility] on 04/11. Patient is wondering if you would be willing to bridge his medication until his appt. Patient states that it doesn't need to be the oxcodone just some thing to help with pain until the 11th. Patient already called [facility] and the provider is not in clinic so he was told to check with his primary.     Phone number to reach patient:  Cell number on file:    Telephone Information:   Mobile [phone]       Best Time:  Anytime    Can we leave a detailed message on this number?  YES    Travel screening: Not Applicable

## 2022-04-01 NOTE — TELEPHONE ENCOUNTER
Patient is wondering if there is something for pain that is non opioid that you can prescribe him.   Please advise

## 2022-04-03 NOTE — TELEPHONE ENCOUNTER
Pee has several risk factors for NSAIDS. I do not feel comfortable prescribing without an appointment with risk/benefit discussion.     He can take Tylenol 1000mg TID.  He can try some topicals like biofreeze or bengay.   He can  lidocaine patches OTC.     Mar Morales, DO

## 2022-04-05 ENCOUNTER — TELEPHONE (OUTPATIENT)
Dept: ANTICOAGULATION | Facility: CLINIC | Age: 82
End: 2022-04-05
Payer: COMMERCIAL

## 2022-04-05 NOTE — TELEPHONE ENCOUNTER
ANTICOAGULATION     Pee Ayala is overdue for INR check.      Left message for patient to call and schedule lab appointment as soon as possible. If returning call, please schedule.     Kendal Muniz RN

## 2022-04-08 ENCOUNTER — HOSPITAL ENCOUNTER (INPATIENT)
Facility: HOSPITAL | Age: 82
LOS: 4 days | Discharge: NURSING, REHAB OR LONG-TERM CARE | DRG: 522 | End: 2022-04-12
Attending: EMERGENCY MEDICINE | Admitting: HOSPITALIST
Payer: COMMERCIAL

## 2022-04-08 ENCOUNTER — PREP FOR PROCEDURE (OUTPATIENT)
Dept: SURGERY | Facility: CLINIC | Age: 82
End: 2022-04-08

## 2022-04-08 ENCOUNTER — APPOINTMENT (OUTPATIENT)
Dept: RADIOLOGY | Facility: HOSPITAL | Age: 82
DRG: 522 | End: 2022-04-08
Attending: EMERGENCY MEDICINE
Payer: COMMERCIAL

## 2022-04-08 ENCOUNTER — APPOINTMENT (OUTPATIENT)
Dept: CT IMAGING | Facility: HOSPITAL | Age: 82
DRG: 522 | End: 2022-04-08
Attending: EMERGENCY MEDICINE
Payer: COMMERCIAL

## 2022-04-08 DIAGNOSIS — S72.002A FRACTURE OF HIP, LEFT, CLOSED, INITIAL ENCOUNTER (H): Primary | ICD-10-CM

## 2022-04-08 DIAGNOSIS — R29.6 FALLS FREQUENTLY: ICD-10-CM

## 2022-04-08 DIAGNOSIS — I25.10 CORONARY ARTERY DISEASE INVOLVING NATIVE CORONARY ARTERY OF NATIVE HEART WITHOUT ANGINA PECTORIS: ICD-10-CM

## 2022-04-08 DIAGNOSIS — Z79.01 CHRONIC ANTICOAGULATION: ICD-10-CM

## 2022-04-08 DIAGNOSIS — S72.002D CLOSED DISPLACED FRACTURE OF LEFT FEMORAL NECK WITH ROUTINE HEALING: Primary | ICD-10-CM

## 2022-04-08 DIAGNOSIS — Z98.890 STATUS POST CORONARY ANGIOGRAM: ICD-10-CM

## 2022-04-08 DIAGNOSIS — G62.9 POLYNEUROPATHY: ICD-10-CM

## 2022-04-08 DIAGNOSIS — I48.20 CHRONIC ATRIAL FIBRILLATION (H): ICD-10-CM

## 2022-04-08 LAB
ABO/RH(D): NORMAL
ALBUMIN SERPL-MCNC: 3.2 G/DL (ref 3.5–5)
ALBUMIN UR-MCNC: 200 MG/DL
ALP SERPL-CCNC: 126 U/L (ref 45–120)
ALT SERPL W P-5'-P-CCNC: 45 U/L (ref 0–45)
ANION GAP SERPL CALCULATED.3IONS-SCNC: 15 MMOL/L (ref 5–18)
ANTIBODY SCREEN: NEGATIVE
APPEARANCE UR: CLEAR
AST SERPL W P-5'-P-CCNC: 45 U/L (ref 0–40)
BILIRUB SERPL-MCNC: 1 MG/DL (ref 0–1)
BILIRUB UR QL STRIP: NEGATIVE
BUN SERPL-MCNC: 27 MG/DL (ref 8–28)
CALCIUM SERPL-MCNC: 8.5 MG/DL (ref 8.5–10.5)
CHLORIDE BLD-SCNC: 104 MMOL/L (ref 98–107)
CO2 SERPL-SCNC: 18 MMOL/L (ref 22–31)
COLOR UR AUTO: ABNORMAL
CREAT SERPL-MCNC: 1.06 MG/DL (ref 0.7–1.3)
ERYTHROCYTE [DISTWIDTH] IN BLOOD BY AUTOMATED COUNT: 13.2 % (ref 10–15)
GFR SERPL CREATININE-BSD FRML MDRD: 71 ML/MIN/1.73M2
GLUCOSE BLD-MCNC: 363 MG/DL (ref 70–125)
GLUCOSE BLDC GLUCOMTR-MCNC: 246 MG/DL (ref 70–99)
GLUCOSE UR STRIP-MCNC: >1000 MG/DL
HCT VFR BLD AUTO: 42.6 % (ref 40–53)
HGB BLD-MCNC: 13.9 G/DL (ref 13.3–17.7)
HGB UR QL STRIP: ABNORMAL
INR PPP: 2.13 (ref 0.85–1.15)
KETONES UR STRIP-MCNC: 20 MG/DL
LEUKOCYTE ESTERASE UR QL STRIP: NEGATIVE
MAGNESIUM SERPL-MCNC: 1.9 MG/DL (ref 1.8–2.6)
MCH RBC QN AUTO: 34.4 PG (ref 26.5–33)
MCHC RBC AUTO-ENTMCNC: 32.6 G/DL (ref 31.5–36.5)
MCV RBC AUTO: 105 FL (ref 78–100)
NITRATE UR QL: NEGATIVE
PH UR STRIP: 5.5 [PH] (ref 5–7)
PLATELET # BLD AUTO: 211 10E3/UL (ref 150–450)
POTASSIUM BLD-SCNC: 4.2 MMOL/L (ref 3.5–5)
PROT SERPL-MCNC: 5.9 G/DL (ref 6–8)
RBC # BLD AUTO: 4.04 10E6/UL (ref 4.4–5.9)
RBC URINE: 1 /HPF
SARS-COV-2 RNA RESP QL NAA+PROBE: NEGATIVE
SODIUM SERPL-SCNC: 137 MMOL/L (ref 136–145)
SP GR UR STRIP: 1.03 (ref 1–1.03)
SPECIMEN EXPIRATION DATE: NORMAL
SQUAMOUS EPITHELIAL: <1 /HPF
UROBILINOGEN UR STRIP-MCNC: <2 MG/DL
WBC # BLD AUTO: 11.6 10E3/UL (ref 4–11)
WBC URINE: 1 /HPF

## 2022-04-08 PROCEDURE — 96376 TX/PRO/DX INJ SAME DRUG ADON: CPT

## 2022-04-08 PROCEDURE — 85027 COMPLETE CBC AUTOMATED: CPT | Performed by: EMERGENCY MEDICINE

## 2022-04-08 PROCEDURE — 96375 TX/PRO/DX INJ NEW DRUG ADDON: CPT

## 2022-04-08 PROCEDURE — 99223 1ST HOSP IP/OBS HIGH 75: CPT | Performed by: HOSPITALIST

## 2022-04-08 PROCEDURE — C9803 HOPD COVID-19 SPEC COLLECT: HCPCS

## 2022-04-08 PROCEDURE — 250N000013 HC RX MED GY IP 250 OP 250 PS 637: Performed by: HOSPITALIST

## 2022-04-08 PROCEDURE — 73502 X-RAY EXAM HIP UNI 2-3 VIEWS: CPT

## 2022-04-08 PROCEDURE — 96374 THER/PROPH/DIAG INJ IV PUSH: CPT

## 2022-04-08 PROCEDURE — 70450 CT HEAD/BRAIN W/O DYE: CPT

## 2022-04-08 PROCEDURE — 81001 URINALYSIS AUTO W/SCOPE: CPT | Performed by: EMERGENCY MEDICINE

## 2022-04-08 PROCEDURE — 80053 COMPREHEN METABOLIC PANEL: CPT | Performed by: EMERGENCY MEDICINE

## 2022-04-08 PROCEDURE — 83735 ASSAY OF MAGNESIUM: CPT | Performed by: EMERGENCY MEDICINE

## 2022-04-08 PROCEDURE — 86901 BLOOD TYPING SEROLOGIC RH(D): CPT | Performed by: EMERGENCY MEDICINE

## 2022-04-08 PROCEDURE — 99285 EMERGENCY DEPT VISIT HI MDM: CPT | Mod: 25

## 2022-04-08 PROCEDURE — 250N000011 HC RX IP 250 OP 636: Performed by: EMERGENCY MEDICINE

## 2022-04-08 PROCEDURE — 85610 PROTHROMBIN TIME: CPT | Performed by: EMERGENCY MEDICINE

## 2022-04-08 PROCEDURE — 120N000001 HC R&B MED SURG/OB

## 2022-04-08 PROCEDURE — 271N000002 HC RX 271: Performed by: EMERGENCY MEDICINE

## 2022-04-08 PROCEDURE — 250N000009 HC RX 250: Performed by: EMERGENCY MEDICINE

## 2022-04-08 PROCEDURE — 36415 COLL VENOUS BLD VENIPUNCTURE: CPT | Performed by: EMERGENCY MEDICINE

## 2022-04-08 PROCEDURE — 87635 SARS-COV-2 COVID-19 AMP PRB: CPT | Performed by: EMERGENCY MEDICINE

## 2022-04-08 PROCEDURE — 71250 CT THORAX DX C-: CPT

## 2022-04-08 RX ORDER — NALOXONE HYDROCHLORIDE 0.4 MG/ML
0.4 INJECTION, SOLUTION INTRAMUSCULAR; INTRAVENOUS; SUBCUTANEOUS
Status: DISCONTINUED | OUTPATIENT
Start: 2022-04-08 | End: 2022-04-12 | Stop reason: HOSPADM

## 2022-04-08 RX ORDER — NALOXONE HYDROCHLORIDE 0.4 MG/ML
0.2 INJECTION, SOLUTION INTRAMUSCULAR; INTRAVENOUS; SUBCUTANEOUS
Status: DISCONTINUED | OUTPATIENT
Start: 2022-04-08 | End: 2022-04-12 | Stop reason: HOSPADM

## 2022-04-08 RX ORDER — METHYLCELLULOSE 4000CPS 30 %
POWDER (GRAM) MISCELLANEOUS
Status: COMPLETED | OUTPATIENT
Start: 2022-04-08 | End: 2022-04-08

## 2022-04-08 RX ORDER — ACETAMINOPHEN 500 MG
1000 TABLET ORAL 2 TIMES DAILY PRN
Status: ON HOLD | COMMUNITY
End: 2022-04-12

## 2022-04-08 RX ORDER — PRAMIPEXOLE DIHYDROCHLORIDE 0.25 MG/1
0.25 TABLET ORAL 2 TIMES DAILY
Status: DISCONTINUED | OUTPATIENT
Start: 2022-04-08 | End: 2022-04-12 | Stop reason: HOSPADM

## 2022-04-08 RX ORDER — MORPHINE SULFATE 4 MG/ML
4 INJECTION, SOLUTION INTRAMUSCULAR; INTRAVENOUS ONCE
Status: COMPLETED | OUTPATIENT
Start: 2022-04-08 | End: 2022-04-08

## 2022-04-08 RX ORDER — LIDOCAINE 4 G/G
1 PATCH TOPICAL EVERY 24 HOURS
Status: DISCONTINUED | OUTPATIENT
Start: 2022-04-08 | End: 2022-04-12 | Stop reason: HOSPADM

## 2022-04-08 RX ORDER — ACETAMINOPHEN 325 MG/1
650 TABLET ORAL EVERY 4 HOURS PRN
Status: DISCONTINUED | OUTPATIENT
Start: 2022-04-11 | End: 2022-04-12 | Stop reason: HOSPADM

## 2022-04-08 RX ORDER — ONDANSETRON 4 MG/1
4 TABLET, ORALLY DISINTEGRATING ORAL EVERY 6 HOURS PRN
Status: DISCONTINUED | OUTPATIENT
Start: 2022-04-08 | End: 2022-04-09

## 2022-04-08 RX ORDER — OXYCODONE AND ACETAMINOPHEN 5; 325 MG/1; MG/1
1 TABLET ORAL 3 TIMES DAILY PRN
COMMUNITY
End: 2022-04-14

## 2022-04-08 RX ORDER — LIDOCAINE 4 G/G
1 PATCH TOPICAL EVERY 24 HOURS
Status: DISCONTINUED | OUTPATIENT
Start: 2022-04-09 | End: 2022-04-09

## 2022-04-08 RX ORDER — PROCHLORPERAZINE 25 MG
12.5 SUPPOSITORY, RECTAL RECTAL EVERY 12 HOURS PRN
Status: DISCONTINUED | OUTPATIENT
Start: 2022-04-08 | End: 2022-04-09

## 2022-04-08 RX ORDER — FINASTERIDE 5 MG/1
5 TABLET, FILM COATED ORAL DAILY
Status: DISCONTINUED | OUTPATIENT
Start: 2022-04-09 | End: 2022-04-12 | Stop reason: HOSPADM

## 2022-04-08 RX ORDER — AMOXICILLIN 250 MG
1 CAPSULE ORAL 2 TIMES DAILY
Status: DISCONTINUED | OUTPATIENT
Start: 2022-04-08 | End: 2022-04-09

## 2022-04-08 RX ORDER — HYDROMORPHONE HYDROCHLORIDE 1 MG/ML
0.5 INJECTION, SOLUTION INTRAMUSCULAR; INTRAVENOUS; SUBCUTANEOUS
Status: DISCONTINUED | OUTPATIENT
Start: 2022-04-08 | End: 2022-04-09

## 2022-04-08 RX ORDER — POLYETHYLENE GLYCOL 3350 17 G/17G
17 POWDER, FOR SOLUTION ORAL DAILY
Status: DISCONTINUED | OUTPATIENT
Start: 2022-04-09 | End: 2022-04-09

## 2022-04-08 RX ORDER — GABAPENTIN 100 MG/1
100 CAPSULE ORAL AT BEDTIME
Status: DISCONTINUED | OUTPATIENT
Start: 2022-04-08 | End: 2022-04-12 | Stop reason: HOSPADM

## 2022-04-08 RX ORDER — ONDANSETRON 2 MG/ML
4 INJECTION INTRAMUSCULAR; INTRAVENOUS ONCE
Status: COMPLETED | OUTPATIENT
Start: 2022-04-08 | End: 2022-04-08

## 2022-04-08 RX ORDER — NICOTINE POLACRILEX 4 MG
15-30 LOZENGE BUCCAL
Status: DISCONTINUED | OUTPATIENT
Start: 2022-04-08 | End: 2022-04-12 | Stop reason: HOSPADM

## 2022-04-08 RX ORDER — INSULIN ASPART 100 [IU]/ML
INJECTION, SUSPENSION SUBCUTANEOUS
COMMUNITY
End: 2022-04-08

## 2022-04-08 RX ORDER — ONDANSETRON 2 MG/ML
4 INJECTION INTRAMUSCULAR; INTRAVENOUS EVERY 6 HOURS PRN
Status: DISCONTINUED | OUTPATIENT
Start: 2022-04-08 | End: 2022-04-09

## 2022-04-08 RX ORDER — LIDOCAINE 4 G/G
1 PATCH TOPICAL EVERY 24 HOURS
COMMUNITY
End: 2022-06-03

## 2022-04-08 RX ORDER — DEXTROSE MONOHYDRATE 25 G/50ML
25-50 INJECTION, SOLUTION INTRAVENOUS
Status: DISCONTINUED | OUTPATIENT
Start: 2022-04-08 | End: 2022-04-12 | Stop reason: HOSPADM

## 2022-04-08 RX ORDER — ACETAMINOPHEN 325 MG/1
975 TABLET ORAL EVERY 8 HOURS
Status: DISCONTINUED | OUTPATIENT
Start: 2022-04-08 | End: 2022-04-10

## 2022-04-08 RX ORDER — HYDROMORPHONE HYDROCHLORIDE 2 MG/1
4 TABLET ORAL EVERY 4 HOURS PRN
Status: DISCONTINUED | OUTPATIENT
Start: 2022-04-08 | End: 2022-04-09

## 2022-04-08 RX ORDER — PROCHLORPERAZINE MALEATE 5 MG
5 TABLET ORAL EVERY 6 HOURS PRN
Status: DISCONTINUED | OUTPATIENT
Start: 2022-04-08 | End: 2022-04-09

## 2022-04-08 RX ORDER — HYDROXYZINE HYDROCHLORIDE 10 MG/1
10 TABLET, FILM COATED ORAL EVERY 6 HOURS PRN
Status: DISCONTINUED | OUTPATIENT
Start: 2022-04-08 | End: 2022-04-09

## 2022-04-08 RX ORDER — CARVEDILOL 6.25 MG/1
6.25 TABLET ORAL 2 TIMES DAILY WITH MEALS
COMMUNITY
End: 2022-06-03

## 2022-04-08 RX ORDER — FUROSEMIDE 20 MG
40 TABLET ORAL
Status: DISCONTINUED | OUTPATIENT
Start: 2022-04-08 | End: 2022-04-12 | Stop reason: HOSPADM

## 2022-04-08 RX ORDER — CARVEDILOL 3.12 MG/1
6.25 TABLET ORAL 2 TIMES DAILY WITH MEALS
Status: DISCONTINUED | OUTPATIENT
Start: 2022-04-08 | End: 2022-04-12 | Stop reason: HOSPADM

## 2022-04-08 RX ADMIN — LIDOCAINE 1 PATCH: 246 PATCH TOPICAL at 23:39

## 2022-04-08 RX ADMIN — FUROSEMIDE 40 MG: 20 TABLET ORAL at 23:39

## 2022-04-08 RX ADMIN — Medication 150 MG: at 20:04

## 2022-04-08 RX ADMIN — HYDROMORPHONE HYDROCHLORIDE 0.5 MG: 1 INJECTION, SOLUTION INTRAMUSCULAR; INTRAVENOUS; SUBCUTANEOUS at 18:43

## 2022-04-08 RX ADMIN — HYDROMORPHONE HYDROCHLORIDE 0.5 MG: 1 INJECTION, SOLUTION INTRAMUSCULAR; INTRAVENOUS; SUBCUTANEOUS at 20:31

## 2022-04-08 RX ADMIN — ACETAMINOPHEN 975 MG: 325 TABLET ORAL at 23:38

## 2022-04-08 RX ADMIN — GABAPENTIN 100 MG: 100 CAPSULE ORAL at 23:39

## 2022-04-08 RX ADMIN — MORPHINE SULFATE 4 MG: 4 INJECTION INTRAVENOUS at 18:06

## 2022-04-08 RX ADMIN — DOCUSATE SODIUM 50 MG AND SENNOSIDES 8.6 MG 1 TABLET: 8.6; 5 TABLET, FILM COATED ORAL at 23:39

## 2022-04-08 RX ADMIN — CARVEDILOL 6.25 MG: 3.12 TABLET, FILM COATED ORAL at 23:39

## 2022-04-08 RX ADMIN — EPINEPHRINE BITARTRATE 3 ML: 1 POWDER at 20:03

## 2022-04-08 RX ADMIN — ONDANSETRON 4 MG: 2 INJECTION INTRAMUSCULAR; INTRAVENOUS at 18:06

## 2022-04-08 RX ADMIN — HYDROMORPHONE HYDROCHLORIDE 4 MG: 4 TABLET ORAL at 23:39

## 2022-04-08 ASSESSMENT — ACTIVITIES OF DAILY LIVING (ADL)
ADLS_ACUITY_SCORE: 11

## 2022-04-08 NOTE — ED NOTES
Bed: JNED-11  Expected date: 4/8/22  Expected time: 5:41 PM  Means of arrival: Ambulance  Comments:  [facility] 81 M fall, hip injury

## 2022-04-08 NOTE — ED PROVIDER NOTES
EMERGENCY DEPARTMENT ENCOUNTER      NAME: Pee Ayala  AGE: 81 year old male  YOB: 1940  MRN: [id number]  EVALUATION DATE & TIME: 4/8/2022  5:51 PM    PCP: Mar Morales    ED PROVIDER: Harjit Vasquez M.D.      Chief Complaint   Patient presents with     Fall         FINAL IMPRESSION:  Left hip fracture  Chronic anticoagulation  Left chest wall contusion  Scalp contusion      ED COURSE & MEDICAL DECISION MAKING:    Pertinent Labs & Imaging studies reviewed. (See chart for details)  81 year old male presents to the Emergency Department for evaluation of injuries after mechanical fall at home.  Patient evidently slipped and fell landing on his left side and striking his head.  Patient is on both Coumadin and Plavix.  Made a trauma alert on arrival.  Patient with bandage to his head this was left undisturbed.  Patient with severe left hip pain with shortening and external rotation suggestive of intertrochanteric fracture.  Moderate lower extremity edema which he relates is chronic.  Patient also with moderate left lateral chest wall tenderness.  Laboratory evaluation and imaging initiated.  Intravenous morphine given for discomfort.  Will complete the exam after imaging.. Patient appears non toxic       5:58 PM I met with the patient for the initial interview and physical examination. Discussed plan for treatment and workup in the ED.    6:40 PM.  Patient returned from CT imaging.  Increased pain.  Dilaudid 0.5 mg IV ordered  6:57 PM Reassessed patient.  Patient with small abrasion to the top of the head.LET will be applied.  Marked tenderness over the left hip area.  CT imaging the head without acute findings.  Review of CT of the chest without obvious rib fractures or contusions.  Patient reports being fully vaccinated for COVID  7:31 PM.  CT of the chest with spectated left fifth seventh and eighth rib fractures.  However tenderness is much lower.  X-ray of the hip with left subcapital fracture  with displacement.  Call will be placed to orthopedics and admitting physician.  CBC unremarkable.  INR and appropriate at 2.13.  Magnesium normal at 1.9.  Patient with moderate hyperglycemia with glucose of 363.  Alkaline phosphatase also mildly elevated.  Total bilirubin normal.  EKG reviewed earlier.  Patient with atrial fibrillation with RVR.  Rate around 100.  At the conclusion of the encounter I discussed the results of all of the tests and the disposition. The questions were answered and return precautions provided. The patient or family acknowledged understanding and was agreeable with the care plan.  7:48 PM Spoke with [facility].  Patient to be made n.p.o. after midnight.  Would like to plan for surgery tomorrow.  8:30 PM Updated patient.   8:50 PM Spoke with hospitalist, Dr. Patel.  He is agreeable with plan.    PPE: Provider wore gloves, N95 mask, eye protection, surgical cap, and paper mask.     MEDICATIONS GIVEN IN THE EMERGENCY:  Medications - No data to display    NEW PRESCRIPTIONS STARTED AT TODAY'S ER VISIT  New Prescriptions    No medications on file          =================================================================    HPI    Patient information was obtained from: Patient    Use of Intrepreter: N/A         Pee Ayala is a 81 year old male with a pertient medical history of HTN, DM type 2, HLD, GERD, CHF, A-fib, and CAD who presents to the ED via EMS for evaluation of fall.     Patient was outside today when he slipped on some rocks and subsequently fell, landing on his left side. Patient denies any loss of consciousness but did obtain a head injury. Since the incident, patient has endorsed in significant left hip pain, left sided chest wall pain, and lower back pain. The left hip pain is constant but provoked with any rotation or movement. His pain does not radiate significantly, staying localized to the left hip. His left sided chest wall is provoked with palpation.  Currently, patient endorses in musculoskeletal pain primarily to the left hip but denies any neck pain.     Patient recently had a lower back surgery in January of 2022. He reports bilateral leg swelling is normal and he is currently on warfarin and Plavix due to a history of a-fib and cardiac surgeries.      Shx: Patient lives at home with his wife.         REVIEW OF SYSTEMS   Constitutional:  Denies fever, chills  Respiratory:  Denies productive cough or increased work of breathing  Cardiovascular:  Denies chest pain, palpitations  GI:  Denies abdominal pain, nausea, vomiting, or change in bowel or bladder habits   Musculoskeletal:  Denies any new muscle/joint swelling. Positive for left hip pain, left sided chest wall pain, lower back pain. Negative for neck pain.    Skin:  Denies rash. Positive for abrasion to head.   Neurologic:  Denies focal weakness  All systems negative except as marked.     PAST MEDICAL HISTORY:  Past Medical History:   Diagnosis Date     ACS (acute coronary syndrome) (H) 6/2/2014     ACS (acute coronary syndrome) (H) 6/2/2014     Actinic keratosis      Actinic keratosis 1/14/2014     Actinic keratosis 1/14/2014     Anticoagulated on Coumadin 12/30/2015     Antiplatelet or antithrombotic long-term use      Atrial fibrillation (H)      Atrial fibrillation (H) 2016     Bone mass 4/26/2017     Chest heaviness 1/23/2019     Chest pain 5/31/2014     Closed fracture of left forearm 2015     Congestive heart failure (H)      Coronary artery disease      Diabetes (H) 2009     Diabetes mellitus (H)      Difficulty in walking(719.7)      Dyslipidemia 8/31/2016     Dyspnea on exertion      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      Encounter for long-term (current) use of insulin (H) 8/11/2016     Esophageal reflux 11/18/2010     Esophageal reflux 11/18/2010     History of angina      HTN  (hypertension) 6/30/2009     (Problem list name updated by automated process. Provider to review and confirm.)     Hyperlipidemia LDL goal <100 10/31/2010     Hypertension      Impotence of organic origin      Mixed hyperlipidemia 4/25/2007 April 25, 2007 restarted Zocor today, recheck in 3 months.  August 23, 2007 LDL at 101 with 40 mg, will increase to 80 mg. Recheck  In 3 months.      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      Type II or unspecified type diabetes mellitus with neurological manifestations, not stated as uncontrolled(250.60) (H)      Walking troubles        PAST SURGICAL HISTORY:  Past Surgical History:   Procedure Laterality Date     BYPASS GRAFT ARTERY CORONARY N/A 9/10/2014    Procedure: BYPASS GRAFT ARTERY CORONARY;  Surgeon: Bharathi Caraballo MD;  Location:  OR     BYPASS GRAFT ARTERY CORONARY  2016     COLONOSCOPY  1/14/2004     CORONARY ANGIOGRAPHY ADULT ORDER       CV CORONARY ANGIOGRAM N/A 4/4/2019    Procedure: Coronary Angiogram;  Surgeon: Dominik Vega MD;  Location: [facility] Cath Lab;  Service: Cardiology     CV CORONARY ANGIOGRAM N/A 1/6/2021    Procedure: Coronary Angiogram;  Surgeon: Dominik Vega MD;  Location: [facility] Cardiac Cath Lab;  Service: Cardiology     CV LEFT HEART CATHETERIZATION WITHOUT LEFT VENTRICULOGRAM Left 4/4/2019    Procedure: Left Heart Catheterization Without Left  Ventriculogram;  Surgeon: Dominik Vega MD;  Location: [facility] Cath Lab;  Service: Cardiology     CV LEFT HEART CATHETERIZATION WITHOUT LEFT VENTRICULOGRAM Left 1/6/2021    Procedure: Left Heart Catheterization Without Left Ventriculogram;  Surgeon: Dominik Vega MD;  Location: [facility] Cardiac Cath Lab;  Service: Cardiology     CV RIGHT HEART CATHETERIZATION Right 4/4/2019    Procedure: Right Heart Catheterization;  Surgeon: Dominik Vega MD;  Location: [facility] Cath Lab;  Service: Cardiology     CV TRANSCATHETER AORTIC VALVE REPLACEMENT N/A 1/12/2021    Procedure: Right transfemoral transcatheter aortic valve replacement using Magdaleno Dominick 3 size 29mm.  Transthoracic echocardiogram;  Surgeon: Jo Romano MD;  Location: [facility] CARDIAC CATH LAB     ESOPHAGOSCOPY, GASTROSCOPY, DUODENOSCOPY (EGD), COMBINED N/A 1/13/2016    Procedure: COMBINED ESOPHAGOSCOPY, GASTROSCOPY, DUODENOSCOPY (EGD);  Surgeon: Rk Srivastava MD;  Location: [facility] FEMORAL CANNULIZATION WITH OPEN STANDBY REPAIR AORTIC VALVE N/A 1/12/2021    Procedure: Cardiopulmonary Bypass standby;  Surgeon: Jefferson Sandy MD;  Location: [facility] CARDIAC CATH LAB     IR LOWER EXTREMITY ANGIOGRAM LEFT  12/7/2021     LAPAROSCOPIC CHOLECYSTECTOMY  10/09     MAZE PROCEDURE N/A 9/10/2014    Procedure: MAZE PROCEDURE;  Surgeon: Bharathi Caraballo MD;  Location:  OR     MOHS MICROGRAPHIC PROCEDURE       ORTHOPEDIC SURGERY      surgery for fx  Left forearm     OTHER SURGICAL HISTORY Left 2015    forearm sugery     STENT, CORONARY, HUMA  02/2016     VASECTOMY           CURRENT MEDICATIONS:    No current facility-administered medications for this encounter.    Current Outpatient Medications:      ACCU-CHEK GUIDE test strip, USE 1  TO CHECK GLUCOSE THREE TIMES DAILY, Disp: 300 strip, Rfl: 1     Apoaequorin (PREVAGEN PO), Take 1 tablet by mouth daily , Disp: , Rfl:      blood glucose monitor KIT, 1  kit 2 times daily., Disp: 1 kit, Rfl: 0     carvedilol (COREG) 6.25 MG tablet, TAKE 1 TABLET BY MOUTH TWICE DAILY WITH MEALS, Disp: , Rfl:      clopidogrel (PLAVIX) 75 MG tablet, Take 1 tablet (75 mg) by mouth daily Start taking medication the day after the procedure., Disp: 90 tablet, Rfl: 1     clotrimazole (LOTRIMIN) 1 % external cream, Apply to feet twice daily until 1 week after clinical resolution, typically for 4 weeks total, Disp: 85 g, Rfl: 1     empagliflozin (JARDIANCE) 10 MG TABS tablet, Take 1 tablet (10 mg) by mouth daily, Disp: 30 tablet, Rfl: 4     finasteride (PROSCAR) 5 MG tablet, Take 1 tablet (5 mg) by mouth daily, Disp: 90 tablet, Rfl: 3     furosemide (LASIX) 20 MG tablet, TAKE 2 TABLETS BY MOUTH IN THE MORNING AND 1 TABLET IN THE AFTERNOON, Disp: 180 tablet, Rfl: 3     HYDROcodone-acetaminophen (NORCO) 5-325 MG tablet, Take 1 tablet by mouth, Disp: , Rfl:      hydrOXYzine (ATARAX) 25 MG tablet, Take 25 mg by mouth 3 times daily as needed for itching , Disp: , Rfl:      insulin aspart prot & aspart (NOVOLOG MIX 70/30 PEN) (70-30) 100 UNIT/ML pen, Inject Subcutaneous 2 times daily as needed 32 in AM and 26 in PM, Disp: , Rfl:      insulin pen needle (B-D U/F) 31G X 8 MM, Use daily., Disp: 100 each, Rfl: prn     metFORMIN (GLUCOPHAGE) 500 MG tablet, Take 2 tablets by mouth twice daily, Disp: 360 tablet, Rfl: 0     Multiple Vitamins-Minerals (EYE VITAMINS) CAPS, Take by mouth 2 times daily, Disp: , Rfl:      ONE TOUCH ULTRASOFT LANCETS MISC, Test glucose twice daily, Disp: 3 months, Rfl: 3     pimecrolimus (ELIDEL) 1 % cream, Use around the eyes twice a day, Disp: 30 g, Rfl: 3     polyethylene glycol (MIRALAX) 17 GM/Dose powder, Take 17 g (1 capful) by mouth daily as needed for constipation (opioid induced constipation), Disp: 507 g, Rfl: 0     pramipexole (MIRAPEX) 0.25 MG tablet, Take 1 tablet (0.25 mg) by mouth 2 times daily Takes 4pm and 5;30 pm (Patient taking differently: Take 0.25 mg by  mouth 2 times daily 3 tablets daily Takes One pill at 1 pm and 2 pills at 5 pm), Disp: 180 tablet, Rfl: 3     pravastatin (PRAVACHOL) 80 MG tablet, Take 1 tablet (80 mg) by mouth At Bedtime, Disp: 90 tablet, Rfl: 0     warfarin ANTICOAGULANT (COUMADIN) 5 MG tablet, Take 1 tablet (5 mg) by mouth daily Resume your warfarin tonight 12/ after the procedure at the usual dose, Disp: 30 tablet, Rfl: 3    ALLERGIES:  Allergies   Allergen Reactions     Amlodipine Dizziness     Dizziness and dry heaves     Lisinopril Cough     Adhesive Tape Itching and Rash       FAMILY HISTORY:  Family History   Problem Relation Age of Onset     Diabetes Mother      Hypertension Father      Cerebrovascular Disease Father      Diabetes Maternal Grandmother      Breast Cancer No family hx of      Cancer - colorectal No family hx of      Prostate Cancer No family hx of      C.A.D. No family hx of      Diabetes Type 2  Mother         58 ,  from anesthesia complication.     Heart Disease Father         86  from stroke     No Known Problems Brother         60 years of age.       SOCIAL HISTORY:   Social History     Socioeconomic History     Marital status:      Spouse name: Fay Ayala     Number of children: Not on file     Years of education: Not on file     Highest education level: Not on file   Occupational History     Employer: RETIRED   Tobacco Use     Smoking status: Former Smoker     Quit date: 1998     Years since quittin.2     Smokeless tobacco: Never Used   Substance and Sexual Activity     Alcohol use: Yes     Alcohol/week: 0.0 standard drinks     Comment: Alcoholic Drinks/day: very rare     Drug use: No     Sexual activity: Never     Birth control/protection: None   Other Topics Concern     Parent/sibling w/ CABG, MI or angioplasty before 65F 55M? No   Social History Narrative     and lives with life.  Has adult children from previous relationship. ( Blended family).  Has a dog - [email or web address]    Jazzy  MD Louise  1/3/2019         Social Determinants of Health     Financial Resource Strain: Not on file   Food Insecurity: Not on file   Transportation Needs: Not on file   Physical Activity: Not on file   Stress: Not on file   Social Connections: Not on file   Intimate Partner Violence: Not on file   Housing Stability: Not on file       VITALS:  No data found.     PHYSICAL EXAM    Constitutional:  Awake, alert. Mild to moderate distress. Bandage to the head.   HENT:  Normocephalic, Atraumatic. Bilateral external ears normal. Oropharynx moist. Nose normal. Neck- Normal range of motion with no guarding, No midline cervical tenderness, Supple, No stridor.   Eyes:  PERRL, EOMI with no signs of entrapment, Conjunctiva normal, No discharge.   Respiratory:  Normal breath sounds, No respiratory distress, No wheezing.    Cardiovascular:  Normal heart rate, Normal rhythm, No appreciable rubs or gallops.   GI:  Soft, No tenderness, No distension, No palpable masses  Musculoskeletal:  Intact distal pulses, No edema. External rotation and shortening of the left hip. Lower left lateral chest wall tenderness. Clavicles stable and non tender. No neck tenderness.   Integument:  Warm, Dry, No erythema, No rash. Small abrasion to the top of his head.   Neurologic:  Alert & oriented, Normal motor function, Normal sensory function, No focal deficits noted.   Psychiatric:  Affect normal, Judgment normal, Mood normal.     LAB:  All pertinent labs reviewed and interpreted.  Results for orders placed or performed during the hospital encounter of 04/08/22   Head CT w/o contrast     Status: None    Narrative    EXAM: CT HEAD W/O CONTRAST  LOCATION: [facility]  DATE/TIME: 4/8/2022 6:30 PM    INDICATION: Head trauma, minor (Age >= 65y)  COMPARISON: None.  TECHNIQUE: Routine CT Head without IV contrast. Multiplanar reformats. Dose reduction techniques were used.    FINDINGS:  INTRACRANIAL CONTENTS: No evidence of acute  intracranial hemorrhage or mass effect. Scattered foci of decreased attenuation within the cerebral hemispheric white matter which are nonspecific, though most commonly ascribed to chronic small vessel ischemic   disease. The ventricles and sulci are prominent consistent with moderate brain parenchymal volume loss. Gray-white matter differentiation is maintained. The basilar cisterns are patent.    VISUALIZED ORBITS/SINUSES/MASTOIDS: The globes are unremarkable. The partially imaged paranasal sinuses, mastoid air cells and middle ear cavities are unremarkable.     BONES/SOFT TISSUES: The visualized skull base and calvarium are unremarkable.      Impression    IMPRESSION:    1.  No evidence of acute intracranial hemorrhage or mass effect.  2.  Mild nonspecific white matter changes.  3.  Moderate brain parenchymal volume loss.   Chest CT w/o contrast     Status: None    Narrative    EXAM: CT CHEST W/O CONTRAST  LOCATION: [facility]  DATE/TIME: 4/8/2022 6:30 PM    INDICATION: Chest trauma, minor  COMPARISON: 12/04/2020   TECHNIQUE: CT chest without IV contrast. Multiplanar reformats were obtained. Dose reduction techniques were used.  CONTRAST: None.    FINDINGS:   LUNGS AND PLEURA: Scarring in the lungs has not changed. There is prominent extrapleural fat deposition.     MEDIASTINUM/AXILLAE: A TAVR stent is present. There is a moderate sized hiatal hernia.     CORONARY ARTERY CALCIFICATION: Previous intervention (stents or CABG).    UPPER ABDOMEN: Cholecystectomy.     MUSCULOSKELETAL: Healed rib, clavicle, and humerus fractures are noted. There may be nondisplaced acute fractures of the left anterolateral fifth, seventh, and eighth ribs, though there is no adjacent soft tissue stranding or fluid. Status post median   sternotomy.       Impression    IMPRESSION:   1.  There may be nondisplaced acute fractures of the left anterolateral fifth, seventh, and eighth ribs, though there is no adjacent  soft tissue stranding or fluid. Correlate for point tenderness.   2.  Interval TAVR.   3.  No other change from the prior study.   XR Pelvis and Hip Left 2 Views     Status: None    Narrative    EXAM: XR PELVIS AND HIP LEFT 2 VIEWS  LOCATION: [facility]  DATE/TIME: 4/8/2022 7:03 PM    INDICATION: Fall, hip pain with external rotation and shortening  COMPARISON: None.      Impression    IMPRESSION: Acute moderately displaced subcapital fracture of the left femur with superior displacement of the humeral shaft. Osteopenia. Mild degenerative changes in both hips.   INR     Status: Abnormal   Result Value Ref Range    INR 2.13 (H) 0.85 - 1.15   Comprehensive metabolic panel     Status: Abnormal   Result Value Ref Range    Sodium 137 136 - 145 mmol/L    Potassium 4.2 3.5 - 5.0 mmol/L    Chloride 104 98 - 107 mmol/L    Carbon Dioxide (CO2) 18 (L) 22 - 31 mmol/L    Anion Gap 15 5 - 18 mmol/L    Urea Nitrogen 27 8 - 28 mg/dL    Creatinine 1.06 0.70 - 1.30 mg/dL    Calcium 8.5 8.5 - 10.5 mg/dL    Glucose 363 (H) 70 - 125 mg/dL    Alkaline Phosphatase 126 (H) 45 - 120 U/L    AST 45 (H) 0 - 40 U/L    ALT 45 0 - 45 U/L    Protein Total 5.9 (L) 6.0 - 8.0 g/dL    Albumin 3.2 (L) 3.5 - 5.0 g/dL    Bilirubin Total 1.0 0.0 - 1.0 mg/dL    GFR Estimate 71 >60 mL/min/1.73m2   Magnesium     Status: Normal   Result Value Ref Range    Magnesium 1.9 1.8 - 2.6 mg/dL   CBC (+ platelets, no diff)     Status: Abnormal   Result Value Ref Range    WBC Count 11.6 (H) 4.0 - 11.0 10e3/uL    RBC Count 4.04 (L) 4.40 - 5.90 10e6/uL    Hemoglobin 13.9 13.3 - 17.7 g/dL    Hematocrit 42.6 40.0 - 53.0 %     (H) 78 - 100 fL    MCH 34.4 (H) 26.5 - 33.0 pg    MCHC 32.6 31.5 - 36.5 g/dL    RDW 13.2 10.0 - 15.0 %    Platelet Count 211 150 - 450 10e3/uL         EKG:    Atrial fibrillation with RVR.  Rate of 105.  Normal QRS.  Normal ST segments.  When compared to January 19, 2022 rate has increased.  I have independently reviewed  and interpreted the EKG(s) documented above.          I, Sandra Murphy, am serving as a scribe to document services personally performed by Harjit Vasquez MD, based on my observation and the provider's statements to me. I, Harjit Vasquez MD attest that Sandra Murphy is acting in a scribe capacity, has observed my performance of the services and has documented them in accordance with my direction.    Harjit Vasquez M.D.  Emergency Medicine  [facility] EMERGENCY DEPARTMENT     Harjit Vasquez MD  04/08/22 2051

## 2022-04-08 NOTE — ED TRIAGE NOTES
Patient arrives via EMS from home with fall on concrete. Hit head and left side of body. Endorses Left rib and hip pain. Patient is on blood thinners. Trauma alert called and provider at bedside.

## 2022-04-09 ENCOUNTER — APPOINTMENT (OUTPATIENT)
Dept: RADIOLOGY | Facility: HOSPITAL | Age: 82
DRG: 522 | End: 2022-04-09
Attending: HOSPITALIST
Payer: COMMERCIAL

## 2022-04-09 ENCOUNTER — APPOINTMENT (OUTPATIENT)
Dept: RADIOLOGY | Facility: HOSPITAL | Age: 82
DRG: 522 | End: 2022-04-09
Attending: PHYSICIAN ASSISTANT
Payer: COMMERCIAL

## 2022-04-09 ENCOUNTER — ANESTHESIA EVENT (OUTPATIENT)
Dept: SURGERY | Facility: HOSPITAL | Age: 82
DRG: 522 | End: 2022-04-09
Payer: COMMERCIAL

## 2022-04-09 ENCOUNTER — ANESTHESIA (OUTPATIENT)
Dept: SURGERY | Facility: HOSPITAL | Age: 82
DRG: 522 | End: 2022-04-09
Payer: COMMERCIAL

## 2022-04-09 LAB
ANION GAP SERPL CALCULATED.3IONS-SCNC: 13 MMOL/L (ref 5–18)
BUN SERPL-MCNC: 21 MG/DL (ref 8–28)
CALCIUM SERPL-MCNC: 8.8 MG/DL (ref 8.5–10.5)
CHLORIDE BLD-SCNC: 100 MMOL/L (ref 98–107)
CO2 SERPL-SCNC: 25 MMOL/L (ref 22–31)
CREAT SERPL-MCNC: 0.97 MG/DL (ref 0.7–1.3)
GFR SERPL CREATININE-BSD FRML MDRD: 79 ML/MIN/1.73M2
GLUCOSE BLD-MCNC: 316 MG/DL (ref 70–125)
GLUCOSE BLDC GLUCOMTR-MCNC: 199 MG/DL (ref 70–99)
GLUCOSE BLDC GLUCOMTR-MCNC: 211 MG/DL (ref 70–99)
GLUCOSE BLDC GLUCOMTR-MCNC: 237 MG/DL (ref 70–99)
GLUCOSE BLDC GLUCOMTR-MCNC: 345 MG/DL (ref 70–99)
GLUCOSE BLDC GLUCOMTR-MCNC: 346 MG/DL (ref 70–99)
GLUCOSE BLDC GLUCOMTR-MCNC: 365 MG/DL (ref 70–99)
HOLD SPECIMEN: NORMAL
INR PPP: 1.4 (ref 0.85–1.15)
INR PPP: 1.62 (ref 0.85–1.15)
MAGNESIUM SERPL-MCNC: 2 MG/DL (ref 1.8–2.6)
PHOSPHATE SERPL-MCNC: 4 MG/DL (ref 2.5–4.5)
POTASSIUM BLD-SCNC: 3.9 MMOL/L (ref 3.5–5)
SODIUM SERPL-SCNC: 138 MMOL/L (ref 136–145)

## 2022-04-09 PROCEDURE — 258N000003 HC RX IP 258 OP 636: Performed by: ANESTHESIOLOGY

## 2022-04-09 PROCEDURE — 710N000009 HC RECOVERY PHASE 1, LEVEL 1, PER MIN: Performed by: ORTHOPAEDIC SURGERY

## 2022-04-09 PROCEDURE — 250N000009 HC RX 250: Performed by: NURSE ANESTHETIST, CERTIFIED REGISTERED

## 2022-04-09 PROCEDURE — 82947 ASSAY GLUCOSE BLOOD QUANT: CPT | Performed by: HOSPITALIST

## 2022-04-09 PROCEDURE — 250N000011 HC RX IP 250 OP 636: Performed by: NURSE ANESTHETIST, CERTIFIED REGISTERED

## 2022-04-09 PROCEDURE — 85610 PROTHROMBIN TIME: CPT | Performed by: PHYSICIAN ASSISTANT

## 2022-04-09 PROCEDURE — 94150 VITAL CAPACITY TEST: CPT

## 2022-04-09 PROCEDURE — 250N000013 HC RX MED GY IP 250 OP 250 PS 637: Performed by: PHYSICIAN ASSISTANT

## 2022-04-09 PROCEDURE — 250N000011 HC RX IP 250 OP 636: Performed by: ANESTHESIOLOGY

## 2022-04-09 PROCEDURE — 71045 X-RAY EXAM CHEST 1 VIEW: CPT

## 2022-04-09 PROCEDURE — 250N000013 HC RX MED GY IP 250 OP 250 PS 637: Performed by: ANESTHESIOLOGY

## 2022-04-09 PROCEDURE — 0QS70ZZ REPOSITION LEFT UPPER FEMUR, OPEN APPROACH: ICD-10-PCS | Performed by: ORTHOPAEDIC SURGERY

## 2022-04-09 PROCEDURE — 360N000077 HC SURGERY LEVEL 4, PER MIN: Performed by: ORTHOPAEDIC SURGERY

## 2022-04-09 PROCEDURE — 250N000012 HC RX MED GY IP 250 OP 636 PS 637: Performed by: HOSPITALIST

## 2022-04-09 PROCEDURE — 85610 PROTHROMBIN TIME: CPT | Performed by: HOSPITALIST

## 2022-04-09 PROCEDURE — P9041 ALBUMIN (HUMAN),5%, 50ML: HCPCS | Performed by: NURSE ANESTHETIST, CERTIFIED REGISTERED

## 2022-04-09 PROCEDURE — 36415 COLL VENOUS BLD VENIPUNCTURE: CPT | Performed by: HOSPITALIST

## 2022-04-09 PROCEDURE — C1776 JOINT DEVICE (IMPLANTABLE): HCPCS | Performed by: ORTHOPAEDIC SURGERY

## 2022-04-09 PROCEDURE — 250N000011 HC RX IP 250 OP 636: Performed by: HOSPITALIST

## 2022-04-09 PROCEDURE — 999N000157 HC STATISTIC RCP TIME EA 10 MIN

## 2022-04-09 PROCEDURE — 370N000017 HC ANESTHESIA TECHNICAL FEE, PER MIN: Performed by: ORTHOPAEDIC SURGERY

## 2022-04-09 PROCEDURE — 258N000003 HC RX IP 258 OP 636: Performed by: HOSPITALIST

## 2022-04-09 PROCEDURE — 84100 ASSAY OF PHOSPHORUS: CPT | Performed by: HOSPITALIST

## 2022-04-09 PROCEDURE — 83735 ASSAY OF MAGNESIUM: CPT | Performed by: HOSPITALIST

## 2022-04-09 PROCEDURE — 258N000003 HC RX IP 258 OP 636: Performed by: NURSE ANESTHETIST, CERTIFIED REGISTERED

## 2022-04-09 PROCEDURE — 120N000001 HC R&B MED SURG/OB

## 2022-04-09 PROCEDURE — 999N000141 HC STATISTIC PRE-PROCEDURE NURSING ASSESSMENT: Performed by: ORTHOPAEDIC SURGERY

## 2022-04-09 PROCEDURE — 250N000025 HC SEVOFLURANE, PER MIN: Performed by: ORTHOPAEDIC SURGERY

## 2022-04-09 PROCEDURE — 36415 COLL VENOUS BLD VENIPUNCTURE: CPT | Performed by: PHYSICIAN ASSISTANT

## 2022-04-09 PROCEDURE — 0SRS019 REPLACEMENT OF LEFT HIP JOINT, FEMORAL SURFACE WITH METAL SYNTHETIC SUBSTITUTE, CEMENTED, OPEN APPROACH: ICD-10-PCS | Performed by: ORTHOPAEDIC SURGERY

## 2022-04-09 PROCEDURE — 94150 VITAL CAPACITY TEST: CPT | Performed by: PEDIATRICS

## 2022-04-09 PROCEDURE — 250N000011 HC RX IP 250 OP 636: Performed by: PHYSICIAN ASSISTANT

## 2022-04-09 PROCEDURE — 73502 X-RAY EXAM HIP UNI 2-3 VIEWS: CPT

## 2022-04-09 PROCEDURE — 272N000001 HC OR GENERAL SUPPLY STERILE: Performed by: ORTHOPAEDIC SURGERY

## 2022-04-09 PROCEDURE — 99232 SBSQ HOSP IP/OBS MODERATE 35: CPT | Performed by: STUDENT IN AN ORGANIZED HEALTH CARE EDUCATION/TRAINING PROGRAM

## 2022-04-09 PROCEDURE — 250N000013 HC RX MED GY IP 250 OP 250 PS 637: Performed by: HOSPITALIST

## 2022-04-09 PROCEDURE — 278N000051 HC OR IMPLANT GENERAL: Performed by: ORTHOPAEDIC SURGERY

## 2022-04-09 PROCEDURE — 258N000003 HC RX IP 258 OP 636: Performed by: PHYSICIAN ASSISTANT

## 2022-04-09 DEVICE — SUMMIT FEMORAL STEM 12/14 TAPER CEMENTED SIZE 4 STD 114MM
Type: IMPLANTABLE DEVICE | Site: HIP | Status: FUNCTIONAL
Brand: SUMMIT

## 2022-04-09 DEVICE — SELF CENTERING BI-POLAR HEAD 28MM ID 48MM OD
Type: IMPLANTABLE DEVICE | Site: HIP | Status: FUNCTIONAL
Brand: SELF CENTERING

## 2022-04-09 DEVICE — CEMENTRALIZER STEM CENTRALIZER 9.25MM CEMENTED
Type: IMPLANTABLE DEVICE | Site: HIP | Status: FUNCTIONAL
Brand: CEMENTRALIZER

## 2022-04-09 DEVICE — 25MM BUCK CEMENT PLUG WITH                                    DISPOSABLE INSERTER
Type: IMPLANTABLE DEVICE | Site: HIP | Status: FUNCTIONAL
Brand: BUCK

## 2022-04-09 DEVICE — ARTICUL/EZE FEMORAL HEAD DIAMETER 28MM +5 12/14 TAPER
Type: IMPLANTABLE DEVICE | Site: HIP | Status: FUNCTIONAL
Brand: ARTICUL/EZE

## 2022-04-09 DEVICE — FULL DOSE BONE CEMENT, 10 PACK CATALOG NUMBER IS 6191-1-010
Type: IMPLANTABLE DEVICE | Site: HIP | Status: FUNCTIONAL
Brand: SIMPLEX

## 2022-04-09 RX ORDER — ONDANSETRON 4 MG/1
4 TABLET, ORALLY DISINTEGRATING ORAL EVERY 30 MIN PRN
Status: DISCONTINUED | OUTPATIENT
Start: 2022-04-09 | End: 2022-04-09 | Stop reason: HOSPADM

## 2022-04-09 RX ORDER — KETAMINE HYDROCHLORIDE 50 MG/ML
INJECTION, SOLUTION INTRAMUSCULAR; INTRAVENOUS PRN
Status: DISCONTINUED | OUTPATIENT
Start: 2022-04-09 | End: 2022-04-09

## 2022-04-09 RX ORDER — ONDANSETRON 2 MG/ML
INJECTION INTRAMUSCULAR; INTRAVENOUS PRN
Status: DISCONTINUED | OUTPATIENT
Start: 2022-04-09 | End: 2022-04-09

## 2022-04-09 RX ORDER — SODIUM CHLORIDE, SODIUM LACTATE, POTASSIUM CHLORIDE, CALCIUM CHLORIDE 600; 310; 30; 20 MG/100ML; MG/100ML; MG/100ML; MG/100ML
INJECTION, SOLUTION INTRAVENOUS CONTINUOUS
Status: DISCONTINUED | OUTPATIENT
Start: 2022-04-09 | End: 2022-04-09 | Stop reason: HOSPADM

## 2022-04-09 RX ORDER — CEFAZOLIN SODIUM/WATER 2 G/20 ML
2 SYRINGE (ML) INTRAVENOUS
Status: DISCONTINUED | OUTPATIENT
Start: 2022-04-09 | End: 2022-04-09 | Stop reason: HOSPADM

## 2022-04-09 RX ORDER — AMOXICILLIN 250 MG
1 CAPSULE ORAL 2 TIMES DAILY
Status: DISCONTINUED | OUTPATIENT
Start: 2022-04-09 | End: 2022-04-12 | Stop reason: HOSPADM

## 2022-04-09 RX ORDER — FENTANYL CITRATE 50 UG/ML
25 INJECTION, SOLUTION INTRAMUSCULAR; INTRAVENOUS
Status: DISCONTINUED | OUTPATIENT
Start: 2022-04-09 | End: 2022-04-09 | Stop reason: HOSPADM

## 2022-04-09 RX ORDER — ONDANSETRON 2 MG/ML
4 INJECTION INTRAMUSCULAR; INTRAVENOUS EVERY 6 HOURS PRN
Status: DISCONTINUED | OUTPATIENT
Start: 2022-04-09 | End: 2022-04-12 | Stop reason: HOSPADM

## 2022-04-09 RX ORDER — PROCHLORPERAZINE MALEATE 5 MG
5 TABLET ORAL EVERY 6 HOURS PRN
Status: DISCONTINUED | OUTPATIENT
Start: 2022-04-09 | End: 2022-04-12 | Stop reason: HOSPADM

## 2022-04-09 RX ORDER — ALBUMIN, HUMAN INJ 5% 5 %
SOLUTION INTRAVENOUS CONTINUOUS PRN
Status: DISCONTINUED | OUTPATIENT
Start: 2022-04-09 | End: 2022-04-09

## 2022-04-09 RX ORDER — LIDOCAINE 40 MG/G
CREAM TOPICAL
Status: DISCONTINUED | OUTPATIENT
Start: 2022-04-09 | End: 2022-04-12 | Stop reason: HOSPADM

## 2022-04-09 RX ORDER — LIDOCAINE HYDROCHLORIDE 20 MG/ML
INJECTION, SOLUTION INFILTRATION; PERINEURAL PRN
Status: DISCONTINUED | OUTPATIENT
Start: 2022-04-09 | End: 2022-04-09

## 2022-04-09 RX ORDER — ACETAMINOPHEN 325 MG/1
975 TABLET ORAL EVERY 8 HOURS
Status: DISCONTINUED | OUTPATIENT
Start: 2022-04-09 | End: 2022-04-12

## 2022-04-09 RX ORDER — OXYCODONE HYDROCHLORIDE 5 MG/1
5 TABLET ORAL EVERY 4 HOURS PRN
Status: DISCONTINUED | OUTPATIENT
Start: 2022-04-09 | End: 2022-04-12 | Stop reason: HOSPADM

## 2022-04-09 RX ORDER — OXYCODONE HYDROCHLORIDE 5 MG/1
10 TABLET ORAL EVERY 4 HOURS PRN
Status: DISCONTINUED | OUTPATIENT
Start: 2022-04-09 | End: 2022-04-12 | Stop reason: HOSPADM

## 2022-04-09 RX ORDER — BISACODYL 10 MG
10 SUPPOSITORY, RECTAL RECTAL DAILY PRN
Status: DISCONTINUED | OUTPATIENT
Start: 2022-04-09 | End: 2022-04-12 | Stop reason: HOSPADM

## 2022-04-09 RX ORDER — FENTANYL CITRATE 50 UG/ML
25 INJECTION, SOLUTION INTRAMUSCULAR; INTRAVENOUS EVERY 5 MIN PRN
Status: DISCONTINUED | OUTPATIENT
Start: 2022-04-09 | End: 2022-04-09 | Stop reason: HOSPADM

## 2022-04-09 RX ORDER — ONDANSETRON 2 MG/ML
4 INJECTION INTRAMUSCULAR; INTRAVENOUS EVERY 30 MIN PRN
Status: DISCONTINUED | OUTPATIENT
Start: 2022-04-09 | End: 2022-04-09 | Stop reason: HOSPADM

## 2022-04-09 RX ORDER — ONDANSETRON 4 MG/1
4 TABLET, ORALLY DISINTEGRATING ORAL EVERY 6 HOURS PRN
Status: DISCONTINUED | OUTPATIENT
Start: 2022-04-09 | End: 2022-04-12 | Stop reason: HOSPADM

## 2022-04-09 RX ORDER — FENTANYL CITRATE 50 UG/ML
INJECTION, SOLUTION INTRAMUSCULAR; INTRAVENOUS PRN
Status: DISCONTINUED | OUTPATIENT
Start: 2022-04-09 | End: 2022-04-09

## 2022-04-09 RX ORDER — HYDROXYZINE HYDROCHLORIDE 10 MG/1
10 TABLET, FILM COATED ORAL EVERY 6 HOURS PRN
Status: DISCONTINUED | OUTPATIENT
Start: 2022-04-09 | End: 2022-04-12 | Stop reason: HOSPADM

## 2022-04-09 RX ORDER — OXYCODONE HYDROCHLORIDE 5 MG/1
5 TABLET ORAL EVERY 4 HOURS PRN
Status: DISCONTINUED | OUTPATIENT
Start: 2022-04-09 | End: 2022-04-09 | Stop reason: HOSPADM

## 2022-04-09 RX ORDER — HYDROMORPHONE HYDROCHLORIDE 1 MG/ML
0.2 INJECTION, SOLUTION INTRAMUSCULAR; INTRAVENOUS; SUBCUTANEOUS
Status: DISCONTINUED | OUTPATIENT
Start: 2022-04-09 | End: 2022-04-12 | Stop reason: HOSPADM

## 2022-04-09 RX ORDER — CEFAZOLIN SODIUM/WATER 2 G/20 ML
2 SYRINGE (ML) INTRAVENOUS SEE ADMIN INSTRUCTIONS
Status: DISCONTINUED | OUTPATIENT
Start: 2022-04-09 | End: 2022-04-09 | Stop reason: HOSPADM

## 2022-04-09 RX ORDER — LIDOCAINE 40 MG/G
CREAM TOPICAL
Status: DISCONTINUED | OUTPATIENT
Start: 2022-04-09 | End: 2022-04-09 | Stop reason: HOSPADM

## 2022-04-09 RX ORDER — PROPOFOL 10 MG/ML
INJECTION, EMULSION INTRAVENOUS PRN
Status: DISCONTINUED | OUTPATIENT
Start: 2022-04-09 | End: 2022-04-09

## 2022-04-09 RX ORDER — SODIUM CHLORIDE, SODIUM LACTATE, POTASSIUM CHLORIDE, CALCIUM CHLORIDE 600; 310; 30; 20 MG/100ML; MG/100ML; MG/100ML; MG/100ML
INJECTION, SOLUTION INTRAVENOUS CONTINUOUS
Status: DISCONTINUED | OUTPATIENT
Start: 2022-04-09 | End: 2022-04-10

## 2022-04-09 RX ORDER — ACETAMINOPHEN 325 MG/1
650 TABLET ORAL EVERY 4 HOURS PRN
Status: DISCONTINUED | OUTPATIENT
Start: 2022-04-12 | End: 2022-04-12 | Stop reason: HOSPADM

## 2022-04-09 RX ORDER — CEFAZOLIN SODIUM 1 G/3ML
1 INJECTION, POWDER, FOR SOLUTION INTRAMUSCULAR; INTRAVENOUS EVERY 8 HOURS
Status: COMPLETED | OUTPATIENT
Start: 2022-04-09 | End: 2022-04-10

## 2022-04-09 RX ORDER — POLYETHYLENE GLYCOL 3350 17 G/17G
17 POWDER, FOR SOLUTION ORAL DAILY
Status: DISCONTINUED | OUTPATIENT
Start: 2022-04-10 | End: 2022-04-12 | Stop reason: HOSPADM

## 2022-04-09 RX ORDER — HYDROMORPHONE HYDROCHLORIDE 1 MG/ML
0.4 INJECTION, SOLUTION INTRAMUSCULAR; INTRAVENOUS; SUBCUTANEOUS
Status: DISCONTINUED | OUTPATIENT
Start: 2022-04-09 | End: 2022-04-12 | Stop reason: HOSPADM

## 2022-04-09 RX ORDER — DEXAMETHASONE SODIUM PHOSPHATE 4 MG/ML
INJECTION, SOLUTION INTRA-ARTICULAR; INTRALESIONAL; INTRAMUSCULAR; INTRAVENOUS; SOFT TISSUE PRN
Status: DISCONTINUED | OUTPATIENT
Start: 2022-04-09 | End: 2022-04-09

## 2022-04-09 RX ORDER — CEFAZOLIN SODIUM 1 G/3ML
INJECTION, POWDER, FOR SOLUTION INTRAMUSCULAR; INTRAVENOUS PRN
Status: DISCONTINUED | OUTPATIENT
Start: 2022-04-09 | End: 2022-04-09

## 2022-04-09 RX ORDER — MEPERIDINE HYDROCHLORIDE 25 MG/ML
12.5 INJECTION INTRAMUSCULAR; INTRAVENOUS; SUBCUTANEOUS
Status: DISCONTINUED | OUTPATIENT
Start: 2022-04-09 | End: 2022-04-09 | Stop reason: HOSPADM

## 2022-04-09 RX ADMIN — INSULIN ASPART 2 UNITS: 100 INJECTION, SOLUTION INTRAVENOUS; SUBCUTANEOUS at 08:53

## 2022-04-09 RX ADMIN — FENTANYL CITRATE 25 MCG: 50 INJECTION, SOLUTION INTRAMUSCULAR; INTRAVENOUS at 14:14

## 2022-04-09 RX ADMIN — Medication 1 UNITS: at 12:44

## 2022-04-09 RX ADMIN — SODIUM CHLORIDE, POTASSIUM CHLORIDE, SODIUM LACTATE AND CALCIUM CHLORIDE: 600; 310; 30; 20 INJECTION, SOLUTION INTRAVENOUS at 16:00

## 2022-04-09 RX ADMIN — CEFAZOLIN 1 G: 1 INJECTION, POWDER, FOR SOLUTION INTRAMUSCULAR; INTRAVENOUS at 18:57

## 2022-04-09 RX ADMIN — Medication 0.5 UNITS: at 11:27

## 2022-04-09 RX ADMIN — FENTANYL CITRATE 25 MCG: 50 INJECTION, SOLUTION INTRAMUSCULAR; INTRAVENOUS at 14:40

## 2022-04-09 RX ADMIN — Medication 0.5 UNITS: at 11:48

## 2022-04-09 RX ADMIN — CARVEDILOL 6.25 MG: 3.12 TABLET, FILM COATED ORAL at 08:44

## 2022-04-09 RX ADMIN — FUROSEMIDE 40 MG: 20 TABLET ORAL at 18:25

## 2022-04-09 RX ADMIN — INSULIN ASPART 5 UNITS: 100 INJECTION, SOLUTION INTRAVENOUS; SUBCUTANEOUS at 21:14

## 2022-04-09 RX ADMIN — PHENYLEPHRINE HYDROCHLORIDE 100 MCG: 10 INJECTION INTRAVENOUS at 12:24

## 2022-04-09 RX ADMIN — LIDOCAINE HYDROCHLORIDE 60 MG: 20 INJECTION, SOLUTION INFILTRATION; PERINEURAL at 10:52

## 2022-04-09 RX ADMIN — KETAMINE HYDROCHLORIDE 50 MG: 50 INJECTION, SOLUTION INTRAMUSCULAR; INTRAVENOUS at 10:52

## 2022-04-09 RX ADMIN — PHENYLEPHRINE HYDROCHLORIDE 100 MCG: 10 INJECTION INTRAVENOUS at 12:22

## 2022-04-09 RX ADMIN — OXYCODONE HYDROCHLORIDE 5 MG: 5 TABLET ORAL at 22:47

## 2022-04-09 RX ADMIN — CEFAZOLIN 2 G: 1 INJECTION, POWDER, FOR SOLUTION INTRAMUSCULAR; INTRAVENOUS at 10:48

## 2022-04-09 RX ADMIN — PHENYLEPHRINE HYDROCHLORIDE 200 MCG: 10 INJECTION INTRAVENOUS at 11:24

## 2022-04-09 RX ADMIN — DEXAMETHASONE SODIUM PHOSPHATE 4 MG: 4 INJECTION, SOLUTION INTRA-ARTICULAR; INTRALESIONAL; INTRAMUSCULAR; INTRAVENOUS; SOFT TISSUE at 10:52

## 2022-04-09 RX ADMIN — Medication 0.5 UNITS: at 12:02

## 2022-04-09 RX ADMIN — FENTANYL CITRATE 25 MCG: 50 INJECTION, SOLUTION INTRAMUSCULAR; INTRAVENOUS at 13:55

## 2022-04-09 RX ADMIN — FENTANYL CITRATE 25 MCG: 50 INJECTION, SOLUTION INTRAMUSCULAR; INTRAVENOUS at 14:23

## 2022-04-09 RX ADMIN — ALBUMIN HUMAN: 0.05 INJECTION, SOLUTION INTRAVENOUS at 11:24

## 2022-04-09 RX ADMIN — OXYCODONE HYDROCHLORIDE 5 MG: 5 TABLET ORAL at 14:45

## 2022-04-09 RX ADMIN — FENTANYL CITRATE 25 MCG: 50 INJECTION, SOLUTION INTRAMUSCULAR; INTRAVENOUS at 14:35

## 2022-04-09 RX ADMIN — HYDROMORPHONE HYDROCHLORIDE 0.5 MG: 1 INJECTION, SOLUTION INTRAMUSCULAR; INTRAVENOUS; SUBCUTANEOUS at 08:10

## 2022-04-09 RX ADMIN — PRAMIPEXOLE DIHYDROCHLORIDE 0.25 MG: 0.25 TABLET ORAL at 00:42

## 2022-04-09 RX ADMIN — SUGAMMADEX 200 MG: 100 INJECTION, SOLUTION INTRAVENOUS at 13:09

## 2022-04-09 RX ADMIN — ACETAMINOPHEN 975 MG: 325 TABLET ORAL at 22:47

## 2022-04-09 RX ADMIN — SENNOSIDES AND DOCUSATE SODIUM 1 TABLET: 8.6; 5 TABLET ORAL at 21:05

## 2022-04-09 RX ADMIN — PHENYLEPHRINE HYDROCHLORIDE 100 MCG: 10 INJECTION INTRAVENOUS at 11:02

## 2022-04-09 RX ADMIN — HYDROMORPHONE HYDROCHLORIDE 0.5 MG: 1 INJECTION, SOLUTION INTRAMUSCULAR; INTRAVENOUS; SUBCUTANEOUS at 04:51

## 2022-04-09 RX ADMIN — ALBUMIN HUMAN: 0.05 INJECTION, SOLUTION INTRAVENOUS at 11:07

## 2022-04-09 RX ADMIN — PHYTONADIONE 2 MG: 10 INJECTION, EMULSION INTRAMUSCULAR; INTRAVENOUS; SUBCUTANEOUS at 00:42

## 2022-04-09 RX ADMIN — GABAPENTIN 100 MG: 100 CAPSULE ORAL at 21:05

## 2022-04-09 RX ADMIN — PHENYLEPHRINE HYDROCHLORIDE 100 MCG: 10 INJECTION INTRAVENOUS at 11:06

## 2022-04-09 RX ADMIN — CARVEDILOL 6.25 MG: 3.12 TABLET, FILM COATED ORAL at 18:27

## 2022-04-09 RX ADMIN — FENTANYL CITRATE 25 MCG: 50 INJECTION, SOLUTION INTRAMUSCULAR; INTRAVENOUS at 14:03

## 2022-04-09 RX ADMIN — PRAMIPEXOLE DIHYDROCHLORIDE 0.25 MG: 0.25 TABLET ORAL at 21:05

## 2022-04-09 RX ADMIN — PHENYLEPHRINE HYDROCHLORIDE 200 MCG: 10 INJECTION INTRAVENOUS at 11:15

## 2022-04-09 RX ADMIN — FENTANYL CITRATE 25 MCG: 50 INJECTION, SOLUTION INTRAMUSCULAR; INTRAVENOUS at 13:43

## 2022-04-09 RX ADMIN — OXYCODONE HYDROCHLORIDE 5 MG: 5 TABLET ORAL at 18:25

## 2022-04-09 RX ADMIN — PHENYLEPHRINE HYDROCHLORIDE 0.4 MCG/KG/MIN: 10 INJECTION INTRAVENOUS at 10:52

## 2022-04-09 RX ADMIN — INSULIN ASPART 5 UNITS: 100 INJECTION, SOLUTION INTRAVENOUS; SUBCUTANEOUS at 04:51

## 2022-04-09 RX ADMIN — PHENYLEPHRINE HYDROCHLORIDE 100 MCG: 10 INJECTION INTRAVENOUS at 10:52

## 2022-04-09 RX ADMIN — VASOPRESSIN 1 UNITS/HR: 20 INJECTION INTRAVENOUS at 12:27

## 2022-04-09 RX ADMIN — Medication 0.5 UNITS: at 11:29

## 2022-04-09 RX ADMIN — SODIUM CHLORIDE, POTASSIUM CHLORIDE, SODIUM LACTATE AND CALCIUM CHLORIDE: 600; 310; 30; 20 INJECTION, SOLUTION INTRAVENOUS at 12:10

## 2022-04-09 RX ADMIN — ONDANSETRON 4 MG: 2 INJECTION INTRAMUSCULAR; INTRAVENOUS at 12:52

## 2022-04-09 RX ADMIN — FENTANYL CITRATE 100 MCG: 50 INJECTION, SOLUTION INTRAMUSCULAR; INTRAVENOUS at 10:41

## 2022-04-09 RX ADMIN — ROCURONIUM BROMIDE 50 MG: 50 INJECTION, SOLUTION INTRAVENOUS at 10:52

## 2022-04-09 RX ADMIN — ACETAMINOPHEN 975 MG: 325 TABLET ORAL at 18:25

## 2022-04-09 RX ADMIN — Medication 1 UNITS: at 12:26

## 2022-04-09 RX ADMIN — SODIUM CHLORIDE, POTASSIUM CHLORIDE, SODIUM LACTATE AND CALCIUM CHLORIDE: 600; 310; 30; 20 INJECTION, SOLUTION INTRAVENOUS at 09:36

## 2022-04-09 RX ADMIN — FENTANYL CITRATE 25 MCG: 50 INJECTION, SOLUTION INTRAMUSCULAR; INTRAVENOUS at 13:49

## 2022-04-09 RX ADMIN — PROPOFOL 60 MG: 10 INJECTION, EMULSION INTRAVENOUS at 10:52

## 2022-04-09 ASSESSMENT — ACTIVITIES OF DAILY LIVING (ADL)
ADLS_ACUITY_SCORE: 13
ADLS_ACUITY_SCORE: 13
ADLS_ACUITY_SCORE: 8
ADLS_ACUITY_SCORE: 13
VISION_MANAGEMENT: GLASSES
ADLS_ACUITY_SCORE: 8
ADLS_ACUITY_SCORE: 8
ADLS_ACUITY_SCORE: 13
ADLS_ACUITY_SCORE: 13
ADLS_ACUITY_SCORE: 15
ADLS_ACUITY_SCORE: 11
WALKING_OR_CLIMBING_STAIRS: OTHER (SEE COMMENTS)
ADLS_ACUITY_SCORE: 13
ADLS_ACUITY_SCORE: 13
WALKING_OR_CLIMBING_STAIRS_DIFFICULTY: YES
ADLS_ACUITY_SCORE: 13
ADLS_ACUITY_SCORE: 13
CONCENTRATING,_REMEMBERING_OR_MAKING_DECISIONS_DIFFICULTY: NO
ADLS_ACUITY_SCORE: 13
ADLS_ACUITY_SCORE: 13
ADLS_ACUITY_SCORE: 15
ADLS_ACUITY_SCORE: 11
ADLS_ACUITY_SCORE: 13
DRESSING/BATHING_DIFFICULTY: NO
CHANGE_IN_FUNCTIONAL_STATUS_SINCE_ONSET_OF_CURRENT_ILLNESS/INJURY: YES
DIFFICULTY_EATING/SWALLOWING: NO
ADLS_ACUITY_SCORE: 11
ADLS_ACUITY_SCORE: 13
TOILETING_ISSUES: NO
EQUIPMENT_CURRENTLY_USED_AT_HOME: CANE, STRAIGHT
ADLS_ACUITY_SCORE: 13
ADLS_ACUITY_SCORE: 8
FALL_HISTORY_WITHIN_LAST_SIX_MONTHS: NO
ADLS_ACUITY_SCORE: 15
WEAR_GLASSES_OR_BLIND: YES
DOING_ERRANDS_INDEPENDENTLY_DIFFICULTY: NO

## 2022-04-09 ASSESSMENT — ENCOUNTER SYMPTOMS: DYSRHYTHMIAS: 1

## 2022-04-09 NOTE — TREATMENT PLAN
Consult received and imaging reviewed. In brief, this is a 81 y M with a left displaced femoral neck fracture. Will tentatively plan for hemiarthroplasty in the AM, pending INR reversal and medical clearance.    -Bedrest, lopez  -NPO at midnight  -Reverse INR per medical team  -Type and screen  -Plan for left hip hemiarthroplasty at 10 AM if medically optimized and INR reversed    Full consult to follow in AM    ISABELLA CASTILLO MD

## 2022-04-09 NOTE — PLAN OF CARE
Problem: Plan of Care - These are the overarching goals to be used throughout the patient stay.    Goal: Absence of Hospital-Acquired Illness or Injury  Outcome: Ongoing, Progressing  Intervention: Identify and Manage Fall Risk  Recent Flowsheet Documentation  Taken 4/8/2022 2300 by Bernard Johnston RN  Safety Promotion/Fall Prevention:   bed alarm on   assistive device/personal items within reach  Intervention: Prevent Skin Injury  Recent Flowsheet Documentation  Taken 4/8/2022 2300 by Bernard Johnston RN  Body Position: refuses positioning  Intervention: Prevent and Manage VTE (Venous Thromboembolism) Risk  Recent Flowsheet Documentation  Taken 4/8/2022 2300 by Bernard Johnston RN  Activity Management: bedrest   Goal Outcome Evaluation:

## 2022-04-09 NOTE — ED NOTES
[facility] ED Handoff Report    ED Chief Complaint:     ED Diagnosis:  (I25.10) Coronary artery disease involving native coronary artery of native heart without angina pectoris  (primary encounter diagnosis)  Comment:   Plan: Medication Therapy Management Referral            (R29.6) Falls frequently  Comment:   Plan: Medication Therapy Management Referral              (Z98.890) Status post coronary angiogram  Comment:   Plan: Medication Therapy Management Referral            (S72.002A) Fracture of hip, left, closed, initial encounter (H)  Comment:   Plan:     (Z79.01) Chronic anticoagulation  Comment:  Plan:     (I48.20) Chronic atrial fibrillation (H)  Comment:   Plan:        PMH:    Past Medical History:   Diagnosis Date     ACS (acute coronary syndrome) (H) 6/2/2014     ACS (acute coronary syndrome) (H) 6/2/2014     Actinic keratosis      Actinic keratosis 1/14/2014     Actinic keratosis 1/14/2014     Anticoagulated on Coumadin 12/30/2015     Antiplatelet or antithrombotic long-term use      Atrial fibrillation (H)      Atrial fibrillation (H) 2016     Bone mass 4/26/2017     Chest heaviness 1/23/2019     Chest pain 5/31/2014     Closed fracture of left forearm 2015     Congestive heart failure (H)      Coronary artery disease      Diabetes (H) 2009     Diabetes mellitus (H)      Difficulty in walking(719.7)      Dyslipidemia 8/31/2016     Dyspnea on exertion      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      Encounter for long-term (current) use of insulin (H) 8/11/2016     Esophageal reflux 11/18/2010     Esophageal reflux 11/18/2010     History of angina      HTN (hypertension) 6/30/2009     (Problem list name updated by automated process. Provider to review and confirm.)     Hyperlipidemia LDL goal <100 10/31/2010     Hypertension      Impotence of organic origin      Mixed hyperlipidemia  4/25/2007 April 25, 2007 restarted Zocor today, recheck in 3 months.  August 23, 2007 LDL at 101 with 40 mg, will increase to 80 mg. Recheck  In 3 months.      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      Type II or unspecified type diabetes mellitus with neurological manifestations, not stated as uncontrolled(250.60) (H)      Walking troubles         Code Status:  Prior     Falls Risk: Yes Band: Applied    Current Living Situation/Residence: lives with a significant other     Elimination Status: Continent: Yes     Activity Level: Total assist/lift    Patients Preferred Language:  English     Needed: No    Vital Signs:  BP (!) 148/97 (BP Location: Left arm)   Pulse 108   Temp 97.2  F (36.2  C) (Oral)   Resp 20   Wt 78 kg (172 lb)   SpO2 99%   BMI 28.62 kg/m       Cardiac Rhythm: A-fib    Pain Score: 4/10    Is the Patient Confused:  No    Last Food or Drink: Breakfast this morning     Focused Assessment:  Fell today on concrete. Hit head and left side of body. Laceration to head, abrasions on left elbow and left rib and hip pain.     Tests Performed: Done: Labs and Imaging    Treatments Provided:  Cleansed wounds, IV pain meds    Family Dynamics/Concerns: No    Family Updated On Visitor Policy: Yes    Plan of Care Communicated to Family: Yes    Who Was Updated about Plan of Care: Patient and wife    Belongings Checklist Done and Signed by Patient:  Yes    Medications sent with patient: None    Covid: asymptomatic , Sent - pending     Additional Information:     RN: Ratna Newton 4/8/2022 9:29 PM

## 2022-04-09 NOTE — ANESTHESIA PROCEDURE NOTES
Airway       Patient location during procedure: OR       Procedure Start/Stop Times: 4/9/2022 10:54 AM  Staff -        Anesthesiologist:  Israel Lindo MD       CRNA: Valeriy Michele APRN CRNA       Performed By: CRNAIndications and Patient Condition       Indications for airway management: livan-procedural       Induction type:intravenous       Mask difficulty assessment: 1 - vent by mask    Final Airway Details       Final airway type: endotracheal airway       Successful airway: ETT - single  Endotracheal Airway Details        ETT size (mm): 7.5       Cuffed: yes       Successful intubation technique: direct laryngoscopy       DL Blade Type: Avelar 2       Grade View of Cords: 1       Adjucts: stylet and tooth guard       Position: Right       Measured from: lips       Secured at (cm): 23       Bite block used: None    Post intubation assessment        Placement verified by: capnometry, equal breath sounds and chest rise        Number of attempts at approach: 1       Number of other approaches attempted: 0       Secured with: silk tape       Ease of procedure: easy       Dentition: Intact and Unchanged

## 2022-04-09 NOTE — ANESTHESIA POSTPROCEDURE EVALUATION
Patient: Pee Ayala    Procedure: Procedure(s):  HEMIARTHROPLASTY, HIP, BIPOLAR, OPEN REDUCTION INTERNAL FIXATION OF GREATER TROCHANTER       Anesthesia Type:  General    Note:  Disposition: Inpatient   Postop Pain Control: Uneventful            Sign Out: Well controlled pain   PONV: No   Neuro/Psych: Uneventful            Sign Out: Acceptable/Baseline neuro status   Airway/Respiratory: Uneventful            Sign Out: Acceptable/Baseline resp. status   CV/Hemodynamics: Uneventful            Sign Out: Acceptable CV status; No obvious hypovolemia; No obvious fluid overload   Other NRE: NONE   DID A NON-ROUTINE EVENT OCCUR? No    Event details/Postop Comments:      Intraop hypotension, now improved postoperatively; Mild hyperglycemia but near levels on floor           Last vitals:  Vitals Value Taken Time   /54 04/09/22 1430   Temp 36.4  C (97.5  F) 04/09/22 1428   Pulse 80 04/09/22 1440   Resp 18 04/09/22 1440   SpO2 100 % 04/09/22 1440   Vitals shown include unvalidated device data.    Electronically Signed By: Israel Lindo MD  April 9, 2022  2:41 PM

## 2022-04-09 NOTE — ANESTHESIA CARE TRANSFER NOTE
Patient: Pee Ayala    Procedure: Procedure(s):  HEMIARTHROPLASTY, HIP, BIPOLAR, OPEN REDUCTION INTERNAL FIXATION OF GREATER TROCHANTER       Diagnosis: Closed displaced fracture of left femoral neck with routine healing [S72.002D]  Diagnosis Additional Information: No value filed.    Anesthesia Type:   General     Note:    Oropharynx: oropharynx clear of all foreign objects and spontaneously breathing  Level of Consciousness: drowsy  Oxygen Supplementation: face mask  Level of Supplemental Oxygen (L/min / FiO2): 6  Independent Airway: airway patency satisfactory and stable  Dentition: dentition unchanged  Vital Signs Stable: post-procedure vital signs reviewed and stable  Report to RN Given: handoff report given  Patient transferred to: PACU    Handoff Report: Identifed the Patient, Identified the Reponsible Provider, Reviewed the pertinent medical history, Discussed the surgical course, Reviewed Intra-OP anesthesia mangement and issues during anesthesia, Set expectations for post-procedure period and Allowed opportunity for questions and acknowledgement of understanding      Vitals:  Vitals Value Taken Time   BP     Temp     Pulse 74 04/09/22 1328   Resp 40 04/09/22 1328   SpO2 100 % 04/09/22 1328   Vitals shown include unvalidated device data.    Electronically Signed By: BOBBY Hernandez CRNA  April 9, 2022  1:29 PM

## 2022-04-09 NOTE — PROGRESS NOTES
Pt has TID NIF and IS. This morning pt IS was 750mL, NIF -19. Pt was still in surgery when to do 1400 chk.

## 2022-04-09 NOTE — PROGRESS NOTES
Progress Note    Date of admission: 04/08/2022    Assessment/Plan  Pee Ayala is a 81 year old male admitted on 4/8/2022. He was brought to the emergency department by ambulance after he tripped and fell at home landing on his left side and hitting his head without loss of consciousness.  Complaining of left hip pain and left rib cage pain when trying to get up.     Acute moderately displaced subcapital fracture of the left femur  S/p hemiarthroplasty, open reduction and internal fixation of greater trochanter  Ortho consult appreciated     Left fifth, seventh, eighth anterolateral ribs fractures  Rib fracture order set: Incentive spirometry, lidocaine patch     Chronic atrial fibrillation  Therapeutic INR 2.13, reverse warfarin effect for surgery  History of Maze procedure during CABG  Continue carvedilol     Coronary artery disease  History of CABG  Denies angina symptoms  Hold Plavix until after surgery, then resume when okay with surgeon  Continue carvedilol     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  Hold empagliflozin, Metformin, NovoLog Mix 70/30  Monitor with insulin sliding scale and hypoglycemic protocol     Chronic diastolic congestive heart failure  Echocardiogram 1/6/2022 showed EF 65-70%, bioprosthetic aortic valve with a mean systolic gradient of 7 mmHg and a peak anterograde velocity of 1.9 m/s  Chronic pitting edema but otherwise no signs of acute heart failure  Continue furosemide  Monitor volume status, daily weight     Chronic back pain  Status post injection about 3 weeks ago and procedure about 4 weeks ago  Continue lidocaine patch and pain control     Constipation  Bowel regimen     DVT PPX : SCD    Barriers to discharge: Postop recovery    Anticipated Discharge date  : 2-3 days    Subjective  S/p hip fracture repair.  Patient appears to be a little drowsy.  States pain on the left hip is around 2 out of 10.  No distress noted.  Management plan discussed with  the patient and his wife who was at bedside     Objective  Vital signs in last 24 hours  Temp:  [97.2  F (36.2  C)-98  F (36.7  C)] 97.5  F (36.4  C)  Pulse:  [] 79  Resp:  [18-25] 18  BP: (105-172)/(54-97) 105/58  SpO2:  [91 %-100 %] 99 %    Input and Output in 24 hrs     Intake/Output Summary (Last 24 hours) at 4/9/2022 1505  Last data filed at 4/9/2022 1428  Gross per 24 hour   Intake 2300 ml   Output 3125 ml   Net -825 ml       Physical Exam:  GEN: Alert and oriented. Not in acute distress  HEENT: Atraumatic    Sclera- anicteric   Mucous membrane- moist and pink  CHEST: Clear to auscultation bilaterally  HEART: S1S2 regular. No murmurs, rubs or gallops  ABDOMEN: Soft. Non-tender, non-distended. No organomegaly. No guarding or rigidity. Bowel sounds- active  Extremities: No pedal oedema  CNS: No focal neurological deficit. No involuntary movements  SKIN: No skin rash, no cyanosis or clubbing      Pertinent Labs   Lab Results: Personally Reviewed    Recent Results (from the past 24 hour(s))   INR    Collection Time: 04/08/22  6:11 PM   Result Value Ref Range    INR 2.13 (H) 0.85 - 1.15   Comprehensive metabolic panel    Collection Time: 04/08/22  6:11 PM   Result Value Ref Range    Sodium 137 136 - 145 mmol/L    Potassium 4.2 3.5 - 5.0 mmol/L    Chloride 104 98 - 107 mmol/L    Carbon Dioxide (CO2) 18 (L) 22 - 31 mmol/L    Anion Gap 15 5 - 18 mmol/L    Urea Nitrogen 27 8 - 28 mg/dL    Creatinine 1.06 0.70 - 1.30 mg/dL    Calcium 8.5 8.5 - 10.5 mg/dL    Glucose 363 (H) 70 - 125 mg/dL    Alkaline Phosphatase 126 (H) 45 - 120 U/L    AST 45 (H) 0 - 40 U/L    ALT 45 0 - 45 U/L    Protein Total 5.9 (L) 6.0 - 8.0 g/dL    Albumin 3.2 (L) 3.5 - 5.0 g/dL    Bilirubin Total 1.0 0.0 - 1.0 mg/dL    GFR Estimate 71 >60 mL/min/1.73m2   Magnesium    Collection Time: 04/08/22  6:11 PM   Result Value Ref Range    Magnesium 1.9 1.8 - 2.6 mg/dL   CBC (+ platelets, no diff)    Collection Time: 04/08/22  6:11 PM   Result Value Ref  Range    WBC Count 11.6 (H) 4.0 - 11.0 10e3/uL    RBC Count 4.04 (L) 4.40 - 5.90 10e6/uL    Hemoglobin 13.9 13.3 - 17.7 g/dL    Hematocrit 42.6 40.0 - 53.0 %     (H) 78 - 100 fL    MCH 34.4 (H) 26.5 - 33.0 pg    MCHC 32.6 31.5 - 36.5 g/dL    RDW 13.2 10.0 - 15.0 %    Platelet Count 211 150 - 450 10e3/uL   Asymptomatic COVID-19 Virus (Coronavirus) by PCR Nasopharyngeal    Collection Time: 04/08/22  6:12 PM    Specimen: Nasopharyngeal; Swab   Result Value Ref Range    SARS CoV2 PCR Negative Negative   UA with Microscopic reflex to Culture    Collection Time: 04/08/22  7:55 PM    Specimen: Urine, Midstream   Result Value Ref Range    Color Urine Light Yellow Colorless, Straw, Light Yellow, Yellow    Appearance Urine Clear Clear    Glucose Urine >1000 (A) Negative mg/dL    Bilirubin Urine Negative Negative    Ketones Urine 20  (A) Negative mg/dL    Specific Gravity Urine 1.033 (H) 1.001 - 1.030    Blood Urine 0.06 mg/dL (A) Negative    pH Urine 5.5 5.0 - 7.0    Protein Albumin Urine 200  (A) Negative mg/dL    Urobilinogen Urine <2.0 <2.0 mg/dL    Nitrite Urine Negative Negative    Leukocyte Esterase Urine Negative Negative    RBC Urine 1 <=2 /HPF    WBC Urine 1 <=5 /HPF    Squamous Epithelials Urine <1 <=1 /HPF   Adult Type and Screen    Collection Time: 04/08/22  8:37 PM   Result Value Ref Range    ABO/RH(D) A POS     Antibody Screen Negative Negative    SPECIMEN EXPIRATION DATE [id number]    Glucose by meter    Collection Time: 04/08/22  9:13 PM   Result Value Ref Range    GLUCOSE BY METER POCT 246 (H) 70 - 99 mg/dL   Glucose by meter    Collection Time: 04/09/22  4:12 AM   Result Value Ref Range    GLUCOSE BY METER POCT 345 (H) 70 - 99 mg/dL   INR    Collection Time: 04/09/22  5:10 AM   Result Value Ref Range    INR 1.62 (H) 0.85 - 1.15   Basic metabolic panel    Collection Time: 04/09/22  5:10 AM   Result Value Ref Range    Sodium 138 136 - 145 mmol/L    Potassium 3.9 3.5 - 5.0 mmol/L    Chloride 100 98 -  107 mmol/L    Carbon Dioxide (CO2) 25 22 - 31 mmol/L    Anion Gap 13 5 - 18 mmol/L    Urea Nitrogen 21 8 - 28 mg/dL    Creatinine 0.97 0.70 - 1.30 mg/dL    Calcium 8.8 8.5 - 10.5 mg/dL    Glucose 316 (H) 70 - 125 mg/dL    GFR Estimate 79 >60 mL/min/1.73m2   Magnesium    Collection Time: 04/09/22  5:10 AM   Result Value Ref Range    Magnesium 2.0 1.8 - 2.6 mg/dL   Phosphorus    Collection Time: 04/09/22  5:10 AM   Result Value Ref Range    Phosphorus 4.0 2.5 - 4.5 mg/dL   Glucose by meter    Collection Time: 04/09/22  8:30 AM   Result Value Ref Range    GLUCOSE BY METER POCT 237 (H) 70 - 99 mg/dL   INR    Collection Time: 04/09/22  9:24 AM   Result Value Ref Range    INR 1.40 (H) 0.85 - 1.15   Extra Red Top Tube    Collection Time: 04/09/22  9:24 AM   Result Value Ref Range    Hold Specimen JIC    Glucose by meter    Collection Time: 04/09/22  9:54 AM   Result Value Ref Range    GLUCOSE BY METER POCT 199 (H) 70 - 99 mg/dL       Pertinent Radiology   Radiology Results reviewed      EKG Results reviewed       Advanced Care Planning      Madison Panda MD   [facility]

## 2022-04-09 NOTE — OP NOTE
Preoperative diagnosis:  1.  Left femoral neck fracture    Postoperative diagnosis:  1.  Left femoral neck fracture.  2.  Comminuted left greater trochanter fracture.     Procedure(s) performed:  1. Left hip bipolar hemiarthroplasty.  2. Open reduction internal fixation left greater trochanter fracture    Attending Surgeon: Jd Larose MD  Assistant: Diann Briones PA-C (A skilled assistant was necessary for this procedure to help with patient positioning, manipulation and prep the surgical extremity, instrumentation, and safe/timely progress of the procedure.)    Anesthesia: General  IV fluids: See anesthesia record  Estimated blood loss: 300 mL    Specimens: None  Drains: None    Complications: None apparent  Disposition: Stable to PACU    Indications:  This patient is a 81-year-old male who sustained a mechanical fall at home, with resultant left hip pain and inability to ambulate.  He was found on subsequent work-up at the emergency department to have a displaced complete femoral neck fracture.  Recommendation for hemiarthroplasty was made.. Risks, benefits, and alternatives were reviewed.  Patient indicated desire to proceed informed consent was obtained.    Operative findings:  Displaced fracture of the left femoral neck in addition to comminuted fracture of the posterior lateral greater trochanter. Successful cemented bipolar hemiarthroplasty was completed with placement of a [place] size 4 standard cemented stem, 28 +5 Articul/CHRISTEN femoral head, and 48 mm outer diameter bipolar head.  Comminuted posterior lateral greater trochanter fracture was fixed using multiple nonabsorbable sutures placed through bone tunnels..    Procedure in detail:  The patient was identified in preoperative holding where the left hip was marked and surgical site was confirmed.  The patient was then brought to the operating room and general anesthetic was induced.  The patient rolled into decubitus position with the operative hip  up, and all bony/neural prominences were padded appropriately.  An axillary roll was placed beneath the down shoulder.  The surgical hip was then prepped and draped in the typical sterile fashion.  A presurgical timeout was completed.  Antibiotics were administered by anesthesia just prior to incision.    A standard posterior approach was utilized.  A curved incision over the posterior lateral hip was made and skin flaps developed.  The iliotibial band was identified and split in line with the incision, extending that proximally into the gluteus klaudia fascia.  The muscular fibers of gluteus klaudia were split and perforating vessels cauterized with Bovie.  A Charnley retractor was then placed.  Peritrochanteric bursa was resected, noting that there was a significantly hemorrhagic bursa.  Upon resection of the bursa and inspecting the trochanter, it was noted that there was a comminuted fracture of the posterior lateral trochanter involving a portion of the gluteus medius and minimus insertion.  The more anterior half of the attachment remained intact.  I carefully retracted the abductor insertion in order to expose the capsule and short external rotators.  A muscular capsular flap including the quadratus, obturator externus, and gemelli was elevated from the posterior aspect of the proximal femur, and capsule was split in line with the neck down to the labrum.  A tag stitch was placed in this flap for later repair.  The hip was then dislocated through the fracture with internal rotation.  I was able to better appreciate the comminuted fracture of the trochanter at this point.  The more anterior lateral portion of the trochanter remained intact, but the posterior and more medial portions of the trochanter were significantly comminuted.  Unattached bony fragments were carefully removed with rongeur, attempting to maintain the large bony fragments to which the tendon insertion remained.    The femoral neck was then  exposed with the femoral neck elevator, and the femoral neck cut made approximately 1 fingerbreadth above the top of the lesser trochanter with a saw.  This allowed better access to the acetabulum, and the femoral head was extracted.  This was sized on the back table, noting it to be 48 mm in diameter.  A 40 made millimeter outer diameter bipolar head trial was placed, with good fit within the native acetabulum.  The acetabulum was inspected with minimal degenerative change noted.  The proximal femur was again exposed.  The femur was then reamed and sequentially broached up to a size 4, which had nice fit and rotational fill.  The broach was left in position and trial neck and head components were placed.  The hip was reduced, and I found excellent stability and length with a combination of a standard neck and +5 head.    The hip was dislocated and the trial components were removed.  The femoral canal was irrigated with pulse lavage.  The canal was dried, and polymethylmethacrylate bone cement was injected and pressurized within the femoral canal.  The size 4 stem was then placed and impacted, after which it was held until the cement had fully cured.  Care was taken to avoid cement winding up within the trochanter fracture planes, such that they could be later repaired.  The +5 trial head and 48 mm trial bipolar head were placed and the hip was reduced again.  Stability was excellent.  The hip was dislocated a final time and all trial components removed.  The trunnion was dried with a clean sponge and the 28 +5 head was placed and impacted onto the Henry taper.  The polyethylene liner and 48 outer diameter bipolar head were snapped into position.  The hip was reduced a final time.  Again excellent stability and length were confirmed.  The joint was then thoroughly irrigated with pulse lavage.    Multiple #5 Ethibond sutures were then used to perform reduction and internal fixation of the greater trochanter fracture.   The sutures were passed through fragments of bone, and then subsequently whipstitched within the abductor tendons.  Small drill holes were placed through the trochanter, and using a Noel suture passer, the suture was then brought down and tied over the lateral aspect of the proximal greater trochanter.  A total of 4 of these were utilized, which nicely reduced the various fragments into near anatomic position.  There appeared to be excellent security of the repaired trochanteric fragments.  The capsule was then repaired along with longitudinal split with #2 Ethibond.  The remainder of the muscular capsular flap was then repaired to the posterior trochanter also through bone tunnels using #5 Ethibond.  The iliotibial band was then reapproximated with #1 Ethibond, subsequently oversewed with #1 strata fix.  The skin was closed in layers with 0 Vicryl, 2-0 Vicryl, and skin staples.  An adhesive, occlusive bandage was applied.    The drapes were taken down and an abduction pillow was placed between the legs.  Anesthesia was reversed and the patient was taken recovery in stable condition.  No complications were noted.  All sponge and needle counts were correct.    Postoperative plan:  - Weightbearing as tolerated on the operative extremity while observing posterior hip precautions.  - Abduction pillow is to remain in place when the patient is supine in bed.  Avoid active abduction for 4 to 6 weeks postoperatively in light of the greater trochanteric repair.  - Multimodal DVT prophylaxis will consist of early mobilization, lower extremity weightbearing, SCDs, ARNOLD hose, and resumption of home oral anticoagulants per medicine.  Chemical DVT prophylaxis can be initiated on postoperative day 1.

## 2022-04-09 NOTE — PLAN OF CARE
Goal Outcome Evaluation:                      Problem: Plan of Care - These are the overarching goals to be used throughout the patient stay.    Goal: Absence of Hospital-Acquired Illness or Injury  Intervention: Prevent Skin Injury  Recent Flowsheet Documentation  Taken 4/9/2022 0858 by Hailey Santiago RN  Body Position:   supine   supine, legs elevated     Problem: Risk for Delirium  Goal: Optimal Coping  Outcome: Ongoing, Progressing     Problem: Pain (Hip Fracture Medical Management)  Goal: Acceptable Pain Level  Outcome: Ongoing, Progressing   Patient has hip fx from fall at home. Report called to surgery. Patient has been npo. Silva in place. Boris bath done. Pain medication given as requested by patient. Increased pain with turning. Transported by bed to [facility].

## 2022-04-09 NOTE — CONSULTS
ORTHOPEDIC CONSULTATION    Consultation  Pee Ayala,  1940, MRN [id number]    [unfilled]  Chronic anticoagulation [Z79.01]  Falls frequently [R29.6]  Chronic atrial fibrillation (H) [I48.20]  Status post coronary angiogram [Z98.890]  Fracture of hip, left, closed, initial encounter (H) [S72.002A]  Coronary artery disease involving native coronary artery of native heart without angina pectoris [I25.10]    PCP: Mar Morales, [phone]   Code status:  Full Code       Extended Emergency Contact Information  Primary Emergency Contact: Fay Ayala   Address: [address] [place] [address]  Home Phone: [phone]  Mobile Phone: [phone]  Relation: Spouse  Secondary Emergency Contact: PolloAnkita   [place]  Mobile Phone: [phone]  Relation: Daughter         CHIEF COMPLAINT: Fracture of hip, left, closed, initial encounter (H)      HISTORY OF PRESENT ILLNESS:  The patient is pleasant 81-year-old male brought to the emergency department yesterday evening after sustaining a mechanical fall at home.  He was bending down to  after his dog when he fell, landing on his left hip with resultant pain and inability to ambulate.  Medical history significant for coronary artery disease, diastolic heart failure, atrial fibrillation on anticoagulation, aortic stenosis status post TAVR, hypertension, hyperlipidemia, type 2 diabetes, polyneuropathy.  He is recently status post 2 low back procedures the last several weeks, with continued low back pain.  No new low back pain since his injury.    At rest, he is comfortable lying supine in bed.  If he does have significant pain in the left hip/groin region.  No prior issues with the left hip.  He has been seen and cleared for surgery by the admitting medicine team.  INR has been reversed to 1.4.    PAST MEDICAL HISTORY:  I have reviewed this patient's medical history and updated it with pertinent  information if needed.        Past Medical History:   Diagnosis Date     ACS (acute coronary syndrome) (H) 6/2/2014     ACS (acute coronary syndrome) (H) 6/2/2014     Actinic keratosis       Actinic keratosis 1/14/2014     Actinic keratosis 1/14/2014     Anticoagulated on Coumadin 12/30/2015     Antiplatelet or antithrombotic long-term use       Atrial fibrillation (H)       Atrial fibrillation (H) 2016     Bone mass 4/26/2017     Chest heaviness 1/23/2019     Chest pain 5/31/2014     Closed fracture of left forearm 2015     Congestive heart failure (H)       Coronary artery disease       Diabetes (H) 2009     Diabetes mellitus (H)       Difficulty in walking(719.7)       Dyslipidemia 8/31/2016     Dyspnea on exertion       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      Encounter for long-term (current) use of insulin (H) 8/11/2016     Esophageal reflux 11/18/2010     Esophageal reflux 11/18/2010     History of angina       HTN (hypertension) 6/30/2009      (Problem list name updated by automated process. Provider to review and confirm.)     Hyperlipidemia LDL goal <100 10/31/2010     Hypertension       Impotence of organic origin       Mixed hyperlipidemia 4/25/2007 April 25, 2007 restarted Zocor today, recheck in 3 months.  August 23, 2007 LDL at 101 with 40 mg, will increase to 80 mg. Recheck  In 3 months.      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      Type II or unspecified type diabetes mellitus with neurological manifestations, not stated as uncontrolled(250.60) (H)       Walking troubles      Past Surgical History:   Procedure Laterality Date     BYPASS GRAFT ARTERY CORONARY N/A 9/10/2014     Procedure: BYPASS GRAFT ARTERY CORONARY;  Surgeon: Bharathi Caraballo MD;  Location: [facility] OR     BYPASS GRAFT ARTERY CORONARY   2016     COLONOSCOPY   1/14/2004     CORONARY ANGIOGRAPHY ADULT ORDER         CV CORONARY ANGIOGRAM N/A 4/4/2019     Procedure: Coronary Angiogram;  Surgeon: Dominik Vega MD;  Location: [facility] Cath Lab;  Service: Cardiology     CV CORONARY ANGIOGRAM N/A 1/6/2021     Procedure: Coronary Angiogram;  Surgeon: Dominik Vega MD;  Location: [facility] Cardiac Cath Lab;  Service: Cardiology     CV LEFT HEART CATHETERIZATION WITHOUT LEFT VENTRICULOGRAM Left 4/4/2019     Procedure: Left Heart Catheterization Without Left Ventriculogram;  Surgeon: Dominik Vega MD;  Location: [facility] Cath Lab;  Service: Cardiology     CV LEFT HEART CATHETERIZATION WITHOUT LEFT VENTRICULOGRAM Left 1/6/2021     Procedure: Left Heart Catheterization Without Left Ventriculogram;  Surgeon: Dominik Vega MD;  Location: [facility] Cardiac Cath Lab;  Service: Cardiology     CV RIGHT HEART CATHETERIZATION Right 4/4/2019     Procedure: Right Heart Catheterization;  Surgeon: Dominik Vega MD;  Location: [facility] Cath Lab;  Service: Cardiology     CV TRANSCATHETER AORTIC VALVE REPLACEMENT N/A 1/12/2021     Procedure: Right transfemoral transcatheter aortic valve replacement using Magdaleno Dominick 3 size 29mm.  Transthoracic echocardiogram;  Surgeon: Jo Romano MD;  Location:  HEART CARDIAC CATH LAB     ESOPHAGOSCOPY, GASTROSCOPY, DUODENOSCOPY (EGD), COMBINED N/A 1/13/2016     Procedure: COMBINED  ESOPHAGOSCOPY, GASTROSCOPY, DUODENOSCOPY (EGD);  Surgeon: Rk Srivastava MD;  Location: [facility]     HEART CATH FEMORAL CANNULIZATION WITH OPEN STANDBY REPAIR AORTIC VALVE N/A 1/12/2021     Procedure: Cardiopulmonary Bypass standby;  Surgeon: Jefferson Sandy MD;  Location:  HEART CARDIAC CATH LAB     IR LOWER EXTREMITY ANGIOGRAM LEFT   12/7/2021     LAPAROSCOPIC CHOLECYSTECTOMY   10/09     MAZE PROCEDURE N/A 9/10/2014     Procedure: MAZE PROCEDURE;  Surgeon: Bharathi Caraballo MD;  Location:  OR     MOHS MICROGRAPHIC PROCEDURE         ORTHOPEDIC SURGERY         surgery for fx  Left forearm     OTHER SURGICAL HISTORY Left 2015     forearm sugery     STENT, CORONARY, HUMA   02/2016     VASECTOMY     Pleasant elderly male    ALLERGIES:   Review of patient's allergies indicates   Allergies   Allergen Reactions     Amlodipine Dizziness     Dizziness and dry heaves     Lisinopril Cough     Adhesive Tape Itching and Rash         MEDICATIONS UPON ADMISSION:  Medications were reviewed.  They include:   Medications Prior to Admission   Medication Sig Dispense Refill Last Dose     acetaminophen (TYLENOL) 500 MG tablet Take 1,000 mg by mouth 2 times daily as needed for mild pain    4/8/2022 at Unknown time     Apoaequorin (PREVAGEN PO) Take 1 tablet by mouth daily    4/8/2022 at Unknown time     carvedilol (COREG) 6.25 MG tablet Take 6.25 mg by mouth 2 times daily (with meals)   4/8/2022 at Unknown time     clopidogrel (PLAVIX) 75 MG tablet Take 1 tablet (75 mg) by mouth daily Start taking medication the day after the procedure. 90 tablet 1 4/8/2022 at Unknown time     clotrimazole (LOTRIMIN) 1 % external cream Apply to feet twice daily until 1 week after clinical resolution, typically for 4 weeks total 85 g 1 Past Week at Unknown time     empagliflozin (JARDIANCE) 10 MG TABS tablet Take 1 tablet (10 mg) by mouth daily 30 tablet 4 4/8/2022 at Unknown time     finasteride (PROSCAR) 5 MG tablet Take 1 tablet (5 mg)  by mouth daily 90 tablet 3 4/8/2022 at Unknown time     furosemide (LASIX) 20 MG tablet TAKE 2 TABLETS BY MOUTH IN THE MORNING AND 1 TABLET IN THE AFTERNOON 180 tablet 3 4/8/2022 at Unknown time     insulin aspart prot & aspart (NOVOLOG MIX 70/30 PEN) (70-30) 100 UNIT/ML pen Inject Subcutaneous 2 times daily (with meals) 33 in AM and 26 in PM   4/8/2022 at Unknown time     Lidocaine (LIDOCARE) 4 % Patch Place 1 patch onto the skin every 24 hours To prevent lidocaine toxicity, patient should be patch free for 12 hrs daily.   4/8/2022 at Unknown time     metFORMIN (GLUCOPHAGE) 500 MG tablet Take 2 tablets by mouth twice daily 360 tablet 0 4/8/2022 at Unknown time     oxyCODONE-acetaminophen (PERCOCET) 5-325 MG tablet Take 1 tablet by mouth 3 times daily as needed for severe pain   Unknown at Unknown time     polyethylene glycol (MIRALAX) 17 GM/Dose powder Take 17 g (1 capful) by mouth daily as needed for constipation (opioid induced constipation) 507 g 0 Unknown at Unknown time     pramipexole (MIRAPEX) 0.25 MG tablet Take 1 tablet (0.25 mg) by mouth 2 times daily Takes 4pm and 5;30 pm 180 tablet 3 4/8/2022 at x1     warfarin ANTICOAGULANT (COUMADIN) 5 MG tablet Take 1 tablet (5 mg) by mouth daily Resume your warfarin tonight 12/7 after the procedure at the usual dose (Patient taking differently: 7.5 mg Tues, Thurs, Sat, 5 mg all other days) 30 tablet 3 4/7/2022 at Unknown time     ACCU-CHEK GUIDE test strip USE 1  TO CHECK GLUCOSE THREE TIMES DAILY 300 strip 1      blood glucose monitor KIT 1 kit 2 times daily. 1 kit 0          SOCIAL HISTORY:   he  reports that he quit smoking about 24 years ago. He has never used smokeless tobacco. He reports current alcohol use. He reports that he does not use drugs.      FAMILY HISTORY:  family history includes Cerebrovascular Disease in his father; Diabetes in his maternal grandmother and mother; Diabetes Type 2  in his mother; Heart Disease in his father; Hypertension in his  father; No Known Problems in his brother.      REVIEW OF SYSTEMS:   See HPI, otherwise negative      PHYSICAL EXAMINATION:  Vitals: Temp:  [97.2  F (36.2  C)-98  F (36.7  C)] 97.4  F (36.3  C)  Pulse:  [] 97  Resp:  [18-24] 18  BP: (110-172)/(62-97) 127/62  SpO2:  [94 %-99 %] 94 %   Pleasant elderly male lying supine in bed, appears comfortable.  Alert and oriented x3.  Normal mood and affect.  Respirations are unlabored.  Left leg is slightly shortened and externally rotated relative to the right.  Severe left hip/groin pain with any attempted left motion.  Wiggles toes to command.  Sensate to light touch sural, saphenous, peroneal, tibial nerve distributions.  Intact TA, GS, EHL, FHL motor.  Brisk cap refill.  Chronic symmetric lower extremity edema below the knees.      RADIOGRAPHIC EVALUATION:   Displaced transverse left femoral neck fracture seen on left hip x-rays. Mild to moderate bilateral hip osteoarthritis.    Pertinent Labs  Lab Results: personally reviewed.   INR 1.40 after reversal.  WBC 11.6, Hgb 13.9, hematocrit 42.6, plts 211    IMPRESSION:  82yo male with acute, displaced left femoral neck fracture.     PLAN:  I met with the patient at bedside discussing his injury and the recommended surgery.  Specifically recommended cemented bipolar hemiarthroplasty.  Risks of surgery including but not limited to bleeding, infection, postoperative instability, DVT/PE, and anesthetic risks were reviewed.  Anticipate posterior hip precautions and weightbearing as tolerated following the procedure.  Additionally anticipate likely 2 to 3 days for determining appropriate disposition before hospital discharge.  He has been cleared for surgery by medicine, and INR is appropriate to move forward with surgery with slight increased risk for blood loss.  He has consented to blood transfusions if necessary.  He is appropriately n.p.o.    Jd Larose MD      CC1:   Madison Panda, *    CC2:   Mar Morales  [place]

## 2022-04-09 NOTE — PHARMACY-ADMISSION MEDICATION HISTORY
Pharmacy Note - Admission Medication History    Pertinent Provider Information:      ______________________________________________________________________    Prior To Admission (PTA) med list completed and updated in EMR.       PTA Med List   Medication Sig Last Dose     acetaminophen (TYLENOL) 500 MG tablet Take 1,000 mg by mouth 2 times daily as needed for mild pain  4/8/2022 at Unknown time     Apoaequorin (PREVAGEN PO) Take 1 tablet by mouth daily  4/8/2022 at Unknown time     carvedilol (COREG) 6.25 MG tablet Take 6.25 mg by mouth 2 times daily (with meals) 4/8/2022 at Unknown time     clopidogrel (PLAVIX) 75 MG tablet Take 1 tablet (75 mg) by mouth daily Start taking medication the day after the procedure. 4/8/2022 at Unknown time     clotrimazole (LOTRIMIN) 1 % external cream Apply to feet twice daily until 1 week after clinical resolution, typically for 4 weeks total Past Week at Unknown time     empagliflozin (JARDIANCE) 10 MG TABS tablet Take 1 tablet (10 mg) by mouth daily 4/8/2022 at Unknown time     finasteride (PROSCAR) 5 MG tablet Take 1 tablet (5 mg) by mouth daily 4/8/2022 at Unknown time     furosemide (LASIX) 20 MG tablet TAKE 2 TABLETS BY MOUTH IN THE MORNING AND 1 TABLET IN THE AFTERNOON 4/8/2022 at Unknown time     insulin aspart prot & aspart (NOVOLOG MIX 70/30 PEN) (70-30) 100 UNIT/ML pen Inject Subcutaneous 2 times daily as needed 33 in AM and 26 in PM 4/8/2022 at Unknown time     Lidocaine (LIDOCARE) 4 % Patch Place 1 patch onto the skin every 24 hours To prevent lidocaine toxicity, patient should be patch free for 12 hrs daily. 4/8/2022 at Unknown time     metFORMIN (GLUCOPHAGE) 500 MG tablet Take 2 tablets by mouth twice daily 4/8/2022 at Unknown time     oxyCODONE-acetaminophen (PERCOCET) 5-325 MG tablet Take 1 tablet by mouth 3 times daily as needed for severe pain Unknown at Unknown time     polyethylene glycol (MIRALAX) 17 GM/Dose powder Take 17 g (1 capful) by mouth daily as needed  for constipation (opioid induced constipation) Unknown at Unknown time     pramipexole (MIRAPEX) 0.25 MG tablet Take 1 tablet (0.25 mg) by mouth 2 times daily Takes 4pm and 5;30 pm 4/8/2022 at x1     warfarin ANTICOAGULANT (COUMADIN) 5 MG tablet Take 1 tablet (5 mg) by mouth daily Resume your warfarin tonight 12/7 after the procedure at the usual dose (Patient taking differently: 7.5 mg Tues, Thurs, Sat, 5 mg all other days) 4/7/2022 at Unknown time       Information source(s): Patient, Clinic records and [facility]  Method of interview communication: in-person    Summary of Changes to PTA Med List  New: lidocaine patch, percocet, acetaminophen  Discontinued: pravastatin, hydroxyzine, pimecrolimus cream  Changed:     Patient was asked about OTC/herbal products specifically.  PTA med list reflects this.    In the past week, patient estimated taking medication this percent of the time:  greater than 90%.    Allergies were reviewed, assessed, and updated with the patient.      Patient does not use any multi-dose medications prior to admission.    The information provided in this note is only as accurate as the sources available at the time of the update(s).    Thank you for the opportunity to participate in the care of this patient.    Robyn Mane RPH  4/8/2022 8:42 PM

## 2022-04-09 NOTE — ANESTHESIA PREPROCEDURE EVALUATION
Anesthesia Pre-Procedure Evaluation    Patient: Pee Ayala   MRN: [id number] : 1940        Procedure : Procedure(s):  HEMIARTHROPLASTY, HIP, BIPOLAR          Past Medical History:   Diagnosis Date     ACS (acute coronary syndrome) (H) 2014     ACS (acute coronary syndrome) (H) 2014     Actinic keratosis      Actinic keratosis 2014     Actinic keratosis 2014     Anticoagulated on Coumadin 2015     Antiplatelet or antithrombotic long-term use      Atrial fibrillation (H)      Atrial fibrillation (H)      Bone mass 2017     Chest heaviness 2019     Chest pain 2014     Closed fracture of left forearm      Congestive heart failure (H)      Coronary artery disease      Diabetes (H)      Diabetes mellitus (H)      Difficulty in walking(719.7)      Dyslipidemia 2016     Dyspnea on exertion      ED (erectile dysfunction) of organic origin 2005    Overview:  2007 will check PSA, try Levitra, no history of CAD, not on nitrates.      Encounter for long-term (current) use of insulin (H) 2016     Esophageal reflux 2010     Esophageal reflux 2010     History of angina      HTN (hypertension) 2009     (Problem list name updated by automated process. Provider to review and confirm.)     Hyperlipidemia LDL goal <100 10/31/2010     Hypertension      Impotence of organic origin      Mixed hyperlipidemia 2007 restarted Zocor today, recheck in 3 months.  2007 LDL at 101 with 40 mg, will increase to 80 mg. Recheck  In 3 months.      New onset atrial fibrillation (H) 2013     Nonalcoholic steatohepatitis 10/1/2009     Nonalcoholic steatohepatitis 10/1/2009     Obese      Palpitations      PD (perceptive deafness), asymmetrical 2010     Polyneuropathy in diabetes(357.2)      Sensorineural hearing loss, asymmetrical 2010     Shortness of breath      Squamous cell carcinoma 2013    R  vertex scalp     Status post coronary angiogram 3/9/2016     Tremor 9/28/2014     Type 2 diabetes, HbA1C goal < 8% (H) 1/5/2011 2/9/11: seen by [facility]- Mild to moderate non-proliferative retinopathy.      Type II or unspecified type diabetes mellitus with neurological manifestations, not stated as uncontrolled(250.60) (H)      Walking troubles       Past Surgical History:   Procedure Laterality Date     BYPASS GRAFT ARTERY CORONARY N/A 9/10/2014    Procedure: BYPASS GRAFT ARTERY CORONARY;  Surgeon: Bharathi Caraballo MD;  Location:  OR     BYPASS GRAFT ARTERY CORONARY  2016     COLONOSCOPY  1/14/2004     CORONARY ANGIOGRAPHY ADULT ORDER       CV CORONARY ANGIOGRAM N/A 4/4/2019    Procedure: Coronary Angiogram;  Surgeon: Dominik Vega MD;  Location: [facility] Cath Lab;  Service: Cardiology     CV CORONARY ANGIOGRAM N/A 1/6/2021    Procedure: Coronary Angiogram;  Surgeon: Dominik Vega MD;  Location: [facility] Cardiac Cath Lab;  Service: Cardiology     CV LEFT HEART CATHETERIZATION WITHOUT LEFT VENTRICULOGRAM Left 4/4/2019    Procedure: Left Heart Catheterization Without Left Ventriculogram;  Surgeon: Dominik Vega MD;  Location: [facility] Cath Lab;  Service: Cardiology     CV LEFT HEART CATHETERIZATION WITHOUT LEFT VENTRICULOGRAM Left 1/6/2021    Procedure: Left Heart Catheterization Without Left Ventriculogram;  Surgeon: Dominik Vega MD;  Location: [facility] Cardiac Cath Lab;  Service: Cardiology     CV RIGHT HEART CATHETERIZATION Right 4/4/2019    Procedure: Right Heart Catheterization;  Surgeon: Dominik Vega MD;  Location: [facility] Cath Lab;  Service: Cardiology     CV TRANSCATHETER AORTIC VALVE REPLACEMENT N/A 1/12/2021    Procedure: Right transfemoral transcatheter aortic valve replacement using Magdaleno Dominick 3 size 29mm.  Transthoracic echocardiogram;  Surgeon: Jo Romano MD;  Location: [facility] CARDIAC CATH LAB      ESOPHAGOSCOPY, GASTROSCOPY, DUODENOSCOPY (EGD), COMBINED N/A 2016    Procedure: COMBINED ESOPHAGOSCOPY, GASTROSCOPY, DUODENOSCOPY (EGD);  Surgeon: Rk Srivastava MD;  Location: [facility]     HEART CATH FEMORAL CANNULIZATION WITH OPEN STANDBY REPAIR AORTIC VALVE N/A 2021    Procedure: Cardiopulmonary Bypass standby;  Surgeon: Jefferson Sandy MD;  Location:  HEART CARDIAC CATH LAB     IR LOWER EXTREMITY ANGIOGRAM LEFT  2021     LAPAROSCOPIC CHOLECYSTECTOMY  10/09     MAZE PROCEDURE N/A 9/10/2014    Procedure: MAZE PROCEDURE;  Surgeon: Bharathi Caraballo MD;  Location:  OR     MOHS MICROGRAPHIC PROCEDURE       ORTHOPEDIC SURGERY      surgery for fx  Left forearm     OTHER SURGICAL HISTORY Left     forearm sugery     STENT, CORONARY, HUMA  2016     VASECTOMY        Allergies   Allergen Reactions     Amlodipine Dizziness     Dizziness and dry heaves     Lisinopril Cough     Adhesive Tape Itching and Rash      Social History     Tobacco Use     Smoking status: Former Smoker     Quit date: 1998     Years since quittin.2     Smokeless tobacco: Never Used   Substance Use Topics     Alcohol use: Yes     Alcohol/week: 0.0 standard drinks     Comment: Alcoholic Drinks/day: very rare      Wt Readings from Last 1 Encounters:   22 78 kg (172 lb)        Anesthesia Evaluation            ROS/MED HX  ENT/Pulmonary:  - neg pulmonary ROS     Neurologic:     (+) peripheral neuropathy,     Cardiovascular: Comment:  TTE  1. Normal left ventricular size and systolic performance with a visually  estimated ejection fraction of 65-70%.  2. There is mild concentric increase in left ventricular wall thickness.  3. There is a bio-prosthetic aortic valve (documented 29 mm Magdaleno Dominick 3  tissue valve).  Â  Normal aortic valve prosthesis metrics with a mean systolic gradient of 7  mmHg and a peak anterograde velocity of 1.9 m/sec.  Â  There is trace-mild aortic insufficiency.  4. There is  moderate mitral insufficiency.  5. Normal right ventricular size and systolic performance.  6. There is severe left atrial enlargement. There is mild to moderate right  atrial enlargement.  7. Right ventricular systolic pressure relative to right atrial pressure is  mildly increased. The pulmonary artery pressure is estimated to be 35-40 mmHg  plus right atrial pressure (the IVC is not well-visualized on this study).    (+) hypertension--CAD -CABG--Taking blood thinners CHF dysrhythmias, a-fib, valvular problems/murmurs type: MR s/p 2021 TAVR.     METS/Exercise Tolerance:     Hematologic:       Musculoskeletal:       GI/Hepatic:     (+) GERD, liver disease,     Renal/Genitourinary:       Endo:     (+) type II DM, Obesity,     Psychiatric/Substance Use:       Infectious Disease:       Malignancy:       Other:            Physical Exam    Airway  airway exam normal      Mallampati: II   TM distance: > 3 FB   Neck ROM: full   Mouth opening: > 3 cm    Respiratory Devices and Support         Dental  no notable dental history         Cardiovascular   cardiovascular exam normal       Rhythm and rate: irregular and normal   (+) murmur       Pulmonary   pulmonary exam normal        breath sounds clear to auscultation           OUTSIDE LABS:  CBC:   Lab Results   Component Value Date    WBC 11.6 (H) 04/08/2022    WBC 5.7 01/19/2022    HGB 13.9 04/08/2022    HGB 13.8 01/19/2022    HCT 42.6 04/08/2022    HCT 42.7 01/19/2022     04/08/2022     01/19/2022     BMP:   Lab Results   Component Value Date     04/09/2022     04/08/2022    POTASSIUM 3.9 04/09/2022    POTASSIUM 4.2 04/08/2022    CHLORIDE 100 04/09/2022    CHLORIDE 104 04/08/2022    CO2 25 04/09/2022    CO2 18 (L) 04/08/2022    BUN 21 04/09/2022    BUN 27 04/08/2022    CR 0.97 04/09/2022    CR 1.06 04/08/2022     (H) 04/09/2022     (H) 04/09/2022     COAGS:   Lab Results   Component Value Date    PTT 28 12/07/2021    INR 1.62 (H)  04/09/2022    FIBR 232 09/10/2014     POC:   Lab Results   Component Value Date     (H) 01/13/2021     HEPATIC:   Lab Results   Component Value Date    ALBUMIN 3.2 (L) 04/08/2022    PROTTOTAL 5.9 (L) 04/08/2022    ALT 45 04/08/2022    AST 45 (H) 04/08/2022    ALKPHOS 126 (H) 04/08/2022    BILITOTAL 1.0 04/08/2022     OTHER:   Lab Results   Component Value Date    PH 7.34 (L) 04/04/2019    LACT 3.5 (H) 09/10/2014    A1C 8.0 (H) 01/25/2022    RACHEL 8.8 04/09/2022    PHOS 4.0 04/09/2022    MAG 2.0 04/09/2022    LIPASE 113 01/05/2016    AMYLASE 43 01/05/2016    TSH 0.87 05/06/2021       Anesthesia Plan    ASA Status:  3      Anesthesia Type: General.     - Airway: ETT              Consents    Anesthesia Plan(s) and associated risks, benefits, and realistic alternatives discussed. Questions answered and patient/representative(s) expressed understanding.    - Discussed:     - Discussed with:  Patient      - Patient is DNR/DNI Status: No         Postoperative Care    Pain management: Multi-modal analgesia.   PONV prophylaxis: Ondansetron (or other 5HT-3), Dexamethasone or Solumedrol     Comments:                Israel Lindo MD

## 2022-04-09 NOTE — PROVIDER NOTIFICATION
Paged Dr. Panda through [facility] regarding pt's blood glucose result of 346 with no scheduled insulin until bedtime.

## 2022-04-09 NOTE — H&P
[facility]    History and Physical - Hospitalist Service       Date of Admission:  4/8/2022    Assessment & Plan      Pee Ayala is a 81 year old male admitted on 4/8/2022. He was brought to the emergency department by ambulance after he tripped and fell at home landing on his left side and hitting his head without loss of consciousness.  Complaining of left hip pain and left rib cage pain when trying to get up.    1.  Acute moderately displaced subcapital fracture of the left femur  Nothing by mouth, bedrest, pain control  Ortho consult for repair  Reverse INR for surgery but otherwise will not need any further preop work-up    2.  Left fifth, seventh, eighth anterolateral ribs fractures  Rib fracture order set: Incentive spirometry, lidocaine patch    3.  Chronic atrial fibrillation  Therapeutic INR 2.13, reverse warfarin effect for surgery  History of Maze procedure during CABG  Continue carvedilol    4.  Coronary artery disease  History of CABG  Denies angina symptoms  Hold Plavix until after surgery, then resume when okay with surgeon  Continue carvedilol    5.  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  Chronic diastolic congestive heart failure  Echocardiogram 1/6/2022 showed EF 65-70%, bioprosthetic aortic valve with a mean systolic gradient of 7 mmHg and a peak anterograde velocity of 1.9 m/s  Chronic pitting edema but otherwise no signs of acute heart failure  Continue furosemide  Monitor volume status, daily weight    7.  Chronic back pain  Status post injection about 3 weeks ago and procedure about 4 weeks ago  Continue lidocaine patch and pain control    8.  Constipation  Bowel regimen       Diet: NPO per Anesthesia Guidelines for Procedure/Surgery Except for: Meds    DVT Prophylaxis: Pneumatic Compression Devices  Silva Catheter:  "Not present  Central Lines: None  Cardiac Monitoring: None  Code Status:  Full code    Clinically Significant Risk Factors Present on Admission            # Anion Gap Metabolic Acidosis: AG = 15 mmol/L (Ref range: 5 - 18 mmol/L) on admission, will monitor and treat as appropriate  # Hypoalbuminemia: Albumin = 3.2 g/dL (Ref range: 3.5 - 5.0 g/dL) on admission, will monitor as appropriate   # Coagulation Defect: home medication list includes an anticoagulant medication  # Platelet Defect: home medication list includes an antiplatelet medication   # Diabetes, type II: last A1C 8.0 % (Ref range: 0.0 - 5.6 %)  # Overweight: Estimated body mass index is 29.52 kg/m  as calculated from the following:    Height as of this encounter: 1.626 m (5' 4\").    Weight as of this encounter: 78 kg (172 lb).      Disposition Plan   Expected Discharge:  after at least 2 midnights  Anticipated discharge location:  Awaiting care coordination huddle  Delays:    Hip surgery       The patient's care was discussed with the Patient.    Javid Patel MD  Hospitalist Service  [facility]  Securely message with the Vocera Web Console (learn more here)  Text page via InVisM Paging/Directory         ______________________________________________________________________    Chief Complaint   Fall, hip pain    History is obtained from the patient, electronic health record and emergency department physician    History of Present Illness   Pee Ayala is a 81 year old male who was brought to the emergency department by ambulance for evaluation of hip pain after a fall at home.  Past medical history of coronary artery disease status post CABG, Maze procedure, chronic atrial fibrillation, aortic stenosis status post TAVR January 2021, essential hypertension, hyperlipidemia, heart failure with preserved ejection fraction, type 2 diabetes mellitus, polyneuropathy.  Patient lives at home with his wife and wiped out when trying " to  the dog's waste.  He hit his head but did not lose consciousness.  He felt pain on the left hip and also on the left rib cage when he tried to get up.  He was unable to get up and laid on the floor for about 40 minutes until his wife found him.  Work-up in the ED showed a left hip fracture and left-sided ribs fractures.  Patient denies recent dyspnea on exertion, chest pain, shortness of breath or palpitations.  He denies previous anesthesia reaction.  He states had a spine injection about 3 weeks ago and back surgery about 4 weeks ago.  He complains of increasing back pain while in bed and after transfer but not related to the fall.  Patient moves his bowels every other day, denies melena or hematochezia and takes stool softeners.    Review of Systems    The 10 point Review of Systems is negative other than noted in the HPI or here.     Past Medical History    I have reviewed this patient's medical history and updated it with pertinent information if needed.   Past Medical History:   Diagnosis Date     ACS (acute coronary syndrome) (H) 6/2/2014     ACS (acute coronary syndrome) (H) 6/2/2014     Actinic keratosis      Actinic keratosis 1/14/2014     Actinic keratosis 1/14/2014     Anticoagulated on Coumadin 12/30/2015     Antiplatelet or antithrombotic long-term use      Atrial fibrillation (H)      Atrial fibrillation (H) 2016     Bone mass 4/26/2017     Chest heaviness 1/23/2019     Chest pain 5/31/2014     Closed fracture of left forearm 2015     Congestive heart failure (H)      Coronary artery disease      Diabetes (H) 2009     Diabetes mellitus (H)      Difficulty in walking(719.7)      Dyslipidemia 8/31/2016     Dyspnea on exertion      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      Encounter for long-term (current) use of insulin (H) 8/11/2016     Esophageal reflux 11/18/2010     Esophageal  reflux 11/18/2010     History of angina      HTN (hypertension) 6/30/2009     (Problem list name updated by automated process. Provider to review and confirm.)     Hyperlipidemia LDL goal <100 10/31/2010     Hypertension      Impotence of organic origin      Mixed hyperlipidemia 4/25/2007 April 25, 2007 restarted Zocor today, recheck in 3 months.  August 23, 2007 LDL at 101 with 40 mg, will increase to 80 mg. Recheck  In 3 months.      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      Type II or unspecified type diabetes mellitus with neurological manifestations, not stated as uncontrolled(250.60) (H)      Walking troubles        Past Surgical History   I have reviewed this patient's surgical history and updated it with pertinent information if needed.  Past Surgical History:   Procedure Laterality Date     BYPASS GRAFT ARTERY CORONARY N/A 9/10/2014    Procedure: BYPASS GRAFT ARTERY CORONARY;  Surgeon: Bharathi Caraballo MD;  Location: [facility] OR     BYPASS GRAFT ARTERY CORONARY  2016     COLONOSCOPY  1/14/2004     CORONARY ANGIOGRAPHY ADULT ORDER       CV CORONARY ANGIOGRAM N/A 4/4/2019    Procedure: Coronary Angiogram;  Surgeon: Dominik Vega MD;  Location: [facility] Cath Lab;  Service: Cardiology     CV CORONARY ANGIOGRAM N/A 1/6/2021    Procedure: Coronary Angiogram;  Surgeon: Dominik Vega MD;  Location: [facility] Cardiac Cath Lab;  Service:  Cardiology     CV LEFT HEART CATHETERIZATION WITHOUT LEFT VENTRICULOGRAM Left 4/4/2019    Procedure: Left Heart Catheterization Without Left Ventriculogram;  Surgeon: Dominik Vega MD;  Location: [facility] Cath Lab;  Service: Cardiology     CV LEFT HEART CATHETERIZATION WITHOUT LEFT VENTRICULOGRAM Left 1/6/2021    Procedure: Left Heart Catheterization Without Left Ventriculogram;  Surgeon: Dominik Vega MD;  Location: [facility] Cardiac Cath Lab;  Service: Cardiology     CV RIGHT HEART CATHETERIZATION Right 4/4/2019    Procedure: Right Heart Catheterization;  Surgeon: Dominik Vega MD;  Location: [facility] Cath Lab;  Service: Cardiology     CV TRANSCATHETER AORTIC VALVE REPLACEMENT N/A 1/12/2021    Procedure: Right transfemoral transcatheter aortic valve replacement using Magdaleno Dominick 3 size 29mm.  Transthoracic echocardiogram;  Surgeon: Jo Romano MD;  Location: [facility] CARDIAC CATH LAB     ESOPHAGOSCOPY, GASTROSCOPY, DUODENOSCOPY (EGD), COMBINED N/A 1/13/2016    Procedure: COMBINED ESOPHAGOSCOPY, GASTROSCOPY, DUODENOSCOPY (EGD);  Surgeon: Rk Srivastava MD;  Location: [facility] FEMORAL CANNULIZATION WITH OPEN STANDBY REPAIR AORTIC VALVE N/A 1/12/2021    Procedure: Cardiopulmonary Bypass standby;  Surgeon: Jefferson Sandy MD;  Location: [facility] CARDIAC CATH LAB     IR LOWER EXTREMITY ANGIOGRAM LEFT  12/7/2021     LAPAROSCOPIC CHOLECYSTECTOMY  10/09     MAZE PROCEDURE N/A 9/10/2014    Procedure: MAZE PROCEDURE;  Surgeon: Bharathi Caraballo MD;  Location:  OR     [facility] MICROGRAPHIC PROCEDURE       ORTHOPEDIC SURGERY      surgery for fx  Left forearm     OTHER SURGICAL HISTORY Left 2015    forearm sugery     STENT, CORONARY, HUMA  02/2016     VASECTOMY         Social History   I have reviewed this patient's social history and updated it with pertinent information if needed.  Social History     Tobacco Use     Smoking status: Former Smoker     Quit  date: 1998     Years since quittin.2     Smokeless tobacco: Never Used   Substance Use Topics     Alcohol use: Yes     Alcohol/week: 0.0 standard drinks     Comment: Alcoholic Drinks/day: very rare     Drug use: No       Family History   I have reviewed this patient's family history and updated it with pertinent information if needed.  Family History   Problem Relation Age of Onset     Diabetes Mother      Hypertension Father      Cerebrovascular Disease Father      Diabetes Maternal Grandmother      Breast Cancer No family hx of      Cancer - colorectal No family hx of      Prostate Cancer No family hx of      C.A.D. No family hx of      Diabetes Type 2  Mother         58 ,  from anesthesia complication.     Heart Disease Father         86  from stroke     No Known Problems Brother         60 years of age.       Prior to Admission Medications   Prior to Admission Medications   Prescriptions Last Dose Informant Patient Reported? Taking?   ACCU-CHEK GUIDE test strip   No No   Sig: USE 1  TO CHECK GLUCOSE THREE TIMES DAILY   Apoaequorin (PREVAGEN PO) 2022 at Unknown time  Yes Yes   Sig: Take 1 tablet by mouth daily    Lidocaine (LIDOCARE) 4 % Patch 2022 at Unknown time  Yes Yes   Sig: Place 1 patch onto the skin every 24 hours To prevent lidocaine toxicity, patient should be patch free for 12 hrs daily.   acetaminophen (TYLENOL) 500 MG tablet 2022 at Unknown time  Yes Yes   Sig: Take 1,000 mg by mouth 2 times daily as needed for mild pain    blood glucose monitor KIT  Self No No   Si kit 2 times daily.   carvedilol (COREG) 6.25 MG tablet 2022 at Unknown time  Yes Yes   Sig: Take 6.25 mg by mouth 2 times daily (with meals)   clopidogrel (PLAVIX) 75 MG tablet 2022 at Unknown time  No Yes   Sig: Take 1 tablet (75 mg) by mouth daily Start taking medication the day after the procedure.   clotrimazole (LOTRIMIN) 1 % external cream Past Week at Unknown time  No Yes   Sig: Apply to  feet twice daily until 1 week after clinical resolution, typically for 4 weeks total   empagliflozin (JARDIANCE) 10 MG TABS tablet 4/8/2022 at Unknown time  No Yes   Sig: Take 1 tablet (10 mg) by mouth daily   finasteride (PROSCAR) 5 MG tablet 4/8/2022 at Unknown time  No Yes   Sig: Take 1 tablet (5 mg) by mouth daily   furosemide (LASIX) 20 MG tablet 4/8/2022 at Unknown time  No Yes   Sig: TAKE 2 TABLETS BY MOUTH IN THE MORNING AND 1 TABLET IN THE AFTERNOON   insulin aspart prot & aspart (NOVOLOG MIX 70/30 PEN) (70-30) 100 UNIT/ML pen 4/8/2022 at Unknown time  Yes Yes   Sig: Inject Subcutaneous 2 times daily (with meals) 33 in AM and 26 in PM   metFORMIN (GLUCOPHAGE) 500 MG tablet 4/8/2022 at Unknown time  No Yes   Sig: Take 2 tablets by mouth twice daily   oxyCODONE-acetaminophen (PERCOCET) 5-325 MG tablet Unknown at Unknown time  Yes Yes   Sig: Take 1 tablet by mouth 3 times daily as needed for severe pain   polyethylene glycol (MIRALAX) 17 GM/Dose powder Unknown at Unknown time  No Yes   Sig: Take 17 g (1 capful) by mouth daily as needed for constipation (opioid induced constipation)   pramipexole (MIRAPEX) 0.25 MG tablet 4/8/2022 at x1  No Yes   Sig: Take 1 tablet (0.25 mg) by mouth 2 times daily Takes 4pm and 5;30 pm   warfarin ANTICOAGULANT (COUMADIN) 5 MG tablet 4/7/2022 at Unknown time  No Yes   Sig: Take 1 tablet (5 mg) by mouth daily Resume your warfarin tonight 12/7 after the procedure at the usual dose   Patient taking differently: 7.5 mg Tues, Thurs, Sat, 5 mg all other days      Facility-Administered Medications: None     Allergies   Allergies   Allergen Reactions     Amlodipine Dizziness     Dizziness and dry heaves     Lisinopril Cough     Adhesive Tape Itching and Rash       Physical Exam   Vital Signs: Temp: 97.3  F (36.3  C) Temp src: Oral BP: (!) 142/70 Pulse: 98   Resp: 22 SpO2: 97 % O2 Device: Nasal cannula Oxygen Delivery: 2 LPM  Weight: 172 lbs 0 oz    Constitutional: awake, alert and no  apparent distress  ENT: Normocephalic, atraumatic  Respiratory: no increased work of breathing, good air exchange and clear to auscultation  Cardiovascular: irregularly irregular rhythm and normal S1 and S2  GI: normal bowel sounds, soft and non-tender  Skin: Scalp abrasions  Musculoskeletal: Bilateral pitting edema, nontender  Neurologic: Mental Status Exam:  Level of Alertness:   awake  Orientation:   person, place, time  Motor Exam: Limited left leg motion secondary to fracture  Neuropsychiatric: General: normal and calm    Data   Data reviewed today: I reviewed all medications, new labs and imaging results over the last 24 hours. I personally reviewed the EKG tracing showing Atrial fibrillation at 105 bpm, nonspecific ST waves.    Recent Labs   Lab 04/08/22 2113 04/08/22  1811   WBC  --  11.6*   HGB  --  13.9   MCV  --  105*   PLT  --  211   INR  --  2.13*   NA  --  137   POTASSIUM  --  4.2   CHLORIDE  --  104   CO2  --  18*   BUN  --  27   CR  --  1.06   ANIONGAP  --  15   RACHEL  --  8.5   * 363*   ALBUMIN  --  3.2*   PROTTOTAL  --  5.9*   BILITOTAL  --  1.0   ALKPHOS  --  126*   ALT  --  45   AST  --  45*     Recent Results (from the past 24 hour(s))   Head CT w/o contrast    Narrative    EXAM: CT HEAD W/O CONTRAST  LOCATION: [facility]  DATE/TIME: 4/8/2022 6:30 PM    INDICATION: Head trauma, minor (Age >= 65y)  COMPARISON: None.  TECHNIQUE: Routine CT Head without IV contrast. Multiplanar reformats. Dose reduction techniques were used.    FINDINGS:  INTRACRANIAL CONTENTS: No evidence of acute intracranial hemorrhage or mass effect. Scattered foci of decreased attenuation within the cerebral hemispheric white matter which are nonspecific, though most commonly ascribed to chronic small vessel ischemic   disease. The ventricles and sulci are prominent consistent with moderate brain parenchymal volume loss. Gray-white matter differentiation is maintained. The basilar cisterns are  patent.    VISUALIZED ORBITS/SINUSES/MASTOIDS: The globes are unremarkable. The partially imaged paranasal sinuses, mastoid air cells and middle ear cavities are unremarkable.     BONES/SOFT TISSUES: The visualized skull base and calvarium are unremarkable.      Impression    IMPRESSION:    1.  No evidence of acute intracranial hemorrhage or mass effect.  2.  Mild nonspecific white matter changes.  3.  Moderate brain parenchymal volume loss.   Chest CT w/o contrast    Narrative    EXAM: CT CHEST W/O CONTRAST  LOCATION: [facility]  DATE/TIME: 4/8/2022 6:30 PM    INDICATION: Chest trauma, minor  COMPARISON: 12/04/2020   TECHNIQUE: CT chest without IV contrast. Multiplanar reformats were obtained. Dose reduction techniques were used.  CONTRAST: None.    FINDINGS:   LUNGS AND PLEURA: Scarring in the lungs has not changed. There is prominent extrapleural fat deposition.     MEDIASTINUM/AXILLAE: A TAVR stent is present. There is a moderate sized hiatal hernia.     CORONARY ARTERY CALCIFICATION: Previous intervention (stents or CABG).    UPPER ABDOMEN: Cholecystectomy.     MUSCULOSKELETAL: Healed rib, clavicle, and humerus fractures are noted. There may be nondisplaced acute fractures of the left anterolateral fifth, seventh, and eighth ribs, though there is no adjacent soft tissue stranding or fluid. Status post median   sternotomy.       Impression    IMPRESSION:   1.  There may be nondisplaced acute fractures of the left anterolateral fifth, seventh, and eighth ribs, though there is no adjacent soft tissue stranding or fluid. Correlate for point tenderness.   2.  Interval TAVR.   3.  No other change from the prior study.   XR Pelvis and Hip Left 2 Views    Narrative    EXAM: XR PELVIS AND HIP LEFT 2 VIEWS  LOCATION: [facility]  DATE/TIME: 4/8/2022 7:03 PM    INDICATION: Fall, hip pain with external rotation and shortening  COMPARISON: None.      Impression    IMPRESSION:  Acute moderately displaced subcapital fracture of the left femur with superior displacement of the humeral shaft. Osteopenia. Mild degenerative changes in both hips.

## 2022-04-10 ENCOUNTER — APPOINTMENT (OUTPATIENT)
Dept: OCCUPATIONAL THERAPY | Facility: HOSPITAL | Age: 82
DRG: 522 | End: 2022-04-10
Attending: PHYSICIAN ASSISTANT
Payer: COMMERCIAL

## 2022-04-10 ENCOUNTER — APPOINTMENT (OUTPATIENT)
Dept: PHYSICAL THERAPY | Facility: HOSPITAL | Age: 82
DRG: 522 | End: 2022-04-10
Attending: HOSPITALIST
Payer: COMMERCIAL

## 2022-04-10 ENCOUNTER — APPOINTMENT (OUTPATIENT)
Dept: RADIOLOGY | Facility: HOSPITAL | Age: 82
DRG: 522 | End: 2022-04-10
Attending: HOSPITALIST
Payer: COMMERCIAL

## 2022-04-10 LAB
ANION GAP SERPL CALCULATED.3IONS-SCNC: 7 MMOL/L (ref 5–18)
BASOPHILS # BLD AUTO: 0 10E3/UL (ref 0–0.2)
BASOPHILS NFR BLD AUTO: 0 %
BUN SERPL-MCNC: 21 MG/DL (ref 8–28)
CALCIUM SERPL-MCNC: 8.3 MG/DL (ref 8.5–10.5)
CHLORIDE BLD-SCNC: 102 MMOL/L (ref 98–107)
CO2 SERPL-SCNC: 28 MMOL/L (ref 22–31)
CREAT SERPL-MCNC: 0.8 MG/DL (ref 0.7–1.3)
EOSINOPHIL # BLD AUTO: 0 10E3/UL (ref 0–0.7)
EOSINOPHIL NFR BLD AUTO: 0 %
ERYTHROCYTE [DISTWIDTH] IN BLOOD BY AUTOMATED COUNT: 13.2 % (ref 10–15)
GFR SERPL CREATININE-BSD FRML MDRD: 90 ML/MIN/1.73M2
GLUCOSE BLD-MCNC: 229 MG/DL (ref 70–125)
GLUCOSE BLDC GLUCOMTR-MCNC: 213 MG/DL (ref 70–99)
GLUCOSE BLDC GLUCOMTR-MCNC: 224 MG/DL (ref 70–99)
GLUCOSE BLDC GLUCOMTR-MCNC: 310 MG/DL (ref 70–99)
GLUCOSE BLDC GLUCOMTR-MCNC: 312 MG/DL (ref 70–99)
GLUCOSE BLDC GLUCOMTR-MCNC: 314 MG/DL (ref 70–99)
GLUCOSE BLDC GLUCOMTR-MCNC: 409 MG/DL (ref 70–99)
HCT VFR BLD AUTO: 30 % (ref 40–53)
HGB BLD-MCNC: 9.9 G/DL (ref 13.3–17.7)
IMM GRANULOCYTES # BLD: 0.1 10E3/UL
IMM GRANULOCYTES NFR BLD: 1 %
INR PPP: 1.2 (ref 0.85–1.15)
LYMPHOCYTES # BLD AUTO: 0.9 10E3/UL (ref 0.8–5.3)
LYMPHOCYTES NFR BLD AUTO: 10 %
MAGNESIUM SERPL-MCNC: 1.9 MG/DL (ref 1.8–2.6)
MCH RBC QN AUTO: 34.6 PG (ref 26.5–33)
MCHC RBC AUTO-ENTMCNC: 33 G/DL (ref 31.5–36.5)
MCV RBC AUTO: 105 FL (ref 78–100)
MONOCYTES # BLD AUTO: 0.5 10E3/UL (ref 0–1.3)
MONOCYTES NFR BLD AUTO: 6 %
NEUTROPHILS # BLD AUTO: 7.2 10E3/UL (ref 1.6–8.3)
NEUTROPHILS NFR BLD AUTO: 83 %
NRBC # BLD AUTO: 0 10E3/UL
NRBC BLD AUTO-RTO: 0 /100
PHOSPHATE SERPL-MCNC: 2.7 MG/DL (ref 2.5–4.5)
PLATELET # BLD AUTO: 142 10E3/UL (ref 150–450)
POTASSIUM BLD-SCNC: 3.8 MMOL/L (ref 3.5–5)
RBC # BLD AUTO: 2.86 10E6/UL (ref 4.4–5.9)
SODIUM SERPL-SCNC: 137 MMOL/L (ref 136–145)
WBC # BLD AUTO: 8.6 10E3/UL (ref 4–11)

## 2022-04-10 PROCEDURE — 999N000157 HC STATISTIC RCP TIME EA 10 MIN

## 2022-04-10 PROCEDURE — 80048 BASIC METABOLIC PNL TOTAL CA: CPT | Performed by: STUDENT IN AN ORGANIZED HEALTH CARE EDUCATION/TRAINING PROGRAM

## 2022-04-10 PROCEDURE — 83735 ASSAY OF MAGNESIUM: CPT | Performed by: HOSPITALIST

## 2022-04-10 PROCEDURE — 97530 THERAPEUTIC ACTIVITIES: CPT | Mod: GO

## 2022-04-10 PROCEDURE — 250N000013 HC RX MED GY IP 250 OP 250 PS 637: Performed by: PHYSICIAN ASSISTANT

## 2022-04-10 PROCEDURE — 250N000013 HC RX MED GY IP 250 OP 250 PS 637: Performed by: STUDENT IN AN ORGANIZED HEALTH CARE EDUCATION/TRAINING PROGRAM

## 2022-04-10 PROCEDURE — 97530 THERAPEUTIC ACTIVITIES: CPT | Mod: GP

## 2022-04-10 PROCEDURE — 97166 OT EVAL MOD COMPLEX 45 MIN: CPT | Mod: GO

## 2022-04-10 PROCEDURE — 84100 ASSAY OF PHOSPHORUS: CPT | Performed by: HOSPITALIST

## 2022-04-10 PROCEDURE — 99232 SBSQ HOSP IP/OBS MODERATE 35: CPT | Performed by: STUDENT IN AN ORGANIZED HEALTH CARE EDUCATION/TRAINING PROGRAM

## 2022-04-10 PROCEDURE — 97162 PT EVAL MOD COMPLEX 30 MIN: CPT | Mod: GP

## 2022-04-10 PROCEDURE — 250N000013 HC RX MED GY IP 250 OP 250 PS 637: Performed by: HOSPITALIST

## 2022-04-10 PROCEDURE — 97110 THERAPEUTIC EXERCISES: CPT | Mod: GP

## 2022-04-10 PROCEDURE — 250N000011 HC RX IP 250 OP 636: Performed by: PHYSICIAN ASSISTANT

## 2022-04-10 PROCEDURE — 97535 SELF CARE MNGMENT TRAINING: CPT | Mod: GO

## 2022-04-10 PROCEDURE — 85610 PROTHROMBIN TIME: CPT | Performed by: PHYSICIAN ASSISTANT

## 2022-04-10 PROCEDURE — 36415 COLL VENOUS BLD VENIPUNCTURE: CPT | Performed by: PHYSICIAN ASSISTANT

## 2022-04-10 PROCEDURE — 258N000003 HC RX IP 258 OP 636: Performed by: PHYSICIAN ASSISTANT

## 2022-04-10 PROCEDURE — 94150 VITAL CAPACITY TEST: CPT

## 2022-04-10 PROCEDURE — 85025 COMPLETE CBC W/AUTO DIFF WBC: CPT | Performed by: STUDENT IN AN ORGANIZED HEALTH CARE EDUCATION/TRAINING PROGRAM

## 2022-04-10 PROCEDURE — 120N000001 HC R&B MED SURG/OB

## 2022-04-10 PROCEDURE — 250N000012 HC RX MED GY IP 250 OP 636 PS 637: Performed by: STUDENT IN AN ORGANIZED HEALTH CARE EDUCATION/TRAINING PROGRAM

## 2022-04-10 PROCEDURE — 71045 X-RAY EXAM CHEST 1 VIEW: CPT

## 2022-04-10 RX ADMIN — OXYCODONE HYDROCHLORIDE 5 MG: 5 TABLET ORAL at 05:17

## 2022-04-10 RX ADMIN — CARVEDILOL 6.25 MG: 3.12 TABLET, FILM COATED ORAL at 08:28

## 2022-04-10 RX ADMIN — FINASTERIDE 5 MG: 5 TABLET, FILM COATED ORAL at 08:28

## 2022-04-10 RX ADMIN — POLYETHYLENE GLYCOL 3350 17 G: 17 POWDER, FOR SOLUTION ORAL at 08:28

## 2022-04-10 RX ADMIN — ACETAMINOPHEN 975 MG: 325 TABLET ORAL at 14:36

## 2022-04-10 RX ADMIN — GABAPENTIN 100 MG: 100 CAPSULE ORAL at 21:01

## 2022-04-10 RX ADMIN — LIDOCAINE 1 PATCH: 246 PATCH TOPICAL at 14:37

## 2022-04-10 RX ADMIN — CARVEDILOL 6.25 MG: 3.12 TABLET, FILM COATED ORAL at 18:04

## 2022-04-10 RX ADMIN — OXYCODONE HYDROCHLORIDE 5 MG: 5 TABLET ORAL at 18:47

## 2022-04-10 RX ADMIN — ACETAMINOPHEN 975 MG: 325 TABLET ORAL at 22:32

## 2022-04-10 RX ADMIN — PRAMIPEXOLE DIHYDROCHLORIDE 0.25 MG: 0.25 TABLET ORAL at 21:01

## 2022-04-10 RX ADMIN — PRAMIPEXOLE DIHYDROCHLORIDE 0.25 MG: 0.25 TABLET ORAL at 08:28

## 2022-04-10 RX ADMIN — FUROSEMIDE 40 MG: 20 TABLET ORAL at 17:59

## 2022-04-10 RX ADMIN — OXYCODONE HYDROCHLORIDE 5 MG: 5 TABLET ORAL at 09:49

## 2022-04-10 RX ADMIN — ACETAMINOPHEN 975 MG: 325 TABLET ORAL at 08:29

## 2022-04-10 RX ADMIN — FUROSEMIDE 40 MG: 20 TABLET ORAL at 08:29

## 2022-04-10 RX ADMIN — CEFAZOLIN 1 G: 1 INJECTION, POWDER, FOR SOLUTION INTRAMUSCULAR; INTRAVENOUS at 03:39

## 2022-04-10 RX ADMIN — WARFARIN SODIUM 7.5 MG: 5 TABLET ORAL at 17:59

## 2022-04-10 RX ADMIN — INSULIN ASPART 4 UNITS: 100 INJECTION, SOLUTION INTRAVENOUS; SUBCUTANEOUS at 12:31

## 2022-04-10 RX ADMIN — INSULIN GLARGINE 10 UNITS: 100 INJECTION, SOLUTION SUBCUTANEOUS at 21:02

## 2022-04-10 RX ADMIN — SENNOSIDES AND DOCUSATE SODIUM 1 TABLET: 8.6; 5 TABLET ORAL at 21:02

## 2022-04-10 RX ADMIN — INSULIN ASPART 4 UNITS: 100 INJECTION, SOLUTION INTRAVENOUS; SUBCUTANEOUS at 00:50

## 2022-04-10 RX ADMIN — SENNOSIDES AND DOCUSATE SODIUM 1 TABLET: 8.6; 5 TABLET ORAL at 08:28

## 2022-04-10 RX ADMIN — SODIUM CHLORIDE, POTASSIUM CHLORIDE, SODIUM LACTATE AND CALCIUM CHLORIDE: 600; 310; 30; 20 INJECTION, SOLUTION INTRAVENOUS at 03:40

## 2022-04-10 RX ADMIN — INSULIN ASPART 2 UNITS: 100 INJECTION, SOLUTION INTRAVENOUS; SUBCUTANEOUS at 04:50

## 2022-04-10 RX ADMIN — INSULIN ASPART 2 UNITS: 100 INJECTION, SOLUTION INTRAVENOUS; SUBCUTANEOUS at 08:46

## 2022-04-10 ASSESSMENT — ACTIVITIES OF DAILY LIVING (ADL)
ADLS_ACUITY_SCORE: 10
ADLS_ACUITY_SCORE: 12
ADLS_ACUITY_SCORE: 8
ADLS_ACUITY_SCORE: 10
ADLS_ACUITY_SCORE: 8
ADLS_ACUITY_SCORE: 8
ADLS_ACUITY_SCORE: 10
ADLS_ACUITY_SCORE: 8
ADLS_ACUITY_SCORE: 10
ADLS_ACUITY_SCORE: 12
ADLS_ACUITY_SCORE: 10
ADLS_ACUITY_SCORE: 8
ADLS_ACUITY_SCORE: 10
ADLS_ACUITY_SCORE: 8
ADLS_ACUITY_SCORE: 10
ADLS_ACUITY_SCORE: 12

## 2022-04-10 NOTE — PROGRESS NOTES
04/10/22 0745   Quick Adds   Type of Visit Initial PT Evaluation   Living Environment   People in Home spouse   Current Living Arrangements house   Self-Care   Usual Activity Tolerance good   Current Activity Tolerance fair   Equipment Currently Used at Home cane, straight   Fall history within last six months no   General Information   Onset of Illness/Injury or Date of Surgery 04/08/22   Referring Physician Diann Briones PA-C   Patient/Family Therapy Goals Statement (PT) none stated   Pertinent History of Current Problem (include personal factors and/or comorbidities that impact the POC) tripped and fell at home landing on his left side and hitting his head without loss of consciousness.  Complaining of left hip pain and left rib cage pain when trying to get up. L hip fx, L 5, 7, 8 anterolateral rib fxs   Existing Precautions/Restrictions no active hip ABD;90 degree hip flexion;no hip ADD past midline;no hip IR;fall   Weight-Bearing Status - LLE weight-bearing as tolerated   Range of Motion (ROM)   Range of Motion ROM deficits secondary to pain;ROM deficits secondary to surgical procedure   Strength (Manual Muscle Testing)   Strength (Manual Muscle Testing) Deficits observed during functional mobility  (limited d/t pain)   Bed Mobility   Bed Mobility supine-sit   Supine-Sit [place] (Bed Mobility) minimum assist (75% patient effort);moderate assist (50% patient effort);2 person assist   Bed Mobility Limitations decreased ability to use legs for bridging/pushing   Impairments Contributing to Impaired Bed Mobility impaired balance;pain;decreased strength   Assistive Device (Bed Mobility) bed rails;draw sheet   Comment, (Bed Mobility) HOB elevated, very painful   Transfers   Transfers sit-stand transfer;bed-chair transfer   Bed-Chair Transfer   Assistive Device (Bed-Chair Transfers) other (see comments)  (arm in arm of 2)   Bed-Chair [place] (Transfers) moderate assist (50% patient effort)   Comment,  (Bed-Chair Transfer) inc pain   Sit-Stand Transfer   Sit-Stand [place] (Transfers) minimum assist (75% patient effort);moderate assist (50% patient effort);2 person assist   Assistive Device (Sit-Stand Transfers) other (see comments)  (arm in arm of 2)   Balance   Balance other (describe)   Balance Comments right lateral lean with sitting EOB, And[place] for upright. significant flexed posture with standing   Clinical Impression   Criteria for Skilled Therapeutic Intervention Yes, treatment indicated   PT Diagnosis (PT) impaired functional mobility, gait abnormality   Influenced by the following impairments pain, orthopedic restrictions, decreased strength   Functional limitations due to impairments bed mobility, gait, transfers   Clinical Presentation (PT Evaluation Complexity) Evolving/Changing   Clinical Presentation Rationale pt presents as medically diagnosed   Clinical Decision Making (Complexity) moderate complexity   Planned Therapy Interventions (PT) balance training;bed mobility training;gait training;home exercise program;neuromuscular re-education;patient/family education;ROM (range of motion);stair training;strengthening;transfer training   Anticipated Equipment Needs at Discharge (PT) walker, rolling   Risk & Benefits of therapy have been explained evaluation/treatment results reviewed;patient   PT Discharge Planning   PT Discharge Recommendation (DC Rec) Transitional Care Facility   PT Rationale for DC Rec inc assist of 2 for mobility   Total Evaluation Time   Total Evaluation Time (Minutes) 10   Physical Therapy Goals   PT Frequency 2x/day   PT Predicated Duration/Target Date for Goal Attainment 04/15/22   PT Goals Bed Mobility;Transfers;Gait;Stairs;PT Goal 1   PT: Bed Mobility Supervision/stand-by assist;Supine to/from sit;Within precautions   PT: Transfers Supervision/stand-by assist;Sit to/from stand;Bed to/from chair;Assistive device;Within precautions   PT: Gait Minimal assist;Rolling  walker;Within precautions;50 feet   PT: Stairs Minimal assist;Within precautions;4 stairs;Rail on both sides   PT: Goal 1 SBA BLE ex to improve strength and endurance needed for functional mobility

## 2022-04-10 NOTE — CONSULTS
Care Management Initial Consult    General Information  Assessment completed with: Patient, Spouse or significant other,  (Pt and spouse)  Type of CM/SW Visit: Initial Assessment    Primary Care Provider verified and updated as needed:     Readmission within the last 30 days:        Reason for Consult: discharge planning  Advance Care Planning:            Communication Assessment  Patient's communication style: spoken language (English or Bilingual)    Hearing Difficulty or Deaf: no   Wear Glasses or Blind: yes    Cognitive  Cognitive/Neuro/Behavioral: WDL  Level of Consciousness: confused  Arousal Level: opens eyes spontaneously  Orientation: oriented x 4  Mood/Behavior: calm, cooperative  Best Language: 0 - No aphasia  Speech: clear    Living Environment:   People in home: spouse     Current living Arrangements: house      Able to return to prior arrangements:         Family/Social Support:  Care provided by:    Provides care for:       Wife, Children          Description of Support System: Supportive, Involved         Current Resources:   Patient receiving home care services:       Community Resources:    Equipment currently used at home: grab bar, toilet, grab bar, tub/shower, raised toilet seat, shower chair, walker, rolling  Supplies currently used at home:      Employment/Financial:  Employment Status: retired        Financial Concerns:             Lifestyle & Psychosocial Needs:  Social Determinants of Health     Tobacco Use: Medium Risk     Smoking Tobacco Use: Former Smoker     Smokeless Tobacco Use: Never Used   Alcohol Use: Not on file   Financial Resource Strain: Not on file   Food Insecurity: Not on file   Transportation Needs: Not on file   Physical Activity: Not on file   Stress: Not on file   Social Connections: Not on file   Intimate Partner Violence: Not on file   Depression: Not at risk     PHQ-2 Score: 2   Housing Stability: Not on file       Functional Status:  Prior to admission patient needed  assistance:                       Values/Beliefs:  Spiritual, Cultural Beliefs, Buddhism Practices, Values that affect care:                 Additional Information:  SWCM met with pt in room and spoke with spouse Agueda over the phone.     Pt is from home with spouse in [place]; has walker available. [facility]erral [facility] to [facility]. [facility] Medicare Adv insurance.   CM to f/u on bed availability on 4/10. Transportation pending mobility.     BRO Lee

## 2022-04-10 NOTE — PROGRESS NOTES
"1330 O2 1 lpm/NC, SpO2 98 %, BS diminished crackles Rt lung, flutter valve performed x 6 minutes, good technique. RT to monitor.     /54 (BP Location: Right arm)   Pulse 94   Temp 98.1  F (36.7  C) (Oral)   Resp 20   Ht 1.626 m (5' 4\")   Wt 78 kg (172 lb)   SpO2 98%   BMI 29.52 kg/m      "

## 2022-04-10 NOTE — PLAN OF CARE
Pt alert and oriented x 4. PRN Oxycodone 5 mg and scheduled Tylenol given for pain in lower back. Pt reports pain in left hip, but states it does not hurt as much as his lower back. Informed pt of 's orders to ambulate this shift of at least 75 feet. Pt refused. Pt on 1L of O2 with O2 sats in the high 90s. Continuous pulse oximeter on. IS done every 2 hours with average result of 1000. Pt's blood glucose at 1840 was 349. Paged provider. Order placed to give 3 units of Novolog. Novolog administered at 1856. Pt has hip abduction strapped in place and bilateral heels suspended with 1 pillow under each calf. SCDs were on at the beginning of the shift, but pt refused and requested to take them off around 2200. Attempted to place Lidocaine patches on pt's lower back, but too painful for pt when turning onto his side. Pt requested to remove the hip abductor pillow. Informed pt it's per Dr's orders after his hip surgery.     Problem: Fracture Stabilization and Management (Hip Fracture Medical Management)  Goal: Fracture Stability  Outcome: Ongoing, Progressing     Problem: Functional Ability Impaired (Hip Fracture Medical Management)  Goal: Optimal Functional Performance  Outcome: Ongoing, Not Progressing  Intervention: Promote Optimal Functional Status  Recent Flowsheet Documentation  Taken 4/9/2022 1930 by Ingrid Swann RN  Activity Management: activity adjusted per tolerance  Taken 4/9/2022 1730 by Ingrid Swann RN  Activity Management: activity adjusted per tolerance  Taken 4/9/2022 1600 by Ingrid Swann RN  Activity Management: activity adjusted per tolerance  Taken 4/9/2022 1530 by Ingrid Swann RN  Activity Management: activity adjusted per tolerance     Problem: Pain (Hip Fracture Medical Management)  Goal: Acceptable Pain Level  Outcome: Ongoing, Progressing  Intervention: Manage Acute Orthopaedic-Related Pain  Recent Flowsheet Documentation  Taken 4/9/2022 1825 by Ingrid Swann RN  Pain Management Interventions:  medication (see MAR)   Goal Outcome Evaluation:

## 2022-04-10 NOTE — PROGRESS NOTES
"   04/10/22 1130   Quick Adds   Type of Visit Initial Occupational Therapy Evaluation       Present yes   Living Environment   People in Home spouse   Current Living Arrangements house   Home Accessibility no concerns   Living Environment Comments twin home in [place]   Self-Care   Usual Activity Tolerance good   Current Activity Tolerance fair   Equipment Currently Used at Home grab bar, toilet;grab bar, tub/shower;raised toilet seat;shower chair;walker, rolling   Fall history within last six months yes   Number of times patient has fallen within last six months 1   General Information   Onset of Illness/Injury or Date of Surgery 04/08/22   Referring Physician Diann Briones PA-C   Patient/Family Therapy Goal Statement (OT) \" i [facility] to go to [facility]\"   Additional Occupational Profile Info/Pertinent History of Current Problem moderate complexity kyle, [facility]: S/p hemiarthroplasty, open reduction and internal fixation of greater trochanter, Left fifth, seventh, eighth anterolateral ribs fractures   Performance Patterns (Routines, Roles, Habits) independent BADLs, wife completes IADLs   Existing Precautions/Restrictions fall;no hip IR;no active hip ABD;no hip ADD past midline;90 degree hip flexion   Limitations/Impairments safety/cognitive   General Observations and Info Pt with increased thoracic flexion, fearful of movement   Cognitive Status Examination   Orientation Status orientation to person, place and time   Behavioral Issues overwhelmed easily   Affect/Mental Status (Cognitive) anxious   Follows Commands WFL   Executive Function Deficit organization/sequencing;insight/awareness of deficits   Cognitive Status Comments Pt needs tasks broken down in simple steps   Posture   Posture forward head position;kyphosis   Range of Motion Comprehensive   General Range of Motion bilateral upper extremity ROM WFL   Bed Mobility   Bed Mobility supine-sit;sit-supine   Supine-Sit [place] (Bed Mobility) " dependent (less than 25% patient effort)   Assistive Device (Bed Mobility) bed rails   Clinical Impression   Criteria for Skilled Therapeutic Interventions Met (OT) Yes, treatment indicated   OT Diagnosis impaired BADL performance   OT Problem List-Impairments impacting ADL problems related to;activity tolerance impaired;balance;cognition;fear & anxiety;motor control;pain;post-surgical precautions   ADL comments/analysis Pt is is requiring total A for self cares   Assessment of Occupational Performance 3-5 Performance Deficits   Identified Performance Deficits dressing, toileting, bathing, G/H   Planned Therapy Interventions (OT) ADL retraining;IADL retraining;balance training;bed mobility training;cognition   Clinical Decision Making Complexity (OT) moderate complexity   Anticipated Equipment Needs Upon Discharge (OT) hip kit;commode chair;raised toilet seat   Risk & Benefits of therapy have been explained evaluation/treatment results reviewed;care plan/treatment goals reviewed;risks/benefits reviewed;participants included;patient;daughter   Clinical Impression Comments Pt presents significantly below baseline with self cares and functional mobility- Ax 2 for mobility.  Pt will need TCU upon discharge.   OT Discharge Planning   OT Discharge Recommendation (DC Rec) Transitional Care Facility   OT Rationale for DC Rec Ax2 for self cares and bed mobility/ transfers   Total Evaluation Time (Minutes)   Total Evaluation Time (Minutes) 15   OT Goals   Therapy Frequency (OT) Daily   OT Predicated Duration/Target Date for Goal Attainment 04/18/22   OT Goals Hygiene/Grooming;Upper Body Dressing;Lower Body Dressing;Toilet Transfer/Toileting   OT: Hygiene/Grooming supervision/stand-by assist   OT: Upper Body Dressing Supervision/stand-by assist   OT: Lower Body Dressing Moderate assist   OT: Toilet Transfer/Toileting Minimal assist

## 2022-04-10 NOTE — PLAN OF CARE
Problem: Plan of Care - These are the overarching goals to be used throughout the patient stay.    Goal: Absence of Hospital-Acquired Illness or Injury  Intervention: Prevent and Manage VTE (Venous Thromboembolism) Risk  Recent Flowsheet Documentation  Taken 4/10/2022 0830 by Hailey Santiago RN  VTE Prevention/Management: SCDs (sequential compression devices) on  Activity Management:   activity adjusted per tolerance   activity encouraged  Taken 4/10/2022 0829 by Hailey Santiago RN  Activity Management:   activity adjusted per tolerance   activity encouraged   up in chair     Problem: Plan of Care - These are the overarching goals to be used throughout the patient stay.    Goal: Optimal Comfort and Wellbeing  Intervention: Monitor Pain and Promote Comfort  Recent Flowsheet Documentation  Taken 4/10/2022 0949 by Hailey Santiago RN  Pain Management Interventions:   medication (see MAR)   distraction   breathing exercises   repositioned  Taken 4/10/2022 0829 by Hailey Santiago RN  Pain Management Interventions:   medication (see MAR)   distraction   emotional support   pain management plan reviewed with patient/caregiver   pillow support provided   repositioned   Goal Outcome Evaluation:    Patient up in chair. He complained of increased pain in left lower buttocks. Adjusted patient in chair and supported with pillows. Medicated with oxycodone and ice applied to left hip. Patient assisted back to bed with 3 to assist. He was fearful of falling. Patient was able to stand and turn to bed with maximum assist and transfer belt. In bed he was comfortable and pain decreased. Later patient stood at bedside with O.T. continue to encourage activity as tolerated.

## 2022-04-10 NOTE — PROGRESS NOTES
Progress Note    Date of admission: 04/08/2022    Assessment/Plan  Pee Ayala is a 81 year old male admitted on 4/8/2022. He was brought to the emergency department by ambulance after he tripped and fell at home landing on his left side and hitting his head without loss of consciousness.  Complained of left hip pain and left rib cage pain when trying to get up.     Acute moderately displaced subcapital fracture of the left femur  #POD 1 S/P hemiarthroplasty, open reduction and internal fixation of greater trochanter  Ortho consult appreciated  Plan for voiding trial tomorrow     Left fifth, seventh, eighth anterolateral ribs fractures  Rib fracture order set: Incentive spirometry, lidocaine patch     Chronic atrial fibrillation  History of Maze procedure during CABG  Continue carvedilol, warfarin     Coronary artery disease  History of CABG  Denies angina symptoms  Hold Plavix until after surgery, then resume when okay with surgeon  Continue carvedilol     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  Hold empagliflozin, Metformin, NovoLog Mix 70/30  Monitor with insulin sliding scale and hypoglycemic protocol  Lantus 10 units every evening  High intensity insulin sliding scale, mealtime insulin coverage     Chronic diastolic congestive heart failure  Echocardiogram 1/6/2022 showed EF 65-70%, bioprosthetic aortic valve with a mean systolic gradient of 7 mmHg and a peak anterograde velocity of 1.9 m/s  Chronic pitting edema but otherwise no signs of acute heart failure  Continue furosemide  Monitor volume status, daily weight     Chronic back pain  Status post injection about 3 weeks ago and procedure about 4 weeks ago  Continue lidocaine patch and pain control     Constipation  Bowel regimen     DVT PPX : SCD    Barriers to discharge: Postop recovery    Anticipated Discharge date  : 2-3 days    Subjective   States pain on the left hip is around 5 out of 10.  No distress noted.   Management plan discussed with the patient and he expressed understanding  Objective  Vital signs in last 24 hours  Temp:  [97.1  F (36.2  C)-98.4  F (36.9  C)] 98.1  F (36.7  C)  Pulse:  [64-98] 94  Resp:  [16-20] 20  BP: (111-152)/(54-73) 111/54  SpO2:  [94 %-100 %] 98 %    Input and Output in 24 hrs     Intake/Output Summary (Last 24 hours) at 4/9/2022 1505  Last data filed at 4/9/2022 1428  Gross per 24 hour   Intake 2300 ml   Output 3125 ml   Net -825 ml       Physical Exam:  GEN: Alert and oriented. Not in acute distress  HEENT: Atraumatic    Sclera- anicteric   Mucous membrane- moist and pink  CHEST: Clear to auscultation bilaterally  HEART: S1S2 regular. No murmurs, rubs or gallops  ABDOMEN: Soft. Non-tender, non-distended. No organomegaly. No guarding or rigidity. Bowel sounds- active  Extremities: No pedal oedema  CNS: No focal neurological deficit. No involuntary movements  SKIN: No skin rash, no cyanosis or clubbing      Pertinent Labs   Lab Results: Personally Reviewed    Recent Results (from the past 24 hour(s))   Glucose by meter    Collection Time: 04/09/22  6:38 PM   Result Value Ref Range    GLUCOSE BY METER POCT 346 (H) 70 - 99 mg/dL   Glucose by meter    Collection Time: 04/09/22  9:13 PM   Result Value Ref Range    GLUCOSE BY METER POCT 365 (H) 70 - 99 mg/dL   Glucose by meter    Collection Time: 04/10/22 12:42 AM   Result Value Ref Range    GLUCOSE BY METER POCT 310 (H) 70 - 99 mg/dL   Glucose by meter    Collection Time: 04/10/22  3:52 AM   Result Value Ref Range    GLUCOSE BY METER POCT 213 (H) 70 - 99 mg/dL   Magnesium    Collection Time: 04/10/22  5:45 AM   Result Value Ref Range    Magnesium 1.9 1.8 - 2.6 mg/dL   Phosphorus    Collection Time: 04/10/22  5:45 AM   Result Value Ref Range    Phosphorus 2.7 2.5 - 4.5 mg/dL   INR    Collection Time: 04/10/22  5:45 AM   Result Value Ref Range    INR 1.20 (H) 0.85 - 1.15   Basic metabolic panel    Collection Time: 04/10/22  5:45 AM   Result Value  Ref Range    Sodium 137 136 - 145 mmol/L    Potassium 3.8 3.5 - 5.0 mmol/L    Chloride 102 98 - 107 mmol/L    Carbon Dioxide (CO2) 28 22 - 31 mmol/L    Anion Gap 7 5 - 18 mmol/L    Urea Nitrogen 21 8 - 28 mg/dL    Creatinine 0.80 0.70 - 1.30 mg/dL    Calcium 8.3 (L) 8.5 - 10.5 mg/dL    Glucose 229 (H) 70 - 125 mg/dL    GFR Estimate 90 >60 mL/min/1.73m2   CBC with platelets and differential    Collection Time: 04/10/22  5:45 AM   Result Value Ref Range    WBC Count 8.6 4.0 - 11.0 10e3/uL    RBC Count 2.86 (L) 4.40 - 5.90 10e6/uL    Hemoglobin 9.9 (L) 13.3 - 17.7 g/dL    Hematocrit 30.0 (L) 40.0 - 53.0 %     (H) 78 - 100 fL    MCH 34.6 (H) 26.5 - 33.0 pg    MCHC 33.0 31.5 - 36.5 g/dL    RDW 13.2 10.0 - 15.0 %    Platelet Count 142 (L) 150 - 450 10e3/uL    % Neutrophils 83 %    % Lymphocytes 10 %    % Monocytes 6 %    % Eosinophils 0 %    % Basophils 0 %    % Immature Granulocytes 1 %    NRBCs per 100 WBC 0 <1 /100    Absolute Neutrophils 7.2 1.6 - 8.3 10e3/uL    Absolute Lymphocytes 0.9 0.8 - 5.3 10e3/uL    Absolute Monocytes 0.5 0.0 - 1.3 10e3/uL    Absolute Eosinophils 0.0 0.0 - 0.7 10e3/uL    Absolute Basophils 0.0 0.0 - 0.2 10e3/uL    Absolute Immature Granulocytes 0.1 <=0.4 10e3/uL    Absolute NRBCs 0.0 10e3/uL   Glucose by meter    Collection Time: 04/10/22  8:41 AM   Result Value Ref Range    GLUCOSE BY METER POCT 224 (H) 70 - 99 mg/dL   Glucose by meter    Collection Time: 04/10/22 12:17 PM   Result Value Ref Range    GLUCOSE BY METER POCT 314 (H) 70 - 99 mg/dL       Pertinent Radiology   Radiology Results reviewed      EKG Results reviewed       Advanced Care Planning      Madison Panda MD   [facility]

## 2022-04-10 NOTE — PROGRESS NOTES
ORTHOPEDIC L/E PROGRESS NOTE  Procedure(s):  HEMIARTHROPLASTY, HIP, BIPOLAR, OPEN REDUCTION INTERNAL FIXATION OF GREATER TROCHANTER on 4/8/2022 - 4/9/2022.   1 Day Post-Op    PAIN: moderate  NAUSEA: no    Temp:  [97.1  F (36.2  C)-98.4  F (36.9  C)] 97.3  F (36.3  C)  Pulse:  [64-98] 94  Resp:  [16-29] 20  BP: (105-172)/(54-73) 152/66  SpO2:  [91 %-100 %] 94 %    Denies chest pain and shortness of breath    Lab Results   Component Value Date    HGB 9.9 (L) 04/10/2022    HGB 13.9 04/08/2022    HGB 13.8 01/19/2022    INR 1.20 (H) 04/10/2022    INR 1.40 (H) 04/09/2022    INR 1.62 (H) 04/09/2022       EXAM   The patient is A&Ox3.  Calves are soft and non-tender.   Sensation is intact.  Dorsiflexion and plantar flexion is intact.  The incision is covered. Dressing C/D/I    ASSESSMENT  POD #1 s/p Procedure(s):  HEMIARTHROPLASTY, HIP, BIPOLAR, OPEN REDUCTION INTERNAL FIXATION OF GREATER TROCHANTER    PLAN  Anticoagulation -resume Coumadin  Continue with PT, OT.  Plan for Discharge home versus TCU.    Puja Urena MD  [facility]

## 2022-04-10 NOTE — PROGRESS NOTES
"O2 1 lpm/NC, SpO2 99 %, decreased to room air, BS diminished with crackles RML RLL, NIF performed - 36 cm, instructed on Flutter valve, performed at PEEP level 3 x 6 min, good technique. BS remains unchanged. RT tomonitor    BP (!) 152/66 (BP Location: Left arm)   Pulse 94   Temp 97.3  F (36.3  C) (Oral)   Resp 20   Ht 1.626 m (5' 4\")   Wt 78 kg (172 lb)   SpO2 99%   BMI 29.52 kg/m      "

## 2022-04-11 ENCOUNTER — APPOINTMENT (OUTPATIENT)
Dept: PHYSICAL THERAPY | Facility: HOSPITAL | Age: 82
DRG: 522 | End: 2022-04-11
Payer: COMMERCIAL

## 2022-04-11 ENCOUNTER — APPOINTMENT (OUTPATIENT)
Dept: RADIOLOGY | Facility: HOSPITAL | Age: 82
DRG: 522 | End: 2022-04-11
Attending: HOSPITALIST
Payer: COMMERCIAL

## 2022-04-11 ENCOUNTER — APPOINTMENT (OUTPATIENT)
Dept: OCCUPATIONAL THERAPY | Facility: HOSPITAL | Age: 82
DRG: 522 | End: 2022-04-11
Payer: COMMERCIAL

## 2022-04-11 LAB
ANION GAP SERPL CALCULATED.3IONS-SCNC: 7 MMOL/L (ref 5–18)
BUN SERPL-MCNC: 17 MG/DL (ref 8–28)
CALCIUM SERPL-MCNC: 8.3 MG/DL (ref 8.5–10.5)
CHLORIDE BLD-SCNC: 100 MMOL/L (ref 98–107)
CO2 SERPL-SCNC: 32 MMOL/L (ref 22–31)
CREAT SERPL-MCNC: 0.77 MG/DL (ref 0.7–1.3)
ERYTHROCYTE [DISTWIDTH] IN BLOOD BY AUTOMATED COUNT: 13.6 % (ref 10–15)
GFR SERPL CREATININE-BSD FRML MDRD: >90 ML/MIN/1.73M2
GLUCOSE BLD-MCNC: 242 MG/DL (ref 70–125)
GLUCOSE BLDC GLUCOMTR-MCNC: 238 MG/DL (ref 70–99)
GLUCOSE BLDC GLUCOMTR-MCNC: 263 MG/DL (ref 70–99)
GLUCOSE BLDC GLUCOMTR-MCNC: 297 MG/DL (ref 70–99)
GLUCOSE BLDC GLUCOMTR-MCNC: 307 MG/DL (ref 70–99)
GLUCOSE BLDC GLUCOMTR-MCNC: 329 MG/DL (ref 70–99)
GLUCOSE BLDC GLUCOMTR-MCNC: 353 MG/DL (ref 70–99)
GLUCOSE BLDC GLUCOMTR-MCNC: 360 MG/DL (ref 70–99)
HCT VFR BLD AUTO: 28.8 % (ref 40–53)
HGB BLD-MCNC: 9.5 G/DL (ref 13.3–17.7)
INR PPP: 1.22 (ref 0.85–1.15)
MAGNESIUM SERPL-MCNC: 1.9 MG/DL (ref 1.8–2.6)
MCH RBC QN AUTO: 34.8 PG (ref 26.5–33)
MCHC RBC AUTO-ENTMCNC: 33 G/DL (ref 31.5–36.5)
MCV RBC AUTO: 106 FL (ref 78–100)
PHOSPHATE SERPL-MCNC: 2.4 MG/DL (ref 2.5–4.5)
PLATELET # BLD AUTO: 150 10E3/UL (ref 150–450)
POTASSIUM BLD-SCNC: 3.5 MMOL/L (ref 3.5–5)
RBC # BLD AUTO: 2.73 10E6/UL (ref 4.4–5.9)
SODIUM SERPL-SCNC: 139 MMOL/L (ref 136–145)
WBC # BLD AUTO: 7.5 10E3/UL (ref 4–11)

## 2022-04-11 PROCEDURE — 83735 ASSAY OF MAGNESIUM: CPT | Performed by: HOSPITALIST

## 2022-04-11 PROCEDURE — 999N000157 HC STATISTIC RCP TIME EA 10 MIN

## 2022-04-11 PROCEDURE — 85610 PROTHROMBIN TIME: CPT | Performed by: PHYSICIAN ASSISTANT

## 2022-04-11 PROCEDURE — 71045 X-RAY EXAM CHEST 1 VIEW: CPT

## 2022-04-11 PROCEDURE — 94150 VITAL CAPACITY TEST: CPT

## 2022-04-11 PROCEDURE — 80048 BASIC METABOLIC PNL TOTAL CA: CPT | Performed by: STUDENT IN AN ORGANIZED HEALTH CARE EDUCATION/TRAINING PROGRAM

## 2022-04-11 PROCEDURE — 120N000001 HC R&B MED SURG/OB

## 2022-04-11 PROCEDURE — 250N000013 HC RX MED GY IP 250 OP 250 PS 637: Performed by: STUDENT IN AN ORGANIZED HEALTH CARE EDUCATION/TRAINING PROGRAM

## 2022-04-11 PROCEDURE — 97535 SELF CARE MNGMENT TRAINING: CPT | Mod: GO

## 2022-04-11 PROCEDURE — 99232 SBSQ HOSP IP/OBS MODERATE 35: CPT | Performed by: STUDENT IN AN ORGANIZED HEALTH CARE EDUCATION/TRAINING PROGRAM

## 2022-04-11 PROCEDURE — 250N000013 HC RX MED GY IP 250 OP 250 PS 637: Performed by: PHYSICIAN ASSISTANT

## 2022-04-11 PROCEDURE — 97110 THERAPEUTIC EXERCISES: CPT | Mod: GP

## 2022-04-11 PROCEDURE — 36415 COLL VENOUS BLD VENIPUNCTURE: CPT | Performed by: PHYSICIAN ASSISTANT

## 2022-04-11 PROCEDURE — 84100 ASSAY OF PHOSPHORUS: CPT | Performed by: HOSPITALIST

## 2022-04-11 PROCEDURE — 85027 COMPLETE CBC AUTOMATED: CPT | Performed by: STUDENT IN AN ORGANIZED HEALTH CARE EDUCATION/TRAINING PROGRAM

## 2022-04-11 PROCEDURE — 97530 THERAPEUTIC ACTIVITIES: CPT | Mod: GP

## 2022-04-11 PROCEDURE — 250N000013 HC RX MED GY IP 250 OP 250 PS 637: Performed by: HOSPITALIST

## 2022-04-11 RX ORDER — CLOPIDOGREL BISULFATE 75 MG/1
75 TABLET ORAL DAILY
Status: DISCONTINUED | OUTPATIENT
Start: 2022-04-11 | End: 2022-04-12 | Stop reason: HOSPADM

## 2022-04-11 RX ADMIN — WARFARIN SODIUM 7.5 MG: 5 TABLET ORAL at 17:53

## 2022-04-11 RX ADMIN — POLYETHYLENE GLYCOL 3350 17 G: 17 POWDER, FOR SOLUTION ORAL at 09:02

## 2022-04-11 RX ADMIN — CARVEDILOL 6.25 MG: 3.12 TABLET, FILM COATED ORAL at 16:32

## 2022-04-11 RX ADMIN — LIDOCAINE 1 PATCH: 246 PATCH TOPICAL at 12:16

## 2022-04-11 RX ADMIN — SENNOSIDES AND DOCUSATE SODIUM 1 TABLET: 8.6; 5 TABLET ORAL at 18:46

## 2022-04-11 RX ADMIN — CLOPIDOGREL BISULFATE 75 MG: 75 TABLET ORAL at 18:46

## 2022-04-11 RX ADMIN — OXYCODONE HYDROCHLORIDE 5 MG: 5 TABLET ORAL at 03:14

## 2022-04-11 RX ADMIN — GABAPENTIN 100 MG: 100 CAPSULE ORAL at 20:26

## 2022-04-11 RX ADMIN — ACETAMINOPHEN 975 MG: 325 TABLET ORAL at 22:50

## 2022-04-11 RX ADMIN — SENNOSIDES AND DOCUSATE SODIUM 1 TABLET: 8.6; 5 TABLET ORAL at 09:00

## 2022-04-11 RX ADMIN — FUROSEMIDE 40 MG: 20 TABLET ORAL at 09:00

## 2022-04-11 RX ADMIN — PRAMIPEXOLE DIHYDROCHLORIDE 0.25 MG: 0.25 TABLET ORAL at 20:27

## 2022-04-11 RX ADMIN — FINASTERIDE 5 MG: 5 TABLET, FILM COATED ORAL at 09:01

## 2022-04-11 RX ADMIN — OXYCODONE HYDROCHLORIDE 5 MG: 5 TABLET ORAL at 14:31

## 2022-04-11 RX ADMIN — CARVEDILOL 6.25 MG: 3.12 TABLET, FILM COATED ORAL at 09:01

## 2022-04-11 RX ADMIN — PRAMIPEXOLE DIHYDROCHLORIDE 0.25 MG: 0.25 TABLET ORAL at 09:02

## 2022-04-11 RX ADMIN — ACETAMINOPHEN 975 MG: 325 TABLET ORAL at 06:50

## 2022-04-11 RX ADMIN — OXYCODONE HYDROCHLORIDE 5 MG: 5 TABLET ORAL at 09:35

## 2022-04-11 RX ADMIN — FUROSEMIDE 40 MG: 20 TABLET ORAL at 17:53

## 2022-04-11 RX ADMIN — OXYCODONE HYDROCHLORIDE 5 MG: 5 TABLET ORAL at 18:47

## 2022-04-11 RX ADMIN — ACETAMINOPHEN 975 MG: 325 TABLET ORAL at 14:31

## 2022-04-11 ASSESSMENT — ACTIVITIES OF DAILY LIVING (ADL)
ADLS_ACUITY_SCORE: 8
ADLS_ACUITY_SCORE: 11
ADLS_ACUITY_SCORE: 6
ADLS_ACUITY_SCORE: 11
ADLS_ACUITY_SCORE: 6
ADLS_ACUITY_SCORE: 6
ADLS_ACUITY_SCORE: 11
ADLS_ACUITY_SCORE: 11
ADLS_ACUITY_SCORE: 9
ADLS_ACUITY_SCORE: 7
ADLS_ACUITY_SCORE: 6
ADLS_ACUITY_SCORE: 11
ADLS_ACUITY_SCORE: 8
ADLS_ACUITY_SCORE: 11
ADLS_ACUITY_SCORE: 7
ADLS_ACUITY_SCORE: 6
ADLS_ACUITY_SCORE: 6
ADLS_ACUITY_SCORE: 7
ADLS_ACUITY_SCORE: 6
ADLS_ACUITY_SCORE: 11
ADLS_ACUITY_SCORE: 6
ADLS_ACUITY_SCORE: 6
ADLS_ACUITY_SCORE: 11
ADLS_ACUITY_SCORE: 8

## 2022-04-11 NOTE — PROGRESS NOTES
Patient performed NIF maneuver  -[id number], Good effort. Patient completed 5 minutes of flutter valve.  Continue to monitor.    Sangeeta Bernal, RRT

## 2022-04-11 NOTE — PROGRESS NOTES
Care Management Follow Up    Length of Stay (days): 3    Expected Discharge Date: 04/12/2022     Concerns to be Addressed:       Patient plan of care discussed at interdisciplinary rounds: Yes    Anticipated Discharge Disposition: Transitional Care  Disposition Comments:    Anticipated Discharge Services:  Post acute therapy   Anticipated Discharge DME:  None     Patient/family educated on Medicare website which has current facility and service quality ratings:  yes  Education Provided on the Discharge Plan:  yes  Patient/Family in Agreement with the Plan:  yes    Referrals Placed by CM/SW:  TCU   Private pay costs discussed: Not applicable    Additional Information:  LUKE met with pt spouse and further discussed discharge planning and obtained more TCU choices     Faxed Referrals (see destination tab).     [facility]     UPDATE: 3;00pm- SW received a call from [facility] and pt accepted to TCU for 4/12 pending [facility] insurance auth. TCU submitted for auth today and requested [facility] man[facility] ride to be set for 4/12 at 5:00pm .     LUKE spoke with pt wife Agueda and updated on acceptance and insurance auth and Agueda requested  Health transport and is agreeable to possible out of pocket cost.     Anita Chester, KATYASW

## 2022-04-11 NOTE — PLAN OF CARE
Problem: Plan of Care - These are the overarching goals to be used throughout the patient stay.    Goal: Absence of Hospital-Acquired Illness or Injury  Intervention: Identify and Manage Fall Risk  Recent Flowsheet Documentation  Taken 4/11/2022 0441 by Reena Galeano, RN  Safety Promotion/Fall Prevention:    activity supervised    chair alarm on  Taken 4/11/2022 0100 by Reena Galeano, RN  Safety Promotion/Fall Prevention:    activity supervised    chair alarm on   Goal Outcome Evaluation:      Alert and oriented x 4. Vital signs are stable. Received PRN oxycodone and schedule tylenol for pain on his back and left hip. Ice pack offered. Foam wedge between legs. Assist of 2 with repositioning. Silva in place. Dressing on left hip intact. Continue to monitor.

## 2022-04-11 NOTE — PLAN OF CARE
Patient alert and oriented this shift. He continues to have left hip and back pain.  PRN oxycodone given x 2 this shift. Patient worked with OT and PT. Silva catheter removed at 1130 and patient has been voiding. Last bladder scan was 130 at 1330. Will pass onto monitor. Lorna Miranda RN     Problem: Risk for Delirium  Goal: Optimal Coping  Outcome: Ongoing, Progressing  Goal: Improved Behavioral Control  Outcome: Ongoing, Progressing  Goal: Improved Attention and Thought Clarity  Outcome: Ongoing, Progressing  Goal: Improved Sleep  Outcome: Ongoing, Progressing     Problem: Adjustment to Injury (Hip Fracture Medical Management)  Goal: Optimal Coping with Change in Health Status  Outcome: Ongoing, Progressing     Problem: Bleeding (Hip Fracture Medical Management)  Goal: Absence of Bleeding  Outcome: Ongoing, Progressing     Problem: Bowel Elimination Impaired (Hip Fracture Medical Management)  Goal: Effective Bowel Elimination  Outcome: Ongoing, Progressing     Problem: Embolism (Hip Fracture Medical Management)  Goal: Absence of Embolism  Outcome: Ongoing, Progressing     Problem: Fracture Stabilization and Management (Hip Fracture Medical Management)  Goal: Fracture Stability  Outcome: Ongoing, Progressing     Problem: Functional Ability Impaired (Hip Fracture Medical Management)  Goal: Optimal Functional Performance  Outcome: Ongoing, Progressing  Intervention: Promote Optimal Functional Status  Recent Flowsheet Documentation  Taken 4/11/2022 0935 by Lorna Miranda RN  Activity Management:   up ad antonina   activity adjusted per tolerance     Problem: Pain (Hip Fracture Medical Management)  Goal: Acceptable Pain Level  Outcome: Ongoing, Progressing  Intervention: Manage Acute Orthopaedic-Related Pain  Recent Flowsheet Documentation  Taken 4/11/2022 1431 by Lorna Miranda RN  Pain Management Interventions: medication (see MAR)  Taken 4/11/2022 0935 by Lorna Miranda RN  Pain Management  Interventions: medication (see MAR)     Problem: Urinary Elimination Impaired (Hip Fracture Medical Management)  Goal: Effective Urinary Elimination  Outcome: Ongoing, Progressing     Problem: Pain Acute  Goal: Acceptable Pain Control and Functional Ability  Outcome: Ongoing, Progressing  Intervention: Develop Pain Management Plan  Recent Flowsheet Documentation  Taken 4/11/2022 1431 by Lorna Miranda, RN  Pain Management Interventions: medication (see MAR)  Taken 4/11/2022 0935 by Lorna Miranda, RN  Pain Management Interventions: medication (see MAR)   Goal Outcome Evaluation:

## 2022-04-11 NOTE — PROGRESS NOTES
Respiratory Care     NIF -50 cmH2O this morning. No respiratory distress noted. On 1L SpO2 97%. No concerns at this time from respiratory standpoint.       Vinny Marr, RT

## 2022-04-11 NOTE — PROGRESS NOTES
"Orthopedic Progress Note      Assessment: 2 Days Post-Op  S/P Procedure(s):  HEMIARTHROPLASTY, HIP, BIPOLAR, OPEN REDUCTION INTERNAL FIXATION OF GREATER TROCHANTER     Plan:   - Continue PT/OT  - Pain management  - Weightbearing status: WBAT, posterior hip precautions. Avoid active abduction 4-6 weeks  - Abduction pillow in place while in bed.  - Anticoagulation: Coumadin dosed based on INRs (okay to restart preoperative anticoag routine) in addition to SCDs, carole stockings and early ambulation.  - Discharge planning: Discharge pending, likely TCU    Subjective:  Pain: mild  Nausea, Vomiting:  No  Lightheadedness, Dizziness:  No  Neuro:  Patient denies new onset numbness or paresthesias    Patient reports that he is doing okay today. Notes mild pain at the left hip but is tolerable. Has been able to work with therapy and aware of restrictions. No fevers/chills overnight.    Objective:  /57 (BP Location: Left arm)   Pulse 94   Temp 97.9  F (36.6  C) (Oral)   Resp 18   Ht 1.626 m (5' 4\")   Wt 78 kg (172 lb)   SpO2 97%   BMI 29.52 kg/m    The patient is A&Ox3. Appears comfortable.   Abduction pillow in place.  Sensation is intact.  Dorsiflexion and plantar flexion is intact.  Dorsalis pedis pulse intact.  Calves are soft and non-tender. Negative Eliud's.  The incision is covered. Dressing C/D/I.    Pertinent Labs   Lab Results: personally reviewed.   Lab Results   Component Value Date    INR 1.22 (H) 04/11/2022    INR 1.20 (H) 04/10/2022    INR 1.40 (H) 04/09/2022     Lab Results   Component Value Date    WBC 7.5 04/11/2022    HGB 9.5 (L) 04/11/2022    HCT 28.8 (L) 04/11/2022     (H) 04/11/2022     04/11/2022     Lab Results   Component Value Date     04/11/2022    CO2 32 (H) 04/11/2022         Report completed by:  German Meier PA-C, JEEVAN  Date: 4/11/2022  Time: 10:24 AM    "

## 2022-04-11 NOTE — PLAN OF CARE
Pt alert and oriented x 4. Reports pain level of 5/10 in lower back. PRN Oxycodone 5 mg given. IS done every 2 hours with result of 1000. Pt on 1L of O2 with O2 sats in high 90s. Pt refuses alternating turning to and from his right side and back this shift. Pt agreed toward the end of shift to be turned. Turned him to his right side and the pt tolerated it well with no report of pain. Ice packs and pillow support utilized. Fluids discontinued due to pt eating and tolerating fluids and food by mouth. Right PIV saline locked. Hip abductor pillow in place. Hip dressing has small amount of dark red drainage on the inside of the dressing, but is clean, dry, and intact.       Problem: Plan of Care - These are the overarching goals to be used throughout the patient stay.    Goal: Absence of Hospital-Acquired Illness or Injury  Intervention: Identify and Manage Fall Risk  Recent Flowsheet Documentation  Taken 4/10/2022 1641 by Ingrid Swann RN  Safety Promotion/Fall Prevention:    bed alarm on    room door open    room organization consistent  Intervention: Prevent Skin Injury  Recent Flowsheet Documentation  Taken 4/10/2022 1641 by Ingrid Swann RN  Body Position: supine, head elevated  Intervention: Prevent and Manage VTE (Venous Thromboembolism) Risk  Recent Flowsheet Documentation  Taken 4/10/2022 1641 by Ingrid Swann RN  VTE Prevention/Management: SCDs (sequential compression devices) off  Activity Management: activity adjusted per tolerance  Goal: Optimal Comfort and Wellbeing  Intervention: Monitor Pain and Promote Comfort  Recent Flowsheet Documentation  Taken 4/10/2022 1641 by Ingrid Swann RN  Pain Management Interventions: repositioned     Problem: Embolism (Hip Fracture Medical Management)  Goal: Absence of Embolism  Outcome: Ongoing, Progressing  Intervention: Prevent or Manage Embolism Risk  Recent Flowsheet Documentation  Taken 4/10/2022 1641 by Ingrid Swann RN  VTE Prevention/Management: SCDs (sequential compression  devices) off     Problem: Fracture Stabilization and Management (Hip Fracture Medical Management)  Goal: Fracture Stability  Outcome: Ongoing, Progressing  Intervention: Promote Fracture Stability and Healing  Recent Flowsheet Documentation  Taken 4/10/2022 1641 by Ingrid Swann RN  Equipment: aBduction pillow     Problem: Pain (Hip Fracture Medical Management)  Goal: Acceptable Pain Level  Outcome: Ongoing, Progressing  Intervention: Manage Acute Orthopaedic-Related Pain  Recent Flowsheet Documentation  Taken 4/10/2022 1641 by Ingrid Swann RN  Pain Management Interventions: repositioned     Problem: Pain Acute  Goal: Acceptable Pain Control and Functional Ability  Intervention: Develop Pain Management Plan  Recent Flowsheet Documentation  Taken 4/10/2022 1641 by Ingrid Swann RN  Pain Management Interventions: repositioned  Intervention: Prevent or Manage Pain  Recent Flowsheet Documentation  Taken 4/10/2022 1641 by Ingrid Swann RN  Medication Review/Management: medications reviewed   Goal Outcome Evaluation:

## 2022-04-11 NOTE — PLAN OF CARE
Problem: Plan of Care - These are the overarching goals to be used throughout the patient stay.    Goal: Absence of Hospital-Acquired Illness or Injury  Outcome: Ongoing, Progressing  Intervention: Identify and Manage Fall Risk  Recent Flowsheet Documentation  Taken 4/11/2022 1608 by Connor Foreman RN  Safety Promotion/Fall Prevention:    assistive device/personal items within reach    lighting adjusted    mobility aid in reach    nonskid shoes/slippers when out of bed    patient and family education    room door open    safety round/check completed    supervised activity     Problem: Plan of Care - These are the overarching goals to be used throughout the patient stay.    Goal: Optimal Comfort and Wellbeing  Outcome: Ongoing, Progressing     Problem: Risk for Delirium  Goal: Improved Behavioral Control  Outcome: Ongoing, Progressing     Problem: Risk for Delirium  Goal: Improved Attention and Thought Clarity  Outcome: Ongoing, Progressing     Problem: Bleeding (Hip Fracture Medical Management)  Goal: Absence of Bleeding  Outcome: Ongoing, Progressing     Problem: Bowel Elimination Impaired (Hip Fracture Medical Management)  Goal: Effective Bowel Elimination  Outcome: Ongoing, Not Progressing     Problem: Pain (Hip Fracture Medical Management)  Goal: Acceptable Pain Level  Outcome: Ongoing, Progressing     Problem: Urinary Elimination Impaired (Hip Fracture Medical Management)  Goal: Effective Urinary Elimination  Outcome: Ongoing, Progressing    Pt alert and oriented. Pt c/o 4/10 back pain. PRN oxycodone 5 mg given at 1847. Pt voided 150 mL at 1741 and 125 at 1852. PVR at 1910 was 186 mL. No BM thus far. Denies abdominal pain/discomfort. Pt on scheduled Miralax and senna. Refused PRN milk of magnesia when offered. Pt performing aerobika flutter valve. Pre-prandial BG of 329; insulin given per sliding scale and carb count. Dressing has small amount of dried drainage on surgical dressing. Site marked. Pt has numbness  to bilateral hands and feet per his baseline. Radial and pedal pulses faint but present bilaterally. Call light is within reach.

## 2022-04-11 NOTE — PROGRESS NOTES
Progress Note    Date of admission: 04/08/2022    Assessment/Plan  Pee Ayala is a 81 year old male admitted on 4/8/2022. He was brought to the emergency department by ambulance after he tripped and fell at home landing on his left side and hitting his head without loss of consciousness.  Complained of left hip pain and left rib cage pain when trying to get up.     Acute moderately displaced subcapital fracture of the left femur  #POD 2, S/P hemiarthroplasty, open reduction and internal fixation of greater trochanter  Ortho consult appreciated  Plan for voiding trial today    Anemia  - likely acute blood loss anemia  - plan to monitor CBC, consider iron supplement     Left fifth, seventh, eighth anterolateral ribs fractures  Rib fracture order set: Incentive spirometry, lidocaine patch     Chronic atrial fibrillation  History of Maze procedure during CABG  Continue carvedilol, warfarin     Coronary artery disease  History of CABG  Denies angina symptoms  Resume Plavix   Continue carvedilol     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  Hold empagliflozin, Metformin, NovoLog Mix 70/30  Monitor with insulin sliding scale and hypoglycemic protocol  Lantus 18 units every evening  High intensity insulin sliding scale, mealtime insulin coverage     Chronic diastolic congestive heart failure  Echocardiogram 1/6/2022 showed EF 65-70%, bioprosthetic aortic valve with a mean systolic gradient of 7 mmHg and a peak anterograde velocity of 1.9 m/s  Chronic pitting edema but otherwise no signs of acute heart failure  Continue furosemide  Monitor volume status, daily weight     Chronic back pain  Status post injection about 3 weeks ago and procedure about 4 weeks ago  Continue lidocaine patch and pain control     Constipation  Bowel regimen     DVT PPX : SCD    Barriers to discharge: Postop recovery, TCU     Anticipated Discharge date  : 2-3 days    Subjective   States pain on the left hip is around  4 out of 10.  No distress noted.  Management plan discussed with the patient and he expressed understanding    Objective  Vital signs in last 24 hours  Temp:  [97.6  F (36.4  C)-97.9  F (36.6  C)] 97.9  F (36.6  C)  Pulse:  [] 94  Resp:  [18-20] 18  BP: (103-127)/(57-60) 121/57  SpO2:  [97 %-99 %] 97 %    Input and Output in 24 hrs     Intake/Output Summary (Last 24 hours) at 4/9/2022 1505  Last data filed at 4/9/2022 1428  Gross per 24 hour   Intake 2300 ml   Output 3125 ml   Net -825 ml       Physical Exam:  GEN: Alert and oriented. Not in acute distress  HEENT: Atraumatic    Sclera- anicteric   Mucous membrane- moist and pink  CHEST: Clear to auscultation bilaterally  HEART: S1S2 regular. No murmurs, rubs or gallops  ABDOMEN: Soft. Non-tender, non-distended. No organomegaly. No guarding or rigidity. Bowel sounds- active  Extremities: No pedal oedema  CNS: No focal neurological deficit. No involuntary movements  SKIN: No skin rash, no cyanosis or clubbing      Pertinent Labs   Lab Results: Personally Reviewed    Recent Results (from the past 24 hour(s))   Glucose by meter    Collection Time: 04/10/22  4:38 PM   Result Value Ref Range    GLUCOSE BY METER POCT 312 (H) 70 - 99 mg/dL   Glucose by meter    Collection Time: 04/10/22  8:57 PM   Result Value Ref Range    GLUCOSE BY METER POCT 409 (H) 70 - 99 mg/dL   Glucose by meter    Collection Time: 04/11/22  3:19 AM   Result Value Ref Range    GLUCOSE BY METER POCT 238 (H) 70 - 99 mg/dL   Magnesium    Collection Time: 04/11/22  5:18 AM   Result Value Ref Range    Magnesium 1.9 1.8 - 2.6 mg/dL   Phosphorus    Collection Time: 04/11/22  5:18 AM   Result Value Ref Range    Phosphorus 2.4 (L) 2.5 - 4.5 mg/dL   INR    Collection Time: 04/11/22  5:18 AM   Result Value Ref Range    INR 1.22 (H) 0.85 - 1.15   Basic metabolic panel    Collection Time: 04/11/22  5:18 AM   Result Value Ref Range    Sodium 139 136 - 145 mmol/L    Potassium 3.5 3.5 - 5.0 mmol/L    Chloride  100 98 - 107 mmol/L    Carbon Dioxide (CO2) 32 (H) 22 - 31 mmol/L    Anion Gap 7 5 - 18 mmol/L    Urea Nitrogen 17 8 - 28 mg/dL    Creatinine 0.77 0.70 - 1.30 mg/dL    Calcium 8.3 (L) 8.5 - 10.5 mg/dL    Glucose 242 (H) 70 - 125 mg/dL    GFR Estimate >90 >60 mL/min/1.73m2   CBC with platelets    Collection Time: 04/11/22  5:18 AM   Result Value Ref Range    WBC Count 7.5 4.0 - 11.0 10e3/uL    RBC Count 2.73 (L) 4.40 - 5.90 10e6/uL    Hemoglobin 9.5 (L) 13.3 - 17.7 g/dL    Hematocrit 28.8 (L) 40.0 - 53.0 %     (H) 78 - 100 fL    MCH 34.8 (H) 26.5 - 33.0 pg    MCHC 33.0 31.5 - 36.5 g/dL    RDW 13.6 10.0 - 15.0 %    Platelet Count 150 150 - 450 10e3/uL   Glucose by meter    Collection Time: 04/11/22  8:27 AM   Result Value Ref Range    GLUCOSE BY METER POCT 307 (H) 70 - 99 mg/dL   Glucose by meter    Collection Time: 04/11/22 11:25 AM   Result Value Ref Range    GLUCOSE BY METER POCT 353 (H) 70 - 99 mg/dL       Pertinent Radiology   Radiology Results reviewed      EKG Results reviewed       Advanced Care Planning      Madison Panda MD   [facility]

## 2022-04-12 ENCOUNTER — APPOINTMENT (OUTPATIENT)
Dept: RADIOLOGY | Facility: HOSPITAL | Age: 82
DRG: 522 | End: 2022-04-12
Attending: HOSPITALIST
Payer: COMMERCIAL

## 2022-04-12 ENCOUNTER — APPOINTMENT (OUTPATIENT)
Dept: OCCUPATIONAL THERAPY | Facility: HOSPITAL | Age: 82
DRG: 522 | End: 2022-04-12
Payer: COMMERCIAL

## 2022-04-12 ENCOUNTER — APPOINTMENT (OUTPATIENT)
Dept: PHYSICAL THERAPY | Facility: HOSPITAL | Age: 82
DRG: 522 | End: 2022-04-12
Payer: COMMERCIAL

## 2022-04-12 VITALS
WEIGHT: 172 LBS | RESPIRATION RATE: 18 BRPM | HEIGHT: 64 IN | DIASTOLIC BLOOD PRESSURE: 66 MMHG | TEMPERATURE: 98 F | HEART RATE: 88 BPM | OXYGEN SATURATION: 97 % | SYSTOLIC BLOOD PRESSURE: 116 MMHG | BODY MASS INDEX: 29.37 KG/M2

## 2022-04-12 LAB
ANION GAP SERPL CALCULATED.3IONS-SCNC: 7 MMOL/L (ref 5–18)
BUN SERPL-MCNC: 17 MG/DL (ref 8–28)
CALCIUM SERPL-MCNC: 8.5 MG/DL (ref 8.5–10.5)
CHLORIDE BLD-SCNC: 96 MMOL/L (ref 98–107)
CO2 SERPL-SCNC: 34 MMOL/L (ref 22–31)
CREAT SERPL-MCNC: 0.71 MG/DL (ref 0.7–1.3)
ERYTHROCYTE [DISTWIDTH] IN BLOOD BY AUTOMATED COUNT: 13.8 % (ref 10–15)
GFR SERPL CREATININE-BSD FRML MDRD: >90 ML/MIN/1.73M2
GLUCOSE BLD-MCNC: 268 MG/DL (ref 70–125)
GLUCOSE BLDC GLUCOMTR-MCNC: 204 MG/DL (ref 70–99)
GLUCOSE BLDC GLUCOMTR-MCNC: 263 MG/DL (ref 70–99)
GLUCOSE BLDC GLUCOMTR-MCNC: 289 MG/DL (ref 70–99)
GLUCOSE BLDC GLUCOMTR-MCNC: 429 MG/DL (ref 70–99)
HCT VFR BLD AUTO: 29.8 % (ref 40–53)
HGB BLD-MCNC: 9.9 G/DL (ref 13.3–17.7)
HOLD SPECIMEN: NORMAL
INR PPP: 1.59 (ref 0.85–1.15)
MAGNESIUM SERPL-MCNC: 1.8 MG/DL (ref 1.8–2.6)
MCH RBC QN AUTO: 35 PG (ref 26.5–33)
MCHC RBC AUTO-ENTMCNC: 33.2 G/DL (ref 31.5–36.5)
MCV RBC AUTO: 105 FL (ref 78–100)
PHOSPHATE SERPL-MCNC: 2.1 MG/DL (ref 2.5–4.5)
PLATELET # BLD AUTO: 183 10E3/UL (ref 150–450)
POTASSIUM BLD-SCNC: 3.4 MMOL/L (ref 3.5–5)
RBC # BLD AUTO: 2.83 10E6/UL (ref 4.4–5.9)
SODIUM SERPL-SCNC: 137 MMOL/L (ref 136–145)
WBC # BLD AUTO: 7.2 10E3/UL (ref 4–11)

## 2022-04-12 PROCEDURE — 36415 COLL VENOUS BLD VENIPUNCTURE: CPT | Performed by: STUDENT IN AN ORGANIZED HEALTH CARE EDUCATION/TRAINING PROGRAM

## 2022-04-12 PROCEDURE — 250N000013 HC RX MED GY IP 250 OP 250 PS 637: Performed by: PHYSICIAN ASSISTANT

## 2022-04-12 PROCEDURE — 85610 PROTHROMBIN TIME: CPT | Performed by: PHYSICIAN ASSISTANT

## 2022-04-12 PROCEDURE — 97110 THERAPEUTIC EXERCISES: CPT | Mod: GP

## 2022-04-12 PROCEDURE — 83735 ASSAY OF MAGNESIUM: CPT | Performed by: HOSPITALIST

## 2022-04-12 PROCEDURE — 85027 COMPLETE CBC AUTOMATED: CPT | Performed by: STUDENT IN AN ORGANIZED HEALTH CARE EDUCATION/TRAINING PROGRAM

## 2022-04-12 PROCEDURE — 84100 ASSAY OF PHOSPHORUS: CPT | Performed by: HOSPITALIST

## 2022-04-12 PROCEDURE — 80048 BASIC METABOLIC PNL TOTAL CA: CPT | Performed by: STUDENT IN AN ORGANIZED HEALTH CARE EDUCATION/TRAINING PROGRAM

## 2022-04-12 PROCEDURE — 97530 THERAPEUTIC ACTIVITIES: CPT | Mod: GO

## 2022-04-12 PROCEDURE — 97535 SELF CARE MNGMENT TRAINING: CPT | Mod: GO

## 2022-04-12 PROCEDURE — 97530 THERAPEUTIC ACTIVITIES: CPT | Mod: GP

## 2022-04-12 PROCEDURE — 71045 X-RAY EXAM CHEST 1 VIEW: CPT

## 2022-04-12 PROCEDURE — 250N000013 HC RX MED GY IP 250 OP 250 PS 637: Performed by: HOSPITALIST

## 2022-04-12 PROCEDURE — 99239 HOSP IP/OBS DSCHRG MGMT >30: CPT | Performed by: STUDENT IN AN ORGANIZED HEALTH CARE EDUCATION/TRAINING PROGRAM

## 2022-04-12 PROCEDURE — 250N000013 HC RX MED GY IP 250 OP 250 PS 637: Performed by: STUDENT IN AN ORGANIZED HEALTH CARE EDUCATION/TRAINING PROGRAM

## 2022-04-12 RX ORDER — ACETAMINOPHEN 325 MG/1
975 TABLET ORAL EVERY 8 HOURS
Status: DISCONTINUED | OUTPATIENT
Start: 2022-04-12 | End: 2022-04-12 | Stop reason: HOSPADM

## 2022-04-12 RX ORDER — ACETAMINOPHEN 325 MG/1
975 TABLET ORAL EVERY 8 HOURS
Qty: 100 TABLET | Refills: 0 | Status: SHIPPED | OUTPATIENT
Start: 2022-04-12 | End: 2022-04-15

## 2022-04-12 RX ORDER — GABAPENTIN 100 MG/1
100 CAPSULE ORAL AT BEDTIME
DISCHARGE
Start: 2022-04-12 | End: 2022-06-03 | Stop reason: DRUGHIGH

## 2022-04-12 RX ORDER — HYDROXYZINE HYDROCHLORIDE 10 MG/1
10 TABLET, FILM COATED ORAL EVERY 6 HOURS PRN
Qty: 30 TABLET | Refills: 0 | Status: SHIPPED | OUTPATIENT
Start: 2022-04-12 | End: 2022-05-20 | Stop reason: DRUGHIGH

## 2022-04-12 RX ORDER — AMOXICILLIN 250 MG
1 CAPSULE ORAL 2 TIMES DAILY
Qty: 30 TABLET | Refills: 0 | Status: SHIPPED | OUTPATIENT
Start: 2022-04-12 | End: 2022-07-15

## 2022-04-12 RX ORDER — OXYCODONE HYDROCHLORIDE 5 MG/1
5 TABLET ORAL EVERY 6 HOURS PRN
Qty: 26 TABLET | Refills: 0 | Status: SHIPPED | OUTPATIENT
Start: 2022-04-12 | End: 2022-04-14

## 2022-04-12 RX ADMIN — OXYCODONE HYDROCHLORIDE 5 MG: 5 TABLET ORAL at 04:06

## 2022-04-12 RX ADMIN — OXYCODONE HYDROCHLORIDE 10 MG: 5 TABLET ORAL at 10:11

## 2022-04-12 RX ADMIN — OXYCODONE HYDROCHLORIDE 5 MG: 5 TABLET ORAL at 17:14

## 2022-04-12 RX ADMIN — FINASTERIDE 5 MG: 5 TABLET, FILM COATED ORAL at 08:17

## 2022-04-12 RX ADMIN — SENNOSIDES AND DOCUSATE SODIUM 1 TABLET: 8.6; 5 TABLET ORAL at 08:17

## 2022-04-12 RX ADMIN — FUROSEMIDE 40 MG: 20 TABLET ORAL at 08:17

## 2022-04-12 RX ADMIN — CLOPIDOGREL BISULFATE 75 MG: 75 TABLET ORAL at 08:17

## 2022-04-12 RX ADMIN — ACETAMINOPHEN 975 MG: 325 TABLET ORAL at 16:10

## 2022-04-12 RX ADMIN — POLYETHYLENE GLYCOL 3350 17 G: 17 POWDER, FOR SOLUTION ORAL at 08:17

## 2022-04-12 RX ADMIN — ACETAMINOPHEN 975 MG: 325 TABLET ORAL at 08:16

## 2022-04-12 RX ADMIN — PRAMIPEXOLE DIHYDROCHLORIDE 0.25 MG: 0.25 TABLET ORAL at 08:17

## 2022-04-12 RX ADMIN — CARVEDILOL 6.25 MG: 3.12 TABLET, FILM COATED ORAL at 08:16

## 2022-04-12 RX ADMIN — LIDOCAINE 1 PATCH: 246 PATCH TOPICAL at 13:14

## 2022-04-12 RX ADMIN — CARVEDILOL 6.25 MG: 3.12 TABLET, FILM COATED ORAL at 16:10

## 2022-04-12 ASSESSMENT — ACTIVITIES OF DAILY LIVING (ADL)
ADLS_ACUITY_SCORE: 11

## 2022-04-12 NOTE — DISCHARGE SUMMARY
[facility] MEDICINE  DISCHARGE SUMMARY     Primary Care Physician: Mar Morales  Admission Date: 4/8/2022   Discharge Provider: Madison Panda MD Discharge Date: 4/12/2022   Diet:   Active Diet and Nourishment Order   Procedures     Diet     Moderate Consistent Carb (60 g CHO per Meal) Diet     Diet       Code Status: Full Code   Activity: DCACTIVITY: Activity as tolerated        Condition at Discharge: Good     REASON FOR PRESENTATION(See Admission Note for Details)   Left hip pain secondary to fall accident.    PRINCIPAL & ACTIVE DISCHARGE DIAGNOSES     Principal Problem:    Fracture of hip, left, closed, initial encounter (H)  Active Problems:    Chronic anticoagulation    CAD (coronary artery disease), 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 and removal L atrial appendage 0=702-7    Status post coronary angiogram    Chronic atrial fibrillation (H)    Falls frequently      PENDING LABS     Unresulted Labs Ordered in the Past 30 Days of this Admission     No orders found from 3/9/2022 to 4/9/2022.            PROCEDURES ( this hospitalization only)      Procedure(s):  HEMIARTHROPLASTY, HIP, BIPOLAR, OPEN REDUCTION INTERNAL FIXATION OF GREATER TROCHANTER    RECOMMENDATIONS TO OUTPATIENT PROVIDER FOR F/U VISIT     Follow-up Appointments     Follow Up Care      Please follow-up with Dr. Larose/Diann Briones PA-C in 2 weeks at [facility]. Call our scheduling line at [phone] to make an   appointment if you do not already have one scheduled.         Follow Up and recommended labs and tests      Follow up with Nursing home physician.  No follow up labs or test are   needed.                 DISPOSITION     Skilled Nursing Facility    SUMMARY OF HOSPITAL COURSE:    Pee Ayala is a 81 year old male admitted on 4/8/2022. He was brought to the emergency department by ambulance after he tripped and fell at home landing on his left side and hitting his  head without loss of consciousness.  Complained of left hip pain and left rib cage pain when trying to get up.   Acute moderately displaced subcapital fracture of the left femur  #POD 3, S/P hemiarthroplasty, open reduction and internal fixation of greater trochanter  Ortho consult appreciated  Silva removed and is urinating well  Anemia  - likely acute blood loss anemia  - plan to monitor CBC, consider iron supplement   Left fifth, seventh, eighth anterolateral ribs fractures  Rib fracture order set: Incentive spirometry, lidocaine patch   Chronic atrial fibrillation  History of Maze procedure during CABG  Continue carvedilol, warfarin   Coronary artery disease  History of CABG  Denies angina symptoms  Resume Plavix   Continue carvedilol   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  Hold empagliflozin, Metformin, NovoLog Mix 70/30  Monitor with insulin sliding scale and hypoglycemic protocol  Lantus 18 units every evening  High intensity insulin sliding scale, mealtime insulin coverage   Chronic diastolic congestive heart failure  Echocardiogram 1/6/2022 showed EF 65-70%, bioprosthetic aortic valve with a mean systolic gradient of 7 mmHg and a peak anterograde velocity of 1.9 m/s  Chronic pitting edema but otherwise no signs of acute heart failure  Continue furosemide  Monitor volume status, daily weight   Chronic back pain  Status post injection about 3 weeks ago and procedure about 4 weeks ago  Continue lidocaine patch and pain control   Constipation  Bowel regimen      Patient is clinically and hemodynamically stable enough to be discharged.  Cleared for discharge by orthopedics team.  Medication reconciliation has been done.  Patient will be discharged to TCU for further rehabilitation.    Discharge Medications with Med changes:     Current Discharge Medication List      START taking these medications    Details   gabapentin (NEURONTIN) 100 MG capsule Take 1 capsule (100 mg) by mouth At  Bedtime    Associated Diagnoses: Polyneuropathy      hydrOXYzine (ATARAX) 10 MG tablet Take 1 tablet (10 mg) by mouth every 6 hours as needed for other (adjuvant pain)  Qty: 30 tablet, Refills: 0    Associated Diagnoses: Fracture of hip, left, closed, initial encounter (H)      oxyCODONE (ROXICODONE) 5 MG tablet Take 1 tablet (5 mg) by mouth every 6 hours as needed for pain  Qty: 26 tablet, Refills: 0    Associated Diagnoses: Fracture of hip, left, closed, initial encounter (H)      senna-docusate (SENOKOT-S/PERICOLACE) 8.6-50 MG tablet Take 1 tablet by mouth in the morning and 1 tablet in the evening.  Qty: 30 tablet, Refills: 0    Associated Diagnoses: Fracture of hip, left, closed, initial encounter (H)         CONTINUE these medications which have CHANGED    Details   acetaminophen (TYLENOL) 325 MG tablet Take 3 tablets (975 mg) by mouth in the morning and 3 tablets (975 mg) at noon and 3 tablets (975 mg) in the evening.  Qty: 100 tablet, Refills: 0    Associated Diagnoses: Fracture of hip, left, closed, initial encounter (H)         CONTINUE these medications which have NOT CHANGED    Details   Apoaequorin (PREVAGEN PO) Take 1 tablet by mouth daily       carvedilol (COREG) 6.25 MG tablet Take 6.25 mg by mouth 2 times daily (with meals)      clopidogrel (PLAVIX) 75 MG tablet Take 1 tablet (75 mg) by mouth daily Start taking medication the day after the procedure.  Qty: 90 tablet, Refills: 1    Associated Diagnoses: PAD (peripheral artery disease) (H); Atherosclerosis of native arteries of extremities with intermittent claudication, left leg (H)      clotrimazole (LOTRIMIN) 1 % external cream Apply to feet twice daily until 1 week after clinical resolution, typically for 4 weeks total  Qty: 85 g, Refills: 1    Associated Diagnoses: Tinea pedis of both feet      empagliflozin (JARDIANCE) 10 MG TABS tablet Take 1 tablet (10 mg) by mouth daily  Qty: 30 tablet, Refills: 4    Associated Diagnoses: Type 2 diabetes  mellitus with peripheral neuropathy (H)      finasteride (PROSCAR) 5 MG tablet Take 1 tablet (5 mg) by mouth daily  Qty: 90 tablet, Refills: 3    Associated Diagnoses: Benign prostatic hyperplasia, unspecified whether lower urinary tract symptoms present      furosemide (LASIX) 20 MG tablet TAKE 2 TABLETS BY MOUTH IN THE MORNING AND 1 TABLET IN THE AFTERNOON  Qty: 180 tablet, Refills: 3    Associated Diagnoses: Chronic heart failure with preserved ejection fraction (H)      insulin aspart prot & aspart (NOVOLOG MIX 70/30 PEN) (70-30) 100 UNIT/ML pen Inject Subcutaneous 2 times daily (with meals) 33 in AM and 26 in PM      Lidocaine (LIDOCARE) 4 % Patch Place 1 patch onto the skin every 24 hours To prevent lidocaine toxicity, patient should be patch free for 12 hrs daily.      metFORMIN (GLUCOPHAGE) 500 MG tablet Take 2 tablets by mouth twice daily  Qty: 360 tablet, Refills: 0    Associated Diagnoses: 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)      oxyCODONE-acetaminophen (PERCOCET) 5-325 MG tablet Take 1 tablet by mouth 3 times daily as needed for severe pain      polyethylene glycol (MIRALAX) 17 GM/Dose powder Take 17 g (1 capful) by mouth daily as needed for constipation (opioid induced constipation)  Qty: 507 g, Refills: 0    Associated Diagnoses: Closed compression fracture of L3 lumbar vertebra with routine healing, subsequent encounter      pramipexole (MIRAPEX) 0.25 MG tablet Take 1 tablet (0.25 mg) by mouth 2 times daily Takes 4pm and 5;30 pm  Qty: 180 tablet, Refills: 3    Associated Diagnoses: Restless leg syndrome      warfarin ANTICOAGULANT (COUMADIN) 5 MG tablet Take 1 tablet (5 mg) by mouth daily Resume your warfarin tonight 12/7 after the procedure at the usual dose  Qty: 30 tablet, Refills: 3    Associated Diagnoses: PAD (peripheral artery disease) (H); Atherosclerosis of native arteries of extremities with intermittent claudication, left leg (H)      ACCU-CHEK GUIDE test strip USE 1  TO  CHECK GLUCOSE THREE TIMES DAILY  Qty: 300 strip, Refills: 1    Associated Diagnoses: 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)      blood glucose monitor KIT 1 kit 2 times daily.  Qty: 1 kit, Refills: 0    Comments: Contour Monitor SYS Blue KIT  Diagnosis: Type 2 diabetes, HbA1C goal < 8% 250.00  Associated Diagnoses: Type 2 diabetes, HbA1C goal < 8% (H)                   Rationale for medication changes:              Consults       ORTHOPEDIC SURGERY IP CONSULT  PHARMACY TO DOSE PHYTONADIONE  PHYSICAL THERAPY ADULT IP CONSULT  CARE MANAGEMENT / SOCIAL WORK IP CONSULT  PHYSICAL THERAPY ADULT IP CONSULT  OCCUPATIONAL THERAPY ADULT IP CONSULT  PHARMACY TO DOSE WARFARIN  PHYSICAL THERAPY ADULT IP CONSULT  OCCUPATIONAL THERAPY ADULT IP CONSULT  OCCUPATIONAL THERAPY ADULT IP CONSULT    Immunizations given this encounter     Most Recent Immunizations   Administered Date(s) Administered     COVID-19,PF,Pfizer (12+ Yrs) 09/04/2021     FLU 6-35 months 11/18/2010     Flu, Unspecified 10/08/2015     [id number] Novel Flu 01/15/2010     HepA-Adult 10/08/2009     Influenza (H1N1) 01/15/2010     Influenza (High Dose) 3 valent vaccine 09/20/2019     Influenza (IIV3) PF 01/11/2013     Influenza Vaccine IM > 6 months Valent IIV4 (Alfuria,Fluzone) 09/27/2013     Influenza, Quad, High Dose, Pf, 65yr+ (Fluzone HD) 09/16/2021     Pneumo Conj 13-V (2010&after) 03/08/2015     Pneumococcal 23 valent 04/07/2011     Poliovirus, inactivated (IPV) 12/07/2011     TD (ADULT, 7+) 06/15/2016     TDAP Vaccine (Adacel) 01/05/2016     Td (Adult), Adsorbed 06/15/2016     Tdap (Adacel,Boostrix) 02/03/2009     Tdap (Adult) Unspecified Formulation 06/15/2016     Typhoid IM 12/30/2015     Zoster vaccine recombinant adjuvanted (SHINGRIX) 01/22/2019     Zoster vaccine, live 11/18/2010           Anticoagulation Information      Recent INR results:   Recent Labs   Lab 04/12/22  0507 04/11/22  0518 04/10/22  0545 04/09/22  0924 04/09/22  0510  04/08/22  1811   INR 1.59* 1.22* 1.20* 1.40* 1.62* 2.13*     Warfarin doses (if applicable) or name of other anticoagulant:       SIGNIFICANT IMAGING FINDINGS     Results for orders placed or performed during the hospital encounter of 04/08/22   Head CT w/o contrast    Impression    IMPRESSION:    1.  No evidence of acute intracranial hemorrhage or mass effect.  2.  Mild nonspecific white matter changes.  3.  Moderate brain parenchymal volume loss.   Chest CT w/o contrast    Impression    IMPRESSION:   1.  There may be nondisplaced acute fractures of the left anterolateral fifth, seventh, and eighth ribs, though there is no adjacent soft tissue stranding or fluid. Correlate for point tenderness.   2.  Interval TAVR.   3.  No other change from the prior study.   XR Pelvis and Hip Left 2 Views    Impression    IMPRESSION: Acute moderately displaced subcapital fracture of the left femur with superior displacement of the humeral shaft. Osteopenia. Mild degenerative changes in both hips.   XR Chest 1 View    Impression    IMPRESSION: Heart size within normal limits status post CABG and aortic valve replacement. Left basilar atelectasis and pleural thickening. No visible pneumothorax. Osteopenia. No displaced fractures are visualized by radiograph.   XR Pelvis and Hip Left 2 Views    Impression    IMPRESSION:  1.  Interval left hip hemiarthroplasty with proximal femur bipolar endoprosthesis. The component is well seated without evidence of complication. Postoperative soft tissue gas about the left hip. Left lateral hip skin staples.  2.  No fracture or joint malalignment.  3.  Bone demineralization.     XR Chest Port 1 View    Impression    IMPRESSION: Lungs hypoinflated. Stable left basilar atelectasis. Small left pleural effusion. Stable enlarged cardiac silhouette. Replaced aortic valve. Median sternotomy. The pulmonary vasculature is normal. No pneumothorax.   XR Chest Port 1 View    Impression    IMPRESSION: No  significant change.    Lungs remain hypoexpanded. Stable mild basilar atelectasis and costophrenic angle blunting. No pneumothorax. Stable cardiomediastinal silhouette. Sternotomy. TAVR. Prior suspected rib fractures on CT were subtle and not well-seen on radiography.       XR Chest Port 1 View    Impression    IMPRESSION: Since 04/11/2022 there appears to be a mild increase in infiltrate and/or atelectasis in the left lung base with associated slight amount of pleural fluid or thickening. Otherwise the chest is stable no pneumothorax identified. Known rib   fractures are not well identified.       SIGNIFICANT LABORATORY FINDINGS     Most Recent 3 CBC's:Recent Labs   Lab Test 04/12/22  0507 04/11/22  0518 04/10/22  0545   WBC 7.2 7.5 8.6   HGB 9.9* 9.5* 9.9*   * 106* 105*    150 142*     Most Recent 3 BMP's:Recent Labs   Lab Test 04/12/22  1232 04/12/22  0752 04/12/22  0604 04/12/22  0507 04/11/22  0827 04/11/22  0518 04/10/22  0841 04/10/22  0545   NA  --   --   --  137  --  139  --  137   POTASSIUM  --   --   --  3.4*  --  3.5  --  3.8   CHLORIDE  --   --   --  96*  --  100  --  102   CO2  --   --   --  34*  --  32*  --  28   BUN  --   --   --  17  --  17  --  21   CR  --   --   --  0.71  --  0.77  --  0.80   ANIONGAP  --   --   --  7  --  7  --  7   RACHEL  --   --   --  8.5  --  8.3*  --  8.3*   * 263* 289* 268*   < > 242*   < > 229*    < > = values in this interval not displayed.     Most Recent 2 LFT's:Recent Labs   Lab Test 04/08/22  1811 12/28/21  1617   AST 45* 21   ALT 45 14   ALKPHOS 126* 84   BILITOTAL 1.0 0.5     Most Recent 3 INR's:Recent Labs   Lab Test 04/12/22  0507 04/11/22  0518 04/10/22  0545   INR 1.59* 1.22* 1.20*     Most Recent INR's and Anticoagulation Dosing History:  Anticoagulation Dose History     Recent Dosing and Labs Latest Ref Rng & Units 3/18/2022 4/8/2022 4/9/2022 4/9/2022 4/10/2022 4/11/2022 4/12/2022    ZZ IMS TEMPLATE - - - - - 7.5 mg 7.5 mg -    INR 0.85 -  "1.15 1.1 2.13(H) 1.62(H) 1.40(H) 1.20(H) 1.22(H) 1.59(H)        Most Recent 3 Creatinines:Recent Labs   Lab Test 04/12/22  0507 04/11/22  0518 04/10/22  0545   CR 0.71 0.77 0.80     Most Recent 3 Hemoglobins:Recent Labs   Lab Test 04/12/22  0507 04/11/22  0518 04/10/22  0545   HGB 9.9* 9.5* 9.9*     Most Recent 3 Troponin's:Recent Labs   Lab Test 06/02/14  0635 06/01/14  1440 05/31/14  1610   TROPI 0.042* 0.037* 0.043*     Most Recent 3 BNP's:Recent Labs   Lab Test 06/09/16  1203 05/31/14  1120   NTBNPI  --  2,150*   NTBNP 354*  --      Most Recent D-dimer:Recent Labs   Lab Test 04/04/19  1427   DD <=0.27         Discharge Orders        Medication Therapy Management Referral      General info for SNF    Length of Stay Estimate: Short Term Care: Estimated # of Days <30 Condition at Discharge: Improving Level of care:skilled  Rehabilitation Potential: Good Admission H&P remains valid and up-to-date: Yes Recent Chemotherapy: N/A Use Nursing Home Standing Orders: N/A     Mantoux Instructions    Give two-step Mantoux (PPD) Per Facility Policy {[id number]     Incentive Spirometry    Incentive Spirometry 10 times per hour, 4 times per day.     Reason for your hospital stay    Left hip surgery     When to call - Contact Surgeon Team    You may experience symptoms that require follow-up before your scheduled appointment. Refer to the \"Stoplight Tool\" for instructions on when to contact your Surgeon Team if you are concerned about pain control, blood clots, constipation, or if you are unable to urinate.     When to call - Reach out to Urgent Care    If you are not able to reach your Surgeon Team and you need immediate care, go to the Orthopedic Walk-in Clinic or Urgent Care at your Surgeon's office.  Do NOT go to the Emergency Room unless you have shortness of breath, chest pain, or other signs of a medical emergency.     When to call - Reasons to Call 911    Call 911 immediately if you experience sudden-onset chest pain, arm " weakness/numbness, slurred speech, or shortness of breath     Symptoms - Fever Management    A low grade fever can be expected after surgery.  Use acetaminophen (TYLENOL) as needed for fever management.  Contact your Surgeon Team if you have a fever greater than 101.5 F, chills, and/or night sweats.     Symptoms - Constipation management    Constipation (hard, dry bowel movements) is expected after surgery due to the combination of being less active, the anesthetic, and the opioid pain medication.  You can do the following to help reduce constipation:  ~  FLUIDS:  Drink clear liquids (water or Gatorade), or juice (apple/prune).  ~  DIET:  Eat a fiber rich diet.    ~  ACTIVITY:  Get up and move around several times a day.  Increase your activity as you are able.  MEDICATIONS:  Reduce the risk of constipation by starting medications before you are constipated.  You can take Miralax   (1 packet as directed) and/or a stool softener (Senokot 1-2 tablets 1-2 times a day).  If you already have constipation and these medications are not working, you can get magnesium citrate and use as directed.  If you continue to have constipation you can try an over the counter suppository or enema.  Call your Surgeon Team if it has been greater than 3 days since your last bowel movement.     Symptoms - Reduced Urine Output    Changes in the amount of fluids you drank before and after surgery may result in problems urinating.  It is important to stay well-hydrated after surgery and drink plenty of water. If it has been greater than 8 hours since you have urinated despite drinking plenty of water, call your Surgeon Team.     Activity - Exercises to prevent blood clots    Unless otherwise directed by your Surgeon team, perform the following exercises at least three times per day for the first four weeks after surgery to prevent blood clots in your legs: 1) Point and flex your feet (Ankle Pumps), 2) Move your ankle around in big circles, 3)  Wiggle your toes, 4) Walk, even for short distances, several times a day, will help decrease the risk of blood clots.     Comfort and Pain Management - Pain after Surgery    Pain after surgery is normal and expected.  You will have some amount of pain for several weeks after surgery.  Your pain will improve with time.  There are several things you can do to help reduce your pain including: rest, ice, elevation, and using pain medications as needed. Contact your Surgeon Team if you have pain that persists or worsens after surgery despite rest, ice, elevation, and taking your medication(s) as prescribed. Contact your Surgeon Team if you have new numbness, tingling, or weakness in your operative extremity.     Comfort and Pain Management - Swelling after Surgery    Swelling and/or bruising of the surgical extremity is common and may persist for several months after surgery. In addition to frequent icing and elevation, gentle compressive support with an ACE wrap or tubigrip may help with swelling. Apply compression regularly, removing at least twice daily to perform skin checks. Contact your Surgeon Team if your swelling increases and is NOT associated with an increase in your activity level, or if your swelling increases and is associated with redness and pain.     Comfort and Pain Management - Cold therapy    Ice can be used to control swelling and discomfort after surgery. Place a thin towel over your operative site and apply the ice pack overtop. Leave ice pack in place for 20 minutes, then remove for 20 minutes. Repeat this 20 minutes on/20 minutes off routine as often as tolerated.     Medication Instructions - Acetaminophen (TYLENOL) Instructions    You were discharged with acetaminophen (TYLENOL) for pain management after surgery. Acetaminophen most effectively manages pain symptoms when it is taken on a schedule without missing doses (every four, six, or eight hours). Your Provider will prescribe a safe daily  dose between 3000 - 4000 mg.  Do NOT exceed this daily dose. Most patients use acetaminophen for pain control for the first four weeks after surgery.  You can wean from this medication as your pain decreases.     Medication Instructions - NSAID Instructions    You were discharged with an anti-inflammatory medication for pain management to use in combination with acetaminophen (TYLENOL) and the narcotic pain medication.  Take this medication exactly as directed.  You should only take one anti-inflammatory at a time.  Some common anti-inflammatories include: ibuprofen (ADVIL, MOTRIN), naproxen (ALEVE, NAPROSYN), celecoxib (CELEBREX), meloxicam (MOBIC), ketorolac (TORADOL).  Take this medication with food and water.     Medication Instructions - Opioids - Tapering Instructions    In the first three days following surgery, your symptoms may warrant use of the narcotic pain medication every four to six hours as prescribed. This is normal. As your pain symptoms improve, focus your efforts on decreasing (tapering) use of narcotic medications. The most successful tapering strategy is to first, decrease the number of tablets you take every 4-6 hours to the minimum prescribed. Then, increase the amount of time between doses.  For example:  First, taper to   or 1 tablet every 4-6 hours.  Then, taper to   or 1 tablet every 6-8 hours.  Then, taper to   or 1 tablet every 8-10 hours.  Then, taper to   or 1 tablet every 10-12 hours.  Then, taper to   or 1 tablet at bedtime.  The bedtime dose can help with comfort during sleep and is typically the last dose to be discontinued after surgery.     Follow Up Care    Please follow-up with Dr. Larose/Dainn Briones PA-C in 2 weeks at [facility]. Call our scheduling line at [phone] to make an appointment if you do not already have one scheduled.     Shower with wound/dressing covered    You must COVER your dressing or incision with saran wrap (or any other non-permeable covering)  "to allow the incision to remain dry while showering.  You may shower 3 days after surgery as long as the surgical wound stays dry. Continue to cover your dressing or incision for showering until your first office visit.  You are strictly prohibited from soaking   or submerging the surgical wound underwater.     Comfort and Pain Management - LOWER Extremity Elevation    Swelling is expected for several months after surgery. This type of swelling is usually associated with gravity and activity, and can be improved with elevation.   The best way to do this is to get your \"toes above your nose\" by laying down and placing several pillows lengthwise under your calf and heel. When elevating your leg keep your knee completely straight. Perform this elevation as often as possible especially for the first two weeks after surgery.     Medication Instructions - Opioid Instructions (Less than 65 years)    You were discharged with an opioid medication (hydromorphone, oxycodone, hydrocodone, or tramadol). This medication should only be taken for breakthrough pain that is not controlled with acetaminophen (TYLENOL). If you rate your pain less than 3 you do not need this medication.  Pain rating 0-3:  You do not need this medication.  Pain rating 4-6:  Take 1 tablet every 4-6 hours as needed  Pain rating 7-10:  Take 2 tablets every 4-6 hours as needed.  Do not exceed 6 tablets per day     General info for SNF    Length of Stay Estimate: Short Term Care: Estimated # of Days <30  Condition at Discharge: Improving  Level of care:skilled   Rehabilitation Potential: Good  Admission H&P remains valid and up-to-date: Yes  Recent Chemotherapy: N/A  Use Nursing Home Standing Orders: Yes     Follow Up and recommended labs and tests    Follow up with Nursing home physician.  No follow up labs or test are needed.     Reason for your hospital stay    Admitted for management of hip pain secondary to fall accident.     Activity - Up with nursing " assistance     Physical Therapy Adult Consult    Evaluate and treat as clinically indicated.    Reason: Status Post Hip Surgery     Occupational Therapy Adult Consult    Evaluate and treat as clinically indicated.    Reason: Status Post Hip Surgery     Hip precautions     Crutches DME    DME Documentation: Describe the reason for need to support medical necessity: Impaired gait status post hip surgery. I, the undersigned, certify that the above prescribed supplies are medically necessary for this patient and is both reasonable and necessary in reference to accepted standards of medical practice in the treatment of this patient's condition and is not prescribed as a convenience.     [facility]    DME Documentation: Describe the reason for need to support medical necessity: Impaired gait status post hip surgery. I, the undersigned, certify that the above prescribed supplies are medically necessary for this patient and is both reasonable and necessary in reference to accepted standards of medical practice in the treatment of this patient's condition and is not prescribed as a convenience.     [facility]    : DME Documentation: Describe the reason for need to support medical necessity: Impaired gait status post hip surgery. I, the undersigned, certify that the above prescribed supplies are medically necessary for this patient and is both reasonable and necessary in reference to accepted standards of medical practice in the treatment of this patient's condition and is not prescribed as a convenience.     Diet    Follow this diet upon discharge: Orders Placed This Encounter      Advance Diet as Tolerated: Regular Diet Adult     Diet    Follow this diet upon discharge: Orders Placed This Encounter      Diet      Moderate Consistent Carb (60 g CHO per Meal) Diet       Examination   Physical Exam   Temp:  [98.4  F (36.9  C)-98.8  F (37.1  C)] 98.4  F (36.9  C)  Pulse:  [93-97] 93  Resp:  [18] 18  BP: (115-131)/(64-78)  131/78  SpO2:  [93 %-96 %] 96 %  Wt Readings from Last 1 Encounters:   04/08/22 78 kg (172 lb)     GEN: Alert and oriented. Not in acute distress  HEENT: Atraumatic               Sclera- anicteric              Mucous membrane- moist and pink  CHEST: Clear to auscultation bilaterally  HEART: S1S2 regular. No murmurs, rubs or gallops  ABDOMEN: Soft. Non-tender, non-distended. No organomegaly. No guarding or rigidity. Bowel sounds- active  Extremities: No pedal oedema  CNS: No focal neurological deficit. No involuntary movements  SKIN: No skin rash, no cyanosis or clubbing        Please see EMR for more detailed significant labs, imaging, consultant notes etc.    I, Madison Panda MD, personally saw the patient today and spent greater than 30 minutes discharging this patient.    Madison Panda MD  [facility]    CC:Mar Morales

## 2022-04-12 NOTE — SIGNIFICANT EVENT
Significant Event Note    Time of event: 9:03 PM April 11, 2022    Description of event:  Noticed by nursing staff for elevated blood glucose , BG has been elevated previously     Plan:  Increase lantus to 25 units , increase cab counting insulin to 1 to10    Discussed with: bedside nurse    Jus Arreaga MD

## 2022-04-12 NOTE — PROGRESS NOTES
[id number] Paged Dr Arreaga. Qi   Pee SHARMA  Pt's Blood Glucose is 360, order stated to notify doctor on call. Please advised. Isabel [phone].      0910 Spoke to Dr. Arreaga. See order

## 2022-04-12 NOTE — PLAN OF CARE
Problem: Pain Acute  Goal: Acceptable Pain Control and Functional Ability  Outcome: Ongoing, Progressing  Intervention: Prevent or Manage Pain  Recent Flowsheet Documentation  Taken 4/11/2022 2152 by Isabel Bertrand RN  Medication Review/Management: medications reviewed     Problem: Plan of Care - These are the overarching goals to be used throughout the patient stay.    Goal: Absence of Hospital-Acquired Illness or Injury  Intervention: Identify and Manage Fall Risk  Recent Flowsheet Documentation  Taken 4/11/2022 2152 by Isabel Bertrand RN  Safety Promotion/Fall Prevention:    assistive device/personal items within reach    bed alarm on    safety round/check completed   Goal Outcome Evaluation:   Pt is alert and oriented x4. Scheduled meds and Prn administered per Dr. Doctors's order. Pending discharge. Fall precaution maintained. Xray done.

## 2022-04-12 NOTE — PROGRESS NOTES
"Orthopedic Progress Note      Assessment: 3 Days Post-Op  S/P Procedure(s):  HEMIARTHROPLASTY, HIP, BIPOLAR, OPEN REDUCTION INTERNAL FIXATION OF GREATER TROCHANTER     Plan:   - Continue PT/OT  - Pain management  - Weightbearing status: WBAT, posterior hip precautions. Avoid active abduction 4-6 weeks  - Anticoagulation: Plavix and Coumadin dosed based on INRs in addition to SCDs, carole stockings and early ambulation.  - Follow up with Dr. Larose ([facility]) 2 weeks postoperatively.   - Discharge planning: TCU today, stable to discharge from orthopedic standpoint    Subjective:  Pain: mild  Nausea, Vomiting:  No  Lightheadedness, Dizziness:  No  Neuro:  Patient denies new onset numbness or paresthesias    Patient reports that he is doing okay today. Minimal left sided hip point at this point. Has been working with therapy. No other questions/concerns at this time.    Objective:  /78 (BP Location: Left arm)   Pulse 93   Temp 98.4  F (36.9  C) (Oral)   Resp 18   Ht 1.626 m (5' 4\")   Wt 78 kg (172 lb)   SpO2 96%   BMI 29.52 kg/m    The patient is A&Ox3. Appears comfortable.   Sensation is intact.  Dorsiflexion and plantar flexion is intact.  Dorsalis pedis pulse intact.  Calves are soft and non-tender. Negative Eliud's.  The incision is covered. Dressing C/D/I.    Pertinent Labs   Lab Results: personally reviewed.   Lab Results   Component Value Date    INR 1.59 (H) 04/12/2022    INR 1.22 (H) 04/11/2022    INR 1.20 (H) 04/10/2022     Lab Results   Component Value Date    WBC 7.2 04/12/2022    HGB 9.9 (L) 04/12/2022    HCT 29.8 (L) 04/12/2022     (H) 04/12/2022     04/12/2022     Lab Results   Component Value Date     04/12/2022    CO2 34 (H) 04/12/2022         Report completed by:  German Meier PA-C, JEEVAN  Date: 4/12/2022  Time: 9:54 AM    "

## 2022-04-12 NOTE — PLAN OF CARE
Problem: Plan of Care - These are the overarching goals to be used throughout the patient stay.    Goal: Plan of Care Review/Shift Note  Description: The Plan of Care Review/Shift note should be completed every shift.  The Outcome Evaluation is a brief statement about your assessment that the patient is improving, declining, or no change.  This information will be displayed automatically on your shift note.  Outcome: Adequate for Care Transition  Goal: Absence of Hospital-Acquired Illness or Injury  Intervention: Identify and Manage Fall Risk  Recent Flowsheet Documentation  Taken 4/12/2022 1600 by Jimbo Boswell RN  Safety Promotion/Fall Prevention: assistive device/personal items within reach  Intervention: Prevent and Manage VTE (Venous Thromboembolism) Risk  Recent Flowsheet Documentation  Taken 4/12/2022 1600 by Jimbo Boswell RN  VTE Prevention/Management: SCDs (sequential compression devices) off  Goal: Optimal Comfort and Wellbeing  Outcome: Adequate for Care Transition   Goal Outcome Evaluation:        Pt A&O. Vital signs wdl. C/o pain prn oxycodone given. Discharged to a TCU accompanied by  EMS at 1720 .

## 2022-04-12 NOTE — PLAN OF CARE
Physical Therapy Discharge Summary    Reason for therapy discharge:    Discharged to transitional care facility.    Progress towards therapy goal(s). See goals on Care Plan in [facility] electronic health record for goal details.  Goals not met.  Barriers to achieving goals:   limited tolerance for therapy and discharge from facility.    Therapy recommendation(s):    Continued therapy is recommended.  Rationale/Recommendations:  at TCU.

## 2022-04-13 ENCOUNTER — TELEPHONE (OUTPATIENT)
Dept: GERIATRICS | Facility: CLINIC | Age: 82
End: 2022-04-13

## 2022-04-13 ENCOUNTER — DOCUMENTATION ONLY (OUTPATIENT)
Dept: ANTICOAGULATION | Facility: CLINIC | Age: 82
End: 2022-04-13

## 2022-04-13 ENCOUNTER — LAB REQUISITION (OUTPATIENT)
Dept: LAB | Facility: CLINIC | Age: 82
End: 2022-04-13
Payer: COMMERCIAL

## 2022-04-13 DIAGNOSIS — Z79.01 CHRONIC ANTICOAGULATION: ICD-10-CM

## 2022-04-13 DIAGNOSIS — Z79.01 LONG TERM (CURRENT) USE OF ANTICOAGULANTS: ICD-10-CM

## 2022-04-13 DIAGNOSIS — I48.20 CHRONIC ATRIAL FIBRILLATION (H): Primary | ICD-10-CM

## 2022-04-13 LAB — INR PPP: 1.79 (ref 0.85–1.15)

## 2022-04-13 PROCEDURE — 85610 PROTHROMBIN TIME: CPT | Performed by: FAMILY MEDICINE

## 2022-04-13 NOTE — TELEPHONE ENCOUNTER
[facility] Triage Nurse INR     Provider: BOBBY Healy CNP  Facility: [facility]  Facility Type:  TCU    Caller: Naty   Call Back Number: [phone]  Reason for call: INR  Diagnosis/Goal: A. Fib    INR today 1.79     4/12 1.59 5mg   4/11 1.22 7.5mg   4/10 1.2 7.5mg   4/9 1.4 held d/t surgery     Home dose is 7.5mg T, Th, Sat and 5mg AOD.       Heparin/Lovenox:  No  Currently on ABX?: No  Other interacting medication:  None  Missed or refused doses: No    Verbal Order/Direction given by Provider: 5mg warfarin today, Next INR 4/14    Provider Giving Order:  BOBBY Healy CNP    Verbal Order given to: Naty Pardo RN

## 2022-04-13 NOTE — PROGRESS NOTES
[facility] GERIATRICS    PRIMARY CARE PROVIDER AND CLINIC:  Mar Morales, DO, [address] / [facility] [address]  Chief Complaint   Patient presents with     Hospital F/U      [facility] Record Number:  [id number]  Place of Service where encounter took place:  [facility] ([facility]) [[id number]]    Pee Ayala  is a 81 year old  (1940), admitted to the above facility from  [facility]. Hospital stay 4/8/22 through 4/12/22..   HPI:    Pee is a 81-year-old male with a past medical history of chronic A. fib anticoagulated with Coumadin, CAD, CABG, CHF, hypertension, type 2 diabetes, and chronic back pain who was admitted to [facility] after suffering a ground-level fall.    Brief hospitalization summary  Imaging revealed an acute moderately displaced subcapital fracture of the left femur. Orthopedics was consulted and he subsequently underwent a ORIF of the left greater trochanter.  His hospitalization stay was complicated by hypoglycemia.  Oral antiglycemic's/NovoLog 70/30 was held, both Lantus and sliding scale insulin was initiated for glycemic control    Pee reported pain in his left hip. He has been taking oxycodone which helps with his pain control. Nursing without acute concerns    CODE STATUS/ADVANCE DIRECTIVES DISCUSSION:  Full Code  ALLERGIES:   Allergies   Allergen Reactions     Amlodipine Dizziness     Dizziness and dry heaves     Lisinopril Cough     Adhesive Tape Itching and Rash      PAST MEDICAL HISTORY:   Past Medical History:   Diagnosis Date     ACS (acute coronary syndrome) (H) 6/2/2014     ACS (acute coronary syndrome) (H) 6/2/2014     Actinic keratosis      Actinic keratosis 1/14/2014     Actinic keratosis 1/14/2014     Anticoagulated on Coumadin 12/30/2015     Antiplatelet or antithrombotic long-term use      Atrial fibrillation (H)      Atrial fibrillation (H) 2016     Bone mass 4/26/2017     Chest heaviness 1/23/2019      Chest pain 5/31/2014     Closed fracture of left forearm 2015     Congestive heart failure (H)      Coronary artery disease      Diabetes (H) 2009     Diabetes mellitus (H)      Difficulty in walking(719.7)      Dyslipidemia 8/31/2016     Dyspnea on exertion      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      Encounter for long-term (current) use of insulin (H) 8/11/2016     Esophageal reflux 11/18/2010     Esophageal reflux 11/18/2010     History of angina      HTN (hypertension) 6/30/2009     (Problem list name updated by automated process. Provider to review and confirm.)     Hyperlipidemia LDL goal <100 10/31/2010     Hypertension      Impotence of organic origin      Mixed hyperlipidemia 4/25/2007 April 25, 2007 restarted Zocor today, recheck in 3 months.  August 23, 2007 LDL at 101 with 40 mg, will increase to 80 mg. Recheck  In 3 months.      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      Type II or unspecified type diabetes mellitus with neurological manifestations, not stated as uncontrolled(250.60) (H)      Walking troubles       PAST SURGICAL HISTORY:   has a past surgical history that includes colonoscopy (1/14/2004); Laparoscopic cholecystectomy (10/09); Vasectomy; Coronary Angiography Adult Order; Maze procedure (N/A, 9/10/2014); Esophagoscopy,  gastroscopy, duodenoscopy (EGD), combined (N/A, 1/13/2016); orthopedic surgery; Bypass graft artery coronary (N/A, 9/10/2014); stent, coronary, laurent (02/2016); Transcatheter Aortic Valve Replacement (N/A, 1/12/2021); Heart Cath Femoral Cannulization With Open Standby Repair Aortic Valve (N/A, 1/12/2021); Bypass graft artery coronary (2016); Mohs micrographic procedure; Cv Coronary Angiogram (N/A, 4/4/2019); Cv Right Heart Catheterization (Right, 4/4/2019); Cv Left Heart Catheterization Without Left Ventriculogram (Left, 4/4/2019); other surgical history (Left, 2015); Cv Coronary Angiogram (N/A, 1/6/2021); Cv Left Heart Catheterization Without Left Ventriculogram (Left, 1/6/2021); IR Lower Extremity Angiogram Left (12/7/2021); and Open reduction internal fixation hip bipolar (Left, 4/9/2022).  FAMILY HISTORY: family history includes Cerebrovascular Disease in his father; Diabetes in his maternal grandmother and mother; Diabetes Type 2  in his mother; Heart Disease in his father; Hypertension in his father; No Known Problems in his brother.  SOCIAL HISTORY:   reports that he quit smoking about 24 years ago. He has never used smokeless tobacco. He reports current alcohol use. He reports that he does not use drugs.    Post Discharge Medication Reconciliation Status: discharge medications reconciled, continue medications without change  Current Outpatient Medications   Medication Sig     ACCU-CHEK GUIDE test strip USE 1  TO CHECK GLUCOSE THREE TIMES DAILY     acetaminophen (TYLENOL) 325 MG tablet Take 3 tablets (975 mg) by mouth in the morning and 3 tablets (975 mg) at noon and 3 tablets (975 mg) in the evening.     Apoaequorin (PREVAGEN PO) Take 1 tablet by mouth daily      blood glucose monitor KIT 1 kit 2 times daily.     carvedilol (COREG) 6.25 MG tablet Take 6.25 mg by mouth 2 times daily (with meals)     clopidogrel (PLAVIX) 75 MG tablet Take 1 tablet (75 mg) by mouth daily Start taking medication the day after the  procedure.     clotrimazole (LOTRIMIN) 1 % external cream Apply to feet twice daily until 1 week after clinical resolution, typically for 4 weeks total     empagliflozin (JARDIANCE) 10 MG TABS tablet Take 1 tablet (10 mg) by mouth daily     finasteride (PROSCAR) 5 MG tablet Take 1 tablet (5 mg) by mouth daily     furosemide (LASIX) 20 MG tablet TAKE 2 TABLETS BY MOUTH IN THE MORNING AND 1 TABLET IN THE AFTERNOON     gabapentin (NEURONTIN) 100 MG capsule Take 1 capsule (100 mg) by mouth At Bedtime     hydrOXYzine (ATARAX) 10 MG tablet Take 1 tablet (10 mg) by mouth every 6 hours as needed for other (adjuvant pain)     insulin aspart prot & aspart (NOVOLOG MIX 70/30 PEN) (70-30) 100 UNIT/ML pen Inject Subcutaneous 2 times daily (with meals) 33 in AM and 26 in PM     Lidocaine (LIDOCARE) 4 % Patch Place 1 patch onto the skin every 24 hours To prevent lidocaine toxicity, patient should be patch free for 12 hrs daily.     metFORMIN (GLUCOPHAGE) 500 MG tablet Take 2 tablets by mouth twice daily     oxyCODONE (ROXICODONE) 5 MG tablet Take 1 tablet (5 mg) by mouth every 6 hours as needed for pain     oxyCODONE-acetaminophen (PERCOCET) 5-325 MG tablet Take 1 tablet by mouth 3 times daily as needed for severe pain     polyethylene glycol (MIRALAX) 17 GM/Dose powder Take 17 g (1 capful) by mouth daily as needed for constipation (opioid induced constipation)     pramipexole (MIRAPEX) 0.25 MG tablet Take 1 tablet (0.25 mg) by mouth 2 times daily Takes 4pm and 5;30 pm     senna-docusate (SENOKOT-S/PERICOLACE) 8.6-50 MG tablet Take 1 tablet by mouth in the morning and 1 tablet in the evening.     warfarin ANTICOAGULANT (COUMADIN) 5 MG tablet Take 1 tablet (5 mg) by mouth daily Resume your warfarin tonight 12/7 after the procedure at the usual dose (Patient taking differently: 7.5 mg Tues, Thurs, Sat, 5 mg all other days)     No current facility-administered medications for this visit.       ROS:  10 point ROS of systems including  "Constitutional, Eyes, Respiratory, Cardiovascular, Gastroenterology, Genitourinary, Integumentary, Musculoskeletal, Psychiatric were all negative except for pertinent positives noted in my HPI.    Vitals:  /78   Pulse 90   Temp 98.3  F (36.8  C)   Resp 18   Ht 1.626 m (5' 4\")   Wt 75.6 kg (166 lb 11.2 oz)   SpO2 98%   BMI 28.61 kg/m      Exam:  GENERAL APPEARANCE:  Alert, in no distress  ENT:  Mouth and posterior oropharynx normal, moist mucous membranes  EYES:  EOM, conjunctivae, lids, pupils and irises normal, EOM normal, conjunctiva and lids normal  RESP:  respiratory effort and palpation of chest normal, lungs clear to auscultation , no respiratory distress  CV:  Palpation and auscultation of heart done , regular rate and rhythm, no murmur, rub, or gallop  ABDOMEN:  normal bowel sounds, soft, nontender, no hepatosplenomegaly or other masses  M/S:   no gross deformities  SKIN:  wound healing well, no signs of infection dressing with shadow blood, no discharge  NEURO:   Cranial nerves 2-12 are normal tested and grossly at patient's baseline  PSYCH:  affect and mood normal    Lab/Diagnostic data:  Recent Results (from the past 240 hour(s))   INR    Collection Time: 04/08/22  6:11 PM   Result Value Ref Range    INR 2.13 (H) 0.85 - 1.15   Comprehensive metabolic panel    Collection Time: 04/08/22  6:11 PM   Result Value Ref Range    Sodium 137 136 - 145 mmol/L    Potassium 4.2 3.5 - 5.0 mmol/L    Chloride 104 98 - 107 mmol/L    Carbon Dioxide (CO2) 18 (L) 22 - 31 mmol/L    Anion Gap 15 5 - 18 mmol/L    Urea Nitrogen 27 8 - 28 mg/dL    Creatinine 1.06 0.70 - 1.30 mg/dL    Calcium 8.5 8.5 - 10.5 mg/dL    Glucose 363 (H) 70 - 125 mg/dL    Alkaline Phosphatase 126 (H) 45 - 120 U/L    AST 45 (H) 0 - 40 U/L    ALT 45 0 - 45 U/L    Protein Total 5.9 (L) 6.0 - 8.0 g/dL    Albumin 3.2 (L) 3.5 - 5.0 g/dL    Bilirubin Total 1.0 0.0 - 1.0 mg/dL    GFR Estimate 71 >60 mL/min/1.73m2   Magnesium    Collection Time: " 04/08/22  6:11 PM   Result Value Ref Range    Magnesium 1.9 1.8 - 2.6 mg/dL   CBC (+ platelets, no diff)    Collection Time: 04/08/22  6:11 PM   Result Value Ref Range    WBC Count 11.6 (H) 4.0 - 11.0 10e3/uL    RBC Count 4.04 (L) 4.40 - 5.90 10e6/uL    Hemoglobin 13.9 13.3 - 17.7 g/dL    Hematocrit 42.6 40.0 - 53.0 %     (H) 78 - 100 fL    MCH 34.4 (H) 26.5 - 33.0 pg    MCHC 32.6 31.5 - 36.5 g/dL    RDW 13.2 10.0 - 15.0 %    Platelet Count 211 150 - 450 10e3/uL   Asymptomatic COVID-19 Virus (Coronavirus) by PCR Nasopharyngeal    Collection Time: 04/08/22  6:12 PM    Specimen: Nasopharyngeal; Swab   Result Value Ref Range    SARS CoV2 PCR Negative Negative   UA with Microscopic reflex to Culture    Collection Time: 04/08/22  7:55 PM    Specimen: Urine, Midstream   Result Value Ref Range    Color Urine Light Yellow Colorless, Straw, Light Yellow, Yellow    Appearance Urine Clear Clear    Glucose Urine >1000 (A) Negative mg/dL    Bilirubin Urine Negative Negative    Ketones Urine 20  (A) Negative mg/dL    Specific Gravity Urine 1.033 (H) 1.001 - 1.030    Blood Urine 0.06 mg/dL (A) Negative    pH Urine 5.5 5.0 - 7.0    Protein Albumin Urine 200  (A) Negative mg/dL    Urobilinogen Urine <2.0 <2.0 mg/dL    Nitrite Urine Negative Negative    Leukocyte Esterase Urine Negative Negative    RBC Urine 1 <=2 /HPF    WBC Urine 1 <=5 /HPF    Squamous Epithelials Urine <1 <=1 /HPF   Adult Type and Screen    Collection Time: 04/08/22  8:37 PM   Result Value Ref Range    ABO/RH(D) A POS     Antibody Screen Negative Negative    SPECIMEN EXPIRATION DATE [id number]    Glucose by meter    Collection Time: 04/08/22  9:13 PM   Result Value Ref Range    GLUCOSE BY METER POCT 246 (H) 70 - 99 mg/dL   Glucose by meter    Collection Time: 04/09/22  4:12 AM   Result Value Ref Range    GLUCOSE BY METER POCT 345 (H) 70 - 99 mg/dL   INR    Collection Time: 04/09/22  5:10 AM   Result Value Ref Range    INR 1.62 (H) 0.85 - 1.15   Basic  metabolic panel    Collection Time: 04/09/22  5:10 AM   Result Value Ref Range    Sodium 138 136 - 145 mmol/L    Potassium 3.9 3.5 - 5.0 mmol/L    Chloride 100 98 - 107 mmol/L    Carbon Dioxide (CO2) 25 22 - 31 mmol/L    Anion Gap 13 5 - 18 mmol/L    Urea Nitrogen 21 8 - 28 mg/dL    Creatinine 0.97 0.70 - 1.30 mg/dL    Calcium 8.8 8.5 - 10.5 mg/dL    Glucose 316 (H) 70 - 125 mg/dL    GFR Estimate 79 >60 mL/min/1.73m2   Magnesium    Collection Time: 04/09/22  5:10 AM   Result Value Ref Range    Magnesium 2.0 1.8 - 2.6 mg/dL   Phosphorus    Collection Time: 04/09/22  5:10 AM   Result Value Ref Range    Phosphorus 4.0 2.5 - 4.5 mg/dL   Glucose by meter    Collection Time: 04/09/22  8:30 AM   Result Value Ref Range    GLUCOSE BY METER POCT 237 (H) 70 - 99 mg/dL   INR    Collection Time: 04/09/22  9:24 AM   Result Value Ref Range    INR 1.40 (H) 0.85 - 1.15   Extra Red Top Tube    Collection Time: 04/09/22  9:24 AM   Result Value Ref Range    Hold Specimen JIC    Glucose by meter    Collection Time: 04/09/22  9:54 AM   Result Value Ref Range    GLUCOSE BY METER POCT 199 (H) 70 - 99 mg/dL   Glucose by meter    Collection Time: 04/09/22  2:34 PM   Result Value Ref Range    GLUCOSE BY METER POCT 211 (H) 70 - 99 mg/dL   Glucose by meter    Collection Time: 04/09/22  6:38 PM   Result Value Ref Range    GLUCOSE BY METER POCT 346 (H) 70 - 99 mg/dL   Glucose by meter    Collection Time: 04/09/22  9:13 PM   Result Value Ref Range    GLUCOSE BY METER POCT 365 (H) 70 - 99 mg/dL   Glucose by meter    Collection Time: 04/10/22 12:42 AM   Result Value Ref Range    GLUCOSE BY METER POCT 310 (H) 70 - 99 mg/dL   Glucose by meter    Collection Time: 04/10/22  3:52 AM   Result Value Ref Range    GLUCOSE BY METER POCT 213 (H) 70 - 99 mg/dL   Magnesium    Collection Time: 04/10/22  5:45 AM   Result Value Ref Range    Magnesium 1.9 1.8 - 2.6 mg/dL   Phosphorus    Collection Time: 04/10/22  5:45 AM   Result Value Ref Range    Phosphorus 2.7  2.5 - 4.5 mg/dL   INR    Collection Time: 04/10/22  5:45 AM   Result Value Ref Range    INR 1.20 (H) 0.85 - 1.15   Basic metabolic panel    Collection Time: 04/10/22  5:45 AM   Result Value Ref Range    Sodium 137 136 - 145 mmol/L    Potassium 3.8 3.5 - 5.0 mmol/L    Chloride 102 98 - 107 mmol/L    Carbon Dioxide (CO2) 28 22 - 31 mmol/L    Anion Gap 7 5 - 18 mmol/L    Urea Nitrogen 21 8 - 28 mg/dL    Creatinine 0.80 0.70 - 1.30 mg/dL    Calcium 8.3 (L) 8.5 - 10.5 mg/dL    Glucose 229 (H) 70 - 125 mg/dL    GFR Estimate 90 >60 mL/min/1.73m2   CBC with platelets and differential    Collection Time: 04/10/22  5:45 AM   Result Value Ref Range    WBC Count 8.6 4.0 - 11.0 10e3/uL    RBC Count 2.86 (L) 4.40 - 5.90 10e6/uL    Hemoglobin 9.9 (L) 13.3 - 17.7 g/dL    Hematocrit 30.0 (L) 40.0 - 53.0 %     (H) 78 - 100 fL    MCH 34.6 (H) 26.5 - 33.0 pg    MCHC 33.0 31.5 - 36.5 g/dL    RDW 13.2 10.0 - 15.0 %    Platelet Count 142 (L) 150 - 450 10e3/uL    % Neutrophils 83 %    % Lymphocytes 10 %    % Monocytes 6 %    % Eosinophils 0 %    % Basophils 0 %    % Immature Granulocytes 1 %    NRBCs per 100 WBC 0 <1 /100    Absolute Neutrophils 7.2 1.6 - 8.3 10e3/uL    Absolute Lymphocytes 0.9 0.8 - 5.3 10e3/uL    Absolute Monocytes 0.5 0.0 - 1.3 10e3/uL    Absolute Eosinophils 0.0 0.0 - 0.7 10e3/uL    Absolute Basophils 0.0 0.0 - 0.2 10e3/uL    Absolute Immature Granulocytes 0.1 <=0.4 10e3/uL    Absolute NRBCs 0.0 10e3/uL   Glucose by meter    Collection Time: 04/10/22  8:41 AM   Result Value Ref Range    GLUCOSE BY METER POCT 224 (H) 70 - 99 mg/dL   Glucose by meter    Collection Time: 04/10/22 12:17 PM   Result Value Ref Range    GLUCOSE BY METER POCT 314 (H) 70 - 99 mg/dL   Glucose by meter    Collection Time: 04/10/22  4:38 PM   Result Value Ref Range    GLUCOSE BY METER POCT 312 (H) 70 - 99 mg/dL   Glucose by meter    Collection Time: 04/10/22  8:57 PM   Result Value Ref Range    GLUCOSE BY METER POCT 409 (H) 70 - 99 mg/dL    Glucose by meter    Collection Time: 04/11/22  3:19 AM   Result Value Ref Range    GLUCOSE BY METER POCT 238 (H) 70 - 99 mg/dL   Magnesium    Collection Time: 04/11/22  5:18 AM   Result Value Ref Range    Magnesium 1.9 1.8 - 2.6 mg/dL   Phosphorus    Collection Time: 04/11/22  5:18 AM   Result Value Ref Range    Phosphorus 2.4 (L) 2.5 - 4.5 mg/dL   INR    Collection Time: 04/11/22  5:18 AM   Result Value Ref Range    INR 1.22 (H) 0.85 - 1.15   Basic metabolic panel    Collection Time: 04/11/22  5:18 AM   Result Value Ref Range    Sodium 139 136 - 145 mmol/L    Potassium 3.5 3.5 - 5.0 mmol/L    Chloride 100 98 - 107 mmol/L    Carbon Dioxide (CO2) 32 (H) 22 - 31 mmol/L    Anion Gap 7 5 - 18 mmol/L    Urea Nitrogen 17 8 - 28 mg/dL    Creatinine 0.77 0.70 - 1.30 mg/dL    Calcium 8.3 (L) 8.5 - 10.5 mg/dL    Glucose 242 (H) 70 - 125 mg/dL    GFR Estimate >90 >60 mL/min/1.73m2   CBC with platelets    Collection Time: 04/11/22  5:18 AM   Result Value Ref Range    WBC Count 7.5 4.0 - 11.0 10e3/uL    RBC Count 2.73 (L) 4.40 - 5.90 10e6/uL    Hemoglobin 9.5 (L) 13.3 - 17.7 g/dL    Hematocrit 28.8 (L) 40.0 - 53.0 %     (H) 78 - 100 fL    MCH 34.8 (H) 26.5 - 33.0 pg    MCHC 33.0 31.5 - 36.5 g/dL    RDW 13.6 10.0 - 15.0 %    Platelet Count 150 150 - 450 10e3/uL   Glucose by meter    Collection Time: 04/11/22  8:27 AM   Result Value Ref Range    GLUCOSE BY METER POCT 307 (H) 70 - 99 mg/dL   Glucose by meter    Collection Time: 04/11/22 11:25 AM   Result Value Ref Range    GLUCOSE BY METER POCT 353 (H) 70 - 99 mg/dL   Glucose by meter    Collection Time: 04/11/22  5:44 PM   Result Value Ref Range    GLUCOSE BY METER POCT 329 (H) 70 - 99 mg/dL   Glucose by meter    Collection Time: 04/11/22  6:19 PM   Result Value Ref Range    GLUCOSE BY METER POCT 297 (H) 70 - 99 mg/dL   Glucose by meter    Collection Time: 04/11/22  8:30 PM   Result Value Ref Range    GLUCOSE BY METER POCT 360 (H) 70 - 99 mg/dL   Glucose by meter     Collection Time: 04/11/22 10:58 PM   Result Value Ref Range    GLUCOSE BY METER POCT 263 (H) 70 - 99 mg/dL   Glucose by meter    Collection Time: 04/12/22  2:18 AM   Result Value Ref Range    GLUCOSE BY METER POCT 204 (H) 70 - 99 mg/dL   Magnesium    Collection Time: 04/12/22  5:07 AM   Result Value Ref Range    Magnesium 1.8 1.8 - 2.6 mg/dL   Phosphorus    Collection Time: 04/12/22  5:07 AM   Result Value Ref Range    Phosphorus 2.1 (L) 2.5 - 4.5 mg/dL   INR    Collection Time: 04/12/22  5:07 AM   Result Value Ref Range    INR 1.59 (H) 0.85 - 1.15   Basic metabolic panel    Collection Time: 04/12/22  5:07 AM   Result Value Ref Range    Sodium 137 136 - 145 mmol/L    Potassium 3.4 (L) 3.5 - 5.0 mmol/L    Chloride 96 (L) 98 - 107 mmol/L    Carbon Dioxide (CO2) 34 (H) 22 - 31 mmol/L    Anion Gap 7 5 - 18 mmol/L    Urea Nitrogen 17 8 - 28 mg/dL    Creatinine 0.71 0.70 - 1.30 mg/dL    Calcium 8.5 8.5 - 10.5 mg/dL    Glucose 268 (H) 70 - 125 mg/dL    GFR Estimate >90 >60 mL/min/1.73m2   Extra Purple Top Tube    Collection Time: 04/12/22  5:07 AM   Result Value Ref Range    Hold Specimen JI    CBC with platelets    Collection Time: 04/12/22  5:07 AM   Result Value Ref Range    WBC Count 7.2 4.0 - 11.0 10e3/uL    RBC Count 2.83 (L) 4.40 - 5.90 10e6/uL    Hemoglobin 9.9 (L) 13.3 - 17.7 g/dL    Hematocrit 29.8 (L) 40.0 - 53.0 %     (H) 78 - 100 fL    MCH 35.0 (H) 26.5 - 33.0 pg    MCHC 33.2 31.5 - 36.5 g/dL    RDW 13.8 10.0 - 15.0 %    Platelet Count 183 150 - 450 10e3/uL   Glucose by meter    Collection Time: 04/12/22  6:04 AM   Result Value Ref Range    GLUCOSE BY METER POCT 289 (H) 70 - 99 mg/dL   Glucose by meter    Collection Time: 04/12/22  7:52 AM   Result Value Ref Range    GLUCOSE BY METER POCT 263 (H) 70 - 99 mg/dL   Glucose by meter    Collection Time: 04/12/22 12:32 PM   Result Value Ref Range    GLUCOSE BY METER POCT 429 (H) 70 - 99 mg/dL   INR    Collection Time: 04/13/22  5:30 AM   Result Value Ref  Range    INR 1.79 (H) 0.85 - 1.15   INR    Collection Time: 04/14/22  6:25 AM   Result Value Ref Range    INR 1.95 (H) 0.85 - 1.15       ASSESSMENT/PLAN:    (S72.402S) Closed fracture of distal end of left femur, unspecified fracture morphology, sequela  (primary encounter diagnosis)  Plan:   -Weightbearing as tolerated, posterior hip precautions, avoid abduction 4-6 weeks  -Continue for oxycodone pain control  -Follow-up with orthopedics in 2 weeks    (S22.42XS) Closed fracture of multiple ribs of left side, sequela  Comment: Fifth, seventh, eighth anterolateral ribs  Plan:   -Pulmonary toilet, incentive spirometer every 2 hours while awake  -Continue Lidoderm patch and oxycodonefor pain management    (D62) Anemia due to blood loss, acute    Plan:   -Most likely etiology secondary to multiple fractures  -Check hemoglobin 4/18/2022  -Consider iron supplement with worsening hemoglobin    (I25.10) Coronary artery disease involving native coronary artery of native heart without angina pectoris  Plan:   -Stable, asymptomatic  -History of CABG  -Continue Plavix    (I50.42) 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 (H)  Plan:   -Compensated  -Last echo January 2022 EF 65-70%  -Continue Coreg and Lasix    (I48.20) Chronic atrial fibrillation (H)  Plan:   -Rate controlled  -Continue Coreg and Coumadin  -History of Maze procedure during CABG    (E11.3599) 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 Greater than 50% of total time spent with counseling and coordinating care due to medical complexity    Electronically signed by:  Harjit Coates CNP

## 2022-04-14 ENCOUNTER — TELEPHONE (OUTPATIENT)
Dept: GERIATRICS | Facility: CLINIC | Age: 82
End: 2022-04-14

## 2022-04-14 ENCOUNTER — LAB REQUISITION (OUTPATIENT)
Dept: LAB | Facility: CLINIC | Age: 82
End: 2022-04-14
Payer: COMMERCIAL

## 2022-04-14 ENCOUNTER — TRANSITIONAL CARE UNIT VISIT (OUTPATIENT)
Dept: GERIATRICS | Facility: CLINIC | Age: 82
End: 2022-04-14

## 2022-04-14 VITALS
OXYGEN SATURATION: 98 % | HEIGHT: 64 IN | RESPIRATION RATE: 18 BRPM | WEIGHT: 166.7 LBS | TEMPERATURE: 98.3 F | DIASTOLIC BLOOD PRESSURE: 78 MMHG | BODY MASS INDEX: 28.46 KG/M2 | SYSTOLIC BLOOD PRESSURE: 119 MMHG | HEART RATE: 90 BPM

## 2022-04-14 DIAGNOSIS — R52 PAIN: Primary | ICD-10-CM

## 2022-04-14 DIAGNOSIS — I50.42 CHRONIC COMBINED SYSTOLIC AND DIASTOLIC CONGESTIVE HEART FAILURE (H): ICD-10-CM

## 2022-04-14 DIAGNOSIS — D62 ANEMIA DUE TO BLOOD LOSS, ACUTE: ICD-10-CM

## 2022-04-14 DIAGNOSIS — I25.10 CORONARY ARTERY DISEASE INVOLVING NATIVE CORONARY ARTERY OF NATIVE HEART WITHOUT ANGINA PECTORIS: ICD-10-CM

## 2022-04-14 DIAGNOSIS — E11.3599 TYPE 2 DIABETES MELLITUS WITH PROLIFERATIVE RETINOPATHY WITHOUT MACULAR EDEMA, WITHOUT LONG-TERM CURRENT USE OF INSULIN, UNSPECIFIED LATERALITY (H): ICD-10-CM

## 2022-04-14 DIAGNOSIS — S72.002A FRACTURE OF HIP, LEFT, CLOSED, INITIAL ENCOUNTER (H): ICD-10-CM

## 2022-04-14 DIAGNOSIS — S22.42XS CLOSED FRACTURE OF MULTIPLE RIBS OF LEFT SIDE, SEQUELA: ICD-10-CM

## 2022-04-14 DIAGNOSIS — I48.20 CHRONIC ATRIAL FIBRILLATION (H): ICD-10-CM

## 2022-04-14 DIAGNOSIS — I48.20 CHRONIC ATRIAL FIBRILLATION, UNSPECIFIED (H): ICD-10-CM

## 2022-04-14 DIAGNOSIS — S72.402S CLOSED FRACTURE OF DISTAL END OF LEFT FEMUR, UNSPECIFIED FRACTURE MORPHOLOGY, SEQUELA: Primary | ICD-10-CM

## 2022-04-14 LAB — INR PPP: 1.95 (ref 0.85–1.15)

## 2022-04-14 PROCEDURE — 85610 PROTHROMBIN TIME: CPT | Performed by: NURSE PRACTITIONER

## 2022-04-14 PROCEDURE — 99310 SBSQ NF CARE HIGH MDM 45: CPT | Performed by: NURSE PRACTITIONER

## 2022-04-14 RX ORDER — OXYCODONE HYDROCHLORIDE 5 MG/1
5 TABLET ORAL EVERY 6 HOURS PRN
Qty: 26 TABLET | Refills: 0 | Status: SHIPPED | OUTPATIENT
Start: 2022-04-14 | End: 2022-04-15

## 2022-04-14 RX ORDER — OXYCODONE AND ACETAMINOPHEN 5; 325 MG/1; MG/1
TABLET ORAL
Qty: 30 TABLET | Refills: 0 | Status: SHIPPED | OUTPATIENT
Start: 2022-04-14 | End: 2022-04-15

## 2022-04-14 NOTE — LETTER
4/14/2022        RE: Pee Ayala  [address]  [place] [address]        [facility] GERIATRICS    PRIMARY CARE PROVIDER AND CLINIC:  Mar Morales DO, [address] / [facility] [address]  Chief Complaint   Patient presents with     Hospital F/U      [facility] Medical Record Number:  [id number]  Place of Service where encounter took place:  [facility]  AT [facility] ([facility]) [[id number]]    Pee Ayala  is a 81 year old  (1940), admitted to the above facility from  [facility]. Hospital stay 4/8/22 through 4/12/22..   HPI:    Pee is a 81-year-old male with a past medical history of chronic A. fib anticoagulated with Coumadin, CAD, CABG, CHF, hypertension, type 2 diabetes, and chronic back pain who was admitted to [facility] after suffering a ground-level fall.    Brief hospitalization summary  Imaging revealed an acute moderately displaced subcapital fracture of the left femur. Orthopedics was consulted and he subsequently underwent a ORIF of the left greater trochanter.  His hospitalization stay was complicated by hypoglycemia.  Oral antiglycemic's/NovoLog 70/30 was held, both Lantus and sliding scale insulin was initiated for glycemic control    Pee reported pain in his left hip. He has been taking oxycodone which helps with his pain control. Nursing without acute concerns    CODE STATUS/ADVANCE DIRECTIVES DISCUSSION:  Full Code  ALLERGIES:   Allergies   Allergen Reactions     Amlodipine Dizziness     Dizziness and dry heaves     Lisinopril Cough     Adhesive Tape Itching and Rash      PAST MEDICAL HISTORY:   Past Medical History:   Diagnosis Date     ACS (acute coronary syndrome) (H) 6/2/2014     ACS (acute coronary syndrome) (H) 6/2/2014     Actinic keratosis      Actinic keratosis 1/14/2014     Actinic keratosis 1/14/2014     Anticoagulated on Coumadin 12/30/2015     Antiplatelet or antithrombotic long-term use      Atrial fibrillation (H)       Atrial fibrillation (H) 2016     Bone mass 4/26/2017     Chest heaviness 1/23/2019     Chest pain 5/31/2014     Closed fracture of left forearm 2015     Congestive heart failure (H)      Coronary artery disease      Diabetes (H) 2009     Diabetes mellitus (H)      Difficulty in walking(719.7)      Dyslipidemia 8/31/2016     Dyspnea on exertion      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      Encounter for long-term (current) use of insulin (H) 8/11/2016     Esophageal reflux 11/18/2010     Esophageal reflux 11/18/2010     History of angina      HTN (hypertension) 6/30/2009     (Problem list name updated by automated process. Provider to review and confirm.)     Hyperlipidemia LDL goal <100 10/31/2010     Hypertension      Impotence of organic origin      Mixed hyperlipidemia 4/25/2007 April 25, 2007 restarted Zocor today, recheck in 3 months.  August 23, 2007 LDL at 101 with 40 mg, will increase to 80 mg. Recheck  In 3 months.      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      Type II or unspecified type diabetes mellitus with neurological manifestations, not stated as uncontrolled(250.60) (H)      Walking troubles       PAST SURGICAL HISTORY:   has a past surgical history that includes colonoscopy (1/14/2004); Laparoscopic cholecystectomy (10/09);  Vasectomy; Coronary Angiography Adult Order; Maze procedure (N/A, 9/10/2014); Esophagoscopy, gastroscopy, duodenoscopy (EGD), combined (N/A, 1/13/2016); orthopedic surgery; Bypass graft artery coronary (N/A, 9/10/2014); stent, coronary, laurent (02/2016); Transcatheter Aortic Valve Replacement (N/A, 1/12/2021); Heart Cath Femoral Cannulization With Open Standby Repair Aortic Valve (N/A, 1/12/2021); Bypass graft artery coronary (2016); Mohs micrographic procedure; Cv Coronary Angiogram (N/A, 4/4/2019); Cv Right Heart Catheterization (Right, 4/4/2019); Cv Left Heart Catheterization Without Left Ventriculogram (Left, 4/4/2019); other surgical history (Left, 2015); Cv Coronary Angiogram (N/A, 1/6/2021); Cv Left Heart Catheterization Without Left Ventriculogram (Left, 1/6/2021); IR Lower Extremity Angiogram Left (12/7/2021); and Open reduction internal fixation hip bipolar (Left, 4/9/2022).  FAMILY HISTORY: family history includes Cerebrovascular Disease in his father; Diabetes in his maternal grandmother and mother; Diabetes Type 2  in his mother; Heart Disease in his father; Hypertension in his father; No Known Problems in his brother.  SOCIAL HISTORY:   reports that he quit smoking about 24 years ago. He has never used smokeless tobacco. He reports current alcohol use. He reports that he does not use drugs.    Post Discharge Medication Reconciliation Status: discharge medications reconciled, continue medications without change  Current Outpatient Medications   Medication Sig     ACCU-CHEK GUIDE test strip USE 1  TO CHECK GLUCOSE THREE TIMES DAILY     acetaminophen (TYLENOL) 325 MG tablet Take 3 tablets (975 mg) by mouth in the morning and 3 tablets (975 mg) at noon and 3 tablets (975 mg) in the evening.     Apoaequorin (PREVAGEN PO) Take 1 tablet by mouth daily      blood glucose monitor KIT 1 kit 2 times daily.     carvedilol (COREG) 6.25 MG tablet Take 6.25 mg by mouth 2 times daily (with meals)     clopidogrel (PLAVIX)  75 MG tablet Take 1 tablet (75 mg) by mouth daily Start taking medication the day after the procedure.     clotrimazole (LOTRIMIN) 1 % external cream Apply to feet twice daily until 1 week after clinical resolution, typically for 4 weeks total     empagliflozin (JARDIANCE) 10 MG TABS tablet Take 1 tablet (10 mg) by mouth daily     finasteride (PROSCAR) 5 MG tablet Take 1 tablet (5 mg) by mouth daily     furosemide (LASIX) 20 MG tablet TAKE 2 TABLETS BY MOUTH IN THE MORNING AND 1 TABLET IN THE AFTERNOON     gabapentin (NEURONTIN) 100 MG capsule Take 1 capsule (100 mg) by mouth At Bedtime     hydrOXYzine (ATARAX) 10 MG tablet Take 1 tablet (10 mg) by mouth every 6 hours as needed for other (adjuvant pain)     insulin aspart prot & aspart (NOVOLOG MIX 70/30 PEN) (70-30) 100 UNIT/ML pen Inject Subcutaneous 2 times daily (with meals) 33 in AM and 26 in PM     Lidocaine (LIDOCARE) 4 % Patch Place 1 patch onto the skin every 24 hours To prevent lidocaine toxicity, patient should be patch free for 12 hrs daily.     metFORMIN (GLUCOPHAGE) 500 MG tablet Take 2 tablets by mouth twice daily     oxyCODONE (ROXICODONE) 5 MG tablet Take 1 tablet (5 mg) by mouth every 6 hours as needed for pain     oxyCODONE-acetaminophen (PERCOCET) 5-325 MG tablet Take 1 tablet by mouth 3 times daily as needed for severe pain     polyethylene glycol (MIRALAX) 17 GM/Dose powder Take 17 g (1 capful) by mouth daily as needed for constipation (opioid induced constipation)     pramipexole (MIRAPEX) 0.25 MG tablet Take 1 tablet (0.25 mg) by mouth 2 times daily Takes 4pm and 5;30 pm     senna-docusate (SENOKOT-S/PERICOLACE) 8.6-50 MG tablet Take 1 tablet by mouth in the morning and 1 tablet in the evening.     warfarin ANTICOAGULANT (COUMADIN) 5 MG tablet Take 1 tablet (5 mg) by mouth daily Resume your warfarin tonight 12/7 after the procedure at the usual dose (Patient taking differently: 7.5 mg Tues, Thurs, Sat, 5 mg all other days)     No current  "facility-administered medications for this visit.       ROS:  10 point ROS of systems including Constitutional, Eyes, Respiratory, Cardiovascular, Gastroenterology, Genitourinary, Integumentary, Musculoskeletal, Psychiatric were all negative except for pertinent positives noted in my HPI.    Vitals:  /78   Pulse 90   Temp 98.3  F (36.8  C)   Resp 18   Ht 1.626 m (5' 4\")   Wt 75.6 kg (166 lb 11.2 oz)   SpO2 98%   BMI 28.61 kg/m      Exam:  GENERAL APPEARANCE:  Alert, in no distress  ENT:  Mouth and posterior oropharynx normal, moist mucous membranes  EYES:  EOM, conjunctivae, lids, pupils and irises normal, EOM normal, conjunctiva and lids normal  RESP:  respiratory effort and palpation of chest normal, lungs clear to auscultation , no respiratory distress  CV:  Palpation and auscultation of heart done , regular rate and rhythm, no murmur, rub, or gallop  ABDOMEN:  normal bowel sounds, soft, nontender, no hepatosplenomegaly or other masses  M/S:   no gross deformities  SKIN:  wound healing well, no signs of infection dressing with shadow blood, no discharge  NEURO:   Cranial nerves 2-12 are normal tested and grossly at patient's baseline  PSYCH:  affect and mood normal    Lab/Diagnostic data:  Recent Results (from the past 240 hour(s))   INR    Collection Time: 04/08/22  6:11 PM   Result Value Ref Range    INR 2.13 (H) 0.85 - 1.15   Comprehensive metabolic panel    Collection Time: 04/08/22  6:11 PM   Result Value Ref Range    Sodium 137 136 - 145 mmol/L    Potassium 4.2 3.5 - 5.0 mmol/L    Chloride 104 98 - 107 mmol/L    Carbon Dioxide (CO2) 18 (L) 22 - 31 mmol/L    Anion Gap 15 5 - 18 mmol/L    Urea Nitrogen 27 8 - 28 mg/dL    Creatinine 1.06 0.70 - 1.30 mg/dL    Calcium 8.5 8.5 - 10.5 mg/dL    Glucose 363 (H) 70 - 125 mg/dL    Alkaline Phosphatase 126 (H) 45 - 120 U/L    AST 45 (H) 0 - 40 U/L    ALT 45 0 - 45 U/L    Protein Total 5.9 (L) 6.0 - 8.0 g/dL    Albumin 3.2 (L) 3.5 - 5.0 g/dL    Bilirubin " Total 1.0 0.0 - 1.0 mg/dL    GFR Estimate 71 >60 mL/min/1.73m2   Magnesium    Collection Time: 04/08/22  6:11 PM   Result Value Ref Range    Magnesium 1.9 1.8 - 2.6 mg/dL   CBC (+ platelets, no diff)    Collection Time: 04/08/22  6:11 PM   Result Value Ref Range    WBC Count 11.6 (H) 4.0 - 11.0 10e3/uL    RBC Count 4.04 (L) 4.40 - 5.90 10e6/uL    Hemoglobin 13.9 13.3 - 17.7 g/dL    Hematocrit 42.6 40.0 - 53.0 %     (H) 78 - 100 fL    MCH 34.4 (H) 26.5 - 33.0 pg    MCHC 32.6 31.5 - 36.5 g/dL    RDW 13.2 10.0 - 15.0 %    Platelet Count 211 150 - 450 10e3/uL   Asymptomatic COVID-19 Virus (Coronavirus) by PCR Nasopharyngeal    Collection Time: 04/08/22  6:12 PM    Specimen: Nasopharyngeal; Swab   Result Value Ref Range    SARS CoV2 PCR Negative Negative   UA with Microscopic reflex to Culture    Collection Time: 04/08/22  7:55 PM    Specimen: Urine, Midstream   Result Value Ref Range    Color Urine Light Yellow Colorless, Straw, Light Yellow, Yellow    Appearance Urine Clear Clear    Glucose Urine >1000 (A) Negative mg/dL    Bilirubin Urine Negative Negative    Ketones Urine 20  (A) Negative mg/dL    Specific Gravity Urine 1.033 (H) 1.001 - 1.030    Blood Urine 0.06 mg/dL (A) Negative    pH Urine 5.5 5.0 - 7.0    Protein Albumin Urine 200  (A) Negative mg/dL    Urobilinogen Urine <2.0 <2.0 mg/dL    Nitrite Urine Negative Negative    Leukocyte Esterase Urine Negative Negative    RBC Urine 1 <=2 /HPF    WBC Urine 1 <=5 /HPF    Squamous Epithelials Urine <1 <=1 /HPF   Adult Type and Screen    Collection Time: 04/08/22  8:37 PM   Result Value Ref Range    ABO/RH(D) A POS     Antibody Screen Negative Negative    SPECIMEN EXPIRATION DATE [id number]    Glucose by meter    Collection Time: 04/08/22  9:13 PM   Result Value Ref Range    GLUCOSE BY METER POCT 246 (H) 70 - 99 mg/dL   Glucose by meter    Collection Time: 04/09/22  4:12 AM   Result Value Ref Range    GLUCOSE BY METER POCT 345 (H) 70 - 99 mg/dL   INR     Collection Time: 04/09/22  5:10 AM   Result Value Ref Range    INR 1.62 (H) 0.85 - 1.15   Basic metabolic panel    Collection Time: 04/09/22  5:10 AM   Result Value Ref Range    Sodium 138 136 - 145 mmol/L    Potassium 3.9 3.5 - 5.0 mmol/L    Chloride 100 98 - 107 mmol/L    Carbon Dioxide (CO2) 25 22 - 31 mmol/L    Anion Gap 13 5 - 18 mmol/L    Urea Nitrogen 21 8 - 28 mg/dL    Creatinine 0.97 0.70 - 1.30 mg/dL    Calcium 8.8 8.5 - 10.5 mg/dL    Glucose 316 (H) 70 - 125 mg/dL    GFR Estimate 79 >60 mL/min/1.73m2   Magnesium    Collection Time: 04/09/22  5:10 AM   Result Value Ref Range    Magnesium 2.0 1.8 - 2.6 mg/dL   Phosphorus    Collection Time: 04/09/22  5:10 AM   Result Value Ref Range    Phosphorus 4.0 2.5 - 4.5 mg/dL   Glucose by meter    Collection Time: 04/09/22  8:30 AM   Result Value Ref Range    GLUCOSE BY METER POCT 237 (H) 70 - 99 mg/dL   INR    Collection Time: 04/09/22  9:24 AM   Result Value Ref Range    INR 1.40 (H) 0.85 - 1.15   Extra Red Top Tube    Collection Time: 04/09/22  9:24 AM   Result Value Ref Range    Hold Specimen JIC    Glucose by meter    Collection Time: 04/09/22  9:54 AM   Result Value Ref Range    GLUCOSE BY METER POCT 199 (H) 70 - 99 mg/dL   Glucose by meter    Collection Time: 04/09/22  2:34 PM   Result Value Ref Range    GLUCOSE BY METER POCT 211 (H) 70 - 99 mg/dL   Glucose by meter    Collection Time: 04/09/22  6:38 PM   Result Value Ref Range    GLUCOSE BY METER POCT 346 (H) 70 - 99 mg/dL   Glucose by meter    Collection Time: 04/09/22  9:13 PM   Result Value Ref Range    GLUCOSE BY METER POCT 365 (H) 70 - 99 mg/dL   Glucose by meter    Collection Time: 04/10/22 12:42 AM   Result Value Ref Range    GLUCOSE BY METER POCT 310 (H) 70 - 99 mg/dL   Glucose by meter    Collection Time: 04/10/22  3:52 AM   Result Value Ref Range    GLUCOSE BY METER POCT 213 (H) 70 - 99 mg/dL   Magnesium    Collection Time: 04/10/22  5:45 AM   Result Value Ref Range    Magnesium 1.9 1.8 - 2.6 mg/dL    Phosphorus    Collection Time: 04/10/22  5:45 AM   Result Value Ref Range    Phosphorus 2.7 2.5 - 4.5 mg/dL   INR    Collection Time: 04/10/22  5:45 AM   Result Value Ref Range    INR 1.20 (H) 0.85 - 1.15   Basic metabolic panel    Collection Time: 04/10/22  5:45 AM   Result Value Ref Range    Sodium 137 136 - 145 mmol/L    Potassium 3.8 3.5 - 5.0 mmol/L    Chloride 102 98 - 107 mmol/L    Carbon Dioxide (CO2) 28 22 - 31 mmol/L    Anion Gap 7 5 - 18 mmol/L    Urea Nitrogen 21 8 - 28 mg/dL    Creatinine 0.80 0.70 - 1.30 mg/dL    Calcium 8.3 (L) 8.5 - 10.5 mg/dL    Glucose 229 (H) 70 - 125 mg/dL    GFR Estimate 90 >60 mL/min/1.73m2   CBC with platelets and differential    Collection Time: 04/10/22  5:45 AM   Result Value Ref Range    WBC Count 8.6 4.0 - 11.0 10e3/uL    RBC Count 2.86 (L) 4.40 - 5.90 10e6/uL    Hemoglobin 9.9 (L) 13.3 - 17.7 g/dL    Hematocrit 30.0 (L) 40.0 - 53.0 %     (H) 78 - 100 fL    MCH 34.6 (H) 26.5 - 33.0 pg    MCHC 33.0 31.5 - 36.5 g/dL    RDW 13.2 10.0 - 15.0 %    Platelet Count 142 (L) 150 - 450 10e3/uL    % Neutrophils 83 %    % Lymphocytes 10 %    % Monocytes 6 %    % Eosinophils 0 %    % Basophils 0 %    % Immature Granulocytes 1 %    NRBCs per 100 WBC 0 <1 /100    Absolute Neutrophils 7.2 1.6 - 8.3 10e3/uL    Absolute Lymphocytes 0.9 0.8 - 5.3 10e3/uL    Absolute Monocytes 0.5 0.0 - 1.3 10e3/uL    Absolute Eosinophils 0.0 0.0 - 0.7 10e3/uL    Absolute Basophils 0.0 0.0 - 0.2 10e3/uL    Absolute Immature Granulocytes 0.1 <=0.4 10e3/uL    Absolute NRBCs 0.0 10e3/uL   Glucose by meter    Collection Time: 04/10/22  8:41 AM   Result Value Ref Range    GLUCOSE BY METER POCT 224 (H) 70 - 99 mg/dL   Glucose by meter    Collection Time: 04/10/22 12:17 PM   Result Value Ref Range    GLUCOSE BY METER POCT 314 (H) 70 - 99 mg/dL   Glucose by meter    Collection Time: 04/10/22  4:38 PM   Result Value Ref Range    GLUCOSE BY METER POCT 312 (H) 70 - 99 mg/dL   Glucose by meter    Collection  Time: 04/10/22  8:57 PM   Result Value Ref Range    GLUCOSE BY METER POCT 409 (H) 70 - 99 mg/dL   Glucose by meter    Collection Time: 04/11/22  3:19 AM   Result Value Ref Range    GLUCOSE BY METER POCT 238 (H) 70 - 99 mg/dL   Magnesium    Collection Time: 04/11/22  5:18 AM   Result Value Ref Range    Magnesium 1.9 1.8 - 2.6 mg/dL   Phosphorus    Collection Time: 04/11/22  5:18 AM   Result Value Ref Range    Phosphorus 2.4 (L) 2.5 - 4.5 mg/dL   INR    Collection Time: 04/11/22  5:18 AM   Result Value Ref Range    INR 1.22 (H) 0.85 - 1.15   Basic metabolic panel    Collection Time: 04/11/22  5:18 AM   Result Value Ref Range    Sodium 139 136 - 145 mmol/L    Potassium 3.5 3.5 - 5.0 mmol/L    Chloride 100 98 - 107 mmol/L    Carbon Dioxide (CO2) 32 (H) 22 - 31 mmol/L    Anion Gap 7 5 - 18 mmol/L    Urea Nitrogen 17 8 - 28 mg/dL    Creatinine 0.77 0.70 - 1.30 mg/dL    Calcium 8.3 (L) 8.5 - 10.5 mg/dL    Glucose 242 (H) 70 - 125 mg/dL    GFR Estimate >90 >60 mL/min/1.73m2   CBC with platelets    Collection Time: 04/11/22  5:18 AM   Result Value Ref Range    WBC Count 7.5 4.0 - 11.0 10e3/uL    RBC Count 2.73 (L) 4.40 - 5.90 10e6/uL    Hemoglobin 9.5 (L) 13.3 - 17.7 g/dL    Hematocrit 28.8 (L) 40.0 - 53.0 %     (H) 78 - 100 fL    MCH 34.8 (H) 26.5 - 33.0 pg    MCHC 33.0 31.5 - 36.5 g/dL    RDW 13.6 10.0 - 15.0 %    Platelet Count 150 150 - 450 10e3/uL   Glucose by meter    Collection Time: 04/11/22  8:27 AM   Result Value Ref Range    GLUCOSE BY METER POCT 307 (H) 70 - 99 mg/dL   Glucose by meter    Collection Time: 04/11/22 11:25 AM   Result Value Ref Range    GLUCOSE BY METER POCT 353 (H) 70 - 99 mg/dL   Glucose by meter    Collection Time: 04/11/22  5:44 PM   Result Value Ref Range    GLUCOSE BY METER POCT 329 (H) 70 - 99 mg/dL   Glucose by meter    Collection Time: 04/11/22  6:19 PM   Result Value Ref Range    GLUCOSE BY METER POCT 297 (H) 70 - 99 mg/dL   Glucose by meter    Collection Time: 04/11/22  8:30 PM    Result Value Ref Range    GLUCOSE BY METER POCT 360 (H) 70 - 99 mg/dL   Glucose by meter    Collection Time: 04/11/22 10:58 PM   Result Value Ref Range    GLUCOSE BY METER POCT 263 (H) 70 - 99 mg/dL   Glucose by meter    Collection Time: 04/12/22  2:18 AM   Result Value Ref Range    GLUCOSE BY METER POCT 204 (H) 70 - 99 mg/dL   Magnesium    Collection Time: 04/12/22  5:07 AM   Result Value Ref Range    Magnesium 1.8 1.8 - 2.6 mg/dL   Phosphorus    Collection Time: 04/12/22  5:07 AM   Result Value Ref Range    Phosphorus 2.1 (L) 2.5 - 4.5 mg/dL   INR    Collection Time: 04/12/22  5:07 AM   Result Value Ref Range    INR 1.59 (H) 0.85 - 1.15   Basic metabolic panel    Collection Time: 04/12/22  5:07 AM   Result Value Ref Range    Sodium 137 136 - 145 mmol/L    Potassium 3.4 (L) 3.5 - 5.0 mmol/L    Chloride 96 (L) 98 - 107 mmol/L    Carbon Dioxide (CO2) 34 (H) 22 - 31 mmol/L    Anion Gap 7 5 - 18 mmol/L    Urea Nitrogen 17 8 - 28 mg/dL    Creatinine 0.71 0.70 - 1.30 mg/dL    Calcium 8.5 8.5 - 10.5 mg/dL    Glucose 268 (H) 70 - 125 mg/dL    GFR Estimate >90 >60 mL/min/1.73m2   Extra Purple Top Tube    Collection Time: 04/12/22  5:07 AM   Result Value Ref Range    Hold Specimen JI    CBC with platelets    Collection Time: 04/12/22  5:07 AM   Result Value Ref Range    WBC Count 7.2 4.0 - 11.0 10e3/uL    RBC Count 2.83 (L) 4.40 - 5.90 10e6/uL    Hemoglobin 9.9 (L) 13.3 - 17.7 g/dL    Hematocrit 29.8 (L) 40.0 - 53.0 %     (H) 78 - 100 fL    MCH 35.0 (H) 26.5 - 33.0 pg    MCHC 33.2 31.5 - 36.5 g/dL    RDW 13.8 10.0 - 15.0 %    Platelet Count 183 150 - 450 10e3/uL   Glucose by meter    Collection Time: 04/12/22  6:04 AM   Result Value Ref Range    GLUCOSE BY METER POCT 289 (H) 70 - 99 mg/dL   Glucose by meter    Collection Time: 04/12/22  7:52 AM   Result Value Ref Range    GLUCOSE BY METER POCT 263 (H) 70 - 99 mg/dL   Glucose by meter    Collection Time: 04/12/22 12:32 PM   Result Value Ref Range    GLUCOSE BY METER  POCT 429 (H) 70 - 99 mg/dL   INR    Collection Time: 04/13/22  5:30 AM   Result Value Ref Range    INR 1.79 (H) 0.85 - 1.15   INR    Collection Time: 04/14/22  6:25 AM   Result Value Ref Range    INR 1.95 (H) 0.85 - 1.15       ASSESSMENT/PLAN:    (S72.402S) Closed fracture of distal end of left femur, unspecified fracture morphology, sequela  (primary encounter diagnosis)  Plan:   -Weightbearing as tolerated, posterior hip precautions, avoid abduction 4-6 weeks  -Continue for oxycodone pain control  -Follow-up with orthopedics in 2 weeks    (S22.42XS) Closed fracture of multiple ribs of left side, sequela  Comment: Fifth, seventh, eighth anterolateral ribs  Plan:   -Pulmonary toilet, incentive spirometer every 2 hours while awake  -Continue Lidoderm patch and oxycodonefor pain management    (D62) Anemia due to blood loss, acute    Plan:   -Most likely etiology secondary to multiple fractures  -Check hemoglobin 4/18/2022  -Consider iron supplement with worsening hemoglobin    (I25.10) Coronary artery disease involving native coronary artery of native heart without angina pectoris  Plan:   -Stable, asymptomatic  -History of CABG  -Continue Plavix    (I50.42) 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 (H)  Plan:   -Compensated  -Last echo January 2022 EF 65-70%  -Continue Coreg and Lasix    (I48.20) Chronic atrial fibrillation (H)  Plan:   -Rate controlled  -Continue Coreg and Coumadin  -History of Maze procedure during CABG    (E11.3599) 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 Greater than 50% of total time spent with counseling and coordinating care due to medical complexity    Electronically signed by:  Harjit Coates CNP                        Sincerely,        Harjit Coates CNP

## 2022-04-14 NOTE — TELEPHONE ENCOUNTER
[facility] Geriatrics Triage Nurse INR     Provider: BOBBY Healy CNP  Facility: [facility]  Facility Type:  TCU    Caller: Rachel  Call Back Number: [phone]  Reason for call: INR  Diagnosis/Goal: A. Fib    Todays INR: 1.95  Last INR   4/13 1.79 5mg  4/12 1.59 5mg  4/11 1.22 7.5mg    Heparin/Lovenox:  No  Currently on ABX?: No  Other interacting medication:  None  Missed or refused doses: No    Verbal Order/Direction given by Provider: Coumadin 5mg tonight. Recheck INR tomorrow 4/15/22.    Provider Giving Order:  BOBBY Healy CNP    Verbal Order given to: Rachel Camarena RN

## 2022-04-15 ENCOUNTER — DOCUMENTATION ONLY (OUTPATIENT)
Dept: GERIATRICS | Facility: CLINIC | Age: 82
End: 2022-04-15
Payer: COMMERCIAL

## 2022-04-15 ENCOUNTER — LAB REQUISITION (OUTPATIENT)
Dept: LAB | Facility: CLINIC | Age: 82
End: 2022-04-15

## 2022-04-15 DIAGNOSIS — D64.9 ANEMIA, UNSPECIFIED: ICD-10-CM

## 2022-04-15 DIAGNOSIS — G62.9 POLYNEUROPATHY: Primary | ICD-10-CM

## 2022-04-15 LAB
ERYTHROCYTE [DISTWIDTH] IN BLOOD BY AUTOMATED COUNT: 14.6 % (ref 10–15)
HCT VFR BLD AUTO: 33.9 % (ref 40–53)
HGB BLD-MCNC: 11.2 G/DL (ref 13.3–17.7)
MCH RBC QN AUTO: 35 PG (ref 26.5–33)
MCHC RBC AUTO-ENTMCNC: 33 G/DL (ref 31.5–36.5)
MCV RBC AUTO: 106 FL (ref 78–100)
PLATELET # BLD AUTO: 252 10E3/UL (ref 150–450)
RBC # BLD AUTO: 3.2 10E6/UL (ref 4.4–5.9)
WBC # BLD AUTO: 8.9 10E3/UL (ref 4–11)

## 2022-04-15 PROCEDURE — 85027 COMPLETE CBC AUTOMATED: CPT | Performed by: NURSE PRACTITIONER

## 2022-04-15 RX ORDER — OXYCODONE AND ACETAMINOPHEN 5; 325 MG/1; MG/1
TABLET ORAL
Qty: 60 TABLET | Refills: 0 | Status: SHIPPED | OUTPATIENT
Start: 2022-04-15 | End: 2022-04-19 | Stop reason: DRUGHIGH

## 2022-04-15 RX ORDER — OXYCODONE AND ACETAMINOPHEN 5; 325 MG/1; MG/1
1 TABLET ORAL
COMMUNITY
Start: 2022-04-15 | End: 2022-04-15

## 2022-04-18 ENCOUNTER — TRANSFERRED RECORDS (OUTPATIENT)
Dept: HEALTH INFORMATION MANAGEMENT | Facility: CLINIC | Age: 82
End: 2022-04-18
Payer: COMMERCIAL

## 2022-04-18 ENCOUNTER — LAB REQUISITION (OUTPATIENT)
Dept: LAB | Facility: CLINIC | Age: 82
End: 2022-04-18

## 2022-04-18 ENCOUNTER — TELEPHONE (OUTPATIENT)
Dept: GERIATRICS | Facility: CLINIC | Age: 82
End: 2022-04-18
Payer: COMMERCIAL

## 2022-04-18 DIAGNOSIS — D64.9 ANEMIA, UNSPECIFIED: ICD-10-CM

## 2022-04-18 LAB
ANION GAP SERPL CALCULATED.3IONS-SCNC: 14 MMOL/L (ref 5–18)
BUN SERPL-MCNC: 19 MG/DL (ref 8–28)
CALCIUM SERPL-MCNC: 8.1 MG/DL (ref 8.5–10.5)
CHLORIDE BLD-SCNC: 101 MMOL/L (ref 98–107)
CO2 SERPL-SCNC: 25 MMOL/L (ref 22–31)
CREAT SERPL-MCNC: 0.7 MG/DL (ref 0.7–1.3)
GFR SERPL CREATININE-BSD FRML MDRD: >90 ML/MIN/1.73M2
GLUCOSE BLD-MCNC: 128 MG/DL (ref 70–125)
HGB BLD-MCNC: 11.2 G/DL (ref 13.3–17.7)
INR PPP: 3.03 (ref 0.85–1.15)
POTASSIUM BLD-SCNC: 4.3 MMOL/L (ref 3.5–5)
SODIUM SERPL-SCNC: 140 MMOL/L (ref 136–145)

## 2022-04-18 PROCEDURE — 85610 PROTHROMBIN TIME: CPT | Performed by: NURSE PRACTITIONER

## 2022-04-18 PROCEDURE — 80048 BASIC METABOLIC PNL TOTAL CA: CPT | Performed by: NURSE PRACTITIONER

## 2022-04-18 PROCEDURE — 85018 HEMOGLOBIN: CPT | Performed by: NURSE PRACTITIONER

## 2022-04-18 NOTE — PROGRESS NOTES
"[facility] GERIATRICS    Chief Complaint   Patient presents with     RECHECK     HPI:  Pee Ayala is a 82 year old  (1940), who is being seen today for an episodic care visit at: [facility] AT [facility] [[id number]].     Imaging revealed an acute moderately displaced subcapital fracture of the left femur. Orthopedics was consulted and he subsequently underwent a ORIF of the left greater trochanter.  His hospitalization stay was complicated by hypoglycemia.  Oral antiglycemic's/NovoLog 70/30 was held, both Lantus and sliding scale insulin was initiated for glycemic control     Pee and his daughter reported his back and hip pain is uncontrolled. His back pain is the most bothersome today. No reported radiation of pain. His pain is hindering his progression with therapies.    Allergies, and PMH/PSH reviewed in [facility] today.  REVIEW OF SYSTEMS:  4 point ROS including Respiratory, CV, GI and , other than that noted in the HPI,  is negative    Objective:   /71   Pulse 81   Temp 98.1  F (36.7  C)   Resp 16   Ht 1.626 m (5' 4\")   Wt 72.8 kg (160 lb 6.4 oz)   SpO2 97%   BMI 27.53 kg/m    GENERAL APPEARANCE:  Alert, in no distress  ENT:  Mouth and posterior oropharynx normal, moist mucous membranes  EYES:  EOM normal, conjunctiva and lids normal  RESP:  respiratory effort and palpation of chest normal, lungs clear to auscultation , no respiratory distress  CV:  Palpation and auscultation of heart done , regular rate and rhythm, no murmur, rub, or gallop, Irregularly irregular, S1-S2, monitor heart sounds  ABDOMEN:  no guarding or rebound, bowel sounds normal  M/S:   no gross deformities  SKIN:  No rash, warm and dry  NEURO:   Alert, clear speech, no focal deficits  PSYCH:  oriented X 3, affect and mood normal    Recent Results (from the past 240 hour(s))   Glucose by meter    Collection Time: 04/09/22  2:34 PM   Result Value Ref Range    GLUCOSE BY METER POCT 211 (H) 70 - 99 mg/dL   Glucose " by meter    Collection Time: 04/09/22  6:38 PM   Result Value Ref Range    GLUCOSE BY METER POCT 346 (H) 70 - 99 mg/dL   Glucose by meter    Collection Time: 04/09/22  9:13 PM   Result Value Ref Range    GLUCOSE BY METER POCT 365 (H) 70 - 99 mg/dL   Glucose by meter    Collection Time: 04/10/22 12:42 AM   Result Value Ref Range    GLUCOSE BY METER POCT 310 (H) 70 - 99 mg/dL   Glucose by meter    Collection Time: 04/10/22  3:52 AM   Result Value Ref Range    GLUCOSE BY METER POCT 213 (H) 70 - 99 mg/dL   Magnesium    Collection Time: 04/10/22  5:45 AM   Result Value Ref Range    Magnesium 1.9 1.8 - 2.6 mg/dL   Phosphorus    Collection Time: 04/10/22  5:45 AM   Result Value Ref Range    Phosphorus 2.7 2.5 - 4.5 mg/dL   INR    Collection Time: 04/10/22  5:45 AM   Result Value Ref Range    INR 1.20 (H) 0.85 - 1.15   Basic metabolic panel    Collection Time: 04/10/22  5:45 AM   Result Value Ref Range    Sodium 137 136 - 145 mmol/L    Potassium 3.8 3.5 - 5.0 mmol/L    Chloride 102 98 - 107 mmol/L    Carbon Dioxide (CO2) 28 22 - 31 mmol/L    Anion Gap 7 5 - 18 mmol/L    Urea Nitrogen 21 8 - 28 mg/dL    Creatinine 0.80 0.70 - 1.30 mg/dL    Calcium 8.3 (L) 8.5 - 10.5 mg/dL    Glucose 229 (H) 70 - 125 mg/dL    GFR Estimate 90 >60 mL/min/1.73m2   CBC with platelets and differential    Collection Time: 04/10/22  5:45 AM   Result Value Ref Range    WBC Count 8.6 4.0 - 11.0 10e3/uL    RBC Count 2.86 (L) 4.40 - 5.90 10e6/uL    Hemoglobin 9.9 (L) 13.3 - 17.7 g/dL    Hematocrit 30.0 (L) 40.0 - 53.0 %     (H) 78 - 100 fL    MCH 34.6 (H) 26.5 - 33.0 pg    MCHC 33.0 31.5 - 36.5 g/dL    RDW 13.2 10.0 - 15.0 %    Platelet Count 142 (L) 150 - 450 10e3/uL    % Neutrophils 83 %    % Lymphocytes 10 %    % Monocytes 6 %    % Eosinophils 0 %    % Basophils 0 %    % Immature Granulocytes 1 %    NRBCs per 100 WBC 0 <1 /100    Absolute Neutrophils 7.2 1.6 - 8.3 10e3/uL    Absolute Lymphocytes 0.9 0.8 - 5.3 10e3/uL    Absolute Monocytes 0.5  0.0 - 1.3 10e3/uL    Absolute Eosinophils 0.0 0.0 - 0.7 10e3/uL    Absolute Basophils 0.0 0.0 - 0.2 10e3/uL    Absolute Immature Granulocytes 0.1 <=0.4 10e3/uL    Absolute NRBCs 0.0 10e3/uL   Glucose by meter    Collection Time: 04/10/22  8:41 AM   Result Value Ref Range    GLUCOSE BY METER POCT 224 (H) 70 - 99 mg/dL   Glucose by meter    Collection Time: 04/10/22 12:17 PM   Result Value Ref Range    GLUCOSE BY METER POCT 314 (H) 70 - 99 mg/dL   Glucose by meter    Collection Time: 04/10/22  4:38 PM   Result Value Ref Range    GLUCOSE BY METER POCT 312 (H) 70 - 99 mg/dL   Glucose by meter    Collection Time: 04/10/22  8:57 PM   Result Value Ref Range    GLUCOSE BY METER POCT 409 (H) 70 - 99 mg/dL   Glucose by meter    Collection Time: 04/11/22  3:19 AM   Result Value Ref Range    GLUCOSE BY METER POCT 238 (H) 70 - 99 mg/dL   Magnesium    Collection Time: 04/11/22  5:18 AM   Result Value Ref Range    Magnesium 1.9 1.8 - 2.6 mg/dL   Phosphorus    Collection Time: 04/11/22  5:18 AM   Result Value Ref Range    Phosphorus 2.4 (L) 2.5 - 4.5 mg/dL   INR    Collection Time: 04/11/22  5:18 AM   Result Value Ref Range    INR 1.22 (H) 0.85 - 1.15   Basic metabolic panel    Collection Time: 04/11/22  5:18 AM   Result Value Ref Range    Sodium 139 136 - 145 mmol/L    Potassium 3.5 3.5 - 5.0 mmol/L    Chloride 100 98 - 107 mmol/L    Carbon Dioxide (CO2) 32 (H) 22 - 31 mmol/L    Anion Gap 7 5 - 18 mmol/L    Urea Nitrogen 17 8 - 28 mg/dL    Creatinine 0.77 0.70 - 1.30 mg/dL    Calcium 8.3 (L) 8.5 - 10.5 mg/dL    Glucose 242 (H) 70 - 125 mg/dL    GFR Estimate >90 >60 mL/min/1.73m2   CBC with platelets    Collection Time: 04/11/22  5:18 AM   Result Value Ref Range    WBC Count 7.5 4.0 - 11.0 10e3/uL    RBC Count 2.73 (L) 4.40 - 5.90 10e6/uL    Hemoglobin 9.5 (L) 13.3 - 17.7 g/dL    Hematocrit 28.8 (L) 40.0 - 53.0 %     (H) 78 - 100 fL    MCH 34.8 (H) 26.5 - 33.0 pg    MCHC 33.0 31.5 - 36.5 g/dL    RDW 13.6 10.0 - 15.0 %     Platelet Count 150 150 - 450 10e3/uL   Glucose by meter    Collection Time: 04/11/22  8:27 AM   Result Value Ref Range    GLUCOSE BY METER POCT 307 (H) 70 - 99 mg/dL   Glucose by meter    Collection Time: 04/11/22 11:25 AM   Result Value Ref Range    GLUCOSE BY METER POCT 353 (H) 70 - 99 mg/dL   Glucose by meter    Collection Time: 04/11/22  5:44 PM   Result Value Ref Range    GLUCOSE BY METER POCT 329 (H) 70 - 99 mg/dL   Glucose by meter    Collection Time: 04/11/22  6:19 PM   Result Value Ref Range    GLUCOSE BY METER POCT 297 (H) 70 - 99 mg/dL   Glucose by meter    Collection Time: 04/11/22  8:30 PM   Result Value Ref Range    GLUCOSE BY METER POCT 360 (H) 70 - 99 mg/dL   Glucose by meter    Collection Time: 04/11/22 10:58 PM   Result Value Ref Range    GLUCOSE BY METER POCT 263 (H) 70 - 99 mg/dL   Glucose by meter    Collection Time: 04/12/22  2:18 AM   Result Value Ref Range    GLUCOSE BY METER POCT 204 (H) 70 - 99 mg/dL   Magnesium    Collection Time: 04/12/22  5:07 AM   Result Value Ref Range    Magnesium 1.8 1.8 - 2.6 mg/dL   Phosphorus    Collection Time: 04/12/22  5:07 AM   Result Value Ref Range    Phosphorus 2.1 (L) 2.5 - 4.5 mg/dL   INR    Collection Time: 04/12/22  5:07 AM   Result Value Ref Range    INR 1.59 (H) 0.85 - 1.15   Basic metabolic panel    Collection Time: 04/12/22  5:07 AM   Result Value Ref Range    Sodium 137 136 - 145 mmol/L    Potassium 3.4 (L) 3.5 - 5.0 mmol/L    Chloride 96 (L) 98 - 107 mmol/L    Carbon Dioxide (CO2) 34 (H) 22 - 31 mmol/L    Anion Gap 7 5 - 18 mmol/L    Urea Nitrogen 17 8 - 28 mg/dL    Creatinine 0.71 0.70 - 1.30 mg/dL    Calcium 8.5 8.5 - 10.5 mg/dL    Glucose 268 (H) 70 - 125 mg/dL    GFR Estimate >90 >60 mL/min/1.73m2   Extra Purple Top Tube    Collection Time: 04/12/22  5:07 AM   Result Value Ref Range    Hold Specimen JIC    CBC with platelets    Collection Time: 04/12/22  5:07 AM   Result Value Ref Range    WBC Count 7.2 4.0 - 11.0 10e3/uL    RBC Count 2.83 (L)  4.40 - 5.90 10e6/uL    Hemoglobin 9.9 (L) 13.3 - 17.7 g/dL    Hematocrit 29.8 (L) 40.0 - 53.0 %     (H) 78 - 100 fL    MCH 35.0 (H) 26.5 - 33.0 pg    MCHC 33.2 31.5 - 36.5 g/dL    RDW 13.8 10.0 - 15.0 %    Platelet Count 183 150 - 450 10e3/uL   Glucose by meter    Collection Time: 04/12/22  6:04 AM   Result Value Ref Range    GLUCOSE BY METER POCT 289 (H) 70 - 99 mg/dL   Glucose by meter    Collection Time: 04/12/22  7:52 AM   Result Value Ref Range    GLUCOSE BY METER POCT 263 (H) 70 - 99 mg/dL   Glucose by meter    Collection Time: 04/12/22 12:32 PM   Result Value Ref Range    GLUCOSE BY METER POCT 429 (H) 70 - 99 mg/dL   INR    Collection Time: 04/13/22  5:30 AM   Result Value Ref Range    INR 1.79 (H) 0.85 - 1.15   INR    Collection Time: 04/14/22  6:25 AM   Result Value Ref Range    INR 1.95 (H) 0.85 - 1.15   CBC with platelets    Collection Time: 04/15/22  3:00 PM   Result Value Ref Range    WBC Count 8.9 4.0 - 11.0 10e3/uL    RBC Count 3.20 (L) 4.40 - 5.90 10e6/uL    Hemoglobin 11.2 (L) 13.3 - 17.7 g/dL    Hematocrit 33.9 (L) 40.0 - 53.0 %     (H) 78 - 100 fL    MCH 35.0 (H) 26.5 - 33.0 pg    MCHC 33.0 31.5 - 36.5 g/dL    RDW 14.6 10.0 - 15.0 %    Platelet Count 252 150 - 450 10e3/uL   Basic metabolic panel    Collection Time: 04/18/22  6:10 AM   Result Value Ref Range    Sodium 140 136 - 145 mmol/L    Potassium 4.3 3.5 - 5.0 mmol/L    Chloride 101 98 - 107 mmol/L    Carbon Dioxide (CO2) 25 22 - 31 mmol/L    Anion Gap 14 5 - 18 mmol/L    Urea Nitrogen 19 8 - 28 mg/dL    Creatinine 0.70 0.70 - 1.30 mg/dL    Calcium 8.1 (L) 8.5 - 10.5 mg/dL    Glucose 128 (H) 70 - 125 mg/dL    GFR Estimate >90 >60 mL/min/1.73m2   Hemoglobin    Collection Time: 04/18/22  6:10 AM   Result Value Ref Range    Hemoglobin 11.2 (L) 13.3 - 17.7 g/dL   INR    Collection Time: 04/18/22  6:10 AM   Result Value Ref Range    INR 3.03 (H) 0.85 - 1.15       Assessment/Plan:    (G62.9) Polyneuropathy  (primary encounter  diagnosis)  (S72.002A) Fracture of hip, left, closed, initial encounter (H)  Plan:   -Pain is inhibiting progression in therapies. He is followed by a pain doctor but he cannot be seen until discharged from orthopedic care per pain doctor.  Therefore, increased Percocet to 1-2 tabs every 4 hours as needed and add lidopatch to back.  Monitor for increased confusion and falling.  If this occurs, resume previous Percocet dosing of 1 tab up to 5 times a day.  The patient and his daughter are in agreement with this plan.      Electronically signed by:   Harjit Coates, CNP

## 2022-04-18 NOTE — TELEPHONE ENCOUNTER
[facility] Geriatrics Triage Nurse INR     Provider: BOBBY Healy CNP  Facility: [facility]  Facility Type:  TCU    Caller: Rashida  Call Back Number: [phone]  Reason for call: INR  Diagnosis/Goal: A. Fib    Todays INR: 3.03  Last INR 4/14 1.95(4mg daily), 4/13 1.79(4mg).      Heparin/Lovenox:  No  Currently on ABX?: No  Other interacting medication:  OtherPlavix 75mg daily  Missed or refused doses: No     Nurse is also reporting BMP and Hgb results.        Verbal Order/Direction given by Provider: Hold Warfarin on 4/18.  Give 3mg 4/19 and 4/21 and 4mg on 4/20.  Next INR 4/22/22.      Provider Giving Order:  BOBBY Healy CNP    Verbal Order given to: Rashida Worley RN

## 2022-04-19 ENCOUNTER — OFFICE VISIT (OUTPATIENT)
Dept: GERIATRICS | Facility: CLINIC | Age: 82
End: 2022-04-19
Payer: COMMERCIAL

## 2022-04-19 VITALS
BODY MASS INDEX: 27.39 KG/M2 | WEIGHT: 160.4 LBS | OXYGEN SATURATION: 97 % | SYSTOLIC BLOOD PRESSURE: 109 MMHG | RESPIRATION RATE: 16 BRPM | DIASTOLIC BLOOD PRESSURE: 71 MMHG | HEART RATE: 81 BPM | HEIGHT: 64 IN | TEMPERATURE: 98.1 F

## 2022-04-19 DIAGNOSIS — S72.002A FRACTURE OF HIP, LEFT, CLOSED, INITIAL ENCOUNTER (H): ICD-10-CM

## 2022-04-19 DIAGNOSIS — G62.9 POLYNEUROPATHY: Primary | ICD-10-CM

## 2022-04-19 PROCEDURE — 99309 SBSQ NF CARE MODERATE MDM 30: CPT | Performed by: NURSE PRACTITIONER

## 2022-04-19 RX ORDER — OXYCODONE AND ACETAMINOPHEN 5; 325 MG/1; MG/1
1-2 TABLET ORAL EVERY 4 HOURS PRN
Qty: 30 TABLET | Refills: 0 | Status: SHIPPED | OUTPATIENT
Start: 2022-04-19 | End: 2022-04-25

## 2022-04-19 NOTE — LETTER
"    4/19/2022        RE: Pee Ayala  [address]  [place] [address]        [facility] GERIATRICS    Chief Complaint   Patient presents with     RECHECK     HPI:  Pee Ayala is a 82 year old  (1940), who is being seen today for an episodic care visit at: [facility] AT [facility] ([facility]) [[id number]].     Imaging revealed an acute moderately displaced subcapital fracture of the left femur. Orthopedics was consulted and he subsequently underwent a ORIF of the left greater trochanter.  His hospitalization stay was complicated by hypoglycemia.  Oral antiglycemic's/NovoLog 70/30 was held, both Lantus and sliding scale insulin was initiated for glycemic control     Pee and his daughter reported his back and hip pain is uncontrolled. His back pain is the most bothersome today. No reported radiation of pain. His pain is hindering his progression with therapies.    Allergies, and PMH/PSH reviewed in [facility] today.  REVIEW OF SYSTEMS:  4 point ROS including Respiratory, CV, GI and , other than that noted in the HPI,  is negative    Objective:   /71   Pulse 81   Temp 98.1  F (36.7  C)   Resp 16   Ht 1.626 m (5' 4\")   Wt 72.8 kg (160 lb 6.4 oz)   SpO2 97%   BMI 27.53 kg/m    GENERAL APPEARANCE:  Alert, in no distress  ENT:  Mouth and posterior oropharynx normal, moist mucous membranes  EYES:  EOM normal, conjunctiva and lids normal  RESP:  respiratory effort and palpation of chest normal, lungs clear to auscultation , no respiratory distress  CV:  Palpation and auscultation of heart done , regular rate and rhythm, no murmur, rub, or gallop, Irregularly irregular, S1-S2, monitor heart sounds  ABDOMEN:  no guarding or rebound, bowel sounds normal  M/S:   no gross deformities  SKIN:  No rash, warm and dry  NEURO:   Alert, clear speech, no focal deficits  PSYCH:  oriented X 3, affect and mood normal    Recent Results (from the past 240 hour(s))   Glucose by meter    Collection Time: 04/09/22  " 2:34 PM   Result Value Ref Range    GLUCOSE BY METER POCT 211 (H) 70 - 99 mg/dL   Glucose by meter    Collection Time: 04/09/22  6:38 PM   Result Value Ref Range    GLUCOSE BY METER POCT 346 (H) 70 - 99 mg/dL   Glucose by meter    Collection Time: 04/09/22  9:13 PM   Result Value Ref Range    GLUCOSE BY METER POCT 365 (H) 70 - 99 mg/dL   Glucose by meter    Collection Time: 04/10/22 12:42 AM   Result Value Ref Range    GLUCOSE BY METER POCT 310 (H) 70 - 99 mg/dL   Glucose by meter    Collection Time: 04/10/22  3:52 AM   Result Value Ref Range    GLUCOSE BY METER POCT 213 (H) 70 - 99 mg/dL   Magnesium    Collection Time: 04/10/22  5:45 AM   Result Value Ref Range    Magnesium 1.9 1.8 - 2.6 mg/dL   Phosphorus    Collection Time: 04/10/22  5:45 AM   Result Value Ref Range    Phosphorus 2.7 2.5 - 4.5 mg/dL   INR    Collection Time: 04/10/22  5:45 AM   Result Value Ref Range    INR 1.20 (H) 0.85 - 1.15   Basic metabolic panel    Collection Time: 04/10/22  5:45 AM   Result Value Ref Range    Sodium 137 136 - 145 mmol/L    Potassium 3.8 3.5 - 5.0 mmol/L    Chloride 102 98 - 107 mmol/L    Carbon Dioxide (CO2) 28 22 - 31 mmol/L    Anion Gap 7 5 - 18 mmol/L    Urea Nitrogen 21 8 - 28 mg/dL    Creatinine 0.80 0.70 - 1.30 mg/dL    Calcium 8.3 (L) 8.5 - 10.5 mg/dL    Glucose 229 (H) 70 - 125 mg/dL    GFR Estimate 90 >60 mL/min/1.73m2   CBC with platelets and differential    Collection Time: 04/10/22  5:45 AM   Result Value Ref Range    WBC Count 8.6 4.0 - 11.0 10e3/uL    RBC Count 2.86 (L) 4.40 - 5.90 10e6/uL    Hemoglobin 9.9 (L) 13.3 - 17.7 g/dL    Hematocrit 30.0 (L) 40.0 - 53.0 %     (H) 78 - 100 fL    MCH 34.6 (H) 26.5 - 33.0 pg    MCHC 33.0 31.5 - 36.5 g/dL    RDW 13.2 10.0 - 15.0 %    Platelet Count 142 (L) 150 - 450 10e3/uL    % Neutrophils 83 %    % Lymphocytes 10 %    % Monocytes 6 %    % Eosinophils 0 %    % Basophils 0 %    % Immature Granulocytes 1 %    NRBCs per 100 WBC 0 <1 /100    Absolute Neutrophils 7.2  1.6 - 8.3 10e3/uL    Absolute Lymphocytes 0.9 0.8 - 5.3 10e3/uL    Absolute Monocytes 0.5 0.0 - 1.3 10e3/uL    Absolute Eosinophils 0.0 0.0 - 0.7 10e3/uL    Absolute Basophils 0.0 0.0 - 0.2 10e3/uL    Absolute Immature Granulocytes 0.1 <=0.4 10e3/uL    Absolute NRBCs 0.0 10e3/uL   Glucose by meter    Collection Time: 04/10/22  8:41 AM   Result Value Ref Range    GLUCOSE BY METER POCT 224 (H) 70 - 99 mg/dL   Glucose by meter    Collection Time: 04/10/22 12:17 PM   Result Value Ref Range    GLUCOSE BY METER POCT 314 (H) 70 - 99 mg/dL   Glucose by meter    Collection Time: 04/10/22  4:38 PM   Result Value Ref Range    GLUCOSE BY METER POCT 312 (H) 70 - 99 mg/dL   Glucose by meter    Collection Time: 04/10/22  8:57 PM   Result Value Ref Range    GLUCOSE BY METER POCT 409 (H) 70 - 99 mg/dL   Glucose by meter    Collection Time: 04/11/22  3:19 AM   Result Value Ref Range    GLUCOSE BY METER POCT 238 (H) 70 - 99 mg/dL   Magnesium    Collection Time: 04/11/22  5:18 AM   Result Value Ref Range    Magnesium 1.9 1.8 - 2.6 mg/dL   Phosphorus    Collection Time: 04/11/22  5:18 AM   Result Value Ref Range    Phosphorus 2.4 (L) 2.5 - 4.5 mg/dL   INR    Collection Time: 04/11/22  5:18 AM   Result Value Ref Range    INR 1.22 (H) 0.85 - 1.15   Basic metabolic panel    Collection Time: 04/11/22  5:18 AM   Result Value Ref Range    Sodium 139 136 - 145 mmol/L    Potassium 3.5 3.5 - 5.0 mmol/L    Chloride 100 98 - 107 mmol/L    Carbon Dioxide (CO2) 32 (H) 22 - 31 mmol/L    Anion Gap 7 5 - 18 mmol/L    Urea Nitrogen 17 8 - 28 mg/dL    Creatinine 0.77 0.70 - 1.30 mg/dL    Calcium 8.3 (L) 8.5 - 10.5 mg/dL    Glucose 242 (H) 70 - 125 mg/dL    GFR Estimate >90 >60 mL/min/1.73m2   CBC with platelets    Collection Time: 04/11/22  5:18 AM   Result Value Ref Range    WBC Count 7.5 4.0 - 11.0 10e3/uL    RBC Count 2.73 (L) 4.40 - 5.90 10e6/uL    Hemoglobin 9.5 (L) 13.3 - 17.7 g/dL    Hematocrit 28.8 (L) 40.0 - 53.0 %     (H) 78 - 100 fL     MCH 34.8 (H) 26.5 - 33.0 pg    MCHC 33.0 31.5 - 36.5 g/dL    RDW 13.6 10.0 - 15.0 %    Platelet Count 150 150 - 450 10e3/uL   Glucose by meter    Collection Time: 04/11/22  8:27 AM   Result Value Ref Range    GLUCOSE BY METER POCT 307 (H) 70 - 99 mg/dL   Glucose by meter    Collection Time: 04/11/22 11:25 AM   Result Value Ref Range    GLUCOSE BY METER POCT 353 (H) 70 - 99 mg/dL   Glucose by meter    Collection Time: 04/11/22  5:44 PM   Result Value Ref Range    GLUCOSE BY METER POCT 329 (H) 70 - 99 mg/dL   Glucose by meter    Collection Time: 04/11/22  6:19 PM   Result Value Ref Range    GLUCOSE BY METER POCT 297 (H) 70 - 99 mg/dL   Glucose by meter    Collection Time: 04/11/22  8:30 PM   Result Value Ref Range    GLUCOSE BY METER POCT 360 (H) 70 - 99 mg/dL   Glucose by meter    Collection Time: 04/11/22 10:58 PM   Result Value Ref Range    GLUCOSE BY METER POCT 263 (H) 70 - 99 mg/dL   Glucose by meter    Collection Time: 04/12/22  2:18 AM   Result Value Ref Range    GLUCOSE BY METER POCT 204 (H) 70 - 99 mg/dL   Magnesium    Collection Time: 04/12/22  5:07 AM   Result Value Ref Range    Magnesium 1.8 1.8 - 2.6 mg/dL   Phosphorus    Collection Time: 04/12/22  5:07 AM   Result Value Ref Range    Phosphorus 2.1 (L) 2.5 - 4.5 mg/dL   INR    Collection Time: 04/12/22  5:07 AM   Result Value Ref Range    INR 1.59 (H) 0.85 - 1.15   Basic metabolic panel    Collection Time: 04/12/22  5:07 AM   Result Value Ref Range    Sodium 137 136 - 145 mmol/L    Potassium 3.4 (L) 3.5 - 5.0 mmol/L    Chloride 96 (L) 98 - 107 mmol/L    Carbon Dioxide (CO2) 34 (H) 22 - 31 mmol/L    Anion Gap 7 5 - 18 mmol/L    Urea Nitrogen 17 8 - 28 mg/dL    Creatinine 0.71 0.70 - 1.30 mg/dL    Calcium 8.5 8.5 - 10.5 mg/dL    Glucose 268 (H) 70 - 125 mg/dL    GFR Estimate >90 >60 mL/min/1.73m2   Extra Purple Top Tube    Collection Time: 04/12/22  5:07 AM   Result Value Ref Range    Hold Specimen [facility]    CBC with platelets    Collection Time: 04/12/22   5:07 AM   Result Value Ref Range    WBC Count 7.2 4.0 - 11.0 10e3/uL    RBC Count 2.83 (L) 4.40 - 5.90 10e6/uL    Hemoglobin 9.9 (L) 13.3 - 17.7 g/dL    Hematocrit 29.8 (L) 40.0 - 53.0 %     (H) 78 - 100 fL    MCH 35.0 (H) 26.5 - 33.0 pg    MCHC 33.2 31.5 - 36.5 g/dL    RDW 13.8 10.0 - 15.0 %    Platelet Count 183 150 - 450 10e3/uL   Glucose by meter    Collection Time: 04/12/22  6:04 AM   Result Value Ref Range    GLUCOSE BY METER POCT 289 (H) 70 - 99 mg/dL   Glucose by meter    Collection Time: 04/12/22  7:52 AM   Result Value Ref Range    GLUCOSE BY METER POCT 263 (H) 70 - 99 mg/dL   Glucose by meter    Collection Time: 04/12/22 12:32 PM   Result Value Ref Range    GLUCOSE BY METER POCT 429 (H) 70 - 99 mg/dL   INR    Collection Time: 04/13/22  5:30 AM   Result Value Ref Range    INR 1.79 (H) 0.85 - 1.15   INR    Collection Time: 04/14/22  6:25 AM   Result Value Ref Range    INR 1.95 (H) 0.85 - 1.15   CBC with platelets    Collection Time: 04/15/22  3:00 PM   Result Value Ref Range    WBC Count 8.9 4.0 - 11.0 10e3/uL    RBC Count 3.20 (L) 4.40 - 5.90 10e6/uL    Hemoglobin 11.2 (L) 13.3 - 17.7 g/dL    Hematocrit 33.9 (L) 40.0 - 53.0 %     (H) 78 - 100 fL    MCH 35.0 (H) 26.5 - 33.0 pg    MCHC 33.0 31.5 - 36.5 g/dL    RDW 14.6 10.0 - 15.0 %    Platelet Count 252 150 - 450 10e3/uL   Basic metabolic panel    Collection Time: 04/18/22  6:10 AM   Result Value Ref Range    Sodium 140 136 - 145 mmol/L    Potassium 4.3 3.5 - 5.0 mmol/L    Chloride 101 98 - 107 mmol/L    Carbon Dioxide (CO2) 25 22 - 31 mmol/L    Anion Gap 14 5 - 18 mmol/L    Urea Nitrogen 19 8 - 28 mg/dL    Creatinine 0.70 0.70 - 1.30 mg/dL    Calcium 8.1 (L) 8.5 - 10.5 mg/dL    Glucose 128 (H) 70 - 125 mg/dL    GFR Estimate >90 >60 mL/min/1.73m2   Hemoglobin    Collection Time: 04/18/22  6:10 AM   Result Value Ref Range    Hemoglobin 11.2 (L) 13.3 - 17.7 g/dL   INR    Collection Time: 04/18/22  6:10 AM   Result Value Ref Range    INR 3.03 (H)  0.85 - 1.15       Assessment/Plan:    (G62.9) Polyneuropathy  (primary encounter diagnosis)  (S72.002A) Fracture of hip, left, closed, initial encounter (H)  Plan:   -Pain is inhibiting progression in therapies. He is followed by a pain doctor but he cannot be seen until discharged from orthopedic care per pain doctor.  Therefore, increased Percocet to 1-2 tabs every 4 hours as needed and add lidopatch to back.  Monitor for increased confusion and falling.  If this occurs, resume previous Percocet dosing of 1 tab up to 5 times a day.  The patient and his daughter are in agreement with this plan.      Electronically signed by:   Harjit Coates CNP              Sincerely,        Harjit Coates, CNP

## 2022-04-22 ENCOUNTER — TELEPHONE (OUTPATIENT)
Dept: GERIATRICS | Facility: CLINIC | Age: 82
End: 2022-04-22
Payer: COMMERCIAL

## 2022-04-22 NOTE — TELEPHONE ENCOUNTER
[facility] Geriatrics Triage Nurse INR     Provider: BOBBY Healy CNP  Facility: [facility]  Facility Type:  TCU    Caller: Rashida  Call Back Number: [phone]  Reason for call: INR  Diagnosis/Goal: A. Fib    Todays INR: 2.6  Last INR   4/18 3.03 Held on 4/18, 3mg on 4/19 & 4/21, 4mg on 4/20.  4/14 1.95 4mg daily    Heparin/Lovenox:  No  Currently on ABX?: No  Other interacting medication:  None  Missed or refused doses: No     Nurse also reports patient's BS at 0800 was 139 and dropped to 60 after administration of morning Novolog 70/30 33 units. Pt c/o dizziness and blurred vision, symptoms improved after eating and drinking juice - BS increased to 84.     Verbal Order/Direction given by Provider: Coumadin 3mg daily. Recheck INR on 4/25/22.  Make sure to administer insulin with a meal to prevent hypoglycemic episodes    Provider Giving Order:  BOBBY Healy CNP    Verbal Order given to: Rashida Camarena RN

## 2022-04-25 ENCOUNTER — TELEPHONE (OUTPATIENT)
Dept: GERIATRICS | Facility: CLINIC | Age: 82
End: 2022-04-25
Payer: COMMERCIAL

## 2022-04-25 VITALS
WEIGHT: 159 LBS | BODY MASS INDEX: 27.14 KG/M2 | OXYGEN SATURATION: 96 % | SYSTOLIC BLOOD PRESSURE: 126 MMHG | HEIGHT: 64 IN | RESPIRATION RATE: 18 BRPM | HEART RATE: 75 BPM | TEMPERATURE: 98.2 F | DIASTOLIC BLOOD PRESSURE: 77 MMHG

## 2022-04-25 DIAGNOSIS — G62.9 POLYNEUROPATHY: ICD-10-CM

## 2022-04-25 RX ORDER — OXYCODONE AND ACETAMINOPHEN 5; 325 MG/1; MG/1
1-2 TABLET ORAL EVERY 4 HOURS PRN
Qty: 60 TABLET | Refills: 0 | Status: SHIPPED | OUTPATIENT
Start: 2022-04-25 | End: 2022-06-03 | Stop reason: ALTCHOICE

## 2022-04-25 NOTE — TELEPHONE ENCOUNTER
[facility] Geriatrics Triage Nurse INR     Provider: BOBBY Healy CNP  Facility: [facility]  Facility Type:  TCU    Caller: Naty   Call Back Number: [phone]  Reason for call: INR  Diagnosis/Goal: A. Fib      INR 2.5 today   4/22 2.6 3mg every day   4/18 3.03 Held on 4/18, 3mg on 4/19 & 4/21, 4mg on 4/20.   4/14 1.95 4mg daily     Heparin/Lovenox:  No  Currently on ABX?: No  Other interacting medication:  None  Missed or refused doses: No    Verbal Order/Direction given by Provider: Cont 3mg every day Next INR 4/29    Provider Giving Order:  BOBBY Healy CNP    Verbal Order given to: Naty Pardo RN

## 2022-04-25 NOTE — PROGRESS NOTES
"[facility] GERIATRICS    Chief Complaint   Patient presents with     RECHECK     HPI:  Pee Ayala is a 82 year old  (1940), who is being seen today for an episodic care visit at: [facility] AT [facility] ([facility]) [[id number]].     Imaging revealed an acute moderately displaced subcapital fracture of the left femur. Orthopedics was consulted and he subsequently underwent a ORIF of the left greater trochanter.  His hospitalization stay was complicated by hypoglycemia.  Oral antiglycemic's/NovoLog 70/30 was held, both Lantus and sliding scale insulin was initiated for glycemic control     Pee and his daughter reported his back and hip pain is uncontrolled. His back pain is the most bothersome today. No reported radiation of pain. His pain is hindering his progression with therapies.    4/14 admission  4/19 percocet increased for pain control  4/26 pain improved progressing in therapies    Pee reported he is doing better in therapies he rates his pain at a 6 out of 10 continuously.  He reported having constipation today and last bowel movement was 3 days ago    Allergies, and PMH/PSH reviewed in [facility] today.  REVIEW OF SYSTEMS:  4 point ROS including Respiratory, CV, GI and , other than that noted in the HPI,  is negative    Objective:   /77   Pulse 75   Temp 98.2  F (36.8  C)   Resp 18   Ht 1.626 m (5' 4\")   Wt 72.1 kg (159 lb)   SpO2 96%   BMI 27.29 kg/m      GENERAL APPEARANCE:  Alert, in no distress  EYES:  EOM normal, conjunctiva and lids normal  RESP:  respiratory effort and palpation of chest normal, lungs clear to auscultation , no respiratory distress  CV:  Palpation and auscultation of heart done , regular rate and rhythm, no murmur, rub, or gallop  ABDOMEN:  normal bowel sounds, soft, nontender, no hepatosplenomegaly or other masses  M/S:   no gross deformities  SKIN:  no raash  NEURO:   alert, clear speech, NFD  PSYCH:  affect and mood normal    Recent Results (from the past 240 " hour(s))   Basic metabolic panel    Collection Time: 04/18/22  6:10 AM   Result Value Ref Range    Sodium 140 136 - 145 mmol/L    Potassium 4.3 3.5 - 5.0 mmol/L    Chloride 101 98 - 107 mmol/L    Carbon Dioxide (CO2) 25 22 - 31 mmol/L    Anion Gap 14 5 - 18 mmol/L    Urea Nitrogen 19 8 - 28 mg/dL    Creatinine 0.70 0.70 - 1.30 mg/dL    Calcium 8.1 (L) 8.5 - 10.5 mg/dL    Glucose 128 (H) 70 - 125 mg/dL    GFR Estimate >90 >60 mL/min/1.73m2   Hemoglobin    Collection Time: 04/18/22  6:10 AM   Result Value Ref Range    Hemoglobin 11.2 (L) 13.3 - 17.7 g/dL   INR    Collection Time: 04/18/22  6:10 AM   Result Value Ref Range    INR 3.03 (H) 0.85 - 1.15       Assessment/Plan:    (S72.402S) Closed fracture of distal end of left femur, unspecified fracture morphology, sequela  (primary encounter diagnosis)  Plan:   -WBAT, posterior hip precautions, avoid abduction 4-6 weeks  -Follow-up with orthopedics today  -Percocet for pain control    (E11.3599) 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 No over hypo/hyperglycemia    (I50.42) Chronic combined systolic and diastolic congestive heart failure (H)  Plan:   -Compensated  -Continue Lasix and coreg    (K59.03) Drug-induced constipation  Plan:   -Change miralax to scheduled daily      Electronically signed by: Harjit Coatse, CNP      Addendum    [facility] GERIATRICS  Face to Face and Medical Necessity Statement for DME Provider visit    Patient: Pee Ayala  Gender: male  YOB: 1940  [place] Medical Record Number: [id number]  Demographics:  [address]  [place] [facility] [place] [address]  [phone] (home)   Social Security Number: [id number]  Primary Care Provider: Mar Morales  Insurance: Payor: [employer] / Plan: [employer] MEDICARE ADVANTAGE /  Product Type: HMO /     HPI: Pee Ayala is a 82 year old (1940), who is being seen today for a face to face provider visit at [facility] AT [facility] ([facility]) [[id number]]. Medical necessity statement for DME included.     This patient requires the following: DME Ordered and Medical Necessity Statement   Hospital Bed/Accessories Documentation  The patient has a medical condition due to a fracture, and CHF that requires positioning of the body in ways that is not feasible with an ordinary bed and for alleviation of pain.  He does require head of the bed to be elevated more than 30 degrees most of the time due to congestive heart failure.  He does require a bed height different than a fixed height hospital bed to permit transfers to a wheelchair, chair or standing position.  He also requires frequent changes in body position and/or has an immediate need for a change in body positioning due to the diagnosis listed    1. Does the patient require positioning of the body in ways not feasible with an ordinary bed due to a medical condition that is expected to last at least 1 month? Yes  2. Does the patient require, for the alleviation of pain, positioning of the body in ways not feasible with an ordinary bed? Yes  3. Does the patient require the head of bed to be elevated more than 30 degrees most of the time due to congestive heart failure, chronic pulmonary disease or aspiration? Yes  4. Does the patient require traction that can only be attached to a hospital bed?  No  5. Does the patient require a bed height different than a fixed height hospital bed to permit transfers to a chair, wheelchair, or standing position? Yes  6. Does the patient require frequent changes in body position and/or have an immediate need for change in body position? yes        Wheelchair Documentation  Size: standard  Corresponding cushion: no  Standard foot rests: Yes  Elevating leg rests: No  Arm rests: Yes: full arm rests  Lap tray:  No  Dose the patient use oxygen? No   Is the patient able to propel wheelchair? Yes   1. The patient has mobility limitations that impairs their ability to participate in one or more mobility related activities: Toileting, Feeding, Grooming and Bathing.  The wheelchair is suitable and necessary for use in the patient's home.  2. The patient's mobility limitations cannot be safely resolved by using a cane/walker:Yes    Reason why a cane or walker will not meet the patient's needs. Activity intolerance   3. The patients home has adequate access to use a manual wheelchair:Yes  4. The use of a manual wheelchair on a regular basis will improve the patients ability to participate in mobility related ADL's at home:Yes  5. The patient is willing to use a manual wheelchair at home:Yes  6. The patient has adequate upper body strength and the mental capability to safely use a manual wheelchair and/or has a caregiver that is able to assist: Yes  7. Does the patient have a lower extremity injury or edema?No  Reason for Type of Wheelchair  Patient weight: 0 lbs 0 oz  Light Weight Wheelchair: Patient is unable to self-propel a standard wheelchair in the home but can self propel a light weight wheelchair.    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, without long-term current use of insulin, unspecified laterality (H)  Chronic combined systolic and diastolic congestive heart failure (H)  Drug-induced constipation      Past Medical History:   has a past medical history of ACS (acute coronary syndrome) (H) (6/2/2014), ACS (acute coronary syndrome) (H) (6/2/2014), Actinic keratosis, Actinic keratosis (1/14/2014), Actinic keratosis (1/14/2014), Anticoagulated on Coumadin (12/30/2015),  Antiplatelet or antithrombotic long-term use, Atrial fibrillation (H), Atrial fibrillation (H) (2016), Bone mass (4/26/2017), Chest heaviness (1/23/2019), Chest pain (5/31/2014), Closed fracture of left forearm (2015), Congestive heart failure (H), Coronary artery disease, Diabetes (H) (2009), Diabetes mellitus (H), Difficulty in walking(719.7), Dyslipidemia (8/31/2016), Dyspnea on exertion, 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), Encounter for long-term (current) use of insulin (H) (8/11/2016), Esophageal reflux (11/18/2010), Esophageal reflux (11/18/2010), History of angina, HTN (hypertension) (6/30/2009), Hyperlipidemia LDL goal <100 (10/31/2010), Hypertension, Impotence of organic origin, Mixed hyperlipidemia (4/25/2007), New onset atrial fibrillation (H) (7/29/2013), Nonalcoholic steatohepatitis (10/1/2009), Nonalcoholic steatohepatitis (10/1/2009), Obese, Palpitations, PD (perceptive deafness), asymmetrical (12/17/2010), Polyneuropathy in diabetes(357.2), Sensorineural hearing loss, asymmetrical (12/17/2010), Shortness of breath, Squamous cell carcinoma (04/2013), Status post coronary angiogram (3/9/2016), Tremor (9/28/2014), Type 2 diabetes, HbA1C goal < 8% (H) (1/5/2011), Type II or unspecified type diabetes mellitus with neurological manifestations, not stated as uncontrolled(250.60) (H), and Walking troubles.    He has no past medical history of Basal cell carcinoma, Chronic kidney disease, Malignant melanoma nos, PONV (postoperative nausea and vomiting), or Thyroid disease.        Assessment/Plan:  1. Closed fracture of distal end of left femur, unspecified fracture morphology, sequela    2. 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 Chronic combined systolic and diastolic congestive heart failure (H)    4. Drug-induced constipation        Orders:  1. Facility staff/TC to  contact DME company to get their order form for provider to fill out forms for a hospital bed and wheelchair    ELECTRONICALLY SIGNED BY EUGENIO CERTIFIED PROVIDER: Harjit Coates CNP   NPI: [id number]  [facility] GERIATRICS  [address] [place] [address]   [place] [address]    Addendum 4/28 Notified of patient discharge by facility for 4/29/22    Documentation of Face to Face and Certification for Home Health Services    I certify that services are/were furnished while this patient was under the care of a physician and that a physician or an allowed non-physician practitioner (NPP), had a face-to-face encounter that meets the physician face-to-face encounter requirements. The encounter was in whole, or in part, related to the primary reason for home health. The patient is confined to his/her home and needs intermittent skilled nursing, physical therapy, speech-language pathology, or the continued need for occupational therapy. A plan of care has been established by a physician and is periodically reviewed by a physician.  Date of Face-to-Face Encounter: 4/26/22.    I certify that, based on my findings, the following services are medically necessary home health services: Nursing, Occupational Therapy, Physical Therapy and HHA.    My clinical findings support the need for the above skilled services because: Requires assistance of another person or specialized equipment to access medical services because patient: Has prohibitive pain during ambulation...    Patient to re-establish plan of care with their PCP within 7-10 days after leaving the facility to reestablish care.  Medicare certified EUGENIO provider: Harjit Coates CNP  Date: April 26, 2022    Dr.Yasser Cookie MD. Please send all follow up questions and concerns or needed follow up signatures to the PCP, who [facility] has on file as:  Mar Morales.

## 2022-04-26 ENCOUNTER — TRANSITIONAL CARE UNIT VISIT (OUTPATIENT)
Dept: GERIATRICS | Facility: CLINIC | Age: 82
End: 2022-04-26
Payer: COMMERCIAL

## 2022-04-26 DIAGNOSIS — K59.03 DRUG-INDUCED CONSTIPATION: ICD-10-CM

## 2022-04-26 DIAGNOSIS — E11.3599 TYPE 2 DIABETES MELLITUS WITH PROLIFERATIVE RETINOPATHY WITHOUT MACULAR EDEMA, WITHOUT LONG-TERM CURRENT USE OF INSULIN, UNSPECIFIED LATERALITY (H): ICD-10-CM

## 2022-04-26 DIAGNOSIS — I50.42 CHRONIC COMBINED SYSTOLIC AND DIASTOLIC CONGESTIVE HEART FAILURE (H): ICD-10-CM

## 2022-04-26 DIAGNOSIS — S72.402S CLOSED FRACTURE OF DISTAL END OF LEFT FEMUR, UNSPECIFIED FRACTURE MORPHOLOGY, SEQUELA: Primary | ICD-10-CM

## 2022-04-26 PROCEDURE — 99309 SBSQ NF CARE MODERATE MDM 30: CPT | Performed by: NURSE PRACTITIONER

## 2022-04-26 NOTE — LETTER
"    4/26/2022        RE: Pee Ayala  [address]  [place] [address]        M [facility] GERIATRICS    Chief Complaint   Patient presents with     RECHECK     HPI:  Pee Ayala is a 82 year old  (1940), who is being seen today for an episodic care visit at: [facility] AT [facility] ([facility]) [[id number]].     Imaging revealed an acute moderately displaced subcapital fracture of the left femur. Orthopedics was consulted and he subsequently underwent a ORIF of the left greater trochanter.  His hospitalization stay was complicated by hypoglycemia.  Oral antiglycemic's/NovoLog 70/30 was held, both Lantus and sliding scale insulin was initiated for glycemic control     Pee and his daughter reported his back and hip pain is uncontrolled. His back pain is the most bothersome today. No reported radiation of pain. His pain is hindering his progression with therapies.    4/14 admission  4/19 percocet increased for pain control  4/26 pain improved progressing in therapies    Pee reported he is doing better in therapies he rates his pain at a 6 out of 10 continuously.  He reported having constipation today and last bowel movement was 3 days ago    Allergies, and PMH/PSH reviewed in EPIC today.  REVIEW OF SYSTEMS:  4 point ROS including Respiratory, CV, GI and , other than that noted in the HPI,  is negative    Objective:   /77   Pulse 75   Temp 98.2  F (36.8  C)   Resp 18   Ht 1.626 m (5' 4\")   Wt 72.1 kg (159 lb)   SpO2 96%   BMI 27.29 kg/m      GENERAL APPEARANCE:  Alert, in no distress  EYES:  EOM normal, conjunctiva and lids normal  RESP:  respiratory effort and palpation of chest normal, lungs clear to auscultation , no respiratory distress  CV:  Palpation and auscultation of heart done , regular rate and rhythm, no murmur, rub, or gallop  ABDOMEN:  normal bowel sounds, soft, nontender, no hepatosplenomegaly or other masses  M/S:   no gross deformities  SKIN:  no raash  NEURO:   " alert, clear speech, NFD  PSYCH:  affect and mood normal    Recent Results (from the past 240 hour(s))   Basic metabolic panel    Collection Time: 04/18/22  6:10 AM   Result Value Ref Range    Sodium 140 136 - 145 mmol/L    Potassium 4.3 3.5 - 5.0 mmol/L    Chloride 101 98 - 107 mmol/L    Carbon Dioxide (CO2) 25 22 - 31 mmol/L    Anion Gap 14 5 - 18 mmol/L    Urea Nitrogen 19 8 - 28 mg/dL    Creatinine 0.70 0.70 - 1.30 mg/dL    Calcium 8.1 (L) 8.5 - 10.5 mg/dL    Glucose 128 (H) 70 - 125 mg/dL    GFR Estimate >90 >60 mL/min/1.73m2   Hemoglobin    Collection Time: 04/18/22  6:10 AM   Result Value Ref Range    Hemoglobin 11.2 (L) 13.3 - 17.7 g/dL   INR    Collection Time: 04/18/22  6:10 AM   Result Value Ref Range    INR 3.03 (H) 0.85 - 1.15       Assessment/Plan:    (S72.402S) Closed fracture of distal end of left femur, unspecified fracture morphology, sequela  (primary encounter diagnosis)  Plan:   -WBAT, posterior hip precautions, avoid abduction 4-6 weeks  -Follow-up with orthopedics today  -Percocet for pain control    (E11.3599) 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 No over hypo/hyperglycemia    (I50.42) Chronic combined systolic and diastolic congestive heart failure (H)  Plan:   -Compensated  -Continue Lasix and coreg    (K59.03) Drug-induced constipation  Plan:   -Change miralax to scheduled daily      Electronically signed by: Harjit Coates, [place]              Sincerely,        Harjit Coates, CNP

## 2022-04-29 DIAGNOSIS — G62.9 POLYNEUROPATHY: ICD-10-CM

## 2022-04-29 RX ORDER — OXYCODONE AND ACETAMINOPHEN 5; 325 MG/1; MG/1
1-2 TABLET ORAL EVERY 4 HOURS PRN
Qty: 15 TABLET | Refills: 0 | Status: CANCELLED | OUTPATIENT
Start: 2022-04-29

## 2022-05-02 ENCOUNTER — NURSE TRIAGE (OUTPATIENT)
Dept: NURSING | Facility: CLINIC | Age: 82
End: 2022-05-02

## 2022-05-02 NOTE — TELEPHONE ENCOUNTER
New since speaking to Dior at [facility], who identified herself as one of the providers. She instructed ER for patient.    Patient's daughter was with patient when I called back to redirect them to take Pee to an ER.  His daughter is the one who set up Pee's meds. She said he did not get more than the prescribed dose of warfarin.    Bleeding by that time had stopped.  Per the patient it had been a constant drip drip drip.  I instructed that Pee not put anything up his nose nor blow his nose.    With his nose having bled for the past 1.5 days I recommended that Pee keep original plan and go to the [facility]. No need for a rapid INR.   The provider he sees at the [facility] may want to order hgb and inr.  Patient, wife and daughter stated understanding.  The will go, now.  All questions answered.      Background  Recent hip replacement after fracture.  Discharged from transitional care on 4/29/22.      Nose bleeding for 1.5 days.  Takes Warfarin.  Wife, Fay believes he has had much more warfarin than he should have.  Per the protocol, I instructed that Pee be seen in clinic today.  Fay stated understanding.    I gave Pee instruction for stopping the bleeding. He's doing this now.    I warm transferred Fay to scheduling to make an appointment for Pee to be seen.  There were no available appointments at any nearby clinics for Pee to be seen today. I advised he go to [facility] in [place].      Reason for Disposition    Taking Coumadin (warfarin) or other strong blood thinner, or known bleeding disorder (e.g., thrombocytopenia)    Additional Information    Negative: Fainted (passed out), or too weak to stand following large blood loss    Negative: Sounds like a life-threatening emergency to the triager    Negative: Bleeding present > 30 minutes and using correct method of direct pressure    Negative: Bleeding now and second call after being instructed in correct technique of direct pressure    Negative:  Lightheadedness or dizziness    Negative: Pale skin (pallor) of new onset or worsening    Negative: Has nasal packing (inserted by health care provider to control bleeding) and now has new rash    Negative: Has nasal packing and now has bleeding around the packing (Exception: few drops or ooze)    Negative: Patient sounds very sick or weak to the triager    Negative: Large amount of blood has been lost (e.g., one cup)    Negative: Bleeding recurs 3 or more times in 24 hours despite direct pressure    Protocols used: NOSEBLEED-MALVIN BLUE RN [facility]

## 2022-05-04 ENCOUNTER — MEDICAL CORRESPONDENCE (OUTPATIENT)
Dept: HEALTH INFORMATION MANAGEMENT | Facility: CLINIC | Age: 82
End: 2022-05-04
Payer: COMMERCIAL

## 2022-05-04 ENCOUNTER — ANTICOAGULATION THERAPY VISIT (OUTPATIENT)
Dept: ANTICOAGULATION | Facility: CLINIC | Age: 82
End: 2022-05-04
Payer: COMMERCIAL

## 2022-05-04 ENCOUNTER — TELEPHONE (OUTPATIENT)
Dept: FAMILY MEDICINE | Facility: CLINIC | Age: 82
End: 2022-05-04
Payer: COMMERCIAL

## 2022-05-04 DIAGNOSIS — I48.20 CHRONIC ATRIAL FIBRILLATION (H): Primary | ICD-10-CM

## 2022-05-04 DIAGNOSIS — Z79.01 CHRONIC ANTICOAGULATION: ICD-10-CM

## 2022-05-04 LAB — INR (EXTERNAL): 4.7 (ref 0.9–1.1)

## 2022-05-04 RX ORDER — WARFARIN SODIUM 3 MG/1
3 TABLET ORAL DAILY
Qty: 90 TABLET | Refills: 0 | Status: ON HOLD | OUTPATIENT
Start: 2022-05-04 | End: 2022-07-12

## 2022-05-04 NOTE — TELEPHONE ENCOUNTER
Reason for Call:  Medication or medication refill:    Do you use a [facility]?  Name of the pharmacy and phone number for the current request:  [id number] - [facility], [place] - [address] [place] [address] E    Name of the medication requested: oxyCODONE-acetaminophen (PERCOCET) 5-325 MG tablet    Other request: Pt called stating he was told to reach out to PCP to refill this Rx. He has a few left to Sunday. Pt's daughter is on the line as well helping pt to set up his medications. Verified if they picked up Rx from 04/25 sent to [facility] in [place]. Pt and daughter stated that they did not and that's not the pharmacy where it needs to go and if provider can send it to [facility] in [place].    If provider has any questions, OK to call them.     Can we leave a detailed message on this number? YES    Phone number patient can be reached at: Cell number on file:    Telephone Information:   Mobile [phone]       Best Time: Any time    Call taken on 5/4/2022 at 4:13 PM by Jermaine Cruz

## 2022-05-04 NOTE — PROGRESS NOTES
ANTICOAGULATION MANAGEMENT     Pee Ayala 82 year old male is on warfarin with supratherapeutic INR result. (Goal INR 2.0-3.0)    Recent labs: (last 7 days)     05/04/22  1005   INR 4.7*       ASSESSMENT       Source(s): Chart Review and Home Care/Facility Nurse, spoke with patient also      Warfarin doses taken: Warfarin taken as instructed, discharged from TCU on 3 mg daily, patient did not get sent home with 3 mg tablets so he took as noted on the calendar.     Diet: Decreased greens/vitamin K in diet; ongoing change and DM supplement in TCU TID a day does not states if vitamin K in it    New illness, injury, or hospitalization: Yes: 5/2 urgency center with nose bleed     Medication/supplement changes: percocet daily for pain, no additional tylenol    Signs or symptoms of bleeding or clotting: Yes: nose bleed none since Monday    Previous INR: Therapeutic last 2(+) visits    Additional findings: Refill needed today. Pee meets all criteria for refill (current [facility] referral, office visit with referring provider/group in last year, lab monitoring up to date or not exceeding 2 weeks overdue). Rx instructions and quantity supplied updated to match patient's current dosing plan. 90 day supply with 0 refills granted per [facility] protocol  and Sent in for new tablet size, wife instructed to put away the 5 mg tablets.       PLAN     Recommended plan for temporary change(s) affecting INR     Dosing Instructions: dose was decreased by 50.6% which is what the patient was receiving in the TCU. Patient was therapeutic on that dosing. with next INR in 2 days       Summary  As of 5/4/2022    Full warfarin instructions:  5/4: Hold; 5/5: Hold; Otherwise 3 mg every day   Next INR check:  5/6/2022             Telephone call with Fay wife who verbalizes understanding and agrees to plan. Detailed message left for home care about the 2 day hold and need for recheck on Friday.    Orders given to  Homecare nurse/facility to  recheck    Education provided: Importance of therapeutic range, Importance of taking warfarin as instructed and Warfarin tablet strength change; remove and/or discard previous strength from medication supply    Plan made with [facility] Pharmacist Ludy Desouza, RN  Anticoagulation Clinic  5/4/2022    _______________________________________________________________________     Anticoagulation Episode Summary     Current INR goal:  2.0-3.0   TTR:  42.5 % (1 y)   Target end date:  Indefinite   Send INR reminders to:  DEVON ALEXIS    Indications    Chronic atrial fibrillation (H) [I48.20]  Chronic anticoagulation [Z79.01]           Comments:           Anticoagulation Care Providers     Provider Role Specialty Phone number    Jazzy Gil MD Referring Family Medicine [phone]

## 2022-05-05 ENCOUNTER — OFFICE VISIT (OUTPATIENT)
Dept: VASCULAR SURGERY | Facility: CLINIC | Age: 82
End: 2022-05-05
Attending: SURGERY
Payer: COMMERCIAL

## 2022-05-05 VITALS — HEART RATE: 104 BPM | DIASTOLIC BLOOD PRESSURE: 64 MMHG | OXYGEN SATURATION: 97 % | SYSTOLIC BLOOD PRESSURE: 120 MMHG

## 2022-05-05 DIAGNOSIS — I73.9 PAD (PERIPHERAL ARTERY DISEASE) (H): Primary | ICD-10-CM

## 2022-05-05 PROCEDURE — G0463 HOSPITAL OUTPT CLINIC VISIT: HCPCS

## 2022-05-05 PROCEDURE — 99213 OFFICE O/P EST LOW 20 MIN: CPT | Performed by: SURGERY

## 2022-05-05 RX ORDER — ROSUVASTATIN CALCIUM 20 MG/1
20 TABLET, COATED ORAL AT BEDTIME
COMMUNITY
Start: 2022-05-03 | End: 2022-05-13

## 2022-05-05 RX ORDER — METHYLPREDNISOLONE 4 MG
TABLET, DOSE PACK ORAL
COMMUNITY
Start: 2022-04-02 | End: 2022-05-13

## 2022-05-05 RX ORDER — METHOCARBAMOL 500 MG/1
500 TABLET, FILM COATED ORAL 2 TIMES DAILY
COMMUNITY
Start: 2022-04-30 | End: 2022-07-27

## 2022-05-05 RX ORDER — HYDROXYZINE HYDROCHLORIDE 25 MG/1
TABLET, FILM COATED ORAL
COMMUNITY
Start: 2022-05-02 | End: 2022-06-03

## 2022-05-05 ASSESSMENT — PAIN SCALES - GENERAL: PAINLEVEL: SEVERE PAIN (6)

## 2022-05-05 NOTE — PATIENT INSTRUCTIONS
Stop taking your Plavix.     Ankle-Brachial Index (JESSENIA) or Physiologic Test    Description  An ankle-brachial index test is relatively pain free. Blood pressure cuffs of various sizes are placed on your thigh, calf, foot and toes.  Similar to having your blood pressure checked with an arm cuff, as the technician inflates the cuffs, they progressively tighten and are then quickly released.  You may feel some discomfort, but generally for less than 60 seconds for each measurement. You will be asked to remove your socks and shoes and possibly your pants or shorts. Gowns will be provided. It usually takes about 30-60 minutes.   Depending on the initial readings and patient symptoms, you may be asked to perform a light walk on a treadmill.  The technician will apply ultrasound gel, usually warmed for your comfort, to your ankles and wrists. Through the gel, the technician will use a small hand-held device that emits sound waves.  Risks  There are typically no side effects or complications associated with a physiologic study.  How to Prepare  Eat and take medications as usual.  There is no preparation required for an ankle-brachial index (JESSENIA) or physiologic exam.  What Can I Expect After the Test?  The technician will send the ultrasound images to your vascular surgeon for evaluation. Typically, a report is available in 2-3 days. If anything critical is found, it is standard practice to notify the vascular surgeon immediately.  Reference: [email or web address].org/patient-resources/vascular-tests     Lower Extremity Arterial Ultrasound    Description  Ultrasound examinations are painless and easy for the patient. The vascular laboratory will contain a bed and just two or three pieces of equipment. You will be asked to remove pants or shorts and gowns will be provided. It usually takes about 30 minutes.  The technician will tuck a towel under your underpants in the groin. The gel is water-soluble and will not stain your skin  or clothes.  Ultrasound gel, usually warmed for your comfort, will be placed on the inner side of your legs.    Through the gel, the technician will apply to your legs a small hand-held device that emits sound waves.  When the test is completed, the technician will remove excess gel from your legs.    Risks  There are typically no side effects or complications associated with a lower extremity arterial duplex ultrasound.  How to Prepare  Eat and take medications as usual.  There is no preparation required for a lower extremity arterial duplex ultrasound.  What Can I Expect After the Test?  The technician will send the ultrasound images to your vascular surgeon for evaluation. Typically, a report is available in 2-3 days. If anything critical is found, it is standard practice to notify the vascular surgeon immediately.  Reference: https://vascular.org/patient-resources/vascular-tests

## 2022-05-05 NOTE — PROGRESS NOTES
Home care nurse Joi called stating she did not receive a voicemail yesterday regarding dosing and the wife also did not hear back from [facility].  Reviewed chart and it is documented the anticoagulation nurse spoke with the patient's wife.      Home care nurse also noted the patient has picked up the 3 mg warfarin tablets.    CANDIE Dailey, RN  Anticoagulation Clinic

## 2022-05-05 NOTE — PROGRESS NOTES
VASCULAR SURGERY OUTPATIENT CONSULT OR VISIT   VASCULAR SURGEON: Wendy Delgadillo MD    LOCATION:  [facility]    Pee Ayala   Medical Record #:  [id number]  YOB: 1940  Age:  82 year old     Date of Service: 5/5/2022    PRIMARY CARE PROVIDER: Mar Morales      Reason for consultation: Elevation of PAD    IMPRESSION: Patient still recovering after right hip fracture.  In this context unable to assess left leg very well.  Rest pain which she was having before his angiointervention is not present.  Blood flow studies were done in March but he had his fall around then and did not come in for the visit.  At that time his SFA stent was widely patent.  Toe pressures on the left side were only 42 and his ABIs were noncompressible.  Recent GI bleed on supratherapeutic Coumadin.  Cannot afford Xarelto due to the co-pay.  Also on aspirin and Plavix.    RECOMMENDATION: Overall hard to assess due to his fall but presumably has a patent SFA and no left limb issues anymore.  We will see him back in 3 months time at which point hopefully he is moving better.  We will get ABIs with a Escobar Orozco test if he is able to do it and a left leg arterial duplex at that time.  I think he can come off Plavix to lower his risk of bleeding but should be kept on baby aspirin and the Coumadin.  Continue with his statin therapy.    HPI:  Pee yAala is a 82 year old male who was seen today for follow-up of his left SFA stent placed for progression of claudication to rest pain of the left leg in December of last year.  When seen in January he was doing extremely well with no symptoms anymore in the left leg although still having issues with his right leg.  Sometime after that visit he had a fall and fractured his right hip.  While it is repaired he still has chronic pain and is only slowly recovering through rehab.  He is able to get around with a walker at home.    Very recently had a severe and  nosebleed and was found to have an INR of 6.  He is on his Coumadin for atrial fibrillation.  Has not seen a cardiologist for very long time.  That said they have apparently looked into Xarelto and Eliquis and the co-pays are too high.  Compliant with a statin and dual antiplatelet therapy as well.    PHH:    Past Medical History:   Diagnosis Date     ACS (acute coronary syndrome) (H) 6/2/2014     ACS (acute coronary syndrome) (H) 6/2/2014     Actinic keratosis      Actinic keratosis 1/14/2014     Actinic keratosis 1/14/2014     Anticoagulated on Coumadin 12/30/2015     Antiplatelet or antithrombotic long-term use      Atrial fibrillation (H)      Atrial fibrillation (H) 2016     Bone mass 4/26/2017     Chest heaviness 1/23/2019     Chest pain 5/31/2014     Closed fracture of left forearm 2015     Congestive heart failure (H)      Coronary artery disease      Diabetes (H) 2009     Diabetes mellitus (H)      Difficulty in walking(719.7)      Dyslipidemia 8/31/2016     Dyspnea on exertion      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      Encounter for long-term (current) use of insulin (H) 8/11/2016     Esophageal reflux 11/18/2010     Esophageal reflux 11/18/2010     History of angina      HTN (hypertension) 6/30/2009     (Problem list name updated by automated process. Provider to review and confirm.)     Hyperlipidemia LDL goal <100 10/31/2010     Hypertension      Impotence of organic origin      Mixed hyperlipidemia 4/25/2007 April 25, 2007 restarted Zocor today, recheck in 3 months.  August 23, 2007 LDL at 101 with 40 mg, will increase to 80 mg. Recheck  In 3 months.      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      Type II or unspecified type diabetes mellitus with neurological manifestations, not stated as uncontrolled(250.60) (H)      Walking troubles         Past Surgical History:   Procedure Laterality Date     BYPASS GRAFT ARTERY CORONARY N/A 9/10/2014    Procedure: BYPASS GRAFT ARTERY CORONARY;  Surgeon: Bharathi Caraballo MD;  Location: [facility] OR     BYPASS GRAFT ARTERY CORONARY  2016     COLONOSCOPY  1/14/2004     CORONARY ANGIOGRAPHY ADULT ORDER       CV CORONARY ANGIOGRAM N/A 4/4/2019    Procedure: Coronary Angiogram;  Surgeon: Dominik Vega MD;  Location: [facility] Cath Lab;  Service: Cardiology     CV CORONARY ANGIOGRAM N/A 1/6/2021    Procedure: Coronary Angiogram;  Surgeon: Dominik Vega MD;  Location: [facility] Cardiac Cath Lab;  Service: Cardiology     CV LEFT HEART CATHETERIZATION WITHOUT LEFT VENTRICULOGRAM Left 4/4/2019    Procedure: Left Heart Catheterization Without Left Ventriculogram;  Surgeon: Dominik Vega MD;  Location: [facility] Cath Lab;  Service: Cardiology     CV LEFT HEART CATHETERIZATION WITHOUT LEFT VENTRICULOGRAM Left 1/6/2021    Procedure: Left Heart Catheterization Without Left Ventriculogram;  Surgeon: Dominik Vega MD;  Location: [facility] Cardiac Cath Lab;  Service: Cardiology     CV RIGHT HEART CATHETERIZATION Right 4/4/2019    Procedure: Right Heart Catheterization;  Surgeon: Dominik Vega MD;  Location: [facility] Cath Lab;  Service: Cardiology     CV TRANSCATHETER AORTIC VALVE REPLACEMENT N/A 1/12/2021    Procedure: Right transfemoral transcatheter aortic valve  replacement using Magdaleno Dominick 3 size 29mm.  Transthoracic echocardiogram;  Surgeon: Jo Romano MD;  Location: [facility] CARDIAC CATH LAB     ESOPHAGOSCOPY, GASTROSCOPY, DUODENOSCOPY (EGD), COMBINED N/A 1/13/2016    Procedure: COMBINED ESOPHAGOSCOPY, GASTROSCOPY, DUODENOSCOPY (EGD);  Surgeon: Rk Srivastava MD;  Location: [facility]     HEART CATH FEMORAL CANNULIZATION WITH OPEN STANDBY REPAIR AORTIC VALVE N/A 1/12/2021    Procedure: Cardiopulmonary Bypass standby;  Surgeon: Jefferson Sandy MD;  Location: [facility] CARDIAC CATH LAB     IR LOWER EXTREMITY ANGIOGRAM LEFT  12/7/2021     LAPAROSCOPIC CHOLECYSTECTOMY  10/09     MAZE PROCEDURE N/A 9/10/2014    Procedure: MAZE PROCEDURE;  Surgeon: Bharathi Caraballo MD;  Location:  OR     MOHS MICROGRAPHIC PROCEDURE       OPEN REDUCTION INTERNAL FIXATION HIP BIPOLAR Left 4/9/2022    Procedure: HEMIARTHROPLASTY, HIP, BIPOLAR, OPEN REDUCTION INTERNAL FIXATION OF GREATER TROCHANTER;  Surgeon: Jd Larose MD;  Location: [facility] OR     ORTHOPEDIC SURGERY      surgery for fx  Left forearm     OTHER SURGICAL HISTORY Left 2015    forearm sugery     STENT, CORONARY, HUMA  02/2016     VASECTOMY         ALLERGIES:  Amlodipine, Lisinopril, and Adhesive tape    MEDS:    Current Outpatient Medications:      ACCU-CHEK GUIDE test strip, USE 1  TO CHECK GLUCOSE THREE TIMES DAILY, Disp: 300 strip, Rfl: 1     Apoaequorin (PREVAGEN PO), Take 1 tablet by mouth daily , Disp: , Rfl:      blood glucose monitor KIT, 1 kit 2 times daily., Disp: 1 kit, Rfl: 0     carvedilol (COREG) 6.25 MG tablet, Take 6.25 mg by mouth 2 times daily (with meals), Disp: , Rfl:      clopidogrel (PLAVIX) 75 MG tablet, Take 1 tablet (75 mg) by mouth daily Start taking medication the day after the procedure., Disp: 90 tablet, Rfl: 1     clotrimazole (LOTRIMIN) 1 % external cream, Apply to feet twice daily until 1 week after clinical resolution, typically for 4 weeks total, Disp: 85 g, Rfl: 1      empagliflozin (JARDIANCE) 10 MG TABS tablet, Take 1 tablet (10 mg) by mouth daily, Disp: 30 tablet, Rfl: 4     finasteride (PROSCAR) 5 MG tablet, Take 1 tablet (5 mg) by mouth daily, Disp: 90 tablet, Rfl: 3     furosemide (LASIX) 20 MG tablet, TAKE 2 TABLETS BY MOUTH IN THE MORNING AND 1 TABLET IN THE AFTERNOON, Disp: 180 tablet, Rfl: 3     gabapentin (NEURONTIN) 100 MG capsule, Take 1 capsule (100 mg) by mouth At Bedtime, Disp: , Rfl:      hydrOXYzine (ATARAX) 10 MG tablet, Take 1 tablet (10 mg) by mouth every 6 hours as needed for other (adjuvant pain), Disp: 30 tablet, Rfl: 0     hydrOXYzine (ATARAX) 25 MG tablet, TAKE 1 TABLET BY MOUTH THREE TIMES DAILY AS NEEDED, Disp: , Rfl:      insulin aspart prot & aspart (NOVOLOG MIX 70/30 PEN) (70-30) 100 UNIT/ML pen, Inject Subcutaneous 2 times daily (with meals) 33 in AM and 26 in PM, Disp: , Rfl:      Lidocaine (LIDOCARE) 4 % Patch, Place 1 patch onto the skin every 24 hours To prevent lidocaine toxicity, patient should be patch free for 12 hrs daily., Disp: , Rfl:      metFORMIN (GLUCOPHAGE) 500 MG tablet, Take 2 tablets by mouth twice daily, Disp: 360 tablet, Rfl: 0     methocarbamol (ROBAXIN) 500 MG tablet, Take 500 mg by mouth 2 times daily, Disp: , Rfl:      methylPREDNISolone (MEDROL DOSEPAK) 4 MG tablet therapy pack, USE AS DIRECTED 6 TABLETS ON THE FIRST DAY 5 TABLETS ON THE SECOND DAY 4 TABLETS ON THE THIRD DAY ETC TAKE WITH FOOD, Disp: , Rfl:      oxyCODONE-acetaminophen (PERCOCET) 5-325 MG tablet, Take 1-2 tablets by mouth every 4 hours as needed for moderate to severe pain, Disp: 60 tablet, Rfl: 0     polyethylene glycol (MIRALAX) 17 GM/Dose powder, Take 17 g (1 capful) by mouth daily as needed for constipation (opioid induced constipation), Disp: 507 g, Rfl: 0     pramipexole (MIRAPEX) 0.25 MG tablet, Take 1 tablet (0.25 mg) by mouth 2 times daily Takes 4pm and 5;30 pm, Disp: 180 tablet, Rfl: 3     rosuvastatin (CRESTOR) 20 MG tablet, Take 20 mg by mouth  At Bedtime, Disp: , Rfl:      senna-docusate (SENOKOT-S/PERICOLACE) 8.6-50 MG tablet, Take 1 tablet by mouth in the morning and 1 tablet in the evening., Disp: 30 tablet, Rfl: 0     warfarin ANTICOAGULANT (COUMADIN) 3 MG tablet, Take 1 tablet (3 mg) by mouth daily Or as directed., Disp: 90 tablet, Rfl: 0     WARFARIN SODIUM PO, 22 INR  2.5 Cont 3mg every day Next INR 22 INR 3.03  Hold Warfarin , then take 3mg on  and  and 4mg on .  Next INR 22.  , Disp: , Rfl:     SOCIAL HABITS:    History   Smoking Status     Former Smoker     Quit date: 1998   Smokeless Tobacco     Never Used     Social History    Substance and Sexual Activity      Alcohol use: Yes        Alcohol/week: 0.0 standard drinks        Comment: Alcoholic Drinks/day: very rare      History   Drug Use No       FAMILY HISTORY:    Family History   Problem Relation Age of Onset     Diabetes Mother      Hypertension Father      Cerebrovascular Disease Father      Diabetes Maternal Grandmother      Breast Cancer No family hx of      Cancer - colorectal No family hx of      Prostate Cancer No family hx of      C.A.D. No family hx of      Diabetes Type 2  Mother         58 ,  from anesthesia complication.     Heart Disease Father         86  from stroke     No Known Problems Brother         60 years of age.       REVIEW OF SYSTEMS:    A 12 point ROS was reviewed and except for what is listed in the HPI above, all others are negative    PE:  /64 (BP Location: Left arm)   Pulse 104   SpO2 97%   Wt Readings from Last 1 Encounters:   22 72.1 kg (159 lb)     There is no height or weight on file to calculate BMI.    EXAM:  GENERAL: This is a well-developed 82 year old male who appears his stated age  EYES: Grossly normal.  MOUTH: Buccal mucosa normal   MUSCULOSKELETAL: Grossly normal and both lower extremities are intact.  HEME/LYMPH: No lymphedema  NEUROLOGIC: Focally intact, Alert and oriented x 3.    PSYCH: appropriate affect  INTEGUMENT: No open lesions or ulcers        DIAGNOSTIC STUDIES:     Images:  XR Chest 1 View    Result Date: 4/9/2022  EXAM: XR CHEST 1 VIEW LOCATION: [facility] DATE/TIME: 4/9/2022 5:42 AM INDICATION: Trauma Patient with Rib Fracture(s) COMPARISON: CT from 04/08/2022.     IMPRESSION: Heart size within normal limits status post CABG and aortic valve replacement. Left basilar atelectasis and pleural thickening. No visible pneumothorax. Osteopenia. No displaced fractures are visualized by radiograph.    XR Pelvis and Hip Left 2 Views    Result Date: 4/9/2022  EXAM: XR PELVIS AND HIP LEFT 2 VIEWS LOCATION: [facility] DATE/TIME: 04/09/2022, 1:40 PM INDICATION: Left hip postoperative follow-up. COMPARISON: 04/08/2022.     IMPRESSION: 1.  Interval left hip hemiarthroplasty with proximal femur bipolar endoprosthesis. The component is well seated without evidence of complication. Postoperative soft tissue gas about the left hip. Left lateral hip skin staples. 2.  No fracture or joint malalignment. 3.  Bone demineralization.     XR Pelvis and Hip Left 2 Views    Result Date: 4/8/2022  EXAM: XR PELVIS AND HIP LEFT 2 VIEWS LOCATION: [facility] DATE/TIME: 4/8/2022 7:03 PM INDICATION: Fall, hip pain with external rotation and shortening COMPARISON: None.     IMPRESSION: Acute moderately displaced subcapital fracture of the left femur with superior displacement of the humeral shaft. Osteopenia. Mild degenerative changes in both hips.    XR Chest Port 1 View    Result Date: 4/12/2022  EXAM: XR CHEST PORT 1 VIEW LOCATION: [facility] DATE/TIME: 4/12/2022 6:03 AM INDICATION: Trauma Patient with Rib Fracture(s) COMPARISON: 04/11/2022     IMPRESSION: Since 04/11/2022 there appears to be a mild increase in infiltrate and/or atelectasis in the left lung base with associated slight amount of pleural fluid or  thickening. Otherwise the chest is stable no pneumothorax identified. Known rib fractures are not well identified.    XR Chest Port 1 View    Result Date: 4/11/2022  EXAM: XR CHEST PORT 1 VIEW LOCATION: [facility] DATE/TIME: 4/11/2022 6:04 AM INDICATION: Post trauma with rib fractures. Follow-up. COMPARISON: 04/10/2022. Others.     IMPRESSION: No significant change. Lungs remain hypoexpanded. Stable mild basilar atelectasis and costophrenic angle blunting. No pneumothorax. Stable cardiomediastinal silhouette. Sternotomy. TAVR. Prior suspected rib fractures on CT were subtle and not well-seen on radiography.     XR Chest Port 1 View    Result Date: 4/10/2022  EXAM: XR CHEST PORT 1 VIEW LOCATION: [facility] DATE/TIME: 4/10/2022 6:17 AM INDICATION: Trauma Patient with Rib Fracture(s) COMPARISON: 4/9/2022     IMPRESSION: Lungs hypoinflated. Stable left basilar atelectasis. Small left pleural effusion. Stable enlarged cardiac silhouette. Replaced aortic valve. Median sternotomy. The pulmonary vasculature is normal. No pneumothorax.    Chest CT w/o contrast    Result Date: 4/8/2022  EXAM: CT CHEST W/O CONTRAST LOCATION: [facility] DATE/TIME: 4/8/2022 6:30 PM INDICATION: Chest trauma, minor COMPARISON: 12/04/2020 TECHNIQUE: CT chest without IV contrast. Multiplanar reformats were obtained. Dose reduction techniques were used. CONTRAST: None. FINDINGS: LUNGS AND PLEURA: Scarring in the lungs has not changed. There is prominent extrapleural fat deposition. MEDIASTINUM/AXILLAE: A TAVR stent is present. There is a moderate sized hiatal hernia. CORONARY ARTERY CALCIFICATION: Previous intervention (stents or CABG). UPPER ABDOMEN: Cholecystectomy. MUSCULOSKELETAL: Healed rib, clavicle, and humerus fractures are noted. There may be nondisplaced acute fractures of the left anterolateral fifth, seventh, and eighth ribs, though there is no adjacent soft tissue  stranding or fluid. Status post median sternotomy.     IMPRESSION: 1.  There may be nondisplaced acute fractures of the left anterolateral fifth, seventh, and eighth ribs, though there is no adjacent soft tissue stranding or fluid. Correlate for point tenderness. 2.  Interval TAVR. 3.  No other change from the prior study.    Head CT w/o contrast    Result Date: 4/8/2022  EXAM: CT HEAD W/O CONTRAST LOCATION: [facility] DATE/TIME: 4/8/2022 6:30 PM INDICATION: Head trauma, minor (Age >= 65y) COMPARISON: None. TECHNIQUE: Routine CT Head without IV contrast. Multiplanar reformats. Dose reduction techniques were used. FINDINGS: INTRACRANIAL CONTENTS: No evidence of acute intracranial hemorrhage or mass effect. Scattered foci of decreased attenuation within the cerebral hemispheric white matter which are nonspecific, though most commonly ascribed to chronic small vessel ischemic  disease. The ventricles and sulci are prominent consistent with moderate brain parenchymal volume loss. Gray-white matter differentiation is maintained. The basilar cisterns are patent. VISUALIZED ORBITS/SINUSES/MASTOIDS: The globes are unremarkable. The partially imaged paranasal sinuses, mastoid air cells and middle ear cavities are unremarkable. BONES/SOFT TISSUES: The visualized skull base and calvarium are unremarkable.     IMPRESSION:  1.  No evidence of acute intracranial hemorrhage or mass effect. 2.  Mild nonspecific white matter changes. 3.  Moderate brain parenchymal volume loss.      I personally reviewed the images and my interpretation is that his studies from March show a widely patent left SFA stent.  ABIs are noncompressible bilaterally and his toe pressures are normal at 81 mmHg on the right and 42 on the left..    LABS:      Sodium   Date Value Ref Range Status   04/18/2022 140 136 - 145 mmol/L Final   04/12/2022 137 136 - 145 mmol/L Final   04/11/2022 139 136 - 145 mmol/L Final   01/13/2021 135 133 -  144 mmol/L Final   01/12/2021 139 133 - 144 mmol/L Final   01/12/2021 139 133 - 144 mmol/L Final     Urea Nitrogen   Date Value Ref Range Status   04/18/2022 19 8 - 28 mg/dL Final   04/12/2022 17 8 - 28 mg/dL Final   04/11/2022 17 8 - 28 mg/dL Final   01/13/2021 20 7 - 30 mg/dL Final   01/12/2021 19 7 - 30 mg/dL Final   01/12/2021 20 7 - 30 mg/dL Final     Hemoglobin   Date Value Ref Range Status   04/18/2022 11.2 (L) 13.3 - 17.7 g/dL Final   04/15/2022 11.2 (L) 13.3 - 17.7 g/dL Final   04/12/2022 9.9 (L) 13.3 - 17.7 g/dL Final   01/13/2021 13.7 13.3 - 17.7 g/dL Final   01/12/2021 11.1 (L) 13.3 - 17.7 g/dL Final   01/12/2021 12.1 (L) 13.3 - 17.7 g/dL Final     Platelet Count   Date Value Ref Range Status   04/15/2022 252 150 - 450 10e3/uL Final   04/12/2022 183 150 - 450 10e3/uL Final   04/11/2022 150 150 - 450 10e3/uL Final   01/13/2021 141 (L) 150 - 450 10e9/L Final   01/12/2021 113 (L) 150 - 450 10e9/L Final   01/12/2021 130 (L) 150 - 450 10e9/L Final     BNP   Date Value Ref Range Status   01/08/2021 91 (H) 0 - 82 pg/mL Final   10/21/2020 56 0 - 82 pg/mL Final   05/09/2019 96 (H) 0 - 80 pg/mL Final     INR   Date Value Ref Range Status   04/18/2022 3.03 (H) 0.85 - 1.15 Final   04/14/2022 1.95 (H) 0.85 - 1.15 Final   04/13/2022 1.79 (H) 0.85 - 1.15 Final   01/13/2021 1.13 0.86 - 1.14 Final   01/12/2021 1.09 0.86 - 1.14 Final   06/09/2016 1.72 (H) 0.86 - 1.14 Final     INR (External)   Date Value Ref Range Status   05/04/2022 4.7 (A) 0.9 - 1.1 Final           Wendy Delgadillo MD, MD  VASCULAR SURGERY

## 2022-05-05 NOTE — PROGRESS NOTES
[facility] Vascular Clinic        Patient is here for a follow up ultra sound done in March left leg stents.  leg with stent feeling fine just hip hurts. Hip surgery 4/16/22.    Pt is currently taking DM takng plavix,warfrin, ASA statin    There were no vitals taken for this visit. vitals done    The provider has been notified that the patient whant results .     Questions patient would like addressed today are: N/A.    Refills are needed: N/A    Has homecare services and agency name:  Yes-  Cant remember name is disappointed with them.      Kiki Salvador

## 2022-05-05 NOTE — TELEPHONE ENCOUNTER
Left message for pt letting him know it looks like TCU doctor is the one that sent the refill to [facility]. Asked him to follow up with that provider if he is still at the TCU or if he has been discharged then to follow up with the [facility] Pain specialist that has been managing this medication for him

## 2022-05-05 NOTE — TELEPHONE ENCOUNTER
Who told the patient to transfer their opioid pain medications to primary care?  He needs a visit to discuss me taking over this controlled prescription.    Mar Morales, DO

## 2022-05-06 ENCOUNTER — ANTICOAGULATION THERAPY VISIT (OUTPATIENT)
Dept: ANTICOAGULATION | Facility: CLINIC | Age: 82
End: 2022-05-06
Payer: COMMERCIAL

## 2022-05-06 DIAGNOSIS — I48.20 CHRONIC ATRIAL FIBRILLATION (H): Primary | ICD-10-CM

## 2022-05-06 DIAGNOSIS — Z79.01 CHRONIC ANTICOAGULATION: ICD-10-CM

## 2022-05-06 LAB — INR (EXTERNAL): 5.2 (ref 0.9–1.1)

## 2022-05-06 RX ORDER — PHYTONADIONE 5 MG/1
2.5 TABLET ORAL ONCE
Qty: 1 TABLET | Refills: 0 | Status: SHIPPED | OUTPATIENT
Start: 2022-05-06 | End: 2022-05-06

## 2022-05-06 NOTE — PROGRESS NOTES
ANTICOAGULATION MANAGEMENT     Pee Ayala 82 year old male is on warfarin with supratherapeutic INR result. (Goal INR 2.0-3.0)    Recent labs: (last 7 days)     05/06/22  0000   INR 5.2*       ASSESSMENT       Source(s): Chart Review and Patient/Caregiver Call       Warfarin doses taken: Warfarin recently held for supratherapeutic INR which may be affecting INR    Diet: Decreased greens/vitamin K in diet; ongoing change    New illness, injury, or hospitalization: Yes: 5/2/22 [facility] with nosebleed-recent discharge from TCU    Medication/supplement changes: vitamin K X 1 dose-percocet for pain    Signs or symptoms of bleeding or clotting: No    Previous INR: Supratherapeutic    Additional findings: Home care discussed with patient different reasons for why his INR continues to elevate. No reasons found. [facility] consulted. Pt to get vitamin K X 1 dose as soon as possible. Hold warfarin 5/6/22 and 5/7/22. Maintenance dose reduced.       PLAN     Recommended plan for ongoing change(s) affecting INR     Dosing Instructions: hold 2 doses then decrease your warfarin dose (14.3% change) with next INR in 3 days       Summary  As of 5/6/2022    Full warfarin instructions:  5/6: Hold; 5/7: Hold; Otherwise 1.5 mg every Sun, Wed; 3 mg all other days   Next INR check:  5/9/2022             Telephone call with Roberta [phone] home care/facility nurse who agrees to plan and repeated back plan correctly    Orders given to  Homecare nurse/facility to recheck    Education provided: Importance of notifying clinic for changes in medications; a sooner lab recheck maybe needed. and Contact [phone]  with any changes, questions or concerns.     Plan made with [facility] Pharmacist Viviana Andres, RN  Anticoagulation Clinic  5/6/2022    _______________________________________________________________________     Anticoagulation Episode Summary     Current INR goal:  2.0-3.0   TTR:  42.0 % (1 y)   Target end date:   Indefinite   Send INR reminders to:  DEVON ALEXIS    Indications    Chronic atrial fibrillation (H) [I48.20]  Chronic anticoagulation [Z79.01]           Comments:           Anticoagulation Care Providers     Provider Role Specialty Phone number    Jazzy Gil MD Referring Family Medicine [phone]

## 2022-05-06 NOTE — PROGRESS NOTES
INR >/= 5 after holding for >/= 3 days. Vitamin K per protocol.     Only able to find 5 mg tablets so will Rx with 2.5 mg vitamin K x 1. No factors identified for elevated INR; maintenance dose reduced.     Viviana New, ViriD, BCPS

## 2022-05-10 ENCOUNTER — ANTICOAGULATION THERAPY VISIT (OUTPATIENT)
Dept: ANTICOAGULATION | Facility: CLINIC | Age: 82
End: 2022-05-10
Payer: COMMERCIAL

## 2022-05-10 ENCOUNTER — TRANSFERRED RECORDS (OUTPATIENT)
Dept: HEALTH INFORMATION MANAGEMENT | Facility: CLINIC | Age: 82
End: 2022-05-10
Payer: COMMERCIAL

## 2022-05-10 ENCOUNTER — MEDICAL CORRESPONDENCE (OUTPATIENT)
Dept: HEALTH INFORMATION MANAGEMENT | Facility: CLINIC | Age: 82
End: 2022-05-10
Payer: COMMERCIAL

## 2022-05-10 DIAGNOSIS — Z53.9 DIAGNOSIS NOT YET DEFINED: Primary | ICD-10-CM

## 2022-05-10 DIAGNOSIS — Z79.01 CHRONIC ANTICOAGULATION: ICD-10-CM

## 2022-05-10 DIAGNOSIS — I48.20 CHRONIC ATRIAL FIBRILLATION (H): Primary | ICD-10-CM

## 2022-05-10 LAB — INR (EXTERNAL): 1.1 (ref 0.9–1.1)

## 2022-05-10 PROCEDURE — G0180 MD CERTIFICATION HHA PATIENT: HCPCS | Performed by: FAMILY MEDICINE

## 2022-05-10 NOTE — PROGRESS NOTES
ANTICOAGULATION MANAGEMENT     Pee Ayala 82 year old male is on warfarin with subtherapeutic INR result. (Goal INR 2.0-3.0)    Recent labs: (last 7 days)     05/10/22  1500   INR 1.1       ASSESSMENT       Source(s): Chart Review and Home Care/Facility Nurse       Warfarin doses taken: Warfarin taken as instructed. Recent hold 05/02-05/07. Took 2.5mg of Vitamin K on Sunday    Diet: Poor appetite    New illness, injury, or hospitalization: No    Medication/supplement changes: None noted    Signs or symptoms of bleeding or clotting: No    Previous INR: Supratherapeutic    Additional findings: Pt was therapeutic on 3mg in TCU.       PLAN     Recommended plan for temporary change(s) affecting INR     Dosing Instructions: Increase your warfarin dose (16% change) with next INR in 3 days . Will go back to 3mg daily as pt was therapeutic on this dose prior to taking large doses on 04/29-05/01.    Summary  As of 5/10/2022    Full warfarin instructions:  3 mg every day   Next INR check:  5/13/2022             Telephone call with Kwasi home care/facility nurse who verbalizes understanding and agrees to plan    Orders given to  Homecare nurse/facility to recheck    Education provided: Goal range and significance of current result    Plan made with [facility] Pharmacist Ludy Petesr RN  Anticoagulation Clinic  5/10/2022    _______________________________________________________________________     Anticoagulation Episode Summary     Current INR goal:  2.0-3.0   TTR:  41.2 % (1 y)   Target end date:  Indefinite   Send INR reminders to:  [facility]    Indications    Chronic atrial fibrillation (H) [I48.20]  Chronic anticoagulation [Z79.01]           Comments:           Anticoagulation Care Providers     Provider Role Specialty Phone number    Jazzy Gil MD Referring Family Medicine [phone]

## 2022-05-11 ENCOUNTER — TELEPHONE (OUTPATIENT)
Dept: FAMILY MEDICINE | Facility: CLINIC | Age: 82
End: 2022-05-11
Payer: COMMERCIAL

## 2022-05-11 NOTE — TELEPHONE ENCOUNTER
Patient looking for a complete updated medication list.  He says he has gotten numerous lists that all say something different.  He only wants the medications listed that Dr Morales has prescribed and wants him to continue to take  Please mail to patient

## 2022-05-13 ENCOUNTER — ANTICOAGULATION THERAPY VISIT (OUTPATIENT)
Dept: ANTICOAGULATION | Facility: CLINIC | Age: 82
End: 2022-05-13
Payer: COMMERCIAL

## 2022-05-13 ENCOUNTER — TELEPHONE (OUTPATIENT)
Dept: FAMILY MEDICINE | Facility: CLINIC | Age: 82
End: 2022-05-13
Payer: COMMERCIAL

## 2022-05-13 DIAGNOSIS — I25.10 CORONARY ARTERY DISEASE INVOLVING NATIVE CORONARY ARTERY OF NATIVE HEART WITHOUT ANGINA PECTORIS: Primary | ICD-10-CM

## 2022-05-13 DIAGNOSIS — Z79.01 CHRONIC ANTICOAGULATION: ICD-10-CM

## 2022-05-13 DIAGNOSIS — I48.20 CHRONIC ATRIAL FIBRILLATION (H): Primary | ICD-10-CM

## 2022-05-13 LAB — INR (EXTERNAL): 2.6 (ref 0.9–1.1)

## 2022-05-13 RX ORDER — ROSUVASTATIN CALCIUM 20 MG/1
20 TABLET, COATED ORAL AT BEDTIME
Qty: 90 TABLET | Refills: 3 | Status: SHIPPED | OUTPATIENT
Start: 2022-05-13 | End: 2022-07-27

## 2022-05-13 NOTE — PROGRESS NOTES
ANTICOAGULATION MANAGEMENT     Pee Ayala 82 year old male is on warfarin with therapeutic INR result. (Goal INR 2.0-3.0)    Recent labs: (last 7 days)     05/13/22  1425   INR 2.6*       ASSESSMENT       Source(s): Chart Review and Home Care/Facility Nurse       Warfarin doses taken: Warfarin taken as instructed    Diet: Poor oral intake/food/diet since being home from TCU    New illness, injury, or hospitalization: No    Medication/supplement changes: None noted    Signs or symptoms of bleeding or clotting: No    Previous INR: Subtherapeutic    Additional findings: Recent TCU Discharge. Rapid INR rise.        PLAN     Recommended plan for temporary change(s) affecting INR     Dosing Instructions: decrease your warfarin dose (14% change) with next INR in 4 days       Summary  As of 5/13/2022    Full warfarin instructions:  1.5 mg every Sun, Fri; 3 mg all other days   Next INR check:  5/17/2022             Telephone call with Ali home care/facility nurse who agrees to plan and repeated back plan correctly    Orders given to  Homecare nurse/facility to recheck    Education provided: Please call back if any changes to your diet, medications or how you've been taking warfarin, Monitoring for bleeding signs and symptoms and Monitoring for clotting signs and symptoms    Plan made per [facility] anticoagulation protocol    Palma Villalta, RN  Anticoagulation Clinic  5/13/2022    _______________________________________________________________________     Anticoagulation Episode Summary     Current INR goal:  2.0-3.0   TTR:  41.0 % (1 y)   Target end date:  Indefinite   Send INR reminders to:  DEVON ALEXIS    Indications    Chronic atrial fibrillation (H) [I48.20]  Chronic anticoagulation [Z79.01]           Comments:           Anticoagulation Care Providers     Provider Role Specialty Phone number    Jazzy Gil MD Referring Family Medicine [phone]

## 2022-05-13 NOTE — TELEPHONE ENCOUNTER
Reason for Call:  Medication or medication refill:    Do you use a [facility]?  Name of the pharmacy and phone number for the current request:      [facility] - [place], [place] - [address] [place] [address] E  Name of the medication requested:     rosuvastatin (CRESTOR) 20 MG tablet  Other request: na    Can we leave a detailed message on this number? YES    Phone number patient can be reached at: Home number on file [phone] (home)    Best Time: na    Call taken on 5/13/2022 at 1:54 PM by RADHA CLARKE

## 2022-05-13 NOTE — TELEPHONE ENCOUNTER
1. Coronary artery disease involving native coronary artery of native heart without angina pectoris  - rosuvastatin (CRESTOR) 20 MG tablet; Take 1 tablet (20 mg) by mouth At Bedtime  Dispense: 90 tablet; Refill: 3     Crestor is a historical med in our chart. Please confirm that the 20mg strength is the correct dose that he has been taking.    Prescription sent to the pharmacy.    Mar Morales, DO

## 2022-05-17 ENCOUNTER — ANTICOAGULATION THERAPY VISIT (OUTPATIENT)
Dept: ANTICOAGULATION | Facility: CLINIC | Age: 82
End: 2022-05-17
Payer: COMMERCIAL

## 2022-05-17 DIAGNOSIS — Z79.01 CHRONIC ANTICOAGULATION: ICD-10-CM

## 2022-05-17 DIAGNOSIS — E11.42 TYPE 2 DIABETES MELLITUS WITH PERIPHERAL NEUROPATHY (H): ICD-10-CM

## 2022-05-17 DIAGNOSIS — I48.20 CHRONIC ATRIAL FIBRILLATION (H): Primary | ICD-10-CM

## 2022-05-17 LAB — INR (EXTERNAL): 1.3 (ref 0.9–1.1)

## 2022-05-17 RX ORDER — EMPAGLIFLOZIN 10 MG/1
TABLET, FILM COATED ORAL
Qty: 90 TABLET | Refills: 0 | Status: SHIPPED | OUTPATIENT
Start: 2022-05-17 | End: 2022-05-23

## 2022-05-17 NOTE — PROGRESS NOTES
ANTICOAGULATION MANAGEMENT     Pee Ayala 82 year old male is on warfarin with subtherapeutic INR result. (Goal INR 2.0-3.0)    Recent labs: (last 7 days)     05/17/22  1256   INR 1.3*       ASSESSMENT       Source(s): Chart Review and Home Care/Facility Nurse       Warfarin doses taken: Missed dose(s) may be affecting INR    Diet: appetite is improving since last week    New illness, injury, or hospitalization: No    Medication/supplement changes: None noted    Signs or symptoms of bleeding or clotting: No    Previous INR: Therapeutic last visit; previously outside of goal range. Noted rapid rise last week after 3 days of warfarin. Recently reversed with Vit K (5/8). Planned maintenance dose reduction as of last INR on 5/13, however unable to fully assess response to that dose as Pee missed 2 doses of warfarin in the last 4 days    Additional findings: None       PLAN     Recommended plan for temporary change(s) affecting INR     Dosing Instructions: continue your current warfarin dose with next INR in 3 days       Summary  As of 5/17/2022    Full warfarin instructions:  1.5 mg every Sun, Fri; 3 mg all other days   Next INR check:  5/20/2022             Telephone call with Joi home care/facility nurse who verbalizes understanding and agrees to plan    Orders given to  Homecare nurse/facility to recheck    Education provided: Goal range and significance of current result and Importance of taking warfarin as instructed    Plan made per [facility] anticoagulation protocol    Kendal Muniz, RN  Anticoagulation Clinic  5/17/2022    _______________________________________________________________________     Anticoagulation Episode Summary     Current INR goal:  2.0-3.0   TTR:  41.5 % (1 y)   Target end date:  Indefinite   Send INR reminders to:  DEVON ALEXIS    Indications    Chronic atrial fibrillation (H) [I48.20]  Chronic anticoagulation [Z79.01]           Comments:           Anticoagulation Care Providers      Provider Role Specialty Phone number    Jazzy Gil MD Referring Family Medicine [phone]

## 2022-05-18 ENCOUNTER — TELEPHONE (OUTPATIENT)
Dept: FAMILY MEDICINE | Facility: CLINIC | Age: 82
End: 2022-05-18
Payer: COMMERCIAL

## 2022-05-18 ENCOUNTER — TRANSFERRED RECORDS (OUTPATIENT)
Dept: HEALTH INFORMATION MANAGEMENT | Facility: CLINIC | Age: 82
End: 2022-05-18
Payer: COMMERCIAL

## 2022-05-18 NOTE — TELEPHONE ENCOUNTER
[facility] calling to report to Dr. Morales that she saw patient today (house call and visits per patient's insurance plan) - patient has 2 wounds - one is on his left lower leg from a fall. The wound is about 3x2.5 cm. The other wound is under patient's left foot by the small toe, wound is 1x1 cm. Per patient it is painful for both wounds.    Per Jasmyn, patient is diabetic and would like patient to see provider asap this week. She would like if we can reach out to patient to schedule an appt. She does not think patient should wait to be seen.     If provider has any questions, please reach out to Jasmyn at [phone]; otherwise no call back needed.

## 2022-05-19 NOTE — TELEPHONE ENCOUNTER
Incoming call from [place] with [facility] on a 3 way call with patient. Following up on an appt for pt. I relayed messages that clinical assistants reached out twice. Per patient and spouse on the line stated they did not receive any calls.     Scheduled patient with Dr. Morales for tomorrow 05/20 at 3 pm, arrive by 2:40 pm. Patient is aware of appt date and time.

## 2022-05-20 ENCOUNTER — ANTICOAGULATION THERAPY VISIT (OUTPATIENT)
Dept: ANTICOAGULATION | Facility: CLINIC | Age: 82
End: 2022-05-20
Payer: COMMERCIAL

## 2022-05-20 ENCOUNTER — OFFICE VISIT (OUTPATIENT)
Dept: FAMILY MEDICINE | Facility: CLINIC | Age: 82
End: 2022-05-20
Payer: COMMERCIAL

## 2022-05-20 ENCOUNTER — TELEPHONE (OUTPATIENT)
Dept: FAMILY MEDICINE | Facility: CLINIC | Age: 82
End: 2022-05-20

## 2022-05-20 VITALS
SYSTOLIC BLOOD PRESSURE: 104 MMHG | HEART RATE: 85 BPM | HEIGHT: 64 IN | DIASTOLIC BLOOD PRESSURE: 57 MMHG | BODY MASS INDEX: 28.24 KG/M2 | WEIGHT: 165.38 LBS

## 2022-05-20 DIAGNOSIS — M85.9 LOW BONE DENSITY: ICD-10-CM

## 2022-05-20 DIAGNOSIS — L84 CALLUS OF FOOT: ICD-10-CM

## 2022-05-20 DIAGNOSIS — I48.20 CHRONIC ATRIAL FIBRILLATION (H): Primary | ICD-10-CM

## 2022-05-20 DIAGNOSIS — Z87.81 HX OF COMPRESSION FRACTURE OF SPINE: ICD-10-CM

## 2022-05-20 DIAGNOSIS — Z87.81 HX OF FRACTURE OF HIP: ICD-10-CM

## 2022-05-20 DIAGNOSIS — E11.9 TYPE 2 DIABETES, HBA1C GOAL < 8% (H): Primary | ICD-10-CM

## 2022-05-20 DIAGNOSIS — Z79.01 CHRONIC ANTICOAGULATION: ICD-10-CM

## 2022-05-20 DIAGNOSIS — S81.802A WOUND OF LEFT LOWER EXTREMITY, INITIAL ENCOUNTER: ICD-10-CM

## 2022-05-20 LAB — INR (EXTERNAL): 1.3 (ref 0.9–1.1)

## 2022-05-20 PROCEDURE — 99214 OFFICE O/P EST MOD 30 MIN: CPT | Performed by: FAMILY MEDICINE

## 2022-05-20 RX ORDER — MUPIROCIN 20 MG/G
OINTMENT TOPICAL 3 TIMES DAILY
Qty: 30 G | Refills: 0 | Status: SHIPPED | OUTPATIENT
Start: 2022-05-20 | End: 2022-06-03

## 2022-05-20 RX ORDER — GABAPENTIN 600 MG/1
TABLET ORAL
Status: ON HOLD | COMMUNITY
Start: 2022-05-10 | End: 2022-07-12

## 2022-05-20 RX ORDER — PHYTONADIONE 5 MG/1
TABLET ORAL
COMMUNITY
Start: 2022-05-07 | End: 2022-06-03

## 2022-05-20 NOTE — TELEPHONE ENCOUNTER
Called [facility] and [facility].   Per Dr. Morales, pt was seen today and is unsure of the medications/dosages that he is taking. He stated that someone from [facility] is setting up his medications for him. Dr. Morales would like to either get a copy of a current medication list that they are using to fill his meds or to confirm that medication list to the one in his chart. If they are able to, please have them fax a medication list to [phone].

## 2022-05-20 NOTE — PROGRESS NOTES
ANTICOAGULATION MANAGEMENT     Pee Ayala 82 year old male is on warfarin with subtherapeutic INR result. (Goal INR 2.0-3.0)    Recent labs: (last 7 days)     05/20/22  0949   INR 1.3*       ASSESSMENT       Source(s): Chart Review and Home Care/Facility Nurse       Warfarin doses taken: Warfarin taken as instructed    Diet: Not drinking much in the way of fluids. HC RN encouraging increased fluids.    New illness, injury, or hospitalization: Yes: L leg with red, swollen, scabbed area. He will see PCP today to be evaluated for infection.    Medication/supplement changes: Lasix dose reduced from 60mg to 40mg/day.    Signs or symptoms of bleeding or clotting: No    Previous INR: Subtherapeutic    Additional findings: Pt was on 42.5mg/week prior to April hospitalization.       PLAN     Recommended plan for ongoing change(s) affecting INR     Dosing Instructions: Increase your warfarin dose (33.3% change) with next INR in 4 days       Summary  As of 5/20/2022    Full warfarin instructions:  4.5 mg every Tue, Fri; 3 mg all other days   Next INR check:  5/24/2022             Telephone call with [facility] home care/facility nurse who verbalizes understanding and agrees to plan    Orders given to  Homecare nurse/facility to recheck    Education provided: Goal range and significance of current result    Plan made with [facility] Pharmacist Ludy Peters, RN  Anticoagulation Clinic  5/20/2022    _______________________________________________________________________     Anticoagulation Episode Summary     Current INR goal:  2.0-3.0   TTR:  41.5 % (1 y)   Target end date:  Indefinite   Send INR reminders to:  DEVON ALEXIS    Indications    Chronic atrial fibrillation (H) [I48.20]  Chronic anticoagulation [Z79.01]           Comments:           Anticoagulation Care Providers     Provider Role Specialty Phone number    Jazzy Gil MD Referring Family Medicine [phone]

## 2022-05-20 NOTE — PROGRESS NOTES
Assessment & Plan     Attempted to reconcile medication list today, but patient is not sure what he is taking.  He currently has a home nurse setting up his medications, attempted to reach out to her to get current medication list that she is using to fill his prescriptions but unable to connect.  We have left a voicemail and are awaiting a response back.  Patient reports he has different medication lists that each of his doctors.    1. Type 2 diabetes, HbA1C goal < 8% (H)  - OPTOMETRY REFERRAL; Future  - empagliflozin (JARDIANCE) 10 MG TABS tablet; Take 1 tablet (10 mg) by mouth daily  Dispense: 90 tablet; Refill: 3    Weight is been in and out of the hospital in the TCU over the last couple months.  Sugars have been more closely monitored.  He has been instructed to start Jardiance, but it was cost prohibitive.  States the brand formulation was prescribed, but his pharmacist states the generic would be cost reasonable.  Requests prescription for the generic today.  Prescription sent to pharmacy.    He needs to follow-up with his optometrist.  Reminder placed today.    2. Wound of left lower extremity, initial encounter  - mupirocin (BACTROBAN) 2 % external ointment; Apply topically 3 times daily for 14 days  Dispense: 30 g; Refill: 0    I am not concerned about overt cellulitis, but we can start topical Bactroban to help decrease risk of secondary infection.  Reviewed signs of normal healing.    3. Callus of foot  - Orthopedic  Referral; Future    I am concerned about this callus/scab on his foot, risk of developing an ulcer underneath.  I placed a referral to podiatry for further evaluation and management.    4. Low bone density  5. Hx of compression fracture of spine  6. Hx of fracture of hip  Currently seeing endocrinologist NP for diabetes management.  I have sent a staff message over to see if she can also help with secondary osteoporosis work-up.  Previous normal PTH, calcium, and vitamin D.   "Testosterone level was low.  Remote from last TSH check.  He states he is not working on osteoporosis management with [facility], I am wondering if you would be a good candidate to consider for Fosamax or alternative. Awaiting response.       Return in about 2 months (around 7/20/2022) for follow up .    Mar Morales [facility]    Alex Glasgow is a 82 year old who presents for the following health issues    Chief Complaint   Patient presents with     Leg Pain        History of Present Illness       Reason for visit:  Injury to leg  Symptom onset:  1-2 weeks ago    He eats 2-3 servings of fruits and vegetables daily.He consumes 1 sweetened beverage(s) daily.He exercises with enough effort to increase his heart rate 10 to 19 minutes per day.  He exercises with enough effort to increase his heart rate 3 or less days per week.   He is taking medications regularly.       Home health nurse not concerned about leg, but nurse for medicare wellness visit is concerned about leg and his foot.   Fell down going into the house 2 weeks ago from the TCU. 2 steps to get into the house. Fell down on his knees and scraped his leg. Wasn't bothering him, didn't even recognize it. Doesn't feel he was ready to go home.     Jardiance too expensive. Wants generic, hasn't started. Is checking his sugars 4x per day.   Has some low blood sugars, some down to 62. Wakes him up in a cold sweat and very hungry. May hit 300 if checks too close to dinner.     Leg & Back - appointment on Monday @ [facility]    Review of Systems   See HPI above.       Objective    /57 (BP Location: Left arm, Patient Position: Sitting, Cuff Size: Adult Regular)   Pulse 85   Ht 1.626 m (5' 4\")   Wt 75 kg (165 lb 6 oz)   BMI 28.39 kg/m    Body mass index is 28.39 kg/m .  Physical Exam   GENERAL: Pee is a pleasant, non-toxic elderly male, accompanied today by wife.   HEART: Regular rate and rhythm, no murmurs.  LUNGS: Clear " to auscultation bilaterally, unlabored.   ABDOMEN: Soft, non-tender to palpation, no palpable masses.   EXTREMITIES: 2+ pitting edema bilateral lower extremities, scab present left anterior shin, slight erythema surrounding without induration, warmth, no purulent drainage.  There is also a callus/scab on the left ball of foot laterally.  Flaking of the skin and some tinea pedis scaling bilateral feet.  Some onychomycosis present as well.

## 2022-05-23 ENCOUNTER — TELEPHONE (OUTPATIENT)
Dept: FAMILY MEDICINE | Facility: CLINIC | Age: 82
End: 2022-05-23
Payer: COMMERCIAL

## 2022-05-23 ENCOUNTER — TELEPHONE (OUTPATIENT)
Dept: FAMILY MEDICINE | Facility: CLINIC | Age: 82
End: 2022-05-23

## 2022-05-23 NOTE — TELEPHONE ENCOUNTER
Incoming call from nurse with update. Pt had a controlled fall this morning. L hip replacement a couple weeks ago. Vitals are ok. Feet are swollen, more swelling on the L than the right. Lasix were decreased recently. Prior to fall this morning pt reported involuntary jerking of whole body. Do you want pt to be seen today? Nurse is with patient now.     Payton [phone]

## 2022-05-23 NOTE — TELEPHONE ENCOUNTER
I spoke with the nurse this evening because I did not see the message earlier unfortunately.  Payton was very nice in talking to me and did say that this is the second time she has seen him and his feet are definitely a lot more swollen than they were since prior to the Lasix adjustment.  I am not sure if the jerking movement could have been a low blood sugar I know that there has been some adjustments to his medications.  Dr. Rodney had an opening tomorrow morning so I scheduled him at 940 and told Payton to have the family check-in by 920.  Then she can address the other issues with  what to change the Jardiance to since it was not covered as well .  I told the nurse that it is only a 20-minute appointment time and asked if he was slow-moving and she did not think it would be an issue for a short visit

## 2022-05-23 NOTE — TELEPHONE ENCOUNTER
Please print out ALL upcoming appts and a copy of all medications. Patient will .    Pharmacy told patient there is no generic form of Jardiance.  The cost is $141 for 90 day supply. Patient would like Dr Morales to come up with a different medication.      Please call patient to let him know when the forms are ready to  and when Dr Morales prescribes a different medication  Ok to leave a detailed message

## 2022-05-24 ENCOUNTER — ANTICOAGULATION THERAPY VISIT (OUTPATIENT)
Dept: ANTICOAGULATION | Facility: CLINIC | Age: 82
End: 2022-05-24

## 2022-05-24 ENCOUNTER — TRANSFERRED RECORDS (OUTPATIENT)
Dept: HEALTH INFORMATION MANAGEMENT | Facility: CLINIC | Age: 82
End: 2022-05-24

## 2022-05-24 ENCOUNTER — MEDICAL CORRESPONDENCE (OUTPATIENT)
Dept: HEALTH INFORMATION MANAGEMENT | Facility: CLINIC | Age: 82
End: 2022-05-24

## 2022-05-24 ENCOUNTER — OFFICE VISIT (OUTPATIENT)
Dept: FAMILY MEDICINE | Facility: CLINIC | Age: 82
End: 2022-05-24
Payer: COMMERCIAL

## 2022-05-24 VITALS
WEIGHT: 151.56 LBS | SYSTOLIC BLOOD PRESSURE: 130 MMHG | BODY MASS INDEX: 25.88 KG/M2 | DIASTOLIC BLOOD PRESSURE: 66 MMHG | HEART RATE: 89 BPM | HEIGHT: 64 IN

## 2022-05-24 DIAGNOSIS — E11.42 TYPE 2 DIABETES MELLITUS WITH PERIPHERAL NEUROPATHY (H): ICD-10-CM

## 2022-05-24 DIAGNOSIS — R25.2 JERKING MOVEMENTS OF EXTREMITIES: Primary | ICD-10-CM

## 2022-05-24 DIAGNOSIS — I48.20 CHRONIC ATRIAL FIBRILLATION (H): Primary | ICD-10-CM

## 2022-05-24 DIAGNOSIS — Z79.01 CHRONIC ANTICOAGULATION: ICD-10-CM

## 2022-05-24 DIAGNOSIS — I48.20 CHRONIC ATRIAL FIBRILLATION (H): ICD-10-CM

## 2022-05-24 DIAGNOSIS — D53.9 MACROCYTIC ANEMIA: ICD-10-CM

## 2022-05-24 DIAGNOSIS — R79.89 LOW TESTOSTERONE IN MALE: ICD-10-CM

## 2022-05-24 DIAGNOSIS — Z87.81 HX OF COMPRESSION FRACTURE OF SPINE: ICD-10-CM

## 2022-05-24 LAB
ALBUMIN SERPL-MCNC: 2.8 G/DL (ref 3.5–5)
ALP SERPL-CCNC: 165 U/L (ref 45–120)
ALT SERPL W P-5'-P-CCNC: 14 U/L (ref 0–45)
ANION GAP SERPL CALCULATED.3IONS-SCNC: 14 MMOL/L (ref 5–18)
AST SERPL W P-5'-P-CCNC: 24 U/L (ref 0–40)
BILIRUB SERPL-MCNC: 0.7 MG/DL (ref 0–1)
BUN SERPL-MCNC: 14 MG/DL (ref 8–28)
CALCIUM SERPL-MCNC: 9.2 MG/DL (ref 8.5–10.5)
CHLORIDE BLD-SCNC: 103 MMOL/L (ref 98–107)
CO2 SERPL-SCNC: 25 MMOL/L (ref 22–31)
CREAT SERPL-MCNC: 0.8 MG/DL (ref 0.7–1.3)
ERYTHROCYTE [DISTWIDTH] IN BLOOD BY AUTOMATED COUNT: 13.7 % (ref 10–15)
GFR SERPL CREATININE-BSD FRML MDRD: 89 ML/MIN/1.73M2
GLUCOSE BLD-MCNC: 91 MG/DL (ref 70–125)
HBA1C MFR BLD: 6.3 % (ref 0–5.6)
HCT VFR BLD AUTO: 37.3 % (ref 40–53)
HGB BLD-MCNC: 11.8 G/DL (ref 13.3–17.7)
INR BLD: 1.4 (ref 0.9–1.1)
MCH RBC QN AUTO: 33.9 PG (ref 26.5–33)
MCHC RBC AUTO-ENTMCNC: 31.6 G/DL (ref 31.5–36.5)
MCV RBC AUTO: 107 FL (ref 78–100)
PLATELET # BLD AUTO: 226 10E3/UL (ref 150–450)
POTASSIUM BLD-SCNC: 4.3 MMOL/L (ref 3.5–5)
PROT SERPL-MCNC: 5.8 G/DL (ref 6–8)
RBC # BLD AUTO: 3.48 10E6/UL (ref 4.4–5.9)
SODIUM SERPL-SCNC: 142 MMOL/L (ref 136–145)
WBC # BLD AUTO: 7.1 10E3/UL (ref 4–11)

## 2022-05-24 PROCEDURE — 99214 OFFICE O/P EST MOD 30 MIN: CPT | Performed by: FAMILY MEDICINE

## 2022-05-24 PROCEDURE — 85027 COMPLETE CBC AUTOMATED: CPT | Performed by: FAMILY MEDICINE

## 2022-05-24 PROCEDURE — 36415 COLL VENOUS BLD VENIPUNCTURE: CPT | Performed by: FAMILY MEDICINE

## 2022-05-24 PROCEDURE — 80053 COMPREHEN METABOLIC PANEL: CPT | Performed by: FAMILY MEDICINE

## 2022-05-24 PROCEDURE — 85610 PROTHROMBIN TIME: CPT | Performed by: FAMILY MEDICINE

## 2022-05-24 PROCEDURE — 83036 HEMOGLOBIN GLYCOSYLATED A1C: CPT | Performed by: FAMILY MEDICINE

## 2022-05-24 PROCEDURE — 36416 COLLJ CAPILLARY BLOOD SPEC: CPT | Performed by: FAMILY MEDICINE

## 2022-05-24 NOTE — PROGRESS NOTES
Assessment & Plan     1. Jerking movements of extremities  - Adult Neurology  Referral; Future  - Hemoglobin A1c  - Comprehensive metabolic panel (BMP + Alb, Alk Phos, ALT, AST, Total. Bili, TP)  - CBC with platelets    Pee reports 2 distinct episodes of jerking. Not associated with low blood sugar.   Not postictal. No loss of consciousness.   Rule out electrolyte and metabolic abnormalities.   Consult neurology for further evaluation of episodes.     2. 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 Await A1c results.   Will check with pharmacy for cost of Lantus and Novolog as distinct prescriptions.     3. Chronic atrial fibrillation (H)  - INR point of care, Interfaced Result     Due for INR check today.    Return in about 4 months (around 9/16/2022) for medicare wellness exam / sooner as needed .    Mar oMrales, [facility]    36 minutes spent on date of encounter with chart review, patient visit, counseling, documentation, and coordination of care.     Subjective   Pee is a 82 year old who presents for the following health issues     Chief Complaints and History of Present Illnesses   Patient presents with     Fall     Tremors and then fell        HPI     EDEMA: Feet are looking better today. Decreased dose of Lasix last week? Not sure who made the change, thinks nurse made the change? Isn't checking weight at home. Doesn't think weight here is right. Does have a home scale.     TREMORS: Not a tremor, more of a jerking. Wakes up with whole body jerking. Arms, legs, hands, whole body. First time was laying in bed, stayed in bed for the episode, lasted about 5 minutes. The second time he was on his way to the restroom. Body started jerking motions, fell down, went down slowly because wife was there to help. Isn't a tremor or shaking, is an actual jerking. Remains conscious. Blood sugars normal around  "episodes. Whole body, not localized. Denies any incontinence. First time, went back to sleep. Second time was on the floor. Denies any post ictal state (confusion, fatigue). First episode was middle of last week. Second episode was on Sunday this week.     DIABETES: Insurance doesn't adequately cover. Checking blood sugars 4 times a day. Doesn't have glucometer either.   Fasting sugars - between 100-150  11:00ish after eating - 200's  2:00pmish after lunch close to eating - 300 range, uses shot of insulin.   Before bed - back around 100-150 range.  70/30, using 30 units in the morning and bedtime.     Review of Systems   See HPI above.       Objective    /66 (BP Location: Left arm, Patient Position: Left side, Cuff Size: Adult Regular)   Pulse 89   Ht 1.626 m (5' 4\")   Wt 68.7 kg (151 lb 9 oz)   BMI 26.02 kg/m    Body mass index is 26.02 kg/m .  Physical Exam   GENERAL: Pee is a pleasant, non-toxic male, in no acute distress. Accompanied by wife.   HEART: Regular rate and rhythm, no murmurs.  LUNGS: Clear to auscultation bilaterally, unlabored.   NEUROLOGY: No pronator drift, normal finger nose testing, moving extremities without difficulty.      "

## 2022-05-24 NOTE — PROGRESS NOTES
ANTICOAGULATION MANAGEMENT     Pee Ayala 82 year old male is on warfarin with subtherapeutic INR result. (Goal INR 2.0-3.0)    Recent labs: (last 7 days)     05/24/22  1026   INR 1.4*       ASSESSMENT       Source(s): Chart Review    Previous INR was Subtherapeutic    Medication, diet, health changes since last INR chart reviewed; none identified           PLAN     Unable to reach Pee today.    Left message to continue current dose of warfarin 4.5 mg tonight. Request call back for assessment.     Called and spoke with home care nurseJoi and advised of dose change and next INR follow up date.    Follow up required to confirm warfarin dose taken and assess for changes    Yolis Rubio, RN  Anticoagulation Clinic  5/24/2022

## 2022-05-25 ENCOUNTER — TELEPHONE (OUTPATIENT)
Dept: FAMILY MEDICINE | Facility: CLINIC | Age: 82
End: 2022-05-25
Payer: COMMERCIAL

## 2022-05-25 NOTE — TELEPHONE ENCOUNTER
Called and spoke to pt's wife. We compared the list of meds in his chart to the pill bottles that they have.  gabapentin (NEURONTIN) 600 MG tablet  TAKE 1 TABLET BY MOUTH IN THE EVENING FOR 3 DAYS THEN 1 TWICE DAILY FOR 3 DAYS THEN 1 THREE TIMES DAILY THEREAFTER  They do not have any carvedilol. It is on the med list that the nurse uses to set up his meds. Wife also said that the nurse has a list of meds on her computer that she also uses when setting them up.    I tried calling the nurse, Chay, to verify medications but had to leave a message. If she calls back, please verify meds with her.     Apoaequorin (PREVAGEN PO)  carvedilol (COREG) 6.25 MG tablet  finasteride (PROSCAR) 5 MG tablet  furosemide (LASIX) 20 MG tablet  gabapentin (NEURONTIN) 100 MG capsule  gabapentin (NEURONTIN) 600 MG tablet  metFORMIN (GLUCOPHAGE) 500 MG tablet  methocarbamol (ROBAXIN) 500 MG tablet  pramipexole (MIRAPEX) 0.25 MG tablet  rosuvastatin (CRESTOR) 20 MG tablet  WARFARIN SODIUM PO

## 2022-05-26 ENCOUNTER — TRANSFERRED RECORDS (OUTPATIENT)
Dept: HEALTH INFORMATION MANAGEMENT | Facility: CLINIC | Age: 82
End: 2022-05-26
Payer: COMMERCIAL

## 2022-05-26 ENCOUNTER — TELEPHONE (OUTPATIENT)
Dept: FAMILY MEDICINE | Facility: CLINIC | Age: 82
End: 2022-05-26
Payer: COMMERCIAL

## 2022-05-26 NOTE — PROGRESS NOTES
Returned call to patient.  Per patient and his wife, they were not contacted by home care with dosing instructions. Patient thinks he has been taking 6 mg on Tues and Wednesday therefore, advised wife to give reduced doses on Thur/Fri/Sat incase because patient did have pill box set up for 6 mg every day.    Reviewed warfarin dosing with wife who repeated by dosing correctly until next INR check on 5/31/22.    LEONELA DaileyN, RN  Anticoagulation Clinic

## 2022-05-26 NOTE — PROGRESS NOTES
Wife called back and clarified the patient has not been taking 6 mg daily as previously thought but 1 tablet per day which is 3 mg.  Patient did not receive message on Tuesday to take 4.5 mg so he only took 3 mg.      Returned anticoagulation calendar back to previous dosing.  Did not adjust any doses further.  Will wait until INR is checked on Tuesday before further adjustments are made.    LEONELA DaileyN, RN  Anticoagulation Clinic

## 2022-05-26 NOTE — TELEPHONE ENCOUNTER
Called [facility] and left detailed VM.   We received a form via fax requesting to hold Warfarin for 5 days and Plavix for 7 days.   When [facility] calls back, please verify what kind of injection he is getting and where. Also let them know that the pt is not currently on Plavix.  Document and route back to Dr. Morales and she will then fill out the form.

## 2022-05-27 ENCOUNTER — NURSE TRIAGE (OUTPATIENT)
Dept: NURSING | Facility: CLINIC | Age: 82
End: 2022-05-27
Payer: COMMERCIAL

## 2022-05-27 ENCOUNTER — MEDICAL CORRESPONDENCE (OUTPATIENT)
Dept: HEALTH INFORMATION MANAGEMENT | Facility: CLINIC | Age: 82
End: 2022-05-27
Payer: COMMERCIAL

## 2022-05-27 ENCOUNTER — TELEPHONE (OUTPATIENT)
Dept: FAMILY MEDICINE | Facility: CLINIC | Age: 82
End: 2022-05-27
Payer: COMMERCIAL

## 2022-05-27 NOTE — TELEPHONE ENCOUNTER
Incoming call from patient stating [facility] is trying to schedule him surgery. He just saw Dr. Morales on Tuesday and would like to see if Dr. Morales can change that visit to a preop so he can be cleared for surgery. Asked patient if surgery is scheduled or when it is scheduled for. Pt stated [facility] is working with [facility] to see when they can get him scheduled.     Patient would like a call back to let him know if this is possible or does he have to come back in for a preop appt.

## 2022-05-27 NOTE — TELEPHONE ENCOUNTER
These are very different visit types. We would need to complete the preop questionnaire.     We still have not heard from his homecare team to confirm what meds are being set up for him as he was discharged from facilities outside of our healthcare system, I am concerned his list isn't accurate.     Mar Morales, DO

## 2022-05-27 NOTE — TELEPHONE ENCOUNTER
"Received call yesterday afternoon regarding Pee. They called to have her call back to discuss more. I read Ida the following:  Called [facility] and left detailed VM.   We received a form via fax requesting to hold Warfarin for 5 days and Plavix for 7 days.   When [facility] calls back, please verify what kind of injection he is getting and where. Also let them know that the pt is not currently on Plavix.  Document and route back to Dr. Morales and she will then fill out the form.    Ida said to call her with further questions at:  [phone].  She stated \"He is getting a T 11 kyphoplasty, it's a procedure for a compression fracture. Fax:  [phone].\"  Trinity Louise RN  [facility]    Additional Information    Negative: Nursing judgment    Nursing judgment    Protocols used: INFORMATION ONLY CALL - NO TRIAGE-A-OH      "

## 2022-05-27 NOTE — TELEPHONE ENCOUNTER
Patient is calling into clinic to see if provider will authorize him to stop his warfarin asap. Please review message from below. Please advise.

## 2022-05-30 NOTE — PROGRESS NOTES
Addendum    4. Macrocytic anemia  - Folate; Future  - Vitamin B12; Future  - Lab Blood Morphology Pathologist Review; Future  - TSH with free T4 reflex; Future  - Reticulocyte count; Future     Future orders placed to help work up persistent macrocytic anemia.   Unable to add copper as not covered by insurance. Will discuss this option with patient if labs normal.    5. Hx of compression fracture of spine  6. Low testosterone in male  - Adult Endocrinology  Referral; Future     Several spine compression fractures. Low testosterone.  I am concerned for the possibility of secondary osteoporosis.   Recommend meeting with osteoporosis specialist.     7. 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) - addended  - Continuous Blood Gluc  (FREESTYLE DOMI 2 READER) DOROTEO; 1 each once for 1 dose Use to read blood sugars per 's instructions.  Dispense: 1 each; Refill: 0  - Continuous Blood Gluc Sensor (FREESTYLE DOMI 2 SENSOR) MISC; 1 each every 14 days Use 1 sensor every 14 days. Use to read blood sugars per 's instructions.  Dispense: 1 each; Refill: 0    Pee has not been able to  SGLT2 due to cost.  Is using metformin 1000mg BID and Novolog 70/30.  A1c is now well controlled, concern for periodic lows.  He is interested in changing to Lantus daily with mealtime insulin for better blood sugar control, but isn't regularly checking his sugars.   I recommend we proceed with a diagnostic continuous glucose meter to get a better sense of his blood sugar numbers before further adjustment of medications.      Mar Morales, DO

## 2022-05-30 NOTE — RESULT ENCOUNTER NOTE
Please call Pee and wife Payton with lab results as well as other updates. Please also offer to add on Tuesday afternoon at the end of my day for an updated preop for ortho.    Lab Results:   1. Pee's kidney function and electrolytes are normal.   2. His A1c is very well controlled. I do not think he needs an additional medication at this time. I am concerned about risk of low blood sugars with his drop in A1c. I recommend we proceed with a continuous glucose meter to get a better sense of his blood sugar patterns. We can discuss adjusting his insulin regimen based on these results. I have sent in a prescription for this device.   3. Pee's hemoglobin continues to be mildly low with an elevated MCV. I have added on some future labs to further investigate this persistent macrocytic anemia. We can draw this at next appointment. The rest of the blood cell counts look okay.     Other Updates:   1. I spoke with Agueda Kwok, the diabetes specialist. She does not do osteoporosis management. With Pee's recurrent compression fractures and low testosterone level, I would like him to see an osteoporosis specialist. I have placed this referral.   2. We received paperwork from [facility] about holding Plavix and Warfarin. Plavix is no longer on his medication list. This was discontinued by the vascular surgeon on 5/5/22. This reinforces the importance of knowing exactly what medication list the home nurse is using to set up his medications, and the importance of knowing exactly which medications Pee is using. We cannot rely on our list until it is cross referenced with the list home care is using and the lists from [facility] and the out of network TCU. Please try again to contact the home care nurse for what list she is using currently and have Pee and Payton bring in all of Pee's current prescription bottles to his preop visit.    I will also send a copy of this documentation to patient via results review.    Mar BROWN  Andrew, DO

## 2022-05-31 ENCOUNTER — ANTICOAGULATION THERAPY VISIT (OUTPATIENT)
Dept: ANTICOAGULATION | Facility: CLINIC | Age: 82
End: 2022-05-31
Payer: COMMERCIAL

## 2022-05-31 ENCOUNTER — TELEPHONE (OUTPATIENT)
Dept: FAMILY MEDICINE | Facility: CLINIC | Age: 82
End: 2022-05-31
Payer: COMMERCIAL

## 2022-05-31 ENCOUNTER — MEDICAL CORRESPONDENCE (OUTPATIENT)
Dept: HEALTH INFORMATION MANAGEMENT | Facility: CLINIC | Age: 82
End: 2022-05-31
Payer: COMMERCIAL

## 2022-05-31 DIAGNOSIS — I48.20 CHRONIC ATRIAL FIBRILLATION (H): Primary | ICD-10-CM

## 2022-05-31 DIAGNOSIS — Z79.01 CHRONIC ANTICOAGULATION: ICD-10-CM

## 2022-05-31 LAB — INR (EXTERNAL): 1.3 (ref 0.9–1.1)

## 2022-05-31 NOTE — PROGRESS NOTES
"ANTICOAGULATION MANAGEMENT     Pee Ayala 82 year old male is on warfarin with subtherapeutic INR result. (Goal INR 2.0-3.0)    Recent labs: (last 7 days)     05/31/22  0000   INR 1.3*       ASSESSMENT       Source(s): Chart Review and Home Care/Facility Nurse       Warfarin doses taken: Less warfarin taken than planned which may be affecting INR - Home Care nurse states that the pt wife reports pt took 4.5mg on Friday; 3mg all other days of the week, after speaking with the pt and his wife they state that the pt has only been taking 3mg daily since last INR, updated [facility] calendar to reflect.     Diet: No new diet changes identified    New illness, injury, or hospitalization: No    Medication/supplement changes: None noted    Signs or symptoms of bleeding or clotting: No    Previous INR: Subtherapeutic    Additional findings: Upcoming surgery/procedure 6/7/22 for \"T 11 kyphoplasty\" at [facility]. Per EMR Dr. Morales ok'd a 5-day warfarin hold without bridge for the procedure. Patient has pre-op scheduled on 6/3 but will need to start 5-day warfarin hold prior to preop appointment. If patient is not medically cleared for procedure he would need to restart warfarin.        PLAN     Recommended plan for temporary change(s) affecting INR     Dosing Instructions: Patient will take 6mg of warfarin tonight, 3mg of warfarin tomororw.. He will start 5-day hold on 6/2/22 in preparation for procedure on 6/7/22. Advised if ok with provider to restart warfarin he can begin taking 4.5mg Tuesday, Thursday & Saturday; 3mg all other days of the week.  with next INR on 6/10/22 with home care.     Summary  As of 5/31/2022    Full warfarin instructions:  5/31: 6 mg; 6/2: Hold; 6/3: Hold; 6/4: Hold; 6/5: Hold; 6/6: Hold; Otherwise 4.5 mg every Tue, Thu, Sat; 3 mg all other days   Next INR check:  6/10/2022             Telephone call with  [facility] nurse, Pee & Fay (his wife) who verbalizes understanding and " agrees to plan    Orders given to  Homecare nurse/facility to recheck    Education provided: Goal range and significance of current result, Importance of therapeutic range, Importance of following up at instructed interval, Importance of taking warfarin as instructed, Importance of notifying clinic of upcoming surgeries and procedures 2 weeks in advance and Contact [phone]  with any changes, questions or concerns.     Plan made with [facility] Pharmacist Ludy Guallpa RN  Anticoagulation Clinic  5/31/2022    _______________________________________________________________________     Anticoagulation Episode Summary     Current INR goal:  2.0-3.0   TTR:  41.4 % (1 y)   Target end date:  Indefinite   Send INR reminders to:  [facility]    Indications    Chronic atrial fibrillation (H) [I48.20]  Chronic anticoagulation [Z79.01]           Comments:           Anticoagulation Care Providers     Provider Role Specialty Phone number    Jazzy Gil MD Referring Family Medicine [phone]

## 2022-05-31 NOTE — TELEPHONE ENCOUNTER
Called and spoke to wife Fay. Informed her of note from provider. Scheduled for a pre op on Friday.

## 2022-05-31 NOTE — PROGRESS NOTES
LUIS-PROCEDURAL ANTICOAGULATION  MANAGEMENT    ASSESSMENT     Warfarin interruption plan for back surgery on 2022.    Indication for Anticoagulation: Atrial Fibrillation      [id number] = 5 (Hypertension, Age >= 75, Diabetes and Vascular- CABG)      Luis-Procedure Risk stratification for thromboembolism: moderate (2017 ACC periprocedure pathway for NVAF Expert Consensus)    NVAF: 2017 ACC periprocedure pathway for NVAF advises NO bridge for moderate risk stratification ([id number] score 5-6 or hx of stroke, TIA or systemic embolism > 3 months ago) with increased risk of bleeding     RECOMMENDATION       Pre-Procedure:  o Hold warfarin for 5 days, until after procedure startin2022   o No Bridge      Post-Procedure:  o Resume warfarin dose if okay with provider doing procedure on night of procedure, 2022 PM: 6mg  o Recheck INR ~ 7 days after resuming warfarin       Plan routed to referring provider for approval  ?   Ludy Talbert [facility]

## 2022-05-31 NOTE — TELEPHONE ENCOUNTER
----- Message from Mar Morales DO sent at 5/30/2022  2:54 PM CDT -----  Please call Pee and wife Payton with lab results as well as other updates. Please also offer to add on Tuesday afternoon at the end of my day for an updated preop for ortho.    Lab Results:   1. Pee's kidney function and electrolytes are normal.   2. His A1c is very well controlled. I do not think he needs an additional medication at this time. I am concerned about risk of low blood sugars with his drop in A1c. I recommend we proceed with a continuous glucose meter to get a better sense of his blood sugar patterns. We can discuss adjusting his insulin regimen based on these results. I have sent in a prescription for this device.   3. Pee's hemoglobin continues to be mildly low with an elevated MCV. I have added on some future labs to further investigate this persistent macrocytic anemia. We can draw this at next appointment. The rest of the blood cell counts look okay.     Other Updates:   1. I spoke with Agueda Kwok, the diabetes specialist. She does not do osteoporosis management. With Pee's recurrent compression fractures and low testosterone level, I would like him to see an osteoporosis specialist. I have placed this referral.   2. We received paperwork from [facility] about holding Plavix and Warfarin. Plavix is no longer on his medication list. This was discontinued by the vascular surgeon on 5/5/22. This reinforces the importance of knowing exactly what medication list the home nurse is using to set up his medications, and the importance of knowing exactly which medications Pee is using. We cannot rely on our list until it is cross referenced with the list home care is using and the lists from [facility] and the out of network TCU. Please try again to contact the home care nurse for what list she is using currently and have Pee and Payton bring in all of Pee's current prescription bottles to his preop visit.    I will also  send a copy of this documentation to patient via results review.    aMr Morales, DO

## 2022-05-31 NOTE — TELEPHONE ENCOUNTER
Called home health and requested a current med list be faxed to clinic. I spoke to susi and she states she will have someone from the office fax one over.

## 2022-06-03 ENCOUNTER — MEDICAL CORRESPONDENCE (OUTPATIENT)
Dept: HEALTH INFORMATION MANAGEMENT | Facility: CLINIC | Age: 82
End: 2022-06-03

## 2022-06-03 ENCOUNTER — ANTICOAGULATION THERAPY VISIT (OUTPATIENT)
Dept: ANTICOAGULATION | Facility: CLINIC | Age: 82
End: 2022-06-03

## 2022-06-03 ENCOUNTER — OFFICE VISIT (OUTPATIENT)
Dept: FAMILY MEDICINE | Facility: CLINIC | Age: 82
End: 2022-06-03
Payer: COMMERCIAL

## 2022-06-03 VITALS
SYSTOLIC BLOOD PRESSURE: 121 MMHG | OXYGEN SATURATION: 99 % | HEART RATE: 93 BPM | HEIGHT: 64 IN | WEIGHT: 164.13 LBS | BODY MASS INDEX: 28.02 KG/M2 | DIASTOLIC BLOOD PRESSURE: 73 MMHG

## 2022-06-03 DIAGNOSIS — I48.20 CHRONIC ATRIAL FIBRILLATION (H): ICD-10-CM

## 2022-06-03 DIAGNOSIS — Z79.01 CHRONIC ANTICOAGULATION: ICD-10-CM

## 2022-06-03 DIAGNOSIS — I25.10 CORONARY ARTERY DISEASE INVOLVING NATIVE CORONARY ARTERY OF NATIVE HEART WITHOUT ANGINA PECTORIS: ICD-10-CM

## 2022-06-03 DIAGNOSIS — I48.20 CHRONIC ATRIAL FIBRILLATION (H): Primary | ICD-10-CM

## 2022-06-03 DIAGNOSIS — S22.080G COMPRESSION FRACTURE OF T11 VERTEBRA WITH DELAYED HEALING, SUBSEQUENT ENCOUNTER: ICD-10-CM

## 2022-06-03 DIAGNOSIS — Z01.818 PREOP GENERAL PHYSICAL EXAM: Primary | ICD-10-CM

## 2022-06-03 DIAGNOSIS — D53.9 MACROCYTIC ANEMIA: ICD-10-CM

## 2022-06-03 LAB
BASOPHILS # BLD AUTO: 0.1 10E3/UL (ref 0–0.2)
BASOPHILS NFR BLD AUTO: 1 %
EOSINOPHIL # BLD AUTO: 0.1 10E3/UL (ref 0–0.7)
EOSINOPHIL NFR BLD AUTO: 2 %
ERYTHROCYTE [DISTWIDTH] IN BLOOD BY AUTOMATED COUNT: 13.7 % (ref 10–15)
FOLATE SERPL-MCNC: 7.8 NG/ML
HCT VFR BLD AUTO: 37 % (ref 40–53)
HGB BLD-MCNC: 11.7 G/DL (ref 13.3–17.7)
IMM GRANULOCYTES # BLD: 0 10E3/UL
IMM GRANULOCYTES NFR BLD: 0 %
INR BLD: 1.3 (ref 0.9–1.1)
LYMPHOCYTES # BLD AUTO: 1.4 10E3/UL (ref 0.8–5.3)
LYMPHOCYTES NFR BLD AUTO: 20 %
MCH RBC QN AUTO: 33.9 PG (ref 26.5–33)
MCHC RBC AUTO-ENTMCNC: 31.6 G/DL (ref 31.5–36.5)
MCV RBC AUTO: 107 FL (ref 78–100)
MONOCYTES # BLD AUTO: 0.5 10E3/UL (ref 0–1.3)
MONOCYTES NFR BLD AUTO: 7 %
NEUTROPHILS # BLD AUTO: 4.8 10E3/UL (ref 1.6–8.3)
NEUTROPHILS NFR BLD AUTO: 70 %
NRBC # BLD AUTO: 0 10E3/UL
NRBC BLD AUTO-RTO: 0 /100
PLATELET # BLD AUTO: 261 10E3/UL (ref 150–450)
RBC # BLD AUTO: 3.45 10E6/UL (ref 4.4–5.9)
RETICS # AUTO: 0.08 10E6/UL (ref 0.01–0.11)
RETICS/RBC NFR AUTO: 2.4 % (ref 0.8–2.7)
TSH SERPL DL<=0.005 MIU/L-ACNC: 1.01 UIU/ML (ref 0.3–5)
VIT B12 SERPL-MCNC: 311 PG/ML (ref 213–816)
WBC # BLD AUTO: 6.8 10E3/UL (ref 4–11)

## 2022-06-03 PROCEDURE — 85045 AUTOMATED RETICULOCYTE COUNT: CPT | Performed by: FAMILY MEDICINE

## 2022-06-03 PROCEDURE — 36415 COLL VENOUS BLD VENIPUNCTURE: CPT | Performed by: FAMILY MEDICINE

## 2022-06-03 PROCEDURE — 82607 VITAMIN B-12: CPT | Performed by: FAMILY MEDICINE

## 2022-06-03 PROCEDURE — 84443 ASSAY THYROID STIM HORMONE: CPT | Performed by: FAMILY MEDICINE

## 2022-06-03 PROCEDURE — 85610 PROTHROMBIN TIME: CPT | Performed by: FAMILY MEDICINE

## 2022-06-03 PROCEDURE — U0003 INFECTIOUS AGENT DETECTION BY NUCLEIC ACID (DNA OR RNA); SEVERE ACUTE RESPIRATORY SYNDROME CORONAVIRUS 2 (SARS-COV-2) (CORONAVIRUS DISEASE [COVID-19]), AMPLIFIED PROBE TECHNIQUE, MAKING USE OF HIGH THROUGHPUT TECHNOLOGIES AS DESCRIBED BY CMS-2020-01-R: HCPCS | Performed by: FAMILY MEDICINE

## 2022-06-03 PROCEDURE — U0005 INFEC AGEN DETEC AMPLI PROBE: HCPCS | Performed by: FAMILY MEDICINE

## 2022-06-03 PROCEDURE — 99215 OFFICE O/P EST HI 40 MIN: CPT | Performed by: FAMILY MEDICINE

## 2022-06-03 PROCEDURE — 99417 PROLNG OP E/M EACH 15 MIN: CPT | Performed by: FAMILY MEDICINE

## 2022-06-03 PROCEDURE — 85025 COMPLETE CBC W/AUTO DIFF WBC: CPT | Performed by: FAMILY MEDICINE

## 2022-06-03 PROCEDURE — 82746 ASSAY OF FOLIC ACID SERUM: CPT | Performed by: FAMILY MEDICINE

## 2022-06-03 RX ORDER — OXYCODONE HYDROCHLORIDE 5 MG/1
5-10 TABLET ORAL EVERY 6 HOURS PRN
COMMUNITY
Start: 2022-06-02 | End: 2022-06-13

## 2022-06-03 RX ORDER — PRAMIPEXOLE DIHYDROCHLORIDE 0.25 MG/1
0.5 TABLET ORAL DAILY
Qty: 180 TABLET | Refills: 3
Start: 2022-06-03 | End: 2022-07-27

## 2022-06-03 RX ORDER — CARVEDILOL 6.25 MG/1
6.25 TABLET ORAL 2 TIMES DAILY WITH MEALS
Qty: 180 TABLET | Refills: 3 | Status: SHIPPED | OUTPATIENT
Start: 2022-06-03 | End: 2022-07-15

## 2022-06-03 NOTE — TELEPHONE ENCOUNTER
1. Chronic atrial fibrillation (H)  2. Coronary artery disease involving native coronary artery of native heart without angina pectoris  - carvedilol (COREG) 6.25 MG tablet; Take 1 tablet (6.25 mg) by mouth 2 times daily (with meals)  Dispense: 180 tablet; Refill: 3     Refills sent to the pharmacy.    Mar Morales, DO

## 2022-06-03 NOTE — PROGRESS NOTES
ANTICOAGULATION MANAGEMENT     Pee Ayala 82 year old male is on warfarin with subtherapeutic INR result. (Goal INR 2.0-3.0)    Recent labs: (last 7 days)     06/03/22  0959   INR 1.3*       ASSESSMENT       Source(s): Chart Review and Patient/Caregiver Call       Warfarin doses taken: holding for surgery tuesday 6/7    Diet: No new diet changes identified    New illness, injury, or hospitalization: No    Medication/supplement changes: None noted    Signs or symptoms of bleeding or clotting: No    Previous INR: Subtherapeutic    Additional findings: Upcoming surgery/procedure 6/7 hold until after surgery. if ok with surgeon resume with 6 mg that evening, then usual dose       PLAN     Recommended plan for no diet, medication or health factor changes affecting INR     Dosing Instructions:  Warfarin until after surgery  Resume with 6 mg on 6/7 with surgeon's ok, then usual dose, with next INR 6/14     Summary  As of 6/3/2022    Full warfarin instructions:  6/3: Hold; 6/4: Hold; 6/5: Hold; 6/6: Hold; 6/7: 6 mg; Otherwise 4.5 mg every Tue, Thu, Sat; 3 mg all other days   Next INR check:  6/14/2022             Telephone call with Pee who verbalizes understanding and agrees to plan    Lab visit scheduled    Education provided: None required    Plan made per [facility] anticoagulation protocol    Yousuf Madison RN  Anticoagulation Clinic  6/3/2022    _______________________________________________________________________     Anticoagulation Episode Summary     Current INR goal:  2.0-3.0   TTR:  40.6 % (1 y)   Target end date:  Indefinite   Send INR reminders to:  [facility]    Indications    Chronic atrial fibrillation (H) [I48.20]  Chronic anticoagulation [Z79.01]           Comments:           Anticoagulation Care Providers     Provider Role Specialty Phone number    Jazzy Gil MD Referring Family Medicine [phone]

## 2022-06-03 NOTE — PROGRESS NOTES
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 10.0 - 15.0 %    Platelet Count 261 150 - 450 10e3/uL    % Neutrophils 70 %    % Lymphocytes 20 %    % Monocytes 7 %    % Eosinophils 2 %    % Basophils 1 %    % Immature Granulocytes 0 %    NRBCs per 100 WBC 0 <1 /100    Absolute Neutrophils 4.8 1.6 - 8.3 10e3/uL    Absolute Lymphocytes 1.4 0.8 - 5.3 10e3/uL    Absolute Monocytes 0.5 0.0 - 1.3 10e3/uL    Absolute Eosinophils 0.1 0.0 - 0.7 10e3/uL    Absolute Basophils 0.1 0.0 - 0.2 10e3/uL    Absolute Immature Granulocytes 0.0 <=0.4 10e3/uL    Absolute NRBCs 0.0 10e3/uL   Reticulocyte count   Result Value Ref Range    % Reticulocyte 2.4 0.8 - 2.7 %    Absolute Reticulocyte 0.083 0.010 - 0.110 10e6/uL   Asymptomatic COVID-19 Virus (Coronavirus) by PCR Nose    Specimen: Nose; Swab   Result Value Ref Range    SARS CoV2 PCR Negative Negative, Testing sent to reference lab.  Results will be returned via unsolicited result         No EKG this visit, completed in the last 90 days.    Revised Cardiac Risk Index (RCRI):  The patient has the following serious cardiovascular risks for perioperative complications:   - Coronary Artery Disease (MI, positive stress test, angina, Qs on EKG) = 1 point   - Congestive Heart Failure (pulmonary edema, PND, s3 charlene, CXR with pulmonary congestion, basilar rales) = 1 point   - Diabetes Mellitus (on Insulin) = 1 point     RCRI Interpretation: 3 points: Class IV (high risk - >11% complication rate)    Estimated Functional Capacity: Duke Activity Status Index (DASI) score: 4.6 mets         Signed Electronically by: Mar Morales DO  Copy of this evaluation report is provided to requesting physician.

## 2022-06-03 NOTE — PATIENT INSTRUCTIONS
Please call [phone] to schedule with an osteoporosis specialist.     Please try to  the dexcom continuous glucose monitor so we can better evaluate the fluctuations in your blood sugar.         Preparing for Your Surgery  Getting started  A nurse will call you to review your health history and instructions. They will give you an arrival time based on your scheduled surgery time. Please be ready to share:    Your doctor's clinic name and phone number    Your medical, surgical and anesthesia history    A list of allergies and sensitivities    A list of medicines, including herbal treatments and over-the-counter drugs    Whether the patient has a legal guardian (ask how to send us the papers in advance)  Please tell us if you're pregnant--or if there's any chance you might be pregnant. Some surgeries may injure a fetus (unborn baby), so they require a pregnancy test. Surgeries that are safe for a fetus don't always need a test, and you can choose whether to have one.   If you have a child who's having surgery, please ask for a copy of Preparing for Your Child's Surgery.    Preparing for surgery    Within 30 days of surgery: Have a pre-op exam (sometimes called an H&P, or History and Physical). This can be done at a clinic or pre-operative center.  ? If you're having a , you may not need this exam. Talk to your care team.    At your pre-op exam, talk to your care team about all medicines you take. If you need to stop any medicines before surgery, ask when to start taking them again.  ? We do this for your safety. Many medicines can make you bleed too much during surgery. Some change how well surgery (anesthesia) drugs work.    Call your insurance company to let them know you're having surgery. (If you don't have insurance, call [phone].)    Call your clinic if there's any change in your health. This includes signs of a cold or flu (sore throat, runny nose, cough, rash, fever). It also  includes a scrape or scratch near the surgery site.    If you have questions on the day of surgery, call your hospital or surgery center.  COVID testing  You may need to be tested for COVID-19 before having surgery. If so, we will give you instructions.  Eating and drinking guidelines  For your safety: Unless your surgeon tells you otherwise, follow the guidelines below.    Eat and drink as usual until 8 hours before surgery. After that, no food or milk.    Drink clear liquids until 2 hours before surgery. These are liquids you can see through, like water, Gatorade and Propel Water. You may also have black coffee and tea (no cream or milk).    Nothing by mouth within 2 hours of surgery. This includes gum, candy and breath mints.    If you drink alcohol: Stop drinking it the night before surgery.    If your care team tells you to take medicine on the morning of surgery, it's okay to take it with a sip of water.  Preventing infection    Shower or bathe the night before and morning of your surgery. Follow the instructions your clinic gave you. (If no instructions, use regular soap.)    Don't shave or clip hair near your surgery site. We'll remove the hair if needed.    Don't smoke or vape the morning of surgery. You may chew nicotine gum up to 2 hours before surgery. A nicotine patch is okay.  ? Note: Some surgeries require you to completely quit smoking and nicotine. Check with your surgeon.    Your care team will make every effort to keep you safe from infection. We will:  ? Clean our hands often with soap and water (or an alcohol-based hand rub).  ? Clean the skin at your surgery site with a special soap that kills germs.  ? Give you a special gown to keep you warm. (Cold raises the risk of infection.)  ? Wear special hair covers, masks, gowns and gloves during surgery.  ? Give antibiotic medicine, if prescribed. Not all surgeries need antibiotics.  What to bring on the day of surgery    Photo ID and insurance  card    Copy of your health care directive, if you have one    Glasses and hearing aides (bring cases)  ? You can't wear contacts during surgery    Inhaler and eye drops, if you use them (tell us about these when you arrive)    CPAP machine or breathing device, if you use them    A few personal items, if spending the night    If you have . . .  ? A pacemaker, ICD (cardiac defibrillator) or other implant: Bring the ID card.  ? An implanted stimulator: Bring the remote control.  ? A legal guardian: Bring a copy of the certified (court-stamped) guardianship papers.  Please remove any jewelry, including body piercings. Leave jewelry and other valuables at home.  If you're going home the day of surgery    You must have a responsible adult drive you home. They should stay with you overnight as well.    If you don't have someone to stay with you, and you aren't safe to go home alone, we may keep you overnight. Insurance often won't pay for this.  After surgery  If it's hard to control your pain or you need more pain medicine, please call your surgeon's office.  Questions?   If you have any questions for your care team, list them here: _________________________________________________________________________________________________________________________________________________________________________ ____________________________________ ____________________________________ ____________________________________  For informational purposes only. Not to replace the advice of your health care provider. Copyright   2003, 2019 [facility]. All rights reserved. Clinically reviewed by Saba Rae MD. [employer] [id number] - REV 07/21.    Before Your Procedure or Hospital Admission  Testing for COVID-19  Thank you for choosing [facility] for your health care needs.The COVID-19 pandemic is a very challenging time for everyone.   Our goal is to keep you and our team here at [facility] safe and healthy. We've  "taken many steps to make this happen. For example:    We test and screen our staff, care teams and patients for COVID-19.    Everyone at [facility] must wear a mask and stay 6 feet apart.    We are limiting hospital and clinic visitors.  Before you come in  You must get tested for COVID-19, even if you've been vaccinated.    If you're going home on the same day as your procedure (surgery):  ? 1 to 2 days before your procedure, take an at-home, rapid antigen test. You can buy these at many pharmacy stores. Or you can order free, at-home tests at [email or web address]. If you can't find an at-home, rapid antigen test, please schedule a PCR test with [facility] by calling [facility], or visiting [email or web address]. We can't accept tests that are more than 4 days old.  ? If your test is negative, please take a photo of the test. Show the photo to the nurse when you come in for your procedure.  ? If your test is positive, please see the \"If your test shows you have COVID-19\" section on this page.    If you're staying overnight in the hospital:  ? 4 days before your procedure, please get a   PCR test from a lab.  ? To schedule a PCR test with [facility], call [phone]. Or, visit   [email or web address].  ? If your test is negative, please ask the testing location to fax your results to us at [phone].  ? If your test is positive, please see the \"If your test shows you have COVID-19\" section below.  After your test and before your procedure, please follow these safetyguidelines:    Limit trips outside your home.    Limit the number of people you see.    Always wear a face covering outside your home.    Use social distancing. Stay 6 feet away from others whenever you can.    Wash your hands often.  If your test shows you have COVID-19  If your test is positive, please tell your surgeon's office right away. A positive test means that you have COVID-19.  We'll " probably have to postpone your admission, surgery or procedure. Your care team will discuss this with you. After that, we'll let you know what to do and when you can re-schedule.  If your test shows you DON'T have COVID-19  Even if your test is negative, you can still get COVID-19. It's rare, but sometimes the test result is wrong. You could also catch the virus after taking the test.   There's a very small chance that you could catch COVID-19 in the hospital or surgery center. [facility] has taken many steps to prevent this from happening.   Possible surgery delay   Like you, we want your surgery to happen when it's scheduled. But sometimes the hospital is so full that it's not safe for you to have your surgery. This is especially true during the pandemic. Your surgery may need to be re-scheduled at a later date. If thishappens, we will call and tell you.   Day of your surgery or procedure    Please come wearing a face covering that covers both your nose and mouth.    When you arrive, we'll ask you some questions to find out if you've had any exposures to COVID-19, or have any signs of COVID-19.    No matter where you took a test, we'll need to have the results. Please ask your testing location to fax the results to us at [phone]. If you took a rapid antigen at-home test, you can bring a photo of the results with you to your procedure.    Ask your care team if you can have visitors. All visitors must wear face coverings and will be screened for exposure to, or signs of, COVID-19.    The rules for visitors change often, depending on how much the virus is spreading. To learn more, see Visiting a Loved One in the Hospital during the COVID-19 Outbreak.  Please call your care team, hospital or surgery center if you have any questions. We thank you for your understanding and for choosing [facility] for your care.   Questions and answers  Does it matter how I get tested for COVID-19?  Yes. If the plan is  for you to go home on the same day as your procedure, you can take a rapid antigen, at-home test 1 to 2 days before you come in. If your test is negative, please take a photo of your test. Show it to the nurse when you come to the hospital. If you can't find a rapid antigen, at-home test, you can take a PCR test at a lab instead.  If the plan is for you to stay in the hospital after your surgery, you'll need to get a PCR test from a lab 4 days before your procedure. Ask the testing location to fax your results to [phone].  If we don't get your results, we may have to delay or cancel your treatment.  Do I need to get tested at [facility]?  No. However, if you need a PCR test from a lab, we recommend using an [facility] lab. We'll get the results more quickly and easily that way. To schedule a test, please call [phone]. Or, visit [email or web address].  For informational purposes only. Not to replace the advice of your health care provider. Copyright   2020 [facility]. All rights reserved. Clinically reviewed by Infection Prevention and the [facility] COVID-19 Clinical Team. [employer] [id number] - Rev 05/26/22.    How to Take Your Medication Before Surgery  - Take all of your medications before surgery except as noted below  - HOLD (do not take) your LASIX (FUROSEMIDE) on the morning of surgery.   - HOLD (do not take) your METFORMIN on the morning of surgery.  - HOLD (do not take) warfarin for 5 days before surgery.  - HOLD (do not take) insulin on the morning of surgery.   - STOP taking all vitamins and herbal supplements until after surgery.

## 2022-06-03 NOTE — PATIENT INSTRUCTIONS
Preparing for Your Surgery  Getting started  A nurse will call you to review your health history and instructions. They will give you an arrival time based on your scheduled surgery time. Please be ready to share:    Your doctor's clinic name and phone number    Your medical, surgical and anesthesia history    A list of allergies and sensitivities    A list of medicines, including herbal treatments and over-the-counter drugs    Whether the patient has a legal guardian (ask how to send us the papers in advance)  Please tell us if you're pregnant--or if there's any chance you might be pregnant. Some surgeries may injure a fetus (unborn baby), so they require a pregnancy test. Surgeries that are safe for a fetus don't always need a test, and you can choose whether to have one.   If you have a child who's having surgery, please ask for a copy of Preparing for Your Child's Surgery.    Preparing for surgery    Within 30 days of surgery: Have a pre-op exam (sometimes called an H&P, or History and Physical). This can be done at a clinic or pre-operative center.  ? If you're having a , you may not need this exam. Talk to your care team.    At your pre-op exam, talk to your care team about all medicines you take. If you need to stop any medicines before surgery, ask when to start taking them again.  ? We do this for your safety. Many medicines can make you bleed too much during surgery. Some change how well surgery (anesthesia) drugs work.    Call your insurance company to let them know you're having surgery. (If you don't have insurance, call [phone].)    Call your clinic if there's any change in your health. This includes signs of a cold or flu (sore throat, runny nose, cough, rash, fever). It also includes a scrape or scratch near the surgery site.    If you have questions on the day of surgery, call your hospital or surgery center.  COVID testing  You may need to be tested for COVID-19 before having  surgery. If so, we will give you instructions.  Eating and drinking guidelines  For your safety: Unless your surgeon tells you otherwise, follow the guidelines below.    Eat and drink as usual until 8 hours before surgery. After that, no food or milk.    Drink clear liquids until 2 hours before surgery. These are liquids you can see through, like water, Gatorade and Propel Water. You may also have black coffee and tea (no cream or milk).    Nothing by mouth within 2 hours of surgery. This includes gum, candy and breath mints.    If you drink alcohol: Stop drinking it the night before surgery.    If your care team tells you to take medicine on the morning of surgery, it's okay to take it with a sip of water.  Preventing infection    Shower or bathe the night before and morning of your surgery. Follow the instructions your clinic gave you. (If no instructions, use regular soap.)    Don't shave or clip hair near your surgery site. We'll remove the hair if needed.    Don't smoke or vape the morning of surgery. You may chew nicotine gum up to 2 hours before surgery. A nicotine patch is okay.  ? Note: Some surgeries require you to completely quit smoking and nicotine. Check with your surgeon.    Your care team will make every effort to keep you safe from infection. We will:  ? Clean our hands often with soap and water (or an alcohol-based hand rub).  ? Clean the skin at your surgery site with a special soap that kills germs.  ? Give you a special gown to keep you warm. (Cold raises the risk of infection.)  ? Wear special hair covers, masks, gowns and gloves during surgery.  ? Give antibiotic medicine, if prescribed. Not all surgeries need antibiotics.  What to bring on the day of surgery    Photo ID and insurance card    Copy of your health care directive, if you have one    Glasses and hearing aides (bring cases)  ? You can't wear contacts during surgery    Inhaler and eye drops, if you use them (tell us about these when  you arrive)    CPAP machine or breathing device, if you use them    A few personal items, if spending the night    If you have . . .  ? A pacemaker, ICD (cardiac defibrillator) or other implant: Bring the ID card.  ? An implanted stimulator: Bring the remote control.  ? A legal guardian: Bring a copy of the certified (court-stamped) guardianship papers.  Please remove any jewelry, including body piercings. Leave jewelry and other valuables at home.  If you're going home the day of surgery    You must have a responsible adult drive you home. They should stay with you overnight as well.    If you don't have someone to stay with you, and you aren't safe to go home alone, we may keep you overnight. Insurance often won't pay for this.  After surgery  If it's hard to control your pain or you need more pain medicine, please call your surgeon's office.  Questions?   If you have any questions for your care team, list them here: _________________________________________________________________________________________________________________________________________________________________________ ____________________________________ ____________________________________ ____________________________________  For informational purposes only. Not to replace the advice of your health care provider. Copyright   2003, 2019 [facility]. All rights reserved. Clinically reviewed by Saba Rae MD. [employer] 547522 - REV 07/21.    Before Your Procedure or Hospital Admission  Testing for COVID-19  Thank you for choosing [facility] for your health care needs.The COVID-19 pandemic is a very challenging time for everyone.   Our goal is to keep you and our team here at [facility] safe and healthy. We've taken many steps to make this happen. For example:    We test and screen our staff, care teams and patients for COVID-19.    Everyone at [facility] must wear a mask and stay 6 feet apart.    We are limiting  "hospital and clinic visitors.  Before you come in  You must get tested for COVID-19, even if you've been vaccinated.    If you're going home on the same day as your procedure (surgery):  ? 1 to 2 days before your procedure, take an at-home, rapid antigen test. You can buy these at many pharmacy stores. Or you can order free, at-home tests at [employer]/tests. If you can't find an at-home, rapid antigen test, please schedule a PCR test with "[facility] by calling [employer], or visiting [email or web address]. We can't accept tests that are more than 4 days old.  ? If your test is negative, please take a photo of the test. Show the photo to the nurse when you come in for your procedure.  ? If your test is positive, please see the \"If your test shows you have COVID-19\" section on this page.    If you're staying overnight in the hospital:  ? 4 days before your procedure, please get a   PCR test from a lab.  ? To schedule a PCR test with "[facility] call [phone]. Or, visit   [email or web address]/[email or web address].  ? If your test is negative, please ask the testing location to fax your results to us at [phone].  ? If your test is positive, please see the \"If your test shows you have COVID-19\" section below.  After your test and before your procedure, please follow these safetyguidelines:    Limit trips outside your home.    Limit the number of people you see.    Always wear a face covering outside your home.    Use social distancing. Stay 6 feet away from others whenever you can.    Wash your hands often.  If your test shows you have COVID-19  If your test is positive, please tell your surgeon's office right away. A positive test means that you have COVID-19.  We'll probably have to postpone your admission, surgery or procedure. Your care team will discuss this with you. After that, we'll let you know what to do and when you can re-schedule.  If your test shows you DON'T have " COVID-19  Even if your test is negative, you can still get COVID-19. It's rare, but sometimes the test result is wrong. You could also catch the virus after taking the test.   There's a very small chance that you could catch COVID-19 in the hospital or surgery center. [facility] has taken many steps to prevent this from happening.   Possible surgery delay   Like you, we want your surgery to happen when it's scheduled. But sometimes the hospital is so full that it's not safe for you to have your surgery. This is especially true during the pandemic. Your surgery may need to be re-scheduled at a later date. If thishappens, we will call and tell you.   Day of your surgery or procedure    Please come wearing a face covering that covers both your nose and mouth.    When you arrive, we'll ask you some questions to find out if you've had any exposures to COVID-19, or have any signs of COVID-19.    No matter where you took a test, we'll need to have the results. Please ask your testing location to fax the results to us at [phone]. If you took a rapid antigen at-home test, you can bring a photo of the results with you to your procedure.    Ask your care team if you can have visitors. All visitors must wear face coverings and will be screened for exposure to, or signs of, COVID-19.    The rules for visitors change often, depending on how much the virus is spreading. To learn more, see Visiting a Loved One in the Hospital during the COVID-19 Outbreak.  Please call your care team, hospital or surgery center if you have any questions. We thank you for your understanding and for choosing [facility] for your care.   Questions and answers  Does it matter how I get tested for COVID-19?  Yes. If the plan is for you to go home on the same day as your procedure, you can take a rapid antigen, at-home test 1 to 2 days before you come in. If your test is negative, please take a photo of your test. Show it to the nurse  when you come to the hospital. If you can't find a rapid antigen, at-home test, you can take a PCR test at a lab instead.  If the plan is for you to stay in the hospital after your surgery, you'll need to get a PCR test from a lab 4 days before your procedure. Ask the testing location to fax your results to [phone].  If we don't get your results, we may have to delay or cancel your treatment.  Do I need to get tested at [facility]?  No. However, if you need a PCR test from a lab, we recommend using an [facility] lab. We'll get the results more quickly and easily that way. To schedule a test, please call [phone]. Or, visit [email or web address].  For informational purposes only. Not to replace the advice of your health care provider. Copyright   2020 [facility]. All rights reserved. Clinically reviewed by In[facility] and the [facility] COVID-19 Clinical Team. [employer] [id number] - Rev 05/26/22.    How to Take Your Medication Before Surgery  - Take all of your medications before surgery except as noted below  - HOLD (do not take) your LASIX (FUROSEMIDE) on the morning of surgery.   - HOLD (do not take) your METFORMIN on the morning of surgery.  - HOLD (do not take) warfarin for 5 days before surgery  - HOLD (do not take) your insulin on the morning of surgery.  - STOP taking all vitamins and herbal supplements 14 days before surgery.

## 2022-06-04 LAB — SARS-COV-2 RNA RESP QL NAA+PROBE: NEGATIVE

## 2022-06-06 LAB
PATH REPORT.COMMENTS IMP SPEC: NORMAL
PATH REPORT.COMMENTS IMP SPEC: NORMAL
PATH REPORT.FINAL DX SPEC: NORMAL
PATH REPORT.RELEVANT HX SPEC: NORMAL

## 2022-06-07 ENCOUNTER — MEDICAL CORRESPONDENCE (OUTPATIENT)
Dept: HEALTH INFORMATION MANAGEMENT | Facility: CLINIC | Age: 82
End: 2022-06-07
Payer: COMMERCIAL

## 2022-06-07 NOTE — RESULT ENCOUNTER NOTE
Patient updated by [employer] message with lab results.       Qi Glasgow,  Your labs have returned and look good. Hemoglobin is mildy low, but stable. No identifiable cause for this mild, macrocytic anemia. We can discuss further at your next appointment. I hope your procedure goes well tomorrow.  Mar Morales, DO

## 2022-06-09 ENCOUNTER — MEDICAL CORRESPONDENCE (OUTPATIENT)
Dept: HEALTH INFORMATION MANAGEMENT | Facility: CLINIC | Age: 82
End: 2022-06-09

## 2022-06-10 ENCOUNTER — OFFICE VISIT (OUTPATIENT)
Dept: NEUROLOGY | Facility: CLINIC | Age: 82
End: 2022-06-10
Payer: COMMERCIAL

## 2022-06-10 VITALS
HEART RATE: 84 BPM | RESPIRATION RATE: 18 BRPM | BODY MASS INDEX: 28.15 KG/M2 | SYSTOLIC BLOOD PRESSURE: 98 MMHG | WEIGHT: 164 LBS | DIASTOLIC BLOOD PRESSURE: 66 MMHG

## 2022-06-10 DIAGNOSIS — E11.42 TYPE 2 DIABETES MELLITUS WITH PERIPHERAL NEUROPATHY (H): ICD-10-CM

## 2022-06-10 DIAGNOSIS — R25.3 JERKING: ICD-10-CM

## 2022-06-10 DIAGNOSIS — E53.8 LOW SERUM VITAMIN B12: Primary | ICD-10-CM

## 2022-06-10 DIAGNOSIS — G62.9 NEUROPATHY: ICD-10-CM

## 2022-06-10 PROCEDURE — 99205 OFFICE O/P NEW HI 60 MIN: CPT | Performed by: PSYCHIATRY & NEUROLOGY

## 2022-06-10 RX ORDER — LANOLIN ALCOHOL/MO/W.PET/CERES
1000 CREAM (GRAM) TOPICAL DAILY
Qty: 90 TABLET | Refills: 1 | Status: SHIPPED | OUTPATIENT
Start: 2022-06-10 | End: 2022-07-18

## 2022-06-10 NOTE — NURSING NOTE
"Pee Ayala is a 82 year old male who presents for:  Chief Complaint   Patient presents with     NEUROPATHY     Consult          Initial Vitals:  BP 98/66   Pulse 84   Resp 18   Wt 74.4 kg (164 lb)   BMI 28.15 kg/m   Estimated body mass index is 28.15 kg/m  as calculated from the following:    Height as of 6/3/22: 1.626 m (5' 4\").    Weight as of this encounter: 74.4 kg (164 lb).. Body surface area is 1.83 meters squared. BP completed using cuff size: wrist cuff  Data Unavailable    Nursing Comments:     Lorna Faria, [facility]    "
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
## 2022-06-12 ENCOUNTER — MEDICAL CORRESPONDENCE (OUTPATIENT)
Dept: HEALTH INFORMATION MANAGEMENT | Facility: CLINIC | Age: 82
End: 2022-06-12

## 2022-06-13 ENCOUNTER — OFFICE VISIT (OUTPATIENT)
Dept: FAMILY MEDICINE | Facility: CLINIC | Age: 82
End: 2022-06-13
Payer: COMMERCIAL

## 2022-06-13 ENCOUNTER — ANTICOAGULATION THERAPY VISIT (OUTPATIENT)
Dept: ANTICOAGULATION | Facility: CLINIC | Age: 82
End: 2022-06-13

## 2022-06-13 VITALS
HEART RATE: 91 BPM | DIASTOLIC BLOOD PRESSURE: 78 MMHG | OXYGEN SATURATION: 96 % | HEIGHT: 64 IN | SYSTOLIC BLOOD PRESSURE: 139 MMHG | WEIGHT: 164.2 LBS | BODY MASS INDEX: 28.03 KG/M2

## 2022-06-13 DIAGNOSIS — E53.8 LOW SERUM VITAMIN B12: ICD-10-CM

## 2022-06-13 DIAGNOSIS — L89.892: Primary | ICD-10-CM

## 2022-06-13 DIAGNOSIS — I48.20 CHRONIC ATRIAL FIBRILLATION (H): Primary | ICD-10-CM

## 2022-06-13 DIAGNOSIS — G62.9 NEUROPATHY: ICD-10-CM

## 2022-06-13 DIAGNOSIS — E11.42 TYPE 2 DIABETES MELLITUS WITH PERIPHERAL NEUROPATHY (H): ICD-10-CM

## 2022-06-13 DIAGNOSIS — I48.20 CHRONIC ATRIAL FIBRILLATION (H): ICD-10-CM

## 2022-06-13 DIAGNOSIS — Z79.01 CHRONIC ANTICOAGULATION: ICD-10-CM

## 2022-06-13 DIAGNOSIS — R25.3 JERKING: ICD-10-CM

## 2022-06-13 LAB
AMMONIA PLAS-SCNC: 14 UMOL/L (ref 11–35)
INR BLD: 1.2 (ref 0.9–1.1)
TOTAL PROTEIN SERUM FOR ELP: 6.3 G/DL (ref 6–8)

## 2022-06-13 PROCEDURE — 84425 ASSAY OF VITAMIN B-1: CPT | Mod: 90 | Performed by: FAMILY MEDICINE

## 2022-06-13 PROCEDURE — 84155 ASSAY OF PROTEIN SERUM: CPT | Performed by: FAMILY MEDICINE

## 2022-06-13 PROCEDURE — 86334 IMMUNOFIX E-PHORESIS SERUM: CPT | Performed by: PATHOLOGY

## 2022-06-13 PROCEDURE — 99213 OFFICE O/P EST LOW 20 MIN: CPT | Performed by: FAMILY MEDICINE

## 2022-06-13 PROCEDURE — 84165 PROTEIN E-PHORESIS SERUM: CPT | Performed by: PATHOLOGY

## 2022-06-13 PROCEDURE — 85610 PROTHROMBIN TIME: CPT | Performed by: FAMILY MEDICINE

## 2022-06-13 PROCEDURE — 82140 ASSAY OF AMMONIA: CPT | Performed by: FAMILY MEDICINE

## 2022-06-13 PROCEDURE — 36415 COLL VENOUS BLD VENIPUNCTURE: CPT | Performed by: FAMILY MEDICINE

## 2022-06-13 PROCEDURE — 99000 SPECIMEN HANDLING OFFICE-LAB: CPT | Performed by: FAMILY MEDICINE

## 2022-06-13 ASSESSMENT — PAIN SCALES - GENERAL: PAINLEVEL: MODERATE PAIN (4)

## 2022-06-13 NOTE — PROGRESS NOTES
Assessment & Plan     Pressure injury of foot, stage 2, unspecified laterality (H)  Pressure wounds lateral aspects of bilateral heels and patient with known diabetic neuropathy.  No current signs of infection but will place urgent podiatry referral in case they feel debridement is warranted.  Wound care referral also placed for ongoing management.  Recommended using compression stockings to help mobilize fluid and prevent infection if he is able.  He is currently using cushion supports under his calves while laying in bed to prevent ongoing friction.  - Orthopedic  Referral  - Wound Care Referral    Chronic atrial fibrillation (H)  Recheck INR today, follow-up per Coumadin clinic recommendations  - INR point of care, Interfaced Result    Low serum vitamin B12  Jerking  Neuropathy  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)  Labs placed per Dr. Andersen, neurology.  - Vitamin B1 whole blood  - Protein electrophoresis  - Protein Immunofixation Serum      Katie [facility]on[facility] [employer]    Alex Glasgow is a 82 year old who presents for the following health issues  accompanied by his spouse.  Chief Complaint   Patient presents with     Blisters on heels      Bilateral- x1 week        History of Present Illness       Reason for visit:  Blisters on feet    He eats 2-3 servings of fruits and vegetables daily.He consumes 1 sweetened beverage(s) daily. He exercises with enough effort to increase his heart rate 3 or less days per week.   He is taking medications regularly.     Patient primarily sits in his recliner, uses a wheeled walker to ambulate.  He has a hospital grade bed and when he sleeps alone to the bed or recliner he uses his heels to hoist himself back upward.  Last week his home care nurse noticed drainage of his right heel.  He had large blisters identified on the bilateral heels that were weeping bloody/yellow fluid.  He describes it as a burning pain  "and has known peripheral neuropathy from his diabetes.  He has compression stockings at home that he does not wear routinely.  He denies fevers, chills, nausea, vomiting.  He is due for repeat INR today because he is on Coumadin for atrial fibrillation/TAVR.      Objective    /78 (BP Location: Left arm, Patient Position: Sitting, Cuff Size: Adult Regular)   Pulse 91   Ht 1.626 m (5' 4\")   Wt 74.5 kg (164 lb 3.2 oz)   SpO2 96%   BMI 28.18 kg/m    Body mass index is 28.18 kg/m .  Physical Exam   GENERAL: healthy, alert and no distress  CV: 2+ bilateral lower extremity pitting edema to mid shin left slightly greater than right.  SKIN: Approximately 4 cm roughed blister with underlying ulceration with slight fat exposed layer on right, lateral aspects of bilateral heels  NEURO: Decreased sensation throughout both feet bilaterally    Results for orders placed or performed in visit on 06/13/22 (from the past 24 hour(s))   Protein electrophoresis    Narrative    The following orders were created for panel order Protein electrophoresis.  Procedure                               Abnormality         Status                     ---------                               -----------         ------                     Total Protein, Serum for...[[id number]]                      In process                 Protein Electrophoresis,...[[id number]]                      In process                   Please view results for these tests on the individual orders.     *Note: Due to a large number of results and/or encounters for the requested time period, some results have not been displayed. A complete set of results can be found in Results Review.         "

## 2022-06-13 NOTE — PROGRESS NOTES
ANTICOAGULATION MANAGEMENT     Pee Ayala 82 year old male is on warfarin with subtherapeutic INR result. (Goal INR 2.0-3.0)    Recent labs: (last 7 days)     06/13/22  1112   INR 1.2*       ASSESSMENT       Source(s): Chart Review and Patient/Caregiver Call       Warfarin doses taken: Warfarin recently held for Spinal Kypoplastry 6/7/22 which may be affecting INR. Patient did not restart  Warfarin until 6/9, 2 days post procedure    Diet: No new diet changes identified    New illness, injury, or hospitalization: No, has chronic heel wounds.     Medication/supplement changes: Vitamain B12, started today, not expected to interact with Warfarin    Signs or symptoms of bleeding or clotting: No    Previous INR: Subtherapeutic    Additional findings: Procedure went well.       PLAN     Recommended plan for temporary change(s) affecting INR     Dosing Instructions: 6/13: Booster dose of 1.5 mgs, with next INR in 4 days       Summary  As of 6/13/2022    Full warfarin instructions:  6/13: 4.5 mg; Otherwise 4.5 mg every Tue, Thu, Sat; 3 mg all other days   Next INR check:  6/17/2022             Telephone call with  Fay who verbalizes understanding and agrees to plan    FRANCISCO Ahumada contacted for follow up INR  6/17    Education provided: Please call back if any changes to your diet, medications or how you've been taking warfarin, Goal range and significance of current result and Importance of notifying clinic for changes in medications; a sooner lab recheck maybe needed.    Plan made per [facility] anticoagulation protocol    Merari Abad RN  Anticoagulation Clinic  6/13/2022    _______________________________________________________________________     Anticoagulation Episode Summary     Current INR goal:  2.0-3.0   TTR:  38.3 % (1 y)   Target end date:  Indefinite   Send INR reminders to:  DEVON ALEXIS    Indications    Chronic atrial fibrillation (H) [I48.20]  Chronic anticoagulation [Z79.01]           Comments:            Anticoagulation Care Providers     Provider Role Specialty Phone number    Jazzy Gil MD Referring Family Medicine [phone]

## 2022-06-16 ENCOUNTER — MEDICAL CORRESPONDENCE (OUTPATIENT)
Dept: HEALTH INFORMATION MANAGEMENT | Facility: CLINIC | Age: 82
End: 2022-06-16
Payer: COMMERCIAL

## 2022-06-16 LAB
ALBUMIN PERCENT: 57.1 % (ref 51–67)
ALBUMIN SERPL ELPH-MCNC: 3.6 G/DL (ref 3.2–4.7)
ALPHA 1 PERCENT: 3.7 % (ref 2–4)
ALPHA 2 PERCENT: 14.8 % (ref 5–13)
ALPHA1 GLOB SERPL ELPH-MCNC: 0.2 G/DL (ref 0.1–0.3)
ALPHA2 GLOB SERPL ELPH-MCNC: 0.9 G/DL (ref 0.4–0.9)
B-GLOBULIN SERPL ELPH-MCNC: 0.9 G/DL (ref 0.7–1.2)
BETA PERCENT: 14.4 % (ref 10–17)
GAMMA GLOB SERPL ELPH-MCNC: 0.6 G/DL (ref 0.6–1.4)
GAMMA GLOBULIN PERCENT: 10 % (ref 9–20)
PATH ICD:: ABNORMAL
PATH ICD:: NORMAL
PROT PATTERN SERPL ELPH-IMP: ABNORMAL
PROT PATTERN SERPL IFE-IMP: NORMAL
REVIEWING PATHOLOGIST: ABNORMAL
REVIEWING PATHOLOGIST: NORMAL
TOTAL PROTEIN SERUM FOR ELP (SYNCED VALUE): 6.3 G/DL
VIT B1 PYROPHOSHATE BLD-SCNC: 119 NMOL/L

## 2022-06-17 ENCOUNTER — ANTICOAGULATION THERAPY VISIT (OUTPATIENT)
Dept: ANTICOAGULATION | Facility: CLINIC | Age: 82
End: 2022-06-17
Payer: COMMERCIAL

## 2022-06-17 DIAGNOSIS — Z79.01 CHRONIC ANTICOAGULATION: ICD-10-CM

## 2022-06-17 DIAGNOSIS — I48.20 CHRONIC ATRIAL FIBRILLATION (H): Primary | ICD-10-CM

## 2022-06-17 LAB — INR (EXTERNAL): 1.6 (ref 0.9–1.1)

## 2022-06-17 NOTE — PROGRESS NOTES
ANTICOAGULATION MANAGEMENT     Pee Ayala 82 year old male is on warfarin with subtherapeutic INR result. (Goal INR 2.0-3.0)    Recent labs: (last 7 days)     06/17/22  1553   INR 1.6*       ASSESSMENT       Source(s): Chart Review, Patient/Caregiver Call and Home Care/Facility Nurse       Warfarin doses taken: home care could not verify the dose or find documenation with dosing.  Spoke with the wife Fay who stated she alternated 1 tablet and 1 1/2 tablets.  Warfarin was recently held for a procedure    Diet: No new diet changes identified    New illness, injury, or hospitalization: No    Medication/supplement changes: None noted    Signs or symptoms of bleeding or clotting: No    Previous INR: Subtherapeutic    Additional findings: Wife, Fay stated she doesn't have the dosing instructions from last week so she couldn't verify warfarin given       PLAN     Recommended plan for no diet, medication or health factor changes affecting INR     Dosing Instructions: continue your current warfarin dose with next INR in 1 week. Did not boost as patient potentially already had a boost earlier in the week and the INR has risen since Monday.      Summary  As of 6/17/2022    Full warfarin instructions:  4.5 mg every Tue, Thu, Sat; 3 mg all other days   Next INR check:  6/24/2022             Telephone call with wife Fay and Lorna home care/facility nurse who verbalizes understanding and agrees to plan    Orders given to  Homecare nurse/facility to recheck    Education provided: Goal range and significance of current result, Importance of therapeutic range, Importance of following up at instructed interval and Importance of taking warfarin as instructed    Plan made per [facility] anticoagulation protocol    Yolis Rubio, RN  Anticoagulation Clinic  6/17/2022    _______________________________________________________________________     Anticoagulation Episode Summary     Current INR goal:  2.0-3.0   TTR:  38.4  % (1 y)   Target end date:  Indefinite   Send INR reminders to:  DEVON ALEXIS    Indications    Chronic atrial fibrillation (H) [I48.20]  Chronic anticoagulation [Z79.01]           Comments:           Anticoagulation Care Providers     Provider Role Specialty Phone number    Jazzy Gil MD Referring Family Medicine [phone]

## 2022-06-21 ENCOUNTER — OFFICE VISIT (OUTPATIENT)
Dept: VASCULAR SURGERY | Facility: CLINIC | Age: 82
End: 2022-06-21
Attending: FAMILY MEDICINE
Payer: COMMERCIAL

## 2022-06-21 VITALS — DIASTOLIC BLOOD PRESSURE: 66 MMHG | HEART RATE: 72 BPM | SYSTOLIC BLOOD PRESSURE: 104 MMHG | TEMPERATURE: 97.9 F

## 2022-06-21 DIAGNOSIS — L89.892: ICD-10-CM

## 2022-06-21 PROCEDURE — 99213 OFFICE O/P EST LOW 20 MIN: CPT | Performed by: PODIATRIST

## 2022-06-21 PROCEDURE — G0463 HOSPITAL OUTPT CLINIC VISIT: HCPCS

## 2022-06-21 RX ORDER — OXYCODONE AND ACETAMINOPHEN 5; 325 MG/1; MG/1
1 TABLET ORAL 3 TIMES DAILY PRN
Status: ON HOLD | COMMUNITY
Start: 2022-06-13 | End: 2022-07-12

## 2022-06-21 NOTE — PATIENT INSTRUCTIONS
"Important lnstructions        Obtain a cast cover from [employer]      WEIGHT BEARING STATUS: You are to remain limited-weight bearing on your left and right foot. LIMITED WEIGHT BEARING MEANS THAT IT IS ONLY OKAY FOR YOU TO APPLY LIGHT PRESSURE ON THE AFFECTED FOOT WHEN TRANSFERRING FROM YOUR ASSISTIVE DEVICE TO A CHAIR OR BED.    3. STABILIZATION DEVICE: Use a post op shoe on your right foot and a prevalon boot on your left foot . You will need to WEAR THIS AT ALL TIMES EVEN WHILE IN BED.       4. ELEVATE: Elevating your leg means laying with your head on a pillow and your foot ABOVE YOUR WAIST.     5. DO NOT MOVE YOUR FOOT.  There is a risk of worsening the wound or incision. To give yourself a higher chance of healing, please DO NOT swing foot back and forth and wiggle foot/toes especially when inside a stabilization device.      Dressing Change lnstructions                 DAILY and as needed    Primary Dressing: Apply  idodine onto the wound, allow this to dry completely    Secondary dressing: Cover with dry gauze    Secure with non-sterile roll gauze (4\" x 75\" roll) and tape (1\" roll tape) as needed; avoid adhesive directly on the skin    It IS NOT ok to get your wound wet in the bath or shower    SEEK MEDICAL CARE IF:  You have an increase in swelling, pain, or redness around the wound.  You have an increase in the amount of pus coming from the wound.  There is a bad smell coming from the wound.  The wound appears to be worsening/enlarging  You have a fever greater than 101.5 F      It is ok to continue current wound care treatment/products for the next 2-3 days until new wound care supplies are ordered and arrive. If longer than this please contact our office at [phone].        We want to hear from you!   In the next few weeks, you should receive a call or email to complete a survey about your visit at [facility]. Please help us improve your appointment experience by letting us know how we " did today. We strive to make your experience good and value any ways in which we could do better.      We value your input and suggestions.    Thank you for choosing the [facility]!

## 2022-06-21 NOTE — PROGRESS NOTES
FOOT AND ANKLE SURGERY/PODIATRY CONSULT NOTE        ASSESSMENT:   Grade 1 Pressure Ulceration left heel   PAD      TREATMENT:  -I discussed with the patient and his wife today that there is a stable eschar posterior left heel. No erythema bilateral. After removal of non-viable tissue right heel, underlying skin is intact.     -I have referred him for a prevlon boot to be used overnight, and surgical shoe with ambulation.     -Iodine with a gauze dressing applied today which he will continue to apply daily. Shower bag with bathing.     -He did see Dr. Delgadillo in early May of this year, and we will check with Dr. Delgadillo's team to determine if follow-up should be moved up.     -He will follow-up with me in 3 weeks.    Eagle Aaron DPM  [facility]      HPI: Pee Ayala was seen today for bilateral heel ulcers. The patient's wife is present today. The patient states he underwent a hip replacement and developed the sores while immobilized. He has returned home and is increasing ambulatory.     Past Medical History:   Diagnosis Date     ACS (acute coronary syndrome) (H) 6/2/2014     ACS (acute coronary syndrome) (H) 6/2/2014     Actinic keratosis      Actinic keratosis 1/14/2014     Actinic keratosis 1/14/2014     Anticoagulated on Coumadin 12/30/2015     Antiplatelet or antithrombotic long-term use      Atrial fibrillation (H)      Atrial fibrillation (H) 2016     Bone mass 4/26/2017     Chest heaviness 1/23/2019     Chest pain 5/31/2014     Closed fracture of left forearm 2015     Congestive heart failure (H)      Coronary artery disease      Diabetes (H) 2009     Diabetes mellitus (H)      Difficulty in walking(719.7)      Dyslipidemia 8/31/2016     Dyspnea on exertion      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      Encounter for long-term (current) use of insulin (H)  8/11/2016     Esophageal reflux 11/18/2010     Esophageal reflux 11/18/2010     History of angina      HTN (hypertension) 6/30/2009     (Problem list name updated by automated process. Provider to review and confirm.)     Hyperlipidemia LDL goal <100 10/31/2010     Hypertension      Impotence of organic origin      Mixed hyperlipidemia 4/25/2007 April 25, 2007 restarted Zocor today, recheck in 3 months.  August 23, 2007 LDL at 101 with 40 mg, will increase to 80 mg. Recheck  In 3 months.      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      Type II or unspecified type diabetes mellitus with neurological manifestations, not stated as uncontrolled(250.60) (H)      Walking troubles        Past Surgical History:   Procedure Laterality Date     BYPASS GRAFT ARTERY CORONARY N/A 9/10/2014    Procedure: BYPASS GRAFT ARTERY CORONARY;  Surgeon: Bharathi Caraballo MD;  Location: [facility] OR     BYPASS GRAFT ARTERY CORONARY  2016     COLONOSCOPY  1/14/2004     CORONARY ANGIOGRAPHY ADULT ORDER       CV CORONARY ANGIOGRAM N/A 4/4/2019    Procedure: Coronary Angiogram;  Surgeon: Dominik Vega MD;  Location: [facility] Cath Lab;  Service: Cardiology     CV CORONARY ANGIOGRAM N/A 1/6/2021    Procedure: Coronary Angiogram;  Surgeon: Dominik Vega MD;  Location: [facility] Cardiac Cath Lab;  Service: Cardiology     CV LEFT HEART CATHETERIZATION WITHOUT LEFT  VENTRICULOGRAM Left 4/4/2019    Procedure: Left Heart Catheterization Without Left Ventriculogram;  Surgeon: Dominik Vega MD;  Location: [facility] Cath Lab;  Service: Cardiology     CV LEFT HEART CATHETERIZATION WITHOUT LEFT VENTRICULOGRAM Left 1/6/2021    Procedure: Left Heart Catheterization Without Left Ventriculogram;  Surgeon: Dominik Vega MD;  Location: [facility] Cardiac Cath Lab;  Service: Cardiology     CV RIGHT HEART CATHETERIZATION Right 4/4/2019    Procedure: Right Heart Catheterization;  Surgeon: Dominik Vega MD;  Location: [facility] Cath Lab;  Service: Cardiology     CV TRANSCATHETER AORTIC VALVE REPLACEMENT N/A 1/12/2021    Procedure: Right transfemoral transcatheter aortic valve replacement using Magdaleno Dominick 3 size 29mm.  Transthoracic echocardiogram;  Surgeon: Jo Romano MD;  Location: [facility] CARDIAC CATH LAB     ESOPHAGOSCOPY, GASTROSCOPY, DUODENOSCOPY (EGD), COMBINED N/A 1/13/2016    Procedure: COMBINED ESOPHAGOSCOPY, GASTROSCOPY, DUODENOSCOPY (EGD);  Surgeon: Rk Srivastava MD;  Location: [facility] FEMORAL CANNULIZATION WITH OPEN STANDBY REPAIR AORTIC VALVE N/A 1/12/2021    Procedure: Cardiopulmonary Bypass standby;  Surgeon: Jefferson Sandy MD;  Location: [facility] CARDIAC CATH LAB     IR LOWER EXTREMITY ANGIOGRAM LEFT  12/7/2021     LAPAROSCOPIC CHOLECYSTECTOMY  10/09     MAZE PROCEDURE N/A 9/10/2014    Procedure: MAZE PROCEDURE;  Surgeon: Bharathi Caraballo MD;  Location:  OR     MOHS MICROGRAPHIC PROCEDURE       OPEN REDUCTION INTERNAL FIXATION HIP BIPOLAR Left 4/9/2022    Procedure: HEMIARTHROPLASTY, HIP, BIPOLAR, OPEN REDUCTION INTERNAL FIXATION OF GREATER TROCHANTER;  Surgeon: Jd Larose MD;  Location: [facility]     ORTHOPEDIC SURGERY      surgery for fx  Left forearm     OTHER SURGICAL HISTORY Left 2015    forearm sugery     STENT, CORONARY, HUMA  02/2016     VASECTOMY          Allergies   Allergen  Reactions     Oxycodone Itching and Rash     Amlodipine Dizziness     Dizziness and dry heaves     Lisinopril Cough     Adhesive Tape Itching and Rash         Current Outpatient Medications:      ACCU-CHEK GUIDE test strip, USE 1  TO CHECK GLUCOSE THREE TIMES DAILY, Disp: 300 strip, Rfl: 1     Apoaequorin (PREVAGEN PO), Take 1 tablet by mouth daily , Disp: , Rfl:      carvedilol (COREG) 6.25 MG tablet, Take 1 tablet (6.25 mg) by mouth 2 times daily (with meals), Disp: 180 tablet, Rfl: 3     Continuous Blood Gluc Sensor (FREESTYLE DOMI 2 SENSOR) [facility], 1 each every 14 days Use 1 sensor every 14 days. Use to read blood sugars per 's instructions., Disp: 1 each, Rfl: 0     cyanocobalamin (VITAMIN B-12) 1000 MCG tablet, Take 1 tablet (1,000 mcg) by mouth daily, Disp: 90 tablet, Rfl: 1     finasteride (PROSCAR) 5 MG tablet, Take 1 tablet (5 mg) by mouth daily, Disp: 90 tablet, Rfl: 3     furosemide (LASIX) 20 MG tablet, TAKE 2 TABLETS BY MOUTH IN THE MORNING AND 1 TABLET IN THE AFTERNOON, Disp: 180 tablet, Rfl: 3     gabapentin (NEURONTIN) 600 MG tablet, TAKE 1 TABLET BY MOUTH IN THE EVENING FOR 3 DAYS THEN 1 TWICE DAILY FOR 3 DAYS THEN 1 THREE TIMES DAILY THEREAFTER, Disp: , Rfl:      insulin aspart prot & aspart (NOVOLOG MIX 70/30 PEN) (70-30) 100 UNIT/ML pen, Inject Subcutaneous 2 times daily (with meals) 33 in AM and 26 in PM, Disp: , Rfl:      metFORMIN (GLUCOPHAGE) 500 MG tablet, Take 2 tablets by mouth twice daily, Disp: 360 tablet, Rfl: 0     methocarbamol (ROBAXIN) 500 MG tablet, Take 500 mg by mouth 2 times daily, Disp: , Rfl:      oxyCODONE-acetaminophen (PERCOCET) 5-325 MG tablet, , Disp: , Rfl:      polyethylene glycol (MIRALAX) 17 GM/Dose powder, Take 17 g (1 capful) by mouth daily as needed for constipation (opioid induced constipation), Disp: 507 g, Rfl: 0     pramipexole (MIRAPEX) 0.25 MG tablet, Take 2 tablets (0.5 mg) by mouth daily Takes 4pm and 5;30 pm, Disp: 180 tablet, Rfl: 3      rosuvastatin (CRESTOR) 20 MG tablet, Take 1 tablet (20 mg) by mouth At Bedtime, Disp: 90 tablet, Rfl: 3     senna-docusate (SENOKOT-S/PERICOLACE) 8.6-50 MG tablet, Take 1 tablet by mouth in the morning and 1 tablet in the evening., Disp: 30 tablet, Rfl: 0     warfarin ANTICOAGULANT (COUMADIN) 3 MG tablet, Take 1 tablet (3 mg) by mouth daily Or as directed., Disp: 90 tablet, Rfl: 0    Social History     Social History Narrative     and lives with life.  Has adult children from previous relationship. ( Blended family).  Has a dog - Antoniooswaldo    Jazzy Gil MD  1/3/2019           Family History   Problem Relation Age of Onset     Diabetes Mother      Hypertension Father      Cerebrovascular Disease Father      Diabetes Maternal Grandmother      Breast Cancer No family hx of      Cancer - colorectal No family hx of      Prostate Cancer No family hx of      C.A.D. No family hx of      Diabetes Type 2  Mother         58 ,  from anesthesia complication.     Heart Disease Father         86  from stroke     No Known Problems Brother         60 years of age.       Review of Systems - 10 point Review of Systems is negative except for heel ulcers which is noted in HPI.      OBJECTIVE:  Appearance: alert, well appearing, and in no distress.    /66   Pulse 72   Temp 97.9  F (36.6  C)     BMI= There is no height or weight on file to calculate BMI.    General appearance: Patient is alert and fully cooperative with history & exam.  No sign of distress is noted during the visit.     Psychiatric: Affect is pleasant & appropriate.  Patient appears motivated to improve health.     Respiratory: Breathing is regular & unlabored while sitting.     HEENT: Hearing is intact to spoken word.  Speech is clear.  No gross evidence of visual impairment that would impact ambulation.    Vascular: Dorsalis pedis non-palpableBilateral.  Dermatologic:   Wound Elbow Abrasion(s);Skin tear (Active)       Wound Head Skin tear  (Active)       Rash Right anterior chest (Active)       VASC Wound right lateral heel (Active)   Pre Size Length 4 06/21/22 1400   Pre Size Width 6 06/21/22 1400   Pre Size Depth 0 06/21/22 1400   Pre Total Sq cm 24 06/21/22 1400   Description demarcating eschar 06/21/22 1400       VASC Wound left lateral heel (Active)   Pre Size Length 4 06/21/22 1400   Pre Size Width 4.5 06/21/22 1400   Pre Size Depth 0 06/21/22 1400   Pre Total Sq cm 18 06/21/22 1400   Description stable eschar 06/21/22 1400       Incision/Surgical Site 01/12/21 Right Groin (Active)       Incision/Surgical Site 01/12/21 Left Groin (Active)       Incision/Surgical Site 12/07/21 Right Groin (Active)       Incision/Surgical Site 04/09/22 Left Hip (Active)   Stable eschar posterior left heel. No erythema bilateral. After removal of non-viable tissue right heel, underlying skin is intact.   Neurologic: Diminished to light touch Bilateral.  Musculoskeletal: Contracted digits noted Bilateral.    @LDAVASC(10,16,17)@    Imaging:     No results found.

## 2022-06-21 NOTE — LETTER
"2022             [facility]             Wound Dressing Rx and Order Form             Order Status:New Order             Verbal: Janelle CHRISTY CMA  [facility]   Fax: [phone]  Customer Service: [phone]          Patient Info:  Name: Pee Ayala  : 1940  Address:   [address] [place] [address]  DAKOTA ZACARIAS [facility] [address]  Phone: [phone]      Insurance Info:  PRIMARY INSURANCE: Payor: [employer] / Plan: [facility] MEDICARE ADVANTAGE / Product Type: HMO /   Primary Policy ID#: [id number]  SECONDARY INSURANCE:  N/A   Secondary Policy ID#: N/A    Physician Info:   Name:  Eagle Aaron DPM   Dept Address/Phones:   [facility] VASCULAR [address]  [facility] [address] [phone]  Dept: [phone]  Fax: [phone]    Wound info:  Encounter Diagnosis   Name Primary?     Pressure injury of foot, stage 2, unspecified laterality (H)      Wound Elbow Abrasion(s);Skin tear (Active)       Wound Head Skin tear (Active)       Rash Right anterior chest (Active)                                      VASC Wound left lateral heel (Active)   Pre Size Length 4 22 1400   Pre Size Width 4.5 22 1400   Pre Size Depth 0 22 1400   Pre Total Sq cm 18 22 1400   Description stable eschar 22 1400       Incision/Surgical Site 21 Right Groin (Active)       Incision/Surgical Site 21 Left Groin (Active)       Incision/Surgical Site 21 Right Groin (Active)       Incision/Surgical Site 22 Left Hip (Active)     Drainage: slight and light  Thickness:  unstageable  Duration of Need: 90  Days Supply: 30  Start Date: 2022  Starter Kit: Ancillary Kit (saline, gloves, gauze)  Qualifying wound/Debridement: No-waiting for vascular surgeon input as bloodflow is not adequate for healing     Dressing Type Brand Size Days Supply  15 Quantity  changes/week   Primary Gauze  4\"x4\" 30 Daily    " "Conforming stretch gauze  4\" wide 30 Daily    Paper tape  1\" wide 30 Daily       OK to forward to covered supplier.    Electronically Signed Physician: Eagle Aaron DPM Date: 6/21/2022      "

## 2022-06-22 ENCOUNTER — TELEPHONE (OUTPATIENT)
Dept: VASCULAR SURGERY | Facility: CLINIC | Age: 82
End: 2022-06-22

## 2022-06-22 NOTE — TELEPHONE ENCOUNTER
Writer spoke with wife, she stated that she called their local pharmacys and they don't carry iodine. Informed her they can be ordered off [employer]. She will order.

## 2022-06-22 NOTE — TELEPHONE ENCOUNTER
While making reminder calls for upcoming appointments, this pt commented that they are not able to find the iodine that is recommended.  He is wondering if we have any suggestions or alternatives. Pt and spouse can be reached at [phone]

## 2022-06-24 ENCOUNTER — ANTICOAGULATION THERAPY VISIT (OUTPATIENT)
Dept: ANTICOAGULATION | Facility: CLINIC | Age: 82
End: 2022-06-24

## 2022-06-24 ENCOUNTER — ANCILLARY PROCEDURE (OUTPATIENT)
Dept: VASCULAR ULTRASOUND | Facility: CLINIC | Age: 82
End: 2022-06-24
Attending: SURGERY
Payer: COMMERCIAL

## 2022-06-24 ENCOUNTER — MEDICAL CORRESPONDENCE (OUTPATIENT)
Dept: HEALTH INFORMATION MANAGEMENT | Facility: CLINIC | Age: 82
End: 2022-06-24

## 2022-06-24 DIAGNOSIS — I73.9 PAD (PERIPHERAL ARTERY DISEASE) (H): ICD-10-CM

## 2022-06-24 DIAGNOSIS — I48.20 CHRONIC ATRIAL FIBRILLATION (H): Primary | ICD-10-CM

## 2022-06-24 DIAGNOSIS — Z79.01 CHRONIC ANTICOAGULATION: ICD-10-CM

## 2022-06-24 LAB — INR (EXTERNAL): 1.6 (ref 0.9–1.1)

## 2022-06-24 PROCEDURE — 93923 UPR/LXTR ART STDY 3+ LVLS: CPT | Mod: 26 | Performed by: SURGERY

## 2022-06-24 PROCEDURE — 93923 UPR/LXTR ART STDY 3+ LVLS: CPT

## 2022-06-24 PROCEDURE — 93926 LOWER EXTREMITY STUDY: CPT | Mod: 26 | Performed by: SURGERY

## 2022-06-24 PROCEDURE — 93926 LOWER EXTREMITY STUDY: CPT | Mod: LT

## 2022-06-24 NOTE — PROGRESS NOTES
ANTICOAGULATION MANAGEMENT     Pee Ayala 82 year old male is on warfarin with subtherapeutic INR result. (Goal INR 2.0-3.0)    Recent labs: (last 7 days)     06/24/22  1150   INR 1.6*       ASSESSMENT       Source(s): Chart Review, Patient/Caregiver Call and Home Care/Facility Nurse       Warfarin doses taken: Warfarin taken as instructed    Diet: No new diet changes identified    New illness, injury, or hospitalization: No    Medication/supplement changes: None noted    Signs or symptoms of bleeding or clotting: No    Previous INR: Subtherapeutic    Additional findings: Previous hold may be effecting result.       PLAN     Recommended plan for no diet, medication or health factor changes affecting INR     Dosing Instructions: Increase your warfarin dose (11.8% change) with next INR in 1 week       Summary  As of 6/24/2022    Full warfarin instructions:  3 mg every Tue, Sat; 4.5 mg all other days   Next INR check:  7/1/2022             Telephone call with [facility] home care/facility nurse Left detailed message with dosing and recheck date     Spoke with Fay, Spouse. Assessment per above completed. Dosing and recheck date confirmed.     Orders given to  Homecare nurse/facility to recheck    Education provided: Please call back if any changes to your diet, medications or how you've been taking warfarin    Plan made per [facility] anticoagulation protocol    Merari Abad RN  Anticoagulation Clinic  6/24/2022    _______________________________________________________________________     Anticoagulation Episode Summary     Current INR goal:  2.0-3.0   TTR:  38.4 % (1 y)   Target end date:  Indefinite   Send INR reminders to:  [facility]    Indications    Chronic atrial fibrillation (H) [I48.20]  Chronic anticoagulation [Z79.01]           Comments:           Anticoagulation Care Providers     Provider Role Specialty Phone number    Jazzy Gil MD Referring Family Medicine [phone]

## 2022-06-28 ENCOUNTER — OFFICE VISIT (OUTPATIENT)
Dept: VASCULAR SURGERY | Facility: CLINIC | Age: 82
End: 2022-06-28
Attending: SURGERY
Payer: COMMERCIAL

## 2022-06-28 VITALS
RESPIRATION RATE: 18 BRPM | DIASTOLIC BLOOD PRESSURE: 68 MMHG | HEART RATE: 72 BPM | HEIGHT: 64 IN | BODY MASS INDEX: 26.8 KG/M2 | WEIGHT: 157 LBS | TEMPERATURE: 98.2 F | SYSTOLIC BLOOD PRESSURE: 134 MMHG

## 2022-06-28 DIAGNOSIS — I73.9 PAD (PERIPHERAL ARTERY DISEASE) (H): Primary | ICD-10-CM

## 2022-06-28 PROCEDURE — 99214 OFFICE O/P EST MOD 30 MIN: CPT | Performed by: SURGERY

## 2022-06-28 PROCEDURE — G0463 HOSPITAL OUTPT CLINIC VISIT: HCPCS

## 2022-06-28 RX ORDER — ASPIRIN 81 MG/1
81 TABLET ORAL DAILY
COMMUNITY
End: 2022-07-27

## 2022-06-28 RX ORDER — CILOSTAZOL 50 MG/1
TABLET ORAL
Qty: 162 EACH | Refills: 0 | Status: ON HOLD | OUTPATIENT
Start: 2022-06-28 | End: 2022-07-12

## 2022-06-28 ASSESSMENT — PAIN SCALES - GENERAL: PAINLEVEL: NO PAIN (0)

## 2022-06-28 NOTE — PROGRESS NOTES
VASCULAR SURGERY OUTPATIENT CONSULT OR VISIT   VASCULAR SURGEON: Wendy Delgadillo MD    LOCATION:  [facility]    Pee Ayala   Medical Record #:  [id number]  YOB: 1940  Age:  82 year old     Date of Service: 6/28/2022    PRIMARY CARE PROVIDER: Mar Morales      Reason for consultation: Surveillance of PAD    IMPRESSION: Patient who underwent left SFA stenting by me for progression of short distance claudication into rest pain.  He is initial follow-up visit he was doing great but then went on to have a hip fracture after a fall.  Still recovering from this and walking with a cane but feels that his rest pain and claudication have not recurred.  Had a significant bleed with an INR of 6 on his Coumadin which she needs for atrial fibrillation and was therefore taken off of Plavix.  Is still on baby aspirin.  While he carries a diagnosis of heart failure this is likely from before his coronary artery bypass surgery and valve treatment.  Most recent echo shows normal ejection fraction of 65 to 70%.  Patient does have a little constipation but no other problems with his GI tract.    On noninvasive studies today his ABIs are noncompressible and his duplex shows a widely patent stent but his infrapopliteal waveforms have become monophasic from a previous of biphasic suggesting that there might be development of popliteal and infrapopliteal disease.    RECOMMENDATION: Overall doing quite well.  Strongly support him continuing with his rehab attempts after his hip fracture.  We will start him on cilostazol for improvement of stent patency given the strong evidence for this.  Discussed side effects at length.  If he does have side effects that are persistent he will stop taking it.  Should have a significantly lower risk of bleeding than being on Plavix.    Given the small change in his duplex imaging this is a good reason for him to try cilostazol and we will plan on seeing him back in 3  months time with a repeat left leg arterial duplex as well as repeat ABIs.  No Escobar Orozco testing until he is fully recovered from his fracture.    HPI:  Pee Ayala is a 82 year old male who was seen today for surveillance of his PAD.  This is a gentleman who had claudication that it progressed to rest pain of the left leg which I treated with an SFA stent December 2021.  On his initial return visit he was very happy with no claudication or rest pain.  After that he had a fall and fractured his hip.  He had had an INR of 6 and had a significant hematoma.  Was taken off the Plavix that had started and eventually resumed his Coumadin and baby aspirin.  Still recovering from the hip fracture but walking with a cane.  Does not have claudication or rest pain any longer.    Overall doing reasonably well.    PHH:    Past Medical History:   Diagnosis Date     ACS (acute coronary syndrome) (H) 6/2/2014     ACS (acute coronary syndrome) (H) 6/2/2014     Actinic keratosis      Actinic keratosis 1/14/2014     Actinic keratosis 1/14/2014     Anticoagulated on Coumadin 12/30/2015     Antiplatelet or antithrombotic long-term use      Atrial fibrillation (H)      Atrial fibrillation (H) 2016     Bone mass 4/26/2017     Chest heaviness 1/23/2019     Chest pain 5/31/2014     Closed fracture of left forearm 2015     Congestive heart failure (H)      Coronary artery disease      Diabetes (H) 2009     Diabetes mellitus (H)      Difficulty in walking(719.7)      Dyslipidemia 8/31/2016     Dyspnea on exertion      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      Encounter for long-term (current) use of insulin (H) 8/11/2016     Esophageal reflux 11/18/2010     Esophageal reflux 11/18/2010     History of angina      HTN (hypertension) 6/30/2009     (Problem list name updated by automated process. Provider to review and  confirm.)     Hyperlipidemia LDL goal <100 10/31/2010     Hypertension      Impotence of organic origin      Mixed hyperlipidemia 4/25/2007 April 25, 2007 restarted Zocor today, recheck in 3 months.  August 23, 2007 LDL at 101 with 40 mg, will increase to 80 mg. Recheck  In 3 months.      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      Type II or unspecified type diabetes mellitus with neurological manifestations, not stated as uncontrolled(250.60) (H)      Walking troubles         Past Surgical History:   Procedure Laterality Date     BYPASS GRAFT ARTERY CORONARY N/A 9/10/2014    Procedure: BYPASS GRAFT ARTERY CORONARY;  Surgeon: Bharathi Caraballo MD;  Location: [facility] OR     BYPASS GRAFT ARTERY CORONARY  2016     COLONOSCOPY  1/14/2004     CORONARY ANGIOGRAPHY ADULT ORDER       CV CORONARY ANGIOGRAM N/A 4/4/2019    Procedure: Coronary Angiogram;  Surgeon: Dominik Vega MD;  Location: [facility] Cath Lab;  Service: Cardiology     CV CORONARY ANGIOGRAM N/A 1/6/2021    Procedure: Coronary Angiogram;  Surgeon: Dominik Vega MD;  Location: [facility] Cardiac Cath Lab;  Service: Cardiology     CV LEFT HEART CATHETERIZATION WITHOUT LEFT VENTRICULOGRAM Left 4/4/2019    Procedure: Left Heart Catheterization Without Left Ventriculogram;  Surgeon: Dominik Vega MD;  Location: [facility] Cath Lab;  Service: Cardiology     CV LEFT HEART  CATHETERIZATION WITHOUT LEFT VENTRICULOGRAM Left 1/6/2021    Procedure: Left Heart Catheterization Without Left Ventriculogram;  Surgeon: Dominik Vega MD;  Location: [facility] Cardiac Cath Lab;  Service: Cardiology     CV RIGHT HEART CATHETERIZATION Right 4/4/2019    Procedure: Right Heart Catheterization;  Surgeon: Dominik Vega MD;  Location: [facility] Cath Lab;  Service: Cardiology     CV TRANSCATHETER AORTIC VALVE REPLACEMENT N/A 1/12/2021    Procedure: Right transfemoral transcatheter aortic valve replacement using Magdaleno Dominick 3 size 29mm.  Transthoracic echocardiogram;  Surgeon: Jo Romano MD;  Location: [facility] CARDIAC CATH LAB     ESOPHAGOSCOPY, GASTROSCOPY, DUODENOSCOPY (EGD), COMBINED N/A 1/13/2016    Procedure: COMBINED ESOPHAGOSCOPY, GASTROSCOPY, DUODENOSCOPY (EGD);  Surgeon: Rk Srivastava MD;  Location: [facility] FEMORAL CANNULIZATION WITH OPEN STANDBY REPAIR AORTIC VALVE N/A 1/12/2021    Procedure: Cardiopulmonary Bypass standby;  Surgeon: Jefferson Sandy MD;  Location: [facility] CARDIAC CATH LAB     IR LOWER EXTREMITY ANGIOGRAM LEFT  12/7/2021     LAPAROSCOPIC CHOLECYSTECTOMY  10/09     MAZE PROCEDURE N/A 9/10/2014    Procedure: MAZE PROCEDURE;  Surgeon: Bharathi Caraballo MD;  Location[facility]  [facility]     [facility]       OPEN REDUCTION INTERNAL FIXATION HIP BIPOLAR Left 4/9/2022    Procedure: HEMIARTHROPLASTY, HIP, BIPOLAR, OPEN REDUCTION INTERNAL FIXATION OF GREATER TROCHANTER;  Surgeon: Jd Larose MD;  Location: [facility] OR     ORTHOPEDIC SURGERY      surgery for fx  Left forearm     OTHER SURGICAL HISTORY Left 2015    forearm sugery     STENT, CORONARY, HUMA  02/2016     VASECTOMY         ALLERGIES:  Oxycodone, Amlodipine, Lisinopril, and Adhesive tape    MEDS:    Current Outpatient Medications:      ACCU-CHEK GUIDE test strip, USE 1  TO CHECK GLUCOSE THREE TIMES DAILY, Disp: 300 strip, Rfl: 1     Apoaequorin (PREVAGEN  PO), Take 1 tablet by mouth daily , Disp: , Rfl:      carvedilol (COREG) 6.25 MG tablet, Take 1 tablet (6.25 mg) by mouth 2 times daily (with meals), Disp: 180 tablet, Rfl: 3     Continuous Blood Gluc Sensor (FREESTYLE DOMI 2 SENSOR) [facility], 1 each every 14 days Use 1 sensor every 14 days. Use to read blood sugars per 's instructions., Disp: 1 each, Rfl: 0     cyanocobalamin (VITAMIN B-12) 1000 MCG tablet, Take 1 tablet (1,000 mcg) by mouth daily, Disp: 90 tablet, Rfl: 1     finasteride (PROSCAR) 5 MG tablet, Take 1 tablet (5 mg) by mouth daily, Disp: 90 tablet, Rfl: 3     furosemide (LASIX) 20 MG tablet, TAKE 2 TABLETS BY MOUTH IN THE MORNING AND 1 TABLET IN THE AFTERNOON, Disp: 180 tablet, Rfl: 3     gabapentin (NEURONTIN) 600 MG tablet, TAKE 1 TABLET BY MOUTH IN THE EVENING FOR 3 DAYS THEN 1 TWICE DAILY FOR 3 DAYS THEN 1 THREE TIMES DAILY THEREAFTER, Disp: , Rfl:      insulin aspart prot & aspart (NOVOLOG MIX 70/30 PEN) (70-30) 100 UNIT/ML pen, Inject Subcutaneous 2 times daily (with meals) 33 in AM and 26 in PM, Disp: , Rfl:      metFORMIN (GLUCOPHAGE) 500 MG tablet, Take 2 tablets by mouth twice daily, Disp: 360 tablet, Rfl: 0     methocarbamol (ROBAXIN) 500 MG tablet, Take 500 mg by mouth 2 times daily, Disp: , Rfl:      oxyCODONE-acetaminophen (PERCOCET) 5-325 MG tablet, , Disp: , Rfl:      polyethylene glycol (MIRALAX) 17 GM/Dose powder, Take 17 g (1 capful) by mouth daily as needed for constipation (opioid induced constipation), Disp: 507 g, Rfl: 0     pramipexole (MIRAPEX) 0.25 MG tablet, Take 2 tablets (0.5 mg) by mouth daily Takes 4pm and 5;30 pm, Disp: 180 tablet, Rfl: 3     rosuvastatin (CRESTOR) 20 MG tablet, Take 1 tablet (20 mg) by mouth At Bedtime, Disp: 90 tablet, Rfl: 3     senna-docusate (SENOKOT-S/PERICOLACE) 8.6-50 MG tablet, Take 1 tablet by mouth in the morning and 1 tablet in the evening., Disp: 30 tablet, Rfl: 0     warfarin ANTICOAGULANT (COUMADIN) 3 MG tablet, Take 1 tablet (3  "mg) by mouth daily Or as directed., Disp: 90 tablet, Rfl: 0    SOCIAL HABITS:    History   Smoking Status     Former Smoker     Quit date: 1998   Smokeless Tobacco     Never Used     Social History    Substance and Sexual Activity      Alcohol use: Yes        Alcohol/week: 0.0 standard drinks        Comment: Alcoholic Drinks/day: very rare      History   Drug Use No       FAMILY HISTORY:    Family History   Problem Relation Age of Onset     Diabetes Mother      Hypertension Father      Cerebrovascular Disease Father      Diabetes Maternal Grandmother      Breast Cancer No family hx of      Cancer - colorectal No family hx of      Prostate Cancer No family hx of      C.A.D. No family hx of      Diabetes Type 2  Mother         58 ,  from anesthesia complication.     Heart Disease Father         86  from stroke     No Known Problems Brother         60 years of age.       REVIEW OF SYSTEMS:    A 12 point ROS was reviewed and except for what is listed in the HPI above, all others are negative    PE:  /68   Pulse 72   Temp 98.2  F (36.8  C)   Resp 18   Ht 5' 4\" (1.626 m)   Wt 157 lb (71.2 kg)   BMI 26.95 kg/m    Wt Readings from Last 1 Encounters:   22 157 lb (71.2 kg)     Body mass index is 26.95 kg/m .    EXAM:  GENERAL: This is a well-developed 82 year old male who appears his stated age  EYES: Grossly normal.  MOUTH: Buccal mucosa normal   MUSCULOSKELETAL: Grossly normal and both lower extremities are intact.  HEME/LYMPH: No lymphedema  NEUROLOGIC: Focally intact, Alert and oriented x 3.   PSYCH: appropriate affect  INTEGUMENT: No open lesions or ulcers        DIAGNOSTIC STUDIES:     Images:  US Low Ext Arterial Doppler  wo Ex    Result Date: 2022  BILATERAL RESTING ANKLE-BRACHIAL INDICES (JESSENIA'S) (Date: 22) Indication: Surveillance of left leg [place] Stent (2021)  Previous: 3/16/22   Angioplasty Date: 2021 Resting JESSENIA's          Right: mmHg Index     Brachial: " 113  Ankle-(PT): >254 NC Ankle-(DP): >254 NC          Digit: 83 0.73               Left: mmHg Index     Brachial: 113  Ankle-(PT): >254 NC Ankle-(DP): >254 NC          Digit: 65 0.58 Resting ankle-brachial index of NC on the right. Toe Pressures of 83 mmHg and TBI of 0.73  Resting ankle-brachial index of NC on the left. Toe Pressures of 65 mmHg and TBI of 0.58  VPR WAVEFORMS: The right volume plethysmography waveforms are moderately abnormal. The left volume plethysmography waveforms are moderately abnormal. Comments:   Impression:  1. RIGHT LOWER EXTREMITY: JESSENIA is Uninterpretable due to incompressible vessels. and Normal toe pressures of 83 mmHg. 2. LEFT LOWER EXTREMITY: JESSENIA is Uninterpretable due to incompressible vessels. and Mildly abnormal, but adequate for healing toe pressures of 65 mmHg. Reference: Wound classification Grade JESSENIA Ankle Systolic Pressure Toe Pressures 0 > 0.80 > 100 mmHg > 60 mmHg 1 0.6 - 0.79 70 - 100 mmHg 40 - 59 mmHg 2 0.4 - 0.59 50-70 mmHg 30 - 39 mmHg 3 < 0.39 < 50 mmHg < 30 mmHg Digit Pressures DBI Disease Category > 0.70 Normal < 0.70 Abnormal > 30 mmHg Potential wound healing < 30 mmHg Impaired wound healing Ankle Brachial Pressures JESSENIA Disease Category > 1.3  Likely vessel calcification with monophasic waveforms, non-diagnostic 0.95-1.30 Normal with multiphasic waveforms 0.50-0.95 Single level disease 0.30-0.50 Multilevel disease < 0.30 Critical limb ischema Volume Plethysmography Recording (VPR) at all levels Normal Sharp systolic peak, fast upstroke, prominent dicrotic notch in wave Mild Sharp systolic peak, fast upstroke, absent dicrotic notch in wave Moderate Flattened systolic peak, slowed upstroke, absent dicrotic notch inwave Severe amplitude wave with = upslope and down slope Occluded Flat Line     US Lower Extremity Arterial Duplex Left    Result Date: 6/28/2022  Arterial Duplex Ultrasound (Date: 06/24/22) Lower Extremity Artery Evaluation Indication: Surveillance of left leg  [place] Stent (12/7/2021)  Previous: 3/16/22   Angioplasty Date: 12/7/2021   History: Previous Smoker, Diabetic, Hyperlipidemia, PAD, CAD, Angioplasty and Vascular Surgery Technique: Duplex imaging is performed utilizing gray-scale, two-dimensional images, and color-flow imaging. Doppler waveform analysis and spectral Doppler imaging is also performed. LOWER EXTREMITY ARTERIAL DUPLEX EXAM WITH WAVEFORMS Right Leg:(cm/s) Location: Velocities Waveforms PTA:   29   M SABI:   121  M DPA:   146  M Waveforms: T=Triphasic, M=Monophasic, B=Biphasic Left Leg:(cm/s) Location: Velocities Waveforms EIA:   64  T CFA:   119  T PFA:   142  B SFA Proximal:   82  T SFA Mid:   71  T SFA Distal:   98  B Popliteal Artery:   SEE BELOW  - PTA:   16   M SABI:   132  M DPA:   152  M Waveforms: T=Triphasic, M=Monophasic, B=Biphasic Stent Velocities: Stent 1: Left Leg Stent location: [place]      Velocity    Waveform Inflow Artery: Pop Art       Proximal to Stent:   75   T Proximal Stent:   71  B Mid Stent:   56  B Distal Stent:   32  M Distal to Stent:   84  B Outflow Artery: Pop Art       Impression: Right Lower Extremity: Not formally evaluated Left Lower Extremity: Triphasic external iliac and comfortable waveforms suggest no inflow disease.  The SFA appears to be widely patent with a widely patent SFA stent and normal waveforms.  Waveforms do become monophasic from the popliteal into the tibial vessels although they retain a sharp upstroke.  No clear stenosis identified with we must assume that there is some popliteal and infrapopliteal disease.  This was not noted on the previous duplex in March. Reference: Category Normal 1-19% 20-49% 50-99% Occluded PSV <160 cm/sec without spectral broadening <160 cm/sec with spectral broadening Increased Increased Absent Flow Ratio N/A N/A < 2.0 >2.0 N/A Post-Stenotic Turbulence No No No Yes N/A       I personally reviewed the images and my interpretation is that his ABIs are  noncompressible with toe pressures that are significantly better at 65 mmHg compared to his last visit.  Arterial duplex however, while showing that his SFA stents are widely patent now shows monophasic waveforms at the ankle and popliteal level suggesting possible more distal arterial occlusive disease that was not seen on prior imaging..    LABS:      Sodium   Date Value Ref Range Status   05/24/2022 142 136 - 145 mmol/L Final   04/18/2022 140 136 - 145 mmol/L Final   04/12/2022 137 136 - 145 mmol/L Final   01/13/2021 135 133 - 144 mmol/L Final   01/12/2021 139 133 - 144 mmol/L Final   01/12/2021 139 133 - 144 mmol/L Final     Urea Nitrogen   Date Value Ref Range Status   05/24/2022 14 8 - 28 mg/dL Final   04/18/2022 19 8 - 28 mg/dL Final   04/12/2022 17 8 - 28 mg/dL Final   01/13/2021 20 7 - 30 mg/dL Final   01/12/2021 19 7 - 30 mg/dL Final   01/12/2021 20 7 - 30 mg/dL Final     Hemoglobin   Date Value Ref Range Status   06/03/2022 11.7 (L) 13.3 - 17.7 g/dL Final   05/24/2022 11.8 (L) 13.3 - 17.7 g/dL Final   04/18/2022 11.2 (L) 13.3 - 17.7 g/dL Final   01/13/2021 13.7 13.3 - 17.7 g/dL Final   01/12/2021 11.1 (L) 13.3 - 17.7 g/dL Final   01/12/2021 12.1 (L) 13.3 - 17.7 g/dL Final     Platelet Count   Date Value Ref Range Status   06/03/2022 261 150 - 450 10e3/uL Final   05/24/2022 226 150 - 450 10e3/uL Final   04/15/2022 252 150 - 450 10e3/uL Final   01/13/2021 141 (L) 150 - 450 10e9/L Final   01/12/2021 113 (L) 150 - 450 10e9/L Final   01/12/2021 130 (L) 150 - 450 10e9/L Final     BNP   Date Value Ref Range Status   01/08/2021 91 (H) 0 - 82 pg/mL Final   10/21/2020 56 0 - 82 pg/mL Final   05/09/2019 96 (H) 0 - 80 pg/mL Final     INR   Date Value Ref Range Status   06/13/2022 1.2 (H) 0.9 - 1.1 Final   06/03/2022 1.3 (H) 0.9 - 1.1 Final   01/13/2021 1.13 0.86 - 1.14 Final   01/12/2021 1.09 0.86 - 1.14 Final   06/09/2016 1.72 (H) 0.86 - 1.14 Final     INR (External)   Date Value Ref Range Status   06/24/2022 1.6  (A) 0.9 - 1.1 Final   06/17/2022 1.6 (A) 0.9 - 1.1 Final   05/31/2022 1.3 (A) 0.9 - 1.1 Final       30 minutes spent on the day of encounter doing chart review, history and exam, documentation, and further activities as noted.       Wendy Delgadillo MD, MD  VASCULAR SURGERY

## 2022-06-28 NOTE — PROGRESS NOTES
"[facility]        Patient is here for a follow up  to discuss Wound(s) on left heel.    Pt is currently taking Statin and Warfarin.    /68   Pulse 72   Temp 98.2  F (36.8  C)   Resp 18   Ht 5' 4\" (1.626 m)   Wt 157 lb (71.2 kg)   BMI 26.95 kg/m      The provider has been notified that the patient has concerns of left heel wound.     Questions patient would like addressed today are: N/A.    Refills are needed: N/A    Has homecare services and agency name:  Yes: [facility], but ending this week.         Noemy Parr RN      "

## 2022-06-28 NOTE — PATIENT INSTRUCTIONS
We would like to start you on a medication called Pletal or Cilostazol.    We start you on this slowly and go up over the course of 4 weeks.    The first 2 weeks we would like you to take 50 mg once a day for 2 weeks.  Then increase to 50mg twice a day for 2 weeks.  Finally increase to 100mg twice a day thereafter.    Please notify us if you have any problems with nausea, diarrhea or increased blood sugar levels. During this dose titration we would like you to check your blood sugars twice a day if you are diabetic.   Cilostazol Oral tablet  What is this medicine?  CILOSTAZOL (shant [place] sta zol) is used to treat the symptoms of intermittent claudication. This condition causes pain in the legs during walking, and goes away with rest. By improving blood flow, this medicine helps people with this condition walk longer distances without pain.  This medicine may be used for other purposes; ask your health care provider or pharmacist if you have questions.  What should I tell my health care provider before I take this medicine?  They need to know if you have any of the following conditions:  bleeding disorder or hemophilia  history of heart failure, heart attack, or other heart disease  an unusual or allergic reaction to cilostazol, other medicines, foods, dyes, or preservatives  pregnant or trying to get pregnant  breast-feeding  How should I use this medicine?  Take this medicine by mouth with a full glass of water. Follow the directions on the prescription label. Take this medicine on an empty stomach, at least 30 minutes before or 2 hours after food. Do not take with food. Take your doses at regular intervals. Do not take your medicine more often than directed.  Talk to your pediatrician regarding the use of this medicine in children. Special care may be needed.  Overdosage: If you think you have taken too much of this medicine contact a poison control center or emergency room at once.  NOTE: This medicine is only for  you. Do not share this medicine with others.  What if I miss a dose?  If you miss a dose, take it as soon as you can. If it is almost time for your next dose, take only that dose. Do not take double or extra doses.  What may interact with this medicine?  Do not take this medicine with any of the following medications:  grapefruit juice  This medicine may also interact with the following medications:  agents that prevent or treat blood clots like enoxaparin or warfarin  aspirin  diltiazem  erythromycin or clarithromycin  omeprazole  some medications for treating depression like fluoxetine, fluvoxamine, nefazodone  some medications for treating fungal infections like ketoconazole, fluconazole, itraconazole  This list may not describe all possible interactions. Give your health care provider a list of all the medicines, herbs, non-prescription drugs, or dietary supplements you use. Also tell them if you smoke, drink alcohol, or use illegal drugs. Some items may interact with your medicine.  What should I watch for while using this medicine?  Visit your doctor or health care professional for regular checks on your progress. It may take 2 to 4 weeks for your condition to start to get better once you begin taking this medicine. In some people, it can take as long as 3 months for the condition to get better.  You may get drowsy or dizzy. Do not drive, use machinery, or do anything that needs mental alertness until you know how this drug affects you. Do not stand or sit up quickly, especially if you are an older patient. This reduces the risk of dizzy or fainting spells. Alcohol can make you more drowsy and dizzy. Avoid alcoholic drinks.  Smoking may have effects on the circulation that may limit the benefits you receive from this medicine. You may wish to discuss how to stop smoking with your doctor or health care professional.  If you are going to have surgery, tell your doctor or health care professional that you are  taking this medicine.  What side effects may I notice from receiving this medicine?  Side effects that you should report to your doctor or health care professional as soon as possible:  allergic reactions like skin rash, itching or hives, swelling of the face, lips, or tongue  chest pain  fast, slow, or irregular heartbeat  signs and symptoms of bleeding such as bloody or black, tarry stools; red or dark-brown urine; spitting up blood or brown material that looks like coffee grounds; red spots on the skin; unusual bruising or bleeding from the eye, gums, or nose  swelling in the legs or ankles  Side effects that usually do not require medical attention (report to your doctor or health care professional if they continue or are bothersome):  diarrhea  headache  nausea, or upset stomach  This list may not describe all possible side effects. Call your doctor for medical advice about side effects. You may report side effects to FDA at 1-704-FDA-9469.  Where should I keep my medicine?  Keep out of the reach of children.  Store at room temperature between 15 and 30 degrees C (59 and 86 degrees F). Throw away any unused medicine after the expiration date.  NOTE:This sheet is a summary. It may not cover all possible information. If you have questions about this medicine, talk to your doctor, pharmacist, or health care provider. Copyright  2015 Gold Standard     Ankle-Brachial Index (JESSENIA) or Physiologic Test    Description  An ankle-brachial index test is relatively pain free. Blood pressure cuffs of various sizes are placed on your thigh, calf, foot and toes.  Similar to having your blood pressure checked with an arm cuff, as the technician inflates the cuffs, they progressively tighten and are then quickly released.  You may feel some discomfort, but generally for less than 60 seconds for each measurement. You will be asked to remove your socks and shoes and possibly your pants or shorts. Gowns will be provided. It usually  takes about 30-60 minutes.   Depending on the initial readings and patient symptoms, you may be asked to perform a light walk on a treadmill.  The technician will apply ultrasound gel, usually warmed for your comfort, to your ankles and wrists. Through the gel, the technician will use a small hand-held device that emits sound waves.  Risks  There are typically no side effects or complications associated with a physiologic study.  How to Prepare  Eat and take medications as usual.  There is no preparation required for an ankle-brachial index (JESSENIA) or physiologic exam.  What Can I Expect After the Test?  The technician will send the ultrasound images to your vascular surgeon for evaluation. Typically, a report is available in 2-3 days. If anything critical is found, it is standard practice to notify the vascular surgeon immediately.  Reference: [email or web address].org/patient-resources/vascular-tests

## 2022-06-30 ENCOUNTER — TRANSFERRED RECORDS (OUTPATIENT)
Dept: HEALTH INFORMATION MANAGEMENT | Facility: CLINIC | Age: 82
End: 2022-06-30

## 2022-06-30 ENCOUNTER — MEDICAL CORRESPONDENCE (OUTPATIENT)
Dept: HEALTH INFORMATION MANAGEMENT | Facility: CLINIC | Age: 82
End: 2022-06-30

## 2022-07-01 ENCOUNTER — ANTICOAGULATION THERAPY VISIT (OUTPATIENT)
Dept: ANTICOAGULATION | Facility: CLINIC | Age: 82
End: 2022-07-01

## 2022-07-01 DIAGNOSIS — I48.20 CHRONIC ATRIAL FIBRILLATION (H): Primary | ICD-10-CM

## 2022-07-01 DIAGNOSIS — Z79.01 CHRONIC ANTICOAGULATION: ICD-10-CM

## 2022-07-01 LAB — INR (EXTERNAL): 1.8 (ref 2–3)

## 2022-07-01 NOTE — PROGRESS NOTES
ANTICOAGULATION MANAGEMENT     Pee Ayala 82 year old male is on warfarin with subtherapeutic INR result. (Goal INR 2.0-3.0)    Recent labs: (last 7 days)     07/01/22  1107   INR 1.8*       ASSESSMENT       Source(s): Chart Review    Previous INR was Subtherapeutic    Medication, diet, health changes since last INR chart reviewed; none identified           PLAN     Unable to reach Home care nurse Lorna today.    LVM to please call back with assessment    Follow up required to confirm warfarin dose taken and assess for changes    Aida Alan, RN  [facility]tico[facility] Clinic  7/1/2022

## 2022-07-01 NOTE — PROGRESS NOTES
ANTICOAGULATION MANAGEMENT     Pee Ayala 82 year old male is on warfarin with subtherapeutic INR result. (Goal INR 2.0-3.0)    Recent labs: (last 7 days)     07/01/22  1107   INR 1.8*       ASSESSMENT       Source(s): Chart Review and Home Care/Facility Nurse       Warfarin doses taken: Warfarin taken as instructed    Diet: No new diet changes identified    New illness, injury, or hospitalization: No    Medication/supplement changes: None noted    Signs or symptoms of bleeding or clotting: No    Previous INR: Subtherapeutic    Additional findings: None       PLAN     Recommended plan for no diet, medication or health factor changes affecting INR     Dosing Instructions: continue your current warfarin dose with next INR in 1 week       Summary  As of 7/1/2022    Full warfarin instructions:  3 mg every Tue; 4.5 mg all other days   Next INR check:  7/8/2022             Telephone call with Lorna home care/facility nurse who verbalizes understanding and agrees to plan    Orders given to  Homecare nurse/facility to recheck    Education provided: Goal range and significance of current result    Plan made per [facility] anticoagulation protocol    Pedro Peters RN  Anticoagulation Clinic  7/1/2022    _______________________________________________________________________     Anticoagulation Episode Summary     Current INR goal:  2.0-3.0   TTR:  38.4 % (1 y)   Target end date:  Indefinite   Send INR reminders to:  DEVON ALEXIS    Indications    Chronic atrial fibrillation (H) [I48.20]  Chronic anticoagulation [Z79.01]           Comments:           Anticoagulation Care Providers     Provider Role Specialty Phone number    Jazzy Gil MD Referring Family Medicine [phone]

## 2022-07-03 ENCOUNTER — MEDICAL CORRESPONDENCE (OUTPATIENT)
Dept: HEALTH INFORMATION MANAGEMENT | Facility: CLINIC | Age: 82
End: 2022-07-03

## 2022-07-04 ENCOUNTER — APPOINTMENT (OUTPATIENT)
Dept: RADIOLOGY | Facility: HOSPITAL | Age: 82
End: 2022-07-04
Attending: FAMILY MEDICINE
Payer: COMMERCIAL

## 2022-07-04 ENCOUNTER — APPOINTMENT (OUTPATIENT)
Dept: CT IMAGING | Facility: HOSPITAL | Age: 82
End: 2022-07-04
Attending: FAMILY MEDICINE
Payer: COMMERCIAL

## 2022-07-04 ENCOUNTER — HOSPITAL ENCOUNTER (EMERGENCY)
Facility: HOSPITAL | Age: 82
Discharge: ANOTHER HOSPITAL | End: 2022-07-04
Attending: FAMILY MEDICINE | Admitting: FAMILY MEDICINE
Payer: COMMERCIAL

## 2022-07-04 ENCOUNTER — HOSPITAL ENCOUNTER (INPATIENT)
Facility: CLINIC | Age: 82
LOS: 8 days | Discharge: NURSING, REHAB OR LONG-TERM CARE | DRG: 551 | End: 2022-07-12
Attending: INTERNAL MEDICINE | Admitting: INTERNAL MEDICINE
Payer: COMMERCIAL

## 2022-07-04 ENCOUNTER — TELEPHONE (OUTPATIENT)
Facility: CLINIC | Age: 82
End: 2022-07-04

## 2022-07-04 VITALS
DIASTOLIC BLOOD PRESSURE: 59 MMHG | HEART RATE: 77 BPM | RESPIRATION RATE: 25 BRPM | WEIGHT: 197 LBS | HEIGHT: 64 IN | BODY MASS INDEX: 33.63 KG/M2 | TEMPERATURE: 97.9 F | SYSTOLIC BLOOD PRESSURE: 107 MMHG | OXYGEN SATURATION: 96 %

## 2022-07-04 DIAGNOSIS — G47.00 INSOMNIA, UNSPECIFIED TYPE: ICD-10-CM

## 2022-07-04 DIAGNOSIS — E11.42 DIABETIC POLYNEUROPATHY ASSOCIATED WITH TYPE 2 DIABETES MELLITUS (H): ICD-10-CM

## 2022-07-04 DIAGNOSIS — J18.9 PNEUMONIA OF RIGHT LOWER LOBE DUE TO INFECTIOUS ORGANISM: ICD-10-CM

## 2022-07-04 DIAGNOSIS — Z79.01 CHRONIC ANTICOAGULATION: ICD-10-CM

## 2022-07-04 DIAGNOSIS — J18.9 PNEUMONIA OF LEFT LUNG DUE TO INFECTIOUS ORGANISM, UNSPECIFIED PART OF LUNG: ICD-10-CM

## 2022-07-04 DIAGNOSIS — S32.9XXA CLOSED NONDISPLACED FRACTURE OF PELVIS, UNSPECIFIED PART OF PELVIS, INITIAL ENCOUNTER (H): Primary | ICD-10-CM

## 2022-07-04 DIAGNOSIS — S32.10XA CLOSED FRACTURE OF SACRUM, UNSPECIFIED PORTION OF SACRUM, INITIAL ENCOUNTER (H): ICD-10-CM

## 2022-07-04 DIAGNOSIS — R19.7 DIARRHEA, UNSPECIFIED TYPE: ICD-10-CM

## 2022-07-04 DIAGNOSIS — I73.9 PAD (PERIPHERAL ARTERY DISEASE) (H): ICD-10-CM

## 2022-07-04 DIAGNOSIS — I48.20 CHRONIC ATRIAL FIBRILLATION (H): ICD-10-CM

## 2022-07-04 DIAGNOSIS — F32.A DEPRESSION, UNSPECIFIED DEPRESSION TYPE: ICD-10-CM

## 2022-07-04 DIAGNOSIS — J18.9 COMMUNITY ACQUIRED PNEUMONIA OF RIGHT LOWER LOBE OF LUNG: ICD-10-CM

## 2022-07-04 DIAGNOSIS — S32.591A PUBIC RAMUS FRACTURE, RIGHT, CLOSED, INITIAL ENCOUNTER (H): ICD-10-CM

## 2022-07-04 DIAGNOSIS — R52 PAIN: ICD-10-CM

## 2022-07-04 DIAGNOSIS — E11.42 TYPE 2 DIABETES MELLITUS WITH PERIPHERAL NEUROPATHY (H): ICD-10-CM

## 2022-07-04 LAB
ANION GAP SERPL CALCULATED.3IONS-SCNC: 5 MMOL/L (ref 5–18)
ATRIAL RATE - MUSE: 85 BPM
BASOPHILS # BLD AUTO: 0 10E3/UL (ref 0–0.2)
BASOPHILS NFR BLD AUTO: 0 %
BNP SERPL-MCNC: 321 PG/ML (ref 0–86)
BUN SERPL-MCNC: 21 MG/DL (ref 8–28)
CALCIUM SERPL-MCNC: 8.3 MG/DL (ref 8.5–10.5)
CHLORIDE BLD-SCNC: 101 MMOL/L (ref 98–107)
CO2 SERPL-SCNC: 31 MMOL/L (ref 22–31)
CREAT SERPL-MCNC: 0.74 MG/DL (ref 0.7–1.3)
DIASTOLIC BLOOD PRESSURE - MUSE: 57 MMHG
EOSINOPHIL # BLD AUTO: 0.1 10E3/UL (ref 0–0.7)
EOSINOPHIL NFR BLD AUTO: 1 %
ERYTHROCYTE [DISTWIDTH] IN BLOOD BY AUTOMATED COUNT: 13.3 % (ref 10–15)
GFR SERPL CREATININE-BSD FRML MDRD: >90 ML/MIN/1.73M2
GLUCOSE BLD-MCNC: 270 MG/DL (ref 70–125)
GLUCOSE BLDC GLUCOMTR-MCNC: 239 MG/DL (ref 70–99)
GLUCOSE BLDC GLUCOMTR-MCNC: 240 MG/DL (ref 70–99)
HCT VFR BLD AUTO: 34.4 % (ref 40–53)
HGB BLD-MCNC: 11 G/DL (ref 13.3–17.7)
IMM GRANULOCYTES # BLD: 0 10E3/UL
IMM GRANULOCYTES NFR BLD: 0 %
INR PPP: 2.49 (ref 0.85–1.15)
INTERPRETATION ECG - MUSE: NORMAL
LYMPHOCYTES # BLD AUTO: 1.1 10E3/UL (ref 0.8–5.3)
LYMPHOCYTES NFR BLD AUTO: 13 %
MAGNESIUM SERPL-MCNC: 1.8 MG/DL (ref 1.8–2.6)
MCH RBC QN AUTO: 32.3 PG (ref 26.5–33)
MCHC RBC AUTO-ENTMCNC: 32 G/DL (ref 31.5–36.5)
MCV RBC AUTO: 101 FL (ref 78–100)
MONOCYTES # BLD AUTO: 0.6 10E3/UL (ref 0–1.3)
MONOCYTES NFR BLD AUTO: 7 %
NEUTROPHILS # BLD AUTO: 6.9 10E3/UL (ref 1.6–8.3)
NEUTROPHILS NFR BLD AUTO: 79 %
NRBC # BLD AUTO: 0 10E3/UL
NRBC BLD AUTO-RTO: 0 /100
P AXIS - MUSE: NORMAL DEGREES
PLATELET # BLD AUTO: 121 10E3/UL (ref 150–450)
POTASSIUM BLD-SCNC: 3.9 MMOL/L (ref 3.5–5)
PR INTERVAL - MUSE: NORMAL MS
QRS DURATION - MUSE: 92 MS
QT - MUSE: 478 MS
QTC - MUSE: 548 MS
R AXIS - MUSE: -25 DEGREES
RBC # BLD AUTO: 3.41 10E6/UL (ref 4.4–5.9)
SARS-COV-2 RNA RESP QL NAA+PROBE: NEGATIVE
SODIUM SERPL-SCNC: 137 MMOL/L (ref 136–145)
SYSTOLIC BLOOD PRESSURE - MUSE: 116 MMHG
T AXIS - MUSE: 190 DEGREES
TROPONIN I SERPL-MCNC: 0.15 NG/ML (ref 0–0.29)
VENTRICULAR RATE- MUSE: 79 BPM
WBC # BLD AUTO: 8.6 10E3/UL (ref 4–11)

## 2022-07-04 PROCEDURE — 87635 SARS-COV-2 COVID-19 AMP PRB: CPT | Performed by: FAMILY MEDICINE

## 2022-07-04 PROCEDURE — 250N000011 HC RX IP 250 OP 636: Performed by: INTERNAL MEDICINE

## 2022-07-04 PROCEDURE — 96365 THER/PROPH/DIAG IV INF INIT: CPT

## 2022-07-04 PROCEDURE — 36415 COLL VENOUS BLD VENIPUNCTURE: CPT | Performed by: FAMILY MEDICINE

## 2022-07-04 PROCEDURE — 96375 TX/PRO/DX INJ NEW DRUG ADDON: CPT

## 2022-07-04 PROCEDURE — 73522 X-RAY EXAM HIPS BI 3-4 VIEWS: CPT

## 2022-07-04 PROCEDURE — 99285 EMERGENCY DEPT VISIT HI MDM: CPT | Mod: 25

## 2022-07-04 PROCEDURE — 85025 COMPLETE CBC W/AUTO DIFF WBC: CPT | Performed by: FAMILY MEDICINE

## 2022-07-04 PROCEDURE — 71045 X-RAY EXAM CHEST 1 VIEW: CPT

## 2022-07-04 PROCEDURE — 70450 CT HEAD/BRAIN W/O DYE: CPT

## 2022-07-04 PROCEDURE — 99223 1ST HOSP IP/OBS HIGH 75: CPT | Mod: AI | Performed by: INTERNAL MEDICINE

## 2022-07-04 PROCEDURE — 83735 ASSAY OF MAGNESIUM: CPT | Performed by: FAMILY MEDICINE

## 2022-07-04 PROCEDURE — 250N000013 HC RX MED GY IP 250 OP 250 PS 637: Performed by: FAMILY MEDICINE

## 2022-07-04 PROCEDURE — 85610 PROTHROMBIN TIME: CPT | Performed by: FAMILY MEDICINE

## 2022-07-04 PROCEDURE — 250N000011 HC RX IP 250 OP 636: Performed by: FAMILY MEDICINE

## 2022-07-04 PROCEDURE — 96366 THER/PROPH/DIAG IV INF ADDON: CPT

## 2022-07-04 PROCEDURE — 83880 ASSAY OF NATRIURETIC PEPTIDE: CPT | Performed by: FAMILY MEDICINE

## 2022-07-04 PROCEDURE — 120N000001 HC R&B MED SURG/OB

## 2022-07-04 PROCEDURE — 71250 CT THORAX DX C-: CPT

## 2022-07-04 PROCEDURE — 80048 BASIC METABOLIC PNL TOTAL CA: CPT | Performed by: FAMILY MEDICINE

## 2022-07-04 PROCEDURE — 250N000013 HC RX MED GY IP 250 OP 250 PS 637

## 2022-07-04 PROCEDURE — 93005 ELECTROCARDIOGRAM TRACING: CPT | Performed by: STUDENT IN AN ORGANIZED HEALTH CARE EDUCATION/TRAINING PROGRAM

## 2022-07-04 PROCEDURE — 250N000013 HC RX MED GY IP 250 OP 250 PS 637: Performed by: INTERNAL MEDICINE

## 2022-07-04 PROCEDURE — 84484 ASSAY OF TROPONIN QUANT: CPT | Performed by: FAMILY MEDICINE

## 2022-07-04 PROCEDURE — 250N000012 HC RX MED GY IP 250 OP 636 PS 637: Performed by: INTERNAL MEDICINE

## 2022-07-04 PROCEDURE — 73502 X-RAY EXAM HIP UNI 2-3 VIEWS: CPT

## 2022-07-04 PROCEDURE — C9803 HOPD COVID-19 SPEC COLLECT: HCPCS

## 2022-07-04 PROCEDURE — 72192 CT PELVIS W/O DYE: CPT

## 2022-07-04 RX ORDER — DEXTROSE MONOHYDRATE 25 G/50ML
25-50 INJECTION, SOLUTION INTRAVENOUS
Status: DISCONTINUED | OUTPATIENT
Start: 2022-07-04 | End: 2022-07-12 | Stop reason: HOSPADM

## 2022-07-04 RX ORDER — AZITHROMYCIN 250 MG/1
250 TABLET, FILM COATED ORAL DAILY
Status: COMPLETED | OUTPATIENT
Start: 2022-07-05 | End: 2022-07-08

## 2022-07-04 RX ORDER — CILOSTAZOL 50 MG/1
50 TABLET ORAL DAILY
Status: DISCONTINUED | OUTPATIENT
Start: 2022-07-05 | End: 2022-07-12 | Stop reason: HOSPADM

## 2022-07-04 RX ORDER — CEFTRIAXONE 1 G/1
1 INJECTION, POWDER, FOR SOLUTION INTRAMUSCULAR; INTRAVENOUS ONCE
Status: COMPLETED | OUTPATIENT
Start: 2022-07-04 | End: 2022-07-04

## 2022-07-04 RX ORDER — ASPIRIN 81 MG/1
81 TABLET ORAL DAILY
Status: DISCONTINUED | OUTPATIENT
Start: 2022-07-05 | End: 2022-07-12 | Stop reason: HOSPADM

## 2022-07-04 RX ORDER — KETOROLAC TROMETHAMINE 15 MG/ML
10 INJECTION, SOLUTION INTRAMUSCULAR; INTRAVENOUS ONCE
Status: COMPLETED | OUTPATIENT
Start: 2022-07-04 | End: 2022-07-04

## 2022-07-04 RX ORDER — CARVEDILOL 6.25 MG/1
6.25 TABLET ORAL 2 TIMES DAILY WITH MEALS
Status: DISCONTINUED | OUTPATIENT
Start: 2022-07-04 | End: 2022-07-12 | Stop reason: HOSPADM

## 2022-07-04 RX ORDER — OXYCODONE HCL 10 MG/1
10 TABLET, FILM COATED, EXTENDED RELEASE ORAL EVERY 12 HOURS
Status: DISCONTINUED | OUTPATIENT
Start: 2022-07-04 | End: 2022-07-06

## 2022-07-04 RX ORDER — LIDOCAINE 40 MG/G
CREAM TOPICAL
Status: DISCONTINUED | OUTPATIENT
Start: 2022-07-04 | End: 2022-07-12 | Stop reason: HOSPADM

## 2022-07-04 RX ORDER — PRAMIPEXOLE DIHYDROCHLORIDE 0.5 MG/1
0.5 TABLET ORAL DAILY
Status: DISCONTINUED | OUTPATIENT
Start: 2022-07-04 | End: 2022-07-12 | Stop reason: HOSPADM

## 2022-07-04 RX ORDER — NALOXONE HYDROCHLORIDE 0.4 MG/ML
0.2 INJECTION, SOLUTION INTRAMUSCULAR; INTRAVENOUS; SUBCUTANEOUS
Status: DISCONTINUED | OUTPATIENT
Start: 2022-07-04 | End: 2022-07-12 | Stop reason: HOSPADM

## 2022-07-04 RX ORDER — ROSUVASTATIN CALCIUM 20 MG/1
20 TABLET, COATED ORAL AT BEDTIME
Status: DISCONTINUED | OUTPATIENT
Start: 2022-07-04 | End: 2022-07-12 | Stop reason: HOSPADM

## 2022-07-04 RX ORDER — AZITHROMYCIN 250 MG/1
500 TABLET, FILM COATED ORAL ONCE
Status: COMPLETED | OUTPATIENT
Start: 2022-07-04 | End: 2022-07-04

## 2022-07-04 RX ORDER — OXYCODONE AND ACETAMINOPHEN 5; 325 MG/1; MG/1
1 TABLET ORAL 3 TIMES DAILY PRN
Status: DISCONTINUED | OUTPATIENT
Start: 2022-07-04 | End: 2022-07-06

## 2022-07-04 RX ORDER — NALOXONE HYDROCHLORIDE 0.4 MG/ML
0.4 INJECTION, SOLUTION INTRAMUSCULAR; INTRAVENOUS; SUBCUTANEOUS
Status: DISCONTINUED | OUTPATIENT
Start: 2022-07-04 | End: 2022-07-12 | Stop reason: HOSPADM

## 2022-07-04 RX ORDER — FINASTERIDE 5 MG/1
5 TABLET, FILM COATED ORAL EVERY EVENING
Status: DISCONTINUED | OUTPATIENT
Start: 2022-07-04 | End: 2022-07-12 | Stop reason: HOSPADM

## 2022-07-04 RX ORDER — ONDANSETRON 2 MG/ML
4 INJECTION INTRAMUSCULAR; INTRAVENOUS EVERY 6 HOURS PRN
Status: DISCONTINUED | OUTPATIENT
Start: 2022-07-04 | End: 2022-07-12 | Stop reason: HOSPADM

## 2022-07-04 RX ORDER — AMOXICILLIN 250 MG
1 CAPSULE ORAL 2 TIMES DAILY PRN
Status: DISCONTINUED | OUTPATIENT
Start: 2022-07-04 | End: 2022-07-12 | Stop reason: HOSPADM

## 2022-07-04 RX ORDER — CILOSTAZOL 50 MG/1
50 TABLET ORAL 2 TIMES DAILY
Status: DISCONTINUED | OUTPATIENT
Start: 2022-07-04 | End: 2022-07-04

## 2022-07-04 RX ORDER — ONDANSETRON 4 MG/1
4 TABLET, ORALLY DISINTEGRATING ORAL EVERY 6 HOURS PRN
Status: DISCONTINUED | OUTPATIENT
Start: 2022-07-04 | End: 2022-07-12 | Stop reason: HOSPADM

## 2022-07-04 RX ORDER — NICOTINE POLACRILEX 4 MG
15-30 LOZENGE BUCCAL
Status: DISCONTINUED | OUTPATIENT
Start: 2022-07-04 | End: 2022-07-12 | Stop reason: HOSPADM

## 2022-07-04 RX ORDER — AMOXICILLIN 250 MG
2 CAPSULE ORAL 2 TIMES DAILY PRN
Status: DISCONTINUED | OUTPATIENT
Start: 2022-07-04 | End: 2022-07-12 | Stop reason: HOSPADM

## 2022-07-04 RX ADMIN — PRAMIPEXOLE DIHYDROCHLORIDE 0.5 MG: 0.5 TABLET ORAL at 21:49

## 2022-07-04 RX ADMIN — Medication 1 MG: at 22:17

## 2022-07-04 RX ADMIN — WARFARIN SODIUM 3 MG: 2 TABLET ORAL at 21:10

## 2022-07-04 RX ADMIN — OXYCODONE HYDROCHLORIDE 10 MG: 10 TABLET, FILM COATED, EXTENDED RELEASE ORAL at 21:10

## 2022-07-04 RX ADMIN — TAZOBACTAM SODIUM AND PIPERACILLIN SODIUM 3.38 G: 375; 3 INJECTION, SOLUTION INTRAVENOUS at 21:50

## 2022-07-04 RX ADMIN — CARVEDILOL 6.25 MG: 6.25 TABLET, FILM COATED ORAL at 21:10

## 2022-07-04 RX ADMIN — KETOROLAC TROMETHAMINE 10 MG: 15 INJECTION, SOLUTION INTRAMUSCULAR; INTRAVENOUS at 12:50

## 2022-07-04 RX ADMIN — CEFTRIAXONE SODIUM 1 G: 1 INJECTION, POWDER, FOR SOLUTION INTRAMUSCULAR; INTRAVENOUS at 14:42

## 2022-07-04 RX ADMIN — INSULIN ASPART 3 UNITS: 100 INJECTION, SOLUTION INTRAVENOUS; SUBCUTANEOUS at 22:17

## 2022-07-04 RX ADMIN — ROSUVASTATIN CALCIUM 20 MG: 20 TABLET, FILM COATED ORAL at 21:10

## 2022-07-04 RX ADMIN — AZITHROMYCIN MONOHYDRATE 500 MG: 250 TABLET ORAL at 14:42

## 2022-07-04 RX ADMIN — FINASTERIDE 5 MG: 5 TABLET, FILM COATED ORAL at 21:10

## 2022-07-04 ASSESSMENT — COLUMBIA-SUICIDE SEVERITY RATING SCALE - C-SSRS
3. HAVE YOU BEEN THINKING ABOUT HOW YOU MIGHT KILL YOURSELF?: NO
2. HAVE YOU ACTUALLY HAD ANY THOUGHTS OF KILLING YOURSELF IN THE PAST MONTH?: NO
5. HAVE YOU STARTED TO WORK OUT OR WORKED OUT THE DETAILS OF HOW TO KILL YOURSELF? DO YOU INTEND TO CARRY OUT THIS PLAN?: NO
1. IN THE PAST MONTH, HAVE YOU WISHED YOU WERE DEAD OR WISHED YOU COULD GO TO SLEEP AND NOT WAKE UP?: NO
4. HAVE YOU HAD THESE THOUGHTS AND HAD SOME INTENTION OF ACTING ON THEM?: NO
6. HAVE YOU EVER DONE ANYTHING, STARTED TO DO ANYTHING, OR PREPARED TO DO ANYTHING TO END YOUR LIFE?: NO

## 2022-07-04 ASSESSMENT — ENCOUNTER SYMPTOMS
COUGH: 1
ARTHRALGIAS: 1
SHORTNESS OF BREATH: 0

## 2022-07-04 ASSESSMENT — ACTIVITIES OF DAILY LIVING (ADL)
ADLS_ACUITY_SCORE: 33
ADLS_ACUITY_SCORE: 37

## 2022-07-04 NOTE — H&P
[facility]    History and Physical - Hospitalist Service       Date of Admission:  7/4/2022    Assessment & Plan      Pee Ayala is a 82 year old male admitted on 7/4/2022. He was transferred from [facility] emergency department to [facility] 7/4/2022    Right lower lobe pneumonia  Partially loculated right-sided pleural effusion  Will monitor oxygen  Started patient on Zosyn 7/4/2022 and continued azithromycin that was already started in the emergency department  Consult interventional radiology and pulmonology regarding partially loculated right pleural effusion      Right pubic rami fracture  Fracture is not displaced present in both superior and inferior pubic rami  Sacral marquis fracture  Probable osteoporosis  Pain management we will start the patient on OxyContin 10 mg every 12 hours along with as needed Percocet  Constipation medication -bowel regime  Physical therapy  Occupational Therapy  Probable TCU discharge based on PT OT evaluation  Osteoporosis treatment may have to be started    Subacute superior endplate L5 compression fracture  Inferior endplate L4 compression fracture  With recent 6/7/2022 T11 kyphoplasty for intractable back pain with T11 compression fracture  Pain management as mentioned above    Left hip hemiarthroplasty 4/9/2022  PT and OT will be consulted  Pain management as above    Atrial fibrillation  With severe left atrial enlargement  On warfarin for anticoagulation    Congestive heart failure  With preserved LV function    Bioprosthetic aortic valve  With atrial fibrillation patient is anticoagulated    Moderate mitral insufficiency  Patient is on Lasix and    Coronary artery disease  Peripheral artery disease s/p PCI   on cilostazol, carvedilol and rosuvastatin    Type 2 diabetes mellitus  We will give sliding scale insulin until medication reconciliation is completed    Hypercholesteremia  On Rosuvastatin    GERD  Nonalcoholic  "steatohepatitis                 Diet:  Diabetic diet  DVT Prophylaxis: Warfarin  Silva Catheter: Not present  Central Lines: None  Cardiac Monitoring: None  Code Status:  Patient wants to be full code    Clinically Significant Risk Factors Present on Admission               # Coagulation Defect: home medication list includes an anticoagulant medication  # Platelet Defect: home medication list includes an antiplatelet medication     # Obesity: Estimated body mass index is 33.81 kg/m  as calculated from the following:    Height as of an earlier encounter on 7/4/22: 1.626 m (5' 4\").    Weight as of an earlier encounter on 7/4/22: 89.4 kg (197 lb).        Disposition Plan   Management of loculated pleural effusion right side and pelvic fracture -discharge is unknown at this time     The patient's care was discussed with the Patient and His wife over the phone.    Yifan Momin MD  Hospitalist Service  [facility]  Securely message with the Vocera Web Console (learn more here)  Text page via [employer] Paging/Directory         ______________________________________________________________________    Chief Complaint   Pelvic pain    History is obtained from the patient, medical records and my discussion with emergency department physician        History of Present Illness   Pee Ayala is a pleasant 82-year-old gentleman with past medical history of T2DM, HTN, hypercholesteremia, GERD, atrial fibrillation, CAD, nonalcoholic steatohepatitis and congestive heart failure with preserved LV function and a LVEF of 65 to 70% in the echo done 11/29/2021 bioprosthetic aortic valve replacement, With recent 6/7/2022 T11 kyphoplasty for intractable back pain with T11 compression fracture with history of left total hip arthroplasty done 4/9/2022.    Patient said that he was walking in his house when he fell down in a nonsyncopal fall resulting in right-sided pelvic pain in the groin area this happened 7/2/2022.  " There was no loss of consciousness.  Pain in the pelvis is 8/10 when he walks and less when he is laying down it is 6/10 at this time after moving.     He also has cough with pleuritic right-sided chest pain with sputum production and congestion in the throat for some time.  He denies any fever or chills.  Denies any shortness of breath.     Patient was initially evaluated at [facility] emergency department.  His BMP looked normal with a creatinine of 0.74, potassium of 3.9 sodium 137 bicarb 31.  BNP was mildly elevated at 321 and sugar was elevated at 270.  Hemoglobin was 11 white count 8.6 platelet count 121,000.  COVID was negative INR was therapeutic at 2.4  CT scan of the chest as mentioned below shows right lower lobe airspace opacities with partially loculated right-sided pleural effusion.   CT scan of the pelvis showed nondisplaced radiologically occult right central superior and inferior pubic rami fractures with a nondisplaced right sacral marquis fracture with profound demineralization.     Review of Systems    Denies any headache, blurring of vision or double vision, neck pain jaw pain or shoulder pain, otherwise as mentioned above has chest pain,, cough with increased sputum production, without  shortness of breath, nausea or vomiting, abdominal pain, diarrhea or constipation.  Denies any urinary symptoms of burning otherwise chronically patient has on diuretics increased frequency. Denies any weakness of upper or lower extremities or any seizure activity. Fever or chills. Bleeding from anywhere else.  No rashes or cellulitis.      Past Medical History    I have reviewed this patient's medical history and updated it with pertinent information if needed.   Past Medical History:   Diagnosis Date     ACS (acute coronary syndrome) (H) 6/2/2014     ACS (acute coronary syndrome) (H) 6/2/2014     Actinic keratosis      Actinic keratosis 1/14/2014     Actinic keratosis 1/14/2014      Anticoagulated on Coumadin 12/30/2015     Antiplatelet or antithrombotic long-term use      Atrial fibrillation (H)      Atrial fibrillation (H) 2016     Bone mass 4/26/2017     Chest heaviness 1/23/2019     Chest pain 5/31/2014     Closed fracture of left forearm 2015     Congestive heart failure (H)      Coronary artery disease      Diabetes (H) 2009     Diabetes mellitus (H)      Difficulty in walking(719.7)      Dyslipidemia 8/31/2016     Dyspnea on exertion      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      Encounter for long-term (current) use of insulin (H) 8/11/2016     Esophageal reflux 11/18/2010     Esophageal reflux 11/18/2010     History of angina      HTN (hypertension) 6/30/2009     (Problem list name updated by automated process. Provider to review and confirm.)     Hyperlipidemia LDL goal <100 10/31/2010     Hypertension      Impotence of organic origin      Mixed hyperlipidemia 4/25/2007 April 25, 2007 restarted Zocor today, recheck in 3 months.  August 23, 2007 LDL at 101 with 40 mg, will increase to 80 mg. Recheck  In 3 months.      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      Type II or unspecified type diabetes mellitus with neurological manifestations, not stated as uncontrolled(250.60) (H)      Walking troubles        Past Surgical  History   I have reviewed this patient's surgical history and updated it with pertinent information if needed.  Past Surgical History:   Procedure Laterality Date     BYPASS GRAFT ARTERY CORONARY N/A 9/10/2014    Procedure: BYPASS GRAFT ARTERY CORONARY;  Surgeon: Bharathi Caraballo MD;  Location:  OR     BYPASS GRAFT ARTERY CORONARY  2016     COLONOSCOPY  1/14/2004     CORONARY ANGIOGRAPHY ADULT ORDER       CV CORONARY ANGIOGRAM N/A 4/4/2019    Procedure: Coronary Angiogram;  Surgeon: Dominik Vega MD;  Location: [facility] Cath Lab;  Service: Cardiology     CV CORONARY ANGIOGRAM N/A 1/6/2021    Procedure: Coronary Angiogram;  Surgeon: Dominik Vega MD;  Location: [facility] Cardiac Cath Lab;  Service: Cardiology     CV LEFT HEART CATHETERIZATION WITHOUT LEFT VENTRICULOGRAM Left 4/4/2019    Procedure: Left Heart Catheterization Without Left Ventriculogram;  Surgeon: Dominik Vega MD;  Location: [facility] Cath Lab;  Service: Cardiology     CV LEFT HEART CATHETERIZATION WITHOUT LEFT VENTRICULOGRAM Left 1/6/2021    Procedure: Left Heart Catheterization Without Left Ventriculogram;  Surgeon: Dominik Vega MD;  Location: [facility] Cardiac Cath Lab;  Service: Cardiology     CV RIGHT HEART CATHETERIZATION Right 4/4/2019    Procedure: Right Heart Catheterization;  Surgeon: Dominik Vega MD;  Location: [facility] Cath Lab;  Service: Cardiology     CV TRANSCATHETER AORTIC VALVE REPLACEMENT N/A 1/12/2021    Procedure: Right transfemoral transcatheter aortic valve replacement using Magdaleno Dominick 3 size 29mm.  Transthoracic echocardiogram;  Surgeon: Jo Romano MD;  Location: [facility] CARDIAC CATH LAB     ESOPHAGOSCOPY, GASTROSCOPY, DUODENOSCOPY (EGD), COMBINED N/A 1/13/2016    Procedure: COMBINED ESOPHAGOSCOPY, GASTROSCOPY, DUODENOSCOPY (EGD);  Surgeon: Rk Srivastava MD;  Location: [facility] FEMORAL CANNULIZATION WITH OPEN STANDBY REPAIR  AORTIC VALVE N/A 2021    Procedure: Cardiopulmonary Bypass standby;  Surgeon: Jefferson Sandy MD;  Location:  HEART CARDIAC CATH LAB     IR LOWER EXTREMITY ANGIOGRAM LEFT  2021     LAPAROSCOPIC CHOLECYSTECTOMY  10/09     MAZE PROCEDURE N/A 9/10/2014    Procedure: MAZE PROCEDURE;  Surgeon: Bharathi Caraballo MD;  Location:  OR     MOHS MICROGRAPHIC PROCEDURE       OPEN REDUCTION INTERNAL FIXATION HIP BIPOLAR Left 2022    Procedure: HEMIARTHROPLASTY, HIP, BIPOLAR, OPEN REDUCTION INTERNAL FIXATION OF GREATER TROCHANTER;  Surgeon: Jd Larose MD;  Location: [facility] OR     ORTHOPEDIC SURGERY      surgery for fx  Left forearm     OTHER SURGICAL HISTORY Left     forearm sugery     STENT, CORONARY, HUMA  2016     VASECTOMY         Social History   I have reviewed this patient's social history and updated it with pertinent information if needed.  Social History     Tobacco Use     Smoking status: Former Smoker     Quit date: 1998     Years since quittin.5     Smokeless tobacco: Never Used   Substance Use Topics     Alcohol use: Yes     Alcohol/week: 0.0 standard drinks     Comment: Alcoholic Drinks/day: very rare     Drug use: No       Family History   I have reviewed this patient's family history and updated it with pertinent information if needed.  Family History   Problem Relation Age of Onset     Diabetes Mother      Hypertension Father      Cerebrovascular Disease Father      Diabetes Maternal Grandmother      Breast Cancer No family hx of      Cancer - colorectal No family hx of      Prostate Cancer No family hx of      C.A.D. No family hx of      Diabetes Type 2  Mother         58 ,  from anesthesia complication.     Heart Disease Father         86  from stroke     No Known Problems Brother         60 years of age.       Prior to Admission Medications   Prior to Admission Medications   Prescriptions Last Dose Informant Patient Reported? Taking?   ACCU-CHEK GUIDE  test strip   No No   Sig: USE 1  TO CHECK GLUCOSE THREE TIMES DAILY   Apoaequorin (PREVAGEN PO)   Yes No   Sig: Take 1 tablet by mouth daily    Continuous Blood Gluc Sensor (FREESTYLE DOMI 2 SENSOR) [facility]   No No   Si each every 14 days Use 1 sensor every 14 days. Use to read blood sugars per 's instructions.   aspirin 81 MG EC tablet   Yes No   Sig: Take 81 mg by mouth daily   carvedilol (COREG) 6.25 MG tablet   No No   Sig: Take 1 tablet (6.25 mg) by mouth 2 times daily (with meals)   cilostazol (PLETAL) 50 MG tablet   No No   Sig: Take 1 tablet (50 mg) by mouth daily for 14 days, THEN 1 tablet (50 mg) 2 times daily for 14 days, THEN 2 tablets (100 mg) 2 times daily for 30 days.   cyanocobalamin (VITAMIN B-12) 1000 MCG tablet   No No   Sig: Take 1 tablet (1,000 mcg) by mouth daily   finasteride (PROSCAR) 5 MG tablet   No No   Sig: Take 1 tablet (5 mg) by mouth daily   furosemide (LASIX) 20 MG tablet   No No   Sig: TAKE 2 TABLETS BY MOUTH IN THE MORNING AND 1 TABLET IN THE AFTERNOON   gabapentin (NEURONTIN) 600 MG tablet   Yes No   Sig: TAKE 1 TABLET BY MOUTH IN THE EVENING FOR 3 DAYS THEN 1 TWICE DAILY FOR 3 DAYS THEN 1 THREE TIMES DAILY THEREAFTER   insulin aspart prot & aspart (NOVOLOG MIX 70/30 PEN) (70-30) 100 UNIT/ML pen   Yes No   Sig: Inject Subcutaneous 2 times daily (with meals) 33 in AM and 26 in PM   metFORMIN (GLUCOPHAGE) 500 MG tablet   No No   Sig: Take 2 tablets by mouth twice daily   methocarbamol (ROBAXIN) 500 MG tablet   Yes No   Sig: Take 500 mg by mouth 2 times daily   oxyCODONE-acetaminophen (PERCOCET) 5-325 MG tablet   Yes No   Sig: Take 1 tablet by mouth 3 times daily as needed   polyethylene glycol (MIRALAX) 17 GM/Dose powder   No No   Sig: Take 17 g (1 capful) by mouth daily as needed for constipation (opioid induced constipation)   pramipexole (MIRAPEX) 0.25 MG tablet   No No   Sig: Take 2 tablets (0.5 mg) by mouth daily Takes 4pm and 5;30 pm   rosuvastatin (CRESTOR) 20 MG  tablet   No No   Sig: Take 1 tablet (20 mg) by mouth At Bedtime   senna-docusate (SENOKOT-S/PERICOLACE) 8.6-50 MG tablet   No No   Sig: Take 1 tablet by mouth in the morning and 1 tablet in the evening.   warfarin ANTICOAGULANT (COUMADIN) 3 MG tablet   No No   Sig: Take 1 tablet (3 mg) by mouth daily Or as directed.   Patient taking differently: Take 3 mg by mouth daily 3 mg on Tuesdays and 4.5 mg the rest of the week      Facility-Administered Medications: None     Allergies   Allergies   Allergen Reactions     Oxycodone Itching and Rash     Amlodipine Dizziness     Dizziness and dry heaves     Lisinopril Cough     Adhesive Tape Itching and Rash       Physical Exam   Vital Signs: Temp: 97.8  F (36.6  C) Temp src: Oral BP: 117/59 Pulse: 70   Resp: 18 SpO2: 95 % O2 Device: None (Room air)    Weight: 0 lbs 0 oz      GENERAL: Patient does not look in any acute distress  HEENT: EOM+, Conjunctiva is clear   NECK: There is a 1 cm jugular Venous distention  HEART: S1 S2 regular  Rate and Rhythm, prominent second heart sound systolic murmur is present  LUNGS: Respirations are not laboured up until he takes a deep breath, Lungs have decreased breath sounds in the right lung base to auscultation but I did not hear any crepitations or Wheezing   ABDOMEN: Soft , there is no tenderness , Bowel Sounds are Positive   LOWER LIMBS: There is tenderness in the right pelvic area in the groin with mild pedal Edema right more than left   CNS:  Alert,  Oriented x 3, Moving all the Four Limbs       Data   Data reviewed today: I reviewed all medications, new labs and imaging results over the last 24 hours.      Most Recent 3 CBC's:Recent Labs   Lab Test 07/04/22  1034 06/03/22  0959 05/24/22  1023   WBC 8.6 6.8 7.1   HGB 11.0* 11.7* 11.8*   * 107* 107*   * 261 226     Most Recent 3 BMP's:Recent Labs   Lab Test 07/04/22  1812 07/04/22  1034 05/24/22  1023 04/18/22  0610   NA  --  137 142 140   POTASSIUM  --  3.9 4.3 4.3    CHLORIDE  --  101 103 101   CO2  --  31 25 25   BUN  --  21 14 19   CR  --  0.74 0.80 0.70   ANIONGAP  --  5 14 14   RACHEL  --  8.3* 9.2 8.1*   * 270* 91 128*     Most Recent 2 LFT's:Recent Labs   Lab Test 05/24/22  1023 04/08/22  1811   AST 24 45*   ALT 14 45   ALKPHOS 165* 126*   BILITOTAL 0.7 1.0     Recent Results (from the past 24 hour(s))   Head CT w/o contrast    Narrative    EXAM: CT HEAD W/O CONTRAST  LOCATION: [facility]  DATE/TIME: 7/4/2022 11:20 AM    INDICATION: Headache after trauma.  COMPARISON: CT head dated 04/08/2022  TECHNIQUE: Routine CT Head without IV contrast. Multiplanar reformats. Dose reduction techniques were used.    FINDINGS:   INTRACRANIAL CONTENTS: No acute intracranial hemorrhage. No CT evidence of acute infarct. Sequelae of mild chronic microangiopathy. Mild cerebral volume loss without hydrocephalus. No extra-axial fluid collections.  Patent basal cisterns.     VISUALIZED ORBITS/SINUSES/MASTOIDS: The orbits are unremarkable. The visualized paranasal sinuses and temporal bone structures are well-aerated.     BONES/SOFT TISSUES: The calvarium and skull base are unremarkable.       Impression    IMPRESSION:     1.  Senescent changes and sequelae of chronic microangiopathy without acute intracranial abnormality.   XR Pelvis and Hip Right 2 Views    Narrative    EXAM: XR PELVIS AND HIP RIGHT 2 VIEWS, XR HIP LEFT 2-3 VIEWS  LOCATION: [facility]  DATE/TIME: 07/04/2022, 11:25 AM    INDICATION: Fall. Pelvic pain.  COMPARISON: 04/09/2022.       Impression    IMPRESSION: Left hip hemiarthroplasty remains in place. No acute displaced periprosthetic fracture or finding for component loosening. Progressive heterotopic ossification is seen about the left hip. Previously seen postoperative soft tissue gas about   the left hip has resolved and lateral left hip skin staples have been removed. No interval change in mild right hip  osteoarthritis. Diffuse bone demineralization. Degenerative change in the lower lumbar spine and both SI joints. No acute displaced pelvic   fracture. Arterial calcification. Punctate radiodensity overlies the right hip as before.     XR Hip Left 2-3 Views    Narrative    EXAM: XR PELVIS AND HIP RIGHT 2 VIEWS, XR HIP LEFT 2-3 VIEWS  LOCATION: [facility]  DATE/TIME: 07/04/2022, 11:25 AM    INDICATION: Fall. Pelvic pain.  COMPARISON: 04/09/2022.       Impression    IMPRESSION: Left hip hemiarthroplasty remains in place. No acute displaced periprosthetic fracture or finding for component loosening. Progressive heterotopic ossification is seen about the left hip. Previously seen postoperative soft tissue gas about   the left hip has resolved and lateral left hip skin staples have been removed. No interval change in mild right hip osteoarthritis. Diffuse bone demineralization. Degenerative change in the lower lumbar spine and both SI joints. No acute displaced pelvic   fracture. Arterial calcification. Punctate radiodensity overlies the right hip as before.     XR Chest 1 View    Narrative    EXAM: XR CHEST 1 VIEW  LOCATION: [facility]  DATE/TIME: 7/4/2022 11:25 AM    INDICATION: fall, crackles on exam  COMPARISON: 04/12/2022       Impression    IMPRESSION: Blunting of the left costophrenic sulcus may be scarring or minimal fluid. Subtle pulmonary opacities suggest an atypical infectious process, though edema and other processes can have a similar appearance. Lung volumes are low. There is no   pneumothorax. The cardiomediastinal silhouette is mildly enlarged. A TAVR stent is noted. Median sternotomy. Right upper quadrant surgical clips are present.    CT Pelvis Bone wo Contrast    Narrative    EXAM: CT PELVIS BONE WITHOUT CONTRAST  LOCATION: [facility]  DATE/TIME: 07/04/2022, 1:49 PM    INDICATION: Fall. Pelvic pain.  COMPARISON: Pelvis  radiographic exam earlier today.  TECHNIQUE: CT scan of the pelvis was performed without IV contrast. Multiplanar reformats were obtained. Dose reduction techniques were used.  CONTRAST: None.    FINDINGS:    PELVIS ORGANS: Small volume blood products seen in the perivesicular space anteriorly. Distended urinary bladder. No intestinal obstruction. Colonic diverticulosis. Extensive arterial calcification.    MUSCULOSKELETAL: Left hip hemiarthroplasty in place. Abundant heterotopic ossification is seen about the left hip. Nondisplaced fracture right sacral ala. Nondisplaced radiographically occult fracture right superior pubic ramus near the right   parasymphyseal pubis. Nondisplaced radiographically occult fracture of the central right inferior pubic ramus. No right proximal femur fracture. Profound diffuse bone demineralization. Subacute appearing healing left parasymphyseal pubic fracture   extending towards the central left inferior pubic ramus.    Age uncertain superior endplate L5 compression fracture, likely subacute given the adjacent sclerosis. Inferior endplate L4 compression fracture partially seen. Moderate degenerative change and partial ankylosis at the SI joints particularly on the left.   Arthritis at the symphysis pubis with chondrocalcinosis. No aggressive bone lesion.    Intrinsic pelvic muscle bulk is largely symmetric without significant intramuscular hematoma.      Impression    IMPRESSION:  1.  Nondisplaced radiographically occult right central superior and inferior pubic rami fractures extending across the right parasymphyseal pubis.  2.  Nondisplaced right sacral ala fracture, radiographically occult.  3.  Profound diffuse bone demineralization, which limits CT sensitivity to detect nondisplaced fractures. MRI more sensitive.  4.  Subacute appearing superior endplate L5 and inferior endplate L4 compression fractures.  5.  Left hip hemiarthroplasty in place with abundant heterotopic  ossification about the left hip.  6.  Mild contralateral right hip osteoarthritis without evidence for right proximal femur fracture or dislocation.  7.  Extensive arterial calcification.     Chest CT w/o contrast    Narrative    EXAM: CT CHEST W/O CONTRAST  LOCATION: [facility]  DATE/TIME: 7/4/2022 1:49 PM    INDICATION: abnormal cxr  COMPARISON: Chest radiograph 07/04/2022 at 1147 hours. Chest CT 04/08/2022.   TECHNIQUE: CT chest without IV contrast. Multiplanar reformats were obtained. Dose reduction techniques were used.  CONTRAST: None.    FINDINGS:   LUNGS AND PLEURA:  Partially loculated small pleural effusions are present. There is prominent extrapleural fat deposition. There are new right lower lobe groundglass and more confluent airspace opacities. Linear opacities in the left lung base are   likely scarring/round atelectasis.     MEDIASTINUM/AXILLAE: Normal size of the heart. A TAVR stent is present. There is a moderate size hiatal hernia. Mediastinal fat is present.     CORONARY ARTERY CALCIFICATION: Previous intervention (stents or CABG).    UPPER ABDOMEN: Cholecystectomy.     MUSCULOSKELETAL:  Interval T11 vertebroplasty noted. There is interval height loss and sclerosis of the T7 vertebral body. Healed median sternotomy noted.       Impression    IMPRESSION:   1.  Right lower lobe airspace opacities are concerning for an infectious or inflammatory process.  2.  Mild fibrosis, partially loculated pleural fluid, extrapleural fat deposition, and mediastinal fat are the source of the left costophrenic angle blunting on radiography.

## 2022-07-04 NOTE — ED NOTES
Bed: [facility]NED[facility]-04  Expected date: 7/4/22  Expected time: 9:55 AM  Means of arrival: Ambulance  Comments:  WBL- 83yo M Fall,couple days ago, decreased mobility

## 2022-07-04 NOTE — ED PROVIDER NOTES
EMERGENCY DEPARTMENT ENCOUNTER      NAME: Pee Ayala  AGE: 82 year old male  YOB: 1940  MRN: [id number]  EVALUATION DATE & TIME: 7/4/2022  9:59 AM    PCP: Mar Morales    ED PROVIDER: Cedric Bledsoe M.D.    Chief Complaint   Patient presents with     Fall       FINAL IMPRESSION:  1. Pubic ramus fracture, right, closed, initial encounter (H)    2. Community acquired pneumonia of right lower lobe of lung    3. Closed fracture of sacrum, unspecified portion of sacrum, initial encounter (H)        ED COURSE & MEDICAL DECISION MAKING:    Pertinent Labs & Imaging studies personally reviewed and interpreted by me. (See chart for details)  10:05 AM Patient seen and examined, prior records reviewed. PPE worn including surgical mask, surgical gloves.  Differential diagnosis includes moderate to fracture, dislocation, intracranial hemorrhage, skull fracture, contusion, scalp laceration, concussion, facial fracture, nasal fracture, mandible fracture, dental fracture.  Patient presents with a fall a couple of days ago, now unable to ambulate due to pain.  He says he is able to stand up but cannot walk.  Suspect pelvic fracture, no tenderness on exam of the hips or pelvis.  Labs are ordered along with x-ray of the hips.  Also head CT given scalp abrasion although injury was a couple days ago and patient does not have any other findings for intracranial hemorrhage or injury.  1:09 PM I rechecked and updated the patient and spouse on results.  X-ray and head CT are negative, pelvic CT of and chest x-ray ordered given abnormal chest x-ray findings and pain and inability to ambulate with negative pelvic x-ray.  1:58 PM CT scan personally reviewed and interpreted by me demonstrates a right anterior ramus fracture.  2:18 PM CT scan of the chest shows some loculated pleural effusions along with right lower lobe airspace opacities concerning for possible infectious process.  Patient was started on Rocephin  and azithromycin for possible community-acquired pneumonia.  Radiology interpretation of CT scan demonstrates nondisplaced right superior and inferior pubic rami fractures along with a nondisplaced sacral ala fracture although patient really has no pain in that area.  We will proceed with plan for admission.  2:22 PM Signout to oncoming provider pending transfer/admission    At the conclusion of the encounter I discussed the results of all of the tests and the disposition. The questions were answered. The patient or family acknowledged understanding and was agreeable with the care plan.     PROCEDURES:   Procedures    MEDICATIONS GIVEN IN THE EMERGENCY:  Medications   cefTRIAXone (ROCEPHIN) 1 g vial to attach to  mL bag for ADULTS or NS 50 mL bag for PEDS (has no administration in time range)   azithromycin (ZITHROMAX) tablet 500 mg (has no administration in time range)   ketorolac (TORADOL) injection 10 mg (10 mg Intravenous Given 7/4/22 1250)       NEW PRESCRIPTIONS STARTED AT TODAY'S ER VISIT  New Prescriptions    No medications on file       =================================================================    HPI    Patient information was obtained from: Patient      Pee Ayala is a 82 year old male with a pertinent history of chronic anticoagulation on warfarin, diabetic polyneuropathy, hypertension, diabetes mellitus type 2, CHF, CAD, s/p TAVR, s/p CABG who presents to this ED by EMS for evaluation of fall. Patient reports he slipped and fell 2 days ago (on 07/02) when walking with a cane and since then endorses bilateral hip pain which is worse on the right. Denies hitting his head. He states his pain is improved with certain positions. Patient normally ambulates with a walker, and is able to stand, but has difficulty ambulating secondary to pain. Patient has been taking oxycodone for his pain every 6 hours, which he is normally on PRN, with his last dose being 8 hours ago.    Of note, patient  "reports he has an occasional cough which is \"different\" from his normal cough. Patient also endorses congestion at night. Patient endorses bilateral leg swelling which is normal for him and is on Lasix for this.    Denies chest pain, shortness of breath.       REVIEW OF SYSTEMS   Review of Systems   HENT: Positive for congestion (at night).    Respiratory: Positive for cough. Negative for shortness of breath.    Cardiovascular: Negative for chest pain.   Musculoskeletal: Positive for arthralgias (bilateral hip paIn, R>L) and gait problem (difficulty ambulating secondary to pain).      All other systems reviewed and negative    PAST MEDICAL HISTORY:  Past Medical History:   Diagnosis Date     ACS (acute coronary syndrome) (H) 6/2/2014     ACS (acute coronary syndrome) (H) 6/2/2014     Actinic keratosis      Actinic keratosis 1/14/2014     Actinic keratosis 1/14/2014     Anticoagulated on Coumadin 12/30/2015     Antiplatelet or antithrombotic long-term use      Atrial fibrillation (H)      Atrial fibrillation (H) 2016     Bone mass 4/26/2017     Chest heaviness 1/23/2019     Chest pain 5/31/2014     Closed fracture of left forearm 2015     Congestive heart failure (H)      Coronary artery disease      Diabetes (H) 2009     Diabetes mellitus (H)      Difficulty in walking(719.7)      Dyslipidemia 8/31/2016     Dyspnea on exertion      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      Encounter for long-term (current) use of insulin (H) 8/11/2016     Esophageal reflux 11/18/2010     Esophageal reflux 11/18/2010     History of angina      HTN (hypertension) 6/30/2009     (Problem list name updated by automated process. Provider to review and confirm.)     Hyperlipidemia LDL goal <100 10/31/2010     Hypertension      Impotence of organic origin      Mixed hyperlipidemia 4/25/2007 April 25, 2007 restarted Zocor today, " recheck in 3 months.  August 23, 2007 LDL at 101 with 40 mg, will increase to 80 mg. Recheck  In 3 months.      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      Type II or unspecified type diabetes mellitus with neurological manifestations, not stated as uncontrolled(250.60) (H)      Walking troubles        PAST SURGICAL HISTORY:  Past Surgical History:   Procedure Laterality Date     BYPASS GRAFT ARTERY CORONARY N/A 9/10/2014    Procedure: BYPASS GRAFT ARTERY CORONARY;  Surgeon: Bharathi Caraballo MD;  Location: [facility] OR     BYPASS GRAFT ARTERY CORONARY  2016     COLONOSCOPY  1/14/2004     CORONARY ANGIOGRAPHY ADULT ORDER       CV CORONARY ANGIOGRAM N/A 4/4/2019    Procedure: Coronary Angiogram;  Surgeon: Dominik Vega MD;  Location: [facility] Cath Lab;  Service: Cardiology     CV CORONARY ANGIOGRAM N/A 1/6/2021    Procedure: Coronary Angiogram;  Surgeon: Dominik Vega MD;  Location: [facility] Cardiac Cath Lab;  Service: Cardiology     CV LEFT HEART CATHETERIZATION WITHOUT LEFT VENTRICULOGRAM Left 4/4/2019    Procedure: Left Heart Catheterization Without Left Ventriculogram;  Surgeon: Dominik Vega MD;  Location: [facility] Cath Lab;  Service: Cardiology     CV LEFT HEART CATHETERIZATION WITHOUT LEFT VENTRICULOGRAM Left 1/6/2021    Procedure: Left Heart Catheterization Without Left Ventriculogram;  Surgeon: Dominik Vega MD;   Location: [facility] Cardiac Cath Lab;  Service: Cardiology     CV RIGHT HEART CATHETERIZATION Right 4/4/2019    Procedure: Right Heart Catheterization;  Surgeon: Dominik Vega MD;  Location: [facility] Cath Lab;  Service: Cardiology     CV TRANSCATHETER AORTIC VALVE REPLACEMENT N/A 1/12/2021    Procedure: Right transfemoral transcatheter aortic valve replacement using Magdaleno Dominick 3 size 29mm.  Transthoracic echocardiogram;  Surgeon: Jo Romano MD;  Location: [facility] CARDIAC CATH LAB     ESOPHAGOSCOPY, GASTROSCOPY, DUODENOSCOPY (EGD), COMBINED N/A 1/13/2016    Procedure: COMBINED ESOPHAGOSCOPY, GASTROSCOPY, DUODENOSCOPY (EGD);  Surgeon: Rk Srivastava MD;  Location: [facility] CATH FEMORAL CANNULIZATION WITH OPEN STANDBY REPAIR AORTIC VALVE N/A 1/12/2021    Procedure: Cardiopulmonary Bypass standby;  Surgeon: Jefferson Sandy MD;  Location: [facility] CARDIAC CATH LAB     IR LOWER EXTREMITY ANGIOGRAM LEFT  12/7/2021     LAPAROSCOPIC CHOLECYSTECTOMY  10/09     MAZE PROCEDURE N/A 9/10/2014    Procedure: MAZE PROCEDURE;  Surgeon: Bharathi Caraballo MD;  Location:  OR     MOHS MICROGRAPHIC PROCEDURE       OPEN REDUCTION INTERNAL FIXATION HIP BIPOLAR Left 4/9/2022    Procedure: HEMIARTHROPLASTY, HIP, BIPOLAR, OPEN REDUCTION INTERNAL FIXATION OF GREATER TROCHANTER;  Surgeon: Jd Larose MD;  Location: [facility] OR     ORTHOPEDIC SURGERY      surgery for fx  Left forearm     OTHER SURGICAL HISTORY Left 2015    forearm sugery     STENT, CORONARY, HUMA  02/2016     VASECTOMY         CURRENT MEDICATIONS:    Current Facility-Administered Medications   Medication     azithromycin (ZITHROMAX) tablet 500 mg     cefTRIAXone (ROCEPHIN) 1 g vial to attach to  mL bag for ADULTS or NS 50 mL bag for PEDS     Current Outpatient Medications   Medication     ACCU-CHEK GUIDE test strip     Apoaequorin (PREVAGEN PO)     aspirin 81 MG EC tablet     carvedilol (COREG) 6.25 MG tablet      cilostazol (PLETAL) 50 MG tablet     Continuous Blood Gluc Sensor (FREESTYLE DOMI 2 SENSOR) MISC     cyanocobalamin (VITAMIN B-12) 1000 MCG tablet     finasteride (PROSCAR) 5 MG tablet     furosemide (LASIX) 20 MG tablet     gabapentin (NEURONTIN) 600 MG tablet     insulin aspart prot & aspart (NOVOLOG MIX 70/30 PEN) (70-30) 100 UNIT/ML pen     metFORMIN (GLUCOPHAGE) 500 MG tablet     methocarbamol (ROBAXIN) 500 MG tablet     oxyCODONE-acetaminophen (PERCOCET) 5-325 MG tablet     polyethylene glycol (MIRALAX) 17 GM/Dose powder     pramipexole (MIRAPEX) 0.25 MG tablet     rosuvastatin (CRESTOR) 20 MG tablet     senna-docusate (SENOKOT-S/PERICOLACE) 8.6-50 MG tablet     warfarin ANTICOAGULANT (COUMADIN) 3 MG tablet       ALLERGIES:  Allergies   Allergen Reactions     Oxycodone Itching and Rash     Amlodipine Dizziness     Dizziness and dry heaves     Lisinopril Cough     Adhesive Tape Itching and Rash       FAMILY HISTORY:  Family History   Problem Relation Age of Onset     Diabetes Mother      Hypertension Father      Cerebrovascular Disease Father      Diabetes Maternal Grandmother      Breast Cancer No family hx of      Cancer - colorectal No family hx of      Prostate Cancer No family hx of      C.A.D. No family hx of      Diabetes Type 2  Mother         58 ,  from anesthesia complication.     Heart Disease Father         86  from stroke     No Known Problems Brother         60 years of age.       SOCIAL HISTORY:   Social History     Socioeconomic History     Marital status:      Spouse name: Fay Ayala   Occupational History     Employer: RETIRED   Tobacco Use     Smoking status: Former Smoker     Quit date: 1998     Years since quittin.5     Smokeless tobacco: Never Used   Substance and Sexual Activity     Alcohol use: Yes     Alcohol/week: 0.0 standard drinks     Comment: Alcoholic Drinks/day: very rare     Drug use: No     Sexual activity: Never     Birth control/protection: None  "  Other Topics Concern     Parent/sibling w/ CABG, MI or angioplasty before 65F 55M? No   Social History Narrative     and lives with life.  Has adult children from previous relationship. ( Blended family).  Has a dog - [email or web address]    Jazzy Gil MD  1/3/2019           VITALS:  /56   Pulse 77   Temp 97.9  F (36.6  C)   Resp 22   Ht 1.626 m (5' 4\")   Wt 89.4 kg (197 lb)   SpO2 96%   BMI 33.81 kg/m      PHYSICAL EXAM:  Physical Exam  Vitals and nursing note reviewed.   Constitutional:       Appearance: Normal appearance.   HENT:      Head: Normocephalic. Abrasion (to top of head) present.      Right Ear: External ear normal.      Left Ear: External ear normal.      Nose: Nose normal.      Mouth/Throat:      Mouth: Mucous membranes are moist.   Eyes:      Extraocular Movements: Extraocular movements intact.      Conjunctiva/sclera: Conjunctivae normal.      Pupils: Pupils are equal, round, and reactive to light.   Cardiovascular:      Rate and Rhythm: Normal rate and regular rhythm. Occasional extrasystoles are present.     Heart sounds: Murmur heard.    Systolic murmur is present with a grade of 4/6.  Pulmonary:      Effort: Pulmonary effort is normal.      Breath sounds: Normal breath sounds. No wheezing.      Comments: Bibasilar crackles, diminished breath sounds at the bases bilaterally  Abdominal:      General: Abdomen is flat. There is no distension.      Palpations: Abdomen is soft.      Tenderness: There is no abdominal tenderness. There is no guarding.   Musculoskeletal:         General: Normal range of motion.      Cervical back: Normal range of motion and neck supple.      Comments: Lower extremity edema up to the knees. Pain with straight leg raise. No lower extremity tenderness.   Lymphadenopathy:      Cervical: No cervical adenopathy.   Skin:     General: Skin is warm and dry.   Neurological:      General: No focal deficit present.      Mental Status: He is alert and oriented to " person, place, and time. Mental status is at baseline.      Sensory: Sensory deficit: alayna young.      Comments: No gross focal neurologic deficits   Psychiatric:         Mood and Affect: Mood normal.         Behavior: Behavior normal.         Thought Content: Thought content normal.          LAB:  All pertinent labs reviewed and interpreted.  Results for orders placed or performed during the hospital encounter of 07/04/22   Head CT w/o contrast    Impression    IMPRESSION:     1.  Senescent changes and sequelae of chronic microangiopathy without acute intracranial abnormality.   XR Pelvis and Hip Right 2 Views    Impression    IMPRESSION: Left hip hemiarthroplasty remains in place. No acute displaced periprosthetic fracture or finding for component loosening. Progressive heterotopic ossification is seen about the left hip. Previously seen postoperative soft tissue gas about   the left hip has resolved and lateral left hip skin staples have been removed. No interval change in mild right hip osteoarthritis. Diffuse bone demineralization. Degenerative change in the lower lumbar spine and both SI joints. No acute displaced pelvic   fracture. Arterial calcification. Punctate radiodensity overlies the right hip as before.     XR Hip Left 2-3 Views    Impression    IMPRESSION: Left hip hemiarthroplasty remains in place. No acute displaced periprosthetic fracture or finding for component loosening. Progressive heterotopic ossification is seen about the left hip. Previously seen postoperative soft tissue gas about   the left hip has resolved and lateral left hip skin staples have been removed. No interval change in mild right hip osteoarthritis. Diffuse bone demineralization. Degenerative change in the lower lumbar spine and both SI joints. No acute displaced pelvic   fracture. Arterial calcification. Punctate radiodensity overlies the right hip as before.     XR Chest 1 View    Impression    IMPRESSION: Blunting of the left  costophrenic sulcus may be scarring or minimal fluid. Subtle pulmonary opacities suggest an atypical infectious process, though edema and other processes can have a similar appearance. Lung volumes are low. There is no   pneumothorax. The cardiomediastinal silhouette is mildly enlarged. A TAVR stent is noted. Median sternotomy. Right upper quadrant surgical clips are present.    Chest CT w/o contrast    Impression    IMPRESSION:   1.  Right lower lobe airspace opacities are concerning for an infectious or inflammatory process.  2.  Mild fibrosis, partially loculated pleural fluid, extrapleural fat deposition, and mediastinal fat are the source of the left costophrenic angle blunting on radiography.      CT Pelvis Bone wo Contrast    Impression    IMPRESSION:  1.  Nondisplaced radiographically occult right central superior and inferior pubic rami fractures extending across the right parasymphyseal pubis.  2.  Nondisplaced right sacral ala fracture, radiographically occult.  3.  Profound diffuse bone demineralization, which limits CT sensitivity to detect nondisplaced fractures. MRI more sensitive.  4.  Subacute appearing superior endplate L5 and inferior endplate L4 compression fractures.  5.  Left hip hemiarthroplasty in place with abundant heterotopic ossification about the left hip.  6.  Mild contralateral right hip osteoarthritis without evidence for right proximal femur fracture or dislocation.  7.  Extensive arterial calcification.     Basic metabolic panel   Result Value Ref Range    Sodium 137 136 - 145 mmol/L    Potassium 3.9 3.5 - 5.0 mmol/L    Chloride 101 98 - 107 mmol/L    Carbon Dioxide (CO2) 31 22 - 31 mmol/L    Anion Gap 5 5 - 18 mmol/L    Urea Nitrogen 21 8 - 28 mg/dL    Creatinine 0.74 0.70 - 1.30 mg/dL    Calcium 8.3 (L) 8.5 - 10.5 mg/dL    Glucose 270 (H) 70 - 125 mg/dL    GFR Estimate >90 >60 mL/min/1.73m2   Result Value Ref Range    Troponin I 0.15 0.00 - 0.29 ng/mL   Result Value Ref Range     Magnesium 1.8 1.8 - 2.6 mg/dL   Result Value Ref Range    INR 2.49 (H) 0.85 - 1.15   B-Type Natriuretic Peptide ([facility] Only)   Result Value Ref Range     (H) 0 - 86 pg/mL   CBC with platelets and differential   Result Value Ref Range    WBC Count 8.6 4.0 - 11.0 10e3/uL    RBC Count 3.41 (L) 4.40 - 5.90 10e6/uL    Hemoglobin 11.0 (L) 13.3 - 17.7 g/dL    Hematocrit 34.4 (L) 40.0 - 53.0 %     (H) 78 - 100 fL    MCH 32.3 26.5 - 33.0 pg    MCHC 32.0 31.5 - 36.5 g/dL    RDW 13.3 10.0 - 15.0 %    Platelet Count 121 (L) 150 - 450 10e3/uL    % Neutrophils 79 %    % Lymphocytes 13 %    % Monocytes 7 %    % Eosinophils 1 %    % Basophils 0 %    % Immature Granulocytes 0 %    NRBCs per 100 WBC 0 <1 /100    Absolute Neutrophils 6.9 1.6 - 8.3 10e3/uL    Absolute Lymphocytes 1.1 0.8 - 5.3 10e3/uL    Absolute Monocytes 0.6 0.0 - 1.3 10e3/uL    Absolute Eosinophils 0.1 0.0 - 0.7 10e3/uL    Absolute Basophils 0.0 0.0 - 0.2 10e3/uL    Absolute Immature Granulocytes 0.0 <=0.4 10e3/uL    Absolute NRBCs 0.0 10e3/uL       RADIOLOGY:  Reviewed all pertinent imaging. Please see official radiology report.  Chest CT w/o contrast   Final Result   IMPRESSION:    1.  Right lower lobe airspace opacities are concerning for an infectious or inflammatory process.   2.  Mild fibrosis, partially loculated pleural fluid, extrapleural fat deposition, and mediastinal fat are the source of the left costophrenic angle blunting on radiography.          CT Pelvis Bone wo Contrast   Preliminary Result   IMPRESSION:   1.  Nondisplaced radiographically occult right central superior and inferior pubic rami fractures extending across the right parasymphyseal pubis.   2.  Nondisplaced right sacral ala fracture, radiographically occult.   3.  Profound diffuse bone demineralization, which limits CT sensitivity to detect nondisplaced fractures. MRI more sensitive.   4.  Subacute appearing superior endplate L5 and inferior endplate L4 compression  fractures.   5.  Left hip hemiarthroplasty in place with abundant heterotopic ossification about the left hip.   6.  Mild contralateral right hip osteoarthritis without evidence for right proximal femur fracture or dislocation.   7.  Extensive arterial calcification.         XR Chest 1 View   Final Result   IMPRESSION: Blunting of the left costophrenic sulcus may be scarring or minimal fluid. Subtle pulmonary opacities suggest an atypical infectious process, though edema and other processes can have a similar appearance. Lung volumes are low. There is no    pneumothorax. The cardiomediastinal silhouette is mildly enlarged. A TAVR stent is noted. Median sternotomy. Right upper quadrant surgical clips are present.       XR Hip Left 2-3 Views   Final Result   IMPRESSION: Left hip hemiarthroplasty remains in place. No acute displaced periprosthetic fracture or finding for component loosening. Progressive heterotopic ossification is seen about the left hip. Previously seen postoperative soft tissue gas about    the left hip has resolved and lateral left hip skin staples have been removed. No interval change in mild right hip osteoarthritis. Diffuse bone demineralization. Degenerative change in the lower lumbar spine and both SI joints. No acute displaced pelvic    fracture. Arterial calcification. Punctate radiodensity overlies the right hip as before.         XR Pelvis and Hip Right 2 Views   Final Result   IMPRESSION: Left hip hemiarthroplasty remains in place. No acute displaced periprosthetic fracture or finding for component loosening. Progressive heterotopic ossification is seen about the left hip. Previously seen postoperative soft tissue gas about    the left hip has resolved and lateral left hip skin staples have been removed. No interval change in mild right hip osteoarthritis. Diffuse bone demineralization. Degenerative change in the lower lumbar spine and both SI joints. No acute displaced pelvic    fracture.  Arterial calcification. Punctate radiodensity overlies the right hip as before.         Head CT w/o contrast   Final Result   IMPRESSION:       1.  Senescent changes and sequelae of chronic microangiopathy without acute intracranial abnormality.          EKG:    Performed at: 10:12 AM  Impression: Atrial fibrillation with diffuse T wave inversions in 1, 2, aVL, V2 through V6  Rate: 79  Rhythm: Atrial fibrillation  Axis: Normal  QRS Interval: 92  QTc Interval: 548  ST Changes: Diffuse T wave inversions  Comparison: April 2022, inverted T waves are now present in the anterior lateral leads as well as inferiorly    I have independently reviewed and interpreted the EKG(s) documented above.    I, Parth Carroll, am serving as a scribe to document services personally performed by Dr. Bledsoe based on my observation and the provider's statements to me. I, Cedric Bledsoe MD attest that Parth Carroll is acting in a scribe capacity, has observed my performance of the services and has documented them in accordance with my direction.    Cedric Bledsoe M.D.  Emergency Medicine  [facility] EMERGENCY DEPARTMENT  [facility] [facility] [address]  [phone]  Dept: [phone]     Cedric Bledsoe MD  07/05/22 [id number]

## 2022-07-04 NOTE — TELEPHONE ENCOUNTER
3-Hospitalist Huddle Documentation    Acuity/Preferred Bed Type: inpatient  Infection Concerns: none  Additional Specialist Needed Or Specialist Already Contacted:   None  Timely Treatments Needed: No  Important things to know/address during hospitalization: uncertain if sitter needed    81 yo M w PAF on Coumadin, PAD s/p left SFA stenting 12/2021 on cilostazol, HFpEF, s/p TAVR, chronic pain including chronic back pain followed by [facility] for which he was referred to pain clinic, hip fracture s/p left  MANAN.     BIBA for right hip pain.  Fell at home 2-3 days ago.  Did not hit head.  No LOC.  Was able to get up.  Has noticed he's having a harder time walking since the fall, Generalized weakness and Trouble getting around.   Denies worsening shortness of breath, chest pain, cough, or URI symptoms.  CT Head negative.  CT Pelvis suggests nondisplaced right superior and inferior pubic rami fractures.  CXR showed subtle pulmonary opacities that could suggest atypical pneumonia so CT Chest was performed and showed small loculated pleural effusions, new RLL groundglass opacities (infection vs inflammation), scarring in the left lung base.      No hypoxia.  Sats 98% on RA with RR 18.  TMax 97.9.  No white count or left shift.  .  Trop within normal limits.  INR 2.49.   Empirically started on rocephin azithro for atypical PNA.      Per ED provider, road test attempted.  Patient able to stand but reports he cannot walk because of right leg pain.  Observation stay requested for PT evaluation, possible placement.    Accepted under Obs status.  Requested ED provider update patient regarding Obs status which means he will likely need to private pay for TCU and also to inform patient that the only beds available are in double rooms.    [facility]

## 2022-07-04 NOTE — ED TRIAGE NOTES
-Patient arrives via [facility] with complaints of fall/hip pain. Reported that patient fell 2 days about when walking with cane, no LOC. Has had right/left hip pain since with more pain in the right hip. Reported that patient has also had elevated blood glucose which is abnormal for him. Patient is on warfarin per report. Patient alert/oriented. Trauma Alert Called.

## 2022-07-04 NOTE — PHARMACY-ADMISSION MEDICATION HISTORY
Pharmacy Note - Admission Medication History    Pertinent Provider Information: patient's wife is in charge of his medications, she states there have been no changes since his last office visit with his PCP on 6/13/22     ______________________________________________________________________    Prior To Admission (PTA) med list completed and updated in EMR.       PTA Med List   Medication Sig Note Last Dose     Apoaequorin (PREVAGEN PO) Take 1 tablet by mouth daily   7/4/2022 at am     aspirin 81 MG EC tablet Take 81 mg by mouth daily  7/4/2022 at am     carvedilol (COREG) 6.25 MG tablet Take 1 tablet (6.25 mg) by mouth 2 times daily (with meals)  7/4/2022 at am     cilostazol (PLETAL) 50 MG tablet Take 1 tablet (50 mg) by mouth daily for 14 days, THEN 1 tablet (50 mg) 2 times daily for 14 days, THEN 2 tablets (100 mg) 2 times daily for 30 days. 7/4/2022: Patient supposed to start taking, has not received in the mail yet Unknown at Unknown time     cyanocobalamin (VITAMIN B-12) 1000 MCG tablet Take 1 tablet (1,000 mcg) by mouth daily  7/4/2022 at am     finasteride (PROSCAR) 5 MG tablet Take 1 tablet (5 mg) by mouth daily  7/3/2022 at pm     furosemide (LASIX) 20 MG tablet TAKE 2 TABLETS BY MOUTH IN THE MORNING AND 1 TABLET IN THE AFTERNOON  7/4/2022 at am     gabapentin (NEURONTIN) 600 MG tablet TAKE 1 TABLET BY MOUTH IN THE EVENING FOR 3 DAYS THEN 1 TWICE DAILY FOR 3 DAYS THEN 1 THREE TIMES DAILY THEREAFTER  7/4/2022 at am     insulin aspart prot & aspart (NOVOLOG MIX 70/30 PEN) (70-30) 100 UNIT/ML pen Inject Subcutaneous 2 times daily (with meals) 33 in AM and 26 in PM  7/4/2022 at am     metFORMIN (GLUCOPHAGE) 500 MG tablet Take 2 tablets by mouth twice daily  7/4/2022 at am     methocarbamol (ROBAXIN) 500 MG tablet Take 500 mg by mouth 2 times daily  7/4/2022 at am     oxyCODONE-acetaminophen (PERCOCET) 5-325 MG tablet Take 1 tablet by mouth 3 times daily as needed  Past Week at Unknown time     polyethylene  glycol (MIRALAX) 17 GM/Dose powder Take 17 g (1 capful) by mouth daily as needed for constipation (opioid induced constipation)  Unknown at Unknown time     pramipexole (MIRAPEX) 0.25 MG tablet Take 2 tablets (0.5 mg) by mouth daily Takes 4pm and 5;30 pm  7/3/2022 at Unknown time     rosuvastatin (CRESTOR) 20 MG tablet Take 1 tablet (20 mg) by mouth At Bedtime  7/3/2022 at Unknown time     senna-docusate (SENOKOT-S/PERICOLACE) 8.6-50 MG tablet Take 1 tablet by mouth in the morning and 1 tablet in the evening.  7/3/2022 at Unknown time     warfarin ANTICOAGULANT (COUMADIN) 3 MG tablet Take 1 tablet (3 mg) by mouth daily Or as directed. (Patient taking differently: Take 3 mg by mouth daily 3 mg on Tuesdays and 4.5 mg the rest of the week)  7/3/2022 at pm       Information source(s): Patient, Family member and [facility]  Method of interview communication: in-person    Summary of Changes to PTA Med List  New: none  Discontinued: clopidogrel  Changed: none    Patient was asked about OTC/herbal products specifically.  PTA med list reflects this.    In the past week, patient estimated taking medication this percent of the time:  greater than 90%.    Allergies were reviewed, assessed, and updated with the patient.      Patient does not use any multi-dose medications prior to admission.    The information provided in this note is only as accurate as the sources available at the time of the update(s).    Thank you for the opportunity to participate in the care of this patient.    Eri Rosenberg  7/4/2022 2:55 PM

## 2022-07-04 NOTE — PLAN OF CARE
ROOM # 230-52    Living Situation (if not independent, order SW consult): Home, independent   Facility name:  :   Fay Ayala  Spouse [phone]     Activity level at baseline: Independent w/ cane/walker. Patient fell at home using cane.   Activity level on admit: Ax2    Who will be transporting you at discharge: Fay Ayala (spouse)    Patient registered to observation; given Patient Bill of Rights; given the opportunity to ask questions about observation status and their plan of care.  Patient has been oriented to the observation room, bathroom and call light is in place.    Discussed discharge goals and expectations with patient/family.

## 2022-07-04 NOTE — ED PROVIDER NOTES
"     Emergency Department Patient Sign-out       Brief HPI:  This is a 82 year old male signed out to me by Dr. Bledsoe.  See initial ED Provider note for details of the presentation.     3:43 PM Spoke with Nuha Talbert, Physicians Assistant at [facility], about patient plan of care and admission.  3:59 PM exam is benign, patient appears comfortable at the bedside, discussed need for transfer to [facility] and patient is in agreement with transfer.      Significant Events prior to my assuming care:     Patient presents with a fall a couple of days ago, now unable to ambulate due to pain. He says he is able to stand up but cannot walk. Suspect pelvic fracture, no tenderness on exam of the hips or pelvis. X-ray and head CT are negative. CT scan demonstrates a right anterior ramus fracture. CT scan of the chest shows some loculated pleural effusions along with right lower lobe airspace opacities concerning for possible infectious process. Patient was started on Rocephin and azithromycin for possible community-acquired pneumonia. Radiology interpretation of CT scan demonstrates nondisplaced right superior and inferior pubic rami fractures along with a nondisplaced sacral ala fracture although patient really has no pain in that area.    Exam:   Patient Vitals for the past 24 hrs:   BP Temp Pulse Resp SpO2 Height Weight   07/04/22 1530 128/66 -- 76 17 95 % -- --   07/04/22 1500 111/55 -- 76 15 94 % -- --   07/04/22 1115 115/56 -- 77 22 96 % -- --   07/04/22 1100 117/62 -- 76 -- -- -- --   07/04/22 1045 116/56 -- 80 14 -- -- --   07/04/22 1015 111/57 -- 78 27 98 % -- --   07/04/22 1011 -- 97.9  F (36.6  C) -- -- -- 1.626 m (5' 4\") 89.4 kg (197 lb)   07/04/22 1007 116/57 -- 73 18 98 % -- --           ED RESULTS:   Results for orders placed or performed during the hospital encounter of 07/04/22 (from the past 24 hour(s))   CBC with platelets differential     Status: Abnormal    Collection Time: 07/04/22 10:34 AM "    Narrative    The following orders were created for panel order CBC with platelets differential.  Procedure                               Abnormality         Status                     ---------                               -----------         ------                     CBC with platelets and d...[[id number]]  Abnormal            Final result                 Please view results for these tests on the individual orders.   Basic metabolic panel     Status: Abnormal    Collection Time: 07/04/22 10:34 AM   Result Value Ref Range    Sodium 137 136 - 145 mmol/L    Potassium 3.9 3.5 - 5.0 mmol/L    Chloride 101 98 - 107 mmol/L    Carbon Dioxide (CO2) 31 22 - 31 mmol/L    Anion Gap 5 5 - 18 mmol/L    Urea Nitrogen 21 8 - 28 mg/dL    Creatinine 0.74 0.70 - 1.30 mg/dL    Calcium 8.3 (L) 8.5 - 10.5 mg/dL    Glucose 270 (H) 70 - 125 mg/dL    GFR Estimate >90 >60 mL/min/1.73m2   Troponin I     Status: Normal    Collection Time: 07/04/22 10:34 AM   Result Value Ref Range    Troponin I 0.15 0.00 - 0.29 ng/mL   Magnesium     Status: Normal    Collection Time: 07/04/22 10:34 AM   Result Value Ref Range    Magnesium 1.8 1.8 - 2.6 mg/dL   INR     Status: Abnormal    Collection Time: 07/04/22 10:34 AM   Result Value Ref Range    INR 2.49 (H) 0.85 - 1.15   B-Type Natriuretic Peptide ([facility] Only)     Status: Abnormal    Collection Time: 07/04/22 10:34 AM   Result Value Ref Range     (H) 0 - 86 pg/mL   CBC with platelets and differential     Status: Abnormal    Collection Time: 07/04/22 10:34 AM   Result Value Ref Range    WBC Count 8.6 4.0 - 11.0 10e3/uL    RBC Count 3.41 (L) 4.40 - 5.90 10e6/uL    Hemoglobin 11.0 (L) 13.3 - 17.7 g/dL    Hematocrit 34.4 (L) 40.0 - 53.0 %     (H) 78 - 100 fL    MCH 32.3 26.5 - 33.0 pg    MCHC 32.0 31.5 - 36.5 g/dL    RDW 13.3 10.0 - 15.0 %    Platelet Count 121 (L) 150 - 450 10e3/uL    % Neutrophils 79 %    % Lymphocytes 13 %    % Monocytes 7 %    % Eosinophils 1 %    % Basophils 0 %     % Immature Granulocytes 0 %    NRBCs per 100 WBC 0 <1 /100    Absolute Neutrophils 6.9 1.6 - 8.3 10e3/uL    Absolute Lymphocytes 1.1 0.8 - 5.3 10e3/uL    Absolute Monocytes 0.6 0.0 - 1.3 10e3/uL    Absolute Eosinophils 0.1 0.0 - 0.7 10e3/uL    Absolute Basophils 0.0 0.0 - 0.2 10e3/uL    Absolute Immature Granulocytes 0.0 <=0.4 10e3/uL    Absolute NRBCs 0.0 10e3/uL   Head CT w/o contrast     Status: None    Collection Time: 07/04/22 11:20 AM    Narrative    EXAM: CT HEAD W/O CONTRAST  LOCATION: [facility]  DATE/TIME: 7/4/2022 11:20 AM    INDICATION: Headache after trauma.  COMPARISON: CT head dated 04/08/2022  TECHNIQUE: Routine CT Head without IV contrast. Multiplanar reformats. Dose reduction techniques were used.    FINDINGS:   INTRACRANIAL CONTENTS: No acute intracranial hemorrhage. No CT evidence of acute infarct. Sequelae of mild chronic microangiopathy. Mild cerebral volume loss without hydrocephalus. No extra-axial fluid collections.  Patent basal cisterns.     VISUALIZED ORBITS/SINUSES/MASTOIDS: The orbits are unremarkable. The visualized paranasal sinuses and temporal bone structures are well-aerated.     BONES/SOFT TISSUES: The calvarium and skull base are unremarkable.       Impression    IMPRESSION:     1.  Senescent changes and sequelae of chronic microangiopathy without acute intracranial abnormality.   XR Pelvis and Hip Right 2 Views     Status: None    Collection Time: 07/04/22 11:42 AM    Narrative    EXAM: XR PELVIS AND HIP RIGHT 2 VIEWS, XR HIP LEFT 2-3 VIEWS  LOCATION: [facility]  DATE/TIME: 07/04/2022, 11:25 AM    INDICATION: Fall. Pelvic pain.  COMPARISON: 04/09/2022.       Impression    IMPRESSION: Left hip hemiarthroplasty remains in place. No acute displaced periprosthetic fracture or finding for component loosening. Progressive heterotopic ossification is seen about the left hip. Previously seen postoperative soft tissue gas about   the  left hip has resolved and lateral left hip skin staples have been removed. No interval change in mild right hip osteoarthritis. Diffuse bone demineralization. Degenerative change in the lower lumbar spine and both SI joints. No acute displaced pelvic   fracture. Arterial calcification. Punctate radiodensity overlies the right hip as before.     XR Hip Left 2-3 Views     Status: None    Collection Time: 07/04/22 11:42 AM    Narrative    EXAM: XR PELVIS AND HIP RIGHT 2 VIEWS, XR HIP LEFT 2-3 VIEWS  LOCATION: [facility]  DATE/TIME: 07/04/2022, 11:25 AM    INDICATION: Fall. Pelvic pain.  COMPARISON: 04/09/2022.       Impression    IMPRESSION: Left hip hemiarthroplasty remains in place. No acute displaced periprosthetic fracture or finding for component loosening. Progressive heterotopic ossification is seen about the left hip. Previously seen postoperative soft tissue gas about   the left hip has resolved and lateral left hip skin staples have been removed. No interval change in mild right hip osteoarthritis. Diffuse bone demineralization. Degenerative change in the lower lumbar spine and both SI joints. No acute displaced pelvic   fracture. Arterial calcification. Punctate radiodensity overlies the right hip as before.     XR Chest 1 View     Status: None    Collection Time: 07/04/22 11:44 AM    Narrative    EXAM: XR CHEST 1 VIEW  LOCATION: [facility]  DATE/TIME: 7/4/2022 11:25 AM    INDICATION: fall, crackles on exam  COMPARISON: 04/12/2022       Impression    IMPRESSION: Blunting of the left costophrenic sulcus may be scarring or minimal fluid. Subtle pulmonary opacities suggest an atypical infectious process, though edema and other processes can have a similar appearance. Lung volumes are low. There is no   pneumothorax. The cardiomediastinal silhouette is mildly enlarged. A TAVR stent is noted. Median sternotomy. Right upper quadrant surgical clips are present.     CT Pelvis Bone wo Contrast     Status: None    Collection Time: 07/04/22  1:49 PM    Narrative    EXAM: CT PELVIS BONE WITHOUT CONTRAST  LOCATION: [facility]  DATE/TIME: 07/04/2022, 1:49 PM    INDICATION: Fall. Pelvic pain.  COMPARISON: Pelvis radiographic exam earlier today.  TECHNIQUE: CT scan of the pelvis was performed without IV contrast. Multiplanar reformats were obtained. Dose reduction techniques were used.  CONTRAST: None.    FINDINGS:    PELVIS ORGANS: Small volume blood products seen in the perivesicular space anteriorly. Distended urinary bladder. No intestinal obstruction. Colonic diverticulosis. Extensive arterial calcification.    MUSCULOSKELETAL: Left hip hemiarthroplasty in place. Abundant heterotopic ossification is seen about the left hip. Nondisplaced fracture right sacral ala. Nondisplaced radiographically occult fracture right superior pubic ramus near the right   parasymphyseal pubis. Nondisplaced radiographically occult fracture of the central right inferior pubic ramus. No right proximal femur fracture. Profound diffuse bone demineralization. Subacute appearing healing left parasymphyseal pubic fracture   extending towards the central left inferior pubic ramus.    Age uncertain superior endplate L5 compression fracture, likely subacute given the adjacent sclerosis. Inferior endplate L4 compression fracture partially seen. Moderate degenerative change and partial ankylosis at the SI joints particularly on the left.   Arthritis at the symphysis pubis with chondrocalcinosis. No aggressive bone lesion.    Intrinsic pelvic muscle bulk is largely symmetric without significant intramuscular hematoma.      Impression    IMPRESSION:  1.  Nondisplaced radiographically occult right central superior and inferior pubic rami fractures extending across the right parasymphyseal pubis.  2.  Nondisplaced right sacral ala fracture, radiographically occult.  3.  Profound diffuse  bone demineralization, which limits CT sensitivity to detect nondisplaced fractures. MRI more sensitive.  4.  Subacute appearing superior endplate L5 and inferior endplate L4 compression fractures.  5.  Left hip hemiarthroplasty in place with abundant heterotopic ossification about the left hip.  6.  Mild contralateral right hip osteoarthritis without evidence for right proximal femur fracture or dislocation.  7.  Extensive arterial calcification.     Chest CT w/o contrast     Status: None    Collection Time: 07/04/22  1:52 PM    Narrative    EXAM: CT CHEST W/O CONTRAST  LOCATION: [facility]  DATE/TIME: 7/4/2022 1:49 PM    INDICATION: abnormal cxr  COMPARISON: Chest radiograph 07/04/2022 at 1147 hours. Chest CT 04/08/2022.   TECHNIQUE: CT chest without IV contrast. Multiplanar reformats were obtained. Dose reduction techniques were used.  CONTRAST: None.    FINDINGS:   LUNGS AND PLEURA:  Partially loculated small pleural effusions are present. There is prominent extrapleural fat deposition. There are new right lower lobe groundglass and more confluent airspace opacities. Linear opacities in the left lung base are   likely scarring/round atelectasis.     MEDIASTINUM/AXILLAE: Normal size of the heart. A TAVR stent is present. There is a moderate size hiatal hernia. Mediastinal fat is present.     CORONARY ARTERY CALCIFICATION: Previous intervention (stents or CABG).    UPPER ABDOMEN: Cholecystectomy.     MUSCULOSKELETAL:  Interval T11 vertebroplasty noted. There is interval height loss and sclerosis of the T7 vertebral body. Healed median sternotomy noted.       Impression    IMPRESSION:   1.  Right lower lobe airspace opacities are concerning for an infectious or inflammatory process.  2.  Mild fibrosis, partially loculated pleural fluid, extrapleural fat deposition, and mediastinal fat are the source of the left costophrenic angle blunting on radiography.      Asymptomatic COVID-19 Virus  (Coronavirus) by PCR Nasopharyngeal     Status: Normal    Collection Time: 07/04/22  2:28 PM    Specimen: Nasopharyngeal; Swab   Result Value Ref Range    SARS CoV2 PCR Negative Negative    Narrative    Testing was performed using the johan  SARS-CoV-2 & Influenza A/B Assay on the johan  [employer]  System.  This test should be ordered for the detection of SARS-COV-2 in individuals who meet SARS-CoV-2 clinical and/or epidemiological criteria. Test performance is unknown in asymptomatic patients.  This test is for in vitro diagnostic use under the FDA EUA for laboratories certified under CLIA to perform moderate and/or high complexity testing. This test has not been FDA cleared or approved.  A negative test does not rule out the presence of PCR inhibitors in the specimen or target RNA in concentration below the limit of detection for the assay. The possibility of a false negative should be considered if the patient's recent exposure or clinical presentation suggests COVID-19.  [facility] are certified under the Clinical Laboratory Improvement Amendments of 1988 (CLIA-88) as qualified to perform moderate and/or high complexity laboratory testing.       ED MEDICATIONS:   Medications   ketorolac (TORADOL) injection 10 mg (10 mg Intravenous Given 7/4/22 1250)   cefTRIAXone (ROCEPHIN) 1 g vial to attach to  mL bag for ADULTS or NS 50 mL bag for PEDS (1 g Intravenous New Bag 7/4/22 1442)   azithromycin (ZITHROMAX) tablet 500 mg (500 mg Oral Given 7/4/22 1442)         Impression:    ICD-10-CM    1. Pubic ramus fracture, right, closed, initial encounter (H)  S32.591A    2. Community acquired pneumonia of right lower lobe of lung  J18.9    3. Closed fracture of sacrum, unspecified portion of sacrum, initial encounter (H)  S32.10XA        Plan:    No pending studies.      Unfortunately, no current beds available.  Again imaging studies significant for superior and inferior pubic rami fracture, chest x-ray  concerning for pneumonia and the patient was started on antibiotics.  Patient unable to ambulate here secondary to weakness and pain, he did receive Toradol.  We were able to secure a bed at [facility], the patient is in agreement with transfer, I spoke with the accepting provider, physicians assistant Nuha Talbert who agrees and accepts patient for admission.  Patient was transferred in stable condition.      Spencer Min MD  07/04/22 1600

## 2022-07-05 ENCOUNTER — APPOINTMENT (OUTPATIENT)
Dept: ULTRASOUND IMAGING | Facility: CLINIC | Age: 82
DRG: 551 | End: 2022-07-05
Attending: INTERNAL MEDICINE
Payer: COMMERCIAL

## 2022-07-05 ENCOUNTER — APPOINTMENT (OUTPATIENT)
Dept: PHYSICAL THERAPY | Facility: CLINIC | Age: 82
DRG: 551 | End: 2022-07-05
Attending: INTERNAL MEDICINE
Payer: COMMERCIAL

## 2022-07-05 ENCOUNTER — DOCUMENTATION ONLY (OUTPATIENT)
Dept: ANTICOAGULATION | Facility: CLINIC | Age: 82
End: 2022-07-05

## 2022-07-05 LAB
ANION GAP SERPL CALCULATED.3IONS-SCNC: 7 MMOL/L (ref 3–14)
BUN SERPL-MCNC: 22 MG/DL (ref 7–30)
CALCIUM SERPL-MCNC: 8.3 MG/DL (ref 8.5–10.1)
CHLORIDE BLD-SCNC: 101 MMOL/L (ref 94–109)
CO2 SERPL-SCNC: 28 MMOL/L (ref 20–32)
CREAT SERPL-MCNC: 0.79 MG/DL (ref 0.66–1.25)
ERYTHROCYTE [DISTWIDTH] IN BLOOD BY AUTOMATED COUNT: 13.1 % (ref 10–15)
GFR SERPL CREATININE-BSD FRML MDRD: 90 ML/MIN/1.73M2
GLUCOSE BLD-MCNC: 226 MG/DL (ref 70–99)
GLUCOSE BLDC GLUCOMTR-MCNC: 189 MG/DL (ref 70–99)
GLUCOSE BLDC GLUCOMTR-MCNC: 221 MG/DL (ref 70–99)
GLUCOSE BLDC GLUCOMTR-MCNC: 255 MG/DL (ref 70–99)
GLUCOSE BLDC GLUCOMTR-MCNC: 284 MG/DL (ref 70–99)
GLUCOSE BLDC GLUCOMTR-MCNC: 349 MG/DL (ref 70–99)
HCT VFR BLD AUTO: 33.8 % (ref 40–53)
HGB BLD-MCNC: 10.7 G/DL (ref 13.3–17.7)
INR PPP: 2.79 (ref 0.85–1.15)
LDH SERPL L TO P-CCNC: 262 U/L (ref 85–227)
MCH RBC QN AUTO: 32.3 PG (ref 26.5–33)
MCHC RBC AUTO-ENTMCNC: 31.7 G/DL (ref 31.5–36.5)
MCV RBC AUTO: 102 FL (ref 78–100)
PLATELET # BLD AUTO: 119 10E3/UL (ref 150–450)
POTASSIUM BLD-SCNC: 3.6 MMOL/L (ref 3.4–5.3)
PROT SERPL-MCNC: 5.8 G/DL (ref 6.8–8.8)
RBC # BLD AUTO: 3.31 10E6/UL (ref 4.4–5.9)
SODIUM SERPL-SCNC: 136 MMOL/L (ref 133–144)
WBC # BLD AUTO: 6.4 10E3/UL (ref 4–11)

## 2022-07-05 PROCEDURE — 80048 BASIC METABOLIC PNL TOTAL CA: CPT | Performed by: INTERNAL MEDICINE

## 2022-07-05 PROCEDURE — 120N000001 HC R&B MED SURG/OB

## 2022-07-05 PROCEDURE — 85610 PROTHROMBIN TIME: CPT | Performed by: INTERNAL MEDICINE

## 2022-07-05 PROCEDURE — 36415 COLL VENOUS BLD VENIPUNCTURE: CPT | Performed by: INTERNAL MEDICINE

## 2022-07-05 PROCEDURE — 99232 SBSQ HOSP IP/OBS MODERATE 35: CPT | Performed by: STUDENT IN AN ORGANIZED HEALTH CARE EDUCATION/TRAINING PROGRAM

## 2022-07-05 PROCEDURE — 97161 PT EVAL LOW COMPLEX 20 MIN: CPT | Mod: GP

## 2022-07-05 PROCEDURE — 250N000011 HC RX IP 250 OP 636: Performed by: INTERNAL MEDICINE

## 2022-07-05 PROCEDURE — 84155 ASSAY OF PROTEIN SERUM: CPT | Performed by: STUDENT IN AN ORGANIZED HEALTH CARE EDUCATION/TRAINING PROGRAM

## 2022-07-05 PROCEDURE — 76604 US EXAM CHEST: CPT

## 2022-07-05 PROCEDURE — 250N000013 HC RX MED GY IP 250 OP 250 PS 637: Performed by: INTERNAL MEDICINE

## 2022-07-05 PROCEDURE — 83615 LACTATE (LD) (LDH) ENZYME: CPT | Performed by: STUDENT IN AN ORGANIZED HEALTH CARE EDUCATION/TRAINING PROGRAM

## 2022-07-05 PROCEDURE — 250N000013 HC RX MED GY IP 250 OP 250 PS 637: Performed by: STUDENT IN AN ORGANIZED HEALTH CARE EDUCATION/TRAINING PROGRAM

## 2022-07-05 PROCEDURE — 85027 COMPLETE CBC AUTOMATED: CPT | Performed by: INTERNAL MEDICINE

## 2022-07-05 RX ORDER — POLYETHYLENE GLYCOL 3350 17 G/17G
17 POWDER, FOR SOLUTION ORAL 2 TIMES DAILY PRN
Status: DISCONTINUED | OUTPATIENT
Start: 2022-07-05 | End: 2022-07-12 | Stop reason: HOSPADM

## 2022-07-05 RX ORDER — BISACODYL 10 MG
10 SUPPOSITORY, RECTAL RECTAL ONCE
Status: COMPLETED | OUTPATIENT
Start: 2022-07-05 | End: 2022-07-05

## 2022-07-05 RX ORDER — WARFARIN SODIUM 2 MG/1
2 TABLET ORAL
Status: COMPLETED | OUTPATIENT
Start: 2022-07-05 | End: 2022-07-05

## 2022-07-05 RX ADMIN — ROSUVASTATIN CALCIUM 20 MG: 20 TABLET, FILM COATED ORAL at 22:51

## 2022-07-05 RX ADMIN — SENNOSIDES AND DOCUSATE SODIUM 1 TABLET: 50; 8.6 TABLET ORAL at 09:04

## 2022-07-05 RX ADMIN — BISACODYL 10 MG: 10 SUPPOSITORY RECTAL at 13:21

## 2022-07-05 RX ADMIN — FINASTERIDE 5 MG: 5 TABLET, FILM COATED ORAL at 20:02

## 2022-07-05 RX ADMIN — INSULIN ASPART 2 UNITS: 100 INJECTION, SOLUTION INTRAVENOUS; SUBCUTANEOUS at 08:55

## 2022-07-05 RX ADMIN — OXYCODONE HYDROCHLORIDE 10 MG: 10 TABLET, FILM COATED, EXTENDED RELEASE ORAL at 20:02

## 2022-07-05 RX ADMIN — CARVEDILOL 6.25 MG: 6.25 TABLET, FILM COATED ORAL at 08:39

## 2022-07-05 RX ADMIN — ASPIRIN 81 MG: 81 TABLET, COATED ORAL at 08:39

## 2022-07-05 RX ADMIN — TAZOBACTAM SODIUM AND PIPERACILLIN SODIUM 3.38 G: 375; 3 INJECTION, SOLUTION INTRAVENOUS at 01:36

## 2022-07-05 RX ADMIN — TAZOBACTAM SODIUM AND PIPERACILLIN SODIUM 3.38 G: 375; 3 INJECTION, SOLUTION INTRAVENOUS at 08:39

## 2022-07-05 RX ADMIN — INSULIN ASPART 5 UNITS: 100 INJECTION, SOLUTION INTRAVENOUS; SUBCUTANEOUS at 15:43

## 2022-07-05 RX ADMIN — AZITHROMYCIN MONOHYDRATE 250 MG: 250 TABLET ORAL at 08:39

## 2022-07-05 RX ADMIN — PRAMIPEXOLE DIHYDROCHLORIDE 0.5 MG: 0.5 TABLET ORAL at 15:36

## 2022-07-05 RX ADMIN — CARVEDILOL 6.25 MG: 6.25 TABLET, FILM COATED ORAL at 17:41

## 2022-07-05 RX ADMIN — TAZOBACTAM SODIUM AND PIPERACILLIN SODIUM 3.38 G: 375; 3 INJECTION, SOLUTION INTRAVENOUS at 14:53

## 2022-07-05 RX ADMIN — POLYETHYLENE GLYCOL 3350 17 G: 17 POWDER, FOR SOLUTION ORAL at 13:21

## 2022-07-05 RX ADMIN — OXYCODONE HYDROCHLORIDE AND ACETAMINOPHEN 1 TABLET: 5; 325 TABLET ORAL at 14:54

## 2022-07-05 RX ADMIN — CILOSTAZOL 50 MG: 50 TABLET ORAL at 08:39

## 2022-07-05 RX ADMIN — WARFARIN SODIUM 2 MG: 2 TABLET ORAL at 17:41

## 2022-07-05 RX ADMIN — OXYCODONE HYDROCHLORIDE 10 MG: 10 TABLET, FILM COATED, EXTENDED RELEASE ORAL at 08:39

## 2022-07-05 RX ADMIN — TAZOBACTAM SODIUM AND PIPERACILLIN SODIUM 3.38 G: 375; 3 INJECTION, SOLUTION INTRAVENOUS at 20:02

## 2022-07-05 ASSESSMENT — ACTIVITIES OF DAILY LIVING (ADL)
DRESSING/BATHING_DIFFICULTY: NO
VISION_MANAGEMENT: GLASSES
DIFFICULTY_EATING/SWALLOWING: NO
ADLS_ACUITY_SCORE: 37
ADLS_ACUITY_SCORE: 28
ADLS_ACUITY_SCORE: 37
ADLS_ACUITY_SCORE: 28
WALKING_OR_CLIMBING_STAIRS_DIFFICULTY: YES
ADLS_ACUITY_SCORE: 37
ADLS_ACUITY_SCORE: 34
ADLS_ACUITY_SCORE: 37
ADLS_ACUITY_SCORE: 28
ADLS_ACUITY_SCORE: 37
ADLS_ACUITY_SCORE: 34
ADLS_ACUITY_SCORE: 32
WALKING_OR_CLIMBING_STAIRS: AMBULATION DIFFICULTY, REQUIRES EQUIPMENT
EQUIPMENT_CURRENTLY_USED_AT_HOME: GRAB BAR, TOILET;GRAB BAR, TUB/SHOWER;RAISED TOILET SEAT;SHOWER CHAIR;WALKER, ROLLING
DIFFICULTY_COMMUNICATING: NO
TRANSFERRING: 1-->ASSISTANCE (EQUIPMENT/PERSON) NEEDED (NOT DEVELOPMENTALLY APPROPRIATE)
WEAR_GLASSES_OR_BLIND: YES
NUMBER_OF_TIMES_PATIENT_HAS_FALLEN_WITHIN_LAST_SIX_MONTHS: 1
TOILETING_ISSUES: NO
FALL_HISTORY_WITHIN_LAST_SIX_MONTHS: YES
TRANSFERRING: 1-->ASSISTANCE (EQUIPMENT/PERSON) NEEDED
CHANGE_IN_FUNCTIONAL_STATUS_SINCE_ONSET_OF_CURRENT_ILLNESS/INJURY: YES
CONCENTRATING,_REMEMBERING_OR_MAKING_DECISIONS_DIFFICULTY: NO
DOING_ERRANDS_INDEPENDENTLY_DIFFICULTY: NO
ADLS_ACUITY_SCORE: 28

## 2022-07-05 NOTE — PHARMACY-ADMISSION MEDICATION HISTORY
Medication Reconciliation is completed via Attestation signed by Gilma Tripathi RPH at 7/4/2022  3:02 PM .  See full details in notes section                   July 4, 2022                    Tr Hernandez RPH

## 2022-07-05 NOTE — PROCEDURES
IR NOTE  Limited right chest ultrasound was performed which showed a trace effusion. Thoracentesis was not performed.     Yolis Sullivan DO

## 2022-07-05 NOTE — CONSULTS
Care Management Initial Consult    General Information  Assessment completed with: Patient, patient  Type of CM/SW Visit: CM Role Introduction    Primary Care Provider verified and updated as needed: Yes   Readmission within the last 30 days:        Reason for Consult: discharge planning  Advance Care Planning:            Communication Assessment  Patient's communication style: spoken language (English or Bilingual)    Hearing Difficulty or Deaf: yes   Wear Glasses or Blind: yes    Cognitive  Cognitive/Neuro/Behavioral: .WDL except  Level of Consciousness: alert  Arousal Level: opens eyes spontaneously, arouses to touch/gentle shaking, arouses to voice  Orientation: disoriented to, time  Mood/Behavior: agitated, anxious  Best Language: 0 - No aphasia  Speech: clear    Living Environment:   People in home: spouse  Agueda  Current living Arrangements: house (twin home one level)      Able to return to prior arrangements: no       Family/Social Support:  Care provided by:    Provides care for:    Marital Status:   Wife, Children  Agueda       Description of Support System: supportive        Current Resources:   Patient receiving home care services: NA   Community Resources:  NA  Equipment currently used at home: grab bar, toilet, grab bar, tub/shower, raised toilet seat, shower chair, walker, rolling, cane, straight  Supplies currently used at home:      Lifestyle & Psychosocial Needs:  Social Determinants of Health     Tobacco Use: Medium Risk     Smoking Tobacco Use: Former Smoker     Smokeless Tobacco Use: Never Used   Alcohol Use: Not on file   Financial Resource Strain: Not on file   Food Insecurity: Not on file   Transportation Needs: Not on file   Physical Activity: Not on file   Stress: Not on file   Social Connections: Not on file   Intimate Partner Violence: Not on file   Depression: Not at risk     PHQ-2 Score: 2   Housing Stability: Not on file       Functional Status:  Prior to admission patient  "needed assistance: walker and cane  Recent fall      Additional Information:  Introduced CM role to patient and obtained brief information. Wants to discuss most information with wife present.  Patient lives at home with spouse Agueda in a one level twin home. He uses a cane and walker to get around but had a recent fall and currently is \"not able to walk\"  Discussed physical therapies recommendations of TCU and patient is in agreement with the plan.  Provided [facility] approved facilities as well as medicare ratings to compare. Patient wants to go over this information tomorrow with wife present.  He is agreeable to a shared room.  Briefly discussed WC transport need on discharge. Reviewed out of pocket cost for [facility] WC transport, $81.80 for base rate and $5.26 per mile to the destination. Spoke with patient, they expressed understanding and are agreeable to this.   Will need to discuss with wife.  Left message for wife introducing CM and that TCU facility lists left at bedside  Discharge pending medical clearance    Trinity Grimm RN BSN OCN  Care Coordinator  [facility]  [phone]            "

## 2022-07-05 NOTE — UTILIZATION REVIEW
"  Admission Status; Secondary Review Determination         Under the authority of the Utilization Management Committee, the utilization review process indicated a secondary review on the above patient.  The review outcome is based on review of the medical records, discussions with staff, and applying clinical experience noted on the date of the review.        (X)      Inpatient Status Appropriate - This patient's medical care is consistent with medical management for inpatient care and reasonable inpatient medical practice.      () Observation Status Appropriate - This patient does not meet hospital inpatient criteria and is placed in observation status. If this patient's primary payer is Medicare and was admitted as an inpatient, Condition Code 44 should be used and patient status changed to \"observation\".   () Admission Status NOT Appropriate - This patient's medical care is not consistent with medical management for Inpatient or Observation Status.          RATIONALE FOR DETERMINATION     82-year-old gentleman with past medical history of T2DM, HTN, hypercholesteremia, GERD, atrial fibrillation, CAD, nonalcoholic steatohepatitis and congestive heart failure with preserved LV function and a LVEF of 65 to 70% in the echo done 11/29/2021 bioprosthetic aortic valve replacement, With recent 6/7/2022 T11 kyphoplasty for intractable back pain with T11 compression fracture with history of left total hip arthroplasty done 4/9/2022.     Patient was walking in his house when he fell down in a nonsyncopal fall resulting in right-sided pelvic pain in the groin area this happened 7/2/2022.  There was no loss of consciousness.  Pain in the pelvis is 8/10 when he walks and less when he is laying down it is 6/10 at this time after moving.      He also has cough with pleuritic right-sided chest pain with sputum production and congestion in the throat for some time.    Patient was identified as having right pubic rami fractures.  " He was also identified as having a right lower lobe pneumonia with a partially loculated right-sided pleural effusion.  Patient was initiated on parenteral antibiotics.  Interventional radiology was consulted for potential thoracentesis.  Patient will have pain management and assessment with ambulation and transfers.  A multiple day hospitalization is anticipated, and patient is appropriate for inpatient status.        The severity of illness, intensity of service provided, expected LOS and risk for adverse outcome make the care complex, high risk and appropriate for hospital admission.        The information on this document is developed by the utilization review team in order for the business office to ensure compliance.  This only denotes the appropriateness of proper admission status and does not reflect the quality of care rendered.         The definitions of Inpatient Status and Observation Status used in making the determination above are those provided in the CMS Coverage Manual, Chapter 1 and Chapter 6, section 70.4.      Sincerely,     Ezio Amin MD  Physician Advisor  Utilization Review/ Case Management  [facility].

## 2022-07-05 NOTE — PLAN OF CARE
"Patient was found screaming \"Help!\" in room, yelling into the room phone. Call light was observed next to the patient and within reach. Reminded patient to use call light for assistance instead of screaming for help. Call light clipped securely onto bed for patient. Soft press call light was also offered to patient, which he declined at this time.   "

## 2022-07-05 NOTE — PROGRESS NOTES
"   07/05/22 1109   Quick Adds   Type of Visit Initial PT Evaluation   Living Environment   People in Home spouse   Current Living Arrangements   (single level townhouse)   Home Accessibility stairs to enter home   Number of Stairs, Main Entrance none   Self-Care   Usual Activity Tolerance moderate   Current Activity Tolerance poor   Equipment Currently Used at Home grab bar, toilet;grab bar, tub/shower;raised toilet seat;shower chair;walker, rolling;cane, straight   Fall history within last six months yes   Number of times patient has fallen within last six months 2   Activity/Exercise/Self-Care Comment Patient uses either walker or cane for mobility.  Reports fall occurred when his cane \"slipped.\"  Daughter is a nurse, reported patient has only been home from TCU a couple of weeks.   General Information   Onset of Illness/Injury or Date of Surgery 07/04/22   Referring Physician Yifan Momin MD   Patient/Family Therapy Goals Statement (PT) patient and family are agreeable to TCU   Pertinent History of Current Problem (include personal factors and/or comorbidities that impact the POC) Pee Ayala is a 82 year old male admitted on 7/4/2022. He was transferred from [facility] emergency department to [facility] 7/4/2022. Patient with pmh including A-fib, CHF, bioprosthetic aortic valve, type II DM and hypercholesteremia now admitted with Right lower lobe pnuemonia, partially loculated right-sided pleural effusion, right pubic rami non displaced fracture secondary to mechanical fall, recent 6/7/22 T11 kyphoplasty for intractable back pain with T11 compression fracture, recent left hip hemiarthroplasty on 4/9/22 secondary to hip fracture.   Existing Precautions/Restrictions fall;spinal  (per MD, no restrictions secondary to pelvic fractures)   Cognition   Orientation Status (Cognition) person;place;situation   Pain Assessment   Patient Currently in Pain   (Reports pain at mutple sites including heels, " pelvis, back.  Patient reports worst pain in supine is secondary to constipation.)   Integumentary/Edema   Integumentary/Edema Comments Patient with wound on each heel.   Posture    Posture Forward head position;Protracted shoulders   Range of Motion (ROM)   Range of Motion ROM deficits secondary to pain   Strength (Manual Muscle Testing)   Strength (Manual Muscle Testing) Deficits observed during functional mobility  (limited by pain)   Bed Mobility   Bed Mobility supine-sit   Supine-Sit [place] (Bed Mobility) maximum assist (25% patient effort)   Impairments Contributing to Impaired Bed Mobility pain   Assistive Device (Bed Mobility) bed rails;draw sheet   Transfers   Comment, (Transfers) Unable to trial standing secondary to reports of severe pain   Gait/Stairs (Locomotion)   Comment, (Gait/Stairs) Unable to trial gait secondary to reports of severe pain   Balance   Balance Comments Decreased safety awareness during limited mobility seconadry to pain and focus on constipation   Coordination   Coordination no deficits were identified   Muscle Tone   Muscle Tone no deficits were identified   Clinical Impression   Criteria for Skilled Therapeutic Intervention Yes, treatment indicated   PT Diagnosis (PT) Impaired functional mobility   Influenced by the following impairments pain, decreased activity tolerance, decreased strength, decreased balance   Functional limitations due to impairments difficulties with transfers and gait   Clinical Presentation (PT Evaluation Complexity) Stable/Uncomplicated   Clinical Presentation Rationale complex pmh, pain limiting all mobility, good social support   Clinical Decision Making (Complexity) low complexity   Planned Therapy Interventions (PT) balance training;bed mobility training;gait training;home exercise program;patient/family education;strengthening;stair training;transfer training;progressive activity/exercise;home program guidelines   Risk & Benefits of therapy have  been explained evaluation/treatment results reviewed;care plan/treatment goals reviewed;risks/benefits reviewed;current/potential barriers reviewed;participants included;participants voiced agreement with care plan;patient;daughter;spouse/significant other   PT Discharge Planning   PT Discharge Recommendation (DC Rec) Transitional Care Facility   PT Rationale for DC Rec Patient presents significantly below baseline for functional mobility.  Patient limited by pain; unable to demonstrate safe, functional household mobility.  Recommend TCU in order to increase strength, activity tolerance and independence with mobiltiy

## 2022-07-05 NOTE — PLAN OF CARE
INPATIENT PLAN OF CARE PROGRESS NOTE (7:00 AM - 7:30 PM)    A&Ox4, but forgetful at times w/ repeated requests. Patient is anxious, presses call light often, requiring constant reassurance. Requests that bedpan be within his reach/eyesight at all times. O2 sats stable on RA. BS active x4, tolerating Low Saturated Fat Na <2400mg Diet, no caffeine diet. Continues blood glucose checks with sliding scale insulin coverage. Patient expressed many concerns throughout day regarding BMs. PRN Senna, Miralax and Ducolax were administered with result in medium, formed (x1) and small, formed (x1) BM. Patient continues to request to use bedpan frequently to have try and have additional BMs, but this has resulted in patient only passing flatus. Patient was able to transfer to bedside commode w/ Ax1, walker and gait belt this evening. Voiding in adequate amt's. Redness to bottom/coccyx area. Scattered bruising, scabs, skin peeling present throughout body. Prevalon boots in place to BLE. Pain managed w/ PRN Percocet. Ice applied for comfort. Ortho, PT, OT, SW/CM, Pulmonary consulted. Current plan is to discharge to TCU. Wife and daughter have been updated w/ POC. Continues Zithromax and IV Zosyn Q6H. SL between abx. Droplet precautions maintained.

## 2022-07-05 NOTE — PROGRESS NOTES
Pt to Radiology for right thoracentesis, per PAULINE Sullivan IR Radiologist there is not enough fluid to safely drain at this time.

## 2022-07-05 NOTE — PHARMACY-ANTICOAGULATION SERVICE
Clinical Pharmacy - Warfarin Dosing Consult     Pharmacy has been consulted to manage this patient s warfarin therapy.  Indication: Atrial Fibrillation  Therapy Goal: INR 2-3  Warfarin Prior to Admission: Yes  Warfarin PTA Regimen: 3mg Tues, 4.5mg all other days  Significant drug interactions: aspirin, azithromycin    INR   Date Value Ref Range Status   07/04/2022 2.49 (H) 0.85 - 1.15 Final     INR (External)   Date Value Ref Range Status   07/01/2022 1.8 (A) 2 - 3 Final       Recommend warfarin 3 mg today.  Pharmacy will monitor Pee Ayala daily and order warfarin doses to achieve specified goal.      Please contact pharmacy as soon as possible if the warfarin needs to be held for a procedure or if the warfarin goals change.

## 2022-07-05 NOTE — PLAN OF CARE
Patient c/o abdominal pain this AM, attempted to have BM with no result. Stated last BM was 3 days ago, which he states is his baseline. Given PRN senna this AM.

## 2022-07-05 NOTE — PLAN OF CARE
"Patient's spouse arrived to unit. Spouse was observed walking unit hallways, peering into other patient rooms. Spouse stopped me as I was exiting another patient's room. Spouse states that they do not understand POC and requested that I speak with patient's daughter who is a nurse. POC reviewed again with spouse and daughter present. Patient and family are upset that I am unable to give specific scheduled times for consultations ordered. Spouse then asked, \"Is there even a doctor on this floor?\" Writer confirmed that there was a doctor on the floor and they would speak to the patient at bedside. Radiology/IR also contacted at this time with request of an estimated time for ultrasound/possible thoracentesis planned for today, currently waiting for call back. Writer assured patient and family that they would update them of any changes in POC. Patient and family stated they understood and agreeable at this time.      "

## 2022-07-05 NOTE — PROGRESS NOTES
[facility]    Medicine Progress Note - Hospitalist Service    Date of Admission:  7/4/2022  Date of Service: 07/05/2022      Assessment & Plan             Pee Ayala is a 82 year old male admitted on 7/4/2022. He was transferred from [facility] emergency department to [facility] 7/4/2022    Right lower lobe pneumonia  Partially loculated right-sided pleural effusion  Will monitor oxygen  Started patient on Zosyn 7/4/2022 and continued azithromycin that was already started in the emergency department -> continue for now  Consulted interventional radiology and pulmonology regarding partially loculated right pleural effusion -> plan for Thoracentesis today with labs    Right pubic rami fracture  Fracture is not displaced present in both superior and inferior pubic rami  Sacral marquis fracture  Probable osteoporosis  Pain management we will start the patient on OxyContin 10 mg every 12 hours along with as needed Percocet  Constipation medication -bowel regime  Physical therapy  Occupational Therapy  Probable TCU discharge based on PT OT evaluation  Osteoporosis treatment may have to be started    Subacute superior endplate L5 compression fracture  Inferior endplate L4 compression fracture  With recent 6/7/2022 T11 kyphoplasty for intractable back pain with T11 compression fracture  Pain management as mentioned above    Left hip hemiarthroplasty 4/9/2022  PT and OT will be consulted  Pain management as above    Atrial fibrillation  With severe left atrial enlargement  On warfarin for anticoagulation    Congestive heart failure  With preserved LV function    Bioprosthetic aortic valve  With atrial fibrillation patient is anticoagulated    Moderate mitral insufficiency  Patient is on Lasix and    Coronary artery disease  Peripheral artery disease s/p PCI   on cilostazol, carvedilol and rosuvastatin    Type 2 diabetes mellitus  We will give sliding scale insulin until medication  "reconciliation is completed    Hypercholesteremia  On Rosuvastatin    GERD  Nonalcoholic steatohepatitis             Diet: Combination Diet Low Saturated Fat Na <2400mg Diet, No Caffeine Diet    DVT Prophylaxis: Pneumatic Compression Devices  Silva Catheter: Not present  Central Lines: None  Cardiac Monitoring: None  Code Status: Full Code      Disposition Plan      Expected Discharge Date: 07/06/2022                The patient's care was discussed with the Bedside Nurse, Patient and Patient's Family.    Markus Navarro MD  Hospitalist Service  [facility]  Securely message with the Vocera Web Console (learn more here)  Text page via [employer] Paging/Directory         Clinically Significant Risk Factors Present on Admission               # Coagulation Defect: home medication list includes an anticoagulant medication  # Platelet Defect: home medication list includes an antiplatelet medication     # Obesity: Estimated body mass index is 33.81 kg/m  as calculated from the following:    Height as of an earlier encounter on 7/4/22: 1.626 m (5' 4\").    Weight as of an earlier encounter on 7/4/22: 89.4 kg (197 lb).        ______________________________________________________________________    Interval History     Main complaint today is constipation, no vomiting  No CP/SOB at rest  No fevers or chills.   Hip pain controlled  No other complaints.     Data reviewed today: I reviewed all medications, new labs and imaging results over the last 24 hours. I personally reviewed no images or EKG's today.    Physical Exam   Vital Signs: Temp: 97.5  F (36.4  C) Temp src: Oral BP: 123/59 Pulse: 82   Resp: 18 SpO2: 97 % O2 Device: None (Room air)    Weight: 0 lbs 0 oz    GENERAL: no apparent distress  HEART: S1 S2 regular  Rate and Rhythm, prominent second heart sound systolic murmur is present  LUNGS: Respirations are not laboured up until he takes a deep breath, Lungs have decreased breath sounds in the right lung " base to auscultation  ABDOMEN: Soft , there is no tenderness , Bowel Sounds are Positive   LOWER LIMBS: There is tenderness in the right pelvic area in the groin with mild pedal Edema right more than left   CNS:  Alert,  Oriented x 3, Moving all extremities.       Data   Recent Labs   Lab 07/05/22  0750 07/05/22  0647 07/05/22  0152 07/04/22  1812 07/04/22  1034 07/01/22  1107   WBC  --  6.4  --   --  8.6  --    HGB  --  10.7*  --   --  11.0*  --    MCV  --  102*  --   --  101*  --    PLT  --  119*  --   --  121*  --    INR  --  2.79*  --   --  2.49* 1.8*   NA  --  136  --   --  137  --    POTASSIUM  --  3.6  --   --  3.9  --    CHLORIDE  --  101  --   --  101  --    CO2  --  28  --   --  31  --    BUN  --  22  --   --  21  --    CR  --  0.79  --   --  0.74  --    ANIONGAP  --  7  --   --  5  --    RACHEL  --  8.3*  --   --  8.3*  --    * 226* 189*   < > 270*  --    PROTTOTAL  --  5.8*  --   --   --   --     < > = values in this interval not displayed.     Recent Results (from the past 24 hour(s))   CT Pelvis Bone wo Contrast    Narrative    EXAM: CT PELVIS BONE WITHOUT CONTRAST  LOCATION: [facility]  DATE/TIME: 07/04/2022, 1:49 PM    INDICATION: Fall. Pelvic pain.  COMPARISON: Pelvis radiographic exam earlier today.  TECHNIQUE: CT scan of the pelvis was performed without IV contrast. Multiplanar reformats were obtained. Dose reduction techniques were used.  CONTRAST: None.    FINDINGS:    PELVIS ORGANS: Small volume blood products seen in the perivesicular space anteriorly. Distended urinary bladder. No intestinal obstruction. Colonic diverticulosis. Extensive arterial calcification.    MUSCULOSKELETAL: Left hip hemiarthroplasty in place. Abundant heterotopic ossification is seen about the left hip. Nondisplaced fracture right sacral ala. Nondisplaced radiographically occult fracture right superior pubic ramus near the right   parasymphyseal pubis. Nondisplaced radiographically occult  fracture of the central right inferior pubic ramus. No right proximal femur fracture. Profound diffuse bone demineralization. Subacute appearing healing left parasymphyseal pubic fracture   extending towards the central left inferior pubic ramus.    Age uncertain superior endplate L5 compression fracture, likely subacute given the adjacent sclerosis. Inferior endplate L4 compression fracture partially seen. Moderate degenerative change and partial ankylosis at the SI joints particularly on the left.   Arthritis at the symphysis pubis with chondrocalcinosis. No aggressive bone lesion.    Intrinsic pelvic muscle bulk is largely symmetric without significant intramuscular hematoma.      Impression    IMPRESSION:  1.  Nondisplaced radiographically occult right central superior and inferior pubic rami fractures extending across the right parasymphyseal pubis.  2.  Nondisplaced right sacral ala fracture, radiographically occult.  3.  Profound diffuse bone demineralization, which limits CT sensitivity to detect nondisplaced fractures. MRI more sensitive.  4.  Subacute appearing superior endplate L5 and inferior endplate L4 compression fractures.  5.  Left hip hemiarthroplasty in place with abundant heterotopic ossification about the left hip.  6.  Mild contralateral right hip osteoarthritis without evidence for right proximal femur fracture or dislocation.  7.  Extensive arterial calcification.     Chest CT w/o contrast    Narrative    EXAM: CT CHEST W/O CONTRAST  LOCATION: [facility]  DATE/TIME: 7/4/2022 1:49 PM    INDICATION: abnormal cxr  COMPARISON: Chest radiograph 07/04/2022 at 1147 hours. Chest CT 04/08/2022.   TECHNIQUE: CT chest without IV contrast. Multiplanar reformats were obtained. Dose reduction techniques were used.  CONTRAST: None.    FINDINGS:   LUNGS AND PLEURA:  Partially loculated small pleural effusions are present. There is prominent extrapleural fat deposition. There are new  right lower lobe groundglass and more confluent airspace opacities. Linear opacities in the left lung base are   likely scarring/round atelectasis.     MEDIASTINUM/AXILLAE: Normal size of the heart. A TAVR stent is present. There is a moderate size hiatal hernia. Mediastinal fat is present.     CORONARY ARTERY CALCIFICATION: Previous intervention (stents or CABG).    UPPER ABDOMEN: Cholecystectomy.     MUSCULOSKELETAL:  Interval T11 vertebroplasty noted. There is interval height loss and sclerosis of the T7 vertebral body. Healed median sternotomy noted.       Impression    IMPRESSION:   1.  Right lower lobe airspace opacities are concerning for an infectious or inflammatory process.  2.  Mild fibrosis, partially loculated pleural fluid, extrapleural fat deposition, and mediastinal fat are the source of the left costophrenic angle blunting on radiography.        Medications     - MEDICATION INSTRUCTIONS -       Warfarin Therapy Reminder         aspirin  81 mg Oral Daily     azithromycin  250 mg Oral Daily     bisacodyl  10 mg Rectal Once     carvedilol  6.25 mg Oral BID w/meals     cilostazol  50 mg Oral Daily     finasteride  5 mg Oral QPM     insulin aspart  1-7 Units Subcutaneous 4x Daily AC & HS     insulin aspart  1-5 Units Subcutaneous At Bedtime     oxyCODONE  10 mg Oral Q12H     piperacillin-tazobactam  3.375 g Intravenous Q6H     pramipexole  0.5 mg Oral Daily     rosuvastatin  20 mg Oral At Bedtime     sodium chloride (PF)  3 mL Intracatheter Q8H

## 2022-07-05 NOTE — PLAN OF CARE
INPATIENT NOTE: [id number]    PRIMARY PROBLEM: Pneumonia       /61 (BP Location: Right arm, Patient Position: Supine, Cuff Size: Adult Large)   Pulse 67   Temp 98.7  F (37.1  C) (Oral)   Resp 18   SpO2 94%        Orientation: Alert and Oriented x4  Neuro: Intact   Pain status: Denies any pain with interview during shift. Continues on scheduled OxyContin and PRN oxycodone.   Resp: Lung sounds are Clear. Denies any SOB.   Cardiac: WNL, Denies any Chest Pain   GI: Bowels are active x 4 Quads. Denies any constipation.  : Voiding with no concern. Total output during shift 350 clear orquidea urine.     Skin: WNL   LDA: Peripheral IV   Infusions: Patient is Saline locked.   Diet: Cardiac and No caffeine  Activity: On bedrest with bathroom privileges.   Alarms/Safety: Bed Alarm  Isolation:   Patient is on Droplet precuations for Pneumonia.    Consults: IR, Pulmonology, PT, OT.    Discharge Plan: Transitional Care Unit    Other Cares/Comments:    Will continue to monitor and provide cares.     Adrianne Alegria RN

## 2022-07-05 NOTE — PLAN OF CARE
"Patient became extremely upset prior to going to US for possible thoracentesis. Patient screamed that he wanted to have a bowel movement before going to procedure. Writer explained additional bowel meds were recently added and we would administer this after US/possible thoracentesis. Patient stated that he was \"going to shit everywhere.\" Writer again explained rationale of administering bowel meds post-procedure as opposed to administering immediately before going down for procedure. Patient then replied, \"You are taking a big risk, but okay.\"    Patient MINO.  "

## 2022-07-05 NOTE — PLAN OF CARE
Patient expresses feeling very agitated and frustrated this morning. He states he has not received any information regarding his plan of care since he has arrived to this unit. Writer spent ~45 minutes with patient reviewing plan of care and answering questions and concerns. Plan of care with all consultations was also written out on the room's white board for reference.

## 2022-07-05 NOTE — PROGRESS NOTES
ANTICOAGULATION  MANAGEMENT: Discharge Review    Pee Ayala chart reviewed for anticoagulation continuity of care    Hospital Admission on 7/4/22  for fall (Pubic fractures).    Discharge disposition: Transferred hospitals to [facility]     Results:    Recent labs: (last 7 days)     07/01/22  1107 07/04/22  1034 07/05/22  0647   INR 1.8* 2.49* 2.79*     Anticoagulation inpatient management:     not applicable     Anticoagulation discharge instructions:     Warfarin dosing: home regimen continued   Bridging: No   INR goal change: No      Medication changes affecting anticoagulation: Yes: azithromycin & rocephin    Additional factors affecting anticoagulation: No     PLAN     No adjustment to anticoagulation plan needed    ACC will resume monitoring once discharged from hospital     No adjustment to Anticoagulation Calendar was required    Beverly Marr RN

## 2022-07-05 NOTE — CONSULTS
[facility]    Orthopedic Consultation    Pee Ayala MRN# [id number]   Age: 82 year old YOB: 1940     Date of Admission: 7/4/2022    Reason for consult: Right pubic rami fractures         Requesting provider: Markus Navarro       Level of consult: One-time consult to assist in determining a diagnosis, recommend an appropriate treatment plan and place orders           Assessment and Plan:   Assessment:   Right superior and inferior pubic rami fractures  Right sacral ala fractures  S/P left hip hemiarthroplasty by Dr Larose on 4/9/2022      Plan:       Non-operative management recommended  Able to WBAT with walker  Pain medication as needed, limit narcotics as able  Able to progress in PT/OT as able  Closely monitor INR to keep within therapeutic range.     Follow-up with Dr. Larose at [facility] in 6 weeks for recheck if continued pain as he is an established patient from his recent hemiarthroplasty.                 Chief Complaint:   Right pelvic pain          History of Present Illness:   This patient is a 82 year old male who presents with the following condition requiring a hospital admission: Pelvic fractures and pneumonia      Patient said that he was walking in his house on 7/2/22 when he fell, resulting in right-sided pelvic pain in the groin area.  There was no loss of consciousness.  Pain in the pelvis is 8/10 when he walks and less when he is laying down it is 6/10 at this time after moving. Imaging revealed above fractures.            Past Medical History:     Past Medical History:   Diagnosis Date     ACS (acute coronary syndrome) (H) 6/2/2014     ACS (acute coronary syndrome) (H) 6/2/2014     Actinic keratosis      Actinic keratosis 1/14/2014     Actinic keratosis 1/14/2014     Anticoagulated on Coumadin 12/30/2015     Antiplatelet or antithrombotic long-term use      Atrial fibrillation (H)      Atrial fibrillation (H) 2016     Bone mass 4/26/2017      Chest heaviness 1/23/2019     Chest pain 5/31/2014     Closed fracture of left forearm 2015     Congestive heart failure (H)      Coronary artery disease      Diabetes (H) 2009     Diabetes mellitus (H)      Difficulty in walking(719.7)      Dyslipidemia 8/31/2016     Dyspnea on exertion      ED (erectile dysfunction) of organic origin 12/29/2005    Overview:  April 25, 2007 will check PSA, try Levitra, no history of CAD, not on nitrates.      Encounter for long-term (current) use of insulin (H) 8/11/2016     Esophageal reflux 11/18/2010     Esophageal reflux 11/18/2010     History of angina      HTN (hypertension) 6/30/2009     (Problem list name updated by automated process. Provider to review and confirm.)     Hyperlipidemia LDL goal <100 10/31/2010     Hypertension      Impotence of organic origin      Mixed hyperlipidemia 4/25/2007 April 25, 2007 restarted Zocor today, recheck in 3 months.  August 23, 2007 LDL at 101 with 40 mg, will increase to 80 mg. Recheck  In 3 months.      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      Type II or unspecified type diabetes mellitus with neurological manifestations, not stated as uncontrolled(250.60) (H)      Walking troubles              Past Surgical History:     Past Surgical History:   Procedure Laterality Date     BYPASS GRAFT ARTERY CORONARY N/A 9/10/2014    Procedure: BYPASS GRAFT ARTERY CORONARY;  Surgeon: Rashmi  Bharathi Alvarez MD;  Location:  OR     BYPASS GRAFT ARTERY CORONARY  2016     COLONOSCOPY  1/14/2004     CORONARY ANGIOGRAPHY ADULT ORDER       CV CORONARY ANGIOGRAM N/A 4/4/2019    Procedure: Coronary Angiogram;  Surgeon: Dominik Vega MD;  Location: [facility];  Service: Cardiology     CV CORONARY ANGIOGRAM N/A 1/6/2021    Procedure: Coronary Angiogram;  Surgeon: Dominik Vega MD;  Location: [facility];  Service: Cardiology     CV LEFT HEART CATHETERIZATION WITHOUT LEFT VENTRICULOGRAM Left 4/4/2019    Procedure: Left Heart Catheterization Without Left Ventriculogram;  Surgeon: Dominik Vega MD;  Location: [facility];  Service: Cardiology     CV LEFT HEART CATHETERIZATION WITHOUT LEFT VENTRICULOGRAM Left 1/6/2021    Procedure: Left Heart Catheterization Without Left Ventriculogram;  Surgeon: Dominik Vega MD;  Location: [facility] Cardiac Cath Lab;  Service: Cardiology     CV RIGHT HEART CATHETERIZATION Right 4/4/2019    Procedure: Right Heart Catheterization;  Surgeon: Dominik Vega MD;  Location: [facility];  Service: Cardiology     CV TRANSCATHETER AORTIC VALVE REPLACEMENT N/A 1/12/2021    Procedure: Right transfemoral transcatheter aortic valve replacement using Magdaleno Dominick 3 size 29mm.  Transthoracic echocardiogram;  Surgeon: Jo Romano MD;  Location: [facility] CARDIAC CATH [facility]     ESOPHAGOSCOPY, GASTROSCOPY, DUODENOSCOPY (EGD), COMBINED N/A 1/13/2016    Procedure: COMBINED ESOPHAGOSCOPY, GASTROSCOPY, DUODENOSCOPY (EGD);  Surgeon: Rk Srivastava MD;  Location: [facility] FEMORAL CANNULIZATION WITH OPEN STANDBY REPAIR AORTIC VALVE N/A 1/12/2021    Procedure: Cardiopulmonary Bypass standby;  Surgeon: Jefferson Sandy MD;  Location: [facility] CARDIAC CATH [facility]     IR LOWER EXTREMITY ANGIOGRAM LEFT  12/7/2021     LAPAROSCOPIC CHOLECYSTECTOMY  10/09     MAZE PROCEDURE N/A 9/10/2014     Procedure: MAZE PROCEDURE;  Surgeon: Bharathi Caraballo MD;  Location: [facility] OR     MOHS MICROGRAPHIC PROCEDURE       OPEN REDUCTION INTERNAL FIXATION HIP BIPOLAR Left 2022    Procedure: HEMIARTHROPLASTY, HIP, BIPOLAR, OPEN REDUCTION INTERNAL FIXATION OF GREATER TROCHANTER;  Surgeon: Jd Larose MD;  Location: [facility] OR     ORTHOPEDIC SURGERY      surgery for fx  Left forearm     OTHER SURGICAL HISTORY Left     forearm sugery     STENT, CORONARY, HUMA  2016     VASECTOMY               Social History:     Social History     Tobacco Use     Smoking status: Former Smoker     Quit date: 1998     Years since quittin.5     Smokeless tobacco: Never Used   Substance Use Topics     Alcohol use: Yes     Alcohol/week: 0.0 standard drinks     Comment: Alcoholic Drinks/day: very rare             Family History:     Family History   Problem Relation Age of Onset     Diabetes Mother      Hypertension Father      Cerebrovascular Disease Father      Diabetes Maternal Grandmother      Breast Cancer No family hx of      Cancer - colorectal No family hx of      Prostate Cancer No family hx of      C.A.D. No family hx of      Diabetes Type 2  Mother         58 ,  from anesthesia complication.     Heart Disease Father         86  from stroke     No Known Problems Brother         60 years of age.             Immunizations:     VACCINE/DOSE   Diptheria   DPT   DTAP   HBIG   Hepatitis A   Hepatitis B   HIB   Influenza   Measles   Meningococcal   MMR   Mumps   Pneumococcal   Polio   Rubella   Small Pox   TDAP   Varicella   Zoster             Allergies:     Allergies   Allergen Reactions     Oxycodone Itching and Rash     Amlodipine Dizziness     Dizziness and dry heaves     Lisinopril Cough     Adhesive Tape Itching and Rash             Medications:     Current Facility-Administered Medications   Medication     aspirin EC tablet 81 mg     azithromycin (ZITHROMAX) tablet 250 mg     carvedilol (COREG)  tablet 6.25 mg     cilostazol (PLETAL) tablet 50 mg     glucose gel 15-30 g    Or     dextrose 50 % injection 25-50 mL    Or     glucagon injection 1 mg     finasteride (PROSCAR) tablet 5 mg     insulin aspart (NovoLOG) injection (RAPID ACTING)     insulin aspart (NovoLOG) injection (RAPID ACTING)     lidocaine (LMX4) cream     lidocaine 1 % 0.1-1 mL     melatonin tablet 1 mg     naloxone (NARCAN) injection 0.2 mg    Or     naloxone (NARCAN) injection 0.4 mg    Or     naloxone (NARCAN) injection 0.2 mg    Or     naloxone (NARCAN) injection 0.4 mg     ondansetron (ZOFRAN ODT) ODT tab 4 mg    Or     ondansetron (ZOFRAN) injection 4 mg     oxyCODONE (oxyCONTIN) 12 hr tablet 10 mg     oxyCODONE-acetaminophen (PERCOCET) 5-325 MG per tablet 1 tablet     Patient is already receiving anticoagulation with heparin, enoxaparin (LOVENOX), warfarin (COUMADIN)  or other anticoagulant medication     piperacillin-tazobactam (ZOSYN) infusion 3.375 g     pramipexole (MIRAPEX) tablet 0.5 mg     rosuvastatin (CRESTOR) tablet 20 mg     senna-docusate (SENOKOT-S/PERICOLACE) 8.6-50 MG per tablet 1 tablet    Or     senna-docusate (SENOKOT-S/PERICOLACE) 8.6-50 MG per tablet 2 tablet     sodium chloride (PF) 0.9% PF flush 3 mL     sodium chloride (PF) 0.9% PF flush 3 mL     Warfarin Therapy Reminder (Check START DATE - warfarin may be starting in the FUTURE)             Review of Systems:   ROS:  10 point ROS neg other than the symptoms noted above in the HPI.            Physical Exam:   All vitals have been reviewed  Patient Vitals for the past 24 hrs:   BP Temp Temp src Pulse Resp SpO2   07/05/22 0924 -- -- -- -- 18 --   07/05/22 0835 123/59 97.5  F (36.4  C) Oral 82 18 97 %   07/05/22 0139 110/61 98.7  F (37.1  C) Oral 67 18 94 %   07/04/22 2207 100/59 98.5  F (36.9  C) Oral 81 17 95 %   07/04/22 1815 117/59 97.8  F (36.6  C) Oral 70 18 95 %       Intake/Output Summary (Last 24 hours) at 7/5/2022 1043  Last data filed at 7/5/2022  0846  Gross per 24 hour   Intake 240 ml   Output 450 ml   Net -210 ml         Physical Exam   Temp: 97.5  F (36.4  C) Temp src: Oral BP: 123/59 Pulse: 82   Resp: 18 SpO2: 97 % O2 Device: None (Room air)    Vital Signs with Ranges  Temp:  [97.5  F (36.4  C)-98.7  F (37.1  C)] 97.5  F (36.4  C)  Pulse:  [67-82] 82  Resp:  [13-28] 18  BP: (100-128)/(55-66) 123/59  SpO2:  [94 %-99 %] 97 %  0 lbs 0 oz    Constitutional: Pleasant, alert, appropriate, following commands.  HEENT: Head atraumatic normocephalic. Pupils equal round and reactive to light.  Respiratory: Unlabored breathing no audible wheeze  Cardiovascular: Regular rate and rhythm per pulses  GI: Abdomen non-distended.  Lymph/Hematologic: No lymphadenopathy in areas examined  Genitourinary:  No lopez  Skin: No rashes, no cyanosis, no edema.  Musculoskeletal:   On physical exam of bilateral lower extremities, patient's legs are resting in a neutral position.  No skin abrasions or ecchymosis seen.  Denies pain with hip rotation bilaterally.  Pain on the right with active hip flexion.  Straight leg raise negative for radiculopathy.  Patient is able to dorsi and plantarflex both ankles with equal resistance.  Able to flex bilateral knees.  Bilateral low leg neuropathy to knees bilateral lower extremities.  Distal pulses are intact and equal bilaterally.   Neurologic: normal without focal findings, mental status, speech normal, alert and oriented x iii  Neuropsychiatric: stable            Data:   All laboratory data reviewed  Results for orders placed or performed during the hospital encounter of 07/04/22   Glucose by meter     Status: Abnormal   Result Value Ref Range    GLUCOSE BY METER POCT 240 (H) 70 - 99 mg/dL   INR     Status: Abnormal   Result Value Ref Range    INR 2.79 (H) 0.85 - 1.15   CBC with platelets     Status: Abnormal   Result Value Ref Range    WBC Count 6.4 4.0 - 11.0 10e3/uL    RBC Count 3.31 (L) 4.40 - 5.90 10e6/uL    Hemoglobin 10.7 (L) 13.3 -  17.7 g/dL    Hematocrit 33.8 (L) 40.0 - 53.0 %     (H) 78 - 100 fL    MCH 32.3 26.5 - 33.0 pg    MCHC 31.7 31.5 - 36.5 g/dL    RDW 13.1 10.0 - 15.0 %    Platelet Count 119 (L) 150 - 450 10e3/uL   Basic metabolic panel     Status: Abnormal   Result Value Ref Range    Sodium 136 133 - 144 mmol/L    Potassium 3.6 3.4 - 5.3 mmol/L    Chloride 101 94 - 109 mmol/L    Carbon Dioxide (CO2) 28 20 - 32 mmol/L    Anion Gap 7 3 - 14 mmol/L    Urea Nitrogen 22 7 - 30 mg/dL    Creatinine 0.79 0.66 - 1.25 mg/dL    Calcium 8.3 (L) 8.5 - 10.1 mg/dL    Glucose 226 (H) 70 - 99 mg/dL    GFR Estimate 90 >60 mL/min/1.73m2   Lactate Dehydrogenase     Status: Abnormal   Result Value Ref Range    Lactate Dehydrogenase 262 (H) 85 - 227 U/L   Protein total     Status: Abnormal   Result Value Ref Range    Protein Total 5.8 (L) 6.8 - 8.8 g/dL          Attestation:  I have reviewed today's vital signs, notes, medications, labs and imaging with Dr. Macdonald.  Amount of time performed on this consult: 40 minutes.    Thuy Jauregui PA-C

## 2022-07-05 NOTE — PLAN OF CARE
Goal Outcome Evaluation:    A&Ox4. Vss on RA. Pain 3/10. Ax2- bedrest with bathroom privileges. Pt using urinal to void. legs elevated on pillows per pt request. Pt has some reddening on heels. Blood sugar 240 - given sliding scale. Gave melatonin per pt request. Droplet precautions maintained. Will continue monitoring and providing cares.

## 2022-07-06 ENCOUNTER — APPOINTMENT (OUTPATIENT)
Dept: OCCUPATIONAL THERAPY | Facility: CLINIC | Age: 82
DRG: 551 | End: 2022-07-06
Attending: INTERNAL MEDICINE
Payer: COMMERCIAL

## 2022-07-06 ENCOUNTER — APPOINTMENT (OUTPATIENT)
Dept: PHYSICAL THERAPY | Facility: CLINIC | Age: 82
DRG: 551 | End: 2022-07-06
Attending: INTERNAL MEDICINE
Payer: COMMERCIAL

## 2022-07-06 LAB
GLUCOSE BLDC GLUCOMTR-MCNC: 234 MG/DL (ref 70–99)
GLUCOSE BLDC GLUCOMTR-MCNC: 242 MG/DL (ref 70–99)
GLUCOSE BLDC GLUCOMTR-MCNC: 260 MG/DL (ref 70–99)
GLUCOSE BLDC GLUCOMTR-MCNC: 338 MG/DL (ref 70–99)
GLUCOSE BLDC GLUCOMTR-MCNC: 358 MG/DL (ref 70–99)
INR PPP: 3.17 (ref 0.85–1.15)

## 2022-07-06 PROCEDURE — 85610 PROTHROMBIN TIME: CPT | Performed by: INTERNAL MEDICINE

## 2022-07-06 PROCEDURE — 97166 OT EVAL MOD COMPLEX 45 MIN: CPT | Mod: GO

## 2022-07-06 PROCEDURE — 250N000012 HC RX MED GY IP 250 OP 636 PS 637: Performed by: PHYSICIAN ASSISTANT

## 2022-07-06 PROCEDURE — 250N000011 HC RX IP 250 OP 636: Performed by: INTERNAL MEDICINE

## 2022-07-06 PROCEDURE — 97530 THERAPEUTIC ACTIVITIES: CPT | Mod: GP

## 2022-07-06 PROCEDURE — 250N000013 HC RX MED GY IP 250 OP 250 PS 637: Performed by: INTERNAL MEDICINE

## 2022-07-06 PROCEDURE — 97535 SELF CARE MNGMENT TRAINING: CPT | Mod: GO

## 2022-07-06 PROCEDURE — 99232 SBSQ HOSP IP/OBS MODERATE 35: CPT | Performed by: PHYSICIAN ASSISTANT

## 2022-07-06 PROCEDURE — 250N000013 HC RX MED GY IP 250 OP 250 PS 637: Performed by: PHYSICIAN ASSISTANT

## 2022-07-06 PROCEDURE — 120N000001 HC R&B MED SURG/OB

## 2022-07-06 PROCEDURE — 99223 1ST HOSP IP/OBS HIGH 75: CPT | Performed by: INTERNAL MEDICINE

## 2022-07-06 PROCEDURE — 36415 COLL VENOUS BLD VENIPUNCTURE: CPT | Performed by: INTERNAL MEDICINE

## 2022-07-06 RX ORDER — FUROSEMIDE 20 MG
20 TABLET ORAL
Status: DISCONTINUED | OUTPATIENT
Start: 2022-07-06 | End: 2022-07-12 | Stop reason: HOSPADM

## 2022-07-06 RX ORDER — METHOCARBAMOL 500 MG/1
500 TABLET, FILM COATED ORAL 2 TIMES DAILY
Status: DISCONTINUED | OUTPATIENT
Start: 2022-07-06 | End: 2022-07-12 | Stop reason: HOSPADM

## 2022-07-06 RX ORDER — HYDROXYZINE HYDROCHLORIDE 50 MG/1
50 TABLET, FILM COATED ORAL EVERY 6 HOURS PRN
Status: DISCONTINUED | OUTPATIENT
Start: 2022-07-06 | End: 2022-07-12 | Stop reason: HOSPADM

## 2022-07-06 RX ORDER — OXYCODONE HYDROCHLORIDE 5 MG/1
5 TABLET ORAL EVERY 4 HOURS PRN
Status: DISCONTINUED | OUTPATIENT
Start: 2022-07-06 | End: 2022-07-06

## 2022-07-06 RX ORDER — OXYCODONE AND ACETAMINOPHEN 5; 325 MG/1; MG/1
1 TABLET ORAL EVERY 4 HOURS PRN
Status: DISCONTINUED | OUTPATIENT
Start: 2022-07-06 | End: 2022-07-12 | Stop reason: HOSPADM

## 2022-07-06 RX ORDER — GABAPENTIN 600 MG/1
600 TABLET ORAL 3 TIMES DAILY
Status: DISCONTINUED | OUTPATIENT
Start: 2022-07-06 | End: 2022-07-12 | Stop reason: HOSPADM

## 2022-07-06 RX ORDER — FUROSEMIDE 40 MG
40 TABLET ORAL DAILY
Status: DISCONTINUED | OUTPATIENT
Start: 2022-07-07 | End: 2022-07-12 | Stop reason: HOSPADM

## 2022-07-06 RX ORDER — ACETAMINOPHEN 500 MG
1000 TABLET ORAL 3 TIMES DAILY
Status: DISCONTINUED | OUTPATIENT
Start: 2022-07-06 | End: 2022-07-06

## 2022-07-06 RX ORDER — ACETAMINOPHEN 500 MG
500 TABLET ORAL 3 TIMES DAILY
Status: DISCONTINUED | OUTPATIENT
Start: 2022-07-06 | End: 2022-07-12 | Stop reason: HOSPADM

## 2022-07-06 RX ORDER — HYDROXYZINE HYDROCHLORIDE 25 MG/1
25 TABLET, FILM COATED ORAL EVERY 6 HOURS PRN
Status: DISCONTINUED | OUTPATIENT
Start: 2022-07-06 | End: 2022-07-12 | Stop reason: HOSPADM

## 2022-07-06 RX ADMIN — ACETAMINOPHEN 1000 MG: 500 TABLET, FILM COATED ORAL at 10:37

## 2022-07-06 RX ADMIN — FUROSEMIDE 20 MG: 20 TABLET ORAL at 16:07

## 2022-07-06 RX ADMIN — ACETAMINOPHEN 500 MG: 500 TABLET, FILM COATED ORAL at 20:03

## 2022-07-06 RX ADMIN — CARVEDILOL 6.25 MG: 6.25 TABLET, FILM COATED ORAL at 09:40

## 2022-07-06 RX ADMIN — METFORMIN HYDROCHLORIDE 1000 MG: 500 TABLET, FILM COATED ORAL at 17:20

## 2022-07-06 RX ADMIN — WARFARIN SODIUM 0.5 MG: 1 TABLET ORAL at 18:25

## 2022-07-06 RX ADMIN — CARVEDILOL 6.25 MG: 6.25 TABLET, FILM COATED ORAL at 18:25

## 2022-07-06 RX ADMIN — TAZOBACTAM SODIUM AND PIPERACILLIN SODIUM 3.38 G: 375; 3 INJECTION, SOLUTION INTRAVENOUS at 14:32

## 2022-07-06 RX ADMIN — PRAMIPEXOLE DIHYDROCHLORIDE 0.5 MG: 0.5 TABLET ORAL at 16:07

## 2022-07-06 RX ADMIN — TAZOBACTAM SODIUM AND PIPERACILLIN SODIUM 3.38 G: 375; 3 INJECTION, SOLUTION INTRAVENOUS at 20:05

## 2022-07-06 RX ADMIN — FINASTERIDE 5 MG: 5 TABLET, FILM COATED ORAL at 20:03

## 2022-07-06 RX ADMIN — INSULIN ASPART 26 UNITS: 100 INJECTION, SUSPENSION SUBCUTANEOUS at 18:42

## 2022-07-06 RX ADMIN — GABAPENTIN 600 MG: 600 TABLET, FILM COATED ORAL at 16:07

## 2022-07-06 RX ADMIN — CILOSTAZOL 50 MG: 50 TABLET ORAL at 08:27

## 2022-07-06 RX ADMIN — METHOCARBAMOL 500 MG: 500 TABLET ORAL at 20:03

## 2022-07-06 RX ADMIN — TAZOBACTAM SODIUM AND PIPERACILLIN SODIUM 3.38 G: 375; 3 INJECTION, SOLUTION INTRAVENOUS at 08:31

## 2022-07-06 RX ADMIN — AZITHROMYCIN MONOHYDRATE 250 MG: 250 TABLET ORAL at 08:27

## 2022-07-06 RX ADMIN — ASPIRIN 81 MG: 81 TABLET, COATED ORAL at 08:27

## 2022-07-06 RX ADMIN — ROSUVASTATIN CALCIUM 20 MG: 20 TABLET, FILM COATED ORAL at 22:11

## 2022-07-06 RX ADMIN — GABAPENTIN 600 MG: 600 TABLET, FILM COATED ORAL at 20:03

## 2022-07-06 RX ADMIN — TAZOBACTAM SODIUM AND PIPERACILLIN SODIUM 3.38 G: 375; 3 INJECTION, SOLUTION INTRAVENOUS at 02:47

## 2022-07-06 RX ADMIN — OXYCODONE HYDROCHLORIDE 10 MG: 10 TABLET, FILM COATED, EXTENDED RELEASE ORAL at 08:27

## 2022-07-06 ASSESSMENT — ACTIVITIES OF DAILY LIVING (ADL)
ADLS_ACUITY_SCORE: 34

## 2022-07-06 NOTE — PROGRESS NOTES
"   07/06/22 0902   Quick Adds   Type of Visit Initial Occupational Therapy Evaluation   Living Environment   People in Home spouse   Current Living Arrangements house  (single level [facility])   Home Accessibility stairs to enter home   Number of Stairs, Main Entrance 2   Living Environment Comments Pt lives with spouse in single level [facility], 2 DIPIKA, all needs met on main level, walk in shower w/ shower chair, RTS   Self-Care   Usual Activity Tolerance moderate   Current Activity Tolerance fair   Equipment Currently Used at Home grab bar, toilet;grab bar, tub/shower;raised toilet seat;shower chair;walker, rolling;cane, straight   Fall history within last six months yes   Number of times patient has fallen within last six months 2   Activity/Exercise/Self-Care Comment Pt reports mod I in all ADLs, light homemaking tasks and use of walker for mobility at baseline. Reports wife assists with \"L sock.\" Reports fall occurred when his cane \"slipped.\"  Daughter is a nurse, reported patient has only been home from TCU a couple of weeks.   General Information   Onset of Illness/Injury or Date of Surgery 07/04/22   Referring Physician Yifan Momin MD   Patient/Family Therapy Goal Statement (OT) Pt is open to rehab options   Additional Occupational Profile Info/Pertinent History of Current Problem Per chart: Pt is an 82 year old male admitted with Right lower lobe pnuemonia, partially loculated right-sided pleural effusion, right pubic rami non displaced fracture secondary to mechanical fall, recent 6/7/22 T11 kyphoplasty for intractable back pain with T11 compression fracture, recent left hip hemiarthroplasty on 4/9/22 secondary to hip fracture.   Existing Precautions/Restrictions fall   Left Lower Extremity (Weight-bearing Status) weight-bearing as tolerated (WBAT)   Right Lower Extremity (Weight-bearing Status) weight-bearing as tolerated (WBAT)   Cognitive Status Examination   Orientation Status orientation to person, " place and time   Affect/Mental Status (Cognitive) anxious   Visual Perception   Visual Impairment/Limitations corrective lenses full-time   Sensory   Sensory Comments Baseline neuropathy in hands/feet   Pain Assessment   Patient Currently in Pain Yes, see Vital Sign flowsheet  (pain in RLE)   Range of Motion Comprehensive   Comment, General Range of Motion R shoulder baseline limited AROM; LUE WFL   Strength Comprehensive (MMT)   Comment, General Manual Muscle Testing (MMT) Assessment Generalized weakness BUEs   Bed Mobility   Bed Mobility supine-sit;sit-supine   Supine-Sit [place] (Bed Mobility) unable to assess   Sit-Supine [place] (Bed Mobility) unable to assess   Transfers   Transfers bed-chair transfer;sit-stand transfer;toilet transfer;shower transfer   Transfer Skill: Bed to Chair/Chair to Bed   Bed-Chair [place] (Transfers) unable to assess   Sit-Stand Transfer   Sit-Stand [place] (Transfers) unable to assess   Shower Transfer   [place] Level (Shower Transfer) not tested   Toilet Transfer   [place] Level (Toilet Transfer) unable to assess   Balance   Balance Comments Pt declines EOB/OOB activity due to just returning to bed from using BSC with nursing   Activities of Daily Living   BADL Assessment/Intervention upper body dressing;lower body dressing;grooming;toileting   Upper Body Dressing Assessment/Training   [place] Level (Upper Body Dressing) minimum assist (75% patient effort)   Lower Body Dressing Assessment/Training   [place] Level (Lower Body Dressing) dependent (less than 25% patient effort)   Grooming Assessment/Training   [place] Level (Grooming) minimum assist (75% patient effort)   Toileting   [place] Level (Toileting) maximum assist (25% patient effort)   Clinical Impression   Criteria for Skilled Therapeutic Interventions Met (OT) Yes, treatment indicated   OT Diagnosis Impaired ADLs, IADLs and mobility tasks   OT Problem List-Impairments  impacting ADL problems related to;activity tolerance impaired;balance;cognition;strength;pain   ADL comments/analysis Pt significantly below baseline level of functioning   Assessment of Occupational Performance 5 or more Performance Deficits   Identified Performance Deficits Bathing, dressing, grooming, toileting, homemaking, transfers   Planned Therapy Interventions (OT) ADL retraining;IADL retraining;transfer training;strengthening;balance training;cognition;progressive activity/exercise   Clinical Decision Making Complexity (OT) moderate complexity   Risk & Benefits of therapy have been explained evaluation/treatment results reviewed;care plan/treatment goals reviewed;risks/benefits reviewed;current/potential barriers reviewed;participants voiced agreement with care plan;participants included;patient   OT Discharge Planning   OT Discharge Recommendation (DC Rec) Transitional Care Facility   OT Rationale for DC Rec Pt is significantly below baseline level of functioning. Recommend ongoing skilled OT while IP and in TCU setting to improve strength, functional activity tolerance, balance and safety needed for daily tasks.   Total Evaluation Time (Minutes)   Total Evaluation Time (Minutes) 8   OT Goals   Therapy Frequency (OT) 4 times/wk   OT Predicted Duration/Target Date for Goal Attainment 07/12/22   OT Goals Hygiene/Grooming;Lower Body Dressing;Toilet Transfer/Toileting   OT: Hygiene/Grooming supervision/stand-by assist;using adaptive equipment;while standing;from wheelchair   OT: Lower Body Dressing Supervision/stand-by assist;using adaptive equipment;within precautions;from wheelchair   OT: Toilet Transfer/Toileting Supervision/stand-by assist;toilet transfer;cleaning and garment management;using adaptive equipment;within precautions

## 2022-07-06 NOTE — CONSULTS
[facility] STAFF CRITICAL CARE PROGRESS NOTE:    I saw and examined the patient and reviewed his medical chart, labs and imaging, including personal viewing of his CXR and chest CT scan.    Mr Ayala is a 83 yo male with extensive PMH (see Dr. Bledsoe's 7/4 ED note) whom I am asked to see re question of need for drainage of loculated pleural effusion.    He fell on 7/2 while walking with a can and since then has bilat hip pain, worse on the R.  He presented to ED on 7/4 taking oxycodone every 6 hours for pain.  He also noted occ cough, 'different from his usual cough.  Om ED found to have closed R pubic ramus fx and closed sacral fx plus felt to have RLL community acquired pneumonia  He was Rx'd with rocephin, azithromycin and ketorolac in ED    Pulm ROS:  Denies SOB and recent F/C/S; no past h/o TB exposure; no hemoptysis/  Some asbestos and fiberglass insulation fiber exposure in young adult years (1950s).   Denies significant asthma, prior pneumonia. Of other breathing problems apart from excess fluid (CHF)    PMH: DM; Polyneuropathy; Chronic anticoagulation; CHF, CAD s/p CABG 2014 & 2016, s/p TAVR; HTN; Atrial Fib; GERD; Dyslipidemia; Nonalcoholic steatohepatitis; Sensorineural hearing loss, asymmetrical; skin squamous cell CA; s/p L jamila arthroplasty    SH  :lives at home with his wife;  Remote small amount of smoking (stopped > 45 years ago; < 1 PPD    FH: + DM (mother); HTN; CAD Cerebrovasc disease  (each father)    Home Meds: Per Dr. Bledsoe's 7/4/22 note    Allergies   Allergen Reactions     Oxycodone Itching and Rash     Amlodipine Dizziness       Dizziness and dry heaves     Lisinopril Cough     Adhesive Tape Itching and Rash       ROS: coughs and 'phlegmy' most of the time, worse in AM;   congestion @ night; painful to walk since fall - was using walker previously;  ~ 50 lb weight loss    Exam  Very pleasant male lying in bed without O2.  No tachypnea, wheeze, stridor cough or distress  VSS  Scalp slight  abrasion  No adenopathy  Chest slight basilar crackles, partially clear with deep breath;  No wheeze, rub  Irregular rate; Probable 2/6 systolic murmur;  No g/r appreciated   Abd soft, nontender, no rebound  2+ edema      Labs  Hgb10.7 WBC 6.4 Plt 119K  (7/5)  136 3.6 101 28 22/0.79   Ca 8.3  Mg 1.8     ; NH3 14    CXR 7/4:  Poor lung expansion, possible elevated R Hemidiaphragm; patchy densities; slight blunting L CPA  Chest CT 7/4  Scattered densities R>L posterior base some with rounded consolidation and small amounts of pleural fluid R > L    Official reports  XR Chest 1 View     Impression     IMPRESSION: Blunting of the left costophrenic sulcus may be scarring or minimal fluid. Subtle pulmonary opacities suggest an atypical infectious process, though edema and other processes can have a similar appearance. Lung volumes are low. There is no   pneumothorax. The cardiomediastinal silhouette is mildly enlarged. A TAVR stent is noted. Median sternotomy. Right upper quadrant surgical clips are present.    Chest CT w/o contrast     Impression     IMPRESSION:   1.  Right lower lobe airspace opacities are concerning for an infectious or inflammatory process.  2.  Mild fibrosis, partially loculated pleural fluid, extrapleural fat deposition, and mediastinal fat are the source of the left costophrenic angle blunting on radiography.       Pelvic CT: nondisplaced right superior and inferior pubic rami fractures along with a nondisplaced sacral ala fracture (although patient really has no pain in that area).  ;   Abnormal Chest CT with densities R>L base and small bilateral effusions / Possible CAP (Community-Acquired Pneumonia)  The patient has no respiratory complaints and little to suggest active pneumonia (fevers, chills sweats, increase of SOB or sputum).  Given Chest CT findings, treatment emipirically with a course of antibiotics for CAP is reasonable (7-10 day Rx).  It may be worth a f/u Chest CT in 3  months to be certain that there is clearing of bibasilar densities.  I doubt he has an empyema and suspect that the very small effusions are either parapneumonic or CHF.  Given their small size, I don't believe it is necessary (and hardly feasible) to sample.  If he were to developing worsening signs of infection with elevated WBC, pleuritic pain, increased fluid then tap may be needed (but I think this scenario is relaitvely unlikely). Given his diuresis, if effusions were due to CHF, the diuretics have potential to convert a transudate into an exudate.    I spent a total of 80 minutes dedicated to Mr Ayala's care thus far today, 7/6/2022, including review of past medical records, labs, current and past imaging, with the patient and in documentation.    Ezio Paredes MD  Pulmonary Staff  [phone]

## 2022-07-06 NOTE — PROGRESS NOTES
[facility]    Medicine Progress Note - Hospitalist Service    Date of Admission:  7/4/2022    Assessment & Plan          Pee Ayala is a 82 year old male with PMHx significant for DM type II, HTN, PAF, bioprosthetic aortic valve, HLP, HFpEF, CAD, PAD, GERD admitted on 7/4/2022 with pelvic fx after a fall. He was also noted to have pneumonia.     Right lower lobe pneumonia  Partially loculated right-sided pleural effusion  Doing well from respiratory perspective, not hypoxic, afebrile. He does note a cough and mild pleuritic pain but denies SOB.   Started on Zosyn 7/4/2022 and continued azithromycin that was already started in the emergency department -> continue for now  Consulted interventional radiology and pulmonology regarding partially loculated right pleural effusion -> no significant fluid collection to drain. pulm recommends 7-10 day course of abx.   - medically ready for discharge once TCU bed available     Right pubic rami fracture  Fracture is not displaced present in both superior and inferior pubic rami  Sacral ala fracture  Probable osteoporosis  Pt reports feet feel heavy once he stands up. Able to stand with assistance  Started on OxyContin 10 mg BID on admission, will discontinue, likely able to managed with short acting meds  Pt states he does not tolerate oxycodone but tolerated Percocet. Will start 500 mg Tylenol TID with PRN Percocet 5-325 mg. Add hydroxyzine  Resume home Robaxin and gabapentin  Continue PRN bowel regimen  PT/OT consult, recommending TCU  SW to assist with placement     Subacute superior endplate L5 compression fracture  Inferior endplate L4 compression fracture  With recent 6/7/2022 T11 kyphoplasty for intractable back pain with T11 compression fracture  Pain management as mentioned above     Left hip hemiarthroplasty 4/9/2022  PT and OT will be consulted  Pain management as above     Atrial fibrillation  With severe left atrial enlargement  On  "warfarin for anticoagulation, continue per pharmacy    CAD, s/p 2v CABG and mid RCA stent in 2016  PAD, s/p left SFA stenting   HFpEF  S/p TAVR  Moderate mitral insufficiency  With preserved LV function  With atrial fibrillation patient is anticoagulated, continue per pharmacy  Resume home lasix and continue Coreg  Had bleeding issue while on Plavix, Vasc surgery recently recommended Pletal, pt had not started yet, started here. See Pharm note     Coronary artery disease  Peripheral artery disease s/p PCI   on cilostazol, carvedilol and rosuvastatin     Type 2 diabetes mellitus  Managed with metformin and 70/30 at home, meds not previously resumed. Will now resume metformin 1000 mg BID and 70/30 at 26 units BID and continue sliding scale insulin   - FYI, home dose of 70/30 is 33 units in AM and 26 units in PM     Hypercholesteremia  Continue Rosuvastatin     GERD  Nonalcoholic steatohepatitis       Diet: Combination Diet Low Saturated Fat Na <2400mg Diet, No Caffeine Diet    DVT Prophylaxis: Warfarin  Silva Catheter: Not present  Central Lines: None  Cardiac Monitoring: None  Code Status: Full Code      Disposition Plan      Expected Discharge Date: 07/07/2022    Discharge Delays: Placement - TCU  Destination: inpatient rehabilitation facility          The patient's care was discussed with the Bedside Nurse, Care Coordinator/ and Patient.    Anita Solorzano PA-C  Hospitalist Service  [facility]  Securely message with the Vocera Web Console (learn more here)  Text page via [employer] Paging/Directory         Clinically Significant Risk Factors Present on Admission               # Obesity: Estimated body mass index is 33.81 kg/m  as calculated from the following:    Height as of an earlier encounter on 7/4/22: 1.626 m (5' 4\").    Weight as of an earlier encounter on 7/4/22: 89.4 kg (197 lb).        ______________________________________________________________________    Interval " History   Notes cough and mild pleuritic chest pain, denies SOB. No fevers. Eating well. Notes pain with movement, notes feet feel heavy when standing    Data reviewed today: I reviewed all medications, new labs and imaging results over the last 24 hours. I personally reviewed no images or EKG's today.    Physical Exam   Vital Signs: Temp: 97.6  F (36.4  C) Temp src: Oral BP: 112/54 Pulse: 80   Resp: 22 SpO2: 94 % O2 Device: None (Room air)    Weight: 0 lbs 0 oz    GENERAL:  Comfortable.  PSYCH: pleasant, oriented, No acute distress.  HEART:  Normal S1, S2 with no murmur, no pericardial rub, gallops or S3 or S4.  LUNGS:  Clear to auscultation, normal Respiratory effort. No wheezing, rales or ronchi.  GI:  Soft, normal bowel sounds. Non-tender, non distended.   EXTREMITIES:  Able to move all 4 extremities independently   SKIN:  Dry to touch, No rash, wound or ulcerations.  NEUROLOGIC:  Grossly intact     Data   Recent Labs   Lab 07/06/22  1239 07/06/22  0712 07/06/22  0643 07/06/22  0146 07/05/22  0750 07/05/22  0647 07/04/22  1812 07/04/22  1034   WBC  --   --   --   --   --  6.4  --  8.6   HGB  --   --   --   --   --  10.7*  --  11.0*   MCV  --   --   --   --   --  102*  --  101*   PLT  --   --   --   --   --  119*  --  121*   INR  --   --  3.17*  --   --  2.79*  --  2.49*   NA  --   --   --   --   --  136  --  137   POTASSIUM  --   --   --   --   --  3.6  --  3.9   CHLORIDE  --   --   --   --   --  101  --  101   CO2  --   --   --   --   --  28  --  31   BUN  --   --   --   --   --  22  --  21   CR  --   --   --   --   --  0.79  --  0.74   ANIONGAP  --   --   --   --   --  7  --  5   RACHEL  --   --   --   --   --  8.3*  --  8.3*   * 234*  --  242*   < > 226*   < > 270*   PROTTOTAL  --   --   --   --   --  5.8*  --   --     < > = values in this interval not displayed.     No results found for this or any previous visit (from the past 24 hour(s)).  Medications     - MEDICATION INSTRUCTIONS -       Warfarin  Therapy Reminder         acetaminophen  500 mg Oral TID     aspirin  81 mg Oral Daily     azithromycin  250 mg Oral Daily     carvedilol  6.25 mg Oral BID w/meals     cilostazol  50 mg Oral Daily     finasteride  5 mg Oral QPM     furosemide  20 mg Oral Daily at 4 pm     [START ON 7/7/2022] furosemide  40 mg Oral Daily     gabapentin  600 mg Oral TID     insulin aspart  1-7 Units Subcutaneous TID w/meals     insulin aspart  1-5 Units Subcutaneous At Bedtime     insulin aspart prot & aspart  26 Units Subcutaneous BID w/meals     metFORMIN  1,000 mg Oral BID w/meals     methocarbamol  500 mg Oral BID     piperacillin-tazobactam  3.375 g Intravenous Q6H     pramipexole  0.5 mg Oral Daily     rosuvastatin  20 mg Oral At Bedtime     sodium chloride (PF)  3 mL Intracatheter Q8H     warfarin ANTICOAGULANT  0.5 mg Oral ONCE at 18:00

## 2022-07-06 NOTE — PROGRESS NOTES
Care Management Follow Up    Length of Stay (days): 2    Expected Discharge Date: 07/07/2022     Concerns to be Addressed:       Patient plan of care discussed at interdisciplinary rounds: Yes    Anticipated Discharge Disposition:  TCU     Anticipated Discharge Services:  Therapy  Anticipated Discharge DME: none     Patient/family educated on Medicare website which has current facility and service quality ratings:  yes  Education Provided on the Discharge Plan:  yes  Patient/Family in Agreement with the Plan:  yes    Referrals Placed by CM/SW:  TCU  Private pay costs discussed: private room/amenity fees, transportation fees     Additional Information:  Met with patient and daughter Ankita regarding choices for TCU. They asked that referrals be sent to:  [facility] (first choice)  [facility]   Confirmed w/c transport on discharge. Medically ready to d/c per MD Trinity Grimm RN BSN OCN  Care Coordinator  [facility]  [phone]

## 2022-07-06 NOTE — PLAN OF CARE
Shift from 6425-4107     Inpatient Progress Note:  For complete assessment see flow sheet documentation.    Orientation: AO x4   Neuro: WDL  Pain status: Pt denied pain  Activity: A2 but pt has not been OOB this shift.   Peripheral edema: NA  Resp: LS clear, O2 sats 96% on RA  Cardiac: WDL  GI: WDL, last BM 7/5/2022   : WDL  LDA: PIV in left arm  Infusions: SL  Diet: Combination diet, low saturated fats, Na 2400 mg  Consults: PT, OT, Pulm  Discharge Plan:     /72 (BP Location: Right arm)   Pulse 82   Temp 97.5  F (36.4  C) (Oral)   Resp 18   SpO2 96%

## 2022-07-06 NOTE — PLAN OF CARE
Inpatient note 0700 to 1930.    /62   Pulse 72   Temp 97.9  F (36.6  C) (Oral)   Resp 16   SpO2 94%     Neuro: Disoriented to time.   Cardiac: WNL  Lungs: TOBAR w/ exertion  GI: Pt had BMx2   : WNL  Pain: Pt reports pain w/ activity  IV: SL  Meds: see MAR  Labs/tests: see Results  Diet: Cardiac diet  Activity: Ax2 w/ gb  pivot to bed side commode  Plan: TCU placement

## 2022-07-07 ENCOUNTER — APPOINTMENT (OUTPATIENT)
Dept: PHYSICAL THERAPY | Facility: CLINIC | Age: 82
DRG: 551 | End: 2022-07-07
Attending: INTERNAL MEDICINE
Payer: COMMERCIAL

## 2022-07-07 LAB
GLUCOSE BLDC GLUCOMTR-MCNC: 100 MG/DL (ref 70–99)
GLUCOSE BLDC GLUCOMTR-MCNC: 136 MG/DL (ref 70–99)
GLUCOSE BLDC GLUCOMTR-MCNC: 138 MG/DL (ref 70–99)
GLUCOSE BLDC GLUCOMTR-MCNC: 159 MG/DL (ref 70–99)
GLUCOSE BLDC GLUCOMTR-MCNC: 202 MG/DL (ref 70–99)
GLUCOSE BLDC GLUCOMTR-MCNC: 54 MG/DL (ref 70–99)
GLUCOSE BLDC GLUCOMTR-MCNC: 74 MG/DL (ref 70–99)
INR PPP: 3.83 (ref 0.85–1.15)

## 2022-07-07 PROCEDURE — 250N000013 HC RX MED GY IP 250 OP 250 PS 637: Performed by: PHYSICIAN ASSISTANT

## 2022-07-07 PROCEDURE — 250N000011 HC RX IP 250 OP 636: Performed by: INTERNAL MEDICINE

## 2022-07-07 PROCEDURE — 97530 THERAPEUTIC ACTIVITIES: CPT | Mod: GP

## 2022-07-07 PROCEDURE — 97116 GAIT TRAINING THERAPY: CPT | Mod: GP

## 2022-07-07 PROCEDURE — 85610 PROTHROMBIN TIME: CPT | Performed by: INTERNAL MEDICINE

## 2022-07-07 PROCEDURE — 36415 COLL VENOUS BLD VENIPUNCTURE: CPT | Performed by: INTERNAL MEDICINE

## 2022-07-07 PROCEDURE — 250N000013 HC RX MED GY IP 250 OP 250 PS 637: Performed by: INTERNAL MEDICINE

## 2022-07-07 PROCEDURE — 120N000001 HC R&B MED SURG/OB

## 2022-07-07 PROCEDURE — 99233 SBSQ HOSP IP/OBS HIGH 50: CPT | Performed by: PHYSICIAN ASSISTANT

## 2022-07-07 RX ORDER — BUSPIRONE HYDROCHLORIDE 5 MG/1
5 TABLET ORAL 2 TIMES DAILY
Status: DISCONTINUED | OUTPATIENT
Start: 2022-07-07 | End: 2022-07-12 | Stop reason: HOSPADM

## 2022-07-07 RX ADMIN — GABAPENTIN 600 MG: 600 TABLET, FILM COATED ORAL at 08:09

## 2022-07-07 RX ADMIN — CARVEDILOL 6.25 MG: 6.25 TABLET, FILM COATED ORAL at 08:09

## 2022-07-07 RX ADMIN — METHOCARBAMOL 500 MG: 500 TABLET ORAL at 08:08

## 2022-07-07 RX ADMIN — ACETAMINOPHEN 500 MG: 500 TABLET, FILM COATED ORAL at 20:52

## 2022-07-07 RX ADMIN — METHOCARBAMOL 500 MG: 500 TABLET ORAL at 20:52

## 2022-07-07 RX ADMIN — GABAPENTIN 600 MG: 600 TABLET, FILM COATED ORAL at 14:06

## 2022-07-07 RX ADMIN — METFORMIN HYDROCHLORIDE 1000 MG: 500 TABLET, FILM COATED ORAL at 08:08

## 2022-07-07 RX ADMIN — PRAMIPEXOLE DIHYDROCHLORIDE 0.5 MG: 0.5 TABLET ORAL at 17:13

## 2022-07-07 RX ADMIN — FUROSEMIDE 40 MG: 40 TABLET ORAL at 08:09

## 2022-07-07 RX ADMIN — METFORMIN HYDROCHLORIDE 1000 MG: 500 TABLET, FILM COATED ORAL at 17:13

## 2022-07-07 RX ADMIN — AZITHROMYCIN MONOHYDRATE 250 MG: 250 TABLET ORAL at 08:08

## 2022-07-07 RX ADMIN — TAZOBACTAM SODIUM AND PIPERACILLIN SODIUM 3.38 G: 375; 3 INJECTION, SOLUTION INTRAVENOUS at 21:24

## 2022-07-07 RX ADMIN — FUROSEMIDE 20 MG: 20 TABLET ORAL at 17:13

## 2022-07-07 RX ADMIN — ROSUVASTATIN CALCIUM 20 MG: 20 TABLET, FILM COATED ORAL at 21:24

## 2022-07-07 RX ADMIN — FINASTERIDE 5 MG: 5 TABLET, FILM COATED ORAL at 20:52

## 2022-07-07 RX ADMIN — TAZOBACTAM SODIUM AND PIPERACILLIN SODIUM 3.38 G: 375; 3 INJECTION, SOLUTION INTRAVENOUS at 08:53

## 2022-07-07 RX ADMIN — CILOSTAZOL 50 MG: 50 TABLET ORAL at 08:08

## 2022-07-07 RX ADMIN — BUSPIRONE HYDROCHLORIDE 5 MG: 5 TABLET ORAL at 20:52

## 2022-07-07 RX ADMIN — INSULIN ASPART 26 UNITS: 100 INJECTION, SUSPENSION SUBCUTANEOUS at 17:18

## 2022-07-07 RX ADMIN — TAZOBACTAM SODIUM AND PIPERACILLIN SODIUM 3.38 G: 375; 3 INJECTION, SOLUTION INTRAVENOUS at 01:45

## 2022-07-07 RX ADMIN — ASPIRIN 81 MG: 81 TABLET, COATED ORAL at 08:09

## 2022-07-07 RX ADMIN — TAZOBACTAM SODIUM AND PIPERACILLIN SODIUM 3.38 G: 375; 3 INJECTION, SOLUTION INTRAVENOUS at 14:07

## 2022-07-07 RX ADMIN — INSULIN ASPART 26 UNITS: 100 INJECTION, SUSPENSION SUBCUTANEOUS at 09:45

## 2022-07-07 RX ADMIN — ACETAMINOPHEN 500 MG: 500 TABLET, FILM COATED ORAL at 08:08

## 2022-07-07 RX ADMIN — CARVEDILOL 6.25 MG: 6.25 TABLET, FILM COATED ORAL at 17:13

## 2022-07-07 RX ADMIN — ACETAMINOPHEN 500 MG: 500 TABLET, FILM COATED ORAL at 14:06

## 2022-07-07 RX ADMIN — GABAPENTIN 600 MG: 600 TABLET, FILM COATED ORAL at 20:51

## 2022-07-07 ASSESSMENT — ACTIVITIES OF DAILY LIVING (ADL)
ADLS_ACUITY_SCORE: 34
ADLS_ACUITY_SCORE: 31
ADLS_ACUITY_SCORE: 31
ADLS_ACUITY_SCORE: 34
ADLS_ACUITY_SCORE: 34
ADLS_ACUITY_SCORE: 31

## 2022-07-07 NOTE — PROGRESS NOTES
"[facility]    Medicine Progress Note - Hospitalist Service    Date of Admission:  7/4/2022    Assessment & Plan        Pee Ayala is a 82 year old male with PMHx significant for DM type II, HTN, PAF, bioprosthetic aortic valve, HLP, HFpEF, CAD, PAD, GERD admitted on 7/4/2022 with pelvic fx after a fall. He was also noted to have pneumonia.     Right lower lobe pneumonia  Partially loculated right-sided pleural effusion  Doing well from respiratory perspective, not hypoxic, afebrile. He does note a cough and mild pleuritic pain but denies SOB, improved today.   Started on Zosyn 7/4/2022 and continued azithromycin that was already started in the emergency department -> continue for now  Consulted interventional radiology and pulmonology regarding partially loculated right pleural effusion -> no significant fluid collection to drain. pulm recommends 7-10 day course of abx.   - medically ready for discharge once TCU bed available     Right pubic rami fracture  Fracture is not displaced present in both superior and inferior pubic rami  Sacral ala fracture  Probable osteoporosis  Pt reports feet feel heavy once he stands up. Able to stand with assistance  Started on OxyContin 10 mg BID on admission, will discontinue, likely able to managed with short acting meds  Pt states he does not tolerate oxycodone but tolerated Percocet. Continue 500 mg Tylenol TID with PRN Percocet 5-325 mg and hydroxyzine  -only using Tylenol today  Resume home Robaxin and gabapentin  Continue PRN bowel regimen  PT/OT consult, recommending TCU  SW to assist with placement    Depression  Pt's daughter expressed concern regarding depression, pt also endorses this. He states he feels like a \"worthless blob\". Frustrated and discouraged by recent medical events and set backs. Difficulty adjusting to debility. Wife and daughters are supportive but pt can perceive encouragement as hurtful and feeling like he isn't doing " enough or progressing quick enough. Pt and family interested in starting medication.   - will start with 5 mg Buspar BID. Close outpatient follow up with PCP for titration.  - mental health referral placed on discharge. Pt would benefit from therapy      Subacute superior endplate L5 compression fracture  Inferior endplate L4 compression fracture  With recent 6/7/2022 T11 kyphoplasty for intractable back pain with T11 compression fracture  Pain management as mentioned above     Left hip hemiarthroplasty 4/9/2022  PT and OT consulted  Pain management as above     Atrial fibrillation  With severe left atrial enlargement  On warfarin for anticoagulation, continue per pharmacy    CAD, s/p 2v CABG and mid RCA stent in 2016  PAD, s/p left SFA stenting   HFpEF  S/p TAVR  Moderate mitral insufficiency  With preserved LV function  With atrial fibrillation patient is anticoagulated, continue per pharmacy  Resume home lasix and continue Coreg  Had bleeding issue while on Plavix, Vasc surgery recently recommended Pletal, pt had not started yet, started here (recommend 50 mg x14 days, See Pharm note)     Coronary artery disease  Peripheral artery disease s/p PCI   on cilostazol, carvedilol and rosuvastatin  -Vasc surgery recently recommended Pletal, pt had not started yet, started here.     Type 2 diabetes mellitus  Managed with metformin and 70/30 at home  - Resumed metformin 1000 mg BID and 70/30 at 26 units BID magi of 7/6.  - Continue sliding scale insulin   - FYI: home dose of 70/30 is 33 units in AM and 26 units in PM     Hypercholesteremia  Continue Rosuvastatin     GERD  Nonalcoholic steatohepatitis    Covid-19 negtive 7/4     Diet: Combination Diet Low Saturated Fat Na <2400mg Diet, No Caffeine Diet    DVT Prophylaxis: Warfarin  Silva Catheter: Not present  Central Lines: None  Cardiac Monitoring: None  Code Status: Full Code      Disposition Plan      Expected Discharge Date: 07/07/2022    Discharge Delays: Placement -  "TCU  Destination: inpatient rehabilitation facility          The patient's care was discussed with the Bedside Nurse, Care Coordinator/, Patient and Patient's Family.    Anita Solorzano PA-C  Hospitalist Service  [facility]  Securely message with the Vocera Web Console (learn more here)  Text page via [employer] Paging/Directory         Clinically Significant Risk Factors Present on Admission               # Obesity: Estimated body mass index is 33.81 kg/m  as calculated from the following:    Height as of an earlier encounter on 7/4/22: 1.626 m (5' 4\").    Weight as of an earlier encounter on 7/4/22: 89.4 kg (197 lb).        ______________________________________________________________________    Interval History   Pain well controlled at rest, denies SOB or chest pain, notes pleuritic chest pain with a quick deep breath. Not hypoxic. Discussed feelings of depression today    Data reviewed today: I reviewed all medications, new labs and imaging results over the last 24 hours. I personally reviewed no images or EKG's today.    Physical Exam   Vital Signs: Temp: 97.6  F (36.4  C) Temp src: Oral BP: 117/66 Pulse: 82   Resp: 16 SpO2: 98 % O2 Device: None (Room air)    Weight: 0 lbs 0 oz    GENERAL:  Comfortable.  PSYCH: pleasant, oriented, No acute distress.  HEART:  Normal S1, S2 with no murmur, no pericardial rub, gallops or S3 or S4.  LUNGS:  Clear to auscultation, normal Respiratory effort. No wheezing, rales or ronchi.   EXTREMITIES:  Able to move all 4 extremities independently  SKIN:  Dry to touch, No rash, wound or ulcerations.  NEUROLOGIC:  Grossly intact    Data   Recent Labs   Lab 07/07/22  1230 07/07/22  0846 07/07/22  0711 07/07/22  0458 07/06/22  0712 07/06/22  0643 07/05/22  0750 07/05/22  0647 07/04/22  1812 07/04/22  1034   WBC  --   --   --   --   --   --   --  6.4  --  8.6   HGB  --   --   --   --   --   --   --  10.7*  --  11.0*   MCV  --   --   --   --   --   --  "  --  102*  --  101*   PLT  --   --   --   --   --   --   --  119*  --  121*   INR  --   --  3.83*  --   --  3.17*  --  2.79*  --  2.49*   NA  --   --   --   --   --   --   --  136  --  137   POTASSIUM  --   --   --   --   --   --   --  3.6  --  3.9   CHLORIDE  --   --   --   --   --   --   --  101  --  101   CO2  --   --   --   --   --   --   --  28  --  31   BUN  --   --   --   --   --   --   --  22  --  21   CR  --   --   --   --   --   --   --  0.79  --  0.74   ANIONGAP  --   --   --   --   --   --   --  7  --  5   RACHEL  --   --   --   --   --   --   --  8.3*  --  8.3*   * 136*  --  138*   < >  --    < > 226*   < > 270*   PROTTOTAL  --   --   --   --   --   --   --  5.8*  --   --     < > = values in this interval not displayed.     No results found for this or any previous visit (from the past 24 hour(s)).  Medications     - MEDICATION INSTRUCTIONS -       Warfarin Therapy Reminder         acetaminophen  500 mg Oral TID     aspirin  81 mg Oral Daily     azithromycin  250 mg Oral Daily     busPIRone  5 mg Oral BID     carvedilol  6.25 mg Oral BID w/meals     cilostazol  50 mg Oral Daily     finasteride  5 mg Oral QPM     furosemide  20 mg Oral Daily at 4 pm     furosemide  40 mg Oral Daily     gabapentin  600 mg Oral TID     insulin aspart  1-7 Units Subcutaneous TID w/meals     insulin aspart  1-5 Units Subcutaneous At Bedtime     insulin aspart prot & aspart  26 Units Subcutaneous BID w/meals     metFORMIN  1,000 mg Oral BID w/meals     methocarbamol  500 mg Oral BID     piperacillin-tazobactam  3.375 g Intravenous Q6H     pramipexole  0.5 mg Oral Daily     rosuvastatin  20 mg Oral At Bedtime     sodium chloride (PF)  3 mL Intracatheter Q8H     warfarin-No DOSE today  1 each Does not apply no dose today (warfarin)

## 2022-07-07 NOTE — PROGRESS NOTES
Care Management Follow Up    Length of Stay (days): 3    Expected Discharge Date: 07/07/2022     Concerns to be Addressed: TCU placement       Patient plan of care discussed at interdisciplinary rounds: Yes    Anticipated Discharge Disposition: Transitional Care     Patient/family educated on Medicare website which has current facility and service quality ratings: yes  Education Provided on the Discharge Plan:  yes  Patient/Family in Agreement with the Plan: yes    Referrals Placed by CM/SW:  TCU  Private pay costs discussed: private room/amenity fees and transportation costs    Additional Information:  CM following for discharge planning called and LM for [facility] [phone] and [facility] [phone]. Abram Ramos [facility] will review, their soonest bed availability is Saturday 7/9. Awaiting responses.     Update 1440: Rachel montero [facility] has reviewed, they are able to take pt on Monday 7/11. Pt is medically ready for discharge at this time, hopeful for sooner placement. Spoke with wife Agueda and additional referrals sent to [facility], [facility], [facility] and [facility].     Update 1630: [facility] is able to admit pt to a private room (no fee), they will begin insurance authorization at this time.     Padmaja Garcia RN BSN   Inpatient Care Coordination  [facility]   Phone [phone]

## 2022-07-07 NOTE — PLAN OF CARE
Shift: 4806-5826    A/O, disoriented to time, forgetful. Assist x 2 but not OOB during shift. Voided using bedside urinal last night. IV Zosyn infusion administered, saline locked in between.  (insulin given per sliding scale parameters) and 138. Pt denies pain throughout shift, reports pain with activity. Reports TOBAR with exertion. Plan for TCU placement.

## 2022-07-07 NOTE — PLAN OF CARE
Inpatient note 0700 to 1930.    AOx3.  Forgetful.  Pleasant.  Pt denies pain at rest.  When up to [facility], Pt rated pain 5/10 due to stiffness, however, states improvement in pain w/ activity compared to yesterday.  Pt reports doing leg exercises in bed & is motivated to walk in hallway.  Recliner in rm to encourage Pt to get OOB.  Also, they reported bilateral chest congestion, Pt reports pain when coughing.  IS given & instructed on use.  Tolerating cardiac diet.  Mepilex applied to coccyx due to blanchable redness.  Up w/ 1 assist gb & walker.  Bed alarm on.  Using urinal & bedside commode.  Had bm today.  .  Pt did mention depression due to being in hospital for some time.  Plan: TCU placement.  Will cont supportive cares.

## 2022-07-08 ENCOUNTER — MEDICAL CORRESPONDENCE (OUTPATIENT)
Dept: HEALTH INFORMATION MANAGEMENT | Facility: CLINIC | Age: 82
End: 2022-07-08

## 2022-07-08 LAB
ANION GAP SERPL CALCULATED.3IONS-SCNC: 8 MMOL/L (ref 3–14)
BASOPHILS # BLD AUTO: 0 10E3/UL (ref 0–0.2)
BASOPHILS NFR BLD AUTO: 1 %
BUN SERPL-MCNC: 13 MG/DL (ref 7–30)
CALCIUM SERPL-MCNC: 8.3 MG/DL (ref 8.5–10.1)
CHLORIDE BLD-SCNC: 104 MMOL/L (ref 94–109)
CO2 SERPL-SCNC: 28 MMOL/L (ref 20–32)
CREAT SERPL-MCNC: 0.92 MG/DL (ref 0.66–1.25)
EOSINOPHIL # BLD AUTO: 0.3 10E3/UL (ref 0–0.7)
EOSINOPHIL NFR BLD AUTO: 4 %
ERYTHROCYTE [DISTWIDTH] IN BLOOD BY AUTOMATED COUNT: 13.4 % (ref 10–15)
GFR SERPL CREATININE-BSD FRML MDRD: 84 ML/MIN/1.73M2
GLUCOSE BLD-MCNC: 159 MG/DL (ref 70–99)
GLUCOSE BLDC GLUCOMTR-MCNC: 112 MG/DL (ref 70–99)
GLUCOSE BLDC GLUCOMTR-MCNC: 124 MG/DL (ref 70–99)
GLUCOSE BLDC GLUCOMTR-MCNC: 142 MG/DL (ref 70–99)
GLUCOSE BLDC GLUCOMTR-MCNC: 150 MG/DL (ref 70–99)
GLUCOSE BLDC GLUCOMTR-MCNC: 243 MG/DL (ref 70–99)
GLUCOSE BLDC GLUCOMTR-MCNC: 62 MG/DL (ref 70–99)
GLUCOSE BLDC GLUCOMTR-MCNC: 81 MG/DL (ref 70–99)
HCT VFR BLD AUTO: 35 % (ref 40–53)
HGB BLD-MCNC: 10.9 G/DL (ref 13.3–17.7)
IMM GRANULOCYTES # BLD: 0 10E3/UL
IMM GRANULOCYTES NFR BLD: 1 %
INR PPP: 3.74 (ref 0.85–1.15)
LYMPHOCYTES # BLD AUTO: 1.4 10E3/UL (ref 0.8–5.3)
LYMPHOCYTES NFR BLD AUTO: 17 %
MCH RBC QN AUTO: 32.1 PG (ref 26.5–33)
MCHC RBC AUTO-ENTMCNC: 31.1 G/DL (ref 31.5–36.5)
MCV RBC AUTO: 103 FL (ref 78–100)
MONOCYTES # BLD AUTO: 0.6 10E3/UL (ref 0–1.3)
MONOCYTES NFR BLD AUTO: 7 %
NEUTROPHILS # BLD AUTO: 5.9 10E3/UL (ref 1.6–8.3)
NEUTROPHILS NFR BLD AUTO: 70 %
NRBC # BLD AUTO: 0 10E3/UL
NRBC BLD AUTO-RTO: 0 /100
PLATELET # BLD AUTO: 162 10E3/UL (ref 150–450)
POTASSIUM BLD-SCNC: 3.3 MMOL/L (ref 3.4–5.3)
RBC # BLD AUTO: 3.4 10E6/UL (ref 4.4–5.9)
SODIUM SERPL-SCNC: 140 MMOL/L (ref 133–144)
WBC # BLD AUTO: 8.3 10E3/UL (ref 4–11)

## 2022-07-08 PROCEDURE — 250N000013 HC RX MED GY IP 250 OP 250 PS 637: Performed by: INTERNAL MEDICINE

## 2022-07-08 PROCEDURE — 120N000001 HC R&B MED SURG/OB

## 2022-07-08 PROCEDURE — 99232 SBSQ HOSP IP/OBS MODERATE 35: CPT | Performed by: NURSE PRACTITIONER

## 2022-07-08 PROCEDURE — 80048 BASIC METABOLIC PNL TOTAL CA: CPT | Performed by: PHYSICIAN ASSISTANT

## 2022-07-08 PROCEDURE — 85014 HEMATOCRIT: CPT | Performed by: PHYSICIAN ASSISTANT

## 2022-07-08 PROCEDURE — 85610 PROTHROMBIN TIME: CPT | Performed by: INTERNAL MEDICINE

## 2022-07-08 PROCEDURE — 36415 COLL VENOUS BLD VENIPUNCTURE: CPT | Performed by: INTERNAL MEDICINE

## 2022-07-08 PROCEDURE — 250N000011 HC RX IP 250 OP 636: Performed by: INTERNAL MEDICINE

## 2022-07-08 PROCEDURE — 250N000013 HC RX MED GY IP 250 OP 250 PS 637: Performed by: PHYSICIAN ASSISTANT

## 2022-07-08 RX ORDER — POTASSIUM CHLORIDE 1500 MG/1
40 TABLET, EXTENDED RELEASE ORAL EVERY 6 HOURS
Status: DISCONTINUED | OUTPATIENT
Start: 2022-07-08 | End: 2022-07-08

## 2022-07-08 RX ORDER — CEFDINIR 300 MG/1
300 CAPSULE ORAL 2 TIMES DAILY
Status: DISCONTINUED | OUTPATIENT
Start: 2022-07-08 | End: 2022-07-11

## 2022-07-08 RX ADMIN — ACETAMINOPHEN 500 MG: 500 TABLET, FILM COATED ORAL at 16:56

## 2022-07-08 RX ADMIN — BUSPIRONE HYDROCHLORIDE 5 MG: 5 TABLET ORAL at 09:07

## 2022-07-08 RX ADMIN — ACETAMINOPHEN 500 MG: 500 TABLET, FILM COATED ORAL at 09:07

## 2022-07-08 RX ADMIN — TAZOBACTAM SODIUM AND PIPERACILLIN SODIUM 3.38 G: 375; 3 INJECTION, SOLUTION INTRAVENOUS at 10:48

## 2022-07-08 RX ADMIN — CARVEDILOL 6.25 MG: 6.25 TABLET, FILM COATED ORAL at 09:07

## 2022-07-08 RX ADMIN — FUROSEMIDE 40 MG: 40 TABLET ORAL at 09:06

## 2022-07-08 RX ADMIN — POTASSIUM CHLORIDE 40 MEQ: 1500 TABLET, EXTENDED RELEASE ORAL at 09:07

## 2022-07-08 RX ADMIN — GABAPENTIN 600 MG: 600 TABLET, FILM COATED ORAL at 16:56

## 2022-07-08 RX ADMIN — FINASTERIDE 5 MG: 5 TABLET, FILM COATED ORAL at 19:53

## 2022-07-08 RX ADMIN — FUROSEMIDE 20 MG: 20 TABLET ORAL at 16:56

## 2022-07-08 RX ADMIN — ASPIRIN 81 MG: 81 TABLET, COATED ORAL at 09:07

## 2022-07-08 RX ADMIN — BUSPIRONE HYDROCHLORIDE 5 MG: 5 TABLET ORAL at 19:53

## 2022-07-08 RX ADMIN — CILOSTAZOL 50 MG: 50 TABLET ORAL at 09:07

## 2022-07-08 RX ADMIN — PRAMIPEXOLE DIHYDROCHLORIDE 0.5 MG: 0.5 TABLET ORAL at 16:56

## 2022-07-08 RX ADMIN — CARVEDILOL 6.25 MG: 6.25 TABLET, FILM COATED ORAL at 18:46

## 2022-07-08 RX ADMIN — TAZOBACTAM SODIUM AND PIPERACILLIN SODIUM 3.38 G: 375; 3 INJECTION, SOLUTION INTRAVENOUS at 03:23

## 2022-07-08 RX ADMIN — METHOCARBAMOL 500 MG: 500 TABLET ORAL at 19:53

## 2022-07-08 RX ADMIN — METFORMIN HYDROCHLORIDE 1000 MG: 500 TABLET, FILM COATED ORAL at 18:46

## 2022-07-08 RX ADMIN — INSULIN ASPART 26 UNITS: 100 INJECTION, SUSPENSION SUBCUTANEOUS at 18:47

## 2022-07-08 RX ADMIN — ROSUVASTATIN CALCIUM 20 MG: 20 TABLET, FILM COATED ORAL at 22:21

## 2022-07-08 RX ADMIN — METHOCARBAMOL 500 MG: 500 TABLET ORAL at 09:07

## 2022-07-08 RX ADMIN — GABAPENTIN 600 MG: 600 TABLET, FILM COATED ORAL at 09:07

## 2022-07-08 RX ADMIN — ACETAMINOPHEN 500 MG: 500 TABLET, FILM COATED ORAL at 19:53

## 2022-07-08 RX ADMIN — INSULIN ASPART 26 UNITS: 100 INJECTION, SUSPENSION SUBCUTANEOUS at 09:16

## 2022-07-08 RX ADMIN — AZITHROMYCIN MONOHYDRATE 250 MG: 250 TABLET ORAL at 09:06

## 2022-07-08 RX ADMIN — CEFDINIR 300 MG: 300 CAPSULE ORAL at 19:53

## 2022-07-08 RX ADMIN — METFORMIN HYDROCHLORIDE 1000 MG: 500 TABLET, FILM COATED ORAL at 09:06

## 2022-07-08 RX ADMIN — GABAPENTIN 600 MG: 600 TABLET, FILM COATED ORAL at 19:53

## 2022-07-08 ASSESSMENT — ACTIVITIES OF DAILY LIVING (ADL)
ADLS_ACUITY_SCORE: 33
ADLS_ACUITY_SCORE: 31
ADLS_ACUITY_SCORE: 33
ADLS_ACUITY_SCORE: 38
ADLS_ACUITY_SCORE: 33
ADLS_ACUITY_SCORE: 38
ADLS_ACUITY_SCORE: 33

## 2022-07-08 NOTE — PROGRESS NOTES
"Care Management Follow Up    Length of Stay (days): 4    Expected Discharge Date: 07/08/2022     Concerns to be Addressed:  Insurance authorization  Patient plan of care discussed at interdisciplinary rounds: Yes    Anticipated Discharge Disposition: Transitional Care     Patient/family educated on Medicare website which has current facility and service quality ratings: yes  Education Provided on the Discharge Plan:  yes  Patient/Family in Agreement with the Plan: yes    Private pay costs discussed: private room/amenity fees and transportation costs    Additional Information:  Wife Agueda called for update, informed that pt has been accepted to [facility] and they are working on [facility] insurance authorization at this time, agreeable to this plan. Agueda will update pt w/ plan. Awaiting response from TCU/ insurance.     \"You have successfully submitted the preadmission screening (PAS) to the Senior [employer] Line on:  Created On  7/8/2022 12:07 PM  Your confirmation number is:  [id number]\"    Update 1355: Received call from Lilli at [facility] reporting that [facility] is requesting updated PT/OT notes, report printed and faxed to Lilli at [phone].     Padmaja Garcia RN BSN   Inpatient Care Coordination  [facility]   Phone [phone]      "

## 2022-07-08 NOTE — PROVIDER NOTIFICATION
Notified provider pt's BG 54. Orange juice, renetta and saltine crackers provided. Staying with the pt. Rechecking BG at 2200.

## 2022-07-08 NOTE — PROGRESS NOTES
"[facility]  Hospitalist Progress Note  Wilmer Demarco, APRN CNP 07/08/2022    Reason for Stay (Diagnosis): Pelvic fracture after fall, community-acquired pneumonia         Assessment and Plan:      Summary of Stay: Pee Ayala is a 82 year old male admitted on 7/4/2022 with PMHx significant for DM type II, HTN, PAF, bioprosthetic aortic valve, HLP, HFpEF, CAD, PAD, GERD admitted on 7/4/2022 with pelvic fx after a fall. He was also noted to have pneumonia.     Right lower lobe pneumonia  Partially loculated right-sided pleural effusion  Doing well from respiratory perspective, not hypoxic, afebrile. He does note a cough and mild pleuritic pain but denies SOB, improved today.   - Started on Zosyn 7/4/2022 and continued azithromycin that was already started in the emergency department --> Will transition to PO Vantin and Azithro for 10 days  - Consulted interventional radiology and pulmonology regarding partially loculated right pleural effusion -> no significant fluid collection to drain. pulm recommends 7-10 day course of abx.   - medically ready for discharge once TCU bed available     Right pubic rami fracture  Fracture is not displaced present in both superior and inferior pubic rami  Sacral ala fracture  Probable osteoporosis  Pt reports feet feel heavy once he stands up. Able to stand with assistance  Started on OxyContin 10 mg BID on admission, will discontinue, likely able to managed with short acting meds  Pt states he does not tolerate oxycodone but tolerated Percocet. Continue 500 mg Tylenol TID with PRN Percocet 5-325 mg and hydroxyzine  Resume home Robaxin and gabapentin  Continue PRN bowel regimen  PT/OT consult, recommending TCU  SW to assist with placement     Depression  Pt's daughter expressed concern regarding depression, pt also endorses this. He states he feels like a \"worthless blob\". Frustrated and discouraged by recent medical events and set backs. Difficulty adjusting to " debility. Wife and daughters are supportive but pt can perceive encouragement as hurtful and feeling like he isn't doing enough or progressing quick enough. Pt and family interested in starting medication.   - will start with 5 mg Buspar BID. Close outpatient follow up with PCP for titration.  - mental health referral placed on discharge. 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  Stable on imaging post fall  PT and OT consulted  Pain management as above     Atrial fibrillation  With severe left atrial enlargement  On warfarin for anticoagulation, continue per pharmacy     CAD, s/p 2v CABG and mid RCA stent in 2016  PAD, s/p left SFA stenting   HFpEF  S/p TAVR  Moderate mitral insufficiency  With preserved LV function  With atrial fibrillation patient is anticoagulated, continue per pharmacy  Resume home lasix and continue Coreg  Had bleeding issue while on Plavix, Vasc surgery recently recommended Pletal, pt had not started yet, started here (recommend 50 mg x14 days, See Pharm note)     Coronary artery disease  Peripheral artery disease s/p PCI   on cilostazol, carvedilol and rosuvastatin  -Vasc surgery recently recommended Pletal, pt had not started yet, started here.     Type 2 diabetes mellitus  Managed with metformin and 70/30 at home  - Resumed metformin 1000 mg BID and 70/30 at 26 units BID magi of 7/6.  - Continue sliding scale insulin   - FYI: home dose of 70/30 is 33 units in AM and 26 units in PM   - Had hypoglycemic episode overnight, improved with PO intake. Monitor overnight, may need to decrease evening long acting.      Hypercholesteremia  Continue Rosuvastatin     GERD  Nonalcoholic  steatohepatitis     Covid-19 negtive 7/4  DVT Prophylaxis: Warfarin  Code Status: Full Code  Discharge Dispo: TCU in coming days  Estimated Disch Date / # of Days until Disch: 1-2, pending placement        Interval History (Subjective):      Had hypoglycemia event overnight, improved with p.o. intake.  Patient reports she is feeling well this morning, denies acute pain except for when placing weight on the leg.  Tolerating p.o. intake.  No respiratory symptoms, on room air.                  Physical Exam:      Last Vital Signs:  /63 (BP Location: Right arm)   Pulse 76   Temp 97.8  F (36.6  C) (Oral)   Resp 20   SpO2 99%       Intake/Output Summary (Last 24 hours) at 7/8/2022 1540  Last data filed at 7/8/2022 1342  Gross per 24 hour   Intake 723 ml   Output 1450 ml   Net -727 ml       Constitutional: Awake, alert, cooperative, no apparent distress     Respiratory: Clear to auscultation bilaterally, no crackles or wheezing   Cardiovascular: Regular rate and rhythm, normal S1 and S2, and no murmur noted   Abdomen: Normal bowel sounds, soft, non-distended, non-tender   Skin: No rashes, no cyanosis, dry to touch   Neuro: Alert and oriented x3, no weakness, numbness, memory loss, baseline neuropathy    Extremities: No edema, normal range of motion, RLE is tender with movement   Other(s):        All other systems: Negative          Medications:      All current medications were reviewed with changes reflected in problem list.         Data:      All new lab and imaging data was reviewed.   Labs:       Lab Results   Component Value Date     07/08/2022     07/05/2022     07/04/2022     01/13/2021     01/12/2021     01/12/2021    Lab Results   Component Value Date    CHLORIDE 104 07/08/2022    CHLORIDE 101 07/05/2022    CHLORIDE 101 07/04/2022    CHLORIDE 102 01/13/2021    CHLORIDE 105 01/12/2021    CHLORIDE 105 01/12/2021    Lab Results   Component Value Date    BUN 13 07/08/2022     BUN 22 07/05/2022    BUN 21 07/04/2022    BUN 20 01/13/2021    BUN 19 01/12/2021    BUN 20 01/12/2021      Lab Results   Component Value Date    POTASSIUM 3.3 07/08/2022    POTASSIUM 3.6 07/05/2022    POTASSIUM 3.9 07/04/2022    POTASSIUM 4.2 01/13/2021    POTASSIUM 4.3 01/12/2021    POTASSIUM 3.8 01/12/2021    Lab Results   Component Value Date    CO2 28 07/08/2022    CO2 28 07/05/2022    CO2 31 07/04/2022    CO2 21 01/13/2021    CO2 28 01/12/2021    CO2 28 01/12/2021    Lab Results   Component Value Date    CR 0.92 07/08/2022    CR 0.79 07/05/2022    CR 0.74 07/04/2022    CR 0.97 01/13/2021    CR 1.01 01/12/2021    CR 0.93 01/12/2021        Recent Labs   Lab 07/08/22  0625 07/05/22  0647 07/04/22  1034   WBC 8.3 6.4 8.6   HGB 10.9* 10.7* 11.0*   HCT 35.0* 33.8* 34.4*   * 102* 101*    119* 121*      Imaging:   Results for orders placed or performed during the hospital encounter of 07/04/22   US Chest Pleural Effusion Imaging    Narrative    ULTRASOUND CHEST/PLEURAL EFFUSION IMAGING  7/5/2022 1:01 PM    HISTORY:  82-year-old patient with right-sided pleural effusion per  history. No imaging for confirmation.    FINDINGS: Trace right pleural effusion is identified, given  small-volume no thoracentesis performed.      Impression    IMPRESSION: Trace right pleural effusion, volume too small for  thoracentesis.    ROXANNA RG MD         SYSTEM ID:  [id number]     *Note: Due to a large number of results and/or encounters for the requested time period, some results have not been displayed. A complete set of results can be found in Results Review.     CT Head  IMPRESSION:      1.  Senescent changes and sequelae of chronic microangiopathy without acute intracranial abnormality.    XR Pelvis  IMPRESSION: Left hip hemiarthroplasty remains in place. No acute displaced periprosthetic fracture or finding for component loosening. Progressive heterotopic ossification is seen about the left hip. Previously seen  postoperative soft tissue gas about   the left hip has resolved and lateral left hip skin staples have been removed. No interval change in mild right hip osteoarthritis. Diffuse bone demineralization. Degenerative change in the lower lumbar spine and both SI joints. No acute displaced pelvic   fracture. Arterial calcification. Punctate radiodensity overlies the right hip as before.    CXR  IMPRESSION: Blunting of the left costophrenic sulcus may be scarring or minimal fluid. Subtle pulmonary opacities suggest an atypical infectious process, though edema and other processes can have a similar appearance. Lung volumes are low. There is no   pneumothorax. The cardiomediastinal silhouette is mildly enlarged. A TAVR stent is noted. Median sternotomy. Right upper quadrant surgical clips are present.    CT Pelvis  IMPRESSION:  1.  Nondisplaced radiographically occult right central superior and inferior pubic rami fractures extending across the right parasymphyseal pubis.  2.  Nondisplaced right sacral ala fracture, radiographically occult.  3.  Profound diffuse bone demineralization, which limits CT sensitivity to detect nondisplaced fractures. MRI more sensitive.  4.  Subacute appearing superior endplate L5 and inferior endplate L4 compression fractures.  5.  Left hip hemiarthroplasty in place with abundant heterotopic ossification about the left hip.  6.  Mild contralateral right hip osteoarthritis without evidence for right proximal femur fracture or dislocation.  7.  Extensive arterial calcification.    BOBBY Wayne CNP  July 8, 2022

## 2022-07-08 NOTE — PLAN OF CARE
Shift from [id number]     Inpatient Progress Note:  For complete assessment see flow sheet documentation.      Orientation: A/O x4  Neuro: WDL  Pain status: 2/10 neck pain, resolved with scheduled tylenol  Activity: AO1 with a gaitbelt and walker  Peripheral edema: +1 BUE, +2 BLE  Resp: Lung sounds diminished in LLL and RLL. Denies SOB. Dry, infrequent cough noted. IS encouraged and reached a level of 1000 x5  this shift.  Cardiac: Tachycardic  GI: Loose stools x2 during the shift.  : Voiding adequately via urinal  Skin: Scattered bruising noted; scab on left knee and scalp noted.  LDA: PIV left forearm SL in between IV abx. Dressing reinforced.  Infusions: Zosyn Q6H  Diet: Tolerating cardiac diet  Discharge Plan: TCU placement; referrals sent per  note.    **Pt was hypoglycemic twice this shift. 1st episode was at 2143 with a BG of 54. Orange juice x2 and crackers given. BG increased to 100 at 2224.  2nd episode was at 0220 with a BG of 62. Orange juice x3 given and crackers and BG increased to 112.

## 2022-07-09 ENCOUNTER — APPOINTMENT (OUTPATIENT)
Dept: PHYSICAL THERAPY | Facility: CLINIC | Age: 82
DRG: 551 | End: 2022-07-09
Attending: INTERNAL MEDICINE
Payer: COMMERCIAL

## 2022-07-09 ENCOUNTER — APPOINTMENT (OUTPATIENT)
Dept: OCCUPATIONAL THERAPY | Facility: CLINIC | Age: 82
DRG: 551 | End: 2022-07-09
Attending: INTERNAL MEDICINE
Payer: COMMERCIAL

## 2022-07-09 LAB
GLUCOSE BLDC GLUCOMTR-MCNC: 103 MG/DL (ref 70–99)
GLUCOSE BLDC GLUCOMTR-MCNC: 133 MG/DL (ref 70–99)
GLUCOSE BLDC GLUCOMTR-MCNC: 155 MG/DL (ref 70–99)
GLUCOSE BLDC GLUCOMTR-MCNC: 324 MG/DL (ref 70–99)
GLUCOSE BLDC GLUCOMTR-MCNC: 51 MG/DL (ref 70–99)
GLUCOSE BLDC GLUCOMTR-MCNC: 54 MG/DL (ref 70–99)
GLUCOSE BLDC GLUCOMTR-MCNC: 62 MG/DL (ref 70–99)
GLUCOSE BLDC GLUCOMTR-MCNC: 67 MG/DL (ref 70–99)
GLUCOSE BLDC GLUCOMTR-MCNC: 70 MG/DL (ref 70–99)
GLUCOSE BLDC GLUCOMTR-MCNC: 96 MG/DL (ref 70–99)
INR PPP: 2.99 (ref 0.85–1.15)

## 2022-07-09 PROCEDURE — 97530 THERAPEUTIC ACTIVITIES: CPT | Mod: GP

## 2022-07-09 PROCEDURE — 250N000013 HC RX MED GY IP 250 OP 250 PS 637: Performed by: PHYSICIAN ASSISTANT

## 2022-07-09 PROCEDURE — 99232 SBSQ HOSP IP/OBS MODERATE 35: CPT | Performed by: INTERNAL MEDICINE

## 2022-07-09 PROCEDURE — 97116 GAIT TRAINING THERAPY: CPT | Mod: GP

## 2022-07-09 PROCEDURE — 85610 PROTHROMBIN TIME: CPT | Performed by: INTERNAL MEDICINE

## 2022-07-09 PROCEDURE — 36415 COLL VENOUS BLD VENIPUNCTURE: CPT | Performed by: INTERNAL MEDICINE

## 2022-07-09 PROCEDURE — 250N000013 HC RX MED GY IP 250 OP 250 PS 637: Performed by: INTERNAL MEDICINE

## 2022-07-09 PROCEDURE — 97535 SELF CARE MNGMENT TRAINING: CPT | Mod: GO | Performed by: OCCUPATIONAL THERAPIST

## 2022-07-09 PROCEDURE — 120N000001 HC R&B MED SURG/OB

## 2022-07-09 RX ADMIN — METHOCARBAMOL 500 MG: 500 TABLET ORAL at 20:23

## 2022-07-09 RX ADMIN — ACETAMINOPHEN 500 MG: 500 TABLET, FILM COATED ORAL at 09:01

## 2022-07-09 RX ADMIN — PRAMIPEXOLE DIHYDROCHLORIDE 0.5 MG: 0.5 TABLET ORAL at 15:53

## 2022-07-09 RX ADMIN — GABAPENTIN 600 MG: 600 TABLET, FILM COATED ORAL at 09:01

## 2022-07-09 RX ADMIN — INSULIN ASPART 26 UNITS: 100 INJECTION, SUSPENSION SUBCUTANEOUS at 12:14

## 2022-07-09 RX ADMIN — GABAPENTIN 600 MG: 600 TABLET, FILM COATED ORAL at 20:23

## 2022-07-09 RX ADMIN — FINASTERIDE 5 MG: 5 TABLET, FILM COATED ORAL at 20:23

## 2022-07-09 RX ADMIN — DEXTROSE 15 G: 15 GEL ORAL at 02:20

## 2022-07-09 RX ADMIN — DEXTROSE 15 G: 15 GEL ORAL at 02:44

## 2022-07-09 RX ADMIN — CILOSTAZOL 50 MG: 50 TABLET ORAL at 09:01

## 2022-07-09 RX ADMIN — FUROSEMIDE 40 MG: 40 TABLET ORAL at 09:01

## 2022-07-09 RX ADMIN — METFORMIN HYDROCHLORIDE 1000 MG: 500 TABLET, FILM COATED ORAL at 18:28

## 2022-07-09 RX ADMIN — CEFDINIR 300 MG: 300 CAPSULE ORAL at 20:23

## 2022-07-09 RX ADMIN — ASPIRIN 81 MG: 81 TABLET, COATED ORAL at 09:01

## 2022-07-09 RX ADMIN — FUROSEMIDE 20 MG: 20 TABLET ORAL at 15:53

## 2022-07-09 RX ADMIN — WARFARIN SODIUM 0.5 MG: 1 TABLET ORAL at 18:28

## 2022-07-09 RX ADMIN — CARVEDILOL 6.25 MG: 6.25 TABLET, FILM COATED ORAL at 18:28

## 2022-07-09 RX ADMIN — ACETAMINOPHEN 500 MG: 500 TABLET, FILM COATED ORAL at 20:23

## 2022-07-09 RX ADMIN — CARVEDILOL 6.25 MG: 6.25 TABLET, FILM COATED ORAL at 09:01

## 2022-07-09 RX ADMIN — DEXTROSE 30 G: 15 GEL ORAL at 22:31

## 2022-07-09 RX ADMIN — BUSPIRONE HYDROCHLORIDE 5 MG: 5 TABLET ORAL at 20:23

## 2022-07-09 RX ADMIN — ROSUVASTATIN CALCIUM 20 MG: 20 TABLET, FILM COATED ORAL at 21:54

## 2022-07-09 RX ADMIN — INSULIN ASPART 26 UNITS: 100 INJECTION, SUSPENSION SUBCUTANEOUS at 18:28

## 2022-07-09 RX ADMIN — BUSPIRONE HYDROCHLORIDE 5 MG: 5 TABLET ORAL at 09:01

## 2022-07-09 RX ADMIN — ACETAMINOPHEN 500 MG: 500 TABLET, FILM COATED ORAL at 13:34

## 2022-07-09 RX ADMIN — GABAPENTIN 600 MG: 600 TABLET, FILM COATED ORAL at 13:34

## 2022-07-09 RX ADMIN — CEFDINIR 300 MG: 300 CAPSULE ORAL at 09:01

## 2022-07-09 RX ADMIN — METHOCARBAMOL 500 MG: 500 TABLET ORAL at 09:02

## 2022-07-09 RX ADMIN — OXYCODONE HYDROCHLORIDE AND ACETAMINOPHEN 1 TABLET: 5; 325 TABLET ORAL at 09:00

## 2022-07-09 RX ADMIN — METFORMIN HYDROCHLORIDE 1000 MG: 500 TABLET, FILM COATED ORAL at 09:00

## 2022-07-09 ASSESSMENT — ACTIVITIES OF DAILY LIVING (ADL)
ADLS_ACUITY_SCORE: 38

## 2022-07-09 NOTE — PLAN OF CARE
A&O X4. Admitted after a fall with a pelvic fracture. Discovered to have pneumonia upon admission. VSS. Ass x1 with gait belt and walker. Ambulated to bedside commode 2 times today. Tolerating cardiac diet and oral medications. Saline locked. Pain managed with tylenol. Zosyn administered this morning. +2 edema in BLE and BUE. Pt uses urinal and bedside commode. Blood glucose 150, 243, and 124. Interventions taken for first two results, insulin given. IS encouraged. Waiting placement. Will continue to monitor.     Blood pressure 132/69, pulse 80, temperature 98.7  F (37.1  C), temperature source Oral, resp. rate 16, SpO2 97 %.

## 2022-07-09 NOTE — PLAN OF CARE
PRIMARY DIAGNOSIS: PNA  OUTPATIENT/OBSERVATION GOALS TO BE MET BEFORE DISCHARGE:  1. ADLs back to baseline: No    2. Activity and level of assistance: A-1 walker, gait belt    3. Pain status: Improved-controlled with oral pain medications.    4. Return to near baseline physical activity: No     Discharge Planner Nurse   Safe discharge environment identified: Yes  Barriers to discharge: Yes       Entered by: Porsha Curry RN 07/09/2022 11:55 AM      Pt is A&O x4,  this am pt refused insulin coverage.  Oxy given x1.  Transitioning to PO abx.  Possible discharge to TCU pending insurance authorization.  LS dim  Please review provider order for any additional goals.   Nurse to notify provider when observation goals have been met and patient is ready for discharge.

## 2022-07-09 NOTE — PLAN OF CARE
PRIMARY DIAGNOSIS: PNA  OUTPATIENT/OBSERVATION GOALS TO BE MET BEFORE DISCHARGE:  1. ADLs back to baseline: No     2. Activity and level of assistance: A-1 walker, gait belt     3. Pain status: Improved-controlled with oral pain medications.     4. Return to near baseline physical activity: No          Discharge Planner Nurse   Safe discharge environment identified: Yes  Barriers to discharge: Yes       Entered by: Porsha Curry RN 07/09/2022       Pt is A&O x4, , 324   Oxy given x1.  PO abx.  Possible discharge to TCU pending insurance authorization.  LS dim family at bedside    Please review provider order for any additional goals.   Nurse to notify provider when observation goals have been met and patient is ready for discharge.

## 2022-07-09 NOTE — PROGRESS NOTES
"[facility]    Medicine Progress Note - Hospitalist Service    Date of Admission:  7/4/2022    Assessment & Plan        Pee Ayala is a 82 year old male with PMHx significant for DM type II, HTN, PAF, bioprosthetic aortic valve, HLP, HFpEF, CAD, PAD, GERD admitted on 7/4/2022 with pelvic fx after a fall. He was also noted to have pneumonia.     Right lower lobe pneumonia  Partially loculated right-sided pleural effusion  -Doing well from respiratory perspective, not hypoxic, afebrile. He does note a cough and mild pleuritic pain but denies SOB, improved today.   Started on Zosyn 7/4/2022 and continued azithromycin that was already started in the emergency department -> continue for now  -consulted interventional radiology and pulmonology regarding partially loculated right pleural effusion -> no significant fluid collection to drain. pulm recommends 7-10 day course of abx.   -medically ready for discharge once TCU bed available  - following for discharge planning     Right pubic rami fracture  Fracture is not displaced present in both superior and inferior pubic rami  Sacral ala fracture  Probable osteoporosis  -Pt reports feet feel heavy once he stands up. Able to stand with assistance  -Started on OxyContin 10 mg BID on admission, will discontinue, likely able to managed with short acting meds  -Patient states he does not tolerate oxycodone but tolerated Percocet. Continue 500 mg Tylenol TID with PRN Percocet 5-325 mg and hydroxyzine  -Continue home Robaxin and gabapentin  -Continue PRN bowel regimen  -PT/OT consult, recommending TCU  -SW to assist with placement    Depression  -Patient's daughter expressed concern regarding depression, pt also endorses this. He states he feels like a \"worthless blob\". Frustrated and discouraged by recent medical events and set backs. Difficulty adjusting to debility. Wife and daughters are supportive but pt can perceive encouragement as " hurtful and feeling like he isn't doing enough or progressing quick enough. -Patient and family interested in starting medication.   -Will continue 5 mg Buspar BID. Close outpatient follow up with PCP for titration.  -Mental health referral placed on discharge. Patient would benefit from therapy      Subacute superior endplate L5 compression fracture  -Inferior endplate L4 compression fracture  -With recent 6/7/2022 T11 kyphoplasty for intractable back pain with T11 compression fracture  -Pain management as mentioned above     Left hip hemiarthroplasty 4/9/2022  -PT and OT consulted  -Pain management as above     Atrial fibrillation  -With severe left atrial enlargement  -On warfarin for anticoagulation, continue per pharmacy    CAD, s/p 2v CABG and mid RCA stent in 2016  PAD, s/p left SFA stenting   HFpEF  S/p TAVR  Moderate mitral insufficiency  -With preserved LV function  -With atrial fibrillation patient is anticoagulated, continue per pharmacy  -Continue home lasix and continue Coreg  -Had bleeding issue while on Plavix, Vasc surgery recently recommended Pletal, pt had not started yet, started here (recommend 50 mg x14 days, See Pharm note)     Coronary artery disease  Peripheral artery disease s/p PCI   -on cilostazol, carvedilol and rosuvastatin  -Vasc surgery recently recommended Pletal, pt had not started yet, started here.     Type 2 diabetes mellitus  Managed with metformin and 70/30 at home  - Continue metformin 1000 mg BID and 70/30 at 26 units BID magi of 7/6.  - Continue sliding scale insulin   - FYI: home dose of 70/30 is 33 units in AM and 26 units in PM     Hypercholesteremia  -Continue Rosuvastatin     GERD  -Nonalcoholic steatohepatitis    Covid-19 negtive 7/4     Diet: Combination Diet Low Saturated Fat Na <2400mg Diet    DVT Prophylaxis: Warfarin  Silva Catheter: Not present  Central Lines: None  Cardiac Monitoring: None  Code Status: Full Code      Disposition Plan   The patient's care was  discussed with the Bedside Nurse, Care Coordinator/, Patient and Patient's Family.    Samuel Childs MD, MD  Hospitalist Service  [facility]  Securely message with the Vocera Web Console (learn more here)  Text page via [facility] Paging/Directory       Clinically Significant Risk Factors Present on Admission                  ___________________________________________________________________    Interval History   Patient seen and examined. He stated that he is feeling much better. Cough improving. Getting stronger. He has no nausea or vomiting. Denies fever.     Data reviewed today: I reviewed all medications, new labs and imaging results over the last 24 hours. I personally reviewed no images or EKG's today.    Physical Exam   Vital Signs: Temp: 97.9  F (36.6  C) Temp src: Oral BP: 121/64 Pulse: 83   Resp: 16 SpO2: 99 % O2 Device: None (Room air)    Weight: 0 lbs 0 oz    GENERAL:  Comfortable.  PSYCH: pleasant, oriented, No acute distress.  HEART:  Normal S1, S2 with no murmur, no pericardial rub, gallops or S3 or S4.  LUNGS:  Clear to auscultation, normal Respiratory effort. No wheezing, rales or ronchi.   EXTREMITIES:  Able to move all 4 extremities independently  SKIN:  Dry to touch, No rash, wound or ulcerations.  NEUROLOGIC:  Grossly intact    Data   Recent Labs   Lab 07/09/22  1211 07/09/22  0748 07/09/22  0601 07/09/22  0307 07/08/22  0727 07/08/22  0625 07/07/22  0846 07/07/22  0711 07/05/22  0750 07/05/22  0647 07/04/22  1812 07/04/22  1034   WBC  --   --   --   --   --  8.3  --   --   --  6.4  --  8.6   HGB  --   --   --   --   --  10.9*  --   --   --  10.7*  --  11.0*   MCV  --   --   --   --   --  103*  --   --   --  102*  --  101*   PLT  --   --   --   --   --  162  --   --   --  119*  --  121*   INR  --   --  2.99*  --   --  3.74*  --  3.83*   < > 2.79*  --  2.49*   NA  --   --   --   --   --  140  --   --   --  136  --  137   POTASSIUM  --   --   --   --   --   3.3*  --   --   --  3.6  --  3.9   CHLORIDE  --   --   --   --   --  104  --   --   --  101  --  101   CO2  --   --   --   --   --  28  --   --   --  28  --  31   BUN  --   --   --   --   --  13  --   --   --  22  --  21   CR  --   --   --   --   --  0.92  --   --   --  0.79  --  0.74   ANIONGAP  --   --   --   --   --  8  --   --   --  7  --  5   RACHEL  --   --   --   --   --  8.3*  --   --   --  8.3*  --  8.3*   * 155*  --  103*   < > 159*   < >  --    < > 226*   < > 270*   PROTTOTAL  --   --   --   --   --   --   --   --   --  5.8*  --   --     < > = values in this interval not displayed.     No results found for this or any previous visit (from the past 24 hour(s)).  Medications     - MEDICATION INSTRUCTIONS -       Warfarin Therapy Reminder         acetaminophen  500 mg Oral TID     aspirin  81 mg Oral Daily     busPIRone  5 mg Oral BID     carvedilol  6.25 mg Oral BID w/meals     cefdinir  300 mg Oral BID     cilostazol  50 mg Oral Daily     finasteride  5 mg Oral QPM     furosemide  20 mg Oral Daily at 4 pm     furosemide  40 mg Oral Daily     gabapentin  600 mg Oral TID     insulin aspart  1-7 Units Subcutaneous TID w/meals     insulin aspart  1-5 Units Subcutaneous At Bedtime     insulin aspart prot & aspart  26 Units Subcutaneous BID w/meals     metFORMIN  1,000 mg Oral BID w/meals     methocarbamol  500 mg Oral BID     pramipexole  0.5 mg Oral Daily     rosuvastatin  20 mg Oral At Bedtime     sodium chloride (PF)  3 mL Intracatheter Q8H     warfarin ANTICOAGULANT  0.5 mg Oral ONCE at 18:00

## 2022-07-09 NOTE — PLAN OF CARE
PRIMARY DIAGNOSIS: PNA  OUTPATIENT/OBSERVATION GOALS TO BE MET BEFORE DISCHARGE:  1. ADLs back to baseline: No     2. Activity and level of assistance: A-1 walker, gait belt     3. Pain status: Improved-controlled with oral pain medications.     4. Return to near baseline physical activity: No          Discharge Planner Nurse   Safe discharge environment identified: Yes  Barriers to discharge: Yes       Entered by: Porsha Curry RN 07/09/2022       Pt is A&O x4, , 324, 133   Oxy given x1.  PO abx.  Possible discharge to TCU pending insurance authorization.  LS dim family at bedside     Please review provider order for any additional goals.   Nurse to notify provider when observation goals have been met and patient is ready for discharge.

## 2022-07-09 NOTE — PLAN OF CARE
PRIMARY DIAGNOSIS: PNEUMONIA  OUTPATIENT/OBSERVATION GOALS TO BE MET BEFORE DISCHARGE:  1. ADLs back to baseline: No    2. Activity and level of assistance: Assist of 1    3. Pain status: Improved-controlled with oral pain medications.    4. Return to near baseline physical activity: No  Vitals are Temp: 97.6  F (36.4  C) Temp src: Oral BP: 128/69 Pulse: 74   Resp: 14 SpO2: 97 %.  Patient is Alert and Oriented x4. They are 1 Assist with Gait Belt and Walker.  Patient is on Droplet precuations for Pneumonia.  Pt is a Cardiac diet.  They are complaining of 2/10 pain in their back Tylenol given for pain.  Medicatons decreased pain.  Patient is Saline locked.   Will cont to monitor.     Discharge Planner Nurse   Safe discharge environment identified: Yes  Barriers to discharge: Yes       Entered by: Robyn Craig RN 07/09/2022      Please review provider order for any additional goals.   Nurse to notify provider when observation goals have been met and patient is ready for discharge.

## 2022-07-09 NOTE — PLAN OF CARE
PRIMARY DIAGNOSIS: PNEUMONIA  OUTPATIENT/OBSERVATION GOALS TO BE MET BEFORE DISCHARGE:  1. ADLs back to baseline: No    2. Activity and level of assistance: Assist of 1    3. Pain status: Improved-controlled with oral pain medications.    4. Return to near baseline physical activity: No  Vitals are Temp: 97.6  F (36.4  C) Temp src: Oral BP: 128/69 Pulse: 74   Resp: 14 SpO2: 97 %.  Patient is Alert and Oriented x4. They are 1 Assist with Gait Belt and Walker.  Patient is on Droplet precuations for Pneumonia.  Pt is a Cardiac diet.  They are complaining of 2/10 pain in their back Tylenol given for pain.  Medicatons decreased pain.  Patient is Saline locked. Pt was hypoglycemic with BG of 54.  2 gluco gels given to get BG to over 100.  Plan is to discharge to TCU, Waiting insurance authorization.  Will con to monitor and provide cares.     Discharge Planner Nurse   Safe discharge environment identified: Yes  Barriers to discharge: Yes       Entered by: Robyn Craig RN 07/09/2022      Please review provider order for any additional goals.   Nurse to notify provider when observation goals have been met and patient is ready for discharge.

## 2022-07-09 NOTE — PLAN OF CARE
PRIMARY DIAGNOSIS: PNEUMONIA  OUTPATIENT/OBSERVATION GOALS TO BE MET BEFORE DISCHARGE:  ADLs back to baseline: No    Activity and level of assistance: Assist of 1    Pain status: Improved-controlled with oral pain medications.    Return to near baseline physical activity: No  Vitals are Temp: 98  F (36.7  C) Temp src: Oral BP: 109/55 Pulse: 79   Resp: 16 SpO2: 96 %.  Patient is Alert and Oriented x4. They are 1 Assist with Gait Belt and Walker.  Patient is on Droplet precuations for Pneumonia.  Pt is a Cardiac diet.  They are complaining of 2/10 pain in their back Tylenol given for pain.  Medicatons decreased pain.  Patient is Saline locked.      Discharge Planner Nurse   Safe discharge environment identified: Yes  Barriers to discharge: Yes       Entered by: Robyn Craig RN 07/08/2022      Please review provider order for any additional goals.   Nurse to notify provider when observation goals have been met and patient is ready for discharge.

## 2022-07-10 ENCOUNTER — APPOINTMENT (OUTPATIENT)
Dept: PHYSICAL THERAPY | Facility: CLINIC | Age: 82
DRG: 551 | End: 2022-07-10
Attending: INTERNAL MEDICINE
Payer: COMMERCIAL

## 2022-07-10 LAB
GLUCOSE BLDC GLUCOMTR-MCNC: 102 MG/DL (ref 70–99)
GLUCOSE BLDC GLUCOMTR-MCNC: 112 MG/DL (ref 70–99)
GLUCOSE BLDC GLUCOMTR-MCNC: 189 MG/DL (ref 70–99)
GLUCOSE BLDC GLUCOMTR-MCNC: 252 MG/DL (ref 70–99)
GLUCOSE BLDC GLUCOMTR-MCNC: 261 MG/DL (ref 70–99)
GLUCOSE BLDC GLUCOMTR-MCNC: 263 MG/DL (ref 70–99)
GLUCOSE BLDC GLUCOMTR-MCNC: 39 MG/DL (ref 70–99)
GLUCOSE BLDC GLUCOMTR-MCNC: 92 MG/DL (ref 70–99)
GLUCOSE BLDC GLUCOMTR-MCNC: 99 MG/DL (ref 70–99)
INR PPP: 1.84 (ref 0.85–1.15)
POTASSIUM BLD-SCNC: 2.9 MMOL/L (ref 3.4–5.3)

## 2022-07-10 PROCEDURE — 36415 COLL VENOUS BLD VENIPUNCTURE: CPT | Performed by: INTERNAL MEDICINE

## 2022-07-10 PROCEDURE — 250N000013 HC RX MED GY IP 250 OP 250 PS 637: Performed by: PHYSICIAN ASSISTANT

## 2022-07-10 PROCEDURE — 84132 ASSAY OF SERUM POTASSIUM: CPT | Performed by: INTERNAL MEDICINE

## 2022-07-10 PROCEDURE — 85610 PROTHROMBIN TIME: CPT | Performed by: INTERNAL MEDICINE

## 2022-07-10 PROCEDURE — 250N000013 HC RX MED GY IP 250 OP 250 PS 637: Performed by: INTERNAL MEDICINE

## 2022-07-10 PROCEDURE — 250N000013 HC RX MED GY IP 250 OP 250 PS 637

## 2022-07-10 PROCEDURE — 97116 GAIT TRAINING THERAPY: CPT | Mod: GP

## 2022-07-10 PROCEDURE — 120N000001 HC R&B MED SURG/OB

## 2022-07-10 PROCEDURE — 99232 SBSQ HOSP IP/OBS MODERATE 35: CPT | Performed by: INTERNAL MEDICINE

## 2022-07-10 PROCEDURE — 97530 THERAPEUTIC ACTIVITIES: CPT | Mod: GP

## 2022-07-10 RX ORDER — POTASSIUM CHLORIDE 1.5 G/1.58G
40 POWDER, FOR SOLUTION ORAL ONCE
Status: COMPLETED | OUTPATIENT
Start: 2022-07-10 | End: 2022-07-10

## 2022-07-10 RX ORDER — POTASSIUM CHLORIDE 1.5 G/1.58G
20 POWDER, FOR SOLUTION ORAL ONCE
Status: COMPLETED | OUTPATIENT
Start: 2022-07-10 | End: 2022-07-10

## 2022-07-10 RX ADMIN — CARVEDILOL 6.25 MG: 6.25 TABLET, FILM COATED ORAL at 17:27

## 2022-07-10 RX ADMIN — CILOSTAZOL 50 MG: 50 TABLET ORAL at 07:34

## 2022-07-10 RX ADMIN — METFORMIN HYDROCHLORIDE 1000 MG: 500 TABLET, FILM COATED ORAL at 17:26

## 2022-07-10 RX ADMIN — BUSPIRONE HYDROCHLORIDE 5 MG: 5 TABLET ORAL at 19:39

## 2022-07-10 RX ADMIN — BUSPIRONE HYDROCHLORIDE 5 MG: 5 TABLET ORAL at 07:35

## 2022-07-10 RX ADMIN — CEFDINIR 300 MG: 300 CAPSULE ORAL at 07:34

## 2022-07-10 RX ADMIN — POTASSIUM CHLORIDE 20 MEQ: 1.5 POWDER, FOR SOLUTION ORAL at 19:38

## 2022-07-10 RX ADMIN — GABAPENTIN 600 MG: 600 TABLET, FILM COATED ORAL at 13:59

## 2022-07-10 RX ADMIN — PRAMIPEXOLE DIHYDROCHLORIDE 0.5 MG: 0.5 TABLET ORAL at 17:27

## 2022-07-10 RX ADMIN — OXYCODONE HYDROCHLORIDE AND ACETAMINOPHEN 1 TABLET: 5; 325 TABLET ORAL at 10:26

## 2022-07-10 RX ADMIN — METFORMIN HYDROCHLORIDE 1000 MG: 500 TABLET, FILM COATED ORAL at 07:35

## 2022-07-10 RX ADMIN — ASPIRIN 81 MG: 81 TABLET, COATED ORAL at 07:34

## 2022-07-10 RX ADMIN — GABAPENTIN 600 MG: 600 TABLET, FILM COATED ORAL at 07:34

## 2022-07-10 RX ADMIN — FUROSEMIDE 40 MG: 40 TABLET ORAL at 07:34

## 2022-07-10 RX ADMIN — OXYCODONE HYDROCHLORIDE AND ACETAMINOPHEN 1 TABLET: 5; 325 TABLET ORAL at 14:31

## 2022-07-10 RX ADMIN — GABAPENTIN 600 MG: 600 TABLET, FILM COATED ORAL at 19:39

## 2022-07-10 RX ADMIN — ROSUVASTATIN CALCIUM 20 MG: 20 TABLET, FILM COATED ORAL at 22:25

## 2022-07-10 RX ADMIN — INSULIN ASPART 26 UNITS: 100 INJECTION, SUSPENSION SUBCUTANEOUS at 07:41

## 2022-07-10 RX ADMIN — ACETAMINOPHEN 500 MG: 500 TABLET, FILM COATED ORAL at 07:34

## 2022-07-10 RX ADMIN — CARVEDILOL 6.25 MG: 6.25 TABLET, FILM COATED ORAL at 07:34

## 2022-07-10 RX ADMIN — CEFDINIR 300 MG: 300 CAPSULE ORAL at 19:39

## 2022-07-10 RX ADMIN — METHOCARBAMOL 500 MG: 500 TABLET ORAL at 07:35

## 2022-07-10 RX ADMIN — FUROSEMIDE 20 MG: 20 TABLET ORAL at 17:27

## 2022-07-10 RX ADMIN — FINASTERIDE 5 MG: 5 TABLET, FILM COATED ORAL at 19:39

## 2022-07-10 RX ADMIN — METHOCARBAMOL 500 MG: 500 TABLET ORAL at 19:39

## 2022-07-10 RX ADMIN — ACETAMINOPHEN 500 MG: 500 TABLET, FILM COATED ORAL at 19:39

## 2022-07-10 RX ADMIN — POTASSIUM CHLORIDE 40 MEQ: 1.5 POWDER, FOR SOLUTION ORAL at 17:30

## 2022-07-10 RX ADMIN — WARFARIN SODIUM 3 MG: 2 TABLET ORAL at 17:27

## 2022-07-10 RX ADMIN — ACETAMINOPHEN 500 MG: 500 TABLET, FILM COATED ORAL at 13:59

## 2022-07-10 ASSESSMENT — ACTIVITIES OF DAILY LIVING (ADL)
ADLS_ACUITY_SCORE: 38

## 2022-07-10 NOTE — PLAN OF CARE
PRIMARY DIAGNOSIS: PNEUMONIA  OUTPATIENT/OBSERVATION GOALS TO BE MET BEFORE DISCHARGE:  1. Dyspnea improved and O2 sats >88% on RA or back to baseline O2 levels: Yes   SpO2: 99 %, O2 Device: None (Room air)    2. Tolerating oral abx or appropriate plans made outpatient infusion: Yes    3. Vitals signs normal or return to baseline: Yes    4. Short term supplemental O2 needed with activity at home:  N/A    5. Tolerate oral intake to maintain hydration: Yes    6. Return to near baseline physical activity: Yes    Discharge Planner Nurse   Safe discharge environment identified: Yes  Barriers to discharge: Yes       Entered by: Shannon Cuellar RN 07/10/2022 1:12 AM   /65 (BP Location: Right arm)   Pulse 68   Temp 98.4  F (36.9  C) (Oral)   Resp 14   SpO2 99%    Pt is A&O x4, up with x1 assist but is utilizing urinal. Low fat low NA diet. BG check 92, 112, 102. PIV is SL. PT/OT, SW following. Plan to discharge to TCU ([facility]) pending insurance acceptance. Maintaining droplet precautions.   Please review provider order for any additional goals.   Nurse to notify provider when observation goals have been met and patient is ready for discharge.

## 2022-07-10 NOTE — PLAN OF CARE
"     INPATIENT NOTE     PRIMARY PROBLEM: Pneumonia        Vital signs:  Temp: 98.2  F (36.8  C) Temp src: Oral BP: 123/64 Pulse: 82   Resp: 18 SpO2: 98 % O2 Device: None (Room air)        Estimated body mass index is 33.81 kg/m  as calculated from the following:    Height as of an earlier encounter on 7/4/22: 1.626 m (5' 4\").    Weight as of an earlier encounter on 7/4/22: 89.4 kg (197 lb).        Orientation: Alert and Oriented x4  Neuro: Intact   Pain status: complaining of 3/10 pain in their bilateral hip.  Tylenol given for pain.  Medicatons decreased pain.   Resp: Lung sounds are diminished. Denies any SOB.   Cardiac: WNL, Denies any Chest Pain   GI: Bowels are active x 4 Quads.   Last BM 7/8/22  : Voiding with no concern    Skin: WNL   Peripheral edema: BLE  LDA: Peripheral IV   Infusions: Patient is Saline locked.   Diet: low saturated diet  Activity: are 1 Assist with Gait Belt and Walker  Isolation:   Patient is on Droplet precuations for Pneumonia.    Consults: OT/PT  Discharge Plan: pending [facility] insurance authorization to [facility].   Major Shift Event: pt blood sugar at bedtime ws 50. Pt was given orange juice and cracker. Pt blood sugar was recheck and was 70. Pt blood sugar was recheck again was was 67.  Pt was given glucose gel 30g and blood sugar was recheck which was 96.      Will continue to monitor and provide cares.     Silver Black RN                "

## 2022-07-10 NOTE — PROGRESS NOTES
"[facility]    Medicine Progress Note - Hospitalist Service    Date of Admission:  7/4/2022    Assessment & Plan        Pee Ayala is a 82 year old male with PMHx significant for DM type II, HTN, PAF, bioprosthetic aortic valve, HLP, HFpEF, CAD, PAD, GERD admitted on 7/4/2022 with pelvic fx after a fall. He was also noted to have pneumonia.     Right lower lobe pneumonia  Partially loculated right-sided pleural effusion  -Doing well from respiratory perspective, not hypoxic, afebrile. He does note a cough and mild pleuritic pain but denies SOB, improved today.   Started on Zosyn 7/4/2022 and continued azithromycin that was already started in the emergency department -> continue for now  -consulted interventional radiology and pulmonology regarding partially loculated right pleural effusion -> no significant fluid collection to drain. pulm recommends 7-10 day course of abx.   -medically ready for discharge once TCU bed available  - following for discharge planning     Right pubic rami fracture  Fracture is not displaced present in both superior and inferior pubic rami  Sacral ala fracture  Probable osteoporosis  -Pt reports feet feel heavy once he stands up. Able to stand with assistance  -Started on OxyContin 10 mg BID on admission, will discontinue, likely able to managed with short acting meds  -Patient states he does not tolerate oxycodone but tolerated Percocet. Continue 500 mg Tylenol TID with PRN Percocet 5-325 mg and hydroxyzine  -Continue home Robaxin and gabapentin  -Continue PRN bowel regimen  -PT/OT consult, recommending TCU  -SW to assist with placement    Depression  -Patient's daughter expressed concern regarding depression, pt also endorses this. He states he feels like a \"worthless blob\". Frustrated and discouraged by recent medical events and set backs. Difficulty adjusting to debility. Wife and daughters are supportive but pt can perceive encouragement as " hurtful and feeling like he isn't doing enough or progressing quick enough. -Patient and family interested in starting medication.   -Will continue 5 mg Buspar BID. Close outpatient follow up with PCP for titration.  -Mental health referral placed on discharge. Patient would benefit from therapy      Subacute superior endplate L5 compression fracture  -Inferior endplate L4 compression fracture  -With recent 6/7/2022 T11 kyphoplasty for intractable back pain with T11 compression fracture  -Pain management as mentioned above     Left hip hemiarthroplasty 4/9/2022  -PT and OT consulted  -Pain management as above     Atrial fibrillation  -With severe left atrial enlargement  -On warfarin for anticoagulation, continue per pharmacy    CAD, s/p 2v CABG and mid RCA stent in 2016  PAD, s/p left SFA stenting   HFpEF  S/p TAVR  Moderate mitral insufficiency  -with preserved LV function  -with atrial fibrillation patient is anticoagulated, continue per pharmacy  -continue home lasix and continue Coreg  -Had bleeding issue while on Plavix, Vasc surgery recently recommended Pletal, pt had not started yet, started here (recommend 50 mg x14 days, See Pharm note)     Coronary artery disease  Peripheral artery disease s/p PCI   -on cilostazol, carvedilol and rosuvastatin  -vasc surgery recently recommended Pletal, pt had not started yet, started here.     Type 2 diabetes mellitus  -Had episode of hypoglycemia this morning  -Managed with metformin and 70/30 at home  -Continue metformin 1000 mg BID and 70/30 at 26 units BID magi of 7/6.  -Continue sliding scale insulin   -Home dose of 70/30 is 33 units in AM and 26 units in PM. Had frequent low blood sugar this morning. Decreased Novolog 70/30 insulin to 15 units bid.   -Will continue to adjust insulin dose based on blood sugar    Hypercholesteremia  -Continue Rosuvastatin     GERD  -Nonalcoholic steatohepatitis    Covid-19 negtive 7/4     Diet: Regular Diet Adult    DVT Prophylaxis:  Warfarin  Silva Catheter: Not present  Central Lines: None  Cardiac Monitoring: None  Code Status: Full Code      Disposition Plan   The patient's care was discussed with the Bedside Nurse, Care Coordinator/, Patient and Patient's Family.    Samuel Childs MD, MD  Hospitalist Service  [facility]    Clinically Significant Risk Factors Present on Admission                  ___________________________________________________________________    Interval History   Patient seen and examined. He stated hat he is having pain on the right knee. He denies nausea or vomiting. He has no fever. No nausea or vomiting.     Data reviewed today: I reviewed all medications, new labs and imaging results over the last 24 hours. I personally reviewed no images or EKG's today.    Physical Exam   Vital Signs: Temp: 98.7  F (37.1  C) Temp src: Oral BP: 116/68 Pulse: 79   Resp: 14 SpO2: 99 % O2 Device: None (Room air)    Weight: 0 lbs 0 oz    GENERAL:  Comfortable.  PSYCH: pleasant, oriented, No acute distress.  HEART:  Normal S1, S2 with no murmur, no pericardial rub, gallops or S3 or S4.  LUNGS:  Clear to auscultation, normal Respiratory effort. No wheezing, rales or ronchi.   EXTREMITIES:  Able to move all 4 extremities independently  SKIN:  Dry to touch, No rash, wound or ulcerations.  NEUROLOGIC:  Grossly intact    Data   Recent Labs   Lab 07/10/22  0946 07/10/22  0932 07/10/22  0736 07/10/22  0607 07/10/22  0601 07/09/22  0748 07/09/22  0601 07/08/22  0727 07/08/22  0625 07/05/22  0750 07/05/22  0647 07/04/22  1812 07/04/22  1034   WBC  --   --   --   --   --   --   --   --  8.3  --  6.4  --  8.6   HGB  --   --   --   --   --   --   --   --  10.9*  --  10.7*  --  11.0*   MCV  --   --   --   --   --   --   --   --  103*  --  102*  --  101*   PLT  --   --   --   --   --   --   --   --  162  --  119*  --  121*   INR  --   --   --  1.84*  --   --  2.99*  --  3.74*   < > 2.79*  --  2.49*   NA   --   --   --   --   --   --   --   --  140  --  136  --  137   POTASSIUM  --  2.9*  --   --   --   --   --   --  3.3*  --  3.6  --  3.9   CHLORIDE  --   --   --   --   --   --   --   --  104  --  101  --  101   CO2  --   --   --   --   --   --   --   --  28  --  28  --  31   BUN  --   --   --   --   --   --   --   --  13  --  22  --  21   CR  --   --   --   --   --   --   --   --  0.92  --  0.79  --  0.74   ANIONGAP  --   --   --   --   --   --   --   --  8  --  7  --  5   RACHEL  --   --   --   --   --   --   --   --  8.3*  --  8.3*  --  8.3*   GLC 39*  --  99  --  102*   < >  --    < > 159*   < > 226*   < > 270*   PROTTOTAL  --   --   --   --   --   --   --   --   --   --  5.8*  --   --     < > = values in this interval not displayed.     No results found for this or any previous visit (from the past 24 hour(s)).  Medications     - MEDICATION INSTRUCTIONS -       Warfarin Therapy Reminder         acetaminophen  500 mg Oral TID     aspirin  81 mg Oral Daily     busPIRone  5 mg Oral BID     carvedilol  6.25 mg Oral BID w/meals     cefdinir  300 mg Oral BID     cilostazol  50 mg Oral Daily     finasteride  5 mg Oral QPM     furosemide  20 mg Oral Daily at 4 pm     furosemide  40 mg Oral Daily     gabapentin  600 mg Oral TID     insulin aspart  1-7 Units Subcutaneous TID w/meals     insulin aspart  1-5 Units Subcutaneous At Bedtime     insulin aspart prot & aspart  15 Units Subcutaneous BID w/meals     metFORMIN  1,000 mg Oral BID w/meals     methocarbamol  500 mg Oral BID     pramipexole  0.5 mg Oral Daily     rosuvastatin  20 mg Oral At Bedtime     sodium chloride (PF)  3 mL Intracatheter Q8H

## 2022-07-10 NOTE — PLAN OF CARE
A&O X4. Admitted after a fall with has a pelvic fracture and pneumonia. VSS. Blood glucose 99, 39, 261, and 189. Crackers and orange juice given to correct glucose levels. Ass x1 with walker and gait belt. Saline locked. Tylenol given for pain 8/19 with no relief. Percocet given, pain relieved. PT consulted. Waiting for placement to [facility]. Will continue to monitor.     Blood pressure 128/58, pulse 87, temperature 97.5  F (36.4  C), temperature source Oral, resp. rate 18, SpO2 91 %.

## 2022-07-11 ENCOUNTER — APPOINTMENT (OUTPATIENT)
Dept: PHYSICAL THERAPY | Facility: CLINIC | Age: 82
DRG: 551 | End: 2022-07-11
Attending: INTERNAL MEDICINE
Payer: COMMERCIAL

## 2022-07-11 LAB
ANION GAP SERPL CALCULATED.3IONS-SCNC: 4 MMOL/L (ref 3–14)
BUN SERPL-MCNC: 12 MG/DL (ref 7–30)
C DIFF TOX B STL QL: NEGATIVE
CALCIUM SERPL-MCNC: 8.7 MG/DL (ref 8.5–10.1)
CHLORIDE BLD-SCNC: 106 MMOL/L (ref 94–109)
CO2 SERPL-SCNC: 28 MMOL/L (ref 20–32)
CREAT SERPL-MCNC: 0.72 MG/DL (ref 0.66–1.25)
ERYTHROCYTE [DISTWIDTH] IN BLOOD BY AUTOMATED COUNT: 13.4 % (ref 10–15)
GFR SERPL CREATININE-BSD FRML MDRD: >90 ML/MIN/1.73M2
GLUCOSE BLD-MCNC: 120 MG/DL (ref 70–99)
GLUCOSE BLDC GLUCOMTR-MCNC: 121 MG/DL (ref 70–99)
GLUCOSE BLDC GLUCOMTR-MCNC: 140 MG/DL (ref 70–99)
GLUCOSE BLDC GLUCOMTR-MCNC: 160 MG/DL (ref 70–99)
GLUCOSE BLDC GLUCOMTR-MCNC: 188 MG/DL (ref 70–99)
GLUCOSE BLDC GLUCOMTR-MCNC: 195 MG/DL (ref 70–99)
GLUCOSE BLDC GLUCOMTR-MCNC: 219 MG/DL (ref 70–99)
HCT VFR BLD AUTO: 34.5 % (ref 40–53)
HGB BLD-MCNC: 10.8 G/DL (ref 13.3–17.7)
INR PPP: 1.4 (ref 0.85–1.15)
MCH RBC QN AUTO: 32.3 PG (ref 26.5–33)
MCHC RBC AUTO-ENTMCNC: 31.3 G/DL (ref 31.5–36.5)
MCV RBC AUTO: 103 FL (ref 78–100)
PLATELET # BLD AUTO: 219 10E3/UL (ref 150–450)
POTASSIUM BLD-SCNC: 3.4 MMOL/L (ref 3.4–5.3)
POTASSIUM BLD-SCNC: 3.8 MMOL/L (ref 3.4–5.3)
POTASSIUM BLD-SCNC: 4 MMOL/L (ref 3.4–5.3)
RBC # BLD AUTO: 3.34 10E6/UL (ref 4.4–5.9)
SODIUM SERPL-SCNC: 138 MMOL/L (ref 133–144)
WBC # BLD AUTO: 9.5 10E3/UL (ref 4–11)

## 2022-07-11 PROCEDURE — 84132 ASSAY OF SERUM POTASSIUM: CPT | Performed by: INTERNAL MEDICINE

## 2022-07-11 PROCEDURE — 250N000013 HC RX MED GY IP 250 OP 250 PS 637: Performed by: INTERNAL MEDICINE

## 2022-07-11 PROCEDURE — 87493 C DIFF AMPLIFIED PROBE: CPT | Performed by: INTERNAL MEDICINE

## 2022-07-11 PROCEDURE — 36415 COLL VENOUS BLD VENIPUNCTURE: CPT | Performed by: INTERNAL MEDICINE

## 2022-07-11 PROCEDURE — 250N000013 HC RX MED GY IP 250 OP 250 PS 637

## 2022-07-11 PROCEDURE — 80048 BASIC METABOLIC PNL TOTAL CA: CPT | Performed by: INTERNAL MEDICINE

## 2022-07-11 PROCEDURE — 85027 COMPLETE CBC AUTOMATED: CPT | Performed by: INTERNAL MEDICINE

## 2022-07-11 PROCEDURE — 85610 PROTHROMBIN TIME: CPT | Performed by: INTERNAL MEDICINE

## 2022-07-11 PROCEDURE — 97530 THERAPEUTIC ACTIVITIES: CPT | Mod: GP | Performed by: PHYSICAL THERAPIST

## 2022-07-11 PROCEDURE — G0463 HOSPITAL OUTPT CLINIC VISIT: HCPCS

## 2022-07-11 PROCEDURE — 120N000001 HC R&B MED SURG/OB

## 2022-07-11 PROCEDURE — 99232 SBSQ HOSP IP/OBS MODERATE 35: CPT | Performed by: INTERNAL MEDICINE

## 2022-07-11 PROCEDURE — 250N000013 HC RX MED GY IP 250 OP 250 PS 637: Performed by: PHYSICIAN ASSISTANT

## 2022-07-11 RX ORDER — POTASSIUM CHLORIDE 1.5 G/1.58G
40 POWDER, FOR SOLUTION ORAL ONCE
Status: COMPLETED | OUTPATIENT
Start: 2022-07-11 | End: 2022-07-11

## 2022-07-11 RX ORDER — POTASSIUM CHLORIDE 1500 MG/1
40 TABLET, EXTENDED RELEASE ORAL ONCE
Status: DISCONTINUED | OUTPATIENT
Start: 2022-07-11 | End: 2022-07-11

## 2022-07-11 RX ADMIN — CEFDINIR 300 MG: 300 CAPSULE ORAL at 08:33

## 2022-07-11 RX ADMIN — BUSPIRONE HYDROCHLORIDE 5 MG: 5 TABLET ORAL at 08:33

## 2022-07-11 RX ADMIN — GABAPENTIN 600 MG: 600 TABLET, FILM COATED ORAL at 20:20

## 2022-07-11 RX ADMIN — WARFARIN SODIUM 4.5 MG: 2.5 TABLET ORAL at 18:25

## 2022-07-11 RX ADMIN — CILOSTAZOL 50 MG: 50 TABLET ORAL at 08:33

## 2022-07-11 RX ADMIN — FUROSEMIDE 40 MG: 40 TABLET ORAL at 08:33

## 2022-07-11 RX ADMIN — BUSPIRONE HYDROCHLORIDE 5 MG: 5 TABLET ORAL at 20:20

## 2022-07-11 RX ADMIN — PRAMIPEXOLE DIHYDROCHLORIDE 0.5 MG: 0.5 TABLET ORAL at 15:18

## 2022-07-11 RX ADMIN — GABAPENTIN 600 MG: 600 TABLET, FILM COATED ORAL at 15:02

## 2022-07-11 RX ADMIN — CARVEDILOL 6.25 MG: 6.25 TABLET, FILM COATED ORAL at 18:24

## 2022-07-11 RX ADMIN — HYDROXYZINE HYDROCHLORIDE 25 MG: 25 TABLET, FILM COATED ORAL at 10:30

## 2022-07-11 RX ADMIN — ACETAMINOPHEN 500 MG: 500 TABLET, FILM COATED ORAL at 15:02

## 2022-07-11 RX ADMIN — METHOCARBAMOL 500 MG: 500 TABLET ORAL at 20:20

## 2022-07-11 RX ADMIN — HYDROXYZINE HYDROCHLORIDE 25 MG: 25 TABLET, FILM COATED ORAL at 02:43

## 2022-07-11 RX ADMIN — CARVEDILOL 6.25 MG: 6.25 TABLET, FILM COATED ORAL at 08:33

## 2022-07-11 RX ADMIN — METFORMIN HYDROCHLORIDE 1000 MG: 500 TABLET, FILM COATED ORAL at 18:24

## 2022-07-11 RX ADMIN — FUROSEMIDE 20 MG: 20 TABLET ORAL at 15:18

## 2022-07-11 RX ADMIN — ACETAMINOPHEN 500 MG: 500 TABLET, FILM COATED ORAL at 08:33

## 2022-07-11 RX ADMIN — POTASSIUM CHLORIDE 40 MEQ: 1.5 POWDER, FOR SOLUTION ORAL at 08:54

## 2022-07-11 RX ADMIN — OXYCODONE HYDROCHLORIDE AND ACETAMINOPHEN 1 TABLET: 5; 325 TABLET ORAL at 13:20

## 2022-07-11 RX ADMIN — METHOCARBAMOL 500 MG: 500 TABLET ORAL at 08:32

## 2022-07-11 RX ADMIN — METFORMIN HYDROCHLORIDE 1000 MG: 500 TABLET, FILM COATED ORAL at 08:32

## 2022-07-11 RX ADMIN — ACETAMINOPHEN 500 MG: 500 TABLET, FILM COATED ORAL at 20:20

## 2022-07-11 RX ADMIN — GABAPENTIN 600 MG: 600 TABLET, FILM COATED ORAL at 08:32

## 2022-07-11 RX ADMIN — ROSUVASTATIN CALCIUM 20 MG: 20 TABLET, FILM COATED ORAL at 20:20

## 2022-07-11 RX ADMIN — FINASTERIDE 5 MG: 5 TABLET, FILM COATED ORAL at 20:20

## 2022-07-11 RX ADMIN — ASPIRIN 81 MG: 81 TABLET, COATED ORAL at 08:33

## 2022-07-11 ASSESSMENT — ACTIVITIES OF DAILY LIVING (ADL)
ADLS_ACUITY_SCORE: 38

## 2022-07-11 NOTE — PROGRESS NOTES
Care Management Follow Up    Length of Stay (days): 7    Expected Discharge Date: 07/11/2022     Concerns to be Addressed:       Patient plan of care discussed at interdisciplinary rounds: Yes    Anticipated Discharge Disposition: Transitional Care     Anticipated Discharge Services:  therapy  Anticipated Discharge DME:  na    Patient/family educated on Medicare website which has current facility and service quality ratings: yes  Education Provided on the Discharge Plan:    Patient/Family in Agreement with the Plan: yes    Referrals Placed by CM/SW:  TCU  Private pay costs discussed: Not applicable    Additional Information:  Received a call form pt wife Agueda, who asked for a status update on TCU placement.  [facility].  CM LVM with TCU requesting an update.  Wife would like medical transport arranged once TCU placement confirmed.    Addendum 1145  [facility].  CM notified MD who said that pt is NOT discharge ready today due to diahrrea and thought he would be ready tomorrow. Arranged for [facility] wheelchair transport for 11 AM on 7/12/22.     CM notified Admissions at [facility] that pt is not ready for discharge today due to diarrhea and culture sent for C-Diff.  They said they will hold bed and insurance auth is good until 7/13. Notified them of transportation time, they will need discharge orders by 10AM    Called pt wife Agueda and updated her of the situation . She requested that she be able to call and let her  know.  She does not plan to come to the hospital today.    Cheli Newton RN, BSN, PHN, [facility]  Care Coordinator  [facility]  [phone]

## 2022-07-11 NOTE — PLAN OF CARE
Patient A&Ox4, on K+ protocol draw this AM. PRN hydroxyzine given x1 for right hip pain. Ice applied to right knee. Patient assist of 1 GB/Walker with ambulation. 0200 . Awaiting TCU placement. Will continue current POC.

## 2022-07-11 NOTE — CONSULTS
[facility] Nurse Inpatient Assessment     Consulted for: Left heel    Patient History (according to provider note(s):      Pee Ayala is a 82 year old male with PMHx significant for DM type II, HTN, PAF, bioprosthetic aortic valve, HLP, HFpEF, CAD, PAD, GERD admitted on 7/4/2022 with pelvic fx after a fall. He was also noted to have pneumonia.     Areas Assessed:      Areas visualized during today's visit: Perineal area and Heels     Pressure Injury Location: Left heel  Last photo: 7/11/22    Wound type: Pressure Injury     Pressure Injury Stage: 2, present on admission   Wound history/plan of care:   Area was covered with a large loose scab which easily came off today with only small scab remaining.  Patient with history of PAD s/p recent stenting for revascularization.    Wound base: 100 % dry drainage     Palpation of the wound bed: normal      Drainage: none     Description of drainage: none     Measurements (length x width x depth, in cm) 0.5  x 0.3 cm      Tunneling N/A     Undermining N/A  Periwound skin: Dry/scaly      Color: normal and consistent with surrounding tissue      Temperature: normal   Odor: none  Pain: denies , none  Pain intervention prior to dressing change: N/A  Treatment goal: Heal   STATUS: initial assessment  Supplies ordered: supplies stored on unit and discussed with RN     Right heel is intact with dry flaking skin.      Skin Injury Location: Perianal area and right groin    Last photo: none  Skin injury due to: Moisture associated skin damage (MASD)  Skin history and plan of care:   Patient with recent diarrhea leading to   Affected area:      Skin assessment: Erythema and Lesions-satellite     Measurements (length x width x depth, in cm) Moist erythema with satellite lesions over a 8x5cm on right groin and 10x6cm area over perianal area      Color: normal and consistent with surrounding tissue     Temperature  normal      Drainage: none      Color:  none      Odor: none  Pain: mild  Pain interventions prior to dressing change: patient tolerated well  Treatment goal: Heal   STATUS: initial assessment  Supplies ordered: supplies stored on unit and discussed with RN       Treatment Plan:     Bilateral heel wound(s): BID   1. Cleanse with water and dry  2. Apply Sween cream to bilateral heels and feet (not between toes)  3. Offload heels at all times with pillows under calves     Perianal area and groin: BID  1. Cleanse with wipes and dry  2. Apply a thin layer of Deborah antifungal cream    Orders: Written    RECOMMEND PRIMARY TEAM ORDER: None, at this time  Education provided: plan of care and Off-loading pressure  Discussed plan of care with: Patient and Nurse  WOC nurse follow-up plan: weekly  Notify WOC if wound(s) deteriorate.  Nursing to notify the Provider(s) and re-consult the WOC Nurse if new skin concern.    DATA:     Current support surface: Standard  Atmos Air mattress  Containment of urine/stool: Incontinence Protocol  BMI: Body mass index is 26.42 kg/m .   Active diet order: Orders Placed This Encounter      Regular Diet Adult     Output: I/O last 3 completed shifts:  In: 240 [P.O.:240]  Out: 1250 [Urine:1250]     Labs: Recent Labs   Lab 07/11/22  1048 07/11/22  0556   HGB 10.8*  --    INR  --  1.40*   WBC 9.5  --      Pressure injury risk assessment:   Sensory Perception: 3-->slightly limited  Moisture: 3-->occasionally moist  Activity: 3-->walks occasionally  Mobility: 3-->slightly limited  Nutrition: 3-->adequate  Friction and Shear: 2-->potential problem  Brian Score: 17    Fuad Argueta RN CWOCN  Dept. Pager: [phone]  Dept. Office Number: [phone]

## 2022-07-11 NOTE — PLAN OF CARE
Pt A/O x4.  VSS and afebrile.  On K+ protocol. K+ replaced this shift due for recheck at 2345hrs. Pain managed adequately with scheduled tylenol and Ice.  Elbow dressing changed.  Up Ax1 with walker and gait belt. Voiding via urinal.  Tolerating regular diet well.  , 263. Awaiting TCU placement.  Will continue to monitor.

## 2022-07-11 NOTE — PLAN OF CARE
"*Care resumed for Pt. 0700 until 1930.     Pt. A&Ox4. VSS throughout shift, Pain 10-4/10 throughout shift. Ice to R hip and thigh for pain management as well as atarax 25 mg x1 and Percocet x1 prior to therapy this afternoon. Pt. Also has scheduled Tylenol, gabapentin, and robaxin. Afebrile and stable on room air. IS at bedside. Pt. Had 3 loose stools this AM, seeding and green in color. Per Pt. He has been having loose stools x3-4 days, updated provider and new orders to obtain C-diff sample. Sent to lab-pending results. Continent of urine,  utilizing urinal at bedside. Regular diet. Up with A1 with gait belt and walker. PT/OT/SW and WOC following. Pt. Is on K+ replacement was 3.4, replaced with 40 meq and 4.0. Recheck in AM. BS stable throughout the day, 121, 195, 160, and 219 with dinner. WOC assessed chronic wounds on heels today, as well as perineal area. New orders in place. Keep heels elevated on pillows at all times. Antifungal cream at bedside. PIV SL. Plans for Pt. To transition to [facility] at 11 am tomorrow via [facility] wheelchair transport. Nursing to continue to assess Pt. And provide supportive cares.     Goal Outcome Evaluation: Continuing observation stay, plan to transition to TCU tomorrow at 11 am.     /60 (BP Location: Right arm, Patient Position: Supine, Cuff Size: Adult Small)   Pulse 73   Temp 98.1  F (36.7  C) (Oral)   Resp 16   Ht 1.626 m (5' 4\")   Wt 69.8 kg (153 lb 14.4 oz)   SpO2 99%   BMI 26.42 kg/m                          "

## 2022-07-11 NOTE — PROGRESS NOTES
[facility]    Medicine Progress Note - Hospitalist Service    Date of Admission:  7/4/2022    Assessment & Plan        Pee Ayala is a 82 year old male with PMHx significant for DM type II, HTN, PAF, bioprosthetic aortic valve, HLP, HFpEF, CAD, PAD, GERD admitted on 7/4/2022 with pelvic fx after a fall. He was also noted to have pneumonia.     Right lower lobe pneumonia  Partially loculated right-sided pleural effusion  -respiratory symptoms improved. No fever, cough or shortness of breath.   Started on Zosyn 7/4/2022 and continued azithromycin that was already started in the emergency department -> continue for now  -consulted interventional radiology and pulmonology regarding partially loculated right pleural effusion -> no significant fluid collection to drain. pulm recommends 7-10 day course of abx. Today is day #8 on antibiotics. Will discontinue antibiotics today due to diarrhea and also completed course of treatment for pneumonia.   -will check stool for C.diff toxin B PCR today    Diarrhea  Has frequent watery diarrhea today. Ordered stool for C.diff toxin B PCR. Completed antibiotic today.      Right pubic rami fracture  Fracture is not displaced present in both superior and inferior pubic rami  Sacral ala fracture  Probable osteoporosis  -Continues to complain of right hip pain.   -Patient states he does not tolerate oxycodone but tolerated Percocet. Continue 500 mg Tylenol TID with PRN Percocet 5-325 mg every 4 hours as needed for moderate/severe pain. Will also continue hydroxyzine.   -Continue home Robaxin and gabapentin  -Continue PRN bowel regimen  -PT/OT consult, recommending TCU  -SW to assist with placement    Depression  -Patient's daughter expressed concern regarding depression.   -Patient and family interested in starting medication.   -Will continue 5 mg Buspar BID. Close outpatient follow up with PCP for titration.  -Mental health referral placed on discharge.  Patient would benefit from therapy      Subacute superior endplate L5 compression fracture  -Inferior endplate L4 compression fracture  -With recent 6/7/2022 T11 kyphoplasty for intractable back pain with T11 compression fracture  -Pain management as mentioned above     Left hip hemiarthroplasty 4/9/2022  -PT and OT consulted and recommended TCU placement  -Pain management as above     Atrial fibrillation  -With severe left atrial enlargement  -On warfarin for anticoagulation, continue per pharmacy  -INR subtherapeutic today. Coumadin dosing per pharmacy    CAD, s/p 2v CABG and mid RCA stent in 2016  PAD, s/p left SFA stenting   HFpEF  S/p TAVR  Moderate mitral insufficiency  -with preserved LV function  -with atrial fibrillation patient is anticoagulated, continue per pharmacy  -continue home lasix and continue Coreg  -Had bleeding issue while on Plavix, Vasc surgery recently recommended Pletal, pt had not started yet, started here (recommend 50 mg x14 days, See Pharm note)  -Will continue aspirin, Coreg,Crestor, warfarin. Will also continue Pletal      Coronary artery disease  Peripheral artery disease s/p PCI   -on cilostazol, carvedilol and rosuvastatin  -vasc surgery recently recommended Pletal, pt had not started yet, started here.     Type 2 diabetes mellitus  -Had episode of hypoglycemia this morning  -Managed with metformin and 70/30 at home  -Continue metformin 1000 mg BID and 70/30 at 26 units BID mgai of 7/6.  -Continue sliding scale insulin   -Home dose of 70/30 is 33 units in AM and 26 units in PM. Had frequent low blood sugar yesterday. Decreased Novolog 70/30 insulin to 15 units bid.   -Will continue to adjust insulin dose based on blood sugar.   -Will closely monitor for hypoglycemia    Hypercholesteremia  -Continue Rosuvastatin     GERD  -Nonalcoholic steatohepatitis    Covid-19 negtive 7/4     Diet: Regular Diet Adult    DVT Prophylaxis: Warfarin  Silva Catheter: Not present  Central Lines:  None  Cardiac Monitoring: None  Code Status: Full Code      Disposition Plan   The patient's care was discussed with RN, /care coordinator. Anticipate discharge to TCU tomorrow if diarrhea improved.     Samuel Childs MD, MD  Hospitalist Service  [facility]    Clinically Significant Risk Factors Present on Admission                  ___________________________________________________________________    Interval History   Patient seen and examined. He stated that he is having multiple episodes of diarrhea today. He has no fever. He has no nausea or vomiting. No abdominal pain.     Data reviewed today: I reviewed all medications, new labs and imaging results over the last 24 hours. I personally reviewed no images or EKG's today.    Physical Exam   Vital Signs: Temp: 97.8  F (36.6  C) Temp src: Oral BP: 126/79 Pulse: 86   Resp: 18 SpO2: 96 % O2 Device: None (Room air)    Weight: 0 lbs 0 oz    GENERAL:  Comfortable.  PSYCH: pleasant, oriented, No acute distress.  HEART:  Normal S1, S2 with no murmur, no pericardial rub, gallops or S3 or S4.  LUNGS:  Clear to auscultation, normal Respiratory effort. No wheezing, rales or ronchi.   EXTREMITIES:  Able to move all 4 extremities independently  SKIN:  Dry to touch, No rash, wound or ulcerations.  NEUROLOGIC:  Grossly intact    Data   Recent Labs   Lab 07/11/22  0732 07/11/22  0556 07/11/22  0206 07/10/22  2348 07/10/22  0946 07/10/22  0932 07/10/22  0736 07/10/22  0607 07/09/22  0748 07/09/22  0601 07/08/22  0727 07/08/22  0625 07/05/22  0750 07/05/22  0647   WBC  --   --   --   --   --   --   --   --   --   --   --  8.3  --  6.4   HGB  --   --   --   --   --   --   --   --   --   --   --  10.9*  --  10.7*   MCV  --   --   --   --   --   --   --   --   --   --   --  103*  --  102*   PLT  --   --   --   --   --   --   --   --   --   --   --  162  --  119*   INR  --  1.40*  --   --   --   --   --  1.84*  --  2.99*  --  3.74*   < >  2.79*   NA  --  138  --   --   --   --   --   --   --   --   --  140  --  136   POTASSIUM  --  3.4  --  3.8  --  2.9*  --   --   --   --   --  3.3*  --  3.6   CHLORIDE  --  106  --   --   --   --   --   --   --   --   --  104  --  101   CO2  --  28  --   --   --   --   --   --   --   --   --  28  --  28   BUN  --  12  --   --   --   --   --   --   --   --   --  13  --  22   CR  --  0.72  --   --   --   --   --   --   --   --   --  0.92  --  0.79   ANIONGAP  --  4  --   --   --   --   --   --   --   --   --  8  --  7   RACHEL  --  8.7  --   --   --   --   --   --   --   --   --  8.3*  --  8.3*   * 120* 140*  --    < >  --    < >  --    < >  --    < > 159*   < > 226*   PROTTOTAL  --   --   --   --   --   --   --   --   --   --   --   --   --  5.8*    < > = values in this interval not displayed.     No results found for this or any previous visit (from the past 24 hour(s)).  Medications     - MEDICATION INSTRUCTIONS -       Warfarin Therapy Reminder         acetaminophen  500 mg Oral TID     aspirin  81 mg Oral Daily     busPIRone  5 mg Oral BID     carvedilol  6.25 mg Oral BID w/meals     cefdinir  300 mg Oral BID     cilostazol  50 mg Oral Daily     finasteride  5 mg Oral QPM     furosemide  20 mg Oral Daily at 4 pm     furosemide  40 mg Oral Daily     gabapentin  600 mg Oral TID     insulin aspart  1-7 Units Subcutaneous TID w/meals     insulin aspart  1-5 Units Subcutaneous At Bedtime     insulin aspart prot & aspart  15 Units Subcutaneous BID w/meals     metFORMIN  1,000 mg Oral BID w/meals     methocarbamol  500 mg Oral BID     pramipexole  0.5 mg Oral Daily     rosuvastatin  20 mg Oral At Bedtime     sodium chloride (PF)  3 mL Intracatheter Q8H

## 2022-07-12 ENCOUNTER — TELEPHONE (OUTPATIENT)
Dept: FAMILY MEDICINE | Facility: CLINIC | Age: 82
End: 2022-07-12

## 2022-07-12 ENCOUNTER — LAB REQUISITION (OUTPATIENT)
Dept: LAB | Facility: CLINIC | Age: 82
End: 2022-07-12
Payer: COMMERCIAL

## 2022-07-12 ENCOUNTER — DOCUMENTATION ONLY (OUTPATIENT)
Dept: ANTICOAGULATION | Facility: CLINIC | Age: 82
End: 2022-07-12

## 2022-07-12 VITALS
HEART RATE: 79 BPM | RESPIRATION RATE: 18 BRPM | BODY MASS INDEX: 26.27 KG/M2 | HEIGHT: 64 IN | SYSTOLIC BLOOD PRESSURE: 117 MMHG | DIASTOLIC BLOOD PRESSURE: 57 MMHG | WEIGHT: 153.9 LBS | TEMPERATURE: 98.2 F | OXYGEN SATURATION: 97 %

## 2022-07-12 DIAGNOSIS — I48.20 CHRONIC ATRIAL FIBRILLATION (H): Primary | ICD-10-CM

## 2022-07-12 DIAGNOSIS — Z79.01 CHRONIC ANTICOAGULATION: ICD-10-CM

## 2022-07-12 DIAGNOSIS — I48.20 CHRONIC ATRIAL FIBRILLATION, UNSPECIFIED (H): ICD-10-CM

## 2022-07-12 LAB
ANION GAP SERPL CALCULATED.3IONS-SCNC: 7 MMOL/L (ref 3–14)
BUN SERPL-MCNC: 13 MG/DL (ref 7–30)
CALCIUM SERPL-MCNC: 8.9 MG/DL (ref 8.5–10.1)
CHLORIDE BLD-SCNC: 103 MMOL/L (ref 94–109)
CO2 SERPL-SCNC: 27 MMOL/L (ref 20–32)
CREAT SERPL-MCNC: 0.73 MG/DL (ref 0.66–1.25)
ERYTHROCYTE [DISTWIDTH] IN BLOOD BY AUTOMATED COUNT: 13.3 % (ref 10–15)
GFR SERPL CREATININE-BSD FRML MDRD: >90 ML/MIN/1.73M2
GLUCOSE BLD-MCNC: 119 MG/DL (ref 70–99)
GLUCOSE BLDC GLUCOMTR-MCNC: 126 MG/DL (ref 70–99)
GLUCOSE BLDC GLUCOMTR-MCNC: 133 MG/DL (ref 70–99)
GLUCOSE BLDC GLUCOMTR-MCNC: 142 MG/DL (ref 70–99)
GLUCOSE BLDC GLUCOMTR-MCNC: 303 MG/DL (ref 70–99)
HCT VFR BLD AUTO: 34.4 % (ref 40–53)
HGB BLD-MCNC: 10.7 G/DL (ref 13.3–17.7)
INR PPP: 1.45 (ref 0.85–1.15)
MCH RBC QN AUTO: 31.8 PG (ref 26.5–33)
MCHC RBC AUTO-ENTMCNC: 31.1 G/DL (ref 31.5–36.5)
MCV RBC AUTO: 102 FL (ref 78–100)
PLATELET # BLD AUTO: 216 10E3/UL (ref 150–450)
POTASSIUM BLD-SCNC: 3.6 MMOL/L (ref 3.4–5.3)
RBC # BLD AUTO: 3.36 10E6/UL (ref 4.4–5.9)
SODIUM SERPL-SCNC: 137 MMOL/L (ref 133–144)
WBC # BLD AUTO: 7.3 10E3/UL (ref 4–11)

## 2022-07-12 PROCEDURE — 250N000013 HC RX MED GY IP 250 OP 250 PS 637: Performed by: HOSPITALIST

## 2022-07-12 PROCEDURE — 36415 COLL VENOUS BLD VENIPUNCTURE: CPT | Performed by: INTERNAL MEDICINE

## 2022-07-12 PROCEDURE — 250N000013 HC RX MED GY IP 250 OP 250 PS 637: Performed by: PHYSICIAN ASSISTANT

## 2022-07-12 PROCEDURE — 80048 BASIC METABOLIC PNL TOTAL CA: CPT | Performed by: INTERNAL MEDICINE

## 2022-07-12 PROCEDURE — 250N000013 HC RX MED GY IP 250 OP 250 PS 637: Performed by: INTERNAL MEDICINE

## 2022-07-12 PROCEDURE — 99239 HOSP IP/OBS DSCHRG MGMT >30: CPT | Performed by: INTERNAL MEDICINE

## 2022-07-12 PROCEDURE — 85610 PROTHROMBIN TIME: CPT | Performed by: INTERNAL MEDICINE

## 2022-07-12 PROCEDURE — 85014 HEMATOCRIT: CPT | Performed by: INTERNAL MEDICINE

## 2022-07-12 RX ORDER — BUSPIRONE HYDROCHLORIDE 5 MG/1
5 TABLET ORAL 2 TIMES DAILY
DISCHARGE
Start: 2022-07-12 | End: 2022-07-27

## 2022-07-12 RX ORDER — LOPERAMIDE HCL 2 MG
2 CAPSULE ORAL 3 TIMES DAILY PRN
DISCHARGE
Start: 2022-07-12 | End: 2022-07-27

## 2022-07-12 RX ORDER — CEFDINIR 300 MG/1
300 CAPSULE ORAL EVERY 12 HOURS SCHEDULED
Status: DISCONTINUED | OUTPATIENT
Start: 2022-07-12 | End: 2022-07-12 | Stop reason: HOSPADM

## 2022-07-12 RX ORDER — OXYCODONE AND ACETAMINOPHEN 5; 325 MG/1; MG/1
1 TABLET ORAL 3 TIMES DAILY PRN
Qty: 12 TABLET | Refills: 0 | Status: SHIPPED | OUTPATIENT
Start: 2022-07-12 | End: 2022-07-15

## 2022-07-12 RX ORDER — ACETAMINOPHEN 500 MG
500 TABLET ORAL 3 TIMES DAILY
DISCHARGE
Start: 2022-07-12 | End: 2022-07-27

## 2022-07-12 RX ORDER — CEFDINIR 300 MG/1
300 CAPSULE ORAL EVERY 12 HOURS
DISCHARGE
Start: 2022-07-12 | End: 2022-07-16

## 2022-07-12 RX ORDER — GABAPENTIN 600 MG/1
600 TABLET ORAL 3 TIMES DAILY
DISCHARGE
Start: 2022-07-12 | End: 2022-07-12

## 2022-07-12 RX ORDER — WARFARIN SODIUM 3 MG/1
4.5 TABLET ORAL DAILY
Qty: 90 TABLET | Refills: 0 | DISCHARGE
Start: 2022-07-12 | End: 2022-07-12

## 2022-07-12 RX ORDER — CILOSTAZOL 50 MG/1
TABLET ORAL
Qty: 162 TABLET | Refills: 11 | DISCHARGE
Start: 2022-07-12 | End: 2022-07-27

## 2022-07-12 RX ORDER — LOPERAMIDE HCL 2 MG
2 CAPSULE ORAL 4 TIMES DAILY PRN
Status: DISCONTINUED | OUTPATIENT
Start: 2022-07-12 | End: 2022-07-12 | Stop reason: HOSPADM

## 2022-07-12 RX ORDER — WARFARIN SODIUM 3 MG/1
TABLET ORAL
Qty: 90 TABLET | Refills: 0 | DISCHARGE
Start: 2022-07-12 | End: 2022-07-18

## 2022-07-12 RX ORDER — HYDROXYZINE HYDROCHLORIDE 25 MG/1
25 TABLET, FILM COATED ORAL EVERY 6 HOURS PRN
DISCHARGE
Start: 2022-07-12 | End: 2022-07-27

## 2022-07-12 RX ORDER — GABAPENTIN 600 MG/1
600 TABLET ORAL 3 TIMES DAILY
Qty: 90 TABLET | Refills: 0 | Status: SHIPPED | OUTPATIENT
Start: 2022-07-12 | End: 2022-07-27

## 2022-07-12 RX ORDER — WARFARIN SODIUM 3 MG/1
3 TABLET ORAL DAILY
Qty: 90 TABLET | Refills: 0 | DISCHARGE
Start: 2022-07-12 | End: 2022-07-12

## 2022-07-12 RX ADMIN — OXYCODONE HYDROCHLORIDE AND ACETAMINOPHEN 1 TABLET: 5; 325 TABLET ORAL at 05:32

## 2022-07-12 RX ADMIN — OXYCODONE HYDROCHLORIDE AND ACETAMINOPHEN 1 TABLET: 5; 325 TABLET ORAL at 10:59

## 2022-07-12 RX ADMIN — ACETAMINOPHEN 500 MG: 500 TABLET, FILM COATED ORAL at 08:58

## 2022-07-12 RX ADMIN — GABAPENTIN 600 MG: 600 TABLET, FILM COATED ORAL at 08:57

## 2022-07-12 RX ADMIN — METFORMIN HYDROCHLORIDE 1000 MG: 500 TABLET, FILM COATED ORAL at 08:57

## 2022-07-12 RX ADMIN — ASPIRIN 81 MG: 81 TABLET, COATED ORAL at 08:58

## 2022-07-12 RX ADMIN — CARVEDILOL 6.25 MG: 6.25 TABLET, FILM COATED ORAL at 08:58

## 2022-07-12 RX ADMIN — CILOSTAZOL 50 MG: 50 TABLET ORAL at 08:57

## 2022-07-12 RX ADMIN — BUSPIRONE HYDROCHLORIDE 5 MG: 5 TABLET ORAL at 08:57

## 2022-07-12 RX ADMIN — METHOCARBAMOL 500 MG: 500 TABLET ORAL at 08:57

## 2022-07-12 RX ADMIN — FUROSEMIDE 40 MG: 40 TABLET ORAL at 08:58

## 2022-07-12 RX ADMIN — CEFDINIR 300 MG: 300 CAPSULE ORAL at 10:57

## 2022-07-12 RX ADMIN — LOPERAMIDE HYDROCHLORIDE 2 MG: 2 CAPSULE ORAL at 08:57

## 2022-07-12 ASSESSMENT — ACTIVITIES OF DAILY LIVING (ADL)
ADLS_ACUITY_SCORE: 38
ADLS_ACUITY_SCORE: 38
ADLS_ACUITY_SCORE: 42
ADLS_ACUITY_SCORE: 35
ADLS_ACUITY_SCORE: 42
ADLS_ACUITY_SCORE: 42

## 2022-07-12 NOTE — PROGRESS NOTES
ANTICOAGULATION  MANAGEMENT: Discharge Review    Pee Ayala chart reviewed for anticoagulation continuity of care    Hospital Admission on 07/04/2022-07/12/2022 for pelvic fracture following a fall. Pt also received treated for pneumonia and was noted to have small pleural effusion.     Discharge disposition: [facility] [address]    Results:    Recent labs: (last 7 days)     07/06/22  0643 07/07/22  0711 07/08/22  0625 07/09/22  0601 07/10/22  0607 07/11/22  0556 07/12/22  0621   INR 3.17* 3.83* 3.74* 2.99* 1.84* 1.40* 1.45*     Anticoagulation inpatient management:     less warfarin administered than maintenance regimen    Anticoagulation discharge instructions:     Warfarin dosing: decrease dose to 3mg daily   Bridging: No   INR goal change: No      Medication changes affecting anticoagulation: Yes: cefdinir every 12 hours for 4 days. This medication may increase INR/risk of bleeding.    Additional factors affecting anticoagulation: No     PLAN     No adjustment to anticoagulation plan needed    [facility] will resume monitoring upon discharge from [facility]    Anticoagulation Calendar updated    Pedro Peters RN

## 2022-07-12 NOTE — TELEPHONE ENCOUNTER
It looks like patient has a lab appt to do labs that you have requested.    There are future orders from 02/08/22. Do you want any other labs done before your appt on 08/09/22? He is having labs drawn at his facility so he doesn't have to travel to [place]     Please advise

## 2022-07-12 NOTE — TELEPHONE ENCOUNTER
Patient can have labs drawn at the [facility] in [place]. They are requesting that we fax over any labs that need to be drawn on 08/02. So patient doesn't have to make a trip to the clinic just for labs.    Please fax lab orders to [phone].     Please advise if you wanted any orders drawn.

## 2022-07-12 NOTE — PLAN OF CARE
Occupational Therapy Discharge Summary    Reason for therapy discharge:    Discharged to transitional care facility.    Progress towards therapy goal(s). See goals on Care Plan in [facility] electronic health record for goal details.  Goals partially met.  Barriers to achieving goals:   limited tolerance for therapy and discharge from facility.    Therapy recommendation(s):    Continued therapy is recommended.  Rationale/Recommendations:  Recommend continued OT to increase endurance and independence in ADLS.    Goal Outcome Evaluation:

## 2022-07-12 NOTE — PLAN OF CARE
Shift from [id number]     Inpatient Progress Note:  For complete assessment see flow sheet documentation.      Orientation: A/O x4  Neuro: WDL  Pain status: Percocet given x1 this shift  Activity: AO1-2 with bed mobility-- bed linen and incontinence care provided x2 care providers  Peripheral edema: +2 BLE edema noted w/tingling in feet which is pt's baseline  Resp: RLL and LLL diminished  Cardiac: Rhythm irregular  GI: Loose stools x1 this shift--incontinence care provided x2 staff assist. C. Diff. Culture negative.  : Continent with use of the urinal  Skin: Heels floating, barrier cream applied to right groin  LDA: Right PIV SL  Infusions: None  Pertinent Labs: C. Diff culture negative  Diet: Regular  Discharge Plan: TCU-- [place] at 11am

## 2022-07-12 NOTE — PROGRESS NOTES
Care Management Discharge Note    Discharge Date: 07/12/2022       Discharge Disposition: Transitional Care    Discharge Services:  therapy    Discharge DME:  none    Discharge Transportation: agency    Private pay costs discussed: Not applicable    PAS Confirmation Code:  [id number])  Patient/family educated on Medicare website which has current facility and service quality ratings: yes    Education Provided on the Discharge Plan:  yes  Persons Notified of Discharge Plans: facility  Patient/Family in Agreement with the Plan: yes    Handoff Referral Completed: No    Additional Information:  CM called and spoke with Mehul at admission at [place] to inform them that update orders were faxed at 1031 and hard scripts for Percocet and Neurontin were faxed to [phone] and packet. Medical transport scheduled for 11 AM.      Cheli Newton RN, BSN, PHN, CCM  Care Coordinator  [facility]  [phone]

## 2022-07-12 NOTE — DISCHARGE SUMMARY
[facility]  Hospitalist Discharge Summary      Date of Admission:  7/4/2022  Date of Discharge:  7/12/2022  Discharging Provider: Connor Gonzalez MD  Discharge Service: Hospitalist Service    Discharge Diagnoses      Right lower lobe pneumonia  Partially loculated right-sided pleural effusion  Diarrhea, C. Diff negative  Right pubic rami fracture due to fall  Sacral ala fracture due to fall  Probable osteoporosis  Depression  Subacute superior endplate L5 compression fracture  Left hip hemiarthroplasty 4/9/2022  Atrial fibrillation  Chronic anticoagulation with warfarin  CAD, s/p 2v CABG and mid RCA stent in 2016  PAD, s/p left SFA stenting   HFpEF  H/o TAVR  Moderate mitral valve insufficiency  Coronary artery disease  Peripheral artery disease s/p PCI   Type 2 diabetes mellitus  Hypercholesteremia  GERD  Nonalcoholic steatohepatitis  Covid-19 negtive 7/4    Follow-ups Needed After Discharge   Follow-up Appointments     Follow Up and recommended labs and tests      Follow up with assisted physician.  The following labs/tests are   recommended: INR on Friday 7/15 and then each Monday.  Follow up with a psychology therapist as able.             Unresulted Labs Ordered in the Past 30 Days of this Admission     No orders found from 6/4/2022 to 7/5/2022.          Discharge Disposition   Discharged to short-term care facility  Condition at discharge: Stable      Hospital Course   Pee Ayala is an 82 year old male with history of DM type II, HTN, PAF, bioprosthetic aortic valve, HLP, HFpEF, CAD, PAD, and GERD. He was admitted to [facility] ED on 7/4/2022 with pelvic fractures (right pubic ramus and sacral ala) after a mechanical fall. He was also noted to have right lower lung pneumonia with small partially loculated pleural effusion on CT of chest. Pelvic fractures did not required surgical intervention but were treated with pain management.  Pneumonia was treated with Rocephin and Zithromax  and later Cefdinir. Pee was seen by Pulmonary medicine and 7-10 days of antibiotic was recommended. I am recommending a follow up CT in one month to ensure resolution of pneumonia and small partially loculated effusion. Hospital course was complicated by some diarrhea. Stool was sent for Clostridium difficile testing which was negative. Diarrhea improved with prn Imodium.     Consultations This Hospital Stay   PHYSICAL THERAPY ADULT IP CONSULT  OCCUPATIONAL THERAPY ADULT IP CONSULT  PHARMACY TO DOSE WARFARIN  PULMONARY IP CONSULT  CARE MANAGEMENT / SOCIAL WORK IP CONSULT  ORTHOPEDIC SURGERY IP CONSULT  WOUND OSTOMY CONTINENCE NURSE  IP CONSULT  PHYSICAL THERAPY ADULT IP CONSULT  OCCUPATIONAL THERAPY ADULT IP CONSULT    Code Status   Full Code    Time Spent on this Encounter   I, Connor Gonzalez MD, personally saw the patient today and spent greater than 30 minutes discharging this patient.       Connor Gonzalez MD  [facility] OBSERVATION DEPT  [address] [place] [address]  Phone: [phone]  ______________________________________________________________________    Physical Exam   Vital Signs: Temp: 98.1  F (36.7  C) Temp src: Oral BP: 119/66 Pulse: 84   Resp: 17 SpO2: 98 % O2 Device: None (Room air)    Weight: 153 lbs 14.4 oz  GENERAL:  Comfortable. Cooperative.  PSYCH: pleasant, oriented, No acute distress.  EYES: PERRLA, Normal conjunctiva.  HEART:  Regular rate and rhythm. No JVD. Pulses normal. No edema.  LUNGS:  Clear to auscultation, normal Respiratory effort.  ABDOMEN:  Soft, no hepatosplenomegaly, normal bowel sounds.  EXTREMETIES: No clubbing, cyanosis or ischemia  SKIN:  Dry to touch, No rash.       Primary Care Physician   Mar Morales    Discharge Orders      Adult Mental Health  Referral      General info for SNF    Length of Stay Estimate: Short Term Care: Estimated # of Days <30  Condition at Discharge: Improving  Level of care:skilled    Rehabilitation Potential: Excellent  Admission H&P remains valid and up-to-date: Yes  Recent Chemotherapy: N/A  Use Nursing Home Standing Orders: Yes     Mantoux instructions    Give two-step Mantoux (PPD) Per Facility Policy Yes     Follow Up and recommended labs and tests    Follow up with [facility] physician.  The following labs/tests are recommended: INR on Friday 7/15 and then each Monday.  Follow up with a psychology therapist as able.     Reason for your hospital stay    Community acquired pneumonia in right lower lobe of lung     Glucose monitor nursing POCT    Before meals and at bedtime     Daily weights    Call Provider for weight gain of more than 2 pounds per day or 5 pounds per week.     Activity - Up with nursing assistance     Full Code     Physical Therapy Adult Consult    Evaluate and treat as clinically indicated.    Reason:  deconditioning     Occupational Therapy Adult Consult    Evaluate and treat as clinically indicated.    Reason:  ADLs and deconditioning     Fall precautions     Diet    Follow this diet upon discharge: Moderate Consistent Carb (60 g CHO per Meal) Diet       Significant Results and Procedures   Most Recent 3 CBC's:Recent Labs   Lab Test 07/12/22  0621 07/11/22  1048 07/08/22  0625   WBC 7.3 9.5 8.3   HGB 10.7* 10.8* 10.9*   * 103* 103*    219 162     Most Recent 3 BMP's:Recent Labs   Lab Test 07/12/22  0803 07/12/22  0621 07/12/22  0537 07/11/22  1153 07/11/22  1048 07/11/22  0732 07/11/22  0556 07/08/22  0727 07/08/22  0625   NA  --  137  --   --   --   --  138  --  140   POTASSIUM  --  3.6  --   --  4.0  --  3.4   < > 3.3*   CHLORIDE  --  103  --   --   --   --  106  --  104   CO2  --  27  --   --   --   --  28  --  28   BUN  --  13  --   --   --   --  12  --  13   CR  --  0.73  --   --   --   --  0.72  --  0.92   ANIONGAP  --  7  --   --   --   --  4  --  8   RACHEL  --  8.9  --   --   --   --  8.7  --  8.3*   * 119* 142*   < >  --    < > 120*   < >  159*    < > = values in this interval not displayed.     Most Recent 3 INR's:Recent Labs   Lab Test 07/12/22  0621 07/11/22  0556 07/10/22  0607   INR 1.45* 1.40* 1.84*     Most Recent 6 Bacteria Isolates From Any Culture (See EPIC Reports for Culture Details):No lab results found.,   Results for orders placed or performed during the hospital encounter of 07/04/22   US Chest Pleural Effusion Imaging    Narrative    ULTRASOUND CHEST/PLEURAL EFFUSION IMAGING  7/5/2022 1:01 PM    HISTORY:  82-year-old patient with right-sided pleural effusion per  history. No imaging for confirmation.    FINDINGS: Trace right pleural effusion is identified, given  small-volume no thoracentesis performed.      Impression    IMPRESSION: Trace right pleural effusion, volume too small for  thoracentesis.    ROXANNA RG MD         SYSTEM ID:  [id number]     *Note: Due to a large number of results and/or encounters for the requested time period, some results have not been displayed. A complete set of results can be found in Results Review.       Discharge Medications   Current Discharge Medication List      START taking these medications    Details   acetaminophen (TYLENOL) 500 MG tablet Take 1 tablet (500 mg) by mouth 3 times daily    Associated Diagnoses: Pain      busPIRone (BUSPAR) 5 MG tablet Take 1 tablet (5 mg) by mouth 2 times daily    Associated Diagnoses: Depression, unspecified depression type      cefdinir (OMNICEF) 300 MG capsule Take 1 capsule (300 mg) by mouth every 12 hours for 4 days    Associated Diagnoses: Pneumonia of right lower lobe due to infectious organism      hydrOXYzine (ATARAX) 25 MG tablet Take 1 tablet (25 mg) by mouth every 6 hours as needed for other (adjuvant pain)    Associated Diagnoses: Pain      insulin aspart (NOVOLOG PEN) 100 UNIT/ML pen Inject 1-5 Units Subcutaneous At Bedtime MEDIUM INSULIN RESISTANCE DOSING    Do Not give Bedtime Correction Insulin if BG less than  200.   For  - 249 give 1  units.   For  - 299 give 2 units.   For  - 349 give 3 units.   For  -399 give 4 units.   For BG greater than or equal to 400 give 5 units.  Notify provider if glucose greater than or equal to 350 mg/dL after administration of correction dose.  If given at mealtime, administer within 30 minutes of start of meal.  Qty: 15 mL    Associated Diagnoses: 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)      loperamide (IMODIUM) 2 MG capsule Take 1 capsule (2 mg) by mouth 3 times daily as needed for diarrhea    Associated Diagnoses: Diarrhea, unspecified type      melatonin 1 MG TABS tablet Take 1 tablet (1 mg) by mouth nightly as needed for sleep    Associated Diagnoses: Insomnia, unspecified type         CONTINUE these medications which have CHANGED    Details   cilostazol (PLETAL) 50 MG tablet Take 1 tablet (50 mg) by mouth daily for 6 days, THEN 1 tablet (50 mg) 2 times daily for 14 days, THEN 2 tablets (100 mg) 2 times daily for 30 days.  Qty: 162 tablet, Refills: 11    Associated Diagnoses: PAD (peripheral artery disease) (H)      gabapentin (NEURONTIN) 600 MG tablet Take 1 tablet (600 mg) by mouth 3 times daily  Qty: 90 tablet, Refills: 0    Associated Diagnoses: Diabetic polyneuropathy associated with type 2 diabetes mellitus (H)      insulin aspart prot & aspart (NOVOLOG MIX 70/30 PEN) (70-30) 100 UNIT/ML pen Inject subcutaneously 18 units in AM and 15 in PM  Qty: 15 mL    Associated Diagnoses: 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)      oxyCODONE-acetaminophen (PERCOCET) 5-325 MG tablet Take 1 tablet by mouth 3 times daily as needed  Qty: 12 tablet, Refills: 0    Associated Diagnoses: Pain      warfarin ANTICOAGULANT (COUMADIN) 3 MG tablet Take 1 tablet (3 mg) by mouth daily Or as directed.  Qty: 90 tablet, Refills: 0    Associated Diagnoses: Chronic anticoagulation; Chronic atrial fibrillation (H)         CONTINUE these medications which have NOT CHANGED    Details    Apoaequorin (PREVAGEN PO) Take 1 tablet by mouth daily       aspirin 81 MG EC tablet Take 81 mg by mouth daily      carvedilol (COREG) 6.25 MG tablet Take 1 tablet (6.25 mg) by mouth 2 times daily (with meals)  Qty: 180 tablet, Refills: 3    Associated Diagnoses: Chronic atrial fibrillation (H); Coronary artery disease involving native coronary artery of native heart without angina pectoris      cyanocobalamin (VITAMIN B-12) 1000 MCG tablet Take 1 tablet (1,000 mcg) by mouth daily  Qty: 90 tablet, Refills: 1    Associated Diagnoses: Low serum vitamin B12; Jerking; Neuropathy; 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)      finasteride (PROSCAR) 5 MG tablet Take 1 tablet (5 mg) by mouth daily  Qty: 90 tablet, Refills: 3    Associated Diagnoses: Benign prostatic hyperplasia, unspecified whether lower urinary tract symptoms present      furosemide (LASIX) 20 MG tablet TAKE 2 TABLETS BY MOUTH IN THE MORNING AND 1 TABLET IN THE AFTERNOON  Qty: 180 tablet, Refills: 3    Associated Diagnoses: Chronic heart failure with preserved ejection fraction (H)      metFORMIN (GLUCOPHAGE) 500 MG tablet Take 2 tablets by mouth twice daily  Qty: 360 tablet, Refills: 0    Associated Diagnoses: 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)      methocarbamol (ROBAXIN) 500 MG tablet Take 500 mg by mouth 2 times daily      polyethylene glycol (MIRALAX) 17 GM/Dose powder Take 17 g (1 capful) by mouth daily as needed for constipation (opioid induced constipation)  Qty: 507 g, Refills: 0    Associated Diagnoses: Closed compression fracture of L3 lumbar vertebra with routine healing, subsequent encounter      pramipexole (MIRAPEX) 0.25 MG tablet Take 2 tablets (0.5 mg) by mouth daily Takes 4pm and 5;30 pm  Qty: 180 tablet, Refills: 3      rosuvastatin (CRESTOR) 20 MG tablet Take 1 tablet (20 mg) by mouth At Bedtime  Qty: 90 tablet, Refills: 3    Associated Diagnoses: Coronary artery disease involving native coronary  artery of native heart without angina pectoris      senna-docusate (SENOKOT-S/PERICOLACE) 8.6-50 MG tablet Take 1 tablet by mouth in the morning and 1 tablet in the evening.  Qty: 30 tablet, Refills: 0    Associated Diagnoses: Fracture of hip, left, closed, initial encounter (H)      ACCU-CHEK GUIDE test strip USE 1  TO CHECK GLUCOSE THREE TIMES DAILY  Qty: 300 strip, Refills: 1    Associated Diagnoses: 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)      Continuous Blood Gluc Sensor (FREESTYLE DOMI 2 SENSOR) MISC 1 each every 14 days Use 1 sensor every 14 days. Use to read blood sugars per 's instructions.  Qty: 1 each, Refills: 0    Associated Diagnoses: 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)           Allergies   Allergies   Allergen Reactions     Oxycodone Itching and Rash     Amlodipine Dizziness     Dizziness and dry heaves     Lisinopril Cough     Adhesive Tape Itching and Rash

## 2022-07-12 NOTE — PROGRESS NOTES
Physical Therapy Discharge Summary    Reason for therapy discharge:    Discharged to transitional care facility.    Progress towards therapy goal(s). See goals on Care Plan in [facility] electronic health record for goal details.  Goals not met.  Barriers to achieving goals:   discharge from facility.    Therapy recommendation(s):    Continued therapy is recommended.  Rationale/Recommendations:  strengthening and progression of IND with mobility.

## 2022-07-12 NOTE — TELEPHONE ENCOUNTER
Pee was recently discharged from the hospital.  Per chart review, he was discharged to TCU and should be under the care of a provider who should be managing his care. It looks like he will next be seen 7/15/22. I have not done a hospital follow-up at this point so I am not sure what would be needed sooner.  Mar Morales, DO

## 2022-07-12 NOTE — PHARMACY-ANTICOAGULATION SERVICE
Clinical Pharmacy- Warfarin Discharge Note  This patient is currently on warfarin for the treatment of Atrial fibrillation.  INR Goal= 2-3  Expected length of therapy lifetime.    PTA Regimen: 3mg on Tuesdays and 4.5mg all other days of the week'      Anticoagulation Dose History     Recent Dosing and Labs Latest Ref Rng & Units 7/6/2022 7/7/2022 7/8/2022 7/9/2022 7/10/2022 7/11/2022 7/12/2022    ZZ IMS TEMPLATE - - - - - 3 mg 4.5 mg -    Warfarin 0.5 mg - 0.5 mg - - 0.5 mg - - -    INR 0.85 - 1.15 3.17(H) 3.83(H) 3.74(H) 2.99(H) 1.84(H) 1.40(H) 1.45(H)          Vitamin K doses administered during the last 7 days: ZERO  FFP administered during the last 7 days: ZERO      Warfarin Discharge Plan  INR now subtherapeutic after holding several doses due to supratherapeutic doses, likely related to antibiotics. Discussed with MD to discharge on warfarin 4.5mg daily with INR check in 1-3 days, or per TCU.       Oniel Jorge Pharm.D., BCPS

## 2022-07-12 NOTE — PLAN OF CARE
"Patient's After Visit Summary was reviewed with patient.   Patient verbalized understanding of After Visit Summary, recommended follow up and was given an opportunity to ask questions.   Discharge medications sent home with patient/family: No, sent with scripts for percocet and gabapentin.   Discharged with transport tech.    Goal Outcome Evaluation: Discharging to [facility].     Discharge paperwork reviewed with Pt. At bedside, questions acknowledged and answered. Pt. In agreement with plan of care. Offered to call Pt.'s spouse and he declined stating spouse will be meeting him in [place]. Transportation tech sent with paperwork including scripts. Paperwork was previously faxed to facility by [facility]. PIV removed prior to discharge. BS spot checked prior to discharge 303. Pt. Plans to eat lunch at facility. Pt. Assisted into wheelchair and discharging to [facility]. Pt. Stable at time of discharge.     OBSERVATION patient END time: 1111.     /57 (BP Location: Right arm)   Pulse 79   Temp 98.2  F (36.8  C) (Oral)   Resp 18   Ht 1.626 m (5' 4\")   Wt 69.8 kg (153 lb 14.4 oz)   SpO2 97%   BMI 26.42 kg/m                            "

## 2022-07-13 ENCOUNTER — TRANSFERRED RECORDS (OUTPATIENT)
Dept: HEALTH INFORMATION MANAGEMENT | Facility: CLINIC | Age: 82
End: 2022-07-13

## 2022-07-13 ENCOUNTER — TELEPHONE (OUTPATIENT)
Dept: GERIATRICS | Facility: CLINIC | Age: 82
End: 2022-07-13

## 2022-07-13 ENCOUNTER — LAB REQUISITION (OUTPATIENT)
Dept: LAB | Facility: CLINIC | Age: 82
End: 2022-07-13
Payer: COMMERCIAL

## 2022-07-13 ENCOUNTER — PATIENT OUTREACH (OUTPATIENT)
Dept: CARE COORDINATION | Facility: CLINIC | Age: 82
End: 2022-07-13

## 2022-07-13 DIAGNOSIS — I48.20 CHRONIC ATRIAL FIBRILLATION, UNSPECIFIED (H): ICD-10-CM

## 2022-07-13 DIAGNOSIS — Z71.89 OTHER SPECIFIED COUNSELING: ICD-10-CM

## 2022-07-13 LAB — INR PPP: 1.6 (ref 0.85–1.15)

## 2022-07-13 PROCEDURE — 36415 COLL VENOUS BLD VENIPUNCTURE: CPT | Performed by: NURSE PRACTITIONER

## 2022-07-13 PROCEDURE — 85610 PROTHROMBIN TIME: CPT | Performed by: NURSE PRACTITIONER

## 2022-07-13 PROCEDURE — P9604 ONE-WAY ALLOW PRORATED TRIP: HCPCS | Performed by: NURSE PRACTITIONER

## 2022-07-13 NOTE — PROGRESS NOTES
[facility] GERIATRICS    PRIMARY CARE PROVIDER AND CLINIC:  Mar Morales, DO, [address] / [facility] [address]  Chief Complaint   Patient presents with     Hospital F/U      [facility] Record Number:  [id number]  Place of Service where encounter took place:  [facility] ([facility]) [[id number]]    Pee Ayala  is a 82 year old  (1940), admitted to the above facility from  [facility]. Hospital stay 7/4/22 through 7/12/22..   HPI:    Pee Ayala is an 82 year old male with history of DM type II, HTN, PAF, bioprosthetic aortic valve, HLP, HFpEF, CAD, PAD, and GERD. He was admitted to [facility] ED on 7/4/2022 with pelvic fractures (right pubic ramus and sacral ala) after a mechanical fall. He was also noted to have right lower lung pneumonia with small partially loculated pleural effusion on CT of chest. Pelvic fractures did not required surgical intervention but were treated with pain management.  Pneumonia was treated with Rocephin and Zithromax and later Cefdinir. Pee was seen by Pulmonary medicine and 7-10 days of antibiotic was recommended. I am recommending a follow up CT in one month to ensure resolution of pneumonia and small partially loculated effusion. Hospital course was complicated by some diarrhea. Stool was sent for Clostridium difficile testing which was negative. Diarrhea improved with prn Imodium.     The primary encounter diagnosis was Physical deconditioning. Diagnoses of Generalized muscle weakness, Personal history of fall, Closed fracture of right inferior pubic ramus with routine healing, subsequent encounter, Multiple closed fractures of pelvis without disruption of pelvic ring with routine healing, subsequent encounter, Age-related osteoporosis with current pathological fracture, sequela, Pain, Pneumonia of right lower lobe due to infectious organism, Pleural effusion, Chronic atrial fibrillation (H), S/P TAVR (transcatheter aortic valve  replacement), Anticoagulated on Coumadin, Chronic combined systolic and diastolic congestive heart failure (H), Coronary artery disease involving native coronary artery of native heart without angina pectoris, Anemia due to blood loss, acute, 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    Today- Met with patient who denies any chest pain, palpitations, lightheadedness, dizziness, or cough. He does report some mild shortness of breath with activity only. Improves with rest. Denies any abdominal discomfort. He does report some infrequent nausea complaints. No emesis. History of GERD. Not currently on PPI. Denies dysuria or frequency. Reports having diarrhea which I suspect may be from consuming senna products. Appetite good. Sleeping well. Reports pain to multiple joint locations today. He does report good relief from PRN medications but he is also interested in topical agents per offer. Nursing denies any acute concerns.     BP Readings from Last 3 Encounters:   07/15/22 135/65   07/12/22 117/57   07/04/22 107/59     Wt Readings from Last 5 Encounters:   07/15/22 71.6 kg (157 lb 12.8 oz)   07/11/22 69.8 kg (153 lb 14.4 oz)   07/04/22 89.4 kg (197 lb)   06/28/22 71.2 kg (157 lb)   06/13/22 74.5 kg (164 lb 3.2 oz)     CODE STATUS/ADVANCE DIRECTIVES DISCUSSION:  Full Code  CPR/Full code   ALLERGIES:   Allergies   Allergen Reactions     Oxycodone Itching and Rash     Amlodipine Dizziness     Dizziness  and dry heaves     Lisinopril Cough     Adhesive Tape Itching and Rash      PAST MEDICAL HISTORY:   Past Medical History:   Diagnosis Date     ACS (acute coronary syndrome) (H) 06/02/2014     Actinic keratosis 01/14/2014     Anticoagulated on Coumadin 12/30/2015     Atrial fibrillation (H) 01/01/2016     Bone mass 04/26/2017     Chest heaviness 01/23/2019     Chest pain 05/31/2014     Closed fracture of left forearm 01/01/2015     Congestive heart failure (H)      Coronary artery disease      Difficulty in walking(719.7)      Dyslipidemia 08/31/2016     Dyspnea on exertion      ED (erectile dysfunction) of organic origin 12/29/2005    Overview:  April 25, 2007 will check PSA, try Levitra, no history of CAD, not on nitrates.      Encounter for long-term (current) use of insulin (H) 08/11/2016     Esophageal reflux 11/18/2010     History of angina      HTN (hypertension) 06/30/2009     (Problem list name updated by automated process. Provider to review and confirm.)     Impotence of organic origin      Mixed hyperlipidemia 04/25/2007 April 25, 2007 restarted Zocor today, recheck in 3 months.  August 23, 2007 LDL at 101 with 40 mg, will increase to 80 mg. Recheck  In 3 months.      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      Walking troubles       PAST SURGICAL HISTORY:   has a past surgical history that includes colonoscopy (1/14/2004); Laparoscopic cholecystectomy (10/09); Vasectomy; Coronary Angiography Adult Order; Maze procedure (N/A,  9/10/2014); Esophagoscopy, gastroscopy, duodenoscopy (EGD), combined (N/A, 1/13/2016); orthopedic surgery; Bypass graft artery coronary (N/A, 9/10/2014); stent, coronary, laurent (02/2016); Transcatheter Aortic Valve Replacement (N/A, 1/12/2021); Heart Cath Femoral Cannulization With Open Standby Repair Aortic Valve (N/A, 1/12/2021); Bypass graft artery coronary (2016); Mohs micrographic procedure; Cv Coronary Angiogram (N/A, 4/4/2019); Cv Right Heart Catheterization (Right, 4/4/2019); Cv Left Heart Catheterization Without Left Ventriculogram (Left, 4/4/2019); other surgical history (Left, 2015); Cv Coronary Angiogram (N/A, 1/6/2021); Cv Left Heart Catheterization Without Left Ventriculogram (Left, 1/6/2021); IR Lower Extremity Angiogram Left (12/7/2021); and Open reduction internal fixation hip bipolar (Left, 4/9/2022).  FAMILY HISTORY: family history includes Cerebrovascular Disease in his father; Diabetes in his maternal grandmother and mother; Diabetes Type 2  in his mother; Heart Disease in his father; Hypertension in his father; No Known Problems in his brother.  SOCIAL HISTORY:   reports that he quit smoking about 24 years ago. He has never used smokeless tobacco. He reports current alcohol use. He reports that he does not use drugs.  Patient's living condition: lives with spouse    Post Discharge Medication Reconciliation Status: discharge medications reconciled and changed, per note/orders  Current Outpatient Medications   Medication Sig     carvedilol (COREG) 6.25 MG tablet Take 1 tablet (6.25 mg) by mouth 2 times daily (with meals) HOLD if SBP<100 and/or HR<55     diclofenac (VOLTAREN) 1 % topical gel Apply 2 g topically 4 times daily     melatonin 1 MG TABS tablet Take 3 tablets (3 mg) by mouth nightly as needed for sleep     omeprazole (PRILOSEC) 20 MG DR capsule Take 1 capsule (20 mg) by mouth daily     ondansetron (ZOFRAN) 4 MG tablet Take 1 tablet (4 mg) by mouth every 6 hours as needed for nausea      oxyCODONE-acetaminophen (PERCOCET) 5-325 MG tablet Take 1 tablet by mouth 3 times daily as needed for severe pain     senna-docusate (SENOKOT-S/PERICOLACE) 8.6-50 MG tablet Take 1 tablet by mouth 2 times daily as needed for constipation     ACCU-CHEK GUIDE test strip USE 1  TO CHECK GLUCOSE THREE TIMES DAILY     acetaminophen (TYLENOL) 500 MG tablet Take 1 tablet (500 mg) by mouth 3 times daily     aspirin 81 MG EC tablet Take 81 mg by mouth daily     busPIRone (BUSPAR) 5 MG tablet Take 1 tablet (5 mg) by mouth 2 times daily     cefdinir (OMNICEF) 300 MG capsule Take 1 capsule (300 mg) by mouth every 12 hours for 4 days     cilostazol (PLETAL) 50 MG tablet Take 1 tablet (50 mg) by mouth daily for 6 days, THEN 1 tablet (50 mg) 2 times daily for 14 days, THEN 2 tablets (100 mg) 2 times daily for 30 days.     Continuous Blood Gluc Sensor (FREESTYLE DOMI 2 SENSOR) MISC 1 each every 14 days Use 1 sensor every 14 days. Use to read blood sugars per 's instructions.     cyanocobalamin (VITAMIN B-12) 1000 MCG tablet Take 1 tablet (1,000 mcg) by mouth daily     finasteride (PROSCAR) 5 MG tablet Take 1 tablet (5 mg) by mouth daily     furosemide (LASIX) 20 MG tablet TAKE 2 TABLETS BY MOUTH IN THE MORNING AND 1 TABLET IN THE AFTERNOON     gabapentin (NEURONTIN) 600 MG tablet Take 1 tablet (600 mg) by mouth 3 times daily     hydrOXYzine (ATARAX) 25 MG tablet Take 1 tablet (25 mg) by mouth every 6 hours as needed for other (adjuvant pain)     insulin aspart (NOVOLOG PEN) 100 UNIT/ML pen Inject 1-5 Units Subcutaneous At Bedtime MEDIUM INSULIN RESISTANCE DOSING    Do Not give Bedtime Correction Insulin if BG less than  200.   For  - 249 give 1 units.   For  - 299 give 2 units.   For  - 349 give 3 units.   For  -399 give 4 units.   For BG greater than or equal to 400 give 5 units.  Notify provider if glucose greater than or equal to 350 mg/dL after administration of correction dose.  If given at  "mealtime, administer within 30 minutes of start of meal.     insulin aspart prot & aspart (NOVOLOG MIX 70/30 PEN) (70-30) 100 UNIT/ML pen Inject subcutaneously 18 units in AM and 15 in PM     loperamide (IMODIUM) 2 MG capsule Take 1 capsule (2 mg) by mouth 3 times daily as needed for diarrhea     metFORMIN (GLUCOPHAGE) 500 MG tablet Take 2 tablets by mouth twice daily     methocarbamol (ROBAXIN) 500 MG tablet Take 500 mg by mouth 2 times daily     polyethylene glycol (MIRALAX) 17 GM/Dose powder Take 17 g (1 capful) by mouth daily as needed for constipation (opioid induced constipation)     pramipexole (MIRAPEX) 0.25 MG tablet Take 2 tablets (0.5 mg) by mouth daily Takes 4pm and 5;30 pm     rosuvastatin (CRESTOR) 20 MG tablet Take 1 tablet (20 mg) by mouth At Bedtime     warfarin ANTICOAGULANT (COUMADIN) 3 MG tablet By mouth, take 1.5 tabs (4.5 mg) all days except 1 tab (3 mg) on Fridays and Tuesdays.     No current facility-administered medications for this visit.       ROS:  10 point ROS of systems including Constitutional, Eyes, Respiratory, Cardiovascular, Gastroenterology, Genitourinary, Integumentary, Musculoskeletal, Psychiatric were all negative except for pertinent positives noted in my HPI.    Vitals:  /65   Pulse 82   Temp 96.8  F (36  C)   Resp 18   Ht 1.626 m (5' 4\")   Wt 71.6 kg (157 lb 12.8 oz)   SpO2 97%   BMI 27.09 kg/m    Exam:  GENERAL APPEARANCE:  Alert, in no distress, oriented, cooperative  ENT:  Mouth and posterior oropharynx normal, moist mucous membranes, [place]  EYES:  EOM, conjunctivae, lids, pupils and irises normal  NECK:  No adenopathy,masses or thyromegaly  RESP:  respiratory effort and palpation of chest normal, lungs clear to auscultation , no respiratory distress  CV:  Palpation and auscultation of heart done , regular rate and rhythm, no murmur, rub, or gallop, no edema  ABDOMEN:  normal bowel sounds, soft, nontender, no hepatosplenomegaly or other masses, no guarding or " rebound  M/S:   Ambulates short distance with walker. Wheelchair for long distance needs  SKIN:  Inspection of skin and subcutaneous tissue baseline, Palpation of skin and subcutaneous tissue baseline  NEURO:   Cranial nerves 2-12 are normal tested and grossly at patient's baseline, no purposeful movement in upper and lower extremities  PSYCH:  oriented X 3, normal insight, judgement and memory, affect and mood normal        Lab/Diagnostic data:    Most Recent 3 CBC's:  Recent Labs   Lab Test 07/12/22  0621 07/11/22  1048 07/08/22  0625   WBC 7.3 9.5 8.3   HGB 10.7* 10.8* 10.9*   * 103* 103*    219 162     Most Recent 3 BMP's:  Recent Labs   Lab Test 07/15/22  0625 07/12/22  1100 07/12/22  0803 07/12/22  0621 07/11/22  1153 07/11/22  1048 07/11/22  0732 07/11/22  0556     --   --  137  --   --   --  138   POTASSIUM 3.7  --   --  3.6  --  4.0  --  3.4   CHLORIDE 100  --   --  103  --   --   --  106   CO2 27  --   --  27  --   --   --  28   BUN 12.7  --   --  13  --   --   --  12   CR 0.74  --   --  0.73  --   --   --  0.72   ANIONGAP 9  --   --  7  --   --   --  4   RACHEL 8.8  --   --  8.9  --   --   --  8.7   * 303* 126* 119*   < >  --    < > 120*    < > = values in this interval not displayed.     Most Recent 2 LFT's:  Recent Labs   Lab Test 05/24/22  1023 04/08/22  1811   AST 24 45*   ALT 14 45   ALKPHOS 165* 126*   BILITOTAL 0.7 1.0     Most Recent 3 INR's:  Recent Labs   Lab Test 07/15/22  0625 07/13/22  0615 07/12/22  0621   INR 1.76* 1.60* 1.45*     Most Recent Cholesterol Panel:  Recent Labs   Lab Test 01/25/22  1443 09/16/21  1026   CHOL  --  117   LDL 65 48   HDL  --  43   TRIG  --  131     Most Recent TSH and T4:  Recent Labs   Lab Test 06/03/22  0959   TSH 1.01     Most Recent Hemoglobin A1c:  Recent Labs   Lab Test 05/24/22  1023   A1C 6.3*     Most Recent Urinalysis:  Recent Labs   Lab Test 04/08/22 1955   COLOR Light Yellow   APPEARANCE Clear   URINEGLC >1000*   URINEBILI  Negative   URINEKETONE 20 *   SG 1.033*   UBLD 0.06 mg/dL*   URINEPH 5.5   PROTEIN 200 *   NITRITE Negative   LEUKEST Negative   RBCU 1   WBCU 1     Most Recent Anemia Panel:  Recent Labs   Lab Test 07/12/22  0621 07/04/22  1034 06/03/22  0959 01/06/21  1000 01/02/20  1115   WBC 7.3   < > 6.8   < > 6.2   HGB 10.7*   < > 11.7*   < > 14.5   HCT 34.4*   < > 37.0*   < > 43.0   *   < > 107*   < > 101*      < > 261   < > 188   IRON  --   --   --   --  98   RETICABSCT  --   --  0.083  --   --    RETP  --   --  2.4  --   --    B12  --   --  311  --   --    FOLIC  --   --  7.8  --   --     < > = values in this interval not displayed.       ASSESSMENT/PLAN:    (R53.81) Physical deconditioning  (primary encounter diagnosis)  (M62.81) Generalized muscle weakness  Comment: Acute on chronic. S/T below diagnosis  Plan:   -Continue Physical therapy and Occupational therapy as directed  -SW to remain involved for safe discharge planning needs    (Z91.81) Personal history of fall  (S32.591D) Closed fracture of right inferior pubic ramus with routine healing, subsequent encounter  (S32.82XD) Multiple closed fractures of pelvis without disruption of pelvic ring with routine healing, subsequent encounter  (M80.00XS) Age-related osteoporosis with current pathological fracture, sequela  (R52) Pain  Comment: Acute on chronic. History of frequent falls. He was admitted to [facility] ED on 7/4/2022 with pelvic fractures (right pubic ramus and sacral ala) after a mechanical fall. Pelvic fractures did not required surgical intervention but were treated with pain management  Plan:   -Monitor pain complaints  -Continue Tylenol 500mg TID.   -Continue atarax PRN x 14 days  -Continue gabapentin 600mg TID  -Start diclofenac gel QID to painful areas  -Continue Percocet 1 tab TID PRN. Discontinue if non use. Recommend to discontinue prior to discharge if able  -Robaxin 500mg BID  -BMP, CBC, magnesium, vitamin B12, vitamin D, and INR due  7/18/22    (J18.9) Pneumonia of right lower lobe due to infectious organism  (J90) Pleural effusion  Comment: Acute. He was also noted to have right lower lung pneumonia with small partially loculated pleural effusion on CT of chest. Pneumonia was treated with Rocephin and Zithromax and later Cefdinir. Pee was seen by Pulmonary medicine and 7-10 days of antibiotic was recommended. I have personally reviewed images for this patient.   Plan:   -BMP, CBC, magnesium, vitamin B12, vitamin D, and INR due 7/18/22  -Monitor respiratory status  -Continue cefdinir as directed for 4 additional days.   -Follow up within 3 month for repeat CT for resolution.   -Encourage incentive spirometry every 4 hours while awake.     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 Stable. Recent ECHO 1/2022-Normal left ventricular size and systolic performance with a visually  estimated ejection fraction of 65-70%. Previous INR subtherapeutic. Goal 2-3. Suspect antibiotic contributing. Previous dose as of 7/1/22 were: 3mg on tuesdays and 4.5mg AOD. He was discharged with 3mg daily however.  INR today-1.76  Plan:   -Coumadin-change to 4mg daily  -Follows by [facility] [facility] for INR orders post TCU  -BMP, CBC, magnesium, vitamin B12, vitamin D, and INR due 7/18/22  -Monitor falls  -Monitor for worsening s/sx of concerns  -Monitor bleeding risks.     (I50.42) Chronic combined systolic and diastolic congestive heart failure (H)  (I25.10) Coronary artery disease involving native coronary artery of native heart without angina pectoris  (E78.00) Hypercholesteremia  Comment: Chronic. Stable. s/p 2v CABG and mid RCA stent in 2016  Plan:   -Follow up with cardiology as directed  -Monitor BP and HR as directed  -Daily weights as directed. Baseline weight 153#  -Continue daily asa  -Continue crestor daily  -Continue furosemide  40mg in AM and 20mg in afternoon  -Continue carvedilol 6.25mg BID. HOLD if SBP<100 and/or HR<55  -BMP, CBC, magnesium, vitamin B12, vitamin D, and INR due 7/18/22    (D62) Anemia due to blood loss, acute  Comment: Chronic. Baseline Hgb~10.7  Plan:   -Monitor bleeding risks  -Monitor for worsening s/sx of concerns.   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 Recent A1c on 5/24/22 was 6.3%. Goal <8%.   Plan:   -Monitor Blood Glucose QID as directed  -Continue sliding scale novolog at HS as directed.  -Continue metformin 1000mg BID   -HOLD frestyle jerson sensor while on TCU, may resume post discharge.   -Continue Novolog 70/30 mix as directed: 18units in AM and 15units with dinner. IF BG <100 THEN GIVE 1/2 DOSES OF 9 UNITS IN AM AND 7 UNITS IN PM   -Follow up with endocrinology as directed. Scheduled for 8/9/22  -BMP, CBC, magnesium, vitamin B12, vitamin D, and INR due 7/18/22    (R19.7) Diarrhea  Comment: Acute on chronic. Hospital course was complicated by some diarrhea. Stool was sent for Clostridium difficile testing which was negative. Diarrhea improved with prn Imodium. He is on scheduled senna however. May be side effect of antibiotic use.   Plan:  -Monitor BM status  -Imodium 2mg TID PRN  -Miralax 17gm daily PRN  -Change senna S to 1 tab BID PRN    (F33.9) MDD  Comment: Acute on chronic. Depressive moods.   Plan:  -Monitor mood and behaviors  -Monitor changes in mobility, eating and sleeping patterns  -Follow up with psych as directed. Scheduled virtual on 10/7/22  -Continue buspar 5mg BID  -BMP, CBC, magnesium, vitamin B12, vitamin D, and INR due 7/18/22    (I73.9) PAD (peripheral artery disease) (H)  Comment: Chronic. s/p left SFA stenting history.   Plan:    -Monitor for worsening s/sx of concerns  -Continue Cilostazol as directed--Take 1 tablet (50 mg) by mouth daily for 6 days, THEN 1 tablet (50 mg) 2 times daily for 14 days, THEN 2 tablets (100 mg) 2 times daily for 30 days. (this was originally started by vascular on 6/28/22)  -Continue Tylenol as directed  -Follow up with PCP post TCU  -Follow up with vascular as directed. Scheduled for 10/10/22  -BMP, CBC, magnesium, vitamin B12, vitamin D, and INR due 7/18/22    (R11.0) Nausea  (K21.00)GERD  Comment: Acute on chronic. History of GERD at home with TUMS use. Currently on blood thinner, would recommend starting PPI product  Plan:  -Start zofran PRN  -Start omeprazole daily  -Monitor for worsening s/sx of concerns  -BMP, CBC, magnesium, vitamin B12, vitamin D, and INR due 7/18/22    Total time spent with patient visit at the skilled nursing facility was 60 min including patient visit and review of past records. Greater than 50% of total time spent with counseling and coordinating care with nursing staff due to the complexities of their diagnoses, review of HPI, development of POC, assisting HUC on appointment schedules, IDT rounding with discussion with rehab goals and patient progression to assist with coordination of care, and patient education and review of POC with patient which includes 20 min discussion with patient on: current medications/orders changes, recent blood work results, continued discharge plan/needs, subsequent treatment plan while in TCU and thereafter, current pain control plan and controlled substances ordered. Counseling on:* Diagnostic results, impressions, and/or recommended diagnostic studies, Prognosis, Risks and benefits of management (treatment) options, Instructions for management (treatment) and/or follow up, Importance of compliance with management (treatment) options, and Risk factor reduction regarding above health concerns.     Electronically signed by:  Tata Blood DNP,  APRN

## 2022-07-13 NOTE — PROGRESS NOTES
"Clinic Care Coordination Contact  Care Coordination Transition Communication         Clinical Data: Patient was hospitalized at [facility] from 7/4 to 7/12 with diagnosis of Right lower lobe pneumonia  Partially loculated right-sided pleural effusion  Diarrhea, C. Diff negative  Right pubic rami fracture due to fall  Sacral ala fracture due to fall  Probable osteoporosis  Depression  Subacute superior endplate L5 compression fracture  Left hip hemiarthroplasty 4/9/2022  Atrial fibrillation  Chronic anticoagulation with warfarin  CAD, s/p 2v CABG and mid RCA stent in 2016  PAD, s/p left SFA stenting   HFpEF  H/o TAVR  Moderate mitral valve insufficiency  Coronary artery disease  Peripheral artery disease s/p PCI   Type 2 diabetes mellitus     Transition to Facility:              Facility Name:  [facility]              Contact name and phone number/fax: [phone]    Plan: RN/SW Care Coordinator will await notification from facility staff informing RN/SW Care Coordinator of patient's discharge plans/needs. RN/SW Care Coordinator will review chart and outreach to facility staff every 4 weeks and as needed.     Patient lives at home with spouse Agueda in a one level twin home. He uses a cane and walker to get around but had a recent fall and currently is \"not able to walk\"    "

## 2022-07-14 ENCOUNTER — LAB REQUISITION (OUTPATIENT)
Dept: LAB | Facility: CLINIC | Age: 82
End: 2022-07-14
Payer: COMMERCIAL

## 2022-07-14 ENCOUNTER — TELEPHONE (OUTPATIENT)
Dept: ENDOCRINOLOGY | Facility: CLINIC | Age: 82
End: 2022-07-14

## 2022-07-14 DIAGNOSIS — E11.3599 TYPE 2 DIABETES MELLITUS WITH PROLIFERATIVE DIABETIC RETINOPATHY WITHOUT MACULAR EDEMA, UNSPECIFIED EYE (H): ICD-10-CM

## 2022-07-14 LAB
CREAT UR-MCNC: 33.2 MG/DL
MICROALBUMIN UR-MCNC: <12 MG/L
MICROALBUMIN/CREAT UR: NORMAL MG/G{CREAT}

## 2022-07-14 PROCEDURE — 82043 UR ALBUMIN QUANTITATIVE: CPT | Performed by: NURSE PRACTITIONER

## 2022-07-14 NOTE — TELEPHONE ENCOUNTER
New person to facility with INR check on 2nd day    INR = 1.60   Was on coumadin 3mg po daily    Changed him to 4mg po daily for next 2 days and the nurse stated his next INR check is on Friday.    Nurse called back and stated all is available in the medication dispenser is 3mg of Coumadin.    New orders:  Coumadin 3mg po daily for 2 days and INR on Friday.    Electronically signed by Danielle Stack RN, CNP

## 2022-07-14 NOTE — TELEPHONE ENCOUNTER
[facility] Call Center    Phone Message    May a detailed message be left on voicemail: yes     Reason for Call: Order(s): Other:     Reason for requested: Per Marisabel is wanting to get a call back or sent another fax with the clear instructions in regards to verifying the order that they had received from the clinic. Marisabel states it is a Random Urine, with Creatinine Ratio. Marisabel is wanting to know if this is needing to be a clean catch or not. Please advise    Date needed: asap    Provider name: Sundar      Action Taken: Message routed to:  [facility] Endo    Travel Screening: Not Applicable

## 2022-07-15 ENCOUNTER — TRANSITIONAL CARE UNIT VISIT (OUTPATIENT)
Dept: GERIATRICS | Facility: CLINIC | Age: 82
End: 2022-07-15

## 2022-07-15 ENCOUNTER — LAB REQUISITION (OUTPATIENT)
Dept: LAB | Facility: CLINIC | Age: 82
End: 2022-07-15
Payer: COMMERCIAL

## 2022-07-15 VITALS
OXYGEN SATURATION: 97 % | TEMPERATURE: 96.8 F | RESPIRATION RATE: 18 BRPM | BODY MASS INDEX: 26.94 KG/M2 | HEIGHT: 64 IN | SYSTOLIC BLOOD PRESSURE: 135 MMHG | WEIGHT: 157.8 LBS | DIASTOLIC BLOOD PRESSURE: 65 MMHG | HEART RATE: 82 BPM

## 2022-07-15 DIAGNOSIS — I50.9 HEART FAILURE, UNSPECIFIED (H): ICD-10-CM

## 2022-07-15 DIAGNOSIS — Z91.81 PERSONAL HISTORY OF FALL: ICD-10-CM

## 2022-07-15 DIAGNOSIS — J90 PLEURAL EFFUSION: ICD-10-CM

## 2022-07-15 DIAGNOSIS — E11.3599 TYPE 2 DIABETES MELLITUS WITH PROLIFERATIVE RETINOPATHY WITHOUT MACULAR EDEMA, WITHOUT LONG-TERM CURRENT USE OF INSULIN, UNSPECIFIED LATERALITY (H): ICD-10-CM

## 2022-07-15 DIAGNOSIS — K21.00 GASTROESOPHAGEAL REFLUX DISEASE WITH ESOPHAGITIS, UNSPECIFIED WHETHER HEMORRHAGE: ICD-10-CM

## 2022-07-15 DIAGNOSIS — R52 PAIN: ICD-10-CM

## 2022-07-15 DIAGNOSIS — G47.00 INSOMNIA, UNSPECIFIED TYPE: ICD-10-CM

## 2022-07-15 DIAGNOSIS — S72.002A FRACTURE OF HIP, LEFT, CLOSED, INITIAL ENCOUNTER (H): ICD-10-CM

## 2022-07-15 DIAGNOSIS — S32.591D CLOSED FRACTURE OF RIGHT INFERIOR PUBIC RAMUS WITH ROUTINE HEALING, SUBSEQUENT ENCOUNTER: ICD-10-CM

## 2022-07-15 DIAGNOSIS — I73.9 PAD (PERIPHERAL ARTERY DISEASE) (H): ICD-10-CM

## 2022-07-15 DIAGNOSIS — M62.81 GENERALIZED MUSCLE WEAKNESS: ICD-10-CM

## 2022-07-15 DIAGNOSIS — Z79.01 ANTICOAGULATED ON COUMADIN: ICD-10-CM

## 2022-07-15 DIAGNOSIS — R19.7 DIARRHEA, UNSPECIFIED TYPE: ICD-10-CM

## 2022-07-15 DIAGNOSIS — I50.42 CHRONIC COMBINED SYSTOLIC AND DIASTOLIC CONGESTIVE HEART FAILURE (H): ICD-10-CM

## 2022-07-15 DIAGNOSIS — J18.9 PNEUMONIA OF RIGHT LOWER LOBE DUE TO INFECTIOUS ORGANISM: ICD-10-CM

## 2022-07-15 DIAGNOSIS — S32.82XD MULTIPLE CLOSED FRACTURES OF PELVIS WITHOUT DISRUPTION OF PELVIC RING WITH ROUTINE HEALING, SUBSEQUENT ENCOUNTER: ICD-10-CM

## 2022-07-15 DIAGNOSIS — M80.00XS AGE-RELATED OSTEOPOROSIS WITH CURRENT PATHOLOGICAL FRACTURE, SEQUELA: ICD-10-CM

## 2022-07-15 DIAGNOSIS — R53.81 PHYSICAL DECONDITIONING: Primary | ICD-10-CM

## 2022-07-15 DIAGNOSIS — I48.20 CHRONIC ATRIAL FIBRILLATION (H): ICD-10-CM

## 2022-07-15 DIAGNOSIS — D62 ANEMIA DUE TO BLOOD LOSS, ACUTE: ICD-10-CM

## 2022-07-15 DIAGNOSIS — F33.9 EPISODE OF RECURRENT MAJOR DEPRESSIVE DISORDER, UNSPECIFIED DEPRESSION EPISODE SEVERITY (H): ICD-10-CM

## 2022-07-15 DIAGNOSIS — G62.9 POLYNEUROPATHY: ICD-10-CM

## 2022-07-15 DIAGNOSIS — R11.0 NAUSEA: ICD-10-CM

## 2022-07-15 DIAGNOSIS — I48.20 CHRONIC ATRIAL FIBRILLATION, UNSPECIFIED (H): ICD-10-CM

## 2022-07-15 DIAGNOSIS — Z95.2 S/P TAVR (TRANSCATHETER AORTIC VALVE REPLACEMENT): ICD-10-CM

## 2022-07-15 DIAGNOSIS — I25.10 CORONARY ARTERY DISEASE INVOLVING NATIVE CORONARY ARTERY OF NATIVE HEART WITHOUT ANGINA PECTORIS: ICD-10-CM

## 2022-07-15 DIAGNOSIS — E78.00 HYPERCHOLESTEREMIA: ICD-10-CM

## 2022-07-15 DIAGNOSIS — D64.9 ANEMIA, UNSPECIFIED: ICD-10-CM

## 2022-07-15 DIAGNOSIS — I10 ESSENTIAL (PRIMARY) HYPERTENSION: ICD-10-CM

## 2022-07-15 DIAGNOSIS — E66.01 MORBID OBESITY (H): ICD-10-CM

## 2022-07-15 LAB
ANION GAP SERPL CALCULATED.3IONS-SCNC: 9 MMOL/L (ref 7–15)
BUN SERPL-MCNC: 12.7 MG/DL (ref 8–23)
CALCIUM SERPL-MCNC: 8.8 MG/DL (ref 8.8–10.2)
CHLORIDE SERPL-SCNC: 100 MMOL/L (ref 98–107)
CREAT SERPL-MCNC: 0.74 MG/DL (ref 0.67–1.17)
DEPRECATED HCO3 PLAS-SCNC: 27 MMOL/L (ref 22–29)
GFR SERPL CREATININE-BSD FRML MDRD: >90 ML/MIN/1.73M2
GLUCOSE SERPL-MCNC: 113 MG/DL (ref 70–99)
INR PPP: 1.76 (ref 0.85–1.15)
POTASSIUM SERPL-SCNC: 3.7 MMOL/L (ref 3.4–5.3)
SODIUM SERPL-SCNC: 136 MMOL/L (ref 136–145)

## 2022-07-15 PROCEDURE — 85610 PROTHROMBIN TIME: CPT | Performed by: NURSE PRACTITIONER

## 2022-07-15 PROCEDURE — 82310 ASSAY OF CALCIUM: CPT | Performed by: NURSE PRACTITIONER

## 2022-07-15 PROCEDURE — 36415 COLL VENOUS BLD VENIPUNCTURE: CPT | Performed by: NURSE PRACTITIONER

## 2022-07-15 PROCEDURE — P9604 ONE-WAY ALLOW PRORATED TRIP: HCPCS | Performed by: NURSE PRACTITIONER

## 2022-07-15 PROCEDURE — 99310 SBSQ NF CARE HIGH MDM 45: CPT | Performed by: NURSE PRACTITIONER

## 2022-07-15 RX ORDER — CARVEDILOL 6.25 MG/1
6.25 TABLET ORAL 2 TIMES DAILY WITH MEALS
Qty: 180 TABLET | Refills: 3
Start: 2022-07-15 | End: 2022-07-27

## 2022-07-15 RX ORDER — ONDANSETRON 4 MG/1
4 TABLET, FILM COATED ORAL EVERY 6 HOURS PRN
Qty: 30 TABLET | Refills: 0 | Status: SHIPPED | OUTPATIENT
Start: 2022-07-15 | End: 2022-07-27

## 2022-07-15 RX ORDER — AMOXICILLIN 250 MG
1 CAPSULE ORAL 2 TIMES DAILY PRN
Qty: 30 TABLET | Refills: 0 | Status: SHIPPED | OUTPATIENT
Start: 2022-07-15 | End: 2022-07-27

## 2022-07-15 RX ORDER — OXYCODONE AND ACETAMINOPHEN 5; 325 MG/1; MG/1
1 TABLET ORAL 3 TIMES DAILY PRN
Qty: 30 TABLET | Refills: 0 | Status: SHIPPED | OUTPATIENT
Start: 2022-07-15 | End: 2022-07-27

## 2022-07-15 NOTE — LETTER
7/15/2022        RE: Pee Ayala  [address]  [place] [address]        [facility] GERIATRICS    PRIMARY CARE PROVIDER AND CLINIC:  Mar Morales, , [address] / [facility] [address]  Chief Complaint   Patient presents with     Hospital F/U      [place] Medical Record Number:  [id number]  Place of Service where encounter took place:  [facility] ([facility]) [[id number]]    Pee Ayala  is a 82 year old  (1940), admitted to the above facility from  [facility]. Hospital stay 7/4/22 through 7/12/22..   HPI:    Pee Ayala is an 82 year old male with history of DM type II, HTN, PAF, bioprosthetic aortic valve, HLP, HFpEF, CAD, PAD, and GERD. He was admitted to [facility] ED on 7/4/2022 with pelvic fractures (right pubic ramus and sacral ala) after a mechanical fall. He was also noted to have right lower lung pneumonia with small partially loculated pleural effusion on CT of chest. Pelvic fractures did not required surgical intervention but were treated with pain management.  Pneumonia was treated with Rocephin and Zithromax and later Cefdinir. Pee was seen by Pulmonary medicine and 7-10 days of antibiotic was recommended. I am recommending a follow up CT in one month to ensure resolution of pneumonia and small partially loculated effusion. Hospital course was complicated by some diarrhea. Stool was sent for Clostridium difficile testing which was negative. Diarrhea improved with prn Imodium.     The primary encounter diagnosis was Physical deconditioning. Diagnoses of Generalized muscle weakness, Personal history of fall, Closed fracture of right inferior pubic ramus with routine healing, subsequent encounter, Multiple closed fractures of pelvis without disruption of pelvic ring with routine healing, subsequent encounter, Age-related osteoporosis with current pathological fracture, sequela, Pain, Pneumonia of right lower lobe due to infectious  organism, Pleural effusion, Chronic atrial fibrillation (H), S/P TAVR (transcatheter aortic valve replacement), Anticoagulated on Coumadin, Chronic combined systolic and diastolic congestive heart failure (H), Coronary artery disease involving native coronary artery of native heart without angina pectoris, Anemia due to blood loss, acute, 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    Today- Met with patient who denies any chest pain, palpitations, lightheadedness, dizziness, or cough. He does report some mild shortness of breath with activity only. Improves with rest. Denies any abdominal discomfort. He does report some infrequent nausea complaints. No emesis. History of GERD. Not currently on PPI. Denies dysuria or frequency. Reports having diarrhea which I suspect may be from consuming senna products. Appetite good. Sleeping well. Reports pain to multiple joint locations today. He does report good relief from PRN medications but he is also interested in topical agents per offer. Nursing denies any acute concerns.     BP Readings from Last 3 Encounters:   07/15/22 135/65   07/12/22 117/57   07/04/22 107/59     Wt Readings from Last 5 Encounters:   07/15/22 71.6 kg (157 lb 12.8 oz)   07/11/22 69.8 kg (153 lb 14.4 oz)   07/04/22 89.4 kg (197 lb)   06/28/22 71.2 kg (157 lb)   06/13/22 74.5 kg (164 lb 3.2 oz)     CODE STATUS/ADVANCE DIRECTIVES DISCUSSION:  Full Code  CPR/Full code   ALLERGIES:    Allergies   Allergen Reactions     Oxycodone Itching and Rash     Amlodipine Dizziness     Dizziness and dry heaves     Lisinopril Cough     Adhesive Tape Itching and Rash      PAST MEDICAL HISTORY:   Past Medical History:   Diagnosis Date     ACS (acute coronary syndrome) (H) 06/02/2014     Actinic keratosis 01/14/2014     Anticoagulated on Coumadin 12/30/2015     Atrial fibrillation (H) 01/01/2016     Bone mass 04/26/2017     Chest heaviness 01/23/2019     Chest pain 05/31/2014     Closed fracture of left forearm 01/01/2015     Congestive heart failure (H)      Coronary artery disease      Difficulty in walking(719.7)      Dyslipidemia 08/31/2016     Dyspnea on exertion      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      Encounter for long-term (current) use of insulin (H) 08/11/2016     Esophageal reflux 11/18/2010     History of angina      HTN (hypertension) 06/30/2009     (Problem list name updated by automated process. Provider to review and confirm.)     Impotence of organic origin      Mixed hyperlipidemia 04/25/2007 April 25, 2007 restarted Zocor today, recheck in 3 months.  August 23, 2007 LDL at 101 with 40 mg, will increase to 80 mg. Recheck  In 3 months.      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      Walking troubles       PAST SURGICAL HISTORY:   has a past surgical history that includes colonoscopy (1/14/2004);  Laparoscopic cholecystectomy (10/09); Vasectomy; Coronary Angiography Adult Order; Maze procedure (N/A, 9/10/2014); Esophagoscopy, gastroscopy, duodenoscopy (EGD), combined (N/A, 1/13/2016); orthopedic surgery; Bypass graft artery coronary (N/A, 9/10/2014); stent, coronary, laurent (02/2016); Transcatheter Aortic Valve Replacement (N/A, 1/12/2021); Heart Cath Femoral Cannulization With Open Standby Repair Aortic Valve (N/A, 1/12/2021); Bypass graft artery coronary (2016); Mohs micrographic procedure; Cv Coronary Angiogram (N/A, 4/4/2019); Cv Right Heart Catheterization (Right, 4/4/2019); Cv Left Heart Catheterization Without Left Ventriculogram (Left, 4/4/2019); other surgical history (Left, 2015); Cv Coronary Angiogram (N/A, 1/6/2021); Cv Left Heart Catheterization Without Left Ventriculogram (Left, 1/6/2021); IR Lower Extremity Angiogram Left (12/7/2021); and Open reduction internal fixation hip bipolar (Left, 4/9/2022).  FAMILY HISTORY: family history includes Cerebrovascular Disease in his father; Diabetes in his maternal grandmother and mother; Diabetes Type 2  in his mother; Heart Disease in his father; Hypertension in his father; No Known Problems in his brother.  SOCIAL HISTORY:   reports that he quit smoking about 24 years ago. He has never used smokeless tobacco. He reports current alcohol use. He reports that he does not use drugs.  Patient's living condition: lives with spouse    Post Discharge Medication Reconciliation Status: discharge medications reconciled and changed, per note/orders  Current Outpatient Medications   Medication Sig     carvedilol (COREG) 6.25 MG tablet Take 1 tablet (6.25 mg) by mouth 2 times daily (with meals) HOLD if SBP<100 and/or HR<55     diclofenac (VOLTAREN) 1 % topical gel Apply 2 g topically 4 times daily     melatonin 1 MG TABS tablet Take 3 tablets (3 mg) by mouth nightly as needed for sleep     omeprazole (PRILOSEC) 20 MG DR capsule Take 1 capsule (20 mg) by mouth daily      ondansetron (ZOFRAN) 4 MG tablet Take 1 tablet (4 mg) by mouth every 6 hours as needed for nausea     oxyCODONE-acetaminophen (PERCOCET) 5-325 MG tablet Take 1 tablet by mouth 3 times daily as needed for severe pain     senna-docusate (SENOKOT-S/PERICOLACE) 8.6-50 MG tablet Take 1 tablet by mouth 2 times daily as needed for constipation     ACCU-CHEK GUIDE test strip USE 1  TO CHECK GLUCOSE THREE TIMES DAILY     acetaminophen (TYLENOL) 500 MG tablet Take 1 tablet (500 mg) by mouth 3 times daily     aspirin 81 MG EC tablet Take 81 mg by mouth daily     busPIRone (BUSPAR) 5 MG tablet Take 1 tablet (5 mg) by mouth 2 times daily     cefdinir (OMNICEF) 300 MG capsule Take 1 capsule (300 mg) by mouth every 12 hours for 4 days     cilostazol (PLETAL) 50 MG tablet Take 1 tablet (50 mg) by mouth daily for 6 days, THEN 1 tablet (50 mg) 2 times daily for 14 days, THEN 2 tablets (100 mg) 2 times daily for 30 days.     Continuous Blood Gluc Sensor (FREESTYLE DOMI 2 SENSOR) [facility] 1 each every 14 days Use 1 sensor every 14 days. Use to read blood sugars per 's instructions.     cyanocobalamin (VITAMIN B-12) 1000 MCG tablet Take 1 tablet (1,000 mcg) by mouth daily     finasteride (PROSCAR) 5 MG tablet Take 1 tablet (5 mg) by mouth daily     furosemide (LASIX) 20 MG tablet TAKE 2 TABLETS BY MOUTH IN THE MORNING AND 1 TABLET IN THE AFTERNOON     gabapentin (NEURONTIN) 600 MG tablet Take 1 tablet (600 mg) by mouth 3 times daily     hydrOXYzine (ATARAX) 25 MG tablet Take 1 tablet (25 mg) by mouth every 6 hours as needed for other (adjuvant pain)     insulin aspart (NOVOLOG PEN) 100 UNIT/ML pen Inject 1-5 Units Subcutaneous At Bedtime MEDIUM INSULIN RESISTANCE DOSING    Do Not give Bedtime Correction Insulin if BG less than  200.   For  - 249 give 1 units.   For  - 299 give 2 units.   For  - 349 give 3 units.   For  -399 give 4 units.   For BG greater than or equal to 400 give 5 units.  Notify  "provider if glucose greater than or equal to 350 mg/dL after administration of correction dose.  If given at mealtime, administer within 30 minutes of start of meal.     insulin aspart prot & aspart (NOVOLOG MIX 70/30 PEN) (70-30) 100 UNIT/ML pen Inject subcutaneously 18 units in AM and 15 in PM     loperamide (IMODIUM) 2 MG capsule Take 1 capsule (2 mg) by mouth 3 times daily as needed for diarrhea     metFORMIN (GLUCOPHAGE) 500 MG tablet Take 2 tablets by mouth twice daily     methocarbamol (ROBAXIN) 500 MG tablet Take 500 mg by mouth 2 times daily     polyethylene glycol (MIRALAX) 17 GM/Dose powder Take 17 g (1 capful) by mouth daily as needed for constipation (opioid induced constipation)     pramipexole (MIRAPEX) 0.25 MG tablet Take 2 tablets (0.5 mg) by mouth daily Takes 4pm and 5;30 pm     rosuvastatin (CRESTOR) 20 MG tablet Take 1 tablet (20 mg) by mouth At Bedtime     warfarin ANTICOAGULANT (COUMADIN) 3 MG tablet By mouth, take 1.5 tabs (4.5 mg) all days except 1 tab (3 mg) on Fridays and Tuesdays.     No current facility-administered medications for this visit.       ROS:  10 point ROS of systems including Constitutional, Eyes, Respiratory, Cardiovascular, Gastroenterology, Genitourinary, Integumentary, Musculoskeletal, Psychiatric were all negative except for pertinent positives noted in my HPI.    Vitals:  /65   Pulse 82   Temp 96.8  F (36  C)   Resp 18   Ht 1.626 m (5' 4\")   Wt 71.6 kg (157 lb 12.8 oz)   SpO2 97%   BMI 27.09 kg/m    Exam:  GENERAL APPEARANCE:  Alert, in no distress, oriented, cooperative  ENT:  Mouth and posterior oropharynx normal, moist mucous membranes, [place]  EYES:  EOM, conjunctivae, lids, pupils and irises normal  NECK:  No adenopathy,masses or thyromegaly  RESP:  respiratory effort and palpation of chest normal, lungs clear to auscultation , no respiratory distress  CV:  Palpation and auscultation of heart done , regular rate and rhythm, no murmur, rub, or gallop, no " edema  ABDOMEN:  normal bowel sounds, soft, nontender, no hepatosplenomegaly or other masses, no guarding or rebound  M/S:   Ambulates short distance with walker. Wheelchair for long distance needs  SKIN:  Inspection of skin and subcutaneous tissue baseline, Palpation of skin and subcutaneous tissue baseline  NEURO:   Cranial nerves 2-12 are normal tested and grossly at patient's baseline, no purposeful movement in upper and lower extremities  PSYCH:  oriented X 3, normal insight, judgement and memory, affect and mood normal        Lab/Diagnostic data:    Most Recent 3 CBC's:  Recent Labs   Lab Test 07/12/22  0621 07/11/22  1048 07/08/22  0625   WBC 7.3 9.5 8.3   HGB 10.7* 10.8* 10.9*   * 103* 103*    219 162     Most Recent 3 BMP's:  Recent Labs   Lab Test 07/15/22  0625 07/12/22  1100 07/12/22  0803 07/12/22  0621 07/11/22  1153 07/11/22  1048 07/11/22  0732 07/11/22  0556     --   --  137  --   --   --  138   POTASSIUM 3.7  --   --  3.6  --  4.0  --  3.4   CHLORIDE 100  --   --  103  --   --   --  106   CO2 27  --   --  27  --   --   --  28   BUN 12.7  --   --  13  --   --   --  12   CR 0.74  --   --  0.73  --   --   --  0.72   ANIONGAP 9  --   --  7  --   --   --  4   RACHEL 8.8  --   --  8.9  --   --   --  8.7   * 303* 126* 119*   < >  --    < > 120*    < > = values in this interval not displayed.     Most Recent 2 LFT's:  Recent Labs   Lab Test 05/24/22  1023 04/08/22  1811   AST 24 45*   ALT 14 45   ALKPHOS 165* 126*   BILITOTAL 0.7 1.0     Most Recent 3 INR's:  Recent Labs   Lab Test 07/15/22  0625 07/13/22  0615 07/12/22  0621   INR 1.76* 1.60* 1.45*     Most Recent Cholesterol Panel:  Recent Labs   Lab Test 01/25/22  1443 09/16/21  1026   CHOL  --  117   LDL 65 48   HDL  --  43   TRIG  --  131     Most Recent TSH and T4:  Recent Labs   Lab Test 06/03/22  0959   TSH 1.01     Most Recent Hemoglobin A1c:  Recent Labs   Lab Test 05/24/22  1023   A1C 6.3*     Most Recent  Urinalysis:  Recent Labs   Lab Test 04/08/22 1955   COLOR Light Yellow   APPEARANCE Clear   URINEGLC >1000*   URINEBILI Negative   URINEKETONE 20 *   SG 1.033*   UBLD 0.06 mg/dL*   URINEPH 5.5   PROTEIN 200 *   NITRITE Negative   LEUKEST Negative   RBCU 1   WBCU 1     Most Recent Anemia Panel:  Recent Labs   Lab Test 07/12/22  0621 07/04/22  1034 06/03/22  0959 01/06/21  1000 01/02/20  1115   WBC 7.3   < > 6.8   < > 6.2   HGB 10.7*   < > 11.7*   < > 14.5   HCT 34.4*   < > 37.0*   < > 43.0   *   < > 107*   < > 101*      < > 261   < > 188   IRON  --   --   --   --  98   RETICABSCT  --   --  0.083  --   --    RETP  --   --  2.4  --   --    B12  --   --  311  --   --    FOLIC  --   --  7.8  --   --     < > = values in this interval not displayed.       ASSESSMENT/PLAN:    (R53.81) Physical deconditioning  (primary encounter diagnosis)  (M62.81) Generalized muscle weakness  Comment: Acute on chronic. S/T below diagnosis  Plan:   -Continue Physical therapy and Occupational therapy as directed  -SW to remain involved for safe discharge planning needs    (Z91.81) Personal history of fall  (S32.591D) Closed fracture of right inferior pubic ramus with routine healing, subsequent encounter  (S32.82XD) Multiple closed fractures of pelvis without disruption of pelvic ring with routine healing, subsequent encounter  (M80.00XS) Age-related osteoporosis with current pathological fracture, sequela  (R52) Pain  Comment: Acute on chronic. History of frequent falls. He was admitted to [facility] ED on 7/4/2022 with pelvic fractures (right pubic ramus and sacral ala) after a mechanical fall. Pelvic fractures did not required surgical intervention but were treated with pain management  Plan:   -Monitor pain complaints  -Continue Tylenol 500mg TID.   -Continue atarax PRN x 14 days  -Continue gabapentin 600mg TID  -Start diclofenac gel QID to painful areas  -Continue Percocet 1 tab TID PRN. Discontinue if non use. Recommend to  discontinue prior to discharge if able  -Robaxin 500mg BID  -BMP, CBC, magnesium, vitamin B12, vitamin D, and INR due 7/18/22    (J18.9) Pneumonia of right lower lobe due to infectious organism  (J90) Pleural effusion  Comment: Acute. He was also noted to have right lower lung pneumonia with small partially loculated pleural effusion on CT of chest. Pneumonia was treated with Rocephin and Zithromax and later Cefdinir. Pee was seen by Pulmonary medicine and 7-10 days of antibiotic was recommended. I have personally reviewed images for this patient.   Plan:   -BMP, CBC, magnesium, vitamin B12, vitamin D, and INR due 7/18/22  -Monitor respiratory status  -Continue cefdinir as directed for 4 additional days.   -Follow up within 3 month for repeat CT for resolution.   -Encourage incentive spirometry every 4 hours while awake.     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 Stable. Recent ECHO 1/2022-Normal left ventricular size and systolic performance with a visually  estimated ejection fraction of 65-70%. Previous INR subtherapeutic. Goal 2-3. Suspect antibiotic contributing. Previous dose as of 7/1/22 were: 3mg on tuesdays and 4.5mg AOD. He was discharged with 3mg daily however.  INR today-1.76  Plan:   -Coumadin-change to 4mg daily  -Follows by [facility] for INR orders post TCU  -BMP, CBC, magnesium, vitamin B12, vitamin D, and INR due 7/18/22  -Monitor falls  -Monitor for worsening s/sx of concerns  -Monitor bleeding risks.     (I50.42) Chronic combined systolic and diastolic congestive heart failure (H)  (I25.10) Coronary artery disease involving native coronary artery of native heart without angina pectoris  (E78.00) Hypercholesteremia  Comment: Chronic. Stable. s/p 2v CABG and mid RCA stent in 2016  Plan:   -Follow up with cardiology as directed  -Monitor BP and HR as  directed  -Daily weights as directed. Baseline weight 153#  -Continue daily asa  -Continue crestor daily  -Continue furosemide 40mg in AM and 20mg in afternoon  -Continue carvedilol 6.25mg BID. HOLD if SBP<100 and/or HR<55  -BMP, CBC, magnesium, vitamin B12, vitamin D, and INR due 7/18/22    (D62) Anemia due to blood loss, acute  Comment: Chronic. Baseline Hgb~10.7  Plan:   -Monitor bleeding risks  -Monitor for worsening s/sx of concerns.   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 Recent A1c on 5/24/22 was 6.3%. Goal <8%.   Plan:   -Monitor Blood Glucose QID as directed  -Continue sliding scale novolog at HS as directed.  -Continue metformin 1000mg BID   -HOLD frestyle jerson sensor while on TCU, may resume post discharge.   -Continue Novolog 70/30 mix as directed: 18units in AM and 15units with dinner. IF BG <100 THEN GIVE 1/2 DOSES OF 9 UNITS IN AM AND 7 UNITS IN PM   -Follow up with endocrinology as directed. Scheduled for 8/9/22  -BMP, CBC, magnesium, vitamin B12, vitamin D, and INR due 7/18/22    (R19.7) Diarrhea  Comment: Acute on chronic. Hospital course was complicated by some diarrhea. Stool was sent for Clostridium difficile testing which was negative. Diarrhea improved with prn Imodium. He is on scheduled senna however. May be side effect of antibiotic use.   Plan:  -Monitor BM status  -Imodium 2mg TID PRN  -Miralax 17gm daily PRN  -Change senna S to 1 tab BID PRN    (F33.9) MDD  Comment: Acute on chronic. Depressive moods.   Plan:  -Monitor mood and behaviors  -Monitor changes in mobility, eating and sleeping patterns  -Follow up with psych as directed. Scheduled virtual on 10/7/22  -Continue buspar 5mg BID  -BMP, CBC, magnesium, vitamin B12, vitamin  D, and INR due 7/18/22    (I73.9) PAD (peripheral artery disease) (H)  Comment: Chronic. s/p left SFA stenting history.   Plan:   -Monitor for worsening s/sx of concerns  -Continue Cilostazol as directed--Take 1 tablet (50 mg) by mouth daily for 6 days, THEN 1 tablet (50 mg) 2 times daily for 14 days, THEN 2 tablets (100 mg) 2 times daily for 30 days. (this was originally started by vascular on 6/28/22)  -Continue Tylenol as directed  -Follow up with PCP post TCU  -Follow up with vascular as directed. Scheduled for 10/10/22  -BMP, CBC, magnesium, vitamin B12, vitamin D, and INR due 7/18/22    (R11.0) Nausea  (K21.00)GERD  Comment: Acute on chronic. History of GERD at home with TUMS use. Currently on blood thinner, would recommend starting PPI product  Plan:  -Start zofran PRN  -Start omeprazole daily  -Monitor for worsening s/sx of concerns  -BMP, CBC, magnesium, vitamin B12, vitamin D, and INR due 7/18/22    Total time spent with patient visit at the skilled nursing facility was 60 min including patient visit and review of past records. Greater than 50% of total time spent with counseling and coordinating care with nursing staff due to the complexities of their diagnoses, review of HPI, development of POC, assisting HUC on appointment schedules, IDT rounding with discussion with rehab goals and patient progression to assist with coordination of care, and patient education and review of POC with patient which includes 20 min discussion with patient on: current medications/orders changes, recent blood work results, continued discharge plan/needs, subsequent treatment plan while in TCU and thereafter, current pain control plan and controlled substances ordered. Counseling on:* Diagnostic results, impressions, and/or recommended diagnostic studies, Prognosis, Risks and benefits of management (treatment) options, Instructions for management (treatment) and/or follow up, Importance of compliance with management  (treatment) options, and Risk factor reduction regarding above health concerns.     Electronically signed by:  BOBBY Bedolla DNP                    Sincerely,        BOBBY Bedolla CNP

## 2022-07-18 ENCOUNTER — TRANSITIONAL CARE UNIT VISIT (OUTPATIENT)
Dept: GERIATRICS | Facility: CLINIC | Age: 82
End: 2022-07-18
Payer: COMMERCIAL

## 2022-07-18 VITALS
TEMPERATURE: 97.3 F | HEART RATE: 72 BPM | SYSTOLIC BLOOD PRESSURE: 116 MMHG | WEIGHT: 159 LBS | RESPIRATION RATE: 18 BRPM | BODY MASS INDEX: 27.14 KG/M2 | HEIGHT: 64 IN | DIASTOLIC BLOOD PRESSURE: 60 MMHG | OXYGEN SATURATION: 94 %

## 2022-07-18 DIAGNOSIS — Z51.81 ENCOUNTER FOR THERAPEUTIC DRUG MONITORING: ICD-10-CM

## 2022-07-18 DIAGNOSIS — Z95.2 S/P TAVR (TRANSCATHETER AORTIC VALVE REPLACEMENT): ICD-10-CM

## 2022-07-18 DIAGNOSIS — I48.20 CHRONIC ATRIAL FIBRILLATION (H): Primary | ICD-10-CM

## 2022-07-18 DIAGNOSIS — Z79.01 CHRONIC ANTICOAGULATION: ICD-10-CM

## 2022-07-18 DIAGNOSIS — Z79.01 ANTICOAGULATED ON COUMADIN: ICD-10-CM

## 2022-07-18 DIAGNOSIS — E11.42 TYPE 2 DIABETES MELLITUS WITH PERIPHERAL NEUROPATHY (H): ICD-10-CM

## 2022-07-18 LAB
ANION GAP SERPL CALCULATED.3IONS-SCNC: 8 MMOL/L (ref 7–15)
BUN SERPL-MCNC: 14.3 MG/DL (ref 8–23)
CALCIUM SERPL-MCNC: 8.9 MG/DL (ref 8.8–10.2)
CHLORIDE SERPL-SCNC: 103 MMOL/L (ref 98–107)
CREAT SERPL-MCNC: 0.7 MG/DL (ref 0.67–1.17)
DEPRECATED CALCIDIOL+CALCIFEROL SERPL-MC: 42 UG/L (ref 20–75)
DEPRECATED HCO3 PLAS-SCNC: 29 MMOL/L (ref 22–29)
ERYTHROCYTE [DISTWIDTH] IN BLOOD BY AUTOMATED COUNT: 13.6 % (ref 10–15)
GFR SERPL CREATININE-BSD FRML MDRD: >90 ML/MIN/1.73M2
GLUCOSE SERPL-MCNC: 58 MG/DL (ref 70–99)
HCT VFR BLD AUTO: 34.6 % (ref 40–53)
HGB BLD-MCNC: 10.8 G/DL (ref 13.3–17.7)
INR PPP: 1.49 (ref 0.85–1.15)
MAGNESIUM SERPL-MCNC: 2 MG/DL (ref 1.7–2.3)
MCH RBC QN AUTO: 31.6 PG (ref 26.5–33)
MCHC RBC AUTO-ENTMCNC: 31.2 G/DL (ref 31.5–36.5)
MCV RBC AUTO: 101 FL (ref 78–100)
PLATELET # BLD AUTO: 244 10E3/UL (ref 150–450)
POTASSIUM SERPL-SCNC: 3.8 MMOL/L (ref 3.4–5.3)
RBC # BLD AUTO: 3.42 10E6/UL (ref 4.4–5.9)
SODIUM SERPL-SCNC: 140 MMOL/L (ref 136–145)
VIT B12 SERPL-MCNC: 755 PG/ML (ref 232–1245)
WBC # BLD AUTO: 6.9 10E3/UL (ref 4–11)

## 2022-07-18 PROCEDURE — 82306 VITAMIN D 25 HYDROXY: CPT | Performed by: NURSE PRACTITIONER

## 2022-07-18 PROCEDURE — 82607 VITAMIN B-12: CPT | Performed by: NURSE PRACTITIONER

## 2022-07-18 PROCEDURE — 83735 ASSAY OF MAGNESIUM: CPT | Performed by: NURSE PRACTITIONER

## 2022-07-18 PROCEDURE — P9604 ONE-WAY ALLOW PRORATED TRIP: HCPCS | Performed by: NURSE PRACTITIONER

## 2022-07-18 PROCEDURE — 85610 PROTHROMBIN TIME: CPT | Performed by: NURSE PRACTITIONER

## 2022-07-18 PROCEDURE — 85027 COMPLETE CBC AUTOMATED: CPT | Performed by: NURSE PRACTITIONER

## 2022-07-18 PROCEDURE — 99309 SBSQ NF CARE MODERATE MDM 30: CPT | Performed by: NURSE PRACTITIONER

## 2022-07-18 PROCEDURE — 36415 COLL VENOUS BLD VENIPUNCTURE: CPT | Performed by: NURSE PRACTITIONER

## 2022-07-18 PROCEDURE — 82310 ASSAY OF CALCIUM: CPT | Performed by: NURSE PRACTITIONER

## 2022-07-18 RX ORDER — WARFARIN SODIUM 1 MG/1
TABLET ORAL
Qty: 90 TABLET | Refills: 0 | Status: SHIPPED | OUTPATIENT
Start: 2022-07-18 | End: 2022-07-27

## 2022-07-18 NOTE — LETTER
"    7/18/2022        RE: Pee Ayala  [address]  [place] [address]        [facility]  [place] Medical Record Number:  [id number]  Place of Service where encounter took place: [facility] ([facility]) [[id number]]    HPI:    Pee Ayala is a 82 year old  (1940), who is being seen today for an episodic care visit at the above location. Today's concern is INR/Coumadin management for CHRIS. Fib    ROS/Subjective:  Bleeding Signs/Symptoms:  None  Thromboembolic Signs/Symptoms:  None  Medication Changes:  No  Dietary Changes:  No  Activity Changes: No  Bacterial/Viral Infection:  Yes: finished oral abx for pneumonia  Missed Coumadin Doses:  None  On ASA: Yes - 81 mg po q day  Other Concerns:  No    OBJECTIVE:  /60   Pulse 72   Temp 97.3  F (36.3  C)   Resp 18   Ht 1.626 m (5' 4\")   Wt 72.1 kg (159 lb)   SpO2 94%   BMI 27.29 kg/m    Last INR: 1.76 on 7/15/22  INR Today:  1.49  Current Dose:  4mg 3x weekly and 3mg AOD    ASSESSMENT:     Chronic atrial fibrillation (H)  S/P TAVR (transcatheter aortic valve replacement)  Anticoagulated on Coumadin  Encounter for therapeutic drug monitoring  Subtherapeutic INR for goal of 2-3    PLAN/ORDERS:   New Dose: Increase to 5mg MON/THUR and 4mg AOD    Next INR: 1 week 7/25/22    Electronically signed by:  Tata Blood DNP, APRN          Sincerely,        BOBBY Bedolla CNP    "

## 2022-07-18 NOTE — PROGRESS NOTES
"[facility] GERIATRICS  [place] Medical Record Number:  [id number]  Place of Service where encounter took place: [facility] ([facility]) [[id number]]    HPI:    Pee Ayala is a 82 year old  (1940), who is being seen today for an episodic care visit at the above location. Today's concern is INR/Coumadin management for EDITH Mccallum    ROS/Subjective:  Bleeding Signs/Symptoms:  None  Thromboembolic Signs/Symptoms:  None  Medication Changes:  No  Dietary Changes:  No  Activity Changes: No  Bacterial/Viral Infection:  Yes: finished oral abx for pneumonia  Missed Coumadin Doses:  None  On ASA: Yes - 81 mg po q day  Other Concerns:  No    OBJECTIVE:  /60   Pulse 72   Temp 97.3  F (36.3  C)   Resp 18   Ht 1.626 m (5' 4\")   Wt 72.1 kg (159 lb)   SpO2 94%   BMI 27.29 kg/m    Last INR: 1.76 on 7/15/22  INR Today:  1.49  Current Dose:  4mg 3x weekly and 3mg AOD    ASSESSMENT:     Chronic atrial fibrillation (H)  S/P TAVR (transcatheter aortic valve replacement)  Anticoagulated on Coumadin  Encounter for therapeutic drug monitoring  Subtherapeutic INR for goal of 2-3    PLAN/ORDERS:   New Dose: Increase to 5mg MON/THUR and 4mg AOD    Next INR: 1 week 7/25/22    Electronically signed by:  Tata Blood DNP, APRN    "

## 2022-07-20 NOTE — PROGRESS NOTES
[facility] GERIATRICS    Chief Complaint   Patient presents with     RECHECK     HPI:  Pee Ayala is a 82 year old  (1940), who is being seen today for an episodic care visit at: [facility] ([facility]) [[id number]]. Today's concern is: The primary encounter diagnosis was Chronic atrial fibrillation (H). 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), Chronic anticoagulation, Physical deconditioning, Generalized muscle weakness, Personal history of fall, Closed fracture of right inferior pubic ramus with routine healing, subsequent encounter, Multiple closed fractures of pelvis without disruption of pelvic ring with routine healing, subsequent encounter, Age-related osteoporosis with current pathological fracture, sequela, Pain, Pneumonia of right lower lobe due to infectious organism, Pleural effusion, Chronic combined systolic and diastolic congestive heart failure (H), Coronary artery disease involving native coronary artery of native heart without angina pectoris, Anemia due to blood loss, acute, 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    Today:   Pee is stating he's doing relatively well, continues to work with therapy and reporting good progress. He is fearful about another occurrence of falling after leaving next week as this admission is s/t to fall at home after leaving [facility] prior to fall at home which resulted in R hip fracture. His pan is well  "managed, just left therapy and reporting it at 3/10. He continues to take PRN medications as needed but states he tries to go without. Currently pedaling himself around the facility with wheelchair as primary mode of transportation but does ambulate with walker as assistance is available. Baseline neuropathy in BLEs and hands remains unchanged. Appetite reported to be good, denies nausea and vomiting this week but did have some episodes prior week. BMs regular, reports 2 soft BMs/ day.Last antibiotic regimen ended 7/16 and pt has not been getting any senokot or miralax. Will continue to monitor. Voiding spontaneously, denies dysuria. Reports sleeping well despite noises of facility. HR irregular, consistent with afib, denies chest pain or SOB. Denies dizziness or feeling lightheaded. 1+ edema in ankles, baseline.     BP Readings from Last 3 Encounters:   07/22/22 121/57   07/18/22 116/60   07/15/22 135/65     Wt Readings from Last 5 Encounters:   07/22/22 72.1 kg (159 lb)   07/18/22 72.1 kg (159 lb)   07/15/22 71.6 kg (157 lb 12.8 oz)   07/11/22 69.8 kg (153 lb 14.4 oz)   07/04/22 89.4 kg (197 lb)     Allergies, and PMH/PSH reviewed in EPIC today.  REVIEW OF SYSTEMS:  10 point ROS of systems including Constitutional, Eyes, Respiratory, Cardiovascular, Gastroenterology, Genitourinary, Integumentary, Musculoskeletal, Psychiatric were all negative except for pertinent positives noted in my HPI.    Objective:   /57   Pulse 81   Temp 97.6  F (36.4  C)   Resp 18   Ht 1.626 m (5' 4\")   Wt 72.1 kg (159 lb)   SpO2 97%   BMI 27.29 kg/m    GENERAL APPEARANCE:  Alert, in no distress  ENT:  Mouth and posterior oropharynx normal, moist mucous membranes, normal hearing acuity  EYES:  EOM, conjunctivae, lids, pupils and irises normal  NECK:  No adenopathy,masses or thyromegaly  RESP:  respiratory effort and palpation of chest normal, lungs clear to auscultation   CV:  Palpation and auscultation of heart done , regular " rate and rhythm, no murmur, rub, or gallop, peripheral edema 1+ in ankles  ABDOMEN:  normal bowel sounds, soft, nontender, no hepatosplenomegaly or other masses, reporting 1-2 loose BMs/ day  :    voiding spontaneously, denies dysuria  M/S:   Gait and station abnormal guarded d/t bilateral hip injuries/ surgeries  SKIN:  Inspection of skin and subcutaneous tissue baseline, scab noted on back of head, R elbow and L tibial area  NEURO:   Cranial nerves 2-12 are normal tested and grossly at patient's baseline  PSYCH:  oriented X 3, normal insight, judgement and memory      Most Recent 3 CBC's:  Recent Labs   Lab Test 07/18/22  0636 07/12/22  0621 07/11/22  1048   WBC 6.9 7.3 9.5   HGB 10.8* 10.7* 10.8*   * 102* 103*    216 219     Most Recent 3 BMP's:  Recent Labs   Lab Test 07/18/22  0636 07/15/22  0625 07/12/22  1100 07/12/22  0803 07/12/22  0621    136  --   --  137   POTASSIUM 3.8 3.7  --   --  3.6   CHLORIDE 103 100  --   --  103   CO2 29 27  --   --  27   BUN 14.3 12.7  --   --  13   CR 0.70 0.74  --   --  0.73   ANIONGAP 8 9  --   --  7   RACHEL 8.9 8.8  --   --  8.9   GLC 58* 113* 303*   < > 119*    < > = values in this interval not displayed.     Most Recent Cholesterol Panel:  Recent Labs   Lab Test 01/25/22  1443 09/16/21  1026   CHOL  --  117   LDL 65 48   HDL  --  43   TRIG  --  131     Most Recent Hemoglobin A1c:  Recent Labs   Lab Test 05/24/22  1023   A1C 6.3*     Most Recent Anemia Panel:  Recent Labs   Lab Test 07/18/22  0636 07/04/22  1034 06/03/22  0959 01/06/21  1000 01/02/20  1115   WBC 6.9   < > 6.8   < > 6.2   HGB 10.8*   < > 11.7*   < > 14.5   HCT 34.6*   < > 37.0*   < > 43.0   *   < > 107*   < > 101*      < > 261   < > 188   IRON  --   --   --   --  98   RETICABSCT  --   --  0.083  --   --    RETP  --   --  2.4  --   --    B12 755  --  311  --   --    FOLIC  --   --  7.8  --   --     < > = values in this interval not displayed.     INR   Date Value Ref Range  Status   07/18/2022 1.49 (H) 0.85 - 1.15 Final   01/13/2021 1.13 0.86 - 1.14 Final     INR (External)   Date Value Ref Range Status   07/01/2022 1.8 (A) 2 - 3 Final          ASSESSMENT/PLAN:     (R53.81) Physical deconditioning  (primary encounter diagnosis)  (M62.81) Generalized muscle weakness  Comment: Acute on chronic. S/T below diagnosis. SLUMS 23/30. Ambulating 85 feet with CGA  Plan:   -Continue Physical therapy and Occupational therapy as directed  -SW to remain involved for safe discharge planning needs     (Z91.81) Personal history of fall  (S32.591D) Closed fracture of right inferior pubic ramus with routine healing, subsequent encounter  (S32.82XD) Multiple closed fractures of pelvis without disruption of pelvic ring with routine healing, subsequent encounter  (M80.00XS) Age-related osteoporosis with current pathological fracture, sequela  (R52) Pain  Comment: Acute on chronic. History of frequent falls. He was admitted to [facility] ED on 7/4/2022 with pelvic fractures (right pubic ramus and sacral ala) after a mechanical fall. Pelvic fractures did not required surgical intervention but were treated with pain management  Plan:   -Monitor pain complaints  -Continue Tylenol 500mg TID.   -Continue atarax PRN x 14 days  -Continue gabapentin 600mg TID  -Continue diclofenac gel QID to painful areas  -Continue Percocet 1 tab TID PRN. Discontinue if non use. Recommend to discontinue prior to discharge if able  -Robaxin 500mg BID  -BMP and INR due 7/25/22  -Trend all other labs post TCU with PCP     (J18.9) Pneumonia of right lower lobe due to infectious organism  (J90) Pleural effusion  Comment: Acute. He was also noted to have right lower lung pneumonia with small partially loculated pleural effusion on CT of chest. Pneumonia was treated with Rocephin and Zithromax and later Cefdinir. RESOLVED  Plan:   -BMP and INR due 7/25/22  -Trend all other labs post TCU with PCP  -Monitor respiratory status.   -Follow up within  3 month for repeat CT for resolution.   -Encourage incentive spirometry every 4 hours while awake.      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 Stable. Recent ECHO 1/2022-Normal left ventricular size and systolic performance with a visually  estimated ejection fraction of 65-70%. Previous INR subtherapeutic. Goal 2-3.  INR 7/18-1.76, next due 7/25  Plan:   -Coumadin 4mg daily. INR due 7/25/22 for further dosing  -Followed by [facility] for INR orders post TCU  -Follow-up with PCP for future labs as needed  -Monitor falls  -Monitor for worsening s/sx of concerns  -Monitor bleeding risks.      (I50.42) Chronic combined systolic and diastolic congestive heart failure (H)  (I25.10) Coronary artery disease involving native coronary artery of native heart without angina pectoris  (E78.00) Hypercholesteremia  Comment: Chronic. Stable. s/p 2v CABG and mid RCA stent in 2016  Plan:   -Follow up with cardiology as directed  -Monitor BP and HR as directed  -Daily weights as directed. Baseline weight 153#  -Continue daily asa  -Continue crestor daily  -Continue furosemide 40mg in AM and 20mg in afternoon  -Continue carvedilol 6.25mg BID. HOLD if SBP<100 and/or HR<55  -BMP and INR due 7/25/22  -Trend all other labs post TCU with PCP     (D62) Anemia due to blood loss, acute  Comment: Chronic. Baseline Hgb~10.7  Plan:   -Monitor bleeding risks  -Monitor for worsening s/sx of concerns.   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 Recent A1c on 5/24/22 was 6.3%. Goal <8%.   Plan:   -Monitor Blood Glucose QID as directed  -Continue sliding scale  novolog at HS as directed.  -Continue metformin 1000mg BID   -HOLD frestyle jerson sensor while on TCU, may resume post discharge.   -Continue Novolog 70/30 mix as directed: 18units in AM and 15units with dinner. IF BG <100 THEN GIVE 1/2 DOSES OF 9 UNITS IN AM AND 7 UNITS IN PM   -Follow up with endocrinology as directed. Scheduled for 8/9/22  -BMP and INR due 7/25/22  -Trend all other labs post TCU with PCP     (R19.7) Diarrhea  Comment: Acute on chronic. Continues to have 2 loose BMs/ day, is not taking PRNs   Plan:  -Monitor BM status  -Imodium 2mg TID PRN  -Miralax 17gm daily PRN  -Senna S to 1 tab BID PRN  -BMP and INR due 7/25/22  -Trend all other labs post TCU with PCP     (F33.9) MDD  Comment: Acute on chronic. Depressive moods.   Plan:  -Monitor mood and behaviors  -Monitor changes in mobility, eating and sleeping patterns  -Follow up with psych as directed. Scheduled virtual on 10/7/22  -Continue buspar 5mg BID  -BMP and INR due 7/25/22  -Trend all other labs post TCU with PCP     (I73.9) PAD (peripheral artery disease) (H)  Comment: Chronic. s/p left SFA stenting history.   Plan:   -Monitor for worsening s/sx of concerns  -Continue Cilostazol as directed--Take 1 tablet (50 mg) by mouth daily for 6 days, THEN 1 tablet (50 mg) 2 times daily for 14 days, THEN 2 tablets (100 mg) 2 times daily for 30 days. (this was originally started by vascular on 6/28/22)  -Continue Tylenol as directed  -Follow up with PCP post TCU  -Follow up with vascular as directed. Scheduled for 10/10/22  -BMP and INR due 7/25/22  -Trend all other labs post TCU with PCP     (R11.0) Nausea  (K21.00)GERD  Comment: Acute on chronic. History of GERD at home with TUMS use. Currently on blood thinner  Plan:  -Continue zofran PRN  -Continue omeprazole daily  -Monitor for worsening s/sx of concerns  -BMP and INR due 7/25/22  -Trend all other labs post TCU with PCP     Electronically signed by:  Tata Blood DNP, APRN

## 2022-07-21 ENCOUNTER — LAB REQUISITION (OUTPATIENT)
Dept: LAB | Facility: CLINIC | Age: 82
End: 2022-07-21
Payer: COMMERCIAL

## 2022-07-21 DIAGNOSIS — I10 ESSENTIAL (PRIMARY) HYPERTENSION: ICD-10-CM

## 2022-07-21 DIAGNOSIS — I48.20 CHRONIC ATRIAL FIBRILLATION, UNSPECIFIED (H): ICD-10-CM

## 2022-07-22 ENCOUNTER — TRANSITIONAL CARE UNIT VISIT (OUTPATIENT)
Dept: GERIATRICS | Facility: CLINIC | Age: 82
End: 2022-07-22
Payer: COMMERCIAL

## 2022-07-22 VITALS
OXYGEN SATURATION: 97 % | HEART RATE: 81 BPM | HEIGHT: 64 IN | BODY MASS INDEX: 27.14 KG/M2 | TEMPERATURE: 97.6 F | SYSTOLIC BLOOD PRESSURE: 121 MMHG | RESPIRATION RATE: 18 BRPM | DIASTOLIC BLOOD PRESSURE: 57 MMHG | WEIGHT: 159 LBS

## 2022-07-22 DIAGNOSIS — I73.9 PAD (PERIPHERAL ARTERY DISEASE) (H): ICD-10-CM

## 2022-07-22 DIAGNOSIS — E78.00 HYPERCHOLESTEREMIA: ICD-10-CM

## 2022-07-22 DIAGNOSIS — S32.82XD MULTIPLE CLOSED FRACTURES OF PELVIS WITHOUT DISRUPTION OF PELVIC RING WITH ROUTINE HEALING, SUBSEQUENT ENCOUNTER: ICD-10-CM

## 2022-07-22 DIAGNOSIS — R53.81 PHYSICAL DECONDITIONING: ICD-10-CM

## 2022-07-22 DIAGNOSIS — J90 PLEURAL EFFUSION: ICD-10-CM

## 2022-07-22 DIAGNOSIS — G62.9 POLYNEUROPATHY: ICD-10-CM

## 2022-07-22 DIAGNOSIS — Z95.2 S/P TAVR (TRANSCATHETER AORTIC VALVE REPLACEMENT): ICD-10-CM

## 2022-07-22 DIAGNOSIS — D62 ANEMIA DUE TO BLOOD LOSS, ACUTE: ICD-10-CM

## 2022-07-22 DIAGNOSIS — Z91.81 PERSONAL HISTORY OF FALL: ICD-10-CM

## 2022-07-22 DIAGNOSIS — J18.9 PNEUMONIA OF RIGHT LOWER LOBE DUE TO INFECTIOUS ORGANISM: ICD-10-CM

## 2022-07-22 DIAGNOSIS — Z79.01 ANTICOAGULATED ON COUMADIN: ICD-10-CM

## 2022-07-22 DIAGNOSIS — I48.20 CHRONIC ATRIAL FIBRILLATION (H): Primary | ICD-10-CM

## 2022-07-22 DIAGNOSIS — I25.10 CORONARY ARTERY DISEASE INVOLVING NATIVE CORONARY ARTERY OF NATIVE HEART WITHOUT ANGINA PECTORIS: ICD-10-CM

## 2022-07-22 DIAGNOSIS — M80.00XS AGE-RELATED OSTEOPOROSIS WITH CURRENT PATHOLOGICAL FRACTURE, SEQUELA: ICD-10-CM

## 2022-07-22 DIAGNOSIS — R52 PAIN: ICD-10-CM

## 2022-07-22 DIAGNOSIS — R11.0 NAUSEA: ICD-10-CM

## 2022-07-22 DIAGNOSIS — E66.01 MORBID OBESITY (H): ICD-10-CM

## 2022-07-22 DIAGNOSIS — F33.9 EPISODE OF RECURRENT MAJOR DEPRESSIVE DISORDER, UNSPECIFIED DEPRESSION EPISODE SEVERITY (H): ICD-10-CM

## 2022-07-22 DIAGNOSIS — R19.7 DIARRHEA, UNSPECIFIED TYPE: ICD-10-CM

## 2022-07-22 DIAGNOSIS — M62.81 GENERALIZED MUSCLE WEAKNESS: ICD-10-CM

## 2022-07-22 DIAGNOSIS — E11.42 TYPE 2 DIABETES MELLITUS WITH PERIPHERAL NEUROPATHY (H): ICD-10-CM

## 2022-07-22 DIAGNOSIS — Z79.01 CHRONIC ANTICOAGULATION: ICD-10-CM

## 2022-07-22 DIAGNOSIS — S32.591D CLOSED FRACTURE OF RIGHT INFERIOR PUBIC RAMUS WITH ROUTINE HEALING, SUBSEQUENT ENCOUNTER: ICD-10-CM

## 2022-07-22 DIAGNOSIS — I50.42 CHRONIC COMBINED SYSTOLIC AND DIASTOLIC CONGESTIVE HEART FAILURE (H): ICD-10-CM

## 2022-07-22 DIAGNOSIS — E11.3599 TYPE 2 DIABETES MELLITUS WITH PROLIFERATIVE RETINOPATHY WITHOUT MACULAR EDEMA, WITHOUT LONG-TERM CURRENT USE OF INSULIN, UNSPECIFIED LATERALITY (H): ICD-10-CM

## 2022-07-22 DIAGNOSIS — K21.00 GASTROESOPHAGEAL REFLUX DISEASE WITH ESOPHAGITIS, UNSPECIFIED WHETHER HEMORRHAGE: ICD-10-CM

## 2022-07-22 PROCEDURE — 99309 SBSQ NF CARE MODERATE MDM 30: CPT | Performed by: NURSE PRACTITIONER

## 2022-07-22 NOTE — LETTER
7/22/2022        RE: Pee Ayala  [address]  [place] [address]        M [facility] GERIATRICS    Chief Complaint   Patient presents with     RECHECK     HPI:  Pee Ayala is a 82 year old  (1940), who is being seen today for an episodic care visit at: [facility] ([facility] [place]) [[id number]]. Today's concern is: The primary encounter diagnosis was Chronic atrial fibrillation (H). 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), Chronic anticoagulation, Physical deconditioning, Generalized muscle weakness, Personal history of fall, Closed fracture of right inferior pubic ramus with routine healing, subsequent encounter, Multiple closed fractures of pelvis without disruption of pelvic ring with routine healing, subsequent encounter, Age-related osteoporosis with current pathological fracture, sequela, Pain, Pneumonia of right lower lobe due to infectious organism, Pleural effusion, Chronic combined systolic and diastolic congestive heart failure (H), Coronary artery disease involving native coronary artery of native heart without angina pectoris, Anemia due to blood loss, acute, 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    Today:   Pee is stating he's doing relatively well, continues to work with therapy and reporting good progress. He is fearful about another occurrence of falling after leaving next week as this admission is s/t to fall at home  "after leaving TCU prior to fall at home which resulted in R hip fracture. His pan is well managed, just left therapy and reporting it at 3/10. He continues to take PRN medications as needed but states he tries to go without. Currently pedaling himself around the facility with wheelchair as primary mode of transportation but does ambulate with walker as assistance is available. Baseline neuropathy in BLEs and hands remains unchanged. Appetite reported to be good, denies nausea and vomiting this week but did have some episodes prior week. BMs regular, reports 2 soft BMs/ day.Last antibiotic regimen ended 7/16 and pt has not been getting any senokot or miralax. Will continue to monitor. Voiding spontaneously, denies dysuria. Reports sleeping well despite noises of facility. HR irregular, consistent with afib, denies chest pain or SOB. Denies dizziness or feeling lightheaded. 1+ edema in ankles, baseline.     BP Readings from Last 3 Encounters:   07/22/22 121/57   07/18/22 116/60   07/15/22 135/65     Wt Readings from Last 5 Encounters:   07/22/22 72.1 kg (159 lb)   07/18/22 72.1 kg (159 lb)   07/15/22 71.6 kg (157 lb 12.8 oz)   07/11/22 69.8 kg (153 lb 14.4 oz)   07/04/22 89.4 kg (197 lb)     Allergies, and PMH/PSH reviewed in EPIC today.  REVIEW OF SYSTEMS:  10 point ROS of systems including Constitutional, Eyes, Respiratory, Cardiovascular, Gastroenterology, Genitourinary, Integumentary, Musculoskeletal, Psychiatric were all negative except for pertinent positives noted in my HPI.    Objective:   /57   Pulse 81   Temp 97.6  F (36.4  C)   Resp 18   Ht 1.626 m (5' 4\")   Wt 72.1 kg (159 lb)   SpO2 97%   BMI 27.29 kg/m    GENERAL APPEARANCE:  Alert, in no distress  ENT:  Mouth and posterior oropharynx normal, moist mucous membranes, normal hearing acuity  EYES:  EOM, conjunctivae, lids, pupils and irises normal  NECK:  No adenopathy,masses or thyromegaly  RESP:  respiratory effort and palpation of chest " normal, lungs clear to auscultation   CV:  Palpation and auscultation of heart done , regular rate and rhythm, no murmur, rub, or gallop, peripheral edema 1+ in ankles  ABDOMEN:  normal bowel sounds, soft, nontender, no hepatosplenomegaly or other masses, reporting 1-2 loose BMs/ day  :    voiding spontaneously, denies dysuria  M/S:   Gait and station abnormal guarded d/t bilateral hip injuries/ surgeries  SKIN:  Inspection of skin and subcutaneous tissue baseline, scab noted on back of head, R elbow and L tibial area  NEURO:   Cranial nerves 2-12 are normal tested and grossly at patient's baseline  PSYCH:  oriented X 3, normal insight, judgement and memory      Most Recent 3 CBC's:  Recent Labs   Lab Test 07/18/22  0636 07/12/22  0621 07/11/22  1048   WBC 6.9 7.3 9.5   HGB 10.8* 10.7* 10.8*   * 102* 103*    216 219     Most Recent 3 BMP's:  Recent Labs   Lab Test 07/18/22  0636 07/15/22  0625 07/12/22  1100 07/12/22  0803 07/12/22  0621    136  --   --  137   POTASSIUM 3.8 3.7  --   --  3.6   CHLORIDE 103 100  --   --  103   CO2 29 27  --   --  27   BUN 14.3 12.7  --   --  13   CR 0.70 0.74  --   --  0.73   ANIONGAP 8 9  --   --  7   RACHEL 8.9 8.8  --   --  8.9   GLC 58* 113* 303*   < > 119*    < > = values in this interval not displayed.     Most Recent Cholesterol Panel:  Recent Labs   Lab Test 01/25/22  1443 09/16/21  1026   CHOL  --  117   LDL 65 48   HDL  --  43   TRIG  --  131     Most Recent Hemoglobin A1c:  Recent Labs   Lab Test 05/24/22  1023   A1C 6.3*     Most Recent Anemia Panel:  Recent Labs   Lab Test 07/18/22  0636 07/04/22  1034 06/03/22  0959 01/06/21  1000 01/02/20  1115   WBC 6.9   < > 6.8   < > 6.2   HGB 10.8*   < > 11.7*   < > 14.5   HCT 34.6*   < > 37.0*   < > 43.0   *   < > 107*   < > 101*      < > 261   < > 188   IRON  --   --   --   --  98   RETICABSCT  --   --  0.083  --   --    RETP  --   --  2.4  --   --    B12 755  --  311  --   --    FOLIC  --   --   7.8  --   --     < > = values in this interval not displayed.     INR   Date Value Ref Range Status   07/18/2022 1.49 (H) 0.85 - 1.15 Final   01/13/2021 1.13 0.86 - 1.14 Final     INR (External)   Date Value Ref Range Status   07/01/2022 1.8 (A) 2 - 3 Final          ASSESSMENT/PLAN:     (R53.81) Physical deconditioning  (primary encounter diagnosis)  (M62.81) Generalized muscle weakness  Comment: Acute on chronic. S/T below diagnosis. SLUMS 23/30. Ambulating 85 feet with CGA  Plan:   -Continue Physical therapy and Occupational therapy as directed  -SW to remain involved for safe discharge planning needs     (Z91.81) Personal history of fall  (S32.591D) Closed fracture of right inferior pubic ramus with routine healing, subsequent encounter  (S32.82XD) Multiple closed fractures of pelvis without disruption of pelvic ring with routine healing, subsequent encounter  (M80.00XS) Age-related osteoporosis with current pathological fracture, sequela  (R52) Pain  Comment: Acute on chronic. History of frequent falls. He was admitted to [facility] ED on 7/4/2022 with pelvic fractures (right pubic ramus and sacral ala) after a mechanical fall. Pelvic fractures did not required surgical intervention but were treated with pain management  Plan:   -Monitor pain complaints  -Continue Tylenol 500mg TID.   -Continue atarax PRN x 14 days  -Continue gabapentin 600mg TID  -Continue diclofenac gel QID to painful areas  -Continue Percocet 1 tab TID PRN. Discontinue if non use. Recommend to discontinue prior to discharge if able  -Robaxin 500mg BID  -BMP and INR due 7/25/22  -Trend all other labs post TCU with PCP     (J18.9) Pneumonia of right lower lobe due to infectious organism  (J90) Pleural effusion  Comment: Acute. He was also noted to have right lower lung pneumonia with small partially loculated pleural effusion on CT of chest. Pneumonia was treated with Rocephin and Zithromax and later Cefdinir. RESOLVED  Plan:   -BMP and INR due  7/25/22  -Trend all other labs post TCU with PCP  -Monitor respiratory status.   -Follow up within 3 month for repeat CT for resolution.   -Encourage incentive spirometry every 4 hours while awake.      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 Stable. Recent ECHO 1/2022-Normal left ventricular size and systolic performance with a visually  estimated ejection fraction of 65-70%. Previous INR subtherapeutic. Goal 2-3.  INR 7/18-1.76, next due 7/25  Plan:   -Coumadin 4mg daily. INR due 7/25/22 for further dosing  -Followed by [place] [facility] for INR orders post TCU  -Follow-up with PCP for future labs as needed  -Monitor falls  -Monitor for worsening s/sx of concerns  -Monitor bleeding risks.      (I50.42) Chronic combined systolic and diastolic congestive heart failure (H)  (I25.10) Coronary artery disease involving native coronary artery of native heart without angina pectoris  (E78.00) Hypercholesteremia  Comment: Chronic. Stable. s/p 2v CABG and mid RCA stent in 2016  Plan:   -Follow up with cardiology as directed  -Monitor BP and HR as directed  -Daily weights as directed. Baseline weight 153#  -Continue daily asa  -Continue crestor daily  -Continue furosemide 40mg in AM and 20mg in afternoon  -Continue carvedilol 6.25mg BID. HOLD if SBP<100 and/or HR<55  -BMP and INR due 7/25/22  -Trend all other labs post TCU with PCP     (D62) Anemia due to blood loss, acute  Comment: Chronic. Baseline Hgb~10.7  Plan:   -Monitor bleeding risks  -Monitor for worsening s/sx of concerns.   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 Recent A1c on  5/24/22 was 6.3%. Goal <8%.   Plan:   -Monitor Blood Glucose QID as directed  -Continue sliding scale novolog at HS as directed.  -Continue metformin 1000mg BID   -HOLD frestyle jerson sensor while on TCU, may resume post discharge.   -Continue Novolog 70/30 mix as directed: 18units in AM and 15units with dinner. IF BG <100 THEN GIVE 1/2 DOSES OF 9 UNITS IN AM AND 7 UNITS IN PM   -Follow up with endocrinology as directed. Scheduled for 8/9/22  -BMP and INR due 7/25/22  -Trend all other labs post TCU with PCP     (R19.7) Diarrhea  Comment: Acute on chronic. Continues to have 2 loose BMs/ day, is not taking PRNs   Plan:  -Monitor BM status  -Imodium 2mg TID PRN  -Miralax 17gm daily PRN  -Senna S to 1 tab BID PRN  -BMP and INR due 7/25/22  -Trend all other labs post TCU with PCP     (F33.9) MDD  Comment: Acute on chronic. Depressive moods.   Plan:  -Monitor mood and behaviors  -Monitor changes in mobility, eating and sleeping patterns  -Follow up with psych as directed. Scheduled virtual on 10/7/22  -Continue buspar 5mg BID  -BMP and INR due 7/25/22  -Trend all other labs post TCU with PCP     (I73.9) PAD (peripheral artery disease) (H)  Comment: Chronic. s/p left SFA stenting history.   Plan:   -Monitor for worsening s/sx of concerns  -Continue Cilostazol as directed--Take 1 tablet (50 mg) by mouth daily for 6 days, THEN 1 tablet (50 mg) 2 times daily for 14 days, THEN 2 tablets (100 mg) 2 times daily for 30 days. (this was originally started by vascular on 6/28/22)  -Continue Tylenol as directed  -Follow up with PCP post TCU  -Follow up with vascular as directed. Scheduled for 10/10/22  -BMP and INR due 7/25/22  -Trend all other labs post TCU with PCP     (R11.0) Nausea  (K21.00)GERD  Comment: Acute on chronic. History of GERD at home with TUMS use. Currently on blood thinner  Plan:  -Continue zofran PRN  -Continue omeprazole daily  -Monitor for worsening s/sx of concerns  -BMP and INR due 7/25/22  -Trend all other  labs post TCU with PCP     Electronically signed by:  Tata Blood DNP, APRN        Sincerely,        BOBBY Bedolla CNP

## 2022-07-25 ENCOUNTER — TRANSITIONAL CARE UNIT VISIT (OUTPATIENT)
Dept: GERIATRICS | Facility: CLINIC | Age: 82
End: 2022-07-25
Payer: COMMERCIAL

## 2022-07-25 VITALS
SYSTOLIC BLOOD PRESSURE: 115 MMHG | OXYGEN SATURATION: 96 % | BODY MASS INDEX: 26.65 KG/M2 | HEART RATE: 75 BPM | HEIGHT: 64 IN | WEIGHT: 156.1 LBS | TEMPERATURE: 96.9 F | DIASTOLIC BLOOD PRESSURE: 59 MMHG | RESPIRATION RATE: 18 BRPM

## 2022-07-25 DIAGNOSIS — Z79.01 ANTICOAGULATED ON COUMADIN: ICD-10-CM

## 2022-07-25 DIAGNOSIS — I48.20 CHRONIC ATRIAL FIBRILLATION (H): Primary | ICD-10-CM

## 2022-07-25 DIAGNOSIS — Z51.81 ENCOUNTER FOR THERAPEUTIC DRUG MONITORING: ICD-10-CM

## 2022-07-25 DIAGNOSIS — Z95.2 S/P TAVR (TRANSCATHETER AORTIC VALVE REPLACEMENT): ICD-10-CM

## 2022-07-25 LAB
ANION GAP SERPL CALCULATED.3IONS-SCNC: 13 MMOL/L (ref 7–15)
BUN SERPL-MCNC: 16 MG/DL (ref 8–23)
CALCIUM SERPL-MCNC: 8.9 MG/DL (ref 8.8–10.2)
CHLORIDE SERPL-SCNC: 100 MMOL/L (ref 98–107)
CREAT SERPL-MCNC: 0.74 MG/DL (ref 0.67–1.17)
DEPRECATED HCO3 PLAS-SCNC: 25 MMOL/L (ref 22–29)
GFR SERPL CREATININE-BSD FRML MDRD: >90 ML/MIN/1.73M2
GLUCOSE SERPL-MCNC: 162 MG/DL (ref 70–99)
INR PPP: 2.68 (ref 0.85–1.15)
POTASSIUM SERPL-SCNC: 3.7 MMOL/L (ref 3.4–5.3)
SODIUM SERPL-SCNC: 138 MMOL/L (ref 136–145)

## 2022-07-25 PROCEDURE — 80048 BASIC METABOLIC PNL TOTAL CA: CPT | Performed by: NURSE PRACTITIONER

## 2022-07-25 PROCEDURE — 36415 COLL VENOUS BLD VENIPUNCTURE: CPT | Performed by: NURSE PRACTITIONER

## 2022-07-25 PROCEDURE — 85610 PROTHROMBIN TIME: CPT | Performed by: NURSE PRACTITIONER

## 2022-07-25 PROCEDURE — 99308 SBSQ NF CARE LOW MDM 20: CPT | Performed by: NURSE PRACTITIONER

## 2022-07-25 PROCEDURE — P9604 ONE-WAY ALLOW PRORATED TRIP: HCPCS | Performed by: NURSE PRACTITIONER

## 2022-07-25 NOTE — PROGRESS NOTES
"[facility] GERIATRICS  [place] Medical Record Number:  [id number]  Place of Service where encounter took place: [facility] ([facility]) [[id number]]    HPI:    Pee Ayala is a 82 year old  (1940), who is being seen today for an episodic care visit at the above location. Today's concern is INR/Coumadin management for EDITH Fib    ROS/Subjective:  Bleeding Signs/Symptoms:  None  Thromboembolic Signs/Symptoms:  None  Medication Changes:  No  Dietary Changes:  No  Activity Changes: No  Bacterial/Viral Infection:  No  Missed Coumadin Doses:  None  On ASA: Yes - 81 mg po q day  Other Concerns:  No    OBJECTIVE:  /59   Pulse 75   Temp 96.9  F (36.1  C)   Resp 18   Ht 1.626 m (5' 4\")   Wt 70.8 kg (156 lb 1.6 oz)   SpO2 96%   BMI 26.79 kg/m    Last INR: 1.49 on 7/18/22  INR Today:  2.68  Current Dose:  5mg MON/THUR and 4mg AOD      ASSESSMENT:     Chronic atrial fibrillation (H)  Anticoagulated on Coumadin  S/P TAVR (transcatheter aortic valve replacement)  Encounter for therapeutic drug monitoring  Therapeutic INR for goal of 2-3    PLAN/ORDERS:   New Dose: No Change    Next INR: 1 week 8/1/22 with coumadin clinic as he is discharging home 7/29/22    Electronically signed by:  Tata Blood DNP, APRN      "

## 2022-07-25 NOTE — LETTER
"    7/25/2022        RE: Pee Ayala  [address]  [place] [address]        [facility]  [place] [facility] Record Number:  [id number]  Place of Service where encounter took place: [facility] ([facility]) [[id number]]    HPI:    Pee Ayala is a 82 year old  (1940), who is being seen today for an episodic care visit at the above location. Today's concern is INR/Coumadin management for CHRIS. Fib    ROS/Subjective:  Bleeding Signs/Symptoms:  None  Thromboembolic Signs/Symptoms:  None  Medication Changes:  No  Dietary Changes:  No  Activity Changes: No  Bacterial/Viral Infection:  No  Missed Coumadin Doses:  None  On ASA: Yes - 81 mg po q day  Other Concerns:  No    OBJECTIVE:  /59   Pulse 75   Temp 96.9  F (36.1  C)   Resp 18   Ht 1.626 m (5' 4\")   Wt 70.8 kg (156 lb 1.6 oz)   SpO2 96%   BMI 26.79 kg/m    Last INR: 1.49 on 7/18/22  INR Today:  2.68  Current Dose:  5mg MON/THUR and 4mg AOD      ASSESSMENT:     Chronic atrial fibrillation (H)  Anticoagulated on Coumadin  S/P TAVR (transcatheter aortic valve replacement)  Encounter for therapeutic drug monitoring  Therapeutic INR for goal of 2-3    PLAN/ORDERS:   New Dose: No Change    Next INR: 1 week 8/1/22 with coumadin clinic as he is discharging home 7/29/22    Electronically signed by:  Tata Blood DNP, APRN            Sincerely,        BOBBY Bedolla CNP    "

## 2022-07-26 NOTE — PROGRESS NOTES
[facility] GERIATRICS DISCHARGE SUMMARY  PATIENT'S NAME: Pee Ayala  YOB: 1940  MEDICAL RECORD NUMBER:  [id number]  Place of Service where encounter took place:  [facility] ([facility]) [[id number]]    PRIMARY CARE PROVIDER AND CLINIC RESPONSIBLE AFTER TRANSFER:   Mar Morales DO, [address] / [facility] [address]   Non-FMG Provider     Transferring providers: BOBBY Bedolla CNP, Dior Coon MD  Recent Hospitalization/ED:  [facility] stay 7/4/22 to 7/12/22.  Date of SNF Admission: July 12, 2022  Date of SNF (anticipated) Discharge: July 28, 2022  Discharged to: previous independent home  Cognitive Scores: BIMS: 15/15 and SLUMS: 23/30  Physical Function: Ambulating 85 ft with walker  DME: SNF  coordinating DME needs     CODE STATUS/ADVANCE DIRECTIVES DISCUSSION:  Full Code   ALLERGIES: Oxycodone, Amlodipine, Lisinopril, and Adhesive tape    NURSING FACILITY COURSE   Medication Changes/Rationale:     See below    Summary of nursing facility stay:   (R53.81) Physical deconditioning  (primary encounter diagnosis)  (M62.81) Generalized muscle weakness  Comment: Acute on chronic. S/T below diagnosis. SLUMS 23/30. Ambulating 85 feet with CGA  Plan:   -Continue Physical therapy and Occupational therapy as directed  -SW to remain involved for safe discharge planning needs     (Z91.81) Personal history of fall  (S32.591D) Closed fracture of right inferior pubic ramus with routine healing, subsequent encounter  (S32.82XD) Multiple closed fractures of pelvis without disruption of pelvic ring with routine healing, subsequent encounter  (M80.00XS) Age-related osteoporosis with current pathological fracture, sequela  (R52) Pain  Comment: Acute on chronic. History of frequent falls. He was admitted to [facility] ED on 7/4/2022 with pelvic fractures (right pubic ramus and sacral ala) after a mechanical fall. Pelvic fractures did not required surgical  intervention but were treated with pain management  Plan:   -Monitor pain complaints  -Continue Tylenol 500mg TID.   -Discontinue hydroxyzine  -Discontinue diclofenac  -Continue gabapentin 600mg TID  -Continue Percocet 1 tab TID PRN.   -Robaxin 500mg BID  -Trend labs with PCP post TCU     (J18.9) Pneumonia of right lower lobe due to infectious organism  (J90) Pleural effusion  Comment: Acute. He was also noted to have right lower lung pneumonia with small partially loculated pleural effusion on CT of chest. Pneumonia was treated with Rocephin and Zithromax and later Cefdinir. RESOLVED  Plan:   -Trend labs with PCP post TCU   -Monitor respiratory status.   -Follow up within 3 month for repeat CT for resolution.   -Encourage incentive spirometry every 4 hours while awake.      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 Stable. Recent ECHO 1/2022-Normal left ventricular size and systolic performance with a visually estimated ejection fraction of 65-70%. Recent INR on 7/25-2.68.   Plan:   -Continue coumadin 5mg MON/THUR and 4mg AOD   -Next INR due 8/1/22. Followed by [place] [facility] for INR orders post TCU  -Follow-up with PCP for future labs as needed  -Monitor falls  -Monitor for worsening s/sx of concerns  -Monitor bleeding risks.      (I50.42) Chronic combined systolic and diastolic congestive heart failure (H)  (I25.10) Coronary artery disease involving native coronary artery of native heart without angina pectoris  (E78.00) Hypercholesteremia  Comment: Chronic. Stable. s/p 2v CABG and mid RCA stent in 2016  Plan:   -Follow up with cardiology as directed  -Monitor BP and HR as directed  -Daily weights as directed. Baseline weight 153#  -Continue daily asa  -Continue crestor daily  -Continue furosemide 40mg in AM and 20mg in afternoon  -Continue carvedilol 6.25mg BID. HOLD if SBP<100  and/or HR<55  -Trend labs with PCP post TCU     (D62) Anemia due to blood loss, acute  Comment: Chronic. Baseline Hgb~10.7  Plan:   -Monitor bleeding risks  -Monitor for worsening s/sx of concerns.   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 Recent A1c on 5/24/22 was 6.3%. Goal <8%. Blood Glucose values since admission:  -368, -369  Plan:   -Monitor Blood Glucose QID as directed. Keep log for endocrinology as directed  -Continue metformin 1000mg BID   -HOLD frestyle jerson sensor while on TCU, may resume post discharge.   -Continue Novolog 70/30 mix as directed: 18units in AM and 15units with dinner. IF BG <100 THEN GIVE 1/2 DOSES OF 9 UNITS IN AM AND 7 UNITS IN PM   -Follow up with endocrinology as directed. Scheduled for 8/9/22  -Trend labs with PCP post TCU         (R19.7) Diarrhea  Comment: Acute on chronic. Stable.   Plan:  -Monitor BM status  -Discontinue PRN imodium due to non use  -Miralax 17gm daily PRN  -Senna S 1 tab BID PRN  -Trend labs with PCP post TCU     (F33.9) MDD  Comment: Acute on chronic. Depressive moods.   Plan:  -Monitor mood and behaviors  -Monitor changes in mobility, eating and sleeping patterns  -Follow up with psych as directed. Scheduled virtual on 10/7/22  -Continue buspar 5mg BID  -Trend labs with PCP post TCU     (I73.9) PAD (peripheral artery disease) (H)  Comment: Chronic. s/p left SFA stenting history.   Plan:   -Monitor for worsening s/sx of concerns  -Continue Cilostazol as directed--Take 1 tablet (50 mg) by mouth daily for 6 days, THEN 1 tablet (50 mg) 2 times daily for 14 days, THEN 2 tablets (100 mg) 2 times daily for 30 days. (this was originally started by vascular on 6/28/22)  -Continue Tylenol as directed  -Follow up with PCP post  TCU  -Follow up with vascular as directed. Scheduled for 10/10/22  -Trend labs with PCP post TCU     (R11.0) Nausea  (K21.00)GERD  Comment: Acute on chronic. History of GERD at home with TUMS use. Currently on blood thinner  Plan:  -Continue zofran PRN  -Continue omeprazole daily  -Monitor for worsening s/sx of concerns  -Trend labs with PCP post TCU    Discharge Medications:    Current Outpatient Medications   Medication Sig Dispense Refill     acetaminophen (TYLENOL) 500 MG tablet Take 1 tablet (500 mg) by mouth 3 times daily 240 tablet 0     aspirin 81 MG EC tablet Take 1 tablet (81 mg) by mouth daily 90 tablet 0     busPIRone (BUSPAR) 5 MG tablet Take 1 tablet (5 mg) by mouth 2 times daily 180 tablet 0     carvedilol (COREG) 6.25 MG tablet Take 1 tablet (6.25 mg) by mouth 2 times daily (with meals) HOLD if SBP<100 and/or HR<55 180 tablet 0     cilostazol (PLETAL) 50 MG tablet Take 50mg BID From 7/19/22-8/2/22, then take 100mg BID starting 8/2/22 120 tablet 0     Continuous Blood Gluc Sensor (FREESTYLE DOMI 2 SENSOR) MISC 1 each every 14 days Use 1 sensor every 14 days. Use to read blood sugars per 's instructions. 1 each 0     finasteride (PROSCAR) 5 MG tablet Take 1 tablet (5 mg) by mouth daily 90 tablet 0     furosemide (LASIX) 20 MG tablet TAKE 2 TABLETS BY MOUTH IN THE MORNING AND 1 TABLET IN THE AFTERNOON 180 tablet 0     gabapentin (NEURONTIN) 600 MG tablet Take 1 tablet (600 mg) by mouth 3 times daily 180 tablet 0     insulin aspart prot & aspart (NOVOLOG MIX 70/30 PEN) (70-30) 100 UNIT/ML pen Inject subcutaneously 20 units in AM with breakfast and 12 in PM with dinner. If Blood Glucose<100 then give 1/2 dose) 15 mL 0     metFORMIN (GLUCOPHAGE) 500 MG tablet Take 2 tablets (1,000 mg) by mouth 2 times daily (with meals) 360 tablet 0     methocarbamol (ROBAXIN) 500 MG tablet Take 1 tablet (500 mg) by mouth 2 times daily 60 tablet 0     omeprazole (PRILOSEC) 20 MG DR capsule Take 1 capsule (20  mg) by mouth daily 90 capsule 0     ondansetron (ZOFRAN) 4 MG tablet Take 1 tablet (4 mg) by mouth every 6 hours as needed for nausea 30 tablet 0     oxyCODONE-acetaminophen (PERCOCET) 5-325 MG tablet Take 1 tablet by mouth 3 times daily as needed for severe pain 12 tablet 0     polyethylene glycol (MIRALAX) 17 GM/Dose powder Take 17 g (1 capful) by mouth daily as needed for constipation (opioid induced constipation) 507 g 0     pramipexole (MIRAPEX) 0.25 MG tablet Take 2 tablets (0.5 mg) by mouth daily Takes 4pm and 5;30 pm 180 tablet 0     rosuvastatin (CRESTOR) 20 MG tablet Take 1 tablet (20 mg) by mouth At Bedtime 90 tablet 0     senna-docusate (SENOKOT-S/PERICOLACE) 8.6-50 MG tablet Take 1 tablet by mouth 2 times daily as needed for constipation 30 tablet 0     warfarin ANTICOAGULANT (COUMADIN) 1 MG tablet 5mg daily on Monydays and thursdays and 4mg all other days. 60 tablet 0     ACCU-CHEK GUIDE test strip USE 1  TO CHECK GLUCOSE THREE TIMES DAILY 300 strip 1      Controlled medications:   Medication Percocet electronically prescribed to 30# tabs sent to his pharmacy     Past Medical History:   Past Medical History:   Diagnosis Date     ACS (acute coronary syndrome) (H) 06/02/2014     Actinic keratosis 01/14/2014     Anticoagulated on Coumadin 12/30/2015     Atrial fibrillation (H) 01/01/2016     Bone mass 04/26/2017     Chest heaviness 01/23/2019     Chest pain 05/31/2014     Closed fracture of left forearm 01/01/2015     Congestive heart failure (H)      Coronary artery disease      Difficulty in walking(719.7)      Dyslipidemia 08/31/2016     Dyspnea on exertion      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      Encounter for long-term (current) use of insulin (H) 08/11/2016     Esophageal reflux 11/18/2010     History of angina      HTN (hypertension) 06/30/2009     (Problem list name updated by  "automated process. Provider to review and confirm.)     Impotence of organic origin      Mixed hyperlipidemia 04/25/2007 April 25, 2007 restarted Zocor today, recheck in 3 months.  August 23, 2007 LDL at 101 with 40 mg, will increase to 80 mg. Recheck  In 3 months.      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      Walking troubles      Physical Exam:   Vitals: /56   Pulse 74   Temp (!) 95.2  F (35.1  C)   Resp 18   Ht 1.626 m (5' 4\")   Wt 70.7 kg (155 lb 14.4 oz)   SpO2 96%   BMI 26.76 kg/m    BMI: Body mass index is 26.76 kg/m .  GENERAL APPEARANCE:  Alert, in no distress, oriented, cooperative  ENT:  Mouth and posterior oropharynx normal, moist mucous membranes, [place]  EYES:  EOM, conjunctivae, lids, pupils and irises normal  NECK:  No adenopathy,masses or thyromegaly  RESP:  respiratory effort and palpation of chest normal, lungs clear to auscultation , no respiratory distress  CV:  Palpation and auscultation of heart done , regular rate and rhythm, no murmur, rub, or gallop, trace edema to BLE  ABDOMEN:  normal bowel sounds, soft, nontender, no hepatosplenomegaly or other masses, no guarding or rebound  M/S:   Gait and station normal  Amulates with walker  SKIN:  Inspection of skin and subcutaneous tissue baseline, Palpation of skin and subcutaneous tissue baseline  NEURO:   Cranial nerves 2-12 are normal tested and grossly at patient's baseline, no purposeful movement in upper and lower extremities  PSYCH:  oriented X 3, normal " insight, judgement and memory, affect and mood normal     SNF labs:   Most Recent 3 CBC's:  Recent Labs   Lab Test 07/18/22  0636 07/12/22  0621 07/11/22  1048   WBC 6.9 7.3 9.5   HGB 10.8* 10.7* 10.8*   * 102* 103*    216 219     Most Recent 3 BMP's:  Recent Labs   Lab Test 07/25/22  0605 07/18/22  0636 07/15/22  0625    140 136   POTASSIUM 3.7 3.8 3.7   CHLORIDE 100 103 100   CO2 25 29 27   BUN 16.0 14.3 12.7   CR 0.74 0.70 0.74   ANIONGAP 13 8 9   RACHEL 8.9 8.9 8.8   * 58* 113*     Most Recent 2 LFT's:  Recent Labs   Lab Test 05/24/22  1023 04/08/22  1811   AST 24 45*   ALT 14 45   ALKPHOS 165* 126*   BILITOTAL 0.7 1.0     Most Recent 3 INR's:  Recent Labs   Lab Test 07/25/22  0605 07/18/22  0636 07/15/22  0625   INR 2.68* 1.49* 1.76*     Most Recent Cholesterol Panel:  Recent Labs   Lab Test 01/25/22  1443 09/16/21  1026   CHOL  --  117   LDL 65 48   HDL  --  43   TRIG  --  131     Most Recent TSH and T4:  Recent Labs   Lab Test 06/03/22  0959   TSH 1.01     Most Recent Hemoglobin A1c:  Recent Labs   Lab Test 05/24/22  1023   A1C 6.3*     Most Recent Urinalysis:  Recent Labs   Lab Test 04/08/22  1955   COLOR Light Yellow   APPEARANCE Clear   URINEGLC >1000*   URINEBILI Negative   URINEKETONE 20 *   SG 1.033*   UBLD 0.06 mg/dL*   URINEPH 5.5   PROTEIN 200 *   NITRITE Negative   LEUKEST Negative   RBCU 1   WBCU 1     Most Recent Anemia Panel:  Recent Labs   Lab Test 07/18/22  0636 07/04/22  1034 06/03/22  0959 01/06/21  1000 01/02/20  1115   WBC 6.9   < > 6.8   < > 6.2   HGB 10.8*   < > 11.7*   < > 14.5   HCT 34.6*   < > 37.0*   < > 43.0   *   < > 107*   < > 101*      < > 261   < > 188   IRON  --   --   --   --  98   RETICABSCT  --   --  0.083  --   --    RETP  --   --  2.4  --   --    B12 755  --  311  --   --    FOLIC  --   --  7.8  --   --     < > = values in this interval not displayed.       DISCHARGE PLAN:    Follow up labs: INR due 8/1/22 to be drawn by home care with  results to coumadin INR clinic    Medical Follow Up:      Follow up with primary care provider in 1-2 weeks  -Follow up with vascular as directed. Scheduled for 10/10/22  -Follow up with endocrinology as directed. Scheduled for 8/9/22  -Follow up with psych as directed. Scheduled virtual on 10/7/22      [facility] scheduled appointments:  Next 5 appointments (look out 90 days)    Oct 10, 2022  3:00 PM  (Arrive by 2:50 PM)  Return Visit with Wendy Delgadillo MD  [facility] [place] ([facility] ) [address] [address] [phone]           Discharge Services: Home Care:  Occupational Therapy, Physical Therapy, Registered Nurse, Home Health Aide,  and From:  [facility]    Discharge Instructions Verbalized to Patient at Discharge:     Weight bearing restrictions:  Weight bearing as tolerated.     Monitor blood glucose monitoring 4 times a day. Keep a record and bring it to your next primary provider visit.     Continue to follow your diet:  regular.     Weigh yourself daily in the morning and keep a record. Call your primary clinic: a) if you are more short of breath, or b) if your weight changes more than 3 pounds in one day or more than 5 pounds in one week.     Driving is not recommended     24-hour supervision is recommended for safety.   -Follow up within 3 month for repeat CT for resolution.   -Encourage incentive spirometry every 4 hours while awake.    TOTAL DISCHARGE TIME:   Greater than 30 minutes  Electronically signed by:  Tata Blood DNP, APRN    Documentation of Face to Face and Certification for Home Health Services    I certify that patient: Pee Ayala is under my care and that I, or a nurse practitioner or physician's assistant working with me, had a face-to-face encounter that meets the physician face-to-face encounter requirements with this patient on: 7/27/2022.    This encounter  with the patient was in whole, or in part, for the following medical condition, which is the primary reason for home health care: The primary encounter diagnosis was Chronic atrial fibrillation (H). 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), Chronic anticoagulation, Physical deconditioning, Generalized muscle weakness, Personal history of fall, Closed fracture of right inferior pubic ramus with routine healing, subsequent encounter, Multiple closed fractures of pelvis without disruption of pelvic ring with routine healing, subsequent encounter, Age-related osteoporosis with current pathological fracture, sequela, Pain, Pneumonia of right lower lobe due to infectious organism, Pleural effusion, Chronic combined systolic and diastolic congestive heart failure (H), Coronary artery disease involving native coronary artery of native heart without angina pectoris, Anemia due to blood loss, acute, 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, without long-term current use of insulin, unspecified laterality (H), Morbid obesity (H), Polyneuropathy, PAD (peripheral artery disease) (H), Hypercholesteremia, Nausea, Gastroesophageal reflux disease with esophagitis, unspecified whether hemorrhage, Episode of recurrent major depressive disorder, unspecified depression episode severity (H), Diarrhea, unspecified type, Insomnia, unspecified type, Depression, unspecified depression type, Benign prostatic hyperplasia, unspecified whether lower urinary tract symptoms present, Diabetic polyneuropathy associated with type 2 diabetes mellitus (H), Fracture of hip, left, closed, initial encounter (H), Closed compression fracture of L3 lumbar vertebra with routine healing, subsequent encounter, and Chronic heart failure with preserved ejection fraction (H) were also pertinent to this visit.    I certify  that, based on my findings, the following services are medically necessary home health services: Nursing, Occupational Therapy, Physical Therapy and Social Work.    My clinical findings support the need for the above services because: Nurse is needed: To assess mediation management, education, INR after changes in medications or other medical regimen.., Occupational Therapy Services are needed to assess and treat cognitive ability and address ADL safety due to impairment in deconditioning. and Physical Therapy Services are needed to assess and treat the following functional impairments: deconditioning.    Further, I certify that my clinical findings support that this patient is homebound (i.e. absences from home require considerable and taxing effort and are for medical reasons or [employer] services or infrequently or of short duration when for other reasons) because: Requires assistance of another person or specialized equipment to access medical services because patient: Is unable to walk greater than 100 feet without rest. and Requires supervision of another for safe transfer...    Based on the above findings. I certify that this patient is confined to the home and needs intermittent skilled nursing care, physical therapy and/or speech therapy.  The patient is under my care, and I have initiated the establishment of the plan of care.  This patient will be followed by a physician who will periodically review the plan of care.  Physician/Provider to provide follow up care: Mar Morales    Attending hospital physician (the Medicare certified [facility] provider): Dr.Sara Shaggy MD, signing F2F and only signing for initial order. Please send all follow up questions and concerns or needed follow up signatures to the PCP, who [place] has on file as:  Mar Morales.    Physician Signature: See electronic signature associated with these discharge orders.  Date: 7/26/2022

## 2022-07-27 ENCOUNTER — DISCHARGE SUMMARY NURSING HOME (OUTPATIENT)
Dept: GERIATRICS | Facility: CLINIC | Age: 82
End: 2022-07-27
Payer: COMMERCIAL

## 2022-07-27 VITALS
DIASTOLIC BLOOD PRESSURE: 56 MMHG | OXYGEN SATURATION: 96 % | WEIGHT: 155.9 LBS | BODY MASS INDEX: 26.61 KG/M2 | RESPIRATION RATE: 18 BRPM | TEMPERATURE: 95.2 F | HEIGHT: 64 IN | SYSTOLIC BLOOD PRESSURE: 107 MMHG | HEART RATE: 74 BPM

## 2022-07-27 DIAGNOSIS — I25.10 CORONARY ARTERY DISEASE INVOLVING NATIVE CORONARY ARTERY OF NATIVE HEART WITHOUT ANGINA PECTORIS: ICD-10-CM

## 2022-07-27 DIAGNOSIS — F32.A DEPRESSION, UNSPECIFIED DEPRESSION TYPE: ICD-10-CM

## 2022-07-27 DIAGNOSIS — I48.20 CHRONIC ATRIAL FIBRILLATION (H): Primary | ICD-10-CM

## 2022-07-27 DIAGNOSIS — N40.0 BENIGN PROSTATIC HYPERPLASIA, UNSPECIFIED WHETHER LOWER URINARY TRACT SYMPTOMS PRESENT: ICD-10-CM

## 2022-07-27 DIAGNOSIS — E11.42 TYPE 2 DIABETES MELLITUS WITH PERIPHERAL NEUROPATHY (H): ICD-10-CM

## 2022-07-27 DIAGNOSIS — K21.00 GASTROESOPHAGEAL REFLUX DISEASE WITH ESOPHAGITIS, UNSPECIFIED WHETHER HEMORRHAGE: ICD-10-CM

## 2022-07-27 DIAGNOSIS — J18.9 PNEUMONIA OF RIGHT LOWER LOBE DUE TO INFECTIOUS ORGANISM: ICD-10-CM

## 2022-07-27 DIAGNOSIS — I73.9 PAD (PERIPHERAL ARTERY DISEASE) (H): ICD-10-CM

## 2022-07-27 DIAGNOSIS — E11.3599 TYPE 2 DIABETES MELLITUS WITH PROLIFERATIVE RETINOPATHY WITHOUT MACULAR EDEMA, WITHOUT LONG-TERM CURRENT USE OF INSULIN, UNSPECIFIED LATERALITY (H): ICD-10-CM

## 2022-07-27 DIAGNOSIS — E11.42 DIABETIC POLYNEUROPATHY ASSOCIATED WITH TYPE 2 DIABETES MELLITUS (H): ICD-10-CM

## 2022-07-27 DIAGNOSIS — R19.7 DIARRHEA, UNSPECIFIED TYPE: ICD-10-CM

## 2022-07-27 DIAGNOSIS — S72.002A FRACTURE OF HIP, LEFT, CLOSED, INITIAL ENCOUNTER (H): ICD-10-CM

## 2022-07-27 DIAGNOSIS — D62 ANEMIA DUE TO BLOOD LOSS, ACUTE: ICD-10-CM

## 2022-07-27 DIAGNOSIS — R52 PAIN: ICD-10-CM

## 2022-07-27 DIAGNOSIS — I50.42 CHRONIC COMBINED SYSTOLIC AND DIASTOLIC CONGESTIVE HEART FAILURE (H): ICD-10-CM

## 2022-07-27 DIAGNOSIS — M62.81 GENERALIZED MUSCLE WEAKNESS: ICD-10-CM

## 2022-07-27 DIAGNOSIS — R11.0 NAUSEA: ICD-10-CM

## 2022-07-27 DIAGNOSIS — I50.32 CHRONIC HEART FAILURE WITH PRESERVED EJECTION FRACTION (H): ICD-10-CM

## 2022-07-27 DIAGNOSIS — S32.030D CLOSED COMPRESSION FRACTURE OF L3 LUMBAR VERTEBRA WITH ROUTINE HEALING, SUBSEQUENT ENCOUNTER: ICD-10-CM

## 2022-07-27 DIAGNOSIS — J90 PLEURAL EFFUSION: ICD-10-CM

## 2022-07-27 DIAGNOSIS — Z79.01 CHRONIC ANTICOAGULATION: ICD-10-CM

## 2022-07-27 DIAGNOSIS — S32.591D CLOSED FRACTURE OF RIGHT INFERIOR PUBIC RAMUS WITH ROUTINE HEALING, SUBSEQUENT ENCOUNTER: ICD-10-CM

## 2022-07-27 DIAGNOSIS — R53.81 PHYSICAL DECONDITIONING: ICD-10-CM

## 2022-07-27 DIAGNOSIS — G62.9 POLYNEUROPATHY: ICD-10-CM

## 2022-07-27 DIAGNOSIS — G47.00 INSOMNIA, UNSPECIFIED TYPE: ICD-10-CM

## 2022-07-27 DIAGNOSIS — Z79.01 ANTICOAGULATED ON COUMADIN: ICD-10-CM

## 2022-07-27 DIAGNOSIS — Z95.2 S/P TAVR (TRANSCATHETER AORTIC VALVE REPLACEMENT): ICD-10-CM

## 2022-07-27 DIAGNOSIS — M80.00XS AGE-RELATED OSTEOPOROSIS WITH CURRENT PATHOLOGICAL FRACTURE, SEQUELA: ICD-10-CM

## 2022-07-27 DIAGNOSIS — E78.00 HYPERCHOLESTEREMIA: ICD-10-CM

## 2022-07-27 DIAGNOSIS — S32.82XD MULTIPLE CLOSED FRACTURES OF PELVIS WITHOUT DISRUPTION OF PELVIC RING WITH ROUTINE HEALING, SUBSEQUENT ENCOUNTER: ICD-10-CM

## 2022-07-27 DIAGNOSIS — E66.01 MORBID OBESITY (H): ICD-10-CM

## 2022-07-27 DIAGNOSIS — Z91.81 PERSONAL HISTORY OF FALL: ICD-10-CM

## 2022-07-27 DIAGNOSIS — F33.9 EPISODE OF RECURRENT MAJOR DEPRESSIVE DISORDER, UNSPECIFIED DEPRESSION EPISODE SEVERITY (H): ICD-10-CM

## 2022-07-27 PROCEDURE — 99316 NF DSCHRG MGMT 30 MIN+: CPT | Performed by: NURSE PRACTITIONER

## 2022-07-27 RX ORDER — CILOSTAZOL 50 MG/1
TABLET ORAL
Qty: 120 TABLET | Refills: 0 | Status: SHIPPED | OUTPATIENT
Start: 2022-07-27 | End: 2022-10-10

## 2022-07-27 RX ORDER — BUSPIRONE HYDROCHLORIDE 5 MG/1
5 TABLET ORAL 2 TIMES DAILY
Qty: 180 TABLET | Refills: 0 | Status: SHIPPED | OUTPATIENT
Start: 2022-07-27 | End: 2023-12-21

## 2022-07-27 RX ORDER — ONDANSETRON 4 MG/1
4 TABLET, FILM COATED ORAL EVERY 6 HOURS PRN
Qty: 30 TABLET | Refills: 0 | Status: SHIPPED | OUTPATIENT
Start: 2022-07-27 | End: 2022-08-12

## 2022-07-27 RX ORDER — ACETAMINOPHEN 500 MG
500 TABLET ORAL 3 TIMES DAILY
Qty: 240 TABLET | Refills: 0 | Status: SHIPPED | OUTPATIENT
Start: 2022-07-27 | End: 2023-02-14

## 2022-07-27 RX ORDER — GABAPENTIN 600 MG/1
600 TABLET ORAL 3 TIMES DAILY
Qty: 180 TABLET | Refills: 0 | Status: SHIPPED | OUTPATIENT
Start: 2022-07-27 | End: 2023-03-29

## 2022-07-27 RX ORDER — OXYCODONE AND ACETAMINOPHEN 5; 325 MG/1; MG/1
1 TABLET ORAL 3 TIMES DAILY PRN
Qty: 12 TABLET | Refills: 0 | Status: SHIPPED | OUTPATIENT
Start: 2022-07-27 | End: 2022-08-12

## 2022-07-27 RX ORDER — ROSUVASTATIN CALCIUM 20 MG/1
20 TABLET, COATED ORAL AT BEDTIME
Qty: 90 TABLET | Refills: 0 | Status: SHIPPED | OUTPATIENT
Start: 2022-07-27 | End: 2022-12-28

## 2022-07-27 RX ORDER — CARVEDILOL 6.25 MG/1
6.25 TABLET ORAL 2 TIMES DAILY WITH MEALS
Qty: 180 TABLET | Refills: 0 | Status: SHIPPED | OUTPATIENT
Start: 2022-07-27 | End: 2022-12-16

## 2022-07-27 RX ORDER — FINASTERIDE 5 MG/1
5 TABLET, FILM COATED ORAL DAILY
Qty: 90 TABLET | Refills: 0 | Status: SHIPPED | OUTPATIENT
Start: 2022-07-27 | End: 2023-03-11

## 2022-07-27 RX ORDER — AMOXICILLIN 250 MG
1 CAPSULE ORAL 2 TIMES DAILY PRN
Qty: 30 TABLET | Refills: 0 | Status: SHIPPED | OUTPATIENT
Start: 2022-07-27 | End: 2022-08-12

## 2022-07-27 RX ORDER — PRAMIPEXOLE DIHYDROCHLORIDE 0.25 MG/1
0.5 TABLET ORAL DAILY
Qty: 180 TABLET | Refills: 0 | Status: SHIPPED | OUTPATIENT
Start: 2022-07-27 | End: 2022-11-29

## 2022-07-27 RX ORDER — POLYETHYLENE GLYCOL 3350 17 G/17G
1 POWDER, FOR SOLUTION ORAL DAILY PRN
Qty: 507 G | Refills: 0 | Status: SHIPPED | OUTPATIENT
Start: 2022-07-27 | End: 2023-02-14

## 2022-07-27 RX ORDER — FUROSEMIDE 20 MG
TABLET ORAL
Qty: 180 TABLET | Refills: 0 | Status: SHIPPED | OUTPATIENT
Start: 2022-07-27 | End: 2023-03-10

## 2022-07-27 RX ORDER — ASPIRIN 81 MG/1
81 TABLET ORAL DAILY
Qty: 90 TABLET | Refills: 0 | Status: SHIPPED | OUTPATIENT
Start: 2022-07-27

## 2022-07-27 RX ORDER — METHOCARBAMOL 500 MG/1
500 TABLET, FILM COATED ORAL 2 TIMES DAILY
Qty: 60 TABLET | Refills: 0 | Status: SHIPPED | OUTPATIENT
Start: 2022-07-27 | End: 2022-08-12

## 2022-07-27 RX ORDER — WARFARIN SODIUM 1 MG/1
TABLET ORAL
Qty: 60 TABLET | Refills: 0 | Status: SHIPPED | OUTPATIENT
Start: 2022-07-27 | End: 2023-12-21

## 2022-07-27 NOTE — LETTER
7/27/2022        RE: Pee Ayala  [address]  [place] [address]        [facility] GERIATRICS DISCHARGE SUMMARY  PATIENT'S NAME: Pee Ayala  YOB: 1940  MEDICAL RECORD NUMBER:  [id number]  Place of Service where encounter took place:  [facility] ([facility]) [[id number]]    PRIMARY CARE PROVIDER AND CLINIC RESPONSIBLE AFTER TRANSFER:   Mar Morales DO, [address] / [facility] [place] [address]   Non-FMG Provider     Transferring providers: BOBBY Bedolla CNP, Dior Coon MD  Recent Hospitalization/ED:  [facility] Hospital stay 7/4/22 to 7/12/22.  Date of SNF Admission: July 12, 2022  Date of SNF (anticipated) Discharge: July 28, 2022  Discharged to: previous independent home  Cognitive Scores: BIMS: 15/15 and SLUMS: 23/30  Physical Function: Ambulating 85 ft with walker  DME: SNF  coordinating DME needs     CODE STATUS/ADVANCE DIRECTIVES DISCUSSION:  Full Code   ALLERGIES: Oxycodone, Amlodipine, Lisinopril, and Adhesive tape    NURSING FACILITY COURSE   Medication Changes/Rationale:     See below    Summary of nursing facility stay:   (R53.81) Physical deconditioning  (primary encounter diagnosis)  (M62.81) Generalized muscle weakness  Comment: Acute on chronic. S/T below diagnosis. SLUMS 23/30. Ambulating 85 feet with CGA  Plan:   -Continue Physical therapy and Occupational therapy as directed  -SW to remain involved for safe discharge planning needs     (Z91.81) Personal history of fall  (S32.591D) Closed fracture of right inferior pubic ramus with routine healing, subsequent encounter  (S32.82XD) Multiple closed fractures of pelvis without disruption of pelvic ring with routine healing, subsequent encounter  (M80.00XS) Age-related osteoporosis with current pathological fracture, sequela  (R52) Pain  Comment: Acute on chronic. History of frequent falls. He was admitted to [facility] ED on 7/4/2022 with pelvic fractures (right pubic  ramus and sacral ala) after a mechanical fall. Pelvic fractures did not required surgical intervention but were treated with pain management  Plan:   -Monitor pain complaints  -Continue Tylenol 500mg TID.   -Discontinue hydroxyzine  -Discontinue diclofenac  -Continue gabapentin 600mg TID  -Continue Percocet 1 tab TID PRN.   -Robaxin 500mg BID  -Trend labs with PCP post TCU     (J18.9) Pneumonia of right lower lobe due to infectious organism  (J90) Pleural effusion  Comment: Acute. He was also noted to have right lower lung pneumonia with small partially loculated pleural effusion on CT of chest. Pneumonia was treated with Rocephin and Zithromax and later Cefdinir. RESOLVED  Plan:   -Trend labs with PCP post TCU   -Monitor respiratory status.   -Follow up within 3 month for repeat CT for resolution.   -Encourage incentive spirometry every 4 hours while awake.      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 Stable. Recent ECHO 1/2022-Normal left ventricular size and systolic performance with a visually estimated ejection fraction of 65-70%. Recent INR on 7/25-2.68.   Plan:   -Continue coumadin 5mg MON/THUR and 4mg AOD   -Next INR due 8/1/22. Followed by [place] anticoagulation clinic for INR orders post TCU  -Follow-up with PCP for future labs as needed  -Monitor falls  -Monitor for worsening s/sx of concerns  -Monitor bleeding risks.      (I50.42) Chronic combined systolic and diastolic congestive heart failure (H)  (I25.10) Coronary artery disease involving native coronary artery of native heart without angina pectoris  (E78.00) Hypercholesteremia  Comment: Chronic. Stable. s/p 2v CABG and mid RCA stent in 2016  Plan:   -Follow up with cardiology as directed  -Monitor BP and HR as directed  -Daily weights as directed. Baseline weight 153#  -Continue daily asa  -Continue crestor daily  -Continue  furosemide 40mg in AM and 20mg in afternoon  -Continue carvedilol 6.25mg BID. HOLD if SBP<100 and/or HR<55  -Trend labs with PCP post TCU     (D62) Anemia due to blood loss, acute  Comment: Chronic. Baseline Hgb~10.7  Plan:   -Monitor bleeding risks  -Monitor for worsening s/sx of concerns.   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 Recent A1c on 5/24/22 was 6.3%. Goal <8%. Blood Glucose values since admission:  -368, -369  Plan:   -Monitor Blood Glucose QID as directed. Keep log for endocrinology as directed  -Continue metformin 1000mg BID   -HOLD frestyle jerson sensor while on TCU, may resume post discharge.   -Continue Novolog 70/30 mix as directed: 18units in AM and 15units with dinner. IF BG <100 THEN GIVE 1/2 DOSES OF 9 UNITS IN AM AND 7 UNITS IN PM   -Follow up with endocrinology as directed. Scheduled for 8/9/22  -Trend labs with PCP post TCU         (R19.7) Diarrhea  Comment: Acute on chronic. Stable.   Plan:  -Monitor BM status  -Discontinue PRN imodium due to non use  -Miralax 17gm daily PRN  -Senna S 1 tab BID PRN  -Trend labs with PCP post TCU     (F33.9) MDD  Comment: Acute on chronic. Depressive moods.   Plan:  -Monitor mood and behaviors  -Monitor changes in mobility, eating and sleeping patterns  -Follow up with psych as directed. Scheduled virtual on 10/7/22  -Continue buspar 5mg BID  -Trend labs with PCP post TCU     (I73.9) PAD (peripheral artery disease) (H)  Comment: Chronic. s/p left SFA stenting history.   Plan:   -Monitor for worsening s/sx of concerns  -Continue Cilostazol as directed--Take 1 tablet (50 mg) by mouth daily for 6 days, THEN 1 tablet (50 mg) 2 times daily for 14 days, THEN 2 tablets (100 mg) 2 times daily for 30 days. (this was originally  started by vascular on 6/28/22)  -Continue Tylenol as directed  -Follow up with PCP post TCU  -Follow up with vascular as directed. Scheduled for 10/10/22  -Trend labs with PCP post TCU     (R11.0) Nausea  (K21.00)GERD  Comment: Acute on chronic. History of GERD at home with TUMS use. Currently on blood thinner  Plan:  -Continue zofran PRN  -Continue omeprazole daily  -Monitor for worsening s/sx of concerns  -Trend labs with PCP post TCU    Discharge Medications:    Current Outpatient Medications   Medication Sig Dispense Refill     acetaminophen (TYLENOL) 500 MG tablet Take 1 tablet (500 mg) by mouth 3 times daily 240 tablet 0     aspirin 81 MG EC tablet Take 1 tablet (81 mg) by mouth daily 90 tablet 0     busPIRone (BUSPAR) 5 MG tablet Take 1 tablet (5 mg) by mouth 2 times daily 180 tablet 0     carvedilol (COREG) 6.25 MG tablet Take 1 tablet (6.25 mg) by mouth 2 times daily (with meals) HOLD if SBP<100 and/or HR<55 180 tablet 0     cilostazol (PLETAL) 50 MG tablet Take 50mg BID From 7/19/22-8/2/22, then take 100mg BID starting 8/2/22 120 tablet 0     Continuous Blood Gluc Sensor (FREESTYLE DOMI 2 SENSOR) [facility] 1 each every 14 days Use 1 sensor every 14 days. Use to read blood sugars per 's instructions. 1 each 0     finasteride (PROSCAR) 5 MG tablet Take 1 tablet (5 mg) by mouth daily 90 tablet 0     furosemide (LASIX) 20 MG tablet TAKE 2 TABLETS BY MOUTH IN THE MORNING AND 1 TABLET IN THE AFTERNOON 180 tablet 0     gabapentin (NEURONTIN) 600 MG tablet Take 1 tablet (600 mg) by mouth 3 times daily 180 tablet 0     insulin aspart prot & aspart (NOVOLOG MIX 70/30 PEN) (70-30) 100 UNIT/ML pen Inject subcutaneously 20 units in AM with breakfast and 12 in PM with dinner. If Blood Glucose<100 then give 1/2 dose) 15 mL 0     metFORMIN (GLUCOPHAGE) 500 MG tablet Take 2 tablets (1,000 mg) by mouth 2 times daily (with meals) 360 tablet 0     methocarbamol (ROBAXIN) 500 MG tablet Take 1 tablet (500 mg) by mouth  2 times daily 60 tablet 0     omeprazole (PRILOSEC) 20 MG DR capsule Take 1 capsule (20 mg) by mouth daily 90 capsule 0     ondansetron (ZOFRAN) 4 MG tablet Take 1 tablet (4 mg) by mouth every 6 hours as needed for nausea 30 tablet 0     oxyCODONE-acetaminophen (PERCOCET) 5-325 MG tablet Take 1 tablet by mouth 3 times daily as needed for severe pain 12 tablet 0     polyethylene glycol (MIRALAX) 17 GM/Dose powder Take 17 g (1 capful) by mouth daily as needed for constipation (opioid induced constipation) 507 g 0     pramipexole (MIRAPEX) 0.25 MG tablet Take 2 tablets (0.5 mg) by mouth daily Takes 4pm and 5;30 pm 180 tablet 0     rosuvastatin (CRESTOR) 20 MG tablet Take 1 tablet (20 mg) by mouth At Bedtime 90 tablet 0     senna-docusate (SENOKOT-S/PERICOLACE) 8.6-50 MG tablet Take 1 tablet by mouth 2 times daily as needed for constipation 30 tablet 0     warfarin ANTICOAGULANT (COUMADIN) 1 MG tablet 5mg daily on Monydays and thursdays and 4mg all other days. 60 tablet 0     ACCU-CHEK GUIDE test strip USE 1  TO CHECK GLUCOSE THREE TIMES DAILY 300 strip 1      Controlled medications:   Medication Percocet electronically prescribed to 30# tabs sent to his pharmacy     Past Medical History:   Past Medical History:   Diagnosis Date     ACS (acute coronary syndrome) (H) 06/02/2014     Actinic keratosis 01/14/2014     Anticoagulated on Coumadin 12/30/2015     Atrial fibrillation (H) 01/01/2016     Bone mass 04/26/2017     Chest heaviness 01/23/2019     Chest pain 05/31/2014     Closed fracture of left forearm 01/01/2015     Congestive heart failure (H)      Coronary artery disease      Difficulty in walking(719.7)      Dyslipidemia 08/31/2016     Dyspnea on exertion      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      Encounter for long-term (current) use of insulin (H) 08/11/2016     Esophageal reflux 11/18/2010      "History of angina      HTN (hypertension) 06/30/2009     (Problem list name updated by automated process. Provider to review and confirm.)     Impotence of organic origin      Mixed hyperlipidemia 04/25/2007 April 25, 2007 restarted Zocor today, recheck in 3 months.  August 23, 2007 LDL at 101 with 40 mg, will increase to 80 mg. Recheck  In 3 months.      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      Walking troubles      Physical Exam:   Vitals: /56   Pulse 74   Temp (!) 95.2  F (35.1  C)   Resp 18   Ht 1.626 m (5' 4\")   Wt 70.7 kg (155 lb 14.4 oz)   SpO2 96%   BMI 26.76 kg/m    BMI: Body mass index is 26.76 kg/m .  GENERAL APPEARANCE:  Alert, in no distress, oriented, cooperative  ENT:  Mouth and posterior oropharynx normal, moist mucous membranes, [place]  EYES:  EOM, conjunctivae, lids, pupils and irises normal  NECK:  No adenopathy,masses or thyromegaly  RESP:  respiratory effort and palpation of chest normal, lungs clear to auscultation , no respiratory distress  CV:  Palpation and auscultation of heart done , regular rate and rhythm, no murmur, rub, or gallop, trace edema to BLE  ABDOMEN:  normal bowel sounds, soft, nontender, no hepatosplenomegaly or other masses, no guarding or rebound  M/S:   Gait and station normal  Amulates with walker  SKIN:  Inspection of skin and subcutaneous tissue baseline, Palpation of skin and subcutaneous tissue baseline  NEURO:   Cranial nerves 2-12 are normal tested and grossly at patient's baseline, " no purposeful movement in upper and lower extremities  PSYCH:  oriented X 3, normal insight, judgement and memory, affect and mood normal     SNF labs:   Most Recent 3 CBC's:  Recent Labs   Lab Test 07/18/22  0636 07/12/22  0621 07/11/22  1048   WBC 6.9 7.3 9.5   HGB 10.8* 10.7* 10.8*   * 102* 103*    216 219     Most Recent 3 BMP's:  Recent Labs   Lab Test 07/25/22  0605 07/18/22  0636 07/15/22  0625    140 136   POTASSIUM 3.7 3.8 3.7   CHLORIDE 100 103 100   CO2 25 29 27   BUN 16.0 14.3 12.7   CR 0.74 0.70 0.74   ANIONGAP 13 8 9   RACHEL 8.9 8.9 8.8   * 58* 113*     Most Recent 2 LFT's:  Recent Labs   Lab Test 05/24/22  1023 04/08/22  1811   AST 24 45*   ALT 14 45   ALKPHOS 165* 126*   BILITOTAL 0.7 1.0     Most Recent 3 INR's:  Recent Labs   Lab Test 07/25/22  0605 07/18/22  0636 07/15/22  0625   INR 2.68* 1.49* 1.76*     Most Recent Cholesterol Panel:  Recent Labs   Lab Test 01/25/22  1443 09/16/21  1026   CHOL  --  117   LDL 65 48   HDL  --  43   TRIG  --  131     Most Recent TSH and T4:  Recent Labs   Lab Test 06/03/22  0959   TSH 1.01     Most Recent Hemoglobin A1c:  Recent Labs   Lab Test 05/24/22  1023   A1C 6.3*     Most Recent Urinalysis:  Recent Labs   Lab Test 04/08/22  1955   COLOR Light Yellow   APPEARANCE Clear   URINEGLC >1000*   URINEBILI Negative   URINEKETONE 20 *   SG 1.033*   UBLD 0.06 mg/dL*   URINEPH 5.5   PROTEIN 200 *   NITRITE Negative   LEUKEST Negative   RBCU 1   WBCU 1     Most Recent Anemia Panel:  Recent Labs   Lab Test 07/18/22  0636 07/04/22  1034 06/03/22  0959 01/06/21  1000 01/02/20  1115   WBC 6.9   < > 6.8   < > 6.2   HGB 10.8*   < > 11.7*   < > 14.5   HCT 34.6*   < > 37.0*   < > 43.0   *   < > 107*   < > 101*      < > 261   < > 188   IRON  --   --   --   --  98   RETICABSCT  --   --  0.083  --   --    RETP  --   --  2.4  --   --    B12 755  --  311  --   --    FOLIC  --   --  7.8  --   --     < > = values in this interval not displayed.        DISCHARGE PLAN:    Follow up labs: INR due 8/1/22 to be drawn by home care with results to coumadin INR clinic    Medical Follow Up:      Follow up with primary care provider in 1-2 weeks  -Follow up with vascular as directed. Scheduled for 10/10/22  -Follow up with endocrinology as directed. Scheduled for 8/9/22  -Follow up with psych as directed. Scheduled virtual on 10/7/22      [facility] scheduled appointments:  Next 5 appointments (look out 90 days)    Oct 10, 2022  3:00 PM  (Arrive by 2:50 PM)  Return Visit with Wendy Delgadillo MD  [facility] ([facility] [address] [address] [phone]           Discharge Services: Home Care:  Occupational Therapy, Physical Therapy, Registered Nurse, Home Health Aide,  and From:  [facility]    Discharge Instructions Verbalized to Patient at Discharge:     Weight bearing restrictions:  Weight bearing as tolerated.     Monitor blood glucose monitoring 4 times a day. Keep a record and bring it to your next primary provider visit.     Continue to follow your diet:  regular.     Weigh yourself daily in the morning and keep a record. Call your primary clinic: a) if you are more short of breath, or b) if your weight changes more than 3 pounds in one day or more than 5 pounds in one week.     Driving is not recommended     24-hour supervision is recommended for safety.   -Follow up within 3 month for repeat CT for resolution.   -Encourage incentive spirometry every 4 hours while awake.    TOTAL DISCHARGE TIME:   Greater than 30 minutes  Electronically signed by:  Tata Blood DNP, APRN    Documentation of Face to Face and Certification for Home Health Services    I certify that patient: Pee Ayala is under my care and that I, or a nurse practitioner or physician's assistant working with me, had a face-to-face encounter that meets the physician  face-to-face encounter requirements with this patient on: 7/27/2022.    This encounter with the patient was in whole, or in part, for the following medical condition, which is the primary reason for home health care: The primary encounter diagnosis was Chronic atrial fibrillation (H). 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), Chronic anticoagulation, Physical deconditioning, Generalized muscle weakness, Personal history of fall, Closed fracture of right inferior pubic ramus with routine healing, subsequent encounter, Multiple closed fractures of pelvis without disruption of pelvic ring with routine healing, subsequent encounter, Age-related osteoporosis with current pathological fracture, sequela, Pain, Pneumonia of right lower lobe due to infectious organism, Pleural effusion, Chronic combined systolic and diastolic congestive heart failure (H), Coronary artery disease involving native coronary artery of native heart without angina pectoris, Anemia due to blood loss, acute, 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, without long-term current use of insulin, unspecified laterality (H), Morbid obesity (H), Polyneuropathy, PAD (peripheral artery disease) (H), Hypercholesteremia, Nausea, Gastroesophageal reflux disease with esophagitis, unspecified whether hemorrhage, Episode of recurrent major depressive disorder, unspecified depression episode severity (H), Diarrhea, unspecified type, Insomnia, unspecified type, Depression, unspecified depression type, Benign prostatic hyperplasia, unspecified whether lower urinary tract symptoms present, Diabetic polyneuropathy associated with type 2 diabetes mellitus (H), Fracture of hip, left, closed, initial encounter (H), Closed compression fracture of L3 lumbar vertebra with routine healing, subsequent encounter, and Chronic heart  failure with preserved ejection fraction (H) were also pertinent to this visit.    I certify that, based on my findings, the following services are medically necessary home health services: Nursing, Occupational Therapy, Physical Therapy and Social Work.    My clinical findings support the need for the above services because: Nurse is needed: To assess mediation management, education, INR after changes in medications or other medical regimen.., Occupational Therapy Services are needed to assess and treat cognitive ability and address ADL safety due to impairment in deconditioning. and Physical Therapy Services are needed to assess and treat the following functional impairments: deconditioning.    Further, I certify that my clinical findings support that this patient is homebound (i.e. absences from home require considerable and taxing effort and are for medical reasons or Yaz[employer] services or infrequently or of short duration when for other reasons) because: Requires assistance of another person or specialized equipment to access medical services because patient: Is unable to walk greater than 100 feet without rest. and Requires supervision of another for safe transfer...    Based on the above findings. I certify that this patient is confined to the home and needs intermittent skilled nursing care, physical therapy and/or speech therapy.  The patient is under my care, and I have initiated the establishment of the plan of care.  This patient will be followed by a physician who will periodically review the plan of care.  Physician/Provider to provide follow up care: Mar Morales    Attending [facility] physician (the Medicare certified [place] provider): Dr.Sara Shaggy MD, signing F2F and only signing for initial order. Please send all follow up questions and concerns or needed follow up signatures to the PCP, who [place] has on file as:  Mar Morales.    Physician Signature: See electronic signature associated  with these discharge orders.  Date: 7/26/2022            Sincerely,        BOBBY Bedolla CNP

## 2022-07-28 ENCOUNTER — LAB REQUISITION (OUTPATIENT)
Dept: LAB | Facility: CLINIC | Age: 82
End: 2022-07-28
Payer: COMMERCIAL

## 2022-07-28 DIAGNOSIS — I48.20 CHRONIC ATRIAL FIBRILLATION, UNSPECIFIED (H): ICD-10-CM

## 2022-07-29 ENCOUNTER — ANTICOAGULATION THERAPY VISIT (OUTPATIENT)
Dept: ANTICOAGULATION | Facility: CLINIC | Age: 82
End: 2022-07-29

## 2022-07-29 DIAGNOSIS — Z79.01 CHRONIC ANTICOAGULATION: ICD-10-CM

## 2022-07-29 DIAGNOSIS — I48.20 CHRONIC ATRIAL FIBRILLATION (H): Primary | ICD-10-CM

## 2022-07-29 LAB — INR (EXTERNAL): 2.5 (ref 0.9–1.1)

## 2022-07-29 RX ORDER — WARFARIN SODIUM 4 MG/1
TABLET ORAL
Qty: 30 TABLET | Refills: 1 | Status: SHIPPED | OUTPATIENT
Start: 2022-07-29 | End: 2022-10-22

## 2022-07-29 RX ORDER — WARFARIN SODIUM 5 MG/1
TABLET ORAL
Qty: 30 TABLET | Refills: 1 | Status: SHIPPED | OUTPATIENT
Start: 2022-07-29 | End: 2022-08-12

## 2022-07-29 NOTE — PROGRESS NOTES
ANTICOAGULATION MANAGEMENT     Pee Ayala 82 year old male is on warfarin with therapeutic INR result. (Goal INR 2.0-3.0)    Recent labs: (last 7 days)     07/29/22  1053   INR 2.5*       ASSESSMENT       Source(s): Chart Review and Home Care/Facility Nurse       Warfarin doses taken: Warfarin taken as instructed and patient did not get new prescription for warfarin when discharged from [facility] - patient took 3mg last night     Diet: home from [facility] may eat more     New illness, injury, or hospitalization: Yes: fractured hip - recent discharge from [facility]    Medication/supplement changes: taking PRN oxy for pain    Signs or symptoms of bleeding or clotting: No    Previous INR: Therapeutic last visit; previously outside of goal range    Additional findings: 4mg and 5mg tablets sent to patient pharmacy        PLAN     Recommended plan for temporary change(s) affecting INR     Dosing Instructions: Continue your current warfarin dose with next INR in 5 days       Summary  As of 7/29/2022    Full warfarin instructions:  5 mg every Mon, Thu; 4 mg all other days   Next INR check:  8/3/2022             Telephone call with [facility] home care/facility nurse who verbalizes understanding and agrees to plan    Orders given to  Homecare nurse/facility to recheck    Education provided: Contact [phone]  with any changes, questions or concerns.     Plan made per [facility] anticoagulation protocol    Beverly Marr RN  Anticoagulation Clinic  7/29/2022    _______________________________________________________________________     Anticoagulation Episode Summary     Current INR goal:  2.0-3.0   TTR:  39.8 % (11.7 mo)   Target end date:  Indefinite   Send INR reminders to:  [facility]    Indications    Chronic atrial fibrillation (H) [I48.20]  Chronic anticoagulation [Z79.01]           Comments:           Anticoagulation Care Providers     Provider Role Specialty Phone number    Jazzy Gil MD Referring Family Medicine  [phone]

## 2022-07-30 ENCOUNTER — MEDICAL CORRESPONDENCE (OUTPATIENT)
Dept: HEALTH INFORMATION MANAGEMENT | Facility: CLINIC | Age: 82
End: 2022-07-30

## 2022-08-02 ENCOUNTER — TELEPHONE (OUTPATIENT)
Dept: ENDOCRINOLOGY | Facility: CLINIC | Age: 82
End: 2022-08-02

## 2022-08-02 ENCOUNTER — MEDICAL CORRESPONDENCE (OUTPATIENT)
Dept: FAMILY MEDICINE | Facility: CLINIC | Age: 82
End: 2022-08-02

## 2022-08-02 NOTE — TELEPHONE ENCOUNTER
Patient has a virtual diabetic follow up scheduled in Endocrinology with Agueda Kwok CNP on 8/09/22.  Called patient to assist in getting patient in for a lab appointment as he is due for a HGB A1C.  Left message for patient to return call to get this scheduled.  Provider prefers patient reschedule his appointment with her if unable to complete A1C prior to upcoming virtual visit.  Lab orders are in chart.    Would also like to confirm that patient is no longer in TCU.  If still in TCU, should r/s Endo appointment for a time when patient is no longer in TCU.

## 2022-08-03 ENCOUNTER — ANTICOAGULATION THERAPY VISIT (OUTPATIENT)
Dept: ANTICOAGULATION | Facility: CLINIC | Age: 82
End: 2022-08-03

## 2022-08-03 ENCOUNTER — MEDICAL CORRESPONDENCE (OUTPATIENT)
Dept: FAMILY MEDICINE | Facility: CLINIC | Age: 82
End: 2022-08-03

## 2022-08-03 DIAGNOSIS — Z79.01 CHRONIC ANTICOAGULATION: ICD-10-CM

## 2022-08-03 DIAGNOSIS — I48.20 CHRONIC ATRIAL FIBRILLATION (H): Primary | ICD-10-CM

## 2022-08-03 LAB — INR (EXTERNAL): 2.9 (ref 0.9–1.1)

## 2022-08-03 NOTE — PROGRESS NOTES
ANTICOAGULATION MANAGEMENT     Pee Ayala 82 year old male is on warfarin with therapeutic INR result. (Goal INR 2.0-3.0)    Recent labs: (last 7 days)     08/03/22  1032   INR 2.9*       ASSESSMENT       Source(s): Chart Review and Home Care/Facility Nurse       Warfarin doses taken: Warfarin taken as instructed    Diet: No new diet changes identified    New illness, injury, or hospitalization: No    Medication/supplement changes: None noted    Signs or symptoms of bleeding or clotting: No    Previous INR: Therapeutic last 2(+) visits    Additional findings: None       PLAN     Recommended plan for no diet, medication or health factor changes affecting INR     Dosing Instructions: Continue your current warfarin dose with next INR in 1 week       Summary  As of 8/3/2022    Full warfarin instructions:  5 mg every Mon, Thu; 4 mg all other days   Next INR check:  8/10/2022             [facility] care/facility nurse who verbalizes understanding and agrees to plan    Orders given to  Homecare nurse/facility to recheck    Education provided: None required    Plan made per [facility] anticoagulation protocol    Ana Maria Desouza, RN  Anticoagulation Clinic  8/3/2022    _______________________________________________________________________     Anticoagulation Episode Summary     Current INR goal:  2.0-3.0   TTR:  40.8 % (11.7 mo)   Target end date:  Indefinite   Send INR reminders to:  DEVON ALEXIS    Indications    Chronic atrial fibrillation (H) [I48.20]  Chronic anticoagulation [Z79.01]           Comments:           Anticoagulation Care Providers     Provider Role Specialty Phone number    Jazzy Gil MD Referring Family Medicine [phone]

## 2022-08-05 ENCOUNTER — MEDICAL CORRESPONDENCE (OUTPATIENT)
Dept: FAMILY MEDICINE | Facility: CLINIC | Age: 82
End: 2022-08-05

## 2022-08-08 ENCOUNTER — MYC MEDICAL ADVICE (OUTPATIENT)
Dept: PSYCHOLOGY | Facility: CLINIC | Age: 82
End: 2022-08-08

## 2022-08-09 ENCOUNTER — TRANSFERRED RECORDS (OUTPATIENT)
Dept: HEALTH INFORMATION MANAGEMENT | Facility: CLINIC | Age: 82
End: 2022-08-09

## 2022-08-10 ENCOUNTER — ANTICOAGULATION THERAPY VISIT (OUTPATIENT)
Dept: ANTICOAGULATION | Facility: CLINIC | Age: 82
End: 2022-08-10

## 2022-08-10 ENCOUNTER — OFFICE VISIT (OUTPATIENT)
Dept: VASCULAR SURGERY | Facility: CLINIC | Age: 82
End: 2022-08-10
Attending: PODIATRIST
Payer: COMMERCIAL

## 2022-08-10 VITALS — DIASTOLIC BLOOD PRESSURE: 66 MMHG | HEART RATE: 72 BPM | SYSTOLIC BLOOD PRESSURE: 122 MMHG | RESPIRATION RATE: 18 BRPM

## 2022-08-10 DIAGNOSIS — I48.20 CHRONIC ATRIAL FIBRILLATION (H): Primary | ICD-10-CM

## 2022-08-10 DIAGNOSIS — Z79.01 CHRONIC ANTICOAGULATION: ICD-10-CM

## 2022-08-10 DIAGNOSIS — I73.9 PAD (PERIPHERAL ARTERY DISEASE) (H): ICD-10-CM

## 2022-08-10 DIAGNOSIS — B35.1 ONYCHOMYCOSIS: ICD-10-CM

## 2022-08-10 DIAGNOSIS — L89.892: Primary | ICD-10-CM

## 2022-08-10 LAB — INR (EXTERNAL): 2.5 (ref 0.9–1.1)

## 2022-08-10 PROCEDURE — 11721 DEBRIDE NAIL 6 OR MORE: CPT | Mod: Q8 | Performed by: PODIATRIST

## 2022-08-10 PROCEDURE — 99212 OFFICE O/P EST SF 10 MIN: CPT | Mod: 25 | Performed by: PODIATRIST

## 2022-08-10 ASSESSMENT — PAIN SCALES - GENERAL: PAINLEVEL: NO PAIN (0)

## 2022-08-10 NOTE — PATIENT INSTRUCTIONS
Podiatry Clinics that offer foot and toenail care  [facility]  [facility]  [phone]    Foot and Ankle Clinics, [facility]  [phone]    [facility]  [place] - Dr. Mendez on Tuesdays  [phone]    [facility]  [place]  [phone]    [facility]  [facility]  [phone]    [facility]  [phone]    [facility]  [phone]      [facility]  *Nurse comes to your home for nail care.  Norma Eli RN Foot Specialist  [phone]

## 2022-08-10 NOTE — PROGRESS NOTES
ANTICOAGULATION MANAGEMENT     Pee Ayala 82 year old male is on warfarin with therapeutic INR result. (Goal INR 2.0-3.0)    Recent labs: (last 7 days)     08/10/22  1022   INR 2.5*       ASSESSMENT       Source(s): Chart Review and Home Care/Facility Nurse       Warfarin doses taken: Warfarin taken as instructed    Diet: No new diet changes identified    New illness, injury, or hospitalization: No    Medication/supplement changes: None noted    Signs or symptoms of bleeding or clotting: No    Previous INR: Therapeutic last 2(+) visits    Additional findings: None       PLAN     Recommended plan for no diet, medication or health factor changes affecting INR     Dosing Instructions: Continue your current warfarin dose with next INR in 2 weeks       Summary  As of 8/10/2022    Full warfarin instructions:  5 mg every Mon, Thu; 4 mg all other days   Next INR check:  8/24/2022             Telephone call with [facility] home care/facility nurse who verbalizes understanding and agrees to plan    Orders given to  Homecare nurse/facility to recheck    Education provided: Please call back if any changes to your diet, medications or how you've been taking warfarin    Plan made per [facility] anticoagulation protocol    Ariela Adhikari RN  Anticoagulation Clinic  8/10/2022    _______________________________________________________________________     Anticoagulation Episode Summary     Current INR goal:  2.0-3.0   TTR:  41.3 % (11.7 mo)   Target end date:  Indefinite   Send INR reminders to:  DEVON ALEXIS    Indications    Chronic atrial fibrillation (H) [I48.20]  Chronic anticoagulation [Z79.01]           Comments:           Anticoagulation Care Providers     Provider Role Specialty Phone number    Jazzy Gil MD Referring Family Medicine [phone]

## 2022-08-10 NOTE — PROGRESS NOTES
FOOT AND ANKLE SURGERY/PODIATRY Progress Note      ASSESSMENT:   Grade 1 Pressure Ulceration left heel   PAD  Onychomycosis      TREATMENT:  -The left heel ulceration has resolved. I recommend he continue to monitor for any skin irritation or skin breakdown.     -Follow-up with Dr. Delgadillo as scheduled.     -I debrided nails 1-10 in length and thickness.     -All questions invited and answered. He is discharged from my care at this time and will follow-up with me as concerns develop.     Eagle Aaron DPM  [facility]      HPI: Pee Ayala was seen again today for a left heel ulcer. He has not noticed discomfort or concerns related to the left heel in recent days. He requests a nail trim today.       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      Encounter for long-term (current) use of insulin (H) 08/11/2016     Esophageal reflux 11/18/2010     History of angina      HTN (hypertension) 06/30/2009     (Problem list name updated by automated process. Provider to review and confirm.)     Impotence of organic origin      Mixed hyperlipidemia 04/25/2007 April 25, 2007 restarted Zocor today, recheck in 3 months.  August 23, 2007 LDL at 101 with 40 mg, will increase to 80 mg. Recheck  In 3 months.      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      Walking troubles        Past Surgical History:   Procedure Laterality Date     BYPASS GRAFT ARTERY CORONARY N/A 9/10/2014    Procedure: BYPASS GRAFT ARTERY CORONARY;  Surgeon: Bharathi Caraballo MD;  Location: [facility] OR     BYPASS GRAFT ARTERY CORONARY  2016     COLONOSCOPY  1/14/2004     CORONARY ANGIOGRAPHY ADULT ORDER       CV CORONARY ANGIOGRAM N/A 4/4/2019    Procedure: Coronary Angiogram;  Surgeon: Dominik Vega MD;  Location: [facility] Cath Lab;  Service: Cardiology     CV CORONARY ANGIOGRAM N/A 1/6/2021    Procedure: Coronary Angiogram;  Surgeon: Dominik Vega MD;  Location: [facility] Cardiac Cath Lab;  Service: Cardiology     CV LEFT HEART CATHETERIZATION WITHOUT LEFT VENTRICULOGRAM Left 4/4/2019    Procedure: Left Heart Catheterization Without Left Ventriculogram;  Surgeon: Dominik Vega MD;  Location: [facility] Cath Lab;  Service: Cardiology     CV LEFT HEART CATHETERIZATION WITHOUT LEFT VENTRICULOGRAM Left 1/6/2021    Procedure: Left Heart Catheterization Without Left Ventriculogram;  Surgeon: Dominik Vega MD;  Location: [facility] Cardiac Cath Lab;  Service: Cardiology     CV RIGHT HEART CATHETERIZATION Right 4/4/2019    Procedure: Right Heart Catheterization;  Surgeon: Dominik Vega MD;  Location: [facility] Cath Lab;  Service: Cardiology     CV TRANSCATHETER AORTIC VALVE REPLACEMENT N/A 1/12/2021    Procedure: Right transfemoral transcatheter aortic valve replacement using Magdaleno Dominick 3 size 29mm.  Transthoracic echocardiogram;   Surgeon: Jo Romano MD;  Location:  HEART CARDIAC CATH LAB     ESOPHAGOSCOPY, GASTROSCOPY, DUODENOSCOPY (EGD), COMBINED N/A 1/13/2016    Procedure: COMBINED ESOPHAGOSCOPY, GASTROSCOPY, DUODENOSCOPY (EGD);  Surgeon: Rk Srivastava MD;  Location: [facility] FEMORAL CANNULIZATION WITH OPEN STANDBY REPAIR AORTIC VALVE N/A 1/12/2021    Procedure: Cardiopulmonary Bypass standby;  Surgeon: Jefferson Sandy MD;  Location:  HEART CARDIAC CATH LAB     IR LOWER EXTREMITY ANGIOGRAM LEFT  12/7/2021     LAPAROSCOPIC CHOLECYSTECTOMY  10/09     MAZE PROCEDURE N/A 9/10/2014    Procedure: MAZE PROCEDURE;  Surgeon: Bharathi Caraballo MD;  Location:  OR     MOHS MICROGRAPHIC PROCEDURE       OPEN REDUCTION INTERNAL FIXATION HIP BIPOLAR Left 4/9/2022    Procedure: HEMIARTHROPLASTY, HIP, BIPOLAR, OPEN REDUCTION INTERNAL FIXATION OF GREATER TROCHANTER;  Surgeon: Jd Larose MD;  Location: [facility] OR     ORTHOPEDIC SURGERY      surgery for fx  Left forearm     OTHER SURGICAL HISTORY Left 2015    forearm sugery     STENT, CORONARY, HUMA  02/2016     VASECTOMY         Allergies   Allergen Reactions     Oxycodone Itching and Rash     Amlodipine Dizziness     Dizziness and dry heaves     Lisinopril Cough     Adhesive Tape Itching and Rash         Current Outpatient Medications:      ACCU-CHEK GUIDE test strip, USE 1  TO CHECK GLUCOSE THREE TIMES DAILY, Disp: 300 strip, Rfl: 1     acetaminophen (TYLENOL) 500 MG tablet, Take 1 tablet (500 mg) by mouth 3 times daily, Disp: 240 tablet, Rfl: 0     aspirin 81 MG EC tablet, Take 1 tablet (81 mg) by mouth daily, Disp: 90 tablet, Rfl: 0     busPIRone (BUSPAR) 5 MG tablet, Take 1 tablet (5 mg) by mouth 2 times daily, Disp: 180 tablet, Rfl: 0     carvedilol (COREG) 6.25 MG tablet, Take 1 tablet (6.25 mg) by mouth 2 times daily (with meals) HOLD if SBP<100 and/or HR<55, Disp: 180 tablet, Rfl: 0     cilostazol (PLETAL) 50 MG tablet, Take 50mg BID From  7/19/22-8/2/22, then take 100mg BID starting 8/2/22, Disp: 120 tablet, Rfl: 0     Continuous Blood Gluc Sensor (FREESTYLE DOMI 2 SENSOR) [facility], 1 each every 14 days Use 1 sensor every 14 days. Use to read blood sugars per 's instructions., Disp: 1 each, Rfl: 0     finasteride (PROSCAR) 5 MG tablet, Take 1 tablet (5 mg) by mouth daily, Disp: 90 tablet, Rfl: 0     furosemide (LASIX) 20 MG tablet, TAKE 2 TABLETS BY MOUTH IN THE MORNING AND 1 TABLET IN THE AFTERNOON, Disp: 180 tablet, Rfl: 0     gabapentin (NEURONTIN) 600 MG tablet, Take 1 tablet (600 mg) by mouth 3 times daily, Disp: 180 tablet, Rfl: 0     insulin aspart prot & aspart (NOVOLOG MIX 70/30 PEN) (70-30) 100 UNIT/ML pen, Inject subcutaneously 20 units in AM with breakfast and 12 in PM with dinner. If Blood Glucose<100 then give 1/2 dose), Disp: 15 mL, Rfl: 0     metFORMIN (GLUCOPHAGE) 500 MG tablet, Take 2 tablets (1,000 mg) by mouth 2 times daily (with meals), Disp: 360 tablet, Rfl: 0     methocarbamol (ROBAXIN) 500 MG tablet, Take 1 tablet (500 mg) by mouth 2 times daily, Disp: 60 tablet, Rfl: 0     omeprazole (PRILOSEC) 20 MG DR capsule, Take 1 capsule (20 mg) by mouth daily, Disp: 90 capsule, Rfl: 0     ondansetron (ZOFRAN) 4 MG tablet, Take 1 tablet (4 mg) by mouth every 6 hours as needed for nausea, Disp: 30 tablet, Rfl: 0     oxyCODONE-acetaminophen (PERCOCET) 5-325 MG tablet, Take 1 tablet by mouth 3 times daily as needed for severe pain, Disp: 12 tablet, Rfl: 0     polyethylene glycol (MIRALAX) 17 GM/Dose powder, Take 17 g (1 capful) by mouth daily as needed for constipation (opioid induced constipation), Disp: 507 g, Rfl: 0     pramipexole (MIRAPEX) 0.25 MG tablet, Take 2 tablets (0.5 mg) by mouth daily Takes 4pm and 5;30 pm, Disp: 180 tablet, Rfl: 0     rosuvastatin (CRESTOR) 20 MG tablet, Take 1 tablet (20 mg) by mouth At Bedtime, Disp: 90 tablet, Rfl: 0     senna-docusate (SENOKOT-S/PERICOLACE) 8.6-50 MG tablet, Take 1 tablet by  mouth 2 times daily as needed for constipation, Disp: 30 tablet, Rfl: 0     warfarin ANTICOAGULANT (COUMADIN) 1 MG tablet, 5mg daily on Monydays and thursdays and 4mg all other days., Disp: 60 tablet, Rfl: 0     warfarin ANTICOAGULANT (COUMADIN) 4 MG tablet, Take 4mg warfarin Tuesday, Wednesday, Friday, Saturday, Sunday and take 5mg tablet on Monday and Thursday or as directed by [facility] clinic, Disp: 30 tablet, Rfl: 1     warfarin ANTICOAGULANT (COUMADIN) 5 MG tablet, Take 5mg Monday and Thursday or as directed by acc clinic, Disp: 30 tablet, Rfl: 1    Review of Systems - 10 point Review of Systems is negative except for left heel ulcer which is noted in HPI.      OBJECTIVE:  /66   Pulse 72   Resp 18   General appearance: Patient is alert and fully cooperative with history & exam.  No sign of distress is noted during the visit.    Vascular: Dorsalis pedis/PT non-palpableBilateral.  Dermatologic:    Wound Elbow Abrasion(s);Skin tear (Active)       Wound Head Skin tear (Active)       Rash Right anterior chest (Active)       VASC Wound left lateral heel (Active)   Pre Size Length 0 08/10/22 1100   Pre Size Width 0 08/10/22 1100   Pre Size Depth 0 08/10/22 1100   Pre Total Sq cm 0 08/10/22 1100   Description stable eschar 06/21/22 1400       Incision/Surgical Site 01/12/21 Right Groin (Active)       Incision/Surgical Site 01/12/21 Left Groin (Active)       Incision/Surgical Site 12/07/21 Right Groin (Active)       Incision/Surgical Site 04/09/22 Left Hip (Active)   Nails 1-10 elongated, thick, yellow with subungal debris. Trophic changes noted including diminished hair growth and shiny skin.  Neurologic: Diminished to light touch Bilateral.  Musculoskeletal: Contracted digits noted Bilateral.    Imaging:     No results found.       Picture: None

## 2022-08-12 ENCOUNTER — PATIENT OUTREACH (OUTPATIENT)
Dept: CARE COORDINATION | Facility: CLINIC | Age: 82
End: 2022-08-12

## 2022-08-12 ENCOUNTER — OFFICE VISIT (OUTPATIENT)
Dept: FAMILY MEDICINE | Facility: CLINIC | Age: 82
End: 2022-08-12
Payer: COMMERCIAL

## 2022-08-12 VITALS
BODY MASS INDEX: 27.81 KG/M2 | WEIGHT: 162 LBS | RESPIRATION RATE: 16 BRPM | HEART RATE: 73 BPM | SYSTOLIC BLOOD PRESSURE: 117 MMHG | DIASTOLIC BLOOD PRESSURE: 62 MMHG | TEMPERATURE: 97.7 F

## 2022-08-12 DIAGNOSIS — J18.9 PNEUMONIA OF RIGHT LOWER LOBE DUE TO INFECTIOUS ORGANISM: Primary | ICD-10-CM

## 2022-08-12 DIAGNOSIS — R52 PAIN: ICD-10-CM

## 2022-08-12 DIAGNOSIS — Z23 NEED FOR COVID-19 VACCINE: ICD-10-CM

## 2022-08-12 DIAGNOSIS — E11.3599 TYPE 2 DIABETES MELLITUS WITH PROLIFERATIVE RETINOPATHY WITHOUT MACULAR EDEMA, WITHOUT LONG-TERM CURRENT USE OF INSULIN, UNSPECIFIED LATERALITY (H): ICD-10-CM

## 2022-08-12 PROCEDURE — 99214 OFFICE O/P EST MOD 30 MIN: CPT | Performed by: FAMILY MEDICINE

## 2022-08-12 RX ORDER — OXYCODONE AND ACETAMINOPHEN 5; 325 MG/1; MG/1
1 TABLET ORAL 3 TIMES DAILY PRN
Qty: 15 TABLET | Refills: 0 | Status: SHIPPED | OUTPATIENT
Start: 2022-08-12 | End: 2023-02-14

## 2022-08-12 ASSESSMENT — PATIENT HEALTH QUESTIONNAIRE - PHQ9
SUM OF ALL RESPONSES TO PHQ QUESTIONS 1-9: 3
10. IF YOU CHECKED OFF ANY PROBLEMS, HOW DIFFICULT HAVE THESE PROBLEMS MADE IT FOR YOU TO DO YOUR WORK, TAKE CARE OF THINGS AT HOME, OR GET ALONG WITH OTHER PEOPLE: NOT DIFFICULT AT ALL
SUM OF ALL RESPONSES TO PHQ QUESTIONS 1-9: 3

## 2022-08-12 NOTE — PROGRESS NOTES
Clinic Care Coordination Contact  Community Health Worker Initial Outreach    Spoke to Patient (Pee) and Patient's Wife (Fay)     CHW Introduced intent of call regarding PCP referral (Care Coordination Transition Communication).     Patients reports that he is doing okay. Further expressing that he was recently in the hospital. CHW acknowledged.     CHW introduce Clinic Care Coordination and Patient expressed that he would like to get connected with RNCC further expressing needing additional support on how to get stronger in terms of balance. Further indicating that Patient is experiencing lack on balance majority of the time. Further expressing that Home Care will end next week. And Patient is in need of additional support that can help with exercises to increase Patient's balance.     CHW acknowledged and scheduled Patient for an CCC Phone Visit with [facility] CCC RN for an RN CCC Assessment on 08/16/2022 at 9:30AM. Patient acknowledged.     CHW confirms upcoming appointments; ED/ Hospital Follow Up 08/12/2022 10:15AM [facility]. Patient acknowledged.     CHW acknowledged and provided Patien with CHW contact information for additional support needed.     CHW Initial Information Gathering:  Referral Source: PCP  Preferred Urgent Care: [facility], [phone]  Current living arrangement:: I live in a private home with spouse  Type of residence:: [facility]  Community Resources: Home Care (It will be coming to the of end of week (08/15 - 08/19).)  Equipment Currently Used at Home: walker, rolling  Informal Support system:: Family, Spouse  No PCP office visit in Past Year: No  CHW Additional Questions  If ED/Hospital discharge, follow-up appointment scheduled as recommended?: N/A  Medication changes made following ED/Hospital discharge?: N/A  MyChart active?: Yes  Patient sent Social Determinants of Health questionnaire?: No (Patient expressed that he is  not comfortable with MyChart.)    Patient accepts CC: Yes. Patient scheduled for assessment with [facility] CCC RN on 08/16/2022 at 9:30AM. Patient noted desire to discuss expressed that he would like to get connected with RNCC further expressing needing additional support on how to get stronger in terms of balance. Further indicating that Patient is experiencing lack on balance majority of the time. Further expressing that Home Care will end next week. And Patient is in need of additional support that can help with exercises to increase Patient's balance.     KIMBERLY Dumas  Clinic Care Coordination   [facility]   Phone: [phone]  [email or web address]

## 2022-08-12 NOTE — PATIENT INSTRUCTIONS
Covid booster today    Med list updated    Short refill on oxycodone/APAP 5/325 MG ONE TAB at bedtime if needed.    Low sodium diet

## 2022-08-12 NOTE — PROGRESS NOTES
Answers for HPI/ROS submitted by the patient on 8/12/2022  If you checked off any problems, how difficult have these problems made it for you to do your work, take care of things at home, or get along with other people?: Not difficult at all  PHQ9 TOTAL SCORE: 3          Subjective   Pee is a 82 year old accompanied by his spouse, presenting for the following health issues:  Hospital F/U ([facility] 07/04-07/12 PELVIS FRACTURE, THEN [facility] TCU-DISCHARGED 2 WEEKS AGO  )      HPI   Here with his wife today  TREATED FOR A PNEUMONIA DURING ADMISSION FOR A PELVIC FRACTURE.  NO COUGH, SHORTNESS OF BREATH AND NO FEVER.  EATING FINE.  100% DEPENDENT ON A WALKER.  THE OT HAS HAD HIM WALK WITH A CANE BUT WITH A SUPPORT BELT AND UNDER DIRECT SUPERVISION.    LAST NIGHT AWOKEN BY PAIN IN BOTH HIPS REQUIRING AND OXYCODONE  THE OXYCODONE USE HAS BEEN LIMITED.  IT HAS NOT BEEN USED DAILY.      Hospital Follow-up Visit:    Hospital/Nursing Home/ Rehab Facility: [facility]  Date of Admission: 07/04/22  Date of Discharge: 07/12/22  Reason(s) for Admission: PELVIS FRACTURE    Was your hospitalization related to COVID-19? No   Problems taking medications regularly:  None, SOMETIMES HARD TO REMEMBER TO TAKE  Medication changes since discharge: WARFARIN DOSE CHANGE, INSULIN DOSE CHANGE  Problems adhering to non-medication therapy:  None    Summary of hospitalization:  [facility] discharge summary reviewed  Diagnostic Tests/Treatments reviewed.  Follow up needed: none  Other Healthcare Providers Involved in Patient s Care:         Update since discharge: improved.         Post Medication Reconciliation Status:        Plan of care communicated with patient           Objective    /62 (BP Location: Left arm, Patient Position: Sitting, Cuff Size: Adult Regular)   Pulse 73   Temp 97.7  F (36.5  C) (Oral)   Resp 16   Wt 73.5 kg (162 lb)   BMI 27.81 kg/m     Body mass index is 27.81 kg/m .  Physical Exam   GENERAL: healthy, alert and no distress  RESP: lungs clear to auscultation - no rales, rhonchi or wheezes  CV: regular rate and rhythm, normal S1 S2, no S3 or S4, no murmur, click or rub, no peripheral edema and peripheral pulses strong  MS: no gross musculoskeletal defects noted, no edema        INR   Date Value Ref Range Status   01/13/2021 1.13 0.86 - 1.14 Final     INR (External)   Date Value Ref Range Status   08/10/2022 2.5 (A) 0.9 - 1.1 Final      Last Comprehensive Metabolic Panel:  Sodium   Date Value Ref Range Status   07/25/2022 138 136 - 145 mmol/L Final   01/13/2021 135 133 - 144 mmol/L Final     Potassium   Date Value Ref Range Status   07/25/2022 3.7 3.4 - 5.3 mmol/L Final   07/12/2022 3.6 3.4 - 5.3 mmol/L Final   01/13/2021 4.2 3.4 - 5.3 mmol/L Final     Chloride   Date Value Ref Range Status   07/25/2022 100 98 - 107 mmol/L Final   07/12/2022 103 94 - 109 mmol/L Final   01/13/2021 102 94 - 109 mmol/L Final     Carbon Dioxide   Date Value Ref Range Status   01/13/2021 21 20 - 32 mmol/L Final     Carbon Dioxide (CO2)   Date Value Ref Range Status   07/25/2022 25 22 - 29 mmol/L Final   07/12/2022 27 20 - 32 mmol/L Final     Anion Gap   Date Value Ref Range Status   07/25/2022 13 7 - 15 mmol/L Final   07/12/2022 7 3 - 14 mmol/L Final   01/13/2021 12 3 - 14 mmol/L Final     Glucose   Date Value Ref Range Status   07/25/2022 162 (H) 70 - 99 mg/dL Final   07/12/2022 119 (H) 70 - 99 mg/dL Final   01/13/2021 312 (H) 70 - 99 mg/dL Final     GLUCOSE BY METER POCT   Date Value Ref Range Status   07/12/2022 303 (H) 70 - 99 mg/dL Final     Urea Nitrogen   Date Value Ref Range Status   07/25/2022 16.0 8.0 - 23.0 mg/dL Final   07/12/2022 13 7 - 30 mg/dL Final   01/13/2021 20 7 - 30 mg/dL Final     Creatinine   Date Value Ref Range Status   07/25/2022 0.74 0.67 - 1.17 mg/dL Final   01/13/2021 0.97 0.66 - 1.25 mg/dL Final     GFR Estimate   Date Value Ref Range  Status   07/25/2022 >90 >60 mL/min/1.73m2 Final     Comment:     Effective December 21, 2021 eGFRcr in adults is calculated using the 2021 CKD-EPI creatinine equation which includes age and gender (Derrek parker al., NEJM, DOI: 10.1056/AEDStw0732040)   02/23/2021 >60 >60 mL/min/1.73m2 Final   01/13/2021 74 >60 mL/min/[1.73_m2] Final     Comment:     Non  GFR Calc  Starting 12/18/2018, serum creatinine based estimated GFR (eGFR) will be   calculated using the Chronic Kidney Disease Epidemiology Collaboration   (CKD-EPI) equation.       Calcium   Date Value Ref Range Status   07/25/2022 8.9 8.8 - 10.2 mg/dL Final   01/13/2021 9.2 8.5 - 10.1 mg/dL Final       Lab Results   Component Value Date    A1C 6.3 05/24/2022    A1C 8.0 01/25/2022    A1C 8.1 12/03/2021    A1C 8.6 08/31/2021    A1C 9.0 08/03/2021    A1C 9.1 01/12/2021    A1C 8.0 06/09/2016    A1C 8.2 03/08/2016    A1C 7.7 12/23/2015    A1C 6.9 09/15/2015     Encounter Diagnoses   Name Primary?     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  ..

## 2022-08-12 NOTE — LETTER
My Depression Action Plan  Name: Pee Ayala   Date of Birth 1940  Date: 8/12/2022    My doctor: Mar Morales   My clinic: [address] [facility] [id number] [phone]          GREEN    ZONE   Good Control    What it looks like:     Things are going generally well. You have normal ups and downs. You may even feel depressed from time to time, but bad moods usually last less than a day.   What you need to do:  1. Continue to care for yourself (see self care plan)  2. Check your depression survival kit and update it as needed  3. Follow your physician s recommendations including any medication.  4. Do not stop taking medication unless you consult with your physician first.           YELLOW         ZONE Getting Worse    What it looks like:     Depression is starting to interfere with your life.     It may be hard to get out of bed; you may be starting to isolate yourself from others.    Symptoms of depression are starting to last most all day and this has happened for several days.     You may have suicidal thoughts but they are not constant.   What you need to do:     1. Call your care team. Your response to treatment will improve if you keep your care team informed of your progress. Yellow periods are signs an adjustment may need to be made.     2. Continue your self-care.  Just get dressed and ready for the day.  Don't give yourself time to talk yourself out of it.    3. Talk to someone in your support network.    4. Open up your Depression Self-Care Plan/Wellness Kit.           RED    ZONE Medical Alert - Get Help    What it looks like:     Depression is seriously interfering with your life.     You may experience these or other symptoms: You can t get out of bed most days, can t work or engage in other necessary activities, you have trouble taking care of basic hygiene, or basic responsibilities, thoughts of suicide or death that  will not go away, self-injurious behavior.     What you need to do:  1. Call your care team and request a same-day appointment. If they are not available (weekends or after hours) call your local crisis line, emergency room or 911.          Depression Self-Care Plan / Wellness Kit    Many people find that medication and therapy are helpful treatments for managing depression. In addition, making small changes to your everyday life can help to boost your mood and improve your wellbeing. Below are some tips for you to consider. Be sure to talk with your medical provider and/or behavioral health consultant if your symptoms are worsening or not improving.     Sleep   Sleep hygiene  means all of the habits that support good, restful sleep. It includes maintaining a consistent bedtime and wake time, using your bedroom only for sleeping or sex, and keeping the bedroom dark and free of distractions like a computer, smartphone, or television.     Develop a Healthy Routine  Maintain good hygiene. Get out of bed in the morning, make your bed, brush your teeth, take a shower, and get dressed. Don t spend too much time viewing media that makes you feel stressed. Find time to relax each day.    Exercise  Get some form of exercise every day. This will help reduce pain and release endorphins, the  feel good  chemicals in your brain. It can be as simple as just going for a walk or doing some gardening, anything that will get you moving.      Diet  Strive to eat healthy foods, including fruits and vegetables. Drink plenty of water. Avoid excessive sugar, caffeine, alcohol, and other mood-altering substances.     Stay Connected with Others  Stay in touch with friends and family members.    Manage Your Mood  Try deep breathing, massage therapy, biofeedback, or meditation. Take part in fun activities when you can. Try to find something to smile about each day.     Psychotherapy  Be open to working with a therapist if your provider  recommends it.     Medication  Be sure to take your medication as prescribed. Most anti-depressants need to be taken every day. It usually takes several weeks for medications to work. Not all medicines work for all people. It is important to follow-up with your provider to make sure you have a treatment plan that is working for you. Do not stop your medication abruptly without first discussing it with your provider.    Crisis Resources   These hotlines are for both adults and children. They and are open 24 hours a day, 7 days a week unless noted otherwise.      National Suicide Prevention Lifeline   988 or 1-747-253-TFEF (8025)      Crisis Text Line    www.crisistextline.org  Text HOME to 892754 from anywhere in the [place], anytime, about any type of crisis. A live, trained crisis counselor will receive the text and respond quickly.      [facility] Lifeline for LGBTQ Youth  A national crisis intervention and suicide lifeline for LGBTQ youth under 25. Provides a safe place to talk without judgement. Call 1-319.332.3037; text START to 103011 or visit www.thetrevorproject.org to talk to a trained counselor.      For [facility] crisis numbers, visit the [facility] website at:  https://[place].gov/dhs/people-we-serve/adults/health-care/mental-health/resources/crisis-contacts.jsp

## 2022-08-12 NOTE — PROGRESS NOTES
"Clinic Care Coordination Contact    Care Coordination Transition Communication           Clinical Data: Patient was hospitalized at [facility] from 7/4 to 7/12 with diagnosis of Right lower lobe pneumonia  Partially loculated right-sided pleural effusion  Diarrhea, C. Diff negative  Right pubic rami fracture due to fall  Sacral ala fracture due to fall  Probable osteoporosis  Depression  Subacute superior endplate L5 compression fracture  Left hip hemiarthroplasty 4/9/2022  Atrial fibrillation  Chronic anticoagulation with warfarin  CAD, s/p 2v CABG and mid RCA stent in 2016  PAD, s/p left SFA stenting   HFpEF  H/o TAVR  Moderate mitral valve insufficiency  Coronary artery disease  Peripheral artery disease s/p PCI   Type 2 diabetes mellitus      Transition to Facility:              Facility Name:  [facility]              Contact name and phone number/fax: [phone]     Plan: Patient now discharged to home.  Will write a new [facility] Referral for [facility] to outreach and offer services.     Patient lives at home with spouse Agueda in a one level twin home. He uses a cane and walker to get around but had a recent fall and currently is \"not able to walk\"     "

## 2022-08-15 ENCOUNTER — LAB (OUTPATIENT)
Dept: LAB | Facility: CLINIC | Age: 82
End: 2022-08-15
Payer: COMMERCIAL

## 2022-08-15 DIAGNOSIS — E11.42 TYPE 2 DIABETES MELLITUS WITH PERIPHERAL NEUROPATHY (H): ICD-10-CM

## 2022-08-15 LAB
ANION GAP SERPL CALCULATED.3IONS-SCNC: 13 MMOL/L (ref 7–15)
BUN SERPL-MCNC: 19.7 MG/DL (ref 8–23)
CALCIUM SERPL-MCNC: 9.7 MG/DL (ref 8.8–10.2)
CHLORIDE SERPL-SCNC: 97 MMOL/L (ref 98–107)
CREAT SERPL-MCNC: 1 MG/DL (ref 0.67–1.17)
CREAT UR-MCNC: 53.9 MG/DL
DEPRECATED HCO3 PLAS-SCNC: 30 MMOL/L (ref 22–29)
GFR SERPL CREATININE-BSD FRML MDRD: 76 ML/MIN/1.73M2
GLUCOSE SERPL-MCNC: 64 MG/DL (ref 70–99)
HBA1C MFR BLD: 8.8 % (ref 0–5.6)
MICROALBUMIN UR-MCNC: 43.2 MG/L
MICROALBUMIN/CREAT UR: 80.15 MG/G CR (ref 0–17)
POTASSIUM SERPL-SCNC: 3.4 MMOL/L (ref 3.4–5.3)
SODIUM SERPL-SCNC: 140 MMOL/L (ref 136–145)

## 2022-08-15 PROCEDURE — 82043 UR ALBUMIN QUANTITATIVE: CPT

## 2022-08-15 PROCEDURE — 83036 HEMOGLOBIN GLYCOSYLATED A1C: CPT

## 2022-08-15 PROCEDURE — 36415 COLL VENOUS BLD VENIPUNCTURE: CPT

## 2022-08-15 PROCEDURE — 80048 BASIC METABOLIC PNL TOTAL CA: CPT

## 2022-08-16 ENCOUNTER — TELEPHONE (OUTPATIENT)
Dept: CARE COORDINATION | Facility: CLINIC | Age: 82
End: 2022-08-16

## 2022-08-16 ENCOUNTER — PATIENT OUTREACH (OUTPATIENT)
Dept: NURSING | Facility: CLINIC | Age: 82
End: 2022-08-16

## 2022-08-16 DIAGNOSIS — S32.82XA MULTIPLE CLOSED FRACTURES OF PELVIS WITHOUT DISRUPTION OF PELVIC RING, INITIAL ENCOUNTER (H): Primary | ICD-10-CM

## 2022-08-16 DIAGNOSIS — J18.9 PNEUMONIA OF RIGHT LOWER LOBE DUE TO INFECTIOUS ORGANISM: ICD-10-CM

## 2022-08-16 ASSESSMENT — ACTIVITIES OF DAILY LIVING (ADL): DEPENDENT_IADLS:: TRANSPORTATION
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
## 2022-08-16 NOTE — TELEPHONE ENCOUNTER
Writer spoke with Mr Ayala today for Enrollment to [facility].  We discussed the following 2 items and are asking for the following 3 Referrals if okayed by Dr. Singh.    1.  Referrals to outpatient PT/OT.  Patient would like to attend therapy at the [facility] due to the distance from his home.  If this is not available; could the referral, face sheet, and any other supporting documentation PCP feels appropriate please be faxed to [facility] [place] per patient request at [phone].    2.  Referral to the Pain Clinic.  Patient stating pain constantly on a daily basis at a 2/10  24/7 without any movement per his report.  As soon as he puts on his shoes, gets dressed, makes breakfast, etc pain increases to 5/10.    Takes the Oxycodone PRN if pain is a 8 or a 9.  Looking for long term solutions.    Thank you  Betina Rodriguez RN

## 2022-08-16 NOTE — LETTER
[facility] CARE COORDINATION  [address]  [facility] [address]    August 16, 2022    Pee Ayala  [address]  [place] [address]      Dear Pee,        I am a clinic care coordinator who works with Ihsan Singh MD with the [facility]. I wanted to thank you for spending the time to talk with me.  Below is a description of clinic care coordination and how I can further assist you.       The clinic care coordination team is made up of a registered nurse, , financial resource worker and community health worker who understand the health care system. The goal of clinic care coordination is to help you manage your health and improve access to the health care system. Our team works alongside your provider to assist you in determining your health and social needs. We can help you obtain health care and community resources, providing you with necessary information and education. We can work with you through any barriers and develop a care plan that helps coordinate and strengthen the communication between you and your care team.    Please feel free to contact me with any questions or concerns regarding care coordination and what we can offer.      We are focused on providing you with the highest-quality healthcare experience possible.    Sincerely,     Betina Rodriguez RN  Clinic Care Coordination    Enclosed: I have enclosed a copy of the Patient Centered Plan of Care. This has helpful information and goals that we have talked about. Please keep this in an easy to access place to use as needed.

## 2022-08-18 ENCOUNTER — MEDICAL CORRESPONDENCE (OUTPATIENT)
Dept: HEALTH INFORMATION MANAGEMENT | Facility: CLINIC | Age: 82
End: 2022-08-18

## 2022-08-22 ENCOUNTER — PATIENT OUTREACH (OUTPATIENT)
Dept: CARE COORDINATION | Facility: CLINIC | Age: 82
End: 2022-08-22

## 2022-08-22 ENCOUNTER — TELEPHONE (OUTPATIENT)
Dept: FAMILY MEDICINE | Facility: CLINIC | Age: 82
End: 2022-08-22

## 2022-08-22 NOTE — PROGRESS NOTES
Clinic Care Coordination - Chart Review Only    Situation/Background: Patient chart reviewed by care coordinator related to [facility] [facility] conversion.    Assessment: Patient continues to be followed by Clinic Care Coordination.    Plan: Patient's chart updated to align with [facility] [facility] program for ongoing patient management.

## 2022-08-22 NOTE — TELEPHONE ENCOUNTER
General Call      Reason for Call:  Patient lost Covid vaccine card and would like a new one. Patient has an appt for booster on 8/25/22.      What are your questions or concerns:  N/A    Date of last appointment with provider:   N/A    Could we send this information to you in [facility] or would you prefer to receive a phone call?:   Patient would prefer a phone call   Okay to leave a detailed message?: Yes at Cell number on file:    Telephone Information:   Mobile [phone]

## 2022-08-23 ENCOUNTER — TRANSFERRED RECORDS (OUTPATIENT)
Dept: HEALTH INFORMATION MANAGEMENT | Facility: CLINIC | Age: 82
End: 2022-08-23

## 2022-08-24 ENCOUNTER — ANTICOAGULATION THERAPY VISIT (OUTPATIENT)
Dept: ANTICOAGULATION | Facility: CLINIC | Age: 82
End: 2022-08-24

## 2022-08-24 ENCOUNTER — MEDICAL CORRESPONDENCE (OUTPATIENT)
Dept: HEALTH INFORMATION MANAGEMENT | Facility: CLINIC | Age: 82
End: 2022-08-24

## 2022-08-24 DIAGNOSIS — Z79.01 CHRONIC ANTICOAGULATION: ICD-10-CM

## 2022-08-24 DIAGNOSIS — I48.20 CHRONIC ATRIAL FIBRILLATION (H): Primary | ICD-10-CM

## 2022-08-24 LAB — INR (EXTERNAL): 2.2 (ref 0.9–1.1)

## 2022-08-24 NOTE — PROGRESS NOTES
ANTICOAGULATION MANAGEMENT     Pee Ayala 82 year old male is on warfarin with therapeutic INR result. (Goal INR 2.0-3.0)    Recent labs: (last 7 days)     08/24/22  1023   INR 2.2*       ASSESSMENT       Source(s): Chart Review and Home Care/Facility Nurse       Warfarin doses taken: Warfarin taken as instructed    Diet: No new diet changes identified    New illness, injury, or hospitalization: No    Medication/supplement changes: None noted    Signs or symptoms of bleeding or clotting: No    Previous INR: Therapeutic last 2(+) visits    Additional findings: Pt may have a spinal pain injection coming up, though not yet scheduled. Advised that patient should call [facility] if a hold is needed.     Home care is discharging today.       PLAN     Recommended plan for no diet, medication or health factor changes affecting INR     Dosing Instructions: Continue your current warfarin dose with next INR in 2 weeks       Summary  As of 8/24/2022    Full warfarin instructions:  5 mg every Mon, Thu; 4 mg all other days   Next INR check:  9/7/2022             Telephone call with Lorna home care/facility nurse who verbalizes understanding and agrees to plan    Patient offered & declined to schedule next visit    Education provided: Goal range and significance of current result    Plan made per [facility] anticoagulation protocol    Pedro Peters RN  Anticoagulation Clinic  8/24/2022    _______________________________________________________________________     Anticoagulation Episode Summary     Current INR goal:  2.0-3.0   TTR:  43.9 % (11.7 mo)   Target end date:  Indefinite   Send INR reminders to:  [facility]    Indications    Chronic atrial fibrillation (H) [I48.20]  Chronic anticoagulation [Z79.01]           Comments:           Anticoagulation Care Providers     Provider Role Specialty Phone number    Jazzy Gil MD Referring Family Medicine [phone]

## 2022-08-24 NOTE — TELEPHONE ENCOUNTER
Called patient to reschedule appointment due to change in my clinic schedule that day. Spoke to patient who elected to change his appointment to 10/7/22 at 11:00am.     Kaci Evans, Ph.D., , [facility]  Clinical Health Psychologist  Phone: [phone]   Pager: [phone]  8/24/2022 4:14 PM

## 2022-08-26 ENCOUNTER — IMMUNIZATION (OUTPATIENT)
Dept: NURSING | Facility: CLINIC | Age: 82
End: 2022-08-26
Payer: COMMERCIAL

## 2022-08-26 PROCEDURE — 0054A COVID-19,PF,PFIZER (12+ YRS): CPT

## 2022-08-26 PROCEDURE — 91305 COVID-19,PF,PFIZER (12+ YRS): CPT

## 2022-08-31 ENCOUNTER — OFFICE VISIT (OUTPATIENT)
Dept: ENDOCRINOLOGY | Facility: CLINIC | Age: 82
End: 2022-08-31
Payer: COMMERCIAL

## 2022-08-31 VITALS — WEIGHT: 163 LBS | BODY MASS INDEX: 27.98 KG/M2 | DIASTOLIC BLOOD PRESSURE: 70 MMHG | SYSTOLIC BLOOD PRESSURE: 110 MMHG

## 2022-08-31 DIAGNOSIS — E11.42 TYPE 2 DIABETES MELLITUS WITH PERIPHERAL NEUROPATHY (H): Primary | ICD-10-CM

## 2022-08-31 PROCEDURE — 99213 OFFICE O/P EST LOW 20 MIN: CPT | Performed by: NURSE PRACTITIONER
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
## 2022-09-07 ENCOUNTER — ANTICOAGULATION THERAPY VISIT (OUTPATIENT)
Dept: ANTICOAGULATION | Facility: CLINIC | Age: 82
End: 2022-09-07

## 2022-09-07 ENCOUNTER — LAB (OUTPATIENT)
Dept: LAB | Facility: CLINIC | Age: 82
End: 2022-09-07
Payer: COMMERCIAL

## 2022-09-07 DIAGNOSIS — Z79.01 CHRONIC ANTICOAGULATION: ICD-10-CM

## 2022-09-07 DIAGNOSIS — I48.20 CHRONIC ATRIAL FIBRILLATION (H): Primary | ICD-10-CM

## 2022-09-07 DIAGNOSIS — Z79.01 CHRONIC ANTICOAGULATION: Primary | ICD-10-CM

## 2022-09-07 LAB — INR BLD: 1.9 (ref 0.9–1.1)

## 2022-09-07 PROCEDURE — 85610 PROTHROMBIN TIME: CPT

## 2022-09-07 PROCEDURE — 36416 COLLJ CAPILLARY BLOOD SPEC: CPT

## 2022-09-07 NOTE — PROGRESS NOTES
ANTICOAGULATION MANAGEMENT     Pee Ayala 82 year old male is on warfarin with subtherapeutic INR result. (Goal INR 2.0-3.0)    Recent labs: (last 7 days)     09/07/22  1120   INR 1.9*       ASSESSMENT       Source(s): Chart Review, Patient/Caregiver Call and Template       Warfarin doses taken: Warfarin taken as instructed    Diet: Protein supplement bar and smoothies which maybe affecting INR   Reported started in the last one week drinking smoothies w/ protein powder and protein bars.    New illness, injury, or hospitalization: No    Medication/supplement changes: None noted    On 8/26/22 vaccinated with 2nd booster Pfizer Covid-19    Signs or symptoms of bleeding or clotting: No    Previous INR: Therapeutic last 5 visits    Additional findings:  Discharged from Home Care on 8/24/22.       PLAN     Recommended plan for ongoing change(s) affecting INR     Dosing Instructions: Increase your warfarin dose (3.3% change) with next INR in 2 weeks       Summary  As of 9/7/2022    Full warfarin instructions:  5 mg every Mon, Wed, Sat; 4 mg all other days   Next INR check:  9/21/2022             Telephone call with  Pee ([phone]) who verbalizes understanding and agrees to plan    Lab visit scheduled - INR on 9/21/22 @ [facility].    Education provided: Importance of consistent vitamin K intake and impact increased Protein intake of INR Goal range and significance of current result and Monitoring for clotting signs and symptoms    Plan made per [facility] anticoagulation protocol    Mariel Dale RN  Anticoagulation Clinic  9/7/2022    _______________________________________________________________________     Anticoagulation Episode Summary     Current INR goal:  2.0-3.0   TTR:  44.3 % (11.7 mo)   Target end date:  Indefinite   Send INR reminders to:  [facility]    Indications    Chronic atrial fibrillation (H) [I48.20]  Chronic anticoagulation [Z79.01]           Comments:           Anticoagulation  Care Providers     Provider Role Specialty Phone number    Jazyz Gil MD Referring Family Medicine [phone]

## 2022-09-21 ENCOUNTER — PATIENT OUTREACH (OUTPATIENT)
Dept: CARE COORDINATION | Facility: CLINIC | Age: 82
End: 2022-09-21

## 2022-09-21 ENCOUNTER — LAB (OUTPATIENT)
Dept: LAB | Facility: CLINIC | Age: 82
End: 2022-09-21
Payer: COMMERCIAL

## 2022-09-21 ENCOUNTER — ANTICOAGULATION THERAPY VISIT (OUTPATIENT)
Dept: ANTICOAGULATION | Facility: CLINIC | Age: 82
End: 2022-09-21

## 2022-09-21 DIAGNOSIS — E11.42 TYPE 2 DIABETES MELLITUS WITH PERIPHERAL NEUROPATHY (H): ICD-10-CM

## 2022-09-21 DIAGNOSIS — Z79.01 CHRONIC ANTICOAGULATION: ICD-10-CM

## 2022-09-21 DIAGNOSIS — I48.20 CHRONIC ATRIAL FIBRILLATION (H): Primary | ICD-10-CM

## 2022-09-21 LAB — INR BLD: 2.9 (ref 0.9–1.1)

## 2022-09-21 PROCEDURE — 36416 COLLJ CAPILLARY BLOOD SPEC: CPT

## 2022-09-21 PROCEDURE — 85610 PROTHROMBIN TIME: CPT

## 2022-09-21 RX ORDER — BLOOD SUGAR DIAGNOSTIC
STRIP MISCELLANEOUS
Qty: 300 STRIP | Refills: 0 | Status: SHIPPED | OUTPATIENT
Start: 2022-09-21 | End: 2022-12-28

## 2022-09-21 NOTE — PROGRESS NOTES
Clinic Care Coordination Contact    Community Health Worker Follow Up    Care Gaps:     Health Maintenance Due   Topic Date Due     HF ACTION PLAN  06/09/2019     EYE EXAM  02/19/2022     INFLUENZA VACCINE (1) 09/01/2022     LIPID  09/16/2022     MEDICARE ANNUAL WELLNESS VISIT  09/16/2022       Postponed to next CHW outreach     Care Plan:       Goals Addressed                    This Visit's Progress       Improve Symptoms (pt-stated)   100%      I have accomplished receiving a Referral for Outpatient PT and have been going to [facility].     Personal Plan  If I have any questions about outpatient PT that I have been attending I will call [facility] at [phone].             My Chart (pt-stated)   0%      Goal Statement: I would like to regain access into My Chart in the next 30-90 days.  Date Goal set: 8/16/22  Barriers: [place]  Strengths: motivated  Date to Achieve By: 30-90 days  Patient expressed understanding of goal: yes  Action steps to achieve this goal:  1.  I will call  [phone] to regain access into [facility] My Chart. My CHW gave me the phone number for the MyChart support team and will call them soon.    Goal Updated: 9/21/22             Pain Management (pt-stated)   50%      Goal Statement: I would like a Referral to the Pain Clinic in the next 30 to 60 days.  Date Goal set: 8/16/22  Barriers:   Strengths: motivated  Date to Achieve By: 30-60 days  Patient expressed understanding of goal: yes  Action steps to achieve this goal:  1. The CCC RN sent a telephone communication to Dr. Singh to ask for a Referral to the Pain Clinic.  If I do not hear from them in the next 2 weeks; I will notify my Community Healthcare Worker, [facility] RN and/or clinic. I am on a wait list with [facility] in [place].    Goal Updated: 9/21/22              Intervention and Education during outreach: N/A    CHW Plan: CHW will follow up with patient in 1 month on 10/21/22.    Dora  Pantera  Community Health Worker  [facility] Care Coordination   Yajaira Kruse Blaine, [facility]  Office: [phone]

## 2022-09-21 NOTE — PROGRESS NOTES
ANTICOAGULATION MANAGEMENT     Pee Ayala 82 year old male is on warfarin with therapeutic INR result. (Goal INR 2.0-3.0)    Recent labs: (last 7 days)     09/21/22  1106   INR 2.9*       ASSESSMENT       Source(s): Chart Review and Template       Warfarin doses taken: Warfarin taken as instructed but may have missed yesterday    Diet: No new diet changes identified    New illness, injury, or hospitalization: No    Medication/supplement changes: None noted    Signs or symptoms of bleeding or clotting: No    Previous INR: Subtherapeutic    Additional findings: None       PLAN     Recommended plan for no diet, medication or health factor changes affecting INR     Dosing Instructions: Continue your current warfarin dose with next INR in 7-10 days    Summary  As of 9/21/2022    Full warfarin instructions:  5 mg every Mon, Wed, Sat; 4 mg all other days   Next INR check:               Detailed voice message left for Pee with dosing instructions and follow up date.     Contact [phone] to schedule and with any changes, questions or concerns.     Education provided: None required    Plan made per [facility] anticoagulation protocol    Yousuf Madison RN  Anticoagulation Clinic  9/21/2022    _______________________________________________________________________     Anticoagulation Episode Summary     Current INR goal:  2.0-3.0   TTR:  47.0 % (11.7 mo)   Target end date:  Indefinite   Send INR reminders to:  [facility]    Indications    Chronic atrial fibrillation (H) [I48.20]  Chronic anticoagulation [Z79.01]           Comments:           Anticoagulation Care Providers     Provider Role Specialty Phone number    Jazzy Gil MD Referring Family Medicine [phone]

## 2022-09-27 ENCOUNTER — PATIENT OUTREACH (OUTPATIENT)
Dept: CARE COORDINATION | Facility: CLINIC | Age: 82
End: 2022-09-27

## 2022-09-27 NOTE — PROGRESS NOTES
Clinic Care Coordination Contact    Care Coordination Clinician Chart Review     Situation: Patient chart reviewed by care coordinator.?     Background: Initial assessment and enrollment to Care Coordination was 8/16/22.?? Care plan(s) with patient-centered goal(s) were developed with participation from patient.? Lead CC handed patient off to CHW for continued outreach every 30 days.??     Assessment: Per chart review, patient outreach completed by CC CHW on 9/21/22.? Patient is actively working to accomplish goal(s).? Patient's goal(s) remain(s) appropriate at this time.? Patient is not due for updated Plan of Care.? Annual assessment will be due 8/16/23.     Care Plan: Pain Clinic     Problem: Pain Clinic     Note:      I would like a Referral to the Pain Clinic in the next 30 to 60 days.  Date Goal set: 8/16/22  Barriers:   Strengths: motivated  Date to Achieve By: 30-60 days  Patient expressed understanding of goal: yes  Action steps to achieve this goal:  1. The CCC RN sent a telephone communication to Dr. Singh to ask for a Referral to the Pain Clinic.  If I do not hear from them in the next 2 weeks; I will notify my Community Healthcare Worker, CCC RN and/or clinic. I am on a wait list with [facility] in [place].        Goal: I would like a Referral to the Pain Clinic     Start Date: 8/16/2022    Note:     Barriers: wait list  Strengths: motivated  Patient expressed understanding of goal: yes  Action steps to achieve this goal:  1. The CCC RN sent a telephone communication to Dr. Singh to ask for a Referral to the Pain Clinic.  If I do not hear from them in the next 2 weeks; I will notify my Community Healthcare Worker, CCC RN and/or clinic. I am on a wait list with [facility] in [place].                      Care Plan: My Chart     Problem: HP GENERAL PROBLEM     Goal: I would like to regain access into My Chart     Start Date: 8/16/2022    Note:     Barriers:  [place]  Strengths: motivated  Patient expressed understanding of goal: yes  Action steps to achieve this goal:  1.  I will call  [phone] to regain access into  [email or web address] My Chart. My CHW gave me the phone number for the [facility] support team and will call them soon.                          Plan/Recommendations: The patient will continue working with Care Coordination to achieve above goal(s).? CHW will involve Lead CC as needed or if patient is ready to move to maintenance.? Lead CC will continue to monitor CHW s monthly outreaches and progress to goal(s) every 6 weeks.    Plan of Care updated and sent to patient: No and LCP 8/16/22

## 2022-09-30 ENCOUNTER — TRANSFERRED RECORDS (OUTPATIENT)
Dept: HEALTH INFORMATION MANAGEMENT | Facility: CLINIC | Age: 82
End: 2022-09-30

## 2022-10-07 ENCOUNTER — VIRTUAL VISIT (OUTPATIENT)
Dept: PSYCHOLOGY | Facility: CLINIC | Age: 82
End: 2022-10-07
Attending: PHYSICIAN ASSISTANT
Payer: COMMERCIAL

## 2022-10-07 ENCOUNTER — TELEPHONE (OUTPATIENT)
Dept: ANTICOAGULATION | Facility: CLINIC | Age: 82
End: 2022-10-07

## 2022-10-07 DIAGNOSIS — Z91.199 NO-SHOW FOR APPOINTMENT: Primary | ICD-10-CM

## 2022-10-07 DIAGNOSIS — S32.9XXA CLOSED NONDISPLACED FRACTURE OF PELVIS, UNSPECIFIED PART OF PELVIS, INITIAL ENCOUNTER (H): ICD-10-CM

## 2022-10-07 NOTE — PROGRESS NOTES
10/07/22 11:05 AM: Patient not yet connected to scheduled video visit. Called patient to touch base and offer assistance if needed. No answer. Left message stating my name and that we had an 11:00 video visit scheduled and that I was calling to touch base and see if he was still planing on the visit and to offer assistance with connecting if needed. Stated that I would remain connected to the visit until 11:15 and invited patient to return my call or send a [employer] message.     Patient did not connect to visit by 11:15. Disconnected from visit. Will send [employer] message regarding rescheduling.     Kaci Evans, Ph.D., , [facility]  Clinical Health Psychologist  Phone: [phone]   Pager: [phone]  10/7/2022 11:18 AM        No show for this scheduled appointment.

## 2022-10-07 NOTE — TELEPHONE ENCOUNTER
ANTICOAGULATION     Pee Ayala is overdue for INR check.      Spoke with Pee and scheduled lab appointment on 10/10    Mariel Dale RN

## 2022-10-10 ENCOUNTER — LAB (OUTPATIENT)
Dept: LAB | Facility: CLINIC | Age: 82
End: 2022-10-10
Payer: COMMERCIAL

## 2022-10-10 ENCOUNTER — ANCILLARY PROCEDURE (OUTPATIENT)
Dept: VASCULAR ULTRASOUND | Facility: CLINIC | Age: 82
End: 2022-10-10
Attending: SURGERY
Payer: COMMERCIAL

## 2022-10-10 ENCOUNTER — ANTICOAGULATION THERAPY VISIT (OUTPATIENT)
Dept: ANTICOAGULATION | Facility: CLINIC | Age: 82
End: 2022-10-10

## 2022-10-10 ENCOUNTER — OFFICE VISIT (OUTPATIENT)
Dept: VASCULAR SURGERY | Facility: CLINIC | Age: 82
End: 2022-10-10
Attending: SURGERY
Payer: COMMERCIAL

## 2022-10-10 VITALS — DIASTOLIC BLOOD PRESSURE: 80 MMHG | SYSTOLIC BLOOD PRESSURE: 110 MMHG | RESPIRATION RATE: 14 BRPM | HEART RATE: 70 BPM

## 2022-10-10 DIAGNOSIS — Z79.01 CHRONIC ANTICOAGULATION: ICD-10-CM

## 2022-10-10 DIAGNOSIS — I73.9 PAD (PERIPHERAL ARTERY DISEASE) (H): ICD-10-CM

## 2022-10-10 DIAGNOSIS — I73.9 PAD (PERIPHERAL ARTERY DISEASE) (H): Primary | ICD-10-CM

## 2022-10-10 DIAGNOSIS — I48.20 CHRONIC ATRIAL FIBRILLATION (H): Primary | ICD-10-CM

## 2022-10-10 LAB — INR BLD: 2.6 (ref 0.9–1.1)

## 2022-10-10 PROCEDURE — 93926 LOWER EXTREMITY STUDY: CPT | Mod: 26 | Performed by: SURGERY

## 2022-10-10 PROCEDURE — 93923 UPR/LXTR ART STDY 3+ LVLS: CPT

## 2022-10-10 PROCEDURE — G0463 HOSPITAL OUTPT CLINIC VISIT: HCPCS

## 2022-10-10 PROCEDURE — 93926 LOWER EXTREMITY STUDY: CPT | Mod: LT

## 2022-10-10 PROCEDURE — 99213 OFFICE O/P EST LOW 20 MIN: CPT | Performed by: SURGERY

## 2022-10-10 PROCEDURE — 93923 UPR/LXTR ART STDY 3+ LVLS: CPT | Mod: 26 | Performed by: SURGERY

## 2022-10-10 PROCEDURE — 36416 COLLJ CAPILLARY BLOOD SPEC: CPT

## 2022-10-10 PROCEDURE — 85610 PROTHROMBIN TIME: CPT

## 2022-10-10 NOTE — PROGRESS NOTES
ANTICOAGULATION MANAGEMENT     Pee Ayala 82 year old male is on warfarin with therapeutic INR result. (Goal INR 2.0-3.0)    Recent labs: (last 7 days)     10/10/22  1539   INR 2.6*       ASSESSMENT       Source(s): Chart Review, Patient/Caregiver Call and Template       Warfarin doses taken: Warfarin taken as instructed    Diet: No new diet changes identified    New illness, injury, or hospitalization: Yes:    10/10/2 [facility] - peripheral artery disease.     Medication/supplement changes: None noted    Signs or symptoms of bleeding or clotting: No    Previous INR: Therapeutic last visit at 2.9; previously outside of goal range at 1.9    Additional findings: None       PLAN     Recommended plan for no diet, medication or health factor changes affecting INR     Dosing Instructions: Continue your current warfarin dose with next INR in -4 weeks       Summary  As of 10/10/2022    Full warfarin instructions:  5 mg every Mon, Wed, Sat; 4 mg all other days   Next INR check:  11/7/2022             Telephone call with  Pee ([phone]) who verbalizes understanding and agrees to plan    Patient offered & declined to schedule next visit    Education provided: Importance of consistent vitamin K intake and Goal range and significance of current result    Plan made per [facility] anticoagulation protocol    Mariel Dale RN  Anticoagulation Clinic  10/10/2022    _______________________________________________________________________     Anticoagulation Episode Summary     Current INR goal:  2.0-3.0   TTR:  51.0 % (11.7 mo)   Target end date:  Indefinite   Send INR reminders to:  [facility]    Indications    Chronic atrial fibrillation (H) [I48.20]  Chronic anticoagulation [Z79.01]           Comments:           Anticoagulation Care Providers     Provider Role Specialty Phone number    Jazzy Gil MD Referring Family Medicine [phone]

## 2022-10-10 NOTE — NURSING NOTE
[facility]        Patient is here for a 3 MONTH follow up  to discuss Peripheral artery disease (PAD). Pt walks in his home with a walker or cane. No wounds. Complaints of lower leg edema at the end of the day. Has not seen cardiology in the past year.     Pt is currently taking Aspirin, Statin and Warfarin. PT NEVER STARTED PLETAL     /80   Pulse 70   Resp 14     The provider has been notified that the patient has no concerns.     Questions patient would like addressed today are: N/A.    Refills are needed: N/A    Has homecare services and agency name:  Blanca Bill RN

## 2022-10-10 NOTE — PROGRESS NOTES
VASCULAR SURGERY OUTPATIENT CONSULT OR VISIT   VASCULAR SURGEON: Wendy Delgadillo MD    LOCATION:  [facility]    Pee Ayala   Medical Record #:  [id number]  YOB: 1940  Age:  82 year old     Date of Service: 10/10/2022    PRIMARY CARE PROVIDER: Ihsan Singh      Reason for consultation: Evaluation for PAD.    IMPRESSION: Patient with left leg SFA stents placed for very short distance claudication after failure of supervised exercise and cilostazol.  Has had repeated falls since then and is still recovering from a pelvic fracture.  Compliant with Coumadin and aspirin.  Not taking cilostazol.  Does have heart failure and has not seen his cardiologist in a long time.  Noticing swelling in his ankles.    RECOMMENDATION: Doing very well from our standpoint.  I will have him see Diana in a years time with repeat ABIs and left leg arterial duplex.  Assuming all is well I will see him the following year with the same studies.  Support him being on aspirin and Coumadin.  While we did give him a trial of low-dose cilostazol in discussion with Dr. Carlos phillips we will hold off on this given his known heart failure.  We will also refer him back to cardiology given his increased lower leg swelling and need for diuretics.    HPI:  Pee Ayala is a 82 year old male who was seen today for surveillance of his PAD.  This gentleman with extremely short distance claudication with failed a supervised exercise regimen and attempted cilostazol.  I had taken him for SFA stenting which was successful and resolved his symptoms.  Unfortunately short time after that he had a fall and fractured his right hip.  This was a long time in the healing.  When I last saw him he had finally healed.  Since then however he had another fall in his kitchen and fractured his pelvis.  Getting around okay but in a fair bit of pain.  No recurrence of his claudication.    A major concern for the patient is of increasing  leg swelling.  Has been started on a diuretic by his primary but he has not gone back to see his cardiologist.  Compliant with his Coumadin for atrial fibrillation and on baby aspirin in addition to statin.    No other new health issues.    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      Encounter for long-term (current) use of insulin (H) 08/11/2016     Esophageal reflux 11/18/2010     History of angina      HTN (hypertension) 06/30/2009     (Problem list name updated by automated process. Provider to review and confirm.)     Impotence of organic origin      Mixed hyperlipidemia 04/25/2007 April 25, 2007 restarted Zocor today, recheck in 3 months.  August 23, 2007 LDL at 101 with 40 mg, will increase to 80 mg. Recheck  In 3 months.      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      Walking troubles         Past Surgical History:   Procedure Laterality Date      BYPASS GRAFT ARTERY CORONARY N/A 9/10/2014    Procedure: BYPASS GRAFT ARTERY CORONARY;  Surgeon: Bharathi Caraballo MD;  Location: [facility] OR     BYPASS GRAFT ARTERY CORONARY  2016     COLONOSCOPY  1/14/2004     CORONARY ANGIOGRAPHY ADULT ORDER       CV CORONARY ANGIOGRAM N/A 4/4/2019    Procedure: Coronary Angiogram;  Surgeon: Dominik Vega MD;  Location: [facility] Cath Lab;  Service: Cardiology     CV CORONARY ANGIOGRAM N/A 1/6/2021    Procedure: Coronary Angiogram;  Surgeon: Dominik Vega MD;  Location: [facility] Cardiac Cath Lab;  Service: Cardiology     CV LEFT HEART CATHETERIZATION WITHOUT LEFT VENTRICULOGRAM Left 4/4/2019    Procedure: Left Heart Catheterization Without Left Ventriculogram;  Surgeon: Dominik Vega MD;  Location: [facility] Cath Lab;  Service: Cardiology     CV LEFT HEART CATHETERIZATION WITHOUT LEFT VENTRICULOGRAM Left 1/6/2021    Procedure: Left Heart Catheterization Without Left Ventriculogram;  Surgeon: Dominik Vega MD;  Location: [facility] Cardiac Cath Lab;  Service: Cardiology     CV RIGHT HEART CATHETERIZATION Right 4/4/2019    Procedure: Right Heart Catheterization;  Surgeon: Dominik Vega MD;  Location: [facility] Cath Lab;  Service: Cardiology     CV TRANSCATHETER AORTIC VALVE REPLACEMENT N/A 1/12/2021    Procedure: Right transfemoral transcatheter aortic valve replacement using Magdaleno Dominick 3 size 29mm.  Transthoracic echocardiogram;  Surgeon: Jo Romano MD;  Location: [facility] CARDIAC CATH [facility]     ESOPHAGOSCOPY, GASTROSCOPY, DUODENOSCOPY (EGD), COMBINED N/A 1/13/2016    Procedure: COMBINED ESOPHAGOSCOPY, GASTROSCOPY, DUODENOSCOPY (EGD);  Surgeon: Rk Srivastava MD;  Location: [facility] FEMORAL CANNULIZATION WITH OPEN STANDBY REPAIR AORTIC VALVE N/A 1/12/2021    Procedure: Cardiopulmonary Bypass standby;  Surgeon: Jefferson Sandy MD;  Location: [facility] CARDIAC CATH LAB     IR LOWER EXTREMITY  ANGIOGRAM LEFT  12/7/2021     LAPAROSCOPIC CHOLECYSTECTOMY  10/09     MAZE PROCEDURE N/A 9/10/2014    Procedure: MAZE PROCEDURE;  Surgeon: Bharathi Caraballo MD;  Location:  OR     MOHS MICROGRAPHIC PROCEDURE       OPEN REDUCTION INTERNAL FIXATION HIP BIPOLAR Left 4/9/2022    Procedure: HEMIARTHROPLASTY, HIP, BIPOLAR, OPEN REDUCTION INTERNAL FIXATION OF GREATER TROCHANTER;  Surgeon: Jd Larose MD;  Location: [facility] OR     ORTHOPEDIC SURGERY      surgery for fx  Left forearm     OTHER SURGICAL HISTORY Left 2015    forearm sugery     STENT, CORONARY, HUMA  02/2016     VASECTOMY         ALLERGIES:  Oxycodone, Amlodipine, Lisinopril, and Adhesive tape    MEDS:    Current Outpatient Medications:      acetaminophen (TYLENOL) 500 MG tablet, Take 1 tablet (500 mg) by mouth 3 times daily, Disp: 240 tablet, Rfl: 0     aspirin 81 MG EC tablet, Take 1 tablet (81 mg) by mouth daily, Disp: 90 tablet, Rfl: 0     blood glucose (ACCU-CHEK GUIDE) test strip, Use strip to check Glucose Three times daily., Disp: 300 strip, Rfl: 0     busPIRone (BUSPAR) 5 MG tablet, Take 1 tablet (5 mg) by mouth 2 times daily, Disp: 180 tablet, Rfl: 0     carvedilol (COREG) 6.25 MG tablet, Take 1 tablet (6.25 mg) by mouth 2 times daily (with meals) HOLD if SBP<100 and/or HR<55, Disp: 180 tablet, Rfl: 0     Continuous Blood Gluc Sensor (FREESTYLE DOMI 2 SENSOR) [facility], 1 each every 14 days Use 1 sensor every 14 days. Use to read blood sugars per 's instructions., Disp: 1 each, Rfl: 0     finasteride (PROSCAR) 5 MG tablet, Take 1 tablet (5 mg) by mouth daily, Disp: 90 tablet, Rfl: 0     furosemide (LASIX) 20 MG tablet, TAKE 2 TABLETS BY MOUTH IN THE MORNING AND 1 TABLET IN THE AFTERNOON, Disp: 180 tablet, Rfl: 0     gabapentin (NEURONTIN) 600 MG tablet, Take 1 tablet (600 mg) by mouth 3 times daily, Disp: 180 tablet, Rfl: 0     insulin aspart prot & aspart (NOVOLOG MIX 70/30 PEN) (70-30) 100 UNIT/ML pen, Inject subcutaneously  20 units in AM with breakfast and 12 in PM with dinner. If Blood Glucose<100 then give 1/2 dose), Disp: 15 mL, Rfl: 0     metFORMIN (GLUCOPHAGE) 500 MG tablet, Take 2 tablets (1,000 mg) by mouth 2 times daily (with meals), Disp: 360 tablet, Rfl: 0     oxyCODONE-acetaminophen (PERCOCET) 5-325 MG tablet, Take 1 tablet by mouth 3 times daily as needed for severe pain, Disp: 15 tablet, Rfl: 0     polyethylene glycol (MIRALAX) 17 GM/Dose powder, Take 17 g (1 capful) by mouth daily as needed for constipation (opioid induced constipation), Disp: 507 g, Rfl: 0     pramipexole (MIRAPEX) 0.25 MG tablet, Take 2 tablets (0.5 mg) by mouth daily Takes 4pm and 5;30 pm, Disp: 180 tablet, Rfl: 0     rosuvastatin (CRESTOR) 20 MG tablet, Take 1 tablet (20 mg) by mouth At Bedtime, Disp: 90 tablet, Rfl: 0     warfarin ANTICOAGULANT (COUMADIN) 1 MG tablet, 5mg daily on Monydays and thursdays and 4mg all other days., Disp: 60 tablet, Rfl: 0     warfarin ANTICOAGULANT (COUMADIN) 4 MG tablet, Take 4mg warfarin Tuesday, Wednesday, Friday, Saturday, Sunday and take 5mg tablet on Monday and Thursday or as directed by [facility], Disp: 30 tablet, Rfl: 1     cilostazol (PLETAL) 50 MG tablet, Take 50mg BID From 7/19/22-8/2/22, then take 100mg BID starting 8/2/22 (Patient not taking: Reported on 10/10/2022), Disp: 120 tablet, Rfl: 0    SOCIAL HABITS:    History   Smoking Status     Former     Quit date: 1/1/1998   Smokeless Tobacco     Never     Social History    Substance and Sexual Activity      Alcohol use: Yes        Alcohol/week: 0.0 standard drinks        Comment: Alcoholic Drinks/day: very rare      History   Drug Use No       FAMILY HISTORY:    Family History   Problem Relation Age of Onset     Diabetes Mother      Hypertension Father      Cerebrovascular Disease Father      Diabetes Maternal Grandmother      Breast Cancer No family hx of      Cancer - colorectal No family hx of      Prostate Cancer No family hx of      C.A.D. No family hx  of      Diabetes Type 2  Mother         58 ,  from anesthesia complication.     Heart Disease Father         86  from stroke     No Known Problems Brother         60 years of age.       REVIEW OF SYSTEMS:    A 12 point ROS was reviewed and except for what is listed in the HPI above, all others are negative    PE:  /80   Pulse 70   Resp 14   Wt Readings from Last 1 Encounters:   22 163 lb (73.9 kg)     There is no height or weight on file to calculate BMI.    EXAM:  GENERAL: This is a well-developed 82 year old male who appears his stated age  EYES: Grossly normal.  MOUTH: Buccal mucosa normal   MUSCULOSKELETAL: Grossly normal and both lower extremities are intact.  HEME/LYMPH: No lymphedema  NEUROLOGIC: Focally intact, Alert and oriented x 3.   PSYCH: appropriate affect  INTEGUMENT: No open lesions or ulcers        DIAGNOSTIC STUDIES:     Images:  US Low Ext Arterial Dop Seg Pres w/o Exercise    Result Date: 10/10/2022  Table formatting from the original result was not included. BILATERAL RESTING ANKLE-BRACHIAL INDICES (JESSENIA'S) (Date: 10/10/22) Indication: Surveillance of left leg [place] Stent (2021)   Previous: 3/16/22; 22   Angioplasty Date: 2021   History: Previous Smoker, Diabetic, Hyperlipidemia, PAD, CAD, Angioplasty and Vascular Surgery  Resting JESSENIA's          Right: mmHg Index     Brachial: 123  Ankle-(PT): 50 0.41 Ankle-(DP): >254 NC          Digit: 86 0.70               Left: mmHg Index     Brachial: 121  Ankle-(PT): >254 NC Ankle-(DP): >254 NC          Digit: 70 0.57 Resting ankle-brachial index of NC on the right. Toe Pressures of 86 mmHg and TBI of 0.70  Resting ankle-brachial index of NC on the left. Toe Pressures of 70 mmHg and TBI of 0.57  VPR WAVEFORMS: The right volume plethysmography waveforms are mildly abnormal at the lower thigh level, mildly abnormal at the upper calf level and mildly abnormal at the ankle. The left volume plethysmography waveforms  are mildly abnormal at the lower thigh level, mildly abnormal at the upper calf level and mildly abnormal at the ankle. Comments: Right PTA was obtained using the duplex machine.  Impression:  1. RIGHT LOWER EXTREMITY: JESSENIA is Uninterpretable due to incompressible vessels. and Normal toe pressures of 86 mmHg. 2. LEFT LOWER EXTREMITY: JESSENIA is Uninterpretable due to incompressible vessels. and Normal toe pressures of 70 mmHg. Reference: Wound classification Grade JESSENIA Ankle Systolic Pressure Toe Pressures 0 > 0.80 > 100 mmHg > 60 mmHg 1 0.6 - 0.79 70 - 100 mmHg 40 - 59 mmHg 2 0.4 - 0.59 50-70 mmHg 30 - 39 mmHg 3 < 0.39 < 50 mmHg < 30 mmHg Digit Pressures DBI Disease Category > 0.70 Normal < 0.70 Abnormal > 30 mmHg Potential wound healing < 30 mmHg Impaired wound healing Ankle Brachial Pressures JESSENIA Disease Category > 1.3  Likely vessel calcification with monophasic waveforms, non-diagnostic 0.95-1.30 Normal with multiphasic waveforms 0.50-0.95 Single level disease 0.30-0.50 Multilevel disease < 0.30 Critical limb ischema Volume Plethysmography Recording (VPR) at all levels Normal Sharp systolic peak, fast upstroke, prominent dicrotic notch in wave Mild Sharp systolic peak, fast upstroke, absent dicrotic notch in wave Moderate Flattened systolic peak, slowed upstroke, absent dicrotic notch inwave Severe amplitude wave with = upslope and down slope Occluded Flat Line     US Lower Extremity Arterial Duplex Left    Result Date: 10/10/2022  Table formatting from the original result was not included. Arterial Duplex Ultrasound (Date: 10/10/22) Lower Extremity Artery Evaluation Indication: Surveillance of left leg [place] Canal Stent (12/7/2021)   Previous: 3/16/22; 06/24/22   Angioplasty Date: 12/7/2021   History: Previous Smoker, Diabetic, Hyperlipidemia, PAD, CAD, Angioplasty and Vascular Surgery  Technique: Duplex imaging is performed utilizing gray-scale, two-dimensional images, and color-flow imaging. Doppler waveform  analysis and spectral Doppler imaging is also performed.  LOWER EXTREMITY ARTERIAL DUPLEX EXAM WITH WAVEFORMS Left Leg:(cm/s) Location: Velocities Waveforms EIA:   81  T CFA:   170  T PFA:   71  B SFA Proximal:   104  T SFA Mid:   69  B SFA Distal:   82  B Popliteal Artery:   72  B PTA:   47   M SABI:   104  M DPA:   70  M Waveforms: T=Triphasic, M=Monophasic, B=Biphasic Stent Velocities: Stent 1: Left Leg Stent location: [place]      Velocity    Waveform Inflow Artery: SFA Dist.       Proximal to Stent:   82   B Proximal Stent:   176  B Distal Stent:   134  B Distal to Stent:   79  B Outflow Artery: Pop. A. Prox      Stenotic Profile Ratio: 176 / 82 = 2.14 Impression: Left Lower Extremity: Triphasic external iliac and common femoral waveforms suggest no inflow disease.  Widely patent flow through the SFA with widely patent SFA stents without areas of visible stenosis.  Patent popliteal artery.  Tibial vessel waveforms are monophasic but there is certainly a second forward flow comp and the morphology suggesting this is actually a triphasic waveform with underlying venous hum.  Overall near normal flow. Reference: Category Normal 1-19% 20-49% 50-99% Occluded PSV <160 cm/sec without spectral broadening <160 cm/sec with spectral broadening Increased Increased Absent Flow Ratio N/A N/A < 2.0 >2.0 N/A Post-Stenotic Turbulence No No No Yes N/A       I personally reviewed the images and my interpretation is that his toe pressures are normal bilaterally even though his ABIs are noncompressible.  On duplex imaging his left SFA is widely patent.    LABS:      Sodium   Date Value Ref Range Status   08/15/2022 140 136 - 145 mmol/L Final   07/25/2022 138 136 - 145 mmol/L Final   07/18/2022 140 136 - 145 mmol/L Final   01/13/2021 135 133 - 144 mmol/L Final   01/12/2021 139 133 - 144 mmol/L Final   01/12/2021 139 133 - 144 mmol/L Final     Urea Nitrogen   Date Value Ref Range Status   08/15/2022 19.7 8.0 - 23.0 mg/dL Final    07/25/2022 16.0 8.0 - 23.0 mg/dL Final   07/18/2022 14.3 8.0 - 23.0 mg/dL Final   07/12/2022 13 7 - 30 mg/dL Final   07/11/2022 12 7 - 30 mg/dL Final   07/08/2022 13 7 - 30 mg/dL Final   01/13/2021 20 7 - 30 mg/dL Final   01/12/2021 19 7 - 30 mg/dL Final   01/12/2021 20 7 - 30 mg/dL Final     Hemoglobin   Date Value Ref Range Status   07/18/2022 10.8 (L) 13.3 - 17.7 g/dL Final   07/12/2022 10.7 (L) 13.3 - 17.7 g/dL Final   07/11/2022 10.8 (L) 13.3 - 17.7 g/dL Final   01/13/2021 13.7 13.3 - 17.7 g/dL Final   01/12/2021 11.1 (L) 13.3 - 17.7 g/dL Final   01/12/2021 12.1 (L) 13.3 - 17.7 g/dL Final     Platelet Count   Date Value Ref Range Status   07/18/2022 244 150 - 450 10e3/uL Final   07/12/2022 216 150 - 450 10e3/uL Final   07/11/2022 219 150 - 450 10e3/uL Final   01/13/2021 141 (L) 150 - 450 10e9/L Final   01/12/2021 113 (L) 150 - 450 10e9/L Final   01/12/2021 130 (L) 150 - 450 10e9/L Final     BNP   Date Value Ref Range Status   07/04/2022 321 (H) 0 - 86 pg/mL Final   01/08/2021 91 (H) 0 - 82 pg/mL Final   10/21/2020 56 0 - 82 pg/mL Final     INR   Date Value Ref Range Status   09/21/2022 2.9 (H) 0.9 - 1.1 Final   09/07/2022 1.9 (H) 0.9 - 1.1 Final   01/13/2021 1.13 0.86 - 1.14 Final   01/12/2021 1.09 0.86 - 1.14 Final   06/09/2016 1.72 (H) 0.86 - 1.14 Final     INR (External)   Date Value Ref Range Status   08/24/2022 2.2 (A) 0.9 - 1.1 Final   08/10/2022 2.5 (A) 0.9 - 1.1 Final   08/03/2022 2.9 (A) 0.9 - 1.1 Final           Wendy Delgadillo MD, MD  VASCULAR SURGERY

## 2022-10-10 NOTE — PATIENT INSTRUCTIONS
Qi Glasgow,    Thank you for entrusting your care with us today. After your visit today with MD Wendy Delgadillo this is the plan that was discussed at your appointment.    Please call to schedule a follow-up with cardiology- Rosalina Osorio [phone]    You will be contacted to schedule a follow-up in 1 year with Diana FRANCIS with imaging         I am including additional information on these things and our contact information if you have any questions or concerns.   Please do not hesitate to reach out to us if you felt we did not answer your questions or you are unsure of the treatment plan after your visit today. Our number is [phone].Thank you for trusting us with your care.         Again thank you for your time.     Vijaya Bill RN

## 2022-10-12 ENCOUNTER — DOCUMENTATION ONLY (OUTPATIENT)
Dept: FAMILY MEDICINE | Facility: CLINIC | Age: 82
End: 2022-10-12

## 2022-10-12 DIAGNOSIS — I73.9 PERIPHERAL ARTERIAL DISEASE (H): ICD-10-CM

## 2022-10-12 DIAGNOSIS — Z79.01 LONG TERM (CURRENT) USE OF ANTICOAGULANTS: ICD-10-CM

## 2022-10-12 DIAGNOSIS — I48.20 CHRONIC ATRIAL FIBRILLATION (H): Primary | ICD-10-CM

## 2022-10-12 NOTE — PROGRESS NOTES
ANTICOAGULATION CLINIC REFERRAL RENEWAL REQUEST       An annual renewal order is required for all patients referred to [facility].?  Please review and sign the pended referral order for Pee Ayala.       ANTICOAGULATION SUMMARY      Warfarin indication(s)   Atrial Fibrillation, Chronic    Mechanical heart valve present?  NO       Current goal range   INR: 2.0-3.0     Goal appropriate for indication? Goal INR 2-3, standard for indication(s) above     Time in Therapeutic Range (TTR)  (Goal > 60%) 51.0 %       Office visit with referring provider's group within last year Yes on 8/12/2022       Mariel Dale RN  [facility]

## 2022-10-24 ENCOUNTER — PATIENT OUTREACH (OUTPATIENT)
Dept: CARE COORDINATION | Facility: CLINIC | Age: 82
End: 2022-10-24

## 2022-10-24 NOTE — PROGRESS NOTES
Clinic Care Coordination Contact  [facility]/Voicemail    Clinical Data: Care Coordinator Outreach  Outreach attempted x 1.  Left message on patient's voicemail with call back information and requested return call.  Plan: Care Coordinator will try to reach patient again in 10 business days.    Next CHW outreach: 11/8/22    Dora [facility] Health Worker  [facility] Care Coordination   StephanYajaira ibarra [facility]  Office: [phone]

## 2022-11-07 ENCOUNTER — TELEPHONE (OUTPATIENT)
Dept: ANTICOAGULATION | Facility: CLINIC | Age: 82
End: 2022-11-07

## 2022-11-07 DIAGNOSIS — R52 PAIN: ICD-10-CM

## 2022-11-07 NOTE — TELEPHONE ENCOUNTER
ANTICOAGULATION     Pee Ayala is overdue for INR check.      Left message with Pee - added an INR on his flu shot visit on 11/15/22 @ [facility] to follow-after his Flu Shot appt.    - informed, OK to reschedule INR if needed.    Mariel Dale RN

## 2022-11-07 NOTE — TELEPHONE ENCOUNTER
Reason for call:  Medication   If this is a refill request, has the caller requested the refill from the pharmacy already? No  Will the patient be using a [facility]? No  Name of the pharmacy and phone number for the current request: [facility]    Name of the medication requested:   oxyCODONE-acetaminophen (PERCOCET) 5-325 MG tablet    Other request: please call when prescription is sent    Phone number to reach patient:  Home number on file [phone] (home)    Best Time: any    Can we leave a detailed message on this number?  YES    Travel screening: Not Applicable

## 2022-11-08 ENCOUNTER — PATIENT OUTREACH (OUTPATIENT)
Dept: CARE COORDINATION | Facility: CLINIC | Age: 82
End: 2022-11-08
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
## 2022-11-08 NOTE — PROGRESS NOTES
Clinic Care Coordination Contact    Care Coordination Clinician Chart Review     Situation: Patient chart reviewed by care coordinator.?     Background: Initial assessment and enrollment to Care Coordination was 8/16/22.?? Care plan(s) with patient-centered goal(s) were developed with participation from patient.? Lead CC handed patient off to CHW for continued outreach every 30 days.??     Assessment: Per chart review, patient outreach completed by CC CHW on 10/24/22.? Patient is actively working to accomplish goal(s).? Patient's goal(s) remain(s) appropriate at this time.? Patient is due for updated Plan of Care.? Annual assessment will be due 8/16/23    Care Plan: Pain Clinic     Problem: Pain Clinic     Note:      I would like a Referral to the Pain Clinic in the next 30 to 60 days.  Date Goal set: 8/16/22  Barriers:   Strengths: motivated  Date to Achieve By: 30-60 days  Patient expressed understanding of goal: yes  Action steps to achieve this goal:  1. The CCC RN sent a telephone communication to Dr. Singh to ask for a Referral to the Pain Clinic.  If I do not hear from them in the next 2 weeks; I will notify my Community Healthcare Worker, CCC RN and/or clinic. I am on a wait list with [facility] in [place].        Goal: I would like a Referral to the Pain Clinic     Start Date: 8/16/2022    Note:     Barriers: wait list  Strengths: motivated  Patient expressed understanding of goal: yes  Action steps to achieve this goal:  1. The CCC RN sent a telephone communication to Dr. Singh to ask for a Referral to the Pain Clinic.  If I do not hear from them in the next 2 weeks; I will notify my Community Healthcare Worker, CCC RN and/or clinic. I am on a wait list with [facility] in [place].                      Care Plan: My Chart     Problem: HP GENERAL PROBLEM     Goal: I would like to regain access into My Chart     Start Date: 8/16/2022    Note:     Barriers: Mychart  Strengths:  motivated  Patient expressed understanding of goal: yes  Action steps to achieve this goal:  1.  I will call  [phone] to regain access into [facility] My Chart. My CHW gave me the phone number for the [facility] support team and will call them soon.                      Care Plan: Appointments     Problem: HP GENERAL PROBLEM     Goal: I would like to attend the following appointments.     Start Date: 11/8/2022    Note:     Barriers: numerous appointments  Strengths: motivated  Patient expressed understanding of goal: chart review  Action steps to achieve this goal:  1.  11/14 @ 2:30 with Endocrinology  2.  12/15 @ 12:35  with Cardiology  3.   3/21  @ 10:45  with Endocrinology                        Plan/Recommendations: The patient will continue working with Care Coordination to achieve above goal(s).? CHW will involve Lead CC as needed or if patient is ready to move to maintenance.? Lead CC will continue to monitor CHW s monthly outreaches and progress to goal(s) every 6 weeks.    Plan of Care updated and sent to patient: Blanca and FRANCK 11/8/22

## 2022-11-09 ENCOUNTER — PATIENT OUTREACH (OUTPATIENT)
Dept: CARE COORDINATION | Facility: CLINIC | Age: 82
End: 2022-11-09

## 2022-11-09 RX ORDER — OXYCODONE AND ACETAMINOPHEN 5; 325 MG/1; MG/1
1 TABLET ORAL 3 TIMES DAILY PRN
Qty: 15 TABLET | Refills: 0 | OUTPATIENT
Start: 2022-11-09

## 2022-11-09 NOTE — PROGRESS NOTES
Clinic Care Coordination Contact    Community Health Worker Follow Up    Care Gaps:     Health Maintenance Due   Topic Date Due     HF ACTION PLAN  06/09/2019     EYE EXAM  02/19/2022     INFLUENZA VACCINE (1) 09/01/2022     MEDICARE ANNUAL WELLNESS VISIT  09/16/2022     LIPID  09/16/2022     COVID-19 Vaccine (5 - Booster for Pfizer series) 10/21/2022       Patient accepted scheduling phone number for [facility]  to schedule independently     Care Plan:   Care Plan: Pain Clinic     Problem: Pain Clinic     Note:      I would like a Referral to the Pain Clinic in the next 30 to 60 days.  Date Goal set: 8/16/22  Barriers:   Strengths: motivated  Date to Achieve By: 30-60 days  Patient expressed understanding of goal: yes  Action steps to achieve this goal:  1. The CCC RN sent a telephone communication to Dr. Singh to ask for a Referral to the Pain Clinic.  If I do not hear from them in the next 2 weeks; I will notify my Community Healthcare Worker, CCC RN and/or clinic. I am on a wait list with [facility] in [place].        Goal: I would like a Referral to the Pain Clinic     Start Date: 8/16/2022    This Visit's Progress: 70%    Note:     Barriers: wait list  Strengths: motivated  Patient expressed understanding of goal: yes  Action steps to achieve this goal:  1. The CCC RN sent a telephone communication to Dr. Singh to ask for a Referral to the Pain Clinic.  If I do not hear from them in the next 2 weeks; I will notify my Community Healthcare Worker, CCC RN and/or clinic. I was on a wit list with [facility] pain clinic and now have found out they are closing. I am doing pain injections and will also have a procedure on nerves. I will wait to see how this works out and may go back to the pain clinic after depending on how I am feeling.                      Care Plan: My Chart     Problem: HP GENERAL PROBLEM     Goal: I would like to regain access into My Chart     Start Date: 8/16/2022     This Visit's Progress: 10%    Note:     Barriers: Mychart  Strengths: motivated  Patient expressed understanding of goal: yes  Action steps to achieve this goal:  1.  I will call  [phone] to regain access into [facility] My Chart. My CHW gave me the phone number for the [facility] support team and will call them soon. Continuous (MB)                      Care Plan: Appointments     Problem: HP GENERAL PROBLEM     Goal: I would like to attend the following appointments.     Start Date: 11/8/2022    This Visit's Progress: 10%    Note:     Barriers: numerous appointments  Strengths: motivated  Patient expressed understanding of goal: chart review  Action steps to achieve this goal:  1.  11/14 @ 2:30 with Endocrinology. Continuous (MB)  2.  12/15 @ 12:35  with Cardiology. Continuous (MB)  3.   3/21  @ 10:45  with Endocrinology. Continuous (MB)                        Intervention and Education during outreach: N/A    CHW Plan: CHW will follow up with patient in 1 month on 12/9/22.    Dora Mott  Community Health Worker  [facility] Care Coordination   Yajaira Kruse Bl[place], [facility]  Office: [phone]

## 2022-11-14 ENCOUNTER — VIRTUAL VISIT (OUTPATIENT)
Dept: ENDOCRINOLOGY | Facility: CLINIC | Age: 82
End: 2022-11-14
Attending: FAMILY MEDICINE
Payer: COMMERCIAL

## 2022-11-14 DIAGNOSIS — Z87.81 HX OF COMPRESSION FRACTURE OF SPINE: ICD-10-CM

## 2022-11-14 DIAGNOSIS — E11.42 TYPE 2 DIABETES MELLITUS WITH PERIPHERAL NEUROPATHY (H): ICD-10-CM

## 2022-11-14 DIAGNOSIS — M81.0 AGE-RELATED OSTEOPOROSIS WITHOUT CURRENT PATHOLOGICAL FRACTURE: Primary | ICD-10-CM

## 2022-11-14 PROCEDURE — 99205 OFFICE O/P NEW HI 60 MIN: CPT | Mod: TEL | Performed by: INTERNAL MEDICINE

## 2022-11-14 RX ORDER — MEPERIDINE HYDROCHLORIDE 25 MG/ML
25 INJECTION INTRAMUSCULAR; INTRAVENOUS; SUBCUTANEOUS EVERY 30 MIN PRN
Status: CANCELLED | OUTPATIENT
Start: 2022-11-14

## 2022-11-14 RX ORDER — ALBUTEROL SULFATE 0.83 MG/ML
2.5 SOLUTION RESPIRATORY (INHALATION)
Status: CANCELLED | OUTPATIENT
Start: 2022-11-14

## 2022-11-14 RX ORDER — METHYLPREDNISOLONE SODIUM SUCCINATE 125 MG/2ML
125 INJECTION, POWDER, LYOPHILIZED, FOR SOLUTION INTRAMUSCULAR; INTRAVENOUS
Status: CANCELLED
Start: 2022-11-14

## 2022-11-14 RX ORDER — HEPARIN SODIUM (PORCINE) LOCK FLUSH IV SOLN 100 UNIT/ML 100 UNIT/ML
5 SOLUTION INTRAVENOUS
Status: CANCELLED | OUTPATIENT
Start: 2022-11-14

## 2022-11-14 RX ORDER — HEPARIN SODIUM,PORCINE 10 UNIT/ML
5 VIAL (ML) INTRAVENOUS
Status: CANCELLED | OUTPATIENT
Start: 2022-11-14

## 2022-11-14 RX ORDER — EPINEPHRINE 1 MG/ML
0.3 INJECTION, SOLUTION INTRAMUSCULAR; SUBCUTANEOUS EVERY 5 MIN PRN
Status: CANCELLED | OUTPATIENT
Start: 2022-11-14

## 2022-11-14 RX ORDER — ZOLEDRONIC ACID 5 MG/100ML
5 INJECTION, SOLUTION INTRAVENOUS ONCE
Status: CANCELLED
Start: 2022-11-14

## 2022-11-14 RX ORDER — ALBUTEROL SULFATE 90 UG/1
1-2 AEROSOL, METERED RESPIRATORY (INHALATION)
Status: CANCELLED
Start: 2022-11-14

## 2022-11-14 RX ORDER — ACETAMINOPHEN 325 MG/1
650 TABLET ORAL ONCE
Status: CANCELLED | OUTPATIENT
Start: 2022-11-14

## 2022-11-14 RX ORDER — DIPHENHYDRAMINE HYDROCHLORIDE 50 MG/ML
50 INJECTION INTRAMUSCULAR; INTRAVENOUS
Status: CANCELLED
Start: 2022-11-14

## 2022-11-14 NOTE — LETTER
11/14/2022         RE: Pee Ayala  [address]  [place] [address]        Dear Colleague,    Thank you for referring your patient, Pee Ayala, to the [facility]. Please see a copy of my visit note below.    Pee is a 82 year old who is being evaluated via a billable video visit.      How would you like to obtain your AVS? MyChart  If the video visit is dropped, the invitation should be resent by: Send to e-mail at: [email or web address]  Will anyone else be joining your video visit? Blanca Guerrero  Video-Visit Details      Endocrinology Clinic Visit    Chief Complaint: Video Visit     Information obtained from:Patient      Assessment/Treatment Plan:      Osteoporosis  Fragility fracture of bilateral hips and compression fracture of the spine  I have personally reviewed bone density from 1/14/2022.  Agree with interpretation L1-L4 -0.5 [falsely low due to degenerative changes and previous fractures.]  To assess secondary causes of osteoporosis we are checking labs documented below including SPEP, parathyroid hormone, thyroid, 24-hour urine collection for calcium and other labs as documented below.  Bone specific therapy urgently needed.  After discussing risk And benefit decision made to proceed with Reclast infusion.  We will schedule the Reclast infusion at [facility].  Her symptoms 100 mg from all sources, continue current vitamin D, fall prevention and exercise regimen as tolerated as advised by physical therapy.  Plan:    Weight bearing Exercises as recommended by physical therapy     Fall prevention    Adequate Calcium and vitamin D intake: for maintenance calcium 1200 mg daily from all sources and continue your current vitamin D supplement.     Schedule RECLAST infusion to treat the osteoporosis and prevent further fractures. More information on this infusion is included below.       Type 2 diabetes  Blood glucose data not available for  this visit.  Patient does not have any concerns regarding diabetes management at this time.  We will download blood glucose readings.    Referral states low testosterone; however level was checked late in the afternoon and result was not interpretable. testosterone level can be repeated at 8:00 am in the morning if that is continued concern regarding this issue.    Test and/or medications prescribed today:  Orders Placed This Encounter   Procedures     Albumin level     Calcium     Parathyroid Hormone Intact     Phosphorus     Creatinine     Urea nitrogen     TSH with free T4 reflex     Calcium timed urine     Creatinine timed urine     Cortisol free urine     Tissue transglutaminase lexii IgA and IgG     Hemoglobin A1c     C-Telopeptide, Beta-Cross-Linked     Protein electrophoresis         Return to clinic in 6 months.    Tiera Delgado MD  Staff Endocrinologist    Division of Endocrinology and Diabetes      Subjective:         HPI: Pee Ayala is a 82 year old male with history of osteoporosis who is seen in consultation at Mar Morales's request for the same.     Fracture risk and osteoporosis  Previous fracture after the age of 52- three collapsed disc, left hip & right hip fractures; April & June 2022 respectively. Back surgeries. Pain 3/10, wake up in the middle of the night due to pain; tylenol helps. Fall accidents.   Age >65 - Yes  Loss of height - Yes  Low body mass index; weight less than 127 pounds - 163 pounds.   Family/Parental history of hip fracture/Osteoporosis - no   Smoking   Early menopause   Previous fragility fracture, morphometric vertebral fracture:  Current glucocorticoid treatment: >5 mg prednisone for 3 mo or longer; current or previously  Excessive Alcohol intake  Falls/risk factors   Calcium tums once a week.   Milk - 2 glasses of milk per day. yoghurt every other day. and vitamin D intake;   Exercise - activity limited due to fractures.   Dental procedure planned - No      Allergies   Allergen Reactions     Oxycodone Itching and Rash     Amlodipine Dizziness     Dizziness and dry heaves     Lisinopril Cough     Adhesive Tape Itching and Rash       Current Outpatient Medications   Medication Sig Dispense Refill     acetaminophen (TYLENOL) 500 MG tablet Take 1 tablet (500 mg) by mouth 3 times daily 240 tablet 0     aspirin 81 MG EC tablet Take 1 tablet (81 mg) by mouth daily 90 tablet 0     blood glucose (ACCU-CHEK GUIDE) test strip Use strip to check Glucose Three times daily. 300 strip 0     busPIRone (BUSPAR) 5 MG tablet Take 1 tablet (5 mg) by mouth 2 times daily 180 tablet 0     carvedilol (COREG) 6.25 MG tablet Take 1 tablet (6.25 mg) by mouth 2 times daily (with meals) HOLD if SBP<100 and/or HR<55 180 tablet 0     Continuous Blood Gluc Sensor (FREESTYLE DOMI 2 SENSOR) [facility] 1 each every 14 days Use 1 sensor every 14 days. Use to read blood sugars per 's instructions. 1 each 0     finasteride (PROSCAR) 5 MG tablet Take 1 tablet (5 mg) by mouth daily 90 tablet 0     furosemide (LASIX) 20 MG tablet TAKE 2 TABLETS BY MOUTH IN THE MORNING AND 1 TABLET IN THE AFTERNOON 180 tablet 0     gabapentin (NEURONTIN) 600 MG tablet Take 1 tablet (600 mg) by mouth 3 times daily 180 tablet 0     insulin aspart prot & aspart (NOVOLOG MIX 70/30 PEN) (70-30) 100 UNIT/ML pen Inject subcutaneously 20 units in AM with breakfast and 12 in PM with dinner. If Blood Glucose<100 then give 1/2 dose) 15 mL 0     metFORMIN (GLUCOPHAGE) 500 MG tablet Take 2 tablets (1,000 mg) by mouth 2 times daily (with meals) 360 tablet 0     oxyCODONE-acetaminophen (PERCOCET) 5-325 MG tablet Take 1 tablet by mouth 3 times daily as needed for severe pain 15 tablet 0     rosuvastatin (CRESTOR) 20 MG tablet Take 1 tablet (20 mg) by mouth At Bedtime 90 tablet 0     warfarin ANTICOAGULANT (COUMADIN) 1 MG tablet 5mg daily on Monydays and thursdays and 4mg all other days. 60 tablet 0     warfarin ANTICOAGULANT  (COUMADIN) 4 MG tablet Take 4mg warfarin Tuesday, Wednesday, Friday, Saturday,  and take 5mg tablet on Monday and Thursday or as directed by [facility] 30 tablet 1     polyethylene glycol (MIRALAX) 17 GM/Dose powder Take 17 g (1 capful) by mouth daily as needed for constipation (opioid induced constipation) (Patient not taking: Reported on 2022) 507 g 0     pramipexole (MIRAPEX) 0.25 MG tablet Take 2 tablets (0.5 mg) by mouth daily Takes 4pm and 5;30 pm (Patient not taking: Reported on 2022) 180 tablet 0       Review of Systems     8 point review system (Constitutional, HENT, Eyes, Respiratory, Cardiovascular, Gastrointestinal, Genitourinary, Musculoskeletal,Neurological, Psychiatric/Behavioural, Endocrine) is negative or is as per HPI above.  Pertinent past medical history, past social history, social and family history reviewed in epic.      Social History     Socioeconomic History     Marital status:      Spouse name: Fay Ayala     Number of children: None     Years of education: None     Highest education level: None   Occupational History     Employer: RETIRED   Tobacco Use     Smoking status: Former     Types: Cigarettes     Quit date: 1998     Years since quittin.8     Smokeless tobacco: Never   Substance and Sexual Activity     Alcohol use: Yes     Alcohol/week: 0.0 standard drinks     Comment: Alcoholic Drinks/day: very rare     Drug use: No     Sexual activity: Never     Birth control/protection: None   Other Topics Concern     Parent/sibling w/ CABG, MI or angioplasty before 65F 55M? No   Social History Narrative     and lives with life.  Has adult children from previous relationship. ( Blended family).  Has a dog - Vincent Gil MD  1/3/2019           Objective:   There were no vitals taken for this visit.  Limited due to telephone call.  Able to speak in complete sentences without any difficulty.  In House Labs:   Lab Results   Component  Value Date    A1C 8.8 08/15/2022    A1C 6.3 05/24/2022    A1C 8.0 01/25/2022    A1C 8.1 12/03/2021    A1C 8.6 08/31/2021    A1C 9.1 01/12/2021    A1C 8.0 06/09/2016    A1C 8.2 03/08/2016    A1C 7.7 12/23/2015    A1C 6.9 09/15/2015       TSH   Date Value Ref Range Status   06/03/2022 1.01 0.30 - 5.00 uIU/mL Final   05/06/2021 0.87 0.30 - 5.00 uIU/mL Final   03/29/2016 0.84 0.40 - 4.00 mU/L Final   01/05/2016 0.78 0.40 - 4.00 mU/L Final   05/31/2014 1.34 0.4 - 5.0 mU/L Final   11/16/2012 1.41 0.4 - 5.0 mU/L Final   11/18/2010 2.09 0.4 - 5.0 mU/L Final       Creatinine   Date Value Ref Range Status   08/15/2022 1.00 0.67 - 1.17 mg/dL Final   01/13/2021 0.97 0.66 - 1.25 mg/dL Final   ]  ENDO CALCIUM LABS-[facility] Latest Ref Rng & Units 8/15/2022   VITAMIN D DEFICIENCY SCREENING 20 - 75 ug/L    ALBUMIN 3.5 - 5.0 g/dL    ALKPHOS 45 - 120 U/L    AMYLASE 30 - 110 U/L    CALCIUM IONIZED WB 4.4 - 5.2 mg/dL    CALCIUM 8.8 - 10.2 mg/dL 9.7   CREATININE 0.67 - 1.17 mg/dL 1.00   LDH 85 - 227 U/L    LIPASE 73 - 393 U/L    MAGNESIUM 1.7 - 2.3 mg/dL      ENDO CALCIUM LABS-UMP Latest Ref Rng & Units 7/25/2022   VITAMIN D DEFICIENCY SCREENING 20 - 75 ug/L    ALBUMIN 3.5 - 5.0 g/dL    ALKPHOS 45 - 120 U/L    AMYLASE 30 - 110 U/L    CALCIUM IONIZED WB 4.4 - 5.2 mg/dL    CALCIUM 8.8 - 10.2 mg/dL 8.9   CREATININE 0.67 - 1.17 mg/dL 0.74   LDH 85 - 227 U/L    LIPASE 73 - 393 U/L    MAGNESIUM 1.7 - 2.3 mg/dL      ENDO CALCIUM LABS-UMP Latest Ref Rng & Units 7/18/2022   VITAMIN D DEFICIENCY SCREENING 20 - 75 ug/L 42   ALBUMIN 3.5 - 5.0 g/dL    ALKPHOS 45 - 120 U/L    AMYLASE 30 - 110 U/L    CALCIUM IONIZED WB 4.4 - 5.2 mg/dL    CALCIUM 8.8 - 10.2 mg/dL 8.9   CREATININE 0.67 - 1.17 mg/dL 0.70   LDH 85 - 227 U/L    LIPASE 73 - 393 U/L    MAGNESIUM 1.7 - 2.3 mg/dL 2.0     ENDO CALCIUM LABS-UMP Latest Ref Rng & Units 8/15/2022   VITAMIN D DEFICIENCY SCREENING 20 - 75 ug/L    ALBUMIN 3.5 - 5.0 g/dL    ALKPHOS 45 - 120 U/L    AMYLASE 30 - 110 U/L   "  CALCIUM IONIZED WB 4.4 - 5.2 mg/dL    CALCIUM 8.8 - 10.2 mg/dL 9.7   CREATININE 0.67 - 1.17 mg/dL 1.00   LDH 85 - 227 U/L    LIPASE 73 - 393 U/L    MAGNESIUM 1.7 - 2.3 mg/dL      ENDO CALCIUM LABS-UMP Latest Ref Rng & Units 7/25/2022   VITAMIN D DEFICIENCY SCREENING 20 - 75 ug/L    ALBUMIN 3.5 - 5.0 g/dL    ALKPHOS 45 - 120 U/L    AMYLASE 30 - 110 U/L    CALCIUM IONIZED WB 4.4 - 5.2 mg/dL    CALCIUM 8.8 - 10.2 mg/dL 8.9   CREATININE 0.67 - 1.17 mg/dL 0.74   LDH 85 - 227 U/L    LIPASE 73 - 393 U/L    MAGNESIUM 1.7 - 2.3 mg/dL      ENDO CALCIUM LABS-UMP Latest Ref Rng & Units 7/18/2022   VITAMIN D DEFICIENCY SCREENING 20 - 75 ug/L 42   ALBUMIN 3.5 - 5.0 g/dL    ALKPHOS 45 - 120 U/L    AMYLASE 30 - 110 U/L    CALCIUM IONIZED WB 4.4 - 5.2 mg/dL    CALCIUM 8.8 - 10.2 mg/dL 8.9   CREATININE 0.67 - 1.17 mg/dL 0.70   LDH 85 - 227 U/L    LIPASE 73 - 393 U/L    MAGNESIUM 1.7 - 2.3 mg/dL 2.0     \"FINDINGS:     Lumbar Spine: L1-L2: BMD: 1.138 g/cm2. T-score: -0.5. Z-score: 0.2  RIGHT Hip Total: BMD: 0.870 g/cm2. T-score: -1.6. Z-score: -0.5  RIGHT Hip Femoral neck: BMD: 0.832 g/cm2. T-score: -1.8. Z-score: -0.3  LEFT Hip Total: BMD: 0.824 g/cm2. T-score: -1.9. Z-score: -0.8  LEFT Hip Femoral neck: BMD: 0.805 g/cm2. T-score: -2.0. Z-score: -0.5  RIGHT Radius: BMD: 0.917 g/cm2. T-score: -0.7. Z-score: 0.5\"     This note has been dictated using voice recognition software.  As a result, there may be errors in the documentation that have gone undetected.  Please consider this when interpreting information in this documentation.    Telephone time 18 minutes. 65 minutes spent on the date of the encounter doing chart review, history and, documentation and further activities per the note.         Again, thank you for allowing me to participate in the care of your patient.        Sincerely,        Tiera Delgado MD    "

## 2022-11-14 NOTE — PATIENT INSTRUCTIONS
Osteoporosis   Weight bearing Exercises as recommended by physical therapy   Fall prevention  Adequate Calcium and vitamin D intake: for maintenance calcium 1200 mg daily from all sources and continue your current vitamin D supplement.   We will schedule RECLAST infusion to treat the osteoporosis and prevent further fractures. More information on this infusion is included below.     Please check blood work and 24 hour urine collection.     Instructions on 24 hour urine collection:    a day; if for eg; Sunday discard the first urine on Sunday morning and collect all other urine all day and night; including the first urine on Monday morning. Bring it back to the laboratory after collection is completed.       Orders Placed This Encounter   Procedures    Albumin level    Calcium    Parathyroid Hormone Intact    Phosphorus    Creatinine    Urea nitrogen    TSH with free T4 reflex    Calcium timed urine    Creatinine timed urine    Cortisol free urine    Tissue transglutaminase leixi IgA and IgG    Hemoglobin A1c    C-Telopeptide, Beta-Cross-Linked    Protein electrophoresis        Zoledronic acid: Brand Names: [place]     Reclast;   What is this drug used for?         It is used to prevent or treat soft, brittle bones (osteoporosis).     What are some side effects that I need to call my doctor about right away?     Signs of an allergic reaction, like rash; hives; itching; red, swollen, blistered, or peeling skin with or without fever; wheezing; tightness in the chest or throat; trouble breathing, swallowing, or talking; unusual hoarseness; or swelling of the mouth, face, lips, tongue, or throat.    Signs of low calcium levels like muscle cramps or spasms, numbness and tingling, or seizures.     This drug may cause jawbone problems. The risk may be higher the longer you take this drug. The risk may be higher if you have cancer, dental problems, dentures that do not fit well, anemia, blood clotting problems, or an  infection. The risk may also be higher if you are having dental work, get chemo or radiation, or take other drugs that may cause jawbone problems (like some steroid drugs).  Notify your provider; if you have jaw swelling or pain.     How is this drug best taken?     It is given as an infusion into a vein over a period of time.    Drink lots of noncaffeine liquids take Tylenol 500 mg every 8 hours for three days after the infusion.    Drink at least 2 glasses of liquids a few hours before you get this drug.

## 2022-11-14 NOTE — PROGRESS NOTES
Endocrinology Clinic Visit    Chief Complaint: Video Visit     Information obtained from:Patient      Assessment/Treatment Plan:      Osteoporosis  Fragility fracture of bilateral hips and compression fracture of the spine  I have personally reviewed bone density from 1/14/2022.  Agree with interpretation L1-L4 -0.5 [falsely low due to degenerative changes and previous fractures.]  To assess secondary causes of osteoporosis we are checking labs documented below including SPEP, parathyroid hormone, thyroid, 24-hour urine collection for calcium and other labs as documented below.  Bone specific therapy urgently needed.  After discussing risk And benefit decision made to proceed with Reclast infusion.  We will schedule the Reclast infusion at [facility].  Her symptoms 100 mg from all sources, continue current vitamin D, fall prevention and exercise regimen as tolerated as advised by physical therapy.  Plan:    Weight bearing Exercises as recommended by physical therapy     Fall prevention    Adequate Calcium and vitamin D intake: for maintenance calcium 1200 mg daily from all sources and continue your current vitamin D supplement.     Schedule RECLAST infusion to treat the osteoporosis and prevent further fractures. More information on this infusion is included below.       Type 2 diabetes  Blood glucose data not available for this visit.  Patient does not have any concerns regarding diabetes management at this time.  We will download blood glucose readings.    Referral states low testosterone; however level was checked late in the afternoon and result was not interpretable. testosterone level can be repeated at 8:00 am in the morning if that is continued concern regarding this issue.    Test and/or medications prescribed today:  Orders Placed This Encounter   Procedures     Albumin level     Calcium     Parathyroid Hormone Intact     Phosphorus     Creatinine     Urea nitrogen     TSH with free T4 reflex      Calcium timed urine     Creatinine timed urine     Cortisol free urine     Tissue transglutaminase lexii IgA and IgG     Hemoglobin A1c     C-Telopeptide, Beta-Cross-Linked     Protein electrophoresis         Return to clinic in 6 months.    Tiera Delgado MD  Staff Endocrinologist    Division of Endocrinology and Diabetes      Subjective:         HPI: Pee Ayala is a 82 year old male with history of osteoporosis who is seen in consultation at Mar Morales's request for the same.     Fracture risk and osteoporosis  Previous fracture after the age of 52- three collapsed disc, left hip & right hip fractures; April & June 2022 respectively. Back surgeries. Pain 3/10, wake up in the middle of the night due to pain; tylenol helps. Fall accidents.   Age >65 - Yes  Loss of height - Yes  Low body mass index; weight less than 127 pounds - 163 pounds.   Family/Parental history of hip fracture/Osteoporosis - no   Smoking   Early menopause   Previous fragility fracture, morphometric vertebral fracture:  Current glucocorticoid treatment: >5 mg prednisone for 3 mo or longer; current or previously  Excessive Alcohol intake  Falls/risk factors   Calcium tums once a week.   Milk - 2 glasses of milk per day. yoghurt every other day. and vitamin D intake;   Exercise - activity limited due to fractures.   Dental procedure planned - No     Allergies   Allergen Reactions     Oxycodone Itching and Rash     Amlodipine Dizziness     Dizziness and dry heaves     Lisinopril Cough     Adhesive Tape Itching and Rash       Current Outpatient Medications   Medication Sig Dispense Refill     acetaminophen (TYLENOL) 500 MG tablet Take 1 tablet (500 mg) by mouth 3 times daily 240 tablet 0     aspirin 81 MG EC tablet Take 1 tablet (81 mg) by mouth daily 90 tablet 0     blood glucose (ACCU-CHEK GUIDE) test strip Use strip to check Glucose Three times daily. 300 strip 0     busPIRone (BUSPAR) 5 MG tablet Take 1 tablet (5 mg) by mouth 2  times daily 180 tablet 0     carvedilol (COREG) 6.25 MG tablet Take 1 tablet (6.25 mg) by mouth 2 times daily (with meals) HOLD if SBP<100 and/or HR<55 180 tablet 0     Continuous Blood Gluc Sensor (FREESTYLE DOMI 2 SENSOR) [facility] 1 each every 14 days Use 1 sensor every 14 days. Use to read blood sugars per 's instructions. 1 each 0     finasteride (PROSCAR) 5 MG tablet Take 1 tablet (5 mg) by mouth daily 90 tablet 0     furosemide (LASIX) 20 MG tablet TAKE 2 TABLETS BY MOUTH IN THE MORNING AND 1 TABLET IN THE AFTERNOON 180 tablet 0     gabapentin (NEURONTIN) 600 MG tablet Take 1 tablet (600 mg) by mouth 3 times daily 180 tablet 0     insulin aspart prot & aspart (NOVOLOG MIX 70/30 PEN) (70-30) 100 UNIT/ML pen Inject subcutaneously 20 units in AM with breakfast and 12 in PM with dinner. If Blood Glucose<100 then give 1/2 dose) 15 mL 0     metFORMIN (GLUCOPHAGE) 500 MG tablet Take 2 tablets (1,000 mg) by mouth 2 times daily (with meals) 360 tablet 0     oxyCODONE-acetaminophen (PERCOCET) 5-325 MG tablet Take 1 tablet by mouth 3 times daily as needed for severe pain 15 tablet 0     rosuvastatin (CRESTOR) 20 MG tablet Take 1 tablet (20 mg) by mouth At Bedtime 90 tablet 0     warfarin ANTICOAGULANT (COUMADIN) 1 MG tablet 5mg daily on Monydays and thursdays and 4mg all other days. 60 tablet 0     warfarin ANTICOAGULANT (COUMADIN) 4 MG tablet Take 4mg warfarin Tuesday, Wednesday, Friday, Saturday, Sunday and take 5mg tablet on Monday and Thursday or as directed by [facility] clinic 30 tablet 1     polyethylene glycol (MIRALAX) 17 GM/Dose powder Take 17 g (1 capful) by mouth daily as needed for constipation (opioid induced constipation) (Patient not taking: Reported on 11/14/2022) 507 g 0     pramipexole (MIRAPEX) 0.25 MG tablet Take 2 tablets (0.5 mg) by mouth daily Takes 4pm and 5;30 pm (Patient not taking: Reported on 11/14/2022) 180 tablet 0       Review of Systems     8 point review system (Constitutional, HENT,  Eyes, Respiratory, Cardiovascular, Gastrointestinal, Genitourinary, Musculoskeletal,Neurological, Psychiatric/Behavioural, Endocrine) is negative or is as per HPI above.  Pertinent past medical history, past social history, social and family history reviewed in epic.      Social History     Socioeconomic History     Marital status:      Spouse name: Fay Ayala     Number of children: None     Years of education: None     Highest education level: None   Occupational History     Employer: RETIRED   Tobacco Use     Smoking status: Former     Types: Cigarettes     Quit date: 1998     Years since quittin.8     Smokeless tobacco: Never   Substance and Sexual Activity     Alcohol use: Yes     Alcohol/week: 0.0 standard drinks     Comment: Alcoholic Drinks/day: very rare     Drug use: No     Sexual activity: Never     Birth control/protection: None   Other Topics Concern     Parent/sibling w/ CABG, MI or angioplasty before 65F 55M? No   Social History Narrative     and lives with life.  Has adult children from previous relationship. ( Blended family).  Has a dog - Vincent Gil MD  1/3/2019           Objective:   There were no vitals taken for this visit.  Limited due to telephone call.  Able to speak in complete sentences without any difficulty.  In House Labs:   Lab Results   Component Value Date    A1C 8.8 08/15/2022    A1C 6.3 2022    A1C 8.0 2022    A1C 8.1 2021    A1C 8.6 2021    A1C 9.1 2021    A1C 8.0 2016    A1C 8.2 2016    A1C 7.7 2015    A1C 6.9 09/15/2015       TSH   Date Value Ref Range Status   2022 1.01 0.30 - 5.00 uIU/mL Final   2021 0.87 0.30 - 5.00 uIU/mL Final   2016 0.84 0.40 - 4.00 mU/L Final   2016 0.78 0.40 - 4.00 mU/L Final   2014 1.34 0.4 - 5.0 mU/L Final   2012 1.41 0.4 - 5.0 mU/L Final   2010 2.09 0.4 - 5.0 mU/L Final       Creatinine   Date Value Ref Range Status    08/15/2022 1.00 0.67 - 1.17 mg/dL Final   01/13/2021 0.97 0.66 - 1.25 mg/dL Final   ]  ENDO CALCIUM LABS-UMP Latest Ref Rng & Units 8/15/2022   VITAMIN D DEFICIENCY SCREENING 20 - 75 ug/L    ALBUMIN 3.5 - 5.0 g/dL    ALKPHOS 45 - 120 U/L    AMYLASE 30 - 110 U/L    CALCIUM IONIZED WB 4.4 - 5.2 mg/dL    CALCIUM 8.8 - 10.2 mg/dL 9.7   CREATININE 0.67 - 1.17 mg/dL 1.00   LDH 85 - 227 U/L    LIPASE 73 - 393 U/L    MAGNESIUM 1.7 - 2.3 mg/dL      ENDO CALCIUM LABS-UMP Latest Ref Rng & Units 7/25/2022   VITAMIN D DEFICIENCY SCREENING 20 - 75 ug/L    ALBUMIN 3.5 - 5.0 g/dL    ALKPHOS 45 - 120 U/L    AMYLASE 30 - 110 U/L    CALCIUM IONIZED WB 4.4 - 5.2 mg/dL    CALCIUM 8.8 - 10.2 mg/dL 8.9   CREATININE 0.67 - 1.17 mg/dL 0.74   LDH 85 - 227 U/L    LIPASE 73 - 393 U/L    MAGNESIUM 1.7 - 2.3 mg/dL      ENDO CALCIUM LABS-UMP Latest Ref Rng & Units 7/18/2022   VITAMIN D DEFICIENCY SCREENING 20 - 75 ug/L 42   ALBUMIN 3.5 - 5.0 g/dL    ALKPHOS 45 - 120 U/L    AMYLASE 30 - 110 U/L    CALCIUM IONIZED WB 4.4 - 5.2 mg/dL    CALCIUM 8.8 - 10.2 mg/dL 8.9   CREATININE 0.67 - 1.17 mg/dL 0.70   LDH 85 - 227 U/L    LIPASE 73 - 393 U/L    MAGNESIUM 1.7 - 2.3 mg/dL 2.0     ENDO CALCIUM LABS-UMP Latest Ref Rng & Units 8/15/2022   VITAMIN D DEFICIENCY SCREENING 20 - 75 ug/L    ALBUMIN 3.5 - 5.0 g/dL    ALKPHOS 45 - 120 U/L    AMYLASE 30 - 110 U/L    CALCIUM IONIZED WB 4.4 - 5.2 mg/dL    CALCIUM 8.8 - 10.2 mg/dL 9.7   CREATININE 0.67 - 1.17 mg/dL 1.00   LDH 85 - 227 U/L    LIPASE 73 - 393 U/L    MAGNESIUM 1.7 - 2.3 mg/dL      ENDO CALCIUM LABS-UMP Latest Ref Rng & Units 7/25/2022   VITAMIN D DEFICIENCY SCREENING 20 - 75 ug/L    ALBUMIN 3.5 - 5.0 g/dL    ALKPHOS 45 - 120 U/L    AMYLASE 30 - 110 U/L    CALCIUM IONIZED WB 4.4 - 5.2 mg/dL    CALCIUM 8.8 - 10.2 mg/dL 8.9   CREATININE 0.67 - 1.17 mg/dL 0.74   LDH 85 - 227 U/L    LIPASE 73 - 393 U/L    MAGNESIUM 1.7 - 2.3 mg/dL      ENDO CALCIUM LABS-UMP Latest Ref Rng & Units 7/18/2022   VITAMIN D  "DEFICIENCY SCREENING 20 - 75 ug/L 42   ALBUMIN 3.5 - 5.0 g/dL    ALKPHOS 45 - 120 U/L    AMYLASE 30 - 110 U/L    CALCIUM IONIZED WB 4.4 - 5.2 mg/dL    CALCIUM 8.8 - 10.2 mg/dL 8.9   CREATININE 0.67 - 1.17 mg/dL 0.70   LDH 85 - 227 U/L    LIPASE 73 - 393 U/L    MAGNESIUM 1.7 - 2.3 mg/dL 2.0     \"FINDINGS:     Lumbar Spine: L1-L2: BMD: 1.138 g/cm2. T-score: -0.5. Z-score: 0.2  RIGHT Hip Total: BMD: 0.870 g/cm2. T-score: -1.6. Z-score: -0.5  RIGHT Hip Femoral neck: BMD: 0.832 g/cm2. T-score: -1.8. Z-score: -0.3  LEFT Hip Total: BMD: 0.824 g/cm2. T-score: -1.9. Z-score: -0.8  LEFT Hip Femoral neck: BMD: 0.805 g/cm2. T-score: -2.0. Z-score: -0.5  RIGHT Radius: BMD: 0.917 g/cm2. T-score: -0.7. Z-score: 0.5\"     This note has been dictated using voice recognition software.  As a result, there may be errors in the documentation that have gone undetected.  Please consider this when interpreting information in this documentation.    Telephone time 18 minutes. 65 minutes spent on the date of the encounter doing chart review, history and, documentation and further activities per the note.     "

## 2022-11-14 NOTE — PROGRESS NOTES
Pee is a 82 year old who is being evaluated via a billable video visit.      How would you like to obtain your AVS? MyChart  If the video visit is dropped, the invitation should be resent by: Send to e-mail at: [email or web address]  Will anyone else be joining your video visit? Blanca Guerrero  Video-Visit Details

## 2022-11-15 ENCOUNTER — ANTICOAGULATION THERAPY VISIT (OUTPATIENT)
Dept: ANTICOAGULATION | Facility: CLINIC | Age: 82
End: 2022-11-15

## 2022-11-15 ENCOUNTER — TELEPHONE (OUTPATIENT)
Dept: ENDOCRINOLOGY | Facility: CLINIC | Age: 82
End: 2022-11-15

## 2022-11-15 ENCOUNTER — LAB (OUTPATIENT)
Dept: LAB | Facility: CLINIC | Age: 82
End: 2022-11-15
Payer: COMMERCIAL

## 2022-11-15 DIAGNOSIS — Z79.01 CHRONIC ANTICOAGULATION: ICD-10-CM

## 2022-11-15 DIAGNOSIS — I48.20 CHRONIC ATRIAL FIBRILLATION (H): ICD-10-CM

## 2022-11-15 DIAGNOSIS — I48.20 CHRONIC ATRIAL FIBRILLATION (H): Primary | ICD-10-CM

## 2022-11-15 DIAGNOSIS — I73.9 PERIPHERAL ARTERIAL DISEASE (H): ICD-10-CM

## 2022-11-15 DIAGNOSIS — Z79.01 LONG TERM (CURRENT) USE OF ANTICOAGULANTS: ICD-10-CM

## 2022-11-15 DIAGNOSIS — Z87.81 HX OF COMPRESSION FRACTURE OF SPINE: ICD-10-CM

## 2022-11-15 DIAGNOSIS — M81.0 AGE-RELATED OSTEOPOROSIS WITHOUT CURRENT PATHOLOGICAL FRACTURE: ICD-10-CM

## 2022-11-15 LAB — INR BLD: 2.7 (ref 0.9–1.1)

## 2022-11-15 PROCEDURE — 85610 PROTHROMBIN TIME: CPT

## 2022-11-15 PROCEDURE — 36416 COLLJ CAPILLARY BLOOD SPEC: CPT

## 2022-11-15 NOTE — PROGRESS NOTES
ANTICOAGULATION MANAGEMENT     Pee Ayala 82 year old male is on warfarin with therapeutic INR result. (Goal INR 2.0-3.0)    Recent labs: (last 7 days)     11/15/22  1443   INR 2.7*       ASSESSMENT       Source(s): Chart Review, Patient/Caregiver Call and Template       Warfarin doses taken: Warfarin taken as instructed and Template incorrect; verbally confirmed home dose with Pee     Reported back taking less warfarin than instructed. (wife also confirmed dose and has been taking this since last INR).   (will update anticoag calendar with current dose).    Diet: No new diet changes identified    New illness, injury, or hospitalization: No    Medication/supplement changes: None noted    Signs or symptoms of bleeding or clotting: No    Previous INR: Therapeutic last 2 visits    Additional findings:  Scheduled to establish with Cardiologist (Dr. Gil) on 12/15/22 @ [facility].       PLAN     Recommended plan for no diet, medication or health factor changes affecting INR     Dosing Instructions: Continue your current warfarin dose with next INR in 3-4 weeks       Summary  As of 11/15/2022    Full warfarin instructions:  5 mg every Mon, Thu; 4 mg all other days; Starting 11/15/2022   Next INR check:  12/13/2022             Telephone call with  Pee ([phone]) who verbalizes understanding and agrees to plan    Lab visit scheduled - INR on 12/15/22 at Cardiology visit with Dr. Gil @ [facility].    Education provided:     Taking warfarin: Importance of taking warfarin as instructed    Goal range and lab monitoring: goal range and significance of current result    Dietary considerations: importance of consistent vitamin K intake    Plan made per [facility] anticoagulation protocol    Mariel Dale, RN  Anticoagulation Clinic  11/15/2022    _______________________________________________________________________     Anticoagulation Episode Summary     Current INR goal:  2.0-3.0   TTR:  51.0 % (11.7  mo)   Target end date:  Indefinite   Send INR reminders to:  [facility]    Indications    Chronic atrial fibrillation (H) [I48.20]  Chronic anticoagulation [Z79.01]  Long term (current) use of anticoagulants [Z79.01]  Peripheral arterial disease (H) [I73.9]           Comments:           Anticoagulation Care Providers     Provider Role Specialty Phone number    Jazzy Gil MD Referring Family Medicine [phone]    Ihsan Singh MD Referring Family Medicine [phone]

## 2022-11-17 ENCOUNTER — LAB (OUTPATIENT)
Dept: LAB | Facility: CLINIC | Age: 82
End: 2022-11-17
Payer: COMMERCIAL

## 2022-11-17 DIAGNOSIS — E11.42 TYPE 2 DIABETES MELLITUS WITH PERIPHERAL NEUROPATHY (H): ICD-10-CM

## 2022-11-17 DIAGNOSIS — M81.0 AGE-RELATED OSTEOPOROSIS WITHOUT CURRENT PATHOLOGICAL FRACTURE: ICD-10-CM

## 2022-11-17 DIAGNOSIS — Z87.81 HX OF COMPRESSION FRACTURE OF SPINE: ICD-10-CM

## 2022-11-17 LAB
ALBUMIN SERPL BCG-MCNC: 4.1 G/DL (ref 3.5–5.2)
BUN SERPL-MCNC: 23 MG/DL (ref 8–23)
CALCIUM 24H UR-MRATE: 0.14 G/SPEC (ref 0.1–0.3)
CALCIUM SERPL-MCNC: 9.4 MG/DL (ref 8.8–10.2)
CALCIUM UR-MCNC: 5 MG/DL
COLLECT DURATION TIME UR: 12 H
COLLECT DURATION TIME UR: 12 H
CREAT 24H UR-MRATE: 1.43 G/(24.H) (ref 0.98–2.2)
CREAT SERPL-MCNC: 0.94 MG/DL (ref 0.67–1.17)
CREAT UR-MCNC: 52.9 MG/DL
GFR SERPL CREATININE-BSD FRML MDRD: 82 ML/MIN/1.73M2
HBA1C MFR BLD: 10.3 % (ref 0–5.6)
PHOSPHATE SERPL-MCNC: 3.8 MG/DL (ref 2.5–4.5)
PTH-INTACT SERPL-MCNC: 47 PG/ML (ref 15–65)
SPECIMEN VOL UR: 1350 ML
SPECIMEN VOL UR: 1350 ML
TOTAL PROTEIN SERUM FOR ELP: 6.3 G/DL (ref 6.4–8.3)

## 2022-11-17 PROCEDURE — 36415 COLL VENOUS BLD VENIPUNCTURE: CPT

## 2022-11-17 PROCEDURE — 82570 ASSAY OF URINE CREATININE: CPT

## 2022-11-17 PROCEDURE — 82565 ASSAY OF CREATININE: CPT

## 2022-11-17 PROCEDURE — 82340 ASSAY OF CALCIUM IN URINE: CPT

## 2022-11-17 PROCEDURE — 84155 ASSAY OF PROTEIN SERUM: CPT

## 2022-11-17 PROCEDURE — 99000 SPECIMEN HANDLING OFFICE-LAB: CPT

## 2022-11-17 PROCEDURE — 82040 ASSAY OF SERUM ALBUMIN: CPT

## 2022-11-17 PROCEDURE — 82523 COLLAGEN CROSSLINKS: CPT | Mod: 90

## 2022-11-17 PROCEDURE — 84443 ASSAY THYROID STIM HORMONE: CPT

## 2022-11-17 PROCEDURE — 86364 TISS TRNSGLTMNASE EA IG CLAS: CPT

## 2022-11-17 PROCEDURE — 82530 CORTISOL FREE: CPT | Mod: 90

## 2022-11-17 PROCEDURE — 83036 HEMOGLOBIN GLYCOSYLATED A1C: CPT

## 2022-11-17 PROCEDURE — 83970 ASSAY OF PARATHORMONE: CPT

## 2022-11-17 PROCEDURE — 82310 ASSAY OF CALCIUM: CPT

## 2022-11-17 PROCEDURE — 84520 ASSAY OF UREA NITROGEN: CPT

## 2022-11-17 PROCEDURE — 84100 ASSAY OF PHOSPHORUS: CPT

## 2022-11-17 PROCEDURE — 84165 PROTEIN E-PHORESIS SERUM: CPT

## 2022-11-17 PROCEDURE — 81050 URINALYSIS VOLUME MEASURE: CPT

## 2022-11-18 LAB
ALBUMIN SERPL ELPH-MCNC: 3.8 G/DL (ref 3.7–5.1)
ALPHA1 GLOB SERPL ELPH-MCNC: 0.3 G/DL (ref 0.2–0.4)
ALPHA2 GLOB SERPL ELPH-MCNC: 0.6 G/DL (ref 0.5–0.9)
B-GLOBULIN SERPL ELPH-MCNC: 0.8 G/DL (ref 0.6–1)
COLLAGEN CTX SERPL-MCNC: 1010 PG/ML
GAMMA GLOB SERPL ELPH-MCNC: 0.8 G/DL (ref 0.7–1.6)
M PROTEIN SERPL ELPH-MCNC: 0 G/DL
PROT PATTERN SERPL ELPH-IMP: NORMAL
TSH SERPL DL<=0.005 MIU/L-ACNC: 0.92 UIU/ML (ref 0.3–4.2)
TTG IGA SER-ACNC: 1.7 U/ML
TTG IGG SER-ACNC: 0.6 U/ML

## 2022-11-19 ENCOUNTER — HEALTH MAINTENANCE LETTER (OUTPATIENT)
Age: 82
End: 2022-11-19

## 2022-11-21 ENCOUNTER — TELEPHONE (OUTPATIENT)
Dept: ENDOCRINOLOGY | Facility: CLINIC | Age: 82
End: 2022-11-21

## 2022-11-21 NOTE — TELEPHONE ENCOUNTER
----- Message from Tiera Delgado MD sent at 11/21/2022 10:10 AM CST -----  Please inform pt the following. Please schedule next available and cancellation list for diabetes management. Thank you.   Dear Pee,      Here are your recent results.   These blood work results are within the expected range with the exception of c-telopepetide level which indicates that we need to proceed with the RECLAST infusion as planned as soon as possible.   The A1c level was high. We need to discuss next steps regarding the diabetes management once the RECLAST infusion is completed.   Please let us know if you have any questions or concerns.     Regards,  Tiera Delgado MD

## 2022-11-21 NOTE — RESULT ENCOUNTER NOTE
Please inform pt the following. Please schedule next available and cancellation list for diabetes management. Thank you.   Dear Pee,     Here are your recent results.   These blood work results are within the expected range with the exception of c-telopepetide level which indicates that we need to proceed with the RECLAST infusion as planned as soon as possible.   The A1c level was high. We need to discuss next steps regarding the diabetes management once the RECLAST infusion is completed.   Please let us know if you have any questions or concerns.    Regards,  Tiera Delgado MD

## 2022-11-21 NOTE — RESULT ENCOUNTER NOTE
Dear Pee,     Here are your recent urine results which are within the expected range. Please proceed with RECLAST infusion as planned.  Please let us know if you have any questions or concerns.    Regards,  Tiera Delgado MD

## 2022-11-21 NOTE — TELEPHONE ENCOUNTER
Spoke with patient about below.  Patient is already on the schedule for 12/1 in infusion.  Placed on Dr. Delgado' schedule for 2/21/2023.    Ariela Luu on 11/21/2022 at 1:09 PM

## 2022-11-22 LAB
ANNOTATION COMMENT IMP: NORMAL
CORTIS F 24H UR HPLC-MCNC: 20.7 UG/L
CORTIS F 24H UR-MRATE: NORMAL UG/D
CORTIS F/CREAT 24H UR: 36.32 UG/G CRT
CREAT 24H UR-MRATE: NORMAL MG/D
CREAT UR-MCNC: 57 MG/DL

## 2022-11-28 DIAGNOSIS — R52 PAIN: ICD-10-CM

## 2022-11-28 NOTE — TELEPHONE ENCOUNTER
Patient calling in requesting Rx for Oxycodone.   Last Rx 8/12/22 #15 tabs.    Patient has 1 tab left.

## 2022-11-29 RX ORDER — OXYCODONE AND ACETAMINOPHEN 5; 325 MG/1; MG/1
1 TABLET ORAL 3 TIMES DAILY PRN
Qty: 15 TABLET | Refills: 0 | OUTPATIENT
Start: 2022-11-29

## 2022-11-29 RX ORDER — PRAMIPEXOLE DIHYDROCHLORIDE 0.25 MG/1
0.5 TABLET ORAL DAILY
Qty: 180 TABLET | Refills: 0 | Status: SHIPPED | OUTPATIENT
Start: 2022-11-29 | End: 2022-12-28

## 2022-11-29 NOTE — TELEPHONE ENCOUNTER
"If feeling need for ongoing controlled medication then pt needs an aoot ti establIsh a CSA and UDS.  Last fill was a \"short refill\" on a one time basis.   "

## 2022-11-29 NOTE — TELEPHONE ENCOUNTER
Mirapex was refilled.    The controlled med will have to wait until his schedule appt in order to establish CSA and complete UDS.

## 2022-12-01 ENCOUNTER — INFUSION THERAPY VISIT (OUTPATIENT)
Dept: INFUSION THERAPY | Facility: CLINIC | Age: 82
End: 2022-12-01
Attending: INTERNAL MEDICINE
Payer: COMMERCIAL

## 2022-12-01 VITALS
DIASTOLIC BLOOD PRESSURE: 82 MMHG | WEIGHT: 171.4 LBS | SYSTOLIC BLOOD PRESSURE: 141 MMHG | TEMPERATURE: 98 F | HEART RATE: 63 BPM | RESPIRATION RATE: 16 BRPM | HEIGHT: 60 IN | BODY MASS INDEX: 33.65 KG/M2 | OXYGEN SATURATION: 99 %

## 2022-12-01 DIAGNOSIS — M81.0 AGE-RELATED OSTEOPOROSIS WITHOUT CURRENT PATHOLOGICAL FRACTURE: Primary | ICD-10-CM

## 2022-12-01 DIAGNOSIS — Z87.81 HX OF COMPRESSION FRACTURE OF SPINE: ICD-10-CM

## 2022-12-01 PROCEDURE — 96365 THER/PROPH/DIAG IV INF INIT: CPT | Performed by: INTERNAL MEDICINE

## 2022-12-01 PROCEDURE — 99207 PR NO CHARGE LOS: CPT

## 2022-12-01 RX ORDER — DIPHENHYDRAMINE HYDROCHLORIDE 50 MG/ML
50 INJECTION INTRAMUSCULAR; INTRAVENOUS
Status: CANCELLED
Start: 2022-12-01

## 2022-12-01 RX ORDER — EPINEPHRINE 1 MG/ML
0.3 INJECTION, SOLUTION INTRAMUSCULAR; SUBCUTANEOUS EVERY 5 MIN PRN
Status: CANCELLED | OUTPATIENT
Start: 2022-12-01

## 2022-12-01 RX ORDER — ALBUTEROL SULFATE 90 UG/1
1-2 AEROSOL, METERED RESPIRATORY (INHALATION)
Status: CANCELLED
Start: 2022-12-01

## 2022-12-01 RX ORDER — ACETAMINOPHEN 325 MG/1
650 TABLET ORAL ONCE
Status: CANCELLED | OUTPATIENT
Start: 2022-12-01

## 2022-12-01 RX ORDER — MEPERIDINE HYDROCHLORIDE 25 MG/ML
25 INJECTION INTRAMUSCULAR; INTRAVENOUS; SUBCUTANEOUS EVERY 30 MIN PRN
Status: CANCELLED | OUTPATIENT
Start: 2022-12-01

## 2022-12-01 RX ORDER — HEPARIN SODIUM (PORCINE) LOCK FLUSH IV SOLN 100 UNIT/ML 100 UNIT/ML
5 SOLUTION INTRAVENOUS
Status: CANCELLED | OUTPATIENT
Start: 2022-12-01

## 2022-12-01 RX ORDER — METHYLPREDNISOLONE SODIUM SUCCINATE 125 MG/2ML
125 INJECTION, POWDER, LYOPHILIZED, FOR SOLUTION INTRAMUSCULAR; INTRAVENOUS
Status: CANCELLED
Start: 2022-12-01

## 2022-12-01 RX ORDER — ALBUTEROL SULFATE 0.83 MG/ML
2.5 SOLUTION RESPIRATORY (INHALATION)
Status: CANCELLED | OUTPATIENT
Start: 2022-12-01

## 2022-12-01 RX ORDER — ZOLEDRONIC ACID 5 MG/100ML
5 INJECTION, SOLUTION INTRAVENOUS ONCE
Status: CANCELLED
Start: 2022-12-01

## 2022-12-01 RX ORDER — ZOLEDRONIC ACID 5 MG/100ML
5 INJECTION, SOLUTION INTRAVENOUS ONCE
Status: COMPLETED | OUTPATIENT
Start: 2022-12-01 | End: 2022-12-01

## 2022-12-01 RX ORDER — HEPARIN SODIUM,PORCINE 10 UNIT/ML
5 VIAL (ML) INTRAVENOUS
Status: CANCELLED | OUTPATIENT
Start: 2022-12-01

## 2022-12-01 RX ADMIN — ZOLEDRONIC ACID 5 MG: 0.05 INJECTION, SOLUTION INTRAVENOUS at 13:32

## 2022-12-01 RX ADMIN — Medication 250 ML: at 13:27

## 2022-12-01 ASSESSMENT — PAIN SCALES - GENERAL: PAINLEVEL: MILD PAIN (2)

## 2022-12-01 NOTE — PROGRESS NOTES
Infusion Nursing Note:  Pee Ayala presents today for NEW Reclast.    Patient seen by provider today: No   present during visit today: Not Applicable.    Note: Patient welcomed and introduced to the infusion center. Educated on Reclast and information handout given. Educated on taking calcium and vitamin D supplements. No further questions at this time.     Intravenous Access:  Peripheral IV placed.    Treatment Conditions:  Lab Results   Component Value Date     08/15/2022    POTASSIUM 3.4 08/15/2022    MAG 2.0 07/18/2022    CR 0.94 11/17/2022    RACHEL 9.4 11/17/2022    BILITOTAL 0.7 05/24/2022    ALBUMIN 4.1 11/17/2022    ALT 14 05/24/2022    AST 24 05/24/2022     Results reviewed, labs MET treatment parameters, ok to proceed with treatment.    Post Infusion Assessment:  Patient tolerated infusion without incident.  Site patent and intact, free from redness, edema or discomfort.  No evidence of extravasations.  Access discontinued per protocol.     Discharge Plan:   Discharge instructions reviewed with: Patient.  Patient and/or family verbalized understanding of discharge instructions and all questions answered.  Patient discharged in stable condition accompanied by: self.  Departure Mode: Ambulatory.  Informed patient to schedule next apt for one year out.       Olga Waterman RN

## 2022-12-02 NOTE — TELEPHONE ENCOUNTER
Pt notified of message below. Pt wondering if there is anything else Dr Singh could prescribe or recommend to control pain until his appointment on 12/20/22. Pt had been out of pain meds x 1 weeks.

## 2022-12-05 NOTE — TELEPHONE ENCOUNTER
If pain is out of control then I would recommend an appt this week with any available provider or in [facility]IC.  Tylenol 1000 mg three times a day could be tried but if inadequate then really needs to be seen in a face to face appt.

## 2022-12-12 ENCOUNTER — PATIENT OUTREACH (OUTPATIENT)
Dept: CARE COORDINATION | Facility: CLINIC | Age: 82
End: 2022-12-12

## 2022-12-12 NOTE — PROGRESS NOTES
Clinic Care Coordination Contact    Community Health Worker Follow Up    Care Gaps:     Health Maintenance Due   Topic Date Due     HF ACTION PLAN  06/09/2019     EYE EXAM  02/19/2022     MEDICARE ANNUAL WELLNESS VISIT  09/16/2022     LIPID  09/16/2022     COVID-19 Vaccine (5 - Booster for Pfizer series) 10/21/2022       [facility]  to [facility]     Care Plan:   Care Plan: Pain Clinic     Problem: Pain Clinic     Note:      I would like a Referral to the Pain Clinic in the next 30 to 60 days.  Date Goal set: 8/16/22  Barriers:   Strengths: motivated  Date to Achieve By: 30-60 days  Patient expressed understanding of goal: yes  Action steps to achieve this goal:  1. The CCC RN sent a telephone communication to Dr. Singh to ask for a Referral to the Pain Clinic.  If I do not hear from them in the next 2 weeks; I will notify my Community Healthcare Worker, CCC RN and/or clinic. I am on a wait list with [facility] in [place].        Goal: I would like a Referral to the Pain Clinic     Start Date: 8/16/2022    This Visit's Progress: 70% Recent Progress: 70%    Note:     Barriers: wait list  Strengths: motivated  Patient expressed understanding of goal: yes  Action steps to achieve this goal:  1. The CCC RN sent a telephone communication to Dr. Singh to ask for a Referral to the Pain Clinic.  If I do not hear from them in the next 2 weeks; I will notify my Community Healthcare Worker, CCC RN and/or clinic. I was on a wit list with [facility] pain clinic and now have found out they are closing. I am doing pain injections and will also have a procedure on nerves. I will wait to see how this works out and may go back to the pain clinic after depending on how I am feeling. I have had my right side left side completed and have my left side scheduled for next week.                      Care Plan: My Chart     Problem: HP GENERAL PROBLEM      Goal: I have accomplished regaining access to Mychart  Completed 12/12/2022    Start Date: 8/16/2022    This Visit's Progress: 100% Recent Progress: 10%    Note:     Personal Plan  If I have any trouble with MyChart in the future I will call the MyChart support team @ [phone].                        Care Plan: Appointments     Problem: HP GENERAL PROBLEM     Goal: I would like to attend the following appointments.     Start Date: 11/8/2022    This Visit's Progress: 30% Recent Progress: 10%    Note:     Barriers: numerous appointments  Strengths: motivated  Patient expressed understanding of goal: chart review  Action steps to achieve this goal:  1.  11/14 @ 2:30 with Endocrinology. Completed  2.  12/15 @ 12:35  with Cardiology. Continuous (MB)  3.   3/21  @ 10:45  with Endocrinology. Continuous (MB)                        Intervention and Education during outreach: N/A    CHW Plan: CHW will follow up with patient in 1 month on 1/12/23.    Dora Mott  Community Health Worker  [facility] Care Coordination   Yajaira Kruse [place], [facility]  Office: [phone]

## 2022-12-16 ENCOUNTER — OFFICE VISIT (OUTPATIENT)
Dept: CARDIOLOGY | Facility: CLINIC | Age: 82
End: 2022-12-16
Payer: COMMERCIAL

## 2022-12-16 ENCOUNTER — LAB (OUTPATIENT)
Dept: CARDIOLOGY | Facility: CLINIC | Age: 82
End: 2022-12-16
Payer: COMMERCIAL

## 2022-12-16 ENCOUNTER — ANTICOAGULATION THERAPY VISIT (OUTPATIENT)
Dept: ANTICOAGULATION | Facility: CLINIC | Age: 82
End: 2022-12-16

## 2022-12-16 VITALS
WEIGHT: 164 LBS | BODY MASS INDEX: 35.49 KG/M2 | DIASTOLIC BLOOD PRESSURE: 52 MMHG | SYSTOLIC BLOOD PRESSURE: 116 MMHG | HEART RATE: 72 BPM | RESPIRATION RATE: 14 BRPM

## 2022-12-16 DIAGNOSIS — Z79.01 LONG TERM (CURRENT) USE OF ANTICOAGULANTS: ICD-10-CM

## 2022-12-16 DIAGNOSIS — I73.9 PERIPHERAL ARTERIAL DISEASE (H): ICD-10-CM

## 2022-12-16 DIAGNOSIS — I25.10 CORONARY ARTERY DISEASE INVOLVING NATIVE CORONARY ARTERY OF NATIVE HEART WITHOUT ANGINA PECTORIS: ICD-10-CM

## 2022-12-16 DIAGNOSIS — I48.20 CHRONIC ATRIAL FIBRILLATION (H): Primary | ICD-10-CM

## 2022-12-16 DIAGNOSIS — I50.32 CHRONIC HEART FAILURE WITH PRESERVED EJECTION FRACTION (HFPEF) (H): Primary | ICD-10-CM

## 2022-12-16 DIAGNOSIS — I48.20 CHRONIC ATRIAL FIBRILLATION (H): ICD-10-CM

## 2022-12-16 DIAGNOSIS — Z79.01 LONG TERM (CURRENT) USE OF ANTICOAGULANTS: Primary | ICD-10-CM

## 2022-12-16 DIAGNOSIS — Z79.01 CHRONIC ANTICOAGULATION: ICD-10-CM

## 2022-12-16 LAB — INR POINT OF CARE: 2.4 (ref 0.9–1.1)

## 2022-12-16 PROCEDURE — 36416 COLLJ CAPILLARY BLOOD SPEC: CPT

## 2022-12-16 PROCEDURE — 85610 PROTHROMBIN TIME: CPT

## 2022-12-16 PROCEDURE — 99214 OFFICE O/P EST MOD 30 MIN: CPT | Performed by: INTERNAL MEDICINE

## 2022-12-16 RX ORDER — CARVEDILOL 3.12 MG/1
3.12 TABLET ORAL 2 TIMES DAILY WITH MEALS
Qty: 180 TABLET | Refills: 3 | Status: SHIPPED | OUTPATIENT
Start: 2022-12-16 | End: 2023-02-14

## 2022-12-16 NOTE — LETTER
12/16/2022    Ihsan Singh MD  [address]  [facility] [address]    RE: Pee Ayala       Dear Colleague,     I had the pleasure of seeing Pee [facility]ala in the [facility].    HEART CARE ENCOUNTER CONSULTATON NOTE      [facility]  [phone]      Assessment/Recommendations   Assessment:   1.  Acute on chronic congestive heart failure with preserved ejection fraction, left ventricular hypertrophy  2.  Aortic valve stenosis status post transaortic valve replacement (29 mm Magdaleno Dominick 3).  3.  Moderate mitral regurgitation  4.  Coronary artery disease status post coronary artery surgery and prior PCI.  Has a patent LIMA, patent SVG to OM, patent stent in proximal RCA with notable disease in distal RCA  5.  Atrial fibrillation    Plan:   1.  Long conversation with the patient and his wife.  He needs to transition to a low-sodium diet.  2.  Lasix 40 mg in the morning and 20 mg in PM (may need to titrate higher).   3.  Reduce carvedilol to 3.125 mg twice daily  4.  Continue aspirin therapy for coronary artery disease  5.  Continue Crestor for hyperlipidemia coronary artery disease  6.  Remains on warfarin therapy for stroke prevention and A. fib    3-month follow-up       History of Present Illness/Subjective    HPI: Pee Ayala is a 82 year old male heart failure with preserved ejection fraction who presents to cardiology clinic to establish care with myself for progressive lower extremity edema.    Patient has been noticing difficult to control lower extremity edema.  He is currently on a high salt diet which is not helping his situation.  Long conversation about modifying his salt intake.  We will at this point continue Lasix at 40 mg in the morning and 20 mg in the evening.  He has trouble with a lot of diuresis.  Also will decrease carvedilol slightly given hypotension.    Currently denies any anginal symptoms.  No syncope.  No acute bleeding issues  with warfarin    Reviewed notes by Dr. Hadley     Coronary Angiogram:     Mid RCA lesion is 75% stenosed.    Mid Cx lesion is 85% stenosed.    Prox LAD to Mid LAD lesion is 75% stenosed.    1st Diag lesion is 70% stenosed.    Prox Cx lesion is 80% stenosed.     79 yo male s/p CABG and PCI with residual severe small vessel disease of RCA/prox LAD into diagonal, mid LAD, circ prox and Circ now with severe aortic stenosis on echo and significant dyspnea on exertion     Angiography via left radial  Noted all vessels with severe diffuse dz and very small in diameter  LM mild dz  LAD prox 70%; mid 80%; diagonal prox 60-70%, patent LIMA   Circ severe dz in prox/mid with patent SVG to OM  RCA prox stent patent severe dz in mid (no change from 2019)      Echocardiogram: 2022  1. Normal left ventricular size and systolic performance with a visually  estimated ejection fraction of 65-70%.  2. There is mild concentric increase in left ventricular wall thickness.  3. There is a bio-prosthetic aortic valve (documented 29 mm Magdaleno Dominick 3  tissue valve).  Â  Normal aortic valve prosthesis metrics with a mean systolic gradient of 7  mmHg and a peak anterograde velocity of 1.9 m/sec.  Â  There is trace-mild aortic insufficiency.  4. There is moderate mitral insufficiency.  5. Normal right ventricular size and systolic performance.  6. There is severe left atrial enlargement. There is mild to moderate right  atrial enlargement.  7. Right ventricular systolic pressure relative to right atrial pressure is  mildly increased. The pulmonary artery pressure is estimated to be 35-40 mmHg  plus right atrial pressure (the IVC is not well-visualized on this study).            Physical Examination  Review of Systems   Vitals: /52 (BP Location: Right arm, Patient Position: Sitting, Cuff Size: Adult Regular)   Pulse 72   Resp 14   Wt 74.4 kg (164 lb)   BMI 35.49 kg/m    BMI= Body mass index is 35.49 kg/m .  Wt Readings from Last 3  Encounters:   12/16/22 74.4 kg (164 lb)   12/01/22 77.7 kg (171 lb 6.4 oz)   08/31/22 73.9 kg (163 lb)        Pleasant   ENT/Mouth: membranes moist, no oral lesions or bleeding gums.      EYES:  no scleral icterus, normal conjunctivae       Chest/Lungs:   lungs are clear to auscultation, no rales or wheezing, noted sternal scar, equal chest wall expansion    Cardiovascular:   Irregular. Normal first and second heart sounds with 2 out of 6 systolic murmur. No rubs, or gallops; the carotid, radial and posterior tibial pulses are intact, Jugular venous pressure normal, Moderate pitting edema bilaterally    Abdomen:  no tenderness; bowel sounds are present   Extremities: no cyanosis or clubbing   Skin: no xanthelasma, warm.    Neurologic: normal  bilateral, no tremors     Psychiatric: alert and oriented x3, calm        Please refer above for cardiac ROS details.        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      Encounter for long-term (current) use of insulin (H) 08/11/2016     Esophageal reflux 11/18/2010     History of angina      HTN (hypertension) 06/30/2009     (Problem list name updated by automated process. Provider to review and confirm.)     Impotence of organic origin      Mixed hyperlipidemia 04/25/2007 April 25, 2007 restarted Zocor today, recheck in 3  months.  August 23, 2007 LDL at 101 with 40 mg, will increase to 80 mg. Recheck  In 3 months.      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      Walking troubles      Past Surgical History:   Procedure Laterality Date     BYPASS GRAFT ARTERY CORONARY N/A 9/10/2014    Procedure: BYPASS GRAFT ARTERY CORONARY;  Surgeon: Bharathi Caraballo MD;  Location: [facility] OR     BYPASS GRAFT ARTERY CORONARY  2016     COLONOSCOPY  1/14/2004     CORONARY ANGIOGRAPHY ADULT ORDER       CV CORONARY ANGIOGRAM N/A 4/4/2019    Procedure: Coronary Angiogram;  Surgeon: Dominik Vega MD;  Location: [facility] Cath Lab;  Service: Cardiology     CV CORONARY ANGIOGRAM N/A 1/6/2021    Procedure: Coronary Angiogram;  Surgeon: Dominik Vega MD;  Location: [facility] Cardiac Cath Lab;  Service: Cardiology     CV LEFT HEART CATHETERIZATION WITHOUT LEFT VENTRICULOGRAM Left 4/4/2019    Procedure: Left Heart Catheterization Without Left Ventriculogram;  Surgeon: Dominik Vega MD;  Location: [facility] Cath Lab;  Service: Cardiology     CV LEFT HEART CATHETERIZATION WITHOUT LEFT VENTRICULOGRAM Left 1/6/2021    Procedure: Left Heart Catheterization Without Left Ventriculogram;  Surgeon: Dominik Vega MD;  Location: [facility] Cardiac Cath Lab;  Service: Cardiology     CV RIGHT HEART CATHETERIZATION Right 4/4/2019    Procedure: Right Heart Catheterization;  Surgeon: Dominik Vega MD;  Location: [facility] Cath Lab;  Service: Cardiology      CV TRANSCATHETER AORTIC VALVE REPLACEMENT N/A 2021    Procedure: Right transfemoral transcatheter aortic valve replacement using Magdaleno Dominick 3 size 29mm.  Transthoracic echocardiogram;  Surgeon: Jo Romano MD;  Location: [facility] CARDIAC CATH LAB     ESOPHAGOSCOPY, GASTROSCOPY, DUODENOSCOPY (EGD), COMBINED N/A 2016    Procedure: COMBINED ESOPHAGOSCOPY, GASTROSCOPY, DUODENOSCOPY (EGD);  Surgeon: Rk Srivastava MD;  Location: [facility] CATH FEMORAL CANNULIZATION WITH OPEN STANDBY REPAIR AORTIC VALVE N/A 2021    Procedure: Cardiopulmonary Bypass standby;  Surgeon: Jefferson Sandy MD;  Location: [facility] CARDIAC CATH LAB     IR LOWER EXTREMITY ANGIOGRAM LEFT  2021     LAPAROSCOPIC CHOLECYSTECTOMY  10/09     MAZE PROCEDURE N/A 9/10/2014    Procedure: MAZE PROCEDURE;  Surgeon: Bharathi Caraballo MD;  Location:  OR     MOHS MICROGRAPHIC PROCEDURE       OPEN REDUCTION INTERNAL FIXATION HIP BIPOLAR Left 2022    Procedure: HEMIARTHROPLASTY, HIP, BIPOLAR, OPEN REDUCTION INTERNAL FIXATION OF GREATER TROCHANTER;  Surgeon: Jd Larose MD;  Location: [facility] OR     ORTHOPEDIC SURGERY      surgery for fx  Left forearm     OTHER SURGICAL HISTORY Left     forearm sugery     STENT, CORONARY, HUMA  2016     VASECTOMY       Family History   Problem Relation Age of Onset     Diabetes Mother      Hypertension Father      Cerebrovascular Disease Father      Diabetes Maternal Grandmother      Breast Cancer No family hx of      Cancer - colorectal No family hx of      Prostate Cancer No family hx of      C.A.D. No family hx of      Diabetes Type 2  Mother         58 ,  from anesthesia complication.     Heart Disease Father         86  from stroke     No Known Problems Brother         60 years of age.        Social History     Socioeconomic History     Marital status:      Spouse name: Fay Ayala     Number of children: Not on file     Years of  education: Not on file     Highest education level: Not on file   Occupational History     Employer: RETIRED   Tobacco Use     Smoking status: Former     Types: Cigarettes     Quit date: 1998     Years since quittin.9     Smokeless tobacco: Never   Substance and Sexual Activity     Alcohol use: Yes     Alcohol/week: 0.0 standard drinks     Comment: Alcoholic Drinks/day: very rare     Drug use: No     Sexual activity: Never     Birth control/protection: None   Other Topics Concern     Parent/sibling w/ CABG, MI or angioplasty before 65F 55M? No   Social History Narrative     and lives with life.  Has adult children from previous relationship. ( Blended family).  Has a dog - Antoniooswaldo    Jazzy Gil MD  1/3/2019         Social Determinants of Health     Financial Resource Strain: Not on file   Food Insecurity: Not on file   Transportation Needs: Not on file   Physical Activity: Not on file   Stress: Not on file   Social Connections: Not on file   Intimate Partner Violence: Not on file   Housing Stability: Not on file           Medications  Allergies   Current Outpatient Medications   Medication Sig Dispense Refill     acetaminophen (TYLENOL) 500 MG tablet Take 1 tablet (500 mg) by mouth 3 times daily (Patient taking differently: Take 500 mg by mouth 2 times daily) 240 tablet 0     aspirin 81 MG EC tablet Take 1 tablet (81 mg) by mouth daily 90 tablet 0     blood glucose (ACCU-CHEK GUIDE) test strip Use strip to check Glucose Three times daily. 300 strip 0     busPIRone (BUSPAR) 5 MG tablet Take 1 tablet (5 mg) by mouth 2 times daily 180 tablet 0     carvedilol (COREG) 3.125 MG tablet Take 1 tablet (3.125 mg) by mouth 2 times daily (with meals) 180 tablet 3     Continuous Blood Gluc Sensor (FREESTYLE DOMI 2 SENSOR) [facility] 1 each every 14 days Use 1 sensor every 14 days. Use to read blood sugars per 's instructions. 1 each 0     finasteride (PROSCAR) 5 MG tablet Take 1 tablet (5 mg) by  mouth daily 90 tablet 0     furosemide (LASIX) 20 MG tablet TAKE 2 TABLETS BY MOUTH IN THE MORNING AND 1 TABLET IN THE AFTERNOON 180 tablet 0     gabapentin (NEURONTIN) 600 MG tablet Take 1 tablet (600 mg) by mouth 3 times daily 180 tablet 0     insulin aspart prot & aspart (NOVOLOG MIX 70/30 PEN) (70-30) 100 UNIT/ML pen Inject subcutaneously 20 units in AM with breakfast and 12 in PM with dinner. If Blood Glucose<100 then give 1/2 dose) 15 mL 0     metFORMIN (GLUCOPHAGE) 500 MG tablet Take 2 tablets (1,000 mg) by mouth 2 times daily (with meals) 360 tablet 0     polyethylene glycol (MIRALAX) 17 GM/Dose powder Take 17 g (1 capful) by mouth daily as needed for constipation (opioid induced constipation) 507 g 0     pramipexole (MIRAPEX) 0.25 MG tablet Take 2 tablets (0.5 mg) by mouth daily Takes 4pm and 5;30 pm 180 tablet 0     rosuvastatin (CRESTOR) 20 MG tablet Take 1 tablet (20 mg) by mouth At Bedtime 90 tablet 0     warfarin ANTICOAGULANT (COUMADIN) 1 MG tablet 5mg daily on Monydays and thursdays and 4mg all other days. 60 tablet 0     warfarin ANTICOAGULANT (COUMADIN) 4 MG tablet Take 4mg warfarin Tuesday, Wednesday, Friday, Saturday, Sunday and take 5mg tablet on Monday and Thursday or as directed by [facility] 30 tablet 1     oxyCODONE-acetaminophen (PERCOCET) 5-325 MG tablet Take 1 tablet by mouth 3 times daily as needed for severe pain (Patient not taking: Reported on 12/16/2022) 15 tablet 0       Allergies   Allergen Reactions     Oxycodone Itching and Rash     Amlodipine Dizziness     Dizziness and dry heaves     Lisinopril Cough     Adhesive Tape Itching and Rash          Lab Results    Chemistry/lipid CBC Cardiac Enzymes/BNP/TSH/INR   Recent Labs   Lab Test 01/25/22  1443 09/16/21  1026 01/05/16  1104 04/29/15  0834   CHOL  --  117   < > 148   HDL  --  43   < > 44   LDL 65 48   < > 85   TRIG  --  131   < > 93   CHOLHDLRATIO  --   --   --  3.4    < > = values in this interval not displayed.     Recent Labs    Lab Test 01/25/22  1443 09/16/21  1026 08/31/21  1221   LDL 65 48 64     Recent Labs   Lab Test 11/17/22  1315 08/15/22  1325   NA  --  140   POTASSIUM  --  3.4   CHLORIDE  --  97*   CO2  --  30*   GLC  --  64*   BUN 23.0 19.7   CR 0.94 1.00   GFRESTIMATED 82 76   RACHEL 9.4 9.7     Recent Labs   Lab Test 11/17/22  1315 08/15/22  1325 07/25/22  0605   CR 0.94 1.00 0.74     Recent Labs   Lab Test 11/17/22  1315 08/15/22  1325 05/24/22  1023   A1C 10.3* 8.8* 6.3*          Recent Labs   Lab Test 07/18/22  0636   WBC 6.9   HGB 10.8*   HCT 34.6*   *        Recent Labs   Lab Test 07/18/22  0636 07/12/22  0621 07/11/22  1048   HGB 10.8* 10.7* 10.8*    Recent Labs   Lab Test 07/04/22  1034   TROPONINI 0.15     Recent Labs   Lab Test 07/04/22  1034 01/08/21  0842 10/21/20  1451 05/09/19  1657 06/09/16  1203   * 91* 56   < >  --    NTBNP  --   --   --   --  354*    < > = values in this interval not displayed.     Recent Labs   Lab Test 11/17/22  1315   TSH 0.92     Recent Labs   Lab Test 12/16/22  0927 11/15/22  1443 10/10/22  1539   INR 2.4* 2.7* 2.6*        Ulices Hernandez DO  Thank you for allowing me to participate in the care of your patient.      Sincerely,     Ulices Hernandez DO     [facility]  cc:   Referred Self,

## 2022-12-16 NOTE — PROGRESS NOTES
ANTICOAGULATION MANAGEMENT     Pee Ayala 82 year old male is on warfarin with therapeutic INR result. (Goal INR 2.0-3.0)    Recent labs: (last 7 days)     12/16/22  0927   INR 2.4*       ASSESSMENT       Source(s): Chart Review, Patient/Caregiver Call and Template       Warfarin doses taken: Warfarin taken differently, but did not change total weekly dose    Diet: No new diet changes identified    New illness, injury, or hospitalization: No    Medication/supplement changes: None noted    Signs or symptoms of bleeding or clotting: No    Previous INR: Therapeutic last 3 visits    Additional findings: None       PLAN     Recommended plan for no diet, medication or health factor changes affecting INR     Dosing Instructions: Continue your current warfarin dose with next INR in 5 weeks       Summary  As of 12/16/2022    Full warfarin instructions:  5 mg every Mon, Thu; 4 mg all other days; Starting 12/16/2022   Next INR check:  1/20/2023             Telephone call with  Pee ([phone]) who verbalizes understanding and agrees to plan    Lab visit scheduled - INR on 1/20/23 @ [facility]    Education provided:     Taking warfarin: Importance of taking warfarin as instructed    Goal range and lab monitoring: goal range and significance of current result    Plan made per [facility] anticoagulation protocol    Mariel Dale RN  Anticoagulation Clinic  12/16/2022    _______________________________________________________________________     Anticoagulation Episode Summary     Current INR goal:  2.0-3.0   TTR:  56.7 % (11.7 mo)   Target end date:  Indefinite   Send INR reminders to:  [facility]    Indications    Chronic atrial fibrillation (H) [I48.20]  Chronic anticoagulation [Z79.01]  Long term (current) use of anticoagulants [Z79.01]  Peripheral arterial disease (H) [I73.9]           Comments:           Anticoagulation Care Providers     Provider Role Specialty Phone number    Jazzy Gil MD  Referring Family Medicine [phone]    Ihsan Singh MD Referring Family Medicine [phone]

## 2022-12-17 NOTE — PROGRESS NOTES
HEART CARE ENCOUNTER CONSULTATON NOTE      [facility]  [phone]      Assessment/Recommendations   Assessment:   1.  Acute on chronic congestive heart failure with preserved ejection fraction, left ventricular hypertrophy  2.  Aortic valve stenosis status post transaortic valve replacement (29 mm Magdaleno Dominick 3).  3.  Moderate mitral regurgitation  4.  Coronary artery disease status post coronary artery surgery and prior PCI.  Has a patent LIMA, patent SVG to OM, patent stent in proximal RCA with notable disease in distal RCA  5.  Atrial fibrillation    Plan:   1.  Long conversation with the patient and his wife.  He needs to transition to a low-sodium diet.  2.  Lasix 40 mg in the morning and 20 mg in PM (may need to titrate higher).   3.  Reduce carvedilol to 3.125 mg twice daily  4.  Continue aspirin therapy for coronary artery disease  5.  Continue Crestor for hyperlipidemia coronary artery disease  6.  Remains on warfarin therapy for stroke prevention and A. fib    3-month follow-up       History of Present Illness/Subjective    HPI: Pee Ayala is a 82 year old male heart failure with preserved ejection fraction who presents to cardiology clinic to establish care with myself for progressive lower extremity edema.    Patient has been noticing difficult to control lower extremity edema.  He is currently on a high salt diet which is not helping his situation.  Long conversation about modifying his salt intake.  We will at this point continue Lasix at 40 mg in the morning and 20 mg in the evening.  He has trouble with a lot of diuresis.  Also will decrease carvedilol slightly given hypotension.    Currently denies any anginal symptoms.  No syncope.  No acute bleeding issues with warfarin    Reviewed notes by Dr. Hadley     Coronary Angiogram:     Mid RCA lesion is 75% stenosed.    Mid Cx lesion is 85% stenosed.    Prox LAD to Mid LAD lesion is 75% stenosed.    1st Diag lesion is  70% stenosed.    Prox Cx lesion is 80% stenosed.     79 yo male s/p CABG and PCI with residual severe small vessel disease of RCA/prox LAD into diagonal, mid LAD, circ prox and Circ now with severe aortic stenosis on echo and significant dyspnea on exertion     Angiography via left radial  Noted all vessels with severe diffuse dz and very small in diameter  LM mild dz  LAD prox 70%; mid 80%; diagonal prox 60-70%, patent LIMA   Circ severe dz in prox/mid with patent SVG to OM  RCA prox stent patent severe dz in mid (no change from 2019)      Echocardiogram: 2022  1. Normal left ventricular size and systolic performance with a visually  estimated ejection fraction of 65-70%.  2. There is mild concentric increase in left ventricular wall thickness.  3. There is a bio-prosthetic aortic valve (documented 29 mm Magdaleno Dominick 3  tissue valve).  Â  Normal aortic valve prosthesis metrics with a mean systolic gradient of 7  mmHg and a peak anterograde velocity of 1.9 m/sec.  Â  There is trace-mild aortic insufficiency.  4. There is moderate mitral insufficiency.  5. Normal right ventricular size and systolic performance.  6. There is severe left atrial enlargement. There is mild to moderate right  atrial enlargement.  7. Right ventricular systolic pressure relative to right atrial pressure is  mildly increased. The pulmonary artery pressure is estimated to be 35-40 mmHg  plus right atrial pressure (the IVC is not well-visualized on this study).            Physical Examination  Review of Systems   Vitals: /52 (BP Location: Right arm, Patient Position: Sitting, Cuff Size: Adult Regular)   Pulse 72   Resp 14   Wt 74.4 kg (164 lb)   BMI 35.49 kg/m    BMI= Body mass index is 35.49 kg/m .  Wt Readings from Last 3 Encounters:   12/16/22 74.4 kg (164 lb)   12/01/22 77.7 kg (171 lb 6.4 oz)   08/31/22 73.9 kg (163 lb)        Pleasant   ENT/Mouth: membranes moist, no oral lesions or bleeding gums.      EYES:  no scleral  icterus, normal conjunctivae       Chest/Lungs:   lungs are clear to auscultation, no rales or wheezing, noted sternal scar, equal chest wall expansion    Cardiovascular:   Irregular. Normal first and second heart sounds with 2 out of 6 systolic murmur. No rubs, or gallops; the carotid, radial and posterior tibial pulses are intact, Jugular venous pressure normal, Moderate pitting edema bilaterally    Abdomen:  no tenderness; bowel sounds are present   Extremities: no cyanosis or clubbing   Skin: no xanthelasma, warm.    Neurologic: normal  bilateral, no tremors     Psychiatric: alert and oriented x3, calm        Please refer above for cardiac ROS details.        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      Encounter for long-term (current) use of insulin (H) 08/11/2016     Esophageal reflux 11/18/2010     History of angina      HTN (hypertension) 06/30/2009     (Problem list name updated by automated process. Provider to review and confirm.)     Impotence of organic origin      Mixed hyperlipidemia 04/25/2007 April 25, 2007 restarted Zocor today, recheck in 3 months.  August 23, 2007 LDL at 101 with 40 mg, will increase to 80 mg. Recheck  In 3 months.      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      Walking troubles      Past Surgical History:   Procedure Laterality Date     BYPASS GRAFT ARTERY CORONARY N/A 9/10/2014    Procedure: BYPASS GRAFT ARTERY CORONARY;  Surgeon: Bharathi Caraballo MD;  Location: [facility] OR     BYPASS GRAFT ARTERY CORONARY  2016     COLONOSCOPY  1/14/2004     CORONARY ANGIOGRAPHY ADULT ORDER       CV CORONARY ANGIOGRAM N/A 4/4/2019    Procedure: Coronary Angiogram;  Surgeon: Dominik Vega MD;  Location: [facility] Cath Lab;  Service: Cardiology     CV CORONARY ANGIOGRAM N/A 1/6/2021    Procedure: Coronary Angiogram;  Surgeon: Dominik Vega MD;  Location: [facility] Cardiac Cath Lab;  Service: Cardiology     CV LEFT HEART CATHETERIZATION WITHOUT LEFT VENTRICULOGRAM Left 4/4/2019    Procedure: Left Heart Catheterization Without Left Ventriculogram;  Surgeon: Dominik Vega MD;  Location: [facility] Cath Lab;  Service: Cardiology     CV LEFT HEART CATHETERIZATION WITHOUT LEFT VENTRICULOGRAM Left 1/6/2021    Procedure: Left Heart Catheterization Without Left Ventriculogram;  Surgeon: Dominik Vega MD;  Location: [facility] Cardiac Cath Lab;  Service: Cardiology     CV RIGHT HEART CATHETERIZATION Right 4/4/2019    Procedure: Right Heart Catheterization;  Surgeon: Dominik Vega MD;  Location: [facility] Cath Lab;  Service: Cardiology     CV TRANSCATHETER AORTIC VALVE REPLACEMENT N/A 1/12/2021    Procedure: Right transfemoral transcatheter aortic valve replacement using Magdaleno Dominick 3 size 29mm.  Transthoracic echocardiogram;   Surgeon: Jo Romano MD;  Location: [facility] CARDIAC CATH LAB     ESOPHAGOSCOPY, GASTROSCOPY, DUODENOSCOPY (EGD), COMBINED N/A 2016    Procedure: COMBINED ESOPHAGOSCOPY, GASTROSCOPY, DUODENOSCOPY (EGD);  Surgeon: Rk Srivastava MD;  Location: [facility] CATH FEMORAL CANNULIZATION WITH OPEN STANDBY REPAIR AORTIC VALVE N/A 2021    Procedure: Cardiopulmonary Bypass standby;  Surgeon: Jefferson Sandy MD;  Location: [facility] CARDIAC CATH LAB     IR LOWER EXTREMITY ANGIOGRAM LEFT  2021     LAPAROSCOPIC CHOLECYSTECTOMY  10/09     MAZE PROCEDURE N/A 9/10/2014    Procedure: MAZE PROCEDURE;  Surgeon: Bharathi Caraballo MD;  Location:  OR     MOHS MICROGRAPHIC PROCEDURE       OPEN REDUCTION INTERNAL FIXATION HIP BIPOLAR Left 2022    Procedure: HEMIARTHROPLASTY, HIP, BIPOLAR, OPEN REDUCTION INTERNAL FIXATION OF GREATER TROCHANTER;  Surgeon: Jd Larose MD;  Location: [facility] [place] OR     ORTHOPEDIC SURGERY      surgery for fx  Left forearm     OTHER SURGICAL HISTORY Left     forearm sugery     STENT, CORONARY, HUMA  2016     VASECTOMY       Family History   Problem Relation Age of Onset     Diabetes Mother      Hypertension Father      Cerebrovascular Disease Father      Diabetes Maternal Grandmother      Breast Cancer No family hx of      Cancer - colorectal No family hx of      Prostate Cancer No family hx of      C.A.D. No family hx of      Diabetes Type 2  Mother         58 ,  from anesthesia complication.     Heart Disease Father         86  from stroke     No Known Problems Brother         60 years of age.        Social History     Socioeconomic History     Marital status:      Spouse name: Fay Ayala     Number of children: Not on file     Years of education: Not on file     Highest education level: Not on file   Occupational History     Employer: RETIRED   Tobacco Use     Smoking status: Former     Types: Cigarettes     Quit date: 1998      Years since quittin.9     Smokeless tobacco: Never   Substance and Sexual Activity     Alcohol use: Yes     Alcohol/week: 0.0 standard drinks     Comment: Alcoholic Drinks/day: very rare     Drug use: No     Sexual activity: Never     Birth control/protection: None   Other Topics Concern     Parent/sibling w/ CABG, MI or angioplasty before 65F 55M? No   Social History Narrative     and lives with life.  Has adult children from previous relationship. ( Blended family).  Has a dog - Antoniooswaldo    Jazzy Gil MD  1/3/2019         Social Determinants of Health     Financial Resource Strain: Not on file   Food Insecurity: Not on file   Transportation Needs: Not on file   Physical Activity: Not on file   Stress: Not on file   Social Connections: Not on file   Intimate Partner Violence: Not on file   Housing Stability: Not on file           Medications  Allergies   Current Outpatient Medications   Medication Sig Dispense Refill     acetaminophen (TYLENOL) 500 MG tablet Take 1 tablet (500 mg) by mouth 3 times daily (Patient taking differently: Take 500 mg by mouth 2 times daily) 240 tablet 0     aspirin 81 MG EC tablet Take 1 tablet (81 mg) by mouth daily 90 tablet 0     blood glucose (ACCU-CHEK GUIDE) test strip Use strip to check Glucose Three times daily. 300 strip 0     busPIRone (BUSPAR) 5 MG tablet Take 1 tablet (5 mg) by mouth 2 times daily 180 tablet 0     carvedilol (COREG) 3.125 MG tablet Take 1 tablet (3.125 mg) by mouth 2 times daily (with meals) 180 tablet 3     Continuous Blood Gluc Sensor (FREESTYLE DOMI 2 SENSOR) [facility] 1 each every 14 days Use 1 sensor every 14 days. Use to read blood sugars per 's instructions. 1 each 0     finasteride (PROSCAR) 5 MG tablet Take 1 tablet (5 mg) by mouth daily 90 tablet 0     furosemide (LASIX) 20 MG tablet TAKE 2 TABLETS BY MOUTH IN THE MORNING AND 1 TABLET IN THE AFTERNOON 180 tablet 0     gabapentin (NEURONTIN) 600 MG tablet Take 1 tablet (600  mg) by mouth 3 times daily 180 tablet 0     insulin aspart prot & aspart (NOVOLOG MIX 70/30 PEN) (70-30) 100 UNIT/ML pen Inject subcutaneously 20 units in AM with breakfast and 12 in PM with dinner. If Blood Glucose<100 then give 1/2 dose) 15 mL 0     metFORMIN (GLUCOPHAGE) 500 MG tablet Take 2 tablets (1,000 mg) by mouth 2 times daily (with meals) 360 tablet 0     polyethylene glycol (MIRALAX) 17 GM/Dose powder Take 17 g (1 capful) by mouth daily as needed for constipation (opioid induced constipation) 507 g 0     pramipexole (MIRAPEX) 0.25 MG tablet Take 2 tablets (0.5 mg) by mouth daily Takes 4pm and 5;30 pm 180 tablet 0     rosuvastatin (CRESTOR) 20 MG tablet Take 1 tablet (20 mg) by mouth At Bedtime 90 tablet 0     warfarin ANTICOAGULANT (COUMADIN) 1 MG tablet 5mg daily on Monydays and thursdays and 4mg all other days. 60 tablet 0     warfarin ANTICOAGULANT (COUMADIN) 4 MG tablet Take 4mg warfarin Tuesday, Wednesday, Friday, Saturday, Sunday and take 5mg tablet on Monday and Thursday or as directed by [facility] clinic 30 tablet 1     oxyCODONE-acetaminophen (PERCOCET) 5-325 MG tablet Take 1 tablet by mouth 3 times daily as needed for severe pain (Patient not taking: Reported on 12/16/2022) 15 tablet 0       Allergies   Allergen Reactions     Oxycodone Itching and Rash     Amlodipine Dizziness     Dizziness and dry heaves     Lisinopril Cough     Adhesive Tape Itching and Rash          Lab Results    Chemistry/lipid CBC Cardiac Enzymes/BNP/TSH/INR   Recent Labs   Lab Test 01/25/22  1443 09/16/21  1026 01/05/16  1104 04/29/15  0834   CHOL  --  117   < > 148   HDL  --  43   < > 44   LDL 65 48   < > 85   TRIG  --  131   < > 93   CHOLHDLRATIO  --   --   --  3.4    < > = values in this interval not displayed.     Recent Labs   Lab Test 01/25/22  1443 09/16/21  1026 08/31/21  1221   LDL 65 48 64     Recent Labs   Lab Test 11/17/22  1315 08/15/22  1325   NA  --  140   POTASSIUM  --  3.4   CHLORIDE  --  97*   CO2  --  30*    GLC  --  64*   BUN 23.0 19.7   CR 0.94 1.00   GFRESTIMATED 82 76   RACHEL 9.4 9.7     Recent Labs   Lab Test 11/17/22  1315 08/15/22  1325 07/25/22  0605   CR 0.94 1.00 0.74     Recent Labs   Lab Test 11/17/22  1315 08/15/22  1325 05/24/22  1023   A1C 10.3* 8.8* 6.3*          Recent Labs   Lab Test 07/18/22  0636   WBC 6.9   HGB 10.8*   HCT 34.6*   *        Recent Labs   Lab Test 07/18/22  0636 07/12/22  0621 07/11/22  1048   HGB 10.8* 10.7* 10.8*    Recent Labs   Lab Test 07/04/22  1034   TROPONINI 0.15     Recent Labs   Lab Test 07/04/22  1034 01/08/21  0842 10/21/20  1451 05/09/19  1657 06/09/16  1203   * 91* 56   < >  --    NTBNP  --   --   --   --  354*    < > = values in this interval not displayed.     Recent Labs   Lab Test 11/17/22  1315   TSH 0.92     Recent Labs   Lab Test 12/16/22  0927 11/15/22  1443 10/10/22  1539   INR 2.4* 2.7* 2.6*        Ulices Hernandez DO

## 2022-12-20 ENCOUNTER — PATIENT OUTREACH (OUTPATIENT)
Dept: CARE COORDINATION | Facility: CLINIC | Age: 82
End: 2022-12-20
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
## 2022-12-20 NOTE — PROGRESS NOTES
Clinic Care Coordination Contact    Care Coordination Clinician Chart Review     Situation: Patient chart reviewed by care coordinator.?     Background: Initial assessment and enrollment to Care Coordination was 8/16/22 .?? Care plan(s) with patient-centered goal(s) were developed with participation from patient.? Lead CC handed patient off to CHW for continued outreach every 30 days.??     Assessment: Per chart review, patient outreach completed by CC CHW on 12/12/22.? Patient is actively working to accomplish goal(s).? Patient's goal(s) remain(s) appropriate at this time.? Patient is due for updated Plan of Care.? Annual assessment will be due 8/16/23.     Care Plan: Pain Clinic     Problem: Pain Clinic     Note:      I would like a Referral to the Pain Clinic in the next 30 to 60 days.  Date Goal set: 8/16/22  Barriers:   Strengths: motivated  Date to Achieve By: 30-60 days  Patient expressed understanding of goal: yes  Action steps to achieve this goal:  1. The CCC RN sent a telephone communication to Dr. Singh to ask for a Referral to the Pain Clinic.  If I do not hear from them in the next 2 weeks; I will notify my Community Healthcare Worker, [facility] RN and/or clinic. I am on a wait list with [facility] in [place].        Goal: I would like a Referral to the Pain Clinic     Start Date: 8/16/2022    This Visit's Progress: 70% Recent Progress: 70%    Note:     Barriers: wait list  Strengths: motivated  Patient expressed understanding of goal: yes  Action steps to achieve this goal:  1. The CCC RN sent a telephone communication to Dr. Singh to ask for a Referral to the Pain Clinic.  If I do not hear from them in the next 2 weeks; I will notify my Community Healthcare Worker, [facility] RN and/or clinic. I was on a wit list with [facility] pain clinic and now have found out they are closing. I am doing pain injections and will also have a procedure on nerves. I will wait to see how this works out and  may go back to the pain clinic after depending on how I am feeling. I have had my right side left side completed and have my left side scheduled for next week.                      Care Plan: My Chart     Problem: HP GENERAL PROBLEM     Goal: I have accomplished regaining access to Mychart  Completed 12/12/2022    Start Date: 8/16/2022    This Visit's Progress: 100% Recent Progress: 10%    Note:     Personal Plan  If I have any trouble with MyChart in the future I will call the MyChart support team @ [phone].                        Care Plan: Appointments     Problem: HP GENERAL PROBLEM     Goal: I would like to attend the following appointments.     Start Date: 11/8/2022    Recent Progress: 30%    Note:     Barriers: numerous appointments  Strengths: motivated  Patient expressed understanding of goal: chart review  Action steps to achieve this goal:  1.  11/14 @ 2:30 with Endocrinology. Completed  2.  12/15 @ 12:35  with Cardiology. Completed  3.  12/20 @ 10:30 with Dr. Singh- continuous  3.   3/21  @ 10:45  with Endocrinology. Continuous (MB)                    Care Plan: Annual Wellness Visit     Problem: HP GENERAL PROBLEM     Goal: I will complete my annual wellness visit.     Note:     Barriers: numerous medical appointments  Strengths:     Patient expressed understanding of goal: per chart review  Action steps to achieve this goal:  1. I will schedule my annual wellness visit; [phone] ([phone])  2. I will attend my annual wellness visit.  3. I will contact my Care Management or clinic team if I have barriers to attending my annual wellness visit.                           Plan/Recommendations: The patient will continue working with Care Coordination to achieve above goal(s).? CHW will involve Lead CC as needed or if patient is ready to move to maintenance.? Lead CC will continue to monitor CHW s monthly outreaches and progress to goal(s) every 6 weeks.    Plan of Care updated and sent to  patient: Yes, 12/20/22

## 2022-12-27 DIAGNOSIS — R52 PAIN: ICD-10-CM

## 2022-12-27 DIAGNOSIS — Z79.01 CHRONIC ANTICOAGULATION: ICD-10-CM

## 2022-12-27 DIAGNOSIS — E11.42 TYPE 2 DIABETES MELLITUS WITH PERIPHERAL NEUROPATHY (H): ICD-10-CM

## 2022-12-27 DIAGNOSIS — I48.20 CHRONIC ATRIAL FIBRILLATION (H): ICD-10-CM

## 2022-12-28 DIAGNOSIS — I25.10 CORONARY ARTERY DISEASE INVOLVING NATIVE CORONARY ARTERY OF NATIVE HEART WITHOUT ANGINA PECTORIS: ICD-10-CM

## 2022-12-28 RX ORDER — PRAMIPEXOLE DIHYDROCHLORIDE 0.25 MG/1
TABLET ORAL
Qty: 180 TABLET | Refills: 0 | Status: SHIPPED | OUTPATIENT
Start: 2022-12-28 | End: 2023-02-15

## 2022-12-28 RX ORDER — WARFARIN SODIUM 4 MG/1
TABLET ORAL
Qty: 30 TABLET | Refills: 0 | Status: SHIPPED | OUTPATIENT
Start: 2022-12-28 | End: 2023-01-19

## 2022-12-28 RX ORDER — ROSUVASTATIN CALCIUM 20 MG/1
20 TABLET, COATED ORAL AT BEDTIME
Qty: 100 TABLET | Refills: 1 | Status: SHIPPED | OUTPATIENT
Start: 2022-12-28 | End: 2023-08-28

## 2022-12-28 RX ORDER — BLOOD SUGAR DIAGNOSTIC
STRIP MISCELLANEOUS
Qty: 300 STRIP | Refills: 0 | Status: SHIPPED | OUTPATIENT
Start: 2022-12-28 | End: 2023-12-21

## 2022-12-28 NOTE — TELEPHONE ENCOUNTER
Patient requests a refill on rosuvastatin (CRESTOR) 20 MG. Also, his insurance plan allows for 100-day supply of medications. Patient is ok to get 100 days fill. I have already pended and adjusted the refill to 100-day supply in this encounter.  Please review and send as appropriate.      Thank you  Eunice Russ, PharmD  [facility] services & Rehab Admin

## 2022-12-28 NOTE — TELEPHONE ENCOUNTER
"Last Written Prescription Date:  9/21/22  Last Fill Quantity: 300,  # refills: 0   Last office visit provider:  8/12/22     Requested Prescriptions   Pending Prescriptions Disp Refills     blood glucose (ACCU-CHEK GUIDE) test strip [Pharmacy Med Name: Accu-Chek Guide In Vitro Strip]  0     Sig: USE 1 STRIP TO CHECK GLUCOSE THREE TIMES DAILY       Diabetic Supplies Protocol Passed - 12/28/2022 11:28 AM        Passed - Medication is active on med list        Passed - Patient is 18 years of age or older        Passed - Recent (6 mo) or future (30 days) visit within the authorizing provider's specialty     Patient had office visit in the last 6 months or has a visit in the next 30 days with authorizing provider.  See \"Patient Info\" tab in inbasket, or \"Choose Columns\" in Meds & Orders section of the refill encounter.                 Ihsan Bo RN 12/28/22 11:29 AM  "

## 2022-12-28 NOTE — TELEPHONE ENCOUNTER
"Routing refill request to provider for review/approval because:  INR within goal range.  Anticoagulation protocol - route to pool.    Last Written Prescription Date:  10/22/22  Last Fill Quantity: 30,  # refills: 1   Last office visit provider:  8/12/22     Requested Prescriptions   Pending Prescriptions Disp Refills     pramipexole (MIRAPEX) 0.25 MG tablet [Pharmacy Med Name: Pramipexole Dihydrochloride 0.25 MG Oral Tablet] 180 tablet 0     Sig: TAKE 1 TABLET BY MOUTH TWICE DAILY TAKES  AT  4PM  AND  5.30PM       Antiparkinson's Agents Protocol Passed - 12/28/2022 11:26 AM        Passed - Blood pressure under 140/90 in past 12 months     BP Readings from Last 3 Encounters:   12/16/22 116/52   12/01/22 (!) 141/82   10/10/22 110/80                 Passed - CBC on record in past 12 months     Recent Labs   Lab Test 07/18/22  0636   WBC 6.9   RBC 3.42*   HGB 10.8*   HCT 34.6*                    Passed - ALT on record in past 12 months         Recent Labs   Lab Test 05/24/22  1023   ALT 14             Passed - Serum Creatinine on file in past 12 months     Recent Labs   Lab Test 11/17/22  1315   CR 0.94       Ok to refill medication if creatinine is low          Passed - Medication is active on med list        Passed - Patient is age 18 or older        Passed - Recent (6 mo) or future (30 days) visit within the authorizing provider's specialty     Patient had office visit in the last 6 months or has a visit in the next 30 days with authorizing provider or within the authorizing provider's specialty.  See \"Patient Info\" tab in inbasket, or \"Choose Columns\" in Meds & Orders section of the refill encounter.               warfarin ANTICOAGULANT (COUMADIN) 4 MG tablet [Pharmacy Med Name: Warfarin Sodium 4 MG Oral Tablet] 30 tablet 0     Sig: TAKE 4MG BY MOUTH DAILY ON TUESDAY, WEDNSDAY, FRIDAY, SATURDAY, SUNDAY AND 5MG TABLET ON MONDAY AND THURSDAY OR AS DIRECTED BY [facility] CLINIC       Vitamin K Antagonists Failed - " "12/27/2022  9:04 AM        Failed - INR is within goal in the past 6 weeks     Confirm INR is within goal in the past 6 weeks.     Recent Labs   Lab Test 12/16/22  0927   INR 2.4*                       Passed - Recent (12 mo) or future (30 days) visit within the authorizing provider's specialty     Patient has had an office visit with the authorizing provider or a provider within the authorizing providers department within the previous 12 mos or has a future within next 30 days. See \"Patient Info\" tab in inbasket, or \"Choose Columns\" in Meds & Orders section of the refill encounter.              Passed - Medication is active on med list        Passed - Patient is 18 years of age or older             Ihsan Bo RN 12/28/22 11:27 AM  "

## 2022-12-28 NOTE — TELEPHONE ENCOUNTER
"Routing refill request to provider for review/approval because:  Early refill requested.    Last Written Prescription Date:  11/29/22  Last Fill Quantity: 180,  # refills: 0   Last office visit provider:  8/12/22     Requested Prescriptions   Pending Prescriptions Disp Refills     pramipexole (MIRAPEX) 0.25 MG tablet [Pharmacy Med Name: Pramipexole Dihydrochloride 0.25 MG Oral Tablet] 180 tablet 0     Sig: TAKE 1 TABLET BY MOUTH TWICE DAILY TAKES  AT  4PM  AND  5.30PM       Antiparkinson's Agents Protocol Passed - 12/28/2022 11:26 AM        Passed - Blood pressure under 140/90 in past 12 months     BP Readings from Last 3 Encounters:   12/16/22 116/52   12/01/22 (!) 141/82   10/10/22 110/80                 Passed - CBC on record in past 12 months     Recent Labs   Lab Test 07/18/22  0636   WBC 6.9   RBC 3.42*   HGB 10.8*   HCT 34.6*                    Passed - ALT on record in past 12 months         Recent Labs   Lab Test 05/24/22  1023   ALT 14             Passed - Serum Creatinine on file in past 12 months     Recent Labs   Lab Test 11/17/22  1315   CR 0.94       Ok to refill medication if creatinine is low          Passed - Medication is active on med list        Passed - Patient is age 18 or older        Passed - Recent (6 mo) or future (30 days) visit within the authorizing provider's specialty     Patient had office visit in the last 6 months or has a visit in the next 30 days with authorizing provider or within the authorizing provider's specialty.  See \"Patient Info\" tab in inbasket, or \"Choose Columns\" in Meds & Orders section of the refill encounter.               warfarin ANTICOAGULANT (COUMADIN) 4 MG tablet [Pharmacy Med Name: Warfarin Sodium 4 MG Oral Tablet] 30 tablet 0     Sig: TAKE 4MG BY MOUTH DAILY ON TUESDAY, WEDNSDAY, FRIDAY, SATURDAY, SUNDAY AND 5MG TABLET ON MONDAY AND THURSDAY OR AS DIRECTED BY [facility] CLINIC       Vitamin K Antagonists Failed - 12/27/2022  9:04 AM        Failed - INR is " "within goal in the past 6 weeks     Confirm INR is within goal in the past 6 weeks.     Recent Labs   Lab Test 12/16/22  0927   INR 2.4*                       Passed - Recent (12 mo) or future (30 days) visit within the authorizing provider's specialty     Patient has had an office visit with the authorizing provider or a provider within the authorizing providers department within the previous 12 mos or has a future within next 30 days. See \"Patient Info\" tab in inbasket, or \"Choose Columns\" in Meds & Orders section of the refill encounter.              Passed - Medication is active on med list        Passed - Patient is 18 years of age or older             Ihsan Bo RN 12/28/22 11:27 AM  "

## 2022-12-30 DIAGNOSIS — I48.20 CHRONIC ATRIAL FIBRILLATION (H): ICD-10-CM

## 2022-12-30 DIAGNOSIS — Z79.01 CHRONIC ANTICOAGULATION: ICD-10-CM

## 2022-12-30 RX ORDER — WARFARIN SODIUM 4 MG/1
TABLET ORAL
Qty: 30 TABLET | Refills: 0 | OUTPATIENT
Start: 2022-12-30

## 2023-01-12 ENCOUNTER — PATIENT OUTREACH (OUTPATIENT)
Dept: CARE COORDINATION | Facility: CLINIC | Age: 83
End: 2023-01-12

## 2023-01-12 NOTE — PROGRESS NOTES
Clinic Care Coordination Contact    Community Health Worker Follow Up    Care Gaps:     Health Maintenance Due   Topic Date Due     HF ACTION PLAN  06/09/2019     EYE EXAM  02/19/2022     MEDICARE ANNUAL WELLNESS VISIT  09/16/2022     LIPID  09/16/2022     COVID-19 Vaccine (5 - Booster for Pfizer series) 10/21/2022     PHQ-9  02/12/2023       Patient will schedule this appointment at next CHW follow up in 1 month    Care Plan:   Care Plan: Pain Clinic     Problem: Pain Clinic     Note:      I would like a Referral to the Pain Clinic in the next 30 to 60 days.  Date Goal set: 8/16/22  Barriers:   Strengths: motivated  Date to Achieve By: 30-60 days  Patient expressed understanding of goal: yes  Action steps to achieve this goal:  1. The CCC RN sent a telephone communication to Dr. Singh to ask for a Referral to the Pain Clinic.  If I do not hear from them in the next 2 weeks; I will notify my Community Healthcare Worker, [facility] RN and/or clinic. I am on a wait list with [facility] in [place].        Goal: I would like a Referral to the Pain Clinic     Start Date: 8/16/2022    This Visit's Progress: 70% Recent Progress: 70%    Note:     Barriers: wait list  Strengths: motivated  Patient expressed understanding of goal: yes  Action steps to achieve this goal:  1. The CCC RN sent a telephone communication to Dr. Singh to ask for a Referral to the Pain Clinic.  If I do not hear from them in the next 2 weeks; I will notify my Community Healthcare Worker, [facility] RN and/or clinic. I have completed my pain injections and had procedure on nerves. I am not having as much pain my lower back but still having pain in my upper back. My CHW gave me the phone number to schedule with  Pain Clinic. My CHW also helped me reschedule a med check and refill appointment with Dr. Singh for 2/14/23.                    Care Plan: My Chart     Problem: HP GENERAL PROBLEM     Goal: I have accomplished regaining access to  [facility]  Completed 12/12/2022    Start Date: 8/16/2022    This Visit's Progress: 100% Recent Progress: 10%    Note:     Personal Plan  If I have any trouble with [facility] in the future I will call the [facility] support team @ [phone].                        Care Plan: Appointments     Problem: HP GENERAL PROBLEM     Goal: I would like to attend the following appointments.     Start Date: 11/8/2022    This Visit's Progress: 50% Recent Progress: 30%    Note:     Barriers: numerous appointments  Strengths: motivated  Patient expressed understanding of goal: chart review  Action steps to achieve this goal:  1.  11/14 @ 2:30 with Endocrinology. Completed  2.  12/15 @ 12:35  with Cardiology. Completed  3.  12/20 @ 10:30 with Dr. Singh- My CHW assisted me in rescheduling this appointment for 2/14/23.  3.   3/21  @ 10:45  with Endocrinology. Continuous (MB)                    Care Plan: Annual Wellness Visit     Problem: HP GENERAL PROBLEM     Goal: I will complete my annual wellness visit.     This Visit's Progress: 0%    Note:     Barriers: numerous medical appointments  Strengths:     Patient expressed understanding of goal: per chart review  Action steps to achieve this goal:  1. I will schedule my annual wellness visit; [phone] ([phone]). I would like to schedule this appointment when my CHW follows up with me in 1 month.  2. I will attend my annual wellness visit. Continuous (MB)  3. I will contact my Care Management or clinic team if I have barriers to attending my annual wellness visit. Continuous (MB)                          Intervention and Education during outreach: CHW gave patient the phone number to schedule with [facility] @ [phone]. CHW also helped patient reschedule a med check and refill appointment with Dr. Singh for 2/14/23.      CHW Plan: CHW will follow up with patient in 1 month on 2/15/23 after PCP appointment.     Dora Mott  Community Health Worker  [facility]  [facility] Care Coordination   Yajaira Kruse [place], [facility]  Office: [phone]

## 2023-01-17 DIAGNOSIS — Z79.01 LONG TERM (CURRENT) USE OF ANTICOAGULANTS: Primary | ICD-10-CM

## 2023-01-17 DIAGNOSIS — I48.20 CHRONIC ATRIAL FIBRILLATION (H): ICD-10-CM

## 2023-01-17 DIAGNOSIS — Z79.01 CHRONIC ANTICOAGULATION: ICD-10-CM

## 2023-01-17 NOTE — TELEPHONE ENCOUNTER
Pending Prescriptions:                       Disp   Refills    warfarin ANTICOAGULANT (COUMADIN) 4 MG ta*30 tab*0            Sig: TAKE 4MG BY MOUTH DAILY ON TUESDAY, WEDNSDAY,           FRIDAY, SATURDAY, SUNDAY AND 5MG TABLET ON MONDAY           AND THURSDAY OR AS DIRECTED BY [facility]      Last refill 12/28/22 for #30.

## 2023-01-19 RX ORDER — WARFARIN SODIUM 4 MG/1
TABLET ORAL
Qty: 100 TABLET | Refills: 1 | Status: SHIPPED | OUTPATIENT
Start: 2023-01-19 | End: 2023-02-14

## 2023-01-19 NOTE — TELEPHONE ENCOUNTER
"Routing refill request to provider for review/approval because:  Labs out of range:  INR    Last Written Prescription Date:  12/28/2022  Last Fill Quantity: 30,  # refills: 0   Last office visit provider:  8/12/2022 with PCP Dr JAELYN Singh     Requested Prescriptions   Pending Prescriptions Disp Refills     warfarin ANTICOAGULANT (COUMADIN) 4 MG tablet 30 tablet 0     Sig: TAKE 4MG BY MOUTH DAILY ON TUESDAY, WEDNSDAY, FRIDAY, SATURDAY, SUNDAY AND 5MG TABLET ON MONDAY AND THURSDAY OR AS DIRECTED BY [facility] CLINIC       Vitamin K Antagonists Failed - 1/17/2023  1:30 PM        Failed - INR is within goal in the past 6 weeks     Confirm INR is within goal in the past 6 weeks.     Recent Labs   Lab Test 12/16/22  0927   INR 2.4*                       Passed - Recent (12 mo) or future (30 days) visit within the authorizing provider's specialty     Patient has had an office visit with the authorizing provider or a provider within the authorizing providers department within the previous 12 mos or has a future within next 30 days. See \"Patient Info\" tab in inbasket, or \"Choose Columns\" in Meds & Orders section of the refill encounter.              Passed - Medication is active on med list        Passed - Patient is 18 years of age or older             Megan Giron RN 01/18/23 7:33 PM  "

## 2023-01-20 ENCOUNTER — LAB (OUTPATIENT)
Dept: LAB | Facility: CLINIC | Age: 83
End: 2023-01-20
Payer: COMMERCIAL

## 2023-01-20 ENCOUNTER — ANTICOAGULATION THERAPY VISIT (OUTPATIENT)
Dept: ANTICOAGULATION | Facility: CLINIC | Age: 83
End: 2023-01-20

## 2023-01-20 DIAGNOSIS — I48.20 CHRONIC ATRIAL FIBRILLATION (H): Primary | ICD-10-CM

## 2023-01-20 DIAGNOSIS — I73.9 PERIPHERAL ARTERIAL DISEASE (H): ICD-10-CM

## 2023-01-20 DIAGNOSIS — Z79.01 CHRONIC ANTICOAGULATION: ICD-10-CM

## 2023-01-20 DIAGNOSIS — Z79.01 LONG TERM (CURRENT) USE OF ANTICOAGULANTS: ICD-10-CM

## 2023-01-20 DIAGNOSIS — I48.20 CHRONIC ATRIAL FIBRILLATION (H): ICD-10-CM

## 2023-01-20 LAB — INR BLD: 2 (ref 0.9–1.1)

## 2023-01-20 PROCEDURE — 85610 PROTHROMBIN TIME: CPT

## 2023-01-20 PROCEDURE — 36416 COLLJ CAPILLARY BLOOD SPEC: CPT

## 2023-01-20 NOTE — PROGRESS NOTES
ANTICOAGULATION MANAGEMENT     Pee Ayala 82 year old male is on warfarin with therapeutic INR result. (Goal INR 2.0-3.0)    Recent labs: (last 7 days)     01/20/23  1024   INR 2.0*       ASSESSMENT       Source(s): Chart Review, Patient/Caregiver Call and Template       Warfarin doses taken: Warfarin taken differently, but did not change total weekly dose    Diet: No new diet changes identified    New illness, injury, or hospitalization: No    Medication/supplement changes: Yes.   Carvedilol 3.125mg 1/2 tablet 2x/day dose decreased on 1/19/23 No interaction anticipated    Signs or symptoms of bleeding or clotting: No    Previous INR: Therapeutic last 4 visits    Additional findings: None       PLAN     Recommended plan for ongoing change(s) affecting INR     Dosing Instructions: Continue your current warfarin dose with next INR in 4 weeks       Summary  As of 1/20/2023    Full warfarin instructions:  5 mg every Mon, Thu; 4 mg all other days   Next INR check:  2/17/2023             Telephone call with  Pee ([phone]) who verbalizes understanding and agrees to plan    Check at provider office visit    Education provided:     Taking warfarin: Importance of taking warfarin as instructed    Goal range and lab monitoring: goal range and significance of current result    Dietary considerations: importance of consistent vitamin K intake    Interaction IS NOT anticipated between warfarin and Carvedilol    Plan made per [facility] anticoagulation protocol    Mariel Dale RN  Anticoagulation Clinic  1/20/2023    _______________________________________________________________________     Anticoagulation Episode Summary     Current INR goal:  2.0-3.0   TTR:  60.3 % (11.7 mo)   Target end date:  Indefinite   Send INR reminders to:  [facility]    Indications    Chronic atrial fibrillation (H) [I48.20]  Chronic anticoagulation [Z79.01]  Long term (current) use of anticoagulants [Z79.01]  Peripheral  arterial disease (H) [I73.9]           Comments:           Anticoagulation Care Providers     Provider Role Specialty Phone number    Jazzy Gil MD Referring Family Medicine [phone]    Ihsan Singh MD Referring Family Medicine [phone]

## 2023-01-30 ENCOUNTER — TELEPHONE (OUTPATIENT)
Dept: VASCULAR SURGERY | Facility: CLINIC | Age: 83
End: 2023-01-30
Payer: COMMERCIAL

## 2023-01-30 NOTE — TELEPHONE ENCOUNTER
[facility] Call Center    Phone Message:      Pt called, stating he was given a list of places for foot care, when he last saw DWAYNE Aaron.      Pt has misplaced the list, but now needs the list.  Pt is requesting for the list to be sent to his email (ON FILE).     Please CALL the pt with any questions. Thank you.    May a detailed message be left on voicemail: Yes     Reason for Call: Other: Foot Care List     Travel Screening: Not Applicable

## 2023-01-31 ENCOUNTER — TRANSFERRED RECORDS (OUTPATIENT)
Dept: HEALTH INFORMATION MANAGEMENT | Facility: CLINIC | Age: 83
End: 2023-01-31

## 2023-02-01 ENCOUNTER — PATIENT OUTREACH (OUTPATIENT)
Dept: CARE COORDINATION | Facility: CLINIC | Age: 83
End: 2023-02-01
Payer: COMMERCIAL

## 2023-02-01 NOTE — PROGRESS NOTES
Clinic Care Coordination Contact  Care Coordination Clinician Chart Review    Situation: Patient chart reviewed by Care Coordinator.       Background: Care Coordination Program started: 8/16/2022. Initial assessment completed and patient-centered care plan(s) were developed with participation from patient. Lead CC handed patient off to CHW for continued outreaches.       Assessment: Per chart review, patient outreach completed by CC CHW on 1/12/23.  Patient is actively working to accomplish goal(s). Patient's goal(s) appropriate and relevant at this time. Patient is not due for updated Plan of Care.  Assessments will be completed annually or as needed/with change of patient status.      Care Plan: Pain Clinic     Problem: Pain Clinic     Note:      I would like a Referral to the Pain Clinic in the next 30 to 60 days.  Date Goal set: 8/16/22  Barriers:   Strengths: motivated  Date to Achieve By: 30-60 days  Patient expressed understanding of goal: yes  Action steps to achieve this goal:  1. The CCC RN sent a telephone communication to Dr. Singh to ask for a Referral to the Pain Clinic.  If I do not hear from them in the next 2 weeks; I will notify my Community Healthcare Worker, [facility] RN and/or clinic. I am on a wait list with [facility] in [place].        Goal: I would like a Referral to the Pain Clinic     Start Date: 8/16/2022    This Visit's Progress: 70% Recent Progress: 70%    Note:     Barriers: wait list  Strengths: motivated  Patient expressed understanding of goal: yes  Action steps to achieve this goal:  1. The CCC RN sent a telephone communication to Dr. Singh to ask for a Referral to the Pain Clinic.  If I do not hear from them in the next 2 weeks; I will notify my Community Healthcare Worker, [facility] RN and/or clinic. I have completed my pain injections and had procedure on nerves. I am not having as much pain my lower back but still having pain in my upper back. My CHW gave me the phone  number to schedule with [facility]. My CHW also helped me reschedule a med check and refill appointment with Dr. Singh for 2/14/23.                    Care Plan: My Chart     Problem: HP GENERAL PROBLEM             Care Plan: Appointments     Problem: HP GENERAL PROBLEM     Goal: I would like to attend the following appointments.     Start Date: 11/8/2022    Recent Progress: 50%    Note:     Barriers: numerous appointments  Strengths: motivated  Patient expressed understanding of goal: chart review  Action steps to achieve this goal:  1.   2/14 @ 9:10 Dr. Singh-   2.   3/21  @ 10:45  with Endocrinology. Continuous (MB)                      Care Plan: Annual Wellness Visit     Problem: HP GENERAL PROBLEM     Goal: I will complete my annual wellness visit.     This Visit's Progress: 0%    Note:     Barriers: numerous medical appointments  Strengths:     Patient expressed understanding of goal: per chart review  Action steps to achieve this goal:  1. I will schedule my annual wellness visit; [phone] ([phone]). I would like to schedule this appointment when my CHW follows up with me in 1 month.  2. I will attend my annual wellness visit. Continuous (MB)  3. I will contact my Care Management or clinic team if I have barriers to attending my annual wellness visit. Continuous (MB)                             Plan/Recommendations: The patient will continue working with Care Coordination to achieve goal(s) as above. CHW will continue outreaches at minimum every 30 days and will involve Lead CC as needed or if patient is ready to move to Maintenance. Lead CC will continue to monitor CHW outreaches and patient's progress to goal(s) every 6 weeks.     Plan of Care updated and sent to patient: Blanca JUÁREZ 12/20/22

## 2023-02-13 DIAGNOSIS — R52 PAIN: ICD-10-CM

## 2023-02-14 ENCOUNTER — ANTICOAGULATION THERAPY VISIT (OUTPATIENT)
Dept: ANTICOAGULATION | Facility: CLINIC | Age: 83
End: 2023-02-14

## 2023-02-14 ENCOUNTER — OFFICE VISIT (OUTPATIENT)
Dept: FAMILY MEDICINE | Facility: CLINIC | Age: 83
End: 2023-02-14
Payer: COMMERCIAL

## 2023-02-14 VITALS
WEIGHT: 171.8 LBS | HEART RATE: 63 BPM | OXYGEN SATURATION: 97 % | TEMPERATURE: 96.7 F | SYSTOLIC BLOOD PRESSURE: 155 MMHG | RESPIRATION RATE: 20 BRPM | DIASTOLIC BLOOD PRESSURE: 65 MMHG | BODY MASS INDEX: 37.18 KG/M2

## 2023-02-14 DIAGNOSIS — I73.9 PERIPHERAL ARTERIAL DISEASE (H): ICD-10-CM

## 2023-02-14 DIAGNOSIS — Z79.01 LONG TERM (CURRENT) USE OF ANTICOAGULANTS: ICD-10-CM

## 2023-02-14 DIAGNOSIS — I50.32 CHRONIC HEART FAILURE WITH PRESERVED EJECTION FRACTION (HFPEF) (H): ICD-10-CM

## 2023-02-14 DIAGNOSIS — I48.20 CHRONIC ATRIAL FIBRILLATION (H): ICD-10-CM

## 2023-02-14 DIAGNOSIS — I25.10 CORONARY ARTERY DISEASE INVOLVING NATIVE CORONARY ARTERY OF NATIVE HEART WITHOUT ANGINA PECTORIS: ICD-10-CM

## 2023-02-14 DIAGNOSIS — G89.29 CHRONIC THORACIC BACK PAIN, UNSPECIFIED BACK PAIN LATERALITY: Primary | ICD-10-CM

## 2023-02-14 DIAGNOSIS — M54.6 CHRONIC THORACIC BACK PAIN, UNSPECIFIED BACK PAIN LATERALITY: Primary | ICD-10-CM

## 2023-02-14 DIAGNOSIS — Z79.01 CHRONIC ANTICOAGULATION: ICD-10-CM

## 2023-02-14 DIAGNOSIS — R52 PAIN: ICD-10-CM

## 2023-02-14 DIAGNOSIS — I48.20 CHRONIC ATRIAL FIBRILLATION (H): Primary | ICD-10-CM

## 2023-02-14 LAB — INR BLD: 1.7 (ref 0.9–1.1)

## 2023-02-14 PROCEDURE — 99214 OFFICE O/P EST MOD 30 MIN: CPT | Performed by: FAMILY MEDICINE

## 2023-02-14 PROCEDURE — 36416 COLLJ CAPILLARY BLOOD SPEC: CPT | Performed by: FAMILY MEDICINE

## 2023-02-14 PROCEDURE — 85610 PROTHROMBIN TIME: CPT | Performed by: FAMILY MEDICINE

## 2023-02-14 RX ORDER — OXYCODONE AND ACETAMINOPHEN 5; 325 MG/1; MG/1
1 TABLET ORAL
Qty: 30 TABLET | Refills: 0 | Status: SHIPPED | OUTPATIENT
Start: 2023-02-14 | End: 2023-03-16

## 2023-02-14 RX ORDER — WARFARIN SODIUM 5 MG/1
TABLET ORAL
COMMUNITY
Start: 2023-01-17 | End: 2023-02-14

## 2023-02-14 RX ORDER — CARVEDILOL 3.12 MG/1
3.12 TABLET ORAL 2 TIMES DAILY WITH MEALS
Qty: 180 TABLET | Refills: 3
Start: 2023-02-14 | End: 2023-04-06

## 2023-02-14 RX ORDER — WARFARIN SODIUM 4 MG/1
TABLET ORAL
Qty: 100 TABLET | Refills: 1 | Status: SHIPPED | OUTPATIENT
Start: 2023-02-14 | End: 2023-04-20 | Stop reason: DRUGHIGH

## 2023-02-14 RX ORDER — WARFARIN SODIUM 4 MG/1
TABLET ORAL
Qty: 100 TABLET | Refills: 1 | Status: CANCELLED | OUTPATIENT
Start: 2023-02-14

## 2023-02-14 RX ORDER — ACETAMINOPHEN 500 MG
500 TABLET ORAL 2 TIMES DAILY PRN
Start: 2023-02-14

## 2023-02-14 ASSESSMENT — PATIENT HEALTH QUESTIONNAIRE - PHQ9
SUM OF ALL RESPONSES TO PHQ QUESTIONS 1-9: 4
SUM OF ALL RESPONSES TO PHQ QUESTIONS 1-9: 4
10. IF YOU CHECKED OFF ANY PROBLEMS, HOW DIFFICULT HAVE THESE PROBLEMS MADE IT FOR YOU TO DO YOUR WORK, TAKE CARE OF THINGS AT HOME, OR GET ALONG WITH OTHER PEOPLE: NOT DIFFICULT AT ALL

## 2023-02-14 NOTE — PROGRESS NOTES
Alex Glasgow is a 82 year old, presenting for the following health issues:  Recheck Medication and Insomnia      History of Present Illness       Back Pain:  He presents for follow up of back pain. Patient's back pain is a recurring problem.  Location of back pain:  Left upper back and left hip  Description of back pain: dull ache  Back pain spreads: nowhere    Since patient first noticed back pain, pain is: always present, but gets better and worse  Does back pain interfere with his job:  Not applicable      Diabetes:   He presents for follow up of diabetes.  He is checking home blood glucose three times daily. He checks blood glucose after meals and at bedtime.  Blood glucose is sometimes over 200 and sometimes under 70. He is aware of hypoglycemia symptoms including other. He has no concerns regarding his diabetes at this time.  He is having numbness in feet and burning in feet. The patient has had a diabetic eye exam in the last 12 months. Eye exam performed on don f know. Location of last eye exam [facility] [place].        He eats 0-1 servings of fruits and vegetables daily.He exercises with enough effort to increase his heart rate 9 or less minutes per day.  He exercises with enough effort to increase his heart rate 3 or less days per week. He is missing 1 dose(s) of medications per week.    Today's PHQ-9         PHQ-9 Total Score: 4    PHQ-9 Q9 Thoughts of better off dead/self-harm past 2 weeks :   Not at all    How difficult have these problems made it for you to do your work, take care of things at home, or get along with other people: Not difficult at all     Sleeps poorly at night limited by pain in the back.  Previous RFA in the low back.  Laying flat on back,  Pain then for 2-3 min,  Then about 1:30-2am the pain is shari enough that he has to get up.         Objective    BP (!) 155/65   Pulse 63   Temp (!) 96.7  F (35.9  C) (Oral)   Resp 20   Wt 77.9 kg (171 lb 12.8 oz)   SpO2 97%    BMI 37.18 kg/m    Body mass index is 37.18 kg/m .  Physical Exam   GENERAL: healthy, alert and no distress  RESP: lungs clear to auscultation - no rales, rhonchi or wheezes  CV: regular rate and rhythm, normal S1 S2, no S3 or S4, no murmur, click or rub, no peripheral edema and peripheral pulses strong  MS: no gross musculoskeletal defects noted, no edema          Encounter Diagnoses   Name Primary?     Chronic thoracic back pain, unspecified back pain laterality Yes     Pain      Chronic atrial fibrillation (H), ON CHRONIC ANTICOAGULATION      Long term (current) use of anticoagulants       PLAN:        UDS and CSA for oxy/APAP 5/325 one tablet at hs prn back pain disturbed sleep.  I explained risks of using an opioid.  Pt is willing to take that risk and his wife will keep the oxy in her possession.    Warfarin refilled    Disabiility Parking form completed    30 min spent with pt in reviewing backpain and in completing form as well as discussing use of a controlled substance.

## 2023-02-14 NOTE — LETTER
Opioid / Opioid Plus Controlled Substance Agreement    This is an agreement between you and your provider about the safe and appropriate use of controlled substance/opioids prescribed by your care team. Controlled substances are medicines that can cause physical and mental dependence (abuse).    There are strict laws about having and using these medicines. We here at [facility] are committing to working with you in your efforts to get better. To support you in this work, we ll help you schedule regular office appointments for medicine refills. If we must cancel or change your appointment for any reason, we ll make sure you have enough medicine to last until your next appointment.     As a Provider, I will:    Listen carefully to your concerns and treat you with respect.     Recommend a treatment plan that I believe is in your best interest. This plan may involve therapies other than opioid pain medication.     Talk with you often about the possible benefits, and the risk of harm of any medicine that we prescribe for you.     Provide a plan on how to taper (discontinue or go off) using this medicine if the decision is made to stop its use.    As a Patient, I understand that opioid(s):     Are a controlled substance prescribed by my care team to help me function or work and manage my condition(s).     Are strong medicines and can cause serious side effects such as:    Drowsiness, which can seriously affect my driving ability    A lower breathing rate, enough to cause death    Harm to my thinking ability     Depression     Abuse of and addiction to this medicine    Need to be taken exactly as prescribed. Combining opioids with certain medicines or chemicals (such as illegal drugs, sedatives, sleeping pills, and benzodiazepines) can be dangerous or even fatal. If I stop opioids suddenly, I may have severe withdrawal symptoms.    Do not work for all types of pain nor for all patients. If they re not helpful, I may  be asked to stop them.        The risks, benefits and side effects of these medicine(s) were explained to me. I agree that:  1. I will take part in other treatments as advised by my care team. This may be psychiatry or counseling, physical therapy, behavioral therapy, group treatment or a referral to a specialist.     2. I will keep all my appointments. I understand that this is part of the monitoring of opioids. My care team may require an office visit for EVERY opioid/controlled substance refill. If I miss appointments or don t follow instructions, my care team may stop my medicine.    3. I will take my medicines as prescribed. I will not change the dose or schedule unless my care team tells me to. There will be no refills if I run out early.     4. I may be asked to come to the clinic and complete a urine drug test or complete a pill count at any time. If I don t give a urine sample or participate in a pill count, the care team may stop my medicine.    5. I will only receive prescriptions from this clinic for chronic pain. If I am treated by another provider for acute pain issues, I will tell them that I am taking opioid pain medication for chronic pain and that I have a treatment agreement with this provider. I will inform my [facility] care team within one business day if I am given a prescription for any pain medication by another healthcare provider. My [facility] care team can contact other providers and pharmacists about my use of any medicines.    6. It is up to me to make sure that I don t run out of my medicines on weekends or holidays. If my care team is willing to refill my opioid prescription without a visit, I must request refills only during office hours. Refills may take up to 3 business days to process. I will use one pharmacy to fill all my opioid and other controlled substance prescriptions. I will notify the clinic about any changes to my insurance or medication  availability.    7. I am responsible for my prescriptions. If the medicine/prescription is lost, stolen or destroyed, it will not be replaced. I also agree not to share controlled substance medicines with anyone.    8. I am aware I should not use any illegal or recreational drugs. I agree not to drink alcohol unless my care team says I can.       9. If I enroll in the [place] [facility] Cannabis program, I will tell my care team prior to my next refill.     10. I will tell my care team right away if I become pregnant, have a new medical problem treated outside of my regular clinic, or have a change in my medications.    11. I understand that this medicine can affect my thinking, judgment and reaction time. Alcohol and drugs affect the brain and body, which can affect the safety of my driving. Being under the influence of alcohol or drugs can affect my decision-making, behaviors, personal safety, and the safety of others. Driving while impaired (DWI) can occur if a person is driving, operating, or in physical control of a car, motorcycle, boat, snowmobile, ATV, motorbike, off-road vehicle, or any other motor vehicle ([place] Statute 169A.20). I understand the risk if I choose to drive or operate any vehicle or machinery.    I understand that if I do not follow any of the conditions above, my prescriptions or treatment may be stopped or changed.          Opioids  What You Need to Know    What are opioids?   Opioids are pain medicines that must be prescribed by a doctor. They are also known as narcotics.     Examples are:   1. morphine (MS Contin, Pilar)  2. oxycodone (Oxycontin)  3. oxycodone and acetaminophen (Percocet)  4. hydrocodone and acetaminophen (Vicodin, Norco)   5. fentanyl patch (Duragesic)   6. hydromorphone (Dilaudid)   7. methadone  8. codeine (Tylenol #3)     What do opioids do well?   Opioids are best for severe short-term pain such as after a surgery or injury. They may work well for cancer pain. They may  help some people with long-lasting (chronic) pain.     What do opioids NOT do well?   Opioids never get rid of pain entirely, and they don t work well for most patients with chronic pain. Opioids don t reduce swelling, one of the causes of pain.                                    Other ways to manage chronic pain and improve function include:       Treat the health problem that may be causing pain    Anti-inflammation medicines, which reduce swelling and tenderness, such as ibuprofen (Advil, Motrin) or naproxen (Aleve)    Acetaminophen (Tylenol)    Antidepressants and anti-seizure medicines, especially for nerve pain    Topical treatments such as patches or creams    Injections or nerve blocks    Chiropractic or osteopathic treatment    Acupuncture, massage, deep breathing, meditation, visual imagery, aromatherapy    Use heat or ice at the pain site    Physical therapy     Exercise    Stop smoking    Take part in therapy       Risks and side effects     Talk to your doctor before you start or decide to keep taking opioids. Possible side effects include:      Lowering your breathing rate enough to cause death    Overdose, including death, especially if taking higher than prescribed doses    Worse depression symptoms; less pleasure in things you usually enjoy    Feeling tired or sluggish    Slower thoughts or cloudy thinking    Being more sensitive to pain over time; pain is harder to control    Trouble sleeping or restless sleep    Changes in hormone levels (for example, less testosterone)    Changes in sex drive or ability to have sex    Constipation    Unsafe driving    Itching and sweating    Dizziness    Nausea, throwing up and dry mouth    What else should I know about opioids?    Opioids may lead to dependence, tolerance, or addiction.      Dependence means that if you stop or reduce the medicine too quickly, you will have withdrawal symptoms. These include loose poop (diarrhea), jitters, flu-like symptoms,  nervousness and tremors. Dependence is not the same as addiction.                       Tolerance means needing higher doses over time to get the same effect. This may increase the chance of serious side effects.      Addiction is when people improperly use a substance that harms their body, their mind or their relations with others. Use of opiates can cause a relapse of addiction if you have a history of drug or alcohol abuse.      People who have used opioids for a long time may have a lower quality of life, worse depression, higher levels of pain and more visits to doctors.    You can overdose on opioids. Take these steps to lower your risk of overdose:    1. Recognize the signs:  Signs of overdose include decrease or loss of consciousness (blackout), slowed breathing, trouble waking up and blue lips. If someone is worried about overdose, they should call 911.    2. Talk to your doctor about Narcan (naloxone).   If you are at risk for overdose, you may be given a prescription for Narcan. This medicine very quickly reverses the effects of opioids.   If you overdose, a friend or family member can give you Narcan while waiting for the ambulance. They need to know the signs of overdose and how to give Narcan.     3. Don't use alcohol or street drugs.   Taking them with opioids can cause death.    4. Do not take any of these medicines unless your doctor says it s OK. Taking these with opioids can cause death:    Benzodiazepines, such as lorazepam (Ativan), alprazolam (Xanax) or diazepam (Valium)    Muscle relaxers, such as cyclobenzaprine (Flexeril)    Sleeping pills like zolpidem (Ambien)     Other opioids      How to keep you and other people safe while taking opioids:    1. Never share your opioids with others.  Opioid medicines are regulated by the Drug Enforcement Agency ([employer]). Selling or sharing medications is a criminal act.    2. Be sure to store opioids in a secure place, locked up if possible. Young children  can easily swallow them and overdose.    3. When you are traveling with your medicines, keep them in the original bottles. If you use a pill box, be sure you also carry a copy of your medicine list from your clinic or pharmacy.    4. Safe disposal of opioids    Most pharmacies have places to get rid of medicine, called disposal kiosks. Medicine disposal options are also available in every [facility]. Search your county and  medication disposal  to find more options. You can find more details at:  [email or web address]     I agree that my provider, clinic care team, and pharmacy may work with any city, state or federal law enforcement agency that investigates the misuse, sale, or other diversion of my controlled medicine. I will allow my provider to discuss my care with, or share a copy of, this agreement with any other treating provider, pharmacy or emergency room where I receive care.    I have read this agreement and have asked questions about anything I did not understand.    _______________________________________________________  Patient Signature - Pee Ayala _____________________                   Date     _______________________________________________________  Provider Signature - Ihsan Singh MD   _____________________                   Date     _______________________________________________________  Witness Signature (required if provider not present while patient signing)   _____________________                   Date

## 2023-02-14 NOTE — PATIENT INSTRUCTIONS
UDS and CSA for oxy/APAP 5/325 one tablet at hs prn back pain disturbed sleep    Warfarin refilled    Disabiility Parking form completed

## 2023-02-14 NOTE — PROGRESS NOTES
ANTICOAGULATION MANAGEMENT     Pee Ayala 82 year old male is on warfarin with subtherapeutic INR result. (Goal INR 2.0-3.0)    Recent labs: (last 7 days)     02/14/23  1031   INR 1.7*       ASSESSMENT       Source(s): Chart Review and Patient/Caregiver Call       Warfarin doses taken: Warfarin taken as instructed    Has not missed any doses and uses a pill box.    Diet: Increased greens/vitamin K in diet; plans to resume previous intake    Reported wife, has been making salads all week, more than his usual.  Plans to resume his usual.  (usually salad intake is 2-3x/wk, but in the last one wk has been eating salads daily).    New illness, injury, or hospitalization: No   OV today with Dr. Singh - for chronic back pain and insomnia    Medication/supplement changes:  Yes.    Reported Oxyocodone-Acet refilled and he takes it at bedtime.   Also reported he takes ES-Tylenol 2 tabs daily.    Signs or symptoms of bleeding or clotting: No    Previous INR: Therapeutic last 5 visits    Additional findings: Leaving for [place] for 3 wks, from 2/21 - 3/7/22       PLAN     Recommended plan for temporary change(s) affecting INR     Dosing Instructions: booster dose then continue your current warfarin dose with next INR in 2 weeks       Summary  As of 2/14/2023    Full warfarin instructions:  2/14: 7 mg; Otherwise 5 mg every Mon, Thu; 4 mg all other days   Next INR check:  2/28/2023             Telephone call with  Pee ([phone]) who verbalizes understanding and agrees to plan    - OK to eat his salads, but if eating daily, to eat smaller servings, and stay consistent with intake.    Lab visit scheduled - INR on 3/8/23 @ [facility]    Education provided:     Taking warfarin: Importance of taking warfarin as instructed    Goal range and lab monitoring: goal range and significance of current result    Dietary considerations: importance of consistent vitamin K intake and impact of vitamin K foods on INR    Symptom  monitoring: travel related clotting risk and prevention    Plan made per [facility] anticoagulation protocol    Mariel Dale, RN  Anticoagulation Clinic  2/14/2023    _______________________________________________________________________     Anticoagulation Episode Summary     Current INR goal:  2.0-3.0   TTR:  59.5 % (11.7 mo)   Target end date:  Indefinite   Send INR reminders to:  [facility]    Indications    Chronic atrial fibrillation (H) [I48.20]  Chronic anticoagulation [Z79.01]  Long term (current) use of anticoagulants [Z79.01]  Peripheral arterial disease (H) [I73.9]           Comments:           Anticoagulation Care Providers     Provider Role Specialty Phone number    Jazzy Gil MD Referring Family Medicine [phone]    Ihsan Singh MD Referring Family Medicine [phone]

## 2023-02-15 RX ORDER — PRAMIPEXOLE DIHYDROCHLORIDE 0.25 MG/1
TABLET ORAL
Qty: 180 TABLET | Refills: 1 | Status: SHIPPED | OUTPATIENT
Start: 2023-02-15 | End: 2023-04-06

## 2023-02-15 NOTE — TELEPHONE ENCOUNTER
"Routing refill request to provider for review/approval because:  BP not in range.    Last Written Prescription Date:  12/28/22  Last Fill Quantity: 180,  # refills: 0   Last office visit provider:  2/14/23     Requested Prescriptions   Pending Prescriptions Disp Refills     pramipexole (MIRAPEX) 0.25 MG tablet [Pharmacy Med Name: Pramipexole Dihydrochloride 0.25 MG Oral Tablet] 180 tablet 0     Sig: TAKE 1 TABLET BY MOUTH TWICE DAILY TAKES  AT  4PM  AND  5.30PM       Antiparkinson's Agents Protocol Failed - 2/15/2023  8:44 AM        Failed - Blood pressure under 140/90 in past 12 months     BP Readings from Last 3 Encounters:   02/14/23 (!) 155/65   12/16/22 116/52   12/01/22 (!) 141/82                 Failed - Recent (6 mo) or future (30 days) visit within the authorizing provider's specialty     Patient had office visit in the last 6 months or has a visit in the next 30 days with authorizing provider or within the authorizing provider's specialty.  See \"Patient Info\" tab in inbasket, or \"Choose Columns\" in Meds & Orders section of the refill encounter.            Passed - CBC on record in past 12 months     Recent Labs   Lab Test 07/18/22  0636   WBC 6.9   RBC 3.42*   HGB 10.8*   HCT 34.6*                    Passed - ALT on record in past 12 months         Recent Labs   Lab Test 05/24/22  1023   ALT 14             Passed - Serum Creatinine on file in past 12 months     Recent Labs   Lab Test 11/17/22  1315   CR 0.94       Ok to refill medication if creatinine is low          Passed - Medication is active on med list        Passed - Patient is age 18 or older             Ihsan Bo RN 02/15/23 8:45 AM  "

## 2023-02-16 ENCOUNTER — PATIENT OUTREACH (OUTPATIENT)
Dept: CARE COORDINATION | Facility: CLINIC | Age: 83
End: 2023-02-16
Payer: COMMERCIAL

## 2023-02-16 NOTE — PROGRESS NOTES
Clinic Care Coordination Contact    Community Health Worker Follow Up    Care Gaps:     Health Maintenance Due   Topic Date Due     HF ACTION PLAN  06/09/2019     EYE EXAM  02/19/2022     MEDICARE ANNUAL WELLNESS VISIT  09/16/2022     LIPID  09/16/2022     COVID-19 Vaccine (5 - Booster for Pfizer series) 10/21/2022     BMP  02/15/2023       Scheduled 4/6/23 @ 3:10      Care Plan:   Care Plan: Pain Clinic     Problem: Pain Clinic     Note:      I would like a Referral to the Pain Clinic in the next 30 to 60 days.  Date Goal set: 8/16/22  Barriers:   Strengths: motivated  Date to Achieve By: 30-60 days  Patient expressed understanding of goal: yes  Action steps to achieve this goal:  1. The CCC RN sent a telephone communication to Dr. Singh to ask for a Referral to the Pain Clinic.  If I do not hear from them in the next 2 weeks; I will notify my Community Healthcare Worker, CCC RN and/or clinic. I am on a wait list with [facility] in [place].        Goal: I would like a Referral to the Pain Clinic     Start Date: 8/16/2022    This Visit's Progress: 70% Recent Progress: 70%    Note:     Barriers: wait list  Strengths: motivated  Patient expressed understanding of goal: yes  Action steps to achieve this goal:  1. The CCC RN sent a telephone communication to Dr. Singh to ask for a Referral to the Pain Clinic.  If I do not hear from them in the next 2 weeks; I will notify my Community Healthcare Worker, CCC RN and/or clinic. I have completed my pain injections and had procedure on nerves. I am not having as much pain my lower back but still having pain in my upper back. I am currently on the wait list with the pain clinic.                    Care Plan: My Chart     Problem: HP GENERAL PROBLEM     Goal: I have accomplished regaining access to Mychart  Completed 12/12/2022    Start Date: 8/16/2022    This Visit's Progress: 100% Recent Progress: 10%    Note:     Personal Plan  If I have any trouble with  Silas in the future I will call the [facility] support team @ [phone].                        Care Plan: Appointments     Problem: HP GENERAL PROBLEM     Goal: I would like to attend the following appointments.     Start Date: 11/8/2022    This Visit's Progress: 50% Recent Progress: 50%    Note:     Barriers: numerous appointments  Strengths: motivated  Patient expressed understanding of goal: chart review  Action steps to achieve this goal:  1. 2/14 @ 9:10 Dr. Singh- Completed  2.  3/21  @ 10:45  with Endocrinology. Continuous (MB)  3.  4/6/23 @ 3:10 AWV with Dr. Singh  4. 5/16 @ 10:30 Lab appt  5. 5/30 @ 1:15 Endocrinology  6. 8/22 @11:30 Endocrinology follow up                      Care Plan: Annual Wellness Visit     Problem: HP GENERAL PROBLEM     Goal: I will complete my annual wellness visit.     This Visit's Progress: 50% Recent Progress: 0%    Note:     Barriers: numerous medical appointments  Strengths:     Patient expressed understanding of goal: per chart review  Action steps to achieve this goal:  1. I will schedule my annual wellness visit; [phone] ([phone]). My CHW assisted me in scheduling my AWV on 4/6/23 @ 3:10.  2. I will attend my annual wellness visit. Continuous (MB)  3. I will contact my Care Management or clinic team if I have barriers to attending my annual wellness visit. Continuous (MB)                          Intervention and Education during outreach: CHW assisted patient in scheduling Medicare Annual Wellness visit with Dr. Singh on 4/6/23 @ 3:10.    CHW Plan: CHW will follow up with patient in 1 month on 3/14/23.    Dora Mott  Community Health Worker  [facility] Care Coordination   [place] [facility]  Office: [phone]

## 2023-03-08 DIAGNOSIS — I73.9 PAD (PERIPHERAL ARTERY DISEASE) (H): ICD-10-CM

## 2023-03-08 DIAGNOSIS — I50.32 CHRONIC HEART FAILURE WITH PRESERVED EJECTION FRACTION (H): ICD-10-CM

## 2023-03-08 RX ORDER — CILOSTAZOL 50 MG/1
TABLET ORAL
Qty: 162 TABLET | Refills: 5 | Status: SHIPPED | OUTPATIENT
Start: 2023-03-08 | End: 2023-03-29

## 2023-03-09 ENCOUNTER — ANTICOAGULATION THERAPY VISIT (OUTPATIENT)
Dept: ANTICOAGULATION | Facility: CLINIC | Age: 83
End: 2023-03-09

## 2023-03-09 ENCOUNTER — TRANSFERRED RECORDS (OUTPATIENT)
Dept: HEALTH INFORMATION MANAGEMENT | Facility: CLINIC | Age: 83
End: 2023-03-09

## 2023-03-09 ENCOUNTER — LAB (OUTPATIENT)
Dept: LAB | Facility: CLINIC | Age: 83
End: 2023-03-09
Payer: COMMERCIAL

## 2023-03-09 DIAGNOSIS — Z79.01 LONG TERM (CURRENT) USE OF ANTICOAGULANTS: ICD-10-CM

## 2023-03-09 DIAGNOSIS — I73.9 PERIPHERAL ARTERIAL DISEASE (H): ICD-10-CM

## 2023-03-09 DIAGNOSIS — Z79.01 CHRONIC ANTICOAGULATION: ICD-10-CM

## 2023-03-09 DIAGNOSIS — I48.20 CHRONIC ATRIAL FIBRILLATION (H): ICD-10-CM

## 2023-03-09 DIAGNOSIS — I48.20 CHRONIC ATRIAL FIBRILLATION (H): Primary | ICD-10-CM

## 2023-03-09 LAB — INR BLD: 1.3 (ref 0.9–1.1)

## 2023-03-09 PROCEDURE — 85610 PROTHROMBIN TIME: CPT

## 2023-03-09 PROCEDURE — 36415 COLL VENOUS BLD VENIPUNCTURE: CPT

## 2023-03-09 NOTE — PROGRESS NOTES
ANTICOAGULATION MANAGEMENT     Pee Ayala 82 year old male is on warfarin with subtherapeutic INR result. (Goal INR 2.0-3.0)    Recent labs: (last 7 days)     03/09/23  1031   INR 1.3*       ASSESSMENT     Warfarin Lab Questionnaire    Dose in Tablet or Mg 3/9/2023   TAB or MG? - 4mg / 5mg warfarin tabs (evenings). milligram (mg)     Pt Rptd Dose JAY MONDAY TUESDAY WEDNESDAY THURSDAY FRIDAY SATURDAY   3/9/2023 4 4 5 4 4 4 5     Warfarin Lab Questionnaire 3/9/2023   Missed doses? No   Medication changes? No - warfarin taken differently;         - takes Oxycodone for back pain.        - continues with ES-Tylenol 2 tabs in am / pm for his back.   Abnormal bleeding? No   Shortness of breath? No   Injuries or illness since last INR? No - reported back pain last night and today.        - Hx of compression fracture of spine.   Changes in diet or alcohol? No   Upcoming surgery, procedure? No   Best number to call with results? [phone]       Additional findings: Yes.   - Just returned from 3 wks vacation; [place] from 2/21 - 3/7/23.    - INR trended lower.       PLAN     Recommended plan for ongoing change(s) affecting INR     Dosing Instructions: booster dose then Increase your warfarin dose (10% change) with next INR in 5-7 days       Summary  As of 3/9/2023    Full warfarin instructions:  3/9: 7.5 mg; Otherwise 4 mg every Tue, Fri; 5 mg all other days   Next INR check:  3/16/2023             Telephone call with  Pee ([phone]) who verbalizes understanding and agrees to plan    Lab visit scheduled - INR on 3/21/23 at f/u visit with [facility].    Education provided:     Taking warfarin: Importance of taking warfarin as instructed    Goal range and lab monitoring: goal range and significance of current result    Plan made per [facility] anticoagulation protocol    Mariel Dale, RN  Anticoagulation Clinic  3/9/2023    _______________________________________________________________________      Anticoagulation Episode Summary     Current INR goal:  2.0-3.0   TTR:  55.0 % (11.7 mo)   Target end date:  Indefinite   Send INR reminders [facility]:  [facility]    Indications    Chronic atrial fibrillation (H) [I48.20]  Chronic anticoagulation [Z79.01]  Long term (current) use of anticoagulants [Z79.01]  Peripheral arterial disease (H) [I73.9]           Comments:           Anticoagulation Care Providers     Provider Role Specialty Phone number    Jazzy Gil MD Referring Family Medicine [phone]    Ihsan Singh MD Referring Family Medicine [phone]

## 2023-03-10 DIAGNOSIS — N40.0 BENIGN PROSTATIC HYPERPLASIA, UNSPECIFIED WHETHER LOWER URINARY TRACT SYMPTOMS PRESENT: ICD-10-CM

## 2023-03-10 RX ORDER — FUROSEMIDE 20 MG
TABLET ORAL
Qty: 270 TABLET | Refills: 0 | Status: SHIPPED | OUTPATIENT
Start: 2023-03-10 | End: 2023-10-27

## 2023-03-10 NOTE — TELEPHONE ENCOUNTER
"Routing refill request to provider for review/approval because:  Blood pressure    Last Written Prescription Date:  7/27/2022  Last Fill Quantity: 180,  # refills: 0   Last office visit provider:  2/14/2023     Requested Prescriptions   Pending Prescriptions Disp Refills     furosemide (LASIX) 20 MG tablet 180 tablet 0     Sig: TAKE 2 TABLETS BY MOUTH IN THE MORNING AND 1 TABLET IN THE AFTERNOON       Diuretics (Including Combos) Protocol Failed - 3/8/2023  2:40 PM        Failed - Blood pressure under 140/90 in past 12 months     BP Readings from Last 3 Encounters:   02/14/23 (!) 155/65   12/16/22 116/52   12/01/22 (!) 141/82                 Passed - Recent (12 mo) or future (30 days) visit within the authorizing provider's specialty     Patient has had an office visit with the authorizing provider or a provider within the authorizing providers department within the previous 12 mos or has a future within next 30 days. See \"Patient Info\" tab in inbasket, or \"Choose Columns\" in Meds & Orders section of the refill encounter.              Passed - Medication is active on med list        Passed - Patient is age 18 or older        Passed - Normal serum creatinine on file in past 12 months     Recent Labs   Lab Test 11/17/22  1315   CR 0.94              Passed - Normal serum potassium on file in past 12 months     Recent Labs   Lab Test 08/15/22  1325   POTASSIUM 3.4                    Passed - Normal serum sodium on file in past 12 months     Recent Labs   Lab Test 08/15/22  1325                      Yolis Gill RN 03/09/23 10:28 PM  "

## 2023-03-11 RX ORDER — FINASTERIDE 5 MG/1
5 TABLET, FILM COATED ORAL DAILY
Qty: 90 TABLET | Refills: 3 | Status: SHIPPED | OUTPATIENT
Start: 2023-03-11 | End: 2023-12-21

## 2023-03-12 NOTE — TELEPHONE ENCOUNTER
"Last Written Prescription Date:  7/27/22  Last Fill Quantity: 90,  # refills: 0   Last office visit provider:  2/14/23     Requested Prescriptions   Pending Prescriptions Disp Refills     finasteride (PROSCAR) 5 MG tablet 90 tablet 0     Sig: Take 1 tablet (5 mg) by mouth daily       BPH Agents Passed - 3/10/2023  5:07 PM        Passed - Recent (12 mo) or future (30 days) visit within the authorizing provider's department     Patient has had an office visit with the authorizing provider or a provider within the authorizing providers department within the previous 12 mos or has a future within next 30 days. See \"Patient Info\" tab in inbasket, or \"Choose Columns\" in Meds & Orders section of the refill encounter.              Passed - Medication is active on med list        Passed - Patient is 18 years of age or older             Ashley Ruiz, RN 03/11/23 6:16 PM  "

## 2023-03-14 ENCOUNTER — LAB (OUTPATIENT)
Dept: LAB | Facility: CLINIC | Age: 83
End: 2023-03-14
Payer: COMMERCIAL

## 2023-03-14 ENCOUNTER — PATIENT OUTREACH (OUTPATIENT)
Dept: CARE COORDINATION | Facility: CLINIC | Age: 83
End: 2023-03-14
Payer: COMMERCIAL

## 2023-03-14 DIAGNOSIS — E11.42 TYPE 2 DIABETES MELLITUS WITH PERIPHERAL NEUROPATHY (H): ICD-10-CM

## 2023-03-14 LAB — HBA1C MFR BLD: 8.6 % (ref 0–5.6)

## 2023-03-14 PROCEDURE — 80048 BASIC METABOLIC PNL TOTAL CA: CPT

## 2023-03-14 PROCEDURE — 36415 COLL VENOUS BLD VENIPUNCTURE: CPT

## 2023-03-14 PROCEDURE — 83036 HEMOGLOBIN GLYCOSYLATED A1C: CPT

## 2023-03-14 NOTE — PROGRESS NOTES
Clinic Care Coordination Contact    Community Health Worker Follow Up    Care Gaps:     Health Maintenance Due   Topic Date Due     HF ACTION PLAN  06/09/2019     EYE EXAM  02/19/2022     MEDICARE ANNUAL WELLNESS VISIT  09/16/2022     LIPID  09/16/2022     COVID-19 Vaccine (5 - Booster for Pfizer series) 10/21/2022     BMP  02/15/2023     DIABETIC FOOT EXAM  03/17/2023       Scheduled 4/6/23 with Dr. Singh      Care Plan:   Care Plan: Pain Clinic     Problem: Pain Clinic     Note:      I would like a Referral to the Pain Clinic in the next 30 to 60 days.  Date Goal set: 8/16/22  Barriers:   Strengths: motivated  Date to Achieve By: 30-60 days  Patient expressed understanding of goal: yes  Action steps to achieve this goal:  1. The CCC RN sent a telephone communication to Dr. Singh to ask for a Referral to the Pain Clinic.  If I do not hear from them in the next 2 weeks; I will notify my Community Healthcare Worker, [facility] RN and/or clinic. I am on a wait list with [facility] in [place].        Goal: I would like a Referral to the Pain Clinic     Start Date: 8/16/2022    This Visit's Progress: 80% Recent Progress: 70%    Note:     Barriers: wait list  Strengths: motivated  Patient expressed understanding of goal: yes  Action steps to achieve this goal:  1. The CCC RN sent a telephone communication to Dr. Singh to ask for a Referral to the Pain Clinic.  If I do not hear from them in the next 2 weeks; I will notify my Community Healthcare Worker, [facility] RN and/or clinic. I have completed my pain injections and had procedure on nerves. I am not having as much pain my lower back but still having pain in my upper back. I am currently on the wait list with the [facility]. I am not wanting to look into other pain clinics at this time.  I have a procedure plannied with [facility] to hopefully help with my pain.                     Care Plan: My Chart     Problem: HP GENERAL PROBLEM     Goal:  I have accomplished regaining access to Mychart  Completed 12/12/2022    Start Date: 8/16/2022    This Visit's Progress: 100% Recent Progress: 10%    Note:     Personal Plan  If I have any trouble with MyChart in the future I will call the MyChart support team @ [phone].                        Care Plan: Appointments     Problem: HP GENERAL PROBLEM     Goal: I would like to attend the following appointments.     Start Date: 11/8/2022    This Visit's Progress: 50% Recent Progress: 50%    Note:     Barriers: numerous appointments  Strengths: motivated  Patient expressed understanding of goal: chart review  Action steps to achieve this goal:  1. 2/14 @ 9:10 Dr. Singh- Completed  2. 3/21 @ 9:45 INR  3.  3/21  @ 10:45  with Cardiology. Continuous (MB)  4.  4/6/23 @ 3:10 AWV with Dr. Singh. Continuous (MB)  5. 5/16 @ 10:30 Lab appt Continuous (MB)  6. 5/30 @ 1:15 Endocrinology Continuous (MB)  7. 8/22 @11:30 Endocrinology follow up Continuous (MB)                      Care Plan: Annual Wellness Visit     Problem: HP GENERAL PROBLEM     Goal: I will complete my annual wellness visit.     This Visit's Progress: 50% Recent Progress: 50%    Note:     Barriers: numerous medical appointments  Strengths:     Patient expressed understanding of goal: per chart review  Action steps to achieve this goal:  1. I will schedule my annual wellness visit; [address] [place] [address] [phone]). My CHW assisted me in scheduling my AWV on 4/6/23 @ 3:10. Continuous (MB)  2. I will attend my annual wellness visit. Continuous (MB)  3. I will contact my Care Management or clinic team if I have barriers to attending my annual wellness visit. Continuous (MB)                          Intervention and Education during outreach: N/A    CHW Plan: CHW will follow up with patient in 1 month on 4/13/23.    Dora Mott  Community Health Worker  [facility]  Clinic Care Coordination   Yajaira Kruse Blaine, [facility] and  [facility]  Office: [phone]

## 2023-03-15 ENCOUNTER — PATIENT OUTREACH (OUTPATIENT)
Dept: CARE COORDINATION | Facility: CLINIC | Age: 83
End: 2023-03-15
Payer: COMMERCIAL

## 2023-03-15 LAB
ANION GAP SERPL CALCULATED.3IONS-SCNC: 15 MMOL/L (ref 7–15)
BUN SERPL-MCNC: 24.9 MG/DL (ref 8–23)
CALCIUM SERPL-MCNC: 9.3 MG/DL (ref 8.8–10.2)
CHLORIDE SERPL-SCNC: 103 MMOL/L (ref 98–107)
CREAT SERPL-MCNC: 0.99 MG/DL (ref 0.67–1.17)
DEPRECATED HCO3 PLAS-SCNC: 24 MMOL/L (ref 22–29)
GFR SERPL CREATININE-BSD FRML MDRD: 77 ML/MIN/1.73M2
GLUCOSE SERPL-MCNC: 114 MG/DL (ref 70–99)
POTASSIUM SERPL-SCNC: 4.2 MMOL/L (ref 3.4–5.3)
SODIUM SERPL-SCNC: 142 MMOL/L (ref 136–145)
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
## 2023-03-15 NOTE — PROGRESS NOTES
Clinic Care Coordination Contact  Care Coordination Clinician Chart Review    Situation: Patient chart reviewed by Care Coordinator.       Background: Care Coordination Program started: 8/16/2022. Initial assessment completed and patient-centered care plan(s) were developed with participation from patient. Lead CC handed patient off to CHW for continued outreaches.       Assessment: Per chart review, patient outreach completed by CC CHW on 3/13/23.  Patient is actively working to accomplish goal(s). Patient's goal(s) appropriate and relevant at this time. Patient is due for updated Plan of Care.  Assessments will be completed annually or as needed/with change of patient status.      Care Plan: Pain Clinic     Problem: Pain Clinic     Note:      I would like a Referral to the Pain Clinic in the next 30 to 60 days.  Date Goal set: 8/16/22  Barriers:   Strengths: motivated  Date to Achieve By: 30-60 days  Patient expressed understanding of goal: yes  Action steps to achieve this goal:  1. The CCC RN sent a telephone communication to Dr. Singh to ask for a Referral to the Pain Clinic.  If I do not hear from them in the next 2 weeks; I will notify my Community Healthcare Worker, [facility] RN and/or clinic. I am on a wait list with [facility] in [place].        Goal: I would like a Referral to the Pain Clinic     Start Date: 8/16/2022    This Visit's Progress: 80% Recent Progress: 70%    Note:     Barriers: wait list  Strengths: motivated  Patient expressed understanding of goal: yes  Action steps to achieve this goal:  1. The CCC RN sent a telephone communication to Dr. Singh to ask for a Referral to the Pain Clinic.  If I do not hear from them in the next 2 weeks; I will notify my Community Healthcare Worker, CCC RN and/or clinic. I have completed my pain injections and had procedure on nerves. I am not having as much pain my lower back but still having pain in my upper back. I am currently on the wait list  with the [facility]. I am not wanting to look into other pain clinics at this time.  I have a procedure plannied with [facility] to hopefully help with my pain.                     Care Plan: My Chart     Problem: HP GENERAL PROBLEM             Care Plan: Appointments     Problem: HP GENERAL PROBLEM     Goal: I would like to attend the following appointments.     Start Date: 11/8/2022    This Visit's Progress: 50% Recent Progress: 50%    Note:     Barriers: numerous appointments  Strengths: motivated  Patient expressed understanding of goal: chart review  Action steps to achieve this goal:  1. 2/14 @ 9:10 Dr. Singh- Completed  2. 3/21 @ 9:45 INR  3.  3/21  @ 10:45  with Cardiology. Continuous (MB)  4.  4/6/23 @ 3:10 AWV with Dr. Singh. Continuous (MB)  5. 5/16 @ 10:30 Lab appt Continuous (MB)  6. 5/30 @ 1:15 Endocrinology Continuous (MB)  7. 8/22 @11:30 Endocrinology follow up Continuous (MB)                      Care Plan: Annual Wellness Visit     Problem: HP GENERAL PROBLEM     Goal: I will complete my annual wellness visit.     This Visit's Progress: 50% Recent Progress: 50%    Note:     Barriers: numerous medical appointments  Strengths:     Patient expressed understanding of goal: per chart review  Action steps to achieve this goal:  1. I will schedule my annual wellness visit; [phone] ([phone]). My CHW assisted me in scheduling my AWV on 4/6/23 @ 3:10. Continuous (MB)  2. I will attend my annual wellness visit. Continuous (MB)  3. I will contact my Care Management or clinic team if I have barriers to attending my annual wellness visit. Continuous (MB)                             Plan/Recommendations: The patient will continue working with Care Coordination to achieve goal(s) as above. CHW will continue outreaches at minimum every 30 days and will involve Lead CC as needed or if patient is ready to move to Maintenance. Lead CC will continue to monitor CHW outreaches and  patient's progress to goal(s) every 6 weeks.     Plan of Care updated and sent to patient: Yes, via [employer]

## 2023-03-21 ENCOUNTER — ANTICOAGULATION THERAPY VISIT (OUTPATIENT)
Dept: ANTICOAGULATION | Facility: CLINIC | Age: 83
End: 2023-03-21

## 2023-03-21 ENCOUNTER — LAB (OUTPATIENT)
Dept: CARDIOLOGY | Facility: CLINIC | Age: 83
End: 2023-03-21
Payer: COMMERCIAL

## 2023-03-21 ENCOUNTER — OFFICE VISIT (OUTPATIENT)
Dept: CARDIOLOGY | Facility: CLINIC | Age: 83
End: 2023-03-21
Payer: COMMERCIAL

## 2023-03-21 VITALS
BODY MASS INDEX: 36.79 KG/M2 | DIASTOLIC BLOOD PRESSURE: 74 MMHG | WEIGHT: 170 LBS | RESPIRATION RATE: 16 BRPM | OXYGEN SATURATION: 97 % | SYSTOLIC BLOOD PRESSURE: 130 MMHG | HEART RATE: 88 BPM

## 2023-03-21 DIAGNOSIS — I73.9 PERIPHERAL ARTERIAL DISEASE (H): ICD-10-CM

## 2023-03-21 DIAGNOSIS — N40.1 BENIGN PROSTATIC HYPERPLASIA WITH INCOMPLETE BLADDER EMPTYING: ICD-10-CM

## 2023-03-21 DIAGNOSIS — I25.10 CORONARY ARTERY DISEASE INVOLVING NATIVE CORONARY ARTERY OF NATIVE HEART WITHOUT ANGINA PECTORIS: ICD-10-CM

## 2023-03-21 DIAGNOSIS — R39.14 BENIGN PROSTATIC HYPERPLASIA WITH INCOMPLETE BLADDER EMPTYING: ICD-10-CM

## 2023-03-21 DIAGNOSIS — I48.20 CHRONIC ATRIAL FIBRILLATION (H): Primary | ICD-10-CM

## 2023-03-21 DIAGNOSIS — Z79.01 LONG TERM (CURRENT) USE OF ANTICOAGULANTS: ICD-10-CM

## 2023-03-21 DIAGNOSIS — I35.9 AORTIC VALVE DISORDER: Primary | ICD-10-CM

## 2023-03-21 DIAGNOSIS — I50.32 CHRONIC HEART FAILURE WITH PRESERVED EJECTION FRACTION (HFPEF) (H): ICD-10-CM

## 2023-03-21 DIAGNOSIS — Z79.01 CHRONIC ANTICOAGULATION: ICD-10-CM

## 2023-03-21 DIAGNOSIS — Z79.01 LONG TERM (CURRENT) USE OF ANTICOAGULANTS: Primary | ICD-10-CM

## 2023-03-21 LAB — INR POINT OF CARE: 1.4 (ref 0.9–1.1)

## 2023-03-21 PROCEDURE — 85610 PROTHROMBIN TIME: CPT

## 2023-03-21 PROCEDURE — 99214 OFFICE O/P EST MOD 30 MIN: CPT | Performed by: INTERNAL MEDICINE

## 2023-03-21 PROCEDURE — 36416 COLLJ CAPILLARY BLOOD SPEC: CPT

## 2023-03-21 RX ORDER — TAMSULOSIN HYDROCHLORIDE 0.4 MG/1
0.4 CAPSULE ORAL DAILY
Qty: 30 CAPSULE | Refills: 11 | Status: SHIPPED | OUTPATIENT
Start: 2023-03-21 | End: 2023-12-21

## 2023-03-21 NOTE — PROGRESS NOTES
ANTICOAGULATION MANAGEMENT     Pee Ayala 82 year old male is on warfarin with subtherapeutic INR result. (Goal INR 2.0-3.0)    Recent labs: (last 7 days)     03/21/23  1028   INR 1.4*       ASSESSMENT       Source(s): Chart Review, Patient/Caregiver Call and Template       Warfarin doses taken: Less warfarin taken than planned which may be affecting INR-- flipped dosing days    Diet: No new diet changes identified    New illness, injury, or hospitalization: No    Medication/supplement changes: None noted    Signs or symptoms of bleeding or clotting: No    Previous INR: Subtherapeutic    Additional findings: None         PLAN     Recommended plan for temporary change(s) affecting INR     Dosing Instructions: Increase your warfarin dose (12.1% change from what was taken this previous week) with next INR in 1 week       Summary  As of 3/21/2023    Full warfarin instructions:  4 mg every Fri; 5 mg all other days   Next INR check:  3/28/2023             Telephone call with Pee who agrees to plan and repeated back plan correctly    Lab visit scheduled    Education provided:     Symptom monitoring: monitoring for clotting signs and symptoms, monitoring for stroke signs and symptoms and when to seek medical attention/emergency care    Contact [phone] with any changes, questions or concerns.     Plan made per [facility] anticoagulation protocol    Viky Escamilla RN  Anticoagulation Clinic  3/21/2023    _______________________________________________________________________     Anticoagulation Episode Summary     Current INR goal:  2.0-3.0   TTR:  54.0 % (11.7 mo)   Target end date:  Indefinite   Send INR reminders to:  [facility]    Indications    Chronic atrial fibrillation (H) [I48.20]  Chronic anticoagulation [Z79.01]  Long term (current) use of anticoagulants [Z79.01]  Peripheral arterial disease (H) [I73.9]           Comments:           Anticoagulation Care Providers     Provider Role Specialty  Phone number    Jazzy Gil MD Referring Family Medicine [phone]    Ihsan Singh MD Referring Family Medicine [phone]

## 2023-03-21 NOTE — PHARMACY-ANTICOAGULATION SERVICE
Clinical Pharmacy - Warfarin Dosing Consult     Pharmacy has been consulted to manage this patient s warfarin therapy.  Indication: Atrial Fibrillation  Therapy Goal: INR 2-3  Provider/Team: Diann Briones PA-C OP Anticoag Clinic: [facility]  Warfarin Prior to Admission: Yes  Warfarin PTA Regimen: 7.5 mg Tues, Thurs, Sat, 5 mg all other days  Significant drug interactions: Cefazolin  Recent documented change in oral intake/nutrition: No  Dose Comments: INR < 2 today after reversal with vitamin K 2 mg 4/9 @ 0042 for surgery. Will resume home dose tonight    INR   Date Value Ref Range Status   04/09/2022 1.40 (H) 0.85 - 1.15 Final   04/09/2022 1.62 (H) 0.85 - 1.15 Final       Recommend warfarin 7.5 mg today.  Pharmacy will monitor Pee Ayala daily and order warfarin doses to achieve specified goal.      Please contact pharmacy as soon as possible if the warfarin needs to be held for a procedure or if the warfarin goals change.       Show Applicator Variable?: Yes Duration Of Freeze Thaw-Cycle (Seconds): 5 Post-Care Instructions: I reviewed with the patient in detail post-care instructions. Patient is to wear sunprotection, and avoid picking at any of the treated lesions. Pt may apply Vaseline to crusted or scabbing areas. Detail Level: Detailed Render Note In Bullet Format When Appropriate: No Number Of Freeze-Thaw Cycles: 2 freeze-thaw cycles Consent: The patient's consent was obtained including but not limited to risks of crusting, scabbing, blistering, scarring, darker or lighter pigmentary change, recurrence, incomplete removal and infection.

## 2023-03-21 NOTE — PROGRESS NOTES
HEART CARE ENCOUNTER CONSULTATON NOTE      [facility]  [phone]      Assessment/Recommendations   Assessment:   1.  Acute on chronic congestive heart failure with preserved ejection fraction, left ventricular hypertrophy. NYHA late II  2.  Aortic valve stenosis status post transaortic valve replacement (29 mm Magdaleno Dominick 3). Stable gradient in Jan 2022.   3.  Moderate mitral regurgitation  4.  Coronary artery disease status post coronary artery bypass surgery and prior PCI.  Patent LIMA, patent SVG to OM, patent stent in proximal RCA with notable disease in distal RCA on last coronary angiogram 1/2021  5.  Atrial fibrillation    Plan:   1.  Long conversation with the patient and his wife.  Continue low-sodium diet  2.  Lasix 20 mg in the morning and 20 mg in PM.  Cannot tolerate 40 mg a.m. Lasix  3.  Carvedilol to 3.125 mg twice daily  4.  Continue aspirin therapy for coronary artery disease  5.  Continue Crestor for hyperlipidemia coronary artery disease  6.  Remains on warfarin therapy for stroke prevention and A. Fib  7.  Start Flomax at 0.4 mg daily to improve urinary issues.  Also continue on finasteride.  Patient to be careful about orthostatic hypotension.    3-month follow-up       History of Present Illness/Subjective    HPI: Pee Ayala is a 82 year old male heart failure with preserved ejection fraction who presents to cardiology clinic in follow-up for congestive heart failure, aortic valve disease, coronary disease.    Since last evaluation the patient's had a difficult time with increasing the dose of his Lasix to 40 mg in the morning along with 20 mg in the afternoon.  With his increased dose of morning Lasix he has incontinence.  He also notices difficulty with urinary stream throughout the day.  This likely secondary to his BPH.  He is currently on finasteride.  Would add tamsulosin to see if this helps with his urinary flow and may help with diuresis.    Currently  denies any significant orthopnea or PND symptoms.  He has chronic stable dyspnea on exertion.  No anginal chest pain.  Does have some lightheadedness with standing in the morning.  Need to monitor this carefully with starting Flomax.     Coronary Angiogram:     Mid RCA lesion is 75% stenosed.    Mid Cx lesion is 85% stenosed.    Prox LAD to Mid LAD lesion is 75% stenosed.    1st Diag lesion is 70% stenosed.    Prox Cx lesion is 80% stenosed.     79 yo male s/p CABG and PCI with residual severe small vessel disease of RCA/prox LAD into diagonal, mid LAD, circ prox and Circ now with severe aortic stenosis on echo and significant dyspnea on exertion     Angiography via left radial  Noted all vessels with severe diffuse dz and very small in diameter  LM mild dz  LAD prox 70%; mid 80%; diagonal prox 60-70%, patent LIMA   Circ severe dz in prox/mid with patent SVG to OM  RCA prox stent patent severe dz in mid (no change from 2019)      Echocardiogram: 2022  1. Normal left ventricular size and systolic performance with a visually  estimated ejection fraction of 65-70%.  2. There is mild concentric increase in left ventricular wall thickness.  3. There is a bio-prosthetic aortic valve (documented 29 mm Magdaleno Dominick 3  tissue valve).  Â  Normal aortic valve prosthesis metrics with a mean systolic gradient of 7  mmHg and a peak anterograde velocity of 1.9 m/sec.  Â  There is trace-mild aortic insufficiency.  4. There is moderate mitral insufficiency.  5. Normal right ventricular size and systolic performance.  6. There is severe left atrial enlargement. There is mild to moderate right  atrial enlargement.  7. Right ventricular systolic pressure relative to right atrial pressure is  mildly increased. The pulmonary artery pressure is estimated to be 35-40 mmHg  plus right atrial pressure (the IVC is not well-visualized on this study).            Physical Examination  Review of Systems   Vitals: /74 (BP Location: Right  arm, Patient Position: Sitting, Cuff Size: Adult Regular)   Pulse 88   Resp 16   Wt 77.1 kg (170 lb)   SpO2 97%   BMI 36.79 kg/m    BMI= Body mass index is 36.79 kg/m .  Wt Readings from Last 3 Encounters:   03/21/23 77.1 kg (170 lb)   02/14/23 77.9 kg (171 lb 12.8 oz)   12/16/22 74.4 kg (164 lb)        Pleasant, with wife   ENT/Mouth: membranes moist, no oral lesions or bleeding gums.      EYES:  no scleral icterus, normal conjunctivae       Chest/Lungs:   lungs are clear to auscultation, no rales or wheezing, noted sternal scar, equal chest wall expansion    Cardiovascular:   Irregular. Normal first and second heart sounds with 2 out of 6 systolic murmur. No rubs, or gallops; the carotid, radial and posterior tibial pulses are intact, Jugular venous pressure normal, mild to moderate pitting edema bilaterally    Abdomen:  no tenderness; bowel sounds are present   Extremities: no cyanosis or clubbing   Skin: no xanthelasma, warm.    Neurologic: normal  bilateral, no tremors     Psychiatric: alert and oriented x3, calm        Please refer above for cardiac ROS details.        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      Encounter for long-term (current) use of insulin (H) 08/11/2016     Esophageal reflux 11/18/2010     History of angina      HTN (hypertension)  06/30/2009     (Problem list name updated by automated process. Provider to review and confirm.)     Impotence of organic origin      Mixed hyperlipidemia 04/25/2007 April 25, 2007 restarted Zocor today, recheck in 3 months.  August 23, 2007 LDL at 101 with 40 mg, will increase to 80 mg. Recheck  In 3 months.      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      Walking troubles      Past Surgical History:   Procedure Laterality Date     BYPASS GRAFT ARTERY CORONARY N/A 9/10/2014    Procedure: BYPASS GRAFT ARTERY CORONARY;  Surgeon: Bharathi Caraballo MD;  Location: [facility] OR     BYPASS GRAFT ARTERY CORONARY  2016     COLONOSCOPY  1/14/2004     CORONARY ANGIOGRAPHY ADULT ORDER       CV CORONARY ANGIOGRAM N/A 4/4/2019    Procedure: Coronary Angiogram;  Surgeon: Dominik Vega MD;  Location: [facility] Cath Lab;  Service: Cardiology     CV CORONARY ANGIOGRAM N/A 1/6/2021    Procedure: Coronary Angiogram;  Surgeon: Dominik Vega MD;  Location: [facility] Cardiac Cath Lab;  Service: Cardiology     CV LEFT HEART CATHETERIZATION WITHOUT LEFT VENTRICULOGRAM Left 4/4/2019    Procedure: Left Heart Catheterization Without Left Ventriculogram;  Surgeon: Dominik Vega MD;  Location: [facility] Cath Lab;  Service: Cardiology     CV LEFT HEART CATHETERIZATION WITHOUT LEFT VENTRICULOGRAM Left 1/6/2021    Procedure: Left Heart Catheterization Without Left Ventriculogram;  Surgeon: Dominik Vega MD;  Location: [facility]  Cath Lab;  Service: Cardiology     CV RIGHT HEART CATHETERIZATION Right 2019    Procedure: Right Heart Catheterization;  Surgeon: Dominik Vega MD;  Location: [facility] Cath Lab;  Service: Cardiology     CV TRANSCATHETER AORTIC VALVE REPLACEMENT N/A 2021    Procedure: Right transfemoral transcatheter aortic valve replacement using Magdlaeno Dominick 3 size 29mm.  Transthoracic echocardiogram;  Surgeon: Jo Romano MD;  Location: [facility] CARDIAC CATH LAB     ESOPHAGOSCOPY, GASTROSCOPY, DUODENOSCOPY (EGD), COMBINED N/A 2016    Procedure: COMBINED ESOPHAGOSCOPY, GASTROSCOPY, DUODENOSCOPY (EGD);  Surgeon: Rk Srivastava MD;  Location: [facility] CATH FEMORAL CANNULIZATION WITH OPEN STANDBY REPAIR AORTIC VALVE N/A 2021    Procedure: Cardiopulmonary Bypass standby;  Surgeon: Jefferson Sandy MD;  Location: [facility] CARDIAC CATH LAB     IR LOWER EXTREMITY ANGIOGRAM LEFT  2021     LAPAROSCOPIC CHOLECYSTECTOMY  10/09     MAZE PROCEDURE N/A 9/10/2014    Procedure: MAZE PROCEDURE;  Surgeon: Bharathi Caraballo MD;  Location:  OR     MOHS MICROGRAPHIC PROCEDURE       OPEN REDUCTION INTERNAL FIXATION HIP BIPOLAR Left 2022    Procedure: HEMIARTHROPLASTY, HIP, BIPOLAR, OPEN REDUCTION INTERNAL FIXATION OF GREATER TROCHANTER;  Surgeon: Jd Larose MD;  [place] [facility] [place]     ORTHOPEDIC SURGERY      surgery for fx  Left forearm     OTHER SURGICAL HISTORY Left     forearm sugery     STENT, CORONARY, [facility]  2016     VASECTOMY       Family History   Problem Relation Age of Onset     Diabetes Mother      Hypertension Father      Cerebrovascular Disease Father      Diabetes Maternal Grandmother      Breast Cancer No family hx of      Cancer - colorectal No family hx of      Prostate Cancer No family hx of      C.A.D. No family hx of      Diabetes Type 2  Mother         58 ,  from anesthesia complication.     Heart Disease Father         86  from stroke      No Known Problems Brother         60 years of age.        Social History     Socioeconomic History     Marital status:      Spouse name: Fay Ayala     Number of children: Not on file     Years of education: Not on file     Highest education level: Not on file   Occupational History     Employer: RETIRED   Tobacco Use     Smoking status: Former     Types: Cigarettes     Quit date: 1998     Years since quittin.2     Smokeless tobacco: Never   Substance and Sexual Activity     Alcohol use: Yes     Alcohol/week: 0.0 standard drinks     Comment: Alcoholic Drinks/day: very rare     Drug use: No     Sexual activity: Never     Birth control/protection: None   Other Topics Concern     Parent/sibling w/ CABG, MI or angioplasty before 65F 55M? No   Social History Narrative     and lives with life.  Has adult children from previous relationship. ( Blended family).  Has a dog - Vincent Gil MD  1/3/2019         Social Determinants of Health     Financial Resource Strain: Not on file   Food Insecurity: Not on file   Transportation Needs: Not on file   Physical Activity: Not on file   Stress: Not on file   Social Connections: Not on file   Intimate Partner Violence: Not on file   Housing Stability: Not on file           Medications  Allergies   Current Outpatient Medications   Medication Sig Dispense Refill     acetaminophen (TYLENOL) 500 MG tablet Take 1 tablet (500 mg) by mouth 2 times daily as needed for mild pain       aspirin 81 MG EC tablet Take 1 tablet (81 mg) by mouth daily 90 tablet 0     blood glucose (ACCU-CHEK GUIDE) test strip USE 1 STRIP TO CHECK GLUCOSE THREE TIMES DAILY 300 strip 0     busPIRone (BUSPAR) 5 MG tablet Take 1 tablet (5 mg) by mouth 2 times daily 180 tablet 0     carvedilol (COREG) 3.125 MG tablet Take 1 tablet (3.125 mg) by mouth 2 times daily (with meals) 180 tablet 3     Continuous Blood Gluc Sensor (FREESTYLE DOMI 2 SENSOR) MISC 1 each every 14  days Use 1 sensor every 14 days. Use to read blood sugars per 's instructions. 1 each 0     finasteride (PROSCAR) 5 MG tablet Take 1 tablet (5 mg) by mouth daily 90 tablet 3     furosemide (LASIX) 20 MG tablet TAKE 2 TABLETS BY MOUTH IN THE MORNING AND 1 TABLET IN THE AFTERNOON 270 tablet 0     gabapentin (NEURONTIN) 600 MG tablet Take 1 tablet (600 mg) by mouth 3 times daily (Patient taking differently: Take 600 mg by mouth 2 times daily) 180 tablet 0     insulin aspart prot & aspart (NOVOLOG MIX 70/30 PEN) (70-30) 100 UNIT/ML pen Inject subcutaneously 20 units in AM with breakfast and 12 in PM with dinner. If Blood Glucose<100 then give 1/2 dose) 15 mL 0     metFORMIN (GLUCOPHAGE) 500 MG tablet Take 2 tablets by mouth twice daily 360 tablet 3     pramipexole (MIRAPEX) 0.25 MG tablet TAKE 1 TABLET BY MOUTH TWICE DAILY TAKES  AT  4PM  AND  5.30PM 180 tablet 1     rosuvastatin (CRESTOR) 20 MG tablet Take 1 tablet (20 mg) by mouth At Bedtime 100 tablet 1     tamsulosin (FLOMAX) 0.4 MG capsule Take 1 capsule (0.4 mg) by mouth daily 30 capsule 11     warfarin ANTICOAGULANT (COUMADIN) 1 MG tablet 5mg daily on Monydays and thursdays and 4mg all other days. 60 tablet 0     warfarin ANTICOAGULANT (COUMADIN) 4 MG tablet TAKE 4MG BY MOUTH DAILY ON TUESDAY, WEDNSDAY, FRIDAY, SATURDAY, SUNDAY AND 5MG TABLET ON MONDAY AND THURSDAY OR AS DIRECTED BY [facility] 100 tablet 1     cilostazol (PLETAL) 50 MG tablet TAKE 1 TABLET BY MOUTH  DAILY FOR 14 DAYS, THEN 1  TABLET TWICE DAILY FOR 14  DAYS, THEN 2 TABLETS TWICE  DAILY FOR 30 DAYS (Patient not taking: Reported on 3/21/2023) 162 tablet 5       Allergies   Allergen Reactions     Oxycodone Itching and Rash     Amlodipine Dizziness     Dizziness and dry heaves     Lisinopril Cough     Adhesive Tape Itching and Rash          Lab Results    Chemistry/lipid CBC Cardiac Enzymes/BNP/TSH/INR   Recent Labs   Lab Test 01/25/22  1443 09/16/21  1026 01/05/16  1104 04/29/15  0834    CHOL  --  117   < > 148   HDL  --  43   < > 44   LDL 65 48   < > 85   TRIG  --  131   < > 93   CHOLHDLRATIO  --   --   --  3.4    < > = values in this interval not displayed.     Recent Labs   Lab Test 01/25/22  1443 09/16/21  1026 08/31/21  1221   LDL 65 48 64     Recent Labs   Lab Test 11/17/22  1315 08/15/22  1325   NA  --  140   POTASSIUM  --  3.4   CHLORIDE  --  97*   CO2  --  30*   GLC  --  64*   BUN 23.0 19.7   CR 0.94 1.00   GFRESTIMATED 82 76   RACHEL 9.4 9.7     Recent Labs   Lab Test 11/17/22  1315 08/15/22  1325 07/25/22  0605   CR 0.94 1.00 0.74     Recent Labs   Lab Test 11/17/22  1315 08/15/22  1325 05/24/22  1023   A1C 10.3* 8.8* 6.3*          Recent Labs   Lab Test 07/18/22  0636   WBC 6.9   HGB 10.8*   HCT 34.6*   *        Recent Labs   Lab Test 07/18/22  0636 07/12/22  0621 07/11/22  1048   HGB 10.8* 10.7* 10.8*    Recent Labs   Lab Test 07/04/22  1034   TROPONINI 0.15     Recent Labs   Lab Test 07/04/22  1034 01/08/21  0842 10/21/20  1451 05/09/19  1657 06/09/16  1203   * 91* 56   < >  --    NTBNP  --   --   --   --  354*    < > = values in this interval not displayed.     Recent Labs   Lab Test 11/17/22  1315   TSH 0.92     Recent Labs   Lab Test 12/16/22  0927 11/15/22  1443 10/10/22  1539   INR 2.4* 2.7* 2.6*        Ulices Hernandez, DO

## 2023-03-21 NOTE — LETTER
3/21/2023    Ihsan Singh MD  [address]  [facility] [address]    RE: Pee Ayala       Dear Colleague,     I had the pleasure of seeing Pee Ayala in the [facility].    HEART CARE ENCOUNTER CONSULTATON NOTE      JAELYN [facility]  [phone]      Assessment/Recommendations   Assessment:   1.  Acute on chronic congestive heart failure with preserved ejection fraction, left ventricular hypertrophy. NYHA late II  2.  Aortic valve stenosis status post transaortic valve replacement (29 mm Magdaleno Dominick 3). Stable gradient in Jan 2022.   3.  Moderate mitral regurgitation  4.  Coronary artery disease status post coronary artery bypass surgery and prior PCI.  Patent LIMA, patent SVG to OM, patent stent in proximal RCA with notable disease in distal RCA on last coronary angiogram 1/2021  5.  Atrial fibrillation    Plan:   1.  Long conversation with the patient and his wife.  Continue low-sodium diet  2.  Lasix 20 mg in the morning and 20 mg in PM.  Cannot tolerate 40 mg a.m. Lasix  3.  Carvedilol to 3.125 mg twice daily  4.  Continue aspirin therapy for coronary artery disease  5.  Continue Crestor for hyperlipidemia coronary artery disease  6.  Remains on warfarin therapy for stroke prevention and A. Fib  7.  Start Flomax at 0.4 mg daily to improve urinary issues.  Also continue on finasteride.  Patient to be careful about orthostatic hypotension.    3-month follow-up       History of Present Illness/Subjective    HPI: Pee Ayala is a 82 year old male heart failure with preserved ejection fraction who presents to cardiology clinic in follow-up for congestive heart failure, aortic valve disease, coronary disease.    Since last evaluation the patient's had a difficult time with increasing the dose of his Lasix to 40 mg in the morning along with 20 mg in the afternoon.  With his increased dose of morning Lasix he has incontinence.  He also notices difficulty with  urinary stream throughout the day.  This likely secondary to his BPH.  He is currently on finasteride.  Would add tamsulosin to see if this helps with his urinary flow and may help with diuresis.    Currently denies any significant orthopnea or PND symptoms.  He has chronic stable dyspnea on exertion.  No anginal chest pain.  Does have some lightheadedness with standing in the morning.  Need to monitor this carefully with starting Flomax.     Coronary Angiogram:     Mid RCA lesion is 75% stenosed.    Mid Cx lesion is 85% stenosed.    Prox LAD to Mid LAD lesion is 75% stenosed.    1st Diag lesion is 70% stenosed.    Prox Cx lesion is 80% stenosed.     79 yo male s/p CABG and PCI with residual severe small vessel disease of RCA/prox LAD into diagonal, mid LAD, circ prox and Circ now with severe aortic stenosis on echo and significant dyspnea on exertion     Angiography via left radial  Noted all vessels with severe diffuse dz and very small in diameter  LM mild dz  LAD prox 70%; mid 80%; diagonal prox 60-70%, patent LIMA   Circ severe dz in prox/mid with patent SVG to OM  RCA prox stent patent severe dz in mid (no change from 2019)      Echocardiogram: 2022  1. Normal left ventricular size and systolic performance with a visually  estimated ejection fraction of 65-70%.  2. There is mild concentric increase in left ventricular wall thickness.  3. There is a bio-prosthetic aortic valve (documented 29 mm Magdaleno Dominick 3  tissue valve).  Â  Normal aortic valve prosthesis metrics with a mean systolic gradient of 7  mmHg and a peak anterograde velocity of 1.9 m/sec.  Â  There is trace-mild aortic insufficiency.  4. There is moderate mitral insufficiency.  5. Normal right ventricular size and systolic performance.  6. There is severe left atrial enlargement. There is mild to moderate right  atrial enlargement.  7. Right ventricular systolic pressure relative to right atrial pressure is  mildly increased. The pulmonary  artery pressure is estimated to be 35-40 mmHg  plus right atrial pressure (the IVC is not well-visualized on this study).            Physical Examination  Review of Systems   Vitals: /74 (BP Location: Right arm, Patient Position: Sitting, Cuff Size: Adult Regular)   Pulse 88   Resp 16   Wt 77.1 kg (170 lb)   SpO2 97%   BMI 36.79 kg/m    BMI= Body mass index is 36.79 kg/m .  Wt Readings from Last 3 Encounters:   03/21/23 77.1 kg (170 lb)   02/14/23 77.9 kg (171 lb 12.8 oz)   12/16/22 74.4 kg (164 lb)        Pleasant, with wife   ENT/Mouth: membranes moist, no oral lesions or bleeding gums.      EYES:  no scleral icterus, normal conjunctivae       Chest/Lungs:   lungs are clear to auscultation, no rales or wheezing, noted sternal scar, equal chest wall expansion    Cardiovascular:   Irregular. Normal first and second heart sounds with 2 out of 6 systolic murmur. No rubs, or gallops; the carotid, radial and posterior tibial pulses are intact, Jugular venous pressure normal, mild to moderate pitting edema bilaterally    Abdomen:  no tenderness; bowel sounds are present   Extremities: no cyanosis or clubbing   Skin: no xanthelasma, warm.    Neurologic: normal  bilateral, no tremors     Psychiatric: alert and oriented x3, calm        Please refer above for cardiac ROS details.        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      Encounter for long-term (current) use of insulin (H) 08/11/2016     Esophageal reflux 11/18/2010     History of angina      HTN (hypertension) 06/30/2009     (Problem list name updated by automated process. Provider to review and confirm.)     Impotence of organic origin      Mixed hyperlipidemia 04/25/2007 April 25, 2007 restarted Zocor today, recheck in 3 months.  August 23, 2007 LDL at 101 with 40 mg, will increase to 80 mg. Recheck  In 3 months.      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      Walking troubles      Past Surgical History:   Procedure Laterality Date     BYPASS GRAFT ARTERY CORONARY N/A 9/10/2014    Procedure: BYPASS GRAFT ARTERY CORONARY;  Surgeon: Bharathi Caraballo MD;  Location: [facility] OR     BYPASS GRAFT ARTERY CORONARY  2016     COLONOSCOPY  1/14/2004     CORONARY ANGIOGRAPHY ADULT ORDER       CV CORONARY ANGIOGRAM N/A 4/4/2019    Procedure: Coronary Angiogram;  Surgeon: Dominik Vega MD;  Location: [facility] Cath Lab;  Service: Cardiology     CV CORONARY ANGIOGRAM N/A 1/6/2021    Procedure: Coronary Angiogram;  Surgeon: Dominik Vega MD;  Location: [facility] Cardiac Cath Lab;  Service: Cardiology     CV LEFT HEART CATHETERIZATION WITHOUT LEFT VENTRICULOGRAM Left 4/4/2019    Procedure: Left Heart Catheterization Without Left Ventriculogram;  Surgeon: Dominik Vega MD;  Location: [facility] Cath Lab;  Service: Cardiology      CV LEFT HEART CATHETERIZATION WITHOUT LEFT VENTRICULOGRAM Left 1/6/2021    Procedure: Left Heart Catheterization Without Left Ventriculogram;  Surgeon: Dominik Vega MD;  Location: [facility] Cardiac Cath Lab;  Service: Cardiology     CV RIGHT HEART CATHETERIZATION Right 4/4/2019    Procedure: Right Heart Catheterization;  Surgeon: Dominik Vega MD;  Location: [facility] Cath Lab;  Service: Cardiology     CV TRANSCATHETER AORTIC VALVE REPLACEMENT N/A 1/12/2021    Procedure: Right transfemoral transcatheter aortic valve replacement using Magadleno Dominick 3 size 29mm.  Transthoracic echocardiogram;  Surgeon: Jo Rmoano MD;  Location: [facility] CARDIAC CATH LAB     ESOPHAGOSCOPY, GASTROSCOPY, DUODENOSCOPY (EGD), COMBINED N/A 1/13/2016    Procedure: COMBINED ESOPHAGOSCOPY, GASTROSCOPY, DUODENOSCOPY (EGD);  Surgeon: Rk Srivastava MD;  Location: [facility] FEMORAL CANNULIZATION WITH OPEN STANDBY REPAIR AORTIC VALVE N/A 1/12/2021    Procedure: Cardiopulmonary Bypass standby;  Surgeon: Jefferson Sandy MD;  Location: [facility] CARDIAC CATH LAB     IR LOWER EXTREMITY ANGIOGRAM LEFT  12/7/2021     LAPAROSCOPIC CHOLECYSTECTOMY  10/09     MAZE PROCEDURE N/A 9/10/2014    Procedure: MAZE PROCEDURE;  Surgeon: Bharathi Caraballo MD;  Location:  OR     MOHS MICROGRAPHIC PROCEDURE       OPEN REDUCTION INTERNAL FIXATION HIP BIPOLAR Left 4/9/2022    Procedure: HEMIARTHROPLASTY, HIP, BIPOLAR, OPEN REDUCTION INTERNAL FIXATION OF GREATER TROCHANTER;  Surgeon: Jd Larose MD;  Location: [facility] OR     ORTHOPEDIC SURGERY      surgery for fx  Left forearm     OTHER SURGICAL HISTORY Left 2015    forearm sugery     STENT, CORONARY, HUMA  02/2016     VASECTOMY       Family History   Problem Relation Age of Onset     Diabetes Mother      Hypertension Father      Cerebrovascular Disease Father      Diabetes Maternal Grandmother      Breast Cancer No family hx of      Cancer - colorectal  No family hx of      Prostate Cancer No family hx of      C.A.D. No family hx of      Diabetes Type 2  Mother         58 ,  from anesthesia complication.     Heart Disease Father         86  from stroke     No Known Problems Brother         60 years of age.        Social History     Socioeconomic History     Marital status:      Spouse name: Fay Ayala     Number of children: Not on file     Years of education: Not on file     Highest education level: Not on file   Occupational History     Employer: RETIRED   Tobacco Use     Smoking status: Former     Types: Cigarettes     Quit date: 1998     Years since quittin.2     Smokeless tobacco: Never   Substance and Sexual Activity     Alcohol use: Yes     Alcohol/week: 0.0 standard drinks     Comment: Alcoholic Drinks/day: very rare     Drug use: No     Sexual activity: Never     Birth control/protection: None   Other Topics Concern     Parent/sibling w/ CABG, MI or angioplasty before 65F 55M? No   Social History Narrative     and lives with life.  Has adult children from previous relationship. ( Blended family).  Has a dog - Vincent Gil MD  1/3/2019         Social Determinants of Health     Financial Resource Strain: Not on file   Food Insecurity: Not on file   Transportation Needs: Not on file   Physical Activity: Not on file   Stress: Not on file   Social Connections: Not on file   Intimate Partner Violence: Not on file   Housing Stability: Not on file           Medications  Allergies   Current Outpatient Medications   Medication Sig Dispense Refill     acetaminophen (TYLENOL) 500 MG tablet Take 1 tablet (500 mg) by mouth 2 times daily as needed for mild pain       aspirin 81 MG EC tablet Take 1 tablet (81 mg) by mouth daily 90 tablet 0     blood glucose (ACCU-CHEK GUIDE) test strip USE 1 STRIP TO CHECK GLUCOSE THREE TIMES DAILY 300 strip 0     busPIRone (BUSPAR) 5 MG tablet Take 1 tablet (5 mg) by mouth 2 times  daily 180 tablet 0     carvedilol (COREG) 3.125 MG tablet Take 1 tablet (3.125 mg) by mouth 2 times daily (with meals) 180 tablet 3     Continuous Blood Gluc Sensor (FREESTYLE DOMI 2 SENSOR) [facility] 1 each every 14 days Use 1 sensor every 14 days. Use to read blood sugars per 's instructions. 1 each 0     finasteride (PROSCAR) 5 MG tablet Take 1 tablet (5 mg) by mouth daily 90 tablet 3     furosemide (LASIX) 20 MG tablet TAKE 2 TABLETS BY MOUTH IN THE MORNING AND 1 TABLET IN THE AFTERNOON 270 tablet 0     gabapentin (NEURONTIN) 600 MG tablet Take 1 tablet (600 mg) by mouth 3 times daily (Patient taking differently: Take 600 mg by mouth 2 times daily) 180 tablet 0     insulin aspart prot & aspart (NOVOLOG MIX 70/30 PEN) (70-30) 100 UNIT/ML pen Inject subcutaneously 20 units in AM with breakfast and 12 in PM with dinner. If Blood Glucose<100 then give 1/2 dose) 15 mL 0     metFORMIN (GLUCOPHAGE) 500 MG tablet Take 2 tablets by mouth twice daily 360 tablet 3     pramipexole (MIRAPEX) 0.25 MG tablet TAKE 1 TABLET BY MOUTH TWICE DAILY TAKES  AT  4PM  AND  5.30PM 180 tablet 1     rosuvastatin (CRESTOR) 20 MG tablet Take 1 tablet (20 mg) by mouth At Bedtime 100 tablet 1     tamsulosin (FLOMAX) 0.4 MG capsule Take 1 capsule (0.4 mg) by mouth daily 30 capsule 11     warfarin ANTICOAGULANT (COUMADIN) 1 MG tablet 5mg daily on Monydays and thursdays and 4mg all other days. 60 tablet 0     warfarin ANTICOAGULANT (COUMADIN) 4 MG tablet TAKE 4MG BY MOUTH DAILY ON TUESDAY, WEDNSDAY, FRIDAY, SATURDAY, SUNDAY AND 5MG TABLET ON MONDAY AND THURSDAY OR AS DIRECTED BY [facility] 100 tablet 1     cilostazol (PLETAL) 50 MG tablet TAKE 1 TABLET BY MOUTH  DAILY FOR 14 DAYS, THEN 1  TABLET TWICE DAILY FOR 14  DAYS, THEN 2 TABLETS TWICE  DAILY FOR 30 DAYS (Patient not taking: Reported on 3/21/2023) 162 tablet 5       Allergies   Allergen Reactions     Oxycodone Itching and Rash     Amlodipine Dizziness     Dizziness and dry heaves      Lisinopril Cough     Adhesive Tape Itching and Rash          Lab Results    Chemistry/lipid CBC Cardiac Enzymes/BNP/TSH/INR   Recent Labs   Lab Test 01/25/22  1443 09/16/21  1026 01/05/16  1104 04/29/15  0834   CHOL  --  117   < > 148   HDL  --  43   < > 44   LDL 65 48   < > 85   TRIG  --  131   < > 93   CHOLHDLRATIO  --   --   --  3.4    < > = values in this interval not displayed.     Recent Labs   Lab Test 01/25/22  1443 09/16/21  1026 08/31/21  1221   LDL 65 48 64     Recent Labs   Lab Test 11/17/22  1315 08/15/22  1325   NA  --  140   POTASSIUM  --  3.4   CHLORIDE  --  97*   CO2  --  30*   GLC  --  64*   BUN 23.0 19.7   CR 0.94 1.00   GFRESTIMATED 82 76   RACHEL 9.4 9.7     Recent Labs   Lab Test 11/17/22  1315 08/15/22  1325 07/25/22  0605   CR 0.94 1.00 0.74     Recent Labs   Lab Test 11/17/22  1315 08/15/22  1325 05/24/22  1023   A1C 10.3* 8.8* 6.3*          Recent Labs   Lab Test 07/18/22  0636   WBC 6.9   HGB 10.8*   HCT 34.6*   *        Recent Labs   Lab Test 07/18/22  0636 07/12/22  0621 07/11/22  1048   HGB 10.8* 10.7* 10.8*    Recent Labs   Lab Test 07/04/22  1034   TROPONINI 0.15     Recent Labs   Lab Test 07/04/22  1034 01/08/21  0842 10/21/20  1451 05/09/19  1657 06/09/16  1203   * 91* 56   < >  --    NTBNP  --   --   --   --  354*    < > = values in this interval not displayed.     Recent Labs   Lab Test 11/17/22  1315   TSH 0.92     Recent Labs   Lab Test 12/16/22  0927 11/15/22  1443 10/10/22  1539   INR 2.4* 2.7* 2.6*        Ulices Hernandez, DO  Thank you for allowing me to participate in the care of your patient.      Sincerely,     Ulices Hernandez DO     [facility]  cc:   Ulices Hernandez DO  [address] [place] [address]

## 2023-03-24 ENCOUNTER — TELEPHONE (OUTPATIENT)
Dept: FAMILY MEDICINE | Facility: CLINIC | Age: 83
End: 2023-03-24
Payer: COMMERCIAL

## 2023-03-24 DIAGNOSIS — E11.42 DIABETIC POLYNEUROPATHY ASSOCIATED WITH TYPE 2 DIABETES MELLITUS (H): ICD-10-CM

## 2023-03-24 NOTE — TELEPHONE ENCOUNTER
Received today from Pee [employer] paper work    Please call [phone] for     Paper work given to Viky

## 2023-03-28 ENCOUNTER — HOSPITAL ENCOUNTER (OUTPATIENT)
Dept: CARDIOLOGY | Facility: HOSPITAL | Age: 83
Discharge: HOME | End: 2023-03-28
Attending: INTERNAL MEDICINE | Admitting: INTERNAL MEDICINE
Payer: COMMERCIAL

## 2023-03-28 DIAGNOSIS — I35.9 AORTIC VALVE DISORDER: ICD-10-CM

## 2023-03-28 LAB — LVEF ECHO: NORMAL

## 2023-03-28 PROCEDURE — 93306 TTE W/DOPPLER COMPLETE: CPT

## 2023-03-28 PROCEDURE — 93306 TTE W/DOPPLER COMPLETE: CPT | Mod: 26 | Performed by: INTERNAL MEDICINE

## 2023-03-29 RX ORDER — GABAPENTIN 600 MG/1
600 TABLET ORAL 2 TIMES DAILY
Start: 2023-03-29 | End: 2023-12-21

## 2023-03-30 ENCOUNTER — LAB (OUTPATIENT)
Dept: LAB | Facility: CLINIC | Age: 83
End: 2023-03-30
Payer: COMMERCIAL

## 2023-03-30 ENCOUNTER — ANTICOAGULATION THERAPY VISIT (OUTPATIENT)
Dept: ANTICOAGULATION | Facility: CLINIC | Age: 83
End: 2023-03-30

## 2023-03-30 DIAGNOSIS — Z79.01 LONG TERM (CURRENT) USE OF ANTICOAGULANTS: ICD-10-CM

## 2023-03-30 DIAGNOSIS — I73.9 PERIPHERAL ARTERIAL DISEASE (H): ICD-10-CM

## 2023-03-30 DIAGNOSIS — I48.20 CHRONIC ATRIAL FIBRILLATION (H): ICD-10-CM

## 2023-03-30 DIAGNOSIS — Z79.01 CHRONIC ANTICOAGULATION: ICD-10-CM

## 2023-03-30 DIAGNOSIS — G89.29 CHRONIC THORACIC BACK PAIN, UNSPECIFIED BACK PAIN LATERALITY: ICD-10-CM

## 2023-03-30 DIAGNOSIS — I48.20 CHRONIC ATRIAL FIBRILLATION (H): Primary | ICD-10-CM

## 2023-03-30 DIAGNOSIS — M54.6 CHRONIC THORACIC BACK PAIN, UNSPECIFIED BACK PAIN LATERALITY: ICD-10-CM

## 2023-03-30 LAB — INR BLD: 1.8 (ref 0.9–1.1)

## 2023-03-30 PROCEDURE — 36416 COLLJ CAPILLARY BLOOD SPEC: CPT

## 2023-03-30 PROCEDURE — 85610 PROTHROMBIN TIME: CPT

## 2023-03-30 NOTE — PROGRESS NOTES
ANTICOAGULATION MANAGEMENT     Pee Ayala 82 year old male is on warfarin with subtherapeutic INR result. (Goal INR 2.0-3.0)    Recent labs: (last 7 days)     03/30/23  1008   INR 1.8*       ASSESSMENT     Warfarin Lab Questionnaire    Dose in Tablet or Mg 3/30/2023   TAB or MG? - warfarin tablets  1mg bright pink / 4mg blue.  (evenings) tablet (tab)     Pt Rptd Dose JAY MONDAY TUESDAY WEDNESDAY THURSDAY FRIDAY SATURDAY   3/30/2023 4 4 5 4 5 4 4     Warfarin Lab Questionnaire 3/30/2023   Missed doses? No - we verified warfarin tablets on hand - has 1mg and 4mg tablets.        - amended calendar to reflect 1mg / 4mg warfarin tabs    Medication changes? No - wrong warfarin dose written above and taking less warfarin of 33mg/wk, than ordered with 34mg/wk.        - started on 3/21/23 Tamsulosin 0.4mg daily to improve urinary issues.          - also, not able to tolerate Lasix 40mg daily; will take 20mg 2x/day.   Abnormal bleeding? No   Shortness of breath? No   Injuries or illness since last INR? No   Changes in diet or alcohol? No   Upcoming surgery, procedure? No   Best number to call with results? after 12p  [phone]       Additional findings: None       PLAN     Recommended plan for temporary change(s) and ongoing change(s) affecting INR     Dosing Instructions: Increase your warfarin dose (8.1% change) with next INR in 1-2 weeks       Summary  As of 3/30/2023    Full warfarin instructions:  3/31: 4 mg; Otherwise 5 mg every day   Next INR check:  4/13/2023             Telephone call with  Pee ([phone]) who verbalizes understanding and agrees to plan    Check at provider office visit - INR on 4/6/23 at [facility] with Dr. Singh.    Education provided:     Taking warfarin: Importance of taking warfarin as instructed    Goal range and lab monitoring: goal range and significance of current result and Importance of therapeutic range    Plan made per [facility] anticoagulation protocol       Mariel Dale,  RN  Anticoagulation Clinic  3/30/2023    _______________________________________________________________________     Anticoagulation Episode Summary     Current INR goal:  2.0-3.0   TTR:  53.9 % (11.7 mo)   Target end date:  Indefinite   Send INR reminders to:  [facility]    Indications    Chronic atrial fibrillation (H) [I48.20]  Chronic anticoagulation [Z79.01]  Long term (current) use of anticoagulants [Z79.01]  Peripheral arterial disease (H) [I73.9]           Comments:           Anticoagulation Care Providers     Provider Role Specialty Phone number    Jazzy Gil MD Referring Family Medicine [phone]    Ihsan Singh MD Referring Family Medicine [phone]

## 2023-03-30 NOTE — LETTER
April 7, 2023      Pee PEREZ Jamie  [address]  [place] [address]        Dear ,  We are writing to inform you of your test results.    Сергей Glasgow:  Your recent urine drug screen is negative.      Resulted Orders   Drug abuse screen 8 urine (UR)   Result Value Ref Range    Amphetamines Urine Screen Negative Screen Negative      Comment:      Cutoff for a negative amphetamine is less than 500 ng/mL.    Barbituates Urine Screen Negative Screen Negative      Comment:      Cutoff for a negative barbiturate is less than 200 ng/mL.    Benzodiazepine Urine Screen Negative Screen Negative      Comment:      Cutoff for a negative benzodiazepine is less than 100 ng/mL.    Cannabinoids Urine Screen Negative Screen Negative      Comment:      Cutoff for a negative cannabinoid is less than 50 ng/mL.    Cocaine Urine Screen Negative Screen Negative      Comment:      Cutoff for a negative cocaine is less than 300 ng/mL.    Ethanol Urine Screen Negative Screen Negative      Comment:      Cutoff for a negative urine ethanol is less than 0.05 g/dL.    Opiates Urine Screen Negative Screen Negative      Comment:      Cutoff for a negative opiate is less than 300 ng/mL.    PCP Urine Screen Negative Screen Negative      Comment:      Cutoff for a negative PCP is less than 25 ng/mL.       If you have any questions or concerns, please call the clinic at the number listed above.       Sincerely,      Ihsan Singh MD

## 2023-04-06 ENCOUNTER — OFFICE VISIT (OUTPATIENT)
Dept: FAMILY MEDICINE | Facility: CLINIC | Age: 83
End: 2023-04-06
Payer: COMMERCIAL

## 2023-04-06 VITALS
TEMPERATURE: 98.3 F | HEIGHT: 62 IN | HEART RATE: 96 BPM | RESPIRATION RATE: 18 BRPM | DIASTOLIC BLOOD PRESSURE: 63 MMHG | SYSTOLIC BLOOD PRESSURE: 111 MMHG | OXYGEN SATURATION: 96 % | BODY MASS INDEX: 30.14 KG/M2 | WEIGHT: 163.8 LBS

## 2023-04-06 DIAGNOSIS — I25.10 CORONARY ARTERY DISEASE INVOLVING NATIVE CORONARY ARTERY OF NATIVE HEART WITHOUT ANGINA PECTORIS: ICD-10-CM

## 2023-04-06 DIAGNOSIS — Z00.00 ENCOUNTER FOR ANNUAL WELLNESS EXAM IN MEDICARE PATIENT: ICD-10-CM

## 2023-04-06 DIAGNOSIS — E78.5 DYSLIPIDEMIA: ICD-10-CM

## 2023-04-06 DIAGNOSIS — D64.9 ANEMIA, UNSPECIFIED TYPE: ICD-10-CM

## 2023-04-06 DIAGNOSIS — L98.9 SCALP LESION: ICD-10-CM

## 2023-04-06 DIAGNOSIS — I48.20 CHRONIC ATRIAL FIBRILLATION (H): ICD-10-CM

## 2023-04-06 DIAGNOSIS — R52 PAIN: ICD-10-CM

## 2023-04-06 DIAGNOSIS — M81.0 OSTEOPOROSIS, UNSPECIFIED OSTEOPOROSIS TYPE, UNSPECIFIED PATHOLOGICAL FRACTURE PRESENCE: ICD-10-CM

## 2023-04-06 DIAGNOSIS — Z95.2 S/P TAVR (TRANSCATHETER AORTIC VALVE REPLACEMENT): ICD-10-CM

## 2023-04-06 DIAGNOSIS — R74.8 ELEVATED LIVER ENZYMES: ICD-10-CM

## 2023-04-06 DIAGNOSIS — E11.3599 TYPE 2 DIABETES MELLITUS WITH PROLIFERATIVE RETINOPATHY WITHOUT MACULAR EDEMA, WITHOUT LONG-TERM CURRENT USE OF INSULIN, UNSPECIFIED LATERALITY (H): ICD-10-CM

## 2023-04-06 DIAGNOSIS — I50.32 CHRONIC HEART FAILURE WITH PRESERVED EJECTION FRACTION (HFPEF) (H): ICD-10-CM

## 2023-04-06 LAB
ALT SERPL W P-5'-P-CCNC: 24 U/L (ref 10–50)
AMPHETAMINES UR QL SCN: NORMAL
BARBITURATES UR QL SCN: NORMAL
BENZODIAZ UR QL SCN: NORMAL
BZE UR QL SCN: NORMAL
CANNABINOIDS UR QL SCN: NORMAL
CHOLEST SERPL-MCNC: 136 MG/DL
ERYTHROCYTE [DISTWIDTH] IN BLOOD BY AUTOMATED COUNT: 12.9 % (ref 10–15)
ETHANOL UR QL SCN: NORMAL
HCT VFR BLD AUTO: 38.5 % (ref 40–53)
HDLC SERPL-MCNC: 61 MG/DL
HGB BLD-MCNC: 12.4 G/DL (ref 13.3–17.7)
LDLC SERPL CALC-MCNC: 58 MG/DL
MCH RBC QN AUTO: 32.5 PG (ref 26.5–33)
MCHC RBC AUTO-ENTMCNC: 32.2 G/DL (ref 31.5–36.5)
MCV RBC AUTO: 101 FL (ref 78–100)
NONHDLC SERPL-MCNC: 75 MG/DL
OPIATES UR QL SCN: NORMAL
PCP QUAL URINE (ROCHE): NORMAL
PLATELET # BLD AUTO: 158 10E3/UL (ref 150–450)
RBC # BLD AUTO: 3.82 10E6/UL (ref 4.4–5.9)
TRIGL SERPL-MCNC: 85 MG/DL
WBC # BLD AUTO: 5.3 10E3/UL (ref 4–11)

## 2023-04-06 PROCEDURE — 84460 ALANINE AMINO (ALT) (SGPT): CPT | Performed by: FAMILY MEDICINE

## 2023-04-06 PROCEDURE — 80061 LIPID PANEL: CPT | Performed by: FAMILY MEDICINE

## 2023-04-06 PROCEDURE — 99214 OFFICE O/P EST MOD 30 MIN: CPT | Mod: 25 | Performed by: FAMILY MEDICINE

## 2023-04-06 PROCEDURE — 36415 COLL VENOUS BLD VENIPUNCTURE: CPT | Performed by: FAMILY MEDICINE

## 2023-04-06 PROCEDURE — 85027 COMPLETE CBC AUTOMATED: CPT | Performed by: FAMILY MEDICINE

## 2023-04-06 PROCEDURE — 80307 DRUG TEST PRSMV CHEM ANLYZR: CPT | Performed by: FAMILY MEDICINE

## 2023-04-06 PROCEDURE — G0439 PPPS, SUBSEQ VISIT: HCPCS | Performed by: FAMILY MEDICINE

## 2023-04-06 RX ORDER — PRAMIPEXOLE DIHYDROCHLORIDE 0.25 MG/1
0.25 TABLET ORAL 3 TIMES DAILY
Qty: 270 TABLET | Refills: 3
Start: 2023-04-06 | End: 2023-10-27

## 2023-04-06 RX ORDER — CARVEDILOL 3.12 MG/1
3.12 TABLET ORAL 2 TIMES DAILY WITH MEALS
Qty: 180 TABLET | Refills: 3
Start: 2023-04-06 | End: 2023-07-31

## 2023-04-06 ASSESSMENT — ENCOUNTER SYMPTOMS
MYALGIAS: 0
HEMATURIA: 0
EYE PAIN: 0
JOINT SWELLING: 0
PALPITATIONS: 0
HEADACHES: 0
SHORTNESS OF BREATH: 0
NERVOUS/ANXIOUS: 0
FEVER: 0
HEARTBURN: 1
WEAKNESS: 0
ABDOMINAL PAIN: 0
DYSURIA: 0
FREQUENCY: 1
DIARRHEA: 0
CHILLS: 1
CONSTIPATION: 1
SORE THROAT: 0
PARESTHESIAS: 0
NAUSEA: 0
DIZZINESS: 1
ARTHRALGIAS: 1
COUGH: 0

## 2023-04-06 ASSESSMENT — ACTIVITIES OF DAILY LIVING (ADL)
CURRENT_FUNCTION: SHOPPING REQUIRES ASSISTANCE
CURRENT_FUNCTION: TRANSPORTATION REQUIRES ASSISTANCE
CURRENT_FUNCTION: LAUNDRY REQUIRES ASSISTANCE
CURRENT_FUNCTION: MONEY MANAGEMENT REQUIRES ASSISTANCE

## 2023-04-06 NOTE — LETTER
April 7, 2023      Pee Ayala  [address]  [place] [address]        Dear ,  We are writing to inform you of your test results.    Сергей Glasgow:  The cholesterol panel is good,.  Normal liver function.  Your hemoglobin has improved and is just slightly below normal.    Resulted Orders   Lipid panel reflex to direct LDL Non-fasting   Result Value Ref Range    Cholesterol 136 <200 mg/dL    Triglycerides 85 <150 mg/dL    Direct Measure HDL 61 >=40 mg/dL    LDL Cholesterol Calculated 58 <=100 mg/dL    Non HDL Cholesterol 75 <130 mg/dL    Narrative    Cholesterol  Desirable:  <200 mg/dL    Triglycerides  Normal:  Less than 150 mg/dL  Borderline High:  150-199 mg/dL  High:  200-499 mg/dL  Very High:  Greater than or equal to 500 mg/dL    Direct Measure HDL  Female:  Greater than or equal to 50 mg/dL   Male:  Greater than or equal to 40 mg/dL    LDL Cholesterol  Desirable:  <100mg/dL  Above Desirable:  100-129 mg/dL   Borderline High:  130-159 mg/dL   High:  160-189 mg/dL   Very High:  >= 190 mg/dL    Non HDL Cholesterol  Desirable:  130 mg/dL  Above Desirable:  130-159 mg/dL  Borderline High:  160-189 mg/dL  High:  190-219 mg/dL  Very High:  Greater than or equal to 220 mg/dL   ALT   Result Value Ref Range    ALT 24 10 - 50 U/L   CBC with platelets   Result Value Ref Range    WBC Count 5.3 4.0 - 11.0 10e3/uL    RBC Count 3.82 (L) 4.40 - 5.90 10e6/uL    Hemoglobin 12.4 (L) 13.3 - 17.7 g/dL    Hematocrit 38.5 (L) 40.0 - 53.0 %     (H) 78 - 100 fL    MCH 32.5 26.5 - 33.0 pg    MCHC 32.2 31.5 - 36.5 g/dL    RDW 12.9 10.0 - 15.0 %    Platelet Count 158 150 - 450 10e3/uL       If you have any questions or concerns, please call the clinic at the number listed above.       Sincerely,      Ihsan Singh MD

## 2023-04-06 NOTE — PATIENT INSTRUCTIONS
Med list is updated    Labs done (non-fasting), ALT, LIPIDS, CBC    Dermatology referral (To see Dr Moore)    Endocrinology referral to discuss osteoporosis treatment options

## 2023-04-06 NOTE — PROGRESS NOTES
"SUBJECTIVE:   Pee is a 82 year old who presents for Preventive Visit.      4/6/2023     3:23 PM   Additional Questions   Roomed by CHARLI Abad CMA([facility])   Patient has been advised of split billing requirements and indicates understanding: Yes  Are you in the first 12 months of your Medicare coverage?  No    Healthy Habits:     In general, how would you rate your overall health?  Good    Frequency of exercise:  1 day/week    Duration of exercise:  15-30 minutes    Do you usually eat at least 4 servings of fruit and vegetables a day, include whole grains    & fiber and avoid regularly eating high fat or \"junk\" foods?  Yes    Taking medications regularly:  Yes    Medication side effects:  None    Ability to successfully perform activities of daily living:  Transportation requires assistance, shopping requires assistance, laundry requires assistance and money management requires assistance    Home Safety:  Throw rugs in the hallway    Hearing Impairment:  Difficulty following a conversation in a noisy restaurant or crowded room and difficulty understanding soft or whispered speech    In the past 6 months, have you been bothered by leaking of urine? Yes    In general, how would you rate your overall mental or emotional health?  Good      PHQ-2 Total Score: 1    Additional concerns today:  No      Have you ever done Advance Care Planning? (For example, a Health Directive, POLST, or a discussion with a medical provider or your loved ones about your wishes): Yes, patient states has an Advance Care Planning document and will bring a copy to the clinic.       Fall risk  Fallen 2 or more times in the past year?: Yes  Any fall with injury in the past year?: Yes    Cognitive Screening   1) Repeat 3 items (Leader, Season, Table)    2) Clock draw: NORMAL  3) 3 item recall: Recalls 1 object   Results: NORMAL clock, 1-2 items recalled: COGNITIVE IMPAIRMENT LESS LIKELY    Mini-CogTM Copyright ASHLYN Castillo. Licensed by the author for use " in [facility]; reprinted with permission (soob@[facility]). All rights reserved.          Reviewed and updated as needed this visit by clinical staff   Tobacco  Allergies  Meds              Reviewed and updated as needed this visit by Provider                 Social History     Tobacco Use     Smoking status: Former     Types: Cigarettes     Quit date: 1998     Years since quittin.2     Smokeless tobacco: Never   Vaping Use     Vaping status: Not on file   Substance Use Topics     Alcohol use: Yes     Alcohol/week: 0.0 standard drinks of alcohol     Comment: Alcoholic Drinks/day: very rare             2023     3:18 PM   Alcohol Use   Prescreen: >3 drinks/day or >7 drinks/week? Not Applicable     Do you have a current opioid prescription? Yes   How severe is your pain on a scale from 1-10? 5/10         Do you use any other controlled substances or medications that are not prescribed by a provider? None      Current providers sharing in care for this patient include:   Patient Care Team:  Ihsan Singh MD as PCP - General (Family Medicine)  Rodolfo Curry, PharmD as Pharmacist (Pharmacist)  Wendy Delgadillo MD as Assigned Heart and Vascular Provider  Tiera Delgado MD as MD (Endocrinology, Diabetes, and Metabolism)  Bryan Andersen MD as Assigned Neuroscience Provider  Mar Morales DO as Assigned PCP  Betina Rodriguez RN as Lead Care Coordinator (Primary Care - CC)  Dora Mott as Community Health Worker  Gilma Coombs APRN CNP as Nurse Practitioner  Kaci Evans, PhD as Assigned Behavioral Health Provider  Tiera Delgado MD as Assigned Endocrinology Provider  Harjit Coates CNP as Assigned Pain Medication Provider  Eagle Aaron DPM as Assigned Surgical Provider  Ihsan Singh MD as Assigned Pain Medication Provider    The following health maintenance items are reviewed in Epic and correct as of today:  Health Maintenance   Topic Date Due  "    HF ACTION PLAN  06/09/2019     EYE EXAM  02/19/2022     MEDICARE ANNUAL WELLNESS VISIT  09/16/2022     LIPID  09/16/2022     ANNUAL REVIEW OF HM ORDERS  09/16/2022     COVID-19 Vaccine (5 - Booster for Pfizer series) 10/21/2022     DIABETIC FOOT EXAM  03/17/2023     ALT  05/24/2023     CBC  07/18/2023     PHQ-9  08/14/2023     MICROALBUMIN  08/15/2023     A1C  09/14/2023     BMP  09/14/2023     TSH W/FREE T4 REFLEX  11/17/2023     FALL RISK ASSESSMENT  04/06/2024     DTAP/TDAP/TD IMMUNIZATION (8 - Td or Tdap) 06/15/2026     ADVANCE CARE PLANNING  04/06/2028     DEPRESSION ACTION PLAN  Completed     INFLUENZA VACCINE  Completed     Pneumococcal Vaccine: 65+ Years  Completed     ZOSTER IMMUNIZATION  Completed     IPV IMMUNIZATION  Aged Out     MENINGITIS IMMUNIZATION  Aged Out         Review of Systems   Constitutional: Positive for chills. Negative for fever.   HENT: Positive for hearing loss. Negative for ear pain and sore throat.    Eyes: Positive for visual disturbance. Negative for pain.   Respiratory: Negative for cough and shortness of breath.    Cardiovascular: Positive for peripheral edema. Negative for chest pain and palpitations.   Gastrointestinal: Positive for constipation and heartburn. Negative for abdominal pain, diarrhea and nausea.   Genitourinary: Positive for frequency, impotence and urgency. Negative for dysuria, genital sores, hematuria and penile discharge.   Musculoskeletal: Positive for arthralgias. Negative for joint swelling and myalgias.   Skin: Negative for rash.   Neurological: Positive for dizziness. Negative for weakness, headaches and paresthesias.   Psychiatric/Behavioral: Negative for mood changes. The patient is not nervous/anxious.          OBJECTIVE:   /63   Pulse 96   Temp 98.3  F (36.8  C) (Oral)   Resp 18   Ht 1.568 m (5' 1.75\")   Wt 74.3 kg (163 lb 12.8 oz)   SpO2 96%   BMI 30.20 kg/m   Estimated body mass index is 30.2 kg/m  as calculated from the " "following:    Height as of this encounter: 1.568 m (5' 1.75\").    Weight as of this encounter: 74.3 kg (163 lb 12.8 oz).  Physical Exam  GENERAL: healthy, alert and no distress  EYES: Eyes grossly normal to inspection, PERRL and conjunctivae and sclerae normal  HENT: ear canals and TM's normal, nose and mouth without ulcers or lesions  NECK: no adenopathy, no asymmetry, masses, or scars and thyroid normal to palpation  RESP: lungs clear to auscultation - no rales, rhonchi or wheezes  CV: regular rate and rhythm, normal S1 S2, no S3 or S4, no murmur, click or rub, no peripheral edema and peripheral pulses strong  ABDOMEN: soft, nontender, no hepatosplenomegaly, no masses and bowel sounds normal  RECTAL: deferred  MS: no gross musculoskeletal defects noted, no edema  SKIN: no suspicious lesions or rashes  NEURO: Normal strength and tone, mentation intact and speech normal  PSYCH: mentation appears normal, affect normal/bright    INR   Date Value Ref Range Status   03/30/2023 1.8 (H) 0.9 - 1.1 Final   01/13/2021 1.13 0.86 - 1.14 Final     INR Point of Care   Date Value Ref Range Status   03/21/2023 1.4 (A) 0.9 - 1.1 Final      CBC RESULTS: Recent Labs   Lab Test 07/18/22  0636   WBC 6.9   RBC 3.42*   HGB 10.8*   HCT 34.6*   *   MCH 31.6   MCHC 31.2*   RDW 13.6        Last Comprehensive Metabolic Panel:  Lab Results   Component Value Date     03/14/2023    POTASSIUM 4.2 03/14/2023    CHLORIDE 103 03/14/2023    CO2 24 03/14/2023    ANIONGAP 15 03/14/2023     (H) 03/14/2023    BUN 24.9 (H) 03/14/2023    CR 0.99 03/14/2023    GFRESTIMATED 77 03/14/2023    RACHEL 9.3 03/14/2023     Lab Results   Component Value Date    A1C 8.6 03/14/2023    A1C 10.3 11/17/2022    A1C 8.8 08/15/2022    A1C 6.3 05/24/2022    A1C 8.0 01/25/2022    A1C 9.1 01/12/2021    A1C 8.0 06/09/2016    A1C 8.2 03/08/2016    A1C 7.7 12/23/2015    A1C 6.9 09/15/2015     Lab Results   Component Value Date    CHOL 117 09/16/2021    " CHOL 148 04/29/2015     Lab Results   Component Value Date    HDL 43 09/16/2021    HDL 44 04/29/2015     Lab Results   Component Value Date    LDL 65 01/25/2022    LDL 79 01/05/2016    LDL 85 04/29/2015     Lab Results   Component Value Date    TRIG 131 09/16/2021    TRIG 93 04/29/2015     Lab Results   Component Value Date    CHOLHDLRATIO 3.4 04/29/2015         ASSESSMENT / PLAN:       ICD-10-CM    1. Encounter for annual wellness exam in Medicare patient  Z00.00       2. 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 S/P TAVR (transcatheter aortic valve replacement), anticoagulated Z95.2       4. Dyslipidemia,  Stable on statin E78.5 Lipid panel reflex to direct LDL Non-fasting     ALT      5. Elevated liver enzymes, recheck R74.8       6. Osteoporosis, unspecified osteoporosis type, unspecified pathological fracture presence, referral to Endocrine to consult regarding possible treatments. M81.0 Adult Endocrinology  Referral      7. Chronic atrial fibrillation (H), anticoagulated I48.20 carvedilol (COREG) 3.125 MG tablet      8. Coronary artery disease involving native coronary artery of native heart without angina pectoris, stable I25.10 carvedilol (COREG) 3.125 MG tablet      9. Chronic heart failure with preserved ejection fraction (HFpEF) (H), stable  I50.32 carvedilol (COREG) 3.125 MG tablet      10. Pain, try pramipexole three times daily  R52 pramipexole (MIRAPEX) 0.25 MG tablet      11. Scalp lesion, multipe AKs of scalp, to see Derm  L98.9 Adult Dermatology Referral      12. Anemia, unspecified type, recheck HGB D64.9 CBC with platelets          PLAN:        Med list is updated    Labs done (non-fasting), ALT, LIPIDS, CBC    Dermatology referral (To see Dr Moore)    Endocrinology referral to discuss osteoporosis treatment options               COUNSELING:  Reviewed preventive health counseling, as reflected in  "patient instructions       Regular exercise       Healthy diet/nutrition       Colon cancer screening       Prostate cancer screening      BMI:   Estimated body mass index is 30.2 kg/m  as calculated from the following:    Height as of this encounter: 1.568 m (5' 1.75\").    Weight as of this encounter: 74.3 kg (163 lb 12.8 oz).         He reports that he quit smoking about 25 years ago. His smoking use included cigarettes. He has never used smokeless tobacco.      Appropriate preventive services were discussed with this patient, including applicable screening as appropriate for cardiovascular disease, diabetes, osteopenia/osteoporosis, and glaucoma.  As appropriate for age/gender, discussed screening for colorectal cancer, prostate cancer, breast cancer, and cervical cancer. Checklist reviewing preventive services available has been given to the patient.    Reviewed patients plan of care and provided an AVS. The Basic Care Plan (routine screening as documented in Health Maintenance) for Pee meets the Care Plan requirement. This Care Plan has been established and reviewed with the Patient.      Ihsan Singh MD  [facility]    Identified Health Risks:    "

## 2023-04-13 ENCOUNTER — PATIENT OUTREACH (OUTPATIENT)
Dept: CARE COORDINATION | Facility: CLINIC | Age: 83
End: 2023-04-13

## 2023-04-13 NOTE — PROGRESS NOTES
Clinic Care Coordination Contact  [facility]/Voicemail     Clinical Data: Care Coordinator Outreach  Outreach attempted x 1.  Left message on patient's voicemail with call back information and requested return call.  Plan: Care Coordinator will try to reach patient again in 10 business days.    Next CHW outreach: 4/27/23    Dora Mott  [facility] Health Worker  [facility] Care Coordination   [place] [facility]  Office: [phone]

## 2023-04-17 ENCOUNTER — TELEPHONE (OUTPATIENT)
Dept: ANTICOAGULATION | Facility: CLINIC | Age: 83
End: 2023-04-17
Payer: COMMERCIAL

## 2023-04-17 NOTE — TELEPHONE ENCOUNTER
ANTICOAGULATION     Pee Ayala is overdue for INR check.      Spoke with Pee and scheduled lab appointment on 4/20    Kendal Muniz RN

## 2023-04-20 ENCOUNTER — LAB (OUTPATIENT)
Dept: LAB | Facility: CLINIC | Age: 83
End: 2023-04-20
Payer: COMMERCIAL

## 2023-04-20 ENCOUNTER — ANTICOAGULATION THERAPY VISIT (OUTPATIENT)
Dept: ANTICOAGULATION | Facility: CLINIC | Age: 83
End: 2023-04-20

## 2023-04-20 DIAGNOSIS — I48.20 CHRONIC ATRIAL FIBRILLATION (H): Primary | ICD-10-CM

## 2023-04-20 DIAGNOSIS — I73.9 PERIPHERAL ARTERIAL DISEASE (H): ICD-10-CM

## 2023-04-20 DIAGNOSIS — I48.20 CHRONIC ATRIAL FIBRILLATION (H): ICD-10-CM

## 2023-04-20 DIAGNOSIS — Z79.01 LONG TERM (CURRENT) USE OF ANTICOAGULANTS: ICD-10-CM

## 2023-04-20 LAB — INR BLD: 1.4 (ref 0.9–1.1)

## 2023-04-20 PROCEDURE — 85610 PROTHROMBIN TIME: CPT

## 2023-04-20 PROCEDURE — 36416 COLLJ CAPILLARY BLOOD SPEC: CPT

## 2023-04-20 RX ORDER — WARFARIN SODIUM 5 MG/1
TABLET ORAL
Qty: 90 TABLET | Refills: 0 | Status: SHIPPED | OUTPATIENT
Start: 2023-04-20 | End: 2023-11-20

## 2023-04-20 NOTE — PROGRESS NOTES
ANTICOAGULATION MANAGEMENT     Pee Ayala 83 year old male is on warfarin with subtherapeutic INR result. (Goal INR 2.0-3.0)    Recent labs: (last 7 days)     04/20/23  1450   INR 1.4*       ASSESSMENT     Warfarin Lab Questionnaire    Warfarin Doses Last 7 Days      4/20/2023     2:51 PM   Dose in Tablet or Mg   TAB or MG? milligram (mg)           4/20/2023   Warfarin Lab Questionnaire   Missed doses? No   Changes in diet or alcohol? No   Medication changes? No   Injuries or illness since last INR? No   Shortness of breath? No   Abnormal bleeding? No   Upcoming surgery, procedure? No   Best number to call with results? [phone]        Previous INR: Subtherapeutic  Additional findings: Pt INR sub-therapeutic again today. Last INR was on 3/30/23 at 1.8. Maintenance dose was increased at that time. Not certain if pt has been taking 5 mg daily or not? For now, left a detailed voicemail message to take 10 mg of warfarin today 4/20/23 then take 5 mg daily of warfarin. Recheck INR in 5-7 days. Pt to call back with updates, questions or concerns.       PLAN     Dosing Instructions: booster dose then continue your current warfarin dose with next INR in 5-7 days       Summary  As of 4/20/2023    Full warfarin instructions:  4/20: 10 mg; Otherwise 5 mg every day   Next INR check:  4/27/2023             Detailed voice message left for Pee with dosing instructions and follow up date.   Sent [employer] message with dosing and follow up instructions    Contact [phone] to schedule and with any changes, questions or concerns.     Education provided:     Please call back if any changes to your diet, medications or how you've been taking warfarin    Contact [phone] with any changes, questions or concerns.     Plan made per [facility] anticoagulation protocol    Shanna Andres, RN  Anticoagulation Clinic  4/20/2023    _______________________________________________________________________     Anticoagulation Episode Summary      Current INR goal:  2.0-3.0   TTR:  51.4 % (11.9 mo)   Target end date:  Indefinite   Send INR reminders to:  CINTIHA ZOË [facility]    Indications    Chronic atrial fibrillation (H) [I48.20]  Chronic anticoagulation [Z79.01]  Long term (current) use of anticoagulants [Z79.01]  Peripheral arterial disease (H) [I73.9]           Comments:           Anticoagulation Care Providers     Provider Role Specialty Phone number    Jazzy Gil MD Referring Family Medicine [phone]    Ihsan Singh MD Referring Family Medicine [phone]

## 2023-04-20 NOTE — PROGRESS NOTES
Pt called back and states that he took warfarin 5 mg on Mondays and Thursdays and 4 mg all other days for the last 3 weeks. Pt advised that at last INR check he was advised to take 5 mg daily.     Pt reports that it would be easier to have 5 mg tablets instead of 4 mg tablets and 1 mg tablets. Rx for 5 mg tablets sent to pharmacy. Pt advised to hide the 4 mg tablets of warfarin away for now.    Pt will take a boost dose of 10 mg today 4/20/23 then  new script tomorrow 4/21/23 and start 1 tablet (5 mg) daily. Pt scheduled to recheck INR on 4/27/23.

## 2023-04-23 ENCOUNTER — TRANSFERRED RECORDS (OUTPATIENT)
Dept: MULTI SPECIALTY CLINIC | Facility: CLINIC | Age: 83
End: 2023-04-23

## 2023-04-23 LAB — RETINOPATHY: NORMAL

## 2023-04-27 ENCOUNTER — LAB (OUTPATIENT)
Dept: LAB | Facility: CLINIC | Age: 83
End: 2023-04-27
Payer: COMMERCIAL

## 2023-04-27 ENCOUNTER — ANTICOAGULATION THERAPY VISIT (OUTPATIENT)
Dept: ANTICOAGULATION | Facility: CLINIC | Age: 83
End: 2023-04-27

## 2023-04-27 ENCOUNTER — PATIENT OUTREACH (OUTPATIENT)
Dept: CARE COORDINATION | Facility: CLINIC | Age: 83
End: 2023-04-27

## 2023-04-27 DIAGNOSIS — I73.9 PERIPHERAL ARTERIAL DISEASE (H): ICD-10-CM

## 2023-04-27 DIAGNOSIS — I48.20 CHRONIC ATRIAL FIBRILLATION (H): ICD-10-CM

## 2023-04-27 DIAGNOSIS — Z79.01 LONG TERM (CURRENT) USE OF ANTICOAGULANTS: ICD-10-CM

## 2023-04-27 DIAGNOSIS — Z79.01 CHRONIC ANTICOAGULATION: ICD-10-CM

## 2023-04-27 DIAGNOSIS — I48.20 CHRONIC ATRIAL FIBRILLATION (H): Primary | ICD-10-CM

## 2023-04-27 LAB — INR BLD: 1.6 (ref 0.9–1.1)

## 2023-04-27 PROCEDURE — 85610 PROTHROMBIN TIME: CPT

## 2023-04-27 PROCEDURE — 36416 COLLJ CAPILLARY BLOOD SPEC: CPT
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
## 2023-04-27 NOTE — PROGRESS NOTES
Clinic Care Coordination Contact  Care Coordination Clinician Chart Review    Situation: Patient chart reviewed by Care Coordinator.       Background: Care Coordination Program started: 8/16/2022. Initial assessment completed and patient-centered care plan(s) were developed with participation from patient. Lead CC handed patient off to CHW for continued outreaches.       Assessment: Per chart review, patient outreach completed by CC CHW on 4/13/23.  Patient is actively working to accomplish goal(s). Patient's goal(s) appropriate and relevant at this time. Patient is due for updated Plan of Care.  Assessments will be completed annually or as needed/with change of patient status.      Care Plan: Pain Clinic     Problem: Pain Clinic     Note:      I would like a Referral to the Pain Clinic in the next 30 to 60 days.  Date Goal set: 8/16/22  Barriers:   Strengths: motivated  Date to Achieve By: 30-60 days  Patient expressed understanding of goal: yes  Action steps to achieve this goal:  1. The CCC RN sent a telephone communication to Dr. Singh to ask for a Referral to the Pain Clinic.  If I do not hear from them in the next 2 weeks; I will notify my Community Healthcare Worker, CCC RN and/or clinic. I am on a wait list with [facility] in [place].        Goal: I would like a Referral to the Pain Clinic     Start Date: 8/16/2022    This Visit's Progress: 80% Recent Progress: 70%    Note:     Barriers: wait list  Strengths: motivated  Patient expressed understanding of goal: yes  Action steps to achieve this goal:  1. The CCC RN sent a telephone communication to Dr. Singh to ask for a Referral to the Pain Clinic.  If I do not hear from them in the next 2 weeks; I will notify my Community Healthcare Worker, CCC RN and/or clinic. I have completed my pain injections and had procedure on nerves. I am not having as much pain my lower back but still having pain in my upper back. I am currently on the wait list  with the [facility] clinic. I am not wanting to look into other pain clinics at this time.  I have a procedure plannied with [facility] to hopefully help with my pain.                     Care Plan: My Chart     Problem: HP GENERAL PROBLEM             Care Plan: Appointments     Problem: HP GENERAL PROBLEM             Care Plan: Annual Wellness Visit     Problem: HP GENERAL PROBLEM                    Plan/Recommendations: The patient will continue working with Care Coordination to achieve goal(s) as above. CHW will continue outreaches at minimum every 30 days and will involve Lead CC as needed or if patient is ready to move to Maintenance. Lead CC will continue to monitor CHW outreaches and patient's progress to goal(s) every 6 weeks.     Plan of Care updated and sent to patient: Yes, via [employer]

## 2023-04-27 NOTE — PROGRESS NOTES
ANTICOAGULATION MANAGEMENT     Pee Ayala 83 year old male is on warfarin with subtherapeutic INR result. (Goal INR 2.0-3.0)    Recent labs: (last 7 days)     04/27/23  1314   INR 1.6*       ASSESSMENT     Warfarin Lab Questionnaire    Warfarin Doses Last 7 Days      4/27/2023     1:13 PM   Dose in Tablet or Mg   TAB or MG? milligram (mg)     Pt Rptd Dose SUNDAY MONDAY TUESDAY WED THURS FRIDAY SATURDAY 4/27/2023   1:13 PM 5 5 5 5 5 5 5         4/27/2023   Warfarin Lab Questionnaire   Missed doses within past 14 days? No   Changes in diet or alcohol within past 14 days? No   Medication changes since last result? No   Injuries or illness since last result? No   New shortness of breath, severe headaches or sudden changes in vision since last result? No   Abnormal bleeding since last result? No   Upcoming surgery, procedure? No   Best number to call with results? [phone]        Previous result: Subtherapeutic  Additional findings: None       PLAN     Recommended plan for no diet, medication or health factor changes affecting INR     Dosing Instructions: booster dose then Increase your warfarin dose (14.3% change) with next INR in 1 week       Summary  As of 4/27/2023    Full warfarin instructions:  4/27: 10 mg; Otherwise 7.5 mg every Sun, Wed; 5 mg all other days   Next INR check:  5/4/2023             Telephone call with Pee who verbalizes understanding and agrees to plan    Lab visit scheduled    Education provided:     Contact [phone] with any changes, questions or concerns.     Plan made per [facility] anticoagulation protocol    Yousuf Madison RN  Anticoagulation Clinic  4/27/2023    _______________________________________________________________________     Anticoagulation Episode Summary     Current INR goal:  2.0-3.0   TTR:  51.5 % (11.9 mo)   Target end date:  Indefinite   Send INR reminders to:  [facility]    Indications    Chronic atrial fibrillation (H) [I48.20]  Chronic  anticoagulation [Z79.01]  Long term (current) use of anticoagulants [Z79.01]  Peripheral arterial disease (H) [I73.9]           Comments:           Anticoagulation Care Providers     Provider Role Specialty Phone number    Jazzy Gil MD Referring Family Medicine [phone]    Ihsan Singh MD Referring Family Medicine [phone]

## 2023-04-28 ENCOUNTER — PATIENT OUTREACH (OUTPATIENT)
Dept: CARE COORDINATION | Facility: CLINIC | Age: 83
End: 2023-04-28
Payer: COMMERCIAL

## 2023-04-28 NOTE — PROGRESS NOTES
Clinic Care Coordination Contact  [facility]/Voicemail    Clinical Data: Care Coordinator Outreach  Outreach attempted x 2.  Left message on patient's voicemail with call back information and requested return call.  Plan: Care Coordinator will try to reach patient again in 10 business days.    Next CHW outreach: 5/15/23    Dora Mott  [facility] Health Worker  [facility] Care Coordination   ClaytonYajaira ibarra [place] [facility]  Office: [phone]

## 2023-05-05 ENCOUNTER — TELEPHONE (OUTPATIENT)
Dept: ANTICOAGULATION | Facility: CLINIC | Age: 83
End: 2023-05-05
Payer: COMMERCIAL

## 2023-05-05 NOTE — TELEPHONE ENCOUNTER
ANTICOAGULATION     Pee Ayala is overdue for INR check.      Spoke with Pee and scheduled lab appointment on 5/9    Mariel Dale RN

## 2023-05-08 ENCOUNTER — DOCUMENTATION ONLY (OUTPATIENT)
Dept: LAB | Facility: CLINIC | Age: 83
End: 2023-05-08
Payer: COMMERCIAL

## 2023-05-08 DIAGNOSIS — M81.0 AGE-RELATED OSTEOPOROSIS WITHOUT CURRENT PATHOLOGICAL FRACTURE: Primary | ICD-10-CM

## 2023-05-08 NOTE — PROGRESS NOTES
Pee PEREZ Jamie has an upcoming lab appointment:    Future Appointments   Date Time Provider Department Center   5/9/2023 11:00 AM [facility]   5/16/2023 10:30 AM [facility]   5/30/2023  1:30 PM [facility]   8/22/2023 11:30 AM [facility]     Patient is scheduled for the following lab(s):     Labs per Agueda Kwok NP    There is no order available. Please review and place either future orders or HMPO (Review of Health Maintenance Protocol Orders), as appropriate.    Health Maintenance Due   Topic     ANNUAL REVIEW OF HM ORDERS      Cecy Brooks

## 2023-05-09 NOTE — PROGRESS NOTES
Lm for pt to c/b-  Please see note below if pt calls back      Pt is seeing  for osteo, is pt aware he can see that provider for both osteo and DM?   If pt would still like to keep follow up with Agueda, since pt is not seeing agueda for osteo pt does not need any lab work prior to appt and lab appt can be canceled.

## 2023-05-15 ENCOUNTER — PATIENT OUTREACH (OUTPATIENT)
Dept: CARE COORDINATION | Facility: CLINIC | Age: 83
End: 2023-05-15
Payer: COMMERCIAL

## 2023-05-15 NOTE — PROGRESS NOTES
Clinic Care Coordination Contact  [facility]/Voicemail    Clinical Data: Care Coordinator Outreach  Outreach attempted x 3.  Left message on patient's voicemail with call back information and requested return call.    Plan: Care Coordinator will rount patients chart to CCC RN to review for disenrollment.    Dora Mott  Community Health Worker  [facility] Care Coordination   CrouseYajaira epstein [place], [place] [facility]  Office: [phone]

## 2023-05-16 ENCOUNTER — LAB (OUTPATIENT)
Dept: LAB | Facility: CLINIC | Age: 83
End: 2023-05-16
Payer: COMMERCIAL

## 2023-05-16 ENCOUNTER — ANTICOAGULATION THERAPY VISIT (OUTPATIENT)
Dept: ANTICOAGULATION | Facility: CLINIC | Age: 83
End: 2023-05-16

## 2023-05-16 DIAGNOSIS — Z79.01 CHRONIC ANTICOAGULATION: ICD-10-CM

## 2023-05-16 DIAGNOSIS — I73.9 PERIPHERAL ARTERIAL DISEASE (H): ICD-10-CM

## 2023-05-16 DIAGNOSIS — I48.20 CHRONIC ATRIAL FIBRILLATION (H): Primary | ICD-10-CM

## 2023-05-16 DIAGNOSIS — I48.20 CHRONIC ATRIAL FIBRILLATION (H): ICD-10-CM

## 2023-05-16 DIAGNOSIS — Z79.01 LONG TERM (CURRENT) USE OF ANTICOAGULANTS: ICD-10-CM

## 2023-05-16 LAB — INR BLD: 1.2 (ref 0.9–1.1)

## 2023-05-16 PROCEDURE — 85610 PROTHROMBIN TIME: CPT

## 2023-05-16 PROCEDURE — 36416 COLLJ CAPILLARY BLOOD SPEC: CPT

## 2023-05-16 NOTE — PROGRESS NOTES
ANTICOAGULATION MANAGEMENT     Pee Ayala 83 year old male is on warfarin with subtherapeutic INR result. (Goal INR 2.0-3.0)    Recent labs: (last 7 days)     05/16/23  1030   INR 1.2*       ASSESSMENT     Warfarin Lab Questionnaire    Warfarin Doses Last 7 Days      5/16/2023    10:33 AM   Dose in Tablet or Mg   TAB or MG? - warfarin 5mg tablets (evenings) milligram (mg)     Pt Rptd Dose SUNDAY MONDAY TUESDAY WED THURS FRIDAY SATURDAY 5/16/2023  10:33 AM 5 5 5 5 5 5 5         5/16/2023   Warfarin Lab Questionnaire   Missed doses within past 14 days? No - warfarin dose was increased on 4/27/23, however, Pee reported taking 5mg daily,         - has not missed any doses.   Changes in diet or alcohol within past 14 days? No   Medication changes since last result? No - new warfarin tablets ordered from 1mg to 5mg on 4/20/23.   Injuries or illness since last result? No   New shortness of breath, severe headaches or sudden changes in vision since last result? No   Abnormal bleeding since last result? No   Upcoming surgery, procedure? No   Best number to call with results? [phone]      Previous result: Subtherapeutic Last 6 INR results.    Additional findings:  reported no factors or regimen changes.       PLAN     Recommended plan for no diet, medication or health factor changes affecting INR     Dosing Instructions: booster dose then Increase your warfarin dose (18% change) with next INR in 5-7 days       Summary  As of 5/16/2023    Full warfarin instructions:  5/16: 12.5 mg; Otherwise 5 mg every Mon and Fri; 7.5 mg all other days   Next INR check:  5/23/2023             Telephone call with  Pee ([phone]) who verbalizes understanding and agrees to plan    Lab visit scheduled - INR on 5/23/23 [facility]    Education provided:     Taking warfarin: Importance of taking warfarin as instructed    Goal range and lab monitoring: goal range and significance of current result    Plan made with [facility] Pharmacist  Sandra Dale, RN  Anticoagulation Clinic  5/16/2023    _______________________________________________________________________     Anticoagulation Episode Summary     Current INR goal:  2.0-3.0   TTR:  50.5 % (11.8 mo)   Target end date:  Indefinite   Send INR reminders to:  [facility]    Indications    Chronic atrial fibrillation (H) [I48.20]  Chronic anticoagulation [Z79.01]  Long term (current) use of anticoagulants [Z79.01]  Peripheral arterial disease (H) [I73.9]           Comments:           Anticoagulation Care Providers     Provider Role Specialty Phone number    Jazzy Gil MD Referring Family Medicine [phone]    Ihsan Singh MD Referring Family Medicine [phone]

## 2023-05-16 NOTE — PROGRESS NOTES
ANTICOAGULATION MANAGEMENT     Pee Ayala 83 year old male is on warfarin with subtherapeutic INR result. (Goal INR 2.0-3.0)    Recent labs: (last 7 days)     05/16/23  1030   INR 1.2*       ASSESSMENT     Warfarin Lab Questionnaire    Warfarin Doses Last 7 Days      5/16/2023    10:33 AM   Dose in Tablet or Mg   TAB or MG? - warfarin 5mg tablets (evenings) milligram (mg)     Pt Rptd Dose SUNDAY MONDAY TUESDAY WED THURS FRIDAY SATURDAY 5/16/2023  10:33 AM 5 5 5 5 5 5 5         5/16/2023   Warfarin Lab Questionnaire   Missed doses within past 14 days? No - warfarin dose increased on 4/27/23.       - NEW    ANTICOAGULATION  MANAGEMENT-Home Monitor Managed by Exception    Pee Ayala 83 year old male is on warfarin with therapeutic INR result. (Goal INR {INR Goal:223502})    Recent labs: (last 7 days)     05/16/23  1030   INR 1.2*         Previous INR was Therapeutic    Medication, diet, health changes since last INR:chart reviewed; none identified    Contacted within the last 12 weeks by phone on ***      SHELL Glasgow was NOT contacted regarding therapeutic result today per home monitoring policy manage by exception agreement.   Current warfarin dose is to be continued:     Summary  As of 5/16/2023    Full warfarin instructions:  7.5 mg every Sun, Wed; 5 mg all other days   Next INR check:  5/23/2023           ?   Mariel Dale RN  Anticoagulation Clinic  5/16/2023    _______________________________________________________________________     Anticoagulation Episode Summary     Current INR goal:  2.0-3.0   TTR:  50.3 % (11.9 mo)   Target end date:  Indefinite   [facility]    Indications    Chronic atrial fibrillation (H) [I48.20]  Chronic anticoagulation [Z79.01]  Long term (current) use of anticoagulants [Z79.01]  Peripheral arterial disease (H) [I73.9]           Comments:           Anticoagulation Care Providers     Provider Role Specialty Phone number     Jazzy Gil MD Referring Family Medicine [phone]    Ihsan Singh MD Referring Family Medicine [phone]         warfarin tabs ordered from 1mg to 5mg tablets on 4/20/23.   Changes in diet or alcohol within past 14 days? No   Medication changes since last result? No   Injuries or illness since last result? No   New shortness of breath, severe headaches or sudden changes in vision since last result? No   Abnormal bleeding since last result? No   Upcoming surgery, procedure? No   Best number to call with results? [phone]        Previous result: Subtherapeutic last 6 INR results.    Additional findings: None       PLAN     {INRPLAN:961427}

## 2023-05-17 NOTE — PROGRESS NOTES
ASSESSMENT     Warfarin Lab Questionnaire    Warfarin Doses Last 7 Days      5/16/2023    10:33 AM   Dose in Tablet or Mg   TAB or MG? milligram (mg)     Pt Rptd Dose SUNDAY MONDAY TUESDAY WED THURS FRIDAY SATURDAY 5/16/2023  10:33 AM 5 5 5 5 5 5 5         5/16/2023   Warfarin Lab Questionnaire   Missed doses within past 14 days? No - template was wrong - Pee verified taking only 5mg daily, which is less warfarin than instructed; (see last INR documentation from 4/27/23).   Changes in diet or alcohol within past 14 days? No   Medication changes since last result? No - reported new warfarin tablets from 1mg to 5mg on 4/20/23.   Injuries or illness since last result? No   New shortness of breath, severe headaches or sudden changes in vision since last result? No   Abnormal bleeding since last result? No   Upcoming surgery, procedure? No   Best number to call with results? [phone]        Previous result: Subtherapeutic last 6 INR results.    Additional findings: None       PLAN     Recommended plan for temporary change(s) affecting INR     Dosing Instructions:   - reported taking one time booster dose with 12.5mg last night.   - then change warfarin dose to 7.5mg Sun/Tues/Fri and 5mg all other days.   - with next INR in 5-7 days       Summary  As of 5/16/2023    Full warfarin instructions:  5/16: 12.5 mg; Otherwise 7.5 mg every Sun, Tue, Fri; 5 mg all other days   Next INR check:  5/23/2023             Telephone call with  Pee ([phone]) who verbalizes understanding and agrees to plan    Lab visit scheduled - INR on 5/23/23 @ [facility]    Education provided:     Taking warfarin: Importance of taking warfarin as instructed    Goal range and lab monitoring: goal range and significance of current result    Plan made per [facility] anticoagulation protocol    Mariel Dale, RN  Anticoagulation Clinic  5/17/2023    _______________________________________________________________________     Anticoagulation Episode  Summary     Current INR goal:  2.0-3.0   TTR:  50.5 % (11.8 mo)   Target end date:  Indefinite   Send INR reminders to:  [facility]    Indications    Chronic atrial fibrillation (H) [I48.20]  Chronic anticoagulation [Z79.01]  Long term (current) use of anticoagulants [Z79.01]  Peripheral arterial disease (H) [I73.9]           Comments:           Anticoagulation Care Providers     Provider Role Specialty Phone number    Jazzy Gil MD Referring Family Medicine [phone]    Ihsan Singh MD Referring Family Medicine [phone]

## 2023-05-23 ENCOUNTER — PATIENT OUTREACH (OUTPATIENT)
Dept: CARE COORDINATION | Facility: CLINIC | Age: 83
End: 2023-05-23

## 2023-05-23 NOTE — LETTER
[facility] CARE COORDINATION  [address]  [facility] [address]    May 23, 2023    Pee Ayala  [address] [facility] [address]      Dear Pee,    I have been unsuccessful in reaching you since our last contact. At this time the Care Coordination team will make no further attempts to reach you, however this does not change your ability to continue receiving care from your providers at your primary care clinic. If you need additional support from a care coordinator in the future please contact Dora Mott at [phone].    All of us at [facility] are invested in your health and are here to assist you in meeting your goals.       Sincerely,    Dora Mott  Community Health Worker  [facility] Care Coordination   Office: [phone]

## 2023-05-24 ENCOUNTER — TELEPHONE (OUTPATIENT)
Dept: ANTICOAGULATION | Facility: CLINIC | Age: 83
End: 2023-05-24
Payer: COMMERCIAL

## 2023-05-24 NOTE — TELEPHONE ENCOUNTER
ANTICOAGULATION     Pee Ayala is overdue for INR check.      Spoke with Pee and scheduled lab appointment on 5/26    - no show INR appt on 5/23/23.    Mariel Dale RN

## 2023-05-31 ENCOUNTER — TELEPHONE (OUTPATIENT)
Dept: ANTICOAGULATION | Facility: CLINIC | Age: 83
End: 2023-05-31

## 2023-05-31 NOTE — TELEPHONE ENCOUNTER
ANTICOAGULATION     Pee Ayala is overdue for INR check.      Spoke with Pee and scheduled lab appointment on 6/1    - no show for INR appts: on 5/26 and 5/23/23.    Mariel Dale RN

## 2023-06-01 ENCOUNTER — TELEPHONE (OUTPATIENT)
Dept: ANTICOAGULATION | Facility: CLINIC | Age: 83
End: 2023-06-01

## 2023-06-01 ENCOUNTER — LAB (OUTPATIENT)
Dept: LAB | Facility: CLINIC | Age: 83
End: 2023-06-01
Payer: COMMERCIAL

## 2023-06-01 ENCOUNTER — ANTICOAGULATION THERAPY VISIT (OUTPATIENT)
Dept: ANTICOAGULATION | Facility: CLINIC | Age: 83
End: 2023-06-01

## 2023-06-01 DIAGNOSIS — I73.9 PERIPHERAL ARTERIAL DISEASE (H): ICD-10-CM

## 2023-06-01 DIAGNOSIS — Z79.01 CHRONIC ANTICOAGULATION: ICD-10-CM

## 2023-06-01 DIAGNOSIS — I48.20 CHRONIC ATRIAL FIBRILLATION (H): Primary | ICD-10-CM

## 2023-06-01 DIAGNOSIS — I48.20 CHRONIC ATRIAL FIBRILLATION (H): ICD-10-CM

## 2023-06-01 DIAGNOSIS — Z79.01 LONG TERM (CURRENT) USE OF ANTICOAGULANTS: ICD-10-CM

## 2023-06-01 LAB — INR BLD: 2 (ref 0.9–1.1)

## 2023-06-01 PROCEDURE — 85610 PROTHROMBIN TIME: CPT

## 2023-06-01 PROCEDURE — 36416 COLLJ CAPILLARY BLOOD SPEC: CPT

## 2023-06-01 NOTE — TELEPHONE ENCOUNTER
"[facility]-PROCEDURAL ANTICOAGULATION  MANAGEMENT    ASSESSMENT     Warfarin interruption plan for RFA-back with [facility] on 6/5/23.    Indication for Anticoagulation: Atrial Fibrillation      Hx of TAVR      [id number] = 5-6  (Hypertension, Age >= 75, Diabetes and Vascular- CAD-s/p CABG, PCI  +/- CHF)  - EF: 55-60% (3/28/23)      Hx of hold without bridge 6/2022 back surgery      [facility] typically requires faxed orders prior to scheduling; unable to find/verify faxed orders in chart review today    [facility]-Procedure Risk stratification for thromboembolism: moderate (2022 Chest guidelines)    AFIB: 2022 CHEST Perioperative Management guidelines recommends against bridging for patients with atrial fibrillation except in high risk stratification patients.     RECOMMENDATION       Pre-Procedure:  o Start/Continue Hold warfarin for 5 days, until after procedure TODAY   o No Bridge      Post-Procedure:  o Resume warfarin dose if okay with provider doing procedure on night of procedure, 6/5 PM: 12.5 mg daily x 2 then resume current dose  o Recheck INR ~ 7 days after resuming warfarin     Plan routed to referring provider as ANGEL, no additional orders  ?   Sandra Covarrubias, [facility]    SUBJECTIVE/OBJECTIVE     Pee Ayala, a 83 year old male    Goal INR Range: 2.0-3.0     Wt Readings from Last 3 Encounters:   04/06/23 74.3 kg (163 lb 12.8 oz)   03/21/23 77.1 kg (170 lb)   02/14/23 77.9 kg (171 lb 12.8 oz)      Ideal body weight: 54 kg (119 lb 1.7 oz)  Adjusted ideal body weight: 62.1 kg (136 lb 15.7 oz)     Estimated body mass index is 30.2 kg/m  as calculated from the following:    Height as of 4/6/23: 1.568 m (5' 1.75\").    Weight as of 4/6/23: 74.3 kg (163 lb 12.8 oz).    Lab Results   Component Value Date    INR 2.0 (H) 06/01/2023    INR 1.2 (H) 05/16/2023    INR 1.6 (H) 04/27/2023     Lab Results   Component Value Date    HGB 12.4 (L) 04/06/2023    HCT 38.5 (L) 04/06/2023     04/06/2023     Lab Results   Component " Value Date    CR 0.99 03/14/2023    CR 0.94 11/17/2022    CR 1.00 08/15/2022     CrCl cannot be calculated (Patient's most recent lab result is older than the maximum 30 days allowed.).

## 2023-06-01 NOTE — PROGRESS NOTES
ANTICOAGULATION MANAGEMENT     Pee Ayala 83 year old male is on warfarin with therapeutic INR result. (Goal INR 2.0-3.0)    Recent labs: (last 7 days)     06/01/23  1125   INR 2.0*       ASSESSMENT     Warfarin Lab Questionnaire    Warfarin Doses Last 7 Days      6/1/2023    11:31 AM   Dose in Tablet or Mg   TAB or MG? - warfarin 5mg tablets (evenings). milligram (mg)     Pt Rptd Dose SUNDAY MONDAY TUESDAY WED THURS FRIDAY SATURDAY 6/1/2023  11:31 AM 0 0 0 0 0 0 0         6/1/2023   Warfarin Lab Questionnaire   Missed doses within past 14 days? Yes - Pee reported, he got a call from [email or web address] to hold warfarin 5 days prior to procedure.     If yes; please list when: Held since 5-25 for ablation - Pee reported he started holding his warfarin on 5/31/23 for a total of 5 days, as he was instructed by [facility].     Changes in diet or alcohol within past 14 days? No   Medication changes since last result? No   Injuries or illness since last result? Yes - reported no headaches, dizziness, or any other symptoms.     If yes, please explain: Hit head on cabinet door - reported, he his his head 2 days ago.     New shortness of breath, severe headaches or sudden changes in vision since last result? No   Abnormal bleeding since last result? No   Upcoming surgery, procedure? Yes   Please explain, date scheduled? 6-5-2023   Best number to call with results? [phone]        Previous result: Subtherapeutic at 1.2 on 5/16/23.    Additional findings: Yes  Pee reported they will burn nerve endings - Ablation of his back.   - planned for bilateral T10-12 MBB with RFA. (see media / documentation visit from 4/20/23 from [facility])   - d/t severe ongoing back pains   - short term relief from back injections.   - consulted Sandra Covarrubias [facility] to review warfarin hold and potential bridge; (see other phone message)         PLAN     Recommended plan for temporary change(s) affecting INR     Dosing Instructions:  continue to HOLD warfarin doses with next INR in 1 week after resuming warfarin    Summary  As of 6/1/2023    Full warfarin instructions:  6/1: Hold; 6/2: Hold; 6/3: Hold; 6/4: Hold; Otherwise 7.5 mg every Sun, Tue, Fri; 5 mg all other days   Next INR check:  6/12/2023             Telephone call with  Pee ([phone]) who verbalizes understanding and agrees to plan    Lab visit scheduled - INR on 6/12/23 @ [facility].    Education provided:     Taking warfarin: Importance of taking warfarin as instructed    Goal range and lab monitoring: goal range and significance of current result    Plan made with [facility] Pharmacist Sandra Dale, RN  Anticoagulation Clinic  6/1/2023    _______________________________________________________________________     Anticoagulation Episode Summary     Current INR goal:  2.0-3.0   TTR:  50.5 % (11.8 mo)   Target end date:  Indefinite   Send INR reminders to:  [facility]    Indications    Chronic atrial fibrillation (H) [I48.20]  Chronic anticoagulation [Z79.01]  Long term (current) use of anticoagulants [Z79.01]  Peripheral arterial disease (H) [I73.9]           Comments:           Anticoagulation Care Providers     Provider Role Specialty Phone number    Jazzy Gil MD Referring Family Medicine [phone]    Ihsan Singh MD Referring Family Medicine [phone]

## 2023-06-02 NOTE — TELEPHONE ENCOUNTER
Ihsan Singh MD  You 18 hours ago (9:02 PM)     Сергей Flores:   I agree with the plan you have outlined.   Thank you.   Dr Singh

## 2023-06-02 NOTE — PROGRESS NOTES
Called and talked with Pee.     - he clarified, as instructed by [facility] he started holding warfarin on 5/31/23 for a total of 5 days.     OFFICE VISIT    Patient: Gloria Combs   : 1974 MRN: [id number]    SUBJECTIVE:  Chief Complaint   Patient presents with   • Follow-up     6 Week F/U headache, insomnia, left pinky toe, A1C checked   • Office Visit   • Imm/Inj     Flu vaccine       Patient has given consent to record this visit for documentation in their clinical record.    A 48 year old female presents for a 6-week follow-up of multiple medical conditions.     HISTORY OF PRESENT ILLNESS:  Daily headache:   Noted mild improvement in headache on amitriptyline 10 mg once daily. Headache was suspected due to neurological condition. Follows up with ENT specialist with no significant abnormality.    Need for vaccination:  Due for influenza vaccine.    Insomnia, unspecified type:   Continues to have difficulty in maintaining asleep, wakes up in every 1-2 hours. Does feel refreshed on waking up in the morning. Tried working on basic sleep hygiene; avoids drinking caffeine at night. Is planning to start with herbal tea that will aid her sleep pattern.    Reactive hypoglycemia:  Complains of profuse perspiration with trembling in the morning 2 to 3 hours after eating whole cereal; had low blood glucose level of 50 mg/dL. Was referred to endocrinologist for the same, and then it subsided but now complaints returned. Not on any OTC or herbal medications, not on supplements.     OM (onychomycosis):  Complains of thickness of nail of fifth digit of right foot for several years.    Additional comments:  - Tried MiraLAX for chronic constipation.      PAST MEDICAL HISTORY:  Past Medical History:   Diagnosis Date   • Abdominal distention    • Abdominal pain    • Abnormal brain MRI 2013   • Acute bronchitis with bronchospasm    • Allergic conjunctivitis    • Allergic rhinitis    • Bronchitis    • Chronic rhinosinusitis    • Essential (primary) hypertension    • Fibroids    • Gastroesophageal reflux disease    • History of cardiovascular stress test  05/08/2019 5/2019 ECHO Stress: Left ventricular ejection fraction normal at baseline. Left ventricular ejection fraction increased with stress. Normal LV regional wall motion at baseline. No LV regional wall motion abnormalities with stress. No echocardiographic evidence of myocardial ischemia. 13.4 METS noted.    • History of cardiovascular stress test 05/08/2019 5/2019 ECHO Stress: Left ventricular ejection fraction normal at baseline. Left ventricular ejection fraction increased with stress. Normal LV regional wall motion at baseline. No LV regional wall motion abnormalities with stress. No echocardiographic evidence of myocardial ischemia. 13.4 METS noted.    • RAD (reactive airway disease)    • Sinusitis, chronic      MEDICATIONS:  Current Outpatient Medications   Medication Sig Dispense Refill   • traZODone (DESYREL) 50 MG tablet Take one to two tablets by mouth at night as needed for sleep 60 tablet 1   • ciclopirox (PENLAC) 8 % topical solution Apply topically nightly. 6.6 mL 0   • amitriptyline (ELAVIL) 10 MG tablet Take 1 tablet by mouth nightly. 90 tablet 1   • clobetasol (Temovate) 0.05 % ointment Use a thin layer topically daily for 1 month , then gradually decrease to every 3-7 days as guided by symptoms 60 g 10   • estradiol (ESTRACE VAGINAL) 0.1 MG/GM vaginal cream Use a thin layer topically every 3-7 days as guided by symptoms 1 each 3   • fluticasone (FLONASE) 50 MCG/ACT nasal spray Spray 2 sprays in each nostril daily.     • metoPROLOL succinate (Toprol XL) 25 MG 24 hr tablet Take 1 tablet by mouth daily. 90 tablet 1   • sucralfate (CARAFATE) 1 g tablet Take 1 tablet by mouth 3 times daily. 90 tablet 1   • Omeprazole Magnesium (PRILOSEC PO)      • solifenacin (VESICARE) 10 MG tablet Take 10 mg by mouth daily.     • nicotine (NICODERM) 14 MG/24HR patch Place 1 patch onto the skin every 24 hours. 30 each 1   • cholecalciferol (VITAMIN D) 25 mcg (1,000 units) tablet Take 5,000 Units by mouth 3  days a week.     • loratadine (CLARITIN) 10 MG tablet Take 10 mg by mouth daily.     • ibuprofen (MOTRIN) 200 MG tablet Take 600 mg by mouth daily as needed for Pain (hand).     • Docusate Calcium (STOOL SOFTENER PO) Take 2 tablets by mouth daily as needed (constipation from Ibuprofen use).     • omeprazole (PrilOSEC) 20 MG capsule Take 20 mg by mouth daily.     • Multiple Vitamins-Minerals (vitamin - therapeutic multivitamins w/minerals) tablet Take 2 tablets by mouth daily. Gummy formula     • Probiotic Product (UP4 PROBIOTICS PO) Take 1 tablet by mouth daily.        No current facility-administered medications for this visit.     ALLERGIES:  Allergies as of 2022 - Reviewed 2022   Allergen Reaction Noted   • Ceftin [cefuroxime axetil] HIVES 2014   • Lisinopril Angioedema 2022   • Nitrofurantoin RASH 2022   • Sulfa antibiotics Other (See Comments) 2014     FAMILY HISTORY:  Family History   Problem Relation Age of Onset   • Anemia Mother    • Tuberculosis Father    • Other Father         dermatomyocytis   • Multiple Sclerosis Sister    • Patient is unaware of any medical problems Brother    • Cancer, Ovarian Maternal Grandmother    • Cancer, Ovarian Paternal Grandmother    • Patient is unaware of any medical problems Maternal Grandfather    • Patient is unaware of any medical problems Paternal Grandfather    • Cancer, Breast Maternal Great-Grandmother    • Other Other         no uterine or colon ca ; .jk    • Cancer Neg Hx          neg fam hr br, co, ut cancer     SOCIAL HISTORY:  Social History     Tobacco Use   • Smoking status: Former Smoker     Packs/day: 1.00     Years: 10.00     Pack years: 10.00     Types: Cigarettes     Start date:      Quit date: 2020     Years since quittin.8   • Smokeless tobacco: Never Used   • Tobacco comment: vape    Vaping Use   • Vaping Use: Every day   • Substances: Nicotine, Flavoring   • Devices: Pre-filled or refillable cartridge    • Passive vaping exposure: Yes   Substance Use Topics   • Alcohol use: No     Comment: very rarely,less than 1 drink every 6 months   • Drug use: No     Past Surgical HISTORY  Past Surgical History:   Procedure Laterality Date   • Appendectomy  11/2018   • Hysterectomy     • Nasal scopy,open maxill sinus  06/15/2022    maxillary bilateral partial ethmoidectomy, R sphenoidotomy   • Tubal ligation  2006       REVIEW OF SYSTEMS:  Constitutional: Negative for fever and chills.  Skin: Negative for rash.  HEENT: Negative for ear pain or sore throat.  Respiratory: Negative cough or shortness of breath.  Cardiovascular: Negative for chest pain or chest pressure.  Gastrointestinal: Negative for nausea, vomiting, diarrhea or abdominal pain.      OBJECTIVE:  Visit Vitals  /80   Pulse (!) 115   Temp 97.3 °F (36.3 °C) (Temporal)   Ht 5'   Wt 59.3 kg (130 lb 11.2 oz)   LMP 03/25/2019 (Approximate)   SpO2 100%   BMI 25.53 kg/m²       PHYSICAL EXAM:  Constitutional: Alert, in no acute distress and current vital signs reviewed.   Head and Face: Atraumatic and normocephalic.   Eyes: No discharge, no eyelid swelling and the sclerae were normal.   ENT: Oropharynx normal. Normal appearing outer ear. Tympanic membranes are bilaterally clear, normal appearing nose and normal lips.   Neck: Normal appearing neck and supple neck.   Pulmonary: Breath sounds clear to auscultation bilaterally, but no respiratory distress and normal respiratory rate and effort.   Cardiovascular: Normal rate, regular rhythm, normal S1, normal S2 and edema was not present in the lower extremities.   Abdomen: Soft and nontender.   Skin, Hair, Nails: Normal skin color and pigmentation. Toenail fungus on fifth toe of the right foot.  Psychiatric: Alert and awake, interactive and mood/affect were appropriate.    DIAGNOSTIC STUDIES:  LAB RESULTS:  Lab Services on 08/24/2022   Component Date Value Ref Range Status   • FSH 08/24/2022 4.8  mUnits/mL Final        ASSESSMENT AND PLAN:  This 48 year old female presents with :  1. Daily headache    2. Need for vaccination    3. Insomnia, unspecified type    4. Reactive hypoglycemia    5. OM (onychomycosis)        Orders Placed This Encounter   • Influenza Quadrivalent Split Pres Free 0.5 mL Pre-filled Vacc (FluLaval, Fluzone, Fluarix; ages 6+ months - [id number]   • SERVICE TO ENDOCRINOLOGY   • traZODone (DESYREL) 50 MG tablet   • DISCONTD: amitriptyline (ELAVIL) 10 MG tablet   • ciclopirox (PENLAC) 8 % topical solution   • amitriptyline (ELAVIL) 10 MG tablet       PLAN:  Daily headache:   Currently, controlled.  Continue amitriptyline 10 mg once daily or as needed, refills provided.     Need for vaccination:   Influenza vaccine administered today.    Insomnia, unspecified type:   Recommended trial of herbal tea to help with sleep.  Prescribed trazodone 50 mg one tablet an hour before bedtime, may increase it up to 2 tablets if tolerated well.       Reactive hypoglycemia:  Etiology unclear  Ambulatory referral to Endocrinology provided.    OM (onychomycosis):  Prescribed ciclopirox 8% topical solution, apply as directed for the next 2-3 months.    Return to clinic in few months in early December or end of November.  Refer to orders.  Medical compliance with plan discussed and risks of non-compliance reviewed.  Patient education completed on disease process, etiology & prognosis.  Proper usage and side effects of medications reviewed & discussed.  Return to clinic as clinically indicated as discussed with the patient who verbalized understanding of the plan and is in agreement with the plan.    I,  Dr. Aron Cross, have created a visit summary document based on the audio recording between Dr. Nitin Arthur MD and this patient for the physician to review, edit as needed, and authenticate.    Creation Date: 9/20/2022        I have reviewed and edited the visit summary above and attest that it is accurate.  Nitin  MD Jerson  [facility]

## 2023-06-06 NOTE — TELEPHONE ENCOUNTER
Called and talked with Pee,     - reported warfarin was resumed last night pm 6/5/23.     - as instructed per Dr. Singh and Sandra Covarrubias, [facility] - advised 2 day booster dose with 12.5mg on 6/6-7.     - INR scheduled on 6/13/23 at his OV with Dr. Singh.

## 2023-06-13 ENCOUNTER — ANTICOAGULATION THERAPY VISIT (OUTPATIENT)
Dept: ANTICOAGULATION | Facility: CLINIC | Age: 83
End: 2023-06-13

## 2023-06-13 ENCOUNTER — LAB (OUTPATIENT)
Dept: LAB | Facility: CLINIC | Age: 83
End: 2023-06-13
Payer: COMMERCIAL

## 2023-06-13 DIAGNOSIS — I73.9 PERIPHERAL ARTERIAL DISEASE (H): ICD-10-CM

## 2023-06-13 DIAGNOSIS — I48.20 CHRONIC ATRIAL FIBRILLATION (H): ICD-10-CM

## 2023-06-13 DIAGNOSIS — I48.20 CHRONIC ATRIAL FIBRILLATION (H): Primary | ICD-10-CM

## 2023-06-13 DIAGNOSIS — Z79.01 LONG TERM (CURRENT) USE OF ANTICOAGULANTS: ICD-10-CM

## 2023-06-13 DIAGNOSIS — Z79.01 CHRONIC ANTICOAGULATION: ICD-10-CM

## 2023-06-13 LAB — INR BLD: 3.6 (ref 0.9–1.1)

## 2023-06-13 PROCEDURE — 36416 COLLJ CAPILLARY BLOOD SPEC: CPT

## 2023-06-13 PROCEDURE — 85610 PROTHROMBIN TIME: CPT

## 2023-06-13 NOTE — PROGRESS NOTES
ANTICOAGULATION MANAGEMENT     Pee Ayala 83 year old male is on warfarin with supratherapeutic INR result. (Goal INR 2.0-3.0)    Recent labs: (last 7 days)     06/13/23  1406   INR 3.6*       ASSESSMENT     Warfarin Lab Questionnaire    Warfarin Doses Last 7 Days      6/13/2023     1:54 PM   Dose in Tablet or Mg   TAB or MG? - warfarin 5mg tablets (evenings) milligram (mg)     Pt Rptd Dose SUNDAY MONDAY TUESDAY WED THURS FRIDAY SATURDAY 6/13/2023   1:54 PM 5 5 7.5 5 5 7.5 5         6/13/2023   Warfarin Lab Questionnaire   Missed doses within past 14 days? No - once given clearance to resume warfarin, was advised 2 days booster dose with 12.5mg.        - held warfarin for 5 days from 5/31 - 6/4, as instructed per [facility]. (no bridge)     Changes in diet or alcohol within past 14 days? No   Medication changes since last result? No.  Pee reported, he was not prescribed any pain meds and also stated, he is not taking any OTC pain meds.     Injuries or illness since last result? No - s/p bilateral T10-12 MBB with RFA on 6/5/23.        - reported, everything went well.   New shortness of breath, severe headaches or sudden changes in vision since last result? No   Abnormal bleeding since last result? No   Upcoming surgery, procedure? No        Previous result: Therapeutic last visit at 2.0; previously outside of goal range at 1.2    Additional findings: None       PLAN     Recommended plan for temporary change(s) affecting INR     Dosing Instructions: partial hold then continue your current warfarin dose with next INR in 5-7 days       Summary  As of 6/13/2023    Full warfarin instructions:  6/13: 2.5 mg; Otherwise 7.5 mg every Sun, Tue, Fri; 5 mg all other days   Next INR check:  6/20/2023             Telephone call with  Pee ([phone]) who verbalizes understanding and agrees to plan    Lab visit scheduled - INR on 6/20/23 @ [facility]    Education provided:     Taking warfarin: Importance of taking  warfarin as instructed    Goal range and lab monitoring: goal range and significance of current result    Symptom monitoring: monitoring for bleeding signs and symptoms    Plan made with [facility] Pharmacist Sandra Dale, RN  Anticoagulation Clinic  6/13/2023    _______________________________________________________________________     Anticoagulation Episode Summary     Current INR goal:  2.0-3.0   TTR:  52.4 % (11.9 mo)   Target end date:  Indefinite   Send INR reminders to:  [facility]    Indications    Chronic atrial fibrillation (H) [I48.20]  Chronic anticoagulation [Z79.01]  Long term (current) use of anticoagulants [Z79.01]  Peripheral arterial disease (H) [I73.9]           Comments:           Anticoagulation Care Providers     Provider Role Specialty Phone number    Jazzy Gil MD Referring Family Medicine [phone]    Ihsan Singh MD Referring Family Medicine [phone]

## 2023-06-20 ENCOUNTER — APPOINTMENT (OUTPATIENT)
Dept: LAB | Facility: CLINIC | Age: 83
End: 2023-06-20
Payer: COMMERCIAL

## 2023-06-20 ENCOUNTER — ANTICOAGULATION THERAPY VISIT (OUTPATIENT)
Dept: ANTICOAGULATION | Facility: CLINIC | Age: 83
End: 2023-06-20

## 2023-06-20 DIAGNOSIS — I48.20 CHRONIC ATRIAL FIBRILLATION (H): Primary | ICD-10-CM

## 2023-06-20 DIAGNOSIS — I73.9 PERIPHERAL ARTERIAL DISEASE (H): ICD-10-CM

## 2023-06-20 DIAGNOSIS — Z79.01 CHRONIC ANTICOAGULATION: ICD-10-CM

## 2023-06-20 DIAGNOSIS — Z79.01 LONG TERM (CURRENT) USE OF ANTICOAGULANTS: ICD-10-CM

## 2023-06-20 NOTE — PROGRESS NOTES
ANTICOAGULATION MANAGEMENT     Pee Ayala 83 year old male is on warfarin with supratherapeutic INR result. (Goal INR 2.0-3.0)    Recent labs: (last 7 days)     06/20/23  1605   INR 3.1*       ASSESSMENT     Warfarin Lab Questionnaire    Warfarin Doses Last 7 Days      6/20/2023     4:01 PM   Dose in Tablet or Mg   TAB or MG? tablet (tab)     Pt Rptd Dose SUNDAY MONDAY TUESDAY WED THURS FRIDAY SATURDAY 6/20/2023   4:01 PM 0.5 5 7.5 5 5 7.5 5         6/20/2023   Warfarin Lab Questionnaire   Missed doses within past 14 days? No - had partial warfarin hold on 6/13/23.     Changes in diet or alcohol within past 14 days? No   Medication changes since last result? No - reported has been applying OTC Neosporin.     Injuries or illness since last result? No  - 4 days ago, got scratches from his dog on both forearms.        - s/p bilateral T-10-12 MBB w/ RF ablation on 6/5/23.     New shortness of breath, severe headaches or sudden changes in vision since last result? No   Abnormal bleeding since last result? Yes - both lucio arms bled pretty good.      If yes, please explain: dog scratches thin skin   Upcoming surgery, procedure? No   Best number to call with results? [phone]  after 430        Previous result: Supratherapeutic at 3.6 on 6/13/23.    Additional findings: None       PLAN     Recommended plan for temporary change(s) affecting INR     Dosing Instructions: partial hold then continue your current warfarin dose with next INR in 1-2 weeks       Summary  As of 6/20/2023    Full warfarin instructions:  6/20: 5 mg; Otherwise 7.5 mg every Sun, Tue, Fri; 5 mg all other days   Next INR check:  7/4/2023              Telephone call with  Pee ([phone]) who verbalizes understanding and agrees to plan    - advised to observe for s/sx of any infection.    Lab visit scheduled - INR on 7/6/23 @ [facility]    Education provided:     Taking warfarin: Importance of taking warfarin as instructed    Goal range and lab  monitoring: goal range and significance of current result    Importance of notifying anticoagulation clinic for: advised to watch for s/sx of infection from wound.    Plan made per [facility] anticoagulation protocol    Mariel Dale RN  Anticoagulation Clinic  6/20/2023    _______________________________________________________________________     Anticoagulation Episode Summary     Current INR goal:  2.0-3.0   TTR:  52.4 % (11.9 mo)   Target end date:  Indefinite   Send INR reminders to:  [facility]    Indications    Chronic atrial fibrillation (H) [I48.20]  Chronic anticoagulation [Z79.01]  Long term (current) use of anticoagulants [Z79.01]  Peripheral arterial disease (H) [I73.9]           Comments:           Anticoagulation Care Providers     Provider Role Specialty Phone number    Jazzy Gil MD Referring Family Medicine [phone]    Ihsan Singh MD Referring Family Medicine [phone]

## 2023-07-06 ENCOUNTER — LAB (OUTPATIENT)
Dept: LAB | Facility: CLINIC | Age: 83
End: 2023-07-06
Payer: COMMERCIAL

## 2023-07-06 ENCOUNTER — ANTICOAGULATION THERAPY VISIT (OUTPATIENT)
Dept: ANTICOAGULATION | Facility: CLINIC | Age: 83
End: 2023-07-06

## 2023-07-06 DIAGNOSIS — I48.20 CHRONIC ATRIAL FIBRILLATION (H): ICD-10-CM

## 2023-07-06 DIAGNOSIS — Z79.01 CHRONIC ANTICOAGULATION: ICD-10-CM

## 2023-07-06 DIAGNOSIS — Z79.01 LONG TERM (CURRENT) USE OF ANTICOAGULANTS: ICD-10-CM

## 2023-07-06 DIAGNOSIS — I73.9 PERIPHERAL ARTERIAL DISEASE (H): ICD-10-CM

## 2023-07-06 DIAGNOSIS — I48.20 CHRONIC ATRIAL FIBRILLATION (H): Primary | ICD-10-CM

## 2023-07-06 LAB — INR BLD: 2.5 (ref 0.9–1.1)

## 2023-07-06 PROCEDURE — 85610 PROTHROMBIN TIME: CPT

## 2023-07-06 PROCEDURE — 36416 COLLJ CAPILLARY BLOOD SPEC: CPT

## 2023-07-06 NOTE — PROGRESS NOTES
ANTICOAGULATION MANAGEMENT     Pee Ayala 83 year old male is on warfarin with therapeutic INR result. (Goal INR 2.0-3.0)    Recent labs: (last 7 days)     07/06/23  1553   INR 2.5*       ASSESSMENT     Warfarin Lab Questionnaire    Warfarin Doses Last 7 Days      7/6/2023     3:47 PM   Dose in Tablet or Mg   TAB or MG? tablet (tab)     Pt Rptd Dose SUNDAY MONDAY TUESDAY WED THURS FRIDAY SATURDAY 7/6/2023   3:47 PM 0.5 0.5 7.5 0.5 0.5 0.5 7.5         7/6/2023   Warfarin Lab Questionnaire   Missed doses within past 14 days? No - template was wrong and taking less warfarin than ordered.        - will update anticoag calendar.        -partial warfarin hold on 6/20/23.   Changes in diet or alcohol within past 14 days? No   Medication changes since last result? No   Injuries or illness since last result? No   New shortness of breath, severe headaches or sudden changes in vision since last result? No   Abnormal bleeding since last result? No   Upcoming surgery, procedure? No   Best number to call with results? [phone]   afternoon 4-5     Previous result: Supratherapeutic last 2 INR results.    Additional findings: None       PLAN     Recommended plan for ongoing change(s) affecting INR     Dosing Instructions: Continue your current warfarin dose with next INR in 2 weeks       Summary  As of 7/6/2023    Full warfarin instructions:  7.5 mg every Tue, Fri; 5 mg all other days   Next INR check:  7/20/2023             Telephone call with  Pee ([phone]) who verbalizes understanding and agrees to plan    Check at provider office visit - INR on 7/18/23 at his OV with Dr. Singh.    Education provided:     Taking warfarin: Importance of taking warfarin as instructed    Goal range and lab monitoring: goal range and significance of current result    Plan made per [facility] anticoagulation protocol    Mariel Dale, RN  Anticoagulation  Clinic  7/6/2023    _______________________________________________________________________     Anticoagulation Episode Summary     Current INR goal:  2.0-3.0   TTR:  55.2 % (11.9 mo)   Target end date:  Indefinite   Send INR reminders to:  [facility]    Indications    Chronic atrial fibrillation (H) [I48.20]  Chronic anticoagulation [Z79.01]  Long term (current) use of anticoagulants [Z79.01]  Peripheral arterial disease (H) [I73.9]           Comments:           Anticoagulation Care Providers     Provider Role Specialty Phone number    Jazzy Gil MD Referring Family Medicine [phone]    Ihsan Singh MD Referring Family Medicine [phone]

## 2023-07-12 DIAGNOSIS — E11.42 TYPE 2 DIABETES MELLITUS WITH PERIPHERAL NEUROPATHY (H): Primary | ICD-10-CM

## 2023-07-18 ENCOUNTER — MEDICAL CORRESPONDENCE (OUTPATIENT)
Dept: HEALTH INFORMATION MANAGEMENT | Facility: CLINIC | Age: 83
End: 2023-07-18

## 2023-07-18 ENCOUNTER — OFFICE VISIT (OUTPATIENT)
Dept: FAMILY MEDICINE | Facility: CLINIC | Age: 83
End: 2023-07-18
Payer: COMMERCIAL

## 2023-07-18 VITALS
DIASTOLIC BLOOD PRESSURE: 73 MMHG | SYSTOLIC BLOOD PRESSURE: 131 MMHG | HEART RATE: 94 BPM | BODY MASS INDEX: 28.4 KG/M2 | RESPIRATION RATE: 22 BRPM | WEIGHT: 154 LBS | TEMPERATURE: 98 F | OXYGEN SATURATION: 97 %

## 2023-07-18 DIAGNOSIS — E11.3599 TYPE 2 DIABETES MELLITUS WITH PROLIFERATIVE RETINOPATHY WITHOUT MACULAR EDEMA, WITH LONG-TERM CURRENT USE OF INSULIN, UNSPECIFIED LATERALITY (H): ICD-10-CM

## 2023-07-18 DIAGNOSIS — E11.42 TYPE 2 DIABETES MELLITUS WITH PERIPHERAL NEUROPATHY (H): ICD-10-CM

## 2023-07-18 DIAGNOSIS — Z79.4 TYPE 2 DIABETES MELLITUS WITH PROLIFERATIVE RETINOPATHY WITHOUT MACULAR EDEMA, WITH LONG-TERM CURRENT USE OF INSULIN, UNSPECIFIED LATERALITY (H): ICD-10-CM

## 2023-07-18 LAB
ANION GAP SERPL CALCULATED.3IONS-SCNC: 11 MMOL/L (ref 7–15)
BUN SERPL-MCNC: 18.2 MG/DL (ref 8–23)
CALCIUM SERPL-MCNC: 9.3 MG/DL (ref 8.8–10.2)
CHLORIDE SERPL-SCNC: 97 MMOL/L (ref 98–107)
CREAT SERPL-MCNC: 1.04 MG/DL (ref 0.67–1.17)
DEPRECATED HCO3 PLAS-SCNC: 27 MMOL/L (ref 22–29)
GFR SERPL CREATININE-BSD FRML MDRD: 72 ML/MIN/1.73M2
GLUCOSE SERPL-MCNC: 230 MG/DL (ref 70–99)
HBA1C MFR BLD: 9.8 % (ref 0–5.6)
POTASSIUM SERPL-SCNC: 3.9 MMOL/L (ref 3.4–5.3)
SODIUM SERPL-SCNC: 135 MMOL/L (ref 136–145)

## 2023-07-18 PROCEDURE — 99213 OFFICE O/P EST LOW 20 MIN: CPT | Performed by: FAMILY MEDICINE

## 2023-07-18 PROCEDURE — 83036 HEMOGLOBIN GLYCOSYLATED A1C: CPT | Performed by: FAMILY MEDICINE

## 2023-07-18 PROCEDURE — 80048 BASIC METABOLIC PNL TOTAL CA: CPT | Performed by: FAMILY MEDICINE

## 2023-07-18 PROCEDURE — 36415 COLL VENOUS BLD VENIPUNCTURE: CPT | Performed by: FAMILY MEDICINE

## 2023-07-18 NOTE — PATIENT INSTRUCTIONS
Referral to [facility]hthalmology    Forms for diabetic shoes completed

## 2023-07-18 NOTE — PROGRESS NOTES
Alex Glasgow is a 83 year old, presenting for the following health issues:  Diabetes (Foot exam for diabetic shoes )        7/18/2023    10:01 AM   Additional Questions   Roomed by Dior HUNT CMA     History of Present Illness       Back Pain:  He presents for follow up of back pain. Patient's back pain is a recurring problem.  Location of back pain:  Right lower back and right middle of back  Description of back pain: other  Back pain spreads: nowhere    Since patient first noticed back pain, pain is: rapidly improving  Does back pain interfere with his job:  No      Diabetes:   He presents for follow up of diabetes.  He is checking home blood glucose two times daily. He checks blood glucose before and after meals and at bedtime.  Blood glucose is sometimes over 200 and sometimes under 70. He is aware of hypoglycemia symptoms including shakiness. He is concerned about blood sugar frequently over 200.  He is having numbness in feet, blurry vision and weight loss. The patient has had a diabetic eye exam in the last 12 months. Eye exam performed on april 23 2023. Location of last eye exam [facility] eye [facility].        Vascular Disease:  He presents for follow up of vascular disease.  He never takes nitroglycerin. He takes daily aspirin.    He eats 2-3 servings of fruits and vegetables daily.He consumes 2 sweetened beverage(s) daily.He exercises with enough effort to increase his heart rate 9 or less minutes per day.  He is missing 1 dose(s) of medications per week.             Objective    /73 (BP Location: Left arm, Patient Position: Sitting, Cuff Size: Adult Regular)   Pulse 94   Temp 98  F (36.7  C) (Oral)   Resp 22   Wt 69.9 kg (154 lb)   SpO2 97%   BMI 28.40 kg/m    Body mass index is 28.4 kg/m .  Physical Exam   FEET:  MF TESTING:  Poor sensation except over heels.              SKIN EXAM:  Mild callus distal medial plantar surface of the right foot              VASCULAR:   R DP  PULSE:  -                                      L DP  PULSE: -                                      R PT  PULSE: -                                      L PT  PULSE: -      Lab Results   Component Value Date    A1C 8.6 03/14/2023    A1C 10.3 11/17/2022    A1C 8.8 08/15/2022    A1C 6.3 05/24/2022    A1C 8.0 01/25/2022    A1C 9.1 01/12/2021    A1C 8.0 06/09/2016    A1C 8.2 03/08/2016    A1C 7.7 12/23/2015    A1C 6.9 09/15/2015       Encounter Diagnosis   Name Primary?     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

## 2023-07-28 DIAGNOSIS — I50.32 CHRONIC HEART FAILURE WITH PRESERVED EJECTION FRACTION (HFPEF) (H): ICD-10-CM

## 2023-07-28 DIAGNOSIS — I48.20 CHRONIC ATRIAL FIBRILLATION (H): ICD-10-CM

## 2023-07-28 DIAGNOSIS — I25.10 CORONARY ARTERY DISEASE INVOLVING NATIVE CORONARY ARTERY OF NATIVE HEART WITHOUT ANGINA PECTORIS: ICD-10-CM

## 2023-07-28 DIAGNOSIS — E11.42 TYPE 2 DIABETES MELLITUS WITH PERIPHERAL NEUROPATHY (H): ICD-10-CM

## 2023-07-31 RX ORDER — CARVEDILOL 3.12 MG/1
TABLET ORAL
Qty: 200 TABLET | Refills: 2 | Status: SHIPPED | OUTPATIENT
Start: 2023-07-31 | End: 2023-12-21

## 2023-08-02 ENCOUNTER — MYC MEDICAL ADVICE (OUTPATIENT)
Dept: ANTICOAGULATION | Facility: CLINIC | Age: 83
End: 2023-08-02
Payer: COMMERCIAL

## 2023-08-02 ENCOUNTER — TELEPHONE (OUTPATIENT)
Dept: VASCULAR SURGERY | Facility: CLINIC | Age: 83
End: 2023-08-02
Payer: COMMERCIAL

## 2023-08-02 NOTE — TELEPHONE ENCOUNTER
ANTICOAGULATION     Pee Ayala is overdue for INR check.       [facility] sent    Kendal Muniz RN

## 2023-08-02 NOTE — TELEPHONE ENCOUNTER
[facility] to schedule 1 year follow up with Diana and ultrasounds, this is due in October 2023

## 2023-08-09 ENCOUNTER — TELEPHONE (OUTPATIENT)
Dept: ANTICOAGULATION | Facility: CLINIC | Age: 83
End: 2023-08-09
Payer: COMMERCIAL

## 2023-08-09 NOTE — TELEPHONE ENCOUNTER
ANTICOAGULATION     Pee Ayala is overdue for INR check.      Spoke with Pee and scheduled lab appointment on 8/11/23    Rosa Vergara RN

## 2023-08-10 ENCOUNTER — DOCUMENTATION ONLY (OUTPATIENT)
Dept: FAMILY MEDICINE | Facility: CLINIC | Age: 83
End: 2023-08-10
Payer: COMMERCIAL

## 2023-08-10 DIAGNOSIS — E11.3599 TYPE 2 DIABETES MELLITUS WITH PROLIFERATIVE RETINOPATHY WITHOUT MACULAR EDEMA, WITH LONG-TERM CURRENT USE OF INSULIN, UNSPECIFIED LATERALITY (H): Primary | ICD-10-CM

## 2023-08-10 DIAGNOSIS — Z79.4 TYPE 2 DIABETES MELLITUS WITH PROLIFERATIVE RETINOPATHY WITHOUT MACULAR EDEMA, WITH LONG-TERM CURRENT USE OF INSULIN, UNSPECIFIED LATERALITY (H): Primary | ICD-10-CM

## 2023-08-10 NOTE — PROGRESS NOTES
.Pee Ayala has an upcoming lab appointment:    Future Appointments   Date Time Provider Department Center   8/11/2023  9:15 AM [facility]   [facility]/12[facility]2023  9[facility]15 [facility]   10/12/2023 10:15 AM [facility]   [facility]12[facility]2023 10:40 AM [facility]   11/7/2023  3:30 PM [facility]     Patient is scheduled for the following lab(s): Labs per Agueda Kwok NP    There is no order available. Please review and place either future orders or HMPO (Review of Health Maintenance Protocol Orders), as appropriate.    Health Maintenance Due   Topic    ANNUAL REVIEW OF HM ORDERS     MICROALBUMIN      Cecy Brooks

## 2023-08-16 ENCOUNTER — DOCUMENTATION ONLY (OUTPATIENT)
Dept: ANTICOAGULATION | Facility: CLINIC | Age: 83
End: 2023-08-16
Payer: COMMERCIAL

## 2023-08-16 NOTE — PROGRESS NOTES
ANTICOAGULATION     Pee Ayala is overdue for INR check.      Reminder letter sent    Mariel Dale RN

## 2023-08-16 NOTE — LETTER
[facility] ANTICOAGULATION CLINIC  [address] [facility] [address]  Phone: [phone]  Fax: [phone]   August 16, 2023        Pee Ayala  [address]  [place] [facility] [address]            Dear Pee,    You are currently under the care of [facility] for your warfarin (Coumadin , Jantoven ) therapy.  We are contacting you because our records show you were due for an INR on 7/20/23.    Last INR was in 7/6/23.    There are potentially serious risks when taking warfarin without careful monitoring and we want to make sure you are safely managed.  Routine lab monitoring is required for warfarin refills.     Please call [phone] as soon as possible to schedule an appointment.  If there has been a change in your care or other concerns, please let us know so we can help and or update our records.     Sincerely,       [facility]

## 2023-08-28 ENCOUNTER — TELEPHONE (OUTPATIENT)
Dept: FAMILY MEDICINE | Facility: CLINIC | Age: 83
End: 2023-08-28
Payer: COMMERCIAL

## 2023-08-28 DIAGNOSIS — I25.10 CORONARY ARTERY DISEASE INVOLVING NATIVE CORONARY ARTERY OF NATIVE HEART WITHOUT ANGINA PECTORIS: ICD-10-CM

## 2023-08-28 RX ORDER — ROSUVASTATIN CALCIUM 20 MG/1
20 TABLET, COATED ORAL AT BEDTIME
Qty: 100 TABLET | Refills: 3 | Status: SHIPPED | OUTPATIENT
Start: 2023-08-28 | End: 2023-12-21

## 2023-08-28 NOTE — TELEPHONE ENCOUNTER
Called and spoke with patient. Notified I will forward message to Dr Delgadillo for further recommendation. Please advise if patient is to continue with this medication.   His last dose was Wednesday 8/23/23        cilostazol (PLETAL) 50 MG tablet TAKE 1 TABLET BY MOUTH  DAILY FOR 14 DAYS, THEN 1  TABLET TWICE DAILY FOR 14  DAYS, THEN 2 TABLETS TWICE  DAILY FOR 30 DAYS (Patient not taking: Reported on 3/21/2023)

## 2023-08-28 NOTE — TELEPHONE ENCOUNTER
General Call    Contacts         Type Contact Phone/Fax    2023 09:21 AM CDT Phone (Incoming) Pee Ayala (Self) [phone] (M)          Reason for Call:  Cilostazol 50 mg     What are your questions or concerns:  States that [facility] told him that his prescription for Cilostazol 50 mg was  and needs new prescription. Wants Dr. Singh to sent the new prescription to [facility] pharmacy in [facility]. Patient states if Dr. Singh wants him to continue taking the med , he needs to sent a new prescription. Also patient states that the med was prescribed by Dr. Wendy Delgadillo, which I suggested to the patient that he should call the prescribing doctor but he insist on asking Dr. Singh to fill this med for him.     Date of last appointment with provider: 23    Could we send this information to you in [facility] or would you prefer to receive a phone call?:   Patient would prefer a phone call   Okay to leave a detailed message?: Yes at Home number on file [phone] (home)

## 2023-08-28 NOTE — TELEPHONE ENCOUNTER
"Last Written Prescription Date:  12/28/2022  Last Fill Quantity: 100,  # refills: 1   Last office visit provider:  7/18/2023     Requested Prescriptions   Pending Prescriptions Disp Refills    rosuvastatin (CRESTOR) 20 MG tablet 100 tablet 1     Sig: Take 1 tablet (20 mg) by mouth At Bedtime       Statins Protocol Passed - 8/28/2023  9:10 AM        Passed - LDL on file in past 12 months     Recent Labs   Lab Test 04/06/23  1625   LDL 58             Passed - No abnormal creatine kinase in past 12 months     No lab results found.             Passed - Recent (12 mo) or future (30 days) visit within the authorizing provider's specialty     Patient has had an office visit with the authorizing provider or a provider within the authorizing providers department within the previous 12 mos or has a future within next 30 days. See \"Patient Info\" tab in inbasket, or \"Choose Columns\" in Meds & Orders section of the refill encounter.              Passed - Medication is active on med list        Passed - Patient is age 18 or older             Cecile Curry RN 08/28/23 2:33 PM  "

## 2023-08-28 NOTE — TELEPHONE ENCOUNTER
Not on med list      Any further refill on this should be coming from Dr Wendy Delgadillo.   Please check with his office.

## 2023-08-28 NOTE — TELEPHONE ENCOUNTER
Pt stated that he has been out of Pletal for 2 weeks. He does not feel like the medication helped with his leg pain. Informed him that we won't refill at this time if it was not effective. He can discuss further with Diana at his appt in October. He agreed with the plan.

## 2023-08-30 ENCOUNTER — DOCUMENTATION ONLY (OUTPATIENT)
Dept: ANTICOAGULATION | Facility: CLINIC | Age: 83
End: 2023-08-30
Payer: COMMERCIAL

## 2023-08-30 NOTE — LETTER
[facility]  [address] [facility] [address]  Phone: [phone]  Fax: [phone]   August 30, 2023        Pee Ayala  [address]  [place] [facility] [address]            Dear Pee,    You are currently under the care of [facility] for your warfarin (Coumadin , Jantoven ) therapy.  We are contacting you because our records show you were due for an INR on 7/20/23    Last INR in 7/6/3.    There are potentially serious risks when taking warfarin without careful monitoring and we want to make sure you are safely managed.  Routine lab monitoring is required for warfarin refills.     Please call [phone] as soon as possible to schedule an appointment.  If there has been a change in your care or other concerns, please let us know so we can help and or update our records.     Sincerely,       [facility]

## 2023-08-30 NOTE — PROGRESS NOTES
Anticoagulation clinic notificiation    Dr. Singh,    Your patient, Pee Ayala, is past due for an INR. Their last result was 2.5 on 7/6/23 and was due to come back on 7/20/23.    Pee Ayala received phone calls and letters over the last several weeks in attempt to arrange a follow up appointment.  Pee Ayala will be sent another letter today.     No action is required from you unless you have additional information or if you would like to reach out personally to Pee Ayala.    Please don t hesitate to contact the Anticoagulation Management Program if you have any questions or concerns.     Sincerely,     [facility]

## 2023-09-02 NOTE — PROGRESS NOTES
[address]  Progress Note  Name: Lissette Deleon  MRN: [id number]  Unit/Bed#:  I Date of Admission: 8/29/2023   Date of Service: 9/2/2023 I Hospital Day: 4    Assessment/Plan   Acute respiratory failure with hypoxia and hypercapnia (HCC)  Assessment & Plan  Due to COPD exacerbation, MARSHA/OHS non-compliant with home NIV  Clinically improving, states breathing is back to normal.  Seen by pulmonary:  Requiring 3L 02 nc   Continue BiPAP qHS-qualifies with testing for BiPAP, case management working on getting approved from insurance  Ambulatory pulse ox, patient requires 2 L with ambulation, when at rest, oral sent home on 2 L continuous  Follow blood cultures and sputum culture  Continue azithromycin to complete 5 day course for COPD exacerbation. Left pleural effusion not amendable to thoracentesis due to small size and loculation. prednisone 40 mg po daily  Taper prednisone by 10 mg every 3 days until completion. Continue Xopenex/Atrovent nebs 3 times daily.   At discharge recommend Trelegy 1 puff daily and prn albuterol     * COPD with acute exacerbation [facility])  Assessment & Plan  See management under respiratory failure    Pleural effusion, left  Assessment & Plan  - small to moderate left sided effusion on CT  - Seen by pulmonary, no thoracentesis at this time, fluid amount not sufficient  - Echocardiogram completed - EF% 60, Diastolic function is mildly abnormal, consistent with grade I      MARSHA and COPD overlap syndrome (HCC)  Assessment & Plan  - see management under respiratory failure       Microcytic anemia  Assessment & Plan  Hemoglobin 7.7  - suspect iron deficiency anemia  - Hgb 8.1, prior baseline 11 in 7/2023   -Iron 16, ferritin 19, elevated reticulocyte count 2.34  - BRBPR reported intermittent over prior 3 days to admission, no reoccurring symptoms since hospitalized  - GI consult:in setting of respiratory failure will defer EGD and colonoscopy at this time, Progress Notes by Lilli Guzman CNP at 7/12/2019 10:30 AM     Author: Lilli Guzman CNP Service: -- Author Type: Nurse Practitioner    Filed: 7/12/2019 11:14 AM Encounter Date: 7/12/2019 Status: Signed    : Lilli Guzman CNP (Nurse Practitioner)           Click to link to [facility] HEART CARE NOTE      Assessment/Recommendations   Assessment:    1.  Heart failure with preserved ejection fraction, NYHA class III: Compensated.  He states his dyspnea on exertion has improved slightly.  He continues to have fatigue and lower extremity edema.  He states his swelling has been worse at the end of the day especially if he has been on his feet.  He is wondering if increasing his Lasix or taking in the afternoon would help.  He has been trying to follow a low-sodium diet as best as he can.    2.  Hypertension: Controlled.  Blood pressure 130/78    3.  Permanent atrial fibrillation: Rate controlled.  He continues warfarin for anticoagulation.    Plan:  1.  Increase Lasix to 20 mg twice a day for increased swelling  2.  BMP pending  3.  Continue daily weights and low-sodium diet    Pee Ayala will follow up with Dr. Talbert in September and in the heart failure clinic in 4 weeks.     History of Present Illness    Mr. Pee Ayala is a 79 y.o. male seen at [facility] heart failure clinic today for continued follow-up.  He follows up for heart failure with preserved ejection fraction.  He did echocardiogram March 18, 2018 which showed ejection fraction of 65% with moderate aortic stenosis and mild aortic regurgitation.  He has a past medical history significant for hypertension, coronary artery disease, hyperlipidemia, permanent atrial fibrillation, and diabetes.  Status post three-vessel coronary artery bypass graft in 2014.    Today, he states his dyspnea on exertion has improved slightly.  He continues to have fatigue, lower extremity edema, and  hemoglobin has remained stable, can be done as outpatient  - Iron infusion completed, continue oral ferrous  -Transition to p.o. PPI  - Monitor hemoglobin    Umbilical hernia without obstruction and without gangrene  Assessment & Plan  - incidental finding on CT  - no abd pain, monitor    Fatty liver disease, nonalcoholic  Assessment & Plan  Weight loss recommended    Bright red rectal bleeding  Assessment & Plan  See management under microcytic anemia    Uncontrolled type 2 diabetes mellitus with hyperglycemia, with long-term current use of insulin [facility])  Assessment & Plan  Lab Results   Component Value Date    HGBA1C 11.5 (H) 06/08/2023       Recent Labs     09/01/23  2110 09/02/23  0712 09/02/23  1107 09/02/23  1607   POCGLU 233* 157* 250* 202*       Blood Sugar Average: Last 72 hrs:  (P) [id number]     Chronically Uncontrolled, see HgA1C above  ISS  Diabetic diet    Morbid obesity with BMI of 45.0-49.9, adult (HCC)  Assessment & Plan  BMI 46.85  - lifestyle modifications and dietary restrictions discussed with patient on admission  - education upon discharge    Acquired hypothyroidism  Assessment & Plan  - TSH wnl  - continue levothyroxine    Benign essential hypertension  Assessment & Plan  Hold lisinopril and HCTZ due to acute illness, potential GIB and potential for hemodynamic instability, restart as able               VTE Pharmacologic Prophylaxis:   Moderate Risk (Score 3-4) - Pharmacological DVT Prophylaxis Contraindicated. Sequential Compression Devices Ordered. Patient Centered Rounds: I performed bedside rounds with nursing staff today. Discussions with Specialists or Other Care Team Provider: none    Education and Discussions with Family / Patient: Patient declined call to .      Total Time Spent on Date of Encounter in care of patient: 45 minutes This time was spent on one or more of the following: performing physical exam; counseling and coordination of care; obtaining or occasional lightheadedness.  He states his swelling at the end of the day is quite significant if he has been on his feet.  He denies orthopnea or PND.  He denies shortness of breath, orthopnea, PND, palpitations, chest pain and abdominal fullness/bloating.      He is monitoring home weights which are stable around 202 pounds.  He is following a low sodium diet.       Physical Examination Review of Systems   Vitals:    07/12/19 1023   BP: 130/78   Pulse: 64   Resp: 16     Body mass index is 33.67 kg/m .  Wt Readings from Last 3 Encounters:   07/12/19 207 lb (93.9 kg)   05/30/19 208 lb 3.2 oz (94.4 kg)   05/24/19 205 lb (93 kg)       General Appearance:     Alert, cooperative and in no acute distress.   ENT/Mouth: membranes moist, no oral lesions or bleeding gums.      EYES:  no scleral icterus, normal conjunctivae   Neck: no carotid bruits or thyromegaly   Chest/Lungs:   lungs are clear to auscultation, no rales or wheezing, respirations unlabored   Cardiovascular:     Irregularly irregular. Normal first and second heart sounds with grade 3/6 systolic murmur,no rubs, or gallops; the carotid, radial and posterior tibial pulses are intact, Jugular venous pressure normal, trace edema bilateral lower extremities    Abdomen:  Soft, nontender, nondistended, bowel sounds present   Extremities: no cyanosis or clubbing   Skin: warm, dry.    Neurologic: mood and affect are appropriate, alert and oriented x3      General: WNL  Eyes: WNL  Ears/Nose/Throat: Nosebleeds  Lungs: WNL  Heart: Leg Swelling  Stomach: WNL  Bladder: Frequent Urination at Night  Muscle/Joints: WNL  Skin: WNL  Nervous System: Falls  Mental Health: WNL     Blood: Easy Bleeding, Easy Bruising     Medical History  Surgical History Family History Social History   Past Medical History:   Diagnosis Date   ? Actinic keratosis 1/14/2014   ? Actinic keratosis 1/14/2014   ? Anticoagulated on Coumadin 12/30/2015   ? Bone mass 4/26/2017   ? Dyslipidemia 8/31/2016   ?  ED (erectile dysfunction) of organic origin 2005    Overview:  2007 will check PSA, try Levitra, no history of CAD, not on nitrates.    ? Encounter for long-term (current) use of insulin (H) 2016   ? Esophageal reflux 2010   ? Nonalcoholic steatohepatitis 10/1/2009   ? PD (perceptive deafness), asymmetrical 2010   ? Status post coronary angiogram 3/9/2016   ? Tremor 2014    Past Surgical History:   Procedure Laterality Date   ? CORONARY ARTERY BYPASS GRAFT     ? CV CORONARY ANGIOGRAM N/A 2019    Procedure: Coronary Angiogram;  Surgeon: Dominik Vega MD;  Location: [facility] Cath Lab;  Service: Cardiology   ? CV LEFT HEART CATHETERIZATION WO LEFT VETRICULOGRAM Left 2019    Procedure: Left Heart Catheterization Without Left Ventriculogram;  Surgeon: Dominik Vega MD;  Location: [facility] Cath Lab;  Service: Cardiology   ? CV RIGHT HEART CATH Right 2019    Procedure: Right Heart Catheterization;  Surgeon: Dominik Vega MD;  Location: [facility] Cath Lab;  Service: Cardiology   ? [facility]      Family History   Problem Relation Age of Onset   ? Diabetes type II Mother         58 ,  from anesthesia complication.   ? Heart disease Father         86  from stroke   ? Stroke Father    ? No Medical Problems Brother         60 years of age.    Social History     Socioeconomic History   ? Marital status:      Spouse name: Not on file   ? Number of children: Not on file   ? Years of education: Not on file   ? Highest education level: Not on file   Occupational History   ? Not on file   Social Needs   ? Financial resource strain: Not on file   ? Food insecurity:     Worry: Not on file     Inability: Not on file   ? Transportation needs:     Medical: Not on file     Non-medical: Not on file   Tobacco Use   ? Smoking status: Former Smoker     Last attempt to quit: 1998     Years since quittin.5   ? Smokeless tobacco:  reviewing history; documenting in the medical record; reviewing/ordering tests, medications or procedures; communicating with other healthcare professionals and discussing with patient's family/caregivers. Current Length of Stay: 4 day(s)  Current Patient Status: Inpatient   Certification Statement: The patient will continue to require additional inpatient hospital stay due to copd exac  Discharge Plan: Anticipate discharge tomorrow to home with home services. Code Status: Level 1 - Full Code    Subjective:   Patient overall feels his breathing is better, his oxygen has been weaned down he feels more comfortable breathing. He is starting to tolerate the BiPAP little bit more at night. He is anticipating getting out of the hospital soon as possible    Objective:     Vitals:   Temp (24hrs), Av.4 °F (36.3 °C), Min:97.1 °F (36.2 °C), Max:97.6 °F (36.4 °C)    Temp:  [97.1 °F (36.2 °C)-97.6 °F (36.4 °C)] 97.5 °F (36.4 °C)  HR:  [] 89  Resp:  [19-43] 43  BP: (125-169)/() 163/81  SpO2:  [85 %-95 %] 95 %  Body mass index is 45.3 kg/m². Input and Output Summary (last 24 hours): Intake/Output Summary (Last 24 hours) at 2023 1714  Last data filed at 2023 0900  Gross per 24 hour   Intake 360 ml   Output 4150 ml   Net -3790 ml       Physical Exam:   Physical Exam  Vitals and nursing note reviewed. Constitutional:       Appearance: He is well-developed. He is obese. HENT:      Head: Normocephalic and atraumatic. Eyes:      Conjunctiva/sclera: Conjunctivae normal.   Cardiovascular:      Rate and Rhythm: Normal rate and regular rhythm. Heart sounds: No murmur heard. Pulmonary:      Effort: Pulmonary effort is normal.      Comments: Mildly decreased bilaterally, improved from prior  Abdominal:      Palpations: Abdomen is soft. Tenderness: There is no abdominal tenderness. Musculoskeletal:         General: No swelling. Cervical back: Neck supple.    Skin:     General: Skin Never Used   Substance and Sexual Activity   ? Alcohol use: Yes     Comment: very rare   ? Drug use: No   ? Sexual activity: Never     Birth control/protection: None   Lifestyle   ? Physical activity:     Days per week: Not on file     Minutes per session: Not on file   ? Stress: Not on file   Relationships   ? Social connections:     Talks on phone: Not on file     Gets together: Not on file     Attends [employer] service: Not on file     Active member of club or organization: Not on file     Attends meetings of clubs or organizations: Not on file     Relationship status: Not on file   ? Intimate partner violence:     Fear of current or ex partner: Not on file     Emotionally abused: Not on file     Physically abused: Not on file     Forced sexual activity: Not on file   Other Topics Concern   ? Not on file   Social History Narrative     and lives with life.    Has adult children from previous relationship. ( Blended family).    Has a dog - Vincent Gil MD  1/3/2019              Medications  Allergies   Current Outpatient Medications   Medication Sig Dispense Refill   ? acetaminophen (TYLENOL) 500 MG tablet Take 1,000 mg by mouth every 6 (six) hours as needed.            ? amLODIPine (NORVASC) 5 MG tablet Take 1 tablet (5 mg total) by mouth at bedtime. 90 tablet 5   ? antiox.mv no.10/omeg3s/lut/starr (I-CAPS ORAL) Take 1 tablet by mouth 2 (two) times a day.     ? aspirin 81 mg chewable tablet Chew 81 mg daily.            ? blood glucose meter (GLUCOMETER) Use 1 each As Directed as needed. Dispense glucometer brand per patient's insurance at pharmacy discretion. 1 each 0   ? carvedilol (COREG) 25 MG tablet Take 1 tablet (25 mg total) by mouth 2 (two) times a day with meals. 180 tablet 5   ? finasteride (PROSCAR) 5 mg tablet TAKE ONE TABLET BY MOUTH ONCE DAILY IN  ADDITION  TO  FLOMAX     ? fluorouracil (EFUDEX) 5 % cream Apply to cheeks, nose, forehead, ears and scalp twice daily for 2 weeks.  is warm and dry. Capillary Refill: Capillary refill takes less than 2 seconds. Comments: Chronic venous stasis skin change of the LE b/l with visible varicosities/dilated veins   Neurological:      Mental Status: He is alert and oriented to person, place, and time. Psychiatric:      Comments:             Additional Data:     Labs:  Results from last 7 days   Lab Units 23  0541   WBC Thousand/uL 11.53*   HEMOGLOBIN g/dL 9.0*   HEMATOCRIT % 30.7*   PLATELETS Thousands/uL 397*   NEUTROS PCT % 73   LYMPHS PCT % 15   MONOS PCT % 10   EOS PCT % 0     Results from last 7 days   Lab Units 23  0541   SODIUM mmol/L 135   POTASSIUM mmol/L 4.3   CHLORIDE mmol/L 94*   CO2 mmol/L 35*   BUN mg/dL 21   CREATININE mg/dL 1.17   ANION GAP mmol/L 6   CALCIUM mg/dL 9.3   ALBUMIN g/dL 3.9   TOTAL BILIRUBIN mg/dL 0.42   ALK PHOS U/L 53   ALT U/L 32   AST U/L 27   GLUCOSE RANDOM mg/dL 174*     Results from last 7 days   Lab Units 23  0438   INR  1.07     Results from last 7 days   Lab Units 23  1607 23  1107 23  0712 23  2110 23  1556 23  1211 23  0845 23  2102 23  1619 23  1133 23  0740 23  2101   POC GLUCOSE mg/dl 202* 250* 157* 233* 283* 195* 127 192* 162* 154* 179* 325*         Results from last 7 days   Lab Units 23  0453 23  1837   LACTIC ACID mmol/L  --  1.7   PROCALCITONIN ng/ml 0.10 0.17       Lines/Drains:  Invasive Devices     Peripheral Intravenous Line  Duration           Peripheral IV 23 Dorsal (posterior); Left Hand <1 day                  Telemetry:  Telemetry Orders (From admission, onward)             24 Hour Telemetry Monitoring  Continuous x 24 Hours (Telem)           Question:  Reason for 24 Hour Telemetry  Answer:  Acute respiratory failure on BiPAP                            Imaging: No pertinent imaging reviewed.     Recent Cultures (last 7 days):   Results from last 7 days   Lab Units "Inflammation is expected.     ? furosemide (LASIX) 20 MG tablet Take 1 tablet (20 mg total) by mouth 2 (two) times a day at 9am and 6pm. 180 tablet 0   ? gabapentin (NEURONTIN) 300 MG capsule Take 2 capsules (600 mg total) by mouth 2 (two) times a day. 360 capsule 3   ? glimepiride (AMARYL) 4 MG tablet TAKE ONE TABLET BY MOUTH TWICE DAILY     ? insulin NPH-insulin regular (NOVOLIN 70/30 U-100 INSULIN) 100 unit/mL (70-30) injection 18 in the morning and 12 at night            ? insulin syringe-needle U-100 0.3 mL 31 gauge x 15/64\" Syrg Use 1 each As Directed 2 (two) times a day. 100 Syringe 11   ? metFORMIN (GLUCOPHAGE) 500 MG tablet Take 2 tablets (1,000 mg total) by mouth 2 (two) times a day. 360 tablet 3   ? omeprazole (PRILOSEC) 40 MG capsule Take 40 mg by mouth daily as needed.            ? rosuvastatin (CRESTOR) 20 MG tablet Take 1 tablet (20 mg total) by mouth at bedtime. 90 tablet 3   ? traMADol (ULTRAM) 50 mg tablet Take 1 tablet (50 mg total) by mouth at bedtime as needed. 30 tablet 0   ? warfarin (COUMADIN/JANTOVEN) 5 MG tablet Take 5mg daily by mouth as directed 90 tablet 1   ? alfuzosin (UROXATRAL) 10 mg 24 hr tablet Take 10 mg by mouth daily.              No current facility-administered medications for this visit.       Allergies   Allergen Reactions   ? Oxycodone Itching   ? Lisinopril Cough         Lab Results    Chemistry CBC BNP   Lab Results   Component Value Date    CREATININE 0.85 05/22/2019    BUN 23 05/22/2019     05/22/2019    K 5.1 (H) 05/22/2019     05/22/2019    CO2 25 05/22/2019     Creatinine (mg/dL)   Date Value   05/22/2019 0.85   04/04/2019 0.83   03/28/2019 0.92   10/06/2014 0.89    Lab Results   Component Value Date    WBC 6.3 04/04/2019    HGB 12.6 (L) 04/04/2019    HCT 37.7 (L) 04/04/2019     (H) 04/04/2019     04/04/2019    Lab Results   Component Value Date    BNP 96 (H) 05/09/2019     BNP (pg/mL)   Date Value   05/09/2019 96 (H)          25 minutes " 08/30/23  0441 08/29/23  1837   BLOOD CULTURE   --  No Growth at 72 hrs. No Growth at 72 hrs.    SPUTUM CULTURE  3+ Growth of  --    GRAM STAIN RESULT  1+ Epithelial cells per low power field*  Rare Polys*  2+ Gram positive cocci in pairs and chains*  1+ Gram positive rods*  --        Last 24 Hours Medication List:   Current Facility-Administered Medications   Medication Dose Route Frequency Provider Last Rate   • acetaminophen  650 mg Oral Q6H PRN Kelly Calvert MD     • albuterol  2.5 mg Nebulization Q4H PRN Kelly Calvert MD     • ALPRAZolam  0.25 mg Oral BID PRN Kelly Calvert MD     • aluminum-magnesium hydroxide-simethicone  30 mL Oral Q6H PRN Kelly Calvert MD     • ascorbic acid  500 mg Oral BID AC Kelly Calvert MD     • atorvastatin  80 mg Oral Daily With Dinner Kelly Calvert MD     • azithromycin  500 mg Intravenous Q24H Kelly Calvert MD     • benzonatate  100 mg Oral TID PRN Kelly Calvert MD     • DULoxetine  60 mg Oral Daily Kelly Calvert MD     • ferrous sulfate  325 mg Oral BID AC Kelly Calvert MD     • fish oil  1,000 mg Oral BID Kelly Calvert MD     • guaiFENesin  1,200 mg Oral Q12H [address] Kelly Calvert MD     • insulin glargine  30 Units Subcutaneous HS Kelly Calvert MD     • insulin lispro  2-12 Units Subcutaneous TID JMAARI Calvert MD     • insulin lispro  2-12 Units Subcutaneous HS Kelly Calvert MD     • ipratropium  0.5 mg Nebulization TID Kelly Calvert MD     • levalbuterol  1.25 mg Nebulization TID Kelly Calvert MD     • levothyroxine  175 mcg Oral Early Morning Kelly Calvert MD     • lidocaine  1 patch Topical Daily Mannie Yadiel Counts, DO     • [START ON 9/3/2023] lisinopril  5 mg Oral Daily Mannie Yadiel Counts, DO     • loratadine  10 mg Oral Daily Kelly Calvert MD     • methylPREDNISolone sodium succinate  40 mg Intravenous Q12H [address] Dandre Car PA-C     • metoclopramide  10 [facility] Intravenous Q6H [facility] Esthela     • [START ON 9/3/2023] pantoprazole  40 mg Oral Early Morning Mannie Merida DO     • polyethylene glycol  17 g Oral Daily PRN MARLEEN Hutchins     • pregabalin  25 mg Oral BID Lazarus Mass, MD     • sodium chloride  1 spray Each Nare Q1H PRN Lazarus Mass, MD     • traZODone  150 mg Oral HS Lazarus Mass, MD          Today, Patient Was Seen By: Shen Merida DO    **Please Note: This note may have been constructed using a voice recognition system. ** were spent with the patient with greater than 50% spent on education and counseling.      Lilli Guzman, [facility]   Heart Failure Clinic

## 2023-09-13 ENCOUNTER — DOCUMENTATION ONLY (OUTPATIENT)
Dept: FAMILY MEDICINE | Facility: CLINIC | Age: 83
End: 2023-09-13
Payer: COMMERCIAL

## 2023-09-13 DIAGNOSIS — Z95.2 S/P TAVR (TRANSCATHETER AORTIC VALVE REPLACEMENT): ICD-10-CM

## 2023-09-13 DIAGNOSIS — I73.9 PERIPHERAL ARTERIAL DISEASE (H): ICD-10-CM

## 2023-09-13 DIAGNOSIS — Z79.01 LONG TERM (CURRENT) USE OF ANTICOAGULANTS: ICD-10-CM

## 2023-09-13 DIAGNOSIS — I48.20 CHRONIC ATRIAL FIBRILLATION (H): Primary | ICD-10-CM

## 2023-09-13 NOTE — PROGRESS NOTES
ANTICOAGULATION CLINIC REFERRAL RENEWAL REQUEST       An annual renewal order is required for all patients referred to [facility].?  Please review and sign the pended referral order for Pee Ayala.       ANTICOAGULATION SUMMARY      Warfarin indication(s)   Atrial Fibrillation, chronic and s/p TAVR on 1/12/21.    Mechanical heart valve present?  NO       Current goal range   INR: 2.0-3.0     Goal appropriate for indication? Goal INR 2-3, standard for indication(s) above     Time in Therapeutic Range (TTR)  (Goal > 60%) 55.2 %       Office visit with referring provider's group within last year Yes on 7/18/2023       Mariel Dale RN  [facility]

## 2023-09-14 ENCOUNTER — TELEPHONE (OUTPATIENT)
Dept: ANTICOAGULATION | Facility: CLINIC | Age: 83
End: 2023-09-14
Payer: COMMERCIAL

## 2023-09-14 DIAGNOSIS — Z79.01 LONG TERM (CURRENT) USE OF ANTICOAGULANTS: ICD-10-CM

## 2023-09-14 DIAGNOSIS — Z95.2 S/P TAVR (TRANSCATHETER AORTIC VALVE REPLACEMENT): ICD-10-CM

## 2023-09-14 DIAGNOSIS — I73.9 PERIPHERAL ARTERIAL DISEASE (H): ICD-10-CM

## 2023-09-14 DIAGNOSIS — Z79.01 CHRONIC ANTICOAGULATION: ICD-10-CM

## 2023-09-14 DIAGNOSIS — I48.20 CHRONIC ATRIAL FIBRILLATION (H): Primary | ICD-10-CM

## 2023-09-14 NOTE — TELEPHONE ENCOUNTER
ANTICOAGULATION MANAGEMENT PROGRAM    Dr. Singh,     Our records indicate that Pee Ayala remains past due to check INR. Pee Ayala was contacted multiple times over at least the last 8 weeks to attempt to arrange a follow up appointment.    Pee Ayala last had an INR checked on 7/6/23 and was due for follow up on 7/20/23.     Called patient,Spoke with Pee and scheduled lab appointment on 9/19/23 @ [facility]     At this time Pee Ayala will be moved to noncompliant status within the program.  Their referral was just renewed on 9/14/23. While in noncompliant status the patient would continue to be contacted every 6 weeks by the anticoagulation program to attempt to schedule patient. You will be notified of each contact attempt to make you aware of patient's ongoing noncompliance.        Thank you,     [facility] Anticoagulation Management Program

## 2023-09-14 NOTE — TELEPHONE ENCOUNTER
Yes.  I spoke with Pee and we scheduled INR on 9/19/23 @ [facility]     - I will follow-up and ensure he gets INR checked on 9/19.

## 2023-10-10 ENCOUNTER — TELEPHONE (OUTPATIENT)
Dept: FAMILY MEDICINE | Facility: CLINIC | Age: 83
End: 2023-10-10
Payer: COMMERCIAL

## 2023-10-10 NOTE — TELEPHONE ENCOUNTER
Incoming call from patient requesting to schedule with Dr. Singh because he needs oxycodone. Patient said he has prescription # [id number], and wants to renew his refill but I didn't see any oxycodone on his current med list but I did find one that was discontinued from 2015.  Patient is scheduled on 10/23. Please advise

## 2023-10-23 ENCOUNTER — OFFICE VISIT (OUTPATIENT)
Dept: FAMILY MEDICINE | Facility: CLINIC | Age: 83
End: 2023-10-23
Payer: COMMERCIAL

## 2023-10-23 ENCOUNTER — ANTICOAGULATION THERAPY VISIT (OUTPATIENT)
Dept: ANTICOAGULATION | Facility: CLINIC | Age: 83
End: 2023-10-23

## 2023-10-23 VITALS
HEART RATE: 73 BPM | DIASTOLIC BLOOD PRESSURE: 77 MMHG | HEIGHT: 62 IN | WEIGHT: 166.2 LBS | BODY MASS INDEX: 30.59 KG/M2 | SYSTOLIC BLOOD PRESSURE: 163 MMHG | OXYGEN SATURATION: 98 % | TEMPERATURE: 97.9 F | RESPIRATION RATE: 16 BRPM

## 2023-10-23 DIAGNOSIS — I73.9 PERIPHERAL ARTERIAL DISEASE (H): ICD-10-CM

## 2023-10-23 DIAGNOSIS — E11.3599 TYPE 2 DIABETES MELLITUS WITH PROLIFERATIVE RETINOPATHY WITHOUT MACULAR EDEMA, WITH LONG-TERM CURRENT USE OF INSULIN, UNSPECIFIED LATERALITY (H): ICD-10-CM

## 2023-10-23 DIAGNOSIS — I48.20 CHRONIC ATRIAL FIBRILLATION (H): Primary | ICD-10-CM

## 2023-10-23 DIAGNOSIS — Z95.2 S/P TAVR (TRANSCATHETER AORTIC VALVE REPLACEMENT): ICD-10-CM

## 2023-10-23 DIAGNOSIS — Z79.01 CHRONIC ANTICOAGULATION: ICD-10-CM

## 2023-10-23 DIAGNOSIS — I10 PRIMARY HYPERTENSION: ICD-10-CM

## 2023-10-23 DIAGNOSIS — Z79.4 TYPE 2 DIABETES MELLITUS WITH PROLIFERATIVE RETINOPATHY WITHOUT MACULAR EDEMA, WITH LONG-TERM CURRENT USE OF INSULIN, UNSPECIFIED LATERALITY (H): ICD-10-CM

## 2023-10-23 DIAGNOSIS — I48.20 CHRONIC ATRIAL FIBRILLATION (H): ICD-10-CM

## 2023-10-23 DIAGNOSIS — Z79.891 OPIOID USE AGREEMENT EXISTS: ICD-10-CM

## 2023-10-23 DIAGNOSIS — S32.000D COMPRESSION FRACTURE OF LUMBAR VERTEBRA WITH ROUTINE HEALING, UNSPECIFIED LUMBAR VERTEBRAL LEVEL, SUBSEQUENT ENCOUNTER: ICD-10-CM

## 2023-10-23 DIAGNOSIS — Z79.01 LONG TERM (CURRENT) USE OF ANTICOAGULANTS: ICD-10-CM

## 2023-10-23 LAB
HBA1C MFR BLD: 8.4 % (ref 0–5.6)
HOLD SPECIMEN: NORMAL
INR BLD: 2 (ref 0.9–1.1)

## 2023-10-23 PROCEDURE — 85610 PROTHROMBIN TIME: CPT | Performed by: FAMILY MEDICINE

## 2023-10-23 PROCEDURE — 36415 COLL VENOUS BLD VENIPUNCTURE: CPT | Performed by: FAMILY MEDICINE

## 2023-10-23 PROCEDURE — 83036 HEMOGLOBIN GLYCOSYLATED A1C: CPT | Performed by: FAMILY MEDICINE

## 2023-10-23 PROCEDURE — 99214 OFFICE O/P EST MOD 30 MIN: CPT | Performed by: FAMILY MEDICINE

## 2023-10-23 RX ORDER — RESPIRATORY SYNCYTIAL VIRUS VACCINE 120MCG/0.5
0.5 KIT INTRAMUSCULAR ONCE
Qty: 1 EACH | Refills: 0 | Status: CANCELLED | OUTPATIENT
Start: 2023-10-23 | End: 2023-10-23

## 2023-10-23 RX ORDER — OXYCODONE AND ACETAMINOPHEN 5; 325 MG/1; MG/1
1 TABLET ORAL EVERY 6 HOURS PRN
Qty: 30 TABLET | Refills: 0 | Status: SHIPPED | OUTPATIENT
Start: 2023-10-23 | End: 2023-12-21

## 2023-10-23 ASSESSMENT — PATIENT HEALTH QUESTIONNAIRE - PHQ9
SUM OF ALL RESPONSES TO PHQ QUESTIONS 1-9: 2
10. IF YOU CHECKED OFF ANY PROBLEMS, HOW DIFFICULT HAVE THESE PROBLEMS MADE IT FOR YOU TO DO YOUR WORK, TAKE CARE OF THINGS AT HOME, OR GET ALONG WITH OTHER PEOPLE: NOT DIFFICULT AT ALL
SUM OF ALL RESPONSES TO PHQ QUESTIONS 1-9: 2

## 2023-10-23 NOTE — LETTER
October 23, 2023      Pee Ayala  [address] [place] [address]        Dear ,  We are writing to inform you of your test results.    Сергей Glasgow:  Your diabetic control has improved though still is not at the goal of A1C below 8.0.  However you are close!  Continue to adhere to the diabetic diet and limit carbohydrate intake.  Recheck your next A1C in 3 months.      Resulted Orders   Hemoglobin A1c   Result Value Ref Range    Hemoglobin A1C 8.4 (H) 0.0 - 5.6 %      Comment:      Normal <5.7%   Prediabetes 5.7-6.4%    Diabetes 6.5% or higher     Note: Adopted from ADA consensus guidelines.       If you have any questions or concerns, please call the clinic at the number listed above.       Sincerely,      Ihsan Singh MD

## 2023-10-23 NOTE — PROGRESS NOTES
ANTICOAGULATION MANAGEMENT     Pee Ayala 83 year old male is on warfarin with therapeutic INR result. (Goal INR 2.0-3.0)    Recent labs: (last 7 days)     10/23/23  1020   INR 2.0*       ASSESSMENT     Warfarin Lab Questionnaire    Warfarin Doses Last 7 Days      10/23/2023     9:24 AM   Dose in Tablet or Mg   TAB or MG? - has 4mg and 5mg tablets (evenings) milligram (mg)     Pt Rptd Dose JAY MONDAY TUESDAY WED THURS FRIDAY SATURDAY   10/23/2023   9:24 AM 4 4 5 4 4 4 5         10/23/2023   Warfarin Lab Questionnaire   Missed doses within past 14 days? No - template had wrong warfarin dose - and Pee verified back warfarin dose as mentioned above and taking less warfarin than ordered.       - will update anticoagulation calendar, as patient has been taking dose for a while now.     Changes in diet or alcohol within past 14 days? No   Medication changes since last result? No   Injuries or illness since last result? No - Meds (Percocet) check today with Dr. Singh -      New shortness of breath, severe headaches or sudden changes in vision since last result? No   Abnormal bleeding since last result? No   Upcoming surgery, procedure? No     Previous result: Therapeutic last visit at 2.5; previously outside of goal range at 3.1    - last INR was in 7/6/23.    Additional findings: None       PLAN     Recommended plan for ongoing change(s) affecting INR     Dosing Instructions: Continue your current warfarin dose with next INR in 2 weeks       Summary  As of 10/23/2023      Full warfarin instructions:  5 mg every Tue, Sat; 4 mg all other days   Next INR check:  11/6/2023               Telephone call with Pee who verbalizes understanding and agrees to plan    Lab visit scheduled - INR on 11/7/23 at diabetic follow-up @ [facility]    Education provided:   Taking warfarin: Importance of taking warfarin as instructed  Goal range and lab monitoring: goal range and significance of current result    Plan made per [facility]  anticoagulation protocol    Mariel Dale RN  Anticoagulation Clinic  10/23/2023    _______________________________________________________________________     Anticoagulation Episode Summary       Current INR goal:  2.0-3.0   TTR:  43.0% (8.5 mo)   Target end date:  Indefinite   Send INR reminders to:  [facility]    Indications    Chronic atrial fibrillation (H) [I48.20]  Chronic anticoagulation [Z79.01]  Long term (current) use of anticoagulants [Z79.01]  Peripheral arterial disease (H24) [I73.9]  S/P TAVR (transcatheter aortic valve replacement) [Z95.2]             Comments:               Anticoagulation Care Providers       Provider Role Specialty Phone number    Jazzy Gil MD Referring Family Medicine [phone]    Ihsan Singh MD Referring Family Medicine [phone]

## 2023-10-23 NOTE — PATIENT INSTRUCTIONS
CHECK A1C and INR    UPDATE CSA    UDS NOT DUE UNTIL APRIL 2024    REFILL OXYCODONE FOR 5/325 MG TABS #30    NURSE BP CHECK APPT IN A COUPLE WEEKS.    AVOID SALT

## 2023-10-23 NOTE — PROGRESS NOTES
Alex Glasgow is a 83 year old, presenting for the following health issues:  Recheck Medication (Requesting oxyCODONE-acetaminophen (PERCOCET) 5-325 MG - CSA done 2/14/23)        10/23/2023     9:31 AM   Additional Questions   Roomed by Laura FRANCIS LPN       HPI     OXYCODONE REFILL:  given #30 pills in Feb 2023  PDMP reviewed.  No controlled meds sinde oxycodone fill in Feb 2023  Neg UDS April 2023        Current Outpatient Medications:     acetaminophen (TYLENOL) 500 MG tablet, Take 1 tablet (500 mg) by mouth 2 times daily as needed for mild pain, Disp: , Rfl:     aspirin 81 MG EC tablet, Take 1 tablet (81 mg) by mouth daily, Disp: 90 tablet, Rfl: 0    blood glucose (ACCU-CHEK GUIDE) test strip, USE 1 STRIP TO CHECK GLUCOSE THREE TIMES DAILY, Disp: 300 strip, Rfl: 0    busPIRone (BUSPAR) 5 MG tablet, Take 1 tablet (5 mg) by mouth 2 times daily, Disp: 180 tablet, Rfl: 0    carvedilol (COREG) 3.125 MG tablet, TAKE 1 TABLET BY MOUTH TWICE  DAILY WITH MEALS, Disp: 200 tablet, Rfl: 2    Continuous Blood Gluc Sensor (FREESTYLE DOMI 2 SENSOR) [facility] [place] 1 each every 14 days Use 1 sensor every 14 days. Use to read blood sugars per 's instructions., Disp: 1 each, Rfl: 0    finasteride (PROSCAR) 5 MG tablet, Take 1 tablet (5 mg) by mouth daily, Disp: 90 tablet, Rfl: 3    furosemide (LASIX) 20 MG tablet, TAKE 2 TABLETS BY MOUTH IN THE MORNING AND 1 TABLET IN THE AFTERNOON, Disp: 270 tablet, Rfl: 0    gabapentin (NEURONTIN) 600 MG tablet, Take 1 tablet (600 mg) by mouth 2 times daily, Disp: , Rfl:     insulin aspart prot & aspart (NOVOLOG MIX 70/30 PEN) (70-30) 100 UNIT/ML pen, Inject subcutaneously 20 units in AM with breakfast and 12 in PM with dinner. If Blood Glucose<100 then give 1/2 dose), Disp: 15 mL, Rfl: 0    metFORMIN (GLUCOPHAGE) 500 MG tablet, Take 2 tablets (1,000 mg) by mouth 2 times daily (with meals), Disp: 360 tablet, Rfl: 1    Multiple Vitamins-Minerals (PRESERVISION AREDS PO), , Disp: , Rfl:  "    oxyCODONE-acetaminophen (PERCOCET) 5-325 MG tablet, Take 1 tablet by mouth every 6 hours as needed for pain, Disp: 30 tablet, Rfl: 0    pramipexole (MIRAPEX) 0.25 MG tablet, Take 1 tablet (0.25 mg) by mouth 3 times daily Take one tablet at noon, 4pm and 5:30 pm, Disp: 270 tablet, Rfl: 3    rosuvastatin (CRESTOR) 20 MG tablet, Take 1 tablet (20 mg) by mouth At Bedtime, Disp: 100 tablet, Rfl: 3    tamsulosin (FLOMAX) 0.4 MG capsule, Take 1 capsule (0.4 mg) by mouth daily, Disp: 30 capsule, Rfl: 11    warfarin ANTICOAGULANT (COUMADIN) 1 MG tablet, 5mg daily on Monydays and thursdays and 4mg all other days., Disp: 60 tablet, Rfl: 0    warfarin ANTICOAGULANT (COUMADIN) 5 MG tablet, Take 1 tablet (5 mg) daily or as directed by the INR clinic, Disp: 90 tablet, Rfl: 0     Review of Systems         Objective    BP (!) 163/77   Pulse 73   Temp 97.9  F (36.6  C) (Oral)   Resp 16   Ht 1.568 m (5' 1.75\")   Wt 75.4 kg (166 lb 3.2 oz)   SpO2 98%   BMI 30.65 kg/m    Body mass index is 30.65 kg/m .  Physical Exam   GENERAL: healthy, alert and no distress  RESP: lungs clear to auscultation - no rales, rhonchi or wheezes  CV: regular rate and rhythm, normal S1 S2, no S3 or S4, no murmur, click or rub, no peripheral edema and peripheral pulses strong  MS: no gross musculoskeletal defects noted, 1-2+ EDEMA of distal LE    Last Comprehensive Metabolic Panel:  Lab Results   Component Value Date     (L) 07/18/2023    POTASSIUM 3.9 07/18/2023    CHLORIDE 97 (L) 07/18/2023    CO2 27 07/18/2023    ANIONGAP 11 07/18/2023     (H) 07/18/2023    BUN 18.2 07/18/2023    CR 1.04 07/18/2023    GFRESTIMATED 72 07/18/2023    RACHEL 9.3 07/18/2023        Lab Results   Component Value Date    ALT 24 04/06/2023    ALT 32 01/12/2021     CBC RESULTS:   Recent Labs   Lab Test 04/06/23  1625   WBC 5.3   RBC 3.82*   HGB 12.4*   HCT 38.5*   *   MCH 32.5   MCHC 32.2   RDW 12.9        Hemoglobin A1C   Date Value Ref Range Status "   07/18/2023 9.8 (H) 0.0 - 5.6 % Final     Comment:     Normal <5.7%   Prediabetes 5.7-6.4%    Diabetes 6.5% or higher     Note: Adopted from ADA consensus guidelines.                      Lab Results   Component Value Date    A1C 8.4 10/23/2023    A1C 9.8 07/18/2023    A1C 8.6 03/14/2023    A1C 10.3 11/17/2022    A1C 8.8 08/15/2022    A1C 9.1 01/12/2021    A1C 8.0 06/09/2016    A1C 8.2 03/08/2016    A1C 7.7 12/23/2015    A1C 6.9 09/15/2015         Encounter Diagnoses   Name Primary?    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    AVOID SALT

## 2023-10-24 ENCOUNTER — TELEPHONE (OUTPATIENT)
Dept: FAMILY MEDICINE | Facility: CLINIC | Age: 83
End: 2023-10-24
Payer: COMMERCIAL

## 2023-10-24 NOTE — TELEPHONE ENCOUNTER
----- Message from Ihsan Singh MD sent at 10/23/2023  8:56 PM CDT -----  Сергей Glasgow:  Your diabetic control has improved though still is not at the goal of A1C below 8.0.  However you are close!  Continue to adhere to the diabetic diet and limit carbohydrate intake.  Recheck your next A1C in 3 months.

## 2023-10-26 DIAGNOSIS — E11.42 TYPE 2 DIABETES MELLITUS WITH PERIPHERAL NEUROPATHY (H): ICD-10-CM

## 2023-10-27 DIAGNOSIS — R52 PAIN: ICD-10-CM

## 2023-10-27 DIAGNOSIS — I50.32 CHRONIC HEART FAILURE WITH PRESERVED EJECTION FRACTION (H): ICD-10-CM

## 2023-10-27 RX ORDER — FUROSEMIDE 20 MG
TABLET ORAL
Qty: 270 TABLET | Refills: 0 | Status: SHIPPED | OUTPATIENT
Start: 2023-10-27 | End: 2023-12-21

## 2023-10-27 RX ORDER — PRAMIPEXOLE DIHYDROCHLORIDE 0.25 MG/1
TABLET ORAL
Qty: 180 TABLET | Refills: 0 | Status: SHIPPED | OUTPATIENT
Start: 2023-10-27 | End: 2023-12-21

## 2023-10-31 ENCOUNTER — OFFICE VISIT (OUTPATIENT)
Dept: ENDOCRINOLOGY | Facility: CLINIC | Age: 83
End: 2023-10-31
Payer: COMMERCIAL

## 2023-10-31 VITALS
HEART RATE: 78 BPM | WEIGHT: 166.3 LBS | BODY MASS INDEX: 30.66 KG/M2 | SYSTOLIC BLOOD PRESSURE: 140 MMHG | OXYGEN SATURATION: 95 % | DIASTOLIC BLOOD PRESSURE: 62 MMHG

## 2023-10-31 DIAGNOSIS — E11.9 TYPE 2 DIABETES, HBA1C GOAL < 8% (H): Primary | ICD-10-CM

## 2023-10-31 PROCEDURE — 99213 OFFICE O/P EST LOW 20 MIN: CPT | Performed by: NURSE PRACTITIONER
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
## 2023-11-01 ENCOUNTER — TELEPHONE (OUTPATIENT)
Dept: ENDOCRINOLOGY | Facility: CLINIC | Age: 83
End: 2023-11-01
Payer: COMMERCIAL

## 2023-11-01 NOTE — TELEPHONE ENCOUNTER
PA Initiation    Medication: FREESTYLE DOMI 2 READER DOROTEO  Insurance Company: [employer] Part D - Phone [phone] Fax [phone]  Pharmacy Filling the Rx:    Filling Pharmacy Phone:    Filling Pharmacy Fax:    Start Date: 11/1/2023    Key: [id number]
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
## 2023-11-01 NOTE — LETTER
To whom it may concern,      Pee is a patient that suffers from type two diabetes, which is insulin dependent. Please considering covering the Freestyle Judie 2 reader and sensors as these would aid in his diabetes management. If there are any questions, please feel free to contact us at [phone].    Sincerely,    ANA PAULA Estrada

## 2023-11-01 NOTE — LETTER
To whom it may concern,        Pee is a patient that suffers from type two diabetes, which is insulin dependent, taking novolog 70/30 2-3 times per day. Currently, Pee checks his blood sugar 1-3 times per day. Please considering covering the Freestyle Judie 2 reader and sensors as these would aid in his diabetes management. If there are any questions, please feel free to contact us at [phone].     Sincerely,     ANA PAULA Estrada

## 2023-11-06 NOTE — TELEPHONE ENCOUNTER
Freestyle jerson 2 denied, please refer to denial. Please advise on how you would like to proceed.If appealed please provide medical rational.

## 2023-11-06 NOTE — TELEPHONE ENCOUNTER
PRIOR AUTHORIZATION DENIED    Medication: FREESTYLE DOMI 2 READER [facility]  Insurance Company: [employer] - Phone [phone] Fax [phone]  Denial Date: 11/4/2023  Denial Rational:   Appeal Information:

## 2023-11-08 NOTE — TELEPHONE ENCOUNTER
Medication Appeal Initiation    Medication: FREESTYLE DOMI 2 READER DOR[facility]O  Appeal Start Date:  11/8/2023  Insurance Company: [employer]  Insurance Phone:   Insurance Fax: [phone]  Comments:

## 2023-11-13 ENCOUNTER — TELEPHONE (OUTPATIENT)
Dept: ANTICOAGULATION | Facility: CLINIC | Age: 83
End: 2023-11-13
Payer: COMMERCIAL

## 2023-11-13 NOTE — TELEPHONE ENCOUNTER
MEDICATION APPEAL APPROVED    Medication: FREESTYLE DOMI 2 READER DOROTEO  Authorization Effective Date: 11/11/2023  Authorization Expiration Date: 12/31/2024  Approved Dose/Quantity: 1/30 days   Reference #: [id number]   Appeal Insurance Company: [employer]  Expected CoPay: $ 0     CoPay Card Available:    Financial Assistance Needed: no  Filling Pharmacy:    Patient Notified: yes,sent my chart message  Comments:  Freestyle domi 2 sensors are covered with this PA

## 2023-11-13 NOTE — TELEPHONE ENCOUNTER
ANTICOAGULATION     Pee Ayala is overdue for an INR check.     Spoke with Pee and scheduled lab appointment on 11/14    Mariel Dale RN

## 2023-11-17 ENCOUNTER — TELEPHONE (OUTPATIENT)
Dept: FAMILY MEDICINE | Facility: CLINIC | Age: 83
End: 2023-11-17
Payer: COMMERCIAL

## 2023-11-17 NOTE — TELEPHONE ENCOUNTER
General Call    Contacts         Type Contact Phone/Fax    11/17/2023 11:33 AM CST Phone (Incoming) Pee Ayala (Self) [phone] (M)          Reason for Call:  patient wants to have current medications printed. I did print this out and it is at the  for .     Date of last appointment with provider: 10-    Could we send this information to you in [employer] or would you prefer to receive a phone call?:   Patient would prefer a phone call   Okay to leave a detailed message?: Yes at Cell number on file:    Telephone Information:   Mobile [phone]

## 2023-11-18 DIAGNOSIS — I48.20 CHRONIC ATRIAL FIBRILLATION (H): ICD-10-CM

## 2023-11-20 RX ORDER — WARFARIN SODIUM 5 MG/1
TABLET ORAL
Qty: 90 TABLET | Refills: 0 | Status: SHIPPED | OUTPATIENT
Start: 2023-11-20 | End: 2023-12-21

## 2023-11-21 ENCOUNTER — TELEPHONE (OUTPATIENT)
Dept: ANTICOAGULATION | Facility: CLINIC | Age: 83
End: 2023-11-21
Payer: COMMERCIAL

## 2023-11-21 NOTE — TELEPHONE ENCOUNTER
ANTICOAGULATION     Pee Ayala is overdue for an INR check.     Spoke with Pee and scheduled lab appointment on 11/22   - no show for scheduled INR on 11/14/23  (he forgot)   - cancelled INR appt on 11/2/23.    Mariel Dale RN

## 2023-11-28 ENCOUNTER — DOCUMENTATION ONLY (OUTPATIENT)
Dept: ANTICOAGULATION | Facility: CLINIC | Age: 83
End: 2023-11-28
Payer: COMMERCIAL

## 2023-11-28 NOTE — LETTER
[facility]  [address]  [facility] [address]  Phone: [phone]  Fax: [phone]   November 28, 2023        Pee Ayala  [address]  [place] [facility] [address]            Dear ePe,    You are currently under the care of [facility] for your warfarin (Coumadin , Jantoven ) therapy.  We are contacting you because our records show you were due for an INR on 11/6/23.    Last INR at 2.0 on 10/23/23.    There are potentially serious risks when taking warfarin without careful monitoring and we want to make sure you are safely managed.  Routine lab monitoring is required for warfarin refills.     Please call [phone] as soon as possible to schedule a lab appointment. If it is difficult for you to get to lab, please call us to discuss options.  If there has been a change in your care or other concerns, please let us know so we can help and/or update our records.         Sincerely,       [facility]

## 2023-11-28 NOTE — PROGRESS NOTES
ANTICOAGULATION     Pee Ayala is overdue for an INR check.     Reminder letter sent   - No show 3x - 11/2, 11/7, 11/14/23.    Mariel Dale RN

## 2023-12-12 ENCOUNTER — DOCUMENTATION ONLY (OUTPATIENT)
Dept: ANTICOAGULATION | Facility: CLINIC | Age: 83
End: 2023-12-12
Payer: COMMERCIAL

## 2023-12-12 NOTE — PROGRESS NOTES
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 Their last result was 2.0 on 10/23/23 and was due to come back on 11/6/23.    he received phone calls and letters over the last several weeks in attempt to arrange follow up labs. Pee Ayala will be contacted again today.     Please contact patient directly to discuss compliance with monitoring or schedule visit to review ongoing anticoagulation therapy.    Thank you,     [facility]

## 2023-12-12 NOTE — LETTER
[facility]  [address]  [facility] [address]  Phone: [phone]  Fax: [phone]   December 12, 2023        Pee Ayala  [address]  [place] [facility] [address]            Dear Pee,    You are currently under the care of [facility] for your warfarin (Coumadin , Jantoven ) therapy.  We are contacting you because our records show you were due for an INR on 11/6/23    Last INR was on 11/23/23 at 2.0  (INR goal 2.0 - 3.0).    There are potentially serious risks when taking warfarin without careful monitoring and we want to make sure you are safely managed.  Routine lab monitoring is required for warfarin refills.     Please call [phone] as soon as possible to schedule a lab appointment. If it is difficult for you to get to lab, please call us to discuss options.  If there has been a change in your care or other concerns, please let us know so we can help and/or update our records.         Sincerely,       [facility]

## 2023-12-18 ENCOUNTER — TELEPHONE (OUTPATIENT)
Dept: ENDOCRINOLOGY | Facility: CLINIC | Age: 83
End: 2023-12-18
Payer: COMMERCIAL

## 2023-12-18 RX ORDER — ACETAMINOPHEN 325 MG/1
650 TABLET ORAL ONCE
OUTPATIENT
Start: 2023-12-18

## 2023-12-18 RX ORDER — DIPHENHYDRAMINE HYDROCHLORIDE 50 MG/ML
50 INJECTION INTRAMUSCULAR; INTRAVENOUS
Start: 2023-12-18

## 2023-12-18 RX ORDER — ALBUTEROL SULFATE 90 UG/1
1-2 AEROSOL, METERED RESPIRATORY (INHALATION)
Start: 2023-12-18

## 2023-12-18 RX ORDER — EPINEPHRINE 1 MG/ML
0.3 INJECTION, SOLUTION, CONCENTRATE INTRAVENOUS EVERY 5 MIN PRN
OUTPATIENT
Start: 2023-12-18

## 2023-12-18 RX ORDER — ALBUTEROL SULFATE 0.83 MG/ML
2.5 SOLUTION RESPIRATORY (INHALATION)
OUTPATIENT
Start: 2023-12-18

## 2023-12-18 RX ORDER — HEPARIN SODIUM (PORCINE) LOCK FLUSH IV SOLN 100 UNIT/ML 100 UNIT/ML
5 SOLUTION INTRAVENOUS
OUTPATIENT
Start: 2023-12-18

## 2023-12-18 RX ORDER — HEPARIN SODIUM,PORCINE 10 UNIT/ML
5 VIAL (ML) INTRAVENOUS
OUTPATIENT
Start: 2023-12-18

## 2023-12-18 RX ORDER — METHYLPREDNISOLONE SODIUM SUCCINATE 125 MG/2ML
125 INJECTION, POWDER, LYOPHILIZED, FOR SOLUTION INTRAMUSCULAR; INTRAVENOUS
Start: 2023-12-18

## 2023-12-18 RX ORDER — MEPERIDINE HYDROCHLORIDE 25 MG/ML
25 INJECTION INTRAMUSCULAR; INTRAVENOUS; SUBCUTANEOUS EVERY 30 MIN PRN
OUTPATIENT
Start: 2023-12-18

## 2023-12-18 NOTE — TELEPHONE ENCOUNTER
Spoke with pt and did not want to schedule with Dr. Delgado, wants to stay with Fay Buenrostro on 12/18/2023 at 12:54 PM

## 2023-12-21 ENCOUNTER — NURSE TRIAGE (OUTPATIENT)
Dept: NURSING | Facility: CLINIC | Age: 83
End: 2023-12-21
Payer: COMMERCIAL

## 2023-12-21 ENCOUNTER — TELEPHONE (OUTPATIENT)
Dept: CARDIOLOGY | Facility: CLINIC | Age: 83
End: 2023-12-21

## 2023-12-21 ENCOUNTER — APPOINTMENT (OUTPATIENT)
Dept: RADIOLOGY | Facility: HOSPITAL | Age: 83
DRG: 286 | End: 2023-12-21
Payer: COMMERCIAL

## 2023-12-21 ENCOUNTER — HOSPITAL ENCOUNTER (INPATIENT)
Facility: HOSPITAL | Age: 83
LOS: 2 days | Discharge: HOME | DRG: 286 | End: 2023-12-23
Attending: EMERGENCY MEDICINE | Admitting: INTERNAL MEDICINE
Payer: COMMERCIAL

## 2023-12-21 ENCOUNTER — APPOINTMENT (OUTPATIENT)
Dept: RADIOLOGY | Facility: HOSPITAL | Age: 83
DRG: 286 | End: 2023-12-21
Attending: STUDENT IN AN ORGANIZED HEALTH CARE EDUCATION/TRAINING PROGRAM
Payer: COMMERCIAL

## 2023-12-21 ENCOUNTER — APPOINTMENT (OUTPATIENT)
Dept: CARDIOLOGY | Facility: HOSPITAL | Age: 83
DRG: 286 | End: 2023-12-21
Attending: INTERNAL MEDICINE
Payer: COMMERCIAL

## 2023-12-21 DIAGNOSIS — I50.9 CHF EXACERBATION (H): ICD-10-CM

## 2023-12-21 DIAGNOSIS — I48.20 CHRONIC ATRIAL FIBRILLATION (H): ICD-10-CM

## 2023-12-21 DIAGNOSIS — I50.33 ACUTE ON CHRONIC DIASTOLIC HEART FAILURE (H): ICD-10-CM

## 2023-12-21 DIAGNOSIS — I25.110 CORONARY ARTERY DISEASE INVOLVING NATIVE CORONARY ARTERY OF NATIVE HEART WITH UNSTABLE ANGINA PECTORIS (H): ICD-10-CM

## 2023-12-21 DIAGNOSIS — R07.2 PRECORDIAL PAIN: Primary | ICD-10-CM

## 2023-12-21 DIAGNOSIS — I50.9 ACUTE DECOMPENSATED HEART FAILURE (H): Primary | ICD-10-CM

## 2023-12-21 LAB
ANION GAP SERPL CALCULATED.3IONS-SCNC: 10 MMOL/L (ref 7–15)
ANION GAP SERPL CALCULATED.3IONS-SCNC: 12 MMOL/L (ref 7–15)
BASE EXCESS BLDV CALC-SCNC: 2.7 MMOL/L
BASOPHILS # BLD AUTO: 0 10E3/UL (ref 0–0.2)
BASOPHILS NFR BLD AUTO: 1 %
BUN SERPL-MCNC: 15.1 MG/DL (ref 8–23)
BUN SERPL-MCNC: 17.3 MG/DL (ref 8–23)
CALCIUM SERPL-MCNC: 9.5 MG/DL (ref 8.8–10.2)
CALCIUM SERPL-MCNC: 9.6 MG/DL (ref 8.8–10.2)
CHLORIDE SERPL-SCNC: 100 MMOL/L (ref 98–107)
CHLORIDE SERPL-SCNC: 104 MMOL/L (ref 98–107)
CREAT SERPL-MCNC: 0.81 MG/DL (ref 0.67–1.17)
CREAT SERPL-MCNC: 0.91 MG/DL (ref 0.67–1.17)
DEPRECATED HCO3 PLAS-SCNC: 27 MMOL/L (ref 22–29)
DEPRECATED HCO3 PLAS-SCNC: 28 MMOL/L (ref 22–29)
EGFRCR SERPLBLD CKD-EPI 2021: 85 ML/MIN/1.73M2
EGFRCR SERPLBLD CKD-EPI 2021: 89 ML/MIN/1.73M2
EOSINOPHIL # BLD AUTO: 0.1 10E3/UL (ref 0–0.7)
EOSINOPHIL NFR BLD AUTO: 2 %
ERYTHROCYTE [DISTWIDTH] IN BLOOD BY AUTOMATED COUNT: 14.1 % (ref 10–15)
FLUAV RNA SPEC QL NAA+PROBE: NEGATIVE
FLUBV RNA RESP QL NAA+PROBE: NEGATIVE
GLUCOSE BLDC GLUCOMTR-MCNC: 111 MG/DL (ref 70–99)
GLUCOSE BLDC GLUCOMTR-MCNC: 144 MG/DL (ref 70–99)
GLUCOSE BLDC GLUCOMTR-MCNC: 192 MG/DL (ref 70–99)
GLUCOSE SERPL-MCNC: 149 MG/DL (ref 70–99)
GLUCOSE SERPL-MCNC: 199 MG/DL (ref 70–99)
HCO3 BLDV-SCNC: 27 MMOL/L (ref 24–30)
HCT VFR BLD AUTO: 37.5 % (ref 40–53)
HGB BLD-MCNC: 11.6 G/DL (ref 13.3–17.7)
HOLD SPECIMEN: NORMAL
IMM GRANULOCYTES # BLD: 0 10E3/UL
IMM GRANULOCYTES NFR BLD: 0 %
INR PPP: 2.19 (ref 0.85–1.15)
LVEF ECHO: NORMAL
LYMPHOCYTES # BLD AUTO: 1.2 10E3/UL (ref 0.8–5.3)
LYMPHOCYTES NFR BLD AUTO: 19 %
MAGNESIUM SERPL-MCNC: 1.9 MG/DL (ref 1.7–2.3)
MAGNESIUM SERPL-MCNC: 2 MG/DL (ref 1.7–2.3)
MCH RBC QN AUTO: 31.9 PG (ref 26.5–33)
MCHC RBC AUTO-ENTMCNC: 30.9 G/DL (ref 31.5–36.5)
MCV RBC AUTO: 103 FL (ref 78–100)
MONOCYTES # BLD AUTO: 0.4 10E3/UL (ref 0–1.3)
MONOCYTES NFR BLD AUTO: 7 %
NEUTROPHILS # BLD AUTO: 4.4 10E3/UL (ref 1.6–8.3)
NEUTROPHILS NFR BLD AUTO: 71 %
NRBC # BLD AUTO: 0 10E3/UL
NRBC BLD AUTO-RTO: 0 /100
NT-PROBNP SERPL-MCNC: 2987 PG/ML (ref 0–1800)
OXYHGB MFR BLDV: 86.3 % (ref 70–75)
PCO2 BLDV: 41 MM HG (ref 35–50)
PH BLDV: 7.43 [PH] (ref 7.35–7.45)
PLATELET # BLD AUTO: 156 10E3/UL (ref 150–450)
PO2 BLDV: 52 MM HG (ref 25–47)
POTASSIUM SERPL-SCNC: 3.6 MMOL/L (ref 3.4–5.3)
POTASSIUM SERPL-SCNC: 4.5 MMOL/L (ref 3.4–5.3)
RBC # BLD AUTO: 3.64 10E6/UL (ref 4.4–5.9)
RSV RNA SPEC NAA+PROBE: NEGATIVE
SAO2 % BLDV: 87.7 % (ref 70–75)
SARS-COV-2 RNA RESP QL NAA+PROBE: NEGATIVE
SODIUM SERPL-SCNC: 139 MMOL/L (ref 135–145)
SODIUM SERPL-SCNC: 142 MMOL/L (ref 135–145)
TROPONIN T SERPL HS-MCNC: 29 NG/L
TROPONIN T SERPL HS-MCNC: 31 NG/L
TROPONIN T SERPL HS-MCNC: 43 NG/L
WBC # BLD AUTO: 6.1 10E3/UL (ref 4–11)

## 2023-12-21 PROCEDURE — 36415 COLL VENOUS BLD VENIPUNCTURE: CPT | Performed by: EMERGENCY MEDICINE

## 2023-12-21 PROCEDURE — 80048 BASIC METABOLIC PNL TOTAL CA: CPT | Performed by: STUDENT IN AN ORGANIZED HEALTH CARE EDUCATION/TRAINING PROGRAM

## 2023-12-21 PROCEDURE — 93306 TTE W/DOPPLER COMPLETE: CPT | Mod: 26 | Performed by: INTERNAL MEDICINE

## 2023-12-21 PROCEDURE — 84484 ASSAY OF TROPONIN QUANT: CPT | Performed by: EMERGENCY MEDICINE

## 2023-12-21 PROCEDURE — 250N000012 HC RX MED GY IP 250 OP 636 PS 637: Performed by: INTERNAL MEDICINE

## 2023-12-21 PROCEDURE — 82962 GLUCOSE BLOOD TEST: CPT

## 2023-12-21 PROCEDURE — 36415 COLL VENOUS BLD VENIPUNCTURE: CPT | Performed by: STUDENT IN AN ORGANIZED HEALTH CARE EDUCATION/TRAINING PROGRAM

## 2023-12-21 PROCEDURE — 85048 AUTOMATED LEUKOCYTE COUNT: CPT | Performed by: STUDENT IN AN ORGANIZED HEALTH CARE EDUCATION/TRAINING PROGRAM

## 2023-12-21 PROCEDURE — 83735 ASSAY OF MAGNESIUM: CPT | Performed by: EMERGENCY MEDICINE

## 2023-12-21 PROCEDURE — 99223 1ST HOSP IP/OBS HIGH 75: CPT | Performed by: INTERNAL MEDICINE

## 2023-12-21 PROCEDURE — 96374 THER/PROPH/DIAG INJ IV PUSH: CPT

## 2023-12-21 PROCEDURE — 93005 ELECTROCARDIOGRAM TRACING: CPT

## 2023-12-21 PROCEDURE — 250N000013 HC RX MED GY IP 250 OP 250 PS 637: Performed by: INTERNAL MEDICINE

## 2023-12-21 PROCEDURE — 84484 ASSAY OF TROPONIN QUANT: CPT

## 2023-12-21 PROCEDURE — 85025 COMPLETE CBC W/AUTO DIFF WBC: CPT | Performed by: EMERGENCY MEDICINE

## 2023-12-21 PROCEDURE — 87637 SARSCOV2&INF A&B&RSV AMP PRB: CPT | Performed by: STUDENT IN AN ORGANIZED HEALTH CARE EDUCATION/TRAINING PROGRAM

## 2023-12-21 PROCEDURE — 86900 BLOOD TYPING SEROLOGIC ABO: CPT | Performed by: INTERNAL MEDICINE

## 2023-12-21 PROCEDURE — 250N000011 HC RX IP 250 OP 636: Mod: JZ | Performed by: INTERNAL MEDICINE

## 2023-12-21 PROCEDURE — 80048 BASIC METABOLIC PNL TOTAL CA: CPT

## 2023-12-21 PROCEDURE — 82805 BLOOD GASES W/O2 SATURATION: CPT

## 2023-12-21 PROCEDURE — 93005 ELECTROCARDIOGRAM TRACING: CPT | Performed by: STUDENT IN AN ORGANIZED HEALTH CARE EDUCATION/TRAINING PROGRAM

## 2023-12-21 PROCEDURE — 71045 X-RAY EXAM CHEST 1 VIEW: CPT

## 2023-12-21 PROCEDURE — 93005 ELECTROCARDIOGRAM TRACING: CPT | Performed by: EMERGENCY MEDICINE

## 2023-12-21 PROCEDURE — 71046 X-RAY EXAM CHEST 2 VIEWS: CPT

## 2023-12-21 PROCEDURE — 83880 ASSAY OF NATRIURETIC PEPTIDE: CPT | Performed by: EMERGENCY MEDICINE

## 2023-12-21 PROCEDURE — 36415 COLL VENOUS BLD VENIPUNCTURE: CPT

## 2023-12-21 PROCEDURE — 85610 PROTHROMBIN TIME: CPT | Performed by: EMERGENCY MEDICINE

## 2023-12-21 PROCEDURE — C8929 TTE W OR WO FOL WCON,DOPPLER: HCPCS

## 2023-12-21 PROCEDURE — 83735 ASSAY OF MAGNESIUM: CPT

## 2023-12-21 PROCEDURE — 99285 EMERGENCY DEPT VISIT HI MDM: CPT

## 2023-12-21 PROCEDURE — 93010 ELECTROCARDIOGRAM REPORT: CPT | Performed by: INTERNAL MEDICINE

## 2023-12-21 PROCEDURE — 250N000011 HC RX IP 250 OP 636: Mod: JZ | Performed by: EMERGENCY MEDICINE

## 2023-12-21 PROCEDURE — 99222 1ST HOSP IP/OBS MODERATE 55: CPT | Mod: 25 | Performed by: INTERNAL MEDICINE

## 2023-12-21 PROCEDURE — 120N000001 HC R&B MED SURG/OB

## 2023-12-21 PROCEDURE — 80048 BASIC METABOLIC PNL TOTAL CA: CPT | Performed by: EMERGENCY MEDICINE

## 2023-12-21 PROCEDURE — 84484 ASSAY OF TROPONIN QUANT: CPT | Performed by: STUDENT IN AN ORGANIZED HEALTH CARE EDUCATION/TRAINING PROGRAM

## 2023-12-21 PROCEDURE — 255N000002 HC RX 255 OP 636: Performed by: INTERNAL MEDICINE

## 2023-12-21 PROCEDURE — 87637 SARSCOV2&INF A&B&RSV AMP PRB: CPT | Performed by: EMERGENCY MEDICINE

## 2023-12-21 RX ORDER — CARVEDILOL 3.12 MG/1
6.25 TABLET ORAL 2 TIMES DAILY WITH MEALS
COMMUNITY
End: 2024-05-06

## 2023-12-21 RX ORDER — FUROSEMIDE 20 MG
40 TABLET ORAL EVERY MORNING
Status: ON HOLD | COMMUNITY
End: 2023-12-23

## 2023-12-21 RX ORDER — FUROSEMIDE 20 MG
20 TABLET ORAL EVERY EVENING
Status: ON HOLD | COMMUNITY
End: 2023-12-23

## 2023-12-21 RX ORDER — NICOTINE POLACRILEX 4 MG
15-30 LOZENGE BUCCAL
Status: DISCONTINUED | OUTPATIENT
Start: 2023-12-21 | End: 2023-12-23 | Stop reason: HOSPADM

## 2023-12-21 RX ORDER — ONDANSETRON 2 MG/ML
4 INJECTION INTRAMUSCULAR; INTRAVENOUS EVERY 6 HOURS PRN
Status: DISCONTINUED | OUTPATIENT
Start: 2023-12-21 | End: 2023-12-23 | Stop reason: HOSPADM

## 2023-12-21 RX ORDER — PRAMIPEXOLE DIHYDROCHLORIDE 0.25 MG/1
0.5 TABLET ORAL 2 TIMES DAILY
COMMUNITY
End: 2024-01-26

## 2023-12-21 RX ORDER — HYDRALAZINE HYDROCHLORIDE 20 MG/ML
10 INJECTION INTRAMUSCULAR; INTRAVENOUS EVERY 4 HOURS PRN
Status: DISCONTINUED | OUTPATIENT
Start: 2023-12-21 | End: 2023-12-23 | Stop reason: HOSPADM

## 2023-12-21 RX ORDER — WARFARIN SODIUM 4 MG/1
4 TABLET ORAL SEE ADMIN INSTRUCTIONS
COMMUNITY
End: 2023-12-28

## 2023-12-21 RX ORDER — FUROSEMIDE 10 MG/ML
60 INJECTION INTRAMUSCULAR; INTRAVENOUS EVERY 8 HOURS
Status: DISCONTINUED | OUTPATIENT
Start: 2023-12-21 | End: 2023-12-23

## 2023-12-21 RX ORDER — CARVEDILOL 3.12 MG/1
3.12 TABLET ORAL 2 TIMES DAILY WITH MEALS
Status: DISCONTINUED | OUTPATIENT
Start: 2023-12-21 | End: 2023-12-23

## 2023-12-21 RX ORDER — TAMSULOSIN HYDROCHLORIDE 0.4 MG/1
0.4 CAPSULE ORAL EVERY MORNING
Status: DISCONTINUED | OUTPATIENT
Start: 2023-12-22 | End: 2023-12-23 | Stop reason: HOSPADM

## 2023-12-21 RX ORDER — PROCHLORPERAZINE MALEATE 5 MG
5 TABLET ORAL EVERY 6 HOURS PRN
Status: DISCONTINUED | OUTPATIENT
Start: 2023-12-21 | End: 2023-12-23 | Stop reason: HOSPADM

## 2023-12-21 RX ORDER — ACETAMINOPHEN 325 MG/1
650 TABLET ORAL EVERY 4 HOURS PRN
Status: DISCONTINUED | OUTPATIENT
Start: 2023-12-21 | End: 2023-12-23 | Stop reason: HOSPADM

## 2023-12-21 RX ORDER — ASPIRIN 81 MG/1
81 TABLET ORAL DAILY
Status: DISCONTINUED | OUTPATIENT
Start: 2023-12-22 | End: 2023-12-23 | Stop reason: HOSPADM

## 2023-12-21 RX ORDER — GABAPENTIN 300 MG/1
600 CAPSULE ORAL EVERY EVENING
Status: DISCONTINUED | OUTPATIENT
Start: 2023-12-21 | End: 2023-12-23 | Stop reason: HOSPADM

## 2023-12-21 RX ORDER — PROCHLORPERAZINE 25 MG
12.5 SUPPOSITORY, RECTAL RECTAL EVERY 12 HOURS PRN
Status: DISCONTINUED | OUTPATIENT
Start: 2023-12-21 | End: 2023-12-23 | Stop reason: HOSPADM

## 2023-12-21 RX ORDER — WARFARIN SODIUM 2 MG/1
4 TABLET ORAL
Status: COMPLETED | OUTPATIENT
Start: 2023-12-21 | End: 2023-12-21

## 2023-12-21 RX ORDER — WARFARIN SODIUM 5 MG/1
5 TABLET ORAL SEE ADMIN INSTRUCTIONS
COMMUNITY
End: 2024-05-17

## 2023-12-21 RX ORDER — AMOXICILLIN 250 MG
1 CAPSULE ORAL 2 TIMES DAILY PRN
Status: DISCONTINUED | OUTPATIENT
Start: 2023-12-21 | End: 2023-12-23 | Stop reason: HOSPADM

## 2023-12-21 RX ORDER — DEXTROSE MONOHYDRATE 25 G/50ML
25-50 INJECTION, SOLUTION INTRAVENOUS
Status: DISCONTINUED | OUTPATIENT
Start: 2023-12-21 | End: 2023-12-23 | Stop reason: HOSPADM

## 2023-12-21 RX ORDER — AMOXICILLIN 250 MG
2 CAPSULE ORAL 2 TIMES DAILY PRN
Status: DISCONTINUED | OUTPATIENT
Start: 2023-12-21 | End: 2023-12-23 | Stop reason: HOSPADM

## 2023-12-21 RX ORDER — CALCIUM CARBONATE 500 MG/1
1000 TABLET, CHEWABLE ORAL 4 TIMES DAILY PRN
Status: DISCONTINUED | OUTPATIENT
Start: 2023-12-21 | End: 2023-12-23 | Stop reason: HOSPADM

## 2023-12-21 RX ORDER — LANOLIN ALCOHOL/MO/W.PET/CERES
3 CREAM (GRAM) TOPICAL
Status: DISCONTINUED | OUTPATIENT
Start: 2023-12-21 | End: 2023-12-23 | Stop reason: HOSPADM

## 2023-12-21 RX ORDER — ACETAZOLAMIDE 500 MG/5ML
500 INJECTION, POWDER, LYOPHILIZED, FOR SOLUTION INTRAVENOUS ONCE
Qty: 5 ML | Refills: 0 | Status: COMPLETED | OUTPATIENT
Start: 2023-12-21 | End: 2023-12-21

## 2023-12-21 RX ORDER — ROSUVASTATIN CALCIUM 20 MG/1
20 TABLET, COATED ORAL EVERY EVENING
COMMUNITY

## 2023-12-21 RX ORDER — SPIRONOLACTONE 25 MG/1
25 TABLET ORAL DAILY
Status: DISCONTINUED | OUTPATIENT
Start: 2023-12-21 | End: 2023-12-23 | Stop reason: HOSPADM

## 2023-12-21 RX ORDER — FUROSEMIDE 10 MG/ML
40 INJECTION INTRAMUSCULAR; INTRAVENOUS ONCE
Status: COMPLETED | OUTPATIENT
Start: 2023-12-21 | End: 2023-12-21

## 2023-12-21 RX ORDER — POLYETHYLENE GLYCOL 3350 17 G/17G
17 POWDER, FOR SOLUTION ORAL 2 TIMES DAILY PRN
Status: DISCONTINUED | OUTPATIENT
Start: 2023-12-21 | End: 2023-12-23 | Stop reason: HOSPADM

## 2023-12-21 RX ORDER — ONDANSETRON 4 MG/1
4 TABLET, ORALLY DISINTEGRATING ORAL EVERY 6 HOURS PRN
Status: DISCONTINUED | OUTPATIENT
Start: 2023-12-21 | End: 2023-12-23 | Stop reason: HOSPADM

## 2023-12-21 RX ORDER — TAMSULOSIN HYDROCHLORIDE 0.4 MG/1
0.4 CAPSULE ORAL EVERY MORNING
COMMUNITY

## 2023-12-21 RX ORDER — CARVEDILOL 3.12 MG/1
6.25 TABLET ORAL 2 TIMES DAILY WITH MEALS
Status: DISCONTINUED | OUTPATIENT
Start: 2023-12-21 | End: 2023-12-21

## 2023-12-21 RX ORDER — PRAMIPEXOLE DIHYDROCHLORIDE 0.5 MG/1
0.5 TABLET ORAL 2 TIMES DAILY
Status: DISCONTINUED | OUTPATIENT
Start: 2023-12-21 | End: 2023-12-23 | Stop reason: HOSPADM

## 2023-12-21 RX ORDER — ACETAMINOPHEN 650 MG/1
650 SUPPOSITORY RECTAL EVERY 4 HOURS PRN
Status: DISCONTINUED | OUTPATIENT
Start: 2023-12-21 | End: 2023-12-23 | Stop reason: HOSPADM

## 2023-12-21 RX ORDER — GABAPENTIN 600 MG/1
600 TABLET ORAL EVERY EVENING
COMMUNITY
End: 2024-02-16

## 2023-12-21 RX ORDER — ROSUVASTATIN CALCIUM 40 MG/1
40 TABLET, COATED ORAL EVERY EVENING
Status: DISCONTINUED | OUTPATIENT
Start: 2023-12-21 | End: 2023-12-23 | Stop reason: HOSPADM

## 2023-12-21 RX ADMIN — FUROSEMIDE 40 MG: 10 INJECTION, SOLUTION INTRAMUSCULAR; INTRAVENOUS at 12:43

## 2023-12-21 RX ADMIN — PRAMIPEXOLE DIHYDROCHLORIDE 0.5 MG: 0.5 TABLET ORAL at 19:22

## 2023-12-21 RX ADMIN — WARFARIN SODIUM 4 MG: 2 TABLET ORAL at 18:34

## 2023-12-21 RX ADMIN — ROSUVASTATIN CALCIUM 40 MG: 40 TABLET, COATED ORAL at 19:22

## 2023-12-21 RX ADMIN — FUROSEMIDE 60 MG: 10 INJECTION, SOLUTION INTRAMUSCULAR; INTRAVENOUS at 15:01

## 2023-12-21 RX ADMIN — CARVEDILOL 3.12 MG: 3.12 TABLET, FILM COATED ORAL at 18:34

## 2023-12-21 RX ADMIN — METFORMIN HYDROCHLORIDE 1000 MG: 500 TABLET, FILM COATED ORAL at 18:34

## 2023-12-21 RX ADMIN — ACETAMINOPHEN 650 MG: 325 TABLET ORAL at 19:21

## 2023-12-21 RX ADMIN — SPIRONOLACTONE 25 MG: 25 TABLET, FILM COATED ORAL at 16:18

## 2023-12-21 RX ADMIN — HYDRALAZINE HYDROCHLORIDE 10 MG: 20 INJECTION INTRAMUSCULAR; INTRAVENOUS at 14:11

## 2023-12-21 RX ADMIN — GABAPENTIN 600 MG: 300 CAPSULE ORAL at 19:22

## 2023-12-21 RX ADMIN — ACETAZOLAMIDE 500 MG: 500 INJECTION, POWDER, LYOPHILIZED, FOR SOLUTION INTRAVENOUS at 16:18

## 2023-12-21 RX ADMIN — FUROSEMIDE 60 MG: 10 INJECTION, SOLUTION INTRAMUSCULAR; INTRAVENOUS at 21:59

## 2023-12-21 RX ADMIN — PERFLUTREN 2 ML: 6.52 INJECTION, SUSPENSION INTRAVENOUS at 16:09

## 2023-12-21 RX ADMIN — INSULIN ASPART 1 UNITS: 100 INJECTION, SOLUTION INTRAVENOUS; SUBCUTANEOUS at 18:40

## 2023-12-21 ASSESSMENT — ACTIVITIES OF DAILY LIVING (ADL)
ADLS_ACUITY_SCORE: 35
ADLS_ACUITY_SCORE: 26
ADLS_ACUITY_SCORE: 32
DEPENDENT_IADLS:: INDEPENDENT
ADLS_ACUITY_SCORE: 32
ADLS_ACUITY_SCORE: 35
ADLS_ACUITY_SCORE: 35

## 2023-12-21 NOTE — ED PROVIDER NOTES
EMERGENCY DEPARTMENT ENCOUNTER      NAME: Pee Ayala  AGE: 83 year old male  YOB: 1940  MRN: [id number]  EVALUATION DATE & TIME: 12/21/2023 11:55 AM    PCP: Ihsan Singh    ED PROVIDER: Nuha Loreod PA-C      Chief Complaint   Patient presents with    Shortness of Breath    Generalized Weakness         FINAL IMPRESSION:  1. CHF exacerbation (H)    2. Chronic atrial fibrillation (H)      MEDICAL DECISION MAKING:    Pertinent Labs & Imaging studies reviewed. (See chart for details)  83 year old male presents to the Emergency Department for evaluation of shortness of breath. The patient has been struggling with shortness of breath for the past month. His shortness of breath has become acutely worse over the past week and when he called the nurse today it was recommended he come to the ED. On my evaluation, the patient was slightly hypertensive at 166/77 but was otherwise vitally stable. Examination with intermittent crackling at bilateral bases and patient is dyspneic with conversation. Bilateral pitting edema to lower extremities. Heart in irregularly irregular rhythm, normal rate. Abdomen soft, nontender without rebound or guarding. No lower extremity erythema or warmth. Differential diagnosis included arrhythmia, ACS, CHF, penumonia, pneumothorax, electrolyte derangement.     Work up was initiated in our waiting room due to boarding crisis prior to my evaluation. CBC with slightly low hemoglobin at 11.6, stable from previous. No other significant derangements. BMP without significant derangement. EKG in atrial fibrillation with normal rate, no ST or T wave changes concerning for ACS. Initial troponin is 31 and repeat 2 hours later is largely unchanged from prior at 29. Magnesium within normal limits. INR 2.19. BNP elevated at 2987. [place] x-ray independently interpreted by myself and did show pleural effusions bilaterally and other chronic findings with previous TAVR. Formal read with the  heart is normal in size with stable postoperative changes including sternotomy wires and TAVR. The pleural thickening involving the left mid and lower lung laterally with blunting of the left lateral costophrenic angle and linear   atelectasis or scarring in the left mid and lower lung are minimally changed. The right lung appears clear. Vertebroplasty changes in the lower thoracic and lumbar spine with demineralization of the spine and mild to moderate anterior wedging of the two treated levels and the approximately T12 level. Patient is noncompliant with his lasix at home and with his severe symptoms and findings with CHF exacerbation I do feel he requires admission to the hospital. 40 mg IV lasix given here. Patient and wife in agreement and understanding with the plan of care. I spoke with the hospitalist Dr. Morris who accepted for admission.     Medical Decision Making    History:  Supplemental history from: Documented in chart, if applicable  External Record(s) reviewed: Documented in chart, if applicable.    Work Up:  Chart documentation includes differential considered and any EKGs or imaging independently interpreted by provider, where specified.  In additional to work up documented, I considered the following work up: Documented in chart, if applicable.    External consultation:  Discussion of management with another provider: Hospitalist    Complicating factors:  Care impacted by chronic illness: Diabetes, Heart Disease, Hyperlipidemia, and Hypertension  Care affected by social determinants of health: Access to Medical Care and Medication Noncompliance    Disposition considerations: Admit.         ED COURSE:  12:20 PM I met with the patient, obtained history, performed an initial exam, and discussed options and plan for diagnostics and treatment here in the ED.    12:25 PM I staffed the patient with Spencre Virgen MD.    1:28 PM I discussed the patient with Dr. Morris who accepted for admission.      At the conclusion of the encounter I discussed the results of all of the tests and the disposition. The questions were answered. The patient or family acknowledged understanding and was agreeable with the care plan.     MEDICATIONS GIVEN IN THE EMERGENCY:  Medications   furosemide (LASIX) injection 40 mg (40 mg Intravenous $Given 12/21/23 0478)       NEW PRESCRIPTIONS STARTED AT TODAY'S ER VISIT  New Prescriptions    No medications on file            =================================================================    HPI:    Patient information was obtained from: the patient    Use of Interpretor: N/A         Pee Ayala is a 83 year old male with a pertinent history of diabetes, hyperlipidemia, atrial fibrillation on warfarin, CAD, CHF, severe aortic stenosis s/p TAVR, osteoporosis who presents to this ED via private vehicle with wife for evaluation of shortness of breath. The patient has been struggling with shortness of breath over the past month worsening this past week. He called his nurse today who recommended he come to the ED. On my evaluation, the patient reports his shortness of breath has worsened to the point that he is short of breath even when talking. He denies any fevers, chills, coughing up blood, abdominal pain, nausea, vomiting, diarrhea. He notes that he has had increased leg swelling and is not compliant with his lasix. He denies any chest pain but notes that he started to have some tingling in his left upper chest and arm. He states it occurs in the morning and then improves after he ranges his upper extremity. No symptoms at this time. No weakness, vision changes, speech changes or other concerns. Patient reports that he has been sleeping in a chair the past week due to his worsening shortness of breath, after this is when his symptoms started. Has been occurring all week. No other concerns voiced at this time.       REVIEW OF SYSTEMS:  Negative unless otherwise stated in the above  HPI.       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     Encounter for long-term (current) use of insulin (H) 08/11/2016    Esophageal reflux 11/18/2010    History of angina     HTN (hypertension) 06/30/2009     (Problem list name updated by automated process. Provider to review and confirm.)    Impotence of organic origin     Mixed hyperlipidemia 04/25/2007 April 25, 2007 restarted Zocor today, recheck in 3 months.  August 23, 2007 LDL at 101 with 40 mg, will increase to 80 mg. Recheck  In 3 months.     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     Walking troubles        PAST SURGICAL HISTORY:  Past Surgical History:   Procedure Laterality Date    BYPASS GRAFT ARTERY CORONARY N/A 9/10/2014    Procedure: BYPASS GRAFT ARTERY CORONARY;  Surgeon: Bharathi Caraballo MD;  Location: [facility] OR    BYPASS GRAFT ARTERY CORONARY  2016    COLONOSCOPY  1/14/2004    CORONARY  ANGIOGRAPHY ADULT ORDER      CV CORONARY ANGIOGRAM N/A 4/4/2019    Procedure: Coronary Angiogram;  Surgeon: Dominik Vega MD;  Location: [facility] Cath Lab;  Service: Cardiology    CV CORONARY ANGIOGRAM N/A 1/6/2021    Procedure: Coronary Angiogram;  Surgeon: Dominik Vega MD;  Location: [facility] Cardiac Cath Lab;  Service: Cardiology    CV LEFT HEART CATHETERIZATION WITHOUT LEFT VENTRICULOGRAM Left 4/4/2019    Procedure: Left Heart Catheterization Without Left Ventriculogram;  Surgeon: Dominik Vega MD;  Location: [facility] Cath Lab;  Service: Cardiology    CV LEFT HEART CATHETERIZATION WITHOUT LEFT VENTRICULOGRAM Left 1/6/2021    Procedure: Left Heart Catheterization Without Left Ventriculogram;  Surgeon: Dominik Vega MD;  Location: [facility] Cardiac Cath Lab;  Service: Cardiology    CV RIGHT HEART CATHETERIZATION Right 4/4/2019    Procedure: Right Heart Catheterization;  Surgeon: Dominik Vega MD;  Location: [facility] Cath Lab;  Service: Cardiology    CV TRANSCATHETER AORTIC VALVE REPLACEMENT N/A 1/12/2021    Procedure: Right transfemoral transcatheter aortic valve replacement using Magdaleno Dominick 3 size 29mm.  Transthoracic echocardiogram;  Surgeon: Jo Romano MD;  Location: [facility] CARDIAC CATH LAB    ESOPHAGOSCOPY, GASTROSCOPY, DUODENOSCOPY (EGD), COMBINED N/A 1/13/2016    Procedure: COMBINED ESOPHAGOSCOPY, GASTROSCOPY, DUODENOSCOPY (EGD);  Surgeon: Rk Srivastava MD;  Location: [facility] FEMORAL CANNULIZATION WITH OPEN STANDBY REPAIR AORTIC VALVE N/A 1/12/2021    Procedure: Cardiopulmonary Bypass standby;  Surgeon: Jefferson Sandy MD;  Location: [facility] CARDIAC CATH LAB    IR LOWER EXTREMITY ANGIOGRAM LEFT  12/7/2021    LAPAROSCOPIC CHOLECYSTECTOMY  10/09    MAZE PROCEDURE N/A 9/10/2014    Procedure: MAZE PROCEDURE;  Surgeon: Bharathi Caraballo MD;  Location: [facility] OR MOHS MICROGRAPH PROCEDURE      OPEN REDUCTION  INTERNAL FIXATION HIP BIPOLAR Left 4/9/2022    Procedure: HEMIARTHROPLASTY, HIP, BIPOLAR, OPEN REDUCTION INTERNAL FIXATION OF GREATER TROCHANTER;  Surgeon: Jd Larose MD;  Location: [facility] OR    ORTHOPEDIC SURGERY      surgery for fx  Left forearm    OTHER SURGICAL HISTORY Left 2015    forearm sugery    STENT, CORONARY, HUMA  02/2016    VASECTOMY             CURRENT MEDICATIONS:    No current facility-administered medications for this encounter.    Current Outpatient Medications:     acetaminophen (TYLENOL) 500 MG tablet, Take 1 tablet (500 mg) by mouth 2 times daily as needed for mild pain, Disp: , Rfl:     aspirin 81 MG EC tablet, Take 1 tablet (81 mg) by mouth daily, Disp: 90 tablet, Rfl: 0    blood glucose (ACCU-CHEK GUIDE) test strip, USE 1 STRIP TO CHECK GLUCOSE THREE TIMES DAILY, Disp: 300 strip, Rfl: 0    busPIRone (BUSPAR) 5 MG tablet, Take 1 tablet (5 mg) by mouth 2 times daily, Disp: 180 tablet, Rfl: 0    carvedilol (COREG) 3.125 MG tablet, TAKE 1 TABLET BY MOUTH TWICE  DAILY WITH MEALS, Disp: 200 tablet, Rfl: 2    Continuous Blood Gluc  (FREESTYLE DOMI 2 READER) [facility], Use to read blood sugars as per 's instructions., Disp: 1 each, Rfl: 0    Continuous Blood Gluc Sensor (FREESTYLE DOMI 2 SENSOR) [facility] [place], Change every 14 days., Disp: 6 each, Rfl: 3    finasteride (PROSCAR) 5 MG tablet, Take 1 tablet (5 mg) by mouth daily, Disp: 90 tablet, Rfl: 3    furosemide (LASIX) 20 MG tablet, TAKE 2 TABLETS BY MOUTH IN THE MORNING AND 1 ONCE DAILY IN  THE  AFTERNOON (Patient taking differently: TAKE 2 TABLETS BY MOUTH IN THE MORNING AND 1 ONCE DAILY IN  THE  AFTERNOON), Disp: 270 tablet, Rfl: 0    gabapentin (NEURONTIN) 600 MG tablet, Take 1 tablet (600 mg) by mouth 2 times daily, Disp: , Rfl:     insulin aspart prot & aspart (NOVOLOG MIX 70/30 PEN) (70-30) 100 UNIT/ML pen, Inject subcutaneously 20 units in AM with breakfast and 12 in PM with dinner. If Blood Glucose<100 then give 1/2  dose), Disp: 15 mL, Rfl: 0    metFORMIN (GLUCOPHAGE) 500 MG tablet, Take 2 tablets (1,000 mg) by mouth 2 times daily (with meals), Disp: 360 tablet, Rfl: 1    Multiple Vitamins-Minerals (PRESERVISION AREDS PO), , Disp: , Rfl:     oxyCODONE-acetaminophen (PERCOCET) 5-325 MG tablet, Take 1 tablet by mouth every 6 hours as needed for pain, Disp: 30 tablet, Rfl: 0    pramipexole (MIRAPEX) 0.25 MG tablet, TAKE 1 TABLET BY MOUTH TWICE DAILY AT  4PM  AND  5:30PM, Disp: 180 tablet, Rfl: 0    rosuvastatin (CRESTOR) 20 MG tablet, Take 1 tablet (20 mg) by mouth At Bedtime, Disp: 100 tablet, Rfl: 3    tamsulosin (FLOMAX) 0.4 MG capsule, Take 1 capsule (0.4 mg) by mouth daily, Disp: 30 capsule, Rfl: 11    warfarin ANTICOAGULANT (COUMADIN) 1 MG tablet, 5mg daily on  and  and 4mg all other days., Disp: 60 tablet, Rfl: 0    warfarin ANTICOAGULANT (COUMADIN) 5 MG tablet, TAKE 1 TABLET BY MOUTH ONCE DAILY  OR AS DIRECTED BY  INR  CLINIC, Disp: 90 tablet, Rfl: 0      ALLERGIES:  Allergies   Allergen Reactions    Oxycodone Itching and Rash    Amlodipine Dizziness     Dizziness and dry heaves    Lisinopril Cough    Adhesive Tape Itching and Rash       FAMILY HISTORY:  Family History   Problem Relation Age of Onset    Diabetes Mother     Hypertension Father     Cerebrovascular Disease Father     Diabetes Maternal Grandmother     Breast Cancer No family hx of     Cancer - colorectal No family hx of     Prostate Cancer No family hx of     C.A.D. No family hx of     Diabetes Type 2  Mother         58 ,  from anesthesia complication.    Heart Disease Father         86  from stroke    No Known Problems Brother         60 years of age.       SOCIAL HISTORY:   Social History     Socioeconomic History    Marital status:      Spouse name: Fay Jamie   Occupational History     Employer: RETIRED   Tobacco Use    Smoking status: Former     Types: Cigarettes     Quit date: 1998     Years since quittin.9     Smokeless tobacco: Never   Substance and Sexual Activity    Alcohol use: Yes     Alcohol/week: 0.0 standard drinks of alcohol     Comment: Alcoholic Drinks/day: very rare    Drug use: No    Sexual activity: Never     Birth control/protection: None   Other Topics Concern    Parent/sibling w/ CABG, MI or angioplasty before 65F 55M? No   Social History Narrative     and lives with life.  Has adult children from previous relationship. ( Blended family).  Has a dog - Vincent Gil MD  1/3/2019         Social Determinants of Health     Financial Resource Strain: Low Risk  (10/23/2023)    Financial Resource Strain     Within the past 12 months, have you or your family members you live with been unable to get utilities (heat, electricity) when it was really needed?: No   Food Insecurity: Low Risk  (10/23/2023)    Food Insecurity     Within the past 12 months, did you worry that your food would run out before you got money to buy more?: No     Within the past 12 months, did the food you bought just not last and you didn t have money to get more?: No   Transportation Needs: Low Risk  (10/23/2023)    Transportation Needs     Within the past 12 months, has lack of transportation kept you from medical appointments, getting your medicines, non-medical meetings or appointments, work, or from getting things that you need?: No   Interpersonal Safety: Low Risk  (10/23/2023)    Interpersonal Safety     Do you feel physically and emotionally safe where you currently live?: Yes     Within the past 12 months, have you been hit, slapped, kicked or otherwise physically hurt by someone?: No     Within the past 12 months, have you been humiliated or emotionally abused in other ways by your partner or ex-partner?: No   Housing Stability: Low Risk  (10/23/2023)    Housing Stability     Do you have housing? : Yes     Are you worried about losing your housing?: No       VITALS:  Patient Vitals for the past 24 hrs:   BP  "Temp Temp src Pulse Resp SpO2 Height Weight   12/21/23 1215 (!) 173/76 -- -- 64 18 99 % -- --   12/21/23 1202 (!) 192/75 -- -- 72 20 97 % -- --   12/21/23 1055 (!) 166/72 97.5  F (36.4  C) Temporal 74 22 97 % 1.575 m (5' 2\") 73.9 kg (163 lb)       PHYSICAL EXAM    Constitutional: Well developed, Well nourished, NAD  HENT: Normocephalic, Atraumatic, Bilateral external ears normal, Oropharynx normal, mucous membranes moist, Nose normal.   Neck: Normal range of motion, No tenderness, Supple, No stridor.  Eyes: PERRL, EOMI, Conjunctiva normal, No discharge.   Respiratory: Dyspneic to conversation, crackles at bilateral bases. No wheezing.  Cardiovascular: Normal heart rate, Regular rhythm, No murmurs, No rubs, No gallops. Chest wall nontender.  GI: Soft, No tenderness, No masses, No flank tenderness. No rebound or guarding.  Musculoskeletal: Bilateral pitting edema to lower extremities, no erythema or warmth. No cyanosis, No clubbing. Good range of motion in all major joints. No tenderness to palpation or major deformities noted. No tenderness of the CTLS spine.   Integument: Warm, Dry, No erythema, No rash. No petechiae.  Neurologic: Alert & oriented x 3, Normal motor function, Normal sensory function, No focal deficits noted. Normal gait.  Psychiatric: Affect normal, Judgment normal, Mood normal. Cooperative.    LAB:  All pertinent labs reviewed and interpreted.  Recent Results (from the past 24 hour(s))   Basic metabolic panel    Collection Time: 12/21/23 11:03 AM   Result Value Ref Range    Sodium 142 135 - 145 mmol/L    Potassium 4.5 3.4 - 5.3 mmol/L    Chloride 104 98 - 107 mmol/L    Carbon Dioxide (CO2) 28 22 - 29 mmol/L    Anion Gap 10 7 - 15 mmol/L    Urea Nitrogen 15.1 8.0 - 23.0 mg/dL    Creatinine 0.81 0.67 - 1.17 mg/dL    GFR Estimate 89 >60 mL/min/1.73m2    Calcium 9.6 8.8 - 10.2 mg/dL    Glucose 149 (H) 70 - 99 mg/dL   Troponin T, High Sensitivity    Collection Time: 12/21/23 11:03 AM   Result Value Ref " Range    Troponin T, High Sensitivity 31 (H) <=22 ng/L   Extra Blue Top Tube    Collection Time: 12/21/23 11:03 AM   Result Value Ref Range    Hold Specimen JIC    Extra Red Top Tube    Collection Time: 12/21/23 11:03 AM   Result Value Ref Range    Hold Specimen JIC    CBC with platelets and differential    Collection Time: 12/21/23 11:03 AM   Result Value Ref Range    WBC Count 6.1 4.0 - 11.0 10e3/uL    RBC Count 3.64 (L) 4.40 - 5.90 10e6/uL    Hemoglobin 11.6 (L) 13.3 - 17.7 g/dL    Hematocrit 37.5 (L) 40.0 - 53.0 %     (H) 78 - 100 fL    MCH 31.9 26.5 - 33.0 pg    MCHC 30.9 (L) 31.5 - 36.5 g/dL    RDW 14.1 10.0 - 15.0 %    Platelet Count 156 150 - 450 10e3/uL    % Neutrophils 71 %    % Lymphocytes 19 %    % Monocytes 7 %    % Eosinophils 2 %    % Basophils 1 %    % Immature Granulocytes 0 %    NRBCs per 100 WBC 0 <1 /100    Absolute Neutrophils 4.4 1.6 - 8.3 10e3/uL    Absolute Lymphocytes 1.2 0.8 - 5.3 10e3/uL    Absolute Monocytes 0.4 0.0 - 1.3 10e3/uL    Absolute Eosinophils 0.1 0.0 - 0.7 10e3/uL    Absolute Basophils 0.0 0.0 - 0.2 10e3/uL    Absolute Immature Granulocytes 0.0 <=0.4 10e3/uL    Absolute NRBCs 0.0 10e3/uL   Nt probnp inpatient    Collection Time: 12/21/23 11:03 AM   Result Value Ref Range    N terminal Pro BNP Inpatient 2,987 (H) 0 - 1,800 pg/mL   Magnesium    Collection Time: 12/21/23 11:03 AM   Result Value Ref Range    Magnesium 1.9 1.7 - 2.3 mg/dL   INR    Collection Time: 12/21/23 11:03 AM   Result Value Ref Range    INR 2.19 (H) 0.85 - 1.15   Symptomatic Influenza A/B, RSV, & SARS-CoV2 PCR (COVID-19) Nasopharyngeal    Collection Time: 12/21/23 11:06 AM    Specimen: Nasopharyngeal; Swab   Result Value Ref Range    Influenza A PCR Negative Negative    Influenza B PCR Negative Negative    RSV PCR Negative Negative    SARS CoV2 PCR Negative Negative         RADIOLOGY:  Reviewed all pertinent imaging. Please see official radiology report.  XR Chest 2 Views   Final Result   IMPRESSION:  The heart is normal in size with stable postoperative changes including sternotomy wires and TAVR. The pleural thickening involving the left mid and lower lung laterally with blunting of the left lateral costophrenic angle and linear    atelectasis or scarring in the left mid and lower lung are minimally changed. The right lung appears clear. Vertebroplasty changes in the lower thoracic and lumbar spine with demineralization of the spine and mild to moderate anterior wedging of the two    treated levels and the approximately T12 level.          PROCEDURES:   None.    Nuha Loredo PA-C  Emergency Medicine  [facility]  12/21/2023      Nuha Loredo PA-C  12/21/23 3434

## 2023-12-21 NOTE — CONSULTS
Thank you, Dr. Rios ref. provider found, for asking the [facility] Care team to see Pee Ayala to evaluate dyspnea.      Assessment/Recommendations   Assessment:    Acute on chronic heart failure with preserved ejection fraction probably related to uncontrolled hypertension  Permanent atrial fibrillation with controlled ventricular response on chronic warfarin  Aortic valve stenosis, status post TAVR, normal auscultation  Coronary artery disease with history of CABG, no angina or significantly elevated troponin  Hypertension, suboptimal control  Diabetes mellitus    Plan:  IV furosemide, IV Diamox, p.o. spironolactone to promote vigorous diuresis-should probably lose 10 to 15 pounds before getting to euvolemic state  Adjust blood pressure medications once euvolemic status obtained  Echo to reassess LV function- may need ischemic evaluation if there is significant drop in LV systolic performance       History of Present Illness/Subjective    Mr. Pee Ayala is a 83 year old male who came into the hospital with complaints of progressive shortness of breath and weakness.  He reports the symptoms develop gradually the course of last several weeks.  He is able to walk very short distance before she he gets out of breath.  He is having orthopnea and sleeping in the chair at night.  He has had progressive swelling of lower extremities.  He denies heart palpitations or syncope.  He has not had chest pains  The only recent medication change was reduction of carvedilol dose due to bradycardia.  He was last seen by cardiologist Dr. Hernandez in March of this year.  Dr. Hernandez was trying to convince him to take more diuretics but patient was reluctant due to incontinence  He does have a history of remote coronary artery bypass surgery and TAVR in January 2021.  He has had preserved LV systolic function in the past.      ECG: Personally reviewed.  Atrial fibrillation no ischemic changes.  Coronary angiogram:  "January 2021 before TAVR  LM mild dz  LAD prox 70%; mid 80%; diagonal prox 60-70%, patent LIMA   Circ severe dz in prox/mid with patent SVG to OM  RCA prox stent patent severe dz in mid (no change from 2019)     LVEDP 19mmHg pull back did not record gradient     Imp/plan  1. Severe aortic stenosis - plan TAVR   2. CAD s/p CABG/PCI - severe diffuse dz in very small vessels - no change from 2019    ECHO (personnaly Reviewed): Pending  March 2023  1. The left ventricle is normal in size. Left ventricular function is  normal.The ejection fraction is 55-60%. The left ventricle is not well  visualized. No regional wall motion abnormalities noted.  2. Normal right ventricle size and systolic function. The right ventricle is  not well visualized.  3. There is mild to moderate (1-2+) mitral regurgitation.  4. Well seated, bio-prosthetic aortic valve 29 mm Magdaleno Dominick 3 tissue  valve. Normal aortic valve prosthesis gradients with mean systolic gradient of  6-8 mmHg and a peak anterograde velocity of 1.7 m/sec. there is mild  paravalvular regurgitation noted.  Chest X-Ray: No acute findings     Physical Examination Review of Systems   BP (!) 196/86   Pulse 69   Temp 97.5  F (36.4  C) (Temporal)   Resp 25   Ht 1.575 m (5' 2\")   Wt 73.9 kg (163 lb)   SpO2 99%   BMI 29.81 kg/m    Body mass index is 29.81 kg/m .  Wt Readings from Last 3 Encounters:   12/21/23 73.9 kg (163 lb)   10/31/23 75.4 kg (166 lb 4.8 oz)   10/23/23 75.4 kg (166 lb 3.2 oz)       Intake/Output Summary (Last 24 hours) at 12/21/2023 1522  Last data filed at 12/21/2023 1430  Gross per 24 hour   Intake --   Output 1800 ml   Net -1800 ml     General Appearance:   Alert, cooperative, no distress, appears stated age   Head/ENT: Normocephalic, without obvious abnormality. Membranes moist.      EYES:  No scleral icterus   Neck: Supple, symmetrical, trachea midline, no adenopathy, thyroid: not enlarged, symmetric, no carotid bruit elevated JVD   Chest/Lungs:   " Few crackles at the bases   Cardiovascular:   irregular rhythm, S1, S2 normal, no murmur, rub or gallop.   Abdomen:  Soft, non-tender, bowel sounds active all four quadrants,  no masses, no organomegaly   Extremities: no cyanosis or clubbing.  3+ edema   Skin: Skin color, texture, turgor normal, no rashes or lesions.    Neurologic: Alert and oriented x 3, moving all four extremities.    Psychiatric: Normal affect.      10 system review of systems completed see history of present illness and inpatient H&P (reviewed) for further details.          Lab Results    Chemistry/lipid CBC Cardiac Enzymes/BNP/TSH/INR   Recent Labs   Lab Test 04/06/23  1625   CHOL 136   HDL 61   LDL 58   TRIG 85     Recent Labs   Lab Test 04/06/23  1625 01/25/22  1443 09/16/21  1026   LDL 58 65 48     Recent Labs   Lab Test 12/21/23  1416 12/21/23  1103   NA  --  142   POTASSIUM  --  4.5   CHLORIDE  --  104   CO2  --  28   * 149*   BUN  --  15.1   CR  --  0.81   GFRESTIMATED  --  89   RACHEL  --  9.6     Recent Labs   Lab Test 12/21/23  1103 07/18/23  1025 03/14/23  1126   CR 0.81 1.04 0.99     Recent Labs   Lab Test 10/23/23  1020 07/18/23  1025 03/14/23  1126   A1C 8.4* 9.8* 8.6*          Recent Labs   Lab Test 12/21/23  1103   WBC 6.1   HGB 11.6*   HCT 37.5*   *        Recent Labs   Lab Test 12/21/23  1103 04/06/23  1625 07/18/22  0636   HGB 11.6* 12.4* 10.8*    Recent Labs   Lab Test 07/04/22  1034   TROPONINI 0.15     Recent Labs   Lab Test 12/21/23  1103 07/04/22  1034 01/08/21  0842 10/21/20  1451 05/09/19  1657 06/09/16  1203   BNP  --  321* 91* 56   < >  --    NTBNPI 2,987*  --   --   --   --   --    NTBNP  --   --   --   --   --  354*    < > = values in this interval not displayed.     Recent Labs   Lab Test 11/17/22  1315   TSH 0.92     Recent Labs   Lab Test 12/21/23  1103 10/23/23  1020 07/06/23  1553   INR 2.19* 2.0* 2.5*        Medical History  Surgical History Family History Social History   Past Medical  History:   Diagnosis Date     ACS (acute coronary syndrome) (H) 06/02/2014     Actinic keratosis 01/14/2014     Anticoagulated on Coumadin 12/30/2015     Atrial fibrillation (H) 01/01/2016     Bone mass 04/26/2017     Chest heaviness 01/23/2019     Chest pain 05/31/2014     Closed fracture of left forearm 01/01/2015     Congestive heart failure (H)      Coronary artery disease      Difficulty in walking(719.7)      Dyslipidemia 08/31/2016     Dyspnea on exertion      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      Encounter for long-term (current) use of insulin (H) 08/11/2016     Esophageal reflux 11/18/2010     History of angina      HTN (hypertension) 06/30/2009     (Problem list name updated by automated process. Provider to review and confirm.)     Impotence of organic origin      Mixed hyperlipidemia 04/25/2007 April 25, 2007 restarted Zocor today, recheck in 3 months.  August 23, 2007 LDL at 101 with 40 mg, will increase to 80 mg. Recheck  In 3 months.      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      Walking troubles      Past Surgical History:   Procedure Laterality Date     BYPASS GRAFT ARTERY CORONARY N/A 9/10/2014    Procedure: BYPASS GRAFT ARTERY CORONARY;  Surgeon: Bharathi Caraballo MD;  Location: [facility] OR     BYPASS GRAFT ARTERY CORONARY  2016     COLONOSCOPY  1/14/2004     CORONARY ANGIOGRAPHY ADULT ORDER       CV CORONARY  ANGIOGRAM N/A 4/4/2019    Procedure: Coronary Angiogram;  Surgeon: Dominik Vega MD;  Location: [facility] Cath Lab;  Service: Cardiology     CV CORONARY ANGIOGRAM N/A 1/6/2021    Procedure: Coronary Angiogram;  Surgeon: Dominik Vega MD;  Location: [facility] Cath Lab;  Service: Cardiology     CV LEFT HEART CATHETERIZATION WITHOUT LEFT VENTRICULOGRAM Left 4/4/2019    Procedure: Left Heart Catheterization Without Left Ventriculogram;  Surgeon: Dominik Vega MD;  Location: [facility] Cath Lab;  Service: Cardiology     CV LEFT HEART CATHETERIZATION WITHOUT LEFT VENTRICULOGRAM Left 1/6/2021    Procedure: Left Heart Catheterization Without Left Ventriculogram;  Surgeon: Dominik Vega MD;  Location: [facility] Cardiac Cath Lab;  Service: Cardiology     CV RIGHT HEART CATHETERIZATION Right 4/4/2019    Procedure: Right Heart Catheterization;  Surgeon: Dominik Vega MD;  Location: [facility] Cath Lab;  Service: Cardiology     CV TRANSCATHETER AORTIC VALVE REPLACEMENT N/A 1/12/2021    Procedure: Right transfemoral transcatheter aortic valve replacement using Magdaleno Dominick 3 size 29mm.  Transthoracic echocardiogram;  Surgeon: Jo Romano MD;  Location: [facility] CARDIAC CATH LAB     ESOPHAGOSCOPY, GASTROSCOPY, DUODENOSCOPY (EGD), COMBINED N/A 1/13/2016    Procedure: COMBINED ESOPHAGOSCOPY, GASTROSCOPY, DUODENOSCOPY (EGD);  Surgeon: Rk Srivastava MD;  Location: [facility] FEMORAL CANNULIZATION WITH OPEN STANDBY REPAIR AORTIC VALVE N/A 1/12/2021    Procedure: Cardiopulmonary Bypass standby;  Surgeon: Jefferson Sandy MD;  Location: [facility] CARDIAC CATH LAB     IR LOWER EXTREMITY ANGIOGRAM LEFT  12/7/2021     LAPAROSCOPIC CHOLECYSTECTOMY  10/09     MAZE PROCEDURE N/A 9/10/2014    Procedure: MAZE PROCEDURE;  Surgeon: Bharathi Caraballo MD;  Location: [facility] OR MOHS MICROGRAPH PROCEDURE       OPEN REDUCTION INTERNAL FIXATION HIP BIPOLAR  Left 2022    Procedure: HEMIARTHROPLASTY, HIP, BIPOLAR, OPEN REDUCTION INTERNAL FIXATION OF GREATER TROCHANTER;  Surgeon: Jd Larose MD;  [place] [facility] [place]     ORTHOPEDIC SURGERY      surgery for fx  Left forearm     OTHER SURGICAL HISTORY Left     forearm sugery     STENT, CORONARY, HUMA  2016     VASECTOMY       No premature CAD, SCD,cardiomyopathy   Social History     Socioeconomic History     Marital status:      Spouse name: Fay Ayala     Number of children: Not on file     Years of education: Not on file     Highest education level: Not on file   Occupational History     Employer: RETIRED   Tobacco Use     Smoking status: Former     Types: Cigarettes     Quit date: 1998     Years since quittin.9     Smokeless tobacco: Never   Substance and Sexual Activity     Alcohol use: Yes     Alcohol/week: 0.0 standard drinks of alcohol     Comment: Alcoholic Drinks/day: very rare     Drug use: No     Sexual activity: Never     Birth control/protection: None   Other Topics Concern     Parent/sibling w/ CABG, MI or angioplasty before 65F 55M? No   Social History Narrative     and lives with life.  Has adult children from previous relationship. ( Blended family).  Has a dog - Vincent Gil MD  1/3/2019         Social Determinants of Health     Financial Resource Strain: Low Risk  (10/23/2023)    Financial Resource Strain      Within the past 12 months, have you or your family members you live with been unable to get utilities (heat, electricity) when it was really needed?: No   Food Insecurity: Low Risk  (10/23/2023)    Food Insecurity      Within the past 12 months, did you worry that your food would run out before you got money to buy more?: No      Within the past 12 months, did the food you bought just not last and you didn t have money to get more?: No   Transportation Needs: Low Risk  (10/23/2023)    Transportation Needs      Within the past 12 months,  has lack of transportation kept you from medical appointments, getting your medicines, non-medical meetings or appointments, work, or from getting things that you need?: No   Physical Activity: Not on file   Stress: Not on file   Social Connections: Not on file   Interpersonal Safety: Low Risk  (10/23/2023)    Interpersonal Safety      Do you feel physically and emotionally safe where you currently live?: Yes      Within the past 12 months, have you been hit, slapped, kicked or otherwise physically hurt by someone?: No      Within the past 12 months, have you been humiliated or emotionally abused in other ways by your partner or ex-partner?: No   Housing Stability: Low Risk  (10/23/2023)    Housing Stability      Do you have housing? : Yes      Are you worried about losing your housing?: No         Medications  Allergies   Current Facility-Administered Medications Ordered in Epic   Medication Dose Route Frequency Provider Last Rate Last Admin     acetaminophen (TYLENOL) tablet 650 mg  650 mg Oral Q4H PRN Tee Morris DO        Or     acetaminophen (TYLENOL) Suppository 650 mg  650 mg Rectal Q4H PRN Tee Morris DO         acetaZOLAMIDE (DIAMOX) injection 500 mg  500 mg Intravenous Once Tavo Blackman MD         calcium carbonate (TUMS) chewable tablet 1,000 mg  1,000 mg Oral 4x Daily PRN Tee Morris DO         carvedilol (COREG) tablet 3.125 mg  3.125 mg Oral BID w/meals Tavo Blackman MD         glucose gel 15-30 g  15-30 g Oral Q15 Min PRTee Mondragon DO        Or     dextrose 50 % injection 25-50 mL  25-50 mL Intravenous Q15 Min PRN Tee Morris DO        Or     glucagon injection 1 mg  1 mg Subcutaneous Q15 Min PRTee Mondragon DO         furosemide (LASIX) injection 60 mg  60 mg Intravenous Q8H Tee Morris DO   60 mg at 12/21/23 1501     hydrALAZINE (APRESOLINE) injection 10 mg  10 mg Intravenous Q4H PRN Tee Morris DO   10 mg at 12/21/23  1411     insulin aspart (NovoLOG) injection (RAPID ACTING)  1-7 Units Subcutaneous TID AC Tee Morris DO         insulin aspart (NovoLOG) injection (RAPID ACTING)  1-5 Units Subcutaneous At Bedtime Tee Morris DO         insulin aspart (NovoLOG) injection (RAPID ACTING)   Subcutaneous TID AC Tee Morris DO         melatonin tablet 3 mg  3 mg Oral At Bedtime PRN Tee Morris DO         ondansetron (ZOFRAN ODT) ODT tab 4 mg  4 mg Oral Q6H PRN Tee Morris DO        Or     ondansetron (ZOFRAN) injection 4 mg  4 mg Intravenous Q6H PRN Tee Morris DO         Patient is already receiving anticoagulation with heparin, enoxaparin (LOVENOX), warfarin (COUMADIN)  or other anticoagulant medication   Does not apply Continuous PRN Tee Morris DO         polyethylene glycol (MIRALAX) Packet 17 g  17 g Oral BID PRN Tee Morris DO         prochlorperazine (COMPAZINE) injection 5 mg  5 mg Intravenous Q6H PRN Tee Morris DO        Or     prochlorperazine (COMPAZINE) tablet 5 mg  5 mg Oral Q6H PRN Tee Morris DO        Or     prochlorperazine (COMPAZINE) suppository 12.5 mg  12.5 mg Rectal Q12H PRN Tee Morris DO         senna-docusate (SENOKOT-S/PERICOLACE) 8.6-50 MG per tablet 1 tablet  1 tablet Oral BID PRN Tee Morris DO        Or     senna-docusate (SENOKOT-S/PERICOLACE) 8.6-50 MG per tablet 2 tablet  2 tablet Oral BID PRN Tee Morris DO         spironolactone (ALDACTONE) tablet 25 mg  25 mg Oral Daily Tavo Blackman MD         Warfarin Dose Required Daily - Pharmacist Managed  1 each Oral See Admin Instructions Tee Morris DO         Current Outpatient Medications Ordered in Epic   Medication Sig Dispense Refill     acetaminophen (TYLENOL) 500 MG tablet Take 1 tablet (500 mg) by mouth 2 times daily as needed for mild pain       aspirin 81 MG EC tablet Take 1 tablet (81 mg) by mouth daily 90 tablet 0     carvedilol  (COREG) 3.125 MG tablet Take 6.25 mg by mouth 2 times daily (with meals)       furosemide (LASIX) 20 MG tablet Take 40 mg by mouth every morning Take in addition to 20 mg tablet in the evening.       furosemide (LASIX) 20 MG tablet Take 20 mg by mouth every evening Take in addition to 40 mg (2x20) mg tablets in the evening.       gabapentin (NEURONTIN) 600 MG tablet Take 600 mg by mouth every evening       metFORMIN (GLUCOPHAGE) 500 MG tablet Take 2 tablets (1,000 mg) by mouth 2 times daily (with meals) 360 tablet 1     Multiple Vitamins-Minerals (PRESERVISION AREDS PO) Take 1 tablet by mouth 2 times daily       pramipexole (MIRAPEX) 0.25 MG tablet Take 0.5 mg by mouth 2 times daily       rosuvastatin (CRESTOR) 20 MG tablet Take 40 mg by mouth every evening       tamsulosin (FLOMAX) 0.4 MG capsule Take 0.4 mg by mouth every morning       warfarin ANTICOAGULANT (COUMADIN) 4 MG tablet Take 4 mg by mouth See Admin Instructions Patient reports taking 4 mg every Sunday, Monday, Wednesday, Thursday, and Saturday. On Tuesday and Friday patient reports taking 5 mg.       warfarin ANTICOAGULANT (COUMADIN) 5 MG tablet Take 5 mg by mouth See Admin Instructions Patient reports taking 4 mg every Sunday, Monday, Wednesday, Thursday, and Saturday. On Tuesday and Friday patient reports taking 5 mg.       Continuous Blood Gluc  (FREESTYLE DOMI 2 READER) DOROTEO Use to read blood sugars as per 's instructions. 1 each 0     Continuous Blood Gluc Sensor (FREESTYLE DOMI 2 SENSOR) MISC Change every 14 days. 6 each 3     insulin aspart prot & aspart (NOVOLOG MIX 70/30 PEN) (70-30) 100 UNIT/ML pen Inject subcutaneously 20 units in AM with breakfast and 12 in PM with dinner. If Blood Glucose<100 then give 1/2 dose) 15 mL 0       Allergies   Allergen Reactions     Oxycodone Itching and Rash     Amlodipine Dizziness     Dizziness and dry heaves     Lisinopril Cough     Adhesive Tape Itching and Rash

## 2023-12-21 NOTE — ED PROVIDER NOTES
"Emergency Department Midlevel Supervisory Note     I personally saw the patient and performed a substantive portion of the visit including all aspects of the medical decision making.    ED Course:  12:25 PM Nuha Loredo PA-C staffed patient with me. I agree with their assessment and plan of management, and I will see the patient.  12:31 PM I met with the patient to introduce myself, gather additional history, perform my initial exam, and discuss the plan.     Brief HPI:     Pee Ayala is a 83 year old male who presents for evaluation of shortness of breath and generalized weakness.    Patient reports he has been struggling with shortness of breath over the past month worsening this past week. He called his nurse today who recommended he come to the ED. States shortness of breath has worsened to the point that he is short of breath even when talking. He notes that he has had increased leg swelling and is not compliant with his lasix. Denies chest pain, any fevers, chills, coughing up blood, abdominal pain, nausea, vomiting, diarrhea. No weakness, vision changes, speech changes or other concerns. Patient reports that he has been sleeping in a chair the past week due to his worsening shortness of breath, after this is when his symptoms started. Has been occurring all week. No other concerns voiced at this time.    I, Mimi Choi, am serving as a scribe to document services personally performed by Spencer Virgen MD, based on my observation and the provider's statements to me.   I, Spencer Virgen MD attest that Mimi Choi is acting in a scribe capacity, has observed my performance of the services and has documented them in accordance with my direction.    Brief Physical Exam: BP (!) 196/86   Pulse 69   Temp 97.6  F (36.4  C) (Oral)   Resp 25   Ht 1.575 m (5' 2\")   Wt 73.9 kg (163 lb)   SpO2 99%   BMI 29.81 kg/m    Constitutional:  Alert, in no acute distress  EYES: Conjunctivae clear  HENT:  " Atraumatic, normocephalic  Respiratory:  Respirations even, unlabored, in no acute respiratory distress  Cardiovascular:  Regular rate and rhythm, good peripheral perfusion  GI: Soft, nondistended, nontender, no palpable masses, no rebound, no guarding   Musculoskeletal: Marked 2+ pitting edema bilateral lower extremities.  No gross deformities.  Integument: Warm, Dry, No erythema, No rash.   Neurologic:  Alert & oriented, no focal deficits noted.  Equal strength bilateral upper and lower extremities, no facial droop or pronator drift, cranial nerves II through XII intact.  Psych: Normal mood and affect     MDM:  Again, patient is 83-year-old male who presents with progressive shortness of breath.  Patient is afebrile with no tachycardia or hypoxia.  Lung sounds distant but clear with no wheezing, does have marked peripheral edema.  Certainly concern for worsening CHF exacerbation, no chest pain to suggest ACS, nothing to suggest PE or dissection.  No fever or productive cough, low suspicion for pneumonia, no body aches or fever to suggest COVID or influenza.  Patient endorses vague left upper extremity tingling that he has in the morning and goes away throughout the day, this is been going on for a week, he is currently sleeping upright in a recliner and I suspect this is likely secondary to positional etiology.  Neurologic exam here is completely benign and currently he denies any tingling or focal symptoms with certainly no indication for code stroke.  We will obtain screening labs, BMP, ECG and a chest x-ray.  Anticipate the patient will require admission.       1. CHF exacerbation (H)    2. Chronic atrial fibrillation (H)        Labs and Imaging:  Results for orders placed or performed during the hospital encounter of 12/21/23   XR Chest 2 Views    Impression    IMPRESSION: The heart is normal in size with stable postoperative changes including sternotomy wires and TAVR. The pleural thickening involving the  left mid and lower lung laterally with blunting of the left lateral costophrenic angle and linear   atelectasis or scarring in the left mid and lower lung are minimally changed. The right lung appears clear. Vertebroplasty changes in the lower thoracic and lumbar spine with demineralization of the spine and mild to moderate anterior wedging of the two   treated levels and the approximately T12 level.   Basic metabolic panel   Result Value Ref Range    Sodium 142 135 - 145 mmol/L    Potassium 4.5 3.4 - 5.3 mmol/L    Chloride 104 98 - 107 mmol/L    Carbon Dioxide (CO2) 28 22 - 29 mmol/L    Anion Gap 10 7 - 15 mmol/L    Urea Nitrogen 15.1 8.0 - 23.0 mg/dL    Creatinine 0.81 0.67 - 1.17 mg/dL    GFR Estimate 89 >60 mL/min/1.73m2    Calcium 9.6 8.8 - 10.2 mg/dL    Glucose 149 (H) 70 - 99 mg/dL   Result Value Ref Range    Troponin T, High Sensitivity 31 (H) <=22 ng/L   Symptomatic Influenza A/B, RSV, & SARS-CoV2 PCR (COVID-19) Nasopharyngeal    Specimen: Nasopharyngeal; Swab   Result Value Ref Range    Influenza A PCR Negative Negative    Influenza B PCR Negative Negative    RSV PCR Negative Negative    SARS CoV2 PCR Negative Negative   Extra Blue Top Tube   Result Value Ref Range    Hold Specimen JIC    Extra Red Top Tube   Result Value Ref Range    Hold Specimen JIC    CBC with platelets and differential   Result Value Ref Range    WBC Count 6.1 4.0 - 11.0 10e3/uL    RBC Count 3.64 (L) 4.40 - 5.90 10e6/uL    Hemoglobin 11.6 (L) 13.3 - 17.7 g/dL    Hematocrit 37.5 (L) 40.0 - 53.0 %     (H) 78 - 100 fL    MCH 31.9 26.5 - 33.0 pg    MCHC 30.9 (L) 31.5 - 36.5 g/dL    RDW 14.1 10.0 - 15.0 %    Platelet Count 156 150 - 450 10e3/uL    % Neutrophils 71 %    % Lymphocytes 19 %    % Monocytes 7 %    % Eosinophils 2 %    % Basophils 1 %    % Immature Granulocytes 0 %    NRBCs per 100 WBC 0 <1 /100    Absolute Neutrophils 4.4 1.6 - 8.3 10e3/uL    Absolute Lymphocytes 1.2 0.8 - 5.3 10e3/uL    Absolute Monocytes 0.4 0.0 - 1.3  10e3/uL    Absolute Eosinophils 0.1 0.0 - 0.7 10e3/uL    Absolute Basophils 0.0 0.0 - 0.2 10e3/uL    Absolute Immature Granulocytes 0.0 <=0.4 10e3/uL    Absolute NRBCs 0.0 10e3/uL   Nt probnp inpatient   Result Value Ref Range    N terminal Pro BNP Inpatient 2,987 (H) 0 - 1,800 pg/mL   Result Value Ref Range    Magnesium 1.9 1.7 - 2.3 mg/dL   Result Value Ref Range    INR 2.19 (H) 0.85 - 1.15   Troponin T, High Sensitivity (now)   Result Value Ref Range    Troponin T, High Sensitivity 29 (H) <=22 ng/L   Glucose by meter   Result Value Ref Range    GLUCOSE BY METER POCT 111 (H) 70 - 99 mg/dL   Echocardiogram Complete   Result Value Ref Range    LVEF  60-65%      I have reviewed the relevant laboratory and radiology studies    ECG  ECG by my independent interpretation showed atrial fibrillation, no specific ST acute ischemic changes, no concerning dysrhythmias or for prolongation, when compared to ECG of July 4, 2022 patient remains in atrial fibrillation.    Procedures:  I was present for the key portions of this procedure: none    Spencer Virgen M.D.  [facility] EMERGENCY DEPARTMENT  [address] [place] [id number] [phone]     Spencer Virgen MD  12/21/23 [id number]

## 2023-12-21 NOTE — PLAN OF CARE
Problem: Pain Acute  Goal: Optimal Pain Control and Function  Outcome: Progressing   Goal Outcome Evaluation:       Neuro: A&Ox4.   Cardiac: Afebrile, VSS, in telemetry   Respiratory: RA  GI/: primo fit . No BM this shift.  Diet/appetite: Tolerating 2gm NA,no caffienated diet. Denies nausea.  Activity: Up with assist  x1  Pain: Denies   Skin: No new deficits noted.  Lines: saline locked  Drains: no  Replacements: no  .Anne Lovell RN

## 2023-12-21 NOTE — CONSULTS
"Care Management Initial Consult    General Information  Assessment completed with: PatientPee  Type of CM/SW Visit: Initial Assessment    Primary Care Provider verified and updated as needed: Yes   Readmission within the last 30 days: no previous admission in last 30 days      Reason for Consult: discharge planning  Advance Care Planning: Advance Care Planning Reviewed: present on chart          Communication Assessment  Patient's communication style: spoken language (English or Bilingual)             Cognitive  Cognitive/Neuro/Behavioral: WDL                      Living Environment:   People in home: spouse, child(hayes), adult  Pee and wife Fay Perez and son Brandon  Current living Arrangements: house (\"townhouse. I have to go down a full flight of steps to let the dog out. Those steps are hard for me now with my shortness of breath.\")      Able to return to prior arrangements: yes       Family/Social Support:  Care provided by: self  Provides care for: no one  Marital Status:   Wife, Children  Fay Perez       Description of Support System: Supportive, Involved    Support Assessment: Adequate family and caregiver support, Adequate social supports, Patient communicates needs well met    Current Resources:   Patient receiving home care services: No     Community Resources: None  Equipment currently used at home: walker, rolling, walker, standard, cane, straight, glucometer (\"I use the cane when I am outside my house. I use the walker inside my house. I have 2 4WW and 1 FWW.\")  Supplies currently used at home: Hearing Aid Batteries, Diabetic Supplies, Other (\"hearing aids, glasses\")    Employment/Financial:  Employment Status: retired     Employment/ Comments: \"no  history\"  Financial Concerns:     Referral to Financial Worker: No       Does the patient's insurance plan have a 3 day qualifying hospital stay waiver?  Yes     Which insurance plan 3 day waiver is available? Alternative " "insurance waiver    Will the waiver be used for post-acute placement? Undetermined at this time      Functional Status:  Prior to admission patient needed assistance:   Dependent ADLs:: Ambulation-cane, Ambulation-walker, Independent  Dependent IADLs:: Independent (\"my wife and son help if I need the help.\")       Mental Health Status:          Chemical Dependency Status:                Values/Beliefs:  Spiritual, Cultural Beliefs, Quaker Practices, Values that affect care:                 Additional Information:  Pee lives in a townhouse with his wife Fay Perez and son Brandon. In the townhouse \"he has to go down a full flight of steps to let the dog out. Those steps are hard for me now with my shortness of breath.\"    \"I use the cane when I am outside my house. I use the walker inside my house. I have 2 4WWs and 1 FWW.\"    He is independent with ADLs and IADLs. He and his wife still drive. \"My wife and son help if I need the help.\"    Unknown discharge needs at this time.     Wife to transport at discharge.    CM to follow for medical progression of care, discharge recommendations, and final discharge plan.    Sandra Rico RN    "

## 2023-12-21 NOTE — H&P
[facility]    History and Physical - Hospitalist Service       Date of Admission:  12/21/2023    Assessment & Plan   Principal Problem:    Acute on chronic diastolic heart failure (H)  Active Problems:    Diabetic polyneuropathy (H)    HTN (hypertension)    Nonalcoholic steatohepatitis    Type 2 diabetes, HbA1C goal < 8% (H)    CAD (coronary artery disease), 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 and removal L atrial appendage 4=967-4    Tremor hands, lower legs 9-2014    Chronic atrial fibrillation (H)    Aortic stenosis    Anticoagulated on Coumadin    S/P TAVR (transcatheter aortic valve replacement)       Pee Ayala is a 83 year old male with history of CAD, chronic A-fib, chronic diastolic CHF, aortic stenosis status post TAVR, DM2, hypertension and nonalcoholic steatohepatitis admitted on 12/21/2023 for acute on chronic diastolic CHF exacerbation.      Acute on chronic diastolic CHF exacerbation:  Likely related to medication noncompliance  Presented with dyspnea on phonation  BNP elevated from previous in the setting of clinical weight gain, TOBAR and orthopnea.   Chest x-ray with blunting of the left costophrenic angle and linear atelectasis versus scarring which is stable.  Right lung clear.  --Start IV Lasix 60 IV Q8H  -- Cardiology consultation  -- TTE pending  -- Cardiology has reduced home Coreg dose to 3.125 twice daily and started spironolactone      History of CAD:  With evidence of demand ischemia  Troponin has trended down  --Will stop checking further troponin  -- Continue home aspirin, statin  -- TTE pending      History of chronic A-fib:  --Warfarin dosing per pharmacy  -- Defer Coreg adjustment to cardiology      DM2:  -- Continue home metformin, add sliding scale insulin and mealtime carb counting insulin for now    History of severe aortic stenosis: Status post TAVR      BPH: Continue home Flomax    Chronic leg and hand tremor:  --Continue Mirapex    "  Diet: Combination Diet Moderate Consistent Carb (60 g CHO per Meal) Diet; 2 gm NA Diet; No Caffeine Diet (and additional linked orders)  Fluid restriction 1800 ML FLUID (and additional linked orders)    DVT Prophylaxis: Warfarin  Silva Catheter: Not present  Lines: None     Cardiac Monitoring: ACTIVE order. Indication: Acute decompensated heart failure (48 hours)  Code Status: Full Code      Clinically Significant Risk Factors Present on Admission               # Drug Induced Coagulation Defect: home medication list includes an anticoagulant medication  # Drug Induced Platelet Defect: home medication list includes an antiplatelet medication   # Hypertension: Noted on problem list  # Acute heart failure with preserved ejection fraction: heart failure noted on problem list, last echo with EF >50%, and receiving IV diuretics    # DMII: A1C = 8.4 % (Ref range: 0.0 - 5.6 %) within past 6 months    # Overweight: Estimated body mass index is 29.81 kg/m  as calculated from the following:    Height as of this encounter: 1.575 m (5' 2\").    Weight as of this encounter: 73.9 kg (163 lb).        # History of CABG: noted on surgical history       Disposition Plan      Expected Discharge Date: 12/23/2023                  eTe Morris DO  Hospitalist Service  [facility]  Securely message with [employer] (more info)  Text page via [employer] Paging/Directory     ______________________________________________________________________    Chief Complaint   Shortness of breath    History is obtained from the patient    History of Present Illness   Pee Ayala is a 83 year old male who presents with dyspnea on phonation in the setting of weight gain, orthopnea, TOBAR for the past several days.  Patient endorses Lasix noncompliance due to inability to safely get up out of bed and urinate in the evening.      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     Encounter for long-term (current) use of insulin (H) 08/11/2016    Esophageal reflux 11/18/2010    History of angina     HTN (hypertension) 06/30/2009     (Problem list name updated by automated process. Provider to review and confirm.)    Impotence of organic origin     Mixed hyperlipidemia 04/25/2007 April 25, 2007 restarted Zocor today, recheck in 3 months.  August 23, 2007 LDL at 101 with 40 mg, will increase to 80 mg. Recheck  In 3 months.     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     Walking troubles        Past Surgical History   Past Surgical History:   Procedure Laterality Date    BYPASS GRAFT ARTERY CORONARY N/A 9/10/2014    Procedure: BYPASS GRAFT ARTERY CORONARY;  Surgeon: Bharathi Caraballo MD;  Location: [facility] OR    BYPASS GRAFT ARTERY CORONARY  2016    COLONOSCOPY  1/14/2004    CORONARY ANGIOGRAPHY ADULT ORDER      CV CORONARY ANGIOGRAM N/A 4/4/2019    Procedure: Coronary Angiogram;  Surgeon: Gary  Dominik Leal MD;  Location: [facility] Cath Lab;  Service: Cardiology    CV CORONARY ANGIOGRAM N/A 1/6/2021    Procedure: Coronary Angiogram;  Surgeon: Dominik Vega MD;  Location: [facility] Cath Lab;  Service: Cardiology    CV LEFT HEART CATHETERIZATION WITHOUT LEFT VENTRICULOGRAM Left 4/4/2019    Procedure: Left Heart Catheterization Without Left Ventriculogram;  Surgeon: Dominik Vega MD;  Location: [facility] Cath Lab;  Service: Cardiology    CV LEFT HEART CATHETERIZATION WITHOUT LEFT VENTRICULOGRAM Left 1/6/2021    Procedure: Left Heart Catheterization Without Left Ventriculogram;  Surgeon: Dominik Vega MD;  Location: [facility] Cardiac Cath Lab;  Service: Cardiology    CV RIGHT HEART CATHETERIZATION Right 4/4/2019    Procedure: Right Heart Catheterization;  Surgeon: Dominik Vega MD;  Location: [facility] Cath Lab;  Service: Cardiology    CV TRANSCATHETER AORTIC VALVE REPLACEMENT N/A 1/12/2021    Procedure: Right transfemoral transcatheter aortic valve replacement using Magdaleno Dominick 3 size 29mm.  Transthoracic echocardiogram;  Surgeon: Jo Romano MD;  Location: [facility] CARDIAC CATH LAB    ESOPHAGOSCOPY, GASTROSCOPY, DUODENOSCOPY (EGD), COMBINED N/A 1/13/2016    Procedure: COMBINED ESOPHAGOSCOPY, GASTROSCOPY, DUODENOSCOPY (EGD);  Surgeon: Rk Srivastava MD;  Location: [facility] FEMORAL CANNULIZATION WITH OPEN STANDBY REPAIR AORTIC VALVE N/A 1/12/2021    Procedure: Cardiopulmonary Bypass standby;  Surgeon: Jefferson Sandy MD;  Location: [facility] CARDIAC CATH LAB    IR LOWER EXTREMITY ANGIOGRAM LEFT  12/7/2021    LAPAROSCOPIC CHOLECYSTECTOMY  10/09    MAZE PROCEDURE N/A 9/10/2014    Procedure: MAZE PROCEDURE;  Surgeon: Bharathi Caraballo MD;  Location:  OR    MOHS MICROGRAPHIC PROCEDURE      OPEN REDUCTION INTERNAL FIXATION HIP BIPOLAR Left 4/9/2022    Procedure: HEMIARTHROPLASTY, HIP, BIPOLAR, OPEN REDUCTION INTERNAL  FIXATION OF GREATER TROCHANTER;  Surgeon: Jd Larose MD;  Location: [facility] OR    ORTHOPEDIC SURGERY      surgery for fx  Left forearm    OTHER SURGICAL HISTORY Left 2015    forearm sugery    STENT, CORONARY, HUMA  02/2016    VASECTOMY         Prior to Admission Medications   Prior to Admission Medications   Prescriptions Last Dose Informant Patient Reported? Taking?   Continuous Blood Gluc  (FREESTYLE DOMI 2 READER) DOROTEO n/a at n/a  No No   Sig: Use to read blood sugars as per 's instructions.   Continuous Blood Gluc Sensor (FREESTYLE DOMI 2 SENSOR) MISC n/a at n/a  No No   Sig: Change every 14 days.   Multiple Vitamins-Minerals (PRESERVISION AREDS PO) 12/20/2023 at pm  Yes Yes   Sig: Take 1 tablet by mouth 2 times daily   acetaminophen (TYLENOL) 500 MG tablet Unknown at prn  No Yes   Sig: Take 1 tablet (500 mg) by mouth 2 times daily as needed for mild pain   aspirin 81 MG EC tablet 12/21/2023 at am  No Yes   Sig: Take 1 tablet (81 mg) by mouth daily   carvedilol (COREG) 3.125 MG tablet 12/20/2023 at pm  Yes Yes   Sig: Take 6.25 mg by mouth 2 times daily (with meals)   furosemide (LASIX) 20 MG tablet 12/20/2023 at am  Yes Yes   Sig: Take 40 mg by mouth every morning Take in addition to 20 mg tablet in the evening.   furosemide (LASIX) 20 MG tablet 12/20/2023 at pm  Yes Yes   Sig: Take 20 mg by mouth every evening Take in addition to 40 mg (2x20) mg tablets in the evening.   gabapentin (NEURONTIN) 600 MG tablet 12/20/2023 at pm  Yes Yes   Sig: Take 600 mg by mouth every evening   insulin aspart prot & aspart (NOVOLOG MIX 70/30 PEN) (70-30) 100 UNIT/ML pen 12/20/2023 at pm  No Yes   Sig: Inject subcutaneously 20 units in AM with breakfast and 12 in PM with dinner. If Blood Glucose<100 then give 1/2 dose)   metFORMIN (GLUCOPHAGE) 500 MG tablet 12/20/2023 at pm  No Yes   Sig: Take 2 tablets (1,000 mg) by mouth 2 times daily (with meals)   pramipexole (MIRAPEX) 0.25 MG tablet 12/20/2023  at pm  Yes Yes   Sig: Take 0.5 mg by mouth 2 times daily   rosuvastatin (CRESTOR) 20 MG tablet 12/20/2023 at pm  Yes Yes   Sig: Take 40 mg by mouth every evening   tamsulosin (FLOMAX) 0.4 MG capsule 12/20/2023 at am  Yes Yes   Sig: Take 0.4 mg by mouth every morning   warfarin ANTICOAGULANT (COUMADIN) 4 MG tablet 12/20/2023 at pm  Yes Yes   Sig: Take 4 mg by mouth See Admin Instructions Patient reports taking 4 mg every Sunday, Monday, Wednesday, Thursday, and Saturday. On Tuesday and Friday patient reports taking 5 mg.   warfarin ANTICOAGULANT (COUMADIN) 5 MG tablet 12/19/2023 at pm  Yes Yes   Sig: Take 5 mg by mouth See Admin Instructions Patient reports taking 4 mg every Sunday, Monday, Wednesday, Thursday, and Saturday. On Tuesday and Friday patient reports taking 5 mg.      Facility-Administered Medications: None           Physical Exam   Vital Signs: Temp: 97.5  F (36.4  C) Temp src: Temporal BP: (!) 196/86 Pulse: 69   Resp: 25 SpO2: 99 % O2 Device: None (Room air)    Weight: 163 lbs 0 oz    General Appearance: In no acute distress  EYES: Clear, without inflammation   HEENT: Without congestion or inflammation  NECK:  supple, without adenopathy or thyroid enlargement  CHEST: no focal tenderness   BREAST: deferred  RESPIRATORY: Respirations labored  CARDIOVASCULAR: 2+ le edema bilat.  ABDOMEN: soft and non-tender  NEUROLOGIC: No focal arm or leg  weakness, speech is clear      Medical Decision Making     >75 MINUTES SPENT BY ME on the date of service doing chart review, history, exam, documentation & further activities per the note.         Data

## 2023-12-21 NOTE — TELEPHONE ENCOUNTER
Nurse Triage SBAR    Is this a 2nd Level Triage? NO    Situation: Shortness of breath    Background: patient calling, states that he has had worsening shortness of breath for 2 months.  He states that he gets short of breath just tying his shoes.  He states it is worse when he is sitting or lying down and better when he stands.       Assessment: Worsening shortness of breath    Protocol Recommended Disposition:   Go to ED Now    Recommendation: Patient will go to the ED now.      N/A    BRENT CANTRELL RN    Does the patient meet one of the following criteria for ADS visit consideration? 16+ years old, with an [facility] PCP     TIP  Providers, please consider if this condition is appropriate for management at one of our Acute and Diagnostic Services sites.     If patient is a good candidate, please use dotphrase <dot>triageresponse and select Refer to ADS to document.    Reason for Disposition   MODERATE difficulty breathing (e.g., speaks in phrases, SOB even at rest, pulse 100-120) of new-onset or worse than normal    Additional Information   Negative: SEVERE difficulty breathing (e.g., struggling for each breath, speaks in single words, pulse > 120)   Negative: Breathing stopped and hasn't returned   Negative: Choking on something   Negative: Bluish (or gray) lips or face   Negative: Difficult to awaken or acting confused (e.g., disoriented, slurred speech)   Negative: Passed out (i.e., fainted, collapsed and was not responding)   Negative: Wheezing started suddenly after medicine, an allergic food, or bee sting   Negative: Stridor (harsh sound while breathing in)   Negative: Slow, shallow and weak breathing   Negative: Sounds like a life-threatening emergency to the triager   Negative: Chest pain   Negative: Wheezing (high pitched whistling sound) and previous asthma attacks or use of asthma medicines   Negative: Difficulty breathing and within 14 days of COVID-19 Exposure   Negative: Difficulty breathing and  only present when coughing   Negative: Difficulty breathing and only from stuffy nose   Negative: Difficulty breathing and only from stuffy nose or runny nose from common cold    Protocols used: Breathing Difficulty-A-[place]

## 2023-12-21 NOTE — MEDICATION SCRIBE - ADMISSION MEDICATION HISTORY
Medication Scribe Admission Medication History    Admission medication history is complete. The information provided in this note is only as accurate as the sources available at the time of the update.    Information Source(s): Patient and Prescription bottles via in-person    Pertinent Information: Patient reports self management of medications. Patient's spouse was encouraged to retreive from home patient's self filled medication tray and RX bottles. This was used as the basis of the PTA list.     Carvedilol: RX written 3.125 mg BID. Patient taking 3.125 x 2 tablets BID. (2 times prescribed value)    Pramipexole: RX written 0.25 mg BID. Patient taking 0.25 x 2 tablets BID. (2 times prescribed value)    Rosuvastatin: RX written 20 mg at bedtime. Patient taking 20 x 2 tablets at bedtime. (2 times prescribed value)    Gabapentin: RX written: 600 mg BID. Patient taking 600 mg in the evening.     Warfarin:Patient reports taking 4 mg every Sunday, Monday, Wednesday, Thursday, and Saturday. On Tuesday and Friday patient reports taking 5 mg.     Changes made to PTA medication list:  Added: None  Deleted: Buspirone, Finasteride, Percocet (all none in medication tray or in RX pill bottles  Changed: See above note    Medication Affordability:  Not including over the counter (OTC) medications, was there a time in the past 3 months when you did not take your medications as prescribed because of cost?: No    Allergies reviewed with patient and updates made in EHR: yes    Medication History Completed By: Cedric Alas 12/21/2023 3:24 PM    PTA Med List   Medication Sig Note Last Dose    acetaminophen (TYLENOL) 500 MG tablet Take 1 tablet (500 mg) by mouth 2 times daily as needed for mild pain  Unknown at prn    aspirin 81 MG EC tablet Take 1 tablet (81 mg) by mouth daily  12/21/2023 at am    carvedilol (COREG) 3.125 MG tablet Take 6.25 mg by mouth 2 times daily (with meals)  12/20/2023 at pm    furosemide (LASIX) 20 MG tablet Take  40 mg by mouth every morning Take in addition to 20 mg tablet in the evening.  12/20/2023 at am    furosemide (LASIX) 20 MG tablet Take 20 mg by mouth every evening Take in addition to 40 mg (2x20) mg tablets in the evening.  12/20/2023 at pm    gabapentin (NEURONTIN) 600 MG tablet Take 600 mg by mouth every evening  12/20/2023 at pm    insulin aspart prot & aspart (NOVOLOG MIX 70/30 PEN) (70-30) 100 UNIT/ML pen Inject subcutaneously 20 units in AM with breakfast and 12 in PM with dinner. If Blood Glucose<100 then give 1/2 dose) 12/21/2023: Patient reports taking 20 units in the AM and 15 units in the evening. 12/20/2023 at pm    metFORMIN (GLUCOPHAGE) 500 MG tablet Take 2 tablets (1,000 mg) by mouth 2 times daily (with meals)  12/20/2023 at pm    Multiple Vitamins-Minerals (PRESERVISION AREDS PO) Take 1 tablet by mouth 2 times daily  12/20/2023 at pm    pramipexole (MIRAPEX) 0.25 MG tablet Take 0.5 mg by mouth 2 times daily  12/20/2023 at pm    rosuvastatin (CRESTOR) 20 MG tablet Take 40 mg by mouth every evening  12/20/2023 at pm    tamsulosin (FLOMAX) 0.4 MG capsule Take 0.4 mg by mouth every morning  12/20/2023 at am    warfarin ANTICOAGULANT (COUMADIN) 4 MG tablet Take 4 mg by mouth See Admin Instructions Patient reports taking 4 mg every Sunday, Monday, Wednesday, Thursday, and Saturday. On Tuesday and Friday patient reports taking 5 mg.  12/20/2023 at pm    warfarin ANTICOAGULANT (COUMADIN) 5 MG tablet Take 5 mg by mouth See Admin Instructions Patient reports taking 4 mg every Sunday, Monday, Wednesday, Thursday, and Saturday. On Tuesday and Friday patient reports taking 5 mg.  12/19/2023 at pm

## 2023-12-21 NOTE — PHARMACY-ANTICOAGULATION SERVICE
Clinical Pharmacy - Warfarin Dosing Consult     Pharmacy has been consulted to manage this patient s warfarin therapy.  Indication: Atrial Fibrillation  Therapy Goal: INR 2-3  Provider/Team: [facility] [place]  Warfarin Prior to Admission: Yes  Warfarin PTA Regimen: 5 mg Tuesday and Friday, 4 mg all other days of the week  Significant drug interactions: Rosuvastatin  Recent documented change in oral intake/nutrition: Unknown  Dose Comments: INR 2.19 today. Will plan to give usual home dose of 4 mg and re evaluate tomorrow.    INR   Date Value Ref Range Status   12/21/2023 2.19 (H) 0.85 - 1.15 Final   10/23/2023 2.0 (H) 0.9 - 1.1 Final       Recommend warfarin 4 mg mg today.  Pharmacy will monitor Pee Ayala daily and order warfarin doses to achieve specified goal.      Please contact pharmacy as soon as possible if the warfarin needs to be held for a procedure or if the warfarin goals change.

## 2023-12-21 NOTE — ED TRIAGE NOTES
Patient reports increasing SOB/weakness for last month with lower extremity swelling.  Pt also c/o feeling weak.

## 2023-12-22 LAB
ABO/RH(D): NORMAL
ANION GAP SERPL CALCULATED.3IONS-SCNC: 8 MMOL/L (ref 7–15)
ANTIBODY SCREEN: NEGATIVE
ATRIAL RATE - MUSE: 156 BPM
ATRIAL RATE - MUSE: 82 BPM
BLD PROD TYP BPU: NORMAL
BLD PROD TYP BPU: NORMAL
BLOOD COMPONENT TYPE: NORMAL
BLOOD COMPONENT TYPE: NORMAL
BUN SERPL-MCNC: 19.2 MG/DL (ref 8–23)
CALCIUM SERPL-MCNC: 9.2 MG/DL (ref 8.8–10.2)
CHLORIDE SERPL-SCNC: 100 MMOL/L (ref 98–107)
CODING SYSTEM: NORMAL
CODING SYSTEM: NORMAL
CREAT SERPL-MCNC: 1.05 MG/DL (ref 0.67–1.17)
DEPRECATED HCO3 PLAS-SCNC: 31 MMOL/L (ref 22–29)
DIASTOLIC BLOOD PRESSURE - MUSE: NORMAL MMHG
DIASTOLIC BLOOD PRESSURE - MUSE: NORMAL MMHG
EGFRCR SERPLBLD CKD-EPI 2021: 71 ML/MIN/1.73M2
GLUCOSE BLDC GLUCOMTR-MCNC: 160 MG/DL (ref 70–99)
GLUCOSE BLDC GLUCOMTR-MCNC: 182 MG/DL (ref 70–99)
GLUCOSE BLDC GLUCOMTR-MCNC: 184 MG/DL (ref 70–99)
GLUCOSE BLDC GLUCOMTR-MCNC: 204 MG/DL (ref 70–99)
GLUCOSE BLDC GLUCOMTR-MCNC: 252 MG/DL (ref 70–99)
GLUCOSE SERPL-MCNC: 188 MG/DL (ref 70–99)
INR PPP: 2.12 (ref 0.85–1.15)
INTERPRETATION ECG - MUSE: NORMAL
INTERPRETATION ECG - MUSE: NORMAL
ISSUE DATE AND TIME: NORMAL
MAGNESIUM SERPL-MCNC: 1.9 MG/DL (ref 1.7–2.3)
P AXIS - MUSE: NORMAL DEGREES
P AXIS - MUSE: NORMAL DEGREES
POTASSIUM SERPL-SCNC: 3.8 MMOL/L (ref 3.4–5.3)
PR INTERVAL - MUSE: NORMAL MS
PR INTERVAL - MUSE: NORMAL MS
QRS DURATION - MUSE: 142 MS
QRS DURATION - MUSE: 86 MS
QT - MUSE: 418 MS
QT - MUSE: 494 MS
QTC - MUSE: 431 MS
QTC - MUSE: 484 MS
R AXIS - MUSE: -26 DEGREES
R AXIS - MUSE: -51 DEGREES
SODIUM SERPL-SCNC: 139 MMOL/L (ref 135–145)
SPECIMEN EXPIRATION DATE: NORMAL
SYSTOLIC BLOOD PRESSURE - MUSE: NORMAL MMHG
SYSTOLIC BLOOD PRESSURE - MUSE: NORMAL MMHG
T AXIS - MUSE: 30 DEGREES
T AXIS - MUSE: 35 DEGREES
UNIT ABO/RH: NORMAL
UNIT NUMBER: NORMAL
UNIT NUMBER: NORMAL
UNIT STATUS: NORMAL
UNIT STATUS: NORMAL
UNIT TYPE ISBT: 6200
VENTRICULAR RATE- MUSE: 58 BPM
VENTRICULAR RATE- MUSE: 64 BPM

## 2023-12-22 PROCEDURE — 80048 BASIC METABOLIC PNL TOTAL CA: CPT | Performed by: INTERNAL MEDICINE

## 2023-12-22 PROCEDURE — 250N000011 HC RX IP 250 OP 636: Performed by: INTERNAL MEDICINE

## 2023-12-22 PROCEDURE — 258N000003 HC RX IP 258 OP 636: Performed by: INTERNAL MEDICINE

## 2023-12-22 PROCEDURE — 99152 MOD SED SAME PHYS/QHP 5/>YRS: CPT | Performed by: INTERNAL MEDICINE

## 2023-12-22 PROCEDURE — 93455 CORONARY ART/GRFT ANGIO S&I: CPT | Performed by: INTERNAL MEDICINE

## 2023-12-22 PROCEDURE — C1769 GUIDE WIRE: HCPCS | Performed by: INTERNAL MEDICINE

## 2023-12-22 PROCEDURE — 99232 SBSQ HOSP IP/OBS MODERATE 35: CPT | Performed by: INTERNAL MEDICINE

## 2023-12-22 PROCEDURE — 250N000013 HC RX MED GY IP 250 OP 250 PS 637: Performed by: INTERNAL MEDICINE

## 2023-12-22 PROCEDURE — B21F1ZZ FLUOROSCOPY OF OTHER BYPASS GRAFT USING LOW OSMOLAR CONTRAST: ICD-10-PCS | Performed by: INTERNAL MEDICINE

## 2023-12-22 PROCEDURE — 93455 CORONARY ART/GRFT ANGIO S&I: CPT | Mod: 26 | Performed by: INTERNAL MEDICINE

## 2023-12-22 PROCEDURE — P9059 PLASMA, FRZ BETWEEN 8-24HOUR: HCPCS | Performed by: INTERNAL MEDICINE

## 2023-12-22 PROCEDURE — 83735 ASSAY OF MAGNESIUM: CPT | Performed by: INTERNAL MEDICINE

## 2023-12-22 PROCEDURE — 99233 SBSQ HOSP IP/OBS HIGH 50: CPT | Mod: 25 | Performed by: INTERNAL MEDICINE

## 2023-12-22 PROCEDURE — 255N000002 HC RX 255 OP 636: Performed by: INTERNAL MEDICINE

## 2023-12-22 PROCEDURE — 272N000001 HC OR GENERAL SUPPLY STERILE: Performed by: INTERNAL MEDICINE

## 2023-12-22 PROCEDURE — 210N000001 HC R&B IMCU HEART CARE

## 2023-12-22 PROCEDURE — 36415 COLL VENOUS BLD VENIPUNCTURE: CPT | Performed by: INTERNAL MEDICINE

## 2023-12-22 PROCEDURE — 250N000009 HC RX 250: Performed by: INTERNAL MEDICINE

## 2023-12-22 PROCEDURE — 250N000011 HC RX IP 250 OP 636: Mod: JZ | Performed by: INTERNAL MEDICINE

## 2023-12-22 PROCEDURE — 85610 PROTHROMBIN TIME: CPT | Performed by: INTERNAL MEDICINE

## 2023-12-22 PROCEDURE — C1894 INTRO/SHEATH, NON-LASER: HCPCS | Performed by: INTERNAL MEDICINE

## 2023-12-22 PROCEDURE — B2111ZZ FLUOROSCOPY OF MULTIPLE CORONARY ARTERIES USING LOW OSMOLAR CONTRAST: ICD-10-PCS | Performed by: INTERNAL MEDICINE

## 2023-12-22 RX ORDER — WARFARIN SODIUM 2 MG/1
4 TABLET ORAL
Status: COMPLETED | OUTPATIENT
Start: 2023-12-22 | End: 2023-12-22

## 2023-12-22 RX ORDER — FENTANYL CITRATE 50 UG/ML
25 INJECTION, SOLUTION INTRAMUSCULAR; INTRAVENOUS
Status: DISCONTINUED | OUTPATIENT
Start: 2023-12-22 | End: 2023-12-22 | Stop reason: HOSPADM

## 2023-12-22 RX ORDER — WARFARIN SODIUM 2 MG/1
4 TABLET ORAL
Status: DISCONTINUED | OUTPATIENT
Start: 2023-12-22 | End: 2023-12-22

## 2023-12-22 RX ORDER — ATROPINE SULFATE 0.1 MG/ML
0.5 INJECTION INTRAVENOUS
Status: ACTIVE | OUTPATIENT
Start: 2023-12-22 | End: 2023-12-23

## 2023-12-22 RX ORDER — HEPARIN SODIUM 1000 [USP'U]/ML
INJECTION, SOLUTION INTRAVENOUS; SUBCUTANEOUS
Status: DISCONTINUED | OUTPATIENT
Start: 2023-12-22 | End: 2023-12-22 | Stop reason: HOSPADM

## 2023-12-22 RX ORDER — ACETAMINOPHEN 325 MG/1
650 TABLET ORAL EVERY 4 HOURS PRN
Status: DISCONTINUED | OUTPATIENT
Start: 2023-12-22 | End: 2023-12-23 | Stop reason: HOSPADM

## 2023-12-22 RX ORDER — NALOXONE HYDROCHLORIDE 0.4 MG/ML
0.2 INJECTION, SOLUTION INTRAMUSCULAR; INTRAVENOUS; SUBCUTANEOUS
Status: ACTIVE | OUTPATIENT
Start: 2023-12-22 | End: 2023-12-23

## 2023-12-22 RX ORDER — OXYCODONE HYDROCHLORIDE 5 MG/1
5 TABLET ORAL EVERY 4 HOURS PRN
Status: DISCONTINUED | OUTPATIENT
Start: 2023-12-22 | End: 2023-12-23 | Stop reason: HOSPADM

## 2023-12-22 RX ORDER — LIDOCAINE 40 MG/G
CREAM TOPICAL
Status: DISCONTINUED | OUTPATIENT
Start: 2023-12-22 | End: 2023-12-22 | Stop reason: HOSPADM

## 2023-12-22 RX ORDER — FENTANYL CITRATE 50 UG/ML
25 INJECTION, SOLUTION INTRAMUSCULAR; INTRAVENOUS
Status: DISCONTINUED | OUTPATIENT
Start: 2023-12-22 | End: 2023-12-22

## 2023-12-22 RX ORDER — HYDRALAZINE HYDROCHLORIDE 20 MG/ML
10 INJECTION INTRAMUSCULAR; INTRAVENOUS
Status: DISCONTINUED | OUTPATIENT
Start: 2023-12-22 | End: 2023-12-23 | Stop reason: HOSPADM

## 2023-12-22 RX ORDER — IODIXANOL 320 MG/ML
INJECTION, SOLUTION INTRAVASCULAR
Status: DISCONTINUED | OUTPATIENT
Start: 2023-12-22 | End: 2023-12-22 | Stop reason: HOSPADM

## 2023-12-22 RX ORDER — OXYCODONE HYDROCHLORIDE 5 MG/1
10 TABLET ORAL EVERY 4 HOURS PRN
Status: DISCONTINUED | OUTPATIENT
Start: 2023-12-22 | End: 2023-12-23 | Stop reason: HOSPADM

## 2023-12-22 RX ORDER — ASPIRIN 325 MG
325 TABLET ORAL ONCE
Status: COMPLETED | OUTPATIENT
Start: 2023-12-22 | End: 2023-12-22

## 2023-12-22 RX ORDER — NALOXONE HYDROCHLORIDE 0.4 MG/ML
0.4 INJECTION, SOLUTION INTRAMUSCULAR; INTRAVENOUS; SUBCUTANEOUS
Status: ACTIVE | OUTPATIENT
Start: 2023-12-22 | End: 2023-12-23

## 2023-12-22 RX ORDER — HEPARIN SODIUM 200 [USP'U]/100ML
INJECTION, SOLUTION INTRAVENOUS
Status: DISCONTINUED | OUTPATIENT
Start: 2023-12-22 | End: 2023-12-22 | Stop reason: HOSPADM

## 2023-12-22 RX ORDER — ASPIRIN 81 MG/1
243 TABLET, CHEWABLE ORAL ONCE
Status: COMPLETED | OUTPATIENT
Start: 2023-12-22 | End: 2023-12-22

## 2023-12-22 RX ORDER — SODIUM CHLORIDE 9 MG/ML
75 INJECTION, SOLUTION INTRAVENOUS CONTINUOUS
Status: ACTIVE | OUTPATIENT
Start: 2023-12-22 | End: 2023-12-22

## 2023-12-22 RX ORDER — SODIUM CHLORIDE 9 MG/ML
INJECTION, SOLUTION INTRAVENOUS CONTINUOUS
Status: DISCONTINUED | OUTPATIENT
Start: 2023-12-22 | End: 2023-12-22 | Stop reason: HOSPADM

## 2023-12-22 RX ORDER — FENTANYL CITRATE 50 UG/ML
INJECTION, SOLUTION INTRAMUSCULAR; INTRAVENOUS
Status: DISCONTINUED | OUTPATIENT
Start: 2023-12-22 | End: 2023-12-22 | Stop reason: HOSPADM

## 2023-12-22 RX ORDER — FLUMAZENIL 0.1 MG/ML
0.2 INJECTION, SOLUTION INTRAVENOUS
Status: ACTIVE | OUTPATIENT
Start: 2023-12-22 | End: 2023-12-23

## 2023-12-22 RX ORDER — MULTIPLE VITAMINS W/ MINERALS TAB 9MG-400MCG
1 TAB ORAL DAILY
Status: DISCONTINUED | OUTPATIENT
Start: 2023-12-23 | End: 2023-12-23 | Stop reason: HOSPADM

## 2023-12-22 RX ORDER — DIAZEPAM 5 MG
5 TABLET ORAL
Status: DISCONTINUED | OUTPATIENT
Start: 2023-12-22 | End: 2023-12-22

## 2023-12-22 RX ADMIN — PRAMIPEXOLE DIHYDROCHLORIDE 0.5 MG: 0.5 TABLET ORAL at 21:27

## 2023-12-22 RX ADMIN — CARVEDILOL 3.12 MG: 3.12 TABLET, FILM COATED ORAL at 18:04

## 2023-12-22 RX ADMIN — Medication 81 MG: at 08:13

## 2023-12-22 RX ADMIN — METFORMIN HYDROCHLORIDE 1000 MG: 500 TABLET, FILM COATED ORAL at 08:13

## 2023-12-22 RX ADMIN — INSULIN ASPART 1 UNITS: 100 INJECTION, SOLUTION INTRAVENOUS; SUBCUTANEOUS at 13:45

## 2023-12-22 RX ADMIN — PRAMIPEXOLE DIHYDROCHLORIDE 0.5 MG: 0.5 TABLET ORAL at 08:13

## 2023-12-22 RX ADMIN — SODIUM CHLORIDE 75 ML/HR: 9 INJECTION, SOLUTION INTRAVENOUS at 16:22

## 2023-12-22 RX ADMIN — INSULIN ASPART 3 UNITS: 100 INJECTION, SOLUTION INTRAVENOUS; SUBCUTANEOUS at 17:04

## 2023-12-22 RX ADMIN — TAMSULOSIN HYDROCHLORIDE 0.4 MG: 0.4 CAPSULE ORAL at 08:13

## 2023-12-22 RX ADMIN — FUROSEMIDE 60 MG: 10 INJECTION, SOLUTION INTRAMUSCULAR; INTRAVENOUS at 06:21

## 2023-12-22 RX ADMIN — CARVEDILOL 3.12 MG: 3.12 TABLET, FILM COATED ORAL at 08:13

## 2023-12-22 RX ADMIN — ASPIRIN 243 MG: 81 TABLET, CHEWABLE ORAL at 13:46

## 2023-12-22 RX ADMIN — SPIRONOLACTONE 25 MG: 25 TABLET, FILM COATED ORAL at 08:13

## 2023-12-22 RX ADMIN — INSULIN ASPART 2 UNITS: 100 INJECTION, SOLUTION INTRAVENOUS; SUBCUTANEOUS at 08:14

## 2023-12-22 RX ADMIN — GABAPENTIN 600 MG: 300 CAPSULE ORAL at 20:24

## 2023-12-22 RX ADMIN — FUROSEMIDE 60 MG: 10 INJECTION, SOLUTION INTRAMUSCULAR; INTRAVENOUS at 22:56

## 2023-12-22 RX ADMIN — FUROSEMIDE 60 MG: 10 INJECTION, SOLUTION INTRAMUSCULAR; INTRAVENOUS at 17:15

## 2023-12-22 RX ADMIN — WARFARIN SODIUM 4 MG: 2 TABLET ORAL at 20:24

## 2023-12-22 RX ADMIN — SODIUM CHLORIDE: 9 INJECTION, SOLUTION INTRAVENOUS at 13:38

## 2023-12-22 RX ADMIN — ROSUVASTATIN CALCIUM 40 MG: 40 TABLET, COATED ORAL at 20:24

## 2023-12-22 ASSESSMENT — ACTIVITIES OF DAILY LIVING (ADL)
ADLS_ACUITY_SCORE: 32
ADLS_ACUITY_SCORE: 34
ADLS_ACUITY_SCORE: 32

## 2023-12-22 NOTE — CONSULTS
"CLINICAL NUTRITION SERVICES - ASSESSMENT NOTE     Nutrition Prescription    RECOMMENDATIONS FOR MDs/PROVIDERS TO ORDER:  -Recommend mvi with minerals d/t not meeting RDIs with inadequate intake    Malnutrition Status:    Severe in acute illness    Recommendations already ordered by Registered Dietitian (RD):  -Add gel plus tid = 450 kcal, 60 g protein  -Remove caffeine restriction per pt request - ok'd by MD      Future/Additional Recommendations:  Adjust supplement pending intake, weight, tolerance, acceptance     REASON FOR ASSESSMENT  Pee Ayala is a/an 83 year old male assessed by the dietitian for Admission Nutrition Risk Screen for positive  with 2-13 lb weight loss and eating poorly d/t decreased appetite and Provider Order - Heart Failure - Dietitian to instruct patient on 2 gram sodium diet     Pt presents with acute on chronic HF  Hx DM2, CAD with CABG, nonalcoholic steatohepatitis, HTN    NUTRITION HISTORY  Pt lives with his wife and son.   Pt has had Heart healthy education 8 years ago  SOB x 1 month with worsening 1 week    Pt reports he has been eating \"small portions\" and his intake has been <50% x 2 weeks d/t poor appetite and altered taste.   He does not salt his food and felt this was all he had to do to limit his Na  Wife reports pt eats high sodium foods often: sausage, soups, turkey bologna.     Pt c/o that \"salt\" is all I can taste and now I can't have it  He has tried protein shakes in the past and does not like them.     CURRENT NUTRITION ORDERS  Diet: 2 g Sodium and Moderate Consistent Carbohydrate, 1800 ml fluid restriction    Intake/Tolerance: fair  Ate 50%of supper last night and 100% of breakfast this am at 558 kcal, 19 g protein    Pt frustrated he is limited on menu d/t diet restriction and being told the kitchen is out of other items. Reports continued poor appetite and altered taste. He is willing to try supplement    LABS  Labs reviewed  Hgb A1C 8.4 10/23  -252 mg/dl " "past 24 hours, in fair control    MEDICATIONS  Medications reviewed  Iv lasix q 8 hr, ssi,, novolog 1u: 10 g cho, metformin, crestor, , spironolactone, warfarin    ANTHROPOMETRICS  Height: 157.5 cm (5' 2\")  IBW: 53.6 kg  BMI: Overweight BMI 25-29.9  Weight History: Fluctuating with fluid balance.   Date/Time Weight Weight Method   12/22/23 0626 74 kg (163 lb 3.2 oz) Standing scale   12/21/23 1921 74.8 kg (164 lb 14.5 oz) --   12/21/23 1055 73.9 kg (163 lb) --     Wt Readings from Last 10 Encounters:   10/31/23 75.4 kg (166 lb 4.8 oz)   10/23/23 75.4 kg (166 lb 3.2 oz)   07/18/23 69.9 kg (154 lb)   04/06/23 74.3 kg (163 lb 12.8 oz)   03/21/23 77.1 kg (170 lb)   02/14/23 77.9 kg (171 lb 12.8 oz)   12/16/22 74.4 kg (164 lb)   12/01/22 77.7 kg (171 lb 6.4 oz)   08/31/22 73.9 kg (163 lb)   Wife reports pt was 200 lb 2 years ago. No significant weight loss in the past year. Wife notes pt was fluid weight up on admit and now improved    Dosing Weight: 58.7 kg adjusted weight    ASSESSED NUTRITION NEEDS  Estimated Energy Needs: 0398-5250 kcals/day (25 - 30 kcals/kg)  Justification: Maintenance  Estimated Protein Needs: 59-70 grams protein/day (1 - 1.2 grams of pro/kg)  Justification: Increased needs  Estimated Fluid Needs: 1800 mL/day (30 ml/kg)   Justification: On a fluid restriction    PHYSICAL FINDINGS  See malnutrition section below.  GI - last BM 12/20  Unable to perform NFPA-pt going down for angiogram now    MALNUTRITION:  % Weight Loss:  None noted  % Intake:  </= 50% for >/= 5 days (severe malnutrition)  Subcutaneous Fat Loss:  Unable to assess  Muscle Loss: Unable to assess  Fluid Retention:  Moderate +2-+3 LE    Malnutrition Diagnosis: Severe malnutrition  In Context of:  Acute on chronic illness or injury    NUTRITION DIAGNOSIS  Inadequate oral intake related to decreased appetite, altered taste as evidenced by pt report of eating <50% x 2 weeks      INTERVENTIONS  Implementation  -Nutrition Education: Provided " education on 2 g Na diet. Provided handout on heart failure Nutrition   -Add gel plus tid = 450 kcal, 60 g protein  -Remove caffeine restriction per pt request - ok'd by MD  -Recommend mvi with minerals d/t not meeting RDIs with inadequate intake    Goals  Meet > 75% of estimated nutrition needs       Monitoring/Evaluation  Progress toward goals will be monitored and evaluated per protocol.

## 2023-12-22 NOTE — SIGNIFICANT EVENT
"Significant Event Note    Time of event: 11:59 PM December 21, 2023    Description of event:  Called regarding new chest discomfort.    Briefly, patient is an 84 yo, hx CAD, chronic a fib, TAVR on warfarin, diastolic CHF here with an acute on chronic exacerbation currently being diuresed.    Shortly before my evaluation, he started noting some chest pressure/discomfort in relation to his breathing. He stated this was new and different from his presenting symptoms earlier today. No radiation. His vitals are normal at my evaluation.     /50 (BP Location: Left arm)   Pulse 71   Temp 98.1  F (36.7  C) (Oral)   Resp 24   Ht 1.575 m (5' 2\")   Wt 74.8 kg (164 lb 14.5 oz)   SpO2 98%   BMI 30.16 kg/m      CV: irregularly irregular rate and rhythm, rate in 50's  Lungs: crackles noted at the bases R>L  Pitting edema present in lower extremities  Extremities warm, perfused  Normal mentation, alert    EKG- shows atrial fibrillation with slow ventricular response. Some T-wave inversions V1-V3, similar to prior EKG on admission.    Troponin- elevated from prior 43 from 29  BMP, mag and VBG wnl  CXR ordered.    Paged out to cardiology. Recommend continuing to monitor for now. Cards consult is ordered. Also recommended to consider stopping warfarin tomorrow if there are any indications/plans for angiogram.      Diana King MD    "

## 2023-12-22 NOTE — PLAN OF CARE
Goal Outcome Evaluation:      Problem: Gas Exchange Impaired  Goal: Optimal Gas Exchange  Outcome: Progressing     Problem: Heart Failure  Goal: Stable Heart Rate and Rhythm  Outcome: Progressing       Patient is pleasant, cooperative, alert and oriented x 4.  Denies pain.  Tele monitoring. Chronic A-fib.  Standby assist with activity.  Voiding into urinal without difficulty.  No c/o SOB or chest pain.  Blood glucose monitored.  Sent to cardiac cath lab for angiogram.

## 2023-12-22 NOTE — PRE-PROCEDURE
GENERAL PRE-PROCEDURE:   Procedure:  Coronary angiogram with possible PCI  Date/Time:  12/22/2023 2:31 PM    Written consent obtained?: Yes    Risks and benefits: Risks, benefits and alternatives were discussed    Consent given by:  Patient  Patient states understanding of procedure being performed: Yes    Patient's understanding of procedure matches consent: Yes    Procedure consent matches procedure scheduled: Yes    Expected level of sedation:  Moderate  Appropriately NPO:  Yes  ASA Class:  4 (chest pain, EKG changes, elevated troponins, CAD; s/p CABG/PCI, permanent AF, chronic warfarin anticoagulation, HFpEF, aortic stenosis; s/p TAVR, NSVT, DM Type II)  Mallampati  :  Grade 3- soft palate visible, posterior pharyngeal wall not visible  Lungs:  Lungs clear with good breath sounds bilaterally  Heart:  A-fib and systolic murmur  History & Physical reviewed:  History and physical reviewed and updates made (see comment)  H&P Comments:  Clinically Significant Risk Factors Present on Admission    Cardiovascular : chest pain, elevated troponins, EKG changes, CAD; s/p CABG, permanent AF, chronic warfarin anticoagulation, HFpEF, NSVT, DM Type II    Fluid & Electrolyte Disorders : Not present on admission    Gastroenterology : Not present on admission    Hematology/Oncology : Not present on admission    Nephrology : Not present on admission    Neurology : Not present on admission    Pulmonology : Not present on admission    Systemic : Not present on admission    Statement of review:  I have reviewed the lab findings, diagnostic data, medications, and the plan for sedation

## 2023-12-22 NOTE — PLAN OF CARE
Problem: Pain Acute  Goal: Optimal Pain Control and Function  Outcome: Progressing     Problem: Gas Exchange Impaired  Goal: Optimal Gas Exchange  Outcome: Progressing  Intervention: Optimize Oxygenation and Ventilation  Recent Flowsheet Documentation  Taken 12/22/2023 0100 by Yolis Glez RN  Head of Bed (hospitals) Positioning: HOB at 20 degrees     Problem: Heart Failure  Goal: Optimal Functional Ability  Intervention: Optimize Functional Ability  Recent Flowsheet Documentation  Taken 12/22/2023 0100 by Yolis Glez RN  Activity Management: activity adjusted per tolerance     Problem: Heart Failure  Goal: Fluid and Electrolyte Balance  Outcome: Progressing     Problem: Heart Failure  Goal: Effective Oxygenation and Ventilation  Outcome: Progressing  Intervention: Promote Airway Secretion Clearance  Recent Flowsheet Documentation  Taken 12/22/2023 0100 by Yolis Glez RN  Cough And Deep Breathing: done independently per patient  Activity Management: activity adjusted per tolerance  Intervention: Optimize Oxygenation and Ventilation  Recent Flowsheet Documentation  Taken 12/22/2023 0100 by Yolis Glez RN  Head of Bed (hospitals) Positioning: HOB at 20 degrees   Goal Outcome Evaluation:  Pt alert and orientedx4.  Denies pain.  Denies SOB at rest but does experience shortness of breath with movement or talking a lot.  Lung sounds diminished.  Primofit in place for measuring urine output.  VSS.  No further c/o chest discomfort. Tele-chronic a-fib.

## 2023-12-22 NOTE — PROGRESS NOTES
Pt transferred from [facility], RN brought down belongings, but did not have black cane. Pt's wife came up from [facility] and said she did not have the cane.

## 2023-12-22 NOTE — PROGRESS NOTES
"    Cardiology Progress Note    Assessment:  Acute on chronic heart failure with preserved ejection fraction, improved volume status  Chest pain with new T wave inversion in anterior leads and minor troponin elevation suspicious for anterior ischemia/small non-ST segment elevation myocardial infarction  Coronary artery disease with history of CABG  Nonsustained VT  Permanent atrial fibrillation on chronic warfarin controlled ventricular response  Diabetes mellitus    Plan:  Continue diuresis  Coronary angiogram and possible intervention.  Discussed with interventional cardiologist.  Will use fresh frozen plasma to reverse anticoagulation prior to angiography.  I discussed procedure with the patient and he agrees to proceed      Subjective:   Had chest pain last night.  He was also noted to have brief runs of nonsustained VT, feels better this morning.  Denies chest pain.  Less short of breath    Objective:   /59 (BP Location: Left arm)   Pulse 70   Temp 97.6  F (36.4  C) (Oral)   Resp 16   Ht 1.575 m (5' 2\")   Wt 74 kg (163 lb 3.2 oz)   SpO2 97%   BMI 29.85 kg/m      Intake/Output Summary (Last 24 hours) at 12/22/2023 0939  Last data filed at 12/22/2023 0830  Gross per 24 hour   Intake 718 ml   Output 6400 ml   Net -5682 ml         Physical Exam:  GENERAL: no distress  NECK: No JVD  LUNGS: Clear to auscultation.  CARDIAC:irregular  rhythm, S1 & S2 normal.  No heaves, thrills, gallops soft ejection murmur at the aortic area  ABDOMEN: flat, negative hepatosplenomegaly, soft and non-tender.  EXTREMITIES: No evidence of cyanosis, clubbing 1+ edema.    Current Facility-Administered Medications Ordered in Epic   Medication Dose Route Frequency Provider Last Rate Last Admin     acetaminophen (TYLENOL) tablet 650 mg  650 mg Oral Q4H PRN Tee Morris DO   650 mg at 12/21/23 1921    Or     acetaminophen (TYLENOL) Suppository 650 mg  650 mg Rectal Q4H PRN Tee Morris DO         aspirin EC tablet 81 " mg  81 mg Oral Daily Tee Morris DO   81 mg at 12/22/23 0813     calcium carbonate (TUMS) chewable tablet 1,000 mg  1,000 mg Oral 4x Daily PRN Tee Morris DO         carvedilol (COREG) tablet 3.125 mg  3.125 mg Oral BID w/meals Tavo Blackman MD   3.125 mg at 12/22/23 0813     glucose gel 15-30 g  15-30 g Oral Q15 Min PRN Tee Morris DO        Or     dextrose 50 % injection 25-50 mL  25-50 mL Intravenous Q15 Min PRN Tee Morris DO        Or     glucagon injection 1 mg  1 mg Subcutaneous Q15 Min PRN Tee Morris DO         diazepam (VALIUM) tablet 5 mg  5 mg Oral Once PRN Tavo Blackman MD         furosemide (LASIX) injection 60 mg  60 mg Intravenous Q8H Tee Morris DO   60 mg at 12/22/23 0621     gabapentin (NEURONTIN) capsule 600 mg  600 mg Oral QPM Tee Morris DO   600 mg at 12/21/23 1922     hydrALAZINE (APRESOLINE) injection 10 mg  10 mg Intravenous Q4H PRN Tee Morris DO   10 mg at 12/21/23 1411     insulin aspart (NovoLOG) injection (RAPID ACTING)  1-10 Units Subcutaneous TID AC Tee Morris DO   2 Units at 12/22/23 0814     insulin aspart (NovoLOG) injection (RAPID ACTING)  1-7 Units Subcutaneous At Bedtime Tee Morris DO         insulin aspart (NovoLOG) injection (RAPID ACTING)   Subcutaneous TID AC Tee Morris DO   3 Units at 12/21/23 1840     melatonin tablet 3 mg  3 mg Oral At Bedtime PRN Tee Morris DO         metFORMIN (GLUCOPHAGE) tablet 1,000 mg  1,000 mg Oral BID w/meals Tee Morris DO   1,000 mg at 12/22/23 0813     ondansetron (ZOFRAN ODT) ODT tab 4 mg  4 mg Oral Q6H PRN Tee Morris DO        Or     ondansetron (ZOFRAN) injection 4 mg  4 mg Intravenous Q6H PRN Tee Morris DO         Patient is already receiving anticoagulation with heparin, enoxaparin (LOVENOX), warfarin (COUMADIN)  or other anticoagulant medication   Does not apply Continuous PRTee Mondragon DO          polyethylene glycol (MIRALAX) Packet 17 g  17 g Oral BID PRN Tee Morris DO         pramipexole (MIRAPEX) tablet 0.5 mg  0.5 mg Oral BID Tee Morris DO   0.5 mg at 12/22/23 0813     prochlorperazine (COMPAZINE) injection 5 mg  5 mg Intravenous Q6H PRN Tee Morris DO        Or     prochlorperazine (COMPAZINE) tablet 5 mg  5 mg Oral Q6H PRN Tee Morris DO        Or     prochlorperazine (COMPAZINE) suppository 12.5 mg  12.5 mg Rectal Q12H PRN Tee Morris DO         rosuvastatin (CRESTOR) tablet 40 mg  40 mg Oral QPM Tee Morris DO   40 mg at 12/21/23 1922     senna-docusate (SENOKOT-S/PERICOLACE) 8.6-50 MG per tablet 1 tablet  1 tablet Oral BID PRN Tee Morris DO        Or     senna-docusate (SENOKOT-S/PERICOLACE) 8.6-50 MG per tablet 2 tablet  2 tablet Oral BID PRN Tee Morris DO         spironolactone (ALDACTONE) tablet 25 mg  25 mg Oral Daily Tavo Blackman MD   25 mg at 12/22/23 0813     tamsulosin (FLOMAX) capsule 0.4 mg  0.4 mg Oral QAM Tee Morris DO   0.4 mg at 12/22/23 0813     warfarin ANTICOAGULANT (COUMADIN) tablet 4 mg  4 mg Oral ONCE at 18:00 Tee Morris DO         Warfarin Dose Required Daily - Pharmacist Managed  1 each Oral See Admin Instructions Tee Morris DO         No current [facility]-ordered outpatient medications on file.       Cardiographics:    Telemetry: A-fib 6 beats of nonsustained VT  ECG: Atrial fibrillation deep symmetrical T wave inversion in the right precordial leads, new  Echocardiogram: Normal V function normal function of bioprosthesis no segmental wall motion abnormalities    Coronary angiogram: January 2021 before TAVR  LM mild dz  LAD prox 70%; mid 80%; diagonal prox 60-70%, patent LIMA   Circ severe dz in prox/mid with patent SVG to OM  RCA prox stent patent severe dz in mid (no change from 2019)     LVEDP 19mmHg pull back did not record gradient     Lab Results    Chemistry/lipid CBC  Cardiac Enzymes/BNP/TSH/INR   Recent Labs   Lab Test 04/06/23  1625   CHOL 136   HDL 61   LDL 58   TRIG 85     Recent Labs   Lab Test 04/06/23  1625 01/25/22  1443 09/16/21  1026   LDL 58 65 48     Recent Labs   Lab Test 12/22/23  0755 12/22/23  0528   NA  --  139   POTASSIUM  --  3.8   CHLORIDE  --  100   CO2  --  31*   * 188*   BUN  --  19.2   CR  --  1.05   GFRESTIMATED  --  71   RACHEL  --  9.2     Recent Labs   Lab Test 12/22/23  0528 12/21/23  2227 12/21/23  1103   CR 1.05 0.91 0.81     Recent Labs   Lab Test 10/23/23  1020 07/18/23  1025 03/14/23  1126   A1C 8.4* 9.8* 8.6*          Recent Labs   Lab Test 12/21/23  1103   WBC 6.1   HGB 11.6*   HCT 37.5*   *        Recent Labs   Lab Test 12/21/23  1103 04/06/23  1625 07/18/22  0636   HGB 11.6* 12.4* 10.8*    Recent Labs   Lab Test 07/04/22  1034   TROPONINI 0.15     Recent Labs   Lab Test 12/21/23  1103 07/04/22  1034 01/08/21  0842 10/21/20  1451 05/09/19  1657 06/09/16  1203   BNP  --  321* 91* 56   < >  --    NTBNPI 2,987*  --   --   --   --   --    NTBNP  --   --   --   --   --  354*    < > = values in this interval not displayed.     Recent Labs   Lab Test 11/17/22  1315   TSH 0.92     Recent Labs   Lab Test 12/22/23  0528 12/21/23  1103 10/23/23  1020   INR 2.12* 2.19* 2.0*

## 2023-12-22 NOTE — PROGRESS NOTES
[facility]    Medicine Progress Note - Hospitalist Service    Date of Admission:  12/21/2023    Assessment & Plan   83 year old male with history of CAD, chronic A-fib, chronic diastolic CHF, aortic stenosis status post TAVR, DM2, hypertension and nonalcoholic steatohepatitis admitted on 12/21/2023 for acute on chronic diastolic CHF exacerbation.        Acute on chronic diastolic CHF exacerbation:  In setting of medication noncompliance  Presented with dyspnea on phonation  BNP elevated from previous in the setting of clinical weight gain, TOBAR and orthopnea.   Chest x-ray with blunting of the left costophrenic angle and linear atelectasis versus scarring which is stable.  Right lung clear.  Had chest pain overnight and noted to have new TWI in anterior leads with minor troponin elevation raising concern for new ischemia  TTE with preserved EF  -- continue current lasix and spironolactone  -- Cardiology consulted and recommend ischemic eval, planned for 12/22. Plan to reverse warfarin for procedure with FFP  -- Cardiology has reduced home Coreg dose to 3.125 twice daily          History of CAD:  Has new TWI and mild troponin leak prompting need for ischemic eval as above  -- Continue home aspirin, statin  -- plan [facility] 12/22  -- reverse warfarin for procedure        History of chronic A-fib:  -- Defer Coreg adjustment to cardiology  -- plan to resume warfarin this evening pending further cardiology recommendations        DM2:  -- Hold home metformin, increase intensity sliding scale insulin and continue mealtime carb counting insulin for now. Monitor for need to start lantus while inpatient     History of severe aortic stenosis: Status post TAVR        BPH: Continue home Flomax       Chronic leg and hand tremor:  --Continue Mirapex        Severe Protein-Calorie Malnutrition   Diet: Combination Diet Moderate Consistent Carb (60 g CHO per Meal) Diet; 2 gm NA Diet; No Caffeine Diet (and additional  "linked orders)  Fluid restriction 1800 ML FLUID (and additional linked orders)  NPO for Medical/Clinical Reasons Except for: Meds    DVT Prophylaxis: Warfarin  Silva Catheter: Not present  Lines: None     Cardiac Monitoring: ACTIVE order. Indication: Acute decompensated heart failure (48 hours)  Code Status: Full Code      Clinically Significant Risk Factors                  # Hypertension: Noted on problem list  # Acute heart failure with preserved ejection fraction: heart failure noted on problem list, last echo with EF >50%, and receiving IV diuretics      # DMII: A1C = 8.4 % (Ref range: 0.0 - 5.6 %) within past 6 months, PRESENT ON ADMISSION  # Overweight: Estimated body mass index is 29.85 kg/m  as calculated from the following:    Height as of this encounter: 1.575 m (5' 2\").    Weight as of this encounter: 74 kg (163 lb 3.2 oz)., PRESENT ON ADMISSION      # History of CABG: noted on surgical history       Disposition Plan      Expected Discharge Date: 12/24/2023    Discharge Delays: IV Medication - consider oral or Home Infusion  PT Disposition recs needed  OT Disposition recs needed              Tee Morris DO  Hospitalist Service  [facility]  Securely message with [employer] (more info)  Text page via [employer] Paging/Directory   ______________________________________________________________________    Interval History   NAD. Denies any complaints    Physical Exam   Vital Signs: Temp: 98  F (36.7  C) Temp src: Oral BP: 106/57 Pulse: 71   Resp: 16 SpO2: 96 % O2 Device: None (Room air)    Weight: 163 lbs 3.2 oz  General: NAD  RESPIRATORY: Breathing nonlabored  CARDIOVASCULAR: 1+ le edema bilat.   NEUROLOGIC: Motor intact, alert         Medical Decision Making       >45 MINUTES SPENT BY ME on the date of service doing chart review, history, exam, documentation & further activities per the note.      Data       "

## 2023-12-22 NOTE — PROGRESS NOTES
Plasma infusing.  Groin clipped.  Aspirin administered per orders.  BP checked bilaterally.  See flowsheets.  Wife at bedside and will accompany patient to cath lab.

## 2023-12-23 ENCOUNTER — APPOINTMENT (OUTPATIENT)
Dept: OCCUPATIONAL THERAPY | Facility: HOSPITAL | Age: 83
DRG: 286 | End: 2023-12-23
Attending: INTERNAL MEDICINE
Payer: COMMERCIAL

## 2023-12-23 ENCOUNTER — APPOINTMENT (OUTPATIENT)
Dept: PHYSICAL THERAPY | Facility: HOSPITAL | Age: 83
DRG: 286 | End: 2023-12-23
Attending: INTERNAL MEDICINE
Payer: COMMERCIAL

## 2023-12-23 VITALS
TEMPERATURE: 97.6 F | WEIGHT: 157.6 LBS | HEART RATE: 68 BPM | HEIGHT: 62 IN | SYSTOLIC BLOOD PRESSURE: 115 MMHG | OXYGEN SATURATION: 94 % | DIASTOLIC BLOOD PRESSURE: 61 MMHG | BODY MASS INDEX: 29 KG/M2 | RESPIRATION RATE: 20 BRPM

## 2023-12-23 LAB
ANION GAP SERPL CALCULATED.3IONS-SCNC: 10 MMOL/L (ref 7–15)
BUN SERPL-MCNC: 24.7 MG/DL (ref 8–23)
CALCIUM SERPL-MCNC: 9.5 MG/DL (ref 8.8–10.2)
CHLORIDE SERPL-SCNC: 98 MMOL/L (ref 98–107)
CREAT SERPL-MCNC: 1.08 MG/DL (ref 0.67–1.17)
DEPRECATED HCO3 PLAS-SCNC: 29 MMOL/L (ref 22–29)
EGFRCR SERPLBLD CKD-EPI 2021: 69 ML/MIN/1.73M2
GLUCOSE BLDC GLUCOMTR-MCNC: 213 MG/DL (ref 70–99)
GLUCOSE BLDC GLUCOMTR-MCNC: 348 MG/DL (ref 70–99)
GLUCOSE SERPL-MCNC: 206 MG/DL (ref 70–99)
INR PPP: 2 (ref 0.85–1.15)
MAGNESIUM SERPL-MCNC: 2 MG/DL (ref 1.7–2.3)
POTASSIUM SERPL-SCNC: 3.6 MMOL/L (ref 3.4–5.3)
SODIUM SERPL-SCNC: 137 MMOL/L (ref 135–145)

## 2023-12-23 PROCEDURE — 250N000013 HC RX MED GY IP 250 OP 250 PS 637: Performed by: INTERNAL MEDICINE

## 2023-12-23 PROCEDURE — 85610 PROTHROMBIN TIME: CPT | Performed by: INTERNAL MEDICINE

## 2023-12-23 PROCEDURE — 250N000011 HC RX IP 250 OP 636: Mod: JZ | Performed by: INTERNAL MEDICINE

## 2023-12-23 PROCEDURE — 97165 OT EVAL LOW COMPLEX 30 MIN: CPT | Mod: GO

## 2023-12-23 PROCEDURE — 83735 ASSAY OF MAGNESIUM: CPT | Performed by: INTERNAL MEDICINE

## 2023-12-23 PROCEDURE — 99232 SBSQ HOSP IP/OBS MODERATE 35: CPT | Performed by: INTERNAL MEDICINE

## 2023-12-23 PROCEDURE — 250N000012 HC RX MED GY IP 250 OP 636 PS 637: Performed by: INTERNAL MEDICINE

## 2023-12-23 PROCEDURE — 97161 PT EVAL LOW COMPLEX 20 MIN: CPT | Mod: GP

## 2023-12-23 PROCEDURE — 80048 BASIC METABOLIC PNL TOTAL CA: CPT | Performed by: INTERNAL MEDICINE

## 2023-12-23 PROCEDURE — 97110 THERAPEUTIC EXERCISES: CPT | Mod: GO

## 2023-12-23 PROCEDURE — 97535 SELF CARE MNGMENT TRAINING: CPT | Mod: GO

## 2023-12-23 PROCEDURE — 97535 SELF CARE MNGMENT TRAINING: CPT | Mod: GP

## 2023-12-23 PROCEDURE — 99239 HOSP IP/OBS DSCHRG MGMT >30: CPT | Performed by: INTERNAL MEDICINE

## 2023-12-23 PROCEDURE — 36415 COLL VENOUS BLD VENIPUNCTURE: CPT | Performed by: INTERNAL MEDICINE

## 2023-12-23 RX ORDER — FUROSEMIDE 40 MG
40 TABLET ORAL
Qty: 60 TABLET | Refills: 1 | Status: SHIPPED | OUTPATIENT
Start: 2023-12-23 | End: 2024-01-17

## 2023-12-23 RX ORDER — SPIRONOLACTONE 25 MG/1
25 TABLET ORAL DAILY
Qty: 30 TABLET | Refills: 1 | Status: SHIPPED | OUTPATIENT
Start: 2023-12-24 | End: 2024-01-17

## 2023-12-23 RX ORDER — FUROSEMIDE 20 MG
40 TABLET ORAL
Status: DISCONTINUED | OUTPATIENT
Start: 2023-12-23 | End: 2023-12-23 | Stop reason: HOSPADM

## 2023-12-23 RX ORDER — WARFARIN SODIUM 5 MG/1
5 TABLET ORAL
Status: DISCONTINUED | OUTPATIENT
Start: 2023-12-23 | End: 2023-12-23 | Stop reason: HOSPADM

## 2023-12-23 RX ORDER — CARVEDILOL 3.12 MG/1
6.25 TABLET ORAL 2 TIMES DAILY WITH MEALS
Status: DISCONTINUED | OUTPATIENT
Start: 2023-12-23 | End: 2023-12-23 | Stop reason: HOSPADM

## 2023-12-23 RX ADMIN — TAMSULOSIN HYDROCHLORIDE 0.4 MG: 0.4 CAPSULE ORAL at 08:40

## 2023-12-23 RX ADMIN — CARVEDILOL 3.12 MG: 3.12 TABLET, FILM COATED ORAL at 08:40

## 2023-12-23 RX ADMIN — PRAMIPEXOLE DIHYDROCHLORIDE 0.5 MG: 0.5 TABLET ORAL at 08:39

## 2023-12-23 RX ADMIN — INSULIN ASPART 3 UNITS: 100 INJECTION, SOLUTION INTRAVENOUS; SUBCUTANEOUS at 09:42

## 2023-12-23 RX ADMIN — FUROSEMIDE 60 MG: 10 INJECTION, SOLUTION INTRAMUSCULAR; INTRAVENOUS at 06:32

## 2023-12-23 RX ADMIN — POLYETHYLENE GLYCOL 3350 17 G: 17 POWDER, FOR SOLUTION ORAL at 08:40

## 2023-12-23 RX ADMIN — Medication 81 MG: at 08:39

## 2023-12-23 RX ADMIN — MULTIPLE VITAMINS W/ MINERALS TAB 1 TABLET: TAB at 08:40

## 2023-12-23 RX ADMIN — SPIRONOLACTONE 25 MG: 25 TABLET, FILM COATED ORAL at 08:40

## 2023-12-23 RX ADMIN — INSULIN ASPART 9 UNITS: 100 INJECTION, SOLUTION INTRAVENOUS; SUBCUTANEOUS at 11:21

## 2023-12-23 ASSESSMENT — ACTIVITIES OF DAILY LIVING (ADL)
ADLS_ACUITY_SCORE: 31
ADLS_ACUITY_SCORE: 31
ADLS_ACUITY_SCORE: 32
ADLS_ACUITY_SCORE: 32
ADLS_ACUITY_SCORE: 31
ADLS_ACUITY_SCORE: 31
PREVIOUS_RESPONSIBILITIES: MEAL PREP;YARDWORK;MEDICATION MANAGEMENT;FINANCES;DRIVING
ADLS_ACUITY_SCORE: 32

## 2023-12-23 NOTE — PROGRESS NOTES
"    Cardiology Progress Note    Assessment:  Acute on chronic heart failure with preserved ejection fraction, improved volume status/significant weight loss  Coronary artery disease with history of CABG, patent grafts with diffuse native disease-no targets for intervention  Permanent atrial fibrillation on chronic warfarin /controlled ventricular response  Diabetes mellitus    Plan:  Change IV furosemide to p.o.  Continue spironolactone after discharge  Resume previous dose of carvedilol    Ambulate in the jameson if no significant shortness of breath can be discharged home later today    Follow-up with CHF nurse practitioner has been scheduled    Should follow-up with primary in 1 week to check electrolytes      Subjective:   Less short of breath, denies chest pains  Underwent coronary angiogram yesterday    Objective:   /58 (BP Location: Right arm)   Pulse 68   Temp 97.7  F (36.5  C) (Oral)   Resp 20   Ht 1.575 m (5' 2\")   Wt 71.5 kg (157 lb 9.6 oz)   SpO2 92%   BMI 28.83 kg/m      Intake/Output Summary (Last 24 hours) at 12/22/2023 0939  Last data filed at 12/22/2023 0830  Gross per 24 hour   Intake 718 ml   Output 6400 ml   Net -5682 ml         Physical Exam:  GENERAL: no distress  NECK: No JVD  LUNGS: Clear to auscultation.  CARDIAC:irregular  rhythm, S1 & S2 normal.  No heaves, thrills, gallops soft ejection murmur at the aortic area  ABDOMEN: flat, negative hepatosplenomegaly, soft and non-tender.  EXTREMITIES: No evidence of cyanosis, clubbing 1+ edema.    Current Facility-Administered Medications Ordered in Epic   Medication Dose Route Frequency Provider Last Rate Last Admin     acetaminophen (TYLENOL) tablet 650 mg  650 mg Oral Q4H PRN Tee Morris DO   650 mg at 12/21/23 1921    Or     acetaminophen (TYLENOL) Suppository 650 mg  650 mg Rectal Q4H PRN Tee Morris DO         acetaminophen (TYLENOL) tablet 650 mg  650 mg Oral Q4H PRN Bahman Lenz MD         aspirin EC tablet 81 " mg  81 mg Oral Daily Tee Morris DO   81 mg at 12/23/23 0839     calcium carbonate (TUMS) chewable tablet 1,000 mg  1,000 mg Oral 4x Daily PRN Tee Morris DO         carvedilol (COREG) tablet 3.125 mg  3.125 mg Oral BID w/meals Tavo Blackman MD   3.125 mg at 12/23/23 0840     glucose gel 15-30 g  15-30 g Oral Q15 Min PRN Tee Morris DO        Or     dextrose 50 % injection 25-50 mL  25-50 mL Intravenous Q15 Min PRN Tee Morris DO        Or     glucagon injection 1 mg  1 mg Subcutaneous Q15 Min PRN Tee Morris DO         furosemide (LASIX) injection 60 mg  60 mg Intravenous Q8H Tee Morris DO   60 mg at 12/23/23 0632     gabapentin (NEURONTIN) capsule 600 mg  600 mg Oral QPM Tee Morris DO   600 mg at 12/22/23 2024     HOLD:  Metformin and metformin containing medications if patient received IV contrast with acute kidney injury or severe chronic kidney disease (stage IV or stage V; i.e., eGFR less than 30)   Does not apply HOLD Bahman Lenz MD         hydrALAZINE (APRESOLINE) injection 10 mg  10 mg Intravenous Once PRN Bahman Lenz MD         hydrALAZINE (APRESOLINE) injection 10 mg  10 mg Intravenous Q4H PRN Tee Morris DO   10 mg at 12/21/23 1411     insulin aspart (NovoLOG) injection (RAPID ACTING)  1-10 Units Subcutaneous TID AC Tee Morris DO   3 Units at 12/22/23 1704     insulin aspart (NovoLOG) injection (RAPID ACTING)  1-7 Units Subcutaneous At Bedtime Tee Morris DO         insulin aspart (NovoLOG) injection (RAPID ACTING)   Subcutaneous TID AC Tee Morris DO   5 Units at 12/22/23 1711     melatonin tablet 3 mg  3 mg Oral At Bedtime PRN Tee Morris DO         [Held by provider] metFORMIN (GLUCOPHAGE) tablet 1,000 mg  1,000 mg Oral BID w/meals Tee Morris DO   1,000 mg at 12/22/23 0813     multivitamin w/minerals (THERA-VIT-M) tablet 1 tablet  1 tablet Oral Daily Tee Morris, DO   1  tablet at 12/23/23 0840     ondansetron (ZOFRAN ODT) ODT tab 4 mg  4 mg Oral Q6H PRN Tee Morris DO        Or     ondansetron (ZOFRAN) injection 4 mg  4 mg Intravenous Q6H PRN Tee Morris DO         oxyCODONE (ROXICODONE) tablet 5 mg  5 mg Oral Q4H PRN Bahman Lenz MD        Or     oxyCODONE (ROXICODONE) tablet 10 mg  10 mg Oral Q4H PRN Bahman Lenz MD         Patient is already receiving anticoagulation with heparin, enoxaparin (LOVENOX), warfarin (COUMADIN)  or other anticoagulant medication   Does not apply Continuous PRN Tee Morris DO         polyethylene glycol (MIRALAX) Packet 17 g  17 g Oral BID PRN Tee Morris DO   17 g at 12/23/23 0840     pramipexole (MIRAPEX) tablet 0.5 mg  0.5 mg Oral BID Tee Morris DO   0.5 mg at 12/23/23 0839     prochlorperazine (COMPAZINE) injection 5 mg  5 mg Intravenous Q6H PRN Tee Morris DO        Or     prochlorperazine (COMPAZINE) tablet 5 mg  5 mg Oral Q6H PRN Tee Morris DO        Or     prochlorperazine (COMPAZINE) suppository 12.5 mg  12.5 mg Rectal Q12H PRN Tee Morris DO         rosuvastatin (CRESTOR) tablet 40 mg  40 mg Oral QPM Tee Morris DO   40 mg at 12/22/23 2024     senna-docusate (SENOKOT-S/PERICOLACE) 8.6-50 MG per tablet 1 tablet  1 tablet Oral BID PRN Tee Morris DO        Or     senna-docusate (SENOKOT-S/PERICOLACE) 8.6-50 MG per tablet 2 tablet  2 tablet Oral BID PRN Tee Morris DO         spironolactone (ALDACTONE) tablet 25 mg  25 mg Oral Daily Tavo Blackman MD   25 mg at 12/23/23 0840     tamsulosin (FLOMAX) capsule 0.4 mg  0.4 mg Oral QAM Tee Morris DO   0.4 mg at 12/23/23 0840     warfarin ANTICOAGULANT (COUMADIN) tablet 5 mg  5 mg Oral ONCE at 18:00 Tee Morris DO         Warfarin Dose Required Daily - Pharmacist Managed  1 each Oral See Admin Instructions Tee Morris DO         No current [facility]-ordered outpatient medications  on file.       Cardiographics:    Telemetry: A-fib 6 beats of nonsustained VT  ECG: Atrial fibrillation deep symmetrical T wave inversion in the right precordial leads, new  Echocardiogram: Normal V function normal function of bioprosthesis no segmental wall motion abnormalities    Coronary angiogram: January 2021 before TAVR  LM mild dz  LAD prox 70%; mid 80%; diagonal prox 60-70%, patent LIMA   Circ severe dz in prox/mid with patent SVG to OM  RCA prox stent patent severe dz in mid (no change from 2019)     LVEDP 19mmHg pull back did not record gradient    12/22/2023  Severe multivessel coronary artery disease with chronic total occlusion of the left circumflex, mid left anterior descending artery, and mid right coronary artery.  Patent LIMA to the LAD with moderate diffuse disease of the native vessel beyond the anastomosis.  Patent vein graft to the OM1 branch with mild diffuse disease of the native vessel beyond the anastomosis.  Moderate diseased vein graft to the OM 2 branch with small, diffusely diseased native vessel beyond the anastomosis.   Lab Results    Chemistry/lipid CBC Cardiac Enzymes/BNP/TSH/INR   Recent Labs   Lab Test 04/06/23  1625   CHOL 136   HDL 61   LDL 58   TRIG 85     Recent Labs   Lab Test 04/06/23  1625 01/25/22  1443 09/16/21  1026   LDL 58 65 48     Recent Labs   Lab Test 12/22/23  0755 12/22/23  0528   NA  --  139   POTASSIUM  --  3.8   CHLORIDE  --  100   CO2  --  31*   * 188*   BUN  --  19.2   CR  --  1.05   GFRESTIMATED  --  71   RACHEL  --  9.2     Recent Labs   Lab Test 12/22/23  0528 12/21/23  2227 12/21/23  1103   CR 1.05 0.91 0.81     Recent Labs   Lab Test 10/23/23  1020 07/18/23  1025 03/14/23  1126   A1C 8.4* 9.8* 8.6*          Recent Labs   Lab Test 12/21/23  1103   WBC 6.1   HGB 11.6*   HCT 37.5*   *        Recent Labs   Lab Test 12/21/23  1103 04/06/23  1625 07/18/22  0636   HGB 11.6* 12.4* 10.8*    Recent Labs   Lab Test 07/04/22  1034   TROPONINI 0.15      Recent Labs   Lab Test 12/21/23  1103 07/04/22  1034 01/08/21  0842 10/21/20  1451 05/09/19  1657 06/09/16  1203   BNP  --  321* 91* 56   < >  --    NTBNPI 2,987*  --   --   --   --   --    NTBNP  --   --   --   --   --  354*    < > = values in this interval not displayed.     Recent Labs   Lab Test 11/17/22  1315   TSH 0.92     Recent Labs   Lab Test 12/22/23  0528 12/21/23  1103 10/23/23  1020   INR 2.12* 2.19* 2.0*

## 2023-12-23 NOTE — PROGRESS NOTES
Care Management Discharge Note    Discharge Date: 12/23/2023     Discharge Disposition:  Home.     Discharge Services:  Outpatient cardiac rehab    Discharge DME:  Per therapy (if indicated).    Discharge Transportation: Patient will arrange his own transport.     Private pay costs discussed: Not applicable    Does the patient's insurance plan have a 3 day qualifying hospital stay waiver?  No    PAS Confirmation Code:    Patient/family educated on Medicare website which has current facility and service quality ratings:      Education Provided on the Discharge Plan:    Persons Notified of Discharge Plans: Nursing;   Patient/Family in Agreement with the Plan:      Handoff Referral Completed: No    Additional Information:  Discharge orders noted and reviewed. No CM needs identified.  BPA noted and completed.     Nuha Bradford RN

## 2023-12-23 NOTE — PLAN OF CARE
Occupational Therapy Discharge Summary    Reason for therapy discharge:    Discharged to home with outpatient therapy.    Progress towards therapy goal(s). See goals on Care Plan in [facility] electronic health record for goal details.  Goals met    Therapy recommendation(s):    Continued therapy is recommended.  Rationale/Recommendations:  Going home w family support and OP Cardiac rehab.

## 2023-12-23 NOTE — PROGRESS NOTES
TR band off at 2100. Attempted earlier and pt began to bleed. Air re-inflated, see documentation. See VS documentation. Has denied pain throughout. Does have slight bruising at site but no hematoma.

## 2023-12-23 NOTE — PLAN OF CARE
Lymphedema Discharge Summary    Reason for therapy discharge:    Discharged to home.    Progress towards therapy goal(s). See goals on Care Plan in [facility] electronic health record for goal details.  Goals partially met.  Barriers to achieving goals:   discharge from facility.    Therapy recommendation(s):    Continue with compression stockings at home.

## 2023-12-23 NOTE — PLAN OF CARE
Problem: Gas Exchange Impaired  Goal: Optimal Gas Exchange  Intervention: Optimize Oxygenation and Ventilation  Recent Flowsheet Documentation  Taken 12/23/2023 0800 by Naty Iverson RN  Head of Bed (HOB) Positioning: HOB at 20-30 degrees   02 saturation stable on room air. Patient denies shortness of breath. Tolerating ambulation.  Patient discharging home with spouse. Discharge instructors and medications reviewed with patient.

## 2023-12-23 NOTE — PLAN OF CARE
"  Problem: Cardiac Catheterization (Diagnostic/Interventional)  Goal: Absence of Bleeding  Outcome: Progressing  Goal: Absence of Contrast-Induced Injury  Outcome: Progressing  Goal: Optimal Pain Control and Function  Outcome: Progressing   Goal Outcome Evaluation:    Heart Failure Care Map  GOALS TO BE MET BEFORE DISCHARGE:    1. Decrease congestion and/or edema with diuretic therapy to achieve near optimal volume status.     Dyspnea improved: Yes, satisfactory for discharge.   Edema improved: No, further care required to meet this goal. Please explain Still has pitting edema. Tolerating IV diuresis        Last 24 hour I/O:   Intake/Output Summary (Last 24 hours) at 12/22/2023 2311  Last data filed at 12/22/2023 2111  Gross per 24 hour   Intake 1419 ml   Output 2550 ml   Net -1131 ml           Net I/O and Weights since admission:   11/23 0700 - 12/23 0659  In: 1779 [P.O.:1738; I.V.:41]  Out: 7200 [Urine:7200]  Net: -5421     Vitals:    12/21/23 1055 12/21/23 1921 12/22/23 0626   Weight: 73.9 kg (163 lb) 74.8 kg (164 lb 14.5 oz) 74 kg (163 lb 3.2 oz)       2.  O2 sats > 90% on room air, or at prior home O2 therapy level.      Able to wean O2 this shift to keep sats above 90%?: Yes, satisfactory for discharge.   Does patient use Home O2? No          Current oxygenation status:   SpO2: 97 %     O2 Device: None (Room air)  3.  Tolerates ambulation and mobility near baseline.     Ambulation: No, further care required to meet this goal. Please explain declined to ambulate as he is \"exhausted\"   Times patient ambulated with staff this shift: 0    Please review the Heart Failure Care Map for additional HF goal outcomes.    Arrived from cath lab this evening after angio, no intervention. L radial site with slight bruising after TR band removal. No pain currently. Tele is Afib with controlled rate. VSS. Remains on Room air and tolerating diuresis. Fluid restriction followed.       Naty Garces RN  12/22/2023                "

## 2023-12-23 NOTE — PROGRESS NOTES
12/23/23 3383   Appointment Info   Signing Clinician's Name / Credentials (PT) Dior Granadose PT   Quick Adds   Quick Adds Edema Eval   Living Environment   People in Home spouse;child(hayes), adult   Current Living Arrangements house   Home Accessibility stairs to enter home;stairs within home   Number of Stairs, Main Entrance 2   Stair Railings, Main Entrance railings on both sides of stairs;railings safe and in good condition   Number of Stairs, Within Home, Primary greater than 10 stairs   Stair Railings, Within Home, Primary railings safe and in good condition   Transportation Anticipated family or friend will provide   Living Environment Comments able to live on one level   Self-Care   Equipment Currently Used at Home walker, rolling;cane, straight   General Information   Onset of Illness/Injury or Date of Surgery 12/21/23   Referring Physician Dr. Morris   Patient/Family Therapy Goals Statement (PT) none stated   Pertinent History of Current Problem (include personal factors and/or comorbidities that impact the POC) CHF, BLE edema, s/p angio 12/22/23   Existing Precautions/Restrictions no known precautions/restrictions   Cognition   Affect/Mental Status (Cognition) WFL   Orientation Status (Cognition) oriented x 4   Follows Commands (Cognition) WFL   Integumentary/Edema   Onset of Edema   (worsening BLE edema past week, but improved today per pt report)   Affected Body Part(s) Left LE;Right LE   Edema Etiology Other (see comments)  (CHF)   General Comments/Previous Edema Treatment/Edema Equipment pt wears compression stockings at home   Edema Examination/Assessment   Skin Condition Pitting   Ulcerations No   Skin Integrity Skin intact, dryness/cracking B heels   Pitting Assessment No edema B feet, 1-2+ pitting R lower leg, 1+ L lower leg   Clinical Impression   Criteria for Skilled Therapeutic Intervention Yes, treatment indicated   Edema: Patient Presentation Edema   Clinical Presentation (PT Evaluation  Complexity) stable   Clinical Presentation Rationale pt presents as medically diagnosed   Clinical Decision Making (Complexity) low complexity   Edema: Planned Interventions Gradient compression bandaging;Edema exercises;Precautions to prevent infection/exacerbation;Education;ADL training   Risk & Benefits of therapy have been explained evaluation/treatment results reviewed;patient   PT Total Evaluation Time   PT Dagmar, Low Complexity Minutes (86396) 10   Physical Therapy Goals   PT Frequency Daily   PT Predicted Duration/Target Date for Goal Attainment 12/30/23   PT Goals Edema   PT: Edema education to increase ability to manage edema after discharge from the hospital Patient;Verbalize;Skin care routine;signs/symptoms of intolerance;wear schedule;limb positioning;garment/bandage care;discharge recommendations   PT: Management of edema bandages Patient;Verbalize;garment(s);Parke   PT: Functional edema exercise program to reduce limb volume, increase activity tolerance and improve independence with ADL Patient;Verbalize;HEP;walking program   Interventions   Interventions Quick Adds Self-Care/Home Mgmt   Self-Care/Home Management   Self-Care/Home Mgmt/ADL, Compensatory, Meal Prep Minutes (87299) 8   Symptoms Noted During/After Treatment none   Treatment Detail/Skilled Intervention Washed, dried, and lotioned B feet and lower legs. Applied size F tetragrip BLE from base of toes to below knees. Educated pt in wearing pattern and plan to reasses tomorrow and to return to compression stocking use at home.   PT Discharge Planning   PT Plan check tetragrip BLE   PT Discharge Recommendation (DC Rec) home   PT Brief overview of current status pt wearing tetragrip BLE   Total Session Time   Timed Code Treatment Minutes 8   Total Session Time (sum of timed and untimed services) 18

## 2023-12-23 NOTE — DISCHARGE SUMMARY
[facility]  Hospitalist Discharge Summary      Date of Admission:  12/21/2023  Date of Discharge:  12/23/2023  1:38 PM  Discharging Provider: Tee Morris DO  Discharge Service: Hospitalist Service        Follow-ups Needed After Discharge   Follow-up Appointments     Follow-up and recommended labs and tests       Follow up with primary care provider, Ihsan Singh, within 7 days for   hospital follow- up.  The following labs/tests are recommended: BMP, Mag.  Follow up with cardiology as directed              Discharge Disposition   Discharged to home  Condition at discharge: Stable      Hospital Course   83 year old male with history of CAD, chronic A-fib, chronic diastolic CHF, aortic stenosis status post TAVR, DM2, hypertension and nonalcoholic steatohepatitis admitted on 12/21/2023 for acute on chronic diastolic CHF exacerbation.        Acute on chronic diastolic CHF exacerbation:  In setting of medication noncompliance  Presented with dyspnea on phonation  BNP elevated from previous in the setting of clinical weight gain, TOBAR and orthopnea.   Chest x-ray with blunting of the left costophrenic angle and linear atelectasis versus scarring which is stable.  Right lung clear.  Had chest pain overnight 12/22 and noted to have new TWI in anterior leads with minor troponin elevation raising concern for new ischemia  TTE with preserved EF  Coronary angiogram performed 12/22 shows coronary disease with history of CABG, patent grafts with diffuse native disease and no targets for intervention.  -- continue current lasix and spironolactone after discharge, see below for dosing  -- Resume home dose Coreg  --Outpatient cardiology follow-up        History of CAD:  Had new TWI and mild troponin leak prompting need for ischemic eval as above however no targets were amenable to intervention  -- Continue home aspirin, statin, Coreg        History of chronic A-fib:  -- Continue home Coreg and  warfarin        DM2:  -- no changes to home management       History of severe aortic stenosis: Status post TAVR        BPH: Continue home Flomax        Chronic leg and hand tremor:  --Continue Mirapex      Severe Protein-Calorie Malnutrition       Consultations This Hospital Stay   CARE MANAGEMENT / SOCIAL WORK IP CONSULT  PHARMACY IP CONSULT  CORE CLINIC EVALUATION IP CONSULT  OCCUPATIONAL THERAPY ADULT IP CONSULT  NUTRITION SERVICES ADULT IP CONSULT  CARE MANAGEMENT / SOCIAL WORK IP CONSULT  CARDIOLOGY IP CONSULT  PHARMACY TO DOSE WARFARIN  PHYSICAL THERAPY ADULT IP CONSULT  OCCUPATIONAL THERAPY ADULT IP CONSULT  LYMPHEDEMA THERAPY IP CONSULT  PHARMACY IP CONSULT  PHARMACY IP CONSULT  PHARMACY IP CONSULT  SMOKING CESSATION PROGRAM IP CONSULT    Code Status   Full Code    Time Spent on this Encounter   I, Tee Morris DO, personally saw the patient today and spent greater than 30 minutes discharging this patient.       Tee Morris DO  [place] [id number]  Phone: [phone]  Fax: [phone]  ______________________________________________________________________    Physical Exam   Vital Signs: Temp: 97.6  F (36.4  C) Temp src: Oral BP: 115/61 Pulse: 68   Resp: 20 SpO2: 94 % O2 Device: None (Room air) Oxygen Delivery: 2 LPM  Weight: 157 lbs 9.6 oz    General: NAD  RESPIRATORY: Respirations nonlabored  CARDIOVASCULAR: Trace pitting le edema bilat.  NEUROLOGIC: No focal arm or leg weakness, speech is clear    Primary Care Physician   Ihsan Singh    Discharge Orders      Cardiac Rehab Referral      Primary Care - Care Coordination Referral      Reason for your hospital stay    Heart disease     Follow-up and recommended labs and tests     Follow up with primary care provider, Ihsan Singh, within 7 days for hospital follow- up.  The following labs/tests are recommended: BMP, Mag.  Follow up with cardiology as directed      Activity    Your activity upon discharge: activity as tolerated     Diet    Follow this diet upon discharge: Orders Placed This Encounter        Moderate Consistent Carb (60 g CHO per Meal) Diet     \    Discharge Medications   Current Discharge Medication List        START taking these medications    Details   spironolactone (ALDACTONE) 25 MG tablet Take 1 tablet (25 mg) by mouth daily  Qty: 30 tablet, Refills: 1    Associated Diagnoses: Acute on chronic diastolic heart failure (H)           CONTINUE these medications which have CHANGED    Details   furosemide (LASIX) 40 MG tablet Take 1 tablet (40 mg) by mouth 2 times daily  Qty: 60 tablet, Refills: 1    Associated Diagnoses: Acute on chronic diastolic heart failure (H)           CONTINUE these medications which have NOT CHANGED    Details   acetaminophen (TYLENOL) 500 MG tablet Take 1 tablet (500 mg) by mouth 2 times daily as needed for mild pain    Associated Diagnoses: Pain      aspirin 81 MG EC tablet Take 1 tablet (81 mg) by mouth daily  Qty: 90 tablet, Refills: 0    Associated Diagnoses: Chronic atrial fibrillation (H)      carvedilol (COREG) 3.125 MG tablet Take 6.25 mg by mouth 2 times daily (with meals)      gabapentin (NEURONTIN) 600 MG tablet Take 600 mg by mouth every evening      insulin aspart prot & aspart (NOVOLOG MIX 70/30 PEN) (70-30) 100 UNIT/ML pen Inject subcutaneously 20 units in AM with breakfast and 12 in PM with dinner. If Blood Glucose<100 then give 1/2 dose)  Qty: 15 mL, Refills: 0    Associated Diagnoses: 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)      metFORMIN (GLUCOPHAGE) 500 MG tablet Take 2 tablets (1,000 mg) by mouth 2 times daily (with meals)  Qty: 360 tablet, Refills: 1    Comments: Requesting 1 year supply  Associated Diagnoses: 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)      Multiple Vitamins-Minerals (PRESERVISION AREDS PO) Take 1 tablet by mouth 2 times daily      pramipexole (MIRAPEX) 0.25 MG  tablet Take 0.5 mg by mouth 2 times daily      rosuvastatin (CRESTOR) 20 MG tablet Take 40 mg by mouth every evening      tamsulosin (FLOMAX) 0.4 MG capsule Take 0.4 mg by mouth every morning      !! warfarin ANTICOAGULANT (COUMADIN) 4 MG tablet Take 4 mg by mouth See Admin Instructions Patient reports taking 4 mg every Sunday, Monday, Wednesday, Thursday, and Saturday. On Tuesday and Friday patient reports taking 5 mg.      !! warfarin ANTICOAGULANT (COUMADIN) 5 MG tablet Take 5 mg by mouth See Admin Instructions Patient reports taking 4 mg every Sunday, Monday, Wednesday, Thursday, and Saturday. On Tuesday and Friday patient reports taking 5 mg.      Continuous Blood Gluc  (FREESTYLE DOMI 2 READER) DOROTEO Use to read blood sugars as per 's instructions.  Qty: 1 each, Refills: 0    Comments: Prior Authorization has been denied.  Patient notified they have the option to pay cash.  Profile and fill on patient request.  Associated Diagnoses: Type 2 diabetes, HbA1C goal < 8% (H)      Continuous Blood Gluc Sensor (FREESTYLE DOMI 2 SENSOR) MISC Change every 14 days.  Qty: 6 each, Refills: 3    Associated Diagnoses: Type 2 diabetes, HbA1C goal < 8% (H)       !! - Potential duplicate medications found. Please discuss with provider.        Allergies   Allergies   Allergen Reactions    Oxycodone Itching and Rash    Amlodipine Dizziness     Dizziness and dry heaves    Lisinopril Cough    Adhesive Tape Itching and Rash

## 2023-12-23 NOTE — PROGRESS NOTES
12/23/23 1009   Appointment Info   Signing Clinician's Name / Credentials (OT) Susanna Saucedo, JEMAL   Living Environment   People in Home spouse;child(hayes), adult   Current Living Arrangements house   Home Accessibility stairs to enter home;stairs within home   Number of Stairs, Main Entrance 2   Stair Railings, Main Entrance railings safe and in good condition   Number of Stairs, Within Home, Primary greater than 10 stairs   Stair Railings, Within Home, Primary railings safe and in good condition   Transportation Anticipated car, drives self   Living Environment Comments was independent w/his ADLs and mobility   Self-Care   Usual Activity Tolerance good   Current Activity Tolerance moderate   Equipment Currently Used at Home cane, straight;walker, rolling;walker, standard;grab bar, toilet;grab bar, tub/shower;raised toilet seat   Fall history within last six months yes   Number of times patient has fallen within last six months 2   Instrumental Activities of Daily Living (IADL)   Previous Responsibilities meal prep;yardwork;medication management;finances;driving  (wife/son does rest of IADLs)   General Information   Onset of Illness/Injury or Date of Surgery 12/21/23   Referring Physician Dr Morris   Patient/Family Therapy Goal Statement (OT) go home   Additional Occupational Profile Info/Pertinent History of Current Problem Pee Ayala is a 83 year old male with history of CAD, chronic A-fib, chronic diastolic CHF, aortic stenosis status post TAVR, DM2, hypertension and nonalcoholic steatohepatitis admitted on 12/21/2023 for acute on chronic diastolic CHF exacerbation.   Existing Precautions/Restrictions cardiac   Left Upper Extremity (Weight-bearing Status) partial weight-bearing (PWB)   Right Upper Extremity (Weight-bearing Status) weight-bearing as tolerated (WBAT)   Left Lower Extremity (Weight-bearing Status) weight-bearing as tolerated (WBAT)   Right Lower Extremity (Weight-bearing Status)  weight-bearing as tolerated (WBAT)   Cognitive Status Examination   Orientation Status orientation to person, place and time   Follows Commands WNL   Visual Perception   Visual Impairment/Limitations corrective lenses full-time   Sensory   Sensory Quick Adds sensation intact   Pain Assessment   Patient Currently in Pain No   Posture   Posture forward head position   Range of Motion Comprehensive   General Range of Motion no range of motion deficits identified   Strength Comprehensive (MMT)   General Manual Muscle Testing (MMT) Assessment no strength deficits identified   Muscle Tone Assessment   Muscle Tone Quick Adds No deficits were identified   Coordination   Upper Extremity Coordination No deficits were identified   Bed Mobility   Bed Mobility supine-sit   Supine-Sit Wallace (Bed Mobility) modified independence   Transfers   Transfers bed-chair transfer   Transfer Skill: Bed to Chair/Chair to Bed   Bed-Chair Wallace (Transfers) modified independence   Balance   Balance Assessment standing dynamic balance   Standing Balance: Dynamic modified independence   Activities of Daily Living   BADL Assessment/Intervention lower body dressing   Lower Body Dressing Assessment/Training   Wallace Level (Lower Body Dressing) modified independence   Clinical Impression   Criteria for Skilled Therapeutic Interventions Met (OT) Yes, treatment indicated   OT Diagnosis Angio   Influenced by the following impairments fatigue, decreased ADLs   OT Problem List-Impairments impacting ADL activity tolerance impaired   Assessment of Occupational Performance 1-3 Performance Deficits   Identified Performance Deficits fatigue, decreased ADLs   Planned Therapy Interventions (OT) ADL retraining   Clinical Decision Making Complexity (OT) detailed assessment/moderate complexity   Risk & Benefits of therapy have been explained evaluation/treatment results reviewed;care plan/treatment goals reviewed;participants voiced agreement  with care plan;risks/benefits reviewed   OT Total Evaluation Time   OT Eval, Low Complexity Minutes (56510) 10   OT Goals   Therapy Frequency (OT) One time eval and treatment   OT Predicted Duration/Target Date for Goal Attainment 12/23/23   OT Goals Cardiac Phase 1   OT: Understanding of cardiac education to maximize quality of life, condition management, and health outcomes Patient;Caregiver;Verbalize;Demonstrate   OT: Perform aerobic activity with stable cardiovascular response 10 minutes;continuous   OT: Functional/aerobic ambulation tolerance with stable cardiovascular response in order to return to home and community environment Modified independent   OT: Navigation of stairs simulating home set up with stable cardiovascular response in order to return to home and community environment Greater than 10 stairs;Modified independent   Interventions   Interventions Quick Adds Self-Care/Home Management;Therapeutic Procedures/Exercise   Self-Care/Home Management   Self-Care/Home Mgmt/ADL, Compensatory, Meal Prep Minutes (66893) 10   Symptoms Noted During/After Treatment (Meal Preparation/Planning Training) none   Treatment Detail/Skilled Intervention precaution review, stoplight tool home ex program, and OP Cardiac Rehab   Therapeutic Procedures/Exercise   Therapeutic Procedure: strength, endurance, ROM, flexibillity minutes (23338) 11   Symptoms Noted During/After Treatment none   Treatment Detail/Skilled Intervention see amb/stairs below   Ambulation   Workload 300'   Symptoms Denies symptoms   Cardiovascular Response Change in rhythm   Exercise Details ambulated w/SEC w/RW   Vital Signs Details before /58, HR 78, O2 96 after   Stairs   Workload 13   Symptoms Fatigue   Cardiovascular Response Normal   Exercise Details up/down 13 steps w/both rails   Vital Signs Details WNL, NSR   Cardiac Education   Education Provided Daily weights;Home exercise program;OMNI Scale;Outpatient Cardiac Rehab;Precautions;Stop  light tool   Education Packet Given to Patient Yes   All Patient Education Handouts Reviewed with Patient and/or Family Yes   Cardiac Rehab Phase II Plan   Phase II Order Received No  (referral out)   OT Discharge Planning   OT Plan d/c OT, goals met   OT Discharge Recommendation (DC Rec) home with outpatient cardiac rehab   OT Rationale for DC Rec pt is safe tor return home w/family support and OP Cardiac Rehab   OT Brief overview of current status Mod I w/ADLs and mobility   Total Session Time   Timed Code Treatment Minutes 21   Total Session Time (sum of timed and untimed services) 31

## 2023-12-23 NOTE — PROGRESS NOTES
Heart Failure Care Map  GOALS TO BE MET BEFORE DISCHARGE:    1. Decrease congestion and/or edema with diuretic therapy to achieve near optimal volume status.     Dyspnea improved: Yes, satisfactory for discharge.   Edema improved: No, further care required to meet this goal. Please explain slight pitting lower extremities, but diuresing well.        Last 24 hour I/O:   Intake/Output Summary (Last 24 hours) at 12/23/2023 0537  Last data filed at 12/23/2023 0421  Gross per 24 hour   Intake 1179 ml   Output 3350 ml   Net -2171 ml           Net I/O and Weights since admission:   11/23 0700 - 12/23 0659  In: 1779 [P.O.:1738; I.V.:41]  Out: 8400 [Urine:8400]  Net: -6621     Vitals:    12/21/23 1055 12/21/23 1921 12/22/23 0626 12/23/23 0353   Weight: 73.9 kg (163 lb) 74.8 kg (164 lb 14.5 oz) 74 kg (163 lb 3.2 oz) 71.5 kg (157 lb 9.6 oz)       2.  O2 sats > 90% on room air, or at prior home O2 therapy level.      Able to wean O2 this shift to keep sats above 90%?: Yes, satisfactory for discharge.   Does patient use Home O2? No          Current oxygenation status:   SpO2: 96 %     O2 Device: None (Room air), Oxygen Delivery: 2 LPM    3.  Tolerates ambulation and mobility near baseline.     Ambulation: No, further care required to meet this goal. Please explain patient very tired   Times patient ambulated with staff this shift: 0    Please review the Heart Failure Care Map for additional HF goal outcomes.  Post-angiogram no intervention, left radial access with bruising at the site and no bleeding or swelling.  Jael Haas, RN  12/23/2023

## 2023-12-23 NOTE — PLAN OF CARE
Physical Therapy: PT orders received and appreciated. Following pt chart review, clinical judgment, and communication with Occupational Therapy, patient demonstrates safe, IND, baseline mobility, and does not need PT services at this time. PT will complete orders.     oral

## 2023-12-26 ENCOUNTER — PATIENT OUTREACH (OUTPATIENT)
Dept: CARE COORDINATION | Facility: CLINIC | Age: 83
End: 2023-12-26
Payer: COMMERCIAL

## 2023-12-26 NOTE — PROGRESS NOTES
Clinic Care Coordination Contact  [facility]: Post-Discharge Note  SITUATION                                                      Admission:    Admission Date: 12/21/23   Reason for Admission:  Shortness of Breath   Generalized Weakness  Discharge:   Discharge Date: 12/23/23  Discharge Diagnosis: acute on chronic diastolic CHF exacerbation    BACKGROUND                                                      Per hospital discharge summary and inpatient provider notes:  83 year old male with history of CAD, chronic A-fib, chronic diastolic CHF, aortic stenosis status post TAVR, DM2, hypertension and nonalcoholic steatohepatitis admitted on 12/21/2023 for acute on chronic diastolic CHF exacerbation.     ASSESSMENT           Discharge Assessment  How are you doing now that you are home?: doing ok - have been resting and taking it easy - had low blood sugar on Sunday and 'slid' to the floor. Has been taking all medications, eating well, and watching blood sugar close. No questions or concerns, feels comfortable and safe at home until follow-up apointment with PCP  How are your symptoms? (Red Flag symptoms escalate to triage hotline per guidelines): Improved  Do you feel your condition is stable enough to be safe at home until your provider visit?: Yes  Does the patient have their discharge instructions? : Yes  Does the patient have questions regarding their discharge instructions? : No  Were you started on any new medications or were there changes to any of your previous medications? : Yes  Does the patient have all of their medications?: Yes  Do you have questions regarding any of your medications? : No  Discharge follow-up appointment scheduled within 14 calendar days? : Yes  Discharge Follow Up Appointment Date: 12/28/23  Discharge Follow Up Appointment Scheduled with?: Primary Care Provider         Post-op (Clinicians Only)  Did the patient have surgery or a procedure: No  Fever: No  Chills: No  Eating &  Drinking: eating and drinking without complaints/concerns  PO Intake: regular diet        PLAN                                                      Outpatient Plan:  [facility] and pt spoke about how things are going - pt confirmed that he is feeling ok, no shortness of breath. Pt is resting and taking it easy at home. His blood sugar was low on Sunday and he 'slid' to the floor due to weakness. He has been checking blood sugar often and this has not been an issue since. Pt feels good being at home, no questions, will reach out to clinic if anything is needed. Pt feels there is no need for [facility] - program closed. [facility] will do no further outreach at this time.     Future Appointments   Date Time Provider Department Center   12/28/2023  4:30 PM [facility]   1/16/2024 12:50 PM Lilli Guzman, APRN CNP [facility]   1/16/2024  1:30 PM FLORI [facility] HEART FAILURE RN [facility]   2/13/2024  3:00 PM Fay Kwok, NP [facility]         For any urgent concerns, please contact our 24 hour nurse triage line: [phone] ([phone])         BRO Pollard

## 2023-12-27 ENCOUNTER — DOCUMENTATION ONLY (OUTPATIENT)
Dept: ANTICOAGULATION | Facility: CLINIC | Age: 83
End: 2023-12-27
Payer: COMMERCIAL

## 2023-12-27 DIAGNOSIS — Z79.01 LONG TERM (CURRENT) USE OF ANTICOAGULANTS: ICD-10-CM

## 2023-12-27 DIAGNOSIS — I48.20 CHRONIC ATRIAL FIBRILLATION (H): Primary | ICD-10-CM

## 2023-12-27 DIAGNOSIS — Z95.2 S/P TAVR (TRANSCATHETER AORTIC VALVE REPLACEMENT): ICD-10-CM

## 2023-12-27 DIAGNOSIS — I73.9 PERIPHERAL ARTERIAL DISEASE (H): ICD-10-CM

## 2023-12-27 DIAGNOSIS — Z79.01 CHRONIC ANTICOAGULATION: ICD-10-CM

## 2023-12-27 NOTE — PROGRESS NOTES
ANTICOAGULATION  MANAGEMENT: Discharge Review    Pee Ayala chart reviewed for anticoagulation continuity of care    Hospital Admission on 12/21 - 23/23 for dyspnea.   - Acute on chronic diastolic CHF exacerbation:  - In setting of medication noncompliance   - BNP elevated  . WT gain   - s/p Coronary angiogram performed 12/22 shows coronary disease with history of CABG, patent grafts with diffuse native disease and no targets for intervention.     Discharge disposition: Home    Results:    Recent labs: (last 7 days)     12/21/23  1103 12/22/23  0528 12/23/23  0414   INR 2.19* 2.12* 2.00*     Anticoagulation inpatient management:     home regimen continued    Anticoagulation discharge instructions:     Warfarin dosing: home regimen continued   Bridging: No   INR goal change: No      Medication changes affecting anticoagulation: Yes:    Started:  Spironolactone 25mg daily    Additional factors affecting anticoagulation: No     PLAN     Agree with discharge plan for follow up:   - Follow up with PCP - Ihsan Singh, within 7 days for hospital follow- up.  The following labs/tests are recommended: BMP, Mag, INR  - Follow up with cardiology    Patient not contacted - [facility] and INR with Dr. Singh on 12/28/23.    No adjustment to Anticoagulation Calendar was required    Mariel Dale RN   Normal for race

## 2023-12-28 ENCOUNTER — ANTICOAGULATION THERAPY VISIT (OUTPATIENT)
Dept: ANTICOAGULATION | Facility: CLINIC | Age: 83
End: 2023-12-28

## 2023-12-28 ENCOUNTER — OFFICE VISIT (OUTPATIENT)
Dept: FAMILY MEDICINE | Facility: CLINIC | Age: 83
End: 2023-12-28
Payer: COMMERCIAL

## 2023-12-28 VITALS
DIASTOLIC BLOOD PRESSURE: 62 MMHG | WEIGHT: 166.8 LBS | RESPIRATION RATE: 18 BRPM | SYSTOLIC BLOOD PRESSURE: 121 MMHG | HEART RATE: 73 BPM | TEMPERATURE: 97.9 F | BODY MASS INDEX: 30.51 KG/M2 | OXYGEN SATURATION: 99 %

## 2023-12-28 DIAGNOSIS — Z79.01 LONG TERM (CURRENT) USE OF ANTICOAGULANTS: ICD-10-CM

## 2023-12-28 DIAGNOSIS — I50.9 CONGESTIVE HEART FAILURE, UNSPECIFIED HF CHRONICITY, UNSPECIFIED HEART FAILURE TYPE (H): Primary | ICD-10-CM

## 2023-12-28 DIAGNOSIS — I73.9 PERIPHERAL ARTERIAL DISEASE (H): ICD-10-CM

## 2023-12-28 DIAGNOSIS — E11.3599 TYPE 2 DIABETES MELLITUS WITH PROLIFERATIVE RETINOPATHY WITHOUT MACULAR EDEMA, WITHOUT LONG-TERM CURRENT USE OF INSULIN, UNSPECIFIED LATERALITY (H): ICD-10-CM

## 2023-12-28 DIAGNOSIS — Z79.01 CHRONIC ANTICOAGULATION: ICD-10-CM

## 2023-12-28 DIAGNOSIS — I48.20 CHRONIC ATRIAL FIBRILLATION (H): ICD-10-CM

## 2023-12-28 DIAGNOSIS — Z95.2 S/P TAVR (TRANSCATHETER AORTIC VALVE REPLACEMENT): ICD-10-CM

## 2023-12-28 DIAGNOSIS — R06.02 SOB (SHORTNESS OF BREATH): Primary | ICD-10-CM

## 2023-12-28 DIAGNOSIS — I48.20 CHRONIC ATRIAL FIBRILLATION (H): Primary | ICD-10-CM

## 2023-12-28 LAB
ANION GAP SERPL CALCULATED.3IONS-SCNC: 9 MMOL/L (ref 7–15)
BUN SERPL-MCNC: 23.8 MG/DL (ref 8–23)
CALCIUM SERPL-MCNC: 9.3 MG/DL (ref 8.8–10.2)
CHLORIDE SERPL-SCNC: 101 MMOL/L (ref 98–107)
CREAT SERPL-MCNC: 1.03 MG/DL (ref 0.67–1.17)
DEPRECATED HCO3 PLAS-SCNC: 28 MMOL/L (ref 22–29)
EGFRCR SERPLBLD CKD-EPI 2021: 73 ML/MIN/1.73M2
GLUCOSE SERPL-MCNC: 195 MG/DL (ref 70–99)
HOLD SPECIMEN: NORMAL
INR BLD: 3.3 (ref 0.9–1.1)
POTASSIUM SERPL-SCNC: 4.4 MMOL/L (ref 3.4–5.3)
SODIUM SERPL-SCNC: 138 MMOL/L (ref 135–145)

## 2023-12-28 PROCEDURE — 80048 BASIC METABOLIC PNL TOTAL CA: CPT | Performed by: FAMILY MEDICINE

## 2023-12-28 PROCEDURE — 85610 PROTHROMBIN TIME: CPT | Performed by: FAMILY MEDICINE

## 2023-12-28 PROCEDURE — 36415 COLL VENOUS BLD VENIPUNCTURE: CPT | Performed by: FAMILY MEDICINE

## 2023-12-28 PROCEDURE — 99495 TRANSJ CARE MGMT MOD F2F 14D: CPT | Performed by: FAMILY MEDICINE

## 2023-12-28 RX ORDER — WARFARIN SODIUM 4 MG/1
TABLET ORAL
Qty: 70 TABLET | Refills: 1 | Status: ON HOLD | OUTPATIENT
Start: 2023-12-28 | End: 2024-08-22

## 2023-12-28 RX ORDER — WARFARIN SODIUM 4 MG/1
TABLET ORAL
Qty: 70 TABLET | Refills: 1 | Status: CANCELLED | OUTPATIENT
Start: 2023-12-28

## 2023-12-28 RX ORDER — RESPIRATORY SYNCYTIAL VIRUS VACCINE 120MCG/0.5
0.5 KIT INTRAMUSCULAR ONCE
Qty: 1 EACH | Refills: 0 | Status: CANCELLED | OUTPATIENT
Start: 2023-12-28 | End: 2023-12-28

## 2023-12-28 NOTE — PATIENT INSTRUCTIONS
Get your RSV vaccine at the local pharmacy    Get your Prevnar 20 vaccine at the local pharmacy    Cardiology follow up on 1-16-24    Tepid shower    Eating ok.  Stay off salt.    Daily morning unclothed weight.   Notify if more than a 1-2 lb change    Establish care with Dr Jazzy Gil

## 2023-12-28 NOTE — LETTER
December 29, 2023      Pee Ayala  [address]  [place] [address]        Dear ,  We are writing to inform you of your test results.    Сергей Glasgow:  Your potassium and kidney function are normal.  The blood sugar is consistent with your known diabetes.  Your next A1C is due on or after 1-23-24.    Resulted Orders   Basic metabolic panel  (Ca, Cl, CO2, Creat, Gluc, K, Na, BUN)   Result Value Ref Range    Sodium 138 135 - 145 mmol/L      Comment:      Reference intervals for this test were updated on 09/26/2023 to more accurately reflect our healthy population. There may be differences in the flagging of prior results with similar values performed with this method. Interpretation of those prior results can be made in the context of the updated reference intervals.     Potassium 4.4 3.4 - 5.3 mmol/L    Chloride 101 98 - 107 mmol/L    Carbon Dioxide (CO2) 28 22 - 29 mmol/L    Anion Gap 9 7 - 15 mmol/L    Urea Nitrogen 23.8 (H) 8.0 - 23.0 mg/dL    Creatinine 1.03 0.67 - 1.17 mg/dL    GFR Estimate 73 >60 mL/min/1.73m2    Calcium 9.3 8.8 - 10.2 mg/dL    Glucose 195 (H) 70 - 99 mg/dL         Hemoglobin A1C   Date Value Ref Range Status   10/23/2023 8.4 (H) 0.0 - 5.6 % Final     Comment:     Normal <5.7%   Prediabetes 5.7-6.4%    Diabetes 6.5% or higher     Note: Adopted from ADA consensus guidelines.   01/12/2021 9.1 (H) 0 - 5.6 % Final     Comment:     Normal <5.7% Prediabetes 5.7-6.4%  Diabetes 6.5% or higher - adopted from ADA   consensus guidelines.        If you have any questions or concerns, please call the clinic at the number listed above.       Sincerely,      Ihsan Singh MD            
No

## 2023-12-28 NOTE — PROGRESS NOTES
Alex Glasgow is a 83 year old, presenting for the following health issues:  Hospital F/U      HPI     Breathing is better but still some sob.  The sob seems to be with exertional activities like putting on shirt on socks.  Feels        12/26/2023     9:13 AM   Post Discharge Outreach   Admission Date 12/21/2023   Reason for Admission  Shortness of Breath   Generalized Weakness   Discharge Date 12/23/2023   Discharge Diagnosis acute on chronic diastolic CHF exacerbation   How are you doing now that you are home? doing ok - have been resting and taking it easy - had low blood sugar on Sunday and 'slid' to the floor. Has been taking all medications, eating well, and watching blood sugar close. No questions or concerns, feels comfortable and safe at home until follow-up apointment with PCP   How are your symptoms? (Red Flag symptoms escalate to triage hotline per guidelines) Improved   Do you feel your condition is stable enough to be safe at home until your provider visit? Yes   Does the patient have their discharge instructions?  Yes   Does the patient have questions regarding their discharge instructions?  No   Were you started on any new medications or were there changes to any of your previous medications?  Yes   Does the patient have all of their medications? Yes   Do you have questions regarding any of your medications?  No   Discharge follow-up appointment scheduled within 14 calendar days?  Yes   Discharge Follow Up Appointment Date 12/28/2023   Discharge Follow Up Appointment Scheduled with? Primary Care Provider     Hospital Follow-up Visit:    Hospital/Nursing Home/IP Rehab Facility: [facility]  Date of Admission: 12/21/23  Date of Discharge: 12/24/23  Reason(s) for Admission: Precordial Pain     Was your hospitalization related to COVID-19? No   Problems taking medications regularly:  None  Medication changes since discharge: Spironolactne 24 mg tab  Problems adhering to  non-medication therapy:  None    Summary of hospitalization:  [facility] discharge summary reviewed  Diagnostic Tests/Treatments reviewed.  Follow up needed: to see Cardiology 1-16-24  Other Healthcare Providers Involved in Patient s Care:          Cardiology  Update since discharge: improved.         Plan of care communicated with patient                 Review of Systems         Objective    /62 (BP Location: Left arm, Patient Position: Sitting, Cuff Size: Adult Large)   Pulse 73   Temp 97.9  F (36.6  C) (Oral)   Resp 18   Wt 75.7 kg (166 lb 12.8 oz)   SpO2 99%   BMI 30.51 kg/m    Body mass index is 30.51 kg/m .    Wt Readings from Last 4 Encounters:   12/28/23 75.7 kg (166 lb 12.8 oz)   12/23/23 71.5 kg (157 lb 9.6 oz)   10/31/23 75.4 kg (166 lb 4.8 oz)   10/23/23 75.4 kg (166 lb 3.2 oz)      Physical Exam   GENERAL: healthy, alert and no distress  RESP: lungs clear to auscultation - no rales, rhonchi or wheezes  CV: regular rate and irreg irreg rhythm, normal S1 S2, no S3 or S4, no murmur, click or rub, no peripheral edema and peripheral pulses strong  MS: no gross musculoskeletal defects noted, no edema    INR   Date Value Ref Range Status   12/23/2023 2.00 (H) 0.85 - 1.15 Final   01/13/2021 1.13 0.86 - 1.14 Final     INR Point of Care   Date Value Ref Range Status   03/21/2023 1.4 (A) 0.9 - 1.1 Final       Last Comprehensive Metabolic Panel:  Lab Results   Component Value Date     12/23/2023    POTASSIUM 3.6 12/23/2023    CHLORIDE 98 12/23/2023    CO2 29 12/23/2023    ANIONGAP 10 12/23/2023     (H) 12/23/2023    BUN 24.7 (H) 12/23/2023    CR 1.08 12/23/2023    GFRESTIMATED 69 12/23/2023    RACHEL 9.5 12/23/2023        Lab Results   Component Value Date    A1C 8.4 10/23/2023    A1C 9.8 07/18/2023    A1C 8.6 03/14/2023    A1C 10.3 11/17/2022    A1C 8.8 08/15/2022    A1C 9.1 01/12/2021    A1C 8.0 06/09/2016    A1C 8.2 03/08/2016    A1C 7.7 12/23/2015    A1C 6.9 09/15/2015      Encounter Diagnoses   Name Primary?    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  Stay off salt.    Daily morning unclothed weight.   Notify if more than a 1-2 lb change    Establish care with Dr Jazzy Gil

## 2023-12-28 NOTE — PROGRESS NOTES
ANTICOAGULATION MANAGEMENT     Pee Ayala 83 year old male is on warfarin with supratherapeutic INR result. (Goal INR 2.0-3.0)    Recent labs: (last 7 days)     12/28/23  1706   INR 3.3*       ASSESSMENT     Source(s): Chart Review and Patient/Caregiver Call     Warfarin doses taken: Warfarin taken as instructed  Diet: No new diet changes identified  Medication/supplement changes: Yes.  Spironolactone 25mg daily started on 12/24/23 subsequent INRs may be decreased. Closer INR monitoring recommended.  New illness, injury, or hospitalization: Yes:    12/28/23 Hospital follow-up with Dr. Singh - breathing a little better, but still some shortness of breath with extended activities.   Hospitalized from 12/21 - 23/23 for acute on chronic diastolic CHF exacerbation, WT gain, and s/p cor. Angio on 12/22/23.  Signs or symptoms of bleeding or clotting: No  Previous result: Therapeutic last 5 INR visits  Additional findings:  Dr. Singh informed patient to establish care with Dr. Gil.       PLAN     Recommended plan for temporary change(s) and ongoing change(s) affecting INR     Dosing Instructions: partial hold then continue your current warfarin dose with next INR in 1-2 weeks       Summary  As of 12/28/2023      Full warfarin instructions:  12/28: 2 mg; Otherwise 5 mg every Tue, Sat; 4 mg all other days   Next INR check:  1/11/2024               Telephone call with Pee who verbalizes understanding and agrees to plan    Lab visit scheduled - INR on 1/16/24 at [facility] @ [facility].    Education provided:   Taking warfarin: Importance of taking warfarin as instructed  Goal range and lab monitoring: goal range and significance of current result  Interaction IS anticipated between warfarin and Sprionolactone  Symptom monitoring: monitoring for bleeding signs and symptoms    Plan made per [facility] anticoagulation protocol    Mariel Dale, RN  Anticoagulation  Clinic  12/28/2023    _______________________________________________________________________     Anticoagulation Episode Summary       Current INR goal:  2.0-3.0   TTR:  41.4% (8.3 mo)   Target end date:  Indefinite   Send INR reminders to:  [facility]    Indications    Chronic atrial fibrillation (H) [I48.20]  Chronic anticoagulation [Z79.01]  Long term (current) use of anticoagulants [Z79.01]  Peripheral arterial disease (H24) [I73.9]  S/P TAVR (transcatheter aortic valve replacement) [Z95.2]             Comments:               Anticoagulation Care Providers       Provider Role Specialty Phone number    Jazzy Gil MD Referring Family Medicine [phone]    Ihsan Singh MD Referring Family Medicine [phone]

## 2024-01-10 DIAGNOSIS — E11.42 TYPE 2 DIABETES MELLITUS WITH PERIPHERAL NEUROPATHY (H): ICD-10-CM

## 2024-01-11 ENCOUNTER — TRANSFERRED RECORDS (OUTPATIENT)
Dept: HEALTH INFORMATION MANAGEMENT | Facility: CLINIC | Age: 84
End: 2024-01-11
Payer: COMMERCIAL

## 2024-01-11 ENCOUNTER — TELEPHONE (OUTPATIENT)
Dept: FAMILY MEDICINE | Facility: CLINIC | Age: 84
End: 2024-01-11
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
## 2024-01-15 NOTE — TELEPHONE ENCOUNTER
"    Requested Prescriptions   Pending Prescriptions Disp Refills    metFORMIN (GLUCOPHAGE) 500 MG tablet [Pharmacy Med Name: metFORMIN HCl 500 MG Oral Tablet] 320 tablet 3     Sig: TAKE 2 TABLETS BY MOUTH TWICE  DAILY WITH MEALS       Biguanide Agents Failed - 1/10/2024  9:02 PM        Failed - Recent (6 mo) or future (30 days) visit within the authorizing provider's specialty     Patient had office visit in the last 6 months or has a visit in the next 30 days with authorizing provider or within the authorizing provider's specialty.  See \"Patient Info\" tab in inbasket, or \"Choose Columns\" in Meds & Orders section of the refill encounter.            Passed - Patient is age 10 or older        Passed - Patient has documented A1c within the specified period of time.     If HgbA1C is 8 or greater, it needs to be on file within the past 3 months.  If less than 8, must be on file within the past 6 months.     Recent Labs   Lab Test 10/23/23  1020   A1C 8.4*             Passed - Patient's CR is NOT>1.4 OR Patient's EGFR is NOT<45 within past 12 mos.     Recent Labs   Lab Test 12/28/23  1706 08/03/21  1419 02/23/21  1007   GFRESTIMATED 73   < > >60   GFRESTBLACK  --   --  >60    < > = values in this interval not displayed.       Recent Labs   Lab Test 12/28/23  1706   CR 1.03             Passed - Patient does NOT have a diagnosis of CHF.        Passed - Medication is active on med list             "

## 2024-01-16 ENCOUNTER — OFFICE VISIT (OUTPATIENT)
Dept: CARDIOLOGY | Facility: CLINIC | Age: 84
End: 2024-01-16
Payer: COMMERCIAL

## 2024-01-16 ENCOUNTER — APPOINTMENT (OUTPATIENT)
Dept: CARDIOLOGY | Facility: CLINIC | Age: 84
End: 2024-01-16
Payer: COMMERCIAL

## 2024-01-16 ENCOUNTER — ANTICOAGULATION THERAPY VISIT (OUTPATIENT)
Dept: ANTICOAGULATION | Facility: CLINIC | Age: 84
End: 2024-01-16

## 2024-01-16 ENCOUNTER — LAB (OUTPATIENT)
Dept: CARDIOLOGY | Facility: CLINIC | Age: 84
End: 2024-01-16
Payer: COMMERCIAL

## 2024-01-16 VITALS
SYSTOLIC BLOOD PRESSURE: 110 MMHG | RESPIRATION RATE: 16 BRPM | HEART RATE: 56 BPM | BODY MASS INDEX: 30.91 KG/M2 | WEIGHT: 169 LBS | DIASTOLIC BLOOD PRESSURE: 50 MMHG

## 2024-01-16 DIAGNOSIS — I48.20 CHRONIC ATRIAL FIBRILLATION (H): ICD-10-CM

## 2024-01-16 DIAGNOSIS — I10 PRIMARY HYPERTENSION: ICD-10-CM

## 2024-01-16 DIAGNOSIS — Z79.01 LONG TERM (CURRENT) USE OF ANTICOAGULANTS: ICD-10-CM

## 2024-01-16 DIAGNOSIS — I35.0 SEVERE AORTIC STENOSIS: ICD-10-CM

## 2024-01-16 DIAGNOSIS — I73.9 PERIPHERAL ARTERIAL DISEASE (H): ICD-10-CM

## 2024-01-16 DIAGNOSIS — I48.20 CHRONIC ATRIAL FIBRILLATION (H): Primary | ICD-10-CM

## 2024-01-16 DIAGNOSIS — Z95.2 S/P TAVR (TRANSCATHETER AORTIC VALVE REPLACEMENT): ICD-10-CM

## 2024-01-16 DIAGNOSIS — E11.9 TYPE 2 DIABETES, HBA1C GOAL < 8% (H): ICD-10-CM

## 2024-01-16 DIAGNOSIS — I25.10 CORONARY ARTERY DISEASE INVOLVING NATIVE CORONARY ARTERY OF NATIVE HEART WITHOUT ANGINA PECTORIS: ICD-10-CM

## 2024-01-16 DIAGNOSIS — Z79.01 CHRONIC ANTICOAGULATION: ICD-10-CM

## 2024-01-16 DIAGNOSIS — I50.32 CHRONIC HEART FAILURE WITH PRESERVED EJECTION FRACTION (H): Primary | ICD-10-CM

## 2024-01-16 PROBLEM — I50.33 ACUTE ON CHRONIC DIASTOLIC HEART FAILURE (H): Status: RESOLVED | Noted: 2023-12-21 | Resolved: 2024-01-16

## 2024-01-16 LAB
ANION GAP SERPL CALCULATED.3IONS-SCNC: 12 MMOL/L (ref 7–15)
BUN SERPL-MCNC: 27.2 MG/DL (ref 8–23)
CALCIUM SERPL-MCNC: 8.5 MG/DL (ref 8.8–10.2)
CHLORIDE SERPL-SCNC: 96 MMOL/L (ref 98–107)
CREAT SERPL-MCNC: 1.24 MG/DL (ref 0.67–1.17)
DEPRECATED HCO3 PLAS-SCNC: 26 MMOL/L (ref 22–29)
EGFRCR SERPLBLD CKD-EPI 2021: 58 ML/MIN/1.73M2
GLUCOSE SERPL-MCNC: 305 MG/DL (ref 70–99)
INR POINT OF CARE: 4.6 (ref 0.9–1.1)
NT-PROBNP SERPL-MCNC: 1204 PG/ML (ref 0–1800)
POTASSIUM SERPL-SCNC: 4 MMOL/L (ref 3.4–5.3)
SODIUM SERPL-SCNC: 134 MMOL/L (ref 135–145)

## 2024-01-16 PROCEDURE — 83880 ASSAY OF NATRIURETIC PEPTIDE: CPT | Performed by: NURSE PRACTITIONER

## 2024-01-16 PROCEDURE — G2211 COMPLEX E/M VISIT ADD ON: HCPCS | Performed by: NURSE PRACTITIONER

## 2024-01-16 PROCEDURE — 85610 PROTHROMBIN TIME: CPT

## 2024-01-16 PROCEDURE — 99215 OFFICE O/P EST HI 40 MIN: CPT | Performed by: NURSE PRACTITIONER

## 2024-01-16 PROCEDURE — 36415 COLL VENOUS BLD VENIPUNCTURE: CPT | Performed by: NURSE PRACTITIONER

## 2024-01-16 PROCEDURE — 80048 BASIC METABOLIC PNL TOTAL CA: CPT | Performed by: NURSE PRACTITIONER

## 2024-01-16 NOTE — PATIENT INSTRUCTIONS
Pee Ayala,    It was a pleasure to see you today at [facility].     My recommendations after this visit include:  - Please follow up with Dr Hernandez in 4 months   - Please follow up with Lilli Guzman in 1 month  - I have checked labs  - Continue current medications    Lilli Guzman, CNP

## 2024-01-16 NOTE — PROGRESS NOTES
Assessment/Recommendations   Assessment:    1.  Heart failure with preserved ejection fraction, NYHA class III: Compensated.  He states he has had increased dyspnea on exertion and lower extremity edema since discharge.  His weight has been stable.  Denies orthopnea or PND.  He does not always follow a low-sodium diet or monitor fluid intake.  He was started on spironolactone at discharge.  2.  CAD: Denies angina.  Status post three-vessel CABG in 2014.  Status post coronary angiogram December 2023 showed patent grafts with diffuse native disease with no targets for intervention.  He continues aspirin, carvedilol and rosuvastatin  3.  Permanent atrial fibrillation: Rate controlled.  He continues warfarin for anticoagulation and carvedilol for rate control  4.  Hypertension: Controlled.  Blood pressure 110/50  5.  Diabetes mellitus type 2: Hemoglobin A1c in October was 8.4.  He continues metformin and insulin.  Managed by primary.  May benefit from SGLT2 inhibitor  6.  Valvular heart disease: Status post TAVR in 2021.  Gradient of aortic valve is normal on recent echocardiogram    Plan:  1.  BMP and NT proBNP pending.  Based on labs may increase Lasix  2.  Continue current medications  3.  Continue monitoring weights and stressed importance of fluid restriction and monitoring salt intake  4.  Encouraged wearing compression socks    Pee Ayala will follow up with Dr. Hernandez in 4 months and in the heart failure clinic in 1 month.     History of Present Illness/Subjective    Mr. Pee Ayala is a 83 year old male seen at [facility] heart failure clinic today for continued follow-up.  His wife accompanies him today.  He follows up for heart failure with preserved ejection fraction.  He was hospitalized December 21 to December 23 with acute heart failure due to medication noncompliance.  Had symptoms of dyspnea on exertion, orthopnea and weight gain.  He had an echocardiogram which showed ejection  fraction of 60 to 65%.  He developed chest pain while hospitalized and underwent coronary angiogram December 22 which showed patent grafts with diffuse native disease and no targets for intervention.  He was diuresed and symptoms improved.  He has a past medical history significant for hypertension, hyperlipidemia, permanent atrial fibrillation, status post TAVR, CAD with three-vessel CABG in 2014, diabetes mellitus type 2.    Today, he complains of fatigue, weakness, dyspnea on exertion and lower extremity edema.  He denies lightheadedness, orthopnea, PND, palpitations, chest pain, and abdominal fullness/bloating.      He is monitoring home weights which are stable between 166-167 pounds.  He tries to follow a low-sodium diet.      ECHOCARDIOGRAM: 12/21/2023-reviewed  Interpretation Summary     Left ventricular size, wall motion and function are normal. The ejection  fraction is 60-65%.  TAPSE is abnormal, which is consistent with abnormal right ventricular  systolic function.  The left atrium is severely dilated.  The right atrium is mild to moderately dilated.  There is a bioprosthetic aortic valve.  The gradient is normal for this prosthetic aortic valve.  There is mild paravalvular regurgitation present.      Coronary angiogram: 12/22/2023-reviewed  CONCLUSIONS:   Severe multivessel coronary artery disease with chronic total occlusion of the left circumflex, mid left anterior descending artery, and mid right coronary artery.  Patent LIMA to the LAD with moderate diffuse disease of the native vessel beyond the anastomosis.  Patent vein graft to the OM1 branch with mild diffuse disease of the native vessel beyond the anastomosis.  Moderate diseased vein graft to the OM 2 branch with small, diffusely diseased native vessel beyond the anastomosis.     RECOMMENDATIONS:   Usual postprocedure cares, please see orders.  Recommend medical management of patient's coronary artery disease.  Aggressive risk factor  modification for secondary prevention.        Physical Examination Review of Systems   /50 (BP Location: Left arm, Patient Position: Sitting, Cuff Size: Adult Regular)   Pulse 56   Resp 16   Wt 76.7 kg (169 lb)   BMI 30.91 kg/m    Body mass index is 30.91 kg/m .  Wt Readings from Last 3 Encounters:   01/16/24 76.7 kg (169 lb)   12/28/23 75.7 kg (166 lb 12.8 oz)   12/23/23 71.5 kg (157 lb 9.6 oz)       General Appearance:   no acute distress   ENT/Mouth: No abnormalities   EYES:  no scleral icterus, normal conjunctivae   Neck: no thyromegaly   Chest/Lungs:   lungs are clear to auscultation, no rales or wheezing, equal chest wall expansion    Cardiovascular:   Irregularly irregular. Normal first and second heart sounds with no murmurs, rubs, or gallops, moderate edema bilaterally    Abdomen:  bowel sounds are present   Extremities: no cyanosis or clubbing   Skin: warm   Neurologic: no tremors     Psychiatric: alert and oriented x3                                              Medical History  Surgical History Family History Social History   Past Medical History:   Diagnosis Date    ACS (acute coronary syndrome) (H) 06/02/2014    Actinic keratosis 01/14/2014    Anticoagulated on Coumadin 12/30/2015    Atrial fibrillation (H) 01/01/2016    Bone mass 04/26/2017    Chest heaviness 01/23/2019    Chest pain 05/31/2014    Chronic heart failure with preserved ejection fraction (H) 01/16/2024    Closed fracture of left forearm 01/01/2015    Congestive heart failure (H)     Coronary artery disease     Difficulty in walking(719.7)     Dyslipidemia 08/31/2016    Dyspnea on exertion     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     Encounter for long-term (current) use of insulin (H) 08/11/2016    Esophageal reflux 11/18/2010    History of angina     HTN (hypertension) 06/30/2009     (Problem list name updated by  automated process. Provider to review and confirm.)    Impotence of organic origin     Mixed hyperlipidemia 04/25/2007 April 25, 2007 restarted Zocor today, recheck in 3 months.  August 23, 2007 LDL at 101 with 40 mg, will increase to 80 mg. Recheck  In 3 months.     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     Walking troubles     Past Surgical History:   Procedure Laterality Date    BYPASS GRAFT ARTERY CORONARY N/A 09/10/2014    Procedure: BYPASS GRAFT ARTERY CORONARY;  Surgeon: Bharathi Caraballo MD;  Location: [facility] OR    BYPASS GRAFT ARTERY CORONARY  01/01/2016    COLONOSCOPY  01/14/2004    CORONARY ANGIOGRAPHY ADULT ORDER      CORONARY ARTERY BYPASS      CV CORONARY ANGIOGRAM N/A 04/04/2019    Procedure: Coronary Angiogram;  Surgeon: Dominik Vega MD;  Location: [facility] Cath Lab;  Service: Cardiology    CV CORONARY ANGIOGRAM N/A 01/06/2021    Procedure: Coronary Angiogram;  Surgeon: Dominik Vega MD;  Location: [facility] Cardiac Cath Lab;  Service: Cardiology    CV CORONARY ANGIOGRAM N/A 12/22/2023    Procedure: Coronary Angiogram;  Surgeon: Bahman Lenz MD;  Location: [facility] CATH LAB CV    CV LEFT HEART CATHETERIZATION WITHOUT LEFT VENTRICULOGRAM Left 04/04/2019    Procedure: Left Heart Catheterization Without Left Ventriculogram;  Surgeon: Dominik Vega MD;  Location: [facility] Cath Lab;  Service: Cardiology    CV LEFT HEART CATHETERIZATION WITHOUT LEFT VENTRICULOGRAM Left 01/06/2021    Procedure: Left  Heart Catheterization Without Left Ventriculogram;  Surgeon: Dominik Vega MD;  Location: [facility] Cardiac Cath Lab;  Service: Cardiology    CV RIGHT HEART CATHETERIZATION Right 04/04/2019    Procedure: Right Heart Catheterization;  Surgeon: Dominik Vega MD;  Location: [facility] Cath Lab;  Service: Cardiology    CV TRANSCATHETER AORTIC VALVE REPLACEMENT N/A 01/12/2021    Procedure: Right transfemoral transcatheter aortic valve replacement using Magdaleno Dominick 3 size 29mm.  Transthoracic echocardiogram;  Surgeon: Jo Romano MD;  Location: [facility] CARDIAC CATH LAB    ESOPHAGOSCOPY, GASTROSCOPY, DUODENOSCOPY (EGD), COMBINED N/A 01/13/2016    Procedure: COMBINED ESOPHAGOSCOPY, GASTROSCOPY, DUODENOSCOPY (EGD);  Surgeon: Rk Srivastava MD;  Location: [facility]    HEART [facility] FEMORAL CANNULIZATION WITH OPEN STANDBY REPAIR AORTIC VALVE N/A 01/12/2021    Procedure: Cardiopulmonary Bypass standby;  Surgeon: Jefferson Sandy MD;  Location: [facility] CARDIAC CATH LAB    HEART CATH, ANGIOPLASTY      IR LOWER EXTREMITY ANGIOGRAM LEFT  12/07/2021    LAPAROSCOPIC CHOLECYSTECTOMY  10/01/2009    MAZE PROCEDURE N/A 09/10/2014    Procedure: MAZE PROCEDURE;  Surgeon: Bharathi Caraballo MD;  Location:  OR    MOHS MICROGRAPHIC PROCEDURE      OPEN REDUCTION INTERNAL FIXATION HIP BIPOLAR Left 04/09/2022    Procedure: HEMIARTHROPLASTY, HIP, BIPOLAR, OPEN REDUCTION INTERNAL FIXATION OF GREATER TROCHANTER;  Surgeon: Jd Larose MD;  Location: [facility] OR    ORTHOPEDIC SURGERY      surgery for fx  Left forearm    OTHER SURGICAL HISTORY Left 01/01/2015    forearm sugery    STENT, CORONARY, HUMA  02/01/2016    VASECTOMY      Family History   Problem Relation Age of Onset    Diabetes Mother     Hypertension Father     Cerebrovascular Disease Father     Diabetes Maternal Grandmother     Breast Cancer No family hx of     Cancer - colorectal No family hx of     Prostate Cancer No family hx of     C.A.D.  No family hx of     Diabetes Type 2  Mother         58 ,  from anesthesia complication.    Heart Disease Father         86  from stroke    No Known Problems Brother         60 years of age.    Social History     Socioeconomic History    Marital status:      Spouse name: Fay Ayala    Number of children: Not on file    Years of education: Not on file    Highest education level: Not on file   Occupational History     Employer: RETIRED   Tobacco Use    Smoking status: Former     Types: Cigarettes     Quit date: 1998     Years since quittin.0    Smokeless tobacco: Never   Substance and Sexual Activity    Alcohol use: Yes     Alcohol/week: 0.0 standard drinks of alcohol     Comment: Alcoholic Drinks/day: very rare    Drug use: No    Sexual activity: Never     Birth control/protection: None   Other Topics Concern    Parent/sibling w/ CABG, MI or angioplasty before 65F 55M? No   Social History Narrative     and lives with life.  Has adult children from previous relationship. ( Blended family).  Has a dog - Vincent Gil MD  1/3/2019         Social Determinants of Health     Financial Resource Strain: Low Risk  (2023)    Financial Resource Strain     Within the past 12 months, have you or your family members you live with been unable to get utilities (heat, electricity) when it was really needed?: No   Food Insecurity: Low Risk  (2023)    Food Insecurity     Within the past 12 months, did you worry that your food would run out before you got money to buy more?: No     Within the past 12 months, did the food you bought just not last and you didn t have money to get more?: No   Transportation Needs: Low Risk  (2023)    Transportation Needs     Within the past 12 months, has lack of transportation kept you from medical appointments, getting your medicines, non-medical meetings or appointments, work, or from getting things that you need?: No   Physical  Activity: Not on file   Stress: Not on file   Social Connections: Not on file   Interpersonal Safety: Low Risk  (10/23/2023)    Interpersonal Safety     Do you feel physically and emotionally safe where you currently live?: Yes     Within the past 12 months, have you been hit, slapped, kicked or otherwise physically hurt by someone?: No     Within the past 12 months, have you been humiliated or emotionally abused in other ways by your partner or ex-partner?: No   Housing Stability: Low Risk  (12/28/2023)    Housing Stability     Do you have housing? : Yes     Are you worried about losing your housing?: No          Medications  Allergies   Current Outpatient Medications   Medication Sig Dispense Refill    acetaminophen (TYLENOL) 500 MG tablet Take 1 tablet (500 mg) by mouth 2 times daily as needed for mild pain      aspirin 81 MG EC tablet Take 1 tablet (81 mg) by mouth daily 90 tablet 0    carvedilol (COREG) 3.125 MG tablet Take 6.25 mg by mouth 2 times daily (with meals)      Continuous Blood Gluc  (FREESTYLE DOMI 2 READER) DOROTEO Use to read blood sugars as per 's instructions. 1 each 0    Continuous Blood Gluc Sensor (FREESTYLE DOMI 2 SENSOR) MISC Change every 14 days. 6 each 3    furosemide (LASIX) 40 MG tablet Take 1 tablet (40 mg) by mouth 2 times daily 60 tablet 1    gabapentin (NEURONTIN) 600 MG tablet Take 600 mg by mouth every evening      insulin aspart prot & aspart (NOVOLOG MIX 70/30 PEN) (70-30) 100 UNIT/ML pen Inject subcutaneously 20 units in AM with breakfast and 12 in PM with dinner. If Blood Glucose<100 then give 1/2 dose) 15 mL 0    metFORMIN (GLUCOPHAGE) 500 MG tablet TAKE 2 TABLETS BY MOUTH TWICE  DAILY WITH MEALS 360 tablet 0    Multiple Vitamins-Minerals (PRESERVISION AREDS PO) Take 1 tablet by mouth 2 times daily      pramipexole (MIRAPEX) 0.25 MG tablet Take 0.5 mg by mouth 2 times daily      rosuvastatin (CRESTOR) 20 MG tablet Take 40 mg by mouth every evening       spironolactone (ALDACTONE) 25 MG tablet Take 1 tablet (25 mg) by mouth daily 30 tablet 1    tamsulosin (FLOMAX) 0.4 MG capsule Take 0.4 mg by mouth every morning      warfarin ANTICOAGULANT (COUMADIN) 4 MG tablet Takes 1 tablet (4mg) 5 days of the week on Sun/Mon/Wed/Thurs/Sat, by mouth as directed.  Adjust dose based on INR results. 70 tablet 1    warfarin ANTICOAGULANT (COUMADIN) 5 MG tablet Take 5 mg by mouth See Admin Instructions Patient reports taking 4 mg every Sunday, Monday, Wednesday, Thursday, and Saturday. On Tuesday and Friday patient reports taking 5 mg.      Allergies   Allergen Reactions    Oxycodone Itching and Rash    Amlodipine Dizziness     Dizziness and dry heaves    Lisinopril Cough    Adhesive Tape Itching and Rash         Lab Results    Chemistry/lipid CBC Cardiac Enzymes/BNP/TSH/INR   Lab Results   Component Value Date    CHOL 136 04/06/2023    HDL 61 04/06/2023    TRIG 85 04/06/2023    BUN 23.8 (H) 12/28/2023     12/28/2023    CO2 28 12/28/2023    Lab Results   Component Value Date    WBC 6.1 12/21/2023    HGB 11.6 (L) 12/21/2023    HCT 37.5 (L) 12/21/2023     (H) 12/21/2023     12/21/2023    Lab Results   Component Value Date    TROPONINI 0.15 07/04/2022     (H) 07/04/2022    TSH 0.92 11/17/2022    INR 3.3 (H) 12/28/2023             This note has been dictated using voice recognition software. Any grammatical, typographical, or context distortions are unintentional and inherent to the software    40 minutes spent on the date of encounter doing chart review, review of outside records, review of test results, interpretation with above tests, patient visit, documentation, and discussion with family.        The longitudinal plan of care for heart failure with preserved ejection fraction, CAD, hypertension, permanent atrial fibrillation, diabetes mellitus type 2, valvular heart disease was addressed during this visit. Due to the added complexity in care, I will  continue to support Pee in the subsequent management of this condition(s) and with the ongoing continuity of care of this condition(s).

## 2024-01-16 NOTE — PROGRESS NOTES
ANTICOAGULATION MANAGEMENT     Pee Ayala 83 year old male is on warfarin with supratherapeutic INR result. (Goal INR 2.0-3.0)    Recent labs: (last 7 days)     01/16/24  1338   INR 4.6*       ASSESSMENT     Source(s): Chart Review and Patient/Caregiver Call     Warfarin doses taken: Held partial dose on 12/28/23, recently which may be affecting INR  Diet: No new diet changes identified   Not always following a low sodium diet or monitor fluid intake.  Medication/supplement changes: None noted  New illness, injury, or hospitalization: Yes:    Heart Failure visit today - complaints of ongoing dyspnea and lower leg edema.  Signs or symptoms of bleeding or clotting: No  Previous result: Supratherapeutic at 3.3 on 12/28/24.  Additional findings:  Awaiting BNP lab results - still in process.       PLAN     Recommended plan for ongoing change(s) affecting INR     Dosing Instructions: hold dose then decrease your warfarin dose (13.3% change) with next INR in 7-10 days       Summary  As of 1/16/2024      Full warfarin instructions:  1/16: Hold; Otherwise 2 mg every Wed; 4 mg all other days   Next INR check:  1/26/2024               Telephone call with Pee who verbalizes understanding and agrees to plan    Lab visit scheduled - INR on 1/23/24 @ [facility]    Education provided:   Taking warfarin: Importance of taking warfarin as instructed  Goal range and lab monitoring: goal range and significance of current result  Symptom monitoring: monitoring for bleeding signs and symptoms    Plan made per [facility] anticoagulation protocol    Mariel Dale RN  Anticoagulation Clinic  1/16/2024    _______________________________________________________________________     Anticoagulation Episode Summary       Current INR goal:  2.0-3.0   TTR:  33.8% (8.3 mo)   Target end date:  Indefinite   Send INR reminders to:  [facility]    Indications    Chronic atrial fibrillation (H) [I48.20]  Chronic anticoagulation  [Z79.01]  Long term (current) use of anticoagulants [Z79.01]  Peripheral arterial disease (H24) [I73.9]  S/P TAVR (transcatheter aortic valve replacement) [Z95.2]             Comments:               Anticoagulation Care Providers       Provider Role Specialty Phone number    Jazzy Gil MD Referring Family Medicine [phone]    Ihsan Singh MD Referring [facility] Medicine [phone]

## 2024-01-16 NOTE — LETTER
1/16/2024    Ihsan Singh MD  [address]  [facility] [address]    RE: Pee Ayala       Dear Colleague,     I had the pleasure of seeing Pee Ayala in the [facility].        Assessment/Recommendations   Assessment:    1.  Heart failure with preserved ejection fraction, NYHA class III: Compensated.  He states he has had increased dyspnea on exertion and lower extremity edema since discharge.  His weight has been stable.  Denies orthopnea or PND.  He does not always follow a low-sodium diet or monitor fluid intake.  He was started on spironolactone at discharge.  2.  CAD: Denies angina.  Status post three-vessel CABG in 2014.  Status post coronary angiogram December 2023 showed patent grafts with diffuse native disease with no targets for intervention.  He continues aspirin, carvedilol and rosuvastatin  3.  Permanent atrial fibrillation: Rate controlled.  He continues warfarin for anticoagulation and carvedilol for rate control  4.  Hypertension: Controlled.  Blood pressure 110/50  5.  Diabetes mellitus type 2: Hemoglobin A1c in October was 8.4.  He continues metformin and insulin.  Managed by primary.  May benefit from SGLT2 inhibitor  6.  Valvular heart disease: Status post TAVR in 2021.  Gradient of aortic valve is normal on recent echocardiogram    Plan:  1.  BMP and NT proBNP pending.  Based on labs may increase Lasix  2.  Continue current medications  3.  Continue monitoring weights and stressed importance of fluid restriction and monitoring salt intake  4.  Encouraged wearing compression socks    Pee Ayala will follow up with Dr. Hernandez in 4 months and in the heart failure clinic in 1 month.     History of Present Illness/Subjective    Mr. Pee Ayala is a 83 year old male seen at [facility] heart failure clinic today for continued follow-up.  His wife accompanies him today.  He follows up for heart failure with preserved ejection fraction.  He was  hospitalized December 21 to December 23 with acute heart failure due to medication noncompliance.  Had symptoms of dyspnea on exertion, orthopnea and weight gain.  He had an echocardiogram which showed ejection fraction of 60 to 65%.  He developed chest pain while hospitalized and underwent coronary angiogram December 22 which showed patent grafts with diffuse native disease and no targets for intervention.  He was diuresed and symptoms improved.  He has a past medical history significant for hypertension, hyperlipidemia, permanent atrial fibrillation, status post TAVR, CAD with three-vessel CABG in 2014, diabetes mellitus type 2.    Today, he complains of fatigue, weakness, dyspnea on exertion and lower extremity edema.  He denies lightheadedness, orthopnea, PND, palpitations, chest pain, and abdominal fullness/bloating.      He is monitoring home weights which are stable between 166-167 pounds.  He tries to follow a low-sodium diet.      ECHOCARDIOGRAM: 12/21/2023-reviewed  Interpretation Summary     Left ventricular size, wall motion and function are normal. The ejection  fraction is 60-65%.  TAPSE is abnormal, which is consistent with abnormal right ventricular  systolic function.  The left atrium is severely dilated.  The right atrium is mild to moderately dilated.  There is a bioprosthetic aortic valve.  The gradient is normal for this prosthetic aortic valve.  There is mild paravalvular regurgitation present.      Coronary angiogram: 12/22/2023-reviewed  CONCLUSIONS:   Severe multivessel coronary artery disease with chronic total occlusion of the left circumflex, mid left anterior descending artery, and mid right coronary artery.  Patent LIMA to the LAD with moderate diffuse disease of the native vessel beyond the anastomosis.  Patent vein graft to the OM1 branch with mild diffuse disease of the native vessel beyond the anastomosis.  Moderate diseased vein graft to the OM 2 branch with small, diffusely  diseased native vessel beyond the anastomosis.     RECOMMENDATIONS:   Usual postprocedure cares, please see orders.  Recommend medical management of patient's coronary artery disease.  Aggressive risk factor modification for secondary prevention.        Physical Examination Review of Systems   /50 (BP Location: Left arm, Patient Position: Sitting, Cuff Size: Adult Regular)   Pulse 56   Resp 16   Wt 76.7 kg (169 lb)   BMI 30.91 kg/m    Body mass index is 30.91 kg/m .  Wt Readings from Last 3 Encounters:   01/16/24 76.7 kg (169 lb)   12/28/23 75.7 kg (166 lb 12.8 oz)   12/23/23 71.5 kg (157 lb 9.6 oz)       General Appearance:   no acute distress   ENT/Mouth: No abnormalities   EYES:  no scleral icterus, normal conjunctivae   Neck: no thyromegaly   Chest/Lungs:   lungs are clear to auscultation, no rales or wheezing, equal chest wall expansion    Cardiovascular:   Irregularly irregular. Normal first and second heart sounds with no murmurs, rubs, or gallops, moderate edema bilaterally    Abdomen:  bowel sounds are present   Extremities: no cyanosis or clubbing   Skin: warm   Neurologic: no tremors     Psychiatric: alert and oriented x3                                              Medical History  Surgical History Family History Social History   Past Medical History:   Diagnosis Date    ACS (acute coronary syndrome) (H) 06/02/2014    Actinic keratosis 01/14/2014    Anticoagulated on Coumadin 12/30/2015    Atrial fibrillation (H) 01/01/2016    Bone mass 04/26/2017    Chest heaviness 01/23/2019    Chest pain 05/31/2014    Chronic heart failure with preserved ejection fraction (H) 01/16/2024    Closed fracture of left forearm 01/01/2015    Congestive heart failure (H)     Coronary artery disease     Difficulty in walking(719.7)     Dyslipidemia 08/31/2016    Dyspnea on exertion     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     Encounter for long-term (current) use of insulin (H) 08/11/2016    Esophageal reflux 11/18/2010    History of angina     HTN (hypertension) 06/30/2009     (Problem list name updated by automated process. Provider to review and confirm.)    Impotence of organic origin     Mixed hyperlipidemia 04/25/2007 April 25, 2007 restarted Zocor today, recheck in 3 months.  August 23, 2007 LDL at 101 with 40 mg, will increase to 80 mg. Recheck  In 3 months.     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     Walking troubles     Past Surgical History:   Procedure Laterality Date    BYPASS GRAFT ARTERY CORONARY N/A 09/10/2014    Procedure: BYPASS GRAFT ARTERY CORONARY;  Surgeon: Bharathi Caraballo MD;  Location: [facility] OR    BYPASS GRAFT ARTERY CORONARY  01/01/2016    COLONOSCOPY  01/14/2004    CORONARY ANGIOGRAPHY ADULT ORDER      CORONARY ARTERY BYPASS      CV CORONARY ANGIOGRAM N/A 04/04/2019    Procedure: Coronary Angiogram;  Surgeon: Dominik Vega MD;  Location: [facility] Cath Lab;  Service: Cardiology    CV CORONARY ANGIOGRAM N/A 01/06/2021    Procedure: Coronary Angiogram;  Surgeon: Dominik Vega MD;  Location: [facility] Cardiac Cath Lab;  Service: Cardiology    CV CORONARY ANGIOGRAM N/A 12/22/2023    Procedure: Coronary Angiogram;  Surgeon: Bahman Lenz MD;  Location: [facility] CATH LAB CV    CV LEFT HEART CATHETERIZATION WITHOUT LEFT VENTRICULOGRAM Left 04/04/2019    Procedure: Left Heart Catheterization Without Left  Ventriculogram;  Surgeon: Dominik Vega MD;  Location: [facility] Cath Lab;  Service: Cardiology    CV LEFT HEART CATHETERIZATION WITHOUT LEFT VENTRICULOGRAM Left 01/06/2021    Procedure: Left Heart Catheterization Without Left Ventriculogram;  Surgeon: Dominik Vega MD;  Location: [facility] Cardiac Cath Lab;  Service: Cardiology    CV RIGHT HEART CATHETERIZATION Right 04/04/2019    Procedure: Right Heart Catheterization;  Surgeon: Dominik Vega MD;  Location: [facility] Cath Lab;  Service: Cardiology    CV TRANSCATHETER AORTIC VALVE REPLACEMENT N/A 01/12/2021    Procedure: Right transfemoral transcatheter aortic valve replacement using Magdaleno Dominick 3 size 29mm.  Transthoracic echocardiogram;  Surgeon: Jo Romano MD;  Location: [facility] CARDIAC CATH LAB    ESOPHAGOSCOPY, GASTROSCOPY, DUODENOSCOPY (EGD), COMBINED N/A 01/13/2016    Procedure: COMBINED ESOPHAGOSCOPY, GASTROSCOPY, DUODENOSCOPY (EGD);  Surgeon: Rk Srivastava MD;  Location: [facility] FEMORAL CANNULIZATION WITH OPEN STANDBY REPAIR AORTIC VALVE N/A 01/12/2021    Procedure: Cardiopulmonary Bypass standby;  Surgeon: Jefferson Sandy MD;  Location: [facility] CARDIAC CATH LAB    HEART CATH, ANGIOPLASTY      IR LOWER EXTREMITY ANGIOGRAM LEFT  12/07/2021    LAPAROSCOPIC CHOLECYSTECTOMY  10/01/2009    MAZE PROCEDURE N/A 09/10/2014    Procedure: MAZE PROCEDURE;  Surgeon: Bharathi Caraballo MD;  Location:  OR    MOHS MICROGRAPHIC PROCEDURE      OPEN REDUCTION INTERNAL FIXATION HIP BIPOLAR Left 04/09/2022    Procedure: HEMIARTHROPLASTY, HIP, BIPOLAR, OPEN REDUCTION INTERNAL FIXATION OF GREATER TROCHANTER;  Surgeon: Jd Larose MD;  Location: [facility] OR    ORTHOPEDIC SURGERY      surgery for fx  Left forearm    OTHER SURGICAL HISTORY Left 01/01/2015    forearm sugery    STENT, CORONARY, HUMA  02/01/2016    VASECTOMY      Family History   Problem Relation Age of Onset    Diabetes Mother      Hypertension Father     Cerebrovascular Disease Father     Diabetes Maternal Grandmother     Breast Cancer No family hx of     Cancer - colorectal No family hx of     Prostate Cancer No family hx of     C.A.D. No family hx of     Diabetes Type 2  Mother         58 ,  from anesthesia complication.    Heart Disease Father         86  from stroke    No Known Problems Brother         60 years of age.    Social History     Socioeconomic History    Marital status:      Spouse name: Fay Ayala    Number of children: Not on file    Years of education: Not on file    Highest education level: Not on file   Occupational History     Employer: RETIRED   Tobacco Use    Smoking status: Former     Types: Cigarettes     Quit date: 1998     Years since quittin.0    Smokeless tobacco: Never   Substance and Sexual Activity    Alcohol use: Yes     Alcohol/week: 0.0 standard drinks of alcohol     Comment: Alcoholic Drinks/day: very rare    Drug use: No    Sexual activity: Never     Birth control/protection: None   Other Topics Concern    Parent/sibling w/ CABG, MI or angioplasty before 65F 55M? No   Social History Narrative     and lives with life.  Has adult children from previous relationship. ( Blended family).  Has a dog - Vincent Gil MD  1/3/2019         Social Determinants of Health     Financial Resource Strain: Low Risk  (2023)    Financial Resource Strain     Within the past 12 months, have you or your family members you live with been unable to get utilities (heat, electricity) when it was really needed?: No   Food Insecurity: Low Risk  (2023)    Food Insecurity     Within the past 12 months, did you worry that your food would run out before you got money to buy more?: No     Within the past 12 months, did the food you bought just not last and you didn t have money to get more?: No   Transportation Needs: Low Risk  (2023)    Transportation Needs     Within  the past 12 months, has lack of transportation kept you from medical appointments, getting your medicines, non-medical meetings or appointments, work, or from getting things that you need?: No   Physical Activity: Not on file   Stress: Not on file   Social Connections: Not on file   Interpersonal Safety: Low Risk  (10/23/2023)    Interpersonal Safety     Do you feel physically and emotionally safe where you currently live?: Yes     Within the past 12 months, have you been hit, slapped, kicked or otherwise physically hurt by someone?: No     Within the past 12 months, have you been humiliated or emotionally abused in other ways by your partner or ex-partner?: No   Housing Stability: Low Risk  (12/28/2023)    Housing Stability     Do you have housing? : Yes     Are you worried about losing your housing?: No          Medications  Allergies   Current Outpatient Medications   Medication Sig Dispense Refill    acetaminophen (TYLENOL) 500 MG tablet Take 1 tablet (500 mg) by mouth 2 times daily as needed for mild pain      aspirin 81 MG EC tablet Take 1 tablet (81 mg) by mouth daily 90 tablet 0    carvedilol (COREG) 3.125 MG tablet Take 6.25 mg by mouth 2 times daily (with meals)      Continuous Blood Gluc  (FREESTYLE DOMI 2 READER) DOROTEO Use to read blood sugars as per 's instructions. 1 each 0    Continuous Blood Gluc Sensor (FREESTYLE DOMI 2 SENSOR) MISC Change every 14 days. 6 each 3    furosemide (LASIX) 40 MG tablet Take 1 tablet (40 mg) by mouth 2 times daily 60 tablet 1    gabapentin (NEURONTIN) 600 MG tablet Take 600 mg by mouth every evening      insulin aspart prot & aspart (NOVOLOG MIX 70/30 PEN) (70-30) 100 UNIT/ML pen Inject subcutaneously 20 units in AM with breakfast and 12 in PM with dinner. If Blood Glucose<100 then give 1/2 dose) 15 mL 0    metFORMIN (GLUCOPHAGE) 500 MG tablet TAKE 2 TABLETS BY MOUTH TWICE  DAILY WITH MEALS 360 tablet 0    Multiple Vitamins-Minerals (PRESERVISION  AREDS PO) Take 1 tablet by mouth 2 times daily      pramipexole (MIRAPEX) 0.25 MG tablet Take 0.5 mg by mouth 2 times daily      rosuvastatin (CRESTOR) 20 MG tablet Take 40 mg by mouth every evening      spironolactone (ALDACTONE) 25 MG tablet Take 1 tablet (25 mg) by mouth daily 30 tablet 1    tamsulosin (FLOMAX) 0.4 MG capsule Take 0.4 mg by mouth every morning      warfarin ANTICOAGULANT (COUMADIN) 4 MG tablet Takes 1 tablet (4mg) 5 days of the week on Sun/Mon/Wed/Thurs/Sat, by mouth as directed.  Adjust dose based on INR results. 70 tablet 1    warfarin ANTICOAGULANT (COUMADIN) 5 MG tablet Take 5 mg by mouth See Admin Instructions Patient reports taking 4 mg every Sunday, Monday, Wednesday, Thursday, and Saturday. On Tuesday and Friday patient reports taking 5 mg.      Allergies   Allergen Reactions    Oxycodone Itching and Rash    Amlodipine Dizziness     Dizziness and dry heaves    Lisinopril Cough    Adhesive Tape Itching and Rash         Lab Results    Chemistry/lipid CBC Cardiac Enzymes/BNP/TSH/INR   Lab Results   Component Value Date    CHOL 136 04/06/2023    HDL 61 04/06/2023    TRIG 85 04/06/2023    BUN 23.8 (H) 12/28/2023     12/28/2023    CO2 28 12/28/2023    Lab Results   Component Value Date    WBC 6.1 12/21/2023    HGB 11.6 (L) 12/21/2023    HCT 37.5 (L) 12/21/2023     (H) 12/21/2023     12/21/2023    Lab Results   Component Value Date    TROPONINI 0.15 07/04/2022     (H) 07/04/2022    TSH 0.92 11/17/2022    INR 3.3 (H) 12/28/2023             This note has been dictated using voice recognition software. Any grammatical, typographical, or context distortions are unintentional and inherent to the software    40 minutes spent on the date of encounter doing chart review, review of outside records, review of test results, interpretation with above tests, patient visit, documentation, and discussion with family.        The longitudinal plan of care for heart failure with  preserved ejection fraction, CAD, hypertension, permanent atrial fibrillation, diabetes mellitus type 2, valvular heart disease was addressed during this visit. Due to the added complexity in care, I will continue to support Pee in the subsequent management of this condition(s) and with the ongoing continuity of care of this condition(s).                Thank you for allowing me to participate in the care of your patient.      Sincerely,     [facility]  cc:   Lorenzo Paula MD  [address]  [place] [address]

## 2024-01-17 ENCOUNTER — TELEPHONE (OUTPATIENT)
Dept: CARDIOLOGY | Facility: CLINIC | Age: 84
End: 2024-01-17
Payer: COMMERCIAL

## 2024-01-17 DIAGNOSIS — I50.33 ACUTE ON CHRONIC DIASTOLIC HEART FAILURE (H): ICD-10-CM

## 2024-01-17 RX ORDER — SPIRONOLACTONE 25 MG/1
25 TABLET ORAL DAILY
Qty: 90 TABLET | Refills: 3 | Status: SHIPPED | OUTPATIENT
Start: 2024-01-17

## 2024-01-17 RX ORDER — FUROSEMIDE 40 MG
TABLET ORAL
Qty: 270 TABLET | Refills: 3 | Status: ON HOLD | OUTPATIENT
Start: 2024-01-17 | End: 2024-08-22

## 2024-01-17 NOTE — PROGRESS NOTES
Spoke with patient about DSB's recommendations, pt stated they understood. I also re enforced these recommendations on his wife's VM.     Rx and BMP ordered.     Manolo Barros RN [facility]

## 2024-01-23 ENCOUNTER — ANTICOAGULATION THERAPY VISIT (OUTPATIENT)
Dept: ANTICOAGULATION | Facility: CLINIC | Age: 84
End: 2024-01-23

## 2024-01-23 ENCOUNTER — LAB (OUTPATIENT)
Dept: LAB | Facility: CLINIC | Age: 84
End: 2024-01-23
Payer: COMMERCIAL

## 2024-01-23 DIAGNOSIS — Z79.01 CHRONIC ANTICOAGULATION: ICD-10-CM

## 2024-01-23 DIAGNOSIS — I73.9 PERIPHERAL ARTERIAL DISEASE (H): ICD-10-CM

## 2024-01-23 DIAGNOSIS — Z95.2 S/P TAVR (TRANSCATHETER AORTIC VALVE REPLACEMENT): ICD-10-CM

## 2024-01-23 DIAGNOSIS — I48.20 CHRONIC ATRIAL FIBRILLATION (H): ICD-10-CM

## 2024-01-23 DIAGNOSIS — Z79.01 LONG TERM (CURRENT) USE OF ANTICOAGULANTS: ICD-10-CM

## 2024-01-23 DIAGNOSIS — I48.20 CHRONIC ATRIAL FIBRILLATION (H): Primary | ICD-10-CM

## 2024-01-23 LAB — INR BLD: 3.6 (ref 0.9–1.1)

## 2024-01-23 PROCEDURE — 36416 COLLJ CAPILLARY BLOOD SPEC: CPT

## 2024-01-23 PROCEDURE — 85610 PROTHROMBIN TIME: CPT

## 2024-01-23 NOTE — PROGRESS NOTES
ANTICOAGULATION MANAGEMENT     Pee Ayala 83 year old male is on warfarin with supratherapeutic INR result. (Goal INR 2.0-3.0)    Recent labs: (last 7 days)     01/23/24  1035   INR 3.6*       ASSESSMENT     Warfarin Lab Questionnaire    Warfarin Doses Last 7 Days      1/23/2024    10:28 AM   Dose in Tablet or Mg   TAB or MG? milligram (mg)     Pt Rptd Dose SUNDAY MONDAY TUESDAY WED THURS FRIDAY SATURDAY 1/23/2024  10:28 AM 4 4 4 4 4 4 4         1/23/2024   Warfarin Lab Questionnaire   Missed doses within past 14 days? No - HELD warfarin dose on 1/16/24 and also decreased weekly warfarin dose.     Changes in diet or alcohol within past 14 days? No   Medication changes since last result? No - 1/17/24 Lasix increased to 80mg morning and 40mg afternoon.     Injuries or illness since last result? No - reported retaining fluid.        - on 1/16/24 OV with [facility] for chronic HF with complaints of increased dyspnea and lower leg edema.  WT stable.  BNP was mildly elevated at 1,204     New shortness of breath, severe headaches or sudden changes in vision since last result? No   Abnormal bleeding since last result? No   Upcoming surgery, procedure? No   Best number to call with results? [phone]     Previous result: Supratherapeutic last 2 INR results;  (4.6 / 3.3)    Additional findings: None       PLAN     Recommended plan for ongoing change(s) affecting INR     Dosing Instructions: partial hold then decrease your warfarin dose (7.7% change) with next INR in 1-2 weeks       Summary  As of 1/23/2024      Full warfarin instructions:  1/23: 2 mg; Otherwise 2 mg every Wed, Sat; 4 mg all other days   Next INR check:  2/6/2024               Telephone call with Pee who verbalizes understanding and agrees to plan    Lab visit scheduled - INR on 2/6/24 @ [facility]    Education provided:   Taking warfarin: Importance of taking warfarin as instructed  Goal range and lab monitoring: goal range and significance of current  result  Symptom monitoring: monitoring for bleeding signs and symptoms    Plan made per [facility] anticoagulation protocol    Mariel Dale RN  Anticoagulation Clinic  1/23/2024    _______________________________________________________________________     Anticoagulation Episode Summary       Current INR goal:  2.0-3.0   TTR:  32.0% (8.3 mo)   Target end date:  Indefinite   Send INR reminders to:  [facility]    Indications    Chronic atrial fibrillation (H) [I48.20]  Chronic anticoagulation [Z79.01]  Long term (current) use of anticoagulants [Z79.01]  Peripheral arterial disease (H24) [I73.9]  S/P TAVR (transcatheter aortic valve replacement) [Z95.2]             Comments:               Anticoagulation Care Providers       Provider Role Specialty Phone number    Jazzy Gil MD Referring Family Medicine [phone]    Ihsan Singh MD Referring Family Medicine [phone]

## 2024-01-26 DIAGNOSIS — G25.81 RESTLESS LEG SYNDROME: Primary | ICD-10-CM

## 2024-01-26 RX ORDER — PRAMIPEXOLE DIHYDROCHLORIDE 0.5 MG/1
0.5 TABLET ORAL 2 TIMES DAILY
Qty: 180 TABLET | Refills: 1 | Status: SHIPPED | OUTPATIENT
Start: 2024-01-26 | End: 2024-07-10

## 2024-01-29 ENCOUNTER — TRANSFERRED RECORDS (OUTPATIENT)
Dept: HEALTH INFORMATION MANAGEMENT | Facility: CLINIC | Age: 84
End: 2024-01-29
Payer: COMMERCIAL

## 2024-01-31 ENCOUNTER — TELEPHONE (OUTPATIENT)
Dept: FAMILY MEDICINE | Facility: CLINIC | Age: 84
End: 2024-01-31
Payer: COMMERCIAL

## 2024-01-31 NOTE — TELEPHONE ENCOUNTER
Called and spoke with patient, discussed the need for pre-op and that recommendations will be made at that visit. Patient states that he needs a pre-op scheduled prior to 2/25/24, though he is unsure of his surgical date at this time. Educated that pre-op physicals are only good for 30 days and if surgical date falls outside of the 30 days he would need another. Patient stated understanding and requested appointment, scheduled with Dr. Gil for pre-op on 2/20.    Anita Donald RN

## 2024-01-31 NOTE — TELEPHONE ENCOUNTER
General Call    Contacts         Type Contact Phone/Fax    01/31/2024 01:23 PM CST Phone (Incoming) Gumaro Ayala (Self) [phone] (M)          Reason for Call:  gumaro called in to see how long he needs to hold off on his Warfarin prior to surgery. He told me that he is going to have surgery in the next month on his back with [facility] (no scheduled date yet for surgery) for them to concrete a collapsed disc and burn some nerves in that area too.   Please advise.       Could we send this information to you in [employer] or would you prefer to receive a phone call?:   Patient would prefer a phone call   Okay to leave a detailed message?: Yes at Cell number on file:    Telephone Information:   Mobile [phone]

## 2024-01-31 NOTE — TELEPHONE ENCOUNTER
He will need a preop prior to his surgery.  The doctor who sees him for that will determine when to hold warfarin.

## 2024-02-01 DIAGNOSIS — E11.9 TYPE 2 DIABETES, HBA1C GOAL < 8% (H): Primary | ICD-10-CM

## 2024-02-07 ENCOUNTER — TELEPHONE (OUTPATIENT)
Dept: ENDOCRINOLOGY | Facility: CLINIC | Age: 84
End: 2024-02-07

## 2024-02-08 ENCOUNTER — LAB (OUTPATIENT)
Dept: LAB | Facility: CLINIC | Age: 84
End: 2024-02-08
Payer: COMMERCIAL

## 2024-02-08 ENCOUNTER — ANTICOAGULATION THERAPY VISIT (OUTPATIENT)
Dept: ANTICOAGULATION | Facility: CLINIC | Age: 84
End: 2024-02-08

## 2024-02-08 ENCOUNTER — TELEPHONE (OUTPATIENT)
Dept: ANTICOAGULATION | Facility: CLINIC | Age: 84
End: 2024-02-08

## 2024-02-08 DIAGNOSIS — I48.20 CHRONIC ATRIAL FIBRILLATION (H): Primary | ICD-10-CM

## 2024-02-08 DIAGNOSIS — Z79.01 LONG TERM (CURRENT) USE OF ANTICOAGULANTS: ICD-10-CM

## 2024-02-08 DIAGNOSIS — I73.9 PERIPHERAL ARTERIAL DISEASE (H): ICD-10-CM

## 2024-02-08 DIAGNOSIS — Z95.2 S/P TAVR (TRANSCATHETER AORTIC VALVE REPLACEMENT): ICD-10-CM

## 2024-02-08 DIAGNOSIS — Z79.01 CHRONIC ANTICOAGULATION: ICD-10-CM

## 2024-02-08 DIAGNOSIS — I48.20 CHRONIC ATRIAL FIBRILLATION (H): ICD-10-CM

## 2024-02-08 DIAGNOSIS — E11.9 TYPE 2 DIABETES, HBA1C GOAL < 8% (H): ICD-10-CM

## 2024-02-08 LAB
HBA1C MFR BLD: 7.8 % (ref 0–5.6)
INR BLD: 1.2 (ref 0.9–1.1)

## 2024-02-08 PROCEDURE — 36415 COLL VENOUS BLD VENIPUNCTURE: CPT

## 2024-02-08 PROCEDURE — 83036 HEMOGLOBIN GLYCOSYLATED A1C: CPT

## 2024-02-08 PROCEDURE — 36416 COLLJ CAPILLARY BLOOD SPEC: CPT

## 2024-02-08 PROCEDURE — 85610 PROTHROMBIN TIME: CPT

## 2024-02-08 PROCEDURE — 80048 BASIC METABOLIC PNL TOTAL CA: CPT

## 2024-02-08 NOTE — TELEPHONE ENCOUNTER
This will be discussed with the patient during preop visit.  He may need bridging and I will review the chart at the visit.    Jazzy Gil MD

## 2024-02-08 NOTE — PROGRESS NOTES
ANTICOAGULATION MANAGEMENT     Pee Ayala 83 year old male is on warfarin with subtherapeutic INR result. (Goal INR 2.0-3.0)    Recent labs: (last 7 days)     02/08/24  1528   INR 1.2*       ASSESSMENT     Source(s): Chart Review and Patient/Caregiver Call     Warfarin doses taken: Held partial dose on 1/23/24,  recently which may be affecting INR   However, Pee reported he was taking more than ordered at 4mg daily   And decreased weekly warfarin dose.  Diet: Increased greens/vitamin K in diet; ongoing change   Reported has been eating more salads this week (everyday), than his usual.  He would like to continue this regiment, as he loves eating lettuce salads.  Medication/supplement changes: None noted  New illness, injury, or hospitalization: Yes:    [facility] follow-up on 1/29/24 for acute low back pain d/t L1 compression fracture.  Signs or symptoms of bleeding or clotting: No  Previous result: Supratherapeutic last 3 INR results; (3.6, 4.6, 3.3)  Additional findings: Upcoming surgery/procedure - will be scheduled for surgery for an L1 Kyphoplasty.   - per [facility] - will need 5 day warfarin HOLD.    - 2/8/24, Sent to pharmacist (Sandra Covarrubias, [facility]) to review warfarin hold and potential bridge.   - preop scheduled on 2/20/24 with Dr. Gil.   - patient planning a vacation on 3/24/24 for [place]       PLAN     Recommended plan for temporary change(s) and ongoing change(s) affecting INR     Dosing Instructions: booster dose then Increase your warfarin dose (20% change) with next INR in 5-7 days       Summary  As of 2/8/2024      Full warfarin instructions:  2/8: 8 mg; Otherwise 5 mg every Wed, Sat; 4 mg all other days   Next INR check:  2/15/2024               Telephone call with Pee who verbalizes understanding and agrees to plan    Lab visit scheduled - INR on 2/14/24 at follow-up visit appt with [facility]     Education provided:   Taking warfarin: Importance of taking  warfarin as instructed  Goal range and lab monitoring: goal range and significance of current result  Dietary considerations: importance of consistent vitamin K intake and impact of vitamin K foods on INR  Symptom monitoring: monitoring for clotting signs and symptoms    Plan made per [facility] anticoagulation protocol    Mariel Dale RN  Anticoagulation Clinic  2/8/2024    _______________________________________________________________________     Anticoagulation Episode Summary       Current INR goal:  2.0-3.0   TTR:  34.7% (8.3 mo)   Target end date:  Indefinite   Send INR reminders to:  [facility]    Indications    Chronic atrial fibrillation (H) [I48.20]  Chronic anticoagulation [Z79.01]  Long term (current) use of anticoagulants [Z79.01]  Peripheral arterial disease (H24) [I73.9]  S/P TAVR (transcatheter aortic valve replacement) [Z95.2]             Comments:               Anticoagulation Care Providers       Provider Role Specialty Phone number    Jazzy Gil MD Referring Family Medicine [phone]    Ihsan Singh MD Referring Family Medicine

## 2024-02-08 NOTE — TELEPHONE ENCOUNTER
General Call    Contacts         Type Contact Phone/Fax    01/31/2024 01:23 PM CST Phone (Incoming) Pee Ayala (Self) [phone] (M)    01/31/2024 02:18 PM CST Phone (Outgoing) Pee Ayala (Self) [phone] (M)          Reason for Call:  I did reach out to Elliot with Dr. Mckeon's team and she told me they DO need the ok for him to hold his warfarin for 5 DAYS prior to surgery. (Surgery is not scheduled, pending if he can hold his warfarin or not.)  Reach out to patient with if this can be held, and contact Dr. Hughes team at [facility] [phone]  Please advise.     Could we send this information to you in [place] or would you prefer to receive a phone call?:   Patient would prefer a phone call   Okay to leave a detailed message?: Yes at Cell number on file:    Telephone Information:   Mobile [phone]

## 2024-02-08 NOTE — TELEPHONE ENCOUNTER
See request from Dr. Marcial's team, at [facility], to hold the patient's warfarin 5 days prior to surgery, during telephone encounter into the clinic on 1/31/24.    Elliot at [facility] stated in the previous telephone call today, 2/8/24, that in order to schedule the patient's surgery for an L1 kyphoplasty, then [facility] needs affirmation to hold the patient's warfarin 5 days prior to the patient's future surgery.    Patient has an appointment scheduled with Jazzy Gil MD, at 1:30 pm, in the [facility], on 2/20/24,Tuesday, with arrival time to the clinic at 1:10 pm.    Writer will route the above request to Jazzy Gil MD, to review and advise next steps in regards to the patient's warfarin dosing.    (Writer attempted to forward the information to the patient's listed PCP, but there was an internal message stating that the PCP is out of the office until January of 2025.)  Rosa Ang RN, BSN    [facility]

## 2024-02-08 NOTE — TELEPHONE ENCOUNTER
Sandra Covarrubias, [facility]     - scheduled surgery for an L1 kyphoplasty with [facility]   - requesting 5 day warfarin HOLD.   - preop appt with Dr. Gil on 2/20/24.     -  please review warfarin hold and potential bridging.

## 2024-02-09 LAB
ANION GAP SERPL CALCULATED.3IONS-SCNC: 12 MMOL/L (ref 7–15)
BUN SERPL-MCNC: 29 MG/DL (ref 8–23)
CALCIUM SERPL-MCNC: 9.4 MG/DL (ref 8.8–10.2)
CHLORIDE SERPL-SCNC: 98 MMOL/L (ref 98–107)
CREAT SERPL-MCNC: 1.18 MG/DL (ref 0.67–1.17)
DEPRECATED HCO3 PLAS-SCNC: 29 MMOL/L (ref 22–29)
EGFRCR SERPLBLD CKD-EPI 2021: 62 ML/MIN/1.73M2
GLUCOSE SERPL-MCNC: 290 MG/DL (ref 70–99)
POTASSIUM SERPL-SCNC: 4.4 MMOL/L (ref 3.4–5.3)
SODIUM SERPL-SCNC: 139 MMOL/L (ref 135–145)

## 2024-02-13 ENCOUNTER — OFFICE VISIT (OUTPATIENT)
Dept: ENDOCRINOLOGY | Facility: CLINIC | Age: 84
End: 2024-02-13
Payer: COMMERCIAL

## 2024-02-13 VITALS
WEIGHT: 164.4 LBS | OXYGEN SATURATION: 98 % | BODY MASS INDEX: 30.07 KG/M2 | DIASTOLIC BLOOD PRESSURE: 62 MMHG | HEART RATE: 79 BPM | SYSTOLIC BLOOD PRESSURE: 138 MMHG

## 2024-02-13 DIAGNOSIS — L08.9 LOCAL INFECTION OF SKIN AND SUBCUTANEOUS TISSUE: ICD-10-CM

## 2024-02-13 DIAGNOSIS — E11.42 TYPE 2 DIABETES MELLITUS WITH PERIPHERAL NEUROPATHY (H): Primary | ICD-10-CM

## 2024-02-13 PROCEDURE — 95251 CONT GLUC MNTR ANALYSIS I&R: CPT | Performed by: NURSE PRACTITIONER

## 2024-02-13 PROCEDURE — 99214 OFFICE O/P EST MOD 30 MIN: CPT | Performed by: NURSE PRACTITIONER

## 2024-02-13 PROCEDURE — G2211 COMPLEX E/M VISIT ADD ON: HCPCS | Performed by: NURSE PRACTITIONER

## 2024-02-13 RX ORDER — OXYCODONE HYDROCHLORIDE 5 MG/1
TABLET ORAL
COMMUNITY
Start: 2024-01-29 | End: 2024-02-16

## 2024-02-13 RX ORDER — CEPHALEXIN 500 MG/1
500 CAPSULE ORAL 2 TIMES DAILY
Qty: 20 CAPSULE | Refills: 0 | Status: SHIPPED | OUTPATIENT
Start: 2024-02-13 | End: 2024-08-22
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
## 2024-02-14 ENCOUNTER — OFFICE VISIT (OUTPATIENT)
Dept: CARDIOLOGY | Facility: CLINIC | Age: 84
End: 2024-02-14
Attending: NURSE PRACTITIONER
Payer: COMMERCIAL

## 2024-02-14 ENCOUNTER — LAB (OUTPATIENT)
Dept: CARDIOLOGY | Facility: CLINIC | Age: 84
End: 2024-02-14
Payer: COMMERCIAL

## 2024-02-14 ENCOUNTER — ANTICOAGULATION THERAPY VISIT (OUTPATIENT)
Dept: ANTICOAGULATION | Facility: CLINIC | Age: 84
End: 2024-02-14

## 2024-02-14 VITALS
WEIGHT: 165 LBS | HEART RATE: 47 BPM | RESPIRATION RATE: 14 BRPM | SYSTOLIC BLOOD PRESSURE: 120 MMHG | DIASTOLIC BLOOD PRESSURE: 60 MMHG | BODY MASS INDEX: 30.18 KG/M2

## 2024-02-14 DIAGNOSIS — I73.9 PERIPHERAL ARTERIAL DISEASE (H): ICD-10-CM

## 2024-02-14 DIAGNOSIS — I48.20 CHRONIC ATRIAL FIBRILLATION (H): Primary | ICD-10-CM

## 2024-02-14 DIAGNOSIS — Z95.2 S/P TAVR (TRANSCATHETER AORTIC VALVE REPLACEMENT): ICD-10-CM

## 2024-02-14 DIAGNOSIS — I50.32 CHRONIC HEART FAILURE WITH PRESERVED EJECTION FRACTION (H): Primary | ICD-10-CM

## 2024-02-14 DIAGNOSIS — I35.0 NONRHEUMATIC AORTIC VALVE STENOSIS: ICD-10-CM

## 2024-02-14 DIAGNOSIS — I48.20 CHRONIC ATRIAL FIBRILLATION (H): ICD-10-CM

## 2024-02-14 DIAGNOSIS — I25.10 CORONARY ARTERY DISEASE INVOLVING NATIVE CORONARY ARTERY OF NATIVE HEART WITHOUT ANGINA PECTORIS: ICD-10-CM

## 2024-02-14 DIAGNOSIS — Z79.01 LONG TERM (CURRENT) USE OF ANTICOAGULANTS: ICD-10-CM

## 2024-02-14 DIAGNOSIS — I10 PRIMARY HYPERTENSION: ICD-10-CM

## 2024-02-14 DIAGNOSIS — Z79.01 CHRONIC ANTICOAGULATION: ICD-10-CM

## 2024-02-14 LAB — INR POINT OF CARE: 1.6 (ref 0.9–1.1)

## 2024-02-14 PROCEDURE — 99214 OFFICE O/P EST MOD 30 MIN: CPT | Performed by: NURSE PRACTITIONER

## 2024-02-14 PROCEDURE — 85610 PROTHROMBIN TIME: CPT

## 2024-02-14 PROCEDURE — 36416 COLLJ CAPILLARY BLOOD SPEC: CPT

## 2024-02-14 NOTE — PATIENT INSTRUCTIONS
Pee Ayala,    It was a pleasure to see you today at [facility].     My recommendations after this visit include:  - Please follow up with Dr Hernandez in November   - Please follow up with Monae Reich May 6  - Please follow up with Lilli Guzman in 6 months  - Continue current medications    Lilli Guzman, CNP

## 2024-02-14 NOTE — PROGRESS NOTES
ANTICOAGULATION MANAGEMENT     Pee Ayala 83 year old male is on warfarin with subtherapeutic INR result. (Goal INR 2.0-3.0)    Recent labs: (last 7 days)     02/14/24  1113   INR 1.6*       ASSESSMENT     Source(s): Chart Review and Patient/Caregiver Call     Warfarin doses taken: Booster dose on 2/8/24, recently taken which may be affecting INR   And weekly warfarin dose was increased on 2/8/24.  Diet: No new diet changes identified   Continues to eat more Vitamin-K foods, as planned on 2/8/24.  He likes to eat lettuce salads.  Medication/supplement changes: None noted  New illness, injury, or hospitalization: No  Signs or symptoms of bleeding or clotting: No  Previous result: Subtherapeutic at 1.2 on 2/8/24   (Supratherapeutic last 3 INRs from 12/28/23 - 1/23/24; 3.6, 4.6, 3.3)  Additional findings:  Yes.  - Upcoming surgery/procedure - will be scheduled for surgery for an L1 Kyphoplasty with .  (however, he is still awaiting when he will be scheduled.  He has called 2x).              - per [facility] - will need 5 day warfarin HOLD.    - 2/8/24, already sent to pharmacist (Sandra Covarrubias, [facility]) to review warfarin hold and potential bridge.              - preop scheduled on 2/20/24 with Dr. Gil.              - patient planning a vacation on 3/24/24 for [place]       PLAN     Recommended plan for ongoing change(s) affecting INR     Dosing Instructions: booster dose then Increase your warfarin dose (10.3% change) with next INR in 1-2 weeks       Summary  As of 2/14/2024      Full warfarin instructions:  2/14: 9 mg; Otherwise 4 mg every Sun, Tue, Thu; 5 mg all other days   Next INR check:  2/28/2024               Telephone call with Pee who verbalizes understanding and agrees to plan    Check at provider office visit - INR on 2/20/24 at preop appt with Dr. Gil    Education provided:   Taking warfarin: Importance of taking warfarin as instructed  Goal range and lab monitoring: goal  range and significance of current result  Dietary considerations: importance of consistent vitamin K intake  Importance of notifying anticoagulation clinic for: upcoming surgeries and procedures 2 weeks in advance    Plan made per [facility] anticoagulation protocol    Mariel Dale RN  Anticoagulation Clinic  2/14/2024    _______________________________________________________________________     Anticoagulation Episode Summary       Current INR goal:  2.0-3.0   TTR:  34.7% (8.3 mo)   Target end date:  Indefinite   Send INR reminders to:  [facility] [place]    Indications    Chronic atrial fibrillation (H) [I48.20]  Chronic anticoagulation [Z79.01]  Long term (current) use of anticoagulants [Z79.01]  Peripheral arterial disease (H24) [I73.9]  S/P TAVR (transcatheter aortic valve replacement) [Z95.2]             Comments:               Anticoagulation Care Providers       Provider Role Specialty Phone number    Jazzy Gil MD Referring Family Medicine [phone]    Ihsan Singh MD Referring Family Medicine

## 2024-02-14 NOTE — TELEPHONE ENCOUNTER
Incoming call from Elliot with Dr. Mckeon's office inquiring about their request for hold on patient's warfarin. States that no one got back to her and the patient called today upset. He told them that he was approved for the hold and needs to get everything done before 2/20. I couldn't find anything that encounter that said for patient to hold on his warfarin. Dr. Mckeon's office is requesting a call back and also wants us to reach to the patient as well. Please advise

## 2024-02-14 NOTE — LETTER
2/14/2024    Jazzy Gil MD  [address]  [facility] [address]    RE: Pee Ayala       Dear Colleague,     I had the pleasure of seeing Pee Ayala in the [facility].        Assessment/Recommendations   Assessment:    1.  Heart failure with preserved ejection fraction, NYHA class III: Compensated.  I increased his Lasix during his last appointment and his lower extremity edema has improved.  He continues to have fatigue and dyspnea on exertion.  His weight has also decreased.  2.  Hypertension: Controlled.  Blood pressure 120/60  3.  CAD: Denies angina.  Status post three-vessel CABG in 2014.  Status post coronary angiogram December 2023 showed patent grafts with diffuse native disease with no targets for intervention.  He continues aspirin, carvedilol and rosuvastatin   4.   Permanent atrial fibrillation: Rate controlled.  He continues warfarin for anticoagulation and carvedilol for rate control   5.  Valvular heart disease: Status post TAVR in 2021.  Gradient of aortic valve is normal on recent echocardiogram     Plan:  1.  Continue current medications  2.  Continue monitoring weights and following a low-salt diet    Pee Ayala will follow up with Dr. Hernandez in November, Monae Reich May 6 and in the heart failure clinic in August.     History of Present Illness/Subjective    Mr. Pee Ayala is a 83 year old male seen at [facility] heart failure clinic today for continued follow-up.  He follows up for heart failure with preserved ejection fraction.  He was hospitalized December with acute heart failure due to medication noncompliance.  Had symptoms of dyspnea on exertion, orthopnea and weight gain.  He had an echocardiogram which showed ejection fraction of 60 to 65%.  He developed chest pain while hospitalized and underwent coronary angiogram December 22 which showed patent grafts with diffuse native disease and no targets for intervention. He has a  past medical history significant for hypertension, hyperlipidemia, permanent atrial fibrillation, status post TAVR, CAD with three-vessel CABG in 2014, diabetes mellitus type 2.      During the last clinic visit, I increased his Lasix.  Today, his lower extremity edema has improved.  He has fatigue and dyspnea on exertion.  He denies lightheadedness, shortness of breath, orthopnea, PND, palpitations, chest pain, and abdominal fullness/bloating.      He is monitoring home weights which are stable between 160-164 pounds.  He is following a low sodium diet.      Physical Examination Review of Systems   /60 (BP Location: Right arm, Patient Position: Sitting, Cuff Size: Adult Regular)   Pulse (!) 47   Resp 14   Wt 74.8 kg (165 lb)   BMI 30.18 kg/m    Body mass index is 30.18 kg/m .  Wt Readings from Last 3 Encounters:   02/14/24 74.8 kg (165 lb)   02/13/24 74.6 kg (164 lb 6.4 oz)   01/16/24 76.7 kg (169 lb)       General Appearance:   no acute distress   ENT/Mouth: No abnormalities   EYES:  no scleral icterus, normal conjunctivae   Neck: no thyromegaly   Chest/Lungs:   lungs are clear to auscultation, no rales or wheezing, equal chest wall expansion    Cardiovascular:   Irregular regular. Normal first and second heart sounds with no murmurs, rubs, or gallops, mild edema bilaterally    Abdomen:  bowel sounds are present   Extremities: no cyanosis or clubbing   Skin: warm   Neurologic: no tremors     Psychiatric: alert and oriented x3    Enc Vitals  BP: 120/60  Pulse: (!) 47  Resp: 14  Weight: 74.8 kg (165 lb)                                         Medical History  Surgical History Family History Social History   Past Medical History:   Diagnosis Date    ACS (acute coronary syndrome) (H) 06/02/2014    Actinic keratosis 01/14/2014    Anticoagulated on Coumadin 12/30/2015    Atrial fibrillation (H) 01/01/2016    Bone mass 04/26/2017    Chest heaviness 01/23/2019    Chest pain 05/31/2014    Chronic heart failure  with preserved ejection fraction (H) 01/16/2024    Closed fracture of left forearm 01/01/2015    Congestive heart failure (H)     Coronary artery disease     Difficulty in walking(719.7)     Dyslipidemia 08/31/2016    Dyspnea on exertion     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     Encounter for long-term (current) use of insulin (H) 08/11/2016    Esophageal reflux 11/18/2010    History of angina     HTN (hypertension) 06/30/2009     (Problem list name updated by automated process. Provider to review and confirm.)    Impotence of organic origin     Mixed hyperlipidemia 04/25/2007 April 25, 2007 restarted Zocor today, recheck in 3 months.  August 23, 2007 LDL at 101 with 40 mg, will increase to 80 mg. Recheck  In 3 months.     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     Walking troubles     Past Surgical History:   Procedure Laterality Date    BYPASS GRAFT ARTERY CORONARY N/A 09/10/2014    Procedure: BYPASS GRAFT ARTERY CORONARY;  Surgeon: Bharathi Caraballo MD;  Location: [facility] OR    BYPASS GRAFT ARTERY CORONARY  01/01/2016    COLONOSCOPY  01/14/2004    CORONARY ANGIOGRAPHY ADULT ORDER      CORONARY ARTERY BYPASS      CV CORONARY ANGIOGRAM N/A 04/04/2019    Procedure: Coronary Angiogram;  Surgeon: Dominik Vega MD;  Location: [facility] Cath Lab;  Service: Cardiology    CV CORONARY ANGIOGRAM N/A 01/06/2021    Procedure: Coronary  Angiogram;  Surgeon: Dominik Vega MD;  Location: [facility] Cath Lab;  Service: Cardiology    CV CORONARY ANGIOGRAM N/A 12/22/2023    Procedure: Coronary Angiogram;  Surgeon: Bahman Lenz MD;  Location: [facility] CV    CV LEFT HEART CATHETERIZATION WITHOUT LEFT VENTRICULOGRAM Left 04/04/2019    Procedure: Left Heart Catheterization Without Left Ventriculogram;  Surgeon: Dominik Vega MD;  Location: [facility] Lab;  Service: Cardiology    CV LEFT HEART CATHETERIZATION WITHOUT LEFT VENTRICULOGRAM Left 01/06/2021    Procedure: Left Heart Catheterization Without Left Ventriculogram;  Surgeon: Dominik Vega MD;  Location: [facility] Cath Lab;  Service: Cardiology    CV RIGHT HEART CATHETERIZATION Right 04/04/2019    Procedure: Right Heart Catheterization;  Surgeon: Dominik Vega MD;  Location: [facility];  Service: Cardiology    CV TRANSCATHETER AORTIC VALVE REPLACEMENT N/A 01/12/2021    Procedure: Right transfemoral transcatheter aortic valve replacement using Magdaleno Dominick 3 size 29mm.  Transthoracic echocardiogram;  Surgeon: Jo Romano MD;  Location: [facility] CARDIAC CATH LAB    ESOPHAGOSCOPY, GASTROSCOPY, DUODENOSCOPY (EGD), COMBINED N/A 01/13/2016    Procedure: COMBINED ESOPHAGOSCOPY, GASTROSCOPY, DUODENOSCOPY (EGD);  Surgeon: Rk Srivastava MD;  Location[facility] FEMORAL CANNULIZATION WITH OPEN STANDBY REPAIR AORTIC VALVE N/A 01/12/2021    Procedure: Cardiopulmonary Bypass standby;  Surgeon: Jefferson Sandy MD;  Location: [facility] CARDIAC CATH LAB    HEART CATH, ANGIOPLASTY      IR LOWER EXTREMITY ANGIOGRAM LEFT  12/07/2021    LAPAROSCOPIC CHOLECYSTECTOMY  10/01/2009    MAZE PROCEDURE N/A 09/10/2014    Procedure: MAZE PROCEDURE;  Surgeon: Bharathi Caraballo MD;  Location:  OR    MOHS MICROGRAPH PROCEDURE      OPEN REDUCTION INTERNAL FIXATION HIP BIPOLAR Left 04/09/2022    Procedure:  HEMIARTHROPLASTY, HIP, BIPOLAR, OPEN REDUCTION INTERNAL FIXATION OF GREATER TROCHANTER;  Surgeon: Jd Larose MD;  Location: [facility] OR    ORTHOPEDIC SURGERY      surgery for fx  Left forearm    OTHER SURGICAL HISTORY Left 2015    forearm sugery    STENT, CORONARY, HUMA  2016    VASECTOMY      Family History   Problem Relation Age of Onset    Diabetes Mother     Hypertension Father     Cerebrovascular Disease Father     Diabetes Maternal Grandmother     Breast Cancer No family hx of     Cancer - colorectal No family hx of     Prostate Cancer No family hx of     C.A.D. No family hx of     Diabetes Type 2  Mother         58 ,  from anesthesia complication.    Heart Disease Father         86  from stroke    No Known Problems Brother         60 years of age.    Social History     Socioeconomic History    Marital status:      Spouse name: Fay Ayala    Number of children: Not on file    Years of education: Not on file    Highest education level: Not on file   Occupational History     Employer: RETIRED   Tobacco Use    Smoking status: Former     Types: Cigarettes     Quit date: 1998     Years since quittin.1    Smokeless tobacco: Never   Substance and Sexual Activity    Alcohol use: Yes     Alcohol/week: 0.0 standard drinks of alcohol     Comment: Alcoholic Drinks/day: very rare    Drug use: No    Sexual activity: Never     Birth control/protection: None   Other Topics Concern    Parent/sibling w/ CABG, MI or angioplasty before 65F 55M? No   Social History Narrative     and lives with life.  Has adult children from previous relationship. ( Blended family).  Has a dog - Vincent Gil MD  1/3/2019         Social Determinants of Health     Financial Resource Strain: Low Risk  (2023)    Financial Resource Strain     Within the past 12 months, have you or your family members you live with been unable to get utilities (heat, electricity) when it was  really needed?: No   Food Insecurity: Low Risk  (12/28/2023)    Food Insecurity     Within the past 12 months, did you worry that your food would run out before you got money to buy more?: No     Within the past 12 months, did the food you bought just not last and you didn t have money to get more?: No   Transportation Needs: Low Risk  (12/28/2023)    Transportation Needs     Within the past 12 months, has lack of transportation kept you from medical appointments, getting your medicines, non-medical meetings or appointments, work, or from getting things that you need?: No   Physical Activity: Not on file   Stress: Not on file   Social Connections: Not on file   Interpersonal Safety: Low Risk  (10/23/2023)    Interpersonal Safety     Do you feel physically and emotionally safe where you currently live?: Yes     Within the past 12 months, have you been hit, slapped, kicked or otherwise physically hurt by someone?: No     Within the past 12 months, have you been humiliated or emotionally abused in other ways by your partner or ex-partner?: No   Housing Stability: Low Risk  (12/28/2023)    Housing Stability     Do you have housing? : Yes     Are you worried about losing your housing?: No          Medications  Allergies   Current Outpatient Medications   Medication Sig Dispense Refill    acetaminophen (TYLENOL) 500 MG tablet Take 1 tablet (500 mg) by mouth 2 times daily as needed for mild pain      aspirin 81 MG EC tablet Take 1 tablet (81 mg) by mouth daily 90 tablet 0    carvedilol (COREG) 3.125 MG tablet Take 6.25 mg by mouth 2 times daily (with meals)      cephALEXin (KEFLEX) 500 MG capsule Take 1 capsule (500 mg) by mouth 2 times daily 20 capsule 0    Continuous Blood Gluc  (FREESTYLE DOMI 2 READER) DOROTEO Use to read blood sugars as per 's instructions. 1 each 0    Continuous Blood Gluc Sensor (FREESTYLE DOMI 2 SENSOR) MISC Change every 14 days. 6 each 3    furosemide (LASIX) 40 MG tablet Please  take 80 mg (2 tablets) in the morning and 40 mg (1 tablet) in the afternoon. 270 tablet 3    gabapentin (NEURONTIN) 600 MG tablet Take 600 mg by mouth every evening      insulin aspart prot & aspart (NOVOLOG MIX 70/30 PEN) (70-30) 100 UNIT/ML pen Inject subcutaneously 20 units in AM with breakfast and 12 in PM with dinner. If Blood Glucose<100 then give 1/2 dose) 15 mL 0    metFORMIN (GLUCOPHAGE) 500 MG tablet TAKE 2 TABLETS BY MOUTH TWICE  DAILY WITH MEALS 360 tablet 0    Multiple Vitamins-Minerals (PRESERVISION AREDS PO) Take 1 tablet by mouth 2 times daily      oxyCODONE (ROXICODONE) 5 MG tablet TAKE 1 TO 2 TABLETS BY MOUTH 4 TIMES DAILY AS NEEDED FOR CHRONIC PAIN      pramipexole (MIRAPEX) 0.5 MG tablet Take 1 tablet (0.5 mg) by mouth 2 times daily 180 tablet 1    rosuvastatin (CRESTOR) 20 MG tablet Take 40 mg by mouth every evening      spironolactone (ALDACTONE) 25 MG tablet Take 1 tablet (25 mg) by mouth daily 90 tablet 3    tamsulosin (FLOMAX) 0.4 MG capsule Take 0.4 mg by mouth every morning      warfarin ANTICOAGULANT (COUMADIN) 4 MG tablet Takes 1 tablet (4mg) 5 days of the week on Sun/Mon/Wed/Thurs/Sat, by mouth as directed.  Adjust dose based on INR results. 70 tablet 1    warfarin ANTICOAGULANT (COUMADIN) 5 MG tablet Take 5 mg by mouth See Admin Instructions Patient reports taking 4 mg every Sunday, Monday, Wednesday, Thursday, and Saturday. On Tuesday and Friday patient reports taking 5 mg.      Allergies   Allergen Reactions    Oxycodone Itching and Rash    Amlodipine Dizziness     Dizziness and dry heaves    Lisinopril Cough    Adhesive Tape Itching and Rash         Lab Results    Chemistry/lipid CBC Cardiac Enzymes/BNP/TSH/INR   Lab Results   Component Value Date    CHOL 136 04/06/2023    HDL 61 04/06/2023    TRIG 85 04/06/2023    BUN 29.0 (H) 02/08/2024     02/08/2024    CO2 29 02/08/2024    Lab Results   Component Value Date    WBC 6.1 12/21/2023    HGB 11.6 (L) 12/21/2023    HCT 37.5 (L)  12/21/2023     (H) 12/21/2023     12/21/2023    Lab Results   Component Value Date    TROPONINI 0.15 07/04/2022     (H) 07/04/2022    TSH 0.92 11/17/2022    INR 1.2 (H) 02/08/2024             This note has been dictated using voice recognition software. Any grammatical, typographical, or context distortions are unintentional and inherent to the software    30 minutes spent on the date of encounter doing chart review, review of outside records, review of test results, interpretation with above tests, patient visit, and documentation.                    Thank you for allowing me to participate in the care of your patient.      Sincerely,     BOBBY Mckoy CNP     [facility]  cc:   BOBBY Mckoy CNP  [address], [address]  [place] [address]

## 2024-02-14 NOTE — PROGRESS NOTES
Assessment/Recommendations   Assessment:    1.  Heart failure with preserved ejection fraction, NYHA class III: Compensated.  I increased his Lasix during his last appointment and his lower extremity edema has improved.  He continues to have fatigue and dyspnea on exertion.  His weight has also decreased.  2.  Hypertension: Controlled.  Blood pressure 120/60  3.  CAD: Denies angina.  Status post three-vessel CABG in 2014.  Status post coronary angiogram December 2023 showed patent grafts with diffuse native disease with no targets for intervention.  He continues aspirin, carvedilol and rosuvastatin   4.   Permanent atrial fibrillation: Rate controlled.  He continues warfarin for anticoagulation and carvedilol for rate control   5.  Valvular heart disease: Status post TAVR in 2021.  Gradient of aortic valve is normal on recent echocardiogram     Plan:  1.  Continue current medications  2.  Continue monitoring weights and following a low-salt diet    Pee Ayala will follow up with Dr. Hernandez in November, Monae Reich May 6 and in the heart failure clinic in August.     History of Present Illness/Subjective    Mr. Pee Ayala is a 83 year old male seen at [facility] heart failure clinic today for continued follow-up.  He follows up for heart failure with preserved ejection fraction.  He was hospitalized December with acute heart failure due to medication noncompliance.  Had symptoms of dyspnea on exertion, orthopnea and weight gain.  He had an echocardiogram which showed ejection fraction of 60 to 65%.  He developed chest pain while hospitalized and underwent coronary angiogram December 22 which showed patent grafts with diffuse native disease and no targets for intervention. He has a past medical history significant for hypertension, hyperlipidemia, permanent atrial fibrillation, status post TAVR, CAD with three-vessel CABG in 2014, diabetes mellitus type 2.      During the last clinic  visit, I increased his Lasix.  Today, his lower extremity edema has improved.  He has fatigue and dyspnea on exertion.  He denies lightheadedness, shortness of breath, orthopnea, PND, palpitations, chest pain, and abdominal fullness/bloating.      He is monitoring home weights which are stable between 160-164 pounds.  He is following a low sodium diet.      Physical Examination Review of Systems   /60 (BP Location: Right arm, Patient Position: Sitting, Cuff Size: Adult Regular)   Pulse (!) 47   Resp 14   Wt 74.8 kg (165 lb)   BMI 30.18 kg/m    Body mass index is 30.18 kg/m .  Wt Readings from Last 3 Encounters:   02/14/24 74.8 kg (165 lb)   02/13/24 74.6 kg (164 lb 6.4 oz)   01/16/24 76.7 kg (169 lb)       General Appearance:   no acute distress   ENT/Mouth: No abnormalities   EYES:  no scleral icterus, normal conjunctivae   Neck: no thyromegaly   Chest/Lungs:   lungs are clear to auscultation, no rales or wheezing, equal chest wall expansion    Cardiovascular:   Irregular regular. Normal first and second heart sounds with no murmurs, rubs, or gallops, mild edema bilaterally    Abdomen:  bowel sounds are present   Extremities: no cyanosis or clubbing   Skin: warm   Neurologic: no tremors     Psychiatric: alert and oriented x3    Enc Vitals  BP: 120/60  Pulse: (!) 47  Resp: 14  Weight: 74.8 kg (165 lb)                                         Medical History  Surgical History Family History Social History   Past Medical History:   Diagnosis Date    ACS (acute coronary syndrome) (H) 06/02/2014    Actinic keratosis 01/14/2014    Anticoagulated on Coumadin 12/30/2015    Atrial fibrillation (H) 01/01/2016    Bone mass 04/26/2017    Chest heaviness 01/23/2019    Chest pain 05/31/2014    Chronic heart failure with preserved ejection fraction (H) 01/16/2024    Closed fracture of left forearm 01/01/2015    Congestive heart failure (H)     Coronary artery disease     Difficulty in walking(719.7)     Dyslipidemia  08/31/2016    Dyspnea on exertion     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     Encounter for long-term (current) use of insulin (H) 08/11/2016    Esophageal reflux 11/18/2010    History of angina     HTN (hypertension) 06/30/2009     (Problem list name updated by automated process. Provider to review and confirm.)    Impotence of organic origin     Mixed hyperlipidemia 04/25/2007 April 25, 2007 restarted Zocor today, recheck in 3 months.  August 23, 2007 LDL at 101 with 40 mg, will increase to 80 mg. Recheck  In 3 months.     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     Walking troubles     Past Surgical History:   Procedure Laterality Date    BYPASS GRAFT ARTERY CORONARY N/A 09/10/2014    Procedure: BYPASS GRAFT ARTERY CORONARY;  Surgeon: Bharathi Caraballo MD;  Location: [facility] OR    BYPASS GRAFT ARTERY CORONARY  01/01/2016    COLONOSCOPY  01/14/2004    CORONARY ANGIOGRAPHY ADULT ORDER      CORONARY ARTERY BYPASS      CV CORONARY ANGIOGRAM N/A 04/04/2019    Procedure: Coronary Angiogram;  Surgeon: Dominik Vega MD;  Location: [facility] Cath Lab;  Service: Cardiology    CV CORONARY ANGIOGRAM N/A 01/06/2021    Procedure: Coronary Angiogram;  Surgeon: Dominik Vega MD;  Location: [facility] Cardiac Cath Lab;  Service: Cardiology    CV CORONARY ANGIOGRAM N/A 12/22/2023    Procedure: Coronary Angiogram;  Surgeon: Delfin  MD Bahman;  Location: [facility] CV    CV LEFT HEART CATHETERIZATION WITHOUT LEFT VENTRICULOGRAM Left 04/04/2019    Procedure: Left Heart Catheterization Without Left Ventriculogram;  Surgeon: Dominik Vega MD;  Location: [facility] Cath Lab;  Service: Cardiology    CV LEFT HEART CATHETERIZATION WITHOUT LEFT VENTRICULOGRAM Left 01/06/2021    Procedure: Left Heart Catheterization Without Left Ventriculogram;  Surgeon: Dominik Vega MD;  Location: [facility] Cardiac Cath Lab;  Service: Cardiology    CV RIGHT HEART CATHETERIZATION Right 04/04/2019    Procedure: Right Heart Catheterization;  Surgeon: Dominik Vega MD;  Location: [facility] Cath Lab;  Service: Cardiology    CV TRANSCATHETER AORTIC VALVE REPLACEMENT N/A 01/12/2021    Procedure: Right transfemoral transcatheter aortic valve replacement using Magdaleno Dominick 3 size 29mm.  Transthoracic echocardiogram;  Surgeon: Jo Romano MD;  Location: [facility] CARDIAC CATH LAB    ESOPHAGOSCOPY, GASTROSCOPY, DUODENOSCOPY (EGD), COMBINED N/A 01/13/2016    Procedure: COMBINED ESOPHAGOSCOPY, GASTROSCOPY, DUODENOSCOPY (EGD);  Surgeon: Rk Srivastava MD;  Location: [facility] FEMORAL CANNULIZATION WITH OPEN STANDBY REPAIR AORTIC VALVE N/A 01/12/2021    Procedure: Cardiopulmonary Bypass standby;  Surgeon: Jefferson Sandy MD;  Location: [facility] CARDIAC CATH LAB    HEART CATH, ANGIOPLASTY      IR LOWER EXTREMITY ANGIOGRAM LEFT  12/07/2021    LAPAROSCOPIC CHOLECYSTECTOMY  10/01/2009    MAZE PROCEDURE N/A 09/10/2014    Procedure: MAZE PROCEDURE;  Surgeon: Bharathi Caraballo MD;  Location:  OR    MOHS MICROGRAPHIC PROCEDURE      OPEN REDUCTION INTERNAL FIXATION HIP BIPOLAR Left 04/09/2022    Procedure: HEMIARTHROPLASTY, HIP, BIPOLAR, OPEN REDUCTION INTERNAL FIXATION OF GREATER TROCHANTER;  Surgeon: Jd Larose MD;  Location: [facility] [place]    ORTHOPEDIC SURGERY      surgery for fx  Left forearm    OTHER  SURGICAL HISTORY Left 2015    forearm sugery    STENT, CORONARY, HUMA  2016    VASECTOMY      Family History   Problem Relation Age of Onset    Diabetes Mother     Hypertension Father     Cerebrovascular Disease Father     Diabetes Maternal Grandmother     Breast Cancer No family hx of     Cancer - colorectal No family hx of     Prostate Cancer No family hx of     C.A.D. No family hx of     Diabetes Type 2  Mother         58 ,  from anesthesia complication.    Heart Disease Father         86  from stroke    No Known Problems Brother         60 years of age.    Social History     Socioeconomic History    Marital status:      Spouse name: Fay Ayala    Number of children: Not on file    Years of education: Not on file    Highest education level: Not on file   Occupational History     Employer: RETIRED   Tobacco Use    Smoking status: Former     Types: Cigarettes     Quit date: 1998     Years since quittin.1    Smokeless tobacco: Never   Substance and Sexual Activity    Alcohol use: Yes     Alcohol/week: 0.0 standard drinks of alcohol     Comment: Alcoholic Drinks/day: very rare    Drug use: No    Sexual activity: Never     Birth control/protection: None   Other Topics Concern    Parent/sibling w/ CABG, MI or angioplasty before 65F 55M? No   Social History Narrative     and lives with life.  Has adult children from previous relationship. ( Blended family).  Has a dog - Vincent Gil MD  1/3/2019         Social Determinants of Health     Financial Resource Strain: Low Risk  (2023)    Financial Resource Strain     Within the past 12 months, have you or your family members you live with been unable to get utilities (heat, electricity) when it was really needed?: No   Food Insecurity: Low Risk  (2023)    Food Insecurity     Within the past 12 months, did you worry that your food would run out before you got money to buy more?: No     Within the past  12 months, did the food you bought just not last and you didn t have money to get more?: No   Transportation Needs: Low Risk  (12/28/2023)    Transportation Needs     Within the past 12 months, has lack of transportation kept you from medical appointments, getting your medicines, non-medical meetings or appointments, work, or from getting things that you need?: No   Physical Activity: Not on file   Stress: Not on file   Social Connections: Not on file   Interpersonal Safety: Low Risk  (10/23/2023)    Interpersonal Safety     Do you feel physically and emotionally safe where you currently live?: Yes     Within the past 12 months, have you been hit, slapped, kicked or otherwise physically hurt by someone?: No     Within the past 12 months, have you been humiliated or emotionally abused in other ways by your partner or ex-partner?: No   Housing Stability: Low Risk  (12/28/2023)    Housing Stability     Do you have housing? : Yes     Are you worried about losing your housing?: No          Medications  Allergies   Current Outpatient Medications   Medication Sig Dispense Refill    acetaminophen (TYLENOL) 500 MG tablet Take 1 tablet (500 mg) by mouth 2 times daily as needed for mild pain      aspirin 81 MG EC tablet Take 1 tablet (81 mg) by mouth daily 90 tablet 0    carvedilol (COREG) 3.125 MG tablet Take 6.25 mg by mouth 2 times daily (with meals)      cephALEXin (KEFLEX) 500 MG capsule Take 1 capsule (500 mg) by mouth 2 times daily 20 capsule 0    Continuous Blood Gluc  (FREESTYLE DOMI 2 READER) DOROTEO Use to read blood sugars as per 's instructions. 1 each 0    Continuous Blood Gluc Sensor (FREESTYLE DOMI 2 SENSOR) MISC Change every 14 days. 6 each 3    furosemide (LASIX) 40 MG tablet Please take 80 mg (2 tablets) in the morning and 40 mg (1 tablet) in the afternoon. 270 tablet 3    gabapentin (NEURONTIN) 600 MG tablet Take 600 mg by mouth every evening      insulin aspart prot & aspart (NOVOLOG MIX  70/30 PEN) (70-30) 100 UNIT/ML pen Inject subcutaneously 20 units in AM with breakfast and 12 in PM with dinner. If Blood Glucose<100 then give 1/2 dose) 15 mL 0    metFORMIN (GLUCOPHAGE) 500 MG tablet TAKE 2 TABLETS BY MOUTH TWICE  DAILY WITH MEALS 360 tablet 0    Multiple Vitamins-Minerals (PRESERVISION AREDS PO) Take 1 tablet by mouth 2 times daily      oxyCODONE (ROXICODONE) 5 MG tablet TAKE 1 TO 2 TABLETS BY MOUTH 4 TIMES DAILY AS NEEDED FOR CHRONIC PAIN      pramipexole (MIRAPEX) 0.5 MG tablet Take 1 tablet (0.5 mg) by mouth 2 times daily 180 tablet 1    rosuvastatin (CRESTOR) 20 MG tablet Take 40 mg by mouth every evening      spironolactone (ALDACTONE) 25 MG tablet Take 1 tablet (25 mg) by mouth daily 90 tablet 3    tamsulosin (FLOMAX) 0.4 MG capsule Take 0.4 mg by mouth every morning      warfarin ANTICOAGULANT (COUMADIN) 4 MG tablet Takes 1 tablet (4mg) 5 days of the week on Sun/Mon/Wed/Thurs/Sat, by mouth as directed.  Adjust dose based on INR results. 70 tablet 1    warfarin ANTICOAGULANT (COUMADIN) 5 MG tablet Take 5 mg by mouth See Admin Instructions Patient reports taking 4 mg every Sunday, Monday, Wednesday, Thursday, and Saturday. On Tuesday and Friday patient reports taking 5 mg.      Allergies   Allergen Reactions    Oxycodone Itching and Rash    Amlodipine Dizziness     Dizziness and dry heaves    Lisinopril Cough    Adhesive Tape Itching and Rash         Lab Results    Chemistry/lipid CBC Cardiac Enzymes/BNP/TSH/INR   Lab Results   Component Value Date    CHOL 136 04/06/2023    HDL 61 04/06/2023    TRIG 85 04/06/2023    BUN 29.0 (H) 02/08/2024     02/08/2024    CO2 29 02/08/2024    Lab Results   Component Value Date    WBC 6.1 12/21/2023    HGB 11.6 (L) 12/21/2023    HCT 37.5 (L) 12/21/2023     (H) 12/21/2023     12/21/2023    Lab Results   Component Value Date    TROPONINI 0.15 07/04/2022     (H) 07/04/2022    TSH 0.92 11/17/2022    INR 1.2 (H) 02/08/2024              This note has been dictated using voice recognition software. Any grammatical, typographical, or context distortions are unintentional and inherent to the software    30 minutes spent on the date of encounter doing chart review, review of outside records, review of test results, interpretation with above tests, patient visit, and documentation.

## 2024-02-15 NOTE — TELEPHONE ENCOUNTER
Please inform patient AND the surgical team that decisions are made during the preop.  If warfarin needs to be held 5 days before the preop then patient should be scheduled at least 1 week before the surgery.  Most likely it is okay to hold the warfarin.  I cannot review the chart ahead of time and make this decision without seeing the patient and documenting.  Please schedule the patient for surgery and then schedule a PRE OP appointment for me to be able to make this decision.    I have reviewed this chart and this has been the situation each time before the surgery.  Please inform [facility] that if there is an urgency, they can get clearance from their anesthesiologist.    Jazzy Gil MD

## 2024-02-16 ENCOUNTER — OFFICE VISIT (OUTPATIENT)
Dept: FAMILY MEDICINE | Facility: CLINIC | Age: 84
End: 2024-02-16
Payer: COMMERCIAL

## 2024-02-16 VITALS
HEART RATE: 75 BPM | DIASTOLIC BLOOD PRESSURE: 62 MMHG | OXYGEN SATURATION: 99 % | BODY MASS INDEX: 30.02 KG/M2 | WEIGHT: 163.13 LBS | TEMPERATURE: 98 F | RESPIRATION RATE: 16 BRPM | SYSTOLIC BLOOD PRESSURE: 130 MMHG | HEIGHT: 62 IN

## 2024-02-16 DIAGNOSIS — Z95.2 S/P TAVR (TRANSCATHETER AORTIC VALVE REPLACEMENT): ICD-10-CM

## 2024-02-16 DIAGNOSIS — E78.2 MIXED HYPERLIPIDEMIA: ICD-10-CM

## 2024-02-16 DIAGNOSIS — I10 PRIMARY HYPERTENSION: ICD-10-CM

## 2024-02-16 DIAGNOSIS — Z87.81 HX OF COMPRESSION FRACTURE OF SPINE: Primary | ICD-10-CM

## 2024-02-16 DIAGNOSIS — E11.42 DIABETIC POLYNEUROPATHY ASSOCIATED WITH TYPE 2 DIABETES MELLITUS (H): ICD-10-CM

## 2024-02-16 DIAGNOSIS — G62.9 NEUROPATHY: ICD-10-CM

## 2024-02-16 DIAGNOSIS — M81.0 AGE-RELATED OSTEOPOROSIS WITHOUT CURRENT PATHOLOGICAL FRACTURE: ICD-10-CM

## 2024-02-16 DIAGNOSIS — I48.20 CHRONIC ATRIAL FIBRILLATION (H): ICD-10-CM

## 2024-02-16 PROCEDURE — 99213 OFFICE O/P EST LOW 20 MIN: CPT | Performed by: FAMILY MEDICINE

## 2024-02-16 PROCEDURE — G2211 COMPLEX E/M VISIT ADD ON: HCPCS | Performed by: FAMILY MEDICINE

## 2024-02-16 RX ORDER — GABAPENTIN 600 MG/1
600 TABLET ORAL 2 TIMES DAILY
Qty: 180 TABLET | Refills: 1 | Status: ON HOLD | OUTPATIENT
Start: 2024-02-16 | End: 2024-08-22

## 2024-02-16 NOTE — PATIENT INSTRUCTIONS
Medication Instructions:  Patient is to take all scheduled medications on the day of surgery EXCEPT for modifications listed below:   - aspirin: cardiology has recommended continuing baby aspirin daily   - warfarin: hold warfarin for 5 days before surgery   - Diuretics: HOLD on the day of surgery.  IF FASTING    - Statins: Continue taking on the day of surgery.    - mixed insulin (70/30m 75/25, 50/50): HOLD day of surgery IF FASTING    - metformin: HOLD day of surgery.  - Gabapentin- Okay to take
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
## 2024-02-16 NOTE — TELEPHONE ENCOUNTER
General Call    Contacts         Type Contact Phone/Fax    01/31/2024 01:23 PM CST Phone (Incoming) Pee Ayala (Self) [phone] (M)    01/31/2024 02:18 PM CST Phone (Outgoing) Pee Ayala (Self) [phone] (M)    02/08/2024 02:54 PM CST Phone (Incoming) Pee Ayala (Self) [phone] (M)    02/08/2024 02:59 PM CST Phone (Outgoing) [facility] (Other) [phone]    02/14/2024 01:35 PM CST Phone (Incoming) [facility] (Other) [phone]    02/16/2024 12:15 PM CST Phone (Incoming) Pee Ayala (Self) [phone] (M)          Reason for Call:  patient called in and wanted to know if he could get an OK for holding warfarin. He told me that he has to be seen before he fly's out of town on 2-. Dr. Gil does have an opening at 3:10pm today and I called and offered this to him. Please send him to [facility] to get this scheduled. Spot is held.       Could we send this information to you in [facility] or would you prefer to receive a phone call?:   Patient would prefer a phone call   Okay to leave a detailed message?: Yes at Cell number on file:    Telephone Information:   Mobile [phone]

## 2024-02-16 NOTE — TELEPHONE ENCOUNTER
FYI - Status Update    Who is Calling: I called [facility] to clarify the situation, Spoke with Agatha and Elliot on Dr Mckeon's team    Update: Informed that there is not a date set for the procedure, they do not schedule the procedure until the patient has had an appointment with PCP to determine if a 5 day hold of Warfarin is OK. They do not require a Pre-op appointment for this procedure. Pt is scheduled for OV 2/16/24 to determine if ok to hold warfarin for a period of 5 days. Pt is also scheduled for a pre-op 2/20/24, however according to [facility], a pre-op is not required.

## 2024-02-21 NOTE — TELEPHONE ENCOUNTER
"LUIS-PROCEDURAL ANTICOAGULATION  MANAGEMENT    ASSESSMENT     Warfarin interruption plan for vertebroplasty on TBD.    Indication for Anticoagulation: Atrial Fibrillation    Hx of TAVR    HVH7OH1-DCGm = 5-6  (Hypertension, Age >= 75, Diabetes and Vascular- CAD-s/p CABG, PCI  +/- CHF)  EF: 55-60% (3/28/23)     Hx of hold without bridge 6/2022 back surgery; 5/2023-RFA Back    Luis-Procedure Risk stratification for thromboembolism: moderate (2022 Chest guidelines)    AFIB: 2022 CHEST Perioperative Management guidelines recommends against bridging for patients with atrial fibrillation except in high risk stratification patients.    PLAN     Cleared at 2/16/24 visit with Dr. Gil for 5 day warfarin hold for procedure.     Pre-Procedure:  Hold warfarin for 5 days, until after procedure  No Bridge    Post-Procedure:  Resume warfarin dose if okay with provider doing procedure on night of procedure in PM: ACC to instruct booster (1.5-2x maintenance dose) x 1-2 days based on current maintenance dose at time of procedure  Recheck INR ~ 7 days after resuming warfarin     Plan routed to referring provider for approval  ?   Sandra Covarrubias, [facility]    SUBJECTIVE/OBJECTIVE     Pee Ayala, a 83 year old male    Goal INR Range: 2.0-3.0     Wt Readings from Last 3 Encounters:   02/16/24 74 kg (163 lb 2 oz)   02/14/24 74.8 kg (165 lb)   02/13/24 74.6 kg (164 lb 6.4 oz)      Ideal body weight: 54.6 kg (120 lb 5.9 oz)  Adjusted ideal body weight: 62.4 kg (137 lb 7.6 oz)     Estimated body mass index is 29.84 kg/m  as calculated from the following:    Height as of 2/16/24: 1.575 m (5' 2\").    Weight as of 2/16/24: 74 kg (163 lb 2 oz).    Lab Results   Component Value Date    INR 1.6 (A) 02/14/2024    INR 1.2 (H) 02/08/2024    INR 3.6 (H) 01/23/2024     Lab Results   Component Value Date    HGB 11.6 (L) 12/21/2023    HCT 37.5 (L) 12/21/2023     12/21/2023     Lab Results   Component Value Date    CR 1.18 (H) 02/08/2024    CR " 1.24 (H) 01/16/2024    CR 1.03 12/28/2023     Estimated Creatinine Clearance: 43.3 mL/min (A) (based on SCr of 1.18 mg/dL (H)).

## 2024-02-26 NOTE — TELEPHONE ENCOUNTER
Called and LM 3x with Pee     - warfarin HOLD already received from our pharmacist - to HOLD warfarin dose fo 5 days with no bridge.     - however, need a date of surgery for (vertebroplasty)  L1 Kyphoplasty with  with [facility].     - per Dr. Gil on  2/16/24:    Medication Instructions:-  Patient is to take all scheduled medications on the day of surgery EXCEPT for modifications listed below:   - aspirin: cardiology has recommended continuing baby aspirin daily   - warfarin: hold warfarin for 5 days before surgery   - Diuretics: HOLD on the day of surgery.  IF FASTING    - Statins: Continue taking on the day of surgery.    - mixed insulin (70/30m 75/25, 50/50): HOLD day of surgery IF FASTING    - metformin: HOLD day of surgery.  - Gabapentin- Okay to take      - STILL AWAITING CALL BACK FROM Pee.

## 2024-02-29 NOTE — TELEPHONE ENCOUNTER
Called and LM x4 to Pee Jamie.     - informed him - he is due to check INR.     - also, to call us back in regards to when is he scheduled for back surgery / procedure?  (before he goes on vacation on 3/24/24.)     - informed Pee, I have instructions on his warfarin HOLD outlined by our pharmacist, prior to back procedure.     - LM will just await for Pee to call us back.    (I have tried multiple time to reach out to Pee with no call back).

## 2024-03-07 ENCOUNTER — PATIENT OUTREACH (OUTPATIENT)
Dept: CARE COORDINATION | Facility: CLINIC | Age: 84
End: 2024-03-07
Payer: COMMERCIAL

## 2024-03-08 ENCOUNTER — MYC MEDICAL ADVICE (OUTPATIENT)
Dept: ANTICOAGULATION | Facility: CLINIC | Age: 84
End: 2024-03-08
Payer: COMMERCIAL

## 2024-03-11 ENCOUNTER — TRANSFERRED RECORDS (OUTPATIENT)
Dept: HEALTH INFORMATION MANAGEMENT | Facility: CLINIC | Age: 84
End: 2024-03-11
Payer: COMMERCIAL

## 2024-03-15 DIAGNOSIS — E11.42 TYPE 2 DIABETES MELLITUS WITH PERIPHERAL NEUROPATHY (H): ICD-10-CM

## 2024-03-16 ENCOUNTER — TRANSFERRED RECORDS (OUTPATIENT)
Dept: MULTI SPECIALTY CLINIC | Facility: CLINIC | Age: 84
End: 2024-03-16

## 2024-03-16 LAB — RETINOPATHY: NORMAL

## 2024-03-18 NOTE — TELEPHONE ENCOUNTER
Requested Prescriptions   Pending Prescriptions Disp Refills    metFORMIN (GLUCOPHAGE) 500 MG tablet [Pharmacy Med Name: metFORMIN HCl 500 MG Oral Tablet] 360 tablet 0     Sig: TAKE 2 TABLETS BY MOUTH TWICE  DAILY WITH MEALS       Biguanide Agents Passed - 3/15/2024  9:36 PM        Passed - Patient is age 10 or older        Passed - Patient has documented A1c within the specified period of time.     If HgbA1C is 8 or greater, it needs to be on file within the past 3 months.  If less than 8, must be on file within the past 6 months.     Recent Labs   Lab Test 02/08/24  1528   A1C 7.8*             Passed - Patient does NOT have a diagnosis of CHF.        Passed - Medication is active on med list        Passed - Medication indicated for associated diagnosis     Medication is associated with one or more of the following diagnoses:     Gestational diabetes mellitus     Hyperinsulinar obesity     Hypersecretion of ovarian androgens    Non-alcoholic fatty liver    Polycystic ovarian syndrome               Pre-diabetes (DM 2 prevention)    Type 2 diabetes mellitus     Weight gain, antipsychotic therapy-induced             Passed - Has GFR on file in past 12 months and most recent value is normal        Passed - Recent (6 mo) or future (90 days) visit within the authorizing provider's specialty     The patient must have completed an in-person or virtual visit within the past 6 months or has a future visit scheduled within the next 90 days with the authorizing provider s specialty.  Urgent care and e-visits do not quality as an office visit for this protocol.

## 2024-03-20 ENCOUNTER — TELEPHONE (OUTPATIENT)
Dept: ANTICOAGULATION | Facility: CLINIC | Age: 84
End: 2024-03-20
Payer: COMMERCIAL

## 2024-03-20 NOTE — TELEPHONE ENCOUNTER
ANTICOAGULATION     Pee Ayala is overdue for an INR check.     Spoke with Pee and scheduled lab appointment on 3/21    Yousuf Madison RN

## 2024-03-21 ENCOUNTER — ANTICOAGULATION THERAPY VISIT (OUTPATIENT)
Dept: ANTICOAGULATION | Facility: CLINIC | Age: 84
End: 2024-03-21

## 2024-03-21 ENCOUNTER — LAB (OUTPATIENT)
Dept: LAB | Facility: CLINIC | Age: 84
End: 2024-03-21
Payer: COMMERCIAL

## 2024-03-21 ENCOUNTER — PATIENT OUTREACH (OUTPATIENT)
Dept: CARE COORDINATION | Facility: CLINIC | Age: 84
End: 2024-03-21

## 2024-03-21 DIAGNOSIS — Z95.2 S/P TAVR (TRANSCATHETER AORTIC VALVE REPLACEMENT): ICD-10-CM

## 2024-03-21 DIAGNOSIS — Z79.01 CHRONIC ANTICOAGULATION: ICD-10-CM

## 2024-03-21 DIAGNOSIS — Z79.01 LONG TERM (CURRENT) USE OF ANTICOAGULANTS: ICD-10-CM

## 2024-03-21 DIAGNOSIS — I48.20 CHRONIC ATRIAL FIBRILLATION (H): ICD-10-CM

## 2024-03-21 DIAGNOSIS — I73.9 PERIPHERAL ARTERIAL DISEASE (H): ICD-10-CM

## 2024-03-21 DIAGNOSIS — I48.20 CHRONIC ATRIAL FIBRILLATION (H): Primary | ICD-10-CM

## 2024-03-21 LAB — INR BLD: 1.2 (ref 0.9–1.1)

## 2024-03-21 PROCEDURE — 36416 COLLJ CAPILLARY BLOOD SPEC: CPT

## 2024-03-21 PROCEDURE — 85610 PROTHROMBIN TIME: CPT

## 2024-03-21 NOTE — PROGRESS NOTES
ANTICOAGULATION MANAGEMENT     Pee Ayala 83 year old male is on warfarin with subtherapeutic INR result. (Goal INR 2.0-3.0)    Recent labs: (last 7 days)     03/21/24  1340   INR 1.2*       ASSESSMENT     Warfarin Lab Questionnaire    Warfarin Doses Last 7 Days      3/21/2024     1:32 PM   Dose in Tablet or Mg   TAB or MG? - 4mg / 5mg warfarin tablets (evenings) milligram (mg)     Pt Rptd Dose JAY MONDAY TUESDAY WED THURS FRIDAY SATURDAY   3/21/2024   1:32 PM 4 4 4 4 4 4 4         3/21/2024   Warfarin Lab Questionnaire   Missed doses within past 14 days? No - He was resumed the evening of 2/23/24. However, he resumed taking less warfarin with 4mg daily.        - reported he did hold 5 days of his warfarin, as instructed.     Changes in diet or alcohol within past 14 days? No   Medication changes since last result? No   Injuries or illness since last result? No - 3/11/24 follow-up post surgery with [facility].        - 2/23/24  s/p  L1 Kyphoplasty with [facility]         - s/p TAVR (Jose Collins)  on 1/12/21      New shortness of breath, severe headaches or sudden changes in vision since last result? No   Abnormal bleeding since last result? No   Upcoming surgery, procedure? No   Best number to call with results? [phone]  afternoon     Previous result: Subtherapeutic last 2 INR results.    Additional findings: Left for [employer] vacation on 2/24 - 3/16/24.   - Establishing care with Katie Jeronimo. His previous PCP - Dr. Singh retired.       PLAN     Recommended plan for temporary change(s) affecting INR     Dosing Instructions: booster dose then Increase your warfarin dose (15% change) with next INR in 5-7 days       Summary  As of 3/21/2024      Full warfarin instructions:  3/21: 8 mg; Otherwise 4 mg every Sun, Tue, Thu; 5 mg all other days   Next INR check:  3/25/2024               Telephone call with Pee who verbalizes understanding and agrees to plan    Lab visit scheduled - INR on 3/25/24 @  [facility]    Education provided:   Taking warfarin: take warfarin at same time each day; preferably in the evening, importance of following [facility] instructions, and Importance of taking warfarin as instructed  Goal range and lab monitoring: goal range and significance of current result    Plan made with [facility] Pharmacist Sandra Dale, RN  Anticoagulation Clinic  3/21/2024    _______________________________________________________________________     Anticoagulation Episode Summary       Current INR goal:  2.0-3.0   TTR:  34.7% (8.3 mo)   Target end date:  Indefinite   Send INR reminders to:  [facility]    Indications    Chronic atrial fibrillation (H) [I48.20]  Chronic anticoagulation [Z79.01]  Long term (current) use of anticoagulants [Z79.01]  Peripheral arterial disease (H24) [I73.9]  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

## 2024-03-22 ENCOUNTER — TRANSFERRED RECORDS (OUTPATIENT)
Dept: HEALTH INFORMATION MANAGEMENT | Facility: CLINIC | Age: 84
End: 2024-03-22
Payer: COMMERCIAL

## 2024-03-25 ENCOUNTER — ANTICOAGULATION THERAPY VISIT (OUTPATIENT)
Dept: ANTICOAGULATION | Facility: CLINIC | Age: 84
End: 2024-03-25

## 2024-03-25 ENCOUNTER — LAB (OUTPATIENT)
Dept: LAB | Facility: CLINIC | Age: 84
End: 2024-03-25
Payer: COMMERCIAL

## 2024-03-25 DIAGNOSIS — E78.5 HYPERLIPIDEMIA LDL GOAL <100: Primary | ICD-10-CM

## 2024-03-25 DIAGNOSIS — I73.9 PERIPHERAL ARTERIAL DISEASE (H): ICD-10-CM

## 2024-03-25 DIAGNOSIS — Z95.2 S/P TAVR (TRANSCATHETER AORTIC VALVE REPLACEMENT): ICD-10-CM

## 2024-03-25 DIAGNOSIS — I48.20 CHRONIC ATRIAL FIBRILLATION (H): Primary | ICD-10-CM

## 2024-03-25 DIAGNOSIS — Z79.01 LONG TERM (CURRENT) USE OF ANTICOAGULANTS: ICD-10-CM

## 2024-03-25 DIAGNOSIS — Z79.01 CHRONIC ANTICOAGULATION: ICD-10-CM

## 2024-03-25 DIAGNOSIS — I48.20 CHRONIC ATRIAL FIBRILLATION (H): ICD-10-CM

## 2024-03-25 LAB — INR BLD: 1.1 (ref 0.9–1.1)

## 2024-03-25 PROCEDURE — 85610 PROTHROMBIN TIME: CPT

## 2024-03-25 PROCEDURE — 36416 COLLJ CAPILLARY BLOOD SPEC: CPT

## 2024-03-25 NOTE — PROGRESS NOTES
ANTICOAGULATION MANAGEMENT     Pee Ayala 83 year old male is on warfarin with subtherapeutic INR result. (Goal INR 2.0-3.0)    Recent labs: (last 7 days)     03/25/24  1311   INR 1.1       ASSESSMENT     Warfarin Lab Questionnaire    Warfarin Doses Last 7 Days      3/25/2024     1:13 PM   Dose in Tablet or Mg   TAB or MG? - 4mg / 5mg warfarin tablets (evenings) milligram (mg)     Pt Rptd Dose JAY MONDAY TUESDAY WED THURS FRIDAY SATURDAY   3/25/2024   1:13 PM 4 4 4 4 4 4 4         3/25/2024   Warfarin Lab Questionnaire   Missed doses within past 14 days? No - above template is wrong - VERIFY DOSE - Pee did verify taking only 4mg daily        - did not follow instructions - to take 8mg booster dose and increase warfarin 4mg on Sun/Tues/Thurs and 5mg ROW.        - on 3/21/24 - he was given a booster dose and weekly warfarin dose was increased.     Changes in diet or alcohol within past 14 days? No   Medication changes since last result? No - 3/22/24 follow-up with [facility] - recommended he get DEXA scan.        - had follow-up on 3/22/24 with [facility]        - 2/23/24  s/p  L1 Kyphoplasty with [facility]    Injuries or illness since last result? No    New shortness of breath, severe headaches or sudden changes in vision since last result? No   Abnormal bleeding since last result? No   Upcoming surgery, procedure? No   Best number to call with results? [phone]     Previous result: Subtherapeutic last 3 INR results; 1.2, 1.6, 1.2, from 2/8-3/21).    Additional findings:  former patient of Dr. Ihsan Singh.  Appt on 4/18/24 to establish care with Dr. Katie Jeronimo.       PLAN     Recommended plan for temporary change(s) affecting INR     Dosing Instructions: booster dose then Increase your warfarin dose (15% change) with next INR in 5-7 days       Summary  As of 3/25/2024      Full warfarin instructions:  3/25: 8 mg; Otherwise 4 mg every Mon, Wed, Fri; 5 mg all other days   Next INR check:   4/1/2024               Telephone call with Pee who verbalizes understanding and agrees to plan.   - Pee wrote down dose instructions on his BLUE SHEET and verbalized back  2x with correct instructions.    Lab visit scheduled - INR on 4/1/24 @ [facility].    Education provided:   Taking warfarin: importance of following [facility] instructions and Importance of taking warfarin as instructed  Goal range and lab monitoring: goal range and significance of current result    Plan made with [facility] Pharmacist Sandra Dale, RN  Anticoagulation Clinic  3/25/2024    _______________________________________________________________________     Anticoagulation Episode Summary       Current INR goal:  2.0-3.0   TTR:  34.7% (8.3 mo)   Target end date:  Indefinite   Send INR reminders to:  [facility]    Indications    Chronic atrial fibrillation (H) [I48.20]  Chronic anticoagulation [Z79.01]  Long term (current) use of anticoagulants [Z79.01]  Peripheral arterial disease (H24) [I73.9]  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

## 2024-03-28 ENCOUNTER — LAB (OUTPATIENT)
Dept: LAB | Facility: CLINIC | Age: 84
End: 2024-03-28
Payer: COMMERCIAL

## 2024-03-28 ENCOUNTER — ANTICOAGULATION THERAPY VISIT (OUTPATIENT)
Dept: ANTICOAGULATION | Facility: CLINIC | Age: 84
End: 2024-03-28

## 2024-03-28 DIAGNOSIS — Z79.01 LONG TERM (CURRENT) USE OF ANTICOAGULANTS: ICD-10-CM

## 2024-03-28 DIAGNOSIS — Z95.2 S/P TAVR (TRANSCATHETER AORTIC VALVE REPLACEMENT): ICD-10-CM

## 2024-03-28 DIAGNOSIS — I48.20 CHRONIC ATRIAL FIBRILLATION (H): Primary | ICD-10-CM

## 2024-03-28 DIAGNOSIS — I48.20 CHRONIC ATRIAL FIBRILLATION (H): ICD-10-CM

## 2024-03-28 DIAGNOSIS — I73.9 PERIPHERAL ARTERIAL DISEASE (H): ICD-10-CM

## 2024-03-28 DIAGNOSIS — Z79.01 CHRONIC ANTICOAGULATION: ICD-10-CM

## 2024-03-28 LAB — INR BLD: 3.8 (ref 0.9–1.1)

## 2024-03-28 PROCEDURE — 36416 COLLJ CAPILLARY BLOOD SPEC: CPT

## 2024-03-28 PROCEDURE — 85610 PROTHROMBIN TIME: CPT

## 2024-03-28 NOTE — PROGRESS NOTES
ANTICOAGULATION MANAGEMENT     Pee Ayala 83 year old male is on warfarin with supratherapeutic INR result. (Goal INR 2.0-3.0)    Recent labs: (last 7 days)     03/28/24  1330   INR 3.8*       ASSESSMENT     Warfarin Lab Questionnaire    Warfarin Doses Last 7 Days      3/28/2024     1:25 PM   Dose in Tablet or Mg   TAB or MG? tablet (tab)     Pt Rptd Dose JAY MONDAY TUESDAY WED THURS FRIDAY SATURDAY   3/28/2024   1:25 PM 4 4 5 4 5 4 4         3/28/2024   Warfarin Lab Questionnaire   Missed doses within past 14 days? No   Changes in diet or alcohol within past 14 days? No   Medication changes since last result? No   Injuries or illness since last result? No   New shortness of breath, severe headaches or sudden changes in vision since last result? No   Abnormal bleeding since last result? No   Upcoming surgery, procedure? No   Best number to call with results? [phone]  afternoon     Previous result: Subtherapeutic  Additional findings: None       PLAN     Recommended plan for no diet, medication or health factor changes affecting INR     Dosing Instructions: decrease your warfarin dose (6.2% change) with next INR in 1 week       Summary  As of 3/28/2024      Full warfarin instructions:  5 mg every Sun, Tue; 4 mg all other days   Next INR check:  4/4/2024               Telephone call with Pee who verbalizes understanding and agrees to plan    Lab visit scheduled    Education provided:   Please call back if any changes to your diet, medications or how you've been taking warfarin    Plan made per [facility] anticoagulation protocol    Yousuf Madison RN  Anticoagulation Clinic  3/28/2024    _______________________________________________________________________     Anticoagulation Episode Summary       Current INR goal:  2.0-3.0   TTR:  35.2% (8.3 mo)   Target end date:  Indefinite   Send INR reminders to:  [facility]    Indications    Chronic atrial fibrillation (H) [I48.20]  Chronic anticoagulation  [Z79.01]  Long term (current) use of anticoagulants [Z79.01]  Peripheral arterial disease (H24) [I73.9]  S/P TAVR (transcatheter aortic valve replacement) [Z95.2]             Comments:               Anticoagulation Care Providers       Provider Role Specialty Phone number    Jazzy Gil MD Referring Family Medicine [phone]    Ihsan Singh MD Referring Family Medicine

## 2024-04-05 ENCOUNTER — TELEPHONE (OUTPATIENT)
Dept: ANTICOAGULATION | Facility: CLINIC | Age: 84
End: 2024-04-05
Payer: COMMERCIAL

## 2024-04-05 NOTE — TELEPHONE ENCOUNTER
ANTICOAGULATION     Pee Ayala is overdue for an INR check.     Left message for patient to call and schedule lab appointment as soon as possible. If returning call, please schedule.     Yousuf Madison RN

## 2024-04-19 ENCOUNTER — TELEPHONE (OUTPATIENT)
Dept: ANTICOAGULATION | Facility: CLINIC | Age: 84
End: 2024-04-19
Payer: COMMERCIAL

## 2024-04-19 NOTE — TELEPHONE ENCOUNTER
ANTICOAGULATION     Pee Ayala is overdue for an INR check.     Spoke with Pee and scheduled lab appointment on 4/22   - 4/4/24 No show   - 4/1/24 lesley Dale RN

## 2024-04-22 ENCOUNTER — LAB (OUTPATIENT)
Dept: LAB | Facility: CLINIC | Age: 84
End: 2024-04-22
Payer: COMMERCIAL

## 2024-04-22 ENCOUNTER — ANTICOAGULATION THERAPY VISIT (OUTPATIENT)
Dept: ANTICOAGULATION | Facility: CLINIC | Age: 84
End: 2024-04-22

## 2024-04-22 DIAGNOSIS — I73.9 PERIPHERAL ARTERIAL DISEASE (H): ICD-10-CM

## 2024-04-22 DIAGNOSIS — Z79.01 LONG TERM (CURRENT) USE OF ANTICOAGULANTS: ICD-10-CM

## 2024-04-22 DIAGNOSIS — Z79.01 CHRONIC ANTICOAGULATION: ICD-10-CM

## 2024-04-22 DIAGNOSIS — I48.20 CHRONIC ATRIAL FIBRILLATION (H): Primary | ICD-10-CM

## 2024-04-22 DIAGNOSIS — Z95.2 S/P TAVR (TRANSCATHETER AORTIC VALVE REPLACEMENT): ICD-10-CM

## 2024-04-22 DIAGNOSIS — I48.20 CHRONIC ATRIAL FIBRILLATION (H): ICD-10-CM

## 2024-04-22 LAB — INR BLD: 1.2 (ref 0.9–1.1)

## 2024-04-22 PROCEDURE — 36416 COLLJ CAPILLARY BLOOD SPEC: CPT

## 2024-04-22 PROCEDURE — 85610 PROTHROMBIN TIME: CPT

## 2024-04-22 NOTE — PROGRESS NOTES
ANTICOAGULATION MANAGEMENT     Pee Ayala 84 year old male is on warfarin with subtherapeutic INR result. (Goal INR 2.0-3.0)    Recent labs: (last 7 days)     04/22/24  1106   INR 1.2*       ASSESSMENT     Warfarin Lab Questionnaire    Warfarin Doses Last 7 Days      4/22/2024    11:10 AM   Dose in Tablet or Mg   TAB or MG? milligram (mg)     Pt Rptd Dose SUNDAY MONDAY TUESDAY WED THURS FRIDAY SATURDAY 4/22/2024  11:10 AM 4 4 5 4 4 4 4         4/22/2024   Warfarin Lab Questionnaire   Missed doses within past 14 days? No   Changes in diet or alcohol within past 14 days? No   Medication changes since last result? No   Injuries or illness since last result? No   New shortness of breath, severe headaches or sudden changes in vision since last result? No   Abnormal bleeding since last result? No   Upcoming surgery, procedure? No     Previous result: Supratherapeutic  Additional findings:  template shows less than planned dose       PLAN     Recommended plan for temporary change(s) affecting INR     Dosing Instructions: booster dose then Increase your warfarin dose (6.7% change) with next INR in 1 week       Summary  As of 4/22/2024      Full warfarin instructions:  4/22: 5 mg; Otherwise 4 mg every Mon, Thu, Sat; 5 mg all other days   Next INR check:  4/29/2024               Detailed voice message left for Pee with dosing instructions and follow up date.     Contact [phone] to schedule and with any changes, questions or concerns.     Education provided:   Please call back if any changes to your diet, medications or how you've been taking warfarin    Plan made per [facility] anticoagulation protocol    Yousuf Madison RN  Anticoagulation Clinic  4/22/2024    _______________________________________________________________________     Anticoagulation Episode Summary       Current INR goal:  2.0-3.0   TTR:  39.0% (8.3 mo)   Target end date:  Indefinite   Send INR reminders to:  [email or web address]    Indications     Chronic atrial fibrillation (H) [I48.20]  Chronic anticoagulation [Z79.01]  Long term (current) use of anticoagulants [Z79.01]  Peripheral arterial disease (H24) [I73.9]  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

## 2024-04-30 ENCOUNTER — TELEPHONE (OUTPATIENT)
Dept: ANTICOAGULATION | Facility: CLINIC | Age: 84
End: 2024-04-30
Payer: COMMERCIAL

## 2024-05-05 NOTE — PROGRESS NOTES
HEART CARE ENCOUNTER CONSULTATON NOTE      [facility]  [phone]      Assessment/Recommendations   Assessment:   Orthostatic symptoms x1 month: Experiences symptoms of lightheadedness, presyncope position changes after prolonged immobility.  Suffered fall in the garage on the way to appointment today, sustained superficial skin tears of left fire arm and elbow, scalp.  Denies sudden collapse, symptoms such as palpitations/racing heart rate during event or other orthostatic episodes.  Coronary artery disease: Denies angina.  S/p three-vessel CABG in 2014.  Status post coronary angiogram December 2023 showed patent grafts with diffuse native disease with no targets for intervention. :  HFpEF: NYHAII on furosemide 80 mg am and 40 mg pm, dosing increased ~3 months ago  Permanent atrial fibrillation: Warfarin, irregular rhythm with controlled rate on exam  S/p TAVR 2021: normal function on echo 12.2023, no evidence of dysfunction on auscultation  HTN: Controlled on carvedilol, spironolactone  HLD: rosuvastatin 20 mg    Plan:   I have recommended evaluation of injuries and head wound in ED given anticoagulation on Warfarin.  Patient is adamant he did not hit his head with fall, but scraped scalp on a bench in his garage and declines ED evaluation. Instructed on signs and symptoms of intracranial bleeding for which to seek emergent treatment in the ED  He is agreeable to have lab evaluation today in clinic, and have us help contact family member to pick him up in clinic to avoid driving following fall.   Given the more typical orthostatic symptoms with prodrome will adjust medications for hypotension/dehydration rather than arrhythmia genic evaluation.  Reduce carvedilol to 3.125 mg twice daily and monitor HR with AFib. Reduce furosemide to 40 mg BID.  Instructed to continue to monitor weights, swelling, shortness of breath on reduced dose  Monitor BMP/BNP today    Follow up in 1 month with me to  monitor lightheadedness, and 11/2024 with Dr. Recio     History of Present Illness/Subjective    HPI: Pee Ayala is a 84 year old male with PMHx of CAD, HFpEF, atrial fibrillation, post TAVR, HTN, HLD presents for follow-up. Hospitalized 12/2023 with acute heart failure exacerbation.  Adjustment to furosemide over the last 4 months.    Patient experienced a fall due to dizziness in his garage prior to leaving for this appointment due to lightheadedness.  Denies LOC.  Became unsteady while walking and prodrome of lightheadedness/presyncope.  Fell and scraped his left arm/hand and has skin tears.  Denies hitting his head on the ground, but rather scraped the top of his scalp on a bench in his garage.  He is anticoagulated on warfarin.  Had taken his blood sugar after fall and was 300s. Lives with son and wife, drove himself to appointment today.  Denies any chest pain, palpitations, racing heart rates surrounding event.    Lightheadedness and poor balance with position changes x1 month.  Worse in the morning after waking, and when he sits for extended periods.  Takes his time when changing position and this usually helps.  Denies other episodes of falls, no syncope/LOC ever.  He has frequent urination and sometimes incontinence on furosemide.  Fairly frequently skips evening dose due to incontinence at night.  Weight has been stable.  Swelling is present and stable, wears compression stockings daily.    Chronic shortness of breath with history of heart failure, some increased shortness of breath at night is improved with antihistamine.  Does have history of allergies. Also thinks progressive blurry vision contributes to instability and optometrist it is has given him no pathway for improvement.         Coronary angiogram 12/2023  :CONCLUSIONS:   Severe multivessel coronary artery disease with chronic total occlusion of the left circumflex, mid left anterior descending artery, and mid right coronary  artery.  Patent LIMA to the LAD with moderate diffuse disease of the native vessel beyond the anastomosis.  Patent vein graft to the OM1 branch with mild diffuse disease of the native vessel beyond the anastomosis.  Moderate diseased vein graft to the OM 2 branch with small, diffusely diseased native vessel beyond the anastomosis.    Echocardiogram 2/2023  Results:  Left ventricular size, wall motion and function are normal. The ejection  fraction is 60-65%.  TAPSE is abnormal, which is consistent with abnormal right ventricular  systolic function.  The left atrium is severely dilated.  The right atrium is mild to moderately dilated.  There is a bioprosthetic aortic valve.  The gradient is normal for this prosthetic aortic valve.  There is mild paravalvular regurgitation present.     Physical Examination  Review of Systems   Vitals: /64 (BP Location: Right arm, Patient Position: Sitting, Cuff Size: Adult Regular)   Pulse 76   Resp 14   BMI= There is no height or weight on file to calculate BMI.  Wt Readings from Last 3 Encounters:   02/16/24 74 kg (163 lb 2 oz)   02/14/24 74.8 kg (165 lb)   02/13/24 74.6 kg (164 lb 6.4 oz)       Head: Superficial tear of scalp.   ENT/Mouth: membranes moist, no oral lesions or bleeding gums.      EYES:  no scleral icterus, normal conjunctivae       Chest/Lungs:   lungs are clear to auscultation, no rales or wheezing, equal chest wall expansion    Cardiovascular:   Irregular. Normal first and second heart sounds with no murmurs, rubs, or gallops; the radial and posterior tibial pulses are intact, edema bilaterally wearing compression stockings       Extremities: no cyanosis or clubbing. Large Superficial skin tears of left forearm and left elbow.    Skin: no xanthelasma, warm.    Neurologic: no tremors     Psychiatric: alert and oriented x3, calm        Please refer above for cardiac ROS details.        Medical History  Surgical History Family History Social History   Past  Medical History:   Diagnosis Date    ACS (acute coronary syndrome) (H) 06/02/2014    Actinic keratosis 01/14/2014    Anticoagulated on Coumadin 12/30/2015    Atrial fibrillation (H) 01/01/2016    Bone mass 04/26/2017    Chest heaviness 01/23/2019    Chest pain 05/31/2014    Chronic heart failure with preserved ejection fraction (H) 01/16/2024    Closed fracture of left forearm 01/01/2015    Congestive heart failure (H)     Coronary artery disease     Difficulty in walking(719.7)     Dyslipidemia 08/31/2016    Dyspnea on exertion     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     Encounter for long-term (current) use of insulin (H) 08/11/2016    Esophageal reflux 11/18/2010    History of angina     HTN (hypertension) 06/30/2009     (Problem list name updated by automated process. Provider to review and confirm.)    Impotence of organic origin     Mixed hyperlipidemia 04/25/2007 April 25, 2007 restarted Zocor today, recheck in 3 months.  August 23, 2007 LDL at 101 with 40 mg, will increase to 80 mg. Recheck  In 3 months.     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     Walking troubles      Past Surgical History:   Procedure Laterality Date    BYPASS GRAFT ARTERY CORONARY N/A 09/10/2014    Procedure: BYPASS GRAFT ARTERY CORONARY;  Surgeon: Bharathi Caraballo MD;  Location: [facility] OR    BYPASS GRAFT ARTERY CORONARY  01/01/2016    COLONOSCOPY  01/14/2004     CORONARY ANGIOGRAPHY ADULT ORDER      CORONARY ARTERY BYPASS      CV CORONARY ANGIOGRAM N/A 04/04/2019    Procedure: Coronary Angiogram;  Surgeon: Dominik Vega MD;  Location: [facility] Cath Lab;  Service: Cardiology    CV CORONARY ANGIOGRAM N/A 01/06/2021    Procedure: Coronary Angiogram;  Surgeon: Dominik Vega MD;  Location: [facility] Cath Lab;  Service: Cardiology    CV CORONARY ANGIOGRAM N/A 12/22/2023    Procedure: Coronary Angiogram;  Surgeon: Bahman Lenz MD;  Location: [facility] CV    CV LEFT HEART CATHETERIZATION WITHOUT LEFT VENTRICULOGRAM Left 04/04/2019    Procedure: Left Heart Catheterization Without Left Ventriculogram;  Surgeon: Dominik Vega MD;  Location: [facility] Lab;  Service: Cardiology    CV LEFT HEART CATHETERIZATION WITHOUT LEFT VENTRICULOGRAM Left 01/06/2021    Procedure: Left Heart Catheterization Without Left Ventriculogram;  Surgeon: Dominik Vega MD;  Location: [facility] Cath Lab;  Service: Cardiology    CV RIGHT HEART CATHETERIZATION Right 04/04/2019    Procedure: Right Heart Catheterization;  Surgeon: Dominik Vega MD;  Location: [facility] Lab;  Service: Cardiology    CV TRANSCATHETER AORTIC VALVE REPLACEMENT N/A 01/12/2021    Procedure: Right transfemoral transcatheter aortic valve replacement using Magdaleno Dominick 3 size 29mm.  Transthoracic echocardiogram;  Surgeon: Jo Romano MD;  Location: [facility]    ESOPHAGOSCOPY, GASTROSCOPY, DUODENOSCOPY (EGD), COMBINED N/A 01/13/2016    Procedure: COMBINED ESOPHAGOSCOPY, GASTROSCOPY, DUODENOSCOPY (EGD);  Surgeon: Rk Srivastava MD;  Location: [facility] FEMORAL CANNULIZATION WITH OPEN STANDBY REPAIR AORTIC VALVE N/A 01/12/2021    Procedure: Cardiopulmonary Bypass standby;  Surgeon: Jefferson Sandy MD;  Location: [facility] CARDIAC CATH LAB    HEART CATH, ANGIOPLASTY      IR LOWER EXTREMITY ANGIOGRAM LEFT   2021    LAPAROSCOPIC CHOLECYSTECTOMY  10/01/2009    MAZE PROCEDURE N/A 09/10/2014    Procedure: MAZE PROCEDURE;  Surgeon: Bharathi Caraballo MD;  Location: [facility] OR    MOHS MICROGRAPHIC PROCEDURE      OPEN REDUCTION INTERNAL FIXATION HIP BIPOLAR Left 2022    Procedure: HEMIARTHROPLASTY, HIP, BIPOLAR, OPEN REDUCTION INTERNAL FIXATION OF GREATER TROCHANTER;  Surgeon: Jd Larose MD;  Location: [facility] OR    ORTHOPEDIC SURGERY      surgery for fx  Left forearm    OTHER SURGICAL HISTORY Left 2015    forearm sugery    STENT, CORONARY, HUMA  2016    VASECTOMY       Family History   Problem Relation Age of Onset    Diabetes Mother     Hypertension Father     Cerebrovascular Disease Father     Diabetes Maternal Grandmother     Breast Cancer No family hx of     Cancer - colorectal No family hx of     Prostate Cancer No family hx of     C.A.D. No family hx of     Diabetes Type 2  Mother         58 ,  from anesthesia complication.    Heart Disease Father         86  from stroke    No Known Problems Brother         60 years of age.        Social History     Socioeconomic History    Marital status:      Spouse name: Fay Ayala    Number of children: Not on file    Years of education: Not on file    Highest education level: Not on file   Occupational History     Employer: RETIRED   Tobacco Use    Smoking status: Former     Current packs/day: 0.00     Types: Cigarettes     Quit date: 1998     Years since quittin.3     Passive exposure: Never    Smokeless tobacco: Never   Vaping Use    Vaping status: Never Used   Substance and Sexual Activity    Alcohol use: Yes     Alcohol/week: 0.0 standard drinks of alcohol     Comment: Alcoholic Drinks/day: very rare    Drug use: No    Sexual activity: Never     Birth control/protection: None   Other Topics Concern    Parent/sibling w/ CABG, MI or angioplasty before 65F 55M? No   Social History Narrative     and lives with  life.  Has adult children from previous relationship. ( Blended family).  Has a dog - Antoniooswaldo    Jazzy Gil MD  1/3/2019                Medication Instructions:    Patient is to take all scheduled medications on the day of surgery EXCEPT for modifications listed below:     - aspirin: cardiology has recommended continuing baby aspirin daily     - warfarin: hold warfarin for 5 days before surgery     - Diuretics: HOLD on the day of surgery.     - Statins: Continue taking on the day of surgery.      - mixed insulin (70/30m 75/25, 50/50): HOLD day of surgery     - metformin: HOLD day of surgery.     - Opioids: Continue without modification     - hold any vitamins or supplements until after surgery.      Social Determinants of Health     Financial Resource Strain: Low Risk  (12/28/2023)    Financial Resource Strain     Within the past 12 months, have you or your family members you live with been unable to get utilities (heat, electricity) when it was really needed?: No   Food Insecurity: Low Risk  (12/28/2023)    Food Insecurity     Within the past 12 months, did you worry that your food would run out before you got money to buy more?: No     Within the past 12 months, did the food you bought just not last and you didn t have money to get more?: No   Transportation Needs: Low Risk  (12/28/2023)    Transportation Needs     Within the past 12 months, has lack of transportation kept you from medical appointments, getting your medicines, non-medical meetings or appointments, work, or from getting things that you need?: No   Physical Activity: Not on file   Stress: Not on file   Social Connections: Not on file   Interpersonal Safety: Low Risk  (10/23/2023)    Interpersonal Safety     Do you feel physically and emotionally safe where you currently live?: Yes     Within the past 12 months, have you been hit, slapped, kicked or otherwise physically hurt by someone?: No     Within the past 12 months, have you been  humiliated or emotionally abused in other ways by your partner or ex-partner?: No   Housing Stability: Low Risk  (12/28/2023)    Housing Stability     Do you have housing? : Yes     Are you worried about losing your housing?: No           Medications  Allergies   Current Outpatient Medications   Medication Sig Dispense Refill    acetaminophen (TYLENOL) 500 MG tablet Take 1 tablet (500 mg) by mouth 2 times daily as needed for mild pain      aspirin 81 MG EC tablet Take 1 tablet (81 mg) by mouth daily 90 tablet 0    carvedilol (COREG) 3.125 MG tablet Take 6.25 mg by mouth 2 times daily (with meals)      Continuous Blood Gluc  (FREESTYLE DOMI 2 READER) DOROTEO Use to read blood sugars as per 's instructions. 1 each 0    Continuous Blood Gluc Sensor (FREESTYLE DOMI 2 SENSOR) MISC Change every 14 days. 6 each 3    furosemide (LASIX) 40 MG tablet Please take 80 mg (2 tablets) in the morning and 40 mg (1 tablet) in the afternoon. 270 tablet 3    gabapentin (NEURONTIN) 600 MG tablet Take 1 tablet (600 mg) by mouth 2 times daily 180 tablet 1    insulin aspart prot & aspart (NOVOLOG MIX 70/30 PEN) (70-30) 100 UNIT/ML pen Inject subcutaneously 20 units in AM with breakfast and 12 in PM with dinner. If Blood Glucose<100 then give 1/2 dose) 15 mL 0    metFORMIN (GLUCOPHAGE) 500 MG tablet TAKE 2 TABLETS BY MOUTH TWICE  DAILY WITH MEALS 360 tablet 0    Multiple Vitamins-Minerals (PRESERVISION AREDS PO) Take 1 tablet by mouth 2 times daily      pramipexole (MIRAPEX) 0.5 MG tablet Take 1 tablet (0.5 mg) by mouth 2 times daily (Patient taking differently: Take 0.5 mg by mouth 3 times daily Take 2 tablets in the afternoon and 1 tablet at bedtime) 180 tablet 1    rosuvastatin (CRESTOR) 20 MG tablet Take 40 mg by mouth every evening      spironolactone (ALDACTONE) 25 MG tablet Take 1 tablet (25 mg) by mouth daily 90 tablet 3    tamsulosin (FLOMAX) 0.4 MG capsule Take 0.4 mg by mouth every morning      warfarin  ANTICOAGULANT (COUMADIN) 4 MG tablet Takes 1 tablet (4mg) 5 days of the week on Sun/Mon/Wed/Thurs/Sat, by mouth as directed.  Adjust dose based on INR results. 70 tablet 1    warfarin ANTICOAGULANT (COUMADIN) 5 MG tablet Take 5 mg by mouth See Admin Instructions Patient reports taking 4 mg every Sunday, Monday, Wednesday, Thursday, and Saturday. On Tuesday and Friday patient reports taking 5 mg.      cephALEXin (KEFLEX) 500 MG capsule Take 1 capsule (500 mg) by mouth 2 times daily (Patient not taking: Reported on 5/6/2024) 20 capsule 0       Allergies   Allergen Reactions    Oxycodone Itching and Rash    Amlodipine Dizziness     Dizziness and dry heaves    Lisinopril Cough    Adhesive Tape Itching and Rash          Lab Results    Chemistry/lipid CBC Cardiac Enzymes/BNP/TSH/INR   Recent Labs   Lab Test 04/06/23  1625   CHOL 136   HDL 61   LDL 58   TRIG 85     Recent Labs   Lab Test 04/06/23  1625 01/25/22  1443 09/16/21  1026   LDL 58 65 48     Recent Labs   Lab Test 02/08/24  1528      POTASSIUM 4.4   CHLORIDE 98   CO2 29   *   BUN 29.0*   CR 1.18*   GFRESTIMATED 62   RACHEL 9.4     Recent Labs   Lab Test 02/08/24  1528 01/16/24  1338 12/28/23  1706   CR 1.18* 1.24* 1.03     Recent Labs   Lab Test 02/08/24  1528 10/23/23  1020 07/18/23  1025   A1C 7.8* 8.4* 9.8*          Recent Labs   Lab Test 12/21/23  1103   WBC 6.1   HGB 11.6*   HCT 37.5*   *        Recent Labs   Lab Test 12/21/23  1103 04/06/23  1625 07/18/22  0636   HGB 11.6* 12.4* 10.8*    Recent Labs   Lab Test 07/04/22  1034   TROPONINI 0.15     Recent Labs   Lab Test 01/16/24  1338 12/21/23  1103 07/04/22  1034 01/08/21  0842 10/21/20  1451 05/09/19  1657 06/09/16  1203   BNP  --   --  321* 91* 56   < >  --    NTBNPI  --  2,987*  --   --   --   --   --    NTBNP 1,204  --   --   --   --   --  354*    < > = values in this interval not displayed.     Recent Labs   Lab Test 11/17/22  1315   TSH 0.92     Recent Labs   Lab Test  04/22/24  1106 03/28/24  1330 03/25/24  1311   INR 1.2* 3.8* 1.1          This note has been dictated using voice recognition software. Any grammatical, typographical, or context distortions are unintentional and inherent to the software    Monae Reich PA-C

## 2024-05-06 ENCOUNTER — TELEPHONE (OUTPATIENT)
Dept: CARDIOLOGY | Facility: CLINIC | Age: 84
End: 2024-05-06

## 2024-05-06 ENCOUNTER — OFFICE VISIT (OUTPATIENT)
Dept: CARDIOLOGY | Facility: CLINIC | Age: 84
End: 2024-05-06
Attending: NURSE PRACTITIONER
Payer: COMMERCIAL

## 2024-05-06 VITALS — RESPIRATION RATE: 14 BRPM | DIASTOLIC BLOOD PRESSURE: 64 MMHG | SYSTOLIC BLOOD PRESSURE: 110 MMHG | HEART RATE: 76 BPM

## 2024-05-06 DIAGNOSIS — I50.32 CHRONIC HEART FAILURE WITH PRESERVED EJECTION FRACTION (H): ICD-10-CM

## 2024-05-06 DIAGNOSIS — I25.10 CORONARY ARTERY DISEASE INVOLVING NATIVE CORONARY ARTERY OF NATIVE HEART WITHOUT ANGINA PECTORIS: ICD-10-CM

## 2024-05-06 DIAGNOSIS — I10 PRIMARY HYPERTENSION: ICD-10-CM

## 2024-05-06 DIAGNOSIS — R42 LIGHTHEADEDNESS: Primary | ICD-10-CM

## 2024-05-06 DIAGNOSIS — E78.2 MIXED HYPERLIPIDEMIA: ICD-10-CM

## 2024-05-06 DIAGNOSIS — I48.20 CHRONIC ATRIAL FIBRILLATION (H): ICD-10-CM

## 2024-05-06 DIAGNOSIS — Z95.2 S/P TAVR (TRANSCATHETER AORTIC VALVE REPLACEMENT): ICD-10-CM

## 2024-05-06 DIAGNOSIS — Z79.01 ANTICOAGULATED ON COUMADIN: ICD-10-CM

## 2024-05-06 LAB
ANION GAP SERPL CALCULATED.3IONS-SCNC: 9 MMOL/L (ref 7–15)
BUN SERPL-MCNC: 22.3 MG/DL (ref 8–23)
CALCIUM SERPL-MCNC: 9.8 MG/DL (ref 8.8–10.2)
CHLORIDE SERPL-SCNC: 100 MMOL/L (ref 98–107)
CREAT SERPL-MCNC: 0.99 MG/DL (ref 0.67–1.17)
DEPRECATED HCO3 PLAS-SCNC: 29 MMOL/L (ref 22–29)
EGFRCR SERPLBLD CKD-EPI 2021: 76 ML/MIN/1.73M2
GLUCOSE SERPL-MCNC: 201 MG/DL (ref 70–99)
INR (EXTERNAL): 1.5 (ref 1.2–1.5)
NT-PROBNP SERPL-MCNC: 482 PG/ML (ref 0–1800)
POTASSIUM SERPL-SCNC: 4.6 MMOL/L (ref 3.4–5.3)
SODIUM SERPL-SCNC: 138 MMOL/L (ref 135–145)

## 2024-05-06 PROCEDURE — 80048 BASIC METABOLIC PNL TOTAL CA: CPT

## 2024-05-06 PROCEDURE — 83880 ASSAY OF NATRIURETIC PEPTIDE: CPT

## 2024-05-06 PROCEDURE — 99215 OFFICE O/P EST HI 40 MIN: CPT

## 2024-05-06 PROCEDURE — 36415 COLL VENOUS BLD VENIPUNCTURE: CPT

## 2024-05-06 RX ORDER — CARVEDILOL 3.12 MG/1
6.25 TABLET ORAL 2 TIMES DAILY WITH MEALS
Qty: 180 TABLET | Refills: 3 | Status: SHIPPED | OUTPATIENT
Start: 2024-05-06 | End: 2024-08-15

## 2024-05-06 NOTE — PATIENT INSTRUCTIONS
It was a pleasure taking part in your care today:    - Reduce Carvedilol to 3.25 mg twice daily, monitor heart rates and should be less than 100 beats per minute.  - Reduce to 40 mg of furosemide twice daily (1 tablet twice daily).  Monitor for weight gain, increased shortness of breath with activity or when lying flat.  - Can take afternoon dose of furosemide 40 mg 3pm to 5 pm to avoid night time awakening   - With any progressive headache, confusion, weakness of an extremity, stroke-like symptoms be evaluated in the emergency room immediately      Please call the [facility] clinic with any questions or concerns at [phone].     Monae Reich PA-C

## 2024-05-06 NOTE — TELEPHONE ENCOUNTER
[id number] - Phone call to patient's wife Fay Perez at the request of GHULAM requesting family come pickup patient from clinic after he presented to appointment reporting that he had fallen at home and sustained injuries to left arm and head.  Patient was evaluated by GHULAM who recommended patient be seen in ED but he declined.    Informed patient's wife of recommendations, reported that she was aware patient had fallen but did not know his injuries and agreed to come pick patient up.    Patient brought down to wife's car in w/c by nurse and both were encouraged to contact PCP for follow-up on injuries to left arm - both agreed.  mg

## 2024-05-06 NOTE — LETTER
5/6/2024    Katie Jeronimo DO  [address]  [facility], [place] [address]    RE: Pee Ayala       Dear Colleague,     I had the pleasure of seeing Pee Ayala in the [facility].    HEART CARE ENCOUNTER CONSULTATON NOTE      [facility]  [phone]      Assessment/Recommendations   Assessment:   Orthostatic symptoms x1 month: Experiences symptoms of lightheadedness, presyncope position changes after prolonged immobility.  Suffered fall in the garage on the way to appointment today, sustained superficial skin tears of left fire arm and elbow, scalp.  Denies sudden collapse, symptoms such as palpitations/racing heart rate during event or other orthostatic episodes.  Coronary artery disease: Denies angina.  S/p three-vessel CABG in 2014.  Status post coronary angiogram December 2023 showed patent grafts with diffuse native disease with no targets for intervention. :  HFpEF: NYHAII on furosemide 80 mg am and 40 mg pm, dosing increased ~3 months ago  Permanent atrial fibrillation: Warfarin, irregular rhythm with controlled rate on exam  S/p TAVR 2021: normal function on echo 12.2023, no evidence of dysfunction on auscultation  HTN: Controlled on carvedilol, spironolactone  HLD: rosuvastatin 20 mg    Plan:   I have recommended evaluation of injuries and head wound in ED given anticoagulation on Warfarin.  Patient is adamant he did not hit his head with fall, but scraped scalp on a bench in his garage and declines ED evaluation. Instructed on signs and symptoms of intracranial bleeding for which to seek emergent treatment in the ED  He is agreeable to have lab evaluation today in clinic, and have us help contact family member to pick him up in clinic to avoid driving following fall.   Given the more typical orthostatic symptoms with prodrome will adjust medications for hypotension/dehydration rather than arrhythmia genic evaluation.  Reduce carvedilol to 3.125 mg twice daily  and monitor HR with AFib. Reduce furosemide to 40 mg BID.  Instructed to continue to monitor weights, swelling, shortness of breath on reduced dose  Monitor BMP/BNP today    Follow up in 1 month with me to monitor lightheadedness, and 11/2024 with Dr. Recio     History of Present Illness/Subjective    HPI: Pee Ayala is a 84 year old male with PMHx of CAD, HFpEF, atrial fibrillation, post TAVR, HTN, HLD presents for follow-up. Hospitalized 12/2023 with acute heart failure exacerbation.  Adjustment to furosemide over the last 4 months.    Patient experienced a fall due to dizziness in his garage prior to leaving for this appointment due to lightheadedness.  Denies LOC.  Became unsteady while walking and prodrome of lightheadedness/presyncope.  Fell and scraped his left arm/hand and has skin tears.  Denies hitting his head on the ground, but rather scraped the top of his scalp on a bench in his garage.  He is anticoagulated on warfarin.  Had taken his blood sugar after fall and was 300s. Lives with son and wife, drove himself to appointment today.  Denies any chest pain, palpitations, racing heart rates surrounding event.    Lightheadedness and poor balance with position changes x1 month.  Worse in the morning after waking, and when he sits for extended periods.  Takes his time when changing position and this usually helps.  Denies other episodes of falls, no syncope/LOC ever.  He has frequent urination and sometimes incontinence on furosemide.  Fairly frequently skips evening dose due to incontinence at night.  Weight has been stable.  Swelling is present and stable, wears compression stockings daily.    Chronic shortness of breath with history of heart failure, some increased shortness of breath at night is improved with antihistamine.  Does have history of allergies. Also thinks progressive blurry vision contributes to instability and optometrist it is has given him no pathway for improvement.          Coronary angiogram 12/2023  :CONCLUSIONS:   Severe multivessel coronary artery disease with chronic total occlusion of the left circumflex, mid left anterior descending artery, and mid right coronary artery.  Patent LIMA to the LAD with moderate diffuse disease of the native vessel beyond the anastomosis.  Patent vein graft to the OM1 branch with mild diffuse disease of the native vessel beyond the anastomosis.  Moderate diseased vein graft to the OM 2 branch with small, diffusely diseased native vessel beyond the anastomosis.    Echocardiogram 2/2023  Results:  Left ventricular size, wall motion and function are normal. The ejection  fraction is 60-65%.  TAPSE is abnormal, which is consistent with abnormal right ventricular  systolic function.  The left atrium is severely dilated.  The right atrium is mild to moderately dilated.  There is a bioprosthetic aortic valve.  The gradient is normal for this prosthetic aortic valve.  There is mild paravalvular regurgitation present.     Physical Examination  Review of Systems   Vitals: /64 (BP Location: Right arm, Patient Position: Sitting, Cuff Size: Adult Regular)   Pulse 76   Resp 14   BMI= There is no height or weight on file to calculate BMI.  Wt Readings from Last 3 Encounters:   02/16/24 74 kg (163 lb 2 oz)   02/14/24 74.8 kg (165 lb)   02/13/24 74.6 kg (164 lb 6.4 oz)       Head: Superficial tear of scalp.   ENT/Mouth: membranes moist, no oral lesions or bleeding gums.      EYES:  no scleral icterus, normal conjunctivae       Chest/Lungs:   lungs are clear to auscultation, no rales or wheezing, equal chest wall expansion    Cardiovascular:   Irregular. Normal first and second heart sounds with no murmurs, rubs, or gallops; the radial and posterior tibial pulses are intact, edema bilaterally wearing compression stockings       Extremities: no cyanosis or clubbing. Large Superficial skin tears of left forearm and left elbow.    Skin: no xanthelasma,  warm.    Neurologic: no tremors     Psychiatric: alert and oriented x3, calm        Please refer above for cardiac ROS details.        Medical History  Surgical History Family History Social History   Past Medical History:   Diagnosis Date    ACS (acute coronary syndrome) (H) 06/02/2014    Actinic keratosis 01/14/2014    Anticoagulated on Coumadin 12/30/2015    Atrial fibrillation (H) 01/01/2016    Bone mass 04/26/2017    Chest heaviness 01/23/2019    Chest pain 05/31/2014    Chronic heart failure with preserved ejection fraction (H) 01/16/2024    Closed fracture of left forearm 01/01/2015    Congestive heart failure (H)     Coronary artery disease     Difficulty in walking(719.7)     Dyslipidemia 08/31/2016    Dyspnea on exertion     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     Encounter for long-term (current) use of insulin (H) 08/11/2016    Esophageal reflux 11/18/2010    History of angina     HTN (hypertension) 06/30/2009     (Problem list name updated by automated process. Provider to review and confirm.)    Impotence of organic origin     Mixed hyperlipidemia 04/25/2007 April 25, 2007 restarted Zocor today, recheck in 3 months.  August 23, 2007 LDL at 101 with 40 mg, will increase to 80 mg. Recheck  In 3 months.     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     Walking troubles      Past Surgical History:   Procedure Laterality Date    BYPASS  GRAFT ARTERY CORONARY N/A 09/10/2014    Procedure: BYPASS GRAFT ARTERY CORONARY;  Surgeon: Bharathi Caraballo MD;  Location: [facility] OR    BYPASS GRAFT ARTERY CORONARY  01/01/2016    COLONOSCOPY  01/14/2004    CORONARY ANGIOGRAPHY ADULT ORDER      CORONARY ARTERY BYPASS      CV CORONARY ANGIOGRAM N/A 04/04/2019    Procedure: Coronary Angiogram;  Surgeon: Dominik Vega MD;  Location: [facility] Cath Lab;  Service: Cardiology    CV CORONARY ANGIOGRAM N/A 01/06/2021    Procedure: Coronary Angiogram;  Surgeon: Dominik Vega MD;  Location: [facility] Cardiac Cath Lab;  Service: Cardiology    CV CORONARY ANGIOGRAM N/A 12/22/2023    Procedure: Coronary Angiogram;  Surgeon: Bahman Lenz MD;  Location: [facility] CV    CV LEFT HEART CATHETERIZATION WITHOUT LEFT VENTRICULOGRAM Left 04/04/2019    Procedure: Left Heart Catheterization Without Left Ventriculogram;  Surgeon: Dominik Vega MD;  Location: [facility] Cath Lab;  Service: Cardiology    CV LEFT HEART CATHETERIZATION WITHOUT LEFT VENTRICULOGRAM Left 01/06/2021    Procedure: Left Heart Catheterization Without Left Ventriculogram;  Surgeon: Dominik Vega MD;  Location: [facility] Cath Lab;  Service: Cardiology    CV RIGHT HEART CATHETERIZATION Right 04/04/2019    Procedure: Right Heart Catheterization;  Surgeon: Dominik Vega MD;  Location: [facility] Cath Lab;  Service: Cardiology    CV TRANSCATHETER AORTIC VALVE REPLACEMENT N/A 01/12/2021    Procedure: Right transfemoral transcatheter aortic valve replacement using Magdaleno Dominick 3 size 29mm.  Transthoracic echocardiogram;  Surgeon: Jo Romano MD;  Location: [facility] CARDIAC CATH LAB    ESOPHAGOSCOPY, GASTROSCOPY, DUODENOSCOPY (EGD), COMBINED N/A 01/13/2016    Procedure: COMBINED ESOPHAGOSCOPY, GASTROSCOPY, DUODENOSCOPY (EGD);  Surgeon: Rk Srivastava MD;  Location: [facility] CATH FEMORAL CANNULIZATION WITH OPEN STANDBY REPAIR  AORTIC VALVE N/A 2021    Procedure: Cardiopulmonary Bypass standby;  Surgeon: Jefferson Sandy MD;  Location:  HEART CARDIAC CATH LAB    HEART CATH, ANGIOPLASTY      IR LOWER EXTREMITY ANGIOGRAM LEFT  2021    LAPAROSCOPIC CHOLECYSTECTOMY  10/01/2009    MAZE PROCEDURE N/A 09/10/2014    Procedure: MAZE PROCEDURE;  Surgeon: Bharathi Caraballo MD;  Location:  OR    MOHS MICROGRAPHIC PROCEDURE      OPEN REDUCTION INTERNAL FIXATION HIP BIPOLAR Left 2022    Procedure: HEMIARTHROPLASTY, HIP, BIPOLAR, OPEN REDUCTION INTERNAL FIXATION OF GREATER TROCHANTER;  Surgeon: Jd Larose MD;  Location: [facility] OR    ORTHOPEDIC SURGERY      surgery for fx  Left forearm    OTHER SURGICAL HISTORY Left 2015    forearm sugery    STENT, CORONARY, HUMA  2016    VASECTOMY       Family History   Problem Relation Age of Onset    Diabetes Mother     Hypertension Father     Cerebrovascular Disease Father     Diabetes Maternal Grandmother     Breast Cancer No family hx of     Cancer - colorectal No family hx of     Prostate Cancer No family hx of     C.A.D. No family hx of     Diabetes Type 2  Mother         58 ,  from anesthesia complication.    Heart Disease Father         86  from stroke    No Known Problems Brother         60 years of age.        Social History     Socioeconomic History    Marital status:      Spouse name: Fay Ayala    Number of children: Not on file    Years of education: Not on file    Highest education level: Not on file   Occupational History     Employer: RETIRED   Tobacco Use    Smoking status: Former     Current packs/day: 0.00     Types: Cigarettes     Quit date: 1998     Years since quittin.3     Passive exposure: Never    Smokeless tobacco: Never   Vaping Use    Vaping status: Never Used   Substance and Sexual Activity    Alcohol use: Yes     Alcohol/week: 0.0 standard drinks of alcohol     Comment: Alcoholic Drinks/day: very rare    Drug  use: No    Sexual activity: Never     Birth control/protection: None   Other Topics Concern    Parent/sibling w/ CABG, MI or angioplasty before 65F 55M? No   Social History Narrative     and lives with life.  Has adult children from previous relationship. ( Blended family).  Has a dog - Vincent Gil MD  1/3/2019                Medication Instructions:    Patient is to take all scheduled medications on the day of surgery EXCEPT for modifications listed below:     - aspirin: cardiology has recommended continuing baby aspirin daily     - warfarin: hold warfarin for 5 days before surgery     - Diuretics: HOLD on the day of surgery.     - Statins: Continue taking on the day of surgery.      - mixed insulin (70/30m 75/25, 50/50): HOLD day of surgery     - metformin: HOLD day of surgery.     - Opioids: Continue without modification     - hold any vitamins or supplements until after surgery.      Social Determinants of Health     Financial Resource Strain: Low Risk  (12/28/2023)    Financial Resource Strain     Within the past 12 months, have you or your family members you live with been unable to get utilities (heat, electricity) when it was really needed?: No   Food Insecurity: Low Risk  (12/28/2023)    Food Insecurity     Within the past 12 months, did you worry that your food would run out before you got money to buy more?: No     Within the past 12 months, did the food you bought just not last and you didn t have money to get more?: No   Transportation Needs: Low Risk  (12/28/2023)    Transportation Needs     Within the past 12 months, has lack of transportation kept you from medical appointments, getting your medicines, non-medical meetings or appointments, work, or from getting things that you need?: No   Physical Activity: Not on file   Stress: Not on file   Social Connections: Not on file   Interpersonal Safety: Low Risk  (10/23/2023)    Interpersonal Safety     Do you feel physically and  emotionally safe where you currently live?: Yes     Within the past 12 months, have you been hit, slapped, kicked or otherwise physically hurt by someone?: No     Within the past 12 months, have you been humiliated or emotionally abused in other ways by your partner or ex-partner?: No   Housing Stability: Low Risk  (12/28/2023)    Housing Stability     Do you have housing? : Yes     Are you worried about losing your housing?: No           Medications  Allergies   Current Outpatient Medications   Medication Sig Dispense Refill    acetaminophen (TYLENOL) 500 MG tablet Take 1 tablet (500 mg) by mouth 2 times daily as needed for mild pain      aspirin 81 MG EC tablet Take 1 tablet (81 mg) by mouth daily 90 tablet 0    carvedilol (COREG) 3.125 MG tablet Take 6.25 mg by mouth 2 times daily (with meals)      Continuous Blood Gluc  (FREESTYLE DOMI 2 READER) DOROTEO Use to read blood sugars as per 's instructions. 1 each 0    Continuous Blood Gluc Sensor (FREESTYLE DOMI 2 SENSOR) MISC Change every 14 days. 6 each 3    furosemide (LASIX) 40 MG tablet Please take 80 mg (2 tablets) in the morning and 40 mg (1 tablet) in the afternoon. 270 tablet 3    gabapentin (NEURONTIN) 600 MG tablet Take 1 tablet (600 mg) by mouth 2 times daily 180 tablet 1    insulin aspart prot & aspart (NOVOLOG MIX 70/30 PEN) (70-30) 100 UNIT/ML pen Inject subcutaneously 20 units in AM with breakfast and 12 in PM with dinner. If Blood Glucose<100 then give 1/2 dose) 15 mL 0    metFORMIN (GLUCOPHAGE) 500 MG tablet TAKE 2 TABLETS BY MOUTH TWICE  DAILY WITH MEALS 360 tablet 0    Multiple Vitamins-Minerals (PRESERVISION AREDS PO) Take 1 tablet by mouth 2 times daily      pramipexole (MIRAPEX) 0.5 MG tablet Take 1 tablet (0.5 mg) by mouth 2 times daily (Patient taking differently: Take 0.5 mg by mouth 3 times daily Take 2 tablets in the afternoon and 1 tablet at bedtime) 180 tablet 1    rosuvastatin (CRESTOR) 20 MG tablet Take 40 mg by  mouth every evening      spironolactone (ALDACTONE) 25 MG tablet Take 1 tablet (25 mg) by mouth daily 90 tablet 3    tamsulosin (FLOMAX) 0.4 MG capsule Take 0.4 mg by mouth every morning      warfarin ANTICOAGULANT (COUMADIN) 4 MG tablet Takes 1 tablet (4mg) 5 days of the week on Sun/Mon/Wed/Thurs/Sat, by mouth as directed.  Adjust dose based on INR results. 70 tablet 1    warfarin ANTICOAGULANT (COUMADIN) 5 MG tablet Take 5 mg by mouth See Admin Instructions Patient reports taking 4 mg every Sunday, Monday, Wednesday, Thursday, and Saturday. On Tuesday and Friday patient reports taking 5 mg.      cephALEXin (KEFLEX) 500 MG capsule Take 1 capsule (500 mg) by mouth 2 times daily (Patient not taking: Reported on 5/6/2024) 20 capsule 0       Allergies   Allergen Reactions    Oxycodone Itching and Rash    Amlodipine Dizziness     Dizziness and dry heaves    Lisinopril Cough    Adhesive Tape Itching and Rash          Lab Results    Chemistry/lipid CBC Cardiac Enzymes/BNP/TSH/INR   Recent Labs   Lab Test 04/06/23  1625   CHOL 136   HDL 61   LDL 58   TRIG 85     Recent Labs   Lab Test 04/06/23  1625 01/25/22  1443 09/16/21  1026   LDL 58 65 48     Recent Labs   Lab Test 02/08/24  1528      POTASSIUM 4.4   CHLORIDE 98   CO2 29   *   BUN 29.0*   CR 1.18*   GFRESTIMATED 62   RACHEL 9.4     Recent Labs   Lab Test 02/08/24  1528 01/16/24  1338 12/28/23  1706   CR 1.18* 1.24* 1.03     Recent Labs   Lab Test 02/08/24  1528 10/23/23  1020 07/18/23  1025   A1C 7.8* 8.4* 9.8*          Recent Labs   Lab Test 12/21/23  1103   WBC 6.1   HGB 11.6*   HCT 37.5*   *        Recent Labs   Lab Test 12/21/23  1103 04/06/23  1625 07/18/22  0636   HGB 11.6* 12.4* 10.8*    Recent Labs   Lab Test 07/04/22  1034   TROPONINI 0.15     Recent Labs   Lab Test 01/16/24  1338 12/21/23  1103 07/04/22  1034 01/08/21  0842 10/21/20  1451 05/09/19  1657 06/09/16  1203   BNP  --   --  321* 91* 56   < >  --    NTBNPI  --  2,987*  --    --   --   --   --    NTBNP 1,204  --   --   --   --   --  354*    < > = values in this interval not displayed.     Recent Labs   Lab Test 11/17/22  1315   TSH 0.92     Recent Labs   Lab Test 04/22/24  1106 03/28/24  1330 03/25/24  1311   INR 1.2* 3.8* 1.1          This note has been dictated using voice recognition software. Any grammatical, typographical, or context distortions are unintentional and inherent to the software    Monae Reich PA-C      Thank you for allowing me to participate in the care of your patient.      Sincerely,     Monae Guerrero PA-C     [facility]  cc:   BOBBY Mckoy CNP  [address], [address]  [place] [address]

## 2024-05-06 NOTE — LETTER
"May 22, 2024      Pee Ayala  [address]  [place] [address]    Dear ,    We are writing to inform you of your test results. Ms. Reich reviewed, and states the following:    \"There is no evidence of significant fluid retention, electrolyte disturbance, and kidney function is normal on lab results from 1 day ago.     Continue with our current plan with reduction in carvedilol to 3.125 mg twice daily and reduction in furosemide to 40 mg twice daily.  Monitor for increased swelling, weight gain, fast heart rates >100.  I will see you back in 1 month, if there is no improvement in lightheadedness with position changes before that time please let our clinic know sooner.     Continue wearing compression stockings and take time with your transitions from sitting/laying to standing.\"        Resulted Orders   N terminal pro BNP outpatient   Result Value Ref Range    N Terminal Pro BNP Outpatient 482 0 - 1,800 pg/mL      Comment:      Reference range shown and results flagged as abnormal are for the outpatient, non acute settings. Establishing a baseline value for each individual patient is useful for follow-up.    Suggested inpatient cut points for confirming diagnosis of CHF in an acute setting are:  >450 pg/mL (age 18 to less than 50)  >900 pg/mL (age 50 to less than 75)  >1800 pg/mL (75 yrs and older)    An inpatient or emergency department NT-proPBNP <300 pg/mL effectively rules out acute CHF, with 99% negative predictive value.       Basic metabolic panel   Result Value Ref Range    Sodium 138 135 - 145 mmol/L      Comment:      Reference intervals for this test were updated on 09/26/2023 to more accurately reflect our healthy population. There may be differences in the flagging of prior results with similar values performed with this method. Interpretation of those prior results can be made in the context of the updated reference intervals.     Potassium 4.6 3.4 - 5.3 mmol/L    Chloride " 100 98 - 107 mmol/L    Carbon Dioxide (CO2) 29 22 - 29 mmol/L    Anion Gap 9 7 - 15 mmol/L    Urea Nitrogen 22.3 8.0 - 23.0 mg/dL    Creatinine 0.99 0.67 - 1.17 mg/dL    GFR Estimate 76 >60 mL/min/1.73m2    Calcium 9.8 8.8 - 10.2 mg/dL    Glucose 201 (H) 70 - 99 mg/dL       If you have any questions or concerns, please call the clinic at the number listed above.       Sincerely,  Sadaf ASTUDILLO, RN  CV Nurse Clinician on behalf of [facility] [address]  [address]  [place] [address]  Office phone: [phone]   Fax: [phone]

## 2024-05-07 ENCOUNTER — ANCILLARY PROCEDURE (OUTPATIENT)
Dept: GENERAL RADIOLOGY | Facility: CLINIC | Age: 84
End: 2024-05-07
Attending: PHYSICIAN ASSISTANT
Payer: COMMERCIAL

## 2024-05-07 ENCOUNTER — OFFICE VISIT (OUTPATIENT)
Dept: FAMILY MEDICINE | Facility: CLINIC | Age: 84
End: 2024-05-07
Payer: COMMERCIAL

## 2024-05-07 VITALS
DIASTOLIC BLOOD PRESSURE: 70 MMHG | RESPIRATION RATE: 20 BRPM | WEIGHT: 164 LBS | BODY MASS INDEX: 30 KG/M2 | OXYGEN SATURATION: 100 % | HEART RATE: 70 BPM | TEMPERATURE: 97.8 F | SYSTOLIC BLOOD PRESSURE: 129 MMHG

## 2024-05-07 DIAGNOSIS — S09.90XD INJURY OF HEAD, SUBSEQUENT ENCOUNTER: ICD-10-CM

## 2024-05-07 DIAGNOSIS — M25.522 LEFT ELBOW PAIN: ICD-10-CM

## 2024-05-07 DIAGNOSIS — S00.01XA ABRASION, SCALP W/O INFECTION: ICD-10-CM
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
## 2024-05-07 DIAGNOSIS — Z79.01 CHRONIC ANTICOAGULATION: ICD-10-CM

## 2024-05-07 DIAGNOSIS — E11.9 TYPE 2 DIABETES, HBA1C GOAL < 8% (H): ICD-10-CM

## 2024-05-07 PROCEDURE — 99213 OFFICE O/P EST LOW 20 MIN: CPT | Performed by: PHYSICIAN ASSISTANT

## 2024-05-07 PROCEDURE — 73080 X-RAY EXAM OF ELBOW: CPT | Mod: TC | Performed by: RADIOLOGY

## 2024-05-07 ASSESSMENT — PATIENT HEALTH QUESTIONNAIRE - PHQ9
10. IF YOU CHECKED OFF ANY PROBLEMS, HOW DIFFICULT HAVE THESE PROBLEMS MADE IT FOR YOU TO DO YOUR WORK, TAKE CARE OF THINGS AT HOME, OR GET ALONG WITH OTHER PEOPLE: NOT DIFFICULT AT ALL
SUM OF ALL RESPONSES TO PHQ QUESTIONS 1-9: 7
SUM OF ALL RESPONSES TO PHQ QUESTIONS 1-9: 7

## 2024-05-07 NOTE — TELEPHONE ENCOUNTER
Requested Prescriptions   Pending Prescriptions Disp Refills    Continuous Glucose  (FREESTYLE DOMI 2 READER) DOROTEO [Pharmacy Med Name: FREESTYLE DOMI 2 READER MIS]  0     Sig: USE TO READ BLOOD SUGARS AS PER 'S INSTRUCTIONS       There is no refill protocol information for this order

## 2024-05-07 NOTE — PROGRESS NOTES
"  Assessment & Plan     Fall, subsequent encounter  Injury of head, subsequent encounter  Abrasion, scalp w/o infection  Chronic anticoagulation  Continue to monitor INR.  Patient seen today for follow up from fall, is on chronic anticoagulation. Had negative head CT yesterday. No signs or symptoms of intracranial bleed, discussed red flags and warning signs to monitor for symptoms and if occur to go to ER.    Left elbow pain  New problem, suspect due to laceration. Xray in office was negative for acute fracture. Recommend follow up for suture removal in about 10 days.  - XR Elbow Left G/E 3 Views    Chronic anticoagulation  Continue to monitor INR.        BMI  Estimated body mass index is 30 kg/m  as calculated from the following:    Height as of 2/16/24: 1.575 m (5' 2\").    Weight as of this encounter: 74.4 kg (164 lb).   Weight management plan: Discussed healthy diet and exercise guidelines      Risks, benefits and alternatives were discussed with patient. Agreeable to the plan of care.      Alex Glasgow is a 84 year old, presenting for the following health issues:  Wound Check (Elbow is extremely sensitive and the cut on his head was draining some blood. )      5/7/2024    12:53 PM   Additional Questions   Roomed by CHARLI Abad CMA([facility])   Accompanied by Spouse     Via the Health Maintenance questionnaire, the patient has reported the following services have been completed -Eye Exam, this information has been sent to the abstraction team.  Wound Check    History of Present Illness       Reason for visit:  A fall  Symptom onset:  1-3 days ago    He eats 2-3 servings of fruits and vegetables daily.He consumes 2 sweetened beverage(s) daily.He exercises with enough effort to increase his heart rate 9 or less minutes per day.  He exercises with enough effort to increase his heart rate 3 or less days per week. He is missing 2 dose(s) of medications per week.         Hospital Follow-up Visit:    Hospital/Nursing " Home/IP Rehab Facility:  [facility]  Date of Admission: 05/06/2024  Date of Discharge: 05/06/2024  Reason(s) for Admission: 05/06/2024  Was the patient in the ICU or did the patient experience delirium during hospitalization?  No  Do you have any other stressors you would like to discuss with your provider? No    Problems taking medications regularly:  None  Medication changes since discharge: None  Problems adhering to non-medication therapy:  None    Summary of hospitalization:  CareEverywhere information obtained and reviewed  Diagnostic Tests/Treatments reviewed.  Follow up needed: none  Other Healthcare Providers Involved in Patient s Care:         None  Update since discharge: improved.         Plan of care communicated with patient and family             Patient is here today to follow up on recent fall  He was seen at [facility] yesterday after the fall given he hit is head and is on warfarin  He notes no headache, vision changes, vomiting  Is concerned about left elbow pain  Does have some stitches there and some adhesive bandaids placed  No redness or discharge      Review of Systems  Constitutional, HEENT, cardiovascular, pulmonary, gi and gu systems are negative, except as otherwise noted.      Objective    /70   Pulse 70   Temp 97.8  F (36.6  C) (Oral)   Resp 20   Wt 74.4 kg (164 lb)   SpO2 100%   BMI 30.00 kg/m    Body mass index is 30 kg/m .  Physical Exam   GENERAL: alert and no distress  Head: bandage over scalp without discharge, redness  NECK: no adenopathy, no asymmetry, masses, or scars  RESP: lungs clear to auscultation - no rales, rhonchi or wheezes  CV: regular rate and rhythm, normal S1 S2, no S3 or S4, no murmur, click or rub, no peripheral edema  MS: no gross musculoskeletal defects noted, no edema, + pain over lateral elbow  SKIN: left elbow shows bruising, 4 sutures and adhesive bandages over elbow. No red streaks or discharge    Xray - Reviewed and interpreted by  me.  Left elbow- no acute fracture noted        Signed Electronically by: Debbie Lincoln PA-C

## 2024-05-08 ENCOUNTER — TELEPHONE (OUTPATIENT)
Dept: ANTICOAGULATION | Facility: CLINIC | Age: 84
End: 2024-05-08
Payer: COMMERCIAL

## 2024-05-08 NOTE — TELEPHONE ENCOUNTER
ANTICOAGULATION     Pee Ayala is overdue for an INR check.     Spoke with Pee and scheduled lab appointment on 5/14    Mariel Dale RN

## 2024-05-09 RX ORDER — CARVEDILOL 3.12 MG/1
3.12 TABLET ORAL 2 TIMES DAILY WITH MEALS
Qty: 200 TABLET | Refills: 2 | OUTPATIENT
Start: 2024-05-09

## 2024-05-17 ENCOUNTER — OFFICE VISIT (OUTPATIENT)
Dept: FAMILY MEDICINE | Facility: CLINIC | Age: 84
End: 2024-05-17
Payer: COMMERCIAL

## 2024-05-17 ENCOUNTER — ANTICOAGULATION THERAPY VISIT (OUTPATIENT)
Dept: ANTICOAGULATION | Facility: CLINIC | Age: 84
End: 2024-05-17

## 2024-05-17 VITALS
HEIGHT: 62 IN | DIASTOLIC BLOOD PRESSURE: 66 MMHG | RESPIRATION RATE: 28 BRPM | SYSTOLIC BLOOD PRESSURE: 140 MMHG | TEMPERATURE: 97.6 F | HEART RATE: 57 BPM | WEIGHT: 166.6 LBS | BODY MASS INDEX: 30.66 KG/M2 | OXYGEN SATURATION: 99 %

## 2024-05-17 DIAGNOSIS — Z79.01 LONG TERM (CURRENT) USE OF ANTICOAGULANTS: ICD-10-CM

## 2024-05-17 DIAGNOSIS — Z95.2 S/P TAVR (TRANSCATHETER AORTIC VALVE REPLACEMENT): ICD-10-CM

## 2024-05-17 DIAGNOSIS — I73.9 PERIPHERAL ARTERIAL DISEASE (H): ICD-10-CM

## 2024-05-17 DIAGNOSIS — Z79.01 ANTICOAGULATED ON COUMADIN: ICD-10-CM

## 2024-05-17 DIAGNOSIS — I48.20 CHRONIC ATRIAL FIBRILLATION (H): ICD-10-CM

## 2024-05-17 DIAGNOSIS — Z79.01 CHRONIC ANTICOAGULATION: ICD-10-CM

## 2024-05-17 DIAGNOSIS — I48.20 CHRONIC ATRIAL FIBRILLATION (H): Primary | ICD-10-CM

## 2024-05-17 DIAGNOSIS — I48.91 NEW ONSET ATRIAL FIBRILLATION (H): Primary | ICD-10-CM

## 2024-05-17 DIAGNOSIS — Z48.01 ENCOUNTER FOR CHANGE OR REMOVAL OF SURGICAL WOUND DRESSING: ICD-10-CM

## 2024-05-17 DIAGNOSIS — Z48.02 VISIT FOR SUTURE REMOVAL: Primary | ICD-10-CM

## 2024-05-17 DIAGNOSIS — S41.112D: ICD-10-CM

## 2024-05-17 LAB — INR BLD: 3.6 (ref 0.9–1.1)

## 2024-05-17 PROCEDURE — 36416 COLLJ CAPILLARY BLOOD SPEC: CPT | Performed by: PHYSICIAN ASSISTANT

## 2024-05-17 PROCEDURE — 99207 PR NO CHARGE LOS: CPT | Performed by: PHYSICIAN ASSISTANT

## 2024-05-17 PROCEDURE — 85610 PROTHROMBIN TIME: CPT | Performed by: PHYSICIAN ASSISTANT

## 2024-05-17 RX ORDER — WARFARIN SODIUM 5 MG/1
TABLET ORAL
Qty: 40 TABLET | Refills: 1 | Status: ON HOLD | OUTPATIENT
Start: 2024-05-17 | End: 2024-08-22

## 2024-05-17 NOTE — PROGRESS NOTES
"  Assessment & Plan     Visit for suture removal  Laceration of left upper arm without complication, subsequent encounter  See note below.  - Suture Removal No Charge      Anticoagulated on Coumadin  Recheck INR today.  - INR point of care (finger stick)- Recommended for same day result    Risks, benefits and alternatives were discussed with patient. Agreeable to the plan of care.      Subjective   Pee is a 84 year old, presenting for the following health issues:  Suture Removal (Remove sutures from elbow.  )      5/17/2024    10:59 AM   Additional Questions   Roomed by CHARLI Abad CMA([facility])     Suture Removal       S: Patient is here today for suture removal.  The patient reports no complications with wound.  Sutures were placed 11 days ago.  Sutures were placed at left elbow.     o: Wound is well healed, no signs of secondary infection.  Sutures removed without complication.     A: Laceration     P: 3 Sutures removed, F/U PRN. Unable to find 4th suture, this was verified by clinic RN.        Patient also notes he needs INR check, on Coumadin      Objective    BP (!) 140/66   Pulse 57   Temp 97.6  F (36.4  C) (Oral)   Resp 28   Ht 1.575 m (5' 2\")   Wt 75.6 kg (166 lb 9.6 oz)   SpO2 99%   BMI 30.47 kg/m    Body mass index is 30.47 kg/m .  Physical Exam   See above        Signed Electronically by: Debbie Lincoln PA-C    "

## 2024-05-17 NOTE — PROGRESS NOTES
S: Patient is here today for suture removal.  The patient reports no complications with wound.  Sutures were placed 11 days ago.  Sutures were placed at left elbow.    o: Wound is well healed, no signs of secondary infection.  Sutures removed without complication.    A: Laceration    P: 3 Sutures removed, F/U PRN. Unable to find 4th suture, this was verified by clinic RN.

## 2024-05-17 NOTE — PROGRESS NOTES
ANTICOAGULATION MANAGEMENT     Pee Ayala 84 year old male is on warfarin with supratherapeutic INR result. (Goal INR 2.0-3.0)    Recent labs: (last 7 days)     05/17/24  1128   INR 3.6*       ASSESSMENT     Warfarin Lab Questionnaire    Warfarin Doses Last 7 Days    Pt Rptd Dose MONDAY TUESDAY WED THURS FRIDAY SATURDAY 5/17/2024  10:47 AM 4 4 4 4 4 4         5/17/2024   Warfarin Lab Questionnaire   Missed doses within past 14 days? No - booster dose on 4/22/24 and weekly warfarin dose was increased by 6.7%.     Changes in diet or alcohol within past 14 days? No   Medication changes since last result? No   Injuries or illness since last result? No - OV 5/17/24 for 3-suture removal of left upper arm.  Wound is well healed.         - follow-up OV on 5/7/24 - left elbow pain from laceration.  Recommended to return in 10 days for suture removal.        - UC - [facility] visit on 5/6/24 for head injury; scalp abrasion and laceration of left upper arm.  CT scan did not detect an intracranial injury.  INR was low at 1.5        - 5/6/24 [facility] appt - orthostatic symptoms x1 month. Lost balance and had a fall in the garage on way to appt.  He was recommended ED visit d/t head wound noted scraped his scalp on a bench.     New shortness of breath, severe headaches or sudden changes in vision since last result? No   Abnormal bleeding since last result? No   Upcoming surgery, procedure? No   Best number to call with results? [phone] after 100     Previous result: Subtherapeutic at 1.2 on 4/22/24.   - Urgent Care INR on 5/6/24 was low at 1.5    Additional findings: Refill needed today. Pee meets all criteria for refill (current [facility] referral, office visit with referring provider/group in last 1 year unless directed to return in 2 years in last referring provider visit note, lab monitoring up to date or not exceeding 2 weeks overdue). Rx instructions and quantity supplied updated to match patient's current dosing plan. Warfarin  90 day supply with 1 refill granted per [facility] protocol        PLAN     Recommended plan for temporary change(s) and ongoing change(s) affecting INR     Dosing Instructions: partial hold then decrease your warfarin dose (6.2% change) with next INR in 1-2 weeks       Summary  As of 5/17/2024      Full warfarin instructions:  5/17: 2 mg; Otherwise 5 mg every Sun, Wed; 4 mg all other days   Next INR check:  5/28/2024               Telephone call with Pee who verbalizes understanding and agrees to plan    Lab visit scheduled - INR on 5/28/24 @ [facility]   - also has diabetic labs ordered.    Education provided:   Taking warfarin: Importance of taking warfarin as instructed  Goal range and lab monitoring: goal range and significance of current result    Plan made with [facility] Pharmacist Sandra Dale, RN  Anticoagulation Clinic  5/17/2024    _______________________________________________________________________     Anticoagulation Episode Summary       Current INR goal:  2.0-3.0   TTR:  41.2% (8.3 mo)   Target end date:  Indefinite   Send INR reminders to:  [facility]    Indications    Chronic atrial fibrillation (H) [I48.20]  Chronic anticoagulation [Z79.01]  Long term (current) use of anticoagulants [Z79.01]  Peripheral arterial disease (H24) [I73.9]  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

## 2024-05-24 DIAGNOSIS — E11.42 TYPE 2 DIABETES MELLITUS WITH PERIPHERAL NEUROPATHY (H): ICD-10-CM

## 2024-05-24 NOTE — TELEPHONE ENCOUNTER
3 month supply sent 3/18- pt has follow up 6/11    Requested Prescriptions   Pending Prescriptions Disp Refills    metFORMIN (GLUCOPHAGE) 500 MG tablet [Pharmacy Med Name: metFORMIN HCl 500 MG Oral Tablet] 360 tablet 3     Sig: TAKE 2 TABLETS BY MOUTH TWICE  DAILY WITH MEALS       Biguanide Agents Passed - 5/24/2024  5:54 AM        Passed - Patient is age 10 or older        Passed - Patient has documented A1c within the specified period of time.     If HgbA1C is 8 or greater, it needs to be on file within the past 3 months.  If less than 8, must be on file within the past 6 months.     Recent Labs   Lab Test 02/08/24  1528   A1C 7.8*             Passed - Patient does NOT have a diagnosis of CHF.        Passed - Medication is active on med list        Passed - Medication indicated for associated diagnosis     Medication is associated with one or more of the following diagnoses:     Gestational diabetes mellitus     Hyperinsulinar obesity     Hypersecretion of ovarian androgens    Non-alcoholic fatty liver    Polycystic ovarian syndrome               Pre-diabetes (DM 2 prevention)    Type 2 diabetes mellitus     Weight gain, antipsychotic therapy-induced             Passed - Has GFR on file in past 12 months and most recent value is normal        Passed - Recent (6 mo) or future (90 days) visit within the authorizing provider's specialty     The patient must have completed an in-person or virtual visit within the past 6 months or has a future visit scheduled within the next 90 days with the authorizing provider s specialty.  Urgent care and e-visits do not quality as an office visit for this protocol.

## 2024-05-29 ENCOUNTER — TELEPHONE (OUTPATIENT)
Dept: ENDOCRINOLOGY | Facility: CLINIC | Age: 84
End: 2024-05-29
Payer: COMMERCIAL

## 2024-05-29 NOTE — TELEPHONE ENCOUNTER
I called pt to reschedule lab appt, mailbox is full. Please contact pt for lab, will need it done before appt with Fay Kwok.

## 2024-06-03 ENCOUNTER — TELEPHONE (OUTPATIENT)
Dept: ANTICOAGULATION | Facility: CLINIC | Age: 84
End: 2024-06-03
Payer: COMMERCIAL

## 2024-06-03 NOTE — TELEPHONE ENCOUNTER
ANTICOAGULATION     Pee Ayala is overdue for an INR check.     Spoke with Pee and scheduled lab appointment on 6/13 at his cardiology appt @ [facility]    Mariel Dale RN

## 2024-06-06 ENCOUNTER — ANTICOAGULATION THERAPY VISIT (OUTPATIENT)
Dept: ANTICOAGULATION | Facility: CLINIC | Age: 84
End: 2024-06-06

## 2024-06-06 ENCOUNTER — LAB (OUTPATIENT)
Dept: LAB | Facility: CLINIC | Age: 84
End: 2024-06-06
Payer: COMMERCIAL

## 2024-06-06 DIAGNOSIS — Z79.01 CHRONIC ANTICOAGULATION: ICD-10-CM

## 2024-06-06 DIAGNOSIS — Z79.01 LONG TERM (CURRENT) USE OF ANTICOAGULANTS: ICD-10-CM

## 2024-06-06 DIAGNOSIS — E11.42 TYPE 2 DIABETES MELLITUS WITH PERIPHERAL NEUROPATHY (H): ICD-10-CM

## 2024-06-06 DIAGNOSIS — Z95.2 S/P TAVR (TRANSCATHETER AORTIC VALVE REPLACEMENT): ICD-10-CM

## 2024-06-06 DIAGNOSIS — I48.20 CHRONIC ATRIAL FIBRILLATION (H): Primary | ICD-10-CM

## 2024-06-06 DIAGNOSIS — I73.9 PERIPHERAL ARTERIAL DISEASE (H): ICD-10-CM

## 2024-06-06 DIAGNOSIS — E78.5 HYPERLIPIDEMIA LDL GOAL <100: ICD-10-CM

## 2024-06-06 DIAGNOSIS — I48.20 CHRONIC ATRIAL FIBRILLATION (H): ICD-10-CM

## 2024-06-06 LAB
HBA1C MFR BLD: 8.5 % (ref 0–5.6)
INR BLD: 1.1 (ref 0.9–1.1)

## 2024-06-06 PROCEDURE — 36415 COLL VENOUS BLD VENIPUNCTURE: CPT

## 2024-06-06 PROCEDURE — 83721 ASSAY OF BLOOD LIPOPROTEIN: CPT

## 2024-06-06 PROCEDURE — 84460 ALANINE AMINO (ALT) (SGPT): CPT

## 2024-06-06 PROCEDURE — 80048 BASIC METABOLIC PNL TOTAL CA: CPT

## 2024-06-06 PROCEDURE — 82570 ASSAY OF URINE CREATININE: CPT

## 2024-06-06 PROCEDURE — 83036 HEMOGLOBIN GLYCOSYLATED A1C: CPT

## 2024-06-06 PROCEDURE — 85610 PROTHROMBIN TIME: CPT

## 2024-06-06 PROCEDURE — 82043 UR ALBUMIN QUANTITATIVE: CPT

## 2024-06-06 NOTE — PROGRESS NOTES
ANTICOAGULATION MANAGEMENT     Pee Ayala 84 year old male is on warfarin with subtherapeutic INR result. (Goal INR 2.0-3.0)    Recent labs: (last 7 days)     06/06/24  1207   INR 1.1       ASSESSMENT     Source(s): Chart Reviewed    Warfarin doses taken: Partial warfarin dose HELD on 5/17/24,  recently which may be affecting INR, And weekly warfarin dose decreased by 6.2%.  Medication/supplement changes:  Yes   - decreasd on 5/22/24 Furosemide to 40mg 2x/day and Carvedilol to 3.125mg 2x/day.  New illness, injury, or hospitalization: No  Previous result: Supratherapeutic at 3.6 on 5/17/24.  Additional findings:  WAITING CALL BACK FROM PT - has he been taking his warfarin ?       PLAN     Unable to reach Pee today.    Left message to take a booster dose of warfarin,  6 mg tonight. Request call back for assessment.    Follow up required to confirm warfarin dose taken and assess for changes    Mariel Dale RN  Anticoagulation Clinic  6/6/2024

## 2024-06-07 LAB
ALT SERPL W P-5'-P-CCNC: 17 U/L (ref 0–70)
ANION GAP SERPL CALCULATED.3IONS-SCNC: 11 MMOL/L (ref 7–15)
BUN SERPL-MCNC: 26.1 MG/DL (ref 8–23)
CALCIUM SERPL-MCNC: 9.4 MG/DL (ref 8.8–10.2)
CHLORIDE SERPL-SCNC: 100 MMOL/L (ref 98–107)
CREAT SERPL-MCNC: 1.13 MG/DL (ref 0.67–1.17)
CREAT UR-MCNC: 50.1 MG/DL
DEPRECATED HCO3 PLAS-SCNC: 28 MMOL/L (ref 22–29)
EGFRCR SERPLBLD CKD-EPI 2021: 65 ML/MIN/1.73M2
GLUCOSE SERPL-MCNC: 156 MG/DL (ref 70–99)
LDLC SERPL DIRECT ASSAY-MCNC: 47 MG/DL
MICROALBUMIN UR-MCNC: 12.8 MG/L
MICROALBUMIN/CREAT UR: 25.55 MG/G CR (ref 0–17)
POTASSIUM SERPL-SCNC: 4.5 MMOL/L (ref 3.4–5.3)
SODIUM SERPL-SCNC: 139 MMOL/L (ref 135–145)

## 2024-06-07 NOTE — PROGRESS NOTES
ANTICOAGULATION MANAGEMENT     Pee Ayala 84 year old male is on warfarin with subtherapeutic INR result. (Goal INR 2.0-3.0)    Recent labs: (last 7 days)     06/06/24  1207   INR 1.1       ASSESSMENT     Source(s): Chart Review and Patient/Caregiver Call     Warfarin doses taken: Less warfarin taken than planned which may be affecting INR   Reported he ran out of 5mg warfarin and has not picked up the RX yet at pharmacy.  Has been taking 4mg daily for the last 3 wks.   He will  5mg Warfarin RX today.  Diet: No new diet changes identified  Medication/supplement changes:  Yes.     Decreasd on 5/22/24 Furosemide to 40mg 2x/day and Carvedilol to 3.125mg 2x/day.   New illness, injury, or hospitalization: No  Signs or symptoms of bleeding or clotting: No  Previous result: Supratherapeutic at 3.6 on 5/17/24.  Additional findings: None       PLAN     Recommended plan for temporary change(s) affecting INR     Dosing Instructions:  - reported he did not check his message and did not take booster dose.  - advised booster dose tonight with 6mg warfarin,  - then continue your current warfarin dose   - with next INR in 3 days       Summary  As of 6/6/2024      Full warfarin instructions:  6/6: 6 mg; Otherwise 5 mg every Sun, Wed; 4 mg all other days   Next INR check:  6/11/2024               Telephone call with Pee who verbalizes understanding and agrees to plan   - provided education, the importance of taking correct warfarin dose, to ensure INR stability.    Lab visit scheduled - INR on 6/11/24 at diabetic education appt    Education provided:   Taking warfarin: take warfarin at same time each day; preferably in the evening, importance of following [facility] instructions vs instructions on the prescription bottle, and Importance of taking warfarin as instructed  Goal range and lab monitoring: goal range and significance of current result    Plan made with [facility] Pharmacist Sandra Dale,  RN  Anticoagulation Clinic  6/7/2024    _______________________________________________________________________     Anticoagulation Episode Summary       Current INR goal:  2.0-3.0   TTR:  42.0% (8.3 mo)   Target end date:  Indefinite   Send INR reminders to:  [facility]    Indications    Chronic atrial fibrillation (H) [I48.20]  Chronic anticoagulation [Z79.01]  Long term (current) use of anticoagulants [Z79.01]  Peripheral arterial disease (H24) [I73.9]  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

## 2024-06-14 ENCOUNTER — ANTICOAGULATION THERAPY VISIT (OUTPATIENT)
Dept: ANTICOAGULATION | Facility: CLINIC | Age: 84
End: 2024-06-14

## 2024-06-14 ENCOUNTER — TELEPHONE (OUTPATIENT)
Dept: ANTICOAGULATION | Facility: CLINIC | Age: 84
End: 2024-06-14

## 2024-06-14 ENCOUNTER — LAB (OUTPATIENT)
Dept: LAB | Facility: CLINIC | Age: 84
End: 2024-06-14
Payer: COMMERCIAL

## 2024-06-14 DIAGNOSIS — I73.9 PERIPHERAL ARTERIAL DISEASE (H): ICD-10-CM

## 2024-06-14 DIAGNOSIS — Z79.01 LONG TERM (CURRENT) USE OF ANTICOAGULANTS: ICD-10-CM

## 2024-06-14 DIAGNOSIS — I25.10 CAD (CORONARY ARTERY DISEASE): Primary | ICD-10-CM

## 2024-06-14 DIAGNOSIS — Z95.2 S/P TAVR (TRANSCATHETER AORTIC VALVE REPLACEMENT): ICD-10-CM

## 2024-06-14 DIAGNOSIS — I48.20 CHRONIC ATRIAL FIBRILLATION (H): Primary | ICD-10-CM

## 2024-06-14 DIAGNOSIS — I48.20 CHRONIC ATRIAL FIBRILLATION (H): ICD-10-CM

## 2024-06-14 DIAGNOSIS — Z79.01 CHRONIC ANTICOAGULATION: ICD-10-CM

## 2024-06-14 LAB — INR BLD: 1.7 (ref 0.9–1.1)

## 2024-06-14 PROCEDURE — 85610 PROTHROMBIN TIME: CPT

## 2024-06-14 PROCEDURE — 36415 COLL VENOUS BLD VENIPUNCTURE: CPT

## 2024-06-14 PROCEDURE — 80061 LIPID PANEL: CPT

## 2024-06-14 NOTE — PROGRESS NOTES
ANTICOAGULATION MANAGEMENT     Pee Ayala 84 year old male is on warfarin with subtherapeutic INR result. (Goal INR 2.0-3.0)    Recent labs: (last 7 days)     06/14/24  1202   INR 1.7*       ASSESSMENT     Warfarin Lab Questionnaire    Warfarin Doses Last 7 Days      6/14/2024    11:58 AM   Dose in Tablet or Mg   TAB or MG? - 4mg / 5mg warfarin tablets (evenings) tablet (tab)     Pt Rptd Dose SUNDAY MONDAY TUESDAY WED THURS FRIDAY SATURDAY 6/14/2024  11:58 AM 1 1 0.5 1 1 0.5 1         6/14/2024   Warfarin Lab Questionnaire   Missed doses within past 14 days? No   Changes in diet or alcohol within past 14 days? No   Medication changes since last result? No   Injuries or illness since last result? No   New shortness of breath, severe headaches or sudden changes in vision since last result? No   Abnormal bleeding since last result? No   Upcoming surgery, procedure? No     Previous result: Subtherapeutic at 1.1 on 6/6/24.    Additional findings: None       PLAN     Recommended plan for no diet, medication or health factor changes affecting INR     Dosing Instructions: Increase your warfarin dose (6.7% change) with next INR in 1-2 weeks       Summary  As of 6/14/2024      Full warfarin instructions:  4 mg every Mon, Wed, Fri; 5 mg all other days   Next INR check:  6/28/2024               Telephone call with Pee who verbalizes understanding and agrees to plan    Patient offered & declined to schedule next visit    Education provided:   Taking warfarin: Importance of taking warfarin as instructed  Goal range and lab monitoring: goal range and significance of current result    Plan made with [facility] Pharmacist Ludy Dale, RN  Anticoagulation Clinic  6/14/2024    _______________________________________________________________________     Anticoagulation Episode Summary       Current INR goal:  2.0-3.0   TTR:  41.4% (8.3 mo)   Target end date:  Indefinite   Send INR reminders to:  [facility]  [facility]    Indications    Chronic atrial fibrillation (H) [I48.20]  Chronic anticoagulation [Z79.01]  Long term (current) use of anticoagulants [Z79.01]  Peripheral arterial disease (H24) [I73.9]  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

## 2024-06-14 NOTE — TELEPHONE ENCOUNTER
ANTICOAGULATION     Pee Ayala is overdue for an INR check.     Spoke with Pee and scheduled lab appointment on 6/14   - he was a NO SHOW for INR and diabetic ed appt on 6/13/24.    Mariel Dale RN

## 2024-06-15 ENCOUNTER — HEALTH MAINTENANCE LETTER (OUTPATIENT)
Age: 84
End: 2024-06-15

## 2024-06-15 LAB
CHOLEST SERPL-MCNC: 115 MG/DL
FASTING STATUS PATIENT QL REPORTED: NO
HDLC SERPL-MCNC: 46 MG/DL
LDLC SERPL CALC-MCNC: 52 MG/DL
NONHDLC SERPL-MCNC: 69 MG/DL
TRIGL SERPL-MCNC: 84 MG/DL

## 2024-06-18 NOTE — TELEPHONE ENCOUNTER
Delivery Summary    Mecca Bills MRN# [id number]   Age: 37 year old YOB: 1987       Procedure note:    Procedure: Low transverse primary  section    Indication: Postdate pregnancy with AMA, failed induction and fetal intolerance.    Surgeon:  Ryan Carrera MD.   Assistant Surgeon: Juan Dukes MD    Antibiotic prior to surgery: Ancef    Findings:    Healthy looking boy with COLIN presentation    -Apgar score: 8 and 9 at 1 and 5 minutes respectively    -Birth weight: 7#7.1 she is okay she wants an epidural okay oz   Normal uterus, fallopian tubes, fimbriae and ovaries   We did not check.    EBL:  375 ml      The whole procedure was then discussed with the patient and her  in details.  We also discussed potential associated complications which included but were not limited to bleeding, infection, unintended injury to the bowel or surrounding organs, unintended injury to the fetus/, and/or anesthesia complications.  In rare case, heavy bleeding may require blood transfusion or hysterectomy.   Alternatives with continue with the induction discussed and offered.  The patient preferred to proceed with the  and the consent was signed.    DESCRIPTION OF THE PROCEDURE:    The patient was given a dose of Ancelf before the procedure.  She was then taken to the operating room.  Pete catheter was placed before taking to the OR.  She was given spinal anesthesia with good effect by CRNA.  She was placed in the supine position.  The abdomen was prepped and draped in the usual sterile manner.      A low transverse abdominal incision was made, about 12 cm in length.  The incision was taken down sharply to the fascia.  The fascia was incised and extended bilaterally.  The rectus abdominus was taken down from the fascia with fingers side to side.  The rectus abdominus was divided in the midline and extended vertically bilaterally.  The peritoneum was then carefully entered and extended  Kassier ordered.    vertically by fingers. The uterus was identified.  Shaun-O retractor was placed in the usual manner.  Bladder flap was created and taken down sharply with fingers.  A low transverse uterine incision was made at the midline and extended bilaterally with fingers.  There was about 300 ml of clear amniotic fluid noted.      The baby was in the vertex position with COLIN presentation.  One loose nuchal cord noted which was released easily before delivering of the head.  The head was delivered first followed by the delivery of the body in a usual manner with no difficulty.  Mouth was gently cleaned with gauss and bulb suctioning.  The cord was then clamped and cut after 1 minute of cord delay.  The infant was passed to the warmer with nursing attending.  Apgar score of 8 and 9 at 1 and 5 minutes respectively.  BW was 7#7.1oz    The placenta was delivered spontaneously.  The placenta was examined and it looked normal with a 3-vessel cord noted.  There were no abnormalities or excessive calcifications on the placenta.  The uterus was then massaged aggressively.  1 ml of Pitocin was injected directly into the fundus.  Thirty units of Pitocin was infused at the bolus rate.  The uterus was then grasped and cleaned with wet laps.  Good hemostasis noted.  The uterine incision was then grasped with Martini and then closed with locking suture using O Vycryl.  The second imbricated suture applied along the urine incision in the usual manner, using 0 chromic.  She was again noted of good hemostasis.    The gutter was then cleaned of blood and clots.  Examination of the fallopian tubes, fimbria and ovaries was normal.      The uterine incision was then irrigated and examined carefully again, which showed good hemostasis. The peritoneum fascia was closed with 3.0 Vicryl. The fascia was then closed with running suture of O-Vicryl in the usual manner.  The subcutaneous tissue was then irrigated and proximate with run-on suturing, using  thing 2.0 chromic.  The skin was then closed with Quilt suture.    Mecca tolerated the procedure well.  Sponge, needle, and instrument counts were correct.  Estimated blood loss was about 375 ml.  She was taken to the recovery room in good condition after the nerve block.  Routine post partum care was initiated.  She and her  were updated about the procedure.  All of their questions were answered.      Ryan Carrera MD.           Pop Bills [[id number]]      Labor Event Times      Decision date/time (emergent ): 2024 0834          Labor Length      3rd Stage (hrs): 0 (min): 4          Labor Events     labor?: No   steroids: None  Labor Type: Induction/Cervical ripening  Predominate monitoring during 1st stage: continuous electronic fetal monitoring     Antibiotics received during labor?: No       Rupture date/time: 24 0952   Rupture type: Artificial Rupture of Membranes  Fluid color: Clear  Fluid odor: Normal     Induction: Misoprostol, Cervidil, Mechanical ripening agent  Mechanical ripening occurred: In hospital  Induction date/time:      Cervical ripening date/time: 24 2244    Indications for induction: Post-term Gestation, Advanced Maternal Age     Augmentation: None       Delivery/Placenta Date and Time      Delivery Date: 24 Delivery Time:  9:53 AM   Placenta Date/Time: 2024  9:57 AM  Oxytocin given at the time of delivery: after delivery of baby  Delivering clinician: Ryan Carrera MD   Other personnel present at delivery:  Provider Role   Juan Dukes MD Assistant Surgeon   Malachi Storey APRN CRNA Certified Registered Nurse Anesthesiologist   Mariely Romero RN Registered Nurse             Apgars    Living status: Living   1 Minute 5 Minute 10 Minute 15 Minute 20 Minute   Skin color: 2  2       Heart rate: 2  2       Reflex irritability: 1  2       Muscle tone: 2  2       Respiratory effort: 1  1       Total: 8  9       Apgars assigned  "by: MIR THOMPSONRN       Cord      Vessels: 3 Vessels    Cord Complications: Nuchal   Nuchal Intervention: reduced         Nuchal cord description: loose nuchal cord         Cord Blood Disposition: Lab    Gases Sent?: No    Delayed cord clamping?: Yes    Cord Clamping Delay (seconds): 31-60 seconds    Stem cell collection?: No            Resuscitation    Methods: Suctioning, Oxygen   Care at Delivery: CPAP at 0958 -1001  Deep Suction Clear scant amount at 1001.        Measurements      Weight: 7 lb 7.1 oz Length: 1' 9.5\"     Head circumference: 33.7 cm Chest circumference: 34.3 cm          Skin to Skin and Feeding Plan      Skin to skin initiation date/time: 1841    Skin to skin with: Mother  Skin to skin end date/time:            Labor Events and Shoulder Dystocia    Fetal Tracing Prior to Delivery: Category 2  Shoulder dystocia present?: Neg       Delivery (Maternal) (Provider to Complete) ([id number])    Episiotomy: None  Perineal lacerations: None    Repair suture: None  Genital tract inspection done: Neg  Comment if genital tract inspection not done:  section delivery       Blood Loss  Mother: Mecca Bills #[id number]     Start of Mother's Information      Delivery Blood Loss  24 0950 - 24 1108      Total Surgical QBL Blood Loss (mL) Hospital Encounter 375 mL    Total  375 mL               End of Mother's Information  Mother: Mecca Bills #[id number]                Delivery - Provider to Complete ([id number])    Delivering clinician: Ryan Carrera MD  Delivery Type (Choose the 1 that will go to the Birth History): , Low Transverse                          Priority: Urgent      Specifics: Primary nulliparous     Indications for : Fetal intolerance, Failed induction     For  or operative vaginal delivery, labor was most recently managed by (if not delivering provider): Yodit Lomeli MD     Other personnel:  " Provider Role   Juan Dukes MD Assistant Surgeon   Malachi Storey APRN CRNA Certified Registered Nurse Anesthesiologist   Mariely Romero RN Registered Nurse                    Placenta    Date/Time: 6/18/2024  9:57 AM  Removal: Spontaneous  Comments: healthy looking placenta with 3 vessel cord.  Nuchal times 1  Disposition: Hospital disposal             Anesthesia    Method: Spinal                    Presentation and Position    Presentation: Vertex    Position: Left Occiput Anterior                     Ryan Oquendo Mai, MD

## 2024-07-01 ENCOUNTER — TELEPHONE (OUTPATIENT)
Dept: ANTICOAGULATION | Facility: CLINIC | Age: 84
End: 2024-07-01
Payer: COMMERCIAL

## 2024-07-01 NOTE — TELEPHONE ENCOUNTER
ANTICOAGULATION     Pee Ayala is overdue for an INR check.     Left message for patient to call and schedule lab appointment as soon as possible. If returning call, please schedule.     Mariel Dale RN

## 2024-07-10 ENCOUNTER — TELEPHONE (OUTPATIENT)
Dept: ANTICOAGULATION | Facility: CLINIC | Age: 84
End: 2024-07-10
Payer: COMMERCIAL

## 2024-07-10 ENCOUNTER — TELEPHONE (OUTPATIENT)
Dept: FAMILY MEDICINE | Facility: CLINIC | Age: 84
End: 2024-07-10
Payer: COMMERCIAL

## 2024-07-10 DIAGNOSIS — G25.81 RESTLESS LEG SYNDROME: ICD-10-CM

## 2024-07-10 RX ORDER — PRAMIPEXOLE DIHYDROCHLORIDE 0.5 MG/1
TABLET ORAL
Qty: 180 TABLET | Refills: 1 | Status: ON HOLD | OUTPATIENT
Start: 2024-07-10 | End: 2024-08-22

## 2024-07-10 NOTE — TELEPHONE ENCOUNTER
Medication Question or Refill Out of Medication PLEASE EXPEDITE Attention Dr. Lincoln     What medication are you calling about (include dose and sig)?: pramipexole (MIRAPEX) 0.5 MG tablet     Preferred Pharmacy:[id number] - [place], [place] - [address]  [address]  [facility] [address]  Phone: [phone] Fax: [phone]        Controlled Substance Agreement on file:   CSA -- Patient Level:     [Media Unavailable] Controlled Substance Agreement - Opioid - Scan on 10/23/2023  2:23 PM   [Media Unavailable] Controlled Substance Agreement - Opioid - Scan on 2/16/2023  5:05 PM   [Media Unavailable] Controlled Substance Agreement - Opioid - Scan on 1/3/2019       Who prescribed the medication?: Dr. Singh. Since he left, he has seen Dr. Gil and he says she is his primary care provider. He said he does not know who Katie Jeronimo is. Can you please change his primary to Dr. Gil?  He was last seen in in Feb by her, then he saw Katie Jeronimo. He is currently out of his medication. He has a 3 day refill from the pharmacy.     Do you need a refill? Yes    When did you use the medication last? Unknown, he is currently out and pharmacy gave him a 3 day refill.     Patient offered an appointment? No    Do you have any questions or concerns?  No      Could we send this information to you in [facility] or would you prefer to receive a phone call?:   Patient would prefer a phone call   Okay to leave a detailed message?: Yes at Cell number on file:    Telephone Information:   Mobile [phone] Mobile and can leave a message if does not . Thank you

## 2024-07-10 NOTE — TELEPHONE ENCOUNTER
ANTICOAGULATION     Pee Ayala is overdue for an INR check.     Spoke with Pee and scheduled lab appointment on 7/11    Mariel Dale RN

## 2024-07-11 ENCOUNTER — LAB (OUTPATIENT)
Dept: LAB | Facility: CLINIC | Age: 84
End: 2024-07-11
Payer: COMMERCIAL

## 2024-07-11 ENCOUNTER — ANTICOAGULATION THERAPY VISIT (OUTPATIENT)
Dept: ANTICOAGULATION | Facility: CLINIC | Age: 84
End: 2024-07-11

## 2024-07-11 DIAGNOSIS — Z79.01 LONG TERM (CURRENT) USE OF ANTICOAGULANTS: ICD-10-CM

## 2024-07-11 DIAGNOSIS — Z95.2 S/P TAVR (TRANSCATHETER AORTIC VALVE REPLACEMENT): ICD-10-CM

## 2024-07-11 DIAGNOSIS — I48.20 CHRONIC ATRIAL FIBRILLATION (H): Primary | ICD-10-CM

## 2024-07-11 DIAGNOSIS — I73.9 PERIPHERAL ARTERIAL DISEASE (H): ICD-10-CM

## 2024-07-11 DIAGNOSIS — I48.20 CHRONIC ATRIAL FIBRILLATION (H): ICD-10-CM

## 2024-07-11 DIAGNOSIS — Z79.01 CHRONIC ANTICOAGULATION: ICD-10-CM

## 2024-07-11 LAB — INR BLD: 1.5 (ref 0.9–1.1)

## 2024-07-11 PROCEDURE — 36415 COLL VENOUS BLD VENIPUNCTURE: CPT

## 2024-07-11 PROCEDURE — 85610 PROTHROMBIN TIME: CPT

## 2024-07-11 NOTE — TELEPHONE ENCOUNTER
Called patient and notified him that this medication was refilled yesterday 7/10/24 by Dr. Jeronimo.    Rogelio Chung RN     [facility]

## 2024-07-11 NOTE — PROGRESS NOTES
ANTICOAGULATION MANAGEMENT     Pee Ayala 84 year old male is on warfarin with subtherapeutic INR result. (Goal INR 2.0-3.0)    Recent labs: (last 7 days)     07/11/24  1109   INR 1.5*       ASSESSMENT     Warfarin Lab Questionnaire    Warfarin Doses Last 7 Days    Pt Rptd Dose SUNDAY MONDAY TUESDAY WED THURS FRIDAY SATURDAY 7/11/2024  11:06 AM 4 4 5 4 4 5 4         7/11/2024   Warfarin Lab Questionnaire   Missed doses within past 14 days? Yes - reported back taking less warfarin dose - 5mg on Tuesday, Friday and 4mg ROW.   If yes; please list when: [id number] bad fall on 3rd and things got messed up     Changes in diet or alcohol within past 14 days? No   Medication changes since last result? Yes - Reported will finish taking an ABX  on 7/12/24 with  Cefdinir 300mg q12hrs started on 7/4/24.   Please list: Antibiotic     Injuries or illness since last result? Yes - slowly improving from his fall.         - reported in [facility] on 7/5/24.         - had a fall on 7/3/24 an fell on left shoulder, incurred skin tear of wilfrid upper arm / forearm, and contusion of face.   If yes, please explain: bad fall     New shortness of breath, severe headaches or sudden changes in vision since last result? No   Abnormal bleeding since last result? Yes   If yes, please explain: bad fall left side a mess   Upcoming surgery, procedure? No   Best number to call with results? [phone] afternoons        Previous result: Subtherapeutic last 2 INR results; (1.7, 1.1)    Additional findings: None       PLAN     Recommended plan for temporary change(s) affecting INR     Dosing Instructions:   booster dose then continue your current warfarin dose with next INR in 7-10 days       Summary  As of 7/11/2024      Full warfarin instructions:  7/11: 8 mg; Otherwise 4 mg every Mon, Wed, Fri; 5 mg all other days   Next INR check:  7/22/2024               Telephone call with Pee who verbalizes understanding and agrees to plan.   -  advised to ensure he takes correct warfarin dose.   - he verbalized back 2x correct warfarin dose.    Lab visit scheduled - INR on 7/18/24 @ \[facility]\""    Education provided: Taking warfarin: take warfarin at same time each day; preferably in the evening and Importance of taking warfarin as instructed  Goal range and lab monitoring: goal range and significance of current result    Plan made per [facility] anticoagulation protocol    Mariel Dale RN  Anticoagulation Clinic  7/11/2024    _______________________________________________________________________     Anticoagulation Episode Summary       Current INR goal:  2.0-3.0   TTR:  35.3% (8.5 mo)   Target end date:  Indefinite   Send INR reminders to:  [facility]    Indications    Chronic atrial fibrillation (H) [I48.20]  Chronic anticoagulation [Z79.01]  Long term (current) use of anticoagulants [Z79.01]  Peripheral arterial disease (H24) [I73.9]  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

## 2024-07-15 ENCOUNTER — TELEPHONE (OUTPATIENT)
Dept: FAMILY MEDICINE | Facility: CLINIC | Age: 84
End: 2024-07-15
Payer: COMMERCIAL

## 2024-07-15 NOTE — TELEPHONE ENCOUNTER
Spoke with patients wife (on consent) and schedule appointment for tomorrow to be seen for swelling in ankles.     Danyelle Antonio RN

## 2024-07-15 NOTE — TELEPHONE ENCOUNTER
Reason for Call:  Appointment Request    Patient requesting this type of appt: Chronic Diease Management/Medication/Follow-Up    Requested provider: Debbie Lincoln    Reason patient unable to be scheduled: Not within requested timeframe    When does patient want to be seen/preferred time: 3-7 days    Comments: Patient has swollen ankles and believes diuretic is no longer working. First available is 08/06/2024.     Could we send this information to you in [facility] or would you prefer to receive a phone call?:   Patient would prefer a phone call   Okay to leave a detailed message?: Yes at Other phone number:  [phone]*    Call taken on 7/15/2024 at 11:14 AM by Lorie Grady

## 2024-07-16 ENCOUNTER — OFFICE VISIT (OUTPATIENT)
Dept: FAMILY MEDICINE | Facility: CLINIC | Age: 84
End: 2024-07-16
Payer: COMMERCIAL

## 2024-07-16 VITALS
SYSTOLIC BLOOD PRESSURE: 138 MMHG | HEIGHT: 62 IN | BODY MASS INDEX: 29.59 KG/M2 | RESPIRATION RATE: 22 BRPM | OXYGEN SATURATION: 100 % | WEIGHT: 160.8 LBS | DIASTOLIC BLOOD PRESSURE: 71 MMHG | HEART RATE: 79 BPM

## 2024-07-16 DIAGNOSIS — J31.0 CHRONIC RHINITIS: ICD-10-CM

## 2024-07-16 DIAGNOSIS — R60.0 PERIPHERAL EDEMA: ICD-10-CM

## 2024-07-16 DIAGNOSIS — E11.42 TYPE 2 DIABETES MELLITUS WITH PERIPHERAL NEUROPATHY (H): ICD-10-CM

## 2024-07-16 DIAGNOSIS — S49.92XD INJURY OF LEFT UPPER ARM, SUBSEQUENT ENCOUNTER: Primary | ICD-10-CM

## 2024-07-16 PROCEDURE — 99214 OFFICE O/P EST MOD 30 MIN: CPT | Performed by: FAMILY MEDICINE

## 2024-07-16 RX ORDER — CEFDINIR 300 MG/1
300 CAPSULE ORAL
COMMUNITY
Start: 2024-07-03 | End: 2024-08-22

## 2024-07-16 ASSESSMENT — PATIENT HEALTH QUESTIONNAIRE - PHQ9
SUM OF ALL RESPONSES TO PHQ QUESTIONS 1-9: 5
SUM OF ALL RESPONSES TO PHQ QUESTIONS 1-9: 5
10. IF YOU CHECKED OFF ANY PROBLEMS, HOW DIFFICULT HAVE THESE PROBLEMS MADE IT FOR YOU TO DO YOUR WORK, TAKE CARE OF THINGS AT HOME, OR GET ALONG WITH OTHER PEOPLE: SOMEWHAT DIFFICULT

## 2024-07-16 NOTE — PROGRESS NOTES
Assessment & Plan     Injury of left upper arm, subsequent encounter    He sustained a fall with a superficial tear of his left arm and forearm injury  Reviewed his recent ED visit with negative x-rays of his shoulder and forearm. No acute fracture was seen  Recommend symptomatic care and reviewed wound care  He has been treated with antibiotics and will monitor for increasing redness, pain, or swelling  Follow-up if not improving    Peripheral edema  Likely related to chronic venous insufficiency and inactivity    Recommend that he wear compression stockings  Recommend leg elevation  He will continue furosemide at current dose  Recommend limiting sodium in his diet  Monitor daily weight  Recommend immediate follow-up if developing shortness of breath or other concerns      Chronic rhinitis    Reviewed options including flonase or Atrovent nasal spray   Follow-up with ENT is planned    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)    Continue plan per endocrinology  Monitor blood sugars    Spent 30 minutes including time for chart preparation and review as well as time with patient                Subjective   Pee is a 84 year old, presenting for the following health issues:  Swelling (Swelling in ankles ) and Fall (Left arm wound check/skin tear check. Was seen at the urgency room on 7/5 and given antibiotics )        7/16/2024     2:40 PM   Additional Questions   Roomed by Rosa DYKES CMA   Accompanied by spouse     History of Present Illness       Reason for visit:  Arm and water retention    He eats 2-3 servings of fruits and vegetables daily.He consumes 0 sweetened beverage(s) daily.He exercises with enough effort to increase his heart rate 10 to 19 minutes per day.  He exercises with enough effort to increase his heart rate 4 days per week. He is missing 2 dose(s) of medications per week.  He is not taking prescribed medications regularly due to remembering to take.     Pee is a pleasant 84 year old  "male who presents to the clinic following an injury where he fall. He missed a step and was seen initially at urgent care in [place]. The wounds were bandaged but no imaging was done. He later presented to the ED and x-rays of the left shoulder and forearm were negative for fracture. He has a tear involving the skin of his arm. This has stopped bleeding.    He notes swelling in his ankles as well. He has not had shortness of breath or chest pain.    He has chronic sinus congestion. He has had chronic low back pain as well.                  Review of Systems  Constitutional, HEENT, cardiovascular, pulmonary, gi and gu systems are negative, except as otherwise noted.      Objective    /71 (BP Location: Right arm, Patient Position: Sitting, Cuff Size: Adult Regular)   Pulse 79   Resp 22   Ht 1.575 m (5' 2\")   Wt 72.9 kg (160 lb 12.8 oz)   SpO2 100%   BMI 29.41 kg/m    Body mass index is 29.41 kg/m .  Physical Exam   GENERAL: alert and no distress  EYES: Eyes grossly normal to inspection, PERRL and conjunctivae and sclerae normal  RESP: lungs clear to auscultation - no rales, rhonchi or wheezes  CV: regular rate and rhythm, normal S1 S2, no S3 or S4, no murmur, click or rub, no peripheral edema  SKIN: He has a superficial tear involving the proximal left upper extremity. No erythema is noted  He has intact range of motion with left elbow flexion  MS: There is trace to 1+ lower extremity edema  NEURO: Normal strength and tone, mentation intact and speech normal  PSYCH: mentation appears normal, affect normal/bright            Signed Electronically by: Aureliano Ozuna MD    "

## 2024-07-16 NOTE — PATIENT INSTRUCTIONS
Pee,    Continue to elevate your legs when you are able  Wear the compression stockings  Continue the current dose of furosemide  Increase activity as you are able  Try to limit salt in your diet  You can review with Debbie in the future    Follow-up with the ENT regarding your sinus concerns    Aureliano Ozuna MD

## 2024-07-18 ENCOUNTER — ANTICOAGULATION THERAPY VISIT (OUTPATIENT)
Dept: ANTICOAGULATION | Facility: CLINIC | Age: 84
End: 2024-07-18

## 2024-07-18 ENCOUNTER — LAB (OUTPATIENT)
Dept: LAB | Facility: CLINIC | Age: 84
End: 2024-07-18
Payer: COMMERCIAL

## 2024-07-18 DIAGNOSIS — I48.20 CHRONIC ATRIAL FIBRILLATION (H): Primary | ICD-10-CM

## 2024-07-18 DIAGNOSIS — Z95.2 S/P TAVR (TRANSCATHETER AORTIC VALVE REPLACEMENT): ICD-10-CM

## 2024-07-18 DIAGNOSIS — I48.20 CHRONIC ATRIAL FIBRILLATION (H): ICD-10-CM

## 2024-07-18 DIAGNOSIS — Z79.01 CHRONIC ANTICOAGULATION: ICD-10-CM

## 2024-07-18 DIAGNOSIS — Z79.01 LONG TERM (CURRENT) USE OF ANTICOAGULANTS: ICD-10-CM

## 2024-07-18 DIAGNOSIS — I73.9 PERIPHERAL ARTERIAL DISEASE (H): ICD-10-CM

## 2024-07-18 LAB — INR BLD: 1.3 (ref 0.9–1.1)

## 2024-07-18 PROCEDURE — 85610 PROTHROMBIN TIME: CPT

## 2024-07-18 PROCEDURE — 36416 COLLJ CAPILLARY BLOOD SPEC: CPT

## 2024-07-18 NOTE — PROGRESS NOTES
ANTICOAGULATION MANAGEMENT     Pee Ayaal 84 year old male is on warfarin with subtherapeutic INR result. (Goal INR 2.0-3.0)    Recent labs: (last 7 days)     07/18/24  1117   INR 1.3*       ASSESSMENT     Warfarin Lab Questionnaire    Warfarin Doses Last 7 Days      7/18/2024    11:19 AM   Dose in Tablet or Mg   TAB or MG? -  4mg / 5mg warfarin (evenings) milligram (mg)     Pt Rptd Dose SUNDAY MONDAY TUESDAY WED THURS FRIDAY SATURDAY 7/18/2024  11:19 AM 4 4 5 4 4 5 4         7/18/2024   Warfarin Lab Questionnaire   Missed doses within past 14 days? No - possible missed dose and not taking correct dose, (has been very busy getting ready for his garage sale).        - wrong dose written on above template and is taking less warfarin that ordered.        - booster dose advised on 7/11/24.     Changes in diet or alcohol within past 14 days? No   Medication changes since last result? No   Injuries or illness since last result? No - OV follow-up on 7/16/24 from injury of left upper arm, from FALL incurred on 7/3/24, and bilateral ankle swelling.         - it was noted during OV on 7/16/24 - He is not taking prescribed medications regularly due to remembering to take; which can explain subpar INR readings.        New shortness of breath, severe headaches or sudden changes in vision since last result? No   Abnormal bleeding since last result? No   Upcoming surgery, procedure? No   Best number to call with results? [phone] late afternoon like after 4        Previous result: Subtherapeutic last 3 INR results; (1.5, 1.7, 1.1)    Additional findings:  Chart reviewed       PLAN     Unable to reach Pee today.    Left message to take a booster dose of warfarin,  10 mg tonight. Request call back for assessment.    Follow up required to confirm warfarin dose taken and assess for changes    Mariel Dale RN  Anticoagulation Clinic  7/18/2024

## 2024-07-19 NOTE — PROGRESS NOTES
ANTICOAGULATION MANAGEMENT     Pee Ayala 84 year old male is on warfarin with subtherapeutic INR result. (Goal INR 2.0-3.0)    Recent labs: (last 7 days)     07/18/24  1117   INR 1.3*       ASSESSMENT     Warfarin Lab Questionnaire    Warfarin Doses Last 7 Days      7/18/2024    11:19 AM   Dose in Tablet or Mg   TAB or MG? -  4mg / 5mg warfarin (evenings) milligram (mg)     Pt Rptd Dose SUNDAY MONDAY TUESDAY WED THURS FRIDAY SATURDAY 7/18/2024  11:19 AM 4 4 5 4 4 5 4         7/18/2024   Warfarin Lab Questionnaire   Missed doses within past 14 days? No - possible missed dose and not taking correct dose, (has been very busy getting ready for his garage sale).        - wrong dose written on above template and is taking less warfarin that ordered.        - booster dose advised on 7/11/24.     Changes in diet or alcohol within past 14 days? No   Medication changes since last result? No   Injuries or illness since last result? No - OV follow-up on 7/16/24 from injury of left upper arm, from FALL incurred on 7/3/24, and bilateral ankle swelling.         - it was noted during OV on 7/16/24 - He is not taking prescribed medications regularly due to remembering to take; which can explain subpar INR readings.        New shortness of breath, severe headaches or sudden changes in vision since last result? No   Abnormal bleeding since last result? No   Upcoming surgery, procedure? No   Best number to call with results? [phone] late afternoon like after 4        Previous result: Subtherapeutic last 3 INR results; (1.5, 1.7, 1.1)    Additional findings: None    PLAN:    Recommended plan for temporary change(s) affecting INR     Dosing Instructions:  - reported he did not check his VM messages and did not take booster dose.  - advised for tonight, take one time booster dose with 10mg  - then continue your current warfarin dose   - with next INR in 5 days       Summary  As of 7/18/2024      Full warfarin instructions:   7/19: 10 mg; Otherwise 4 mg every Mon, Wed, Fri; 5 mg all other days   Next INR check:  7/25/2024               Telephone call with Pee who verbalizes understanding and agrees to plan   - he writes down dosing on the BLUE sheet he is given.   - verbalized back dose.   - informed Pee to ensure he takes his warfarin daily.  When INR falls below target range he is at high risk to form blood clots or stroke.    Lab visit scheduled - INR on 7/23/24 @ [facility]    Education provided: Taking warfarin: take warfarin at same time each day; preferably in the evening and Importance of taking warfarin as instructed  Goal range and lab monitoring: goal range and significance of current result  Symptom monitoring: monitoring for clotting signs and symptoms    Plan made with [facility] Pharmacist Sandra Dale RN  Anticoagulation Clinic  7/19/2024    _______________________________________________________________________     Anticoagulation Episode Summary       Current INR goal:  2.0-3.0   TTR:  34.4% (8.7 mo)   Target end date:  Indefinite   Send INR reminders to:  [facility]    Indications    Chronic atrial fibrillation (H) [I48.20]  Chronic anticoagulation [Z79.01]  Long term (current) use of anticoagulants [Z79.01]  Peripheral arterial disease (H24) [I73.9]  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

## 2024-07-21 DIAGNOSIS — E11.9 TYPE 2 DIABETES, HBA1C GOAL < 8% (H): ICD-10-CM

## 2024-07-25 ENCOUNTER — TRANSFERRED RECORDS (OUTPATIENT)
Dept: HEALTH INFORMATION MANAGEMENT | Facility: CLINIC | Age: 84
End: 2024-07-25
Payer: COMMERCIAL

## 2024-07-25 LAB — RETINOPATHY: POSITIVE

## 2024-07-26 ENCOUNTER — TELEPHONE (OUTPATIENT)
Dept: ANTICOAGULATION | Facility: CLINIC | Age: 84
End: 2024-07-26

## 2024-07-26 ENCOUNTER — LAB (OUTPATIENT)
Dept: LAB | Facility: CLINIC | Age: 84
End: 2024-07-26
Payer: COMMERCIAL

## 2024-07-26 ENCOUNTER — ANTICOAGULATION THERAPY VISIT (OUTPATIENT)
Dept: ANTICOAGULATION | Facility: CLINIC | Age: 84
End: 2024-07-26

## 2024-07-26 DIAGNOSIS — I48.20 CHRONIC ATRIAL FIBRILLATION (H): Primary | ICD-10-CM

## 2024-07-26 DIAGNOSIS — Z95.2 S/P TAVR (TRANSCATHETER AORTIC VALVE REPLACEMENT): ICD-10-CM

## 2024-07-26 DIAGNOSIS — I73.9 PERIPHERAL ARTERIAL DISEASE (H): ICD-10-CM

## 2024-07-26 DIAGNOSIS — Z79.01 LONG TERM (CURRENT) USE OF ANTICOAGULANTS: ICD-10-CM

## 2024-07-26 DIAGNOSIS — Z79.01 CHRONIC ANTICOAGULATION: ICD-10-CM

## 2024-07-26 DIAGNOSIS — I48.20 CHRONIC ATRIAL FIBRILLATION (H): ICD-10-CM

## 2024-07-26 LAB — INR BLD: 1.4 (ref 0.9–1.1)

## 2024-07-26 PROCEDURE — 85610 PROTHROMBIN TIME: CPT

## 2024-07-26 PROCEDURE — 36416 COLLJ CAPILLARY BLOOD SPEC: CPT

## 2024-07-26 NOTE — PROGRESS NOTES
ANTICOAGULATION MANAGEMENT     Pee Ayala 84 year old male is on warfarin with subtherapeutic INR result. (Goal INR 2.0-3.0)    Recent labs: (last 7 days)     07/26/24  1103   INR 1.4*       ASSESSMENT     Source(s): Chart Review and Patient/Caregiver Call     Warfarin doses taken: Warfarin taken as instructed   Advised 10mg warfarin booster on 7/18/24.  Diet: No new diet changes identified  Medication/supplement changes: None noted  New illness, injury, or hospitalization: No  Signs or symptoms of bleeding or clotting: No  Previous result: Subtherapeutic last 4 INR results; (1.3, 1.5, 1.7, 1.1, from 6/6-7/18).  Additional findings: None       PLAN     Recommended plan for no diet, medication or health factor changes affecting INR     Dosing Instructions: booster dose then Increase your warfarin dose (18.8% change) with next INR in 1 week       Summary  As of 7/26/2024      Full warfarin instructions:  7/26: 12 mg; Otherwise 4 mg every Mon, Thu; 6 mg all other days   Next INR check:  8/2/2024               Telephone call with wife, Fay who verbalizes understanding and agrees to plan   - Pee is at the dog park with his dog.    Patient offered & declined to schedule next visit    Education provided: Taking warfarin: Importance of taking warfarin as instructed  Goal range and lab monitoring: goal range and significance of current result    Plan made with [facility] Pharmacist Sandra Dale, RN  Anticoagulation Clinic  7/26/2024    _______________________________________________________________________     Anticoagulation Episode Summary       Current INR goal:  2.0-3.0   TTR:  33.3% (9 mo)   Target end date:  Indefinite   Send INR reminders to:  [facility]    Indications    Chronic atrial fibrillation (H) [I48.20]  Chronic anticoagulation [Z79.01]  Long term (current) use of anticoagulants [Z79.01]  Peripheral arterial disease (H24) [I73.9]  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

## 2024-07-26 NOTE — TELEPHONE ENCOUNTER
ANTICOAGULATION     Pee Ayala is overdue for an INR check.     Spoke with Pee and scheduled lab appointment on 7/26    Mariel Dale RN

## 2024-08-05 ENCOUNTER — TELEPHONE (OUTPATIENT)
Dept: ANTICOAGULATION | Facility: CLINIC | Age: 84
End: 2024-08-05
Payer: COMMERCIAL

## 2024-08-05 NOTE — TELEPHONE ENCOUNTER
ANTICOAGULATION     Pee Ayala is overdue for an INR check.     Spoke with Pee and scheduled lab appointment on 8/6    Mariel Dale RN

## 2024-08-06 ENCOUNTER — LAB (OUTPATIENT)
Dept: LAB | Facility: CLINIC | Age: 84
End: 2024-08-06
Payer: COMMERCIAL

## 2024-08-06 ENCOUNTER — ANTICOAGULATION THERAPY VISIT (OUTPATIENT)
Dept: ANTICOAGULATION | Facility: CLINIC | Age: 84
End: 2024-08-06

## 2024-08-06 DIAGNOSIS — I48.20 CHRONIC ATRIAL FIBRILLATION (H): Primary | ICD-10-CM

## 2024-08-06 DIAGNOSIS — I73.9 PERIPHERAL ARTERIAL DISEASE (H): ICD-10-CM

## 2024-08-06 DIAGNOSIS — I48.20 CHRONIC ATRIAL FIBRILLATION (H): ICD-10-CM

## 2024-08-06 DIAGNOSIS — Z79.01 LONG TERM (CURRENT) USE OF ANTICOAGULANTS: ICD-10-CM

## 2024-08-06 DIAGNOSIS — Z95.2 S/P TAVR (TRANSCATHETER AORTIC VALVE REPLACEMENT): ICD-10-CM

## 2024-08-06 DIAGNOSIS — Z79.01 CHRONIC ANTICOAGULATION: ICD-10-CM

## 2024-08-06 LAB — INR BLD: 2.1 (ref 0.9–1.1)

## 2024-08-06 PROCEDURE — 36416 COLLJ CAPILLARY BLOOD SPEC: CPT

## 2024-08-06 PROCEDURE — 85610 PROTHROMBIN TIME: CPT

## 2024-08-06 NOTE — PROGRESS NOTES
ANTICOAGULATION MANAGEMENT     Pee Aayla 84 year old male is on warfarin with therapeutic INR result. (Goal INR 2.0-3.0)    Recent labs: (last 7 days)     08/06/24  1149   INR 2.1*       ASSESSMENT     Warfarin Lab Questionnaire    Warfarin Doses Last 7 Days    Pt Rptd Dose MONDAY 8/6/2024  11:51 AM 4         8/6/2024   Warfarin Lab Questionnaire   Missed doses within past 14 days? No   Changes in diet or alcohol within past 14 days? No   Medication changes since last result? No - warfarin booster dose on 7/26/24 and weekly warfarin dose was also increased by 18.8%.     Injuries or illness since last result? No   New shortness of breath, severe headaches or sudden changes in vision since last result? No   Abnormal bleeding since last result? No   Upcoming surgery, procedure? No   Best number to call with results? [phone]  after1        Previous result: Subtherapeutic last 4 INR results.    Additional findings: None       PLAN     Recommended plan for no diet, medication or health factor changes affecting INR     Dosing Instructions: Continue your current warfarin dose with next INR in 2 weeks       Summary  As of 8/6/2024      Full warfarin instructions:  4 mg every Mon, Thu; 6 mg all other days   Next INR check:  8/20/2024               Telephone call with wife, Fay Perez who verbalizes understanding and agrees to plan    Lab visit scheduled - INR on 8/15/24 at [facility] appt @ [facility]    Education provided: Taking warfarin: Importance of taking warfarin as instructed  Goal range and lab monitoring: goal range and significance of current result    Plan made per [facility] anticoagulation protocol    Mariel Dale, RN  Anticoagulation Clinic  8/6/2024    _______________________________________________________________________     Anticoagulation Episode Summary       Current INR goal:  2.0-3.0   TTR:  32.6% (9.3 mo)   Target end date:  Indefinite   Send INR reminders to:  [facility]    Indications    Chronic atrial fibrillation (H) [I48.20]  Chronic anticoagulation [Z79.01]  Long term (current) use of anticoagulants [Z79.01]  Peripheral arterial disease (H24) [I73.9]  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

## 2024-08-09 ENCOUNTER — DOCUMENTATION ONLY (OUTPATIENT)
Dept: ANTICOAGULATION | Facility: CLINIC | Age: 84
End: 2024-08-09
Payer: COMMERCIAL

## 2024-08-09 DIAGNOSIS — I48.20 CHRONIC ATRIAL FIBRILLATION (H): ICD-10-CM

## 2024-08-09 DIAGNOSIS — Z95.2 S/P TAVR (TRANSCATHETER AORTIC VALVE REPLACEMENT): ICD-10-CM

## 2024-08-09 DIAGNOSIS — Z79.01 LONG TERM (CURRENT) USE OF ANTICOAGULANTS: Primary | ICD-10-CM

## 2024-08-09 DIAGNOSIS — I73.9 PERIPHERAL ARTERIAL DISEASE (H): ICD-10-CM

## 2024-08-09 DIAGNOSIS — I48.91 NEW ONSET ATRIAL FIBRILLATION (H): ICD-10-CM

## 2024-08-09 NOTE — PROGRESS NOTES
ANTICOAGULATION CLINIC REFERRAL RENEWAL REQUEST       An annual renewal order is required for all patients referred to [facility].?  Please review and sign the pended referral order for Pee Aayla.       ANTICOAGULATION SUMMARY      Warfarin indication(s)   - Chronic Atrial Fibrillation, (new onset 7/29/2013  - s/p TAVR (Jose Collins) on 1/12/21 @ [facility]     Mechanical heart valve present?  Mechanical  AVR-Bileaflet       Current goal range   INR: 2.0-3.0     Goal appropriate for indication? Goal INR 2-3, standard for indication(s) above     Time in Therapeutic Range (TTR)  (Goal > 60%) 32.6 %       Office visit with referring provider's group within last year Yes on 5/17/24 on JEEVAN Murillo RN  [facility]

## 2024-08-13 DIAGNOSIS — E11.42 TYPE 2 DIABETES MELLITUS WITH PERIPHERAL NEUROPATHY (H): ICD-10-CM

## 2024-08-13 NOTE — TELEPHONE ENCOUNTER
Patient has been paying cash out of pocket for insulin, but with current insurance, likely a lower cost if billed through insurance. New prescription needed to be sent to pharmacy to compare out of pocket costs. Pended for prescriber.     Thank you!    Jaja Henriquez, PharmD, [facility]  Population Health Pharmacist  [phone]

## 2024-08-14 NOTE — PROGRESS NOTES
HEART CARE ENCOUNTER CONSULTATON NOTE      [facility]  [phone]      Assessment/Recommendations   Assessment:   HFpEF: NYHAII-III on furosemide 80 mg am and 40 mg pm, no evidence of pulmonary congestion on exam, although does have persistent significant LE edema bilaterally.  BNP with similar symptoms 3 months ago WNL, hesitant to adjust diuretic due to orthostatic symptoms.  Permanent atrial fibrillation: Continues Warfarin, irregular rhythm with controlled rate on exam  Orthostatic symptoms: Experiences symptoms of lightheadedness with position changes after prolonged immobility.  No further falls or presyncopal/syncopal events.  Coronary artery disease: Denies angina.  S/p three-vessel CABG in 2014.  Status post coronary angiogram December 2023 showed patent grafts with diffuse native disease with no targets for intervention  S/p TAVR 2021: normal function on echo 12/2023, no evidence of dysfunction on auscultation.  HTN: Controlled on carvedilol, spironolactone  HLD: Controlled on rosuvastatin 20 mg  Labile INR, chronic anticoagulation on warfarin, Falls: Discussed Watchman device  - [id number] score at least 3 (age, HFpEF, HTN), HAS-BLED 4 (warfarin, aspirin, age, INR), LVEF 60-65% echo 12/2023      Plan:   Given of frequent falls and labile INR discussed Watchman device, benefits and risks, anticoagulation surrounding the procedure.  He and his wife are interested in consult visit and will refer him to Watchman clinic.    Continue furosemide 80 mg a.m., 40 mg p.m.  Recommend elevation of legs and continued use of compression stockings  Careful transitions to avoid lightheadedness and falls  Recommended home monitoring of heart rate to rule out AF with RVR contribution to of breath at rest  Continue reduced dose carvedilol 3.123 mg with orthostatic symptoms    - Following visit and chart review patient may have had left atrial appendage ligation/removal and MAZE procedure with CABG  in 2014 although records not readily available.  Do not see records of previous PE, DVT, CVA, TIA.  Will have to look for other indication for use of chronic warfarin, as well as imaging confirmation of JESICA removal.    Follow up in 6 months with Dr. Hernandez      History of Present Illness/Subjective    HPI: Pee Ayala is a 84 year old male with PMHx of CAD, HFpEF, atrial fibrillation, post TAVR, HTN, HLD presents for follow-up. Hospitalized 12/2023 with acute heart failure exacerbation.  Adjustment to furosemide have been made since.  At our last visit 5/6/2024 he suffered a fall beforehand, was recommended to be seen in ED due to head injury and orthostatic symptoms prior.  No acute intracranial abnormality noted on CT head.  Suffered another fall in July although cause was mechanical.    Patient reports reports stability over the last 3 months.  His weight has been stable, lower extremity swelling is stable and improved with compression stockings although are difficult to don.  He has continued reduced dose of carvedilol, although continues to take furosemide 80 mg in the morning and 40 mg in the evening after episode of lightheadedness and fall in May.  Sleeps in a recliner due to back pain, but does endorse dyspnea winded with exertion, intermittent shortness of breath at rest as well.  Intermittent shortness of breath results in deep breathing for about 1.5 minutes.  Denies chest pain, palpitations, irregular heart rate at those times.    Due to falls, labile INR, patient would be interested in Watchman device.  He denies history of stroke, although family history in his father.      Coronary angiogram 12/2023  :CONCLUSIONS:   Severe multivessel coronary artery disease with chronic total occlusion of the left circumflex, mid left anterior descending artery, and mid right coronary artery.  Patent LIMA to the LAD with moderate diffuse disease of the native vessel beyond the anastomosis.  Patent vein graft  to the OM1 branch with mild diffuse disease of the native vessel beyond the anastomosis.  Moderate diseased vein graft to the OM 2 branch with small, diffusely diseased native vessel beyond the anastomosis.    Echocardiogram 2/2023  Results:  Left ventricular size, wall motion and function are normal. The ejection  fraction is 60-65%.  TAPSE is abnormal, which is consistent with abnormal right ventricular  systolic function.  The left atrium is severely dilated.  The right atrium is mild to moderately dilated.  There is a bioprosthetic aortic valve.  The gradient is normal for this prosthetic aortic valve.  There is mild paravalvular regurgitation present.     Physical Examination  Review of Systems   Vitals: /60 (BP Location: Left arm, Patient Position: Sitting, Cuff Size: Adult Regular)   Pulse 64   Resp 14   Wt 74.8 kg (165 lb)   BMI 30.18 kg/m    BMI= Body mass index is 30.18 kg/m .  Wt Readings from Last 3 Encounters:   08/15/24 74.8 kg (165 lb)   07/16/24 72.9 kg (160 lb 12.8 oz)   05/17/24 75.6 kg (166 lb 9.6 oz)       Head: Superficial tear of scalp.   ENT/Mouth: membranes moist, no oral lesions or bleeding gums.      EYES:  no scleral icterus, normal conjunctivae       Chest/Lungs:   lungs are clear to auscultation, no rales or wheezing, equal chest wall expansion    Cardiovascular:   Irregular. Normal first and second heart sounds with no murmurs, rubs, or gallops; the radial pulses are intact, 1-2+ pitting edema bilaterally       Extremities: no cyanosis or clubbing. Healing laceration of proximal left forearm    Skin: no xanthelasma, warm.    Neurologic: no tremors     Psychiatric: alert and oriented x3, calm        Please refer above for cardiac ROS details.        Medical History  Surgical History Family History Social History   Past Medical History:   Diagnosis Date    ACS (acute coronary syndrome) (H) 06/02/2014    Actinic keratosis 01/14/2014    Anticoagulated on Coumadin 12/30/2015     Atrial fibrillation (H) 01/01/2016    Bone mass 04/26/2017    Chest heaviness 01/23/2019    Chest pain 05/31/2014    Chronic heart failure with preserved ejection fraction (H) 01/16/2024    Closed fracture of left forearm 01/01/2015    Congestive heart failure (H)     Coronary artery disease     Difficulty in walking(719.7)     Dyslipidemia 08/31/2016    Dyspnea on exertion     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     Encounter for long-term (current) use of insulin (H) 08/11/2016    Esophageal reflux 11/18/2010    History of angina     HTN (hypertension) 06/30/2009     (Problem list name updated by automated process. Provider to review and confirm.)    Impotence of organic origin     Mixed hyperlipidemia 04/25/2007 April 25, 2007 restarted Zocor today, recheck in 3 months.  August 23, 2007 LDL at 101 with 40 mg, will increase to 80 mg. Recheck  In 3 months.     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     Walking troubles      Past Surgical History:   Procedure Laterality Date    BYPASS GRAFT ARTERY CORONARY N/A 09/10/2014    Procedure: BYPASS GRAFT ARTERY CORONARY;  Surgeon: Bharathi Caraballo MD;  Location: [facility] OR    BYPASS GRAFT ARTERY CORONARY  01/01/2016    COLONOSCOPY  01/14/2004    CORONARY ANGIOGRAPHY ADULT ORDER      CORONARY ARTERY BYPASS      CV CORONARY ANGIOGRAM N/A 04/04/2019    Procedure: Coronary Angiogram;  Surgeon: Gary  Dominik Leal MD;  Location: [facility] Cath Lab;  Service: Cardiology    CV CORONARY ANGIOGRAM N/A 01/06/2021    Procedure: Coronary Angiogram;  Surgeon: Dominik Vega MD;  [place] [facility];  Service: Cardiology    CV CORONARY ANGIOGRAM N/A 12/22/2023    Procedure: Coronary Angiogram;  Surgeon: Bahman Lenz MD;  Location: [facility] CV    CV LEFT HEART CATHETERIZATION WITHOUT LEFT VENTRICULOGRAM Left 04/04/2019    Procedure: Left Heart Catheterization Without Left Ventriculogram;  Surgeon: Dominik Vega MD;  Location: [facility] Cath Lab;  Service: Cardiology    CV LEFT HEART CATHETERIZATION WITHOUT LEFT VENTRICULOGRAM Left 01/06/2021    Procedure: Left Heart Catheterization Without Left Ventriculogram;  Surgeon: Dominik Vega MD;  Location: [facility];  Service: Cardiology    CV RIGHT HEART CATHETERIZATION Right 04/04/2019    Procedure: Right Heart Catheterization;  Surgeon: Dominik Vega MD;  Location: [facility] Cath Lab;  Service: Cardiology    CV TRANSCATHETER AORTIC VALVE REPLACEMENT N/A 01/12/2021    Procedure: Right transfemoral transcatheter aortic valve replacement using Magdaleno Dominick 3 size 29mm.  Transthoracic echocardiogram;  Surgeon: Jo Romano MD;  Location: [facility] CARDIAC CATH LAB    ESOPHAGOSCOPY, GASTROSCOPY, DUODENOSCOPY (EGD), COMBINED N/A 01/13/2016    Procedure: COMBINED ESOPHAGOSCOPY, GASTROSCOPY, DUODENOSCOPY (EGD);  Surgeon: Rk Srivastava MD;  Location: [facility] FEMORAL CANNULIZATION WITH OPEN STANDBY REPAIR AORTIC VALVE N/A 01/12/2021    Procedure: Cardiopulmonary Bypass standby;  Surgeon: Jefferson Sandy MD;  Location: [facility] CARDIAC CATH LAB    HEART CATH, ANGIOPLASTY      IR LOWER EXTREMITY ANGIOGRAM LEFT  12/07/2021    LAPAROSCOPIC CHOLECYSTECTOMY  10/01/2009    MAZE PROCEDURE N/A 09/10/2014    Procedure: MAZE PROCEDURE;  Surgeon: Bharathi Caraballo MD;   Location: [facility] OR    MOHS MICROGRAPHIC PROCEDURE      OPEN REDUCTION INTERNAL FIXATION HIP BIPOLAR Left 2022    Procedure: HEMIARTHROPLASTY, HIP, BIPOLAR, OPEN REDUCTION INTERNAL FIXATION OF GREATER TROCHANTER;  Surgeon: Jd Larose MD;  Location: [facility] OR    ORTHOPEDIC SURGERY      surgery for fx  Left forearm    OTHER SURGICAL HISTORY Left 2015    forearm sugery    STENT, CORONARY, HUMA  2016    VASECTOMY       Family History   Problem Relation Age of Onset    Diabetes Mother     Hypertension Father     Cerebrovascular Disease Father     Diabetes Maternal Grandmother     Breast Cancer No family hx of     Cancer - colorectal No family hx of     Prostate Cancer No family hx of     C.A.D. No family hx of     Diabetes Type 2  Mother         58 ,  from anesthesia complication.    Heart Disease Father         86  from stroke    No Known Problems Brother         60 years of age.        Social History     Socioeconomic History    Marital status:      Spouse name: Fay Ayala    Number of children: Not on file    Years of education: Not on file    Highest education level: Not on file   Occupational History     Employer: RETIRED   Tobacco Use    Smoking status: Former     Current packs/day: 0.00     Types: Cigarettes     Quit date: 1998     Years since quittin.6     Passive exposure: Never    Smokeless tobacco: Never   Vaping Use    Vaping status: Never Used   Substance and Sexual Activity    Alcohol use: Yes     Alcohol/week: 0.0 standard drinks of alcohol     Comment: Alcoholic Drinks/day: very rare    Drug use: No    Sexual activity: Never     Birth control/protection: None   Other Topics Concern    Parent/sibling w/ CABG, MI or angioplasty before 65F 55M? No   Social History Narrative     and lives with life.  Has adult children from previous relationship. ( Blended family).  Has a dog - Vincent Gil MD  1/3/2019                Medication  Instructions:    Patient is to take all scheduled medications on the day of surgery EXCEPT for modifications listed below:     - aspirin: cardiology has recommended continuing baby aspirin daily     - warfarin: hold warfarin for 5 days before surgery     - Diuretics: HOLD on the day of surgery.     - Statins: Continue taking on the day of surgery.      - mixed insulin (70/30m 75/25, 50/50): HOLD day of surgery     - metformin: HOLD day of surgery.     - Opioids: Continue without modification     - hold any vitamins or supplements until after surgery.      Social Determinants of Health     Financial Resource Strain: Low Risk  (12/28/2023)    Financial Resource Strain     Within the past 12 months, have you or your family members you live with been unable to get utilities (heat, electricity) when it was really needed?: No   Food Insecurity: Low Risk  (12/28/2023)    Food Insecurity     Within the past 12 months, did you worry that your food would run out before you got money to buy more?: No     Within the past 12 months, did the food you bought just not last and you didn t have money to get more?: No   Transportation Needs: Low Risk  (12/28/2023)    Transportation Needs     Within the past 12 months, has lack of transportation kept you from medical appointments, getting your medicines, non-medical meetings or appointments, work, or from getting things that you need?: No   Physical Activity: Not on file   Stress: Not on file   Social Connections: Not on file   Interpersonal Safety: Low Risk  (10/23/2023)    Interpersonal Safety     Do you feel physically and emotionally safe where you currently live?: Yes     Within the past 12 months, have you been hit, slapped, kicked or otherwise physically hurt by someone?: No     Within the past 12 months, have you been humiliated or emotionally abused in other ways by your partner or ex-partner?: No   Housing Stability: Low Risk  (12/28/2023)    Housing Stability     Do you have  housing? : Yes     Are you worried about losing your housing?: No           Medications  Allergies   Current Outpatient Medications   Medication Sig Dispense Refill    acetaminophen (TYLENOL) 500 MG tablet Take 1 tablet (500 mg) by mouth 2 times daily as needed for mild pain      aspirin 81 MG EC tablet Take 1 tablet (81 mg) by mouth daily 90 tablet 0    carvedilol (COREG) 3.125 MG tablet Take 1 tablet (3.125 mg) by mouth 2 times daily (with meals) 180 tablet 3    Continuous Blood Gluc Sensor (FREESTYLE DOMI 2 SENSOR) MISC Change every 14 days. 6 each 3    Continuous Glucose  (FREESTYLE DOMI 2 READER) DOROTEO USE TO READ BLOOD SUGARS AS PER 'S INSTRUCTIONS 1 each 0    furosemide (LASIX) 40 MG tablet Please take 80 mg (2 tablets) in the morning and 40 mg (1 tablet) in the afternoon. (Patient taking differently: Take 40 mg by mouth 2 times daily Please take 80 mg (2 tablets) in the morning and 40 mg (1 tablet) in the afternoon.) 270 tablet 3    gabapentin (NEURONTIN) 600 MG tablet Take 1 tablet (600 mg) by mouth 2 times daily (Patient taking differently: Take by mouth 2 times daily Take one tablet in the morning and two tablets in the evening.) 180 tablet 1    insulin aspart prot & aspart (NOVOLOG MIX 70/30 PEN) (70-30) 100 UNIT/ML pen Inject subcutaneously 20 units in AM with breakfast, 12 units with lunch, and 15 units in PM with dinner. 15 mL 5    metFORMIN (GLUCOPHAGE) 500 MG tablet TAKE 2 TABLETS BY MOUTH TWICE  DAILY WITH MEALS 360 tablet 0    Multiple Vitamins-Minerals (PRESERVISION AREDS PO) Take 1 tablet by mouth 2 times daily      pramipexole (MIRAPEX) 0.5 MG tablet Take 2 tablets in the afternoon and 1 tablet at bedtime (Patient taking differently: Take 1 mg by mouth 2 times daily Take 2 tablets in the afternoon and at bedtime) 180 tablet 1    rosuvastatin (CRESTOR) 20 MG tablet Take 20 mg by mouth every evening      spironolactone (ALDACTONE) 25 MG tablet Take 1 tablet (25 mg) by mouth  daily 90 tablet 3    tamsulosin (FLOMAX) 0.4 MG capsule Take 0.4 mg by mouth every morning      warfarin ANTICOAGULANT (COUMADIN) 4 MG tablet Takes 1 tablet (4mg) 5 days of the week on Sun/Mon/Wed/Thurs/Sat, by mouth as directed.  Adjust dose based on INR results. 70 tablet 1    warfarin ANTICOAGULANT (COUMADIN) 5 MG tablet Take 1 tablet (5mg) on Sunday and Wednesday, by mouth as directed.  Adjust dose based on INR results. 40 tablet 1    cefdinir (OMNICEF) 300 MG capsule Take 300 mg by mouth (Patient not taking: Reported on 8/15/2024)      cephALEXin (KEFLEX) 500 MG capsule Take 1 capsule (500 mg) by mouth 2 times daily (Patient not taking: Reported on 8/15/2024) 20 capsule 0       Allergies   Allergen Reactions    Oxycodone Itching and Rash    Amlodipine Dizziness     Dizziness and dry heaves    Lisinopril Cough    Adhesive Tape Itching and Rash          Lab Results    Chemistry/lipid CBC Cardiac Enzymes/BNP/TSH/INR   Recent Labs   Lab Test 04/06/23  1625   CHOL 136   HDL 61   LDL 58   TRIG 85     Recent Labs   Lab Test 04/06/23  1625 01/25/22  1443 09/16/21  1026   LDL 58 65 48     Recent Labs   Lab Test 02/08/24  1528      POTASSIUM 4.4   CHLORIDE 98   CO2 29   *   BUN 29.0*   CR 1.18*   GFRESTIMATED 62   RACHEL 9.4     Recent Labs   Lab Test 02/08/24  1528 01/16/24  1338 12/28/23  1706   CR 1.18* 1.24* 1.03     Recent Labs   Lab Test 02/08/24  1528 10/23/23  1020 07/18/23  1025   A1C 7.8* 8.4* 9.8*          Recent Labs   Lab Test 12/21/23  1103   WBC 6.1   HGB 11.6*   HCT 37.5*   *        Recent Labs   Lab Test 12/21/23  1103 04/06/23  1625 07/18/22  0636   HGB 11.6* 12.4* 10.8*    Recent Labs   Lab Test 07/04/22  1034   TROPONINI 0.15     Recent Labs   Lab Test 01/16/24  1338 12/21/23  1103 07/04/22  1034 01/08/21  0842 10/21/20  1451 05/09/19  1657 06/09/16  1203   BNP  --   --  321* 91* 56   < >  --    NTBNPI  --  2,987*  --   --   --   --   --    NTBNP 1,204  --   --   --   --   --   354*    < > = values in this interval not displayed.     Recent Labs   Lab Test 11/17/22  1315   TSH 0.92     Recent Labs   Lab Test 04/22/24  1106 03/28/24  1330 03/25/24  1311   INR 1.2* 3.8* 1.1          This note has been dictated using voice recognition software. Any grammatical, typographical, or context distortions are unintentional and inherent to the software    Monae Reich PA-C

## 2024-08-15 ENCOUNTER — LAB (OUTPATIENT)
Dept: CARDIOLOGY | Facility: CLINIC | Age: 84
End: 2024-08-15
Payer: COMMERCIAL

## 2024-08-15 ENCOUNTER — OFFICE VISIT (OUTPATIENT)
Dept: CARDIOLOGY | Facility: CLINIC | Age: 84
End: 2024-08-15
Payer: COMMERCIAL

## 2024-08-15 ENCOUNTER — ANTICOAGULATION THERAPY VISIT (OUTPATIENT)
Dept: ANTICOAGULATION | Facility: CLINIC | Age: 84
End: 2024-08-15

## 2024-08-15 VITALS
SYSTOLIC BLOOD PRESSURE: 128 MMHG | HEART RATE: 64 BPM | BODY MASS INDEX: 30.18 KG/M2 | RESPIRATION RATE: 14 BRPM | WEIGHT: 165 LBS | DIASTOLIC BLOOD PRESSURE: 60 MMHG

## 2024-08-15 DIAGNOSIS — Z79.01 CHRONIC ANTICOAGULATION: ICD-10-CM

## 2024-08-15 DIAGNOSIS — I48.20 CHRONIC ATRIAL FIBRILLATION (H): ICD-10-CM

## 2024-08-15 DIAGNOSIS — I48.20 CHRONIC ATRIAL FIBRILLATION (H): Primary | ICD-10-CM

## 2024-08-15 DIAGNOSIS — Z79.01 LONG TERM (CURRENT) USE OF ANTICOAGULANTS: ICD-10-CM

## 2024-08-15 DIAGNOSIS — Z95.2 S/P TAVR (TRANSCATHETER AORTIC VALVE REPLACEMENT): ICD-10-CM

## 2024-08-15 DIAGNOSIS — I48.91 NEW ONSET ATRIAL FIBRILLATION (H): ICD-10-CM

## 2024-08-15 DIAGNOSIS — I10 PRIMARY HYPERTENSION: ICD-10-CM

## 2024-08-15 DIAGNOSIS — I73.9 PERIPHERAL ARTERIAL DISEASE (H): ICD-10-CM

## 2024-08-15 DIAGNOSIS — R42 LIGHTHEADEDNESS: ICD-10-CM

## 2024-08-15 DIAGNOSIS — I50.32 CHRONIC HEART FAILURE WITH PRESERVED EJECTION FRACTION (H): Primary | ICD-10-CM

## 2024-08-15 LAB — INR POINT OF CARE: 1.7 (ref 0.9–1.1)

## 2024-08-15 PROCEDURE — 85610 PROTHROMBIN TIME: CPT | Mod: QW

## 2024-08-15 PROCEDURE — 36416 COLLJ CAPILLARY BLOOD SPEC: CPT

## 2024-08-15 PROCEDURE — G2211 COMPLEX E/M VISIT ADD ON: HCPCS

## 2024-08-15 PROCEDURE — 99214 OFFICE O/P EST MOD 30 MIN: CPT

## 2024-08-15 RX ORDER — CARVEDILOL 3.12 MG/1
3.12 TABLET ORAL 2 TIMES DAILY WITH MEALS
Qty: 180 TABLET | Refills: 3 | Status: SHIPPED | OUTPATIENT
Start: 2024-08-15

## 2024-08-15 NOTE — PROGRESS NOTES
ANTICOAGULATION MANAGEMENT     Pee Ayala 84 year old male is on warfarin with subtherapeutic INR result. (Goal INR 2.0-3.0)    Recent labs: (last 7 days)     08/15/24  1411   INR 1.7*       ASSESSMENT     Source(s): Chart Review  Previous INR was Therapeutic last visit; previously outside of goal range  Medication, diet, health changes since last INR chart reviewed; none identified         PLAN     Unable to reach Pee or spouse today.    Left message to take a booster dose of warfarin,  6 mg tonight. Request call back for assessment.    Follow up required to confirm warfarin dose taken and assess for changes and discuss dosing instructions and confirm understanding of instructions    Kendal Muniz RN  Anticoagulation Clinic  8/15/2024

## 2024-08-15 NOTE — LETTER
8/15/2024    Debbie Lincoln PA-C  [address]  [facility] [address]    RE: Pee Ayala       Dear Colleague,     I had the pleasure of seeing [facility] the [facility].    HEART CARE ENCOUNTER CONSULTATON NOTE      [facility]  [phone]      Assessment/Recommendations   Assessment:   HFpEF: NYHAII-III on furosemide 80 mg am and 40 mg pm, no evidence of pulmonary congestion on exam, although does have persistent significant LE edema bilaterally.  BNP with similar symptoms 3 months ago WNL, hesitant to adjust diuretic due to orthostatic symptoms.  Permanent atrial fibrillation: Continues Warfarin, irregular rhythm with controlled rate on exam  Orthostatic symptoms: Experiences symptoms of lightheadedness with position changes after prolonged immobility.  No further falls or presyncopal/syncopal events.  Coronary artery disease: Denies angina.  S/p three-vessel CABG in 2014.  Status post coronary angiogram December 2023 showed patent grafts with diffuse native disease with no targets for intervention  S/p TAVR 2021: normal function on echo 12/2023, no evidence of dysfunction on auscultation.  HTN: Controlled on carvedilol, spironolactone  HLD: Controlled on rosuvastatin 20 mg  Labile INR, chronic anticoagulation on warfarin, Falls: Discussed Watchman device  - [id number] score at least 3 (age, HFpEF, HTN), HAS-BLED 4 (warfarin, aspirin, age, INR), LVEF 60-65% echo 12/2023      Plan:   Given of frequent falls and labile INR discussed Watchman device, benefits and risks, anticoagulation surrounding the procedure.  He and his wife are interested in consult visit and will refer him to [facility] clinic.    Continue furosemide 80 mg a.m., 40 mg p.m.  Recommend elevation of legs and continued use of compression stockings  Careful transitions to avoid lightheadedness and falls  Recommended home monitoring of heart rate to rule out AF with RVR contribution  to of breath at rest  Continue reduced dose carvedilol 3.123 mg with orthostatic symptoms    - Following visit and chart review patient may have had left atrial appendage ligation/removal and MAZE procedure with CABG in 2014 although records not readily available.  Do not see records of previous PE, DVT, CVA, TIA.  Will have to look for other indication for use of chronic warfarin, as well as imaging confirmation of JESICA removal.    Follow up in 6 months with Dr. Hernandez      History of Present Illness/Subjective    HPI: Pee Ayala is a 84 year old male with PMHx of CAD, HFpEF, atrial fibrillation, post TAVR, HTN, HLD presents for follow-up. Hospitalized 12/2023 with acute heart failure exacerbation.  Adjustment to furosemide have been made since.  At our last visit 5/6/2024 he suffered a fall beforehand, was recommended to be seen in ED due to head injury and orthostatic symptoms prior.  No acute intracranial abnormality noted on CT head.  Suffered another fall in July although cause was mechanical.    Patient reports reports stability over the last 3 months.  His weight has been stable, lower extremity swelling is stable and improved with compression stockings although are difficult to don.  He has continued reduced dose of carvedilol, although continues to take furosemide 80 mg in the morning and 40 mg in the evening after episode of lightheadedness and fall in May.  Sleeps in a recliner due to back pain, but does endorse dyspnea winded with exertion, intermittent shortness of breath at rest as well.  Intermittent shortness of breath results in deep breathing for about 1.5 minutes.  Denies chest pain, palpitations, irregular heart rate at those times.    Due to falls, labile INR, patient would be interested in Watchman device.  He denies history of stroke, although family history in his father.      Coronary angiogram 12/2023  :CONCLUSIONS:   Severe multivessel coronary artery disease with chronic total  occlusion of the left circumflex, mid left anterior descending artery, and mid right coronary artery.  Patent LIMA to the LAD with moderate diffuse disease of the native vessel beyond the anastomosis.  Patent vein graft to the OM1 branch with mild diffuse disease of the native vessel beyond the anastomosis.  Moderate diseased vein graft to the OM 2 branch with small, diffusely diseased native vessel beyond the anastomosis.    Echocardiogram 2/2023  Results:  Left ventricular size, wall motion and function are normal. The ejection  fraction is 60-65%.  TAPSE is abnormal, which is consistent with abnormal right ventricular  systolic function.  The left atrium is severely dilated.  The right atrium is mild to moderately dilated.  There is a bioprosthetic aortic valve.  The gradient is normal for this prosthetic aortic valve.  There is mild paravalvular regurgitation present.     Physical Examination  Review of Systems   Vitals: /60 (BP Location: Left arm, Patient Position: Sitting, Cuff Size: Adult Regular)   Pulse 64   Resp 14   Wt 74.8 kg (165 lb)   BMI 30.18 kg/m    BMI= Body mass index is 30.18 kg/m .  Wt Readings from Last 3 Encounters:   08/15/24 74.8 kg (165 lb)   07/16/24 72.9 kg (160 lb 12.8 oz)   05/17/24 75.6 kg (166 lb 9.6 oz)       Head: Superficial tear of scalp.   ENT/Mouth: membranes moist, no oral lesions or bleeding gums.      EYES:  no scleral icterus, normal conjunctivae       Chest/Lungs:   lungs are clear to auscultation, no rales or wheezing, equal chest wall expansion    Cardiovascular:   Irregular. Normal first and second heart sounds with no murmurs, rubs, or gallops; the radial pulses are intact, 1-2+ pitting edema bilaterally       Extremities: no cyanosis or clubbing. Healing laceration of proximal left forearm    Skin: no xanthelasma, warm.    Neurologic: no tremors     Psychiatric: alert and oriented x3, calm        Please refer above for cardiac ROS details.        Medical  History  Surgical History Family History Social History   Past Medical History:   Diagnosis Date     ACS (acute coronary syndrome) (H) 06/02/2014     Actinic keratosis 01/14/2014     Anticoagulated on Coumadin 12/30/2015     Atrial fibrillation (H) 01/01/2016     Bone mass 04/26/2017     Chest heaviness 01/23/2019     Chest pain 05/31/2014     Chronic heart failure with preserved ejection fraction (H) 01/16/2024     Closed fracture of left forearm 01/01/2015     Congestive heart failure (H)      Coronary artery disease      Difficulty in walking(719.7)      Dyslipidemia 08/31/2016     Dyspnea on exertion      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      Encounter for long-term (current) use of insulin (H) 08/11/2016     Esophageal reflux 11/18/2010     History of angina      HTN (hypertension) 06/30/2009     (Problem list name updated by automated process. Provider to review and confirm.)     Impotence of organic origin      Mixed hyperlipidemia 04/25/2007 April 25, 2007 restarted Zocor today, recheck in 3 months.  August 23, 2007 LDL at 101 with 40 mg, will increase to 80 mg. Recheck  In 3 months.      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      Walking troubles      Past Surgical History:   Procedure Laterality Date     BYPASS GRAFT ARTERY CORONARY N/A 09/10/2014    Procedure: BYPASS GRAFT ARTERY CORONARY;  Surgeon: Bharathi Caraballo  MD Kim;  Location: [facility] OR     BYPASS GRAFT ARTERY CORONARY  01/01/2016     COLONOSCOPY  01/14/2004     CORONARY ANGIOGRAPHY ADULT ORDER       CORONARY ARTERY BYPASS       CV CORONARY ANGIOGRAM N/A 04/04/2019    Procedure: Coronary Angiogram;  Surgeon: Dominik Vega MD;  Location: [facility] Lab;  Service: Cardiology     CV CORONARY ANGIOGRAM N/A 01/06/2021    Procedure: Coronary Angiogram;  Surgeon: Dominik Vega MD;  Location: [facility];  Service: Cardiology     CV CORONARY ANGIOGRAM N/A 12/22/2023    Procedure: Coronary Angiogram;  Surgeon: Bahman Lenz MD;  Location: [facility] CV     CV LEFT HEART CATHETERIZATION WITHOUT LEFT VENTRICULOGRAM Left 04/04/2019    Procedure: Left Heart Catheterization Without Left Ventriculogram;  Surgeon: Dominik Vega MD;  Location: [facility];  Service: Cardiology     CV LEFT HEART CATHETERIZATION WITHOUT LEFT VENTRICULOGRAM Left 01/06/2021    Procedure: Left Heart Catheterization Without Left Ventriculogram;  Surgeon: Dominik Vgea MD;  Location: [facility];  Service: Cardiology     CV RIGHT HEART CATHETERIZATION Right 04/04/2019    Procedure: Right Heart Catheterization;  Surgeon: Dominik Vega MD;  Location: [facility];  Service: Cardiology     CV TRANSCATHETER AORTIC VALVE REPLACEMENT N/A 01/12/2021    Procedure: Right transfemoral transcatheter aortic valve replacement using Magdaleno Dominick 3 size 29mm.  Transthoracic echocardiogram;  Surgeon: Jo Romano MD;  Location: [facility] CARDIAC CATH LAB     ESOPHAGOSCOPY, GASTROSCOPY, DUODENOSCOPY (EGD), COMBINED N/A 01/13/2016    Procedure: COMBINED ESOPHAGOSCOPY, GASTROSCOPY, DUODENOSCOPY (EGD);  Surgeon: Rk Srivastava MD;  Location: [facility] FEMORAL CANNULIZATION WITH OPEN STANDBY REPAIR AORTIC VALVE N/A 01/12/2021    Procedure: Cardiopulmonary Bypass standby;  Surgeon: Vika  Jefferson RODRIGEZ MD;  Location:  HEART CARDIAC CATH LAB     HEART CATH, ANGIOPLASTY       IR LOWER EXTREMITY ANGIOGRAM LEFT  2021     LAPAROSCOPIC CHOLECYSTECTOMY  10/01/2009     MAZE PROCEDURE N/A 09/10/2014    Procedure: MAZE PROCEDURE;  Surgeon: Bharathi Caraballo MD;  Location:  OR     MOHS MICROGRAPHIC PROCEDURE       OPEN REDUCTION INTERNAL FIXATION HIP BIPOLAR Left 2022    Procedure: HEMIARTHROPLASTY, HIP, BIPOLAR, OPEN REDUCTION INTERNAL FIXATION OF GREATER TROCHANTER;  Surgeon: Jd Larose MD;  Location: [facility] OR     ORTHOPEDIC SURGERY      surgery for fx  Left forearm     OTHER SURGICAL HISTORY Left 2015    forearm sugery     STENT, CORONARY, HUMA  2016     VASECTOMY       Family History   Problem Relation Age of Onset     Diabetes Mother      Hypertension Father      Cerebrovascular Disease Father      Diabetes Maternal Grandmother      Breast Cancer No family hx of      Cancer - colorectal No family hx of      Prostate Cancer No family hx of      C.A.D. No family hx of      Diabetes Type 2  Mother         58 ,  from anesthesia complication.     Heart Disease Father         86  from stroke     No Known Problems Brother         60 years of age.        Social History     Socioeconomic History     Marital status:      Spouse name: Fay Ayala     Number of children: Not on file     Years of education: Not on file     Highest education level: Not on file   Occupational History     Employer: RETIRED   Tobacco Use     Smoking status: Former     Current packs/day: 0.00     Types: Cigarettes     Quit date: 1998     Years since quittin.6     Passive exposure: Never     Smokeless tobacco: Never   Vaping Use     Vaping status: Never Used   Substance and Sexual Activity     Alcohol use: Yes     Alcohol/week: 0.0 standard drinks of alcohol     Comment: Alcoholic Drinks/day: very rare     Drug use: No     Sexual activity: Never     Birth  control/protection: None   Other Topics Concern     Parent/sibling w/ CABG, MI or angioplasty before 65F 55M? No   Social History Narrative     and lives with life.  Has adult children from previous relationship. ( Blended family).  Has a dog - Vincent Gil MD  1/3/2019                Medication Instructions:    Patient is to take all scheduled medications on the day of surgery EXCEPT for modifications listed below:     - aspirin: cardiology has recommended continuing baby aspirin daily     - warfarin: hold warfarin for 5 days before surgery     - Diuretics: HOLD on the day of surgery.     - Statins: Continue taking on the day of surgery.      - mixed insulin (70/30m 75/25, 50/50): HOLD day of surgery     - metformin: HOLD day of surgery.     - Opioids: Continue without modification     - hold any vitamins or supplements until after surgery.      Social Determinants of Health     Financial Resource Strain: Low Risk  (12/28/2023)    Financial Resource Strain      Within the past 12 months, have you or your family members you live with been unable to get utilities (heat, electricity) when it was really needed?: No   Food Insecurity: Low Risk  (12/28/2023)    Food Insecurity      Within the past 12 months, did you worry that your food would run out before you got money to buy more?: No      Within the past 12 months, did the food you bought just not last and you didn t have money to get more?: No   Transportation Needs: Low Risk  (12/28/2023)    Transportation Needs      Within the past 12 months, has lack of transportation kept you from medical appointments, getting your medicines, non-medical meetings or appointments, work, or from getting things that you need?: No   Physical Activity: Not on file   Stress: Not on file   Social Connections: Not on file   Interpersonal Safety: Low Risk  (10/23/2023)    Interpersonal Safety      Do you feel physically and emotionally safe where you currently  live?: Yes      Within the past 12 months, have you been hit, slapped, kicked or otherwise physically hurt by someone?: No      Within the past 12 months, have you been humiliated or emotionally abused in other ways by your partner or ex-partner?: No   Housing Stability: Low Risk  (12/28/2023)    Housing Stability      Do you have housing? : Yes      Are you worried about losing your housing?: No           Medications  Allergies   Current Outpatient Medications   Medication Sig Dispense Refill     acetaminophen (TYLENOL) 500 MG tablet Take 1 tablet (500 mg) by mouth 2 times daily as needed for mild pain       aspirin 81 MG EC tablet Take 1 tablet (81 mg) by mouth daily 90 tablet 0     carvedilol (COREG) 3.125 MG tablet Take 1 tablet (3.125 mg) by mouth 2 times daily (with meals) 180 tablet 3     Continuous Blood Gluc Sensor (FREESTYLE DOMI 2 SENSOR) MISC Change every 14 days. 6 each 3     Continuous Glucose  (FREESTYLE DOMI 2 READER) DOROTEO USE TO READ BLOOD SUGARS AS PER 'S INSTRUCTIONS 1 each 0     furosemide (LASIX) 40 MG tablet Please take 80 mg (2 tablets) in the morning and 40 mg (1 tablet) in the afternoon. (Patient taking differently: Take 40 mg by mouth 2 times daily Please take 80 mg (2 tablets) in the morning and 40 mg (1 tablet) in the afternoon.) 270 tablet 3     gabapentin (NEURONTIN) 600 MG tablet Take 1 tablet (600 mg) by mouth 2 times daily (Patient taking differently: Take by mouth 2 times daily Take one tablet in the morning and two tablets in the evening.) 180 tablet 1     insulin aspart prot & aspart (NOVOLOG MIX 70/30 PEN) (70-30) 100 UNIT/ML pen Inject subcutaneously 20 units in AM with breakfast, 12 units with lunch, and 15 units in PM with dinner. 15 mL 5     metFORMIN (GLUCOPHAGE) 500 MG tablet TAKE 2 TABLETS BY MOUTH TWICE  DAILY WITH MEALS 360 tablet 0     Multiple Vitamins-Minerals (PRESERVISION AREDS PO) Take 1 tablet by mouth 2 times daily       pramipexole  (MIRAPEX) 0.5 MG tablet Take 2 tablets in the afternoon and 1 tablet at bedtime (Patient taking differently: Take 1 mg by mouth 2 times daily Take 2 tablets in the afternoon and at bedtime) 180 tablet 1     rosuvastatin (CRESTOR) 20 MG tablet Take 20 mg by mouth every evening       spironolactone (ALDACTONE) 25 MG tablet Take 1 tablet (25 mg) by mouth daily 90 tablet 3     tamsulosin (FLOMAX) 0.4 MG capsule Take 0.4 mg by mouth every morning       warfarin ANTICOAGULANT (COUMADIN) 4 MG tablet Takes 1 tablet (4mg) 5 days of the week on Sun/Mon/Wed/Thurs/Sat, by mouth as directed.  Adjust dose based on INR results. 70 tablet 1     warfarin ANTICOAGULANT (COUMADIN) 5 MG tablet Take 1 tablet (5mg) on Sunday and Wednesday, by mouth as directed.  Adjust dose based on INR results. 40 tablet 1     cefdinir (OMNICEF) 300 MG capsule Take 300 mg by mouth (Patient not taking: Reported on 8/15/2024)       cephALEXin (KEFLEX) 500 MG capsule Take 1 capsule (500 mg) by mouth 2 times daily (Patient not taking: Reported on 8/15/2024) 20 capsule 0       Allergies   Allergen Reactions     Oxycodone Itching and Rash     Amlodipine Dizziness     Dizziness and dry heaves     Lisinopril Cough     Adhesive Tape Itching and Rash          Lab Results    Chemistry/lipid CBC Cardiac Enzymes/BNP/TSH/INR   Recent Labs   Lab Test 04/06/23  1625   CHOL 136   HDL 61   LDL 58   TRIG 85     Recent Labs   Lab Test 04/06/23  1625 01/25/22  1443 09/16/21  1026   LDL 58 65 48     Recent Labs   Lab Test 02/08/24  1528      POTASSIUM 4.4   CHLORIDE 98   CO2 29   *   BUN 29.0*   CR 1.18*   GFRESTIMATED 62   RACHEL 9.4     Recent Labs   Lab Test 02/08/24  1528 01/16/24  1338 12/28/23  1706   CR 1.18* 1.24* 1.03     Recent Labs   Lab Test 02/08/24  1528 10/23/23  1020 07/18/23  1025   A1C 7.8* 8.4* 9.8*          Recent Labs   Lab Test 12/21/23  1103   WBC 6.1   HGB 11.6*   HCT 37.5*   *        Recent Labs   Lab Test 12/21/23  1103  04/06/23  1625 07/18/22  0636   HGB 11.6* 12.4* 10.8*    Recent Labs   Lab Test 07/04/22  1034   TROPONINI 0.15     Recent Labs   Lab Test 01/16/24  1338 12/21/23  1103 07/04/22  1034 01/08/21  0842 10/21/20  1451 05/09/19  1657 06/09/16  1203   BNP  --   --  321* 91* 56   < >  --    NTBNPI  --  2,987*  --   --   --   --   --    NTBNP 1,204  --   --   --   --   --  354*    < > = values in this interval not displayed.     Recent Labs   Lab Test 11/17/22  1315   TSH 0.92     Recent Labs   Lab Test 04/22/24  1106 03/28/24  1330 03/25/24  1311   INR 1.2* 3.8* 1.1          This note has been dictated using voice recognition software. Any grammatical, typographical, or context distortions are unintentional and inherent to the software    Monae Reich PA-C                                         Thank you for allowing me to participate in the care of your patient.      Sincerely,     Monae Guerrero PA-C     [facility]  cc:   Monae Reich PA-C  [address]  [address]  [place] [address]

## 2024-08-15 NOTE — PATIENT INSTRUCTIONS
It was a pleasure taking part in your care today:    - Continue current medications  - Monitor heart rates when you are short of breath while sitting. >120 bpm report to clinic  - Schedule consult with watchman clinic to discuss watchman device  - Follow up in 3 months with Dr. Recio    Please call the [facility] clinic with any questions or concerns at [phone].     Monae Guerrero PA-C

## 2024-08-16 NOTE — PROGRESS NOTES
ANTICOAGULATION MANAGEMENT     Pee Ayala 84 year old male is on warfarin with subtherapeutic INR result. (Goal INR 2.0-3.0)    Recent labs: (last 7 days)     08/15/24  1411   INR 1.7*       ASSESSMENT     Source(s): Chart Review and Patient/Caregiver Call     Warfarin doses taken: Missed dose(s) may be affecting INR   Reported he did not take the booster dose 6mg last night.   Reported might have taken  4mg dose instead of 6mg this week.   Did verify back taking correct warfarin dose.  Diet: No new diet changes identified  Medication/supplement changes: None noted  New illness, injury, or hospitalization: No.   8/15/24 follow-up HF with Heart Care - INRs have been labile and chronic anticoagulation.  Given frequent FALLS  and labile INRs - discussed Watchman device and patient is interested.  Will schedule with [facility].  Signs or symptoms of bleeding or clotting: No  Previous result: Therapeutic last visit at 2.1; previously outside of goal range subtherapeutic last 5 INRs.  Additional findings:  Scheduled appointment on 8/26/24 with [facility].       PLAN     Recommended plan for temporary change(s) affecting INR     Dosing Instructions:  - reported he did not take 6mg booster dose on 8/15/24,  - tonight, advised  booster dose with 8mg,  - then continue your current warfarin dose   - with next INR in 2 weeks       Summary  As of 8/15/2024      Full warfarin instructions:  8/16: 8 mg; Otherwise 4 mg every Mon, Thu; 6 mg all other days   Next INR check:  8/29/2024               Telephone call with Pee who verbalizes understanding and agrees to plan.   - advised the importance of taking correct warfarin dose daily.    Lab visit scheduled INR on 8/26/24 at his [facility] appointment @ [facility].    Education provided: Taking warfarin: take warfarin at same time each day; preferably in the evening and Importance of taking warfarin as instructed  Goal range and lab monitoring: goal range and  significance of current result    Plan made per [facility] anticoagulation protocol    Mariel Dale, RN  Anticoagulation Clinic  8/16/2024    _______________________________________________________________________     Anticoagulation Episode Summary       Current INR goal:  2.0-3.0   TTR:  32.4% (9.6 mo)   Target end date:  Indefinite   Send INR reminders to:  [facility] [[email or web address]]    Indications    Chronic atrial fibrillation (H) [I48.20]  Chronic anticoagulation [Z79.01]  Long term (current) use of anticoagulants [Z79.01]  Peripheral arterial disease (H24) [I73.9]  S/P TAVR (transcatheter aortic valve replacement) [Z95.2]  New onset atrial fibrillation (H) [I48.91]             Comments:               Anticoagulation Care Providers       Provider Role Specialty Phone number    Jazzy Gil MD Referring Family Medicine [phone]    Ihsan Singh MD Referring Family Medicine     [facility], Debbie James PA-C Referring Physician Assistant - Medical [phone]

## 2024-08-22 ENCOUNTER — APPOINTMENT (OUTPATIENT)
Dept: RADIOLOGY | Facility: HOSPITAL | Age: 84
End: 2024-08-22
Attending: EMERGENCY MEDICINE
Payer: COMMERCIAL

## 2024-08-22 ENCOUNTER — HOSPITAL ENCOUNTER (OUTPATIENT)
Facility: HOSPITAL | Age: 84
Setting detail: OBSERVATION
Discharge: HOME | End: 2024-08-23
Attending: EMERGENCY MEDICINE | Admitting: STUDENT IN AN ORGANIZED HEALTH CARE EDUCATION/TRAINING PROGRAM
Payer: COMMERCIAL

## 2024-08-22 DIAGNOSIS — E16.2 HYPOGLYCEMIA: ICD-10-CM

## 2024-08-22 DIAGNOSIS — R55 SYNCOPE, UNSPECIFIED SYNCOPE TYPE: ICD-10-CM

## 2024-08-22 DIAGNOSIS — T14.8XXA MULTIPLE SKIN TEARS: Primary | ICD-10-CM

## 2024-08-22 DIAGNOSIS — R79.89 ELEVATED TROPONIN: ICD-10-CM

## 2024-08-22 DIAGNOSIS — E11.42 TYPE 2 DIABETES MELLITUS WITH PERIPHERAL NEUROPATHY (H): ICD-10-CM

## 2024-08-22 PROBLEM — I35.0 AORTIC STENOSIS: Status: ACTIVE | Noted: 2020-12-30

## 2024-08-22 PROBLEM — I73.9 PERIPHERAL ARTERIAL DISEASE (H): Status: ACTIVE | Noted: 2022-10-12

## 2024-08-22 LAB
ANION GAP SERPL CALCULATED.3IONS-SCNC: 10 MMOL/L (ref 7–15)
BASOPHILS # BLD AUTO: 0 10E3/UL (ref 0–0.2)
BASOPHILS NFR BLD AUTO: 1 %
BUN SERPL-MCNC: 17.6 MG/DL (ref 8–23)
CALCIUM SERPL-MCNC: 9.2 MG/DL (ref 8.8–10.4)
CHLORIDE SERPL-SCNC: 104 MMOL/L (ref 98–107)
CREAT SERPL-MCNC: 0.94 MG/DL (ref 0.67–1.17)
EGFRCR SERPLBLD CKD-EPI 2021: 81 ML/MIN/1.73M2
EOSINOPHIL # BLD AUTO: 0.2 10E3/UL (ref 0–0.7)
EOSINOPHIL NFR BLD AUTO: 2 %
ERYTHROCYTE [DISTWIDTH] IN BLOOD BY AUTOMATED COUNT: 12.8 % (ref 10–15)
GLUCOSE BLDC GLUCOMTR-MCNC: 121 MG/DL (ref 70–99)
GLUCOSE BLDC GLUCOMTR-MCNC: 134 MG/DL (ref 70–99)
GLUCOSE BLDC GLUCOMTR-MCNC: 137 MG/DL (ref 70–99)
GLUCOSE BLDC GLUCOMTR-MCNC: 137 MG/DL (ref 70–99)
GLUCOSE BLDC GLUCOMTR-MCNC: 151 MG/DL (ref 70–99)
GLUCOSE BLDC GLUCOMTR-MCNC: 151 MG/DL (ref 70–99)
GLUCOSE BLDC GLUCOMTR-MCNC: 183 MG/DL (ref 70–99)
GLUCOSE BLDC GLUCOMTR-MCNC: 211 MG/DL (ref 70–99)
GLUCOSE BLDC GLUCOMTR-MCNC: 224 MG/DL (ref 70–99)
GLUCOSE BLDC GLUCOMTR-MCNC: 256 MG/DL (ref 70–99)
GLUCOSE BLDC GLUCOMTR-MCNC: 90 MG/DL (ref 70–99)
GLUCOSE SERPL-MCNC: 44 MG/DL (ref 70–99)
HCO3 SERPL-SCNC: 26 MMOL/L (ref 22–29)
HCT VFR BLD AUTO: 39.9 % (ref 40–53)
HGB BLD-MCNC: 12.9 G/DL (ref 13.3–17.7)
HOLD SPECIMEN: NORMAL
IMM GRANULOCYTES # BLD: 0 10E3/UL
IMM GRANULOCYTES NFR BLD: 0 %
INR PPP: 1.75 (ref 0.85–1.15)
LYMPHOCYTES # BLD AUTO: 2 10E3/UL (ref 0.8–5.3)
LYMPHOCYTES NFR BLD AUTO: 24 %
MCH RBC QN AUTO: 31.9 PG (ref 26.5–33)
MCHC RBC AUTO-ENTMCNC: 32.3 G/DL (ref 31.5–36.5)
MCV RBC AUTO: 99 FL (ref 78–100)
MONOCYTES # BLD AUTO: 0.7 10E3/UL (ref 0–1.3)
MONOCYTES NFR BLD AUTO: 9 %
NEUTROPHILS # BLD AUTO: 5.4 10E3/UL (ref 1.6–8.3)
NEUTROPHILS NFR BLD AUTO: 65 %
NRBC # BLD AUTO: 0 10E3/UL
NRBC BLD AUTO-RTO: 0 /100
PLATELET # BLD AUTO: 170 10E3/UL (ref 150–450)
POTASSIUM SERPL-SCNC: 3.6 MMOL/L (ref 3.4–5.3)
RBC # BLD AUTO: 4.04 10E6/UL (ref 4.4–5.9)
SODIUM SERPL-SCNC: 140 MMOL/L (ref 135–145)
TROPONIN T SERPL HS-MCNC: 115 NG/L
TROPONIN T SERPL HS-MCNC: 164 NG/L
TROPONIN T SERPL HS-MCNC: 172 NG/L
TROPONIN T SERPL HS-MCNC: 45 NG/L
WBC # BLD AUTO: 8.3 10E3/UL (ref 4–11)

## 2024-08-22 PROCEDURE — 99223 1ST HOSP IP/OBS HIGH 75: CPT | Performed by: STUDENT IN AN ORGANIZED HEALTH CARE EDUCATION/TRAINING PROGRAM

## 2024-08-22 PROCEDURE — 250N000013 HC RX MED GY IP 250 OP 250 PS 637: Performed by: INTERNAL MEDICINE

## 2024-08-22 PROCEDURE — 36415 COLL VENOUS BLD VENIPUNCTURE: CPT | Performed by: EMERGENCY MEDICINE

## 2024-08-22 PROCEDURE — 93005 ELECTROCARDIOGRAM TRACING: CPT | Performed by: EMERGENCY MEDICINE

## 2024-08-22 PROCEDURE — 71045 X-RAY EXAM CHEST 1 VIEW: CPT

## 2024-08-22 PROCEDURE — 84484 ASSAY OF TROPONIN QUANT: CPT | Performed by: EMERGENCY MEDICINE

## 2024-08-22 PROCEDURE — 36415 COLL VENOUS BLD VENIPUNCTURE: CPT | Performed by: STUDENT IN AN ORGANIZED HEALTH CARE EDUCATION/TRAINING PROGRAM

## 2024-08-22 PROCEDURE — 99291 CRITICAL CARE FIRST HOUR: CPT | Mod: 25

## 2024-08-22 PROCEDURE — 99207 PR APP CREDIT; MD BILLING SHARED VISIT: CPT | Performed by: INTERNAL MEDICINE

## 2024-08-22 PROCEDURE — G0463 HOSPITAL OUTPT CLINIC VISIT: HCPCS | Mod: 25

## 2024-08-22 PROCEDURE — 80048 BASIC METABOLIC PNL TOTAL CA: CPT | Performed by: EMERGENCY MEDICINE

## 2024-08-22 PROCEDURE — 250N000012 HC RX MED GY IP 250 OP 636 PS 637: Performed by: INTERNAL MEDICINE

## 2024-08-22 PROCEDURE — 85025 COMPLETE CBC W/AUTO DIFF WBC: CPT | Performed by: EMERGENCY MEDICINE

## 2024-08-22 PROCEDURE — 99222 1ST HOSP IP/OBS MODERATE 55: CPT | Mod: GC | Performed by: INTERNAL MEDICINE

## 2024-08-22 PROCEDURE — 84484 ASSAY OF TROPONIN QUANT: CPT | Mod: 91 | Performed by: STUDENT IN AN ORGANIZED HEALTH CARE EDUCATION/TRAINING PROGRAM

## 2024-08-22 PROCEDURE — 82962 GLUCOSE BLOOD TEST: CPT

## 2024-08-22 PROCEDURE — 250N000013 HC RX MED GY IP 250 OP 250 PS 637: Performed by: STUDENT IN AN ORGANIZED HEALTH CARE EDUCATION/TRAINING PROGRAM

## 2024-08-22 PROCEDURE — 250N000009 HC RX 250: Performed by: INTERNAL MEDICINE

## 2024-08-22 PROCEDURE — 258N000001 HC RX 258: Performed by: EMERGENCY MEDICINE

## 2024-08-22 PROCEDURE — G0378 HOSPITAL OBSERVATION PER HR: HCPCS

## 2024-08-22 PROCEDURE — 96374 THER/PROPH/DIAG INJ IV PUSH: CPT

## 2024-08-22 PROCEDURE — 85610 PROTHROMBIN TIME: CPT | Performed by: STUDENT IN AN ORGANIZED HEALTH CARE EDUCATION/TRAINING PROGRAM

## 2024-08-22 PROCEDURE — 250N000013 HC RX MED GY IP 250 OP 250 PS 637: Performed by: EMERGENCY MEDICINE

## 2024-08-22 RX ORDER — ONDANSETRON 4 MG/1
4 TABLET, ORALLY DISINTEGRATING ORAL EVERY 6 HOURS PRN
Status: DISCONTINUED | OUTPATIENT
Start: 2024-08-22 | End: 2024-08-23 | Stop reason: HOSPADM

## 2024-08-22 RX ORDER — ONDANSETRON 2 MG/ML
4 INJECTION INTRAMUSCULAR; INTRAVENOUS EVERY 6 HOURS PRN
Status: DISCONTINUED | OUTPATIENT
Start: 2024-08-22 | End: 2024-08-23 | Stop reason: HOSPADM

## 2024-08-22 RX ORDER — DEXTROSE MONOHYDRATE 25 G/50ML
50 INJECTION, SOLUTION INTRAVENOUS ONCE
Status: COMPLETED | OUTPATIENT
Start: 2024-08-22 | End: 2024-08-22

## 2024-08-22 RX ORDER — NICOTINE POLACRILEX 4 MG
15-30 LOZENGE BUCCAL
Status: DISCONTINUED | OUTPATIENT
Start: 2024-08-22 | End: 2024-08-23 | Stop reason: HOSPADM

## 2024-08-22 RX ORDER — PRAMIPEXOLE DIHYDROCHLORIDE 1 MG/1
1 TABLET ORAL EVERY EVENING
Status: DISCONTINUED | OUTPATIENT
Start: 2024-08-22 | End: 2024-08-23 | Stop reason: HOSPADM

## 2024-08-22 RX ORDER — ACETAMINOPHEN 650 MG/1
650 SUPPOSITORY RECTAL EVERY 4 HOURS PRN
Status: DISCONTINUED | OUTPATIENT
Start: 2024-08-22 | End: 2024-08-23 | Stop reason: HOSPADM

## 2024-08-22 RX ORDER — ACETAMINOPHEN 325 MG/1
650 TABLET ORAL EVERY 4 HOURS PRN
Status: DISCONTINUED | OUTPATIENT
Start: 2024-08-22 | End: 2024-08-23 | Stop reason: HOSPADM

## 2024-08-22 RX ORDER — ASPIRIN 81 MG/1
324 TABLET, CHEWABLE ORAL ONCE
Status: COMPLETED | OUTPATIENT
Start: 2024-08-22 | End: 2024-08-22

## 2024-08-22 RX ORDER — PRAMIPEXOLE DIHYDROCHLORIDE 0.5 MG/1
0.5 TABLET ORAL AT BEDTIME
COMMUNITY
End: 2024-09-24 | Stop reason: DRUGHIGH

## 2024-08-22 RX ORDER — AMOXICILLIN 250 MG
2 CAPSULE ORAL 2 TIMES DAILY PRN
Status: DISCONTINUED | OUTPATIENT
Start: 2024-08-22 | End: 2024-08-23 | Stop reason: HOSPADM

## 2024-08-22 RX ORDER — PRAMIPEXOLE DIHYDROCHLORIDE 0.5 MG/1
1 TABLET ORAL EVERY EVENING
COMMUNITY

## 2024-08-22 RX ORDER — ROSUVASTATIN CALCIUM 10 MG/1
20 TABLET, COATED ORAL EVERY EVENING
Status: DISCONTINUED | OUTPATIENT
Start: 2024-08-22 | End: 2024-08-23 | Stop reason: HOSPADM

## 2024-08-22 RX ORDER — OXYMETAZOLINE HYDROCHLORIDE 0.05 G/100ML
2 SPRAY NASAL 2 TIMES DAILY
Status: DISCONTINUED | OUTPATIENT
Start: 2024-08-22 | End: 2024-08-23 | Stop reason: HOSPADM

## 2024-08-22 RX ORDER — DEXTROSE MONOHYDRATE 25 G/50ML
25-50 INJECTION, SOLUTION INTRAVENOUS
Status: DISCONTINUED | OUTPATIENT
Start: 2024-08-22 | End: 2024-08-23 | Stop reason: HOSPADM

## 2024-08-22 RX ORDER — FUROSEMIDE 20 MG
80 TABLET ORAL 2 TIMES DAILY
Status: DISCONTINUED | OUTPATIENT
Start: 2024-08-22 | End: 2024-08-23 | Stop reason: HOSPADM

## 2024-08-22 RX ORDER — ACETAMINOPHEN 325 MG/1
650 TABLET ORAL EVERY 4 HOURS PRN
Status: DISCONTINUED | OUTPATIENT
Start: 2024-08-22 | End: 2024-08-22

## 2024-08-22 RX ORDER — DEXTROSE MONOHYDRATE 25 G/50ML
25-50 INJECTION, SOLUTION INTRAVENOUS
Status: DISCONTINUED | OUTPATIENT
Start: 2024-08-22 | End: 2024-08-22

## 2024-08-22 RX ORDER — GABAPENTIN 600 MG/1
600 TABLET ORAL EVERY EVENING
COMMUNITY
End: 2024-09-24

## 2024-08-22 RX ORDER — TAMSULOSIN HYDROCHLORIDE 0.4 MG/1
0.4 CAPSULE ORAL EVERY MORNING
Status: DISCONTINUED | OUTPATIENT
Start: 2024-08-22 | End: 2024-08-23 | Stop reason: HOSPADM

## 2024-08-22 RX ORDER — ASPIRIN 81 MG/1
81 TABLET ORAL DAILY
Status: DISCONTINUED | OUTPATIENT
Start: 2024-08-22 | End: 2024-08-23 | Stop reason: HOSPADM

## 2024-08-22 RX ORDER — GABAPENTIN 300 MG/1
600 CAPSULE ORAL EVERY EVENING
Status: DISCONTINUED | OUTPATIENT
Start: 2024-08-22 | End: 2024-08-23 | Stop reason: HOSPADM

## 2024-08-22 RX ORDER — AMOXICILLIN 250 MG
1 CAPSULE ORAL 2 TIMES DAILY PRN
Status: DISCONTINUED | OUTPATIENT
Start: 2024-08-22 | End: 2024-08-23 | Stop reason: HOSPADM

## 2024-08-22 RX ORDER — WARFARIN SODIUM 4 MG/1
6 TABLET ORAL
COMMUNITY

## 2024-08-22 RX ORDER — WARFARIN SODIUM 4 MG/1
4 TABLET ORAL
COMMUNITY

## 2024-08-22 RX ORDER — FUROSEMIDE 40 MG
80 TABLET ORAL
COMMUNITY

## 2024-08-22 RX ORDER — PRAMIPEXOLE DIHYDROCHLORIDE 0.5 MG/1
0.5 TABLET ORAL AT BEDTIME
Status: DISCONTINUED | OUTPATIENT
Start: 2024-08-22 | End: 2024-08-23 | Stop reason: HOSPADM

## 2024-08-22 RX ORDER — CARVEDILOL 3.12 MG/1
3.12 TABLET ORAL 2 TIMES DAILY WITH MEALS
Status: DISCONTINUED | OUTPATIENT
Start: 2024-08-22 | End: 2024-08-23 | Stop reason: HOSPADM

## 2024-08-22 RX ORDER — SPIRONOLACTONE 25 MG/1
25 TABLET ORAL DAILY
Status: DISCONTINUED | OUTPATIENT
Start: 2024-08-22 | End: 2024-08-23 | Stop reason: HOSPADM

## 2024-08-22 RX ORDER — NICOTINE POLACRILEX 4 MG
15-30 LOZENGE BUCCAL
Status: DISCONTINUED | OUTPATIENT
Start: 2024-08-22 | End: 2024-08-22

## 2024-08-22 RX ORDER — ACETAMINOPHEN 650 MG/1
650 SUPPOSITORY RECTAL EVERY 4 HOURS PRN
Status: DISCONTINUED | OUTPATIENT
Start: 2024-08-22 | End: 2024-08-22

## 2024-08-22 RX ORDER — WARFARIN SODIUM 3 MG/1
6 TABLET ORAL
Status: COMPLETED | OUTPATIENT
Start: 2024-08-22 | End: 2024-08-22

## 2024-08-22 RX ADMIN — CARVEDILOL 3.12 MG: 3.12 TABLET, FILM COATED ORAL at 08:54

## 2024-08-22 RX ADMIN — INSULIN ASPART 1 UNITS: 100 INJECTION, SOLUTION INTRAVENOUS; SUBCUTANEOUS at 12:33

## 2024-08-22 RX ADMIN — WARFARIN SODIUM 6 MG: 3 TABLET ORAL at 17:52

## 2024-08-22 RX ADMIN — DEXTROSE MONOHYDRATE 50 ML: 25 INJECTION, SOLUTION INTRAVENOUS at 02:02

## 2024-08-22 RX ADMIN — ASPIRIN 81 MG: 81 TABLET, COATED ORAL at 08:55

## 2024-08-22 RX ADMIN — METFORMIN HYDROCHLORIDE 1000 MG: 500 TABLET, FILM COATED ORAL at 17:52

## 2024-08-22 RX ADMIN — METFORMIN HYDROCHLORIDE 1000 MG: 500 TABLET, FILM COATED ORAL at 13:16

## 2024-08-22 RX ADMIN — FUROSEMIDE 80 MG: 20 TABLET ORAL at 22:27

## 2024-08-22 RX ADMIN — GABAPENTIN 600 MG: 300 CAPSULE ORAL at 20:37

## 2024-08-22 RX ADMIN — ASPIRIN 81 MG CHEWABLE TABLET 324 MG: 81 TABLET CHEWABLE at 04:31

## 2024-08-22 RX ADMIN — TAMSULOSIN HYDROCHLORIDE 0.4 MG: 0.4 CAPSULE ORAL at 12:24

## 2024-08-22 RX ADMIN — INSULIN ASPART 1 UNITS: 100 INJECTION, SOLUTION INTRAVENOUS; SUBCUTANEOUS at 17:58

## 2024-08-22 RX ADMIN — FUROSEMIDE 80 MG: 20 TABLET ORAL at 12:24

## 2024-08-22 RX ADMIN — PRAMIPEXOLE DIHYDROCHLORIDE 1 MG: 1 TABLET ORAL at 20:38

## 2024-08-22 RX ADMIN — CARVEDILOL 3.12 MG: 3.12 TABLET, FILM COATED ORAL at 17:51

## 2024-08-22 RX ADMIN — OXYMETAZOLINE HYDROCHLORIDE 2 SPRAY: 0.05 SPRAY NASAL at 23:23

## 2024-08-22 RX ADMIN — PRAMIPEXOLE DIHYDROCHLORIDE 0.5 MG: 0.5 TABLET ORAL at 22:26

## 2024-08-22 RX ADMIN — SPIRONOLACTONE 25 MG: 25 TABLET ORAL at 12:23

## 2024-08-22 RX ADMIN — INSULIN ASPART 12 UNITS: 100 INJECTION, SUSPENSION SUBCUTANEOUS at 12:30

## 2024-08-22 RX ADMIN — ROSUVASTATIN 20 MG: 10 TABLET, FILM COATED ORAL at 20:38

## 2024-08-22 RX ADMIN — ACETAMINOPHEN 650 MG: 325 TABLET ORAL at 05:58

## 2024-08-22 ASSESSMENT — ACTIVITIES OF DAILY LIVING (ADL)
ADLS_ACUITY_SCORE: 34
ADLS_ACUITY_SCORE: 43
ADLS_ACUITY_SCORE: 34
ADLS_ACUITY_SCORE: 34
ADLS_ACUITY_SCORE: 38
ADLS_ACUITY_SCORE: 41
ADLS_ACUITY_SCORE: 34
ADLS_ACUITY_SCORE: 38
DEPENDENT_IADLS:: INDEPENDENT
ADLS_ACUITY_SCORE: 34
ADLS_ACUITY_SCORE: 38
ADLS_ACUITY_SCORE: 41
ADLS_ACUITY_SCORE: 34
ADLS_ACUITY_SCORE: 34
ADLS_ACUITY_SCORE: 38
ADLS_ACUITY_SCORE: 34
ADLS_ACUITY_SCORE: 41
ADLS_ACUITY_SCORE: 34
ADLS_ACUITY_SCORE: 34
ADLS_ACUITY_SCORE: 38
ADLS_ACUITY_SCORE: 34
ADLS_ACUITY_SCORE: 38

## 2024-08-22 ASSESSMENT — COLUMBIA-SUICIDE SEVERITY RATING SCALE - C-SSRS
2. HAVE YOU ACTUALLY HAD ANY THOUGHTS OF KILLING YOURSELF IN THE PAST MONTH?: NO
1. IN THE PAST MONTH, HAVE YOU WISHED YOU WERE DEAD OR WISHED YOU COULD GO TO SLEEP AND NOT WAKE UP?: NO
6. HAVE YOU EVER DONE ANYTHING, STARTED TO DO ANYTHING, OR PREPARED TO DO ANYTHING TO END YOUR LIFE?: NO

## 2024-08-22 NOTE — CONSULTS
[facility] Nurse Inpatient Assessment     Consulted for: JAH    Summary: Pt states he fell down a small step (three steps and he thought there was only two) on July 3.    Patient History (according to provider note(s):    Assessment & Plan  Pee Ayala is a 84 year old male admitted on 8/22/2024.      84-year-old male with past medical history of type II DM, HFpEF, permanent A-fib, CAD s/p CABG 2014, hypertension hyperlipidemia presents with syncope        Severe hypoglycemia  Syncope  Found unresponsive in the bathroom, blood sugar was 27 when found by EMS.  Likely secondary to inappropriate timing of insulin(70/30 insulin), took it 2 hours after having dinner  Blood sugar improving after D50 at ED and oral intake.    -Will hold PTA insulin and metformin.  Hypoglycemia protocol, glucose checks every 2 hours for now as blood sugar has been relatively stable.    Holding PTA metformin and insulin. Consider discharging on CGM    Assessment:    Skin Injury Location: LUE    Last photo: 8/22  Skin injury due to: Skin tear  Skin history and plan of care: Pt states he has been using Neosporin at home  Affected area:      Skin assessment:  Well healing areas of 6 week old skin tear sites - 2 small open areas noted covered with dried drainage; otherwise pink/red areas are new intact epithelial tissue     Color: normal and consistent with surrounding tissue, pink, and red     Temperature  normal      Drainage: none .      Color: none      Odor: none  Pain: denies   Pain interventions prior to dressing change: patient tolerated well and slow and gentle cares   Treatment goal: Increase moisture  and Protection  STATUS: initial assessment  Supplies ordered: supplies stored on unit      Treatment Plan:   LUE - Cleanse daily (shower OK); gently dry and then apply thin layer of Sween Cream (pink and white tube).   May use more frequently than daily if desired.    Orders: Written    RECOMMEND  PRIMARY TEAM ORDER: None, at this time  Education provided: plan of care  Discussed plan of care with: Patient  [facility] nurse follow-up plan: signing off  Notify WOC if wound(s) deteriorate.  Nursing to notify the Provider(s) and re-consult the WO Nurse if new skin concern.    DATA:     Current support surface: Standard  Standard Isoflex gel  Containment of urine/stool: Incontinence Protocol  BMI: Body mass index is 29.05 kg/m .   Active diet order: Orders Placed This Encounter      Combination Diet Moderate Consistent Carb (60 g CHO per Meal) Diet; Low Saturated Fat Na <2400mg Diet, No Caffeine Diet     Output: No intake/output data recorded.     Labs:   Recent Labs   Lab 08/22/24  0123   HGB 12.9*   INR 1.75*   WBC 8.3     Pressure injury risk assessment:   Sensory Perception: 3-->slightly limited  Moisture: 3-->occasionally moist  Activity: 3-->walks occasionally  Mobility: 3-->slightly limited  Nutrition: 3-->adequate  Friction and Shear: 3-->no apparent problem  Brian Score: 18    Viviana Lizama, MSN RN CWOCN  Pager no longer is use, please contact through [employer] group: [facility] [employer] [facility]

## 2024-08-22 NOTE — PROGRESS NOTES
[facility]    Medicine Progress Note - Hospitalist Service    Date of Admission:  8/22/2024    Assessment & Plan   85 yo M with h/o DM2, HLD, HTN, GERD, CAD s/p CABG, TAVR, PVD, HFpEF, frequent falls, and ALMEIDA who was admitted with syncope likely due to severe hypoglycemia.  Patient took his insulin several hours after eating and didn't eat as much as usual.  Troponin has trended up, awaiting cardiology opinion.    Principal Problem:    Syncope, unspecified syncope type    Severe Hypoglycemia    Elevated troponin  Active Problems:    Diabetic polyneuropathy (H)    Mixed hyperlipidemia    HTN (hypertension)    Nonalcoholic steatohepatitis    Esophageal reflux    Type 2 diabetes, HbA1C goal < 8% (H)    Chronic anticoagulation    CAD (coronary artery disease), 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 and removal L atrial appendage 8=809-8    Chronic atrial fibrillation (H)    Aortic stenosis    Falls frequently    S/P TAVR (transcatheter aortic valve replacement)    Peripheral arterial disease (H24)    Chronic heart failure with preserved ejection fraction (H)               Observation Goals: -diagnostic tests and consults completed and resulted, -vital signs normal or at patient baseline, Nurse to notify provider when observation goals have been met and patient is ready for discharge.  Diet: Combination Diet Moderate Consistent Carb (60 g CHO per Meal) Diet; Low Saturated Fat Na <2400mg Diet, No Caffeine Diet    DVT Prophylaxis: Warfarin  Silva Catheter: Not present  Lines: None     Cardiac Monitoring: ACTIVE order. Indication: AMI (NSTEMI/ STEMI) (48 hours)  Code Status: Full Code      Clinically Significant Risk Factors Present on Admission               # Drug Induced Coagulation Defect: home medication list includes an anticoagulant medication  # Drug Induced Platelet Defect: home medication list includes an antiplatelet medication   # Hypertension: Noted on problem list        #  "DMII: A1C = 8.5 % (Ref range: 0.0 - 5.6 %) within past 6 months    # Overweight: Estimated body mass index is 29.05 kg/m  as calculated from the following:    Height as of this encounter: 1.6 m (5' 3\").    Weight as of this encounter: 74.4 kg (164 lb).       # Financial/Environmental Concerns: none   # History of CABG: noted on surgical history             Disposition Plan     Medically Ready for Discharge: Anticipated Tomorrow             Rafael Emmanuel MD  Hospitalist Service  [facility]  Securely message with [employer] (more info)  Text page via [employer] Paging/Directory   ______________________________________________________________________    Interval History   No chest pain, still weak    Physical Exam   Vital Signs: Temp: 98.1  F (36.7  C) Temp src: Oral BP: 112/58 Pulse: 65   Resp: 16 SpO2: 98 % O2 Device: None (Room air)    Weight: 164 lbs 0 oz    General Appearance: Older M in NAD  Respiratory:  rhonchi bilaterally  Cardiovascular: RRR S1S2  GI: +BS, soft, NT  Skin: multiple wounds on LUE from fall, see WOC note  Neuro: Alert, generally nonfocal on motor and sensory testing      Medical Decision Making       35 MINUTES SPENT BY ME on the date of service doing chart review, history, exam, documentation & further activities per the note.      Data     I have personally reviewed the following data over the past 24 hrs:    8.3  \   12.9 (L)   / 170     140 104 17.6 /  224 (H)   3.6 26 0.94 \     Trop: 172 (HH) BNP: N/A     INR:  1.75 (H) PTT:  N/A   D-dimer:  N/A Fibrinogen:  N/A       Imaging results reviewed over the past 24 hrs:   Recent Results (from the past 24 hour(s))   XR Chest Port 1 View    Narrative    EXAM: XR CHEST PORT 1 VIEW  LOCATION: [facility]  DATE: 8/22/2024    INDICATION: Syncope  COMPARISON: 12/21/2023      Impression    IMPRESSION:   1.  Endovascular aortic valve replacement. Sternotomy. Stable left lateral pleural thickening. Moderate hiatal " hernia. Heart size is normal. Normal pulmonary vascularity. Lungs are clear. Mild elevation right hemidiaphragm.

## 2024-08-22 NOTE — ED NOTES
Pt would like for writer to update his wife. Writer attempted to call pt's wife Fay, [facility] answer[facility]

## 2024-08-22 NOTE — PROGRESS NOTES
Admission Short Stay OBS RM 11  Admitted from: ED  Via: stretcher   Accompanied by: ED staff  Belongings: Placed in closet; valuables sent home with family.   Admission paperwork: complete.   Teaching: Call don't fall, use of console, meal times, visiting hours, orientation to unit, when to call for the RN (angina/sob/dizzyness, etc.), and stressed the importance of safety.   Access: PIV   Telemetry: Not placed on, pt is allergic to the monitor sticker, Provider aware  Ht./Wt.: complete

## 2024-08-22 NOTE — PHARMACY-ANTICOAGULATION SERVICE
Clinical Pharmacy - Warfarin Dosing Consult     Pharmacy has been consulted to manage this patient s warfarin therapy.  Indication: Atrial Fibrillation  Therapy Goal: INR 2-3  OP Anticoag Clinic: [facility]  Warfarin Prior to Admission: Yes  Warfarin PTA Regimen: 4 mg on Monday and Thursday, 6 mg all other days.  Significant drug interactions: No  Recent documented change in oral intake/nutrition: No  Dose Comments: 6 mg booster dose today due to lower than therapeutic INR    INR   Date Value Ref Range Status   08/22/2024 1.75 (H) 0.85 - 1.15 Final     INR Point of Care   Date Value Ref Range Status   08/15/2024 1.7 (A) 0.9 - 1.1 Final       Recommend warfarin 6 mg today.  Pharmacy will monitor Pee Ayala daily and order warfarin doses to achieve specified goal.      Please contact pharmacy as soon as possible if the warfarin needs to be held for a procedure or if the warfarin goals change.

## 2024-08-22 NOTE — ED TRIAGE NOTES
Pt arrives via EMS from home. EMS reports that the pt was found by family in the bathroom unresponsive and EMS was called. On EMS arrival, BG check found to be 27. EMS placed IV to left lower arm and administer some D10 which then pt woke up. 's systolic and HR 90's.    BG check on arrival to ER is 90.     Triage Assessment (Adult)       Row Name 08/22/24 0106          Triage Assessment    Airway WDL WDL        Respiratory WDL    Respiratory WDL WDL        Skin Circulation/Temperature WDL    Skin Circulation/Temperature WDL WDL        Cardiac WDL    Cardiac WDL WDL        Cognitive/Neuro/Behavioral WDL    Cognitive/Neuro/Behavioral WDL WDL

## 2024-08-22 NOTE — PHARMACY-ADMISSION MEDICATION HISTORY
Pharmacist Admission Medication History    Admission medication history is complete. The information provided in this note is only as accurate as the sources available at the time of the update.    Information Source(s): Patient and CareEverywhere/SureScripts via in-person    Pertinent Information: briefly re-interviewed patient to clarify duplicate gabapentin entries (1200 mg Q evening vs 600 mg daily). Patient reports taking 1 tablet (600 mg) Q evening around 7 PM.    Changes made to PTA medication list:  Added: None  Deleted: None  Changed: gabapentin (see above)    Allergies reviewed with patient and updates made in EHR: yes    Medication History Completed By: Nicollette McMann, [facility] 8/22/2024 11:05 AM    PTA Med List   Medication Sig Last Dose    acetaminophen (TYLENOL) 500 MG tablet Take 1 tablet (500 mg) by mouth 2 times daily as needed for mild pain Unknown at prnn    aspirin 81 MG EC tablet Take 1 tablet (81 mg) by mouth daily 8/21/2024 at am    carvedilol (COREG) 3.125 MG tablet Take 1 tablet (3.125 mg) by mouth 2 times daily (with meals) 8/21/2024 at pm    Continuous Blood Gluc Sensor (FREESTYLE DOMI 2 SENSOR) MISC Change every 14 days. Unknown    Continuous Glucose  (FREESTYLE DOMI 2 READER) DOROTEO USE TO READ BLOOD SUGARS AS PER 'S INSTRUCTIONS Unknown    furosemide (LASIX) 40 MG tablet Take 80 mg by mouth 2 times daily. 8/21/2024 at pm    gabapentin (NEURONTIN) 600 MG tablet Take 600 mg by mouth every evening. 8/21/2024 at pm    insulin aspart prot & aspart (NOVOLOG MIX 70/30 PEN) (70-30) 100 UNIT/ML pen Inject subcutaneously 20 units in AM with breakfast, 12 units with lunch, and 15 units in PM with dinner. 8/21/2024 at pm    metFORMIN (GLUCOPHAGE) 500 MG tablet TAKE 2 TABLETS BY MOUTH TWICE  DAILY WITH MEALS 8/21/2024    Multiple Vitamins-Minerals (PRESERVISION AREDS PO) Take 1 tablet by mouth 2 times daily 8/21/2024    pramipexole (MIRAPEX) 0.5 MG tablet Take 1 mg by mouth every  evening. 8/21/2024 at pm    pramipexole (MIRAPEX) 0.5 MG tablet Take 0.5 mg by mouth at bedtime. 8/21/2024 at hs    rosuvastatin (CRESTOR) 20 MG tablet Take 20 mg by mouth every evening 8/21/2024 at pm    spironolactone (ALDACTONE) 25 MG tablet Take 1 tablet (25 mg) by mouth daily 8/21/2024 at am    tamsulosin (FLOMAX) 0.4 MG capsule Take 0.4 mg by mouth every morning 8/21/2024 at am    warfarin ANTICOAGULANT (COUMADIN) 4 MG tablet Take 6 mg by mouth five times a week. Take one and half tablet (6 mg) on Sunday, Tuesday, Wednesday, Friday and Saturday 8/21/2024 at am    warfarin ANTICOAGULANT (COUMADIN) 4 MG tablet Take 4 mg by mouth twice a week. Take one tablet (4 mg) on Monday and Thursday 8/19/2024 at am

## 2024-08-22 NOTE — CONSULTS
Care Management Initial Consult    General Information  Assessment completed with: Patient, pt  Type of CM/SW Visit: Initial Assessment    Primary Care Provider verified and updated as needed: Yes   Readmission within the last 30 days: no previous admission in last 30 days      Reason for Consult: discharge planning, length of stay  Advance Care Planning: Advance Care Planning Reviewed: verified with patient, present on chart     General Information Comments: lives w/spouse and no svcs, has his Toes cared for but no other svcs. family can transport.    Communication Assessment  Patient's communication style: spoken language (English or Bilingual)    Hearing Difficulty or Deaf: yes   Wear Glasses or Blind: yes    Cognitive  Cognitive/Neuro/Behavioral: WDL                      Living Environment:   People in home: spouse, child(hayes), adult     Current living Arrangements: house      Able to return to prior arrangements: yes       Family/Social Support:  Care provided by: self, spouse/significant other  Provides care for: no one  Marital Status:   Wife, Children          Description of Support System: Supportive, Involved    Support Assessment: Adequate family and caregiver support, Adequate social supports    Current Resources:   Patient receiving home care services: No     Community Resources: None  Equipment currently used at home: walker, rolling  Supplies currently used at home: Hearing Aid Batteries, Diabetic Supplies, Other    Employment/Financial:  Employment Status:          Financial Concerns: none   Referral to Financial Worker: No       Does the patient's insurance plan have a 3 day qualifying hospital stay waiver?  Yes     Which insurance plan 3 day waiver is available? Alternative insurance waiver    Will the waiver be used for post-acute placement? Undetermined at this time    Lifestyle & Psychosocial Needs:  Social Determinants of Health     Food Insecurity: Low Risk  (8/22/2024)    Food  Insecurity     Within the past 12 months, did you worry that your food would run out before you got money to buy more?: No     Within the past 12 months, did the food you bought just not last and you didn t have money to get more?: No   Depression: Not at risk (7/16/2024)    PHQ-2     PHQ-2 Score: 1   Housing Stability: High Risk (8/22/2024)    Housing Stability     Do you have housing? : No     Are you worried about losing your housing?: No   Tobacco Use: Medium Risk (8/15/2024)    Patient History     Smoking Tobacco Use: Former     Smokeless Tobacco Use: Never     Passive Exposure: Never   Financial Resource Strain: Low Risk  (8/22/2024)    Financial Resource Strain     Within the past 12 months, have you or your family members you live with been unable to get utilities (heat, electricity) when it was really needed?: No   Alcohol Use: Not on file   Transportation Needs: Low Risk  (8/22/2024)    Transportation Needs     Within the past 12 months, has lack of transportation kept you from medical appointments, getting your medicines, non-medical meetings or appointments, work, or from getting things that you need?: No   Physical Activity: Not on file   Interpersonal Safety: Low Risk  (10/23/2023)    Interpersonal Safety     Do you feel physically and emotionally safe where you currently live?: Yes     Within the past 12 months, have you been hit, slapped, kicked or otherwise physically hurt by someone?: No     Within the past 12 months, have you been humiliated or emotionally abused in other ways by your partner or ex-partner?: No   Stress: Not on file   Social Connections: Not on file   Health Literacy: Not on file       Functional Status:  Prior to admission patient needed assistance:   Dependent ADLs:: Ambulation-cane, Ambulation-walker, Independent  Dependent IADLs:: Independent  Assesssment of Functional Status: Not at baseline with ADL Functioning    Mental Health Status:  Mental Health Status: No Current  Concerns       Chemical Dependency Status:                Values/Beliefs:  Spiritual, Cultural Beliefs, Protestant Practices, Values that affect care:                 Additional Information:  Assessed, lives w/spouse and no svcs, has his Toes cared for but no other svcs. family can transport. Discussed MOON. CM to follow for discharge recs.    Yordy Swann RN

## 2024-08-22 NOTE — ED NOTES
Bed: JNED-03  Expected date: 8/22/24  Expected time: 12:51 AM  Means of arrival: Ambulance  Comments:  WB- 84M diabetic, was unresponsive w/BGL 27 - gave meds - fixed

## 2024-08-22 NOTE — ED PROVIDER NOTES
EMERGENCY DEPARTMENT ENCOUNTER      NAME: Pee Ayala  AGE: 84 year old male  YOB: 1940  MRN: [id number]  EVALUATION DATE & TIME: 2024 12:59 AM    PCP: Debbie Lincoln    ED PROVIDER: Elvis Morel D.O.      Chief Complaint   Patient presents with    Hypoglycemia       FINAL IMPRESSION:  1. Hypoglycemia    2. Syncope, unspecified syncope type    3. Elevated troponin        ED COURSE & MEDICAL DECISION MAKIN:58 AM I met with the patient to gather history and to perform my initial exam. I discussed the plan for care while in the Emergency Department. His BG was in the 90s upon arrival to this ER.  1:27 AM Per RN, family arrived to the ER and reports they found the patient sitting on the toilet, unconscious, and leaning on the wall. Family state they then placed the patient on the floor.  2:00 AM Lab called with critical lab result. Patient's glucose is 44. I ordered D50.  3:57 AM Lab called with critical lab result. Patient's troponin went from 45 to 115. Will consult cardiology.  4:06 AM Spoke with Dr. Benavidez, Cardiology, regarding patient plan of care. He recommends to not start the heparin drip, trend his enzymes, and bring him in for inpatient care.  4:13 AM Spoke with Dr. Sewell, Hospitalist, regarding plan for admission. Patient is accepted for admission.         Pertinent Labs & Imaging studies reviewed. (See chart for details)  84 year old male presents to the Emergency Department for evaluation of syncopal episode at home.  Patient was found slumped over on the toilet, did not fall and hit his head.  When EMS arrived he was noted to be severely hypoglycemic.  Initial concern was for hypoglycemia being the underlying cause of his symptoms.  He was not tachycardic or hypoxic to suggest PE, and is otherwise PERC negative other than age.  EKG did not show any evidence of ischemia, and he was in atrial fibrillation, which is not new for the patient.  There is no obvious  evidence of infection on this patient is not known diabetic but does use both metformin and insulin for his diabetes, and I believe that is the underlying cause of the patient's hypoglycemia tonight.  Unfortunately his troponin initially came back at 45 with a second troponin coming back at 115.  This I am concerned for the potential for cardiac involvement.  I consulted with cardiology, and they believe it be prudent to trend his troponins and get a cardiac echo, but did not believe he needed to be started on a heparin drip at this time as they believe it is more likely consistent with demand ischemia.  At this time, will admit to the hospitalist service for further management.    Medical Decision Making  Obtained supplemental history:Supplemental history obtained?: Documented in chart and EMS  Reviewed external records: External records reviewed?: Documented in chart  Care impacted by chronic illness:Diabetes, Heart Disease, Hyperlipidemia, and Other: PAD and chronic HF  Care significantly affected by social determinants of health:Access to Affordable Health Care  Did you consider but not order tests?: Work up considered but not performed and documented in chart, if applicable  Did you interpret images independently?: Independent interpretation of ECG and images noted in documentation, when applicable.  Consultation discussion with other provider:Did you involve another provider (consultant, MH, pharmacy, etc.)?: I discussed the care with another health care provider, see documentation for details.  Admit.      At the conclusion of the encounter I discussed the results of all of the tests and the disposition. The questions were answered. The patient or family acknowledged understanding and was agreeable with the care plan.        HPI    Patient information was obtained from: EMS, patient, patient's family    Use of : N/A       Pee Ayala is a 84 year old male who presents with hypoglycemia and  "subjective syncopal episode.    Per EMS, reports patient is coming from home and was found in the bathroom floor by his family. Patient was breathing fast. EMS notes it sounded like the patient didn't fall but instead was lowered onto the ground by his family. He is a Type II diabetic. His BS was 27 with EMS. He was then administered some D10 via IV to his left lower arm and then woke up and was talking afterwards with EMS. His systolic BP was in the 160s and his HR was in the 90s. His vitals were stable. He is on insulin and metformin. He is also on Coumadin. He lives with his wife and his son. His wife is on her way to the ER.    Patient reports he doesn't recall how he ended up on the floor in the bathroom. He denies chest pain or shortness of breath. Patient endorses feeling lightheaded. No nausea or abdominal pain. He has some neck pain and notes he feels like his \"head is very far back\". No other reported complaints or concerns at this time.      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     Encounter for long-term (current) use of insulin (H) 08/11/2016    Esophageal reflux 11/18/2010    History of angina     HTN (hypertension) 06/30/2009     (Problem list name updated by automated process. Provider to review and confirm.)    Impotence of organic " origin     Mixed hyperlipidemia 04/25/2007 April 25, 2007 restarted Zocor today, recheck in 3 months.  August 23, 2007 LDL at 101 with 40 mg, will increase to 80 mg. Recheck  In 3 months.     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     Walking troubles        PAST SURGICAL HISTORY:  Past Surgical History:   Procedure Laterality Date    BYPASS GRAFT ARTERY CORONARY N/A 09/10/2014    Procedure: BYPASS GRAFT ARTERY CORONARY;  Surgeon: Bharathi Caraballo MD;  Location: [facility] OR    BYPASS GRAFT ARTERY CORONARY  01/01/2016    COLONOSCOPY  01/14/2004    CORONARY ANGIOGRAPHY ADULT ORDER      CORONARY ARTERY BYPASS      CV CORONARY ANGIOGRAM N/A 04/04/2019    Procedure: Coronary Angiogram;  Surgeon: Dominik Vega MD;  Location: [facility] Cath Lab;  Service: Cardiology    CV CORONARY ANGIOGRAM N/A 01/06/2021    Procedure: Coronary Angiogram;  Surgeon: Dominik Vega MD;  Location: [facility] Cardiac Cath Lab;  Service: Cardiology    CV CORONARY ANGIOGRAM N/A 12/22/2023    Procedure: Coronary Angiogram;  Surgeon: Bahman Lenz MD;  Location: [facility] CATH LAB CV    CV LEFT HEART CATHETERIZATION WITHOUT LEFT VENTRICULOGRAM Left 04/04/2019    Procedure: Left Heart Catheterization Without Left Ventriculogram;  Surgeon: Dominik Vega MD;  Location: [facility] Cath Lab;  Service: Cardiology    CV LEFT HEART CATHETERIZATION WITHOUT LEFT VENTRICULOGRAM Left 01/06/2021    Procedure: Left Heart Catheterization Without Left Ventriculogram;   Surgeon: Dominik Vega MD;  Location: [facility] Cardiac Cath Lab;  Service: Cardiology    CV RIGHT HEART CATHETERIZATION Right 04/04/2019    Procedure: Right Heart Catheterization;  Surgeon: Dominik Vega MD;  Location: [facility] Cath Lab;  Service: Cardiology    CV TRANSCATHETER AORTIC VALVE REPLACEMENT N/A 01/12/2021    Procedure: Right transfemoral transcatheter aortic valve replacement using Magdaleno Dominick 3 size 29mm.  Transthoracic echocardiogram;  Surgeon: Jo Romano MD;  Location: [facility] CARDIAC CATH LAB    ESOPHAGOSCOPY, GASTROSCOPY, DUODENOSCOPY (EGD), COMBINED N/A 01/13/2016    Procedure: COMBINED ESOPHAGOSCOPY, GASTROSCOPY, DUODENOSCOPY (EGD);  Surgeon: Rk Srivastava MD;  Location: [facility] FEMORAL CANNULIZATION WITH OPEN STANDBY REPAIR AORTIC VALVE N/A 01/12/2021    Procedure: Cardiopulmonary Bypass standby;  Surgeon: Jefferson Sandy MD;  Location: [facility] CARDIAC CATH LAB    HEART CATH, ANGIOPLASTY      IR LOWER EXTREMITY ANGIOGRAM LEFT  12/07/2021    LAPAROSCOPIC CHOLECYSTECTOMY  10/01/2009    MAZE PROCEDURE N/A 09/10/2014    Procedure: MAZE PROCEDURE;  Surgeon: Bharathi Caraballo MD;  Location:  OR    MOHS MICROGRAPHIC PROCEDURE      OPEN REDUCTION INTERNAL FIXATION HIP BIPOLAR Left 04/09/2022    Procedure: HEMIARTHROPLASTY, HIP, BIPOLAR, OPEN REDUCTION INTERNAL FIXATION OF GREATER TROCHANTER;  Surgeon: Jd Larose MD;  Location: [facility] OR    ORTHOPEDIC SURGERY      surgery for fx  Left forearm    OTHER SURGICAL HISTORY Left 01/01/2015    forearm sugery    STENT, CORONARY, HUMA  02/01/2016    VASECTOMY           CURRENT MEDICATIONS:    No current facility-administered medications for this encounter.     Current Outpatient Medications   Medication Sig Dispense Refill    acetaminophen (TYLENOL) 500 MG tablet Take 1 tablet (500 mg) by mouth 2 times daily as needed for mild pain      aspirin 81 MG EC tablet Take 1 tablet (81 mg) by  mouth daily 90 tablet 0    carvedilol (COREG) 3.125 MG tablet Take 1 tablet (3.125 mg) by mouth 2 times daily (with meals) 180 tablet 3    cefdinir (OMNICEF) 300 MG capsule Take 300 mg by mouth (Patient not taking: Reported on 8/15/2024)      cephALEXin (KEFLEX) 500 MG capsule Take 1 capsule (500 mg) by mouth 2 times daily (Patient not taking: Reported on 8/15/2024) 20 capsule 0    Continuous Blood Gluc Sensor (FREESTYLE DOMI 2 SENSOR) MISC Change every 14 days. 6 each 3    Continuous Glucose  (FREESTYLE DOMI 2 READER) DOROTEO USE TO READ BLOOD SUGARS AS PER 'S INSTRUCTIONS 1 each 0    furosemide (LASIX) 40 MG tablet Please take 80 mg (2 tablets) in the morning and 40 mg (1 tablet) in the afternoon. (Patient taking differently: Take 40 mg by mouth 2 times daily Please take 80 mg (2 tablets) in the morning and 40 mg (1 tablet) in the afternoon.) 270 tablet 3    gabapentin (NEURONTIN) 600 MG tablet Take 1 tablet (600 mg) by mouth 2 times daily (Patient taking differently: Take by mouth 2 times daily Take one tablet in the morning and two tablets in the evening.) 180 tablet 1    insulin aspart prot & aspart (NOVOLOG MIX 70/30 PEN) (70-30) 100 UNIT/ML pen Inject subcutaneously 20 units in AM with breakfast, 12 units with lunch, and 15 units in PM with dinner. 15 mL 5    metFORMIN (GLUCOPHAGE) 500 MG tablet TAKE 2 TABLETS BY MOUTH TWICE  DAILY WITH MEALS 360 tablet 0    Multiple Vitamins-Minerals (PRESERVISION AREDS PO) Take 1 tablet by mouth 2 times daily      pramipexole (MIRAPEX) 0.5 MG tablet Take 2 tablets in the afternoon and 1 tablet at bedtime (Patient taking differently: Take 1 mg by mouth 2 times daily Take 2 tablets in the afternoon and at bedtime) 180 tablet 1    rosuvastatin (CRESTOR) 20 MG tablet Take 20 mg by mouth every evening      spironolactone (ALDACTONE) 25 MG tablet Take 1 tablet (25 mg) by mouth daily 90 tablet 3    tamsulosin (FLOMAX) 0.4 MG capsule Take 0.4 mg by mouth every  morning      warfarin ANTICOAGULANT (COUMADIN) 4 MG tablet Takes 1 tablet (4mg) 5 days of the week on Sun/Mon/Wed/Thurs/Sat, by mouth as directed.  Adjust dose based on INR results. 70 tablet 1    warfarin ANTICOAGULANT (COUMADIN) 5 MG tablet Take 1 tablet (5mg) on  and Wednesday, by mouth as directed.  Adjust dose based on INR results. 40 tablet 1         ALLERGIES:  Allergies   Allergen Reactions    Oxycodone Itching and Rash    Amlodipine Dizziness     Dizziness and dry heaves    Lisinopril Cough    Adhesive Tape Itching and Rash       FAMILY HISTORY:  Family History   Problem Relation Age of Onset    Diabetes Mother     Hypertension Father     Cerebrovascular Disease Father     Diabetes Maternal Grandmother     Breast Cancer No family hx of     Cancer - colorectal No family hx of     Prostate Cancer No family hx of     C.A.D. No family hx of     Diabetes Type 2  Mother         58 ,  from anesthesia complication.    Heart Disease Father         86  from stroke    No Known Problems Brother         60 years of age.       SOCIAL HISTORY:  Social History     Socioeconomic History    Marital status:      Spouse name: Fay Ayala   Occupational History     Employer: RETIRED   Tobacco Use    Smoking status: Former     Current packs/day: 0.00     Types: Cigarettes     Quit date: 1998     Years since quittin.6     Passive exposure: Never    Smokeless tobacco: Never   Vaping Use    Vaping status: Never Used   Substance and Sexual Activity    Alcohol use: Yes     Alcohol/week: 0.0 standard drinks of alcohol     Comment: Alcoholic Drinks/day: very rare    Drug use: No    Sexual activity: Never     Birth control/protection: None   Other Topics Concern    Parent/sibling w/ CABG, MI or angioplasty before 65F 55M? No   Social History Narrative     and lives with life.  Has adult children from previous relationship. ( Blended family).  Has a dog - Vincent Gil MD  1/3/2019                 Medication Instructions:    Patient is to take all scheduled medications on the day of surgery EXCEPT for modifications listed below:     - aspirin: cardiology has recommended continuing baby aspirin daily     - warfarin: hold warfarin for 5 days before surgery     - Diuretics: HOLD on the day of surgery.     - Statins: Continue taking on the day of surgery.      - mixed insulin (70/30m 75/25, 50/50): HOLD day of surgery     - metformin: HOLD day of surgery.     - Opioids: Continue without modification     - hold any vitamins or supplements until after surgery.      Social Determinants of Health     Financial Resource Strain: Low Risk  (12/28/2023)    Financial Resource Strain     Within the past 12 months, have you or your family members you live with been unable to get utilities (heat, electricity) when it was really needed?: No   Food Insecurity: Low Risk  (12/28/2023)    Food Insecurity     Within the past 12 months, did you worry that your food would run out before you got money to buy more?: No     Within the past 12 months, did the food you bought just not last and you didn t have money to get more?: No   Transportation Needs: Low Risk  (12/28/2023)    Transportation Needs     Within the past 12 months, has lack of transportation kept you from medical appointments, getting your medicines, non-medical meetings or appointments, work, or from getting things that you need?: No   Interpersonal Safety: Low Risk  (10/23/2023)    Interpersonal Safety     Do you feel physically and emotionally safe where you currently live?: Yes     Within the past 12 months, have you been hit, slapped, kicked or otherwise physically hurt by someone?: No     Within the past 12 months, have you been humiliated or emotionally abused in other ways by your partner or ex-partner?: No   Housing Stability: Low Risk  (12/28/2023)    Housing Stability     Do you have housing? : Yes     Are you worried about losing your housing?:  "No       VITALS:  Patient Vitals for the past 24 hrs:   BP Temp Temp src Pulse Resp SpO2 Height Weight   08/22/24 0415 -- -- -- 69 21 98 % -- --   08/22/24 0400 126/67 -- -- 70 21 99 % -- --   08/22/24 0345 -- -- -- 70 22 99 % -- --   08/22/24 0330 125/64 -- -- 69 21 98 % -- --   08/22/24 0315 124/65 -- -- 70 19 98 % -- --   08/22/24 0300 116/70 -- -- 71 30 99 % -- --   08/22/24 0245 121/57 -- -- 71 25 99 % -- --   08/22/24 0230 123/68 -- -- 69 21 99 % -- --   08/22/24 0215 -- -- -- 67 26 99 % -- --   08/22/24 0200 133/50 -- -- 73 29 99 % -- --   08/22/24 0145 108/57 -- -- 70 -- -- -- --   08/22/24 0130 -- -- -- 76 22 -- -- --   08/22/24 0115 -- -- -- 73 (!) 33 97 % -- --   08/22/24 0103 129/75 98.7  F (37.1  C) Oral 77 16 97 % 1.6 m (5' 3\") 74.4 kg (164 lb)       PHYSICAL EXAM    VITAL SIGNS: /67   Pulse 69   Temp 98.7  F (37.1  C) (Oral)   Resp 21   Ht 1.6 m (5' 3\")   Wt 74.4 kg (164 lb)   SpO2 98%   BMI 29.05 kg/m      General Appearance: Well-appearing, well-nourished, no acute distress. Diaphoretic  Head:  Normocephalic, without obvious abnormality, atraumatic  Eyes:  PERRL, conjunctiva/corneas clear, EOM's intact,  ENT:  Lips, mucosa, and tongue normal, membranes are moist without pallor  Neck:  Normal ROM, symmetrical, trachea midline    Chest:  No tenderness or deformity, no crepitus  Cardio:  Irregular rate and rhythm, no murmur, rub or gallop, 2+ pulses symmetric in all extremities   Pulm:  Clear to auscultation bilaterally, respirations unlabored,  Back:  ROM normal, no CVA tenderness, no spinal tenderness, no paraspinal tenderness  Abdomen:  Soft, non-tender, no rebound or guarding.  Musculoskeletal: Full ROM, no edema, no cyanosis, good ROM of major joints  Integument:  Warm, Dry, No erythema, No rash.    Neurologic:  Alert & oriented.  No focal deficits appreciated.    Psychiatric:  Affect normal, Judgment normal, Mood normal.      LABS  Results for orders placed or performed during the " hospital encounter of 08/22/24 (from the past 24 hour(s))   Glucose by meter   Result Value Ref Range    GLUCOSE BY METER POCT 90 70 - 99 mg/dL   [place] Draw    Narrative    The following orders were created for panel order [place] Draw.  Procedure                               Abnormality         Status                     ---------                               -----------         ------                     Extra Blue Top Tube[[id number]]                              Final result               Extra Red Top Tube[[id number]]                               Final result               Extra Green Top (Lithium...[[id number]]                      Final result               Extra Purple Top Tube[[id number]]                            Final result                 Please view results for these tests on the individual orders.   Extra Blue Top Tube   Result Value Ref Range    Hold Specimen JIC    Extra Red Top Tube   Result Value Ref Range    Hold Specimen JIC    Extra Green Top (Lithium Heparin) Tube   Result Value Ref Range    Hold Specimen JIC    Extra Purple Top Tube   Result Value Ref Range    Hold Specimen JIC    CBC with platelets + differential    Narrative    The following orders were created for panel order CBC with platelets + differential.  Procedure                               Abnormality         Status                     ---------                               -----------         ------                     CBC with platelets and d...[[id number]]  Abnormal            Final result                 Please view results for these tests on the individual orders.   Basic metabolic panel   Result Value Ref Range    Sodium 140 135 - 145 mmol/L    Potassium 3.6 3.4 - 5.3 mmol/L    Chloride 104 98 - 107 mmol/L    Carbon Dioxide (CO2) 26 22 - 29 mmol/L    Anion Gap 10 7 - 15 mmol/L    Urea Nitrogen 17.6 8.0 - 23.0 mg/dL    Creatinine 0.94 0.67 - 1.17 mg/dL    GFR Estimate 81 >60 mL/min/1.73m2    Calcium 9.2 8.8 - 10.4 mg/dL     Glucose 44 (LL) 70 - 99 mg/dL   Troponin T, High Sensitivity (now)   Result Value Ref Range    Troponin T, High Sensitivity 45 (H) <=22 ng/L   CBC with platelets and differential   Result Value Ref Range    WBC Count 8.3 4.0 - 11.0 10e3/uL    RBC Count 4.04 (L) 4.40 - 5.90 10e6/uL    Hemoglobin 12.9 (L) 13.3 - 17.7 g/dL    Hematocrit 39.9 (L) 40.0 - 53.0 %    MCV 99 78 - 100 fL    MCH 31.9 26.5 - 33.0 pg    MCHC 32.3 31.5 - 36.5 g/dL    RDW 12.8 10.0 - 15.0 %    Platelet Count 170 150 - 450 10e3/uL    % Neutrophils 65 %    % Lymphocytes 24 %    % Monocytes 9 %    % Eosinophils 2 %    % Basophils 1 %    % Immature Granulocytes 0 %    NRBCs per 100 WBC 0 <1 /100    Absolute Neutrophils 5.4 1.6 - 8.3 10e3/uL    Absolute Lymphocytes 2.0 0.8 - 5.3 10e3/uL    Absolute Monocytes 0.7 0.0 - 1.3 10e3/uL    Absolute Eosinophils 0.2 0.0 - 0.7 10e3/uL    Absolute Basophils 0.0 0.0 - 0.2 10e3/uL    Absolute Immature Granulocytes 0.0 <=0.4 10e3/uL    Absolute NRBCs 0.0 10e3/uL   XR Chest Port 1 View    Narrative    EXAM: XR CHEST PORT 1 VIEW  LOCATION: [facility]  DATE: 8/22/2024    INDICATION: Syncope  COMPARISON: 12/21/2023      Impression    IMPRESSION:   1.  Endovascular aortic valve replacement. Sternotomy. Stable left lateral pleural thickening. Moderate hiatal hernia. Heart size is normal. Normal pulmonary vascularity. Lungs are clear. Mild elevation right hemidiaphragm.     Glucose by meter   Result Value Ref Range    GLUCOSE BY METER POCT 211 (H) 70 - 99 mg/dL   Glucose by meter   Result Value Ref Range    GLUCOSE BY METER POCT 151 (H) 70 - 99 mg/dL   Glucose by meter   Result Value Ref Range    GLUCOSE BY METER POCT 121 (H) 70 - 99 mg/dL   Troponin T, High Sensitivity (now)   Result Value Ref Range    Troponin T, High Sensitivity 115 (HH) <=22 ng/L   Glucose by meter   Result Value Ref Range    GLUCOSE BY METER POCT 137 (H) 70 - 99 mg/dL     *Note: Due to a large number of results and/or  encounters for the requested time period, some results have not been displayed. A complete set of results can be found in Results Review.         RADIOLOGY  XR Chest Port 1 View   Final Result   IMPRESSION:    1.  Endovascular aortic valve replacement. Sternotomy. Stable left lateral pleural thickening. Moderate hiatal hernia. Heart size is normal. Normal pulmonary vascularity. Lungs are clear. Mild elevation right hemidiaphragm.                EKG:    Rate: 76 bpm   Rhythm: Atrial fibrillation  Axis: Normal  Interval: Normal  Conduction: Normal  QRS: Normal  ST: Normal  T-wave: Normal  QT: Not prolonged  Comparison EKG: Improvement of precordial T wave inversions when compared to 21 December 2023  Impression:  No acute ischemic change   I have independently reviewed and interpreted today's EKG, pending Cardiologist read    PROCEDURES:  N/A        MEDICATIONS GIVEN IN THE EMERGENCY:  Medications   dextrose 50 % injection 50 mL (50 mLs Intravenous $Given 8/22/24 0202)   aspirin (ASA) chewable tablet 324 mg (324 mg Oral $Given 8/22/24 0431)       NEW PRESCRIPTIONS STARTED AT TODAY'S ER VISIT  New Prescriptions    No medications on file        I, Mimi Choi, am serving as a scribe to document services personally performed by Elvis Morel D.O., based on my observations and the provider's statements to me.  I, Elvis Morel D.O., attest that Mimi Choi is acting in a scribe capacity, has observed my performance of the services and has documented them in accordance with my direction.     Elvis Moerl D.O.  Emergency Medicine  [facility] EMERGENCY DEPARTMENT  [address] [place] [id number] [phone]  Dept: [phone]       Elvis Morel,   08/22/24 0546

## 2024-08-22 NOTE — ED NOTES
Charge nurse on short stay informed that admit note is in and will be transporting the pt upstairs once we have transport.

## 2024-08-22 NOTE — H&P
[facility]    History and Physical - Hospitalist Service       Date of Admission:  8/22/2024    Assessment & Plan      Pee Ayala is a 84 year old male admitted on 8/22/2024.     84-year-old male with past medical history of type II DM, HFpEF, permanent A-fib, CAD s/p CABG 2014, hypertension hyperlipidemia presents with syncope      Severe hypoglycemia  Syncope  Found unresponsive in the bathroom, blood sugar was 27 when found by EMS.  Likely secondary to inappropriate timing of insulin(70/30 insulin), took it 2 hours after having dinner  Blood sugar improving after D50 at ED and oral intake.    -Will hold PTA insulin and metformin.  Hypoglycemia protocol, glucose checks every 2 hours for now as blood sugar has been relatively stable.    Holding PTA metformin and insulin. Consider discharging on CGM      Elevated troponin  Denies chest pain, but on workup for syncope found to have elevated troponin 45->115 , EKG unremarkable for acute ischemic changes. Likely demand mediated from severe hypoglycemia. ED discussed with cardiology who recommend no anticoagulation for now but admission for further eval. Received aspirin at ED.  Monitor on telemetry. Continue PTA statin, Echo and cardiology consult    Permanent A-fib  Continue PTA carvedilo, pharmacy to dose warfarin    Left upper extremity superficial wounds  WOC consult    HFpEF  Resume home diuretics once verified by pharmacy           Diet:Cardiac    DVT Prophylaxis: Warfarin  Silva Catheter: Not present  Lines: None     Cardiac Monitoring: None  Code Status:  Full code, discussed     Clinically Significant Risk Factors Present on Admission               # Drug Induced Coagulation Defect: home medication list includes an anticoagulant medication  # Drug Induced Platelet Defect: home medication list includes an antiplatelet medication   # Hypertension: Noted on problem list        # DMII: A1C = 8.5 % (Ref range: 0.0 - 5.6 %) within past  "6 months    # Overweight: Estimated body mass index is 29.05 kg/m  as calculated from the following:    Height as of this encounter: 1.6 m (5' 3\").    Weight as of this encounter: 74.4 kg (164 lb).        # History of CABG: noted on surgical history         Gama Sewell MD  Hospitalist Service  [facility]  Securely message with [employer] (more info)  Text page via Particle Paging/Directory     ______________________________________________________________________    Chief Complaint   Syncope    History is obtained from the patient and ER signout     History of Present Illness   Pee Ayala is a 84-year-old male with past medical history of type II DM, HFpEF, permanent A-fib, CAD s/p CABG 2014, hypertension hyperlipidemia brought after a syncopal episode.  Family found him unresponsive sitting on the toilet and leaning onto the wall.  When medics arrived his blood sugar was low at 27.  He woke up once he was started on D10 infusion by EMS.  He does not remember circumstances prior to the event.  Upon further questioning he states that he took his insulin [70/30 insulin) 2 hours after having his dinner as he forgot to take it prior.  No recent dose changes, has been 3 years since started on insulin no prior severe hypoglycemia episodes.  Denies chest pain, SOB, palpitations    Vitals at ED were stable    Initial labs with blood sugar 45, elevated troponin at 45->115, EKG unremarkable for ischmia.    He received D50 insulin at the ED, had crackers, multiple apple juices.  He was admitted for further management for elevated troponin.      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     Encounter for long-term (current) use of insulin (H) 08/11/2016    Esophageal reflux 11/18/2010    History of angina     HTN (hypertension) 06/30/2009     (Problem list name updated by automated process. Provider to review and confirm.)    Impotence of organic origin     Mixed hyperlipidemia 04/25/2007 April 25, 2007 restarted Zocor today, recheck in 3 months.  August 23, 2007 LDL at 101 with 40 mg, will increase to 80 mg. Recheck  In 3 months.     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     Walking troubles        Past Surgical History   Past Surgical History:   Procedure Laterality Date    BYPASS GRAFT ARTERY CORONARY N/A 09/10/2014    Procedure: BYPASS GRAFT ARTERY CORONARY;  Surgeon: Bharathi Caraballo MD;  Location: [facility] OR    BYPASS GRAFT ARTERY CORONARY  01/01/2016    COLONOSCOPY  01/14/2004    CORONARY ANGIOGRAPHY ADULT ORDER      CORONARY ARTERY BYPASS      CV CORONARY ANGIOGRAM N/A 04/04/2019    Procedure: Coronary Angiogram;  Surgeon: Dominik Vega MD;  Location: [facility] Cath Lab;  Service: Cardiology    CV CORONARY ANGIOGRAM N/A 01/06/2021    Procedure: Coronary Angiogram;   Surgeon: Dominik Vega MD;  Location: [facility] Lab;  Service: Cardiology    CV CORONARY ANGIOGRAM N/A 12/22/2023    Procedure: Coronary Angiogram;  Surgeon: Bahman Lenz MD;  Location: [facility] CV    CV LEFT HEART CATHETERIZATION WITHOUT LEFT VENTRICULOGRAM Left 04/04/2019    Procedure: Left Heart Catheterization Without Left Ventriculogram;  Surgeon: Dominik Vega MD;  Location: [facility];  Service: Cardiology    CV LEFT HEART CATHETERIZATION WITHOUT LEFT VENTRICULOGRAM Left 01/06/2021    Procedure: Left Heart Catheterization Without Left Ventriculogram;  Surgeon: Dominik Vega MD;  Location: [facility] Cath Lab;  Service: Cardiology    CV RIGHT HEART CATHETERIZATION Right 04/04/2019    Procedure: Right Heart Catheterization;  Surgeon: Dominik Vega MD;  Location: [facility];  Service: Cardiology    CV TRANSCATHETER AORTIC VALVE REPLACEMENT N/A 01/12/2021    Procedure: Right transfemoral transcatheter aortic valve replacement using Magdaleno Dominick 3 size 29mm.  Transthoracic echocardiogram;  Surgeon: Jo Romano MD;  Location: [facility] CARDIAC CATH LAB    ESOPHAGOSCOPY, GASTROSCOPY, DUODENOSCOPY (EGD), COMBINED N/A 01/13/2016    Procedure: COMBINED ESOPHAGOSCOPY, GASTROSCOPY, DUODENOSCOPY (EGD);  Surgeon: Rk Srivastava MD;  [facility] FEMORAL CANNULIZATION WITH OPEN STANDBY REPAIR AORTIC VALVE N/A 01/12/2021    Procedure: Cardiopulmonary Bypass standby;  Surgeon: Jefferson Sandy MD;  Location: [facility] CARDIAC CATH LAB    HEART CATH, ANGIOPLASTY      IR LOWER EXTREMITY ANGIOGRAM LEFT  12/07/2021    LAPAROSCOPIC CHOLECYSTECTOMY  10/01/2009    MAZE PROCEDURE N/A 09/10/2014    Procedure: MAZE PROCEDURE;  Surgeon: Bharathi Caraballo MD;  Location:  OR    [facility]ED[facility]      OPEN REDUCTION INTERNAL FIXATION HIP BIPOLAR Left 04/09/2022    Procedure: HEMIARTHROPLASTY, HIP,  BIPOLAR, OPEN REDUCTION INTERNAL FIXATION OF GREATER TROCHANTER;  Surgeon: Jd Larose MD;  Location: [facility] OR    ORTHOPEDIC SURGERY      surgery for fx  Left forearm    OTHER SURGICAL HISTORY Left 01/01/2015    forearm sugery    STENT, CORONARY, HUMA  02/01/2016    VASECTOMY         Prior to Admission Medications   Prior to Admission Medications   Prescriptions Last Dose Informant Patient Reported? Taking?   Continuous Blood Gluc Sensor (FREESTYLE DOMI 2 SENSOR) MISC   No No   Sig: Change every 14 days.   Continuous Glucose  (FREESTYLE DOMI 2 READER) DOROTEO   No No   Sig: USE TO READ BLOOD SUGARS AS PER 'S INSTRUCTIONS   Multiple Vitamins-Minerals (PRESERVISION AREDS PO)   Yes No   Sig: Take 1 tablet by mouth 2 times daily   acetaminophen (TYLENOL) 500 MG tablet   No No   Sig: Take 1 tablet (500 mg) by mouth 2 times daily as needed for mild pain   aspirin 81 MG EC tablet   No No   Sig: Take 1 tablet (81 mg) by mouth daily   carvedilol (COREG) 3.125 MG tablet   No No   Sig: Take 1 tablet (3.125 mg) by mouth 2 times daily (with meals)   cefdinir (OMNICEF) 300 MG capsule   Yes No   Sig: Take 300 mg by mouth   Patient not taking: Reported on 8/15/2024   cephALEXin (KEFLEX) 500 MG capsule   No No   Sig: Take 1 capsule (500 mg) by mouth 2 times daily   Patient not taking: Reported on 8/15/2024   furosemide (LASIX) 40 MG tablet   No No   Sig: Please take 80 mg (2 tablets) in the morning and 40 mg (1 tablet) in the afternoon.   Patient taking differently: Take 40 mg by mouth 2 times daily Please take 80 mg (2 tablets) in the morning and 40 mg (1 tablet) in the afternoon.   gabapentin (NEURONTIN) 600 MG tablet   No No   Sig: Take 1 tablet (600 mg) by mouth 2 times daily   Patient taking differently: Take by mouth 2 times daily Take one tablet in the morning and two tablets in the evening.   insulin aspart prot & aspart (NOVOLOG MIX 70/30 PEN) (70-30) 100 UNIT/ML pen   No No   Sig: Inject  subcutaneously 20 units in AM with breakfast, 12 units with lunch, and 15 units in PM with dinner.   metFORMIN (GLUCOPHAGE) 500 MG tablet   No No   Sig: TAKE 2 TABLETS BY MOUTH TWICE  DAILY WITH MEALS   pramipexole (MIRAPEX) 0.5 MG tablet   No No   Sig: Take 2 tablets in the afternoon and 1 tablet at bedtime   Patient taking differently: Take 1 mg by mouth 2 times daily Take 2 tablets in the afternoon and at bedtime   rosuvastatin (CRESTOR) 20 MG tablet   Yes No   Sig: Take 20 mg by mouth every evening   spironolactone (ALDACTONE) 25 MG tablet   No No   Sig: Take 1 tablet (25 mg) by mouth daily   tamsulosin (FLOMAX) 0.4 MG capsule   Yes No   Sig: Take 0.4 mg by mouth every morning   warfarin ANTICOAGULANT (COUMADIN) 4 MG tablet   No No   Sig: Takes 1 tablet (4mg) 5 days of the week on Sun/Mon/Wed/Thurs/Sat, by mouth as directed.  Adjust dose based on INR results.   warfarin ANTICOAGULANT (COUMADIN) 5 MG tablet   No No   Sig: Take 1 tablet (5mg) on Sunday and Wednesday, by mouth as directed.  Adjust dose based on INR results.      Facility-Administered Medications: None        Review of Systems    The 10 point Review of Systems is negative other than noted in the HPI or here.      Physical Exam   Vital Signs: Temp: 98.7  F (37.1  C) Temp src: Oral BP: 126/67 Pulse: 70   Resp: 21 SpO2: 99 % O2 Device: None (Room air)    Weight: 164 lbs 0 oz    General Appearance: Alert oriented  Respiratory: Bilateral clear chest  Cardiovascular: S1-S2, systolic murmur  GI: Soft nontender abdomen  Skin: Ecchymosis and abrasions on scalp [reports from called fall], abrasions on left upper extremity  Msk: Bilateral grade 3 pedal and pretibial edema  Neuro AOx3    Medical Decision Making       Data     I have personally reviewed the following data over the past 24 hrs:    8.3  \   12.9 (L)   / 170     140 104 17.6 /  137 (H)   3.6 26 0.94 \     Trop: 115 (HH) BNP: N/A       Imaging results reviewed over the past 24 hrs:   Recent Results  (from the past 24 hour(s))   XR Chest Port 1 View    Narrative    EXAM: XR CHEST PORT 1 VIEW  LOCATION: [facility]  DATE: 8/22/2024    INDICATION: Syncope  COMPARISON: 12/21/2023      Impression    IMPRESSION:   1.  Endovascular aortic valve replacement. Sternotomy. Stable left lateral pleural thickening. Moderate hiatal hernia. Heart size is normal. Normal pulmonary vascularity. Lungs are clear. Mild elevation right hemidiaphragm.

## 2024-08-22 NOTE — PLAN OF CARE
PRIMARY DIAGNOSIS: HYPO/HYPERGLYCEMIA    OUTPATIENT/OBSERVATION GOALS TO BE MET BEFORE DISCHARGE  BG greater than 100 and less than 250 on two consecutive readings: Yes  Recent Labs   Lab Test 08/22/24  0729 08/22/24  0557 08/22/24  0447   * 134* 137*       Ketones absent from urine  (hyperglycemia):  na    Tolerating oral intake to maintain hydration: Yes    Return to near baseline physical activity:  tbd    Discharge Planner Nurse   Safe discharge environment identified: No  Barriers to discharge: Yes       Entered by: Dahlia Lim RN 08/22/2024 10:00 AM     Please review provider order for any additional goals.   Nurse to notify provider when observation goals have been met and patient is ready for discharge.

## 2024-08-22 NOTE — DISCHARGE INSTRUCTIONS
LUE - Cleanse daily (shower OK); gently dry and then apply thin layer of Sween Cream (pink and white tube).   May use more frequently than daily if desired.

## 2024-08-22 NOTE — MEDICATION SCRIBE - ADMISSION MEDICATION HISTORY
"Medication Scribe Admission Medication History    Admission medication history is complete. The information provided in this note is only as accurate as the sources available at the time of the update.    Information Source(s): Patient via in-person    Pertinent Information: Patient states that he took all of his medications prior coming to ED.  Patient states that he takes Coumadin \"one tablet two Monday and Thursday and one and half tablet all other days\". Per Anticoagulation schedule for August in patient's chart, patient is taking 4 mg on Monday and Thursday and 6 mg all other days.    Changes made to PTA medication list:  Added: None  Deleted: None  Changed: None    Allergies reviewed with patient and updates made in EHR: yes    Medication History Completed By: Ricky Verdugo 8/22/2024 6:04 AM    PTA Med List   Medication Sig Last Dose    acetaminophen (TYLENOL) 500 MG tablet Take 1 tablet (500 mg) by mouth 2 times daily as needed for mild pain Unknown at prnn    aspirin 81 MG EC tablet Take 1 tablet (81 mg) by mouth daily 8/21/2024 at am    carvedilol (COREG) 3.125 MG tablet Take 1 tablet (3.125 mg) by mouth 2 times daily (with meals) 8/21/2024 at pm    Continuous Blood Gluc Sensor (FREESTYLE DOMI 2 SENSOR) MISC Change every 14 days. Unknown    Continuous Glucose  (FREESTYLE DOMI 2 READER) DOROTEO USE TO READ BLOOD SUGARS AS PER 'S INSTRUCTIONS Unknown    furosemide (LASIX) 40 MG tablet Take 80 mg by mouth 2 times daily. 8/21/2024 at pm    gabapentin (NEURONTIN) 600 MG tablet Take 2 tablets by mouth every evening. 8/21/2024 at pm    gabapentin enacarbil (HORIZANT) 600 MG tablet Take 600 mg by mouth daily. Do not split, crush or chew tablets. 8/21/2024 at am    insulin aspart prot & aspart (NOVOLOG MIX 70/30 PEN) (70-30) 100 UNIT/ML pen Inject subcutaneously 20 units in AM with breakfast, 12 units with lunch, and 15 units in PM with dinner. 8/21/2024 at pm    metFORMIN (GLUCOPHAGE) 500 " MG tablet TAKE 2 TABLETS BY MOUTH TWICE  DAILY WITH MEALS 8/21/2024    Multiple Vitamins-Minerals (PRESERVISION AREDS PO) Take 1 tablet by mouth 2 times daily 8/21/2024    pramipexole (MIRAPEX) 0.5 MG tablet Take 1 mg by mouth every evening. 8/21/2024 at pm    pramipexole (MIRAPEX) 0.5 MG tablet Take 0.5 mg by mouth at bedtime. 8/21/2024 at hs    rosuvastatin (CRESTOR) 20 MG tablet Take 20 mg by mouth every evening 8/21/2024 at pm    spironolactone (ALDACTONE) 25 MG tablet Take 1 tablet (25 mg) by mouth daily 8/21/2024 at am    tamsulosin (FLOMAX) 0.4 MG capsule Take 0.4 mg by mouth every morning 8/21/2024 at am    warfarin ANTICOAGULANT (COUMADIN) 4 MG tablet Take 6 mg by mouth five times a week. Take one and half tablet (6 mg) on Sunday, Tuesday, Wednesday, Friday and Saturday 8/21/2024 at am    warfarin ANTICOAGULANT (COUMADIN) 4 MG tablet Take 4 mg by mouth twice a week. Take one tablet (4 mg) on Monday and Thursday 8/19/2024 at am

## 2024-08-22 NOTE — CONSULTS
[facility] team is asked to see Pee Ayala in consultation to evaluate for possible NSTEMI, chronic atrial fibrillation.      Assessment:     Type 2 MI, not suggestive of acute coronary syndrome- Presented to the ED with syncope likely secondary to hypoglycemia. Found to have elevated troponin during workup. Initial troponin 45 --> 115 -->164 -->172. No ischemic changes noted on EKG. Most likely due to hypoglycemic episode and resulting hypoperfusion. Had recent assessment of coronaries that proved to be stable. Patient remains asymptomatic, denies chest pain. No need for further inpatient evaluation at this time.     Permanent atrial fibrillation - s/p MAZE procedure & left atrial appendage removal in 2014 which patient reports was ineffective. Currently on warfarin. INR goal 2-3. Has discussed watchman device with cardiology provider, this would require transesophageal echocardiogram to assess whether residual atrial appendage is adequate for device placement.  Patient reports difficulty with INR control, may be a good candidate for switch to DOAC.    HFpEF- unknown etiology. PTA spironolactone, lasix 80 mg BID, coreg 3.125 mg BID. Last echo 12/21/2023 showed EF 60-65%. Patient reports chronic dyspnea, which he states has actually improved over the past year.  Will check overnight oximetry to rule out significant sleep apnea, provided that he remains hospitalized    CAD s/p CABG x3  (2014) - Angiogram 12/2023 showed patent grafts with diffuse native disease with no targets for intervention     Aortic stenosis s/p TAVR (1/12/2021) - Normal function on echo 12/2023, no evidence of dysfunction on auscultation.     Plan:     -Pharmacy liaison consult for eliquis or xarelto coverage  -overnight oximetry study to rule out sleep apnea as cause of HFpEF provided that he remains hospitalized  -continue to follow up with cardiology outpatient   No further coronary ischemia evaluation  warranted at this time.     Current History:     Pee Ayala is a 84 year old male with a history of permanent atrial fibrillation currently on warfarin, HFpEF, CAD s/p CABG 3, s/p TAVR 1/12/2021, HTN, HLD, T2DM - insulin dependent.     Presented to the ED following a syncopal episode secondary to hypoglycemia due to inappropriate insulin use. BS found to be 27 by EMS.   Found to have elevated troponin on evaluation. Denies chest pain, worsening SOB, palpitations.     Reports SOB with exertion, but states it has actually improved over the past year.     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     Encounter for long-term (current) use of insulin (H) 08/11/2016    Esophageal reflux 11/18/2010    History of angina     HTN (hypertension) 06/30/2009     (Problem list name updated by automated process. Provider to review and confirm.)    Impotence of organic origin     Mixed hyperlipidemia 04/25/2007 April 25, 2007 restarted Zocor today, recheck in 3 months.  August 23, 2007 LDL at 101 with 40 mg, will increase to 80 mg. Recheck  In 3 months.     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     Walking troubles      Sleep history: sleeps in a recliner, due to back pain not SOB  Past Surgical History:     Past Surgical History:   Procedure Laterality Date    BYPASS GRAFT ARTERY CORONARY N/A 09/10/2014    Procedure: BYPASS GRAFT ARTERY CORONARY;  Surgeon: Bharathi Caraballo MD;  Location: [facility] OR    BYPASS GRAFT ARTERY CORONARY  01/01/2016    COLONOSCOPY  01/14/2004    CORONARY ANGIOGRAPHY ADULT ORDER      CORONARY ARTERY BYPASS      CV CORONARY ANGIOGRAM N/A 04/04/2019    Procedure: Coronary Angiogram;  Surgeon: Dominik Vega MD;  Location: [facility] Cath Lab;  Service: Cardiology    CV CORONARY ANGIOGRAM N/A 01/06/2021    Procedure: Coronary Angiogram;  Surgeon: Dominik Vega MD;  Location: [facility] Cardiac Cath Lab;  Service: Cardiology    CV CORONARY ANGIOGRAM N/A 12/22/2023    Procedure: Coronary Angiogram;  Surgeon: Bahman Lenz MD;  Location: [facility] CV    CV LEFT HEART CATHETERIZATION WITHOUT LEFT VENTRICULOGRAM Left 04/04/2019    Procedure: Left Heart Catheterization Without Left Ventriculogram;  Surgeon: Dominik Vega MD;  Location: [facility] Cath Lab;  Service: Cardiology    CV LEFT HEART CATHETERIZATION WITHOUT LEFT VENTRICULOGRAM Left 01/06/2021    Procedure: Left Heart Catheterization Without Left Ventriculogram;  Surgeon: Dominik Vega MD;  Location: [facility] Cardiac Cath Lab;  Service: Cardiology    CV RIGHT HEART CATHETERIZATION Right 04/04/2019    Procedure: Right Heart Catheterization;  Surgeon: Dominik Vega MD;  Location: [facility] Cath Lab;  Service: Cardiology    CV TRANSCATHETER AORTIC VALVE REPLACEMENT N/A  2021    Procedure: Right transfemoral transcatheter aortic valve replacement using Magdaleno Dominick 3 size 29mm.  Transthoracic echocardiogram;  Surgeon: Jo Romano MD;  Location: [facility] CARDIAC CATH LAB    ESOPHAGOSCOPY, GASTROSCOPY, DUODENOSCOPY (EGD), COMBINED N/A 2016    Procedure: COMBINED ESOPHAGOSCOPY, GASTROSCOPY, DUODENOSCOPY (EGD);  Surgeon: Rk Srivastava MD;  Location: [facility]    HEART CATH FEMORAL CANNULIZATION WITH OPEN STANDBY REPAIR AORTIC VALVE N/A 2021    Procedure: Cardiopulmonary Bypass standby;  Surgeon: Jefferson Sandy MD;  Location:  HEART CARDIAC CATH LAB    HEART CATH, ANGIOPLASTY      IR LOWER EXTREMITY ANGIOGRAM LEFT  2021    LAPAROSCOPIC CHOLECYSTECTOMY  10/01/2009    MAZE PROCEDURE N/A 09/10/2014    Procedure: MAZE PROCEDURE;  Surgeon: Bharathi Caraballo MD;  Location:  OR    MOHS MICROGRAPHIC PROCEDURE      OPEN REDUCTION INTERNAL FIXATION HIP BIPOLAR Left 2022    Procedure: HEMIARTHROPLASTY, HIP, BIPOLAR, OPEN REDUCTION INTERNAL FIXATION OF GREATER TROCHANTER;  Surgeon: Jd Larose MD;  Location: [facility] OR    ORTHOPEDIC SURGERY      surgery for fx  Left forearm    OTHER SURGICAL HISTORY Left 2015    forearm sugery    STENT, CORONARY, HUMA  2016    VASECTOMY         Family History:     Family History   Problem Relation Age of Onset    Diabetes Mother     Hypertension Father     Cerebrovascular Disease Father     Diabetes Maternal Grandmother     Breast Cancer No family hx of     Cancer - colorectal No family hx of     Prostate Cancer No family hx of     C.A.D. No family hx of     Diabetes Type 2  Mother         58 ,  from anesthesia complication.    Heart Disease Father         86  from stroke    No Known Problems Brother         60 years of age.     Family history reviewed and is not pertinent to the chief complaint or presenting problem    Social History:    reports that he quit smoking about 26 years  "ago. His smoking use included cigarettes. He has never been exposed to tobacco smoke. He has never used smokeless tobacco. He reports current alcohol use. He reports that he does not use drugs.  Exercise history: lives at home with wife and son. Feels SOB going up stairs at baseline     Meds:     No current outpatient medications on file.       Allergies:   Oxycodone, Amlodipine, Lisinopril, and Adhesive tape    Review of Systems:   A 12 point comprehensive review of systems was negative except as noted in the current history.    Objective:      Physical Exam  Wt Readings from Last 3 Encounters:   08/22/24 74.4 kg (164 lb)   08/15/24 74.8 kg (165 lb)   07/16/24 72.9 kg (160 lb 12.8 oz)     Body mass index is 29.05 kg/m .  /58 (BP Location: Right arm)   Pulse 65   Temp 98.1  F (36.7  C) (Oral)   Resp 16   Ht 1.6 m (5' 3\")   Wt 74.4 kg (164 lb)   SpO2 98%   BMI 29.05 kg/m      General Appearance:  No apparent distress.  HEENT: No scleral icterus; the mucous membranes were pink and moist.  Conjunctiva not injected  Neck:  No cervical bruits, jugular venous distention, or thyromegaly.  Chest:The spine was straight. The chest was symmetric.  Lungs:  Respirations unlabored; the lungs are clear to auscultation.  Cardiovascular:  Normal point of maximal impulse. Irregularly irregular with no murmurs, rubs, or gallops.   Abdomen:  No organomegaly, masses, bruits, or tenderness. Bowels sounds are present  Extremities: 2+ bilateral LE pitting edema present  Skin:  No xanthelasma.  Neurologic: Mood and affect are appropriate. Speech is fluent.      EKG (personally reviewed):  atrial fibrillation at 70 bpm    Cardiac diagnostics      Cardiac Cath 12/22/2023  CONCLUSIONS:   Severe multivessel coronary artery disease with chronic total occlusion of the left circumflex, mid left anterior descending artery, and mid right coronary artery.  Patent LIMA to the LAD with moderate diffuse disease of the native vessel beyond " the anastomosis.  Patent vein graft to the OM1 branch with mild diffuse disease of the native vessel beyond the anastomosis.  Moderate diseased vein graft to the OM 2 branch with small, diffusely diseased native vessel beyond the anastomosis.  RECOMMENDATIONS:   Usual postprocedure cares, please see orders.  Recommend medical management of patient's coronary artery disease.  Aggressive risk factor modification for secondary prevention.      Echocardiogram 12/21/2023  Interpretation Summary  Left ventricular size, wall motion and function are normal. The ejection  fraction is 60-65%.  TAPSE is abnormal, which is consistent with abnormal right ventricular  systolic function.  The left atrium is severely dilated.  The right atrium is mild to moderately dilated.  There is a bioprosthetic aortic valve.  The gradient is normal for this prosthetic aortic valve.  There is mild paravalvular regurgitation present.       Lab Results    Chemistry/lipid CBC Cardiac Enzymes/BNP/TSH/INR   Recent Labs   Lab Test 06/14/24  1202   CHOL 115   HDL 46   LDL 52   TRIG 84     Recent Labs   Lab Test 06/14/24  1202 06/06/24  1159 04/06/23  1625   LDL 52 47 58     Recent Labs   Lab Test 08/22/24  1136 08/22/24  0217 08/22/24  0123   NA  --   --  140   POTASSIUM  --   --  3.6   CHLORIDE  --   --  104   CO2  --   --  26   *   < > 44*   BUN  --   --  17.6   CR  --   --  0.94   GFRESTIMATED  --   --  81   RACHEL  --   --  9.2    < > = values in this interval not displayed.     Recent Labs   Lab Test 08/22/24  0123 06/06/24  1159 05/06/24  1505   CR 0.94 1.13 0.99     Recent Labs   Lab Test 06/06/24  1159 02/08/24  1528 10/23/23  1020   A1C 8.5* 7.8* 8.4*          Recent Labs   Lab Test 08/22/24  0123   WBC 8.3   HGB 12.9*   HCT 39.9*   MCV 99        Recent Labs   Lab Test 08/22/24  0123 12/21/23  1103 04/06/23  1625   HGB 12.9* 11.6* 12.4*    Recent Labs   Lab Test 07/04/22  1034   TROPONINI 0.15     Recent Labs   Lab Test  "05/06/24  1505 01/16/24  1338 12/21/23  1103 07/04/22  1034 01/08/21  0842 10/21/20  1451 05/09/19  1657 06/09/16  1203   BNP  --   --   --  321* 91* 56   < >  --    NTBNPI  --   --  2,987*  --   --   --   --   --    NTBNP 482 1,204  --   --   --   --   --  354*    < > = values in this interval not displayed.     Recent Labs   Lab Test 11/17/22  1315   TSH 0.92     Recent Labs   Lab Test 08/22/24  0123 08/15/24  1411 08/06/24  1149   INR 1.75* 1.7* 2.1*            Alize Davis MD   8/22/2024    I have seen and examined the patient, reviewed the Resident s note, and agree with history, exam, and plan of care as we have outlined. Michael Calles MD      Note created using Dragon voice recognition software.  Sound alike errors may have escaped editing.    Clinically Significant Risk Factors Present on Admission               # Drug Induced Coagulation Defect: home medication list includes an anticoagulant medication  # Drug Induced Platelet Defect: home medication list includes an antiplatelet medication   # DMII: A1C = 8.5 % (Ref range: 0.0 - 5.6 %) within past 6 months  # Overweight: Estimated body mass index is 29.05 kg/m  as calculated from the following:    Height as of this encounter: 1.6 m (5' 3\").    Weight as of this encounter: 74.4 kg (164 lb).    "

## 2024-08-22 NOTE — ED NOTES
"Pt alerts writer to inform writer that he is having some CP; EKG completed at bedside. Dr. Sewell paged:    Pt is reporting that he has some anterior lower left chest pain right under left breast. 3/10 \"dull ache\" I did another EKG and showed it to ER MD Dr. Morel. Of Review by ER MD, no changes to EKG when compared to previous one done.     Dr. Sewell is ok with transferring upstairs to short stay.  "

## 2024-08-22 NOTE — ED NOTES
[facility] ED Handoff Report    ED Chief Complaint: Hypoglycemia.    ED Diagnosis:  (E16.2) Hypoglycemia    (R55) Syncope, unspecified syncope type    (R79.89) Elevated troponin         PMH:    Past Medical History:   Diagnosis Date    ACS (acute coronary syndrome) (H) 06/02/2014    Actinic keratosis 01/14/2014    Anticoagulated on Coumadin 12/30/2015    Atrial fibrillation (H) 01/01/2016    Bone mass 04/26/2017    Chest heaviness 01/23/2019    Chest pain 05/31/2014    Chronic heart failure with preserved ejection fraction (H) 01/16/2024    Closed fracture of left forearm 01/01/2015    Congestive heart failure (H)     Coronary artery disease     Difficulty in walking(719.7)     Dyslipidemia 08/31/2016    Dyspnea on exertion     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     Encounter for long-term (current) use of insulin (H) 08/11/2016    Esophageal reflux 11/18/2010    History of angina     HTN (hypertension) 06/30/2009     (Problem list name updated by automated process. Provider to review and confirm.)    Impotence of organic origin     Mixed hyperlipidemia 04/25/2007 April 25, 2007 restarted Zocor today, recheck in 3 months.  August 23, 2007 LDL at 101 with 40 mg, will increase to 80 mg. Recheck  In 3 months.     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     Walking troubles         Code Status:  Prior     Falls Risk: Yes Band:  "Applied    Current Living Situation/Residence: lives in a house with his wife.     Elimination Status: Continent: Yes     Activity Level: 2 assist-1; pt has not gotten up during ER visit. Pt is up independent at home.    Patients Preferred Language:  English     Needed: No    Vital Signs:  /67   Pulse 69   Temp 98.7  F (37.1  C) (Oral)   Resp 21   Ht 1.6 m (5' 3\")   Wt 74.4 kg (164 lb)   SpO2 98%   BMI 29.05 kg/m       Cardiac Rhythm: Afib CVR.    Pain Score: 0/10    Is the Patient Confused:  No    Last Food or Drink: 8/22/24 at 0155.    Focused Assessment:  Pt is alert and oriented; calm and cooperative. Up independent at home; Pt has not gotten up during ER visit. Denies any pain. Pt initially was C/O some intermittent dizziness; currently denies any dizziness. Pt can make his needs known. Pt tolerating PO intake without nausea or vomiting.    Pt has old skin tears/abrasion to left upper arm from a fall a few months ago. New skin tear noted to pt's left elbow and bruising. No bleeding noted.    Tests Performed: Done: Labs and Imaging    Treatments Provided:  IVP D50, ASA.    Family Dynamics/Concerns: No    Family Updated On Visitor Policy: Yes    Plan of Care Communicated to Family: Yes    Who Was Updated about Plan of Care: Pt's wife and family friend who was at bedside earlier when pt came to ER. Family did go home for tonight.    Belongings Checklist Done and Signed by Patient: Yes    Belongings Sent with Patient: Remains with pt.    Medications sent with patient: NA    Covid: asymptomatic , not tested.    Additional Information: NA.    RN: Marvin Adair RN 8/22/2024 4:20 AM        "

## 2024-08-22 NOTE — ED NOTES
Pt did eat 1 vanilla pudding, 1 apple sauce, 1 renetta cracker, and drank 2 apple juices. Pt tolerated well, denies any nausea after eating.    2 more apple juice given to the pt. Pt accepting and sipping on juice.

## 2024-08-22 NOTE — PLAN OF CARE
Problem: Adult Inpatient Plan of Care  Goal: Absence of Hospital-Acquired Illness or Injury  Outcome: Progressing  Intervention: Identify and Manage Fall Risk  Recent Flowsheet Documentation  Taken 8/22/2024 0648 by Robyn Dunn RN  Safety Promotion/Fall Prevention: activity supervised  Intervention: Prevent Skin Injury  Recent Flowsheet Documentation  Taken 8/22/2024 0648 by Robyn Dunn RN  Body Position: position changed independently  Device Skin Pressure Protection: absorbent pad utilized/changed  Intervention: Prevent and Manage VTE (Venous Thromboembolism) Risk  Recent Flowsheet Documentation  Taken 8/22/2024 0648 by Robyn Dunn RN  VTE Prevention/Management: compression stockings on  Intervention: Prevent Infection  Recent Flowsheet Documentation  Taken 8/22/2024 0648 by Robyn Dunn RN  Infection Prevention: cohorting utilized  Goal: Optimal Comfort and Wellbeing  Outcome: Progressing     Problem: Skin Injury Risk Increased  Goal: Skin Health and Integrity  Outcome: Progressing  Intervention: Plan: Nurse Driven Intervention: Moisture Management  Recent Flowsheet Documentation  Taken 8/22/2024 0648 by Robyn Dunn RN  Moisture Interventions: Encourage regular toileting  Bathing/Skin Care:   patient refused   linen changed  Intervention: Optimize Skin Protection  Recent Flowsheet Documentation  Taken 8/22/2024 0648 by Robyn Dunn RN  Activity Management: activity adjusted per tolerance  Head of Bed (HOB) Positioning: HOB at 20-30 degrees     Problem: Fall Injury Risk  Goal: Absence of Fall and Fall-Related Injury  Outcome: Progressing  Intervention: Identify and Manage Contributors  Recent Flowsheet Documentation  Taken 8/22/2024 0648 by Robyn Dunn RN  Medication Review/Management: medications reviewed  Intervention: Promote Injury-Free Environment  Recent Flowsheet Documentation  Taken 8/22/2024 0648 by Robyn Dunn RN  Safety  Promotion/Fall Prevention: activity supervised     Problem: Diabetes  Goal: Optimal Coping  Outcome: Progressing  Goal: Blood Glucose Level Within Target Range  Outcome: Progressing  Goal: Minimize Risk of Hypoglycemia  Outcome: Progressing  Assumed Care at 2300  Vitals: VSS, afebrile  Neuro: A/O x 4.    Cardiac:  Hx of Afib, Pt is allergic to monitor sticker not on Tele at this time         Respiratory:  O2 saturation > 92% on RA.   GI/:  Adequate UO via urinal LBM 8/21per pt. BS, 134, 137 passing flatus  Diet/appetite: Comb cardiac, no caffeine .   Activity: Assist +2, generalized weakness  Pain:  Addressed with PRN Tylenol, Pt found relief from pain  Skin: Lac on left arm, Wound consult  LDA's: R-PIV, SL   Plan: PT/OT Consult

## 2024-08-22 NOTE — ED NOTES
Family is at bedside and reports that the pt was found unresponsive sitting on the toilet and leaning on the wall. Family then helped to get the pt off the toilet to supine position on the floor. Family reports pt did not fall and was not found on the floor.    Dr. Morel updated with this information.

## 2024-08-23 ENCOUNTER — DOCUMENTATION ONLY (OUTPATIENT)
Dept: ANTICOAGULATION | Facility: CLINIC | Age: 84
End: 2024-08-23
Payer: COMMERCIAL

## 2024-08-23 VITALS
RESPIRATION RATE: 16 BRPM | TEMPERATURE: 97.6 F | HEIGHT: 63 IN | WEIGHT: 164 LBS | DIASTOLIC BLOOD PRESSURE: 59 MMHG | HEART RATE: 70 BPM | OXYGEN SATURATION: 97 % | SYSTOLIC BLOOD PRESSURE: 114 MMHG | BODY MASS INDEX: 29.06 KG/M2

## 2024-08-23 DIAGNOSIS — Z79.01 CHRONIC ANTICOAGULATION: ICD-10-CM

## 2024-08-23 DIAGNOSIS — I48.91 NEW ONSET ATRIAL FIBRILLATION (H): ICD-10-CM

## 2024-08-23 DIAGNOSIS — I48.20 CHRONIC ATRIAL FIBRILLATION (H): Primary | ICD-10-CM

## 2024-08-23 DIAGNOSIS — Z95.2 S/P TAVR (TRANSCATHETER AORTIC VALVE REPLACEMENT): ICD-10-CM

## 2024-08-23 DIAGNOSIS — Z79.01 LONG TERM (CURRENT) USE OF ANTICOAGULANTS: ICD-10-CM

## 2024-08-23 DIAGNOSIS — I73.9 PERIPHERAL ARTERIAL DISEASE (H): ICD-10-CM

## 2024-08-23 LAB
GLUCOSE BLDC GLUCOMTR-MCNC: 135 MG/DL (ref 70–99)
GLUCOSE BLDC GLUCOMTR-MCNC: 142 MG/DL (ref 70–99)
GLUCOSE BLDC GLUCOMTR-MCNC: 253 MG/DL (ref 70–99)
HOLD SPECIMEN: NORMAL
HOLD SPECIMEN: NORMAL
INR PPP: 2.31 (ref 0.85–1.15)

## 2024-08-23 PROCEDURE — 82962 GLUCOSE BLOOD TEST: CPT

## 2024-08-23 PROCEDURE — 250N000013 HC RX MED GY IP 250 OP 250 PS 637: Performed by: INTERNAL MEDICINE

## 2024-08-23 PROCEDURE — 250N000013 HC RX MED GY IP 250 OP 250 PS 637: Performed by: STUDENT IN AN ORGANIZED HEALTH CARE EDUCATION/TRAINING PROGRAM

## 2024-08-23 PROCEDURE — 85610 PROTHROMBIN TIME: CPT | Performed by: STUDENT IN AN ORGANIZED HEALTH CARE EDUCATION/TRAINING PROGRAM

## 2024-08-23 PROCEDURE — 99239 HOSP IP/OBS DSCHRG MGMT >30: CPT | Performed by: INTERNAL MEDICINE

## 2024-08-23 PROCEDURE — G0378 HOSPITAL OBSERVATION PER HR: HCPCS

## 2024-08-23 PROCEDURE — 36415 COLL VENOUS BLD VENIPUNCTURE: CPT | Performed by: STUDENT IN AN ORGANIZED HEALTH CARE EDUCATION/TRAINING PROGRAM

## 2024-08-23 RX ORDER — WARFARIN SODIUM 2 MG/1
4 TABLET ORAL
Status: DISCONTINUED | OUTPATIENT
Start: 2024-08-23 | End: 2024-08-23 | Stop reason: HOSPADM

## 2024-08-23 RX ADMIN — FUROSEMIDE 80 MG: 20 TABLET ORAL at 08:27

## 2024-08-23 RX ADMIN — ASPIRIN 81 MG: 81 TABLET, COATED ORAL at 08:26

## 2024-08-23 RX ADMIN — METFORMIN HYDROCHLORIDE 1000 MG: 500 TABLET, FILM COATED ORAL at 08:26

## 2024-08-23 RX ADMIN — INSULIN ASPART 2 UNITS: 100 INJECTION, SOLUTION INTRAVENOUS; SUBCUTANEOUS at 12:09

## 2024-08-23 RX ADMIN — TAMSULOSIN HYDROCHLORIDE 0.4 MG: 0.4 CAPSULE ORAL at 08:28

## 2024-08-23 RX ADMIN — INSULIN ASPART 1 UNITS: 100 INJECTION, SOLUTION INTRAVENOUS; SUBCUTANEOUS at 08:28

## 2024-08-23 RX ADMIN — SPIRONOLACTONE 25 MG: 25 TABLET ORAL at 08:26

## 2024-08-23 RX ADMIN — INSULIN ASPART 12 UNITS: 100 INJECTION, SUSPENSION SUBCUTANEOUS at 12:08

## 2024-08-23 RX ADMIN — OXYMETAZOLINE HYDROCHLORIDE 2 SPRAY: 0.05 SPRAY NASAL at 08:28

## 2024-08-23 RX ADMIN — CARVEDILOL 3.12 MG: 3.12 TABLET, FILM COATED ORAL at 08:26

## 2024-08-23 ASSESSMENT — ACTIVITIES OF DAILY LIVING (ADL)
ADLS_ACUITY_SCORE: 34
ADLS_ACUITY_SCORE: 34
ADLS_ACUITY_SCORE: 38
ADLS_ACUITY_SCORE: 34
ADLS_ACUITY_SCORE: 38
ADLS_ACUITY_SCORE: 38
ADLS_ACUITY_SCORE: 34

## 2024-08-23 NOTE — PLAN OF CARE
Observation goals  PRIOR TO DISCHARGE         Diagnostic tests and consults completed and resulted : Overnight pulse oximetry reading, patient was put on continuous oximeter overnight. Oxygen saturation in the mid 90's at room air.     Vital signs normal or at patient baseline : MET.     Nurse to notify provider when observation goals have been met and patient is ready for discharge.      Alert and oriented. Spot check of blood glucose this shift 135 mg/dl.  On tele monitoring, Afib with adequate ventricular response.  Patient is continent. Using the urinal.

## 2024-08-23 NOTE — PROGRESS NOTES
Care Management Discharge Note    Discharge Date: 08/23/2024       Discharge Disposition:  home     Discharge Services:  none    Discharge DME:  none    Discharge Transportation:  family or friend transport     Private pay costs discussed: Not applicable    Does the patient's insurance plan have a 3 day qualifying hospital stay waiver?  Yes     Which insurance plan 3 day waiver is available? Alternative insurance waiver    Will the waiver be used for post-acute placement? No    Education Provided on the Discharge Plan: yes    Persons Notified of Discharge Plans: patient - per team   Patient/Family in Agreement with the Plan:  yes    Handoff Referral Completed: Yes    Additional Information:  No needs anticipated from CM at discharge per pt; independent at baseline. Pt to follow up with PCP post discharge if needs arise. Family to transport home.       MARLO Fraire

## 2024-08-23 NOTE — CONSULTS
8/23-     Test claim for DOAC'S- Both Eliquis and Xarelto are covered $47 for 30 days supply.     Thank you for allowing me to help with your patient  Ludy Orosco [facility]  Pharmacy Discharge Liaison [facility]

## 2024-08-23 NOTE — PROGRESS NOTES
Assumed care for this patient on this date  at 1900. Patient is on observation stay for syncope.  At this time of assessment, patient was lying in bed with eyes open. Alert and oriented. On tele monitoring afib with control rate.  Continent of bowel and bladder. Taking is taking PO furosemide. Using the urinal and voiding adequately. Bilateral lowe extremity nedema. 2 PIV saline lock. This patient is diabetic, blood glucose this evening 256 mg/dl. Patient denies pain. Neuropathy of both upper and lower extremity, patient stated this is baseline for him. Gabapentin 600 mg PO given.  Patient denies pain.

## 2024-08-23 NOTE — PROGRESS NOTES
ANTICOAGULATION  MANAGEMENT: Discharge Review    Pee Ayala chart reviewed for anticoagulation continuity of care    Hospital Admission on 8/22-23/20224  family found patient unresponsive in the bathroom, blood sugar was 27 when found by EMS    - secondary to inappropriate timing of insulin(70/30 insulin), took it 2 hours after having dinner    - severe Hypoglycemia    - elevated troponin 45->115 , EKG unremarkable for acute ischemic changes    - Pee is scheduled with [facility] on 8/26/24 to see if he would be a candidate for LAAO / Watchman.   - maybe a good candidate to switch to DOAC  (pharmacy will check coverage for   Eliquis or Xarelto.  DOAC'S- Both Eliquis and Xarelto are covered $47 for 30 days supply.     Discharge disposition: Home    Results:    Recent labs: (last 7 days)     08/22/24  0123 08/23/24  0534   INR 1.75* 2.31*     Anticoagulation inpatient management:     Gave an increased dose with 6mg on 8/22/24.    Anticoagulation discharge instructions:     Warfarin dosing: home regimen continued   Bridging: No   INR goal change: No      Medication changes affecting anticoagulation: No.   - insulin dose changed.    Additional factors affecting anticoagulation: No     PLAN     Agree with discharge plan for follow up:   - follow-up with PCP within 7 days.   - consider sleep study to r/o sleep apnea as cause of chronic HF.    Patient not contacted - INR on 8/26/24 at [facility] visit.    No adjustment to Anticoagulation Calendar was required    Mariel Dale RN

## 2024-08-23 NOTE — PLAN OF CARE
Problem: Adult Inpatient Plan of Care  Goal: Plan of Care Review  Description: The Plan of Care Review/Shift note should be completed every shift.  The Outcome Evaluation is a brief statement about your assessment that the patient is improving, declining, or no change.  This information will be displayed automatically on your shift  note.  Outcome: Progressing  Flowsheets (Taken 8/23/2024 3566)  Plan of Care Reviewed With: patient  Overall Patient Progress: improving   Goal Outcome Evaluation:      Plan of Care Reviewed With: patient    Overall Patient Progress: improvingOverall Patient Progress: improving

## 2024-08-23 NOTE — PROGRESS NOTES
Patient discharging to home with wife as transport. Education completed with patient and spouse. Personal belongings taken with patient. Questions and concerns addressed prior to discharge.

## 2024-08-23 NOTE — DISCHARGE SUMMARY
"[facility]  Hospitalist Discharge Summary      Date of Admission:  8/22/2024  Date of Discharge:  8/23/2024  Discharging Provider: Rafael Emmanuel MD  Discharge Service: Hospitalist Service    Discharge Diagnoses   Principal Problem:    Syncope presumed due to severe hypoglycemia  Active Problems:    Elevated troponin    Diabetic polyneuropathy    Mixed hyperlipidemia    HTN (hypertension)    Nonalcoholic steatohepatitis    Esophageal reflux    Type 2 diabetes, HbA1C goal < 8%    Chronic anticoagulation    CAD (coronary artery disease), 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 and removal L atrial appendage    Chronic atrial fibrillation    Aortic stenosis    Falls frequently    S/P TAVR (transcatheter aortic valve replacement)    Peripheral arterial disease     Chronic heart failure with preserved ejection fraction          Clinically Significant Risk Factors     # DMII: A1C = 8.5 % (Ref range: 0.0 - 5.6 %) within past 6 months  # Overweight: Estimated body mass index is 29.05 kg/m  as calculated from the following:    Height as of this encounter: 1.6 m (5' 3\").    Weight as of this encounter: 74.4 kg (164 lb).       Follow-ups Needed After Discharge   --Follow up with primary care provider, Debbie Lincoln, within 7 days for hospital follow- up.    --Consider referral to sleep specialist to exclude sleep apnea as cause for chronic heart failure with preserved ejection fraction.    -- consider change to DOAC given difficulty managing the warfarin, test dose suggests cost would be $47/month per pharmacy        Discharge Disposition   Discharged to home  Condition at discharge: Stable    Hospital Course   85 yo M with h/o DM2, HLD, HTN, GERD, CAD s/p CABG, TAVR, PVD, HFpEF, frequent falls, and ALMEIDA who was admitted with syncope likely due to severe hypoglycemia. Patient took his insulin several hours after eating and didn't eat as much as usual. Glucoses have improved.  We will " decrease his evening dose of 70/30 to 12 units and his other doses may need adjusting depending on how he responds.    Troponin trended up after admission. Cardiology evaluated and felt this was related to the syncope/severe hypoglycemia.  They did not feel he needed any further workup at this time other than they think it may be reasonable to consider workup for possible RONAN given his underlying HFpEF.    Consultations This Hospital Stay   PHARMACY TO DOSE WARFARIN  WOUND OSTOMY CONTINENCE NURSE  IP CONSULT  CARE MANAGEMENT / SOCIAL WORK IP CONSULT  CARDIOLOGY IP CONSULT  PHARMACY LIAISON FOR MEDICATION COVERAGE CONSULT    Code Status   Full Code    Time Spent on this Encounter   I, Rafael Emmanuel MD, personally saw the patient today and spent greater than 30 minutes discharging this patient.       Rafael Emmanuel MD  [facility] EXTENDED RECOV[facility] AND SHORT STAY  [address] [place] [id number]  Phone: [phone]  Fax: [phone]  ______________________________________________________________________    Physical Exam   Vital Signs: Temp: 97.6  F (36.4  C) Temp src: Oral BP: 114/59 Pulse: 70   Resp: 16 SpO2: 97 % O2 Device: None (Room air)    Weight: 164 lbs 0 oz  The patient was interviewed and examined on the day of discharge.         Primary Care Physician   Debbie Lincoln    Discharge Orders      Reason for your hospital stay    syncope     Follow-up and recommended labs and tests     Follow up with primary care provider, Debbie Lincoln, within 7 days for hospital follow- up.  Consider referral to sleep specialist to exclude sleep apnea as cause for chronic heart failure with preserved ejection fraction.     Activity    Your activity upon discharge: activity as tolerated     Diet    Follow this diet upon discharge: Current Diet:Orders Placed This Encounter      Combination Diet Moderate Consistent Carb (60 g CHO per Meal) Diet; Low Saturated Fat Na <2400mg  Diet, No Caffeine Diet       Significant Results and Procedures   Results for orders placed or performed during the hospital encounter of 08/22/24   XR Chest Port 1 View    Narrative    EXAM: XR CHEST PORT 1 VIEW  LOCATION: [facility]  DATE: 8/22/2024    INDICATION: Syncope  COMPARISON: 12/21/2023      Impression    IMPRESSION:   1.  Endovascular aortic valve replacement. Sternotomy. Stable left lateral pleural thickening. Moderate hiatal hernia. Heart size is normal. Normal pulmonary vascularity. Lungs are clear. Mild elevation right hemidiaphragm.       *Note: Due to a large number of results and/or encounters for the requested time period, some results have not been displayed. A complete set of results can be found in Results Review.       Discharge Medications   Current Discharge Medication List        CONTINUE these medications which have CHANGED    Details   insulin aspart prot & aspart (NOVOLOG MIX 70/30 PEN) (70-30) 100 UNIT/ML pen Inject subcutaneously 20 units in AM with breakfast, 12 units with lunch, and 12 units in PM with dinner.  Qty: 15 mL, Refills: 5    Associated Diagnoses: 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)           CONTINUE these medications which have NOT CHANGED    Details   acetaminophen (TYLENOL) 500 MG tablet Take 1 tablet (500 mg) by mouth 2 times daily as needed for mild pain    Associated Diagnoses: Pain      aspirin 81 MG EC tablet Take 1 tablet (81 mg) by mouth daily  Qty: 90 tablet, Refills: 0    Associated Diagnoses: Chronic atrial fibrillation (H)      carvedilol (COREG) 3.125 MG tablet Take 1 tablet (3.125 mg) by mouth 2 times daily (with meals)  Qty: 180 tablet, Refills: 3    Associated Diagnoses: Chronic heart failure with preserved ejection fraction (H); Lightheadedness      Continuous Blood Gluc Sensor (FREESTYLE DOMI 2 SENSOR) MISC Change every 14 days.  Qty: 6 each, Refills: 3    Associated Diagnoses: Type 2 diabetes, HbA1C goal  < 8% (H)      Continuous Glucose  (FREESTYLE DOMI 2 READER) DOROTEO USE TO READ BLOOD SUGARS AS PER 'S INSTRUCTIONS  Qty: 1 each, Refills: 0    Associated Diagnoses: Type 2 diabetes, HbA1C goal < 8% (H)      furosemide (LASIX) 40 MG tablet Take 80 mg by mouth 2 times daily.      gabapentin (NEURONTIN) 600 MG tablet Take 600 mg by mouth every evening.      metFORMIN (GLUCOPHAGE) 500 MG tablet TAKE 2 TABLETS BY MOUTH TWICE  DAILY WITH MEALS  Qty: 360 tablet, Refills: 0    Associated Diagnoses: 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)      Multiple Vitamins-Minerals (PRESERVISION AREDS PO) Take 1 tablet by mouth 2 times daily      !! pramipexole (MIRAPEX) 0.5 MG tablet Take 1 mg by mouth every evening.      !! pramipexole (MIRAPEX) 0.5 MG tablet Take 0.5 mg by mouth at bedtime.      rosuvastatin (CRESTOR) 20 MG tablet Take 20 mg by mouth every evening      spironolactone (ALDACTONE) 25 MG tablet Take 1 tablet (25 mg) by mouth daily  Qty: 90 tablet, Refills: 3    Associated Diagnoses: Acute on chronic diastolic heart failure (H)      tamsulosin (FLOMAX) 0.4 MG capsule Take 0.4 mg by mouth every morning      !! warfarin ANTICOAGULANT (COUMADIN) 4 MG tablet Take 6 mg by mouth five times a week. Take one and half tablet (6 mg) on Sunday, Tuesday, Wednesday, Friday and Saturday      !! warfarin ANTICOAGULANT (COUMADIN) 4 MG tablet Take 4 mg by mouth twice a week. Take one tablet (4 mg) on Monday and Thursday       !! - Potential duplicate medications found. Please discuss with provider.        Allergies   Allergies   Allergen Reactions    Oxycodone Itching and Rash    Amlodipine Dizziness     Dizziness and dry heaves    Lisinopril Cough    Adhesive Tape Itching and Rash

## 2024-08-25 ENCOUNTER — PATIENT OUTREACH (OUTPATIENT)
Dept: CARE COORDINATION | Facility: CLINIC | Age: 84
End: 2024-08-25
Payer: COMMERCIAL

## 2024-08-25 NOTE — PROGRESS NOTES
Clinic Care Coordination Contact  [facility]/Voicemail    Clinical Data: Care Coordinator Outreach    Outreach Documentation Number of Outreach Attempt   8/25/2024   8:42 AM 2       Unable to Leave a message on patient's voicemail with call back information and requested return call. Picked up phone and set it down.     Care Coordinator will do no further outreaches at this time.    Leny David  [phone]  Care

## 2024-08-26 ENCOUNTER — ANTICOAGULATION THERAPY VISIT (OUTPATIENT)
Dept: ANTICOAGULATION | Facility: CLINIC | Age: 84
End: 2024-08-26

## 2024-08-26 ENCOUNTER — LAB (OUTPATIENT)
Dept: CARDIOLOGY | Facility: CLINIC | Age: 84
End: 2024-08-26
Payer: COMMERCIAL

## 2024-08-26 ENCOUNTER — OFFICE VISIT (OUTPATIENT)
Dept: CARDIOLOGY | Facility: CLINIC | Age: 84
End: 2024-08-26
Payer: COMMERCIAL

## 2024-08-26 VITALS
SYSTOLIC BLOOD PRESSURE: 114 MMHG | BODY MASS INDEX: 27.99 KG/M2 | DIASTOLIC BLOOD PRESSURE: 74 MMHG | RESPIRATION RATE: 14 BRPM | HEART RATE: 60 BPM | WEIGHT: 158 LBS

## 2024-08-26 DIAGNOSIS — Z79.01 LONG TERM (CURRENT) USE OF ANTICOAGULANTS: ICD-10-CM

## 2024-08-26 DIAGNOSIS — I48.20 CHRONIC ATRIAL FIBRILLATION (H): Primary | ICD-10-CM

## 2024-08-26 DIAGNOSIS — Z79.01 CHRONIC ANTICOAGULATION: ICD-10-CM

## 2024-08-26 DIAGNOSIS — I10 PRIMARY HYPERTENSION: ICD-10-CM

## 2024-08-26 DIAGNOSIS — I73.9 PERIPHERAL ARTERIAL DISEASE (H): ICD-10-CM

## 2024-08-26 DIAGNOSIS — Z95.2 S/P TAVR (TRANSCATHETER AORTIC VALVE REPLACEMENT): ICD-10-CM

## 2024-08-26 DIAGNOSIS — I48.91 NEW ONSET ATRIAL FIBRILLATION (H): ICD-10-CM

## 2024-08-26 DIAGNOSIS — I48.21 PERMANENT ATRIAL FIBRILLATION (H): Primary | ICD-10-CM

## 2024-08-26 DIAGNOSIS — I48.20 CHRONIC ATRIAL FIBRILLATION (H): ICD-10-CM

## 2024-08-26 DIAGNOSIS — I50.32 CHRONIC HEART FAILURE WITH PRESERVED EJECTION FRACTION (H): ICD-10-CM

## 2024-08-26 DIAGNOSIS — I25.10 CORONARY ARTERY DISEASE INVOLVING NATIVE CORONARY ARTERY OF NATIVE HEART WITHOUT ANGINA PECTORIS: ICD-10-CM

## 2024-08-26 LAB — INR POINT OF CARE: 2.1 (ref 0.9–1.1)

## 2024-08-26 PROCEDURE — 36416 COLLJ CAPILLARY BLOOD SPEC: CPT

## 2024-08-26 PROCEDURE — 85610 PROTHROMBIN TIME: CPT | Mod: QW

## 2024-08-26 PROCEDURE — 99215 OFFICE O/P EST HI 40 MIN: CPT | Performed by: PHYSICIAN ASSISTANT

## 2024-08-26 NOTE — PROGRESS NOTES
ANTICOAGULATION MANAGEMENT     Pee Ayala 84 year old male is on warfarin with therapeutic INR result. (Goal INR 2.0-3.0)    Recent labs: (last 7 days)     08/26/24  1343   INR 2.1*       ASSESSMENT     Source(s): Chart Review and Patient/Caregiver Call     Warfarin doses taken: Warfarin taken as instructed  Diet: No new diet changes identified  Medication/supplement changes: None noted  New illness, injury, or hospitalization: No  Signs or symptoms of bleeding or clotting: No  Previous result: Therapeutic last 2(+) visits  Additional findings:  looking into watchman device       PLAN     Recommended plan for no diet, medication or health factor changes affecting INR     Dosing Instructions: Continue your current warfarin dose with next INR in 2 weeks       Summary  As of 8/26/2024      Full warfarin instructions:  4 mg every Mon, Thu; 6 mg all other days   Next INR check:  9/9/2024               Telephone call with Pee who verbalizes understanding and agrees to plan    Lab visit scheduled    Education provided: Please call back if any changes to your diet, medications or how you've been taking warfarin    Plan made per [facility] anticoagulation protocol    Yousuf Madison RN  Anticoagulation Clinic  8/26/2024    _______________________________________________________________________     Anticoagulation Episode Summary       Current INR goal:  2.0-3.0   TTR:  32.5% (10 mo)   Target end date:  Indefinite   Send INR reminders to:  [facility]    Indications    Chronic atrial fibrillation (H) [I48.20]  Chronic anticoagulation [Z79.01]  Long term (current) use of anticoagulants [Z79.01]  Peripheral arterial disease (H24) [I73.9]  S/P TAVR (transcatheter aortic valve replacement) [Z95.2]  New onset atrial fibrillation (H) [I48.91]             Comments:               Anticoagulation Care Providers       Provider Role Specialty Phone number    Jazzy Gil MD Referring Family Medicine [phone]     Ihsan Singh MD Referring Family Medicine     Debbie Lincoln PA-C Referring Physician Assistant - Medical [phone]

## 2024-08-26 NOTE — LETTER
8/26/2024    Debbie Lincoln PA-C  [address]  [facility] [address]    RE: Peeleyla Ayala       Dear Colleague,     I had the pleasure of seeing Pee Ayala in the [facility].  HEART CARE ENCOUNTER NOTE       [facility]  [phone]      Assessment/Recommendations   1.  Permanent atrial fibrillation: Status post MAZE procedure and LAAL in 2014 with CABG.  Patient has remained on warfarin since the his surgery.  Imaging is currently being reviewed by implanters as to whether his current anatomy is amenable for an LAAO device and whether he needs to remain on long-term anticoagulation.    I have personally reviewed this patient's chart and have spoken with the patient about the treatment options, including JESICA device.  He has a [id number] score of at least 6 for age >75, CHF, HTN, vascular disease, DM 2.  He has a HAS-BLED score of 4 for age, bleeding predisposition, subtherapeutic INR, indication for antiplatelet therapy.   He is not a good candidate for long-term anticoagulation due to balance issues, falls, indication for antiplatelet therapy.    2.  Chronic heart failure with preserved ejection fraction -compensated.  He should continue carvedilol 3.125 mg twice daily, spironolactone 25 mg daily, Lasix 80 mg twice daily    3.  Coronary artery disease status post CABG x 3, MAZE, LAAL in 2014 -coronary angiogram December 2023 showed moderate diseased vein graft to OM 2 branch, patent LIMA to LAD, patent vein graft to OM1.  Denies symptoms of angina.  Continue Crestor, aspirin    4.  Aortic stenosis status post TAVR 1/12/2021 -echocardiogram December 2023 showed LVEF 60 to 65%, mean gradient of 9 mmHg    5.  Hypertension - blood pressure is controlled       History of Present Illness/Subjective    Pee Ayala is a 84 year old male who comes in today for Watchman Consult.  He is accompanied by his wife for today's visit.    Pee Ayala has a  past history of aortic stenosis status post TAVR January 2021, coronary artery disease (status post CABG x 3, MAZE, LAAL 2014), chronic heart failure with preserved ejection fraction, permanent atrial fibrillation, peripheral arterial disease, hyperlipidemia, hypertension, type 2 diabetes mellitus.    Recently admitted to the hospital for syncope likely due to severe hypoglycemia in the setting of inappropriate insulin use.  He also had a fall where he missed a step when going down the stairs and injured his left arm.  He notes balance issues and requires a walker or cane for ambulation.  He has remained on warfarin after his cardiac surgery in 2014.  He has had issues with subtherapeutic INR.  He also currently takes a baby aspirin.  He denies any current bleeding issues including blood in his stool, urine, or nosebleeds.  He does have some bruising to his arms.  He denies a history of DVT, PE, or stroke.  He denies a history of clotting disorders.  He currently denies chest discomfort, shortness of breath, dizziness, lightheadedness.  He does sleep in a recliner due to sinus issues and congestion.  He has chronic swelling to his lower extremities and normally wears compression stockings.  His weight has been stable.  He sometimes has difficulty swallowing with certain pills and with chicken.      Medical, surgical, family, social history, and medications were reviewed and updated as necessary.    ECHO results (from 12/21/2023):  Interpretation Summary     Left ventricular size, wall motion and function are normal. The ejection  fraction is 60-65%.  TAPSE is abnormal, which is consistent with abnormal right ventricular  systolic function.  The left atrium is severely dilated.  The right atrium is mild to moderately dilated.  There is a bioprosthetic aortic valve.  The gradient is normal for this prosthetic aortic valve.  There is mild paravalvular regurgitation present.       Physical Examination Review of Systems    Vitals: /74 (BP Location: Right arm, Patient Position: Sitting, Cuff Size: Adult Regular)   Pulse 60   Resp 14   Wt 71.7 kg (158 lb)   BMI 27.99 kg/m    BMI= Body mass index is 27.99 kg/m .  Wt Readings from Last 3 Encounters:   08/26/24 71.7 kg (158 lb)   08/22/24 74.4 kg (164 lb)   08/15/24 74.8 kg (165 lb)       General Appearance:   Alert, cooperative and in no acute distress   ENT/Mouth: membranes moist, no oral lesions or bleeding gums.      EYES:  no scleral icterus, normal conjunctivae   Neck: Thyroid not visualized   Chest/Lungs:   lungs are slightly diminished to the bases, otherwise clear to auscultation   Cardiovascular:   Irregular. Normal first and second heart sounds with no murmurs, rubs or gallops; the carotid, radial and posterior tibial pulses are intact, mild edema bilaterally    Abdomen:  Soft and nontender. Bowel sounds are present in all quadrants   Extremities: no cyanosis or clubbing   Skin: no xanthelasma, warm.    Neurologic: normal gait, normal  bilateral, no tremors   Psychiatric: Normal mood and affect       Please refer above for cardiac ROS details.      Medical History  Surgical History Family History Social History   Past Medical History:   Diagnosis Date     ACS (acute coronary syndrome) (H) 06/02/2014     Actinic keratosis 01/14/2014     Anticoagulated on Coumadin 12/30/2015     Atrial fibrillation (H) 01/01/2016     Bone mass 04/26/2017     Chest heaviness 01/23/2019     Chest pain 05/31/2014     Chronic heart failure with preserved ejection fraction (H) 01/16/2024     Closed fracture of left forearm 01/01/2015     Congestive heart failure (H)      Coronary artery disease      Difficulty in walking(719.7)      Dyslipidemia 08/31/2016     Dyspnea on exertion      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      Encounter for long-term (current) use of insulin  (H) 08/11/2016     Esophageal reflux 11/18/2010     History of angina      HTN (hypertension) 06/30/2009     (Problem list name updated by automated process. Provider to review and confirm.)     Impotence of organic origin      Mixed hyperlipidemia 04/25/2007 April 25, 2007 restarted Zocor today, recheck in 3 months.  August 23, 2007 LDL at 101 with 40 mg, will increase to 80 mg. Recheck  In 3 months.      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      Walking troubles      Past Surgical History:   Procedure Laterality Date     BYPASS GRAFT ARTERY CORONARY N/A 09/10/2014    Procedure: BYPASS GRAFT ARTERY CORONARY;  Surgeon: Bharathi Caraballo MD;  Location: [facility] OR     BYPASS GRAFT ARTERY CORONARY  01/01/2016     COLONOSCOPY  01/14/2004     CORONARY ANGIOGRAPHY ADULT ORDER       CORONARY ARTERY BYPASS       CV CORONARY ANGIOGRAM N/A 04/04/2019    Procedure: Coronary Angiogram;  Surgeon: Dominik Vega MD;  Location: [facility] Cath Lab;  Service: Cardiology     CV CORONARY ANGIOGRAM N/A 01/06/2021    Procedure: Coronary Angiogram;  Surgeon: Dominik Vega MD;  Location: [facility] Cardiac Cath Lab;  Service: Cardiology     CV CORONARY ANGIOGRAM N/A 12/22/2023    Procedure: Coronary Angiogram;  Surgeon: Bahman Lenz MD;  Location: [facility] CATH LAB CV     CV LEFT HEART CATHETERIZATION WITHOUT LEFT VENTRICULOGRAM Left 04/04/2019    Procedure: Left Heart Catheterization Without Left Ventriculogram;  Surgeon: Dominik Vega  MD Elvis;  Location: [facility] Cath Lab;  Service: Cardiology     CV LEFT HEART CATHETERIZATION WITHOUT LEFT VENTRICULOGRAM Left 01/06/2021    Procedure: Left Heart Catheterization Without Left Ventriculogram;  Surgeon: Dominik Vega MD;  Location: [facility] Cardiac Cath Lab;  Service: Cardiology     CV RIGHT HEART CATHETERIZATION Right 04/04/2019    Procedure: Right Heart Catheterization;  Surgeon: Dominik Vega MD;  Location: [facility] Cath Lab;  Service: Cardiology     CV TRANSCATHETER AORTIC VALVE REPLACEMENT N/A 01/12/2021    Procedure: Right transfemoral transcatheter aortic valve replacement using Magdaleno Dominick 3 size 29mm.  Transthoracic echocardiogram;  Surgeon: Jo Romano MD;  Location: [facility] CARDIAC CATH LAB     ESOPHAGOSCOPY, GASTROSCOPY, DUODENOSCOPY (EGD), COMBINED N/A 01/13/2016    Procedure: COMBINED ESOPHAGOSCOPY, GASTROSCOPY, DUODENOSCOPY (EGD);  Surgeon: Rk Srivastava MD;  Location: [facility]     HEART [facility] FEMORAL CANNULIZATION WITH OPEN STANDBY REPAIR AORTIC VALVE N/A 01/12/2021    Procedure: Cardiopulmonary Bypass standby;  Surgeon: Jefferson Sandy MD;  Location: [facility] CARDIAC CATH LAB     HEART CATH, ANGIOPLASTY       IR LOWER EXTREMITY ANGIOGRAM LEFT  12/07/2021     LAPAROSCOPIC CHOLECYSTECTOMY  10/01/2009     MAZE PROCEDURE N/A 09/10/2014    Procedure: MAZE PROCEDURE;  Surgeon: Bharathi Caraballo MD;  Location:  OR     MOHS MICROGRAPHIC PROCEDURE       OPEN REDUCTION INTERNAL FIXATION HIP BIPOLAR Left 04/09/2022    Procedure: HEMIARTHROPLASTY, HIP, BIPOLAR, OPEN REDUCTION INTERNAL FIXATION OF GREATER TROCHANTER;  Surgeon: Jd Larose MD;  Location: [facility] OR     ORTHOPEDIC SURGERY      surgery for fx  Left forearm     OTHER SURGICAL HISTORY Left 01/01/2015    forearm sugery     STENT, CORONARY, HUMA  02/01/2016     VASECTOMY       Family History   Problem Relation Age of Onset     Diabetes Mother      Hypertension Father       Cerebrovascular Disease Father      Diabetes Maternal Grandmother      Breast Cancer No family hx of      Cancer - colorectal No family hx of      Prostate Cancer No family hx of      C.A.D. No family hx of      Diabetes Type 2  Mother         58 ,  from anesthesia complication.     Heart Disease Father         86  from stroke     No Known Problems Brother         60 years of age.    Social History     Socioeconomic History     Marital status:      Spouse name: Fay Ayala     Number of children: Not on file     Years of education: Not on file     Highest education level: Not on file   Occupational History     Employer: RETIRED   Tobacco Use     Smoking status: Former     Current packs/day: 0.00     Types: Cigarettes     Quit date: 1998     Years since quittin.6     Passive exposure: Never     Smokeless tobacco: Never   Vaping Use     Vaping status: Never Used   Substance and Sexual Activity     Alcohol use: Yes     Alcohol/week: 0.0 standard drinks of alcohol     Comment: Alcoholic Drinks/day: very rare     Drug use: No     Sexual activity: Never     Birth control/protection: None   Other Topics Concern     Parent/sibling w/ CABG, MI or angioplasty before 65F 55M? No   Social History Narrative     and lives with life.  Has adult children from previous relationship. ( Blended family).  Has a dog - Vincent Gil MD  1/3/2019                Medication Instructions:    Patient is to take all scheduled medications on the day of surgery EXCEPT for modifications listed below:     - aspirin: cardiology has recommended continuing baby aspirin daily     - warfarin: hold warfarin for 5 days before surgery     - Diuretics: HOLD on the day of surgery.     - Statins: Continue taking on the day of surgery.      - mixed insulin (70/30m 75/25, 50/50): HOLD day of surgery     - metformin: HOLD day of surgery.     - Opioids: Continue without modification     - hold any vitamins or  supplements until after surgery.      Social Determinants of Health     Financial Resource Strain: Low Risk  (8/22/2024)    Financial Resource Strain      Within the past 12 months, have you or your family members you live with been unable to get utilities (heat, electricity) when it was really needed?: No   Food Insecurity: Low Risk  (8/22/2024)    Food Insecurity      Within the past 12 months, did you worry that your food would run out before you got money to buy more?: No      Within the past 12 months, did the food you bought just not last and you didn t have money to get more?: No   Transportation Needs: Low Risk  (8/22/2024)    Transportation Needs      Within the past 12 months, has lack of transportation kept you from medical appointments, getting your medicines, non-medical meetings or appointments, work, or from getting things that you need?: No   Physical Activity: Not on file   Stress: Not on file   Social Connections: Not on file   Interpersonal Safety: Low Risk  (10/23/2023)    Interpersonal Safety      Do you feel physically and emotionally safe where you currently live?: Yes      Within the past 12 months, have you been hit, slapped, kicked or otherwise physically hurt by someone?: No      Within the past 12 months, have you been humiliated or emotionally abused in other ways by your partner or ex-partner?: No   Housing Stability: High Risk (8/22/2024)    Housing Stability      Do you have housing? : No      Are you worried about losing your housing?: No          Medications  Allergies   Current Outpatient Medications   Medication Sig Dispense Refill     acetaminophen (TYLENOL) 500 MG tablet Take 1 tablet (500 mg) by mouth 2 times daily as needed for mild pain       aspirin 81 MG EC tablet Take 1 tablet (81 mg) by mouth daily 90 tablet 0     carvedilol (COREG) 3.125 MG tablet Take 1 tablet (3.125 mg) by mouth 2 times daily (with meals) 180 tablet 3     Continuous Blood Gluc Sensor (FREESTYLE DOMI 2  SENSOR) MISC Change every 14 days. 6 each 3     Continuous Glucose  (FREESTYLE DOMI 2 READER) DOROTEO USE TO READ BLOOD SUGARS AS PER 'S INSTRUCTIONS 1 each 0     furosemide (LASIX) 40 MG tablet Take 80 mg by mouth 2 times daily.       gabapentin (NEURONTIN) 600 MG tablet Take 600 mg by mouth every evening.       insulin aspart prot & aspart (NOVOLOG MIX 70/30 PEN) (70-30) 100 UNIT/ML pen Inject subcutaneously 20 units in AM with breakfast, 12 units with lunch, and 12 units in PM with dinner. 15 mL 5     metFORMIN (GLUCOPHAGE) 500 MG tablet TAKE 2 TABLETS BY MOUTH TWICE  DAILY WITH MEALS 360 tablet 0     Multiple Vitamins-Minerals (PRESERVISION AREDS PO) Take 1 tablet by mouth 2 times daily       pramipexole (MIRAPEX) 0.5 MG tablet Take 1 mg by mouth every evening.       pramipexole (MIRAPEX) 0.5 MG tablet Take 0.5 mg by mouth at bedtime.       rosuvastatin (CRESTOR) 20 MG tablet Take 20 mg by mouth every evening       spironolactone (ALDACTONE) 25 MG tablet Take 1 tablet (25 mg) by mouth daily 90 tablet 3     tamsulosin (FLOMAX) 0.4 MG capsule Take 0.4 mg by mouth every morning       warfarin ANTICOAGULANT (COUMADIN) 4 MG tablet Take 6 mg by mouth five times a week. Take one and half tablet (6 mg) on Sunday, Tuesday, Wednesday, Friday and Saturday       warfarin ANTICOAGULANT (COUMADIN) 4 MG tablet Take 4 mg by mouth twice a week. Take one tablet (4 mg) on Monday and Thursday      Allergies   Allergen Reactions     Oxycodone Itching and Rash     Amlodipine Dizziness     Dizziness and dry heaves     Lisinopril Cough     Adhesive Tape Itching and Rash         Lab Results    Chemistry/lipid CBC Cardiac Enzymes/BNP/TSH/INR   Recent Labs   Lab Test 06/14/24  1202   CHOL 115   HDL 46   LDL 52   TRIG 84     Recent Labs   Lab Test 06/14/24  1202 06/06/24  1159 04/06/23  1625   LDL 52 47 58     Recent Labs   Lab Test 08/23/24  1122 08/22/24  0217 08/22/24  0123   NA  --   --  140   POTASSIUM  --   --  3.6    CHLORIDE  --   --  104   CO2  --   --  26   *   < > 44*   BUN  --   --  17.6   CR  --   --  0.94   GFRESTIMATED  --   --  81   RACHEL  --   --  9.2    < > = values in this interval not displayed.     Recent Labs   Lab Test 08/22/24  0123 06/06/24  1159 05/06/24  1505   CR 0.94 1.13 0.99     Recent Labs   Lab Test 06/06/24  1159 02/08/24  1528 10/23/23  1020   A1C 8.5* 7.8* 8.4*    Recent Labs   Lab Test 08/22/24  0123   WBC 8.3   HGB 12.9*   HCT 39.9*   MCV 99        Recent Labs   Lab Test 08/22/24  0123 12/21/23  1103 04/06/23  1625   HGB 12.9* 11.6* 12.4*    Recent Labs   Lab Test 07/04/22  1034   TROPONINI 0.15     Recent Labs   Lab Test 05/06/24  1505 01/16/24  1338 12/21/23  1103 07/04/22  1034 01/08/21  0842 10/21/20  1451 05/09/19  1657 06/09/16  1203   BNP  --   --   --  321* 91* 56   < >  --    NTBNPI  --   --  2,987*  --   --   --   --   --    NTBNP 482 1,204  --   --   --   --   --  354*    < > = values in this interval not displayed.     Recent Labs   Lab Test 11/17/22  1315   TSH 0.92     Recent Labs   Lab Test 08/23/24  0534 08/22/24  0123 08/15/24  1411   INR 2.31* 1.75* 1.7*        40 minutes spent on the date of encounter doing education, chart prep/review, review of outside records, review of test results, interpretation with above tests, patient visit, documentation, and discussion with family.      This note has been dictated using voice recognition software. Any grammatical or context distortions are unintentional and inherent to the software.    Ruth Ann Dutton PA-C  Structural Heart Program  [facility]       Thank you for allowing me to participate in the care of your patient.      Sincerely,     Ruth Ann Dutton PA-C     [facility]  cc:   Monae Reich PA-C  [address]  [address]  [place] [address]

## 2024-08-26 NOTE — PATIENT INSTRUCTIONS
Pee Ayala,    It was a pleasure to see you today in the clinic regarding your follow-up for Watchman.     My recommendations after this visit include:     - no medication changes today   - the implanters are currently reviewing your images, we will be in touch in about 1 week    You should followup with Dr. Hernandez in December      If you have questions or concerns, please call using the numbers below:    After Hours/Scheduling  [phone]    Otherwise you can dial the nurse directly at:  FRANCISCO Baker RN  [phone]    Ruth Ann Dutton PA-C  Structural Heart Program  [facility]             
240

## 2024-08-26 NOTE — PROGRESS NOTES
HEART CARE ENCOUNTER NOTE       [facility]  [phone]      Assessment/Recommendations   1.  Permanent atrial fibrillation: Status post MAZE procedure and LAAL in 2014 with CABG.  Patient has remained on warfarin since the his surgery.  Imaging is currently being reviewed by implanters as to whether his current anatomy is amenable for an LAAO device and whether he needs to remain on long-term anticoagulation.    I have personally reviewed this patient's chart and have spoken with the patient about the treatment options, including JESICA device.  He has a [id number] score of at least 6 for age >75, CHF, HTN, vascular disease, DM 2.  He has a HAS-BLED score of 4 for age, bleeding predisposition, subtherapeutic INR, indication for antiplatelet therapy.   He is not a good candidate for long-term anticoagulation due to balance issues, falls, indication for antiplatelet therapy.    2.  Chronic heart failure with preserved ejection fraction -compensated.  He should continue carvedilol 3.125 mg twice daily, spironolactone 25 mg daily, Lasix 80 mg twice daily    3.  Coronary artery disease status post CABG x 3, MAZE, LAAL in 2014 -coronary angiogram December 2023 showed moderate diseased vein graft to OM 2 branch, patent LIMA to LAD, patent vein graft to OM1.  Denies symptoms of angina.  Continue Crestor, aspirin    4.  Aortic stenosis status post TAVR 1/12/2021 -echocardiogram December 2023 showed LVEF 60 to 65%, mean gradient of 9 mmHg    5.  Hypertension - blood pressure is controlled       History of Present Illness/Subjective    Pee Ayala is a 84 year old male who comes in today for Watchman Consult.  He is accompanied by his wife for today's visit.    Pee Ayala has a past history of aortic stenosis status post TAVR January 2021, coronary artery disease (status post CABG x 3, MAZE, LAAL 2014), chronic heart failure with preserved ejection fraction, permanent atrial fibrillation,  peripheral arterial disease, hyperlipidemia, hypertension, type 2 diabetes mellitus.    Recently admitted to the hospital for syncope likely due to severe hypoglycemia in the setting of inappropriate insulin use.  He also had a fall where he missed a step when going down the stairs and injured his left arm.  He notes balance issues and requires a walker or cane for ambulation.  He has remained on warfarin after his cardiac surgery in 2014.  He has had issues with subtherapeutic INR.  He also currently takes a baby aspirin.  He denies any current bleeding issues including blood in his stool, urine, or nosebleeds.  He does have some bruising to his arms.  He denies a history of DVT, PE, or stroke.  He denies a history of clotting disorders.  He currently denies chest discomfort, shortness of breath, dizziness, lightheadedness.  He does sleep in a recliner due to sinus issues and congestion.  He has chronic swelling to his lower extremities and normally wears compression stockings.  His weight has been stable.  He sometimes has difficulty swallowing with certain pills and with chicken.      Medical, surgical, family, social history, and medications were reviewed and updated as necessary.    ECHO results (from 12/21/2023):  Interpretation Summary     Left ventricular size, wall motion and function are normal. The ejection  fraction is 60-65%.  TAPSE is abnormal, which is consistent with abnormal right ventricular  systolic function.  The left atrium is severely dilated.  The right atrium is mild to moderately dilated.  There is a bioprosthetic aortic valve.  The gradient is normal for this prosthetic aortic valve.  There is mild paravalvular regurgitation present.       Physical Examination Review of Systems   Vitals: /74 (BP Location: Right arm, Patient Position: Sitting, Cuff Size: Adult Regular)   Pulse 60   Resp 14   Wt 71.7 kg (158 lb)   BMI 27.99 kg/m    BMI= Body mass index is 27.99 kg/m .  Wt Readings  from Last 3 Encounters:   08/26/24 71.7 kg (158 lb)   08/22/24 74.4 kg (164 lb)   08/15/24 74.8 kg (165 lb)       General Appearance:   Alert, cooperative and in no acute distress   ENT/Mouth: membranes moist, no oral lesions or bleeding gums.      EYES:  no scleral icterus, normal conjunctivae   Neck: Thyroid not visualized   Chest/Lungs:   lungs are slightly diminished to the bases, otherwise clear to auscultation   Cardiovascular:   Irregular. Normal first and second heart sounds with no murmurs, rubs or gallops; the carotid, radial and posterior tibial pulses are intact, mild edema bilaterally    Abdomen:  Soft and nontender. Bowel sounds are present in all quadrants   Extremities: no cyanosis or clubbing   Skin: no xanthelasma, warm.    Neurologic: normal gait, normal  bilateral, no tremors   Psychiatric: Normal mood and affect       Please refer above for cardiac ROS details.      Medical History  Surgical History Family History Social History   Past Medical History:   Diagnosis Date    ACS (acute coronary syndrome) (H) 06/02/2014    Actinic keratosis 01/14/2014    Anticoagulated on Coumadin 12/30/2015    Atrial fibrillation (H) 01/01/2016    Bone mass 04/26/2017    Chest heaviness 01/23/2019    Chest pain 05/31/2014    Chronic heart failure with preserved ejection fraction (H) 01/16/2024    Closed fracture of left forearm 01/01/2015    Congestive heart failure (H)     Coronary artery disease     Difficulty in walking(719.7)     Dyslipidemia 08/31/2016    Dyspnea on exertion     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     Encounter for long-term (current) use of insulin (H) 08/11/2016    Esophageal reflux 11/18/2010    History of angina     HTN (hypertension) 06/30/2009     (Problem list name updated by automated process. Provider to review and confirm.)    Impotence of organic origin     Mixed  hyperlipidemia 04/25/2007 April 25, 2007 restarted Zocor today, recheck in 3 months.  August 23, 2007 LDL at 101 with 40 mg, will increase to 80 mg. Recheck  In 3 months.     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     Walking troubles      Past Surgical History:   Procedure Laterality Date    BYPASS GRAFT ARTERY CORONARY N/A 09/10/2014    Procedure: BYPASS GRAFT ARTERY CORONARY;  Surgeon: Bharathi Caraballo MD;  Location: [facility] OR    BYPASS GRAFT ARTERY CORONARY  01/01/2016    COLONOSCOPY  01/14/2004    CORONARY ANGIOGRAPHY ADULT ORDER      CORONARY ARTERY BYPASS      CV CORONARY ANGIOGRAM N/A 04/04/2019    Procedure: Coronary Angiogram;  Surgeon: Dominik Vega MD;  Location: [facility] Cath Lab;  Service: Cardiology    CV CORONARY ANGIOGRAM N/A 01/06/2021    Procedure: Coronary Angiogram;  Surgeon: Dominik Vega MD;  Location: [facility] Cardiac Cath Lab;  Service: Cardiology    CV CORONARY ANGIOGRAM N/A 12/22/2023    Procedure: Coronary Angiogram;  Surgeon: Bahman Lenz MD;  Location: [facility] CATH LAB CV    CV LEFT HEART CATHETERIZATION WITHOUT LEFT VENTRICULOGRAM Left 04/04/2019    Procedure: Left Heart Catheterization Without Left Ventriculogram;  Surgeon: Dominik Vega MD;  Location: [facility] Cath Lab;  Service: Cardiology    CV LEFT HEART CATHETERIZATION WITHOUT LEFT VENTRICULOGRAM Left 01/06/2021    Procedure: Left Heart Catheterization Without Left Ventriculogram;  Surgeon: Dominik Vega MD;   Location: [facility] Cardiac Cath Lab;  Service: Cardiology    CV RIGHT HEART CATHETERIZATION Right 2019    Procedure: Right Heart Catheterization;  Surgeon: Dominik Vega MD;  Location: [facility] Cath Lab;  Service: Cardiology    CV TRANSCATHETER AORTIC VALVE REPLACEMENT N/A 2021    Procedure: Right transfemoral transcatheter aortic valve replacement using Magdaleno Dominick 3 size 29mm.  Transthoracic echocardiogram;  Surgeon: Jo Romano MD;  Location: [facility] CARDIAC CATH LAB    ESOPHAGOSCOPY, GASTROSCOPY, DUODENOSCOPY (EGD), COMBINED N/A 2016    Procedure: COMBINED ESOPHAGOSCOPY, GASTROSCOPY, DUODENOSCOPY (EGD);  Surgeon: Rk Srivastava MD;  Location: [facility]    HEART CATH FEMORAL CANNULIZATION WITH OPEN STANDBY REPAIR AORTIC VALVE N/A 2021    Procedure: Cardiopulmonary Bypass standby;  Surgeon: Jefferson Sandy MD;  Location: [facility] CARDIAC CATH LAB    HEART CATH, ANGIOPLASTY      IR LOWER EXTREMITY ANGIOGRAM LEFT  2021    LAPAROSCOPIC CHOLECYSTECTOMY  10/01/2009    MAZE PROCEDURE N/A 09/10/2014    Procedure: MAZE PROCEDURE;  Surgeon: Bharathi Caraballo MD;  Location:  OR    MOHS MICROGRAPHIC PROCEDURE      OPEN REDUCTION INTERNAL FIXATION HIP BIPOLAR Left 2022    Procedure: HEMIARTHROPLASTY, HIP, BIPOLAR, OPEN REDUCTION INTERNAL FIXATION OF GREATER TROCHANTER;  Surgeon: Jd Larose MD;  Location[facility] OR    ORTHOPEDIC SURGERY      surgery for fx  Left forearm    OTHER SURGICAL HISTORY Left 2015    forearm sugery    STENT, CORONARY, HUMA  2016    VASECTOMY       Family History   Problem Relation Age of Onset    Diabetes Mother     Hypertension Father     Cerebrovascular Disease Father     Diabetes Maternal Grandmother     Breast Cancer No family hx of     Cancer - colorectal No family hx of     Prostate Cancer No family hx of     C.A.D. No family hx of     Diabetes Type 2  Mother         58 ,  from anesthesia  complication.    Heart Disease Father         86  from stroke    No Known Problems Brother         60 years of age.    Social History     Socioeconomic History    Marital status:      Spouse name: Fay Ayala    Number of children: Not on file    Years of education: Not on file    Highest education level: Not on file   Occupational History     Employer: RETIRED   Tobacco Use    Smoking status: Former     Current packs/day: 0.00     Types: Cigarettes     Quit date: 1998     Years since quittin.6     Passive exposure: Never    Smokeless tobacco: Never   Vaping Use    Vaping status: Never Used   Substance and Sexual Activity    Alcohol use: Yes     Alcohol/week: 0.0 standard drinks of alcohol     Comment: Alcoholic Drinks/day: very rare    Drug use: No    Sexual activity: Never     Birth control/protection: None   Other Topics Concern    Parent/sibling w/ CABG, MI or angioplasty before 65F 55M? No   Social History Narrative     and lives with life.  Has adult children from previous relationship. ( Blended family).  Has a dog - Vincent Gil MD  1/3/2019                Medication Instructions:    Patient is to take all scheduled medications on the day of surgery EXCEPT for modifications listed below:     - aspirin: cardiology has recommended continuing baby aspirin daily     - warfarin: hold warfarin for 5 days before surgery     - Diuretics: HOLD on the day of surgery.     - Statins: Continue taking on the day of surgery.      - mixed insulin (70/30m 75/25, 50/50): HOLD day of surgery     - metformin: HOLD day of surgery.     - Opioids: Continue without modification     - hold any vitamins or supplements until after surgery.      Social Determinants of Health     Financial Resource Strain: Low Risk  (2024)    Financial Resource Strain     Within the past 12 months, have you or your family members you live with been unable to get utilities (heat, electricity) when it  was really needed?: No   Food Insecurity: Low Risk  (8/22/2024)    Food Insecurity     Within the past 12 months, did you worry that your food would run out before you got money to buy more?: No     Within the past 12 months, did the food you bought just not last and you didn t have money to get more?: No   Transportation Needs: Low Risk  (8/22/2024)    Transportation Needs     Within the past 12 months, has lack of transportation kept you from medical appointments, getting your medicines, non-medical meetings or appointments, work, or from getting things that you need?: No   Physical Activity: Not on file   Stress: Not on file   Social Connections: Not on file   Interpersonal Safety: Low Risk  (10/23/2023)    Interpersonal Safety     Do you feel physically and emotionally safe where you currently live?: Yes     Within the past 12 months, have you been hit, slapped, kicked or otherwise physically hurt by someone?: No     Within the past 12 months, have you been humiliated or emotionally abused in other ways by your partner or ex-partner?: No   Housing Stability: High Risk (8/22/2024)    Housing Stability     Do you have housing? : No     Are you worried about losing your housing?: No          Medications  Allergies   Current Outpatient Medications   Medication Sig Dispense Refill    acetaminophen (TYLENOL) 500 MG tablet Take 1 tablet (500 mg) by mouth 2 times daily as needed for mild pain      aspirin 81 MG EC tablet Take 1 tablet (81 mg) by mouth daily 90 tablet 0    carvedilol (COREG) 3.125 MG tablet Take 1 tablet (3.125 mg) by mouth 2 times daily (with meals) 180 tablet 3    Continuous Blood Gluc Sensor (FREESTYLE DOMI 2 SENSOR) MISC Change every 14 days. 6 each 3    Continuous Glucose  (FREESTYLE DOMI 2 READER) DOROTEO USE TO READ BLOOD SUGARS AS PER 'S INSTRUCTIONS 1 each 0    furosemide (LASIX) 40 MG tablet Take 80 mg by mouth 2 times daily.      gabapentin (NEURONTIN) 600 MG tablet Take 600  mg by mouth every evening.      insulin aspart prot & aspart (NOVOLOG MIX 70/30 PEN) (70-30) 100 UNIT/ML pen Inject subcutaneously 20 units in AM with breakfast, 12 units with lunch, and 12 units in PM with dinner. 15 mL 5    metFORMIN (GLUCOPHAGE) 500 MG tablet TAKE 2 TABLETS BY MOUTH TWICE  DAILY WITH MEALS 360 tablet 0    Multiple Vitamins-Minerals (PRESERVISION AREDS PO) Take 1 tablet by mouth 2 times daily      pramipexole (MIRAPEX) 0.5 MG tablet Take 1 mg by mouth every evening.      pramipexole (MIRAPEX) 0.5 MG tablet Take 0.5 mg by mouth at bedtime.      rosuvastatin (CRESTOR) 20 MG tablet Take 20 mg by mouth every evening      spironolactone (ALDACTONE) 25 MG tablet Take 1 tablet (25 mg) by mouth daily 90 tablet 3    tamsulosin (FLOMAX) 0.4 MG capsule Take 0.4 mg by mouth every morning      warfarin ANTICOAGULANT (COUMADIN) 4 MG tablet Take 6 mg by mouth five times a week. Take one and half tablet (6 mg) on Sunday, Tuesday, Wednesday, Friday and Saturday      warfarin ANTICOAGULANT (COUMADIN) 4 MG tablet Take 4 mg by mouth twice a week. Take one tablet (4 mg) on Monday and Thursday      Allergies   Allergen Reactions    Oxycodone Itching and Rash    Amlodipine Dizziness     Dizziness and dry heaves    Lisinopril Cough    Adhesive Tape Itching and Rash         Lab Results    Chemistry/lipid CBC Cardiac Enzymes/BNP/TSH/INR   Recent Labs   Lab Test 06/14/24  1202   CHOL 115   HDL 46   LDL 52   TRIG 84     Recent Labs   Lab Test 06/14/24  1202 06/06/24  1159 04/06/23  1625   LDL 52 47 58     Recent Labs   Lab Test 08/23/24  1122 08/22/24  0217 08/22/24  0123   NA  --   --  140   POTASSIUM  --   --  3.6   CHLORIDE  --   --  104   CO2  --   --  26   *   < > 44*   BUN  --   --  17.6   CR  --   --  0.94   GFRESTIMATED  --   --  81   RACHEL  --   --  9.2    < > = values in this interval not displayed.     Recent Labs   Lab Test 08/22/24  0123 06/06/24  1159 05/06/24  1505   CR 0.94 1.13 0.99     Recent Labs    Lab Test 06/06/24  1159 02/08/24  1528 10/23/23  1020   A1C 8.5* 7.8* 8.4*    Recent Labs   Lab Test 08/22/24  0123   WBC 8.3   HGB 12.9*   HCT 39.9*   MCV 99        Recent Labs   Lab Test 08/22/24  0123 12/21/23  1103 04/06/23  1625   HGB 12.9* 11.6* 12.4*    Recent Labs   Lab Test 07/04/22  1034   TROPONINI 0.15     Recent Labs   Lab Test 05/06/24  1505 01/16/24  1338 12/21/23  1103 07/04/22  1034 01/08/21  0842 10/21/20  1451 05/09/19  1657 06/09/16  1203   BNP  --   --   --  321* 91* 56   < >  --    NTBNPI  --   --  2,987*  --   --   --   --   --    NTBNP 482 1,204  --   --   --   --   --  354*    < > = values in this interval not displayed.     Recent Labs   Lab Test 11/17/22  1315   TSH 0.92     Recent Labs   Lab Test 08/23/24  0534 08/22/24  0123 08/15/24  1411   INR 2.31* 1.75* 1.7*        40 minutes spent on the date of encounter doing education, chart prep/review, review of outside records, review of test results, interpretation with above tests, patient visit, documentation, and discussion with family.      This note has been dictated using voice recognition software. Any grammatical or context distortions are unintentional and inherent to the software.    Ruth Ann Dutton PA-C  Structural Heart Program  [facility]

## 2024-09-01 LAB
ATRIAL RATE - MUSE: 88 BPM
DIASTOLIC BLOOD PRESSURE - MUSE: 75 MMHG
INTERPRETATION ECG - MUSE: NORMAL
P AXIS - MUSE: NORMAL DEGREES
PR INTERVAL - MUSE: NORMAL MS
QRS DURATION - MUSE: 96 MS
QT - MUSE: 434 MS
QTC - MUSE: 488 MS
R AXIS - MUSE: -23 DEGREES
SYSTOLIC BLOOD PRESSURE - MUSE: 129 MMHG
T AXIS - MUSE: 212 DEGREES
VENTRICULAR RATE- MUSE: 76 BPM

## 2024-09-04 ENCOUNTER — TELEPHONE (OUTPATIENT)
Dept: FAMILY MEDICINE | Facility: CLINIC | Age: 84
End: 2024-09-04
Payer: COMMERCIAL

## 2024-09-04 NOTE — TELEPHONE ENCOUNTER
Medication Question or Refill    Contacts       Contact Date/Time Type Contact Phone/Fax    09/04/2024 09:56 AM CDT Phone (Incoming) Pee Ayala (Self) [phone] (H)            What medication are you calling about (include dose and sig)?: currently uses Novalog, want to change to Humalog Pens.    Preferred Pharmacy:   [facility] [address] [place], [place] - [address]  [address]  [facility] [address]  Phone: [phone] Fax: [phone]        Controlled Substance Agreement on file:   CSA -- Patient Level:     [Media Unavailable] Controlled Substance Agreement - Opioid - Scan on 10/23/2023  2:23 PM   [Media Unavailable] Controlled Substance Agreement - Opioid - Scan on 2/16/2023  5:05 PM   [Media Unavailable] Controlled Substance Agreement - Opioid - Scan on 1/3/2019       Who prescribed the medication?: unsure    Do you need a refill? Yes    When did you use the medication last? Takes it daily.    Patient offered an appointment? Yes: scheduled for 9/16/24    Do you have any questions or concerns?  Yes: he has enough medication for a week, and has also requested assistance from Endocrine, they suggest he reach out to primary as well.      Could we send this information to you in [facility] or would you prefer to receive a phone call?:   Patient would prefer a phone call   Okay to leave a detailed message?: Yes at Home number on file [phone] (home)

## 2024-09-05 ENCOUNTER — TELEPHONE (OUTPATIENT)
Dept: CARDIOLOGY | Facility: CLINIC | Age: 84
End: 2024-09-05

## 2024-09-05 NOTE — TELEPHONE ENCOUNTER
Switch from Novolog to Humalog made by endocrinology, see telephone encounter dated 9/3/24. Nothing further needed, closing encounter.

## 2024-09-05 NOTE — TELEPHONE ENCOUNTER
Routed to Dr. Hernandez. [facility]    ----- Message -----  From: Oliverio Santiago MD  Sent: 9/4/2024   4:18 PM CDT  To: Sophia Curry RN; Marty Mariano MD; *    To all,  I agree with Marty.  I do not see any residual atrial appendage.  Thanks  Oliverio  ----- Message -----  From: Marty Mariano MD  Sent: 8/19/2024   2:50 PM CDT  To: Sophia Curry RN; Oliverio Santiago MD    I don't see any residual pouch, but let me have Dr. Santiago confirm also.    Thank you!  Marty  ----- Message -----  From: Sophia Curry RN  Sent: 8/19/2024   1:25 PM CDT  To: Marty Mariano MD; Oliverio Santiago MD    Please review images from 12/04/20 - question was if JESICA is fully ligated, or is there is a residual pouch s/p CABG w/ MAZE procedure in 2014.  Pt interested in LAAC, currently on warfarin and having labile INRs, and falls.    Thank you,  Sophia Warner  ----- Message -----  From: Jd Evans MD  Sent: 8/19/2024  12:30 PM CDT  To: Sophia Curry RN; Ulices Hernandez DO; *    There is a 5 mm residual stump. Looks fairly smooth and free of pectinates so probably low risk for thrombus formation and unlikely amenable to percutaneous closure. Probably okay to stop AC but you could have Dr. Santiago or Dr. Mariano take a look for a second opinion.     Jd  ----- Message -----  From: Sophia Curry RN  Sent: 8/16/2024   9:35 AM CDT  To: Jd Evans MD; Ulices Hernandez DO; *    Please review images from 12/04/20.  Trying to verify if JESICA is fully ligated or is there a residual pouch?    Thanks,    Sophia Warner RN    ----- Message from Sophia HUNT sent at 8/16/2024  9:07 AM CDT -----  Images have been requested from 11/09/20 CTA Chest Abd Pelvis, including TAVR overread.  We will have implanters review and let you know.    Thanks,  Sophia Warner  ----- Message -----  From: Monae Reich PA-C  Sent: 8/15/2024   4:27 PM CDT  To: Ulices Hernandez DO; #    I referred this patient for watchman consult today (persistent AF), although after  looking further through the chart he may have had left atrial appendage removal in 2014 with CABG. the main reason for warfarin appears to be atrial fibrillation, I also do not see evidence of history of TIA/CVA, PE, DVT.    Due to being 10 years ago I cannot actually find the exact date of his bypass or records.  I do not see any specific testing looking for appropriate removal of the JESICA since then.  A SELAM in 2014 comments about the presence of the JESICA. CT prior to TAVR in 2020 was without comment     Hoping you could help me search through the chart, and perhaps he may only need imaging to confirm JESICA removal if he has been on warfarin erroneously for    CC Dr. Hernandez who has seen him the last two years if there is any insight    Thanks!

## 2024-09-09 ENCOUNTER — LAB (OUTPATIENT)
Dept: LAB | Facility: CLINIC | Age: 84
End: 2024-09-09
Payer: COMMERCIAL

## 2024-09-09 ENCOUNTER — ANTICOAGULATION THERAPY VISIT (OUTPATIENT)
Dept: ANTICOAGULATION | Facility: CLINIC | Age: 84
End: 2024-09-09

## 2024-09-09 DIAGNOSIS — Z95.2 S/P TAVR (TRANSCATHETER AORTIC VALVE REPLACEMENT): ICD-10-CM

## 2024-09-09 DIAGNOSIS — I48.91 NEW ONSET ATRIAL FIBRILLATION (H): ICD-10-CM

## 2024-09-09 DIAGNOSIS — I48.20 CHRONIC ATRIAL FIBRILLATION (H): Primary | ICD-10-CM

## 2024-09-09 DIAGNOSIS — E11.42 TYPE 2 DIABETES MELLITUS WITH PERIPHERAL NEUROPATHY (H): ICD-10-CM

## 2024-09-09 DIAGNOSIS — Z79.01 LONG TERM (CURRENT) USE OF ANTICOAGULANTS: ICD-10-CM

## 2024-09-09 DIAGNOSIS — Z79.01 CHRONIC ANTICOAGULATION: ICD-10-CM

## 2024-09-09 DIAGNOSIS — I73.9 PERIPHERAL ARTERIAL DISEASE (H): ICD-10-CM

## 2024-09-09 DIAGNOSIS — I48.20 CHRONIC ATRIAL FIBRILLATION (H): ICD-10-CM

## 2024-09-09 LAB
HBA1C MFR BLD: 8.4 % (ref 0–5.6)
INR BLD: 4.5 (ref 0.9–1.1)

## 2024-09-09 PROCEDURE — 83036 HEMOGLOBIN GLYCOSYLATED A1C: CPT

## 2024-09-09 PROCEDURE — 36415 COLL VENOUS BLD VENIPUNCTURE: CPT

## 2024-09-09 PROCEDURE — 85610 PROTHROMBIN TIME: CPT

## 2024-09-09 NOTE — PROGRESS NOTES
ANTICOAGULATION MANAGEMENT     Pee Ayala 84 year old male is on warfarin with supratherapeutic INR result. (Goal INR 2.0-3.0)    Recent labs: (last 7 days)     09/09/24  1027   INR 4.5*       ASSESSMENT     Warfarin Lab Questionnaire    Warfarin Doses Last 7 Days      9/9/2024    10:20 AM   Dose in Tablet or Mg   TAB or MG? - 4mg / 5mg warfarin tablets (evenings) milligram (mg)     Pt Rptd Dose SUNDAY MONDAY TUESDAY WED THURS FRIDAY SATURDAY 9/9/2024  10:20 AM 4 4 5 4 5 4 4         9/9/2024   Warfarin Lab Questionnaire   Missed doses within past 14 days? No   Changes in diet or alcohol within past 14 days? No   Medication changes since last result? No - ABOVE TEMPLATE HAS WRONG WARFARIN DOSE INSTRUCTIONS.  NEED TO VERIFY WARFARIN DOSING INSTRUCTIONS.     Injuries or illness since last result? No - 8/26/24 Heart care consultation for permanent atrial fibrillation.  Has recurrent balance issues / FALL, and labile INRs.   JESICA closure was discussed and will evaluate if he a good candidate for watchman.       - s/p TAVR in 1/12/21.     New shortness of breath, severe headaches or sudden changes in vision since last result? No   Abnormal bleeding since last result? No   Upcoming surgery, procedure? No   Best number to call with results? [phone]        Previous result: Therapeutic last 2 INR visits.    Additional findings: None       PLAN     Unable to reach Pee today.    Left message to hold warfarin tonight. Request call back for assessment.    Follow up required to confirm warfarin dose taken and assess for changes    Mariel Dale RN  9/9/2024  Anticoagulation Clinic  [facility] for routing messages: dayna [facility] patient phone line: [phone]     V-Y Flap Text: The defect edges were debeveled with a #15 scalpel blade.  Given the location of the defect, shape of the defect and the proximity to free margins a V-Y flap was deemed most appropriate.  Using a sterile surgical marker, an appropriate advancement flap was drawn incorporating the defect and placing the expected incisions within the relaxed skin tension lines where possible.    The area thus outlined was incised deep to adipose tissue with a #15 scalpel blade.  The skin margins were undermined to an appropriate distance in all directions utilizing iris scissors.

## 2024-09-10 NOTE — PROGRESS NOTES
ANTICOAGULATION MANAGEMENT     Pee Ayala 84 year old male is on warfarin with supratherapeutic INR result. (Goal INR 2.0-3.0)    Recent labs: (last 7 days)     09/09/24  1027   INR 4.5*       ASSESSMENT     Warfarin Lab Questionnaire    Warfarin Doses Last 7 Days      9/9/2024    10:20 AM   Dose in Tablet or Mg   TAB or MG? - 4mg / 5mg warfarin tablets (evenings)  milligram (mg)     Pt Rptd Dose SUNDAY MONDAY TUESDAY WED THURS FRIDAY SATURDAY 9/9/2024  10:20 AM 4 4 5 4 5 4 4         9/9/2024   Warfarin Lab Questionnaire   Missed doses within past 14 days? No -    Changes in diet or alcohol within past 14 days? No   Medication changes since last result? No   Injuries or illness since last result? No  - 8/26/24 Heart care consultation for permanent atrial fibrillation.  Has recurrent balance issues / FALL, and labile INRs.   JESICA closure was discussed and will evaluate if he a good candidate for watchman.       - s/p TAVR in 1/12/21.     New shortness of breath, severe headaches or sudden changes in vision since last result? No   Abnormal bleeding since last result? No   Upcoming surgery, procedure? No   Best number to call with results? [phone]        Previous result: Therapeutic last 2 INR visits.    Additional findings: None       PLAN     Unable to reach Pee today.   - this writer talked with Agueda.   - Pee is currently not at home.    Communicated dose instructions to wife, Marlene.   - Marlene reported she is not sure if Pee HELD warfarin dose yesterday evening.   - advised - if he held warfarin dose last night, take one time lower dose with 4mg   - if he did take 4mg warfarin dose last night, advised to HOLD warfarin dose tonight.   - inform Pee ACN will call tomorrow (9/11/24) morning at 1030a.    Follow up required to confirm warfarin dose taken and assess for changes   - need to assess his warfarin dose, since he wrote  wrong warfarin dose on Ipad.   - next INR check will be on 9/16/24 at his  follow-up visit with Dr. [email or web address]    Mariel Dale RN  9/10/2024  Anticoagulation Clinic  [facility] for routing messages: dayna CAMP [facility] patient phone line: [phone]

## 2024-09-11 NOTE — PROGRESS NOTES
ANTICOAGULATION MANAGEMENT     Pee Ayala 84 year old male is on warfarin with supratherapeutic INR result. (Goal INR 2.0-3.0)    Recent labs: (last 7 days)     09/09/24  1027   INR 4.5*       ASSESSMENT     Warfarin Lab Questionnaire    Warfarin Doses Last 7 Days      9/9/2024    10:20 AM   Dose in Tablet or Mg   TAB or MG? milligram (mg)     Pt Rptd Dose SUNDAY MONDAY TUESDAY WED THURS FRIDAY SATURDAY 9/9/2024  10:20 AM 4 4 5 4 5 4 4         9/9/2024   Warfarin Lab Questionnaire   Missed doses within past 14 days? No   Changes in diet or alcohol within past 14 days? No   Medication changes since last result? No   Injuries or illness since last result? No   New shortness of breath, severe headaches or sudden changes in vision since last result? No   Abnormal bleeding since last result? No   Upcoming surgery, procedure? No   Best number to call with results? [phone]        Previous result: Therapeutic last visit; previously outside of goal range  Additional findings:  says the dose he has been taking is 6 mg mon and thurs, 4 mg other days . He says he is sure.  Currently being reviewed by heart care for anticoag needs. Pls see TE 9/5       PLAN     Recommended plan for no diet, medication or health factor changes and recent heart valve replacement last 10 weeks affecting INR     Dosing Instructions: hold dose then decrease your warfarin dose (8.3% change) with next INR in 1 week       Summary  As of 9/9/2024      Full warfarin instructions:  9/9: Hold; Otherwise 6 mg every Tue; 4 mg all other days   Next INR check:  9/16/2024               Telephone call with Pee who verbalizes understanding and agrees to plan    Check at provider office visit 9/16    Education provided: Please call back if any changes to your diet, medications or how you've been taking warfarin    Plan made per [facility] anticoagulation protocol    Yousuf Madison RN  9/11/2024  Anticoagulation Clinic  [employer] for routing messages: dayna  DEVON IQBAL  [facility] patient phone line: [phone]        _______________________________________________________________________     Anticoagulation Episode Summary       Current INR goal:  2.0-3.0   TTR:  32.7% (10.5 mo)   Target end date:  Indefinite   Send INR reminders to:  DEVON IQBAL    Indications    Chronic atrial fibrillation (H) [I48.20]  Chronic anticoagulation [Z79.01]  Long term (current) use of anticoagulants [Z79.01]  Peripheral arterial disease (H24) [I73.9]  S/P TAVR (transcatheter aortic valve replacement) [Z95.2]  New onset atrial fibrillation (H) [I48.91]             Comments:               Anticoagulation Care Providers       Provider Role Specialty Phone number    Jazzy Gil MD Referring Family Medicine [phone]    Ihsan Singh MD Referring Family Medicine     [facility] PA-C Referring Physician Assistant - Medical [phone]

## 2024-09-24 ENCOUNTER — ANTICOAGULATION THERAPY VISIT (OUTPATIENT)
Dept: ANTICOAGULATION | Facility: CLINIC | Age: 84
End: 2024-09-24

## 2024-09-24 ENCOUNTER — OFFICE VISIT (OUTPATIENT)
Dept: FAMILY MEDICINE | Facility: CLINIC | Age: 84
End: 2024-09-24
Payer: COMMERCIAL

## 2024-09-24 VITALS
WEIGHT: 165 LBS | SYSTOLIC BLOOD PRESSURE: 122 MMHG | RESPIRATION RATE: 20 BRPM | DIASTOLIC BLOOD PRESSURE: 50 MMHG | HEART RATE: 58 BPM | OXYGEN SATURATION: 99 % | TEMPERATURE: 97.6 F | BODY MASS INDEX: 29.23 KG/M2

## 2024-09-24 DIAGNOSIS — I73.9 PERIPHERAL ARTERIAL DISEASE (H): ICD-10-CM

## 2024-09-24 DIAGNOSIS — G62.9 NEUROPATHY: Primary | ICD-10-CM

## 2024-09-24 DIAGNOSIS — Z79.01 CHRONIC ANTICOAGULATION: ICD-10-CM

## 2024-09-24 DIAGNOSIS — Z79.01 LONG TERM (CURRENT) USE OF ANTICOAGULANTS: ICD-10-CM

## 2024-09-24 DIAGNOSIS — Z79.4 TYPE 2 DIABETES MELLITUS WITH OTHER SPECIFIED COMPLICATION, WITH LONG-TERM CURRENT USE OF INSULIN (H): ICD-10-CM

## 2024-09-24 DIAGNOSIS — Z95.2 S/P TAVR (TRANSCATHETER AORTIC VALVE REPLACEMENT): ICD-10-CM

## 2024-09-24 DIAGNOSIS — I48.91 NEW ONSET ATRIAL FIBRILLATION (H): ICD-10-CM

## 2024-09-24 DIAGNOSIS — I48.20 CHRONIC ATRIAL FIBRILLATION (H): Primary | ICD-10-CM

## 2024-09-24 DIAGNOSIS — E11.69 TYPE 2 DIABETES MELLITUS WITH OTHER SPECIFIED COMPLICATION, WITH LONG-TERM CURRENT USE OF INSULIN (H): ICD-10-CM

## 2024-09-24 DIAGNOSIS — I48.20 CHRONIC ATRIAL FIBRILLATION (H): ICD-10-CM

## 2024-09-24 LAB — INR BLD: 1.6 (ref 0.9–1.1)

## 2024-09-24 PROCEDURE — 36416 COLLJ CAPILLARY BLOOD SPEC: CPT | Performed by: FAMILY MEDICINE

## 2024-09-24 PROCEDURE — 99214 OFFICE O/P EST MOD 30 MIN: CPT | Performed by: FAMILY MEDICINE

## 2024-09-24 PROCEDURE — 85610 PROTHROMBIN TIME: CPT | Performed by: FAMILY MEDICINE

## 2024-09-24 RX ORDER — GABAPENTIN 600 MG/1
TABLET ORAL
Status: SHIPPED
Start: 2024-09-24

## 2024-09-24 NOTE — PROGRESS NOTES
Assessment & Plan     Neuropathy  Patient with ongoing problems in the last couple months with right shoulder numbness and tingling history of neuropathy  Currently on 600 mg at night  Discussed adding additional half a tablet during the day to see if help improve right shoulder  Symptoms occurred not long after a fall sustained in July suspect possible injury to the thoracic region causing symptoms patient has had compression fractures in the past  He is not having pain but more of an annoyance with the ongoing numbness  Patient is agreement to increase gabapentin will update me if not improving  - gabapentin (NEURONTIN) 600 MG tablet    Long term (current) use of anticoagulants  Patient due for an INR recent INR elevated  Anticoagulation due to atrial fibrillation looking to possibly pursue Watchman device  Has been taking 5 mg daily due to running out of his 4 mg this will follow-up with INR nurse  - INR point of care (finger stick)    Chronic atrial fibrillation (H)  Please see above  - INR point of care (finger stick)    Peripheral arterial disease (H24)  History of peripheral artery disease on warfarin  - INR point of care (finger stick)    S/P TAVR (transcatheter aortic valve replacement)  Status post valve replacement  Continue with warfarin at this time check INR today  - INR point of care (finger stick)    Type 2 diabetes mellitus with other specified complication, with long-term current use of insulin (H)  Patient follow with endocrinology    Patient is looking to establish care with a new primary care provider his previous one has retired  Instructed patient we be happy to see him at clinic  Reviewed records  Greater than 30-minute spent today in interview and examination with the patient          Blood sugar testing frequency justification:  Risk of hypoglycemia with medication(s)        Subjective   Pee is a 84 year old, presenting for the following health issues:  INR Followup (Needing INR checked  ), Diabetes, and Shoulder (Numbness sensation in right shoulder, intermittent, episodes lasting 30-60 seconds )        9/24/2024    12:06 PM   Additional Questions   Roomed by Dior HUNT CMA     History of Present Illness       Diabetes:   He presents for follow up of diabetes.  He is checking home blood glucose four or more times daily.   He checks blood glucose after meals and at bedtime.  Blood glucose is sometimes over 200 and sometimes under 70. He is aware of hypoglycemia symptoms including shakiness and weakness.   He is concerned about low blood sugar, several less than 70 in the past few weeks.   He is having burning in feet and blurry vision.            Reason for visit:  Neet new    He eats 2-3 servings of fruits and vegetables daily.He consumes 1 sweetened beverage(s) daily.He exercises with enough effort to increase his heart rate 10 to 19 minutes per day.  He exercises with enough effort to increase his heart rate 3 or less days per week. He is missing 1 dose(s) of medications per week.  He is not taking prescribed medications regularly due to remembering to take.     Patient is here for [facility] care and right shoulder numbness in the areas of the scapula on the right side not on the left patient has been experiencing some numbness that comes and goes with activity and at rest-patient states a lot of time he is in his recliner and this occurs when he is at rest and he has numbness in his right shoulder there is no pain.  This came about not long after a fall sustained during 4 July.  He has a history of neuropathy in review of chart and is on gabapentin currently-we discussed with him the possibility of compression fractures or injury to the back that caused the symptoms of peripheral nerve irritation he states he has had multiple compression fractures in the past.  At this time would like to increase his gabapentin to twice daily to see if this will improve his symptoms.  Patient is in agreement.  He is  due for an INR recent INR checked a few weeks ago was markedly elevated he is run out of his 4 mg tablets and has been taking 5 mg daily.  Reviewed records with him today.  Patient has extensive surgical history is awaiting possible approval for Watchman device.                Objective    /50 (BP Location: Left arm, Patient Position: Sitting, Cuff Size: Adult Regular)   Pulse 58   Temp 97.6  F (36.4  C) (Oral)   Resp 20   Wt 74.8 kg (165 lb)   SpO2 99%   BMI 29.23 kg/m    Body mass index is 29.23 kg/m .  Physical Exam   GENERAL: alert and no distress  RESP: lungs clear to auscultation - no rales, rhonchi or wheezes  CV: irregularly irregular rhythm and no peripheral edema  MS: no gross musculoskeletal defects noted, no edema  NEURO: Normal strength and tone, mentation intact, and walking with a cane  PSYCH: mentation appears normal, affect normal/bright            Signed Electronically by: Guido Pryor MD

## 2024-09-24 NOTE — PROGRESS NOTES
ANTICOAGULATION MANAGEMENT     Pee Ayala 84 year old male is on warfarin with subtherapeutic INR result. (Goal INR 2.0-3.0)    Recent labs: (last 7 days)     09/24/24  1238   INR 1.6*       ASSESSMENT     Source(s): Chart Review and Patient/Caregiver Call    Warfarin doses taken: Held one dose on 9/9/24, recently which may be affecting INR   VERIFY DOSE WITH PEE AND DID HE MISS ANY DOSES ?  Medication/supplement changes:  Yes.   And weekly warfarin dose was decreased by 8.3% on 9/9/24.  New illness, injury, or hospitalization: No  Previous result: Supratherapeutic at 4.5 on 9/9/24.  Additional findings: None       PLAN     Unable to reach Pee today.    Left message to take a booster dose of warfarin,  8 mg tonight. Request call back for assessment.    Follow up required to confirm warfarin dose taken and assess for changes    Mariel Dale RN  9/24/2024  Anticoagulation Clinic  [facility] for routing messages: dayna CAMP [facility] patient phone line: [phone]

## 2024-09-25 RX ORDER — WARFARIN SODIUM 4 MG/1
4 TABLET ORAL
OUTPATIENT
Start: 2024-09-26

## 2024-09-25 NOTE — PROGRESS NOTES
ANTICOAGULATION MANAGEMENT     Pee Ayala 84 year old male is on warfarin with subtherapeutic INR result. (Goal INR 2.0-3.0)    Recent labs: (last 7 days)     09/24/24  1238   INR 1.6*       ASSESSMENT     Source(s): Chart Review and Patient/Caregiver Call     Warfarin doses taken: Held dose on 9/9/24,  recently which may be affecting INR   Ran out of the 4mg Warfarin and only taking 5mg daily for one week.  Pee was OK to use only the 5mg warfarin tabs, so there will be no confusion on his weekly dosing.   Advised to call ACN if he needs refills on his warfarin.  Medication/supplement changes:  Yes   And weekly warfarin dose was decreased by 8.3% on 9/9/24.   New illness, injury, or hospitalization: No  Signs or symptoms of bleeding or clotting: No  Previous result: Supratherapeutic at 4.5 on 9/9/24.   Additional findings: None       PLAN     Recommended plan for temporary change(s) affecting INR     Dosing Instructions: Increase your warfarin dose (14.3% change) with next INR in 1 week       Summary  As of 9/24/2024      Full warfarin instructions:  7.5 mg every Wed, Sat; 5 mg all other days   Next INR check:  10/8/2024               Telephone call with Pee who verbalizes understanding and agrees to plan   - advised Pee if he runs low on warfarin or needs refill on warfarin, to give ACN a call.   - advised to work with the 5mg warfarin tablets, and will not refill his 4mg tabs at this time.  He was agreeable.    Lab visit scheduled - INR on 10/1/24 @ [facility]    Education provided: Taking warfarin: Importance of taking warfarin as instructed  Goal range and lab monitoring: goal range and significance of current result    Plan made with [facility] Pharmacist Sandra Dale, RN  9/25/2024  Anticoagulation Clinic  [employer] for routing messages: dayna IQBAL  [facility] patient phone line: [phone]        _______________________________________________________________________      Anticoagulation Episode Summary       Current INR goal:  2.0-3.0   TTR:  32.8% (11 mo)   Target end date:  Indefinite   Send INR reminders to:  DEVON CAMP [facility]    Indications    Chronic atrial fibrillation (H) [I48.20]  Chronic anticoagulation [Z79.01]  Long term (current) use of anticoagulants [Z79.01]  Peripheral arterial disease (H24) [I73.9]  S/P TAVR (transcatheter aortic valve replacement) [Z95.2]  New onset atrial fibrillation (H) [I48.91]             Comments:               Anticoagulation Care Providers       Provider Role Specialty Phone number    Jazzy Gil MD Referring Family Medicine [phone]    Ihsan Singh MD Referring Family Medicine     [facility] PA-C Referring Physician Assistant - Medical [phone]

## 2024-10-01 ENCOUNTER — ANTICOAGULATION THERAPY VISIT (OUTPATIENT)
Dept: ANTICOAGULATION | Facility: CLINIC | Age: 84
End: 2024-10-01

## 2024-10-01 ENCOUNTER — OFFICE VISIT (OUTPATIENT)
Dept: ENDOCRINOLOGY | Facility: CLINIC | Age: 84
End: 2024-10-01
Payer: COMMERCIAL

## 2024-10-01 ENCOUNTER — LAB (OUTPATIENT)
Dept: LAB | Facility: CLINIC | Age: 84
End: 2024-10-01
Payer: COMMERCIAL

## 2024-10-01 VITALS — HEART RATE: 69 BPM | OXYGEN SATURATION: 98 % | SYSTOLIC BLOOD PRESSURE: 134 MMHG | DIASTOLIC BLOOD PRESSURE: 79 MMHG

## 2024-10-01 DIAGNOSIS — E11.42 TYPE 2 DIABETES MELLITUS WITH PERIPHERAL NEUROPATHY (H): Primary | ICD-10-CM

## 2024-10-01 DIAGNOSIS — I48.91 NEW ONSET ATRIAL FIBRILLATION (H): ICD-10-CM

## 2024-10-01 DIAGNOSIS — Z95.2 S/P TAVR (TRANSCATHETER AORTIC VALVE REPLACEMENT): ICD-10-CM

## 2024-10-01 DIAGNOSIS — I73.9 PERIPHERAL ARTERIAL DISEASE (H): ICD-10-CM

## 2024-10-01 DIAGNOSIS — Z79.01 CHRONIC ANTICOAGULATION: ICD-10-CM

## 2024-10-01 DIAGNOSIS — I48.20 CHRONIC ATRIAL FIBRILLATION (H): Primary | ICD-10-CM

## 2024-10-01 DIAGNOSIS — I48.20 CHRONIC ATRIAL FIBRILLATION (H): ICD-10-CM

## 2024-10-01 DIAGNOSIS — Z79.01 LONG TERM (CURRENT) USE OF ANTICOAGULANTS: ICD-10-CM

## 2024-10-01 LAB — INR BLD: 3.2 (ref 0.9–1.1)

## 2024-10-01 PROCEDURE — 36416 COLLJ CAPILLARY BLOOD SPEC: CPT

## 2024-10-01 PROCEDURE — G2211 COMPLEX E/M VISIT ADD ON: HCPCS | Performed by: NURSE PRACTITIONER

## 2024-10-01 PROCEDURE — 85610 PROTHROMBIN TIME: CPT

## 2024-10-01 PROCEDURE — 99214 OFFICE O/P EST MOD 30 MIN: CPT | Performed by: NURSE PRACTITIONER

## 2024-10-01 NOTE — PATIENT INSTRUCTIONS
Continue taking your insulin 20 AM, 12 with lunch and 12 with dinner.     Add a protein source with lunch - cheese, Peanut Butter crackers, or nuts    Move your sweet treat to earlier in the day.     Try having some protein in the evening instead of the sweet treat.
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
## 2024-10-01 NOTE — PROGRESS NOTES
ANTICOAGULATION MANAGEMENT     Pee Ayala 84 year old male is on warfarin with supratherapeutic INR result. (Goal INR 2.0-3.0)    Recent labs: (last 7 days)     10/01/24  1221   INR 3.2*       ASSESSMENT     Warfarin Lab Questionnaire    Warfarin Doses Last 7 Days      10/1/2024    12:09 PM   Dose in Tablet or Mg   TAB or MG? - 5mg warfarin tablets (evenings) milligram (mg)     Pt Rptd Dose JAY MONDAY TUESDAY WED THURS FRIDAY SATURDAY   10/1/2024  12:09 PM 5 5 5 5 5 5 5         10/1/2024   Warfarin Lab Questionnaire   Missed doses within past 14 days? No   Changes in diet or alcohol within past 14 days? No   Medication changes since last result? No - weekly warfarin dose increased by 14.3% on 9/24/25.     Injuries or illness since last result? No   New shortness of breath, severe headaches or sudden changes in vision since last result? No   Abnormal bleeding since last result? No   Upcoming surgery, procedure? No     Best number to call with results? [phone] [email or web address]        Previous result: Subtherapeutic at 1.6 on 9/24/24.    Additional findings:  Chart reviewed.       PLAN     Recommended plan for no diet, medication or health factor changes affecting INR     Dosing Instructions: Continue your current warfarin dose with next INR in 2 weeks       Summary  As of 10/1/2024      Full warfarin instructions:  7.5 mg every Wed, Sat; 5 mg all other days   Next INR check:                 Telephone call with wife, Agueda who verbalizes understanding and agrees to plan    - advised to call this writer back, if he is taking different warfarin dose.   - agreed with plan.   - Pee is currently walking his dog @ the [employer].    Patient offered & declined to schedule next visit    Education provided: Please call back if any changes to your diet, medications or how you've been taking warfarin  Taking warfarin: Importance of taking warfarin as instructed  Goal range and lab monitoring: goal range and significance of  current result    Plan made per [facility] anticoagulation protocol    Mariel Dale RN  10/1/2024  Anticoagulation Clinic  [employer] for routing messages: p DEVON IQBAL  [facility] patient phone line: [phone]        _______________________________________________________________________     Anticoagulation Episode Summary       Current INR goal:  2.0-3.0   TTR:  33.4% (11.2 mo)   Target end date:  Indefinite   Send INR reminders to:  DEVON IQBAL    Indications    Chronic atrial fibrillation (H) [I48.20]  Chronic anticoagulation [Z79.01]  Long term (current) use of anticoagulants [Z79.01]  Peripheral arterial disease (H) [I73.9]  S/P TAVR (transcatheter aortic valve replacement) [Z95.2]  New onset atrial fibrillation (H) [I48.91]             Comments:               Anticoagulation Care Providers       Provider Role Specialty Phone number    Jazzy Gil MD Referring Family Medicine [phone]    Ihsan Singh MD Referring Family Medicine     [facility] PA-C Referring Physician Assistant - Medical [phone]
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
## 2024-10-03 ENCOUNTER — PATIENT OUTREACH (OUTPATIENT)
Dept: CARE COORDINATION | Facility: CLINIC | Age: 84
End: 2024-10-03
Payer: COMMERCIAL

## 2024-10-08 NOTE — TELEPHONE ENCOUNTER
Naomie Medina, RN  You20 minutes ago (1:29 PM)     XAVIER Talavera, please call patient with Dr. Hernandez notes below. Thank you      Ulices Hernandez, Naomie Linares RN24 minutes ago (1:25 PM)       Patient has no residual pouch on CT imaging after surgical ligation.   I would support discontinuation to anticoagulation at this time.

## 2024-10-09 ENCOUNTER — DOCUMENTATION ONLY (OUTPATIENT)
Dept: ANTICOAGULATION | Facility: CLINIC | Age: 84
End: 2024-10-09
Payer: COMMERCIAL

## 2024-10-09 DIAGNOSIS — Z95.2 S/P TAVR (TRANSCATHETER AORTIC VALVE REPLACEMENT): ICD-10-CM

## 2024-10-09 DIAGNOSIS — I73.9 PERIPHERAL ARTERIAL DISEASE (H): ICD-10-CM

## 2024-10-09 DIAGNOSIS — I48.20 CHRONIC ATRIAL FIBRILLATION (H): Primary | ICD-10-CM

## 2024-10-09 DIAGNOSIS — Z79.01 CHRONIC ANTICOAGULATION: ICD-10-CM

## 2024-10-09 DIAGNOSIS — I48.91 NEW ONSET ATRIAL FIBRILLATION (H): ICD-10-CM

## 2024-10-09 DIAGNOSIS — Z79.01 LONG TERM (CURRENT) USE OF ANTICOAGULANTS: ICD-10-CM

## 2024-10-09 NOTE — TELEPHONE ENCOUNTER
PC with patient and discussion of provider recommendations. Rationale, and to discontinue his Warfarin/Coumadin. Verbalized understanding. Will route to ANCOAG team to discontinue medication and discharge from Anticoagulation team program. Will also route to PCP to inform. No further questions at this time. Jp OLIVAS     Anticoagulation team please discharge from program and reach out to patient to inform, and make sure discontinuation of Warfarin. Thank you CMM,Rn

## 2024-10-09 NOTE — TELEPHONE ENCOUNTER
Return call to patient. Review of rationale and recommendations to stop the blood thinner Warfarin/Coumadin. No more INR's or the medication. Continue Aspirin 81 mg, states that he has been taking and will remain on it. Reviewed concerns also of history of falling, his risk of bleeding, and INR fluctuating. Has fallen due to hypoglycemia. Understands rationale, and will stop the medication starting today. No tapering off, just stop. May take 4 days for to remove from his system. Call if questions. Verbalized understanding. Medication removed/discontinued. DEISY,Rn

## 2024-10-09 NOTE — PROGRESS NOTES
ANTICOAGULATION  MANAGEMENT    Pee Ayala is being discharged from the [facility] ([facility]).    Reason for discharge:  per [facility] doctors - Dr. Mariano and Dr. Santiago - no residual pouch on CT imaging after surgical ligation.  OK to discontinue anticoagulation.    Anticoagulation episode resolved, [facility] referral closed, and Standing order discontinued    If patient needs warfarin management in the future, please send a new referral    Mariel Dale RN

## 2024-10-09 NOTE — TELEPHONE ENCOUNTER
PC and discussion with MAT. Clarify order for warfarin/coumadin. Verbal order to stop/discontinue Warfarin/Coumadin blood thinner. DEISY,Rn

## 2024-10-11 ENCOUNTER — IMMUNIZATION (OUTPATIENT)
Dept: FAMILY MEDICINE | Facility: CLINIC | Age: 84
End: 2024-10-11
Payer: COMMERCIAL

## 2024-10-11 PROCEDURE — G0008 ADMIN INFLUENZA VIRUS VAC: HCPCS

## 2024-10-11 PROCEDURE — 90480 ADMN SARSCOV2 VAC 1/ONLY CMP: CPT

## 2024-10-11 PROCEDURE — 91320 SARSCV2 VAC 30MCG TRS-SUC IM: CPT

## 2024-10-11 PROCEDURE — 90662 IIV NO PRSV INCREASED AG IM: CPT

## 2024-10-17 DIAGNOSIS — E11.42 TYPE 2 DIABETES MELLITUS WITH PERIPHERAL NEUROPATHY (H): ICD-10-CM

## 2024-10-17 RX ORDER — INSULIN LISPRO 100 [IU]/ML
INJECTION, SUSPENSION SUBCUTANEOUS
Qty: 45 ML | Refills: 0 | Status: SHIPPED | OUTPATIENT
Start: 2024-10-17

## 2024-10-17 NOTE — TELEPHONE ENCOUNTER
"    Requested Prescriptions   Pending Prescriptions Disp Refills    insulin lispro protamine-insulin lispro (HUMALOG MIX 75/25 KWIKPEN) (75-25) 100 UNIT/ML pen [Pharmacy Med Name: HumaLOG Mix 75/25 KwikPen (75-25) 100 UNIT/ML Subcutaneous Suspension Pen-injector] 15 mL 0     Sig: INJECT 20 IN THE MORNING THEN 10 UNITS SUBCUTANEOUSLY AT NOON AND 15 WITH EVENING MEAL       Insulin Mixes Protocol Passed - 10/17/2024 10:50 AM        Passed - Serum creatinine on file in past 12 months     Recent Labs   Lab Test 08/22/24  0123   CR 0.94                 Passed - HgbA1C in past 3 or 6 months     If HgbA1C is 8 or greater, it needs to be on file within the past 3 months.  If less than 8, must be on file within the past 6 months.     Recent Labs   Lab Test 09/09/24  1027   A1C 8.4*             Passed - Medication is active on mded list        Passed - Patient is age 18 or older        Passed - Recent (6 mo) or future (30 days) visit within the authorizing provider's specialty     Patient had office visit in the last 6 months or has a visit in the next 30 days with authorizing provider or within the authorizing provider's specialty.  See \"Patient Info\" tab in inbasket, or \"Choose Columns\" in Meds & Orders section of the refill encounter.                 "

## 2024-10-18 DIAGNOSIS — E78.2 MIXED HYPERLIPIDEMIA: Primary | ICD-10-CM

## 2024-10-19 DIAGNOSIS — E11.9 TYPE 2 DIABETES, HBA1C GOAL < 8% (H): ICD-10-CM

## 2024-10-21 NOTE — TELEPHONE ENCOUNTER
Requested Prescriptions   Pending Prescriptions Disp Refills    Continuous Glucose Sensor (FREESTYLE DOMI 2 SENSOR) MISC [Pharmacy Med Name: FREESTYLE DOMI 2 SENSOR KIT]  0     Sig: CHANGE EVERY 14 DAYS       There is no refill protocol information for this order

## 2024-10-22 RX ORDER — ROSUVASTATIN CALCIUM 20 MG/1
20 TABLET, COATED ORAL EVERY EVENING
Qty: 90 TABLET | Refills: 1 | Status: SHIPPED | OUTPATIENT
Start: 2024-10-22

## 2024-11-06 ENCOUNTER — MYC MEDICAL ADVICE (OUTPATIENT)
Dept: FAMILY MEDICINE | Facility: CLINIC | Age: 84
End: 2024-11-06
Payer: COMMERCIAL

## 2024-11-06 DIAGNOSIS — E78.2 MIXED HYPERLIPIDEMIA: ICD-10-CM

## 2024-11-06 NOTE — TELEPHONE ENCOUNTER
Sent MyChart message to Pee Ayala in regards to their rosuvstatin being overdue for refill. Per our records last filled 7/2/24 for 100 day supply and is 27 days late to refill.      Jaja Henriquez, PharmD, BCACP  Population Health Pharmacist  [phone]

## 2024-11-19 RX ORDER — ROSUVASTATIN CALCIUM 20 MG/1
20 TABLET, COATED ORAL EVERY EVENING
Qty: 100 TABLET | Refills: 2 | Status: SHIPPED | OUTPATIENT
Start: 2024-11-19

## 2024-11-19 NOTE — TELEPHONE ENCOUNTER
Patient has [facility] coverage and with this insurance plan, the patient is eligible to receive certain prescriptions as a 100-day supply at the 90-day supply cost.      Prescriptions updated to 100-day supply per protocol: rosuvastatin      Jaja Henriquez, PharmD, [facility]  Population Health Pharmacist  [phone]

## 2024-12-04 ENCOUNTER — OFFICE VISIT (OUTPATIENT)
Dept: CARDIOLOGY | Facility: CLINIC | Age: 84
End: 2024-12-04
Payer: COMMERCIAL

## 2024-12-04 VITALS
DIASTOLIC BLOOD PRESSURE: 74 MMHG | HEART RATE: 60 BPM | BODY MASS INDEX: 28.34 KG/M2 | WEIGHT: 160 LBS | SYSTOLIC BLOOD PRESSURE: 128 MMHG | RESPIRATION RATE: 16 BRPM

## 2024-12-04 DIAGNOSIS — Z95.2 S/P TAVR (TRANSCATHETER AORTIC VALVE REPLACEMENT): ICD-10-CM

## 2024-12-04 DIAGNOSIS — I25.10 CORONARY ARTERY DISEASE INVOLVING NATIVE CORONARY ARTERY OF NATIVE HEART WITHOUT ANGINA PECTORIS: ICD-10-CM

## 2024-12-04 DIAGNOSIS — I50.32 CHRONIC HEART FAILURE WITH PRESERVED EJECTION FRACTION (H): Primary | ICD-10-CM

## 2024-12-04 DIAGNOSIS — E11.42 TYPE 2 DIABETES MELLITUS WITH PERIPHERAL NEUROPATHY (H): ICD-10-CM

## 2024-12-04 DIAGNOSIS — R06.09 DYSPNEA ON EXERTION: ICD-10-CM

## 2024-12-04 DIAGNOSIS — I48.20 CHRONIC ATRIAL FIBRILLATION (H): ICD-10-CM

## 2024-12-04 DIAGNOSIS — J30.2 SEASONAL ALLERGIC RHINITIS, UNSPECIFIED TRIGGER: ICD-10-CM

## 2024-12-04 PROBLEM — R55 SYNCOPE, UNSPECIFIED SYNCOPE TYPE: Status: RESOLVED | Noted: 2024-08-22 | Resolved: 2024-12-04

## 2024-12-04 PROBLEM — R79.89 ELEVATED TROPONIN: Status: RESOLVED | Noted: 2024-08-22 | Resolved: 2024-12-04

## 2024-12-04 PROBLEM — I35.0 AORTIC STENOSIS, SEVERE: Status: RESOLVED | Noted: 2021-01-12 | Resolved: 2024-12-04

## 2024-12-04 PROCEDURE — G2211 COMPLEX E/M VISIT ADD ON: HCPCS | Performed by: INTERNAL MEDICINE

## 2024-12-04 PROCEDURE — 99215 OFFICE O/P EST HI 40 MIN: CPT | Performed by: INTERNAL MEDICINE

## 2024-12-04 RX ORDER — FLUTICASONE PROPIONATE 50 MCG
1 SPRAY, SUSPENSION (ML) NASAL DAILY
Qty: 16 G | Refills: 3 | Status: SHIPPED | OUTPATIENT
Start: 2024-12-04

## 2024-12-04 NOTE — LETTER
12/4/2024    Guido Pryor MD  [address]  [facility] [address]    RE: Pee Ayala       Dear Colleague,     I had the pleasure of seeing Pee Ayala in the [facility].    HEART CARE ENCOUNTER CONSULTATON NOTE      [facility]  [phone]      Assessment/Recommendations   Assessment:   1.  Chronic congestive heart failure with preserved ejection fraction, left ventricular hypertrophy. NYHA late II.  Patient appeared volume overloaded today.   2.  Aortic valve stenosis status post transaortic valve replacement (29 mm Magdaleno Dominick 3). Stable gradient in December 2023 was stable.  3.  Moderate mitral regurgitation, stable  4.  Coronary artery disease status post coronary artery bypass surgery and prior PCI.  Patent LIMA, patent SVG to OM, patent stent in proximal RCA with notable disease in distal RCA on last coronary angiogram 1/2021  5.  Atrial fibrillation.  Patient had successful ligation of left atrial appendage.  Off warfarin.  Reviewed risk and benefits of being off warfarin at this time.  I feel the risk is higher than the benefit of being on warfarin given his age and instability.  6.  Stop ibuprofen use    Plan:   1.  Discontinue ibuprofen use.  He is using 400 mg every night.  Take Tylenol 1000 mg every night prior to sleep.  2.  Continue current dose of Lasix  3.  Would recommend starting Jardiance 10 mg daily if ongoing retention after stopping ibuprofen.  4.  Discontinue carvedilol, patient has normal left ventricular ejection fraction.  Has tendency towards bradycardia.  5.  Continue aspirin therapy for coronary artery disease  6.  Continue Crestor for hyperlipidemia coronary artery disease  7.  Start steroid nasal spray for allergic rhinitis.    6 -month follow-up.  Call patient in 2 weeks to see if he notices improvement in symptoms       History of Present Illness/Subjective    HPI: Pee Ayala is a 84 year old male heart failure  with preserved ejection fraction who presents to cardiology clinic in follow-up for congestive heart failure, aortic valve disease, coronary disease.    Since last clinic evaluation he continues to have fluid retention.  He is not on Jardiance which he should be considered for in the future.  Of note he is taking NSAIDs every night and we will have him discontinue this is likely resulting in worsening fluid retention and heart failure.  Will have him use Tylenol.  He also notes significant orthopnea and lower extremity edema.  Advised him to remain on lower sodium diet.  He has normal left ventricular ejection fraction.  He is on low-dose beta-blocker tendencies towards bradycardia we will discontinue carvedilol.  He is having a lot of nasal congestion which does improve with occasional use of over-the-counter Claritin.  He did notice previously significant treatment with nasal steroids while hospitalized.  Will have him start nasal steroids as an outpatient.  This was ordered       Coronary Angiogram:     Mid RCA lesion is 75% stenosed.    Mid Cx lesion is 85% stenosed.    Prox LAD to Mid LAD lesion is 75% stenosed.    1st Diag lesion is 70% stenosed.    Prox Cx lesion is 80% stenosed.     81 yo male s/p CABG and PCI with residual severe small vessel disease of RCA/prox LAD into diagonal, mid LAD, circ prox and Circ now with severe aortic stenosis on echo and significant dyspnea on exertion     Angiography via left radial  Noted all vessels with severe diffuse dz and very small in diameter  LM mild dz  LAD prox 70%; mid 80%; diagonal prox 60-70%, patent LIMA   Circ severe dz in prox/mid with patent SVG to OM  RCA prox stent patent severe dz in mid (no change from 2019)      Echocardiogram: 2022  1. Normal left ventricular size and systolic performance with a visually  estimated ejection fraction of 65-70%.  2. There is mild concentric increase in left ventricular wall thickness.  3. There is a bio-prosthetic aortic  valve (documented 29 mm Magdaleno Dominick 3  tissue valve).  Â  Normal aortic valve prosthesis metrics with a mean systolic gradient of 7  mmHg and a peak anterograde velocity of 1.9 m/sec.  Â  There is trace-mild aortic insufficiency.  4. There is moderate mitral insufficiency.  5. Normal right ventricular size and systolic performance.  6. There is severe left atrial enlargement. There is mild to moderate right  atrial enlargement.  7. Right ventricular systolic pressure relative to right atrial pressure is  mildly increased. The pulmonary artery pressure is estimated to be 35-40 mmHg  plus right atrial pressure (the IVC is not well-visualized on this study).            Physical Examination  Review of Systems   Vitals: /74 (BP Location: Right arm, Patient Position: Sitting, Cuff Size: Adult Regular)   Pulse 60   Resp 16   Wt 72.6 kg (160 lb)   BMI 28.34 kg/m    BMI= Body mass index is 28.34 kg/m .  Wt Readings from Last 3 Encounters:   12/04/24 72.6 kg (160 lb)   09/24/24 74.8 kg (165 lb)   08/26/24 71.7 kg (158 lb)        Pleasant, using cane   ENT/Mouth: membranes moist, no oral lesions or bleeding gums.      EYES:  no scleral icterus, normal conjunctivae       Chest/Lungs:   lungs are clear to auscultation, no rales or wheezing, noted sternal scar, equal chest wall expansion    Cardiovascular:   Irregular. Normal first and second heart sounds with 2 out of 6 systolic murmur. No rubs, or gallops; the carotid, radial and posterior tibial pulses are intact, Jugular venous pressure normal moderate pitting edema bilaterally    Abdomen:  no tenderness; bowel sounds are present   Extremities: no cyanosis or clubbing   Skin: no xanthelasma, warm.    Neurologic: normal  bilateral, no tremors     Psychiatric: alert and oriented x3, calm        Please refer above for cardiac ROS details.        Medical History  Surgical History Family History Social History   Past Medical History:   Diagnosis Date     ACS  (acute coronary syndrome) (H) 06/02/2014     Actinic keratosis 01/14/2014     Anticoagulated on Coumadin 12/30/2015     Atrial fibrillation (H) 01/01/2016     Bone mass 04/26/2017     Chest heaviness 01/23/2019     Chest pain 05/31/2014     Chronic heart failure with preserved ejection fraction (H) 01/16/2024     Closed fracture of left forearm 01/01/2015     Congestive heart failure (H)      Coronary artery disease      Difficulty in walking(719.7)      Dyslipidemia 08/31/2016     Dyspnea on exertion      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      Encounter for long-term (current) use of insulin (H) 08/11/2016     Esophageal reflux 11/18/2010     History of angina      HTN (hypertension) 06/30/2009     (Problem list name updated by automated process. Provider to review and confirm.)     Impotence of organic origin      Mixed hyperlipidemia 04/25/2007 April 25, 2007 restarted Zocor today, recheck in 3 months.  August 23, 2007 LDL at 101 with 40 mg, will increase to 80 mg. Recheck  In 3 months.      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      Walking troubles      Past Surgical History:   Procedure Laterality Date     BYPASS GRAFT ARTERY CORONARY N/A 09/10/2014    Procedure: BYPASS GRAFT ARTERY CORONARY;  Surgeon: Bharathi Caraballo MD;  Location: [facility] OR     BYPASS GRAFT ARTERY CORONARY  01/01/2016     COLONOSCOPY  01/14/2004      CORONARY ANGIOGRAPHY ADULT ORDER       CORONARY ARTERY BYPASS       CV CORONARY ANGIOGRAM N/A 04/04/2019    Procedure: Coronary Angiogram;  Surgeon: Dominik Vega MD;  Location: [facility] Cath Lab;  Service: Cardiology     CV CORONARY ANGIOGRAM N/A 01/06/2021    Procedure: Coronary Angiogram;  Surgeon: Dominik Vega MD;  Location: [facility] Cath Lab;  Service: Cardiology     CV CORONARY ANGIOGRAM N/A 12/22/2023    Procedure: Coronary Angiogram;  Surgeon: Bahman Lenz MD;  Location: [facility] CV     CV LEFT HEART CATHETERIZATION WITHOUT LEFT VENTRICULOGRAM Left 04/04/2019    Procedure: Left Heart Catheterization Without Left Ventriculogram;  Surgeon: Dominik Vega MD;  Location: [facility] Lab;  Service: Cardiology     CV LEFT HEART CATHETERIZATION WITHOUT LEFT VENTRICULOGRAM Left 01/06/2021    Procedure: Left Heart Catheterization Without Left Ventriculogram;  Surgeon: Dominik Vega MD;  Location: [facility] Cath Lab;  Service: Cardiology     CV RIGHT HEART CATHETERIZATION Right 04/04/2019    Procedure: Right Heart Catheterization;  Surgeon: Dominik Vega MD;  Location: [facility] Lab;  Service: Cardiology     CV TRANSCATHETER AORTIC VALVE REPLACEMENT N/A 01/12/2021    Procedure: Right transfemoral transcatheter aortic valve replacement using Magdaleno Dominick 3 size 29mm.  Transthoracic echocardiogram;  Surgeon: Jo Romano MD;  Location: [facility]     ESOPHAGOSCOPY, GASTROSCOPY, DUODENOSCOPY (EGD), COMBINED N/A 01/13/2016    Procedure: COMBINED ESOPHAGOSCOPY, GASTROSCOPY, DUODENOSCOPY (EGD);  Surgeon: Rk Srivastava MD;  Location: [facility] FEMORAL CANNULIZATION WITH OPEN STANDBY REPAIR AORTIC VALVE N/A 01/12/2021    Procedure: Cardiopulmonary Bypass standby;  Surgeon: Jefferson Sandy MD;  Location: [facility] CARDIAC CATH [facility]     HEART CATH, ANGIOPLASTY       IR LOWER EXTREMITY ANGIOGRAM  LEFT  2021     LAPAROSCOPIC CHOLECYSTECTOMY  10/01/2009     MAZE PROCEDURE N/A 09/10/2014    Procedure: MAZE PROCEDURE;  Surgeon: Bharathi Caraballo MD;  Location: [facility] OR     MOHS MICROGRAPHIC PROCEDURE       OPEN REDUCTION INTERNAL FIXATION HIP BIPOLAR Left 2022    Procedure: HEMIARTHROPLASTY, HIP, BIPOLAR, OPEN REDUCTION INTERNAL FIXATION OF GREATER TROCHANTER;  Surgeon: Jd Larose MD;  [facility]     ORTHOPEDIC SURGERY      surgery for fx  Left forearm     OTHER SURGICAL HISTORY Left 2015    forearm sugery     STENT, CORONARY, HUMA  2016     VASECTOMY       Family History   Problem Relation Age of Onset     Diabetes Mother      Hypertension Father      Cerebrovascular Disease Father      Diabetes Maternal Grandmother      Breast Cancer No family hx of      Cancer - colorectal No family hx of      Prostate Cancer No family hx of      C.A.D. No family hx of      Diabetes Type 2  Mother         58 ,  from anesthesia complication.     Heart Disease Father         86  from stroke     No Known Problems Brother         60 years of age.        Social History     Socioeconomic History     Marital status:      Spouse name: Fay Ayala     Number of children: Not on file     Years of education: Not on file     Highest education level: Not on file   Occupational History     Employer: RETIRED   Tobacco Use     Smoking status: Former     Current packs/day: 0.00     Types: Cigarettes     Quit date: 1998     Years since quittin.9     Passive exposure: Never     Smokeless tobacco: Never   Vaping Use     Vaping status: Never Used   Substance and Sexual Activity     Alcohol use: Yes     Alcohol/week: 0.0 standard drinks of alcohol     Comment: Alcoholic Drinks/day: very rare     Drug use: No     Sexual activity: Never     Birth control/protection: None   Other Topics Concern     Parent/sibling w/ CABG, MI or angioplasty before 65F 55M? No   Social History  Narrative     and lives with life.  Has adult children from previous relationship. ( Blended family).  Has a dog - Vincent Gil MD  1/3/2019                Medication Instructions:    Patient is to take all scheduled medications on the day of surgery EXCEPT for modifications listed below:     - aspirin: cardiology has recommended continuing baby aspirin daily     - warfarin: hold warfarin for 5 days before surgery     - Diuretics: HOLD on the day of surgery.     - Statins: Continue taking on the day of surgery.      - mixed insulin (70/30m 75/25, 50/50): HOLD day of surgery     - metformin: HOLD day of surgery.     - Opioids: Continue without modification     - hold any vitamins or supplements until after surgery.      Social Drivers of Health     Financial Resource Strain: Low Risk  (8/22/2024)    Financial Resource Strain      Within the past 12 months, have you or your family members you live with been unable to get utilities (heat, electricity) when it was really needed?: No   Food Insecurity: Low Risk  (8/22/2024)    Food Insecurity      Within the past 12 months, did you worry that your food would run out before you got money to buy more?: No      Within the past 12 months, did the food you bought just not last and you didn t have money to get more?: No   Transportation Needs: Low Risk  (8/22/2024)    Transportation Needs      Within the past 12 months, has lack of transportation kept you from medical appointments, getting your medicines, non-medical meetings or appointments, work, or from getting things that you need?: No   Physical Activity: Not on file   Stress: Not on file   Social Connections: Not on file   Interpersonal Safety: Low Risk  (9/24/2024)    Interpersonal Safety      Do you feel physically and emotionally safe where you currently live?: Yes      Within the past 12 months, have you been hit, slapped, kicked or otherwise physically hurt by someone?: No      Within the past  12 months, have you been humiliated or emotionally abused in other ways by your partner or ex-partner?: No   Housing Stability: High Risk (8/22/2024)    Housing Stability      Do you have housing? : No      Are you worried about losing your housing?: No           Medications  Allergies   Current Outpatient Medications   Medication Sig Dispense Refill     acetaminophen (TYLENOL) 500 MG tablet Take 1 tablet (500 mg) by mouth 2 times daily as needed for mild pain       aspirin 81 MG EC tablet Take 1 tablet (81 mg) by mouth daily 90 tablet 0     carvedilol (COREG) 3.125 MG tablet Take 1 tablet (3.125 mg) by mouth 2 times daily (with meals) 180 tablet 3     Continuous Glucose  (FREESTYLE DOMI 2 READER) DOROTEO USE TO READ BLOOD SUGARS AS PER 'S INSTRUCTIONS 1 each 0     Continuous Glucose Sensor (FREESTYLE DOMI 2 SENSOR) MISC CHANGE EVERY 14 DAYS 6 each 1     furosemide (LASIX) 40 MG tablet Take 80 mg by mouth. Taking 80 mg in the AM and 40 mg in PM       gabapentin (NEURONTIN) 600 MG tablet Take 1/2 tablet in am, whole tablet in evening       insulin aspart prot & aspart (NOVOLOG MIX 70/30 PEN) (70-30) 100 UNIT/ML pen Inject subcutaneously 20 units in AM with breakfast, 12 units with lunch, and 12 units in PM with dinner. 15 mL 5     insulin lispro protamine-insulin lispro (HUMALOG MIX 75/25 KWIKPEN) (75-25) 100 UNIT/ML pen INJECT 20 IN THE MORNING THEN 12 UNITS SUBCUTANEOUSLY AT NOON AND 12 WITH EVENING MEAL 45 mL 0     metFORMIN (GLUCOPHAGE) 500 MG tablet TAKE 2 TABLETS BY MOUTH TWICE  DAILY WITH MEALS 360 tablet 0     Multiple Vitamins-Minerals (PRESERVISION AREDS PO) Take 1 tablet by mouth 2 times daily       pramipexole (MIRAPEX) 0.5 MG tablet Take 1 mg by mouth every evening.       rosuvastatin (CRESTOR) 20 MG tablet Take 1 tablet (20 mg) by mouth every evening. 100 tablet 2     spironolactone (ALDACTONE) 25 MG tablet Take 1 tablet (25 mg) by mouth daily 90 tablet 3     tamsulosin (FLOMAX) 0.4  MG capsule Take 0.4 mg by mouth every morning         Allergies   Allergen Reactions     Oxycodone Itching and Rash     Amlodipine Dizziness     Dizziness and dry heaves     Lisinopril Cough     Adhesive Tape Itching and Rash          Lab Results    Chemistry/lipid CBC Cardiac Enzymes/BNP/TSH/INR   Recent Labs   Lab Test 01/25/22  1443 09/16/21  1026 01/05/16  1104 04/29/15  0834   CHOL  --  117   < > 148   HDL  --  43   < > 44   LDL 65 48   < > 85   TRIG  --  131   < > 93   CHOLHDLRATIO  --   --   --  3.4    < > = values in this interval not displayed.     Recent Labs   Lab Test 01/25/22  1443 09/16/21  1026 08/31/21  1221   LDL 65 48 64     Recent Labs   Lab Test 11/17/22  1315 08/15/22  1325   NA  --  140   POTASSIUM  --  3.4   CHLORIDE  --  97*   CO2  --  30*   GLC  --  64*   BUN 23.0 19.7   CR 0.94 1.00   GFRESTIMATED 82 76   RACHEL 9.4 9.7     Recent Labs   Lab Test 11/17/22  1315 08/15/22  1325 07/25/22  0605   CR 0.94 1.00 0.74     Recent Labs   Lab Test 11/17/22  1315 08/15/22  1325 05/24/22  1023   A1C 10.3* 8.8* 6.3*          Recent Labs   Lab Test 07/18/22  0636   WBC 6.9   HGB 10.8*   HCT 34.6*   *        Recent Labs   Lab Test 07/18/22  0636 07/12/22  0621 07/11/22  1048   HGB 10.8* 10.7* 10.8*    Recent Labs   Lab Test 07/04/22  1034   TROPONINI 0.15     Recent Labs   Lab Test 07/04/22  1034 01/08/21  0842 10/21/20  1451 05/09/19  1657 06/09/16  1203   * 91* 56   < >  --    NTBNP  --   --   --   --  354*    < > = values in this interval not displayed.     Recent Labs   Lab Test 11/17/22  1315   TSH 0.92     Recent Labs   Lab Test 12/16/22  0927 11/15/22  1443 10/10/22  1539   INR 2.4* 2.7* 2.6*        Ulices Hernandez,       The longitudinal plan of care for the diagnosis(es)/condition(s) as documented were addressed during this visit. Due to the added complexity in care, I will continue to support Pee in the subsequent management and with ongoing continuity of care.  54  minutes spent by me on the date of the encounter doing chart review, review of outside records, review of test results, interpretation of tests, patient visit, and documentation                                   Thank you for allowing me to participate in the care of your patient.      Sincerely,     Ulices Hernandez DO     [facility]  cc:   Monae Reich PA-C  [address]  [place] [address]

## 2024-12-04 NOTE — PATIENT INSTRUCTIONS
Please contact direct nurses line Monday through Friday 8 AM to 5 PM @ [phone]    After-hours contact cardiology office at [phone].    Plan:    Stop coreg    Stop ibuprofen as this can increase fluid retention.  Would use Tylenol 1000 mg, 30 minutes prior to sleep.    Would recommend nasal steroid spray.

## 2024-12-04 NOTE — PROGRESS NOTES
HEART CARE ENCOUNTER CONSULTATON NOTE      [facility]  [phone]      Assessment/Recommendations   Assessment:   1.  Chronic congestive heart failure with preserved ejection fraction, left ventricular hypertrophy. NYHA late II.  Patient appeared volume overloaded today.   2.  Aortic valve stenosis status post transaortic valve replacement (29 mm Magdaleno Dominick 3). Stable gradient in December 2023 was stable.  3.  Moderate mitral regurgitation, stable  4.  Coronary artery disease status post coronary artery bypass surgery and prior PCI.  Patent LIMA, patent SVG to OM, patent stent in proximal RCA with notable disease in distal RCA on last coronary angiogram 1/2021  5.  Atrial fibrillation.  Patient had successful ligation of left atrial appendage.  Off warfarin.  Reviewed risk and benefits of being off warfarin at this time.  I feel the risk is higher than the benefit of being on warfarin given his age and instability.  6.  Stop ibuprofen use    Plan:   1.  Discontinue ibuprofen use.  He is using 400 mg every night.  Take Tylenol 1000 mg every night prior to sleep.  2.  Continue current dose of Lasix  3.  Would recommend starting Jardiance 10 mg daily if ongoing retention after stopping ibuprofen.  4.  Discontinue carvedilol, patient has normal left ventricular ejection fraction.  Has tendency towards bradycardia.  5.  Continue aspirin therapy for coronary artery disease  6.  Continue Crestor for hyperlipidemia coronary artery disease  7.  Start steroid nasal spray for allergic rhinitis.    6 -month follow-up.  Call patient in 2 weeks to see if he notices improvement in symptoms       History of Present Illness/Subjective    HPI: Pee Ayala is a 84 year old male heart failure with preserved ejection fraction who presents to cardiology clinic in follow-up for congestive heart failure, aortic valve disease, coronary disease.    Since last clinic evaluation he continues to have fluid  retention.  He is not on Jardiance which he should be considered for in the future.  Of note he is taking NSAIDs every night and we will have him discontinue this is likely resulting in worsening fluid retention and heart failure.  Will have him use Tylenol.  He also notes significant orthopnea and lower extremity edema.  Advised him to remain on lower sodium diet.  He has normal left ventricular ejection fraction.  He is on low-dose beta-blocker tendencies towards bradycardia we will discontinue carvedilol.  He is having a lot of nasal congestion which does improve with occasional use of over-the-counter Claritin.  He did notice previously significant treatment with nasal steroids while hospitalized.  Will have him start nasal steroids as an outpatient.  This was ordered       Coronary Angiogram:     Mid RCA lesion is 75% stenosed.    Mid Cx lesion is 85% stenosed.    Prox LAD to Mid LAD lesion is 75% stenosed.    1st Diag lesion is 70% stenosed.    Prox Cx lesion is 80% stenosed.     81 yo male s/p CABG and PCI with residual severe small vessel disease of RCA/prox LAD into diagonal, mid LAD, circ prox and Circ now with severe aortic stenosis on echo and significant dyspnea on exertion     Angiography via left radial  Noted all vessels with severe diffuse dz and very small in diameter  LM mild dz  LAD prox 70%; mid 80%; diagonal prox 60-70%, patent LIMA   Circ severe dz in prox/mid with patent SVG to OM  RCA prox stent patent severe dz in mid (no change from 2019)      Echocardiogram: 2022  1. Normal left ventricular size and systolic performance with a visually  estimated ejection fraction of 65-70%.  2. There is mild concentric increase in left ventricular wall thickness.  3. There is a bio-prosthetic aortic valve (documented 29 mm Magdaleno Dominick 3  tissue valve).  Â  Normal aortic valve prosthesis metrics with a mean systolic gradient of 7  mmHg and a peak anterograde velocity of 1.9 m/sec.  Â  There is  trace-mild aortic insufficiency.  4. There is moderate mitral insufficiency.  5. Normal right ventricular size and systolic performance.  6. There is severe left atrial enlargement. There is mild to moderate right  atrial enlargement.  7. Right ventricular systolic pressure relative to right atrial pressure is  mildly increased. The pulmonary artery pressure is estimated to be 35-40 mmHg  plus right atrial pressure (the IVC is not well-visualized on this study).            Physical Examination  Review of Systems   Vitals: /74 (BP Location: Right arm, Patient Position: Sitting, Cuff Size: Adult Regular)   Pulse 60   Resp 16   Wt 72.6 kg (160 lb)   BMI 28.34 kg/m    BMI= Body mass index is 28.34 kg/m .  Wt Readings from Last 3 Encounters:   12/04/24 72.6 kg (160 lb)   09/24/24 74.8 kg (165 lb)   08/26/24 71.7 kg (158 lb)        Pleasant, using cane   ENT/Mouth: membranes moist, no oral lesions or bleeding gums.      EYES:  no scleral icterus, normal conjunctivae       Chest/Lungs:   lungs are clear to auscultation, no rales or wheezing, noted sternal scar, equal chest wall expansion    Cardiovascular:   Irregular. Normal first and second heart sounds with 2 out of 6 systolic murmur. No rubs, or gallops; the carotid, radial and posterior tibial pulses are intact, Jugular venous pressure normal moderate pitting edema bilaterally    Abdomen:  no tenderness; bowel sounds are present   Extremities: no cyanosis or clubbing   Skin: no xanthelasma, warm.    Neurologic: normal  bilateral, no tremors     Psychiatric: alert and oriented x3, calm        Please refer above for cardiac ROS details.        Medical History  Surgical History Family History Social History   Past Medical History:   Diagnosis Date    ACS (acute coronary syndrome) (H) 06/02/2014    Actinic keratosis 01/14/2014    Anticoagulated on Coumadin 12/30/2015    Atrial fibrillation (H) 01/01/2016    Bone mass 04/26/2017    Chest heaviness 01/23/2019     Chest pain 05/31/2014    Chronic heart failure with preserved ejection fraction (H) 01/16/2024    Closed fracture of left forearm 01/01/2015    Congestive heart failure (H)     Coronary artery disease     Difficulty in walking(719.7)     Dyslipidemia 08/31/2016    Dyspnea on exertion     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     Encounter for long-term (current) use of insulin (H) 08/11/2016    Esophageal reflux 11/18/2010    History of angina     HTN (hypertension) 06/30/2009     (Problem list name updated by automated process. Provider to review and confirm.)    Impotence of organic origin     Mixed hyperlipidemia 04/25/2007 April 25, 2007 restarted Zocor today, recheck in 3 months.  August 23, 2007 LDL at 101 with 40 mg, will increase to 80 mg. Recheck  In 3 months.     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     Walking troubles      Past Surgical History:   Procedure Laterality Date    BYPASS GRAFT ARTERY CORONARY N/A 09/10/2014    Procedure: BYPASS GRAFT ARTERY CORONARY;  Surgeon: Bharathi Caraballo MD;  Location:  OR    BYPASS GRAFT ARTERY CORONARY  01/01/2016    COLONOSCOPY  01/14/2004    CORONARY ANGIOGRAPHY ADULT ORDER      CORONARY ARTERY BYPASS      CV CORONARY ANGIOGRAM N/A 04/04/2019    Procedure: Coronary Angiogram;  Surgeon: Dominik Vega MD;  Location: [facility] Cath Lab;  Service: Cardiology    CV CORONARY  ANGIOGRAM N/A 01/06/2021    Procedure: Coronary Angiogram;  Surgeon: Dominik Vega MD;  Location: [facility] Cardiac Cath Lab;  Service: Cardiology    CV CORONARY ANGIOGRAM N/A 12/22/2023    Procedure: Coronary Angiogram;  Surgeon: Bahman Lenz MD;  Location: [facility] CV    CV LEFT HEART CATHETERIZATION WITHOUT LEFT VENTRICULOGRAM Left 04/04/2019    Procedure: Left Heart Catheterization Without Left Ventriculogram;  Surgeon: Dominik Vega MD;  Location: [facility] Cath Lab;  Service: Cardiology    CV LEFT HEART CATHETERIZATION WITHOUT LEFT VENTRICULOGRAM Left 01/06/2021    Procedure: Left Heart Catheterization Without Left Ventriculogram;  Surgeon: Dominik Vega MD;  Location: [facility] Cath Lab;  Service: Cardiology    CV RIGHT HEART CATHETERIZATION Right 04/04/2019    Procedure: Right Heart Catheterization;  Surgeon: Dominik Vega MD;  Location: [facility] Cath Lab;  Service: Cardiology    CV TRANSCATHETER AORTIC VALVE REPLACEMENT N/A 01/12/2021    Procedure: Right transfemoral transcatheter aortic valve replacement using Magdaleno Dominick 3 size 29mm.  Transthoracic echocardiogram;  Surgeon: Jo Romano MD;  Location: [facility] CARDIAC CATH LAB    ESOPHAGOSCOPY, GASTROSCOPY, DUODENOSCOPY (EGD), COMBINED N/A 01/13/2016    Procedure: COMBINED ESOPHAGOSCOPY, GASTROSCOPY, DUODENOSCOPY (EGD);  Surgeon: Rk Srivastava MD;  Location: [facility] FEMORAL CANNULIZATION WITH OPEN STANDBY REPAIR AORTIC VALVE N/A 01/12/2021    Procedure: Cardiopulmonary Bypass standby;  Surgeon: Jefferson Sandy MD;  Location: [facility] CARDIAC CATH LAB    HEART CATH, ANGIOPLASTY      IR LOWER EXTREMITY ANGIOGRAM LEFT  12/07/2021    LAPAROSCOPIC CHOLECYSTECTOMY  10/01/2009    MAZE PROCEDURE N/A 09/10/2014    Procedure: MAZE PROCEDURE;  Surgeon: Bharathi Caraballo MD;  Location: [facility] OR MOHS MICROGRAPHIC PROCEDURE      OPEN REDUCTION INTERNAL FIXATION HIP  BIPOLAR Left 2022    Procedure: HEMIARTHROPLASTY, HIP, BIPOLAR, OPEN REDUCTION INTERNAL FIXATION OF GREATER TROCHANTER;  Surgeon: Jd Larose MD;  Location: [facility]    ORTHOPEDIC SURGERY      surgery for fx  Left forearm    OTHER SURGICAL HISTORY Left 2015    forearm sugery    STENT, CORONARY, HUMA  2016    VASECTOMY       Family History   Problem Relation Age of Onset    Diabetes Mother     Hypertension Father     Cerebrovascular Disease Father     Diabetes Maternal Grandmother     Breast Cancer No family hx of     Cancer - colorectal No family hx of     Prostate Cancer No family hx of     C.A.D. No family hx of     Diabetes Type 2  Mother         58 ,  from anesthesia complication.    Heart Disease Father         86  from stroke    No Known Problems Brother         60 years of age.        Social History     Socioeconomic History    Marital status:      Spouse name: Fay Ayala    Number of children: Not on file    Years of education: Not on file    Highest education level: Not on file   Occupational History     Employer: RETIRED   Tobacco Use    Smoking status: Former     Current packs/day: 0.00     Types: Cigarettes     Quit date: 1998     Years since quittin.9     Passive exposure: Never    Smokeless tobacco: Never   Vaping Use    Vaping status: Never Used   Substance and Sexual Activity    Alcohol use: Yes     Alcohol/week: 0.0 standard drinks of alcohol     Comment: Alcoholic Drinks/day: very rare    Drug use: No    Sexual activity: Never     Birth control/protection: None   Other Topics Concern    Parent/sibling w/ CABG, MI or angioplasty before 65F 55M? No   Social History Narrative     and lives with life.  Has adult children from previous relationship. ( Blended family).  Has a dog - Vincent Gil MD  1/3/2019                Medication Instructions:    Patient is to take all scheduled medications on the day of surgery EXCEPT  for modifications listed below:     - aspirin: cardiology has recommended continuing baby aspirin daily     - warfarin: hold warfarin for 5 days before surgery     - Diuretics: HOLD on the day of surgery.     - Statins: Continue taking on the day of surgery.      - mixed insulin (70/30m 75/25, 50/50): HOLD day of surgery     - metformin: HOLD day of surgery.     - Opioids: Continue without modification     - hold any vitamins or supplements until after surgery.      Social Drivers of Health     Financial Resource Strain: Low Risk  (8/22/2024)    Financial Resource Strain     Within the past 12 months, have you or your family members you live with been unable to get utilities (heat, electricity) when it was really needed?: No   Food Insecurity: Low Risk  (8/22/2024)    Food Insecurity     Within the past 12 months, did you worry that your food would run out before you got money to buy more?: No     Within the past 12 months, did the food you bought just not last and you didn t have money to get more?: No   Transportation Needs: Low Risk  (8/22/2024)    Transportation Needs     Within the past 12 months, has lack of transportation kept you from medical appointments, getting your medicines, non-medical meetings or appointments, work, or from getting things that you need?: No   Physical Activity: Not on file   Stress: Not on file   Social Connections: Not on file   Interpersonal Safety: Low Risk  (9/24/2024)    Interpersonal Safety     Do you feel physically and emotionally safe where you currently live?: Yes     Within the past 12 months, have you been hit, slapped, kicked or otherwise physically hurt by someone?: No     Within the past 12 months, have you been humiliated or emotionally abused in other ways by your partner or ex-partner?: No   Housing Stability: High Risk (8/22/2024)    Housing Stability     Do you have housing? : No     Are you worried about losing your housing?: No           Medications  Allergies    Current Outpatient Medications   Medication Sig Dispense Refill    acetaminophen (TYLENOL) 500 MG tablet Take 1 tablet (500 mg) by mouth 2 times daily as needed for mild pain      aspirin 81 MG EC tablet Take 1 tablet (81 mg) by mouth daily 90 tablet 0    carvedilol (COREG) 3.125 MG tablet Take 1 tablet (3.125 mg) by mouth 2 times daily (with meals) 180 tablet 3    Continuous Glucose  (FREESTYLE DOMI 2 READER) DOROTEO USE TO READ BLOOD SUGARS AS PER 'S INSTRUCTIONS 1 each 0    Continuous Glucose Sensor (FREESTYLE DOMI 2 SENSOR) MISC CHANGE EVERY 14 DAYS 6 each 1    furosemide (LASIX) 40 MG tablet Take 80 mg by mouth. Taking 80 mg in the AM and 40 mg in PM      gabapentin (NEURONTIN) 600 MG tablet Take 1/2 tablet in am, whole tablet in evening      insulin aspart prot & aspart (NOVOLOG MIX 70/30 PEN) (70-30) 100 UNIT/ML pen Inject subcutaneously 20 units in AM with breakfast, 12 units with lunch, and 12 units in PM with dinner. 15 mL 5    insulin lispro protamine-insulin lispro (HUMALOG MIX 75/25 KWIKPEN) (75-25) 100 UNIT/ML pen INJECT 20 IN THE MORNING THEN 12 UNITS SUBCUTANEOUSLY AT NOON AND 12 WITH EVENING MEAL 45 mL 0    metFORMIN (GLUCOPHAGE) 500 MG tablet TAKE 2 TABLETS BY MOUTH TWICE  DAILY WITH MEALS 360 tablet 0    Multiple Vitamins-Minerals (PRESERVISION AREDS PO) Take 1 tablet by mouth 2 times daily      pramipexole (MIRAPEX) 0.5 MG tablet Take 1 mg by mouth every evening.      rosuvastatin (CRESTOR) 20 MG tablet Take 1 tablet (20 mg) by mouth every evening. 100 tablet 2    spironolactone (ALDACTONE) 25 MG tablet Take 1 tablet (25 mg) by mouth daily 90 tablet 3    tamsulosin (FLOMAX) 0.4 MG capsule Take 0.4 mg by mouth every morning         Allergies   Allergen Reactions    Oxycodone Itching and Rash    Amlodipine Dizziness     Dizziness and dry heaves    Lisinopril Cough    Adhesive Tape Itching and Rash          Lab Results    Chemistry/lipid CBC Cardiac Enzymes/BNP/TSH/INR   Recent  Labs   Lab Test 01/25/22  1443 09/16/21  1026 01/05/16  1104 04/29/15  0834   CHOL  --  117   < > 148   HDL  --  43   < > 44   LDL 65 48   < > 85   TRIG  --  131   < > 93   CHOLHDLRATIO  --   --   --  3.4    < > = values in this interval not displayed.     Recent Labs   Lab Test 01/25/22  1443 09/16/21  1026 08/31/21  1221   LDL 65 48 64     Recent Labs   Lab Test 11/17/22  1315 08/15/22  1325   NA  --  140   POTASSIUM  --  3.4   CHLORIDE  --  97*   CO2  --  30*   GLC  --  64*   BUN 23.0 19.7   CR 0.94 1.00   GFRESTIMATED 82 76   RACHEL 9.4 9.7     Recent Labs   Lab Test 11/17/22  1315 08/15/22  1325 07/25/22  0605   CR 0.94 1.00 0.74     Recent Labs   Lab Test 11/17/22  1315 08/15/22  1325 05/24/22  1023   A1C 10.3* 8.8* 6.3*          Recent Labs   Lab Test 07/18/22  0636   WBC 6.9   HGB 10.8*   HCT 34.6*   *        Recent Labs   Lab Test 07/18/22  0636 07/12/22  0621 07/11/22  1048   HGB 10.8* 10.7* 10.8*    Recent Labs   Lab Test 07/04/22  1034   TROPONINI 0.15     Recent Labs   Lab Test 07/04/22  1034 01/08/21  0842 10/21/20  1451 05/09/19  1657 06/09/16  1203   * 91* 56   < >  --    NTBNP  --   --   --   --  354*    < > = values in this interval not displayed.     Recent Labs   Lab Test 11/17/22  1315   TSH 0.92     Recent Labs   Lab Test 12/16/22  0927 11/15/22  1443 10/10/22  1539   INR 2.4* 2.7* 2.6*        Ulices Hernandez, DO      The longitudinal plan of care for the diagnosis(es)/condition(s) as documented were addressed during this visit. Due to the added complexity in care, I will continue to support Pee in the subsequent management and with ongoing continuity of care.  54 minutes spent by me on the date of the encounter doing chart review, review of outside records, review of test results, interpretation of tests, patient visit, and documentation

## 2024-12-13 ENCOUNTER — TELEPHONE (OUTPATIENT)
Dept: ENDOCRINOLOGY | Facility: CLINIC | Age: 84
End: 2024-12-13
Payer: COMMERCIAL

## 2024-12-13 NOTE — TELEPHONE ENCOUNTER
PA Initiation    Med[facility]: [facility]  Insurance Company: [employer] - Phone [phone] Fax [phone]  Pharmacy Filling the Rx: [facility] [address] [place] [address] E  Filling Pharmacy Phone: [phone]  Filling Pharmacy Fax: [phone]  Start Date: 12/13/2024     Key: [id number]

## 2024-12-18 NOTE — TELEPHONE ENCOUNTER
Prior Authorization Approval    Medication: FREESTYLE DOMI 2 SENSOR MISC  Authorization Effective Date:    Authorization Expiration Date: 12/31/2025  Approved Dose/Quantity: uud  Reference #: Key: [id number]   Insurance Company: [employer] Part D - Phone [phone] Fax [phone]  Expected CoPay: $    CoPay Card Available:      Financial Assistance Needed:    Which Pharmacy is filling the prescription: [facility] [address]  Pharmacy Notified: Yes  Patient Notified: Yes

## 2024-12-28 DIAGNOSIS — G25.81 RESTLESS LEGS SYNDROME: Primary | ICD-10-CM

## 2024-12-28 NOTE — TELEPHONE ENCOUNTER
FYI - Status Update    Who is Calling: patient    Update: Patient called and requests this medication be sent in ASAP as he will be leaving [facility] 1/2/24    Does caller want a call/response back: Yes     Could we send this information to you in [employer] or would you prefer to receive a phone call?:   Patient would prefer a phone call   Okay to leave a detailed message?: Yes at Home number on file [phone] (home)

## 2024-12-31 RX ORDER — PRAMIPEXOLE DIHYDROCHLORIDE 0.5 MG/1
TABLET ORAL
Qty: 180 TABLET | Refills: 0 | Status: SHIPPED | OUTPATIENT
Start: 2024-12-31

## 2025-01-04 ENCOUNTER — TELEPHONE (OUTPATIENT)
Dept: NURSING | Facility: CLINIC | Age: 85
End: 2025-01-04
Payer: COMMERCIAL

## 2025-01-04 ENCOUNTER — NURSE TRIAGE (OUTPATIENT)
Dept: ENDOCRINOLOGY | Facility: CLINIC | Age: 85
End: 2025-01-04
Payer: COMMERCIAL

## 2025-01-04 ENCOUNTER — TELEPHONE (OUTPATIENT)
Dept: FAMILY MEDICINE | Facility: CLINIC | Age: 85
End: 2025-01-04
Payer: COMMERCIAL

## 2025-01-04 DIAGNOSIS — E11.9 TYPE 2 DIABETES, HBA1C GOAL < 8% (H): ICD-10-CM

## 2025-01-04 NOTE — TELEPHONE ENCOUNTER
Nurse Triage SBAR    Is this a 2nd Level Triage? YES, LICENSED PRACTITIONER REVIEW IS REQUIRED    Situation and Background: Patient calling for a prescription for a new diabetic sensor    Continuous Glucose Sensor (FREESTYLE DOMI 2 SENSOR) [facility]     Pt states he is currently in [place], [place] and his sensor stopped working. He tried getting another one from the local pharmacy but they told him his Rx .     Pt sees Agueda Kwok at [facility].       Protocol Recommended Disposition:   Call PCP/Specialty provider now. RN contacted the [facility] answering service but they're unable to locate a provider on-call for Endo so RN will page PCP on-call.    Provider consult indicated.     Reason for page: Diabetic sensor no longer working, requesting a refill.     Call connected with Dr. Brito via cell phone by Answering Service at 01:28PM who approved a refill.       Provider Recommendation Follow Up:   Reached patient/caregiver. Informed of provider's recommendations. Patient verbalized understanding and agrees with the plan.       (Note: unable to send refill in this encounter d/t unable to replace pharmacy with new one)    {Temitope Chapman RN  [place] Nurse Advisor  2025 1:32 PM       Reason for Disposition   [1] Prescription refill request for ESSENTIAL medicine (i.e., likelihood of harm to patient if not taken) AND [2] triager unable to refill per department policy     Blood glucose sensor    Protocols used: Medication Refill and Renewal Call-A-

## 2025-01-04 NOTE — TELEPHONE ENCOUNTER
See previous refill encounter from this RN.     Temitope Chapman, RN  [place] Nurse Advisor  1/4/2025 1:44 PM

## 2025-01-04 NOTE — TELEPHONE ENCOUNTER
Nurse Triage SBAR    Is this a 2nd Level Triage? NO    Situation: Pt calling.     Background: Prescription question.     Assessment: Pt calling to check status of Freestyle sensor prescription. He is in [place].  In reviewing chart, it appears that med has been sent to pharmacy. Pt will follow up with pharmacy.

## 2025-01-06 NOTE — TELEPHONE ENCOUNTER
Medication Question or Refill    Contacts       Contact Date/Time Type Contact Phone/Fax    01/04/2025 03:47 PM CST Phone (Incoming) Pee Ayala (Self) [phone] (H)     Pt already spoke to [facility] today, calling to provide the fax number for the pharmacy in [place] where he needs the prescription sent from previous tc.   Fax number is [phone]            What medication are you calling about (include dose and sig)?: Freestyle Sensor    Preferred Pharmacy: [id number] - [place], [place] - [address]  [address]  [address]  [facility] [address]  Phone: [phone] Fax: [phone]      Controlled Substance Agreement on file:   CSA -- Patient Level:     [Media Unavailable] Controlled Substance Agreement - Opioid - Scan on 10/23/2023  2:23 PM   [Media Unavailable] Controlled Substance Agreement - Opioid - Scan on 2/16/2023  5:05 PM   [Media Unavailable] Controlled Substance Agreement - Opioid - Scan on 1/3/2019       Who prescribed the medication?: Roya    Do you need a refill? Yes    When did you use the medication last? 1/4/2025    Patient offered an appointment? No    Do you have any questions or concerns?  No      Could we send this information to you in [facility] or would you prefer to receive a phone call?:   Patient would prefer a phone call   Okay to leave a detailed message?: Yes at Cell number on file:    Telephone Information:   Mobile [phone]     
Patient calling again to inquire about this prescription.  Did confirm pharmacy information, address and fax number.  Everything is correct on this information.  Recommended that the patient try to obtain a finger poke reader and the supplies in the meantime and to try to contact the clinic on Monday to see what else can be done.  Patient and family verbalized understanding information and will try to get a finger poke machine.  
Reviewing chart, sensors already sent 1/6.    Anita Donald RN    
n/a

## 2025-01-23 NOTE — TELEPHONE ENCOUNTER
[id number]   Called and spoke with Pee,  ([phone])     1.  We scheduled INR on 11/24/21     2.  Advised to schedule preop with PCP before 12/7.  He will call clinic tomorrow.     3.  Gave OK to hold warfarin for 5 days with no bridge.    Last dose of warfarin on 12/1.    Begin warfarin hold on 12/2-6    Will get clearance from surgeon when to resume warfarin.    The first dose he will take one time booster with 12.5mg warfarin dose, then resume his usual dose.    Will recheck INR in one wk after resuming warfarin.

## 2025-02-05 ENCOUNTER — LAB (OUTPATIENT)
Dept: LAB | Facility: CLINIC | Age: 85
End: 2025-02-05
Payer: COMMERCIAL

## 2025-02-05 DIAGNOSIS — E11.42 TYPE 2 DIABETES MELLITUS WITH PERIPHERAL NEUROPATHY (H): ICD-10-CM

## 2025-02-05 LAB
EST. AVERAGE GLUCOSE BLD GHB EST-MCNC: 223 MG/DL
HBA1C MFR BLD: 9.4 % (ref 0–5.6)

## 2025-02-05 PROCEDURE — 80048 BASIC METABOLIC PNL TOTAL CA: CPT

## 2025-02-05 PROCEDURE — 83036 HEMOGLOBIN GLYCOSYLATED A1C: CPT

## 2025-02-05 PROCEDURE — 36415 COLL VENOUS BLD VENIPUNCTURE: CPT

## 2025-02-06 LAB
ANION GAP SERPL CALCULATED.3IONS-SCNC: 9 MMOL/L (ref 7–15)
BUN SERPL-MCNC: 15.5 MG/DL (ref 8–23)
CALCIUM SERPL-MCNC: 9.4 MG/DL (ref 8.8–10.4)
CHLORIDE SERPL-SCNC: 101 MMOL/L (ref 98–107)
CREAT SERPL-MCNC: 0.77 MG/DL (ref 0.67–1.17)
EGFRCR SERPLBLD CKD-EPI 2021: 89 ML/MIN/1.73M2
GLUCOSE SERPL-MCNC: 244 MG/DL (ref 70–99)
HCO3 SERPL-SCNC: 26 MMOL/L (ref 22–29)
POTASSIUM SERPL-SCNC: 5.4 MMOL/L (ref 3.4–5.3)
SODIUM SERPL-SCNC: 136 MMOL/L (ref 135–145)

## 2025-03-10 ENCOUNTER — TELEPHONE (OUTPATIENT)
Dept: ENDOCRINOLOGY | Facility: CLINIC | Age: 85
End: 2025-03-10
Payer: COMMERCIAL

## 2025-03-10 DIAGNOSIS — E11.9 TYPE 2 DIABETES, HBA1C GOAL < 8% (H): ICD-10-CM

## 2025-03-10 DIAGNOSIS — E11.42 TYPE 2 DIABETES MELLITUS WITH PERIPHERAL NEUROPATHY (H): ICD-10-CM

## 2025-03-17 RX ORDER — INSULIN LISPRO 100 [IU]/ML
INJECTION, SUSPENSION SUBCUTANEOUS
Qty: 15 ML | Refills: 0 | Status: SHIPPED | OUTPATIENT
Start: 2025-03-17

## 2025-03-17 NOTE — TELEPHONE ENCOUNTER
Please schedule in a DENIS if able or first available, with labs prior. Pt only needs labs if the appt is 5/5 or after.

## 2025-03-27 ENCOUNTER — TRANSFERRED RECORDS (OUTPATIENT)
Dept: HEALTH INFORMATION MANAGEMENT | Facility: CLINIC | Age: 85
End: 2025-03-27
Payer: COMMERCIAL

## 2025-03-27 LAB — RETINOPATHY: POSITIVE

## 2025-04-02 ENCOUNTER — PATIENT OUTREACH (OUTPATIENT)
Dept: CARE COORDINATION | Facility: CLINIC | Age: 85
End: 2025-04-02
Payer: COMMERCIAL

## 2025-04-09 NOTE — TELEPHONE ENCOUNTER
Please r/s pt to a DENIS if able or first available, with labs prior. Pt only needs labs if the appt is 5/5 or after.

## 2025-04-14 DIAGNOSIS — E11.9 TYPE 2 DIABETES, HBA1C GOAL < 8% (H): ICD-10-CM

## 2025-04-14 NOTE — TELEPHONE ENCOUNTER
Requested Prescriptions   Pending Prescriptions Disp Refills    Continuous Glucose Sensor (FREESTYLE DOMI 2 SENSOR) MISC [Pharmacy Med Name: FREESTYLE DOMI 2 SEN 14D KIT]  0     Sig: CHANGE EVERY 14 DAYS       There is no refill protocol information for this order

## 2025-04-20 DIAGNOSIS — E11.42 TYPE 2 DIABETES MELLITUS WITH PERIPHERAL NEUROPATHY (H): ICD-10-CM

## 2025-04-21 RX ORDER — INSULIN LISPRO 100 [IU]/ML
INJECTION, SUSPENSION SUBCUTANEOUS
Qty: 15 ML | Refills: 0 | Status: SHIPPED | OUTPATIENT
Start: 2025-04-21

## 2025-04-21 NOTE — TELEPHONE ENCOUNTER
"Requested Prescriptions   Pending Prescriptions Disp Refills    insulin lispro protamine-insulin lispro (HUMALOG MIX 75/25 KWIKPEN) (75-25) 100 UNIT/ML pen [Pharmacy Med Name: HumaLOG Mix 75/25 KwikPen (75-25) 100 UNIT/ML Subcutaneous Suspension Pen-injector] 15 mL 0     Sig: INJECT 20 UNITS IN THE MORNING THEN 12 UNITS SUBCUTANEOUSLY AT NOON AND 12 WITH EVENING MEAL       Insulin Mixes Protocol Failed - 4/21/2025 12:19 PM        Failed - Medication is active on med list and the sig matches. RN to manually verify dose and sig if red X/fail.     If the protocol passes (green check), you do not need to verify med dose and sig.    A prescription matches if they are the same clinical intention.    For Example: once daily and every morning are the same.    The protocol can not identify upper and lower case letters as matching and will fail.     For Example: Take 1 tablet (50 mg) by mouth daily     TAKE 1 TABLET (50 MG) BY MOUTH DAILY    For all fails (red x), verify dose and sig.    If the refill does match what is on file, the RN can still proceed to approve the refill request.       If they do not match, route to the appropriate provider.             Passed - Serum creatinine on file in past 12 months     Recent Labs   Lab Test 02/05/25  1356   CR 0.77                 Passed - HgbA1C in past 3 or 6 months     If HgbA1C is 8 or greater, it needs to be on file within the past 3 months.  If less than 8, must be on file within the past 6 months.     Recent Labs   Lab Test 02/05/25  1356   A1C 9.4*             Passed - Recent (6 mo) or future (90 days) visit within the authorizing provider's specialty     Patient had office visit in the last 6 months or has a visit in the next 30 days with authorizing provider or within the authorizing provider's specialty.  See \"Patient Info\" tab in inbasket, or \"Choose Columns\" in Meds & Orders section of the refill encounter.            Passed - Patient is age 18 or older             "

## 2025-04-23 ENCOUNTER — DOCUMENTATION ONLY (OUTPATIENT)
Dept: LAB | Facility: CLINIC | Age: 85
End: 2025-04-23
Payer: COMMERCIAL

## 2025-04-23 NOTE — PROGRESS NOTES
Pee Ayala has an upcoming lab appointment:    Future Appointments   Date Time Provider Department Center   5/6/2025 11:15 AM hospitals LAB [facility]   5/7/2025  2:30 PM Fay Kwok NP MDENDO [facility]     Patient is scheduled for the following lab(s): and is looking to get lab done - please review and place pertinent lab orders in his chart.  Thx    There is no order available. Please review and place either future orders or HMPO (Review of Health Maintenance Protocol Orders), as appropriate.    Health Maintenance Due   Topic    ANNUAL REVIEW OF HM ORDERS      JASPAL Summers

## 2025-04-24 DIAGNOSIS — E11.42 TYPE 2 DIABETES MELLITUS WITH PERIPHERAL NEUROPATHY (H): Primary | ICD-10-CM

## 2025-05-01 DIAGNOSIS — E11.9 TYPE 2 DIABETES, HBA1C GOAL < 8% (H): ICD-10-CM

## 2025-05-06 ENCOUNTER — LAB (OUTPATIENT)
Dept: LAB | Facility: CLINIC | Age: 85
End: 2025-05-06
Payer: COMMERCIAL

## 2025-05-06 DIAGNOSIS — E11.42 TYPE 2 DIABETES MELLITUS WITH PERIPHERAL NEUROPATHY (H): ICD-10-CM

## 2025-05-06 LAB
EST. AVERAGE GLUCOSE BLD GHB EST-MCNC: 217 MG/DL
HBA1C MFR BLD: 9.2 % (ref 0–5.6)

## 2025-05-06 PROCEDURE — 83036 HEMOGLOBIN GLYCOSYLATED A1C: CPT

## 2025-05-06 PROCEDURE — 36415 COLL VENOUS BLD VENIPUNCTURE: CPT

## 2025-05-06 PROCEDURE — 80048 BASIC METABOLIC PNL TOTAL CA: CPT

## 2025-05-07 ENCOUNTER — OFFICE VISIT (OUTPATIENT)
Dept: ENDOCRINOLOGY | Facility: CLINIC | Age: 85
End: 2025-05-07
Payer: COMMERCIAL

## 2025-05-07 VITALS
OXYGEN SATURATION: 97 % | BODY MASS INDEX: 29.71 KG/M2 | DIASTOLIC BLOOD PRESSURE: 52 MMHG | WEIGHT: 167.7 LBS | SYSTOLIC BLOOD PRESSURE: 128 MMHG | HEART RATE: 65 BPM

## 2025-05-07 DIAGNOSIS — E11.69 TYPE 2 DIABETES MELLITUS WITH OTHER SPECIFIED COMPLICATION, WITH LONG-TERM CURRENT USE OF INSULIN (H): Primary | ICD-10-CM

## 2025-05-07 DIAGNOSIS — Z79.4 TYPE 2 DIABETES MELLITUS WITH OTHER SPECIFIED COMPLICATION, WITH LONG-TERM CURRENT USE OF INSULIN (H): Primary | ICD-10-CM

## 2025-05-07 LAB
ANION GAP SERPL CALCULATED.3IONS-SCNC: 8 MMOL/L (ref 7–15)
BUN SERPL-MCNC: 14.2 MG/DL (ref 8–23)
CALCIUM SERPL-MCNC: 9.4 MG/DL (ref 8.8–10.4)
CHLORIDE SERPL-SCNC: 102 MMOL/L (ref 98–107)
CREAT SERPL-MCNC: 0.86 MG/DL (ref 0.67–1.17)
EGFRCR SERPLBLD CKD-EPI 2021: 86 ML/MIN/1.73M2
GLUCOSE SERPL-MCNC: 333 MG/DL (ref 70–99)
HCO3 SERPL-SCNC: 29 MMOL/L (ref 22–29)
POTASSIUM SERPL-SCNC: 4.5 MMOL/L (ref 3.4–5.3)
SODIUM SERPL-SCNC: 139 MMOL/L (ref 135–145)

## 2025-05-07 PROCEDURE — 99214 OFFICE O/P EST MOD 30 MIN: CPT | Performed by: NURSE PRACTITIONER

## 2025-05-07 PROCEDURE — 3074F SYST BP LT 130 MM HG: CPT | Performed by: NURSE PRACTITIONER

## 2025-05-07 PROCEDURE — 3078F DIAST BP <80 MM HG: CPT | Performed by: NURSE PRACTITIONER

## 2025-05-07 RX ORDER — BLOOD-GLUCOSE SENSOR
EACH MISCELLANEOUS
Qty: 6 EACH | Refills: 3 | Status: SHIPPED | OUTPATIENT
Start: 2025-05-07
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
## 2025-05-08 ENCOUNTER — RESULTS FOLLOW-UP (OUTPATIENT)
Dept: ENDOCRINOLOGY | Facility: CLINIC | Age: 85
End: 2025-05-08

## 2025-06-11 DIAGNOSIS — E11.42 TYPE 2 DIABETES MELLITUS WITH PERIPHERAL NEUROPATHY (H): ICD-10-CM

## 2025-06-12 ENCOUNTER — TELEPHONE (OUTPATIENT)
Dept: ENDOCRINOLOGY | Facility: CLINIC | Age: 85
End: 2025-06-12
Payer: COMMERCIAL

## 2025-06-12 DIAGNOSIS — E11.9 TYPE 2 DIABETES, HBA1C GOAL < 8% (H): ICD-10-CM

## 2025-06-12 RX ORDER — INSULIN LISPRO 100 [IU]/ML
INJECTION, SUSPENSION SUBCUTANEOUS
Qty: 45 ML | Refills: 0 | Status: SHIPPED | OUTPATIENT
Start: 2025-06-12

## 2025-06-12 NOTE — TELEPHONE ENCOUNTER
"Requested Prescriptions   Pending Prescriptions Disp Refills    insulin lispro protamine-insulin lispro (HUMALOG MIX 75/25 KWIKPEN) (75-25) 100 UNIT/ML pen [Pharmacy Med Name: HumaLOG Mix 75/25 KwikPen (75-25) 100 UNIT/ML Subcutaneous Suspension Pen-injector] 15 mL 0     Sig: INJECT 20 UNITS IN THE MORNING THEN 12 UNITS SUBCUTANEOUSLY AT NOON AND 12 WITH EVENING MEAL       Insulin Mixes Protocol Passed - 6/12/2025  8:35 AM        Passed - Serum creatinine on file in past 12 months     Recent Labs   Lab Test 05/06/25  1113   CR 0.86                 Passed - HgbA1C in past 3 or 6 months     If HgbA1C is 8 or greater, it needs to be on file within the past 3 months.  If less than 8, must be on file within the past 6 months.     Recent Labs   Lab Test 05/06/25  1113   A1C 9.2*             Passed - Medication is active on med list and the sig matches. RN to manually verify dose and sig if red X/fail.     If the protocol passes (green check), you do not need to verify med dose and sig.    A prescription matches if they are the same clinical intention.    For Example: once daily and every morning are the same.    The protocol can not identify upper and lower case letters as matching and will fail.     For Example: Take 1 tablet (50 mg) by mouth daily     TAKE 1 TABLET (50 MG) BY MOUTH DAILY    For all fails (red x), verify dose and sig.    If the refill does match what is on file, the RN can still proceed to approve the refill request.       If they do not match, route to the appropriate provider.             Passed - Recent (6 month) or future (90 days) visit with the authorizing provider's specialty (provided they have been seen in the past 9 months)     Patient had office visit in the last 6 months or has a visit in the next 30 days with authorizing provider or within the authorizing provider's specialty.  See \"Patient Info\" tab in inbasket, or \"Choose Columns\" in Meds & Orders section of the refill encounter.           "  Passed - Patient is age 18 or older

## 2025-06-12 NOTE — TELEPHONE ENCOUNTER
[facility] Call Center    Phone Message    May a detailed message be left on voicemail: yes     Reason for Call: Medication Refill Request    Has the patient contacted the pharmacy for the refill? Yes   Name of medication being requested: Novolog 70/30  Provider who prescribed the medication: [facility]  Pharmacy:   [facility] [id number]     Date medication is needed: patient states on his AVS from 05/07/2025 it states to change from Humalog to Novolog but pharmacy has no record of it. Humalog was sent today, but patient states it's too expensive and she was changing it anyway to Novolog. Please review as patient is down to his last pen, and call patient to let him know when prescription has been sent to pharmacy.

## 2025-06-12 NOTE — TELEPHONE ENCOUNTER
Does patient's insurance cover Jardiance and tresiba?     LOV 5/7/25:  Will see how things go over the next 3 months. Will consider starting Tresiba and perhaps Jardiance if affordable and losing the mixed insulin. This may provide better support for him.

## 2025-06-12 NOTE — TELEPHONE ENCOUNTER
[facility] Call Center    Phone Message    May a detailed message be left on voicemail: yes     Reason for Call: Medication Question or concern regarding medication   Prescription Clarification  Name of Medication: Judie 2 plus reader  Prescribing Provider: Fay Kwok   Pharmacy:   [address]      What on the order needs clarification? Pt needing the judie 2 plus reader, pt can not longer use the judie 2

## 2025-07-06 ENCOUNTER — HEALTH MAINTENANCE LETTER (OUTPATIENT)
Age: 85
End: 2025-07-06

## 2025-08-06 ENCOUNTER — TELEPHONE (OUTPATIENT)
Dept: ENDOCRINOLOGY | Facility: CLINIC | Age: 85
End: 2025-08-06

## 2025-08-10 DIAGNOSIS — G25.81 RESTLESS LEGS SYNDROME: ICD-10-CM

## 2025-08-11 RX ORDER — PRAMIPEXOLE DIHYDROCHLORIDE 0.5 MG/1
TABLET ORAL
Qty: 270 TABLET | Refills: 0 | Status: SHIPPED | OUTPATIENT
Start: 2025-08-11

## 2025-08-12 ENCOUNTER — LAB (OUTPATIENT)
Dept: LAB | Facility: CLINIC | Age: 85
End: 2025-08-12
Payer: COMMERCIAL

## 2025-08-12 DIAGNOSIS — Z79.4 TYPE 2 DIABETES MELLITUS WITH OTHER SPECIFIED COMPLICATION, WITH LONG-TERM CURRENT USE OF INSULIN (H): ICD-10-CM

## 2025-08-12 DIAGNOSIS — E11.69 TYPE 2 DIABETES MELLITUS WITH OTHER SPECIFIED COMPLICATION, WITH LONG-TERM CURRENT USE OF INSULIN (H): ICD-10-CM

## 2025-08-12 LAB
EST. AVERAGE GLUCOSE BLD GHB EST-MCNC: 220 MG/DL
HBA1C MFR BLD: 9.3 % (ref 0–5.6)

## 2025-08-12 PROCEDURE — 83036 HEMOGLOBIN GLYCOSYLATED A1C: CPT

## 2025-08-12 PROCEDURE — 36415 COLL VENOUS BLD VENIPUNCTURE: CPT

## 2025-08-12 PROCEDURE — 80048 BASIC METABOLIC PNL TOTAL CA: CPT

## 2025-08-13 ENCOUNTER — OFFICE VISIT (OUTPATIENT)
Dept: ENDOCRINOLOGY | Facility: CLINIC | Age: 85
End: 2025-08-13
Payer: COMMERCIAL

## 2025-08-13 VITALS
BODY MASS INDEX: 28.27 KG/M2 | DIASTOLIC BLOOD PRESSURE: 68 MMHG | OXYGEN SATURATION: 97 % | HEART RATE: 75 BPM | SYSTOLIC BLOOD PRESSURE: 116 MMHG | WEIGHT: 159.6 LBS

## 2025-08-13 DIAGNOSIS — Z79.4 TYPE 2 DIABETES MELLITUS WITH HYPERGLYCEMIA, WITH LONG-TERM CURRENT USE OF INSULIN (H): Primary | ICD-10-CM

## 2025-08-13 DIAGNOSIS — E11.65 TYPE 2 DIABETES MELLITUS WITH HYPERGLYCEMIA, WITH LONG-TERM CURRENT USE OF INSULIN (H): Primary | ICD-10-CM

## 2025-08-13 LAB
ANION GAP SERPL CALCULATED.3IONS-SCNC: 9 MMOL/L (ref 7–15)
BUN SERPL-MCNC: 24.8 MG/DL (ref 8–23)
CALCIUM SERPL-MCNC: 9.2 MG/DL (ref 8.8–10.4)
CHLORIDE SERPL-SCNC: 100 MMOL/L (ref 98–107)
CREAT SERPL-MCNC: 1.02 MG/DL (ref 0.67–1.17)
EGFRCR SERPLBLD CKD-EPI 2021: 73 ML/MIN/1.73M2
GLUCOSE SERPL-MCNC: 322 MG/DL (ref 70–99)
HCO3 SERPL-SCNC: 28 MMOL/L (ref 22–29)
POTASSIUM SERPL-SCNC: 4 MMOL/L (ref 3.4–5.3)
SODIUM SERPL-SCNC: 137 MMOL/L (ref 135–145)

## 2025-08-13 PROCEDURE — 99214 OFFICE O/P EST MOD 30 MIN: CPT | Performed by: NURSE PRACTITIONER

## 2025-08-13 PROCEDURE — 3046F HEMOGLOBIN A1C LEVEL >9.0%: CPT | Performed by: NURSE PRACTITIONER

## 2025-08-13 PROCEDURE — 3078F DIAST BP <80 MM HG: CPT | Performed by: NURSE PRACTITIONER

## 2025-08-13 PROCEDURE — 3074F SYST BP LT 130 MM HG: CPT | Performed by: NURSE PRACTITIONER

## (undated) DEVICE — WIRE GUIDE 0.035"X150CM EMERALD J TIP 502521

## (undated) DEVICE — DRESSING MEPILEX BORDER POST-OP 4X10

## (undated) DEVICE — INTRODUCER SHEATH 4FRX40CM MICROPUNC PED G47946

## (undated) DEVICE — SOL WATER IRRIG 1000ML BOTTLE 2F7114

## (undated) DEVICE — INTRO SHEATH 8FRX10CM PINNACLE RSS802

## (undated) DEVICE — DRAPE SHEET TABLE COVER KC 42301*

## (undated) DEVICE — GW .035IN X 260CM WHOLEY FLOPPY TIP WWFS35260

## (undated) DEVICE — Device

## (undated) DEVICE — SYSTEM DELIVERY MECHANISM COMMANDER 29MM

## (undated) DEVICE — CATH SYSTEM SENTINEL CEREBRAL PROTECTION 6FR CMS15-10C-US

## (undated) DEVICE — CATH ANGIO JUDKINS JL3.5 6FRX100CM INFINITI 534618T

## (undated) DEVICE — HOLDER LIMB VELCRO OR 0814-1533

## (undated) DEVICE — INTRODUCER SHEATH FAST-CATH 7FRX12CM 406244

## (undated) DEVICE — PACK HEART LEFT CUSTOM

## (undated) DEVICE — SET INTRODUCER SHEATH 16FR 916ES

## (undated) DEVICE — MANIFOLD KIT ANGIO AUTOMATED 014613

## (undated) DEVICE — GLOVE BIOGEL PI INDICATOR 8.0 LF 41680

## (undated) DEVICE — SUTURE VICRYL+ 0 27IN CT-1 UND VCP260H

## (undated) DEVICE — SU ETHIBOND 2 V-37 4X30" MX69G

## (undated) DEVICE — SU ETHIBOND 5 V-37 4X30" MB66G

## (undated) DEVICE — SU STRATAFIX PDS PLUS 1 CT-1 18" SXPP1A404

## (undated) DEVICE — INTRO TERUMO 7FRX10CM PINNACLE W/MARKER RSB702

## (undated) DEVICE — SYR ANGIOGRAPHY MULTIUSE KIT ACIST 014612

## (undated) DEVICE — PLATE GROUNDING ADULT W/CORD 9165L

## (undated) DEVICE — BONE CEMENT MIXEVAC HI VAC W/CARTRIDGE 0306-563-000

## (undated) DEVICE — STPL SKIN 35W 6.9MM  PXW35

## (undated) DEVICE — INTRO SHEATH 6FRX10CM PINNACLE RSS602

## (undated) DEVICE — DRAIN CLOSED FLUID SYSTEM ABSCESS EVAC SET 90500040

## (undated) DEVICE — INTRO SHEATH 7FRX25CM PINNACLE RSS706

## (undated) DEVICE — GLOVE UNDER INDICATOR PI SZ 6 LF 41660

## (undated) DEVICE — A3 SUPPLIES- SEE NURSING INFO PAGE

## (undated) DEVICE — KIT HAND CONTROL ACIST 014644 AR-P54

## (undated) DEVICE — SET INTRODUCER SHEATH 14FR 914ES

## (undated) DEVICE — BLADE SAW SAGITTAL STRK WIDE 25.4X85X1.2MM 2108-151-000

## (undated) DEVICE — DRAPE IOBAN INCISE 23X17" 6650EZ

## (undated) DEVICE — CATH ANGIO INFINITI IM 4FRX100CM 538460

## (undated) DEVICE — PAD HIP POSITIONING MCGUIRE 707-CPM

## (undated) DEVICE — BONE CLEANING TIP INTERPULSE FEMORAL CANAL 0210-008-000

## (undated) DEVICE — CATH ANGIO JUDKINS R4 6FRX100CM INFINITI 534621T

## (undated) DEVICE — SUTURE VICRYL+ 2-0 27IN CT-1 UND VCP259H

## (undated) DEVICE — DEFIB PADPRO CONMED ADULT/CHILD 2001Z-C

## (undated) DEVICE — ELECTRODE DEFIB CADENCE 22550R

## (undated) DEVICE — GLOVE BIOGEL PI SZ 6.0 40860

## (undated) DEVICE — INTRO SHEATH MICRO PLATINUM TIP 4FRX40CM 7274

## (undated) DEVICE — GOWN IMPERVIOUS BREATHABLE SMART XLG 89045

## (undated) DEVICE — SYR VALVE LOW VISCOSITY 100ML ACIST A2000V

## (undated) DEVICE — CEMENT PRESSURIZER FEMORAL CANAL MED 0206-546-000

## (undated) DEVICE — EXCHANGE WIRE .035 260 STAR/JFC/035/260/ M001491681

## (undated) DEVICE — SHEATH EXT 15CM STANDTALL ST0015L

## (undated) DEVICE — INTRO GLIDESHEATH SLENDER 6FRX45CM RM-ES6F10HAU

## (undated) DEVICE — INTRO SHEATH 4FRX25CM PINNACLE MARKER RSB403

## (undated) DEVICE — DRAPE STERI 23X17 NON STERILE 1010NSD

## (undated) DEVICE — CLOSURE ANGIOSEAL 6FR 610130

## (undated) DEVICE — INTRO TERUMO 8FRX25CM W/MARKER RSB803

## (undated) DEVICE — CABLE PACING ALLIGATOR CLIP 12FT 5833SL

## (undated) DEVICE — SYR LOCKING ATRION QL38

## (undated) DEVICE — GLOVE BIOGEL PI ULTRATOUCH G SZ 7.5 42175

## (undated) DEVICE — GUIDEWIRE VASC SAFARI2 0.035X275CM H74939406XS1

## (undated) DEVICE — WIRE GUIDE HI-TRQ BALANCE MDWT JTIP 0.014"X190CM 1001780J-HC

## (undated) DEVICE — SOL NACL 0.9% INJ 1000ML BAG 2B1324X

## (undated) DEVICE — BONE CLEANING TIP INTERPULSE  0210-010-000

## (undated) DEVICE — SPONGE RAY-TEC 4X8" 7318

## (undated) DEVICE — CUSTOM PACK TOTAL HIP SOP5BTHHEA

## (undated) DEVICE — WIRE GUIDE 0.035"X260CM AMPTLAZ XSTIFF CVD THSCF-35-260-3-A

## (undated) DEVICE — PILLOW HIP ABDUCTION CONCAVE 1.9

## (undated) DEVICE — COVER ULTRASOUND PROBE W/GEL FLEXI-FEEL 6"X58" LF  25-FF658

## (undated) DEVICE — SLEEVE TR BAND RADIAL COMPRESSION DEVICE 29CM XX-RF06L

## (undated) DEVICE — INTRO TERUMO 6FRX25CM W/MARKER RSB603

## (undated) DEVICE — CUSTOM PACK CORONARY SAN5BCRHEA

## (undated) DEVICE — INTRO SHEATH 7FRX10CM PINNACLE RSS702

## (undated) DEVICE — INFLATION DEVICE ATRION QL2530

## (undated) DEVICE — SLEEVE TR BAND RADIAL COMPRESSION DEVICE 24CM TRB24-REG

## (undated) DEVICE — SUTURE VICRYL+ 1 27IN CT-1 UND VCP261H

## (undated) DEVICE — SHTH INTRO 0.021IN ID 6FR DIA

## (undated) DEVICE — INTRO GLIDESHEATH SLENDER 6FR 10X45CM 60-1060

## (undated) DEVICE — SUCTION IRR SYSTEM W/O TIP INTERPULSE HANDPIECE 0210-100-000

## (undated) DEVICE — INTRO SHEATH 6FRX25CM PINNACLE RSS606

## (undated) DEVICE — ESU GROUND PAD ADULT REM W/15' CORD E7507DB

## (undated) DEVICE — CATH ANGIO IMPULSE 6FR 100CML IMA CURVE

## (undated) DEVICE — BONE WAX 2.5GM W31G

## (undated) DEVICE — SUCTION MANIFOLD NEPTUNE 2 SYS 4 PORT 0702-020-000

## (undated) DEVICE — GOWN IMPERVIOUS BREATHABLE SMART LG 89015

## (undated) DEVICE — RETRIVER SUTURE LASSO HOFFEE BLUE 710000

## (undated) RX ORDER — FENTANYL CITRATE-0.9 % NACL/PF 10 MCG/ML
PLASTIC BAG, INJECTION (ML) INTRAVENOUS
Status: DISPENSED
Start: 2022-04-09

## (undated) RX ORDER — HYDROMORPHONE HCL IN WATER/PF 6 MG/30 ML
PATIENT CONTROLLED ANALGESIA SYRINGE INTRAVENOUS
Status: DISPENSED
Start: 2021-01-12

## (undated) RX ORDER — CEFAZOLIN SODIUM 1 G/3ML
INJECTION, POWDER, FOR SOLUTION INTRAMUSCULAR; INTRAVENOUS
Status: DISPENSED
Start: 2022-04-09

## (undated) RX ORDER — PROPOFOL 10 MG/ML
INJECTION, EMULSION INTRAVENOUS
Status: DISPENSED
Start: 2022-04-09

## (undated) RX ORDER — VASOPRESSIN 20 U/ML
INJECTION PARENTERAL
Status: DISPENSED
Start: 2022-04-09

## (undated) RX ORDER — HEPARIN SODIUM 1000 [USP'U]/ML
INJECTION, SOLUTION INTRAVENOUS; SUBCUTANEOUS
Status: DISPENSED
Start: 2021-01-12

## (undated) RX ORDER — DEXAMETHASONE SODIUM PHOSPHATE 4 MG/ML
INJECTION, SOLUTION INTRA-ARTICULAR; INTRALESIONAL; INTRAMUSCULAR; INTRAVENOUS; SOFT TISSUE
Status: DISPENSED
Start: 2021-01-12

## (undated) RX ORDER — LIDOCAINE HYDROCHLORIDE 10 MG/ML
INJECTION, SOLUTION INFILTRATION; PERINEURAL
Status: DISPENSED
Start: 2021-12-07

## (undated) RX ORDER — GLYCOPYRROLATE 0.2 MG/ML
INJECTION, SOLUTION INTRAMUSCULAR; INTRAVENOUS
Status: DISPENSED
Start: 2021-01-12

## (undated) RX ORDER — CEFAZOLIN SODIUM 2 G/100ML
INJECTION, SOLUTION INTRAVENOUS
Status: DISPENSED
Start: 2021-01-12

## (undated) RX ORDER — ACETAMINOPHEN 325 MG/1
TABLET ORAL
Status: DISPENSED
Start: 2021-01-12

## (undated) RX ORDER — HEPARIN SODIUM 1000 [USP'U]/ML
INJECTION, SOLUTION INTRAVENOUS; SUBCUTANEOUS
Status: DISPENSED
Start: 2021-12-07

## (undated) RX ORDER — CLOPIDOGREL BISULFATE 75 MG/1
TABLET ORAL
Status: DISPENSED
Start: 2021-12-07

## (undated) RX ORDER — ONDANSETRON 2 MG/ML
INJECTION INTRAMUSCULAR; INTRAVENOUS
Status: DISPENSED
Start: 2021-01-12

## (undated) RX ORDER — LIDOCAINE HYDROCHLORIDE 20 MG/ML
INJECTION, SOLUTION EPIDURAL; INFILTRATION; INTRACAUDAL; PERINEURAL
Status: DISPENSED
Start: 2021-01-12

## (undated) RX ORDER — ACETAMINOPHEN 325 MG/1
TABLET ORAL
Status: DISPENSED
Start: 2021-12-07

## (undated) RX ORDER — KETAMINE HYDROCHLORIDE 10 MG/ML
INJECTION INTRAMUSCULAR; INTRAVENOUS
Status: DISPENSED
Start: 2022-04-09

## (undated) RX ORDER — FENTANYL CITRATE 50 UG/ML
INJECTION, SOLUTION INTRAMUSCULAR; INTRAVENOUS
Status: DISPENSED
Start: 2022-04-09

## (undated) RX ORDER — PROTAMINE SULFATE 10 MG/ML
INJECTION, SOLUTION INTRAVENOUS
Status: DISPENSED
Start: 2021-12-07

## (undated) RX ORDER — FENTANYL CITRATE 50 UG/ML
INJECTION, SOLUTION INTRAMUSCULAR; INTRAVENOUS
Status: DISPENSED
Start: 2021-12-07

## (undated) RX ORDER — FENTANYL CITRATE 50 UG/ML
INJECTION, SOLUTION INTRAMUSCULAR; INTRAVENOUS
Status: DISPENSED
Start: 2023-12-22

## (undated) RX ORDER — FENTANYL CITRATE 50 UG/ML
INJECTION, SOLUTION INTRAMUSCULAR; INTRAVENOUS
Status: DISPENSED
Start: 2021-01-12

## (undated) RX ORDER — PROPOFOL 10 MG/ML
INJECTION, EMULSION INTRAVENOUS
Status: DISPENSED
Start: 2021-01-12